# Patient Record
Sex: MALE | Race: WHITE | NOT HISPANIC OR LATINO | Employment: UNEMPLOYED | URBAN - METROPOLITAN AREA
[De-identification: names, ages, dates, MRNs, and addresses within clinical notes are randomized per-mention and may not be internally consistent; named-entity substitution may affect disease eponyms.]

---

## 2017-01-02 DIAGNOSIS — K21.9 GASTROESOPHAGEAL REFLUX DISEASE WITHOUT ESOPHAGITIS: Primary | ICD-10-CM

## 2017-01-02 RX ORDER — ATORVASTATIN CALCIUM 40 MG/1
20 TABLET, FILM COATED ORAL DAILY
Qty: 90 TABLET | Refills: 3 | COMMUNITY
Start: 2017-01-02 | End: 2019-01-24

## 2017-02-03 ENCOUNTER — TRANSFERRED RECORDS (OUTPATIENT)
Dept: HEALTH INFORMATION MANAGEMENT | Facility: CLINIC | Age: 42
End: 2017-02-03

## 2017-02-24 ENCOUNTER — TELEPHONE (OUTPATIENT)
Dept: EDUCATION SERVICES | Facility: CLINIC | Age: 42
End: 2017-02-24

## 2017-02-24 DIAGNOSIS — E10.59 TYPE 1 DIABETES MELLITUS WITH DIABETIC CARDIOMYOPATHY (H): Primary | ICD-10-CM

## 2017-02-24 DIAGNOSIS — I43 TYPE 1 DIABETES MELLITUS WITH DIABETIC CARDIOMYOPATHY (H): Primary | ICD-10-CM

## 2017-02-24 NOTE — TELEPHONE ENCOUNTER
Patient has expressed interest in attending diabetes education, as a part of his [facility] insurance plan's Comprehensive Diabetes Solution program. Diabetes Educator Referral has been pended for your completion and signature. Please review, complete and sign, if you are in agreement. Thank you!    Marcelle Garcia MPH RD LD CDE   [facility]

## 2017-03-23 DIAGNOSIS — Z94.0 KIDNEY TRANSPLANTED: ICD-10-CM

## 2017-03-23 LAB
ANION GAP SERPL CALCULATED.3IONS-SCNC: 6 MMOL/L (ref 3–14)
BUN SERPL-MCNC: 20 MG/DL (ref 7–30)
CALCIUM SERPL-MCNC: 9.2 MG/DL (ref 8.5–10.1)
CHLORIDE SERPL-SCNC: 106 MMOL/L (ref 94–109)
CO2 SERPL-SCNC: 28 MMOL/L (ref 20–32)
CREAT SERPL-MCNC: 1.78 MG/DL (ref 0.66–1.25)
ERYTHROCYTE [DISTWIDTH] IN BLOOD BY AUTOMATED COUNT: 13.2 % (ref 10–15)
GFR SERPL CREATININE-BSD FRML MDRD: 43 ML/MIN/1.7M2
GLUCOSE SERPL-MCNC: 117 MG/DL (ref 70–99)
HCT VFR BLD AUTO: 42.4 % (ref 40–53)
HGB BLD-MCNC: 14 G/DL (ref 13.3–17.7)
MCH RBC QN AUTO: 29.5 PG (ref 26.5–33)
MCHC RBC AUTO-ENTMCNC: 33 G/DL (ref 31.5–36.5)
MCV RBC AUTO: 89 FL (ref 78–100)
PLATELET # BLD AUTO: 248 10E9/L (ref 150–450)
POTASSIUM SERPL-SCNC: 4.9 MMOL/L (ref 3.4–5.3)
RBC # BLD AUTO: 4.75 10E12/L (ref 4.4–5.9)
SODIUM SERPL-SCNC: 140 MMOL/L (ref 133–144)
TACROLIMUS BLD-MCNC: 7.1 UG/L (ref 5–15)
TME LAST DOSE: NORMAL H
WBC # BLD AUTO: 7.2 10E9/L (ref 4–11)

## 2017-03-23 PROCEDURE — 80197 ASSAY OF TACROLIMUS: CPT | Performed by: INTERNAL MEDICINE

## 2017-03-23 PROCEDURE — 80048 BASIC METABOLIC PNL TOTAL CA: CPT | Performed by: INTERNAL MEDICINE

## 2017-03-23 PROCEDURE — 85027 COMPLETE CBC AUTOMATED: CPT | Performed by: INTERNAL MEDICINE

## 2017-03-23 PROCEDURE — 36415 COLL VENOUS BLD VENIPUNCTURE: CPT | Performed by: INTERNAL MEDICINE

## 2017-04-04 NOTE — TELEPHONE ENCOUNTER
Prescription approved per [facility] Refill Protocol.  Malia Kaminski, RN  Triage Nurse

## 2017-04-04 NOTE — TELEPHONE ENCOUNTER
YOHAN CONTOUR TEST STRIP       Last Written Prescription Date: 12/30/2015  Last Fill Quantity: 450,  # refills: 3   Last Office Visit with [facility], [facility] or [facility] prescribing provider: 12/22/2016

## 2017-06-07 DIAGNOSIS — Z94.0 KIDNEY TRANSPLANTED: ICD-10-CM

## 2017-06-07 LAB
ANION GAP SERPL CALCULATED.3IONS-SCNC: 9 MMOL/L (ref 3–14)
BUN SERPL-MCNC: 27 MG/DL (ref 7–30)
CALCIUM SERPL-MCNC: 9.1 MG/DL (ref 8.5–10.1)
CHLORIDE SERPL-SCNC: 108 MMOL/L (ref 94–109)
CO2 SERPL-SCNC: 22 MMOL/L (ref 20–32)
CREAT SERPL-MCNC: 1.63 MG/DL (ref 0.66–1.25)
ERYTHROCYTE [DISTWIDTH] IN BLOOD BY AUTOMATED COUNT: 13 % (ref 10–15)
GFR SERPL CREATININE-BSD FRML MDRD: 47 ML/MIN/1.7M2
GLUCOSE SERPL-MCNC: 116 MG/DL (ref 70–99)
HCT VFR BLD AUTO: 41.9 % (ref 40–53)
HGB BLD-MCNC: 13.9 G/DL (ref 13.3–17.7)
MCH RBC QN AUTO: 29.6 PG (ref 26.5–33)
MCHC RBC AUTO-ENTMCNC: 33.2 G/DL (ref 31.5–36.5)
MCV RBC AUTO: 89 FL (ref 78–100)
PLATELET # BLD AUTO: 259 10E9/L (ref 150–450)
POTASSIUM SERPL-SCNC: 5 MMOL/L (ref 3.4–5.3)
RBC # BLD AUTO: 4.7 10E12/L (ref 4.4–5.9)
SODIUM SERPL-SCNC: 139 MMOL/L (ref 133–144)
TACROLIMUS BLD-MCNC: 6.5 UG/L (ref 5–15)
TME LAST DOSE: NORMAL H
WBC # BLD AUTO: 8.6 10E9/L (ref 4–11)

## 2017-06-07 PROCEDURE — 85027 COMPLETE CBC AUTOMATED: CPT | Performed by: INTERNAL MEDICINE

## 2017-06-07 PROCEDURE — 80197 ASSAY OF TACROLIMUS: CPT | Performed by: INTERNAL MEDICINE

## 2017-06-07 PROCEDURE — 36415 COLL VENOUS BLD VENIPUNCTURE: CPT | Performed by: INTERNAL MEDICINE

## 2017-06-07 PROCEDURE — 80048 BASIC METABOLIC PNL TOTAL CA: CPT | Performed by: INTERNAL MEDICINE

## 2017-06-14 ENCOUNTER — OFFICE VISIT (OUTPATIENT)
Dept: PODIATRY | Facility: CLINIC | Age: 42
End: 2017-06-14
Payer: COMMERCIAL

## 2017-06-14 VITALS — WEIGHT: 201.1 LBS | BODY MASS INDEX: 30.48 KG/M2 | HEART RATE: 76 BPM | HEIGHT: 68 IN

## 2017-06-14 DIAGNOSIS — I43 TYPE 1 DIABETES MELLITUS WITH DIABETIC CARDIOMYOPATHY (H): ICD-10-CM

## 2017-06-14 DIAGNOSIS — E10.59 TYPE 1 DIABETES MELLITUS WITH DIABETIC CARDIOMYOPATHY (H): ICD-10-CM

## 2017-06-14 DIAGNOSIS — M79.671 PAIN IN BOTH FEET: Primary | ICD-10-CM

## 2017-06-14 DIAGNOSIS — L84 CALLUS OF FOOT: ICD-10-CM

## 2017-06-14 DIAGNOSIS — M21.6X9 PES CAVUS, UNSPECIFIED LATERALITY: ICD-10-CM

## 2017-06-14 DIAGNOSIS — M79.672 PAIN IN BOTH FEET: Primary | ICD-10-CM

## 2017-06-14 PROCEDURE — 11056 PARNG/CUTG B9 HYPRKR LES 2-4: CPT | Performed by: PODIATRIST

## 2017-06-14 PROCEDURE — 99203 OFFICE O/P NEW LOW 30 MIN: CPT | Mod: 25 | Performed by: PODIATRIST

## 2017-06-14 NOTE — PROGRESS NOTES
"  ASSESSMENT/PLAN:    Encounter Diagnoses   Name Primary?     Pain in both feet Yes     Callus of foot      Pes cavus, unspecified laterality      No acute findings. No ulcerations.    I paired the three  hyperkeratotic lesions down with a #15 scalpel.   He is to continue to file the areas down  Pt is referred to the [facility] for  prescription accommodative orthoses.      Information on \"diabetes and your feet\" was provided.     Body mass index is 30.58 kg/(m^2).    Weight management plan: Patient was referred to their PCP to discuss a diet and exercise plan.      Eric Edward DPM, FACFAS, MS    [place] [facility]le [facility]      ____________________________________________________________________    HPI:          Chief Complaint: callus build up, both feet.   Onset of problem: years  Pain/ discomfort is described as:  Throbbing, shooting  Ratin/10   Frequency:  daily    The pain is made worse with walking  Previous treatment: orthoses, self filing  *  Past Medical History:   Diagnosis Date     CMV (cytomegalovirus infection) status negative      Coronary artery disease, non-occlusive     angio showed diffuse disease with no lesions     Diabetes mellitus, type 1     Diagnosed at age 11     Dyslipidemia      Hypertension      Immunosuppressed status (H)      Kidney transplant status, living related donor           Mycotic aneurysm of kidney transplant (H) 2008     Noncompliance with treatment     no labs for one year     Pancreas replaced by transplant (H) 2009    rejection treated with thymo     Tobacco abuse ongoing   *  *  Past Surgical History:   Procedure Laterality Date     BYPASS GRAFT ARTERY CORONARY N/A 2015    Procedure: BYPASS GRAFT ARTERY CORONARY;  Surgeon: Katy Neville MD;  Location: [facility] OR     ORTHOPEDIC SURGERY      tumor removed from left knee     TRANSPLANT      pancrease and kidney transplant   *  *  Current " Outpatient Prescriptions   Medication Sig Dispense Refill     YOHAN CONTOUR test strip USE TO TEST BLOOD SUGAR FIVE TIMES A DAY OR AS DIRECTED 450 each 3     atorvastatin (LIPITOR) 40 MG tablet Take 0.5 tablets (20 mg) by mouth daily 90 tablet 3     omeprazole (PRILOSEC) 40 MG capsule Take 1 capsule (40 mg) by mouth daily Take 30-60 minutes before a meal. 90 capsule 3     tacrolimus (PROGRAF - GENERIC EQUIVALENT) 1 MG capsule Take 1 capsule (1 mg) by mouth 2 times daily Total dose = 1.5 mg BID 60 capsule 11     tacrolimus (PROGRAF - GENERIC EQUIVALENT) 0.5 MG capsule Take 1 capsule (0.5 mg) by mouth 2 times daily Total dose = 1.5 mg BID 60 capsule 11     aspirin 81 MG tablet Take 1 tablet (81 mg) by mouth daily 90 tablet 3     insulin glargine (LANTUS SOLOSTAR) 100 UNIT/ML injection Inject 22 Units Subcutaneous At Bedtime 1 Month 5     insulin aspart (NOVOLOG PEN) 100 UNIT/ML injection Inject 1-8 Units Subcutaneous 3 times daily (before meals) Do Not give Correction Insulin if Pre-Meal BG < 140   140-169 give 1 units.  170-199 give 2 units.  -229 give 3 units.  -259 give 4 units.  260-289 give 5 units.  290-319 give 6 units.  320-349 give 7 units.  BG >/= 350 give 8 units.  To be given with prandial insulin, and based on pre-meal blood glucose.   Notify MD if glucose = or > 350 mg/dL after administration. 15 mL 5     metoprolol (TOPROL-XL) 25 MG 24 hr tablet Take 1 tablet (25 mg) by mouth daily 90 tablet 3     mycophenolic acid (MYFORTIC - GENERIC EQUIVALENT) 360 MG EC tablet Take 2 tablets (720 mg) by mouth 2 times daily 120 each 5     fludrocortisone (FLORINEF) 0.1 MG tablet TAKE ONE TABLET BY MOUTH EVERY DAY 30 tablet 11     insulin pen needle (BD ARPITA U/F) 32G X 4 MM Use 4 daily or as directed. 120 each 11     polyethylene glycol (MIRALAX/GLYCOLAX) packet Take 17 g by mouth daily as needed for constipation       lactulose 20 GM/30ML SOLN Take 30 mLs (20 g) by mouth 2 times daily as needed For  "constipation. 473 mL 1     insulin aspart (NOVOLOG PEN) 100 UNIT/ML soln Inject 1-6 Units Subcutaneous At Bedtime Custom DOSING     Do Not give Bedtime Correction Insulin if BG < 200  -229 give 1 units.  -259 give 2 units.  -289 give 3 units.  -319 give 4 units.  -349 give 5 units.  BG >/= 350 give 6 units.  Notify MD if glucose = or > 350 mg/dL after administration. 3 mL 2     insulin aspart (NOVOLOG PEN) 100 UNIT/ML soln Inject 1 Units Subcutaneous Take with snacks or supplements for high blood sugar DOSE:  1 units per 10 grams of carbohydrate. Only chart total amount of units given.  Do not give if pre-prandial glucose is < 60 mg/dL. 1 Month 6     Blood Glucose Monitoring Suppl ([employer] CONTOUR MONITOR) W/DEVICE KIT TO TEST BG 5 TIMIES DAILY 1 kit 0       ROS:     A 10-point review of systems was performed and is positive for that noted in the HPI and as seen below.  All other areas are negative.     Numbness in feet?  no   Calf pain with walking? no  Recent foot/ankle injury? no  Weight change over past 12 months? 10-15# gain  Self perception as overweight? yes  Recent flu-like symptoms? no  Joint pain other than feet ? no    Social History: Employment:  Software desnigner;  Exercise/Physical activity:  \"below average\";  Tobacco use: yes  Social History     Social History     Marital status:      Spouse name: N/A     Number of children: 1     Years of education: N/A     Occupational History           Social History Main Topics     Smoking status: Former Smoker     Packs/day: 0.30     Types: Cigarettes     Smokeless tobacco: Never Used      Comment: E- Cig     Alcohol use 0.0 oz/week     0 Standard drinks or equivalent per week      Comment: social     Drug use: No     Sexual activity: No     Other Topics Concern     Blood Transfusions No     possibly during surgery, otherwise none.     Seat Belt Yes     Social History Narrative       Family " "history:  Family History   Problem Relation Age of Onset     HEART DISEASE Maternal Grandfather 62       Rheumatoid arthritis:  no  Foot Problems: no  Diabetes: yes      EXAM:    Vitals: Pulse 76  Ht 5' 8\" (1.727 m)  Wt 201 lb 1.6 oz (91.2 kg)  BMI 30.58 kg/m2  BMI: Body mass index is 30.58 kg/(m^2).  Height: 5' 8\"    Constitutional/ general:  Pt is in no apparent distress, appears well-nourished.  Cooperative with history and physical exam.     Vascular:  Pedal pulses are palpable bilaterally for both the DP and PT arteries.  CFT < 3 sec.  No edema.  Pedal hair growth noted.     Neuro:  Alert and oriented x 3. Coordinated gait.  Light touch sensation is intact to the L4, L5, S1 distributions. No obvious deficits.  No evidence of neurological-based weakness, spasticity, or contracture in the lower extremities.     Derm: Normal texture and turgor.  No erythema, ecchymosis, or cyanosis.  No open lesions.   Hyperkeratotic skin build up sub right 5th metatarsal head; sub left 1st and 5th metatarsal heads    Musculoskeletal:    Lower extremity muscle strength is normal.  Patient is ambulatory without an assistive device or brace .  No gross deformities.  High medial longitudinal arches.      Eric Edward DPM, JAMEL, MS [place] [facility] & Ankle Surgery              "

## 2017-06-14 NOTE — LETTER
"  2017       RE: Murtaza Fitzgerald  [address] [address]           Dear Colleague,    Thank you for referring your patient, Murtaza Fitzgerald, to the [facility]. Please see a copy of my visit note below.      ASSESSMENT/PLAN:    Encounter Diagnoses   Name Primary?     Pain in both feet Yes     Callus of foot      Pes cavus, unspecified laterality      No acute findings. No ulcerations.    I paired the three  hyperkeratotic lesions down with a #15 scalpel.   He is to continue to file the areas down  Pt is referred to the [facility] for  prescription accommodative orthoses.      Information on \"diabetes and your feet\" was provided.     Body mass index is 30.58 kg/(m^2).    Weight management plan: Patient was referred to their PCP to discuss a diet and exercise plan.      Eric Edward DPM, FACFAS, MS    [place] [facility] Pod[facility]ry/Foot & Ankle Surgery      ____________________________________________________________________    HPI:          Chief Complaint: callus build up, both feet.   Onset of problem: years  Pain/ discomfort is described as:  Throbbing, shooting  Ratin/10   Frequency:  daily    The pain is made worse with walking  Previous treatment: orthoses, self filing  *  Past Medical History:   Diagnosis Date     CMV (cytomegalovirus infection) status negative      Coronary artery disease, non-occlusive     angio showed diffuse disease with no lesions     Diabetes mellitus, type 1     Diagnosed at age 11     Dyslipidemia      Hypertension      Immunosuppressed status (H)      Kidney transplant status, living related donor           Mycotic aneurysm of kidney transplant (H) 2008     Noncompliance with treatment     no labs for one year     Pancreas replaced by transplant (H) 2009    rejection treated with thymo     Tobacco abuse ongoing   *  *  Past Surgical History:   Procedure Laterality Date     " BYPASS GRAFT ARTERY CORONARY N/A 8/5/2015    Procedure: BYPASS GRAFT ARTERY CORONARY;  Surgeon: Katy Neville MD;  Location: [facility] OR     ORTHOPEDIC SURGERY  1993    tumor removed from left knee     TRANSPLANT      pancrease and kidney transplant   *  *  Current Outpatient Prescriptions   Medication Sig Dispense Refill     YOHAN CONTOUR test strip USE TO TEST BLOOD SUGAR FIVE TIMES A DAY OR AS DIRECTED 450 each 3     atorvastatin (LIPITOR) 40 MG tablet Take 0.5 tablets (20 mg) by mouth daily 90 tablet 3     omeprazole (PRILOSEC) 40 MG capsule Take 1 capsule (40 mg) by mouth daily Take 30-60 minutes before a meal. 90 capsule 3     tacrolimus (PROGRAF - GENERIC EQUIVALENT) 1 MG capsule Take 1 capsule (1 mg) by mouth 2 times daily Total dose = 1.5 mg BID 60 capsule 11     tacrolimus (PROGRAF - GENERIC EQUIVALENT) 0.5 MG capsule Take 1 capsule (0.5 mg) by mouth 2 times daily Total dose = 1.5 mg BID 60 capsule 11     aspirin 81 MG tablet Take 1 tablet (81 mg) by mouth daily 90 tablet 3     insulin glargine (LANTUS SOLOSTAR) 100 UNIT/ML injection Inject 22 Units Subcutaneous At Bedtime 1 Month 5     insulin aspart (NOVOLOG PEN) 100 UNIT/ML injection Inject 1-8 Units Subcutaneous 3 times daily (before meals) Do Not give Correction Insulin if Pre-Meal BG < 140   140-169 give 1 units.  170-199 give 2 units.  -229 give 3 units.  -259 give 4 units.  260-289 give 5 units.  290-319 give 6 units.  320-349 give 7 units.  BG >/= 350 give 8 units.  To be given with prandial insulin, and based on pre-meal blood glucose.   Notify MD if glucose = or > 350 mg/dL after administration. 15 mL 5     metoprolol (TOPROL-XL) 25 MG 24 hr tablet Take 1 tablet (25 mg) by mouth daily 90 tablet 3     mycophenolic acid (MYFORTIC - GENERIC EQUIVALENT) 360 MG EC tablet Take 2 tablets (720 mg) by mouth 2 times daily 120 each 5     fludrocortisone (FLORINEF) 0.1 MG tablet TAKE ONE TABLET BY MOUTH EVERY DAY 30 tablet 11     insulin pen  "needle (BD ARPITA U/F) 32G X 4 MM Use 4 daily or as directed. 120 each 11     polyethylene glycol (MIRALAX/GLYCOLAX) packet Take 17 g by mouth daily as needed for constipation       lactulose 20 GM/30ML SOLN Take 30 mLs (20 g) by mouth 2 times daily as needed For constipation. 473 mL 1     insulin aspart (NOVOLOG PEN) 100 UNIT/ML soln Inject 1-6 Units Subcutaneous At Bedtime Custom DOSING     Do Not give Bedtime Correction Insulin if BG < 200  -229 give 1 units.  -259 give 2 units.  -289 give 3 units.  -319 give 4 units.  -349 give 5 units.  BG >/= 350 give 6 units.  Notify MD if glucose = or > 350 mg/dL after administration. 3 mL 2     insulin aspart (NOVOLOG PEN) 100 UNIT/ML soln Inject 1 Units Subcutaneous Take with snacks or supplements for high blood sugar DOSE:  1 units per 10 grams of carbohydrate. Only chart total amount of units given.  Do not give if pre-prandial glucose is < 60 mg/dL. 1 Month 6     Blood Glucose Monitoring Suppl ([employer] CONTOUR MONITOR) W/DEVICE KIT TO TEST BG 5 TIMIES DAILY 1 kit 0       ROS:     A 10-point review of systems was performed and is positive for that noted in the HPI and as seen below.  All other areas are negative.     Numbness in feet?  no   Calf pain with walking? no  Recent foot/ankle injury? no  Weight change over past 12 months? 10-15# gain  Self perception as overweight? yes  Recent flu-like symptoms? no  Joint pain other than feet ? no    Social History: Employment:  Software desnigner;  Exercise/Physical activity:  \"below average\";  Tobacco use: yes  Social History     Social History     Marital status:      Spouse name: N/A     Number of children: 1     Years of education: N/A     Occupational History           Social History Main Topics     Smoking status: Former Smoker     Packs/day: 0.30     Types: Cigarettes     Smokeless tobacco: Never Used      Comment: E- Cig     Alcohol use 0.0 oz/week     0 Standard " "drinks or equivalent per week      Comment: social     Drug use: No     Sexual activity: No     Other Topics Concern     Blood Transfusions No     possibly during surgery, otherwise none.     Seat Belt Yes     Social History Narrative       Family history:  Family History   Problem Relation Age of Onset     HEART DISEASE Maternal Grandfather 62       Rheumatoid arthritis:  no  Foot Problems: no  Diabetes: yes      EXAM:    Vitals: Pulse 76  Ht 5' 8\" (1.727 m)  Wt 201 lb 1.6 oz (91.2 kg)  BMI 30.58 kg/m2  BMI: Body mass index is 30.58 kg/(m^2).  Height: 5' 8\"    Constitutional/ general:  Pt is in no apparent distress, appears well-nourished.  Cooperative with history and physical exam.     Vascular:  Pedal pulses are palpable bilaterally for both the DP and PT arteries.  CFT < 3 sec.  No edema.  Pedal hair growth noted.     Neuro:  Alert and oriented x 3. Coordinated gait.  Light touch sensation is intact to the L4, L5, S1 distributions. No obvious deficits.  No evidence of neurological-based weakness, spasticity, or contracture in the lower extremities.     Derm: Normal texture and turgor.  No erythema, ecchymosis, or cyanosis.  No open lesions.   Hyperkeratotic skin build up sub right 5th metatarsal head; sub left 1st and 5th metatarsal heads    Musculoskeletal:    Lower extremity muscle strength is normal.  Patient is ambulatory without an assistive device or brace .  No gross deformities.  High medial longitudinal arches.      Eric Edward DPM, FACJAZZY, MS    [place] [facility] Pod[facility] & Ankle Surgery                Again, thank you for allowing me to participate in the care of your patient.        Sincerely,              Eric Edward DPM    "

## 2017-06-14 NOTE — NURSING NOTE
"Chief Complaint   Patient presents with     Foot Problems     DM foot check, calluses on lateral ball of foot L>R       Initial Pulse 76  Ht 5' 8\" (1.727 m)  Wt 201 lb 1.6 oz (91.2 kg)  BMI 30.58 kg/m2 Estimated body mass index is 30.58 kg/(m^2) as calculated from the following:    Height as of this encounter: 5' 8\" (1.727 m).    Weight as of this encounter: 201 lb 1.6 oz (91.2 kg).  Medication Reconciliation: complete  "

## 2017-06-14 NOTE — PATIENT INSTRUCTIONS
[facility] LOCATIONS     MONDAY / FRIDAY AM - ALETHA WEDNESDAY - SIMON   [address] [place] [address] RENAY Rojas [address]   [phone] / [phone] / [phone]       THURSDAY - HIAWATHA SCHEDULE SURGERY: [phone]   [address] APPOINTMENTS: [phone]   [place] [address] AFTER HOURS: [phone]   [phone] / [phone] BILLING QUESTIONS: [phone]      [facility] LOCATIONS  [facility]  [address]  [place] [address]  Phone: [phone]  Fax: [phone] [facility]  [address]  [place] [address]  Phone: [phone]   Fax: [phone]   [facility]  [address]  [place] [address]  Phone: [phone]  Fax: [phone] [facility]  [address]  [place] [address]  Phone: [phone]   Fax: [phone]   [facility]   [address]  [place] [address]  Phone: [phone]   Fax: [phone] * Please call any location listed to make an appointment for a casting/fitting. Your referral was sent to their central office and they will all have the order on file.     CALLUS / CORN / IPK    When there is excessive friction or pressure on the skin, the body responds by making the skin thicker to protect the deeper structures from becoming exposed. While this works well to protect the deeper structures, the thickened skin can cause increased pressure and pain.    Callus: flat, diffuse thickened areas are simple calluses and they are usually caused by friction. Often these are the result of rubbing on a shoe or from going barefoot.    Corns: calluses with a central core on or between the toes are called corns. These result from prominent joints on toes rubbing together. Theses are a symptom of bone malalignment or illfitting shoes and will  always recur unless the underlying bones are addressed surgically.    IPK: calluses with a central core on the ball of the foot are usually IPKs (intractable plantar keratosis). These are caused by excessive pressure from the metatarsals, the bones that make up the ball of the foot. Often one of these bones is too long or too prominent. Again, these will always recur unless the underlying bone issue is addressed. There is no cure for these. They will either go away by themselves, recur, or more could develop.    Home Treatment  - File: Trim them down with a pumice stone or callus file a couple times a week to remove callus tissue that builds up. An electric callus removing device. Amope Pedi Perfect Electronic Pedicure Foot File and Callus Remover can be a good option.   - Moisturize: Lotion can be applied to soften the callus. A lactic acid or urea based cream such as Carmol, Kersal or Vanicream or thicker cream with shea butter are good options.   - Foot Gear: Good supportive shoes and minimizing barefoot walking can slow down callus formation and can decrease pain levels. Gel inserts can also provide padding to the bottom of the foot to prevent pain and slow recurrence. Toe spacers, toe covers, can custom orthotic inserts can be beneficial as well.  - Surgery: If there is a surgical pathology noted, such as a prominent bone, often this needs to be addressed surgically to minimize recurrence. However, sometimes the lesion simply migrates to another spot after surgery, so it is not a guaranteed cure.     If you cannot treat them yourself at home, call the home foot care nurses below:  [facility]  [phone] [facility]   [phone] [facility]   [phone]       DIABETES AND YOUR FEET    Diabetes can result in several problems in the feet including ulcers (open sores) and amputations. Two of the most important reasons why people develop foot problems when they have diabetes is : 1. Neuropathy (loss of feeling)  " 2. Vascular disease (loss or decrease of blood flow).    Neuropathy is a term used to describe a loss of nerve function.  Patients with diabetes are at risk of developing neuropathy if their sugars continue to run high and are above the normal value. One theory for neuropathy is that the \"extra\" sugar in the body enters the nerves and is broken down. These by-products build up in the nerve causing it to swell and impairing nerve function. Often times, this can be prevented by controlling your sugars, dieting and exercise.    When a person develops neuropathy, they usually begin to feel numbness or tingling in their feet and sometime in their legs.  Other symptoms may include painful burning or hot feet, tingling or feeling like insects or ants are crawling on your feet or legs.  If the diabetes is sever and the sugars run high for long periods of time, neuropathy can also occur in the hands.    Vascular disease is a term used to describe a loss or decrease in circulation (blood flow). There is a problem in getting blood and oxygen to areas that need it. Similar to neuropathy, sugars can build up in the walls of the arteries (blood vessels) and cause them to become swollen, thickened and hardened. This decreases the amount of blood that can go to an area that needs it. Though this is common in the legs of diabetic patients, it can also affect other arteries (blood vessels) in the body such as in the heart and eyes.    In the legs, vascular disease usually results in cramping. Patients who develop leg cramps after walking the same distance every time (i.e. One block, half a mile, ect.) need to let their doctors know so that their circulation may be checked. Cramps causing severe pain in the feet and/or legs while sleeping and the cramps go away when you stand or hang your legs off the side of the bed, may also be a sign of poor blood circulation.  Occasional cramping in cold weather or on rare occasions with activity " may not be due to poor circulation, but you should inform your doctor.    PREVENTION OF THESE DISEASES  The key to prevention is good blood sugar control. Poor blood sugar control is a big reason many of these problems start. Physical activity (exercise) is a very good way to help decrease your blood sugars. Exercise can lower your blood sugar, blood pressure, and cholesterol. It also reduces your risk for heart disease and stroke, relieves stress, and strengthens your heart, muscles and bones.  In addition, regular activity helps insulin work better, improves your blood circulation, and keeps your joints flexible. If you're trying to lose weight, a combination of exercise and wise food choices can help you reach your target weight and maintain it.      PAIN MANAGEMENT  1.Blood Sugar Control - Most important  2. Medications such as:  Amytriptylline, duloxetine, gabapentin, lyrica, tramadol  3. Nutritional therapy:  Vitamin B6 (100mg daily), Vitamin B12 (75mcg daily), Vitamin D 2000 IU daily), Alpha-Lipoic Acid (600-1800mg daily), Acetyl-L-Carnitine (500-1000mg TID, L-methyl folate (1500mcg daily)    ** Metformin can block Vitamin B6 and B12 so it is important to supplement**    FOOT CARE RECOMMENDATIONS   1. Wash your feet with lukewarm water and a mild soap and then dry them thoroughly, especially between the toes.     2. Examine your feet daily looking for cuts, corns, blisters, cracks, ect, especially after wearing new shoes. Make sure to look between your toes. If you cannot see the bottom of your feet, set a mirror on the floor and hold your foot over it, or ask a spouse, friend or family member to examine your feet for you. Contact your doctor immediately if new problems are noted or if sores are not healing.     3. Immediately apply moisturizer to the tops and bottoms of your feet, avoiding areas between the toes. Hand lotion (Intesive Care, Keyonna, Eucerin, Neutrogena, Curel, ect) is sufficient unless your  doctor prescribes a medicated lotion. Apply sunscreen to your feet when going swimming outside.     4. Use clean comfortable shoes, wear white socks (if you have any bleeding or drainage, you will see it on white socks). Socks should not have thick seams or cut off the circulation around the leg. Break in new shoes slowly and rotate with older shoes until broken in. Check the inside of your shoes with your hand to look for areas of irritation or objects that may have fallen into your shoes.       5. Keep slippers by the side of your bed for use during the night.     6.  Shoes should be fitted by a professional and should not cause areas of irritation.  Check your feet regularly when wearing a new pair of shoes and replace them as needed.     7.  Talk to your doctor about proper exercise. Exercise and stretching stimulate blood flow to your feet and maintain proper glucose levels.     8.  Monitor your blood glucose level as instructed by your doctor. Notify your doctor immediately if your blood sugar is abnormally high or low.    9. Cut your nails straight across, but then gently round any sharp edges with a cardboard nail file. If you have neuropathy, peripheral vascular disease or cannot see that well to trim your own toenails contact [facility] ([phone]) or [facility] ([phone]).      THINGS TO AVOID DOING   1.  Do not soak your feet if you have an open sore. Use only lukewarm water and always check the temperature with your hand as hot water can easily burn your feet.       2.  Never use a hot water bottle or heating pad on your feet. Also do not apply cold compresses to your feet. With decreased sensation, you could burn or freeze your feet.       3.  Do not apply any of these to your feet:    -  Over the counter medicine for corns or warts    -  Harsh chemicals like boric acid    -  Do not self-treat corns, cuts, blisters or infections. Always consult your doctor.       4.  Do not wear sandals,  slippers or walk barefoot, especially on hot sand or concrete or other harsh surfaces.     5.  If you smoke, stop!!!            Body Mass Index (BMI)  Many things can cause foot and ankle problems. Foot structure, activity level, foot mechanics and injuries are common causes of pain.  One very important issue that often goes unmentioned, is body weight.  Extra weight can cause increased stress on muscles, ligaments, bones and tendons.  Sometimes just a few extra pounds is all it takes to put one over her/his threshold. Without reducing that stress, it can be difficult to alleviate pain. Some people are uncomfortable addressing this issue, but we feel it is important for you to think about it. As [facility] specialists, our job is addressing the lower extremity problem and possible causes. Regarding extra body weight, we encourage patients to discuss diet and weight management plans with their primary care doctors. It is this team approach that gives you the best opportunity for pain relief and getting you back on your feet.

## 2017-06-14 NOTE — MR AVS SNAPSHOT
After Visit Summary   6/14/2017    Murtaza Fitzgerald    MRN: [id number]           Patient Information     Date Of Birth          1975        Visit Information        Provider Department      6/14/2017 9:15 AM Eric Maza DPM [facility]        Today's Diagnoses     Pain in both feet    -  1    Callus of foot        Pes cavus, unspecified laterality          Care Instructions    DR. MAZA'S CLINIC LOCATIONS     [facility]   [address] [place] [address] [place] [address]   [phone] / [phone] / [phone]       THURSDAY - [place] SCHEDULE SURGERY: [phone]   [address] APPOINTMENTS: [phone]   [place] [address] AFTER HOURS: [phone] / [phone] BILLING QUESTIONS: [phone]      [facility] LOCATIONS  [facility]  [address]  [place] [address]  Phone: [phone]  Fax: [phone] [facility]  [address]  [place] [address]  Phone: [phone]   Fax: [phone]   [facility]  [address]  [place] [address]  Phone: [phone]  Fax: [phone] [facility]  [address]  [place] [address]  Phone: [phone]   Fax: [phone]   [facility]   [address]  [place] [address]  Phone: [phone]   Fax: [phone] * Please call any location listed to make an appointment for a casting/fitting. Your referral was sent to their central office and they will all have the order on file.     CALLUS / CORN / IPK    When there is excessive friction or pressure on the skin, the body responds by making the skin thicker to protect the deeper structures from becoming exposed. While this works well to protect the deeper structures, the thickened  skin can cause increased pressure and pain.    Callus: flat, diffuse thickened areas are simple calluses and they are usually caused by friction. Often these are the result of rubbing on a shoe or from going barefoot.    Corns: calluses with a central core on or between the toes are called corns. These result from prominent joints on toes rubbing together. Theses are a symptom of bone malalignment or illfitting shoes and will always recur unless the underlying bones are addressed surgically.    IPK: calluses with a central core on the ball of the foot are usually IPKs (intractable plantar keratosis). These are caused by excessive pressure from the metatarsals, the bones that make up the ball of the foot. Often one of these bones is too long or too prominent. Again, these will always recur unless the underlying bone issue is addressed. There is no cure for these. They will either go away by themselves, recur, or more could develop.    Home Treatment  - File: Trim them down with a pumice stone or callus file a couple times a week to remove callus tissue that builds up. An electric callus removing device. Amope Pedi Perfect Electronic Pedicure Foot File and Callus Remover can be a good option.   - Moisturize: Lotion can be applied to soften the callus. A lactic acid or urea based cream such as Carmol, Kersal or Vanicream or thicker cream with shea butter are good options.   - Foot Gear: Good supportive shoes and minimizing barefoot walking can slow down callus formation and can decrease pain levels. Gel inserts can also provide padding to the bottom of the foot to prevent pain and slow recurrence. Toe spacers, toe covers, can custom orthotic inserts can be beneficial as well.  - Surgery: If there is a surgical pathology noted, such as a prominent bone, often this needs to be addressed surgically to minimize recurrence. However, sometimes the lesion simply migrates to another spot after surgery, so it is not a  "guaranteed cure.     If you cannot treat them yourself at home, call the home foot care nurses below:  [facility]  [phone] [facility]   [phone] [facility]   [phone]       DIABETES AND YOUR FEET    Diabetes can result in several problems in the feet including ulcers (open sores) and amputations. Two of the most important reasons why people develop foot problems when they have diabetes is : 1. Neuropathy (loss of feeling)  2. Vascular disease (loss or decrease of blood flow).    Neuropathy is a term used to describe a loss of nerve function.  Patients with diabetes are at risk of developing neuropathy if their sugars continue to run high and are above the normal value. One theory for neuropathy is that the \"extra\" sugar in the body enters the nerves and is broken down. These by-products build up in the nerve causing it to swell and impairing nerve function. Often times, this can be prevented by controlling your sugars, dieting and exercise.    When a person develops neuropathy, they usually begin to feel numbness or tingling in their feet and sometime in their legs.  Other symptoms may include painful burning or hot feet, tingling or feeling like insects or ants are crawling on your feet or legs.  If the diabetes is sever and the sugars run high for long periods of time, neuropathy can also occur in the hands.    Vascular disease is a term used to describe a loss or decrease in circulation (blood flow). There is a problem in getting blood and oxygen to areas that need it. Similar to neuropathy, sugars can build up in the walls of the arteries (blood vessels) and cause them to become swollen, thickened and hardened. This decreases the amount of blood that can go to an area that needs it. Though this is common in the legs of diabetic patients, it can also affect other arteries (blood vessels) in the body such as in the heart and eyes.    In the legs, vascular disease usually results in cramping. Patients " who develop leg cramps after walking the same distance every time (i.e. One block, half a mile, ect.) need to let their doctors know so that their circulation may be checked. Cramps causing severe pain in the feet and/or legs while sleeping and the cramps go away when you stand or hang your legs off the side of the bed, may also be a sign of poor blood circulation.  Occasional cramping in cold weather or on rare occasions with activity may not be due to poor circulation, but you should inform your doctor.    PREVENTION OF THESE DISEASES  The key to prevention is good blood sugar control. Poor blood sugar control is a big reason many of these problems start. Physical activity (exercise) is a very good way to help decrease your blood sugars. Exercise can lower your blood sugar, blood pressure, and cholesterol. It also reduces your risk for heart disease and stroke, relieves stress, and strengthens your heart, muscles and bones.  In addition, regular activity helps insulin work better, improves your blood circulation, and keeps your joints flexible. If you're trying to lose weight, a combination of exercise and wise food choices can help you reach your target weight and maintain it.      PAIN MANAGEMENT  1.Blood Sugar Control - Most important  2. Medications such as:  Amytriptylline, duloxetine, gabapentin, lyrica, tramadol  3. Nutritional therapy:  Vitamin B6 (100mg daily), Vitamin B12 (75mcg daily), Vitamin D 2000 IU daily), Alpha-Lipoic Acid (600-1800mg daily), Acetyl-L-Carnitine (500-1000mg TID, L-methyl folate (1500mcg daily)    ** Metformin can block Vitamin B6 and B12 so it is important to supplement**    FOOT CARE RECOMMENDATIONS   1. Wash your feet with lukewarm water and a mild soap and then dry them thoroughly, especially between the toes.     2. Examine your feet daily looking for cuts, corns, blisters, cracks, ect, especially after wearing new shoes. Make sure to look between your toes. If you cannot see  the bottom of your feet, set a mirror on the floor and hold your foot over it, or ask a spouse, friend or family member to examine your feet for you. Contact your doctor immediately if new problems are noted or if sores are not healing.     3. Immediately apply moisturizer to the tops and bottoms of your feet, avoiding areas between the toes. Hand lotion (Intesive Care, Keyonna, Eucerin, Neutrogena, Curel, ect) is sufficient unless your doctor prescribes a medicated lotion. Apply sunscreen to your feet when going swimming outside.     4. Use clean comfortable shoes, wear white socks (if you have any bleeding or drainage, you will see it on white socks). Socks should not have thick seams or cut off the circulation around the leg. Break in new shoes slowly and rotate with older shoes until broken in. Check the inside of your shoes with your hand to look for areas of irritation or objects that may have fallen into your shoes.       5. Keep slippers by the side of your bed for use during the night.     6.  Shoes should be fitted by a professional and should not cause areas of irritation.  Check your feet regularly when wearing a new pair of shoes and replace them as needed.     7.  Talk to your doctor about proper exercise. Exercise and stretching stimulate blood flow to your feet and maintain proper glucose levels.     8.  Monitor your blood glucose level as instructed by your doctor. Notify your doctor immediately if your blood sugar is abnormally high or low.    9. Cut your nails straight across, but then gently round any sharp edges with a cardboard nail file. If you have neuropathy, peripheral vascular disease or cannot see that well to trim your own toenails contact [facility] ([phone]) or [facility] ([phone]).      THINGS TO AVOID DOING   1.  Do not soak your feet if you have an open sore. Use only lukewarm water and always check the temperature with your hand as hot water can easily burn your feet.        2.  Never use a hot water bottle or heating pad on your feet. Also do not apply cold compresses to your feet. With decreased sensation, you could burn or freeze your feet.       3.  Do not apply any of these to your feet:    -  Over the counter medicine for corns or warts    -  Harsh chemicals like boric acid    -  Do not self-treat corns, cuts, blisters or infections. Always consult your doctor.       4.  Do not wear sandals, slippers or walk barefoot, especially on hot sand or concrete or other harsh surfaces.     5.  If you smoke, stop!!!            Body Mass Index (BMI)  Many things can cause foot and ankle problems. Foot structure, activity level, foot mechanics and injuries are common causes of pain.  One very important issue that often goes unmentioned, is body weight.  Extra weight can cause increased stress on muscles, ligaments, bones and tendons.  Sometimes just a few extra pounds is all it takes to put one over her/his threshold. Without reducing that stress, it can be difficult to alleviate pain. Some people are uncomfortable addressing this issue, but we feel it is important for you to think about it. As Foot &  Ankle specialists, our job is addressing the lower extremity problem and possible causes. Regarding extra body weight, we encourage patients to discuss diet and weight management plans with their primary care doctors. It is this team approach that gives you the best opportunity for pain relief and getting you back on your feet.                Follow-ups after your visit        Who to contact     If you have questions or need follow up information about today's clinic visit or your schedule please contact [facility] directly at [phone].  Normal or non-critical lab and imaging results will be communicated to you by MyChart, letter or phone within 4 business days after the clinic has received the results. If you do not hear from us within 7 days, please contact the clinic  "through [employer] or phone. If you have a critical or abnormal lab result, we will notify you by phone as soon as possible.  Submit refill requests through [email or web address] or call your pharmacy and they will forward the refill request to us. Please allow 3 business days for your refill to be completed.          Additional Information About Your Visit        [employer] Information     [email or web address] gives you secure access to your electronic health record. If you see a primary care provider, you can also send messages to your care team and make appointments. If you have questions, please call your primary care clinic.  If you do not have a primary care provider, please call [phone] and they will assist you.        Care EveryWhere ID     This is your Care EveryWhere ID. This could be used by other organizations to access your [facility] medical records  [id number]        Your Vitals Were     Pulse Height BMI (Body Mass Index)             76 5' 8\" (1.727 m) 30.58 kg/m2          Blood Pressure from Last 3 Encounters:   12/22/16 105/71   07/26/16 111/76   04/06/16 113/78    Weight from Last 3 Encounters:   06/14/17 201 lb 1.6 oz (91.2 kg)   12/22/16 198 lb 12.8 oz (90.2 kg)   07/26/16 187 lb (84.8 kg)              We Performed the Following     TRIM HYPERKERATOTIC SKIN LESION,2-4        Primary Care Provider Office Phone # Fax #    Kt R[facility]os [phone]       [address] [place] [address]        Thank you!     Thank you for choosing [facility]  for your care. Our goal is always to provide you with excellent care. Hearing back from our patients is one way we can continue to improve our services. Please take a few minutes to complete the written survey that you may receive in the mail after your visit with us. Thank you!             Your Updated Medication List - Protect others around you: Learn how to safely use, store and throw away your medicines at " www.disposemymeds.org.          This list is accurate as of: 6/14/17  9:46 AM.  Always use your most recent med list.                   Brand Name Dispense Instructions for use    aspirin 81 MG tablet     90 tablet    Take 1 tablet (81 mg) by mouth daily       YOHAN CONTOUR test strip   Generic drug:  blood glucose monitoring     450 each    USE TO TEST BLOOD SUGAR FIVE TIMES A DAY OR AS DIRECTED       blood glucose monitoring meter device kit     1 kit    TO TEST BG 5 TIMIES DAILY       fludrocortisone 0.1 MG tablet    FLORINEF    30 tablet    TAKE ONE TABLET BY MOUTH EVERY DAY       * insulin aspart 100 UNIT/ML injection    NovoLOG PEN    1 Month    Inject 1 Units Subcutaneous Take with snacks or supplements for high blood sugar DOSE:  1 units per 10 grams of carbohydrate. Only chart total amount of units given.  Do not give if pre-prandial glucose is < 60 mg/dL.       * insulin aspart 100 UNIT/ML injection    NovoLOG PEN    3 mL    Inject 1-6 Units Subcutaneous At Bedtime Custom DOSING    Do Not give Bedtime Correction Insulin if BG < 200 -229 give 1 units. -259 give 2 units. -289 give 3 units. -319 give 4 units. -349 give 5 units. BG >/= 350 give 6 units. Notify MD if glucose = or > 350 mg/dL after administration.       * insulin aspart 100 UNIT/ML injection    NovoLOG PEN    15 mL    Inject 1-8 Units Subcutaneous 3 times daily (before meals) Do Not give Correction Insulin if Pre-Meal BG < 140  140-169 give 1 units. 170-199 give 2 units. -229 give 3 units. -259 give 4 units. 260-289 give 5 units. 290-319 give 6 units. 320-349 give 7 units. BG >/= 350 give 8 units. To be given with prandial insulin, and based on pre-meal blood glucose.  Notify MD if glucose = or > 350 mg/dL after administration.       insulin glargine 100 UNIT/ML injection    LANTUS SOLOSTAR    1 Month    Inject 22 Units Subcutaneous At Bedtime       insulin pen needle 32G X 4 MM    BD ARPITA U/F    120  each    Use 4 daily or as directed.       lactulose 20 GM/30ML Soln     473 mL    Take 30 mLs (20 g) by mouth 2 times daily as needed For constipation.       LIPITOR 40 MG tablet   Generic drug:  atorvastatin     90 tablet    Take 0.5 tablets (20 mg) by mouth daily       metoprolol 25 MG 24 hr tablet    TOPROL-XL    90 tablet    Take 1 tablet (25 mg) by mouth daily       mycophenolic acid 360 MG EC tablet    MYFORTIC - GENERIC EQUIVALENT    120 each    Take 2 tablets (720 mg) by mouth 2 times daily       omeprazole 40 MG capsule    priLOSEC    90 capsule    Take 1 capsule (40 mg) by mouth daily Take 30-60 minutes before a meal.       polyethylene glycol Packet    MIRALAX/GLYCOLAX     Take 17 g by mouth daily as needed for constipation       * tacrolimus 1 MG capsule    PROGRAF - GENERIC EQUIVALENT    60 capsule    Take 1 capsule (1 mg) by mouth 2 times daily Total dose = 1.5 mg BID       * tacrolimus 0.5 MG capsule    PROGRAF - GENERIC EQUIVALENT    60 capsule    Take 1 capsule (0.5 mg) by mouth 2 times daily Total dose = 1.5 mg BID       * Notice:  This list has 5 medication(s) that are the same as other medications prescribed for you. Read the directions carefully, and ask your doctor or other care provider to review them with you.

## 2017-06-26 DIAGNOSIS — E10.59 TYPE 1 DIABETES MELLITUS WITH DIABETIC CARDIOMYOPATHY (H): ICD-10-CM

## 2017-06-26 DIAGNOSIS — I43 TYPE 1 DIABETES MELLITUS WITH DIABETIC CARDIOMYOPATHY (H): ICD-10-CM

## 2017-06-26 DIAGNOSIS — M79.671 PAIN IN BOTH FEET: ICD-10-CM

## 2017-06-26 DIAGNOSIS — L84 CALLUS OF FOOT: ICD-10-CM

## 2017-06-26 DIAGNOSIS — E10.40 DIABETIC NEUROPATHY, TYPE I DIABETES MELLITUS (H): Primary | ICD-10-CM

## 2017-06-26 DIAGNOSIS — M21.6X9 CAVUS DEFORMITY OF FOOT, ACQUIRED: ICD-10-CM

## 2017-06-26 DIAGNOSIS — M79.672 PAIN IN BOTH FEET: ICD-10-CM

## 2017-06-26 NOTE — PROGRESS NOTES
Orthoses order signed.     Eric Edward DPM, FACFAS, MS    [facility] of [facility]/Foot & Ankle Surgery

## 2017-08-02 DIAGNOSIS — G90.3 NEUROGENIC ORTHOSTATIC HYPOTENSION (H): ICD-10-CM

## 2017-08-02 DIAGNOSIS — Z94.0 KIDNEY TRANSPLANTED: ICD-10-CM

## 2017-08-03 RX ORDER — MYCOPHENOLIC ACID 360 MG/1
TABLET, DELAYED RELEASE ORAL
Qty: 120 TABLET | Refills: 1 | Status: SHIPPED | OUTPATIENT
Start: 2017-08-03 | End: 2017-10-01

## 2017-09-05 DIAGNOSIS — I25.10 CORONARY ARTERY DISEASE INVOLVING NATIVE CORONARY ARTERY OF NATIVE HEART WITHOUT ANGINA PECTORIS: Primary | ICD-10-CM

## 2017-09-05 RX ORDER — METOPROLOL SUCCINATE 25 MG/1
25 TABLET, EXTENDED RELEASE ORAL DAILY
Qty: 90 TABLET | Refills: 3 | Status: SHIPPED | OUTPATIENT
Start: 2017-09-05 | End: 2019-01-24

## 2017-09-05 NOTE — TELEPHONE ENCOUNTER
toprol xl 25      Last Written Prescription Date: 11/21/16  Last Fill Quantity: 90, # refills: 3    Last Office Visit with G, [facility] or [facility] prescribing provider:  12/22/2016   Future Office Visit:        BP Readings from Last 3 Encounters:   12/22/16 105/71   07/26/16 111/76   04/06/16 113/78     Thank you  Juliette Burrell Technician

## 2017-10-01 DIAGNOSIS — Z94.0 KIDNEY TRANSPLANTED: ICD-10-CM

## 2017-10-01 DIAGNOSIS — G90.3 NEUROGENIC ORTHOSTATIC HYPOTENSION (H): ICD-10-CM

## 2017-10-02 RX ORDER — MYCOPHENOLIC ACID 360 MG/1
TABLET, DELAYED RELEASE ORAL
Qty: 120 TABLET | Refills: 1 | Status: SHIPPED | OUTPATIENT
Start: 2017-10-02 | End: 2017-11-20

## 2017-10-31 ENCOUNTER — TELEPHONE (OUTPATIENT)
Dept: TRANSPLANT | Facility: CLINIC | Age: 42
End: 2017-10-31

## 2017-10-31 DIAGNOSIS — Z94.0 KIDNEY REPLACED BY TRANSPLANT: ICD-10-CM

## 2017-10-31 DIAGNOSIS — Z79.899 ENCOUNTER FOR LONG-TERM CURRENT USE OF MEDICATION: ICD-10-CM

## 2017-10-31 DIAGNOSIS — Z48.298 AFTERCARE FOLLOWING ORGAN TRANSPLANT: Primary | ICD-10-CM

## 2017-10-31 DIAGNOSIS — Z48.298 AFTERCARE FOLLOWING ORGAN TRANSPLANT: ICD-10-CM

## 2017-10-31 LAB
ERYTHROCYTE [DISTWIDTH] IN BLOOD BY AUTOMATED COUNT: 13 % (ref 10–15)
HCT VFR BLD AUTO: 41.6 % (ref 40–53)
HGB BLD-MCNC: 13.9 G/DL (ref 13.3–17.7)
MCH RBC QN AUTO: 30.3 PG (ref 26.5–33)
MCHC RBC AUTO-ENTMCNC: 33.4 G/DL (ref 31.5–36.5)
MCV RBC AUTO: 91 FL (ref 78–100)
PLATELET # BLD AUTO: 262 10E9/L (ref 150–450)
RBC # BLD AUTO: 4.58 10E12/L (ref 4.4–5.9)
TACROLIMUS BLD-MCNC: 7.4 UG/L (ref 5–15)
TME LAST DOSE: NORMAL H
WBC # BLD AUTO: 8.6 10E9/L (ref 4–11)

## 2017-10-31 PROCEDURE — 80048 BASIC METABOLIC PNL TOTAL CA: CPT | Performed by: INTERNAL MEDICINE

## 2017-10-31 PROCEDURE — 85027 COMPLETE CBC AUTOMATED: CPT | Performed by: INTERNAL MEDICINE

## 2017-10-31 PROCEDURE — 80197 ASSAY OF TACROLIMUS: CPT | Performed by: INTERNAL MEDICINE

## 2017-10-31 PROCEDURE — 36415 COLL VENOUS BLD VENIPUNCTURE: CPT | Performed by: INTERNAL MEDICINE

## 2017-11-01 LAB
ANION GAP SERPL CALCULATED.3IONS-SCNC: 14 MMOL/L (ref 3–14)
BUN SERPL-MCNC: 24 MG/DL (ref 7–30)
CALCIUM SERPL-MCNC: 8.8 MG/DL (ref 8.5–10.1)
CHLORIDE SERPL-SCNC: 105 MMOL/L (ref 94–109)
CO2 SERPL-SCNC: 20 MMOL/L (ref 20–32)
CREAT SERPL-MCNC: 1.79 MG/DL (ref 0.66–1.25)
GFR SERPL CREATININE-BSD FRML MDRD: 42 ML/MIN/1.7M2
GLUCOSE SERPL-MCNC: 238 MG/DL (ref 70–99)
POTASSIUM SERPL-SCNC: 4.8 MMOL/L (ref 3.4–5.3)
SODIUM SERPL-SCNC: 139 MMOL/L (ref 133–144)

## 2017-11-02 ENCOUNTER — TELEPHONE (OUTPATIENT)
Dept: TRANSPLANT | Facility: CLINIC | Age: 42
End: 2017-11-02

## 2017-11-02 DIAGNOSIS — Z94.0 KIDNEY TRANSPLANTED: Primary | ICD-10-CM

## 2017-11-02 NOTE — TELEPHONE ENCOUNTER
Spoke to patient regarding tac level = 7.4, above goal.  Patient states that this was NOT a good 12 hour trough level.  No dose change at this time.  Patient will repeat labs in 1-2 weeks.

## 2017-11-02 NOTE — TELEPHONE ENCOUNTER
Tacrolimus level 7.4       Goal 4-6  Please phone patient and ask if last Tacrolimus level was a good 12 hour level.  If so, decrease Tacrolimus from 1.5 mg twice per day to 1.0 mg twice per day.  Repeat labs in 1-2 weeks

## 2017-11-20 ENCOUNTER — OFFICE VISIT (OUTPATIENT)
Dept: PEDIATRICS | Facility: CLINIC | Age: 42
End: 2017-11-20
Payer: COMMERCIAL

## 2017-11-20 VITALS
BODY MASS INDEX: 31.37 KG/M2 | SYSTOLIC BLOOD PRESSURE: 82 MMHG | HEART RATE: 86 BPM | TEMPERATURE: 98.5 F | WEIGHT: 207 LBS | OXYGEN SATURATION: 96 % | DIASTOLIC BLOOD PRESSURE: 68 MMHG | HEIGHT: 68 IN

## 2017-11-20 DIAGNOSIS — D84.9 IMMUNOSUPPRESSED STATUS (H): ICD-10-CM

## 2017-11-20 DIAGNOSIS — E10.59 TYPE 1 DIABETES MELLITUS WITH OTHER CIRCULATORY COMPLICATION (H): ICD-10-CM

## 2017-11-20 DIAGNOSIS — R11.0 NAUSEA: ICD-10-CM

## 2017-11-20 DIAGNOSIS — T86.891 FAILED PANCREAS TRANSPLANT: ICD-10-CM

## 2017-11-20 DIAGNOSIS — R41.3 MEMORY LOSS: ICD-10-CM

## 2017-11-20 DIAGNOSIS — Z23 NEED FOR PROPHYLACTIC VACCINATION AND INOCULATION AGAINST INFLUENZA: ICD-10-CM

## 2017-11-20 DIAGNOSIS — I25.10 CORONARY ARTERY DISEASE INVOLVING NATIVE CORONARY ARTERY OF NATIVE HEART WITHOUT ANGINA PECTORIS: ICD-10-CM

## 2017-11-20 DIAGNOSIS — Z94.0 KIDNEY REPLACED BY TRANSPLANT: ICD-10-CM

## 2017-11-20 DIAGNOSIS — I10 HYPERTENSION GOAL BP (BLOOD PRESSURE) < 140/90: ICD-10-CM

## 2017-11-20 DIAGNOSIS — Z00.00 ENCOUNTER FOR ROUTINE ADULT HEALTH EXAMINATION WITHOUT ABNORMAL FINDINGS: Primary | ICD-10-CM

## 2017-11-20 DIAGNOSIS — E78.5 HYPERLIPIDEMIA WITH TARGET LDL LESS THAN 100: ICD-10-CM

## 2017-11-20 PROCEDURE — 99214 OFFICE O/P EST MOD 30 MIN: CPT | Mod: 25 | Performed by: INTERNAL MEDICINE

## 2017-11-20 PROCEDURE — 99396 PREV VISIT EST AGE 40-64: CPT | Performed by: INTERNAL MEDICINE

## 2017-11-20 NOTE — NURSING NOTE
"Chief Complaint   Patient presents with     Physical       Initial BP (!) 82/68 (BP Location: Right arm, Cuff Size: Adult Regular)  Pulse 86  Temp 98.5  F (36.9  C) (Oral)  Ht 5' 8\" (1.727 m)  Wt 207 lb (93.9 kg)  SpO2 96%  BMI 31.47 kg/m2 Estimated body mass index is 31.47 kg/(m^2) as calculated from the following:    Height as of this encounter: 5' 8\" (1.727 m).    Weight as of this encounter: 207 lb (93.9 kg).  Medication Reconciliation: complete     Other BP readin/58, right arm with regular cuff    Cecy Calvillo MA   2017,  8:28 AM    "

## 2017-11-20 NOTE — PATIENT INSTRUCTIONS
Call endocrine for a appointment for diabetes mellitus management.    They will call you for both the MRI and the gastric emptying scan.    Kt Ríos MD  Internal Medicine and Pediatrics         Preventive Health Recommendations  Male Ages 40 to 49    Yearly exam:             See your health care provider every year in order to  o   Review health changes.   o   Discuss preventive care.    o   Review your medicines if your doctor has prescribed any.    You should be tested each year for STDs (sexually transmitted diseases) if you re at risk.     Have a cholesterol test every 5 years.     Have a colonoscopy (test for colon cancer) if someone in your family has had colon cancer or polyps before age 50.     After age 45, have a diabetes test (fasting glucose). If you are at risk for diabetes, you should have this test every 3 years.      Talk with your health care provider about whether or not a prostate cancer screening test (PSA) is right for you.    Shots: Get a flu shot each year. Get a tetanus shot every 10 years.     Nutrition:    Eat at least 5 servings of fruits and vegetables daily.     Eat whole-grain bread, whole-wheat pasta and brown rice instead of white grains and rice.     Talk to your provider about Calcium and Vitamin D.     Lifestyle    Exercise for at least 150 minutes a week (30 minutes a day, 5 days a week). This will help you control your weight and prevent disease.     Limit alcohol to one drink per day.     No smoking.     Wear sunscreen to prevent skin cancer.     See your dentist every six months for an exam and cleaning.

## 2017-11-20 NOTE — MR AVS SNAPSHOT
After Visit Summary   11/20/2017    Murtaza Fitzgerald    MRN: [id number]           Patient Information     Date Of Birth          1975        Visit Information        Provider Department      11/20/2017 8:20 AM Kt Ríos MD [facility] [place]        Today's Diagnoses     Coronary artery disease involving native coronary artery of native heart without angina pectoris    -  1    Need for prophylactic vaccination and inoculation against influenza        Type 1 diabetes mellitus with other circulatory complication (H)        Hyperlipidemia with target LDL less than 100        Hypertension goal BP (blood pressure) < 140/90        Immunosuppressed status (H)        Kidney replaced by transplant        Failed pancreas transplant        Nausea        Memory loss          Care Instructions    Call endocrine for a appointment for diabetes mellitus management.    They will call you for both the MRI and the gastric emptying scan.    Kt Ríos MD  Internal Medicine and Pediatrics         Preventive Health Recommendations  Male Ages 40 to 49    Yearly exam:             See your health care provider every year in order to  o   Review health changes.   o   Discuss preventive care.    o   Review your medicines if your doctor has prescribed any.    You should be tested each year for STDs (sexually transmitted diseases) if you re at risk.     Have a cholesterol test every 5 years.     Have a colonoscopy (test for colon cancer) if someone in your family has had colon cancer or polyps before age 50.     After age 45, have a diabetes test (fasting glucose). If you are at risk for diabetes, you should have this test every 3 years.      Talk with your health care provider about whether or not a prostate cancer screening test (PSA) is right for you.    Shots: Get a flu shot each year. Get a tetanus shot every 10 years.     Nutrition:    Eat at least 5 servings of fruits and vegetables daily.     Eat  whole-grain bread, whole-wheat pasta and brown rice instead of white grains and rice.     Talk to your provider about Calcium and Vitamin D.     Lifestyle    Exercise for at least 150 minutes a week (30 minutes a day, 5 days a week). This will help you control your weight and prevent disease.     Limit alcohol to one drink per day.     No smoking.     Wear sunscreen to prevent skin cancer.     See your dentist every six months for an exam and cleaning.              Follow-ups after your visit        Additional Services     ENDOCRINOLOGY ADULT REFERRAL       Your provider has referred you to: [facility] [phone]   [email or web address]      Please be aware that coverage of these services is subject to the terms and limitations of your health insurance plan.  Call member services at your health plan with any benefit or coverage questions.      Please bring the following to your appointment:    >>   Any x-rays, CTs or MRIs which have been performed.  Contact the facility where they were done to arrange for  prior to your scheduled appointment.    >>   List of current medications   >>   This referral request   >>   Any documents/labs given to you for this referral                  Your next 10 appointments already scheduled     Nov 21, 2017  7:20 AM CST   LAB with EA LAB   [facility]    [address]  [address]  [facility] [address] [phone]           Please do not eat 10-12 hours before your appointment if you are coming in fasting for labs on lipids, cholesterol, or glucose (sugar). This does not apply to pregnant women. Water, hot tea and black coffee (with nothing added) are okay. Do not drink other fluids, diet soda or chew gum.              Future tests that were ordered for you today     Open Future Orders        Priority Expected Expires Ordered    MR Brain w/o Contrast Routine  11/20/2018 11/20/2017  "   NM Gastric Emptying Routine  11/20/2018 11/20/2017    HEMOGLOBIN A1C Routine  2/20/2018 11/20/2017    Lipid panel reflex to direct LDL Fasting Routine  2/20/2018 11/20/2017    TSH WITH FREE T4 REFLEX Routine  2/20/2018 11/20/2017            Who to contact     If you have questions or need follow up information about today's clinic visit or your schedule please contact [facility] directly at [phone].  Normal or non-critical lab and imaging results will be communicated to you by Sapheonhart, letter or phone within 4 business days after the clinic has received the results. If you do not hear from us within 7 days, please contact the clinic through [employer]aren[employer] or phone. If you have a critical or abnormal lab result, we will notify you by phone as soon as possible.  Submit refill requests through G[employer]a or call your pharmacy and they will forward the refill request to us. Please allow 3 business days for your refill to be completed.          Additional Information About Your Visit        Garena Information     [employer] gives you secure access to your electronic health record. If you see a primary care provider, you can also send messages to your care team and make appointments. If you have questions, please call your primary care clinic.  If you do not have a primary care provider, please call [phone] and they will assist you.        Care EveryWhere ID     This is your Care EveryWhere ID. This could be used by other organizations to access your [place] medical records  [id number]        Your Vitals Were     Pulse Temperature Height Pulse Oximetry BMI (Body Mass Index)       86 98.5  F (36.9  C) (Oral) 5' 8\" (1.727 m) 96% 31.47 kg/m2        Blood Pressure from Last 3 Encounters:   11/20/17 (!) 82/68   12/22/16 105/71   07/26/16 111/76    Weight from Last 3 Encounters:   11/20/17 207 lb (93.9 kg)   06/14/17 201 lb 1.6 oz (91.2 kg)   12/22/16 198 lb 12.8 oz (90.2 kg)              We Performed the " Following     ENDOCRINOLOGY ADULT REFERRAL     OFFICE/OUTPT VISIT,[facility]          Today's Medication Changes          These changes are accurate as of: 11/20/17  8:57 AM.  If you have any questions, ask your nurse or doctor.               These medicines have changed or have updated prescriptions.        Dose/Directions    * insulin aspart 100 UNIT/ML injection   Commonly known as:  NovoLOG PEN   This may have changed:    - Another medication with the same name was changed. Make sure you understand how and when to take each.  - Another medication with the same name was removed. Continue taking this medication, and follow the directions you see here.   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 170-199 give 2 units. -229 give 3 units. -259 give 4 units. 260-289 give 5 units. 290-319 give 6 units. 320-349 give 7 units. BG >/= 350 give 8 units. To be given with prandial insulin, and based on pre-meal blood glucose.  Notify MD if glucose = or > 350 mg/dL after administration.   Quantity:  15 mL   Refills:  5       * insulin aspart 100 UNIT/ML injection   Commonly known as:  NovoLOG PEN   This may have changed:    - additional instructions  - Another medication with the same name was removed. Continue taking this medication, and follow the directions you see here.   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   Refills:  0       LANTUS SOLOSTAR 100 UNIT/ML injection   This may have changed:  how much to take   Generic drug:  insulin glargine   Changed by:  Kt Ríos MD         Dose:  26 Units   Inject 26 Units Subcutaneous At Bedtime   Refills:  0       * Notice:  This list has 2 medication(s) that are the same as other medications prescribed for you. Read the directions carefully, and ask your doctor or other care provider to review them with you.             Primary Care Provider Office Phone # Fax #    Kt Ríos [phone] [address] [place] [address]        Equal Access to Services     [facility]: Hadii aad ku hadasho Soomaali, waaxda luqadaha, qaybta kaalmada adeegyada, waxay idiin hayaan adeeg allegramarianneshelby laallyson . So [facility] [phone].    ATENCIÓN: Si habla español, tiene a chatterjee disposición servicios gratuitos de asistencia lingüística. Llame al [phone].    We comply with applicable federal civil rights laws and [place] laws. We do not discriminate on the basis of race, color, national origin, age, disability, sex, sexual orientation, or gender identity.            Thank you!     [facility]. Our goal is always to provide you with excellent care. Hearing back from our patients is one way we can continue to improve our services. Please take a few minutes to complete the written survey that you may receive in the mail after your visit with us. Thank you!             Your Updated Medication List - Protect others around you: Learn how to safely use, store and throw away your medicines at www.disposemymeds.org.          This list is accurate as of: 11/20/17  8:57 AM.  Always use your most recent med list.                   Brand Name Dispense Instructions for use Diagnosis    aspirin 81 MG tablet     90 tablet    Take 1 tablet (81 mg) by mouth daily    Coronary artery disease involving native coronary artery of native heart without angina pectoris       YOHAN CONTOUR test strip   Generic drug:  blood glucose monitoring     450 each    USE TO TEST BLOOD SUGAR FIVE TIMES A DAY OR AS  DIRECTED    Type 1 diabetes mellitus, uncontrolled (H)       blood glucose monitoring meter device kit     1 kit    TO TEST BG 5 TIMIES DAILY    Diabetes mellitus, type 1       * insulin aspart 100 UNIT/ML injection    NovoLOG PEN    15 mL    Inject 1-8 Units Subcutaneous 3 times daily (before meals) Do Not give Correction Insulin if Pre-Meal BG < 140  140-169 give 1 units. 170-199 give 2 units. -229 give 3 units. -259 give 4 units. 260-289 give 5 units. 290-319 give 6 units. 320-349 give 7 units. BG >/= 350 give 8 units. To be given with prandial insulin, and based on pre-meal blood glucose.  Notify MD if glucose = or > 350 mg/dL after administration.    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    Constipation       LANTUS SOLOSTAR 100 UNIT/ML injection   Generic drug:  insulin glargine      Inject 26 Units Subcutaneous At Bedtime        LIPITOR 40 MG tablet   Generic drug:  atorvastatin     90 tablet    Take 0.5 tablets (20 mg) by mouth daily    Gastroesophageal reflux disease without esophagitis       metoprolol 25 MG 24 hr tablet    TOPROL-XL    90 tablet    Take 1 tablet (25 mg) by mouth daily    Coronary artery disease involving native coronary artery of native heart without angina pectoris       omeprazole 40 MG capsule    priLOSEC    90 capsule    Take 1 capsule (40 mg) by mouth daily Take 30-60 minutes before a meal.    Gastroesophageal reflux disease without  esophagitis       polyethylene glycol Packet    MIRALAX/GLYCOLAX     Take 17 g by mouth daily as needed for constipation        * tacrolimus 1 MG capsule    GENERIC EQUIVALENT    60 capsule    Take 1 capsule (1 mg) by mouth 2 times daily Total dose = 1.5 mg BID    Kidney transplanted       * tacrolimus 0.5 MG capsule    GENERIC EQUIVALENT    60 capsule    Take 1 capsule (0.5 mg) by mouth 2 times daily Total dose = 1.5 mg BID    Kidney transplanted       * Notice:  This list has 4 medication(s) that are the same as other medications prescribed for you. Read the directions carefully, and ask your doctor or other care provider to review them with you.

## 2017-11-20 NOTE — PROGRESS NOTES
"SUBJECTIVE:   CC: Murtaza Fitzgerald is an 42 year old male who presents for preventative health visit.     Physical   Annual:     Getting at least 3 servings of Calcium per day::  NO    Bi-annual eye exam::  Yes    Dental care twice a year::  NO    Sleep apnea or symptoms of sleep apnea::  None    Diet::  Carbohydrate counting    Frequency of exercise::  None    Taking medications regularly::  Yes    Medication side effects::  None    Additional concerns today::  YES      Pt would like to discuss nausea, daily for the past 4-6 weeks. Also, would like to discuss \"things haven't been clicking\" in brain.      Wondering about an endocrine referral:  Feels like less stress (from recent house purchase) is improving his control.  AM sugars:  .  After meals, not checking.    Has been nauseous a lot; recent lab work for his kidney transplant has been OK.  Prilosec helps gastroesophageal reflux disease, but every meal makes him more nauseous.   No vomiting, no diarrhea.   No known gastroparesis.    Also, he has additional complaints of feeling that cannot work as efficiently as before; mental slowing every since his CABG.      Has been cleared by cardioology for new pancreas transplant, and is now active on the list.       Today's PHQ-2 Score: PHQ-2 ( 1999 Pf[employer]) 11/20/2017   Q1: Little interest or pleasure in doing things 1   Q2: Feeling down, depressed or hopeless 0   PHQ-2 Score 1   Q1: Little interest or pleasure in doing things Several days   Q2: Feeling down, depressed or hopeless Not at all   PHQ-2 Score 1       Abuse: Current or Past(Physical, Sexual or Emotional)- No  Do you feel safe in your environment - Yes    Social History   Substance Use Topics     Smoking status: Former Smoker     Packs/day: 0.30     Types: Cigarettes     Smokeless tobacco: Never Used      Comment: E- Cig use still     Alcohol use 0.0 oz/week     0 Standard drinks or equivalent per week      Comment: social     The patient " does not drink >3 drinks per day nor >7 drinks per week.    Last PSA: No results found for: PSA    Reviewed orders with patient. Reviewed health maintenance and updated orders accordingly - Yes  Labs reviewed in EPIC  BP Readings from Last 3 Encounters:   11/20/17 (!) 82/68   12/22/16 105/71   07/26/16 111/76    Wt Readings from Last 3 Encounters:   11/20/17 207 lb (93.9 kg)   06/14/17 201 lb 1.6 oz (91.2 kg)   12/22/16 198 lb 12.8 oz (90.2 kg)                  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)     Coronary artery disease involving native coronary artery of native heart without angina pectoris     Past Surgical History:   Procedure Laterality Date     BYPASS GRAFT ARTERY CORONARY N/A 8/5/2015    Procedure: BYPASS GRAFT ARTERY CORONARY;  Surgeon: Katy Neville MD;  Location: [facility] OR     ORTHOPEDIC SURGERY  1993    tumor removed from left knee     TRANSPLANT      pancrease and kidney transplant       Social History   Substance Use Topics     Smoking status: Former Smoker     Packs/day: 0.30     Types: Cigarettes     Smokeless tobacco: Never Used      Comment: E- Cig use still     Alcohol use 0.0 oz/week     0 Standard drinks or equivalent per week      Comment: social     Family History   Problem Relation Age of Onset     HEART DISEASE Maternal Grandfather 62         Current Outpatient Prescriptions   Medication Sig Dispense Refill     insulin glargine (LANTUS SOLOSTAR) 100 UNIT/ML injection Inject 26 Units Subcutaneous At Bedtime 8 mL 11     insulin aspart (NOVOLOG PEN) 100 UNIT/ML injection  Inject 1 Units Subcutaneous Take with snacks or supplements for high blood sugar DOSE:  2 units per 15 grams of carbohydrate.. 8 mL 11     metoprolol (TOPROL-XL) 25 MG 24 hr tablet Take 1 tablet (25 mg) by mouth daily 90 tablet 3     YOHAN CONTOUR test strip USE TO TEST BLOOD SUGAR FIVE TIMES A DAY OR AS DIRECTED 450 each 3     atorvastatin (LIPITOR) 40 MG tablet Take 0.5 tablets (20 mg) by mouth daily 90 tablet 3     omeprazole (PRILOSEC) 40 MG capsule Take 1 capsule (40 mg) by mouth daily Take 30-60 minutes before a meal. 90 capsule 3     tacrolimus (PROGRAF - GENERIC EQUIVALENT) 1 MG capsule Take 1 capsule (1 mg) by mouth 2 times daily Total dose = 1.5 mg BID 60 capsule 11     tacrolimus (PROGRAF - GENERIC EQUIVALENT) 0.5 MG capsule Take 1 capsule (0.5 mg) by mouth 2 times daily Total dose = 1.5 mg BID 60 capsule 11     aspirin 81 MG tablet Take 1 tablet (81 mg) by mouth daily 90 tablet 3     insulin aspart (NOVOLOG PEN) 100 UNIT/ML injection Inject 1-8 Units Subcutaneous 3 times daily (before meals) Do Not give Correction Insulin if Pre-Meal BG < 140   140-169 give 1 units.  170-199 give 2 units.  -229 give 3 units.  -259 give 4 units.  260-289 give 5 units.  290-319 give 6 units.  320-349 give 7 units.  BG >/= 350 give 8 units.  To be given with prandial insulin, and based on pre-meal blood glucose.   Notify MD if glucose = or > 350 mg/dL after administration. 15 mL 5     insulin pen needle (BD ARPITA U/F) 32G X 4 MM Use 4 daily or as directed. 120 each 11     polyethylene glycol (MIRALAX/GLYCOLAX) packet Take 17 g by mouth daily as needed for constipation       lactulose 20 GM/30ML SOLN Take 30 mLs (20 g) by mouth 2 times daily as needed For constipation. 473 mL 1     Blood Glucose Monitoring Suppl ([employer] CONTOUR MONITOR) W/DEVICE KIT TO TEST BG 5 TIMIES DAILY 1 kit 0     [DISCONTINUED] insulin glargine (LANTUS SOLOSTAR) 100 UNIT/ML injection Inject 26 Units Subcutaneous At Bedtime        [DISCONTINUED] insulin aspart (NOVOLOG PEN) 100 UNIT/ML injection Inject 1 Units Subcutaneous Take with snacks or supplements for high blood sugar DOSE:  2 units per 15 grams of carbohydrate..       [DISCONTINUED] insulin glargine (LANTUS SOLOSTAR) 100 UNIT/ML injection Inject 22 Units Subcutaneous At Bedtime 1 Month 5     [DISCONTINUED] insulin aspart (NOVOLOG PEN) 100 UNIT/ML soln Inject 1-6 Units Subcutaneous At Bedtime Custom DOSING     Do Not give Bedtime Correction Insulin if BG < 200  -229 give 1 units.  -259 give 2 units.  -289 give 3 units.  -319 give 4 units.  -349 give 5 units.  BG >/= 350 give 6 units.  Notify MD if glucose = or > 350 mg/dL after administration. 3 mL 2     [DISCONTINUED] insulin aspart (NOVOLOG PEN) 100 UNIT/ML soln Inject 1 Units Subcutaneous Take with snacks or supplements for high blood sugar DOSE:  1 units per 10 grams of carbohydrate. Only chart total amount of units given.  Do not give if pre-prandial glucose is < 60 mg/dL. 1 Month 6     Allergies   Allergen Reactions     Reglan Other (See Comments)     IV, delsuions           Reviewed and updated as needed this visit by clinical staffTobacco  Allergies  Meds  Med Hx  Surg Hx  Fam Hx  Soc Hx        Reviewed and updated as needed this visit by Provider          Past Medical History:   Diagnosis Date     CMV (cytomegalovirus infection) status negative 2011     Coronary artery disease, non-occlusive 2008    angio showed diffuse disease with no lesions     Diabetes mellitus, type 1     Diagnosed at age 11     Dyslipidemia      Hypertension      Immunosuppressed status (H)      Kidney transplant status, living related donor 2008          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     Noncompliance with treatment     no labs for one year     Pancreas replaced by transplant (H) Feb 2009    rejection treated with thymo     Tobacco abuse ongoing      Past Surgical History:   Procedure Laterality  "Date     BYPASS GRAFT ARTERY CORONARY N/A 8/5/2015    Procedure: BYPASS GRAFT ARTERY CORONARY;  Surgeon: Katy Neville MD;  Location: [facility] OR     ORTHOPEDIC SURGERY  1993    tumor removed from left knee     TRANSPLANT      pancrease and kidney transplant       Review of Systems  C: NEGATIVE for fever, chills, change in weight  I: NEGATIVE for worrisome rashes, moles or lesions  E: NEGATIVE for vision changes or irritation  ENT: NEGATIVE for ear, mouth and throat problems  R: NEGATIVE for significant cough or SOB  CV: NEGATIVE for chest pain, palpitations or peripheral edema  GI: NEGATIVE for nausea, abdominal pain, heartburn, or change in bowel habits   male: negative for dysuria, hematuria, decreased urinary stream, erectile dysfunction, urethral discharge  M: NEGATIVE for significant arthralgias or myalgia  N: NEGATIVE for weakness, dizziness or paresthesias  P: NEGATIVE for changes in mood or affect    OBJECTIVE:   Pulse 86  Temp 98.5  F (36.9  C) (Oral)  Ht 5' 8\" (1.727 m)  Wt 207 lb (93.9 kg)  SpO2 96%  BMI 31.47 kg/m2    Physical Exam  GENERAL: healthy, alert and no distress  EYES: Eyes grossly normal to inspection, PERRL and conjunctivae and sclerae normal  HENT: ear canals and TM's normal, nose and mouth without ulcers or lesions  NECK: no adenopathy, no asymmetry, masses, or scars and thyroid normal to palpation  RESP: lungs clear to auscultation - no rales, rhonchi or wheezes  CV: regular rate and rhythm, normal S1 S2, no S3 or S4, no murmur, click or rub, no peripheral edema and peripheral pulses strong  ABDOMEN: soft, nontender, no hepatosplenomegaly, no masses and bowel sounds normal   (male): normal male genitalia without lesions or urethral discharge, no hernia  MS: no gross musculoskeletal defects noted, no edema  SKIN: no suspicious lesions or rashes  NEURO: Normal strength and tone, mentation intact and speech normal  PSYCH: mentation appears normal, affect " normal/bright    ASSESSMENT/PLAN:   1. Need for prophylactic vaccination and inoculation against influenza  up to date.     2. Type 1 diabetes mellitus with other circulatory complication (H)  Would like endocrine referral.  Labs today to ensure adequate diabetes blood test (A1c).  No change in doses of insulin unless labs merit.   - HEMOGLOBIN A1C; Future  - Lipid panel reflex to direct LDL Fasting; Future  - TSH WITH FREE T4 REFLEX; Future  - ENDOCRINOLOGY ADULT REFERRAL  - insulin glargine (LANTUS SOLOSTAR) 100 UNIT/ML injection; Inject 26 Units Subcutaneous At Bedtime  Dispense: 8 mL; Refill: 11  - insulin aspart (NOVOLOG PEN) 100 UNIT/ML injection; Inject 1 Units Subcutaneous Take with snacks or supplements for high blood sugar DOSE:  2 units per 15 grams of carbohydrate..  Dispense: 8 mL; Refill: 11    3. Coronary artery disease involving native coronary artery of native heart without angina pectoris  Secondary risk factor modification.  CLeared for future pancreas transplant.     4. Hyperlipidemia with target LDL less than 100  Continue statin.     5. Hypertension goal BP (blood pressure) < 140/90  blood pressure well controlled; low today, but asymptomatic.     6. Immunosuppressed status (H)  Management of his tranplant status by Dr. Jorgensen.     7. Kidney replaced by transplant  As above.     8. Failed pancreas transplant  On active list again.     9. Nausea  May very well be gastroparesis; referred for gastric emptying scan. zofran not helping; would use reglan if warranted.   - NM Gastric Emptying; Future  - OFFICE/OUTPT VISIT,EST,LEVL IV    10. Memory loss  Need to r/o any past infarct; non-contrast due to kidney disease.   - MR Brain w/o Contrast; Future  - OFFICE/OUTPT VISIT,EST,LEVL IV    11. Encounter for routine adult health examination without abnormal findings  Discussed diet, exercise, testicular self exam, blood pressure, cholesterol, and need for cancer surveillance at appropriate ages.  "      COUNSELING:   Reviewed preventive health counseling, as reflected in patient instructions       Regular exercise       Healthy diet/nutrition       Vision screening       Hearing screening       Family planning       Safe sex practices/STD prevention       Colon cancer screening       Prostate cancer screening           reports that he has quit smoking. His smoking use included Cigarettes. He smoked 0.30 packs per day. He has never used smokeless tobacco.      Estimated body mass index is 31.47 kg/(m^2) as calculated from the following:    Height as of this encounter: 5' 8\" (1.727 m).    Weight as of this encounter: 207 lb (93.9 kg).   Weight management plan: Patient was referred to their PCP to discuss a diet and exercise plan.    Counseling Resources:  ATP IV Guidelines  Pooled Cohorts Equation Calculator  FRAX Risk Assessment  ICSI Preventive Guidelines  Dietary Guidelines for Americans, 2010  USDA's MyPlate  ASA Prophylaxis  Lung CA Screening    Kt Ríos MD  [facility]  "

## 2017-11-21 DIAGNOSIS — E10.59 TYPE 1 DIABETES MELLITUS WITH OTHER CIRCULATORY COMPLICATION (H): ICD-10-CM

## 2017-11-21 DIAGNOSIS — Z94.0 KIDNEY TRANSPLANTED: ICD-10-CM

## 2017-11-21 LAB
CHOLEST SERPL-MCNC: 171 MG/DL
HBA1C MFR BLD: 6.4 % (ref 4.3–6)
HDLC SERPL-MCNC: 33 MG/DL
LDLC SERPL CALC-MCNC: 99 MG/DL
NONHDLC SERPL-MCNC: 138 MG/DL
TACROLIMUS BLD-MCNC: 9.9 UG/L (ref 5–15)
TME LAST DOSE: NORMAL H
TRIGL SERPL-MCNC: 193 MG/DL
TSH SERPL DL<=0.005 MIU/L-ACNC: 2.06 MU/L (ref 0.4–4)

## 2017-11-21 PROCEDURE — 84443 ASSAY THYROID STIM HORMONE: CPT | Performed by: INTERNAL MEDICINE

## 2017-11-21 PROCEDURE — 36415 COLL VENOUS BLD VENIPUNCTURE: CPT | Performed by: INTERNAL MEDICINE

## 2017-11-21 PROCEDURE — 83036 HEMOGLOBIN GLYCOSYLATED A1C: CPT | Performed by: INTERNAL MEDICINE

## 2017-11-21 PROCEDURE — 80197 ASSAY OF TACROLIMUS: CPT | Performed by: INTERNAL MEDICINE

## 2017-11-21 PROCEDURE — 80061 LIPID PANEL: CPT | Performed by: INTERNAL MEDICINE

## 2017-11-22 DIAGNOSIS — Z94.0 KIDNEY TRANSPLANTED: ICD-10-CM

## 2017-11-22 DIAGNOSIS — Z94.0 KIDNEY REPLACED BY TRANSPLANT: Primary | ICD-10-CM

## 2017-11-22 RX ORDER — TACROLIMUS 1 MG/1
1 CAPSULE ORAL 2 TIMES DAILY
Qty: 60 CAPSULE | Refills: 11 | Status: SHIPPED | OUTPATIENT
Start: 2017-11-22 | End: 2019-01-10

## 2017-11-22 NOTE — TELEPHONE ENCOUNTER
Tacrolimus level 9.9       Goal 4-6  Please phone patient and ask if last Tacrolimus level was a good 12 hour level.  If so, decrease Tacrolimus from 1.5 mg twice per day to 1.0 mg twice per day.  Repeat labs in 1-2 weeks

## 2017-11-22 NOTE — TELEPHONE ENCOUNTER
Patient states accurate 12 hour level and will decrease prograf to 1mg twice per day and will repeat x 2 weeks.

## 2017-11-27 ENCOUNTER — HOSPITAL ENCOUNTER (OUTPATIENT)
Dept: MRI IMAGING | Facility: CLINIC | Age: 42
Discharge: HOME | End: 2017-11-27
Attending: INTERNAL MEDICINE | Admitting: INTERNAL MEDICINE
Payer: COMMERCIAL

## 2017-11-27 DIAGNOSIS — R41.3 MEMORY LOSS: ICD-10-CM

## 2017-11-27 PROCEDURE — 25000128 H RX IP 250 OP 636: Performed by: INTERNAL MEDICINE

## 2017-11-27 PROCEDURE — A9585 GADOBUTROL INJECTION: HCPCS | Performed by: INTERNAL MEDICINE

## 2017-11-27 PROCEDURE — 70553 MRI BRAIN STEM W/O & W/DYE: CPT

## 2017-11-27 RX ORDER — GADOBUTROL 604.72 MG/ML
10 INJECTION INTRAVENOUS ONCE
Status: COMPLETED | OUTPATIENT
Start: 2017-11-27 | End: 2017-11-27

## 2017-11-27 RX ADMIN — GADOBUTROL 9 ML: 604.72 INJECTION INTRAVENOUS at 14:25

## 2017-11-28 ENCOUNTER — TELEPHONE (OUTPATIENT)
Dept: PEDIATRICS | Facility: CLINIC | Age: 42
End: 2017-11-28

## 2017-11-28 ENCOUNTER — HOSPITAL ENCOUNTER (OUTPATIENT)
Dept: NUCLEAR MEDICINE | Facility: CLINIC | Age: 42
Setting detail: NUCLEAR MEDICINE
Discharge: HOME | End: 2017-11-28
Attending: INTERNAL MEDICINE | Admitting: INTERNAL MEDICINE
Payer: COMMERCIAL

## 2017-11-28 DIAGNOSIS — R11.0 NAUSEA: ICD-10-CM

## 2017-11-28 PROCEDURE — A9541 TC99M SULFUR COLLOID: HCPCS | Performed by: INTERNAL MEDICINE

## 2017-11-28 PROCEDURE — 78264 GASTRIC EMPTYING IMG STUDY: CPT

## 2017-11-28 PROCEDURE — 34300033 ZZH RX 343: Performed by: INTERNAL MEDICINE

## 2017-11-28 RX ADMIN — Medication 2 MILLICURIE: at 08:15

## 2017-11-30 DIAGNOSIS — K31.84 GASTROPARESIS: Primary | ICD-10-CM

## 2017-11-30 RX ORDER — ERYTHROMYCIN 250 MG/1
250 TABLET, DELAYED RELEASE ORAL DAILY
Qty: 30 TABLET | Refills: 2 | Status: CANCELLED | OUTPATIENT
Start: 2017-11-30

## 2017-12-27 ENCOUNTER — MYC MEDICAL ADVICE (OUTPATIENT)
Dept: PEDIATRICS | Facility: CLINIC | Age: 42
End: 2017-12-27

## 2017-12-27 DIAGNOSIS — K21.9 GASTROESOPHAGEAL REFLUX DISEASE WITHOUT ESOPHAGITIS: ICD-10-CM

## 2017-12-27 RX ORDER — OMEPRAZOLE 40 MG/1
40 CAPSULE, DELAYED RELEASE ORAL 2 TIMES DAILY
Qty: 180 CAPSULE | Refills: 3 | Status: SHIPPED | OUTPATIENT
Start: 2017-12-27 | End: 2019-01-16

## 2018-02-21 ENCOUNTER — MYC MEDICAL ADVICE (OUTPATIENT)
Dept: PEDIATRICS | Facility: CLINIC | Age: 43
End: 2018-02-21

## 2018-02-21 DIAGNOSIS — R11.0 NAUSEA: Primary | ICD-10-CM

## 2018-02-22 ENCOUNTER — MYC MEDICAL ADVICE (OUTPATIENT)
Dept: PEDIATRICS | Facility: CLINIC | Age: 43
End: 2018-02-22

## 2018-02-22 DIAGNOSIS — K21.9 GASTROESOPHAGEAL REFLUX DISEASE WITHOUT ESOPHAGITIS: Primary | ICD-10-CM

## 2018-02-22 RX ORDER — METOCLOPRAMIDE 5 MG/1
5 TABLET ORAL
Qty: 30 TABLET | Refills: 0 | Status: SHIPPED | OUTPATIENT
Start: 2018-02-22 | End: 2018-03-25

## 2018-02-22 NOTE — TELEPHONE ENCOUNTER
Patient replied to the question of reaction to IV Reglan.  Please review.  Sharon Zepeda RN  Message handled by Nurse Triage.

## 2018-02-22 NOTE — TELEPHONE ENCOUNTER
Patient replied to the lab result note-asking to try Reglan, but the note states that he reacted to it in the past and the alternative is erythromycin.    Please review and advise.  Sharon Zepeda RN  Message handled by Nurse Triage.

## 2018-03-25 DIAGNOSIS — K21.9 GASTROESOPHAGEAL REFLUX DISEASE WITHOUT ESOPHAGITIS: ICD-10-CM

## 2018-03-26 NOTE — TELEPHONE ENCOUNTER
"Requested Prescriptions   Pending Prescriptions Disp Refills     metoclopramide (REGLAN) 5 MG tablet [Pharmacy Med Name: METOCLOPRAMIDE HCL 5MG TABS]  Last Written Prescription Date: 02/22/2018  Last Fill Quantity: 30 tablet,  # refills: 0   Last office visit: 11/20/2017 with prescribing provider:  Kt Ríos MD    Future Office Visit:     30 tablet 0     Sig: TAKE ONE TABLET BY MOUTH FOUR TIMES A DAY (BEFORE MEALS AND NIGHTLY)     Antivertigo/Antiemetic Agents Passed    3/25/2018  5:28 PM       Passed - Recent (12 mo) or future (30 days) visit within the authorizing provider's specialty    Patient had office visit in the last 12 months or has a visit in the next 30 days with authorizing provider or within the authorizing provider's specialty.  See \"Patient Info\" tab in inbasket, or \"Choose Columns\" in Meds & Orders section of the refill encounter.           Passed - Patient is 18 years of age or older        YOHAN CONTOUR test strip [Pharmacy Med Name: YOHAN CONTOUR TEST  STRP]  Last Written Prescription Date:  04/04/2017  Last Fill Quantity: 450 each,  # refills: 3   Last office visit: 11/20/2017 with prescribing provider:   Kt Ríos MD     Future Office Visit:     450 each 3     Sig: USE TO TEST BLOOD SUGAR FIVE TIMES A DAY OR AS DIRECTED    Diabetic Supplies Protocol Passed    3/25/2018  5:28 PM       Passed - Patient is 18 years of age or older       Passed - Recent (6 mo) or future (30 days) visit within the authorizing provider's specialty    Patient had office visit in the last 6 months or has a visit in the next 30 days with authorizing provider.  See \"Patient Info\" tab in inbasket, or \"Choose Columns\" in Meds & Orders section of the refill encounter.              "

## 2018-03-27 RX ORDER — METOCLOPRAMIDE 5 MG/1
TABLET ORAL
Qty: 120 TABLET | Refills: 2 | Status: ON HOLD | OUTPATIENT
Start: 2018-03-27 | End: 2019-02-08

## 2018-03-27 NOTE — TELEPHONE ENCOUNTER
Prescription approved per [facility] Refill Protocol for the ashok contour strips    Routing refill request to provider for review/approval because:  Can reglan be approved again?

## 2018-05-10 DIAGNOSIS — Z48.298 AFTERCARE FOLLOWING ORGAN TRANSPLANT: ICD-10-CM

## 2018-05-10 DIAGNOSIS — Z94.0 KIDNEY REPLACED BY TRANSPLANT: ICD-10-CM

## 2018-05-10 DIAGNOSIS — Z79.899 ENCOUNTER FOR LONG-TERM CURRENT USE OF MEDICATION: ICD-10-CM

## 2018-05-10 LAB
ANION GAP SERPL CALCULATED.3IONS-SCNC: 6 MMOL/L (ref 3–14)
BUN SERPL-MCNC: 18 MG/DL (ref 7–30)
CALCIUM SERPL-MCNC: 9.1 MG/DL (ref 8.5–10.1)
CHLORIDE SERPL-SCNC: 106 MMOL/L (ref 94–109)
CO2 SERPL-SCNC: 25 MMOL/L (ref 20–32)
CREAT SERPL-MCNC: 1.69 MG/DL (ref 0.66–1.25)
ERYTHROCYTE [DISTWIDTH] IN BLOOD BY AUTOMATED COUNT: 13.4 % (ref 10–15)
GFR SERPL CREATININE-BSD FRML MDRD: 45 ML/MIN/1.7M2
GLUCOSE SERPL-MCNC: 257 MG/DL (ref 70–99)
HCT VFR BLD AUTO: 44.4 % (ref 40–53)
HGB BLD-MCNC: 14.7 G/DL (ref 13.3–17.7)
MCH RBC QN AUTO: 29.9 PG (ref 26.5–33)
MCHC RBC AUTO-ENTMCNC: 33.1 G/DL (ref 31.5–36.5)
MCV RBC AUTO: 90 FL (ref 78–100)
PLATELET # BLD AUTO: 289 10E9/L (ref 150–450)
POTASSIUM SERPL-SCNC: 5 MMOL/L (ref 3.4–5.3)
RBC # BLD AUTO: 4.92 10E12/L (ref 4.4–5.9)
SODIUM SERPL-SCNC: 137 MMOL/L (ref 133–144)
TACROLIMUS BLD-MCNC: 5.8 UG/L (ref 5–15)
TME LAST DOSE: NORMAL H
WBC # BLD AUTO: 7.7 10E9/L (ref 4–11)

## 2018-05-10 PROCEDURE — 80197 ASSAY OF TACROLIMUS: CPT | Performed by: INTERNAL MEDICINE

## 2018-05-10 PROCEDURE — 85027 COMPLETE CBC AUTOMATED: CPT | Performed by: INTERNAL MEDICINE

## 2018-05-10 PROCEDURE — 80048 BASIC METABOLIC PNL TOTAL CA: CPT | Performed by: INTERNAL MEDICINE

## 2018-05-10 PROCEDURE — 36415 COLL VENOUS BLD VENIPUNCTURE: CPT | Performed by: INTERNAL MEDICINE

## 2018-06-12 ENCOUNTER — OFFICE VISIT (OUTPATIENT)
Dept: ENDOCRINOLOGY | Facility: CLINIC | Age: 43
End: 2018-06-12
Payer: COMMERCIAL

## 2018-06-12 VITALS
HEART RATE: 104 BPM | BODY MASS INDEX: 32.39 KG/M2 | HEIGHT: 68 IN | SYSTOLIC BLOOD PRESSURE: 108 MMHG | WEIGHT: 213.7 LBS | DIASTOLIC BLOOD PRESSURE: 70 MMHG | TEMPERATURE: 98.7 F | OXYGEN SATURATION: 99 % | RESPIRATION RATE: 16 BRPM

## 2018-06-12 DIAGNOSIS — I43 TYPE 1 DIABETES MELLITUS WITH DIABETIC CARDIOMYOPATHY (H): ICD-10-CM

## 2018-06-12 DIAGNOSIS — E10.59 TYPE 1 DIABETES MELLITUS WITH DIABETIC CARDIOMYOPATHY (H): ICD-10-CM

## 2018-06-12 PROCEDURE — 99244 OFF/OP CNSLTJ NEW/EST MOD 40: CPT | Performed by: INTERNAL MEDICINE

## 2018-06-12 NOTE — PROGRESS NOTES
Endocrinology Clinic Note:  Name: Murtaza Fitzgerald  Seen in consultation with Kt Ríos for Diabetes.  HPI:  Murtaza Fitzgerald is a 42 year old male who presents for the evaluation/management of type 1 diabetes.  Was seen by  at [facility] before and [facility] before. Last visit with her was 2013. Records reviewed.  history of type 1 diabetes, diagnosed at age 11, complicated by triopathy and kidney failure.  He underwent LDKT in 11/2008 and bladder drained pancreas transplant in 02/2009.   After the pancreas transplant, he didn't require treatment with insulin for 4 years.  It was restarted in November 2012.  Considering second pancreas transplant.    1. Type 1 DM:  Orginally diagnosed at the age of: diagnosed at age 11  Hospitalization for DKA:  Current Regimen: lantus 26 units as night. Novolog 2/ 15 gm of CHO + SSI 1-30> 100. About 15-20 Units with each meal.  BS checks: 2-3 times a day  Average Meter Download: 219. Fasting Bg mostly high, other times variable. Highest was 365, lowest was 65.  Last A1c: 6.4%  Symptoms of hypoglycemia (low blood sugar): present, episodes are occasional.  Diabetic eye exam within the last year:   Fixed meal pattern: yes  Patient counting carbs: yes  Using PUMP: no      DM Complications:   Complications:   Diabetes Complications  Description / Detail    Diabetic Retinopathy  H/O laser photocoagulation in both eyes and vitrectomy in his right eye    CAD / PAD  No- unable to feel the warm or cold temperatures from knee down   Neuropathy  +   Nephropathy / Microalbuminuria  + s/p kidney transplant.  Lab Results   Component Value Date    UMALCR 198.98 12/22/2016         Gastroparesis  No   Hypoglycemia [place]  No          2. Hypertension: Blood Pressure today:   BP Readings from Last 3 Encounters:   06/12/18 108/70   11/20/17 (!) 82/68   12/22/16 105/71   On medication  3. 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     Noncompliance with treatment     no labs for one year     Pancreas replaced by transplant (H) Feb 2009    rejection treated with thymo     Tobacco abuse ongoing     Past Surgical History:   Procedure Laterality Date     BYPASS GRAFT ARTERY CORONARY N/A 8/5/2015    Procedure: BYPASS GRAFT ARTERY CORONARY;  Surgeon: Katy Neville MD;  Location: [facility] OR     ORTHOPEDIC SURGERY  1993    tumor removed from left knee     TRANSPLANT      pancrease and kidney transplant     Family Hx:  Family History   Problem Relation Age of Onset     HEART DISEASE Maternal Grandfather 62           DM2: No           Social Hx:  Social History     Social History     Marital status:      Spouse name: N/A     Number of children: 1     Years of education: N/A     Occupational History           Social History Main Topics     Smoking status: Former Smoker     Packs/day: 0.30     Types: Cigarettes     Smokeless tobacco: Never Used      Comment: E- Cig use still     Alcohol use 0.0 oz/week     0 Standard drinks or equivalent per week      Comment: social     Drug use: No     Sexual activity: No     Other Topics Concern     Parent/Sibling W/ Cabg, Mi Or Angioplasty Before 65f 55m? No     Blood Transfusions No     possibly during surgery, otherwise none.     Seat Belt Yes     Social History Narrative          MEDICATIONS:  has a current medication list which includes the following prescription(s): aspirin, ashok contour, blood glucose monitoring, insulin aspart, insulin aspart, insulin glargine, insulin pen needle, metoclopramide,  "metoprolol succinate, omeprazole, tacrolimus, atorvastatin, lactulose, and polyethylene glycol.    ROS     ROS: 10 point ROS neg other than the symptoms noted above in the HPI.    Physical Exam   VS: /70 (BP Location: Right arm, Patient Position: Chair, Cuff Size: Adult Regular)  Pulse 104  Temp 98.7  F (37.1  C) (Oral)  Resp 16  Ht 1.727 m (5' 8\")  Wt 96.9 kg (213 lb 11.2 oz)  SpO2 99%  BMI 32.49 kg/m2  GENERAL: AXOX3, NAD, well dressed, answering questions appropriately, appears stated age.  HEENT: No exopthalmous, no proptosis, EOMI, no lig lag, no retraction  NECK: Thyroid normal in size, non tender, no nodules were palpated.  CV: RRR, no rubs, gallops, no murmurs  LUNGS: CTAB, no wheezes, rales, or ronchi  ABDOMEN: soft, nontender, nondistended, +BS, no organomegaly  EXTREMITIES: no edema, +pulses, no rashes, no lesions  NEUROLOGY: CN grossly intact, + monofilament, + DTR upper and lower extremity, no tremors  DM FOOT EXAM: normal DP and PT pulses, no trophic changes or ulcerative lesions, normal sensory exam and normal monofilament exam.   MSK: grossly intact  SKIN: no rashes, no lesions  PSYCH: normal affect and mood      LABS:  A1c:  Lab Results   Component Value Date    A1C 6.4 11/21/2017    A1C 7.1 12/22/2016    A1C 7.2 07/26/2016    A1C 7.5 08/10/2015    A1C 8.5 08/06/2015         BMP:  Last Basic Metabolic Panel:  Lab Results   Component Value Date     05/10/2018      Lab Results   Component Value Date    POTASSIUM 5.0 05/10/2018     Lab Results   Component Value Date    CHLORIDE 106 05/10/2018     Lab Results   Component Value Date    CHARLY 9.1 05/10/2018     Lab Results   Component Value Date    CO2 25 05/10/2018     Lab Results   Component Value Date    BUN 18 05/10/2018     Lab Results   Component Value Date    CR 1.69 05/10/2018     Lab Results   Component Value Date     05/10/2018         Urine Micro:  Lab Results   Component Value Date    MICROL 195 12/22/2016     No " results found for: MICROALBUMIN      LFTs/Lipids:  Recent Labs   Lab Test  11/21/17   0711  07/08/15   0745  01/28/15   0747   CHOL  171  142  120   HDL  33*  35*  39*   LDL  99  90  64   TRIG  193*  86  87   CHOLHDLRATIO   --   4.1  3.1       Liver Function Studies -   Recent Labs   Lab Test  12/22/16   0908   PROTTOTAL  7.7   ALBUMIN  3.8   BILITOTAL  0.3   ALKPHOS  119   AST  10   ALT  16         TFTs:  Lab Results   Component Value Date    TSH 2.06 11/21/2017       Blood Glucose and pump data/ Meter reviewed.     All pertinent notes, labs, and images personally reviewed by me.     A/P  Mr.Christopher EDUAR Fitzgerald is a 42 year old here for the evaluation/management of diabetes:    1. DM1 - Under Fair control.  A1c 6.4%. DM complicated by triopathy.  FBGs high most of the time. Other BG variable.  Plan:  Labs today.  Stop lantus  Switch to Tresiba (long acting insulin)- take 28 units/day (night)  If BG is > 150 consistently after 5 days increase by 2 units. Do not go beyond 34 Units.  Consider pump--> medtronic 670 G. Discussed insulin pump briefly with pt.  Recommend diagnostic CGM- CDE referral in place.    Recommend checking blood sugars before meals and at bedtime.    If Blood glucose are low more often-> 2-3 times/week- give us a call.  The patient is advised to Make better food choices: reduce carbs, Reduce portion size, weight loss and exercise 3-4 times a week.  Discussed hypoglycemia signs and symptoms as well as management in detail.      There is some variability among people, most will usually develop symptoms suggestive of hypoglycemia when blood glucose levels are lowered to the mid 60's. The first set of symptoms are called adrenergic. Patients may experience any of the following nervousness, sweating, intense hunger, trembling, weakness, palpitations, and difficulty speaking. When BS fall below 50 the patient is unable to talk and take oral therapy.  Would recommend Glucagon emergency kit for  the patient and education for family and friends around the patient.   The acute management of hypoglycemia involves the rapid delivery of a source of easily absorbed sugar. Regular soda, juice, lifesavers, table sugar, are good options. 15 grams of glucose is the dose that is given, followed by an assessment of symptoms and a blood glucose check if possible. If after 10 minutes there is no improvement, another 10-15 grams should be given. This can be repeated up to three times. The equivalency of 10-15 grams of glucose (approximate servings) are: Four lifesavers, 4 teaspoons of sugar, or 1/2 cup or 4 oz of juice or regular pop.    2. Hypertension - Under Good  control.  Takes metoprolol 25 mg.      3. Hyperlipidemia - Under Good control. Takes Atorvastatin 20 mg/day    4. Prevention  ASA- 81 mg  Smoking- no    Most Recent Immunizations   Administered Date(s) Administered     HepB 09/27/2013     Influenza (IIV3) PF 12/14/2009     MMR 08/27/1990     Pneumo Conj 13-V (2010&after) 12/22/2016     Pneumococcal 23 valent 08/05/2013     TD (ADULT, 7+) 04/26/2001     TDAP Vaccine (Adacel) 09/27/2013       Follow-up:  3 months    Patricia Costa M.D  Endocrinology  [facility]  CC: Kt Ríos    More than 50% of face to face time spent with Mr. Tata Fitzgerald on counseling / coordinating his care.     All questions were answered.  The patient indicates understanding of the above issues and agrees with the plan set forth.     Disclaimer: This note consists of symbols derived from keyboarding, dictation and/or voice recognition software. As a result, there may be errors in the script that have gone undetected. Please consider this when interpreting information found in this chart.    Addendum to above note and clinic visit:    Labs reviewed.    See result note/telephone encounter.

## 2018-06-12 NOTE — LETTER
6/12/2018         RE: Murtaza Fitzgerald  [address] [facility] [address]        Dear Colleague,    [facility] you [facility], [facility]za Fitzgerald[facility]. Please see a copy of my visit note below.    Endocrinology Clinic Note:  Name: Murtaza Fitzgerald  Seen in consultation with Kt Ríos for Diabetes.  HPI:  Murtaza Fitzgerald is a 42 year old male who presents for the evaluation/management of type 1 diabetes.  Was seen by  at [facility] before and [facility]. Last visit with her was 2013. Records reviewed.  history of type 1 diabetes, diagnosed at age 11, complicated by triopathy and kidney failure.  He underwent LDKT in 11/2008 and bladder drained pancreas transplant in 02/2009.   After the pancreas transplant, he didn't require treatment with insulin for 4 years.  It was restarted in November 2012.  Considering second pancreas transplant.    1. Type 1 DM:  Orginally diagnosed at the age of: diagnosed at age 11  Hospitalization for DKA:  Current Regimen: lantus 26 units as night. Novolog 2/ 15 gm of CHO + SSI 1-30> 100. About 15-20 Units with each meal.  BS checks: 2-3 times a day  Average Meter Download: 219. Fasting Bg mostly high, other times variable. Highest was 365, lowest was 65.  Last A1c: 6.4%  Symptoms of hypoglycemia (low blood sugar): present, episodes are occasional.  Diabetic eye exam within the last year:   Fixed meal pattern: yes  Patient counting carbs: yes  Using PUMP: no      DM Complications:   Complications:   Diabetes Complications  Description / Detail    Diabetic Retinopathy  H/O laser photocoagulation in both eyes and vitrectomy in his right eye    CAD / PAD  No- unable to feel the warm or cold temperatures from knee down   Neuropathy  +   Nephropathy / Microalbuminuria  + s/p kidney transplant.  Lab Results   Component Value Date    UMALCR 198.98 12/22/2016         Gastroparesis  No   Hypoglycemia  [place]  No          2. Hypertension: Blood Pressure today:   BP Readings from Last 3 Encounters:   06/12/18 108/70   11/20/17 (!) 82/68   12/22/16 105/71   On medication  3. 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     Noncompliance with treatment     no labs for one year     Pancreas replaced by transplant (H) Feb 2009    rejection treated with thymo     Tobacco abuse ongoing     Past Surgical History:   Procedure Laterality Date     BYPASS GRAFT ARTERY CORONARY N/A 8/5/2015    Procedure: BYPASS GRAFT ARTERY CORONARY;  Surgeon: Katy Neville MD;  Location: [facility] OR     ORTHOPEDIC SURGERY  1993    tumor removed from left knee     TRANSPLANT      pancrease and kidney transplant     Family Hx:  Family History   Problem Relation Age of Onset     HEART DISEASE Maternal Grandfather 62           DM2: No           Social Hx:  Social History     Social History     Marital status:      Spouse name: N/A     Number of children: 1     Years of education: N/A     Occupational History           Social History Main Topics     Smoking status: Former Smoker     Packs/day: 0.30     Types: Cigarettes     Smokeless tobacco: Never Used      Comment: E- Cig use still     Alcohol use 0.0 oz/week     0 Standard drinks or equivalent per week      Comment: social     Drug use: No     Sexual activity: No     Other Topics Concern     Parent/Sibling W/ Cabg, Mi Or Angioplasty Before 65f 55m? No     Blood Transfusions No     possibly during surgery, otherwise none.     Seat Belt Yes  "    Social History Narrative          MEDICATIONS:  has a current medication list which includes the following prescription(s): aspirin, ashok contour, blood glucose monitoring, insulin aspart, insulin aspart, insulin glargine, insulin pen needle, metoclopramide, metoprolol succinate, omeprazole, tacrolimus, atorvastatin, lactulose, and polyethylene glycol.    ROS     ROS: 10 point ROS neg other than the symptoms noted above in the HPI.    Physical Exam   VS: /70 (BP Location: Right arm, Patient Position: Chair, Cuff Size: Adult Regular)  Pulse 104  Temp 98.7  F (37.1  C) (Oral)  Resp 16  Ht 1.727 m (5' 8\")  Wt 96.9 kg (213 lb 11.2 oz)  SpO2 99%  BMI 32.49 kg/m2  GENERAL: AXOX3, NAD, well dressed, answering questions appropriately, appears stated age.  HEENT: No exopthalmous, no proptosis, EOMI, no lig lag, no retraction  NECK: Thyroid normal in size, non tender, no nodules were palpated.  CV: RRR, no rubs, gallops, no murmurs  LUNGS: CTAB, no wheezes, rales, or ronchi  ABDOMEN: soft, nontender, nondistended, +BS, no organomegaly  EXTREMITIES: no edema, +pulses, no rashes, no lesions  NEUROLOGY: CN grossly intact, + monofilament, + DTR upper and lower extremity, no tremors  DM FOOT EXAM: normal DP and PT pulses, no trophic changes or ulcerative lesions, normal sensory exam and normal monofilament exam.   MSK: grossly intact  SKIN: no rashes, no lesions  PSYCH: normal affect and mood      LABS:  A1c:  Lab Results   Component Value Date    A1C 6.4 11/21/2017    A1C 7.1 12/22/2016    A1C 7.2 07/26/2016    A1C 7.5 08/10/2015    A1C 8.5 08/06/2015         BMP:  Last Basic Metabolic Panel:  Lab Results   Component Value Date     05/10/2018      Lab Results   Component Value Date    POTASSIUM 5.0 05/10/2018     Lab Results   Component Value Date    CHLORIDE 106 05/10/2018     Lab Results   Component Value Date    CHARLY 9.1 05/10/2018     Lab Results   Component Value Date    CO2 25 05/10/2018     Lab " Results   Component Value Date    BUN 18 05/10/2018     Lab Results   Component Value Date    CR 1.69 05/10/2018     Lab Results   Component Value Date     05/10/2018         Urine Micro:  Lab Results   Component Value Date    MICROL 195 12/22/2016     No results found for: MICROALBUMIN      LFTs/Lipids:  Recent Labs   Lab Test  11/21/17   0711  07/08/15   0745  01/28/15   0747   CHOL  171  142  120   HDL  33*  35*  39*   LDL  99  90  64   TRIG  193*  86  87   CHOLHDLRATIO   --   4.1  3.1       Liver Function Studies -   Recent Labs   Lab Test  12/22/16   0908   PROTTOTAL  7.7   ALBUMIN  3.8   BILITOTAL  0.3   ALKPHOS  119   AST  10   ALT  16         TFTs:  Lab Results   Component Value Date    TSH 2.06 11/21/2017       Blood Glucose and pump data/ Meter reviewed.     All pertinent notes, labs, and images personally reviewed by me.     A/P  Mr.Christopher EDUAR Fitzgerald is a 42 year old here for the evaluation/management of diabetes:    1. DM1 - Under Fair control.  A1c 6.4%. DM complicated by triopathy.  FBGs high most of the time. Other BG variable.  Plan:  Labs today.  Stop lantus  Switch to Tresiba (long acting insulin)- take 28 units/day (night)  If BG is > 150 consistently after 5 days increase by 2 units. Do not go beyond 34 Units.  Consider pump--> medtronic 670 G. Discussed insulin pump briefly with pt.  Recommend diagnostic CGM- CDE referral in place.    Recommend checking blood sugars before meals and at bedtime.    If Blood glucose are low more often-> 2-3 times/week- give us a call.  The patient is advised to Make better food choices: reduce carbs, Reduce portion size, weight loss and exercise 3-4 times a week.  Discussed hypoglycemia signs and symptoms as well as management in detail.      There is some variability among people, most will usually develop symptoms suggestive of hypoglycemia when blood glucose levels are lowered to the mid 60's. The first set of symptoms are called adrenergic.  Patients may experience any of the following nervousness, sweating, intense hunger, trembling, weakness, palpitations, and difficulty speaking. When BS fall below 50 the patient is unable to talk and take oral therapy.  Would recommend Glucagon emergency kit for the patient and education for family and friends around the patient.   The acute management of hypoglycemia involves the rapid delivery of a source of easily absorbed sugar. Regular soda, juice, lifesavers, table sugar, are good options. 15 grams of glucose is the dose that is given, followed by an assessment of symptoms and a blood glucose check if possible. If after 10 minutes there is no improvement, another 10-15 grams should be given. This can be repeated up to three times. The equivalency of 10-15 grams of glucose (approximate servings) are: Four lifesavers, 4 teaspoons of sugar, or 1/2 cup or 4 oz of juice or regular pop.    2. Hypertension - Under Good  control.  Takes metoprolol 25 mg.      3. Hyperlipidemia - Under Good control. Takes Atorvastatin 20 mg/day    4. Prevention  ASA- 81 mg  Smoking- no    Most Recent Immunizations   Administered Date(s) Administered     HepB 09/27/2013     Influenza (IIV3) PF 12/14/2009     MMR 08/27/1990     Pneumo Conj 13-V (2010&after) 12/22/2016     Pneumococcal 23 valent 08/05/2013     TD (ADULT, 7+) 04/26/2001     TDAP Vaccine (Adacel) 09/27/2013       Follow-up:  3 months    Patricia Costa M.D  Endocrinology  [facility]  CC: Kt Ríos    More than 50% of face to face time spent with Mr. Tata Fitzgerald on counseling / coordinating his care.     All questions were answered.  The patient indicates understanding of the above issues and agrees with the plan set forth.     Disclaimer: This note consists of symbols derived from keyboarding, dictation and/or voice recognition software. As a result, there may be errors in the script that have gone undetected. Please consider this when interpreting  information found in this chart.    Addendum to above note and clinic visit:    Labs reviewed.    See result note/telephone encounter.            Again, thank you for allowing me to participate in the care of your patient.        Sincerely,        Patricia Costa MD

## 2018-06-12 NOTE — MR AVS SNAPSHOT
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  [place] [address]  Appointment [phone]  Fax: [phone]  [place]: Wednesday and Thursday          Please arrive 30 minutes before your scheduled appointment.   First go to the main floor lab suit  for previsit A1c lab appointment and then come upstairs and get checked in with  for clinic visit.  This will allow for your blood glucose meter/insulin pump to be downloaded and glycosylated hemoglobin (A1c) to be obtained prior to your appointment.      Labs today.  Stop lantus  Switch to Tresiba (long acting insulin)- take 28 units/day (night)  If BG is > 150 consistently after 5 days increase by 2 units. Do not go beyond 34 Units.  Consider pump--> medtronic 670 G.    Your provider has referred you to Diabetes Education: For all [facility]:  Phone [phone]; Fax [phone]  Please call and make the appointment.-->continous glucose monitor (dexom, ipro) (diagnostic)    Recommend checking blood sugars before meals and at bedtime.    If Blood glucose are low more often-> 2-3 times/week- give us a call.  The patient is advised to Make better food choices: reduce carbs, Reduce portion size, weight loss and exercise 3-4 times a week.  Discussed  hypoglycemia signs and symptoms as well as management in detail.                Follow-ups after your visit        Additional Services     DIABETES EDUCATOR REFERRAL       Your provider has referred you to Diabetes Education: For all [facility]:  Phone [phone]; Fax [phone]    This is a Previous Diagnosis     Type of diabetes is Type 1   Medicare covers: 10 hours of initial DSMT in 12 month period from the time of first visit, plus 2 hours of follow-up DSMT annually, and additional hours as requested for insulin training.         Diabetes Co-Morbidities: dyslipidemia and hypertension               A1C Goal:  <7.0       A1C is: Lab Results       Component                Value               Date                       A1C                      6.4                 11/21/2017              MEDICAL NUTRITION THERAPY (MNT) for Diabetes    Medical Nutrition Therapy with a Registered Dietitian can be provided in coordination with Diabetes Self-Management Training to assist in achieving optimal diabetes management.    MNT Type and Hours: Previous diagnosis: Annual follow-up MNT - 2 hours                       Medicare will cover: 3 hours initial MNT in 12 month period after first visit, plus 2 hours of follow-up MNT annually                                                          A1C is: Lab Results       Component                Value               Date                       A1C                      6.4                 11/21/2017            If an urgent visit is needed or A1C is above 12, Care Team to call the diabetes education team at [phone] or send a message to the diabetes education pool (P DIAB ED-PATIENT CARE).    Diabetes education focus: Continuous glucose monitor Diagnostic CGM (minimum of 72-hours)      Education needs: None                                                                                                                                                      Please be aware that  coverage of these services is subject to the terms and limitations of your health insurance plan.  Call member services at your health plan to determine Diabetes Self-Management Training benefits and ask which blood glucose monitor brands are covered by your plan.      Please bring the following to your appointment:    -   List of current medications   -   List of blood glucose monitor brands that are covered by your insurance plan  -   Blood glucose monitor and log book  -   Food records for the 3 days prior to your visit      The Certified Diabetes Educator may make diabetes medication adjustments per  the CDE Protocol and Collaborative Practice Agreement.                  Who to contact     If you have questions or need follow up information about today's clinic visit or your schedule please contact [facility] directly at [phone].  Normal or non-critical lab and imaging results will be communicated to you by [employer], letter or phone within 4 business days after the clinic has received the results. If you do not hear from us within 7 days, please contact the clinic through [employer] or phone. If you have a critical or abnormal lab result, we will notify you by phone as soon as possible.  Submit refill requests through [employer]on[employer] or call your pharmacy and they will forward the refill request to us. Please allow 3 business days for your refill to be completed.          Additional Information About Your Visit        [employer] Information     [employer] gives you secure access to your electronic health record. If you see a primary care provider, you can also send messages to your care team and make appointments. If you have questions, please call your primary care clinic.  If you do not have a primary care provider, please call [phone] and they will assist you.        Care EveryWhere ID     This is your Care EveryWhere ID. This could be used by other organizations to access your [facility]  "records  [id number]        Your Vitals Were     Pulse Temperature Respirations Height Pulse Oximetry BMI (Body Mass Index)    104 98.7  F (37.1  C) (Oral) 16 1.727 m (5' 8\") 99% 32.49 kg/m2       Blood Pressure from Last 3 Encounters:   06/12/18 108/70   11/20/17 (!) 82/68   12/22/16 105/71    Weight from Last 3 Encounters:   06/12/18 96.9 kg (213 lb 11.2 oz)   11/20/17 93.9 kg (207 lb)   06/14/17 91.2 kg (201 lb 1.6 oz)              We Performed the Following     Albumin Random Urine Quantitative with Creat Ratio     DIABETES EDUCATOR REFERRAL     Hemoglobin A1c          Today's Medication Changes          These changes are accurate as of 6/12/18  9:51 AM.  If you have any questions, ask your nurse or doctor.               Start taking these medicines.        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 170-199 give 2 units. -229 give 3 units. -259 give 4 units. 260-289 give 5 units. 290-319 give 6 units. 320-349 give 7 units. BG >/= 350 give 8 units. To be given with prandial insulin, and based on pre-meal blood glucose.  Notify MD if glucose = or > 350 mg/dL after administration.   Quantity:  15 mL   Refills:  5       * insulin aspart 100 UNIT/ML injection   Commonly known as:  NovoLOG PEN   This may have changed:  Another " medication with the same name was changed. Make sure you understand how and when to take each.   Used for:  Type 1 diabetes mellitus with other circulatory complication (H)        Dose:  1 Units   Inject 1 Units Subcutaneous Take with snacks or supplements for high blood sugar DOSE:  2 units per 15 grams of carbohydrate..   Quantity:  8 mL   Refills:  11       * Notice:  This list has 2 medication(s) that are the same as other medications prescribed for you. Read the directions carefully, and ask your doctor or other care provider to review them with you.      Stop taking these medicines if you haven't already. Please contact your care team if you have questions.     insulin glargine 100 UNIT/ML injection   Commonly known as:  LANTUS SOLOSTAR   Stopped by:  Patricia Costa MD                Where to get your medicines      These medications were sent to [facility] [place], [place] - [address] [facility].  [address] [facility] [address]     Phone:  [phone]     insulin degludec 100 UNIT/ML pen                Primary Care Provider Office Phone # Fax #    [place] -[phone] [address] [place] [address]        Equal Access to Services     [facility]: Hadii jocelyn abreuo Soashley, waaxda luqadaha, qaybta kaalmada adeluisyada, gilberto linn. So [facility] [phone].    ATENCIÓN: Si habla español, tiene a chatterjee disposición servicios gratuitos de asistencia lingüística. Gurjit al [phone].    We comply with applicable federal civil rights laws and [place] laws. We do not discriminate on the basis of race, color, national origin, age, disability, sex, sexual orientation, or gender identity.            Thank you!     Thank you for choosing [facility]  for your care. Our goal is always to provide you with excellent care. Hearing back from our patients is one way we can continue to improve our services.  Please take a few minutes to complete the written survey that you may receive in the mail after your visit with us. Thank you!             Your Updated Medication List - Protect others around you: Learn how to safely use, store and throw away your medicines at www.disposemymeds.org.          This list is accurate as of 6/12/18  9:51 AM.  Always use your most recent med list.                   Brand Name Dispense Instructions for use Diagnosis    aspirin 81 MG tablet     90 tablet    Take 1 tablet (81 mg) by mouth daily    Coronary artery disease involving native coronary artery of native heart without angina pectoris       YOHAN CONTOUR test strip   Generic drug:  blood glucose monitoring     450 each    USE TO TEST BLOOD SUGAR FIVE TIMES A DAY OR AS DIRECTED    Type 1 diabetes mellitus, uncontrolled (H)       blood glucose monitoring meter device kit     1 kit    TO TEST BG 5 TIMIES DAILY    Diabetes mellitus, type 1       * insulin aspart 100 UNIT/ML injection    NovoLOG PEN    15 mL    Inject 1-8 Units Subcutaneous 3 times daily (before meals) Do Not give Correction Insulin if Pre-Meal BG < 140  140-169 give 1 units. 170-199 give 2 units. -229 give 3 units. -259 give 4 units. 260-289 give 5 units. 290-319 give 6 units. 320-349 give 7 units. BG >/= 350 give 8 units. To be given with prandial insulin, and based on pre-meal blood glucose.  Notify MD if glucose = or > 350 mg/dL after administration.    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    Constipation       LIPITOR 40 MG tablet   Generic drug:  atorvastatin     90 tablet    Take 0.5 tablets (20 mg) by mouth daily    Gastroesophageal reflux disease without esophagitis       metoclopramide 5 MG tablet    REGLAN    120 tablet    TAKE ONE TABLET BY MOUTH FOUR TIMES A DAY (BEFORE MEALS AND NIGHTLY)    Gastroesophageal reflux disease without esophagitis       metoprolol succinate 25 MG 24 hr tablet    TOPROL-XL    90 tablet    Take 1 tablet (25 mg) by mouth daily    Coronary artery disease involving native coronary artery of native heart without angina pectoris       omeprazole 40 MG capsule    priLOSEC    180 capsule    Take 1 capsule (40 mg) by mouth 2 times daily Take 30-60 minutes before a meal.    Gastroesophageal reflux disease without esophagitis       polyethylene glycol Packet    MIRALAX/GLYCOLAX     Take 17 g by mouth daily as needed for constipation        tacrolimus 1 MG capsule    GENERIC EQUIVALENT    60 capsule    Take 1 capsule (1 mg) by mouth 2 times daily    Kidney transplanted       * Notice:  This list has 2 medication(s) that are the same as other medications prescribed for you. Read the directions carefully, and ask your doctor or other care provider to review them with you.

## 2018-06-12 NOTE — PATIENT INSTRUCTIONS
[facility] & [place] locations   Dr Cotsa, Endocrinology Department      [facility] [place]   [address]  [place] [address]  Appointment [phone]  Fax: [phone]  [place]: Monday and Tuesday         [facility]   [address]  [place] [address]  Appointment [phone]  Fax: [phone]  [place]: Wednesday and Thursday          Please arrive 30 minutes before your scheduled appointment.   First go to the main floor lab suit  for previsit A1c lab appointment and then come upstairs and get checked in with  for clinic visit.  This will allow for your blood glucose meter/insulin pump to be downloaded and glycosylated hemoglobin (A1c) to be obtained prior to your appointment.      Labs today.  Stop lantus  Switch to Tresiba (long acting insulin)- take 28 units/day (night)  If BG is > 150 consistently after 5 days increase by 2 units. Do not go beyond 34 Units.  Consider pump--> medtronic 670 G.    Your provider has referred you to Diabetes Education: For all [facility]:  Phone [phone]; Fax [phone]  Please call and make the appointment.-->continous glucose monitor (dexom, ipro) (diagnostic)    Recommend checking blood sugars before meals and at bedtime.    If Blood glucose are low more often-> 2-3 times/week- give us a call.  The patient is advised to Make better food choices: reduce carbs, Reduce portion size, weight loss and exercise 3-4 times a week.  Discussed hypoglycemia signs and symptoms as well as management in detail.

## 2018-06-14 ENCOUNTER — TELEPHONE (OUTPATIENT)
Dept: EDUCATION SERVICES | Facility: CLINIC | Age: 43
End: 2018-06-14

## 2018-06-14 NOTE — TELEPHONE ENCOUNTER
Attempted to reach patient re: upcoming DM ed appointment that was accidentally double booked on 6/26/18 at 12:00 pm.   Left voice mail, CDE has cancellation/hold appointment availability on 6/19 at 10:30 and 12:30.  Pt asked to call scheduling line [phone] to re-schedule.    Becky Lozada RD, CDE  Diabetes

## 2018-06-25 ENCOUNTER — MYC MEDICAL ADVICE (OUTPATIENT)
Dept: PEDIATRICS | Facility: CLINIC | Age: 43
End: 2018-06-25

## 2018-06-25 DIAGNOSIS — M25.511 RIGHT SHOULDER PAIN, UNSPECIFIED CHRONICITY: Primary | ICD-10-CM

## 2018-06-26 ENCOUNTER — ALLIED HEALTH/NURSE VISIT (OUTPATIENT)
Dept: EDUCATION SERVICES | Facility: CLINIC | Age: 43
End: 2018-06-26
Payer: COMMERCIAL

## 2018-06-26 DIAGNOSIS — I43 TYPE 1 DIABETES MELLITUS WITH DIABETIC CARDIOMYOPATHY (H): Primary | ICD-10-CM

## 2018-06-26 DIAGNOSIS — E10.59 TYPE 1 DIABETES MELLITUS WITH DIABETIC CARDIOMYOPATHY (H): Primary | ICD-10-CM

## 2018-06-26 PROCEDURE — 95250 CONT GLUC MNTR PHYS/QHP EQP: CPT

## 2018-06-26 PROCEDURE — G0108 DIAB MANAGE TRN  PER INDIV: HCPCS

## 2018-06-26 NOTE — MR AVS SNAPSHOT
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)    -  1       Follow-ups after your visit        Your next 10 appointments already scheduled     Jul 03, 2018  9:30 AM CDT   Diabetic Education with Vania Gutierrez RN   [facility]    [id number]  [id number] [phone]            Jul 10, 2018  9:30 AM CDT   Diabetic Education with Vania Gutierrez RN   [facility]    [address]  [id number] [phone]              Who to contact     If you have questions or need follow up information about today's clinic visit or your schedule please contact [facility] directly at [phone].  Normal or non-critical lab and imaging results will be communicated to you by MyChart, letter or phone within 4 business days after the clinic has received the results. If you do not hear from us within 7 days, please contact the clinic through [employer] or phone. If you have a critical or abnormal lab result, we will notify you by phone as soon as possible.  Submit refill requests through Snapdeal or call your pharmacy and they will forward the refill request to us. Please allow 3 business days for your refill to be completed.          Additional Information About Your Visit        MyChart Information     [employer] gives you secure access to your electronic health record. If you see a primary care provider, you can also send messages to your care team and make appointments. If you have questions, please call your  primary care clinic.  If you do not have a primary care provider, please call [phone] and they will assist you.        Care EveryWhere ID     This is your Care EveryWhere ID. This could be used by other organizations to access your [place] medical records  [id number]         Blood Pressure from Last 3 Encounters:   06/12/18 108/70   11/20/17 (!) 82/68   12/22/16 105/71    Weight from Last 3 Encounters:   06/12/18 96.9 kg (213 lb 11.2 oz)   11/20/17 93.9 kg (207 lb)   06/14/17 91.2 kg (201 lb 1.6 oz)              We Performed the Following     CONTINUOUS GLUCOSE MONITORING                              [74251]     DIABETES EDUCATION - Individual  []          Today's Medication Changes          These changes are accurate as of 6/26/18 12:21 PM.  If you have any questions, ask your nurse or doctor.               These medicines have changed or have updated prescriptions.        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 170-199 give 2 units. -229 give 3 units. -259 give 4 units. 260-289 give 5 units. 290-319 give 6 units. 320-349 give 7 units. BG >/= 350 give 8 units. To be given with prandial insulin, and based on pre-meal blood glucose.  Notify MD if glucose = or > 350 mg/dL after administration.   Quantity:  15 mL   Refills:  5       * insulin aspart 100 UNIT/ML injection   Commonly known as:  NovoLOG PEN   This may have changed:  Another medication with the same name was changed. Make sure you understand how and when to take each.   Used for:  Type 1 diabetes mellitus with other circulatory complication (H)        Dose:  1 Units   Inject 1 Units Subcutaneous Take with  snacks or supplements for high blood sugar DOSE:  2 units per 15 grams of carbohydrate..   Quantity:  8 mL   Refills:  11       * Notice:  This list has 2 medication(s) that are the same as other medications prescribed for you. Read the directions carefully, and ask your doctor or other care provider to review them with you.             Primary Care Provider Office Phone # Fax #    [place] [phone] [address] [place] [address]        Equal Access to Services     [facility]: Hadii aad ku hadasho Soomaali, waaxda luqadaha, qaybta kaalmada adeegyada, waxay idiin hayaan adeeg kharash la'charbeln . So [facility] [phone].    ATENCIÓN: Si habla español, tiene a chatterjee disposición servicios gratuitos de asistencia lingüística. [facility] [phone].    We comply with applicable federal civil rights laws and [place] laws. We do not discriminate on the basis of race, color, national origin, age, disability, sex, sexual orientation, or gender identity.            Thank you!     Thank you for choosing [facility] [place]  for your care. Our goal is always to provide you with excellent care. Hearing back from our patients is one way we can continue to improve our services. Please take a few minutes to complete the written survey that you may receive in the mail after your visit with us. Thank you!             Your Updated Medication List - Protect others around you: Learn how to safely use, store and throw away your medicines at www.disposemymeds.org.          This list is accurate as of 6/26/18 12:21 PM.  Always use your most recent med list.                   Brand Name Dispense Instructions for use Diagnosis    aspirin 81 MG tablet     90 tablet    Take 1 tablet (81 mg) by mouth daily    Coronary artery disease involving native coronary artery of native heart without angina pectoris       YOHAN CONTOUR test strip   Generic drug:  blood glucose monitoring     450  each    USE TO TEST BLOOD SUGAR FIVE TIMES A DAY OR AS DIRECTED    Type 1 diabetes mellitus, uncontrolled (H)       blood glucose monitoring meter device kit     1 kit    TO TEST BG 5 TIMIES DAILY    Diabetes mellitus, type 1       * insulin aspart 100 UNIT/ML injection    NovoLOG PEN    15 mL    Inject 1-8 Units Subcutaneous 3 times daily (before meals) Do Not give Correction Insulin if Pre-Meal BG < 140  140-169 give 1 units. 170-199 give 2 units. -229 give 3 units. -259 give 4 units. 260-289 give 5 units. 290-319 give 6 units. 320-349 give 7 units. BG >/= 350 give 8 units. To be given with prandial insulin, and based on pre-meal blood glucose.  Notify MD if glucose = or > 350 mg/dL after administration.    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    Constipation       LIPITOR 40 MG tablet   Generic drug:  atorvastatin     90 tablet    Take 0.5 tablets (20 mg) by mouth daily    Gastroesophageal reflux disease without esophagitis       metoclopramide 5 MG tablet    REGLAN    120 tablet    TAKE ONE TABLET BY MOUTH FOUR TIMES A DAY (BEFORE MEALS AND NIGHTLY)    Gastroesophageal reflux disease without esophagitis       metoprolol succinate 25 MG 24 hr tablet    TOPROL-XL    90 tablet    Take 1 tablet (25 mg)  by mouth daily    Coronary artery disease involving native coronary artery of native heart without angina pectoris       omeprazole 40 MG capsule    priLOSEC    180 capsule    Take 1 capsule (40 mg) by mouth 2 times daily Take 30-60 minutes before a meal.    Gastroesophageal reflux disease without esophagitis       polyethylene glycol Packet    MIRALAX/GLYCOLAX     Take 17 g by mouth daily as needed for constipation        tacrolimus 1 MG capsule    GENERIC EQUIVALENT    60 capsule    Take 1 capsule (1 mg) by mouth 2 times daily    Kidney transplanted       * Notice:  This list has 2 medication(s) that are the same as other medications prescribed for you. Read the directions carefully, and ask your doctor or other care provider to review them with you.

## 2018-06-26 NOTE — PROGRESS NOTES
Diabetes Self Management Training: Professional Continuous Glucose monitor and  Pre-Insulin Pump Assessment     Murtaza WITT Tata Fitzgerald presents today for CGM and discuss insulin pump related to Type 1 diabetes.    He is accompanied by self    Patient's diabetes management related comments/concerns: blood sugars have been higher lately, thinking about a pump or to go back on transplant list.  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    Patient's emotional response to diabetes: expresses readiness to learn    Patient would like this visit to be focused around the following diabetes-related behaviors and goals: discuss pump and CGM    ASSESSMENT:  Patient Problem List and Family Medical History reviewed for relevant medical history, current medical status, and diabetes risk factors.    Current Diabetes Management per Patient:  Taking diabetes medications?   yes:     Diabetes Medication(s)     Insulin Sig    insulin aspart (NOVOLOG PEN) 100 UNIT/ML injection Inject 1 Units Subcutaneous Take with snacks or supplements for high blood sugar DOSE:  2 units per 15 grams of carbohydrate..    insulin aspart (NOVOLOG PEN) 100 UNIT/ML injection Inject 1-8 Units Subcutaneous 3 times daily (before meals) Do Not give Correction Insulin if Pre-Meal BG < 140   140-169 give 1 units.  170-199 give 2 units.  -229 give 3 units.  -259 give 4 units.  260-289 give 5 units.  290-319 give 6 units.  320-349 give 7 units.  BG >/= 350 give 8 units.  To be given with prandial insulin, and based on pre-meal blood glucose.   Notify MD if glucose = or > 350 mg/dL after administration.     Patient taking differently:  Inject 1-8 Units Subcutaneous 3 times daily (before meals) Do Not give Correction Insulin if Pre-Meal BG < 140   140-169 give 1 units.  170-199 give 2 units.  -229 give 3 units.  -259 give 4 units.  260-289 give 5 units.  290-319 give 6 units.  320-349 give 7 units.  BG >/= 350 give 8  "units.  To be given with prandial insulin, and based on pre-meal blood glucose.   Notify MD if glucose = or > 350 mg/dL after administration.    insulin degludec (TRESIBA) 100 UNIT/ML pen Inject 28 Units Subcutaneous daily          *Abbreviated insulin dose documentation key: Insulin Trade Name (fmsrmdova-osygm-tdqlwi-bedtime) - i.e. Humalog 5-5-5-0 (Humalog 5 units at breakfast, 5 units at lunch, and 5 units at dinner).    Past Diabetes Education: Yes    Patient glucose self monitoring as follows: 3-5 times daily.   BG meter: patient did not bring meter meter  BG results: not available     BG values are: unable to assess  Patient's most recent   Lab Results   Component Value Date    A1C 6.4 11/21/2017    is meeting goal of <7.0    Nutrition:  Patient currently eats 3 meals a day and snacks as needed, he counts carbohydrates in grams    Physical Activity:    Not assessed    Diabetes Complications:  Not discussed today.    Vitals:  There were no vitals taken for this visit.  Estimated body mass index is 32.49 kg/(m^2) as calculated from the following:    Height as of 6/12/18: 1.727 m (5' 8\").    Weight as of 6/12/18: 96.9 kg (213 lb 11.2 oz).   Last 3 BP:   BP Readings from Last 3 Encounters:   06/12/18 108/70   11/20/17 (!) 82/68   12/22/16 105/71       History   Smoking Status     Former Smoker     Packs/day: 0.30     Types: Cigarettes   Smokeless Tobacco     Never Used     Comment: E- Cig use still       Labs:  Lab Results   Component Value Date    A1C 6.4 11/21/2017     Lab Results   Component Value Date     05/10/2018     Lab Results   Component Value Date    LDL 99 11/21/2017     HDL Cholesterol   Date Value Ref Range Status   11/21/2017 33 (L) >39 mg/dL Final   ]  GFR Estimate   Date Value Ref Range Status   05/10/2018 45 (L) >60 mL/min/1.7m2 Final     Comment:     Non  GFR Calc     GFR Estimate If Black   Date Value Ref Range Status   05/10/2018 54 (L) >60 mL/min/1.7m2 Final     " Comment:      GFR Calc     Lab Results   Component Value Date    CR 1.69 05/10/2018     No results found for: MICROALBUMIN    Socio/Economic History:    Support system: not assessed    Health Beliefs and Attitudes:   Patient Activation Measure Survey Score:  No flowsheet data found.    Stage of Change: PREPARATION (Decided to change - considering how)    INTERVENTION:    LibrePro sensor started today. (Lot # [id number], Serial # [id number], Expiration date 10/31/18) Sensor was inserted with no resistance or bleeding at insertion site.    Pt will plan to wear the sensor through  7/10/18.    WRITTEN AND VERBAL INFORMATION GIVEN TO SUPPORT UNDERSTANDING OF:  LibrePro CGM: Sensor insertion, intention of monitoring for 14 days. Keep records of BG, food intake, exercise, and medication dosing during wear.       Patient verbalizes understanding of how to remove sensor and all instructions provided.     Education provided today on:  AADE Self-Care Behaviors:  Monitoring: frequency of monitoring    Basics of insulin pump therapy, including what is an insulin pump, how an insulin pump functions, advantages and disadvantages to insulin pump use, hands on button pressing and requirements for successful insulin pump therapy, were discussed.     Opportunities for ongoing education and support in diabetes-self management were discussed.    Patientt verbalized understanding of concepts discussed and recommendations provided today.       Education Materials Provided:  Food record, blood glucose and insulin log sheet  Informational packets for Medtronic, Omnipod and Tandem insulin pumps,   dexcom and aaron sensor brochures    PLAN:  Keep detailed food and insulin record.  Review pump and CGM brochures    FOLLOW-UP:  Chart routed to referring provider.    Ongoing plan for education and support: Follow-up visit with diabetes educator in 7/3/18    Vania Gutierrez RN,CDE   Time Spent: 60 minutes  Encounter Type:  Individual    Any diabetes medication dose changes were made via the CDE Protocol and Collaborative Practice Agreement with the patient's referring provider. A copy of this encounter was shared with the provider.

## 2018-06-26 NOTE — LETTER
6/26/2018         RE: Murtaza Fitzgerald  [address] [address]        Dear Colleague,    Thank you for referring your patient, Murtaza Fitzgerald, to the [facility]. Please see a copy of my visit note below.    Diabetes Self Management Training: Professional Continuous Glucose monitor and  Pre-Insulin Pump Assessment     Murtaza Fitzgerald presents today for CGM and discuss insulin pump related to Type 1 diabetes.    He is accompanied by self    Patient's diabetes management related comments/concerns: blood sugars have been higher lately, thinking about a pump or to go back on transplant list.  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    Patient's emotional response to diabetes: expresses readiness to learn    Patient would like this visit to be focused around the following diabetes-related behaviors and goals: discuss pump and CGM    ASSESSMENT:  Patient Problem List and Family Medical History reviewed for relevant medical history, current medical status, and diabetes risk factors.    Current Diabetes Management per Patient:  Taking diabetes medications?   yes:     Diabetes Medication(s)     Insulin Sig    insulin aspart (NOVOLOG PEN) 100 UNIT/ML injection Inject 1 Units Subcutaneous Take with snacks or supplements for high blood sugar DOSE:  2 units per 15 grams of carbohydrate..    insulin aspart (NOVOLOG PEN) 100 UNIT/ML injection Inject 1-8 Units Subcutaneous 3 times daily (before meals) Do Not give Correction Insulin if Pre-Meal BG < 140   140-169 give 1 units.  170-199 give 2 units.  -229 give 3 units.  -259 give 4 units.  260-289 give 5 units.  290-319 give 6 units.  320-349 give 7 units.  BG >/= 350 give 8 units.  To be given with prandial insulin, and based on pre-meal blood glucose.   Notify MD if glucose = or > 350 mg/dL after administration.     Patient taking differently:  Inject 1-8 Units  "Subcutaneous 3 times daily (before meals) Do Not give Correction Insulin if Pre-Meal BG < 140   140-169 give 1 units.  170-199 give 2 units.  -229 give 3 units.  -259 give 4 units.  260-289 give 5 units.  290-319 give 6 units.  320-349 give 7 units.  BG >/= 350 give 8 units.  To be given with prandial insulin, and based on pre-meal blood glucose.   Notify MD if glucose = or > 350 mg/dL after administration.    insulin degludec (TRESIBA) 100 UNIT/ML pen Inject 28 Units Subcutaneous daily          *Abbreviated insulin dose documentation key: Insulin Trade Name (flgodustg-dormn-hkeulv-bedtime) - i.e. Humalog 5-5-5-0 (Humalog 5 units at breakfast, 5 units at lunch, and 5 units at dinner).    Past Diabetes Education: Yes    Patient glucose self monitoring as follows: 3-5 times daily.   BG meter: patient did not bring meter meter  BG results: not available     BG values are: unable to assess  Patient's most recent   Lab Results   Component Value Date    A1C 6.4 11/21/2017    is meeting goal of <7.0    Nutrition:  Patient currently eats 3 meals a day and snacks as needed, he counts carbohydrates in grams    Physical Activity:    Not assessed    Diabetes Complications:  Not discussed today.    Vitals:  There were no vitals taken for this visit.  Estimated body mass index is 32.49 kg/(m^2) as calculated from the following:    Height as of 6/12/18: 1.727 m (5' 8\").    Weight as of 6/12/18: 96.9 kg (213 lb 11.2 oz).   Last 3 BP:   BP Readings from Last 3 Encounters:   06/12/18 108/70   11/20/17 (!) 82/68   12/22/16 105/71       History   Smoking Status     Former Smoker     Packs/day: 0.30     Types: Cigarettes   Smokeless Tobacco     Never Used     Comment: E- Cig use still       Labs:  Lab Results   Component Value Date    A1C 6.4 11/21/2017     Lab Results   Component Value Date     05/10/2018     Lab Results   Component Value Date    LDL 99 11/21/2017     HDL Cholesterol   Date Value Ref Range Status "   11/21/2017 33 (L) >39 mg/dL Final   ]  GFR Estimate   Date Value Ref Range Status   05/10/2018 45 (L) >60 mL/min/1.7m2 Final     Comment:     Non  GFR Calc     GFR Estimate If Black   Date Value Ref Range Status   05/10/2018 54 (L) >60 mL/min/1.7m2 Final     Comment:      GFR Calc     Lab Results   Component Value Date    CR 1.69 05/10/2018     No results found for: MICROALBUMIN    Socio/Economic History:    Support system: not assessed    Health Beliefs and Attitudes:   Patient Activation Measure Survey Score:  No flowsheet data found.    Stage of Change: PREPARATION (Decided to change - considering how)    INTERVENTION:    LibrePro sensor started today. (Lot # [id number], Serial # [id number], Expiration date 10/31/18) Sensor was inserted with no resistance or bleeding at insertion site.    Pt will plan to wear the sensor through  7/10/18.    WRITTEN AND VERBAL INFORMATION GIVEN TO SUPPORT UNDERSTANDING OF:  LibrePro CGM: Sensor insertion, intention of monitoring for 14 days. Keep records of BG, food intake, exercise, and medication dosing during wear.       Patient verbalizes understanding of how to remove sensor and all instructions provided.     Education provided today on:  AADE Self-Care Behaviors:  Monitoring: frequency of monitoring    Basics of insulin pump therapy, including what is an insulin pump, how an insulin pump functions, advantages and disadvantages to insulin pump use, hands on button pressing and requirements for successful insulin pump therapy, were discussed.     Opportunities for ongoing education and support in diabetes-self management were discussed.    Patientt verbalized understanding of concepts discussed and recommendations provided today.       Education Materials Provided:  Food record, blood glucose and insulin log sheet  Informational packets for Medtronic, Omnipod and Tandem insulin pumps,   dexcom and aaron sensor brochures    PLAN:  Keep detailed  food and insulin record.  Review pump and CGM brochures    FOLLOW-UP:  Chart routed to referring provider.    Ongoing plan for education and support: Follow-up visit with diabetes educator in 7/3/18    Vania Gutierrez RN,CDE   Time Spent: 60 minutes  Encounter Type: Individual    Any diabetes medication dose changes were made via the CDE Protocol and Collaborative Practice Agreement with the patient's referring provider. A copy of this encounter was shared with the provider.

## 2018-06-26 NOTE — TELEPHONE ENCOUNTER
Patient asking for a physical therapy referral for pain in the right shoulder. onset: 4 weeks ago.     Pended please sign if ok.  Sharon Zepeda RN  Message handled by Nurse Triage.

## 2018-07-03 ENCOUNTER — ALLIED HEALTH/NURSE VISIT (OUTPATIENT)
Dept: EDUCATION SERVICES | Facility: CLINIC | Age: 43
End: 2018-07-03
Payer: COMMERCIAL

## 2018-07-03 DIAGNOSIS — E10.59 TYPE 1 DIABETES MELLITUS WITH OTHER CIRCULATORY COMPLICATION (H): ICD-10-CM

## 2018-07-03 PROCEDURE — G0108 DIAB MANAGE TRN  PER INDIV: HCPCS

## 2018-07-03 NOTE — MR AVS SNAPSHOT
After Visit Summary   7/3/2018    Murtaza Fitzgerald    MRN: [id number]           Patient Information     Date Of Birth          1975        Visit Information        Provider Department      7/3/2018 9:30 AM Vania Gutierrez RN [facility]        Today's Diagnoses     Type 1 diabetes mellitus with other circulatory complication (H)          Care Instructions    Decrease Lantus to 24 units once a day    Novolo units per 14 grams of carbohydrate ( 1:7)  Correction of 1:25 above 150    Keep a food record          Follow-ups after your visit        Your next 10 appointments already scheduled     2018  2:20 PM CDT   (Arrive by 2:05 PM)   IKER Extremity with Rusty Little, PT   [facility] ([facility])    [address]  [facility] [id number] [phone]            Jul 10, 2018  9:30 AM CDT   Diabetic Education with Vania Gutierrez RN   [facility] ([facility])    [address]  [facility] [id number] [phone]            2018  3:00 PM CDT   IKER Extremity with Rusty Little, PT   [facility] ([facility])    [address]  [facility] [id number] [phone]            2018 11:30 AM CDT   IKER Extremity with Tonia Pablo PT   [facility] ([facility])    [address]  [facility] [address] [phone]              Who to contact     If you have questions or need follow up information about today's clinic visit or your schedule please contact [facility] directly at [phone].  Normal or non-critical lab and imaging results will be communicated to you by MyChart, letter or phone within 4 business days after the clinic  has received the results. If you do not hear from us within 7 days, please contact the clinic through [employer] or phone. If you have a critical or abnormal lab result, we will notify you by phone as soon as possible.  Submit refill requests through [employer] or call your pharmacy and they will forward the refill request to us. Please allow 3 business days for your refill to be completed.          Additional Information About Your Visit        [employer] Information     [employer] gives you secure access to your electronic health record. If you see a primary care provider, you can also send messages to your care team and make appointments. If you have questions, please call your primary care clinic.  If you do not have a primary care provider, please call [phone] and they will assist you.        Care EveryWhere ID     This is your Care EveryWhere ID. This could be used by other organizations to access your [place] medical records  [id number]         Blood Pressure from Last 3 Encounters:   06/12/18 108/70   11/20/17 (!) 82/68   12/22/16 105/71    Weight from Last 3 Encounters:   06/12/18 96.9 kg (213 lb 11.2 oz)   11/20/17 93.9 kg (207 lb)   06/14/17 91.2 kg (201 lb 1.6 oz)              Today, you had the following     No orders found for display         Today's Medication Changes          These changes are accurate as of 7/3/18 10:30 AM.  If you have any questions, ask your nurse or doctor.               These medicines have changed or have updated prescriptions.        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 170-199 give 2 units. -229 give 3 units. -259 give 4 units. 260-289  give 5 units. 290-319 give 6 units. 320-349 give 7 units. BG >/= 350 give 8 units. To be given with prandial insulin, and based on pre-meal blood glucose.  Notify MD if glucose = or > 350 mg/dL after administration.   Quantity:  15 mL   Refills:  5       * insulin aspart 100 UNIT/ML injection   Commonly known as:  NovoLOG PEN   This may have changed:  Another medication with the same name was changed. Make sure you understand how and when to take each.   Used for:  Type 1 diabetes mellitus with other circulatory complication (H)        Dose:  1 Units   Inject 1 Units Subcutaneous Take with snacks or supplements for high blood sugar DOSE:  2 units per 15 grams of carbohydrate..   Quantity:  8 mL   Refills:  11       * Notice:  This list has 2 medication(s) that are the same as other medications prescribed for you. Read the directions carefully, and ask your doctor or other care provider to review them with you.             Primary Care Provider Office Phone # Fax #    [place] [phone]       [facility] [place] [address]        Equal Access to Services     [facility]: Hadii aad ku hadasho [place], waaxda luqadaha, qaybta kaalmada ademartha, gilberto douglas . So [facility] [phone].    ATENCIÓN: Si habla español, tiene a chatterjee disposición servicios gratuitos de asistencia lingüística. Llame al [phone].    We comply with applicable federal civil rights laws and [place] laws. We do not discriminate on the basis of race, color, national origin, age, disability, sex, sexual orientation, or gender identity.            Thank you!     Thank you for choosing [facility] [place]  for your care. Our goal is always to provide you with excellent care. Hearing back from our patients is one way we can continue to improve our services. Please take a few minutes to complete the written survey that you may receive in the mail after your visit  with us. Thank you!             Your Updated Medication List - Protect others around you: Learn how to safely use, store and throw away your medicines at www.disposemymeds.org.          This list is accurate as of 7/3/18 10:30 AM.  Always use your most recent med list.                   Brand Name Dispense Instructions for use Diagnosis    aspirin 81 MG tablet     90 tablet    Take 1 tablet (81 mg) by mouth daily    Coronary artery disease involving native coronary artery of native heart without angina pectoris       YOHAN CONTOUR test strip   Generic drug:  blood glucose monitoring     450 each    USE TO TEST BLOOD SUGAR FIVE TIMES A DAY OR AS DIRECTED    Type 1 diabetes mellitus, uncontrolled (H)       blood glucose monitoring meter device kit     1 kit    TO TEST BG 5 TIMIES DAILY    Diabetes mellitus, type 1       * insulin aspart 100 UNIT/ML injection    NovoLOG PEN    15 mL    Inject 1-8 Units Subcutaneous 3 times daily (before meals) Do Not give Correction Insulin if Pre-Meal BG < 140  140-169 give 1 units. 170-199 give 2 units. -229 give 3 units. -259 give 4 units. 260-289 give 5 units. 290-319 give 6 units. 320-349 give 7 units. BG >/= 350 give 8 units. To be given with prandial insulin, and based on pre-meal blood glucose.  Notify MD if glucose = or > 350 mg/dL after administration.    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    Type 1 diabetes mellitus with diabetic  cardiomyopathy (H)       lactulose 20 GM/30ML Soln     473 mL    Take 30 mLs (20 g) by mouth 2 times daily as needed For constipation.    Constipation       LIPITOR 40 MG tablet   Generic drug:  atorvastatin     90 tablet    Take 0.5 tablets (20 mg) by mouth daily    Gastroesophageal reflux disease without esophagitis       metoclopramide 5 MG tablet    REGLAN    120 tablet    TAKE ONE TABLET BY MOUTH FOUR TIMES A DAY (BEFORE MEALS AND NIGHTLY)    Gastroesophageal reflux disease without esophagitis       metoprolol succinate 25 MG 24 hr tablet    TOPROL-XL    90 tablet    Take 1 tablet (25 mg) by mouth daily    Coronary artery disease involving native coronary artery of native heart without angina pectoris       omeprazole 40 MG capsule    priLOSEC    180 capsule    Take 1 capsule (40 mg) by mouth 2 times daily Take 30-60 minutes before a meal.    Gastroesophageal reflux disease without esophagitis       polyethylene glycol Packet    MIRALAX/GLYCOLAX     Take 17 g by mouth daily as needed for constipation        tacrolimus 1 MG capsule    GENERIC EQUIVALENT    60 capsule    Take 1 capsule (1 mg) by mouth 2 times daily    Kidney transplanted       * Notice:  This list has 2 medication(s) that are the same as other medications prescribed for you. Read the directions carefully, and ask your doctor or other care provider to review them with you.

## 2018-07-03 NOTE — LETTER
7/3/2018         RE: Murtaza Fitzgerald  [address] [facility] [address]        Dear Colleague,    Thank you for referring your patient, Murtaza Fitzgerald, to the [facility]. Please see a copy of my visit note below.    Diabetes Self Management Training: Follow-up Visit and Continuous Glucose Monitor Download    Murtaza Fitzgerald presents today for education, evaluation of glucose control and download of continuous glucose monitoring related to Type 1 diabetes.    He is accompanied by self    Patient's diabetes management related comments/concerns: sensor fell off on Friday, having trouble with memory, has consulted with PCP and  regarding this.  Self adjusted insulin since last seen by . Worries abut high glucose since he did not manage diabetes when he was younger and developed the complications of diabetes.   Looked at brochures and decided he wants to move forward on the dexcom. Still considering insulin pumps but likes the Omnipod best.     Current Diabetes Management per Patient:  Taking diabetes medications?   yes:     Diabetes Medication(s)     Insulin Sig    insulin aspart (NOVOLOG PEN) 100 UNIT/ML injection Inject 1 Units Subcutaneous Take with snacks or supplements for high blood sugar DOSE:  2 units per 15 grams of carbohydrate..patient self adjusted dosing to 1:5- he did not feel 2:15 was enough insulin    insulin aspart (NOVOLOG PEN) 100 UNIT/ML injection Inject 1-8 Units Subcutaneous 3 times daily (before meals) Do Not give Correction Insulin if Pre-Meal BG < 140   140-169 give 1 units.  170-199 give 2 units.  -229 give 3 units.  -259 give 4 units.  260-289 give 5 units.  290-319 give 6 units.  320-349 give 7 units.  BG >/= 350 give 8 units.  To be given with prandial insulin, and based on pre-meal blood glucose.   Notify MD if glucose = or > 350 mg/dL after administration.    Lantus 30 units  at bedtime - patient has a lot of Lantus and did NOT yet started the Tresiba          *Abbreviated insulin dose documentation key: Insulin Trade Name (elrnisjsz-sjvol-zwtpit-bedtime) - i.e. Humalog 5-5-5-0 (Humalog 5 units at breakfast, 5 units at lunch, and 5 units at dinner).    Most Recent A1c Result:    Lab Results   Component Value Date    A1C 6.4 11/21/2017     Indication/s for LibrePro study: Difficult to manage hypoglycemia and/or hyperglycemia, despite multiple adjustments in self-monitoring of blood glucose and diabetes medication administration, Suspected nocturnal hypoglycemia and Hypoglycemia unawareness.      LibrePro Continuous Glucose Monitor Interpretation         StatisticsData evaluation:            Patient's Logbook shows the following:   Carbohydrate counting is: accurate  Medication and/or insulin dosing is: accurate according to his self adjustment    Assessment:  Patient experiencing several episodes of hypoglycemia and is unaware of these occurences.  This may be contributing to his memory issues.    Overnight hypoglycemia and minimal post meal rise in glucose. Recommend decrease both basal and bolus insulin and replace sensor to monitor glucose trends with new dosing.       INTERVENTION:  Recommend decrease basal insulin by 20%, change I:C ratio to 2:14(1:7) and correction to 1:20 > 150     LibrePro sensor started today. (Lot # [id number], Serial # [id number], Expiration date 10/31/18) Sensor was inserted with no resistance or bleeding at insertion site.      Dexcom online insurance information filled out and submitted per patient request.    Education provided today on:  AADE Self-Care Behaviors:  Healthy Eating: carbohydrate counting and consistency in amount, composition, and timing of food intake  Monitoring: frequency of monitoring  Taking Medication: action of prescribed medication, side effects of prescribed medications, when to take medications and dosing  Problem Solving: low blood  glucose - causes, signs/symptoms, treatment and prevention and carrying a carbohydrate source at all times    Pt verbalized understanding of concepts discussed and recommendations provided today.       Education Materials Provided:  Food record    PLAN:  See Patient Instructions for co-developed, patient-stated behavior change goals.  AVS printed and provided to patient today.    FOLLOW-UP:  Chart routed to referring provider.  7/10/18    Vania Gutierrez RN,CDE   Time Spent: 60 minutes  Encounter Type: Individual    Any diabetes medication dose changes were made via the CDE Protocol and Collaborative Practice Agreement with the patient's referring provider. A copy of this encounter was shared with the provider.

## 2018-07-03 NOTE — PATIENT INSTRUCTIONS
Decrease Lantus to 24 units once a day    Novolo units per 14 grams of carbohydrate ( 1:7)  Correction of 1:25 above 150    Keep a food record

## 2018-07-03 NOTE — PROGRESS NOTES
Diabetes Self Management Training: Follow-up Visit and Continuous Glucose Monitor Download    Murtaza WITT Tata Fitzgerald presents today for education, evaluation of glucose control and download of continuous glucose monitoring related to Type 1 diabetes.    He is accompanied by self    Patient's diabetes management related comments/concerns: sensor fell off on Friday, having trouble with memory, has consulted with PCP and  regarding this.  Self adjusted insulin since last seen by . Worries abut high glucose since he did not manage diabetes when he was younger and developed the complications of diabetes.   Looked at brochures and decided he wants to move forward on the dexcom. Still considering insulin pumps but likes the Omnipod best.     Current Diabetes Management per Patient:  Taking diabetes medications?   yes:     Diabetes Medication(s)     Insulin Sig    insulin aspart (NOVOLOG PEN) 100 UNIT/ML injection Inject 1 Units Subcutaneous Take with snacks or supplements for high blood sugar DOSE:  2 units per 15 grams of carbohydrate..patient self adjusted dosing to 1:5- he did not feel 2:15 was enough insulin    insulin aspart (NOVOLOG PEN) 100 UNIT/ML injection Inject 1-8 Units Subcutaneous 3 times daily (before meals) Do Not give Correction Insulin if Pre-Meal BG < 140   140-169 give 1 units.  170-199 give 2 units.  -229 give 3 units.  -259 give 4 units.  260-289 give 5 units.  290-319 give 6 units.  320-349 give 7 units.  BG >/= 350 give 8 units.  To be given with prandial insulin, and based on pre-meal blood glucose.   Notify MD if glucose = or > 350 mg/dL after administration.    Lantus 30 units at bedtime - patient has a lot of Lantus and did NOT yet started the Tresiba          *Abbreviated insulin dose documentation key: Insulin Trade Name (qhopddupq-xvsyl-ziyfyw-bedtime) - i.e. Humalog 5-5-5-0 (Humalog 5 units at breakfast, 5 units at lunch, and 5 units at  dinner).    Most Recent A1c Result:    Lab Results   Component Value Date    A1C 6.4 11/21/2017     Indication/s for LibrePro study: Difficult to manage hypoglycemia and/or hyperglycemia, despite multiple adjustments in self-monitoring of blood glucose and diabetes medication administration, Suspected nocturnal hypoglycemia and Hypoglycemia unawareness.      LibrePro Continuous Glucose Monitor Interpretation         StatisticsData evaluation:            Patient's Logbook shows the following:   Carbohydrate counting is: accurate  Medication and/or insulin dosing is: accurate according to his self adjustment    Assessment:  Patient experiencing several episodes of hypoglycemia and is unaware of these occurences.  This may be contributing to his memory issues.    Overnight hypoglycemia and minimal post meal rise in glucose. Recommend decrease both basal and bolus insulin and replace sensor to monitor glucose trends with new dosing.       INTERVENTION:  Recommend decrease basal insulin by 20%, change I:C ratio to 2:14(1:7) and correction to 1:20 > 150     LibrePro sensor started today. (Lot # [id number], Serial # [id number], Expiration date 10/31/18) Sensor was inserted with no resistance or bleeding at insertion site.      Dexcom online insurance information filled out and submitted per patient request.    Education provided today on:  AADE Self-Care Behaviors:  Healthy Eating: carbohydrate counting and consistency in amount, composition, and timing of food intake  Monitoring: frequency of monitoring  Taking Medication: action of prescribed medication, side effects of prescribed medications, when to take medications and dosing  Problem Solving: low blood glucose - causes, signs/symptoms, treatment and prevention and carrying a carbohydrate source at all times    Pt verbalized understanding of concepts discussed and recommendations provided today.       Education Materials Provided:  Food record    PLAN:  See Patient  Instructions for co-developed, patient-stated behavior change goals.  AVS printed and provided to patient today.    FOLLOW-UP:  Chart routed to referring provider.  7/10/18    Vania Gutierrez RN,CDE   Time Spent: 60 minutes  Encounter Type: Individual    Any diabetes medication dose changes were made via the CDE Protocol and Collaborative Practice Agreement with the patient's referring provider. A copy of this encounter was shared with the provider.

## 2018-07-03 NOTE — Clinical Note
FYI- I adjusted his insulin today and we requested the dexcom so you may get an order request to sign off from them.   Vania Gutierrez RN,CDE

## 2018-07-05 ENCOUNTER — THERAPY VISIT (OUTPATIENT)
Dept: PHYSICAL THERAPY | Facility: CLINIC | Age: 43
End: 2018-07-05
Payer: COMMERCIAL

## 2018-07-05 DIAGNOSIS — M75.01 ADHESIVE CAPSULITIS OF RIGHT SHOULDER: Primary | ICD-10-CM

## 2018-07-05 PROCEDURE — 97161 PT EVAL LOW COMPLEX 20 MIN: CPT | Mod: GP | Performed by: PHYSICAL THERAPIST

## 2018-07-05 PROCEDURE — 97110 THERAPEUTIC EXERCISES: CPT | Mod: GP | Performed by: PHYSICAL THERAPIST

## 2018-07-05 NOTE — PROGRESS NOTES
[facility] Initial Evaluation  Subjective:  Patient is a 42 year old male presenting with rehab right shoulder hpi. The history is provided by the patient. No  was used.   Murtaza Fitzgerald is a 42 year old male with a right shoulder condition.  Condition occurred with:  Unknown cause.  Condition occurred: for unknown reasons.  This is a new condition  Pt c/o R shoulder pain x 6 weeks.  Denies injury.  MD order date 6/25/18.  States similar symptoms L shoulder 2014 resolved with PT..    Patient reports pain:  Anterior and medial.    Pain is described as sharp, shooting, stabbing and aching and is constant and reported as 5/10.  Associated symptoms:  Loss of motion/stiffness. Pain is the same all the time.  Symptoms are exacerbated by carrying, certain positions, lifting, lying on extremity, using arm behind back and using arm overhead and relieved by nothing.  Since onset symptoms are unchanged.        General health as reported by patient is fair.  Pertinent medical history includes:  Diabetes, heart problems, high blood pressure, kidney disease, smoking and migraines/headaches.    Other surgeries include:  Heart surgery and other (3x bypass, kidney and pancreas transplant).  Current medications:  Other (insulin, reglen, prilosec, aspirin).  Current occupation is   .  Patient is working in normal job without restrictions.  Primary job tasks include:  Other (computer).                                Objective:  Standing Alignment:    Cervical/Thoracic:  Forward head  Shoulder/UE:  Rounded shoulders                                       Shoulder Evaluation:  ROM:  AROM:    Flexion:  Right:  120    Abduction:  Right:  88      External Rotation:  Right:  31            Extension/Internal Rotation:  Right:  Outer hip    PROM:    Flexion:  Right: 130      Abduction:  Right:  105      External Rotation:  Right:  40                    Strength:  : grossly  4+/5 (-) R shoulder fair scap stab.                            Mobility Tests:      Glenohumeral posterior right:  Hypomobile  Glenohumeral inferior right:  Hypomobile          Scapulohumeral rhythm right:  Hypermobile                                   General     ROS    Assessment/Plan:    Patient is a 42 year old male with right side shoulder complaints.    Patient has the following significant findings with corresponding treatment plan.                Diagnosis 1:  R shoulder pain  Pain -  hot/cold therapy, manual therapy, directional preference exercise and home program  Decreased ROM/flexibility - manual therapy and therapeutic exercise  Decreased joint mobility - manual therapy and therapeutic exercise  Decreased strength - therapeutic exercise and therapeutic activities  Impaired muscle performance - neuro re-education  Decreased function - therapeutic activities  Impaired posture - neuro re-education    Therapy Evaluation Codes:   1) History comprised of:   Personal factors that impact the plan of care:      None.    Comorbidity factors that impact the plan of care are:      Diabetes, Heart problems, High blood pressure, Migraines/headaches, Smoking and kidney and pancreas transplant.     Medications impacting care: insulin, reglen, prilosec, aspirin.  2) Examination of Body Systems comprised of:   Body structures and functions that impact the plan of care:      Shoulder.   Activity limitations that impact the plan of care are:      Dressing, Lifting, Sleeping and Laying down.  3) Clinical presentation characteristics are:   Stable/Uncomplicated.  4) Decision-Making    Low complexity using standardized patient assessment instrument and/or measureable assessment of functional outcome.  Cumulative Therapy Evaluation is: Low complexity.    Previous and current functional limitations:  (See Goal Flow Sheet for this information)    Short term and Long term goals: (See Goal Flow Sheet for this information)      Communication ability:  Patient appears to be able to clearly communicate and understand verbal and written communication and follow directions correctly.  Treatment Explanation - The following has been discussed with the patient:   RX ordered/plan of care  Anticipated outcomes  Possible risks and side effects  This patient would benefit from PT intervention to resume normal activities.   Rehab potential is excellent.    Frequency:  1 X week, once daily  Duration:  for 8 weeks  Discharge Plan:  Achieve all LTG.  Independent in home treatment program.  Reach maximal therapeutic benefit.    Please refer to the daily flowsheet for treatment today, total treatment time and time spent performing 1:1 timed codes.

## 2018-07-05 NOTE — MR AVS SNAPSHOT
After Visit Summary   7/5/2018    Murtaza Fitzgerald    MRN: [id number]           Patient Information     Date Of Birth          1975        Visit Information        Provider Department      7/5/2018 2:20 PM Rusty Little, PT [facility]        Today's Diagnoses     Adhesive capsulitis of right shoulder    -  1       Follow-ups after your visit        Your next 10 appointments already scheduled     Jul 10, 2018  9:30 AM CDT   Diabetic Education with Vania Gutierrez RN   [facility] ([facility])    [address]  [facility] [address]   [phone]            Jul 12, 2018  3:00 PM CDT   IEKR Extremity with Rusty Little, PT   [facility] ([facility])    [address]  [facility] [id number] [phone]            Jul 19, 2018 11:30 AM CDT   IKER Extremity with Tonia Pablo PT   [facility] ([facility])    [address]  [facility] [address]   [phone]              Who to contact     If you have questions or need follow up information about today's clinic visit or your schedule please contact [facility] directly at [phone].  Normal or non-critical lab and imaging results will be communicated to you by MyChart, letter or phone within 4 business days after the clinic has received the results. If you do not hear from us within 7 days, please contact the clinic through MyChart or phone. If you have a critical or abnormal lab result, we will notify you by phone as soon as possible.  Submit refill requests through [employer] or call your pharmacy and they will forward the refill request to us. Please allow 3 business days for your refill to be completed.          Additional  Information About Your Visit        [employer] Information     [employer] gives you secure access to your electronic health record. If you see a primary care provider, you can also send messages to your care team and make appointments. If you have questions, please call your primary care clinic.  If you do not have a primary care provider, please call [phone] and they will assist you.        Care EveryWhere ID     This is your Care EveryWhere ID. This could be used by other organizations to access your [place] medical records  [id number]         Blood Pressure from Last 3 Encounters:   06/12/18 108/70   11/20/17 (!) 82/68   12/22/16 105/71    Weight from Last 3 Encounters:   06/12/18 96.9 kg (213 lb 11.2 oz)   11/20/17 93.9 kg (207 lb)   06/14/17 91.2 kg (201 lb 1.6 oz)              We Performed the Following     HC PT EVAL, LOW COMPLEXITY     IKER INITIAL EVAL REPORT     THERAPEUTIC EXERCISES          Today's Medication Changes          These changes are accurate as of 7/5/18  3:02 PM.  If you have any questions, ask your nurse or doctor.               These medicines have changed or have updated prescriptions.        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 170-199 give 2 units. -229 give 3 units. -259 give 4 units. 260-289 give 5 units. 290-319 give 6 units. 320-349 give 7 units. BG >/= 350 give 8 units. To be given with prandial insulin, and based on pre-meal blood glucose.  Notify MD if glucose = or > 350 mg/dL after administration.   Quantity:  15 mL   Refills:  5       * insulin aspart 100 UNIT/ML injection   Commonly known as:  NovoLOG PEN   This may have changed:   Another medication with the same name was changed. Make sure you understand how and when to take each.   Used for:  Type 1 diabetes mellitus with other circulatory complication (H)        DOSE:  2 units per 14 grams of carbohydrate..   Quantity:  8 mL   Refills:  11       * Notice:  This list has 2 medication(s) that are the same as other medications prescribed for you. Read the directions carefully, and ask your doctor or other care provider to review them with you.             Primary Care Provider Office Phone # Fax #    [facility] [phone] [address] [place] [address]        Equal Access to Services     [facility]: Hadii jocelyn blandon hadasho Soomaali, waaxda luqadaha, qaybta kaalmada myron, gilberto douglas . So [facility] [phone].    ATENCIÓN: Si habla español, tiene a chatterjee disposición servicios gratuitos de asistencia lingüística. [facility] [phone].    We comply with applicable federal civil rights laws and [place] laws. We do not discriminate on the basis of race, color, national origin, age, disability, sex, sexual orientation, or gender identity.            Thank you!     Thank you for choosing [facility]  for your care. Our goal is always to provide you with excellent care. Hearing back from our patients is one way we can continue to improve our services. Please take a few minutes to complete the written survey that you may receive in the mail after your visit with us. Thank you!             Your Updated Medication List - Protect others around you: Learn how to safely use, store and throw away your medicines at www.disposemymeds.org.          This list is accurate as of 7/5/18  3:02 PM.  Always use your most recent med list.                   Brand Name Dispense Instructions for use Diagnosis    aspirin 81 MG tablet     90 tablet    Take 1 tablet (81 mg) by mouth daily    Coronary  artery disease involving native coronary artery of native heart without angina pectoris       Applix CONTOUR test strip   Generic drug:  blood glucose monitoring     450 each    USE TO TEST BLOOD SUGAR FIVE TIMES A DAY OR AS DIRECTED    Type 1 diabetes mellitus, uncontrolled (H)       blood glucose monitoring meter device kit     1 kit    TO TEST BG 5 TIMIES DAILY    Diabetes mellitus, type 1       * insulin aspart 100 UNIT/ML injection    NovoLOG PEN    15 mL    Inject 1-8 Units Subcutaneous 3 times daily (before meals) Do Not give Correction Insulin if Pre-Meal BG < 140  140-169 give 1 units. 170-199 give 2 units. -229 give 3 units. -259 give 4 units. 260-289 give 5 units. 290-319 give 6 units. 320-349 give 7 units. BG >/= 350 give 8 units. To be given with prandial insulin, and based on pre-meal blood glucose.  Notify MD if glucose = or > 350 mg/dL after administration.    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    Constipation       LIPITOR 40 MG tablet   Generic drug:  atorvastatin     90 tablet    Take 0.5 tablets (20 mg) by mouth daily    Gastroesophageal reflux disease without esophagitis       metoclopramide 5 MG tablet    REGLAN    120 tablet    TAKE ONE TABLET BY MOUTH FOUR TIMES A DAY (BEFORE MEALS AND NIGHTLY)    Gastroesophageal reflux disease without esophagitis        metoprolol succinate 25 MG 24 hr tablet    TOPROL-XL    90 tablet    Take 1 tablet (25 mg) by mouth daily    Coronary artery disease involving native coronary artery of native heart without angina pectoris       omeprazole 40 MG capsule    priLOSEC    180 capsule    Take 1 capsule (40 mg) by mouth 2 times daily Take 30-60 minutes before a meal.    Gastroesophageal reflux disease without esophagitis       polyethylene glycol Packet    MIRALAX/GLYCOLAX     Take 17 g by mouth daily as needed for constipation        tacrolimus 1 MG capsule    GENERIC EQUIVALENT    60 capsule    Take 1 capsule (1 mg) by mouth 2 times daily    Kidney transplanted       * Notice:  This list has 2 medication(s) that are the same as other medications prescribed for you. Read the directions carefully, and ask your doctor or other care provider to review them with you.

## 2018-07-10 ENCOUNTER — ALLIED HEALTH/NURSE VISIT (OUTPATIENT)
Dept: EDUCATION SERVICES | Facility: CLINIC | Age: 43
End: 2018-07-10
Payer: COMMERCIAL

## 2018-07-10 DIAGNOSIS — E10.59 TYPE 1 DIABETES MELLITUS WITH DIABETIC CARDIOMYOPATHY (H): Primary | ICD-10-CM

## 2018-07-10 DIAGNOSIS — I43 TYPE 1 DIABETES MELLITUS WITH DIABETIC CARDIOMYOPATHY (H): Primary | ICD-10-CM

## 2018-07-10 PROCEDURE — G0108 DIAB MANAGE TRN  PER INDIV: HCPCS

## 2018-07-10 NOTE — LETTER
7/10/2018         RE: Murtaza Fitzgerald  [address] [place] [address]        Dear Colleague,    Thank you for referring your patient, Murtaza Fitzgerald, to the [facility]. Please see a copy of my visit note below.    Diabetes Self Management Training: Follow-up Visit and Continuous Glucose Monitor Download    Murtaza Fitzgerald presents today for education, evaluation of glucose control, pre-insulin pump start education and download of continuous glucose monitoring related to Type 1 diabetes.    He is accompanied by self    Patient's diabetes management related comments/concerns:  decided to go ahead with the Omnipod pump, received a call from [employer] they received his request but hasn't heard any more.       Current Diabetes Management per Patient:  Taking diabetes medications?   yes:     Lantus: 0-0-0-24  Novolo:7 + correction of 1:25 > 150    *Abbreviated insulin dose documentation key: Insulin Trade Name ([id number]-tioats-bedtime) - i.e. Humalog 5-5-5-0 (Humalog 5 units at breakfast, 5 units at lunch, and 5 units at dinner).      Most Recent A1c Result:    Lab Results   Component Value Date    A1C 6.4 2017     Indication/s for LibrePro study: Difficult to manage hypoglycemia and/or hyperglycemia, despite multiple adjustments in self-monitoring of blood glucose and diabetes medication administration, Suspected nocturnal hypoglycemia and Hypoglycemia unawareness.      LibrePro Continuous Glucose Monitor Interpretation         StatisticsData evaluation:                         :             Patient's Logbook shows the following:   Carbohydrate counting is: accurate  Medication and/or insulin dosing is: accurate  Took novolog after the dinner meal on  and after the breakfast meal on .    Took lantus late on - was 1:30 am ( ) instead of 10pm    Assessment:  Patient reduced hypoglycemia events with decrease in  insulin dose last week.  Most elevated readings are explainable.    Minimal rise after Breakfast with lower readings before lunch, breakfast usually consists of a banana, he is taking 4 units ( 30 grams of carbohydrates). Recommend he only take 3 units for the banana.       INTERVENTION:  Paperwork filled out for Omnipod pump    Education provided today on:  AADE Self-Care Behaviors:  Healthy Eating: review of carbohydrate counting  Monitoring: individual blood glucose targets and frequency of monitoring  Taking Medication: action of prescribed medication, when to take medications and dosing  Problem Solving: low blood glucose - causes, signs/symptoms, treatment and prevention and carrying a carbohydrate source at all times    Insulin pump management and the importance of Balancing glucose and insulin, Carbohydrate counting, Calculating boluses and Managing insulin pump therapy    Pt verbalized understanding of concepts discussed and recommendations provided today.       Education Materials Provided:  Contour Next One meter kit    PLAN:  See Patient Instructions for co-developed, patient-stated behavior change goals.  AVS printed and provided to patient today.    FOLLOW-UP:  Chart routed to referring provider.  Patient has completed pre-insulin pump start education and is ready to proceed with insulin pump start.  Call to set up a 2 hour pump start when pump I shipped      Vania Gutierrez RN,CDE   Time Spent: 60 minutes  Encounter Type: Individual

## 2018-07-10 NOTE — Clinical Note
Please bill for interpretation.  Also he  Wants to go ahead with the dexcom and omnipod. Please sign orders when they come to you. Vania Gutierrez RN,CDE

## 2018-07-10 NOTE — MR AVS SNAPSHOT
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 Please let me know if you decide to move forward with the pump          Follow-ups after your visit        Your next 10 appointments already scheduled     Jul 12, 2018  3:00 PM CDT   IKER Extremity with Rusty Little, PT   [place] [facility]    [address]  [facility] [id number] [phone]            Jul 19, 2018 11:30 AM CDT   IKER Extremity with Tonia Pablo PT   [facility] ([facility])    [address]  [facility] [id number] [phone]              Who to contact     If you have questions or need follow up information about today's clinic visit or your schedule please contact [facility] directly at [phone].  Normal or non-critical lab and imaging results will be communicated to you by MyChart, letter or phone within 4 business days after the clinic has received the results. If you do not hear from us within 7 days, please contact the clinic through MyChart or phone. If you have a critical or abnormal lab result, we will notify you by phone as soon as possible.  Submit refill requests through [employer] or call your pharmacy and they will forward the refill request to us. Please allow 3 business days  for your refill to be completed.          Additional Information About Your Visit        [employer] Information     [employer] gives you secure access to your electronic health record. If you see a primary care provider, you can also send messages to your care team and make appointments. If you have questions, please call your primary care clinic.  If you do not have a primary care provider, please call [phone] and they will assist you.        Care EveryWhere ID     This is your Care EveryWhere ID. This could be used by other organizations to access your [place] medical records  [id number]         Blood Pressure from Last 3 Encounters:   06/12/18 108/70   11/20/17 (!) 82/68   12/22/16 105/71    Weight from Last 3 Encounters:   06/12/18 96.9 kg (213 lb 11.2 oz)   11/20/17 93.9 kg (207 lb)   06/14/17 91.2 kg (201 lb 1.6 oz)              Today, you had the following     No orders found for display         Today's Medication Changes          These changes are accurate as of 7/10/18 10:24 AM.  If you have any questions, ask your nurse or doctor.               These medicines have changed or have updated prescriptions.        Dose/Directions    * YOHAN CONTOUR test strip   This may have changed:  Another medication with the same name was added. Make sure you understand how and when to take each.   Used for:  Type 1 diabetes mellitus, uncontrolled (H)   Generic drug:  blood glucose monitoring        USE TO TEST BLOOD SUGAR FIVE TIMES A DAY OR AS DIRECTED   Quantity:  450 each   Refills:  1       * blood glucose monitoring test strip   Commonly known as:  no brand specified   This may have changed:  You were already taking a medication with the same name, and this prescription was added. Make sure you understand how and when to take each.   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 170-199 give 2 units. -229 give 3 units. -259 give 4 units. 260-289 give 5 units. 290-319 give 6 units. 320-349 give 7 units. BG >/= 350 give 8 units. To be given with prandial insulin, and based on pre-meal blood glucose.  Notify MD if glucose = or > 350 mg/dL after administration.   Quantity:  15 mL   Refills:  5       * insulin aspart 100 UNIT/ML injection   Commonly known as:  NovoLOG PEN   This may have changed:  Another medication with the same name was changed. Make sure you understand how and when to take each.   Used for:  Type 1 diabetes mellitus with other circulatory complication (H)        DOSE:  2 units per 14 grams of carbohydrate..   Quantity:  8 mL   Refills:  11       * Notice:  This list has 4 medication(s) that are the same as other medications prescribed for you. Read the directions carefully, and ask your doctor or other care provider to review them with you.         Where to get your medicines      These medications were sent to [facility] [address] [address] [address]     Phone:  [phone]     blood glucose monitoring test strip                Primary Care Provider Office Phone # Fax #    [place] [phone]       [facility] [place] [address]        Equal Access to Services     [facility]: Hadii aad jamia hadasho Soomaali, waaxda luqadaha, qaybta kaalmada adeegyada, gilberto linn. So [facility] [phone].    ATENCIÓN: Si habla español, tiene a chatterjee disposición servicios gratuitos de asistencia lingüística. Llame al  [phone].    We comply with applicable federal civil rights laws and [place] laws. We do not discriminate on the basis of race, color, national origin, age, disability, sex, sexual orientation, or gender identity.            Thank you!     Thank you for choosing [facility]  for your care. Our goal is always to provide you with excellent care. Hearing back from our patients is one way we can continue to improve our services. Please take a few minutes to complete the written survey that you may receive in the mail after your visit with us. Thank you!             Your Updated Medication List - Protect others around you: Learn how to safely use, store and throw away your medicines at www.disposemymeds.org.          This list is accurate as of 7/10/18 10:24 AM.  Always use your most recent med list.                   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 170-199 give 2 units. -229 give 3 units. -259 give 4 units. 260-289 give 5 units. 290-319 give 6 units. 320-349 give 7 units. BG >/= 350 give 8 units. To be  given with prandial insulin, and based on pre-meal blood glucose.  Notify MD if glucose = or > 350 mg/dL after administration.    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    Constipation       LIPITOR 40 MG tablet   Generic drug:  atorvastatin     90 tablet    Take 0.5 tablets (20 mg) by mouth daily    Gastroesophageal reflux disease without esophagitis       metoclopramide 5 MG tablet    REGLAN    120 tablet    TAKE ONE TABLET BY MOUTH FOUR TIMES A DAY (BEFORE MEALS AND NIGHTLY)    Gastroesophageal reflux disease without esophagitis       metoprolol succinate 25 MG 24 hr tablet    TOPROL-XL    90 tablet    Take 1 tablet (25 mg) by mouth daily    Coronary artery disease involving native coronary artery of native heart without angina pectoris       omeprazole 40 MG capsule    priLOSEC    180 capsule    Take 1 capsule (40 mg) by mouth 2 times daily Take 30-60 minutes before a meal.    Gastroesophageal reflux disease without esophagitis       polyethylene glycol Packet    MIRALAX/GLYCOLAX     Take 17 g by mouth daily as needed for constipation        tacrolimus 1 MG capsule    GENERIC EQUIVALENT    60 capsule    Take 1 capsule (1 mg) by mouth 2 times daily    Kidney transplanted       * Notice:  This list has 4 medication(s) that are the same as other medications prescribed for you. Read the  directions carefully, and ask your doctor or other care provider to review them with you.

## 2018-07-10 NOTE — PATIENT INSTRUCTIONS
Continue with Lantus 24 units daily    Continue with I:C ratio of 2:14   Correction of 1:25 >150    Only take 3 units for a regular banana    [employer] ( Omnipod ) will contact you regarding insurance coverage. Please let me know if you decide to move forward with the pump

## 2018-07-10 NOTE — PROGRESS NOTES
Diabetes Self Management Training: Follow-up Visit and Continuous Glucose Monitor Download    Murtaza WITT Tata Fitzgerald presents today for education, evaluation of glucose control, pre-insulin pump start education and download of continuous glucose monitoring related to Type 1 diabetes.    He is accompanied by self    Patient's diabetes management related comments/concerns:  decided to go ahead with the Omnipod pump, received a call from [employer] they received his request but hasn't heard any more.       Current Diabetes Management per Patient:  Taking diabetes medications?   yes:     Lantus: 0-0-0-24  Novolo:7 + correction of 1:25 > 150    *Abbreviated insulin dose documentation key: Insulin Trade Name ([id number]-lyyakb-bedtime) - i.e. Humalog 5-5-5-0 (Humalog 5 units at breakfast, 5 units at lunch, and 5 units at dinner).      Most Recent A1c Result:    Lab Results   Component Value Date    A1C 6.4 2017     Indication/s for LibrePro study: Difficult to manage hypoglycemia and/or hyperglycemia, despite multiple adjustments in self-monitoring of blood glucose and diabetes medication administration, Suspected nocturnal hypoglycemia and Hypoglycemia unawareness.      LibrePro Continuous Glucose Monitor Interpretation         StatisticsData evaluation:                         :             Patient's Logbook shows the following:   Carbohydrate counting is: accurate  Medication and/or insulin dosing is: accurate  Took novolog after the dinner meal on  and after the breakfast meal on .    Took lantus late on - was 1:30 am ( ) instead of 10pm    Assessment:  Patient reduced hypoglycemia events with decrease in insulin dose last week.  Most elevated readings are explainable.    Minimal rise after Breakfast with lower readings before lunch, breakfast usually consists of a banana, he is taking 4 units ( 30 grams of carbohydrates). Recommend he only take 3 units for the banana.        INTERVENTION:  Paperwork filled out for Omnipod pump    Education provided today on:  AADE Self-Care Behaviors:  Healthy Eating: review of carbohydrate counting  Monitoring: individual blood glucose targets and frequency of monitoring  Taking Medication: action of prescribed medication, when to take medications and dosing  Problem Solving: low blood glucose - causes, signs/symptoms, treatment and prevention and carrying a carbohydrate source at all times    Insulin pump management and the importance of Balancing glucose and insulin, Carbohydrate counting, Calculating boluses and Managing insulin pump therapy    Pt verbalized understanding of concepts discussed and recommendations provided today.       Education Materials Provided:  Contour Next One meter kit    PLAN:  See Patient Instructions for co-developed, patient-stated behavior change goals.  AVS printed and provided to patient today.    FOLLOW-UP:  Chart routed to referring provider.  Patient has completed pre-insulin pump start education and is ready to proceed with insulin pump start.  Call to set up a 2 hour pump start when pump I shipped      Vania Gutierrez RN,CDE   Time Spent: 60 minutes  Encounter Type: Individual

## 2018-07-11 ENCOUNTER — MYC MEDICAL ADVICE (OUTPATIENT)
Dept: ENDOCRINOLOGY | Facility: CLINIC | Age: 43
End: 2018-07-11

## 2018-07-12 ENCOUNTER — MEDICAL CORRESPONDENCE (OUTPATIENT)
Dept: HEALTH INFORMATION MANAGEMENT | Facility: CLINIC | Age: 43
End: 2018-07-12

## 2018-07-12 NOTE — TELEPHONE ENCOUNTER
Vania- can you help him get started on Omnipod?  Let me know if you have any questions/ need to sign order.

## 2018-07-12 NOTE — TELEPHONE ENCOUNTER
Omnipod Certificate of Medical Necessity received in error in [place].  Faxed to Dr. Costa at the [facility] fax# [phone].  Yanely Alexandra M.A.

## 2018-07-16 ENCOUNTER — DOCUMENTATION ONLY (OUTPATIENT)
Dept: ENDOCRINOLOGY | Facility: CLINIC | Age: 43
End: 2018-07-16
Payer: COMMERCIAL

## 2018-07-16 DIAGNOSIS — I43 TYPE 1 DIABETES MELLITUS WITH DIABETIC CARDIOMYOPATHY (H): Primary | ICD-10-CM

## 2018-07-16 DIAGNOSIS — E10.59 TYPE 1 DIABETES MELLITUS WITH DIABETIC CARDIOMYOPATHY (H): Primary | ICD-10-CM

## 2018-07-16 PROCEDURE — 95251 CONT GLUC MNTR ANALYSIS I&R: CPT | Performed by: CLINICAL NURSE SPECIALIST

## 2018-07-16 NOTE — PROGRESS NOTES
LibrePro Continuous Glucose Monitor Interpretation          StatisticsData evaluation:                         :            Patient's Logbook shows the following:   Carbohydrate counting is: accurate  Medication and/or insulin dosing is: accurate  Took novolog after the dinner meal on 7/4 and after the breakfast meal on 7/8.    Took lantus late on 7/8- was 1:30 am ( 7/9) instead of 10pm     Assessment:  Patient reduced hypoglycemia events with decrease in insulin dose last week.  Most elevated readings are explainable.    Minimal rise after Breakfast with lower readings before lunch, breakfast usually consists of a banana, he is taking 4 units ( 30 grams of carbohydrates). Recommend he only take 3 units for the banana.       INTERVENTION:  Paperwork filled out for Omnipod pump     Education provided on:  GUMARO Self-Care Behaviors:  Healthy Eating: review of carbohydrate counting  Monitoring: individual blood glucose targets and frequency of monitoring  Taking Medication: action of prescribed medication, when to take medications and dosing  Problem Solving: low blood glucose - causes, signs/symptoms, treatment and prevention and carrying a carbohydrate source at all times     Insulin pump management and the importance of Balancing glucose and insulin, Carbohydrate counting, Calculating boluses and Managing insulin pump therapy     Pt verbalized understanding of concepts discussed and recommendations provided today.        Education Materials Provided:  Contour Next One meter kit     PLAN:  See Patient Instructions for co-developed, patient-stated behavior change goals.     FOLLOW-UP:  Chart routed to referring provider.  Patient has completed pre-insulin pump start education and is ready to proceed with insulin pump start.  Call to set up a 2 hour pump start when pump I shipped        Vania Gutierrez RN,JANIEE   Marcie Lozano NP  Endocrinology  [facility]

## 2018-07-18 NOTE — TELEPHONE ENCOUNTER
Has this been taken care of yet?  The patient see's me in [place] for diabetes education but See's  for Endocrinology.  It is a CMN order that needs to be signed by  to process his Omnipod pump and faxed back.    Please let me know if there is something specific I need to help with .    Vania Gutierrez RN,CDE

## 2018-07-19 ENCOUNTER — DOCUMENTATION ONLY (OUTPATIENT)
Dept: ENDOCRINOLOGY | Facility: CLINIC | Age: 43
End: 2018-07-19

## 2018-07-27 ENCOUNTER — THERAPY VISIT (OUTPATIENT)
Dept: PHYSICAL THERAPY | Facility: CLINIC | Age: 43
End: 2018-07-27
Payer: COMMERCIAL

## 2018-07-27 DIAGNOSIS — M75.01 ADHESIVE CAPSULITIS OF RIGHT SHOULDER: ICD-10-CM

## 2018-07-27 PROCEDURE — 97110 THERAPEUTIC EXERCISES: CPT | Mod: GP | Performed by: PHYSICAL THERAPIST

## 2018-07-27 PROCEDURE — 97140 MANUAL THERAPY 1/> REGIONS: CPT | Mod: GP | Performed by: PHYSICAL THERAPIST

## 2018-07-30 ENCOUNTER — MYC MEDICAL ADVICE (OUTPATIENT)
Dept: EDUCATION SERVICES | Facility: CLINIC | Age: 43
End: 2018-07-30

## 2018-07-30 DIAGNOSIS — I43 TYPE 1 DIABETES MELLITUS WITH DIABETIC CARDIOMYOPATHY (H): Primary | ICD-10-CM

## 2018-07-30 DIAGNOSIS — E10.59 TYPE 1 DIABETES MELLITUS WITH DIABETIC CARDIOMYOPATHY (H): Primary | ICD-10-CM

## 2018-07-30 NOTE — TELEPHONE ENCOUNTER
Formula for insulin pump settings:  Pre-Pump TDD of 50 units insulin - 20% = 40 Units     Basal rate is 50% of 40 insulin pump TDD = 20 ÷ 24 hours = 0.85 units/hr  I:C ratio: rule of 450 ÷ 40insulin pump TDD = 11 grams/unit (round to the nearest whole number)  Insulin sensitivity factor: 1800 ÷ 40 insulin pump TDD = 45 mg/dl/ unit     Patient Initial Pump settings:  Omnipod  BASAL RATES and times:  12 AM (midnight): 0.85 units/hour    CARB RATIO and times:  12 AM (midnight): 11   Corection Factor (Sensitivity) and times:  12 AM - 12 AM: 45 mg/dL    Active Insulin Time: 4 hours   BLOOD GLUCOSE TARGET: 100mg/dl  Correct above 120mg/dl  Reverse correction: On  Minimum BG for bolus calculation:70mg/dl   Max basal: 2.0  Max bolus: 25  Temp basal: percent  Meter BG goal: 100-120  Low reservoir: 20 units  Pod expiration: 1-3 hours per patient preference      Additional notes: Adjustments to current basal insulin(Lantus/Levemir/Basaglar/Toujeo/Tresiba) prior to pump start? No Tresiba the morning of the pump start       Addition order needed:  Insulin vials Yes   Test strips  Yes   Glucagon Yes   Ketone test strips Yes   Syringes Yes     Please let me know if you agree with above initiation settings or indicate alternative plan.    Vania Gutierrez RN,CDE

## 2018-08-01 ENCOUNTER — THERAPY VISIT (OUTPATIENT)
Dept: PHYSICAL THERAPY | Facility: CLINIC | Age: 43
End: 2018-08-01
Payer: COMMERCIAL

## 2018-08-01 DIAGNOSIS — M75.01 ADHESIVE CAPSULITIS OF RIGHT SHOULDER: ICD-10-CM

## 2018-08-01 PROCEDURE — 97110 THERAPEUTIC EXERCISES: CPT | Mod: GP | Performed by: PHYSICAL THERAPIST

## 2018-08-01 PROCEDURE — 97140 MANUAL THERAPY 1/> REGIONS: CPT | Mod: GP | Performed by: PHYSICAL THERAPIST

## 2018-08-01 NOTE — MR AVS SNAPSHOT
After Visit Summary   8/1/2018    Murtaza Fitzgerald    MRN: [id number]           Patient Information     Date Of Birth          1975        Visit Information        Provider Department      8/1/2018 3:00 PM Rusty Little, PT [facility]        Today's Diagnoses     Adhesive capsulitis of right shoulder           Follow-ups after your visit        Your next 10 appointments already scheduled     Aug 08, 2018  3:00 PM CDT   IKER Extremity with Rusty Little PT   [facility] ([facility]    [address]  [facility] [address]   [phone]            Aug 13, 2018  8:30 AM CDT   Diabetic Education with Vania Gutierrez RN   [facility] ([facility])    [address]  [facility] [id number] [phone]              Who to contact     If you have questions or need follow up information about today's clinic visit or your schedule please contact [facility] PH[facility] THERAP[facility] directly at [phone].  Normal or non-critical lab and imaging results will be communicated to you by MyChart, letter or phone within 4 business days after the clinic has received the results. If you do not hear from us within 7 days, please contact the clinic through [employer] or phone. If you have a critical or abnormal lab result, we will notify you by phone as soon as possible.  Submit refill requests through [employer] or call your pharmacy and they will forward the refill request to us. Please allow 3 business days for your refill to be completed.          Additional Information About Your Visit        MyChart Information     [employer] gives you secure access to your electronic health record. If you see a primary care provider, you can also send messages to your care team and make appointments. If  you have questions, please call your primary care clinic.  If you do not have a primary care provider, please call [phone] and they will assist you.        Care EveryWhere ID     This is your Care EveryWhere ID. This could be used by other organizations to access your [place] medical records  [id number]         Blood Pressure from Last 3 Encounters:   06/12/18 108/70   11/20/17 (!) 82/68   12/22/16 105/71    Weight from Last 3 Encounters:   06/12/18 96.9 kg (213 lb 11.2 oz)   11/20/17 93.9 kg (207 lb)   06/14/17 91.2 kg (201 lb 1.6 oz)              We Performed the Following     MANUAL THER TECH,1+REGIONS,EA 15 MIN     THERAPEUTIC EXERCISES          Today's Medication Changes          These changes are accurate as of 8/1/18  3:23 PM.  If you have any questions, ask your nurse or doctor.               These medicines have changed or have updated prescriptions.        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 170-199 give 2 units. -229 give 3 units. -259 give 4 units. 260-289 give 5 units. 290-319 give 6 units. 320-349 give 7 units. BG >/= 350 give 8 units. To be given with prandial insulin, and based on pre-meal blood glucose.  Notify MD if glucose = or > 350 mg/dL after administration.   Quantity:  15 mL   Refills:  5       * insulin aspart 100 UNIT/ML injection   Commonly known as:  NovoLOG PEN   This may have changed:  Another medication with the same name was changed. Make sure you understand how and when to take each.   Used for:  Type 1 diabetes mellitus with other circulatory complication (H)        DOSE:  2 units per 14 grams of carbohydrate..   Quantity:  8 mL    Refills:  11       * Notice:  This list has 2 medication(s) that are the same as other medications prescribed for you. Read the directions carefully, and ask your doctor or other care provider to review them with you.             Primary Care Provider Office Phone # Fax #    [place] [phone] [address] [place] [address]        Equal Access to Services     [facility]: Hadii aad ku hadasho Soomaali, waaxda luqadaha, qaybta kaalmada adeegyada, waxay idiin hayaan adeeg kharash la'aan ah. So [facility] [phone].    ATENCIÓN: Si habla espmike, tiene a chatterjee disposición servicios gratuitos de asistencia lingüística. [facility] [phone].    We comply with applicable federal civil rights laws and [place] laws. We do not discriminate on the basis of race, color, national origin, age, disability, sex, sexual orientation, or gender identity.            Thank you!     Thank you for [facility] PH[facility] THERAP[facility]  for your care. Our goal is always to provide you with excellent care. Hearing back from our patients is one way we can continue to improve our services. Please take a few minutes to complete the written survey that you may receive in the mail after your visit with us. Thank you!             Your Updated Medication List - Protect others around you: Learn how to safely use, store and throw away your medicines at www.disposemymeds.org.          This list is accurate as of 8/1/18  3:23 PM.  Always use your most recent med list.                   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 170-199 give 2 units. -229 give 3 units. -259 give 4 units. 260-289 give 5 units. 290-319 give 6 units. 320-349 give 7 units. BG >/= 350 give 8 units. To be given with prandial insulin, and based on pre-meal blood glucose.  Notify MD if glucose = or > 350 mg/dL after administration.    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    Constipation       LIPITOR 40 MG tablet   Generic drug:  atorvastatin     90 tablet    Take 0.5 tablets (20 mg) by mouth daily    Gastroesophageal reflux disease without esophagitis       metoclopramide 5 MG tablet    REGLAN    120 tablet    TAKE ONE TABLET BY MOUTH FOUR TIMES A DAY (BEFORE MEALS AND NIGHTLY)    Gastroesophageal reflux disease without esophagitis       metoprolol succinate 25 MG 24 hr tablet    TOPROL-XL    90 tablet    Take 1 tablet  (25 mg) by mouth daily    Coronary artery disease involving native coronary artery of native heart without angina pectoris       omeprazole 40 MG capsule    priLOSEC    180 capsule    Take 1 capsule (40 mg) by mouth 2 times daily Take 30-60 minutes before a meal.    Gastroesophageal reflux disease without esophagitis       polyethylene glycol Packet    MIRALAX/GLYCOLAX     Take 17 g by mouth daily as needed for constipation        tacrolimus 1 MG capsule    GENERIC EQUIVALENT    60 capsule    Take 1 capsule (1 mg) by mouth 2 times daily    Kidney transplanted       * Notice:  This list has 4 medication(s) that are the same as other medications prescribed for you. Read the directions carefully, and ask your doctor or other care provider to review them with you.

## 2018-08-10 ENCOUNTER — TELEPHONE (OUTPATIENT)
Dept: FAMILY MEDICINE | Facility: CLINIC | Age: 43
End: 2018-08-10

## 2018-08-10 RX ORDER — SYRINGE-NEEDLE,INSULIN,0.5 ML 27GX1/2"
SYRINGE, EMPTY DISPOSABLE MISCELLANEOUS
Qty: 100 EACH | Refills: 11 | Status: SHIPPED | OUTPATIENT
Start: 2018-08-10 | End: 2019-06-28

## 2018-08-10 NOTE — TELEPHONE ENCOUNTER
Rx's signed and sent to his pharmacy.  I agree with insulin pump initiation settings as documented in this encounter.  Marcie Lozano NP  Endocrinology

## 2018-08-10 NOTE — TELEPHONE ENCOUNTER
Please do not close this encounter until this has been addressed.  (prior auth approved/denied, prescriber refusal to complete prior auth or medication changed/discontinued)    Prior Authorization needed on: freestyle test strips for insulin pump  Drug NDC: [address]-75     Insurance: [facility] - Bin:  [id number], Pcn:  [facility]  Member ID: [id number]   Insurance phone #: [phone]    Pharmacy NPI: [id number]  Pharmacy Phone #: [phone]  Pharmacy Fax #: [phone]    Please let us know if the PA gets approved or denied or if medication is changed  Please change medication or provide reasoning/documentation and forward to PA Team at [phone].    Thanks,  Randi Hernandez CPhT  [facility]  [phone]

## 2018-08-11 NOTE — TELEPHONE ENCOUNTER
Please submit PA  Dx type 1 diabetes with cardiomyopathy - E10.59.  Diabetes is being treated with Omnipod insulin pump.  Freestyle meter/test strip is the only meter/strip that communicates directly with this insulin pump - there are no alternatives.  Marcie Lozano NP  Endocrinology

## 2018-08-13 NOTE — TELEPHONE ENCOUNTER
PA Initiation    Medication: freestyle test strips   Insurance Company: [employer] [place] - Phone [phone] Fax [phone]  Pharmacy Filling the Rx: [place], [place] - [address]  Filling Pharmacy Phone: [phone]  Filling Pharmacy Fax:    Start Date: 8/13/2018    Central Prior Authorization Team   Phone: [phone]

## 2018-08-16 NOTE — TELEPHONE ENCOUNTER
Returned a call to a pharmacist at [facility] needing to verify the type of insulin pump pt has, relayed that he has an Omnipod insulin pump per notes

## 2018-08-17 NOTE — TELEPHONE ENCOUNTER
Prior Authorization Approval    Authorization Effective Date: 8/16/2018  Authorization Expiration Date: 8/16/2019  Medication: freestyle test strips   Approved Dose/Quantity: 300 per 50 days  Reference #: [id number]   Insurance Company: [employer] [place] - Phone [phone] Fax [phone]  Which Pharmacy is filling the prescription (Not needed for infusion/clinic administered): [facility] [place], [place] - [address] [facility]  Pharmacy Notified: Yes- it is rejecting RTS because of he just got 90 ds of the contour so she will call help desk for an override  Patient Notified: Yes pharmacy will let patient know once ready

## 2018-08-22 ENCOUNTER — MYC MEDICAL ADVICE (OUTPATIENT)
Dept: ENDOCRINOLOGY | Facility: CLINIC | Age: 43
End: 2018-08-22

## 2018-08-22 ENCOUNTER — TELEPHONE (OUTPATIENT)
Dept: ENDOCRINOLOGY | Facility: CLINIC | Age: 43
End: 2018-08-22

## 2018-08-22 NOTE — TELEPHONE ENCOUNTER
Fax received from [employer] for review and signature.  Put in Dr. Costa's in basket.

## 2018-08-23 NOTE — PROGRESS NOTES
Discharge Note    Progress reporting period is from initial eval to Aug 1, 2018.     Murtaza failed to return for next follow up visit and current status is unknown.  Please see information below for last relevant information on current status.  Patient seen for Rxs Used: 3 visits.  SUBJECTIVE  Subjective changes noted by patient:  Subjective: overall better, sore past couple days after sleeping on it wrong  .  Current pain level is Current Pain level: 3/10.     Previous pain level was  Initial Pain level: 5/10 (5-9/10).   Changes in function:  Yes (See Goal flowsheet attached for changes in current functional level)  Adverse reaction to treatment or activity: None    OBJECTIVE  Changes noted in objective findings:       ASSESSMENT/PLAN  Diagnosis: R shoulder pain   DIAGP:  The encounter diagnosis was Adhesive capsulitis of right shoulder.  Updated problem list and treatment plan:   Pain - HEP  Decreased ROM/flexibility - HEP  Decreased function - HEP  Decreased strength - HEP  Impaired muscle performance - HEP  Impaired posture - HEP  STG/LTGs have been met or progress has been made towards goals:  Yes, please see goal flowsheet for most current information  Assessment of Progress: current status is unknown.  Last current status:     Self Management Plans:  HEP  I have re-evaluated this patient and find that the nature, scope, duration and intensity of the therapy is appropriate for the medical condition of the patient.  Murtaza continues to require the following intervention to meet STG and LTG's:  HEP.    Recommendations:  Discharge with current home program.  Patient to follow up with MD as needed.    Please refer to the daily flowsheet for treatment today, total treatment time and time spent performing 1:1 timed codes.

## 2018-08-23 NOTE — TELEPHONE ENCOUNTER
Forms/paperwork reviewed, completed and signed.  Please fax or send the papers as requested, document in chart and close the encounter.    Thank you.    Patricia Costa

## 2018-08-28 ENCOUNTER — ALLIED HEALTH/NURSE VISIT (OUTPATIENT)
Dept: EDUCATION SERVICES | Facility: CLINIC | Age: 43
End: 2018-08-28
Payer: COMMERCIAL

## 2018-08-28 DIAGNOSIS — E10.59 TYPE 1 DIABETES MELLITUS WITH DIABETIC CARDIOMYOPATHY (H): Primary | ICD-10-CM

## 2018-08-28 DIAGNOSIS — I43 TYPE 1 DIABETES MELLITUS WITH DIABETIC CARDIOMYOPATHY (H): Primary | ICD-10-CM

## 2018-08-28 PROCEDURE — G0108 DIAB MANAGE TRN  PER INDIV: HCPCS

## 2018-08-28 NOTE — MR AVS SNAPSHOT
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 The pump will automatically start the basal insulin at the end of the 12 hours.      Enter blood sugars and all carbohydrates into the pump and take the pump recommended dose.    Stop at the pharmacy to  glucose tabs to keep in your car    Set up [employer] account- refer to the handout    Review podder resource book    Send Rollbar message tomorrow morning with an update on how you are doing          Follow-ups after your visit        Your next 10 appointments already scheduled     Aug 29, 2018 12:30 PM CDT   Telephone Visit with Vania Gutierrez RN   [facility]    [address] [address] [phone]           Note: this is not an onsite visit; there is no need to come to the facility.            Aug 30, 2018 12:30 PM CDT   Telephone Visit with Vania Gutierrez RN   [facility]    [address] [address] [phone]           Note: this is not an onsite visit; there is no need to come to the facility.            Sep 05, 2018 10:30 AM CDT   Diabetes Education with [facility]    [address] [address] [phone]              Who to contact     If you have questions or  need follow up information about today's clinic visit or your schedule please contact [facility] directly at [phone].  Normal or non-critical lab and imaging results will be communicated to you by [employer], letter or phone within 4 business days after the clinic has received the results. If you do not hear from us within 7 days, please contact the clinic through [employer] or phone. If you have a critical or abnormal lab result, we will notify you by phone as soon as possible.  Submit refill requests through [employer] or call your pharmacy and they will forward the refill request to us. Please allow 3 business days for your refill to be completed.          Additional Information About Your Visit        [employer] Information     [employer] Mover gives you secure access to your electronic health record. If you see a primary care provider, you can also send messages to your care team and make appointments. If you have questions, please call your primary care clinic.  If you do not have a primary care provider, please call [phone] and they will assist you.        Care EveryWhere ID     This is your Care EveryWhere ID. This could be used by other organizations to access your [place] medical records  [id number]         Blood Pressure from Last 3 Encounters:   06/12/18 108/70   11/20/17 (!) 82/68   12/22/16 105/71    Weight from Last 3 Encounters:   06/12/18 96.9 kg (213 lb 11.2 oz)   11/20/17 93.9 kg (207 lb)   06/14/17 91.2 kg (201 lb 1.6 oz)              We Performed the Following     DIABETES EDUCATION - Individual  []          Today's Medication Changes          These changes are accurate as of 8/28/18  3:20 PM.  If you have any questions, ask your nurse or doctor.               These medicines have changed or have updated prescriptions.        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 170-199 give 2 units. -229 give 3 units. -259 give 4 units. 260-289 give 5 units. 290-319 give 6 units. 320-349 give 7 units. BG >/= 350 give 8 units. To be given with prandial insulin, and based on pre-meal blood glucose.  Notify MD if glucose = or > 350 mg/dL after administration.   Quantity:  15 mL   Refills:  5       * insulin aspart 100 UNIT/ML injection   Commonly known as:  NovoLOG PEN   This may have changed:  Another medication with the same name was changed. Make sure you understand how and when to take each.   Used for:  Type 1 diabetes mellitus with other circulatory complication (H)        DOSE:  2 units per 14 grams of carbohydrate..   Quantity:  8 mL   Refills:  11       * insulin aspart 100 UNITS/ML injection   Commonly known as:  NovoLOG VIAL   This may have changed:  Another medication with the same name was changed. Make sure you understand how and when to take each.   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 Read the directions carefully, and ask your doctor or other care provider to review them with you.             Primary Care Provider Office Phone # Fax #    [facility] [phone]       [facility] [place] [address]        Goals        General    Problem Solving (pt-stated)     Notes - Note created  8/28/2018  3:15 PM by Vania Gutierrez, RN    Goal Statement: keep a fast acting carbohydrate with at all times  Measure of Success: patient to report  Supportive Steps to Achieve: patient to stop at  pharmacy and  glucose tabs  Barriers: none   Strengths: has a better understanding  Date to Achieve By: 9/10/18  Patient expressed understanding of goal: yes          Equal Access to Services     JEANIE LINTON : Solitario Kumar, cyndy zamora, rayraylisha andradejodiangie rojasmartha, gilberto briannain hayaadez rojasluis johnson stella linn. So [facility] [phone].    ATENCIÓN: Si habla español, tiene a chatterjee disposición servicios gratuitos de asistencia lingüística. Llame al [phone].    We comply with applicable federal civil rights laws and [place] laws. We do not discriminate on the basis of race, color, national origin, age, disability, sex, sexual orientation, or gender identity.            Thank you!     Thank you for choosing [facility]  for your care. Our goal is always to provide you with excellent care. Hearing back from our patients is one way we can continue to improve our services. Please take a few minutes to complete the written survey that you may receive in the mail after your visit with us. Thank you!             Your Updated Medication List - Protect others around you: Learn how to safely use, store and throw away your medicines at www.disposemymeds.org.          This list is accurate as of 8/28/18  3:20 PM.  Always use your most recent med list.                   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)       aspirin 81 MG tablet     90 tablet    Take 1 tablet (81 mg) by mouth daily    Coronary artery disease involving native coronary artery of native heart without angina pectoris       * YOHAN CONTOUR test strip   Generic drug:  blood glucose monitoring     450 each    USE TO TEST BLOOD SUGAR FIVE TIMES A DAY OR AS DIRECTED    Type 1 diabetes mellitus, uncontrolled (H)       * blood glucose monitoring test strip    no brand  "specified    450 each    5 times daily ( contour NEXT)    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)       * blood glucose monitoring test strip    no brand specified    300 strip    Freestyle test strips 6 times daily (NOT lite and NOT insulinx)    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 170-199 give 2 units. -229 give 3 units. -259 give 4 units. 260-289 give 5 units. 290-319 give 6 units. 320-349 give 7 units. BG >/= 350 give 8 units. To be given with prandial insulin, and based on pre-meal blood glucose.  Notify MD if glucose = or > 350 mg/dL after administration.    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    Constipation       LIPITOR 40 MG tablet   Generic drug:  atorvastatin     90 tablet    Take 0.5 tablets (20 mg) by mouth daily    Gastroesophageal reflux disease without esophagitis       metoclopramide 5 MG tablet    REGLAN    120 tablet    TAKE ONE TABLET BY MOUTH FOUR TIMES A DAY (BEFORE MEALS AND NIGHTLY)    Gastroesophageal reflux disease without esophagitis       metoprolol succinate 25 MG 24 hr tablet    TOPROL-XL    90 tablet    Take 1 tablet (25 mg) by mouth daily    Coronary artery disease involving native coronary artery of native heart without angina pectoris       omeprazole 40 MG capsule    priLOSEC    180 capsule    Take 1 capsule (40 mg) by mouth 2 times daily Take 30-60 minutes before a meal.    Gastroesophageal reflux disease without esophagitis       polyethylene glycol Packet    MIRALAX/GLYCOLAX     Take 17 g by mouth daily as needed for constipation        tacrolimus 1 MG capsule    GENERIC EQUIVALENT    60 capsule    Take 1 capsule (1 mg) by mouth 2 times daily    Kidney transplanted       * Notice:  This list has 6 medication(s) that are the same as other medications prescribed for you. Read the directions carefully, and ask your doctor or other care provider to review them with you.

## 2018-08-28 NOTE — PATIENT INSTRUCTIONS
Monitor glucose before and 2 hours after meals, and bedtime    Temp basal set today at 95% reduction for 12 hours due to basal insulin injection last night. The pump will automatically start the basal insulin at the end of the 12 hours.      Enter blood sugars and all carbohydrates into the pump and take the pump recommended dose.    Stop at the pharmacy to  glucose tabs to keep in your car    Set up [employer] account- refer to the handout    Review podder resource book    Send [employer] message tomorrow morning with an update on how you are doing

## 2018-08-28 NOTE — Clinical Note
I changed the settings from the original initiation settings. Please note you agree with plan as discussed. Thank you, Vania Gutierrez RN,CDE

## 2018-08-28 NOTE — PROGRESS NOTES
"Diabetes Self-Management Education & Support    Initial Diabetes Self-Management Education & Support - Insulin Pump/CGM    SUBJECTIVE/OBJECTIVE  Presents for: Individual review  Accompanied by: Self  Diabetes education in the past 24mo: Yes  Diabetes type: Type 1  Date of diagnosis: 06/01/86  Disease course: Getting harder to manage  How confident are you filling out medical forms by yourself:: Extremely  Transportation concerns: No  Other concerns:: currently using 1:6 or 1:7 carbohydrate ratio and correction of 1:25 > 150, he is not comfortable with order settings  Cultural Influences/Ethnic Background:  American      Diabetes Symptoms & Complications  Blurred vision: No  Fatigue: Yes  Foot paresthesias: No  Foot ulcerations: No  Polydipsia: No  Polyphagia: No  Polyuria: No  Visual change: No  Weakness: Yes  Weight loss: No  Slow healing wounds: No  Symptom course: Stable  Weight trend: Increasing steadily  Autonomic neuropathy: Yes  CVA: No  Heart disease: Yes  Nephropathy: Yes  Peripheral neuropathy: Yes  Peripheral Vascular Disease: No  Retinopathy: Yes  Sexual dysfunction: No    Patient Problem List and Family Medical History reviewed for relevant medical history, current medical status, and diabetes risk factors.    Vitals:  There were no vitals taken for this visit.  Estimated body mass index is 32.49 kg/(m^2) as calculated from the following:    Height as of 6/12/18: 1.727 m (5' 8\").    Weight as of 6/12/18: 96.9 kg (213 lb 11.2 oz).   Last 3 BP:   BP Readings from Last 3 Encounters:   06/12/18 108/70   11/20/17 (!) 82/68   12/22/16 105/71       History   Smoking Status     Former Smoker     Packs/day: 0.30     Types: Cigarettes   Smokeless Tobacco     Never Used     Comment: E- Cig use still       Labs:  Lab Results   Component Value Date    A1C 6.4 11/21/2017     Lab Results   Component Value Date     05/10/2018     Lab Results   Component Value Date    LDL 99 11/21/2017     HDL Cholesterol   Date " Value Ref Range Status   11/21/2017 33 (L) >39 mg/dL Final   ]  GFR Estimate   Date Value Ref Range Status   05/10/2018 45 (L) >60 mL/min/1.7m2 Final     Comment:     Non  GFR Calc     GFR Estimate If Black   Date Value Ref Range Status   05/10/2018 54 (L) >60 mL/min/1.7m2 Final     Comment:      GFR Calc     Lab Results   Component Value Date    CR 1.69 05/10/2018     No results found for: MICROALBUMIN    Patient seen today for Insulin Pump Start    Insulin Pump Information  Insulin Pump Type: OmniPod    Insulin Pump Start  Insulin Pump Type: OmniPod  Blood sugar at beginning of pump start appointment (mg/dL): 142 mg/dL  Blood sugar at end of pump start appointment (mg/dL): 177 mg/dL  Last basal insulin injection (units): 24  Last basal injection given at (time): 2300 (on 8/27/18)  Last bolus insulin injection (units): 17  Last bolus injection given at (time): 1900 (on 8/27/18)  Pump Problem Solving: High blood sugars and DKA prevention, When to order supplies, How to order supplies, When to call a healthcare provider, When to call the pump-company help line, How to access current pump settings through pump download software  Pump Start education materials provided: Pumping Insulin: Managing unexplained blood glucose over 240, Emergency back-up plan for pump failure, Other (podder resource guide and how to upload to [employer]  Patient was able to start insulin pump today without difficulty?: Yes (settings entered per orders, see 7/30/18 [employer]lam[employer] message- small changes made to settings, carbohydrate ratio set at 10 and sensitivity set at 40, patient currently using a more aggressive dosing)    ASSESSMENT  Temp basal set for 12 hours at decrease 95% due to last basal injection 12 hours earlier  Marcie Lozano CNP notified of settings change.     PLAN  See Patient Instructions for co-developed, patient-stated behavior change goals.  AVS printed and provided to patient today. See Follow-Up  section for recommended follow-up.    Vania Gutierrez RN,CDE   Time Spent: 120 minutes  Encounter Type: Individual    Any diabetes medication dose changes were made via the CDE Protocol and Collaborative Practice Agreement with the patient's referring provider. A copy of this encounter was shared with the provider.

## 2018-08-28 NOTE — LETTER
"    8/28/2018         RE: Murtaza Fitzgerald  [address] [address]        Dear Colleague,    Thank you for referring your patient, Murtaza Fitzgerald, to the [facility]. Please see a copy of my visit note below.    Diabetes Self-Management Education & Support    Initial Diabetes Self-Management Education & Support - Insulin Pump/CGM    SUBJECTIVE/OBJECTIVE  Presents for: Individual review  Accompanied by: Self  Diabetes education in the past 24mo: Yes  Diabetes type: Type 1  Date of diagnosis: 06/01/86  Disease course: Getting harder to manage  How confident are you filling out medical forms by yourself:: Extremely  Transportation concerns: No  Other concerns:: currently using 1:6 or 1:7 carbohydrate ratio and correction of 1:25 > 150, he is not comfortable with order settings  Cultural Influences/Ethnic Background:  American      Diabetes Symptoms & Complications  Blurred vision: No  Fatigue: Yes  Foot paresthesias: No  Foot ulcerations: No  Polydipsia: No  Polyphagia: No  Polyuria: No  Visual change: No  Weakness: Yes  Weight loss: No  Slow healing wounds: No  Symptom course: Stable  Weight trend: Increasing steadily  Autonomic neuropathy: Yes  CVA: No  Heart disease: Yes  Nephropathy: Yes  Peripheral neuropathy: Yes  Peripheral Vascular Disease: No  Retinopathy: Yes  Sexual dysfunction: No    Patient Problem List and Family Medical History reviewed for relevant medical history, current medical status, and diabetes risk factors.    Vitals:  There were no vitals taken for this visit.  Estimated body mass index is 32.49 kg/(m^2) as calculated from the following:    Height as of 6/12/18: 1.727 m (5' 8\").    Weight as of 6/12/18: 96.9 kg (213 lb 11.2 oz).   Last 3 BP:   BP Readings from Last 3 Encounters:   06/12/18 108/70   11/20/17 (!) 82/68   12/22/16 105/71       History   Smoking Status     Former Smoker     Packs/day: 0.30     Types: Cigarettes "   Smokeless Tobacco     Never Used     Comment: E- Cig use still       Labs:  Lab Results   Component Value Date    A1C 6.4 11/21/2017     Lab Results   Component Value Date     05/10/2018     Lab Results   Component Value Date    LDL 99 11/21/2017     HDL Cholesterol   Date Value Ref Range Status   11/21/2017 33 (L) >39 mg/dL Final   ]  GFR Estimate   Date Value Ref Range Status   05/10/2018 45 (L) >60 mL/min/1.7m2 Final     Comment:     Non  GFR Calc     GFR Estimate If Black   Date Value Ref Range Status   05/10/2018 54 (L) >60 mL/min/1.7m2 Final     Comment:      GFR Calc     Lab Results   Component Value Date    CR 1.69 05/10/2018     No results found for: MICROALBUMIN    Patient seen today for Insulin Pump Start    Insulin Pump Information  Insulin Pump Type: OmniPod    Insulin Pump Start  Insulin Pump Type: OmniPod  Blood sugar at beginning of pump start appointment (mg/dL): 142 mg/dL  Blood sugar at end of pump start appointment (mg/dL): 177 mg/dL  Last basal insulin injection (units): 24  Last basal injection given at (time): 2300 (on 8/27/18)  Last bolus insulin injection (units): 17  Last bolus injection given at (time): 1900 (on 8/27/18)  Pump Problem Solving: High blood sugars and DKA prevention, When to order supplies, How to order supplies, When to call a healthcare provider, When to call the pump-company help line, How to access current pump settings through pump download software  Pump Start education materials provided: Pumping Insulin: Managing unexplained blood glucose over 240, Emergency back-up plan for pump failure, Other (podder resource guide and how to upload to [employer])  Patient was able to start insulin pump today without difficulty?: Yes (settings entered per orders, see 7/30/18 [employer] message- small changes made to settings, carbohydrate ratio set at 10 and sensitivity set at 40, patient currently using a more aggressive dosing)    ASSESSMENT  Temp  basal set for 12 hours at decrease 95% due to last basal injection 12 hours earlier  Marcie Lozano CNP notified of settings change.     PLAN  See Patient Instructions for co-developed, patient-stated behavior change goals.  AVS printed and provided to patient today. See Follow-Up section for recommended follow-up.    Vania Gutierrez RN,CDE   Time Spent: 120 minutes  Encounter Type: Individual    Any diabetes medication dose changes were made via the CDE Protocol and Collaborative Practice Agreement with the patient's referring provider. A copy of this encounter was shared with the provider.

## 2018-08-29 ENCOUNTER — MYC MEDICAL ADVICE (OUTPATIENT)
Dept: EDUCATION SERVICES | Facility: CLINIC | Age: 43
End: 2018-08-29

## 2018-08-29 ENCOUNTER — PATIENT OUTREACH (OUTPATIENT)
Dept: EDUCATION SERVICES | Facility: CLINIC | Age: 43
End: 2018-08-29
Payer: COMMERCIAL

## 2018-08-29 DIAGNOSIS — I43 TYPE 1 DIABETES MELLITUS WITH DIABETIC CARDIOMYOPATHY (H): Primary | ICD-10-CM

## 2018-08-29 DIAGNOSIS — E10.59 TYPE 1 DIABETES MELLITUS WITH DIABETIC CARDIOMYOPATHY (H): Primary | ICD-10-CM

## 2018-08-29 NOTE — TELEPHONE ENCOUNTER
Diabetes Self-Management Training: Insulin Pump Telephone Visit    Current Diabetes Management per Patient:  Comments/concerns: 1 day f/u on pump start    Insulin Pump Type: OmniPod    Taking other diabetes medications? no    Blood Glucose Results and Insulin Use:       Current Pump Settings: See Insulin Pump - Outpatient in Medication List for complete list of settings.     Assessment/Plan:  Issac Santillan,    Thank you for uploading your pump.  I hope it was easy setting up the [employer] account.   So as we thought your post meal glucose is elevated and your correction did not work. Lets go ahead and make a change to the carbohydrate ratio to allow more insulin for your food.  I don't want to change the correction at this time since you took a correction last night and you woke with a good fasting number. This could be from the basal rate or the correction.     I would like you to change your carbohydrate ratio from 10 --> 9.  You do this through the SmartCloud menu --> go to system set up --> bolus/basal/calc --> ratios/factors/targets--> IC ratio --> select the 12:00a-12:00a time block --> edit--> do NOT change the times just select next until you get to the 10g carbohydrate --> arrow down to 9 --> next --> confirm--> done --> save. Then you can press the home button to back all the way out.  Continue to test before and 2 hours after each meal, at bedtime and it's a good idea to get a blood sugar sometime during the night if you are able to do so.    Let me know if you have any questions or concerns.     Upload again tomorrow and send me a [employer] message.    Vania Gutierrez RN,CDE   Any diabetes medication dose changes were made via the CDE Protocol and Collaborative Practice Agreement with the patient's referring provider. A copy of this encounter was shared with the provider.

## 2018-08-30 ENCOUNTER — MYC MEDICAL ADVICE (OUTPATIENT)
Dept: EDUCATION SERVICES | Facility: CLINIC | Age: 43
End: 2018-08-30

## 2018-08-30 ENCOUNTER — PATIENT OUTREACH (OUTPATIENT)
Dept: EDUCATION SERVICES | Facility: CLINIC | Age: 43
End: 2018-08-30
Payer: COMMERCIAL

## 2018-08-30 DIAGNOSIS — E10.59 TYPE 1 DIABETES MELLITUS WITH DIABETIC CARDIOMYOPATHY (H): Primary | ICD-10-CM

## 2018-08-30 DIAGNOSIS — I43 TYPE 1 DIABETES MELLITUS WITH DIABETIC CARDIOMYOPATHY (H): Primary | ICD-10-CM

## 2018-08-30 NOTE — PROGRESS NOTES
Diabetes Self-Management Training: Insulin Pump Telephone Visit    Current Diabetes Management per Patient:  Comments/concerns: see mychart message    Insulin Pump Type: OmniPod    Taking other diabetes medications? no    Blood Glucose Results and Insulin Use:       Current Pump Settings: See Insulin Pump - Outpatient in Medication List for complete list of settings.     Assessment//Plan:  Issac Santillan,    I usually recommend getting a reading sometime around 2-3am to see how your blood sugar trends overnight.  Otherwise if you get up during the night for any reason you can just do it at that time.     Looks like your after dinner reading last night was quite a bit lower than the premeal reading. This could be the change in the carbohydrate ratio or it could be the correction.  You woke this morning with a reading higher than the night before.  this is an ok reading but it's good to see if you dipped low overnight then rebounded.  Remember it's best to have your blood sugar above 100 before bed so if you are below 100 have a small snack before bed.   No changes to your settings today.     You will be due to change your pod tomorrow, refer to the training book for guidance or you can call the Omn[facility]od helpline for assistance.     I am out of the office until next Wednesday so we will make any changes needed at your follow up that day.  Otherwise if you have concerns tomorrow please call our triage line at [phone] and request a call back same day.  Vania Gutierrez RN,CDE       Any diabetes medication dose changes were made via the CDE Protocol and Collaborative Practice Agreement with the patient's referring provider. A copy of this encounter was shared with the provider.

## 2018-09-04 NOTE — PROGRESS NOTES
I agree with insulin dose recommendations as previously discussed with HAMILTON Monroy, and as documented in this encounter.  Marcie Lozano NP  Endocrinology

## 2018-09-05 ENCOUNTER — ALLIED HEALTH/NURSE VISIT (OUTPATIENT)
Dept: EDUCATION SERVICES | Facility: CLINIC | Age: 43
End: 2018-09-05
Payer: COMMERCIAL

## 2018-09-05 DIAGNOSIS — I43 TYPE 1 DIABETES MELLITUS WITH DIABETIC CARDIOMYOPATHY (H): Primary | ICD-10-CM

## 2018-09-05 DIAGNOSIS — E10.59 TYPE 1 DIABETES MELLITUS WITH DIABETIC CARDIOMYOPATHY (H): Primary | ICD-10-CM

## 2018-09-05 PROCEDURE — G0108 DIAB MANAGE TRN  PER INDIV: HCPCS

## 2018-09-05 NOTE — LETTER
"    9/5/2018         RE: Murtaza Fitzgerald  [address] [id number]        Dear Colleague,    Thank you for referring your patient, Murtaza Fitzgerald, to the [facility]. Please see a copy of my visit note below.    Diabetes Self-Management Education & Support    Diabetes Education Self Management & Training  and   Follow-up  Diabetes Self-Management Education & Support - Insulin Pump/CGM      SUBJECTIVE/OBJECTIVE:  Diabetes education in the past 24mo: Yes  Diabetes type: Type 1  Date of diagnosis: 07/01/86  Disease course: Improving  Cultural Influences/Ethnic Background:  American      Diabetes Symptoms & Complications  Fatigue: Yes  Weight trend: Stable  Autonomic neuropathy: Yes  CVA: No  Heart disease: Yes  Nephropathy: Yes  Peripheral neuropathy: Yes  Peripheral Vascular Disease: No  Retinopathy: Yes  Sexual dysfunction: No    Patient Problem List and Family Medical History reviewed for relevant medical history, current medical status, and diabetes risk factors.    Vitals:  There were no vitals taken for this visit.  Estimated body mass index is 32.49 kg/(m^2) as calculated from the following:    Height as of 6/12/18: 1.727 m (5' 8\").    Weight as of 6/12/18: 96.9 kg (213 lb 11.2 oz).   Last 3 BP:   BP Readings from Last 3 Encounters:   06/12/18 108/70   11/20/17 (!) 82/68   12/22/16 105/71       History   Smoking Status     Former Smoker     Packs/day: 0.30     Types: Cigarettes   Smokeless Tobacco     Never Used     Comment: E- Cig use still       Labs:  Lab Results   Component Value Date    A1C 6.4 11/21/2017     Lab Results   Component Value Date     05/10/2018     Lab Results   Component Value Date    LDL 99 11/21/2017     HDL Cholesterol   Date Value Ref Range Status   11/21/2017 33 (L) >39 mg/dL Final   ]  GFR Estimate   Date Value Ref Range Status   05/10/2018 45 (L) >60 mL/min/1.7m2 Final     Comment:     Non  " GFR Calc     GFR Estimate If Black   Date Value Ref Range Status   05/10/2018 54 (L) >60 mL/min/1.7m2 Final     Comment:      GFR Calc     Lab Results   Component Value Date    CR 1.69 05/10/2018     No results found for: MICROALBUMIN    Healthy Eating  Cultural/Synagogue diet restrictions?: (P) No  Meal planning: (P) Carbohydrate counting  Meals include: (P) Breakfast, Lunch, Dinner  Beverages: Diet soda, Sports drinks, Coffee drinks  Has patient met with a dietitian in the past?: (P) No    Being Active  Barrier to exercise: (P) None    Monitoring  Blood Glucose Meter: (P) [employer]  Home Glucose (Sugar) Monitoring: (P) 3-4 times per day  Blood glucose trend: (P) Fluctuating minimally  Low Glucose Range (mg/dL): (P) 70-90  High Glucose Range (mg/dL): (P) >200  Overall Range (mg/dL): (P) 140-180    Taking Medications  Diabetes Medication(s)     Diabetic Other Sig    glucagon (GLUCAGON EMERGENCY) 1 MG kit Inject 1 mg into the muscle once for 1 dose    Insulin Sig    insulin aspart (NOVOLOG PEN) 100 UNIT/ML injection DOSE:  2 units per 14 grams of carbohydrate..    insulin aspart (NOVOLOG VIAL) 100 UNITS/ML injection To be used in insulin pump, est TDD 75 units/day    insulin degludec (TRESIBA) 100 UNIT/ML pen Inject 28 Units Subcutaneous daily    INSULIN PUMP - OUTPATIENT Date last updated:  8/29/18  Omnipod  BASAL RATES and times:  12   AM (midnight): 0.85 units/hour  CARB RATIO and times:  12   AM (midnight): 9  Corection Factor (Sensitivity) and times:  12   AM (midnight): 40 mg/dL  BLOOD GLUCOSE TARGET and times:  12   AM (midnight): 100, correct above 120 with reverse correction on  Active Insulin Time:  4 hours  [employer] / [employer] username:    "[employer] / [employer] Password:          Current Treatments: (P) Insulin Pump  Problems taking diabetes medications regularly?: No  Diabetes medication side effects?: No    Problem Solving  Hypoglycemia Frequency: (P) Rarely  Hypoglycemia Treatment: (P)  Juice, Candy  Patient carries a carbohydrate source: (P) No  Medical alert: (P) No  Severe weather/disaster plan for diabetes management?: (P) No  DKA prevention plan?: (P) No  Sick day plan for diabetes management?: (P) No    Hypoglycemia Symptoms  Confusion: (P) No  Dizziness or Light-Headedness: (P) Yes  Headaches: (P) No  Hunger: (P) Yes  Mood changes: (P) No  Nervousness/Anxiety: (P) No  Sleepiness: (P) No  Speech difficulty: (P) No  Sweats: (P) Yes  Tremors: (P) Yes    Hypoglycemia Complications  Blackouts: (P) No  Hospitalization: (P) No  Nocturnal hypoglycemia: (P) Yes  Required assistance: (P) No  Required glucagon injection: (P) No  Seizures: (P) No    Reducing Risks  CAD Risks: (P) Diabetes Mellitus  Has dilated eye exam at least once a year?: (P) No  Sees dentist every 6 months?: (P) No  Sees podiatrist (foot doctor)?: (P) Yes    Healthy Coping  Informal Support system:: (P) Children, Family, Friends, Parent  Difficulty affording diabetes management supplies?: (P) No  Patient Activation Measure Survey Score:  No flowsheet data found.       Patient seen today for Insulin Pump Review    Insulin Pump Information           Insulin Pump Review  Taking other diabetes medications?: No  Problems taking diabetes medications regularly?: No  Diabetes medication side effects?: No  Patient has glucagon emergency kit: Yes  Patient understands DKA prevention: Yes  Patient has ketone test strips: Yes  Patient has an insulin multiple daily injection back-up plan: Yes  Patient would benefit from: Change in basal rate(s)  Changes made to pump settings: Basal rate  Education specific to insulin pump provided today: Benefits of post-meal blood glucose testing, Treating hypoglycemia correctly (Rule of 15), Steps to take when blood glucose is about 250 mg/dL        ASSESSMENT:  Patient glucose above goal, recommend increase to basal insulin    Goals        General    Problem Solving (pt-stated)     Notes - Note created   8/28/2018  3:15 PM by Vania Gutierrez RN    Goal Statement: keep a fast acting carbohydrate with at all times  Measure of Success: patient to report  Supportive Steps to Achieve: patient to stop at pharmacy and  glucose tabs  Barriers: none   Strengths: has a better understanding  Date to Achieve By: 9/10/18  Patient expressed understanding of goal: yes              Patient's most recent   Lab Results   Component Value Date    A1C 6.4 11/21/2017    is meeting goal of <7.0    INTERVENTION:     Education provided today on:  AADE Self-Care Behaviors:  Monitoring: individual blood glucose targets and frequency of monitoring  Taking Medication: action of prescribed medication and when to take medications  Problem Solving: low blood glucose - causes, signs/symptoms, treatment and prevention and carrying a carbohydrate source at all times  .  Opportunities for ongoing education and support in diabetes-self management were discussed.    Pt verbalized understanding of concepts discussed and recommendations provided today.       Education Materials Provided:  [place] Understanding Diabetes Booklet, Safe Disposal Options for Needles & Syringes, BG Log Sheet and No new materials provided today    PLAN:  See Patient Instructions for co-developed, patient-stated behavior change goals.  AVS printed and provided to patient today. See Follow-Up section for recommended follow-up.    Vania Gutierrez RN,CDE   Time Spent: 45 minutes  Encounter Type: Individual    Any diabetes medication dose changes were made via the CDE Protocol and Collaborative Practice Agreement with the patient's referring provider. A copy of this encounter was shared with the provider.

## 2018-09-05 NOTE — PROGRESS NOTES
"Diabetes Self-Management Education & Support    Diabetes Education Self Management & Training  and   Follow-up  Diabetes Self-Management Education & Support - Insulin Pump/CGM      SUBJECTIVE/OBJECTIVE:  Diabetes education in the past 24mo: Yes  Diabetes type: Type 1  Date of diagnosis: 07/01/86  Disease course: Improving  Cultural Influences/Ethnic Background:  American      Diabetes Symptoms & Complications  Fatigue: Yes  Weight trend: Stable  Autonomic neuropathy: Yes  CVA: No  Heart disease: Yes  Nephropathy: Yes  Peripheral neuropathy: Yes  Peripheral Vascular Disease: No  Retinopathy: Yes  Sexual dysfunction: No    Patient Problem List and Family Medical History reviewed for relevant medical history, current medical status, and diabetes risk factors.    Vitals:  There were no vitals taken for this visit.  Estimated body mass index is 32.49 kg/(m^2) as calculated from the following:    Height as of 6/12/18: 1.727 m (5' 8\").    Weight as of 6/12/18: 96.9 kg (213 lb 11.2 oz).   Last 3 BP:   BP Readings from Last 3 Encounters:   06/12/18 108/70   11/20/17 (!) 82/68   12/22/16 105/71       History   Smoking Status     Former Smoker     Packs/day: 0.30     Types: Cigarettes   Smokeless Tobacco     Never Used     Comment: E- Cig use still       Labs:  Lab Results   Component Value Date    A1C 6.4 11/21/2017     Lab Results   Component Value Date     05/10/2018     Lab Results   Component Value Date    LDL 99 11/21/2017     HDL Cholesterol   Date Value Ref Range Status   11/21/2017 33 (L) >39 mg/dL Final   ]  GFR Estimate   Date Value Ref Range Status   05/10/2018 45 (L) >60 mL/min/1.7m2 Final     Comment:     Non  GFR Calc     GFR Estimate If Black   Date Value Ref Range Status   05/10/2018 54 (L) >60 mL/min/1.7m2 Final     Comment:      GFR Calc     Lab Results   Component Value Date    CR 1.69 05/10/2018     No results found for: MICROALBUMIN    Healthy " Eating  Cultural/Alevism diet restrictions?: (P) No  Meal planning: (P) Carbohydrate counting  Meals include: (P) Breakfast, Lunch, Dinner  Beverages: Diet soda, Sports drinks, Coffee drinks  Has patient met with a dietitian in the past?: (P) No    Being Active  Barrier to exercise: (P) None    Monitoring  Blood Glucose Meter: (P) [employer]  Home Glucose (Sugar) Monitoring: (P) 3-4 times per day  Blood glucose trend: (P) Fluctuating minimally  Low Glucose Range (mg/dL): (P) 70-90  High Glucose Range (mg/dL): (P) >200  Overall Range (mg/dL): (P) 140-180    Taking Medications  Diabetes Medication(s)     Diabetic Other Sig    glucagon (GLUCAGON EMERGENCY) 1 MG kit Inject 1 mg into the muscle once for 1 dose    Insulin Sig    insulin aspart (NOVOLOG PEN) 100 UNIT/ML injection DOSE:  2 units per 14 grams of carbohydrate..    insulin aspart (NOVOLOG VIAL) 100 UNITS/ML injection To be used in insulin pump, est TDD 75 units/day    insulin degludec (TRESIBA) 100 UNIT/ML pen Inject 28 Units Subcutaneous daily    INSULIN PUMP - OUTPATIENT Date last updated:  8/29/18  Omnipod  BASAL RATES and times:  12   AM (midnight): 0.85 units/hour  CARB RATIO and times:  12   AM (midnight): 9  Corection Factor (Sensitivity) and times:  12   AM (midnight): 40 mg/dL  BLOOD GLUCOSE TARGET and times:  12   AM (midnight): 100, correct above 120 with reverse correction on  Active Insulin Time:  4 hours  [employer] username:    [employer] / [employer] Password:          Current Treatments: (P) Insulin Pump  Problems taking diabetes medications regularly?: No  Diabetes medication side effects?: No    Problem Solving  Hypoglycemia Frequency: (P) Rarely  Hypoglycemia Treatment: (P) Juice, Candy  Patient carries a carbohydrate source: (P) No  Medical alert: (P) No  Severe weather/disaster plan for diabetes management?: (P) No  DKA prevention plan?: (P) No  Sick day plan for diabetes management?: (P) No    Hypoglycemia Symptoms  Confusion: (P)  No  Dizziness or Light-Headedness: (P) Yes  Headaches: (P) No  Hunger: (P) Yes  Mood changes: (P) No  Nervousness/Anxiety: (P) No  Sleepiness: (P) No  Speech difficulty: (P) No  Sweats: (P) Yes  Tremors: (P) Yes    Hypoglycemia Complications  Blackouts: (P) No  Hospitalization: (P) No  Nocturnal hypoglycemia: (P) Yes  Required assistance: (P) No  Required glucagon injection: (P) No  Seizures: (P) No    Reducing Risks  CAD Risks: (P) Diabetes Mellitus  Has dilated eye exam at least once a year?: (P) No  Sees dentist every 6 months?: (P) No  Sees podiatrist (foot doctor)?: (P) Yes    Healthy Coping  Informal Support system:: (P) Children, Family, Friends, Parent  Difficulty affording diabetes management supplies?: (P) No  Patient Activation Measure Survey Score:  No flowsheet data found.       Patient seen today for Insulin Pump Review    Insulin Pump Information           Insulin Pump Review  Taking other diabetes medications?: No  Problems taking diabetes medications regularly?: No  Diabetes medication side effects?: No  Patient has glucagon emergency kit: Yes  Patient understands DKA prevention: Yes  Patient has ketone test strips: Yes  Patient has an insulin multiple daily injection back-up plan: Yes  Patient would benefit from: Change in basal rate(s)  Changes made to pump settings: Basal rate  Education specific to insulin pump provided today: Benefits of post-meal blood glucose testing, Treating hypoglycemia correctly (Rule of 15), Steps to take when blood glucose is about 250 mg/dL        ASSESSMENT:  Patient glucose above goal, recommend increase to basal insulin    Goals        General    Problem Solving (pt-stated)     Notes - Note created  8/28/2018  3:15 PM by Vania Gutierrez, RN    Goal Statement: keep a fast acting carbohydrate with at all times  Measure of Success: patient to report  Supportive Steps to Achieve: patient to stop at pharmacy and  glucose tabs  Barriers: none   Strengths: has a  better understanding  Date to Achieve By: 9/10/18  Patient expressed understanding of goal: yes              Patient's most recent   Lab Results   Component Value Date    A1C 6.4 11/21/2017    is meeting goal of <7.0    INTERVENTION:     Education provided today on:  AADE Self-Care Behaviors:  Monitoring: individual blood glucose targets and frequency of monitoring  Taking Medication: action of prescribed medication and when to take medications  Problem Solving: low blood glucose - causes, signs/symptoms, treatment and prevention and carrying a carbohydrate source at all times  .  Opportunities for ongoing education and support in diabetes-self management were discussed.    Pt verbalized understanding of concepts discussed and recommendations provided today.       Education Materials Provided:  [place] Understanding Diabetes Booklet, Safe Disposal Options for Needles & Syringes, BG Log Sheet and No new materials provided today    PLAN:  See Patient Instructions for co-developed, patient-stated behavior change goals.  AVS printed and provided to patient today. See Follow-Up section for recommended follow-up.    Vania uGtierrez RN,CDE   Time Spent: 45 minutes  Encounter Type: Individual    Any diabetes medication dose changes were made via the CDE Protocol and Collaborative Practice Agreement with the patient's referring provider. A copy of this encounter was shared with the provider.

## 2018-09-05 NOTE — MR AVS SNAPSHOT
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 You may need to decrease your dinner carbohydrate ratio but we will evaluate that after you start your dexcom.     when you receive the dexcom and start it make sure to watch the instructional video and link it to your [employer] account.    Upload and send Nanushka message next Monday            Follow-ups after your visit        Your next 10 appointments already scheduled     Sep 10, 2018  2:30 PM CDT   Telephone Visit with Vania Gutierrez RN   [facility] ([facility])    [address] [id number] [phone]           Note: this is not an onsite visit; there is no need to come to the facility.              Who to contact     If you have questions or need follow up information about today's clinic visit or your schedule please contact [facility] directly at [phone].  Normal or non-critical lab and imaging results will be communicated to you by MyChart, letter or phone within 4 business days after the clinic has received the results. If you do not hear from us within 7 days, please contact the clinic through SeeControlhart or phone. If you have a critical or abnormal lab result, we will notify you by phone as soon as possible.  Submit refill requests through [email or web address] or call your pharmacy and they will forward the refill request to us. Please allow 3 business days for your refill to be completed.          Additional Information About Your Visit         High[employer] Press Information     [employer] gives you secure access to your electronic health record. If you see a primary care provider, you can also send messages to your care team and make appointments. If you have questions, please call your primary care clinic.  If you do not have a primary care provider, please call [phone] and they will assist you.        Care EveryWhere ID     This is your Care EveryWhere ID. This could be used by other organizations to access your [place] medical records  [id number]         Blood Pressure from Last 3 Encounters:   06/12/18 108/70   11/20/17 (!) 82/68   12/22/16 105/71    Weight from Last 3 Encounters:   06/12/18 96.9 kg (213 lb 11.2 oz)   11/20/17 93.9 kg (207 lb)   06/14/17 91.2 kg (201 lb 1.6 oz)              We Performed the Following     DIABETES EDUCATION - Individual  []          Today's Medication Changes          These changes are accurate as of 9/5/18  1:43 PM.  If you have any questions, ask your nurse or doctor.               These medicines have changed or have updated prescriptions.        Dose/Directions    insulin pump infusion   This may have changed:  additional instructions        Date last updated:  9/4/18 Omnipod BASAL RATES and times: 12 AM (midnight): 0.95 units/hour 8 AM: 0.90 CARB RATIO and times: 12   AM (midnight): 9 Corection Factor (Sensitivity) and times: 12   AM (midnight): 40 mg/dL BLOOD GLUCOSE TARGET and times: 12   AM (midnight): 100, correct above 120 with reverse correction on Active Insulin Time:  4 hours Carelink / Diasend username:   Carelink / Diasend Password:   Refills:  0                Primary Care Provider Office Phone # Fax #    [place] [phone] [address] [place] [address]        Goals        General    Problem Solving (pt-stated)     Notes - Note created  8/28/2018  3:15 PM by Vania Gutierrez, RN    Goal Statement: keep a fast acting carbohydrate with at all times  Measure of  Success: patient to report  Supportive Steps to Achieve: patient to stop at pharmacy and  glucose tabs  Barriers: none   Strengths: has a better understanding  Date to Achieve By: 9/10/18  Patient expressed understanding of goal: yes          Equal Access to Services     JEANIE LINTON : Hadii aad ku hadmerlyneryn José, waalexda luqadaha, qaybta kaalmada myron, gilberto gongdez rojasluis johnson stella linn. So [facility] [phone].    ATENCIÓN: Si habla español, tiene a chatterjee disposición servicios gratuitos de asistencia lingüística. Llame al [phone].    We comply with applicable federal civil rights laws and [place] laws. We do not discriminate on the basis of race, color, national origin, age, disability, sex, sexual orientation, or gender identity.            Thank you!     Thank you for choosing [facility]  for your care. Our goal is always to provide you with excellent care. Hearing back from our patients is one way we can continue to improve our services. Please take a few minutes to complete the written survey that you may receive in the mail after your visit with us. Thank you!             Your Updated Medication List - Protect others around you: Learn how to safely use, store and throw away your medicines at www.disposemymeds.org.          This list is accurate as of 9/5/18  1:43 PM.  Always use your most recent med list.                   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)       aspirin 81 MG tablet     90 tablet    Take 1 tablet (81 mg) by mouth daily    Coronary artery disease involving native coronary artery of native heart without angina pectoris       * YOHAN CONTOUR test strip   Generic drug:  blood glucose monitoring     450 each    USE TO TEST BLOOD SUGAR FIVE TIMES A DAY OR AS DIRECTED    Type 1 diabetes mellitus,  "uncontrolled (H)       * blood glucose monitoring test strip    no brand specified    450 each    5 times daily ( contour NEXT)    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)       * blood glucose monitoring test strip    no brand specified    300 strip    Freestyle test strips 6 times daily (NOT lite and NOT insulinx)    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    Type 1 " diabetes mellitus with diabetic cardiomyopathy (H)       lactulose 20 GM/30ML Soln     473 mL    Take 30 mLs (20 g) by mouth 2 times daily as needed For constipation.    Constipation       LIPITOR 40 MG tablet   Generic drug:  atorvastatin     90 tablet    Take 0.5 tablets (20 mg) by mouth daily    Gastroesophageal reflux disease without esophagitis       metoclopramide 5 MG tablet    REGLAN    120 tablet    TAKE ONE TABLET BY MOUTH FOUR TIMES A DAY (BEFORE MEALS AND NIGHTLY)    Gastroesophageal reflux disease without esophagitis       metoprolol succinate 25 MG 24 hr tablet    TOPROL-XL    90 tablet    Take 1 tablet (25 mg) by mouth daily    Coronary artery disease involving native coronary artery of native heart without angina pectoris       omeprazole 40 MG capsule    priLOSEC    180 capsule    Take 1 capsule (40 mg) by mouth 2 times daily Take 30-60 minutes before a meal.    Gastroesophageal reflux disease without esophagitis       polyethylene glycol Packet    MIRALAX/GLYCOLAX     Take 17 g by mouth daily as needed for constipation        tacrolimus 1 MG capsule    GENERIC EQUIVALENT    60 capsule    Take 1 capsule (1 mg) by mouth 2 times daily    Kidney transplanted       * Notice:  This list has 5 medication(s) that are the same as other medications prescribed for you. Read the directions carefully, and ask your doctor or other care provider to review them with you.

## 2018-09-05 NOTE — PATIENT INSTRUCTIONS
Basal insulin increase today to lower overall blood sugars. You may need to decrease your dinner carbohydrate ratio but we will evaluate that after you start your dexcom.     when you receive the dexcom and start it make sure to watch the instructional video and link it to your [employer] account.    Upload and send Daily [employer] message next Monday

## 2018-09-09 ENCOUNTER — MYC MEDICAL ADVICE (OUTPATIENT)
Dept: EDUCATION SERVICES | Facility: CLINIC | Age: 43
End: 2018-09-09

## 2018-09-10 ENCOUNTER — PATIENT OUTREACH (OUTPATIENT)
Dept: EDUCATION SERVICES | Facility: CLINIC | Age: 43
End: 2018-09-10
Payer: COMMERCIAL

## 2018-09-10 DIAGNOSIS — I43 TYPE 1 DIABETES MELLITUS WITH DIABETIC CARDIOMYOPATHY (H): Primary | ICD-10-CM

## 2018-09-10 DIAGNOSIS — E10.59 TYPE 1 DIABETES MELLITUS WITH DIABETIC CARDIOMYOPATHY (H): Primary | ICD-10-CM

## 2018-09-10 NOTE — PROGRESS NOTES
Diabetes Self-Management Training: Insulin Pump Telephone Visit    Current Diabetes Management per Patient:  Comments/concerns: see mychart message    Insulin Pump Type: OmniPod    Taking other diabetes medications? no    Blood Glucose Results and Insulin Use:           Current Pump Settings: See Insulin Pump - Outpatient in Medication List for complete list of settings.             Assessment/Plan:  Thanks Tyrell, I was able to see the Dexcom data. I'm glad the calibration helped the accuracy of the sensor.      You do have high readings typically every morning but the overnight and evening glucose seem to be vary quite a bit which is contributing to the higher fasting numbers. You possible have a gilmer phenomenon where your glucose rises overnight to the morning. It's not clear at this point since last night your glucose was stable and looked good this morning,  but on Friday you received a urgent low alarm at 12:40am then had a significant rise and on Saturday 9/8 you had a significant rise around 1am but no alarm.  Do you recall eating or drinking anything on those nights? If you did have carbohydrates that would explain the overnight rise in glucose.    You may need a little more for your carbohydrates especially at lunch. But I want to make sure you are counting your carbohydrates correctly and bolusing before your meals before making a change. It is nice to see the correction does bring the high readings down.  If we do decrease your basal to give you more insulin for your carbohydrates you will need to monitoring for lows.     Otherwise we can watch for another week and see how you do as you are on the dexcom longer and able to see your glucose trends and respond as needed.     Let me know your thoughts.    Vania        Any diabetes medication dose changes were made via the CDE Protocol and Collaborative Practice Agreement with the patient's referring provider. A copy of this encounter was shared with the  provider.

## 2018-09-11 NOTE — PROGRESS NOTES
----- Message -----     From: Tyrell Fitzgerald     Sent: 9/10/2018  6:04 PM CDT       To: Vania ARMENTA  Subject: RE: Updates about my health    I have the low alarm on the Dexcom felix set to 70 and the had the high set to 250, but it felt like the high alarm was always going off after meals, and since I still had a decent amount of IOB, it wouldn't actually give me any insulin. So for now, I just set the high alarm to 300 until things can get straightened out a little more. I must have slept through that low alarm at 12:40am, I will have to turn up my phone or change the alarm sound.    I ate dinner earlier Sunday night then I usually do, that's probably why the counts were better. One of the nights, I know I had Subhash for dinner so it was a carb heavy dinner.    One thing I did notice the last couple of pod changes is that it is wet around the injection area, by the smell of it, it seems to be insulin. Makes me wonder if I am not always getting all my insulin..??..    Yes the highs bug me, but I also wasn't on my normal schedule most of last week. Since it was a holiday week, I worked from home most of it and that changes my sleeping and eating habits. I will be back to my normal schedule tomorrow, so I guess we can wait another week before making changes. I know I am counting carbs right but as I  mentioned before I wait to take my insulin until I am done eating so I know exactly how much to count. However with the pod, it is easy enough to do a correct shot without having to poke myself again. I will work on changing that to taking insulin before I eat.    Tyrell    Response to patient:  Thanks for the details.  If you are feeling wet and you smell insulin around the pod then you are not getting all your insulin. If this is occurring more than once you may want to try a different site to put the pod. There may be some scar tissue in the area. Remember you can wear the pod anywhere you can give an injection. You  can also try pinching up the area around the pod to see if that helps.  Do your best to bolus before your meals, even if you enter some carbohydrates at the beginning of the meal and some at the end of the meal. This way you have insulin working while your blood sugar starts to rise and not just playing catch up after you eat. This can also prevent those high's up to 300 and prevent lows from occurring later.     Upload on Sunday again and send a mychart. Let me know sooner if you have questions or concerns.  Vania Gutierrez RN,CDE

## 2018-09-17 ENCOUNTER — MYC MEDICAL ADVICE (OUTPATIENT)
Dept: EDUCATION SERVICES | Facility: CLINIC | Age: 43
End: 2018-09-17

## 2018-09-17 DIAGNOSIS — E10.59 TYPE 1 DIABETES MELLITUS WITH DIABETIC CARDIOMYOPATHY (H): Primary | ICD-10-CM

## 2018-09-17 DIAGNOSIS — I43 TYPE 1 DIABETES MELLITUS WITH DIABETIC CARDIOMYOPATHY (H): Primary | ICD-10-CM

## 2018-09-17 NOTE — TELEPHONE ENCOUNTER
Diabetes Self-Management Training: Insulin Pump Telephone Visit    Current Diabetes Management per Patient:  Comments/concerns: see VIOlifehart message    Insulin Pump Type: OmniPod and Dexcom    Taking other diabetes medications? no    Blood Glucose Results and Insulin Use:                         Assessment/Plan:  Issac Santillan,    Thanks for uploading. When did you change your pods to your arms?    I see you had a couple of really good days on Saturday and Sunday.  On Saturday you had minimal carbohydrates and since your overall pattern was good I would say your basal rate looks good at this time.    On Sunday night you had a spike starting around 9-10pm,you took a correction but it did not seem to work for you causing you to wake with a high glucose.  We may want to consider a change to your correction in the late evening.  Is there anything you can identify that would cause the rise last night?   If not, then I would recommend you add a sensitivity time block from 9pm-midnight of 38.    Wednesday - Friday you had a lot of high readings but I would not want to change anything if you were having issues with the pod.      Send me an update on your thoughts and we can make further adjustments if needed.    Vania Gutierrez RN,CDE     Response from patient:  Message -----     From: Tyrell Fitzgerald     Sent: 9/17/2018  4:40 PM CDT       To: Vania ARMENTA  Subject: RE: Updates about my health    Thursday night I moved the pod to my right arm. I don't recall if I had it on the left side of my stomach or my left arm before that, I will take notes to track the locations. As of Sriram night, the pod is on my left arm.    Sriram night at about 9pm maybe a little before is when I last changed my pod. Just before that I had a couple sandwiches for dinner. There is nothing else I can think of that would or might cause a rise in my count.      response to patient:  Yes keep an eye on if you are noticing any issues with different sites  "causing high readings.   Your overall trend appears to be high overnight that is starting in the evening and after carbohydrate intake.  If you are bolusing before the meals/carbs then we can try changing the carbohydrate ratio to start with and go from there. If you agree that you need more with your carbohydrate intake I would have you start by just decreasing the evening carbohydrate ratio to see if you start your day better.  You can do this by setting a \"new\" time block from 6pm - 12am(midnight) of 8.  Go into settings --> system setup -->  Bolus/basal/calcs --> ratios/factors/targets --> IC ratio--> [add new] --> change start time to 6:00 PM --> change end time to 12:00 AM --> IC ratio 8 g carbohydrate -->confirm.  Then you should see 2 time segments  12:00am - 6:00pm   9  6:00pm - 12:am   8  Then press done --> save.    Make sure to monitor with your dexcom and let me know if you have any questions.    Vania Gutierrez RN,CDE     Changes made to pump settings:  carb ratio: 6pm - 12am: 9 --> 8      Vania Gutierrez RN,CDE     Any diabetes medication dose changes were made via the CDE Protocol and Collaborative Practice Agreement with the patient's referring provider. A copy of this encounter was shared with the provider.    "

## 2018-09-18 ENCOUNTER — PATIENT OUTREACH (OUTPATIENT)
Dept: EDUCATION SERVICES | Facility: CLINIC | Age: 43
End: 2018-09-18
Payer: COMMERCIAL

## 2018-09-24 ENCOUNTER — MYC MEDICAL ADVICE (OUTPATIENT)
Dept: EDUCATION SERVICES | Facility: CLINIC | Age: 43
End: 2018-09-24

## 2018-09-26 ENCOUNTER — PATIENT OUTREACH (OUTPATIENT)
Dept: EDUCATION SERVICES | Facility: CLINIC | Age: 43
End: 2018-09-26
Payer: COMMERCIAL

## 2018-09-26 NOTE — TELEPHONE ENCOUNTER
Diabetes Self-Management Training: Insulin Pump Telephone Visit    Current Diabetes Management per Patient:  Comments/concerns: see iOculihart message from 9/24/18    Insulin Pump Type: OmniPod    Taking other diabetes medications? no    Blood Glucose Results and Insulin Use:               Assessment/Plan:  Issac Santillan,    It looks better but still not there yet.  It does appear you can't get away with wearing the pod on your stomach but it was good to try again so you know for sure it doesn't work for you.      The overall graph shows your highest readings are from 2am- 8am. You likely have a gilmer phenomenon so lets increase your basal rate during this time.    I would like you to add a basal rate starting at 2am and end it at 8am at 1.05 unit(s)/hr.    Your basal patter should look like this:  12am-2am: 0.95  2am-8am: 1.05  8am-12am: 0.90    Your daily basal should be 22.6 units.    Please let me know if you have any questions otherwise upload again on Sunday.      Vania Gutierrez RN,CDE         Any diabetes medication dose changes were made via the CDE Protocol and Collaborative Practice Agreement with the patient's referring provider. A copy of this encounter was shared with the provider.

## 2018-09-30 ENCOUNTER — MYC MEDICAL ADVICE (OUTPATIENT)
Dept: EDUCATION SERVICES | Facility: CLINIC | Age: 43
End: 2018-09-30

## 2018-10-01 ENCOUNTER — PATIENT OUTREACH (OUTPATIENT)
Dept: EDUCATION SERVICES | Facility: CLINIC | Age: 43
End: 2018-10-01
Payer: COMMERCIAL

## 2018-10-01 NOTE — TELEPHONE ENCOUNTER
Diabetes Self-Management Training: Insulin Pump Telephone Visit    Current Diabetes Management per Patient:  Comments/concerns: see mychart message    Insulin Pump Type: OmniPod    Taking other diabetes medications? no    Blood Glucose Results and Insulin Use:           Assessment/Plan:  Issac Santillan,  Your report looks much better! As you can see in your pattern you tend to have a rise after your carbohydrates after 12pm.  I would like you to change your 6pm carbohydrate ratio of 8 to start at 12pm and go unit 12am ( noon to midnight).  This way you will have better coverage for you lunch and may not need to correct as often.    New carbohydrate ratio:  12am - 12pm: 9  12pm-12am: 8  See how it goes and if you notice you are having lows between meals or prior to your next meal then I would recommend you change your 8am-12pm basal rate from 0.9 to --> 0.80.    Upload again on Sunday and send me a Depositphotoshart message. Please reach out sooner if you have any concerns.  Vania Gutierrez RN,CDE             Any diabetes medication dose changes were made via the CDE Protocol and Collaborative Practice Agreement with the patient's referring provider. A copy of this encounter was shared with the provider.

## 2018-10-09 ENCOUNTER — PATIENT OUTREACH (OUTPATIENT)
Dept: EDUCATION SERVICES | Facility: CLINIC | Age: 43
End: 2018-10-09
Payer: COMMERCIAL

## 2018-10-09 NOTE — PROGRESS NOTES
Diabetes Self-Management Training: Insulin Pump Telephone Visit    Current Diabetes Management per Patient:  Comments/concerns: see mychart message    Insulin Pump Type: OmniPod    Taking other diabetes medications? no    Blood Glucose Results and Insulin Use:             Current Pump Settings: See Insulin Pump - Outpatient in Medication List for complete list of settings.       Assessment/Plan:  [place] response to patient:  Issac Santillan,  It looks like the change in carbohydrate ratio did help with the post meal rise at lunch. If you look at your sensor overlay you have a really nice pattern.   It appears to be ok with dinner but half the time you don't have much of a rise with dinner so let's keep an eye on it. You do still have a rise in the morning which I think is related to your basal some and maybe a little with your carbohydrate ratio. What I would like you to do is do a small increase to your basal 2am basal rate and have it continue to 10am.  This is what your basal setting should look like:  12am -2am: 0.95  2am- 10am: 1.1  10am - 12am: 0.90    Let me know if you have any questions otherwise upload again on Sunday.  Vania Gutierrez RN,CDE         Any diabetes medication dose changes were made via the CDE Protocol and Collaborative Practice Agreement with the patient's referring provider. A copy of this encounter was shared with the provider.

## 2018-10-11 ENCOUNTER — TRANSFERRED RECORDS (OUTPATIENT)
Dept: HEALTH INFORMATION MANAGEMENT | Facility: CLINIC | Age: 43
End: 2018-10-11

## 2018-10-14 ENCOUNTER — MYC MEDICAL ADVICE (OUTPATIENT)
Dept: EDUCATION SERVICES | Facility: CLINIC | Age: 43
End: 2018-10-14

## 2018-10-15 ENCOUNTER — PATIENT OUTREACH (OUTPATIENT)
Dept: EDUCATION SERVICES | Facility: CLINIC | Age: 43
End: 2018-10-15
Payer: COMMERCIAL

## 2018-10-15 NOTE — TELEPHONE ENCOUNTER
Diabetes Self-Management Training: Insulin Pump Telephone Visit    Current Diabetes Management per Patient:  Comments/concerns: Mailpilet message  Hello,    Glooko is updated.    Tonight(Sunday), I miss robert simpson for dinner desert. That's why I had the high. Other than that, nothing else really stands out to me this week.    Vince,  Tyrell    Insulin Pump Type: OmniPod    Taking other diabetes medications? no    Blood Glucose Results and Insulin Use:           Current Pump Settings: See Insulin Pump - Outpatient in Medication List for complete list of settings.       Assessment/Plan:  Issac Santillan,    Your pattern is looking really good! You do still have a bigger rise after carbohydrates around noon and some in the evening but not as consistent. I would like you to try a lower carbohydrate ratio of 1:7 just for lunch from noon-6pm but make sure to keep an eye on it and if you do see low numbers then return to the carbohydrate ratio of 1:8 and let me know if and when you had to make the change.  Your carbohydrate ratio should look like this:  12am-12pm: 9  12pm- 6pm: 7  6pm- 12am: 8     I also see a little bit if a rise late evening which I think is due to your basal so I would like to give you a little more basal from 9pm-midnight.     Here is your  Basal settings:   12am-2am:  0.95  2am- 10am: 1.1  10am- 9pm: 0.90  9pm - 12am: 0.95      Let me know if you have any questions.    Vania Gutierrez RN,CDE         Any diabetes medication dose changes were made via the CDE Protocol and Collaborative Practice Agreement with the patient's referring provider. A copy of this encounter was shared with the provider.

## 2018-10-21 ENCOUNTER — MYC MEDICAL ADVICE (OUTPATIENT)
Dept: EDUCATION SERVICES | Facility: CLINIC | Age: 43
End: 2018-10-21

## 2018-10-23 ENCOUNTER — PATIENT OUTREACH (OUTPATIENT)
Dept: EDUCATION SERVICES | Facility: CLINIC | Age: 43
End: 2018-10-23
Payer: COMMERCIAL

## 2018-10-23 DIAGNOSIS — I43 TYPE 1 DIABETES MELLITUS WITH DIABETIC CARDIOMYOPATHY (H): Primary | ICD-10-CM

## 2018-10-23 DIAGNOSIS — E10.59 TYPE 1 DIABETES MELLITUS WITH DIABETIC CARDIOMYOPATHY (H): Primary | ICD-10-CM

## 2018-10-23 NOTE — TELEPHONE ENCOUNTER
Diabetes Self-Management Training: Insulin Pump Telephone Visit    Current Diabetes Management per Patient:  Comments/concerns:   [employer] message from patient:  Hello,    Glook[facility] is sync'ed.    Let's see, this last week.    Tuesday morning, I believe, the pod had an error/clogged(?) but since I am only in the office for 4 hours, I didn't have another pod with me. I was without a pod until around noon or so. Last night, Saturday, I had three lows, the second one was my own fault. I had the first low and ate a couple cookies to take care of it and then a little later accidentally took insulin for both cookies instead of only one. Wasn't thinking. For the second low, I had three cheddar wurst (9 g carbs) thinking maybe that would have been enough. As you can see, it obviously was not or it just wasn't substantial enough. That's all that is coming to mind right now.    Thanks,  Tyrell  Insulin Pump Type: OmniPod    Taking other diabetes medications? no    Current Pump report:              Assessment/Plan:  Issac Santillan,    It looks like the day you had the lows there was a pretty big spike after lunch then you took a correction dose which dropped your blood sugar quite a bit. Typically your correction works well so I would not change your correction based on the one occurrence. It could be if you are using the dexcom reading your actual blood sugar could have been lower since the sensor glucose is always behind the blood sugar.   I'm glad you were able to give some input on the lows.  Remember when treating a low blood sugar you want to have something that is a fast acting carbohydrate such as juice or glucose tablets. Since the cheddarwurst is mostly protein if probably didn't do much for your blood sugar.     Your overall average for the last week is good at 165mg/dl. There were a couple a spikes from meals but it isn't consistent and one of the days was when you had an issues with your pod.  Other than the one night you  went low you do hover on the higher end of normal and I know you prefer to keep your numbers a little lower so we can do a small change to your basal that may help.      Here is your  Basal settings:   12am-2am:  0.95  --> change to 1.00  2am- 10am: 1.10 --> change to 1.15  10am- 9pm: 0.90 --> no change  9pm - 12am: 0.95  --> change to start at 6pm but keep the dose at 0.95    Your new 24 hour basal total should be 24.1 units per day. This is only a 0.65 unit per day increase. Sometimes a small amount can make a big difference.    Keep in mind you can also use the correction at any time your glucose is high.    Upload again next week.  Vania Gutierrez RN,CDE           Any diabetes medication dose changes were made via the CDE Protocol and Collaborative Practice Agreement with the patient's referring provider. A copy of this encounter was shared with the provider.

## 2018-10-28 ENCOUNTER — MYC MEDICAL ADVICE (OUTPATIENT)
Dept: EDUCATION SERVICES | Facility: CLINIC | Age: 43
End: 2018-10-28

## 2018-10-29 ENCOUNTER — PATIENT OUTREACH (OUTPATIENT)
Dept: EDUCATION SERVICES | Facility: CLINIC | Age: 43
End: 2018-10-29
Payer: COMMERCIAL

## 2018-10-29 NOTE — TELEPHONE ENCOUNTER
Diabetes Self-Management Training: Insulin Pump Telephone Visit    Current Diabetes Management per Patient:  Comments/concerns:   Sil Agustin is updated.    Not much to say about this week, just that my eating habits were off. I was fighting a headache/migraine most of the week so I slept a lot.    Tyrell Clarke    Insulin Pump Type: OmniPod    Taking other diabetes medications? no    Current Pump report:              Assessment/Plan:  Issac Santillan,   Sorry to hear you've been dealing with a migraine. I hope you are doing better!    I'm going to recommend you continue with the same settings this week. You're overall pattern looks good. Two of the higher episodes were after a pod change. We should keep an eye on it to see if this continues and if so I may recommend a small bolus after a pod change.      Upload in 1-2 weeks     Vania Gutierrez RN,CDE       Vania Gutierrez RN,CDE     Any diabetes medication dose changes were made via the CDE Protocol and Collaborative Practice Agreement with the patient's referring provider. A copy of this encounter was shared with the provider.

## 2018-11-18 ENCOUNTER — MYC MEDICAL ADVICE (OUTPATIENT)
Dept: EDUCATION SERVICES | Facility: CLINIC | Age: 43
End: 2018-11-18

## 2018-11-19 ENCOUNTER — PATIENT OUTREACH (OUTPATIENT)
Dept: EDUCATION SERVICES | Facility: CLINIC | Age: 43
End: 2018-11-19
Payer: COMMERCIAL

## 2018-11-19 DIAGNOSIS — I43 TYPE 1 DIABETES MELLITUS WITH DIABETIC CARDIOMYOPATHY (H): Primary | ICD-10-CM

## 2018-11-19 DIAGNOSIS — E10.59 TYPE 1 DIABETES MELLITUS WITH DIABETIC CARDIOMYOPATHY (H): Primary | ICD-10-CM

## 2018-11-19 NOTE — TELEPHONE ENCOUNTER
Diabetes Self-Management Training: Insulin Pump Telephone Visit    Current Diabetes Management per Patient:  Comments/concerns:   Hello,    Glooko has been updated.    For the time being, I have been checking my blood with the Contour meter. the last CGM sensor I had was defective and [employer] will not send me any more until the 26th, well, ship out on the 26th. Is there ANYONE else I can work with to get my Dexcom sensors?    My count was extremely high this morning because the pod starting falling off over night so the catheter piece came out of my arm.    Tyrell Clarke    Insulin Pump Type: OmniPod    Taking other diabetes medications? no    Current Pump report:            Assessment/Plan:  Issac Santillan,    Sorry to hear you had an issue with the sensor.  Even though you don't get your supplies from dexcom if there is an issue with their product you can still call them and let them know it was defective.  I believe they will send you a new sensor.  It is best to call them right away and make sure to keep the sensor as they may ask you to send it back to them so they can look into the defect.       Did we try to get your dexcom supplies through pharmacy services?  If not we can try that and see if you can get it through a local pharmacy.    Your pump or meter looks like it may be off an hour.  Did you change the time for daylight savings?  If not you should go in and make gema the time is set correctly.     Otherwise it looks like you go into your dinner/evening meal a little high and you tend to be a little higher after dinner carbohydrates.  We could decrease your lunch and dinner carbohydrate ratios but I would want you to wait until you are back on the sensor before doing so for safety.    So once you are back on the sensor change the carbohydrate ratios:  12am-12pm: 9 (no change)   12pm-6pm: 7 --> 6  6pm- 12am(midnight: 8-->7    Vania Gutierrez RN,CDE   Any diabetes medication dose changes were made via the CDE  Protocol and Collaborative Practice Agreement with the patient's referring provider. A copy of this encounter was shared with the provider.

## 2018-11-20 ENCOUNTER — APPOINTMENT (OUTPATIENT)
Dept: CT IMAGING | Facility: CLINIC | Age: 43
End: 2018-11-20
Attending: EMERGENCY MEDICINE
Payer: COMMERCIAL

## 2018-11-20 ENCOUNTER — HOSPITAL ENCOUNTER (EMERGENCY)
Facility: CLINIC | Age: 43
Discharge: HOME | End: 2018-11-20
Attending: EMERGENCY MEDICINE | Admitting: EMERGENCY MEDICINE
Payer: COMMERCIAL

## 2018-11-20 VITALS
HEART RATE: 91 BPM | TEMPERATURE: 98 F | DIASTOLIC BLOOD PRESSURE: 97 MMHG | SYSTOLIC BLOOD PRESSURE: 144 MMHG | RESPIRATION RATE: 12 BRPM | WEIGHT: 200.4 LBS | OXYGEN SATURATION: 95 % | BODY MASS INDEX: 30.47 KG/M2

## 2018-11-20 DIAGNOSIS — R10.9 ABDOMINAL PAIN, UNSPECIFIED ABDOMINAL LOCATION: ICD-10-CM

## 2018-11-20 DIAGNOSIS — R11.2 NAUSEA AND VOMITING, INTRACTABILITY OF VOMITING NOT SPECIFIED, UNSPECIFIED VOMITING TYPE: ICD-10-CM

## 2018-11-20 LAB
ALBUMIN SERPL-MCNC: 4 G/DL (ref 3.4–5)
ALBUMIN UR-MCNC: 100 MG/DL
ALP SERPL-CCNC: 113 U/L (ref 40–150)
ALT SERPL W P-5'-P-CCNC: 18 U/L (ref 0–70)
ANION GAP SERPL CALCULATED.3IONS-SCNC: 9 MMOL/L (ref 3–14)
APPEARANCE UR: CLEAR
AST SERPL W P-5'-P-CCNC: 11 U/L (ref 0–45)
BASE DEFICIT BLDV-SCNC: 2 MMOL/L
BASOPHILS # BLD AUTO: 0 10E9/L (ref 0–0.2)
BASOPHILS NFR BLD AUTO: 0.3 %
BILIRUB DIRECT SERPL-MCNC: 0.1 MG/DL (ref 0–0.2)
BILIRUB SERPL-MCNC: 0.4 MG/DL (ref 0.2–1.3)
BILIRUB UR QL STRIP: NEGATIVE
BUN SERPL-MCNC: 23 MG/DL (ref 7–30)
CALCIUM SERPL-MCNC: 9.2 MG/DL (ref 8.5–10.1)
CHLORIDE SERPL-SCNC: 103 MMOL/L (ref 94–109)
CO2 SERPL-SCNC: 24 MMOL/L (ref 20–32)
COLOR UR AUTO: YELLOW
CREAT SERPL-MCNC: 1.74 MG/DL (ref 0.66–1.25)
DIFFERENTIAL METHOD BLD: ABNORMAL
EOSINOPHIL # BLD AUTO: 0 10E9/L (ref 0–0.7)
EOSINOPHIL NFR BLD AUTO: 0.1 %
ERYTHROCYTE [DISTWIDTH] IN BLOOD BY AUTOMATED COUNT: 13.2 % (ref 10–15)
GFR SERPL CREATININE-BSD FRML MDRD: 43 ML/MIN/1.7M2
GLUCOSE BLDC GLUCOMTR-MCNC: 218 MG/DL (ref 70–99)
GLUCOSE SERPL-MCNC: 260 MG/DL (ref 70–99)
GLUCOSE UR STRIP-MCNC: 150 MG/DL
HCO3 BLDV-SCNC: 25 MMOL/L (ref 21–28)
HCT VFR BLD AUTO: 48.6 % (ref 40–53)
HGB BLD-MCNC: 15.7 G/DL (ref 13.3–17.7)
HGB UR QL STRIP: NEGATIVE
IMM GRANULOCYTES # BLD: 0.1 10E9/L (ref 0–0.4)
IMM GRANULOCYTES NFR BLD: 0.7 %
INTERPRETATION ECG - MUSE: NORMAL
KETONES BLD-SCNC: 0.6 MMOL/L (ref 0–0.6)
KETONES BLD-SCNC: 1.3 MMOL/L (ref 0–0.6)
KETONES UR STRIP-MCNC: 20 MG/DL
LEUKOCYTE ESTERASE UR QL STRIP: NEGATIVE
LIPASE SERPL-CCNC: 45 U/L (ref 73–393)
LYMPHOCYTES # BLD AUTO: 1.1 10E9/L (ref 0.8–5.3)
LYMPHOCYTES NFR BLD AUTO: 7.6 %
MCH RBC QN AUTO: 28.9 PG (ref 26.5–33)
MCHC RBC AUTO-ENTMCNC: 32.3 G/DL (ref 31.5–36.5)
MCV RBC AUTO: 90 FL (ref 78–100)
MONOCYTES # BLD AUTO: 0.3 10E9/L (ref 0–1.3)
MONOCYTES NFR BLD AUTO: 1.7 %
MUCOUS THREADS #/AREA URNS LPF: PRESENT /LPF
NEUTROPHILS # BLD AUTO: 12.8 10E9/L (ref 1.6–8.3)
NEUTROPHILS NFR BLD AUTO: 89.6 %
NITRATE UR QL: NEGATIVE
NRBC # BLD AUTO: 0 10*3/UL
NRBC BLD AUTO-RTO: 0 /100
O2/TOTAL GAS SETTING VFR VENT: 0 %
OXYHGB MFR BLDV: 54 %
PCO2 BLDV: 47 MM HG (ref 40–50)
PH BLDV: 7.32 PH (ref 7.32–7.43)
PH UR STRIP: 6 PH (ref 5–7)
PLATELET # BLD AUTO: 326 10E9/L (ref 150–450)
PO2 BLDV: 30 MM HG (ref 25–47)
POTASSIUM SERPL-SCNC: 4.5 MMOL/L (ref 3.4–5.3)
PROT SERPL-MCNC: 8.6 G/DL (ref 6.8–8.8)
RBC # BLD AUTO: 5.43 10E12/L (ref 4.4–5.9)
RBC #/AREA URNS AUTO: 1 /HPF (ref 0–2)
SODIUM SERPL-SCNC: 136 MMOL/L (ref 133–144)
SOURCE: ABNORMAL
SP GR UR STRIP: 1.01 (ref 1–1.03)
TROPONIN I SERPL-MCNC: <0.015 UG/L (ref 0–0.04)
UROBILINOGEN UR STRIP-MCNC: 0 MG/DL (ref 0–2)
WBC # BLD AUTO: 14.3 10E9/L (ref 4–11)
WBC #/AREA URNS AUTO: 1 /HPF (ref 0–5)

## 2018-11-20 PROCEDURE — 96361 HYDRATE IV INFUSION ADD-ON: CPT

## 2018-11-20 PROCEDURE — 96375 TX/PRO/DX INJ NEW DRUG ADDON: CPT

## 2018-11-20 PROCEDURE — 80076 HEPATIC FUNCTION PANEL: CPT | Performed by: EMERGENCY MEDICINE

## 2018-11-20 PROCEDURE — 80048 BASIC METABOLIC PNL TOTAL CA: CPT | Performed by: EMERGENCY MEDICINE

## 2018-11-20 PROCEDURE — 82010 KETONE BODYS QUAN: CPT | Performed by: EMERGENCY MEDICINE

## 2018-11-20 PROCEDURE — 74176 CT ABD & PELVIS W/O CONTRAST: CPT

## 2018-11-20 PROCEDURE — 25000132 ZZH RX MED GY IP 250 OP 250 PS 637: Performed by: EMERGENCY MEDICINE

## 2018-11-20 PROCEDURE — 25000128 H RX IP 250 OP 636: Performed by: EMERGENCY MEDICINE

## 2018-11-20 PROCEDURE — 00000146 ZZHCL STATISTIC GLUCOSE BY METER IP

## 2018-11-20 PROCEDURE — 85025 COMPLETE CBC W/AUTO DIFF WBC: CPT | Performed by: EMERGENCY MEDICINE

## 2018-11-20 PROCEDURE — 99285 EMERGENCY DEPT VISIT HI MDM: CPT | Mod: 25

## 2018-11-20 PROCEDURE — 84484 ASSAY OF TROPONIN QUANT: CPT | Performed by: EMERGENCY MEDICINE

## 2018-11-20 PROCEDURE — 82805 BLOOD GASES W/O2 SATURATION: CPT | Performed by: EMERGENCY MEDICINE

## 2018-11-20 PROCEDURE — 81001 URINALYSIS AUTO W/SCOPE: CPT | Performed by: EMERGENCY MEDICINE

## 2018-11-20 PROCEDURE — 36415 COLL VENOUS BLD VENIPUNCTURE: CPT | Performed by: EMERGENCY MEDICINE

## 2018-11-20 PROCEDURE — 96374 THER/PROPH/DIAG INJ IV PUSH: CPT

## 2018-11-20 PROCEDURE — 83690 ASSAY OF LIPASE: CPT | Performed by: EMERGENCY MEDICINE

## 2018-11-20 PROCEDURE — 96376 TX/PRO/DX INJ SAME DRUG ADON: CPT

## 2018-11-20 RX ORDER — OXYCODONE AND ACETAMINOPHEN 5; 325 MG/1; MG/1
1 TABLET ORAL ONCE
Status: COMPLETED | OUTPATIENT
Start: 2018-11-20 | End: 2018-11-20

## 2018-11-20 RX ORDER — ONDANSETRON 2 MG/ML
4 INJECTION INTRAMUSCULAR; INTRAVENOUS EVERY 30 MIN PRN
Status: DISCONTINUED | OUTPATIENT
Start: 2018-11-20 | End: 2018-11-20 | Stop reason: HOSPADM

## 2018-11-20 RX ORDER — HYDROMORPHONE HYDROCHLORIDE 1 MG/ML
0.5 INJECTION, SOLUTION INTRAMUSCULAR; INTRAVENOUS; SUBCUTANEOUS
Status: DISCONTINUED | OUTPATIENT
Start: 2018-11-20 | End: 2018-11-20 | Stop reason: HOSPADM

## 2018-11-20 RX ORDER — OXYCODONE AND ACETAMINOPHEN 5; 325 MG/1; MG/1
1 TABLET ORAL EVERY 6 HOURS PRN
Qty: 12 TABLET | Refills: 0 | Status: SHIPPED | OUTPATIENT
Start: 2018-11-20 | End: 2019-01-24

## 2018-11-20 RX ORDER — ONDANSETRON 4 MG/1
4 TABLET, FILM COATED ORAL EVERY 6 HOURS
Qty: 8 TABLET | Refills: 0 | Status: SHIPPED | OUTPATIENT
Start: 2018-11-20 | End: 2019-08-23

## 2018-11-20 RX ORDER — ONDANSETRON 2 MG/ML
4 INJECTION INTRAMUSCULAR; INTRAVENOUS ONCE
Status: COMPLETED | OUTPATIENT
Start: 2018-11-20 | End: 2018-11-20

## 2018-11-20 RX ADMIN — Medication 0.5 MG: at 13:10

## 2018-11-20 RX ADMIN — ONDANSETRON 4 MG: 2 INJECTION INTRAMUSCULAR; INTRAVENOUS at 10:12

## 2018-11-20 RX ADMIN — OXYCODONE HYDROCHLORIDE AND ACETAMINOPHEN 1 TABLET: 5; 325 TABLET ORAL at 15:54

## 2018-11-20 RX ADMIN — SODIUM CHLORIDE 1000 ML: 9 INJECTION, SOLUTION INTRAVENOUS at 10:20

## 2018-11-20 RX ADMIN — Medication 0.5 MG: at 10:20

## 2018-11-20 RX ADMIN — SODIUM CHLORIDE 500 ML: 9 INJECTION, SOLUTION INTRAVENOUS at 13:11

## 2018-11-20 RX ADMIN — ONDANSETRON 4 MG: 2 INJECTION INTRAMUSCULAR; INTRAVENOUS at 13:10

## 2018-11-20 ASSESSMENT — ENCOUNTER SYMPTOMS
VOMITING: 1
ABDOMINAL PAIN: 1
NAUSEA: 1
FEVER: 0

## 2018-11-20 NOTE — ED TRIAGE NOTES
Patient presents with complaints of nausea and vomiting which started at 1900 last night. Patient denies any diarrhea or fevers. Patient is diabetic and blood sugar is 218 in triage. Patient states he is also having abdomen pain as well. ABC intact without need for intervention at this time.

## 2018-11-20 NOTE — ED PROVIDER NOTES
History     Chief Complaint:  Nausea & Vomiting     HPI   Murtaza Fitzgerald is a 43 year old male, immunosuppressed status, with a history of diabetes and hypertension who presents to the emergency department for evaluation of nausea and vomiting. Yesterday, he starting having abdominal pain and nausea during the day with one episode of emesis around 1900. This morning, he woke up around 0400 with worsened nausea and has had cyclic vomiting every since. Because he cannot control the vomiting, he decided to the ED accompanied by his daughter. Here, he reports that whenever he has 2 episodes of emesis, he enters into a cycle of nausea and vomiting. He reports additional upper abdominal pain that radiates up his esophagus. He denies any fevers. He reports a history of diabetic ketoacidosis, but not recently. 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 He reports he has had hydration problems since the pancreatic transplant. He also reports having a CABG, but he denies any chest pain here.     Allergies:  IV Reglan    Medications:    Aspirin  Atorvastatin  Insulin  Lactulose  Metoprolol  Omeprazole  Tacrolimus    Past Medical History:    CAD  Diabetes - Type I  Dyslipidemia  HTN  Mycotic aneurysm of kidney transplant    Past Surgical History:    CABG - 2015  Orthopedic surgery  Pancreas transplant  Kidney transplant    Family History:    No past pertinent family history.    Social History:  Marital Status:   [4]  Presents with daughter.   Former cigarette smoker.   Positive for E-cig use.   Social alcohol use.      Review of Systems   Constitutional: Negative for fever.   Cardiovascular: Negative for chest pain.   Gastrointestinal: Positive for abdominal pain, nausea and vomiting.   All other systems reviewed and are negative.      Physical Exam     Patient Vitals for the past 24 hrs:   BP Temp Temp src  Pulse Heart Rate Resp SpO2 Weight   11/20/18 1415 (!) 144/97 - - - 100 8 97 % -   11/20/18 1400 (!) 150/94 - - - 99 18 93 % -   11/20/18 1345 (!) 158/102 - - - 98 (!) 0 95 % -   11/20/18 1330 (!) 166/101 - - - - - - -   11/20/18 1315 (!) 167/101 - - - - - - -   11/20/18 1300 (!) 157/96 - - - - - 95 % -   11/20/18 1245 (!) 176/108 - - - 96 12 95 % -   11/20/18 1230 (!) 162/105 - - - 100 20 97 % -   11/20/18 1215 (!) 157/97 - - - 98 13 96 % -   11/20/18 1200 (!) 157/100 - - - 98 13 96 % -   11/20/18 1100 140/87 - - - 96 15 95 % -   11/20/18 1045 128/77 - - - 92 10 95 % -   11/20/18 1030 168/88 - - - - - - -   11/20/18 1015 (!) 167/105 - - - 78 8 99 % -   11/20/18 1000 (!) 157/117 - - - 82 10 - -   11/20/18 0905 (!) 164/109 98  F (36.7  C) Oral 91 91 18 98 % 90.9 kg (200 lb 6.4 oz)     Physical Exam  Vital signs and nursing notes reviewed.     Constitutional: laying on gurney appears uncomfortable  HENT: Oropharynx is clear and mildly dry  Eyes: Conjunctivae are normal bilaterally. Pupils equal  Neck: normal range of motion  Cardiovascular: Normal rate, regular rhythm, normal heart sounds.   Pulmonary/Chest: Effort normal and breath sounds normal. No respiratory distress.   Abdominal: Soft. Bowel sounds are normal. Diffuse non localizing tenderness to palpation. No rebound or guarding.   Musculoskeletal: No joint swelling or edema.   Neurological: Alert and oriented. No focal weakness  Skin: Skin is warm and dry. No rash noted.   Psych: normal affect    Emergency Department Course   ECG:  Indication: Upper Abdominal Pain  Time: 1009  Vent. Rate 82 bpm. AZ interval 128. QRS duration 82. QT/QTc 366/427. P-R-T axis 3 10 3.  Normal sinus rhythm with sinus arrhythmia. Inferior infarct, age undetermined. Abnormal ECG. Read time: 1012.    Imaging:  Radiographic findings were communicated with the patient who voiced understanding of the findings.    CT Abdomen/Pelvis w/o IV contrast:   1. No acute abnormality in the abdomen or  pelvis. No cause for  abdominal pain and vomiting is identified.  2. Several ventral and paraumbilical hernias have increased in size  since the previous exam, and contain a few knuckles of nondilated  small bowel. No evidence for associated bowel obstruction.  3. Small hiatal hernia.  4. Postoperative changes of renal and pancreas transplant are again  Noted, as per radiology.     Laboratory:  CBC: WBC: 14.3 (H), HGB: 15.7, PLT: 326    BMP: Glucose 260 (H), GFR: 43 (L), o/w WNL (Creatinine: 1.74 (H))    0905 Glucose by meter: 218 (H)    Lipase: 45 (L)    0954 Troponin: <0.015    Hepatic Panel: All WNL    1033 Ketone beta-hydroxybutyrate: 1.3 (H)  1416 Ketone beta-hydroxybutyrate: 0.6    Blood gas venous & oxyhgb: All WNL    UA with Microscopic: Glucose: 150 (A), Ketone: 20 (A), Albumin: 100 (A), Mucous: Present (A), o/w WNL    Interventions:  1012 Zofran, 4 mg, IV injection  1020 NS 1L IV   Dilaudid, 0.5 mg, IV injection  1310 Zofran, 4 mg, IV injection   Dilaudid, 0.5 mg, IV injection   1311 NS 0.5L IV   Percocet, 1 tablet, PO    Emergency Department Course:  Nursing notes and vitals reviewed. (1006) I performed an exam of the patient as documented above.      IV inserted. Medicine administered as documented above. Blood drawn. This was sent to the lab for further testing, results above.     The patient was sent for an abdomen/pelvis CT while in the emergency department, findings above.      EKG obtained in the ED, see results above.     The patient provided a urine sample here in the emergency department. This was sent for laboratory testing, findings above.     (1310) I rechecked the patient and discussed the results of his workup thus far.      Findings and plan explained to the Patient. Patient discharged home with instructions regarding supportive care, medications, and reasons to return. The importance of close follow-up was reviewed. The patient was prescribed Zofran and Percocet.      I personally reviewed  the laboratory and imaging results with the Patient and answered all related questions prior to discharge.        Impression & Plan      Medical Decision Making:  Murtaza Fitzgerald is a pleasant 43 year old male who presents with worsening nausea, vomiting, and abdominal pain. Patient says at times when he starts to get dehydrated, he gets into this cycle where he cannot stop vomiting. He has not noted obvious fevers, bloody stools or diarrhea. He also described a diffuse abdominal pain. On initial examination, he was quite uncomfortable with dry heaving, and nausea. Lab tests were obtained which showed serum ketones at 1.3 without obvious acidosis and a leukocytosis of uncertain significance. CT imaging did not show any acute intraabdominal pathology. Troponin and EKG were obtained which were unremarkable as well. After IV hydration and fluids and he was medicated for nausea and pain control, patient showed marked improvement of his symptomatology. I rechecked his ketones and result was at 0.6 which raises suspicion for more of a mild starvation ketosis, as there is no indication of DKA. He is tolerating PO fluids in the emergency department. He feels better and wants to be discharged home. I did offer him observation admission to ensure continued improvement, but he declined and insisted that he will return with worsening symptoms. Patient was given prescriptions for Zofran, as well as some medication for his pain. He is aware however if he experiences fevers, intractable pain, or uncontrolled vomiting, he needs to return here immediately. Patient understands the plan and is discharged home in stable condition.     Diagnosis:    ICD-10-CM    1. Abdominal pain, unspecified abdominal location R10.9    2. Nausea and vomiting, intractability of vomiting not specified, unspecified vomiting type R11.2        Disposition:  discharged to home    Discharge Medications:  New Prescriptions    ONDANSETRON (ZOFRAN) 4  MG TABLET    Take 1 tablet (4 mg) by mouth every 6 hours    OXYCODONE-ACETAMINOPHEN (PERCOCET) 5-325 MG PER TABLET    Take 1 tablet by mouth every 6 hours as needed for moderate to severe pain       Scribe Disclosure:  I, Drea Paz, am serving as a scribe on 11/20/2018 at 10:06 AM to personally document services performed by Yonny Ureña MD based on my observations and the provider's statements to me.       Drea Paz  11/20/2018   [facility] EMERGENCY DEPARTMENT       Yonny Ureña MD  11/20/18 [id number]

## 2018-11-20 NOTE — ED AVS SNAPSHOT
[facility] Emergency Department    [address]    [facility] [address]    Phone:  [phone]    Fax:  [phone]                                       Murtaza Fitzgerald   MRN: [id number]    Department:  [facility] Emergency Department   Date of Visit:  11/20/2018           Patient Information     Date Of Birth          1975        Your diagnoses for this visit were:     Abdominal pain, unspecified abdominal location     Nausea and vomiting, intractability of vomiting not specified, unspecified vomiting type        You were seen by Yonny Ureña MD.      Follow-up Information     Schedule an appointment as soon as possible for a visit with Kt Ríos MD.    Specialties:  Internal Medicine, Pediatrics    Contact information:    [facility] [place] [address] [phone]          Follow up with [facility] Emergency Department.    Specialty:  EMERGENCY MEDICINE    Why:  If symptoms worsen    Contact information:    [address]  [facility] [address] [phone]        Discharge Instructions         Abdominal Pain    Abdominal pain is pain in the stomach or belly area. Everyone has this pain from time to time. In many cases it goes away on its own. But abdominal pain can sometimes be due to a serious problem, such as appendicitis. So it s important to know when to seek help.  Causes of abdominal pain  There are many possible causes of abdominal pain. Common causes in adults include:    Constipation, diarrhea, or gas    Stomach acid flowing back up into the esophagus (acid reflux or heartburn)    Severe acid reflux, called GERD (gastroesophageal reflux disease)    A sore in the lining of the stomach or small intestine (peptic ulcer)    Inflammation of the gallbladder, liver, or pancreas    Gallstones or kidney stones    Appendicitis     Intestinal blockage     An internal organ pushing through a muscle  or other tissue (hernia)    Urinary tract infections    In women, menstrual cramps, fibroids, or endometriosis    Inflammation or infection of the intestines  Diagnosing the cause of abdominal pain  Your healthcare provider will do a physical exam help find the cause of your pain. If needed, tests will be ordered. Belly pain has many possible causes. So it can be hard to find the reason for your pain. Giving details about your pain can help. Tell your provider where and when you feel the pain, and what makes it better or worse. Also let your provider know if you have other symptoms such as:    Fever    Tiredness    Upset stomach (nausea)    Vomiting    Changes in bathroom habits  Treating abdominal pain  Some causes of pain need emergency medical treatment right away. These include appendicitis or a bowel blockage. Other problems can be treated with rest, fluids, or medicines. Your healthcare provider can give you specific instructions for treatment or self-care based on what is causing your pain.  If you have vomiting or diarrhea, sip water or other clear fluids. When you are ready to eat solid foods again, start with small amounts of easy-to-digest, low-fat foods. These include apple sauce, toast, or crackers.   When to seek medical care  Call 911 or go to the hospital right away if you:    Can t pass stool and are vomiting    Are vomiting blood or have bloody diarrhea or black, tarry diarrhea    Have chest, neck, or shoulder pain    Feel like you might pass out    Have pain in your shoulder blades with nausea    Have sudden, severe belly pain    Have new, severe pain unlike any you have felt before    Have a belly that is rigid, hard, and tender to touch  Call your healthcare provider if you have:    Pain for more than 5 days    Bloating for more than 2 days    Diarrhea for more than 5 days    A fever of 100.4 F (38 C) or higher, or as directed by your healthcare provider    Pain that gets worse    Weight loss  "for no reason    Continued lack of appetite    Blood in your stool  How to prevent abdominal pain  Here are some tips to help prevent abdominal pain:    Eat smaller amounts of food at one time.    Avoid greasy, fried, or other high-fat foods.    Avoid foods that give you gas.    Exercise regularly.    Drink plenty of fluids.  To help prevent GERD symptoms:    Quit smoking.    Reduce alcohol and certain foods that increase stomach acid.    Avoid aspirin and over-the-counter pain and fever medicines (NSAIDS or nonsteroidal anti-inflammatory drugs), if possible    Lose extra weight.    Finish eating at least 2 hours before you go to bed or lie down.    Raise the head of your bed.  Date Last Reviewed: 7/1/2016 2000-2018 [employer]. [address] [place] [address], [place] [address]. All rights reserved. This information is not intended as a substitute for professional medical care. Always follow your healthcare professional's instructions.          Vomiting (Adult)  Vomiting is a common symptom that may be due to different causes. These include gastroenteritis (\"stomach flu\"), food poisoning and gastritis. There are other more serious causes of vomiting which may be hard to diagnose early in the illness. Therefore, it is important to watch for the warning signs listed below.  The main danger from repeated vomiting is dehydration. This is due to excess loss of water and minerals from the body. When this occurs, your body fluids must be replaced.  Home care    If symptoms are severe, rest at home for the next 24 hours.    Because your symptoms may be from an infection, wash your hands often and well. If soap and water are not available, use alcohol-based  to keep from spreading the infection to others.    Wash your hands for at least 20 seconds. Humming the happy birthday song twice while you wash is an easy way to make sure you've washed for 20 seconds.    Wash your hands after using the toilet, before " and after preparing food, before eating food, after changing a diaper, cleaning a wound, caring for a sick person, and blowing your nose, coughing, or sneezing. You should also wash your hands after caring for someone who is sick, touching pet food, or treats, and touching an animal, or animal waste.    You may use acetaminophen or NSAID medicines like ibuprofen or naproxen to control fever, unless another medicine was prescribed. If you have chronic liver or kidney disease or ever had a stomach ulcer or gastrointestinal bleeding, talk with your doctor before using these medicines. Aspirin should never be used in anyone under 18 years of age who is ill with a fever. It may cause severe liver damage. Don't use NSAID medicines if you are already taking one for another condition (like arthritis) or are on aspirin (such as for heart disease, or after a stroke)    Don't use tobacco and or drink alcohol, which may worsen your symptoms.    If medicines for vomiting were prescribed, take as directed.    Once vomiting stops, then follow these guidelines:  During the first 12 to 24 hours follow the diet below:    Fruit juices. Apple, grape juice, clear fruit drinks, and electrolyte replacement drinks.    Beverages. Soft drinks without caffeine; mineral water (plain or flavored), decaffeinated tea and coffee.    Soups. Clear broth and bouillon    Desserts. Plain gelatin, ice pops, and fruit juice bars. As you feel better, you may add 6 to 8 ounces of yogurt per day.  During the next 24 hours you may add the following to the above:    Hot cereal, plain toast, bread, rolls, crackers    Plain noodles, rice, mashed potatoes, chicken noodle or rice soup    Unsweetened canned fruit such as applesauce, bananas (avoid pineapple and citrus)    Limit caffeine and chocolate. No spices or seasonings except salt.  During the next 24 hours:  Gradually resume a normal diet, as you feel better and your symptoms lessen.  Follow-up care  Follow  up with your healthcare provider, or as advised.  When to seek medical advice  Call your healthcare provider right away if any of these occur:    Constant right-sided lower belly pain or increasing general belly pain    Continued vomiting (unable to keep liquids down) for 24 hours    Vomiting blood or coffee grounds    Swollen belly    Frequent diarrhea (more than 5 times a day); blood (red or black color) or mucus in diarrhea    Reduced urine output or extreme thirst    Weakness, dizziness or fainting    Unusually drowsy or confused    Fever of 100.4 F (38 C) oral or higher, or as directed    Yellow color of the eyes or skin  Date Last Reviewed: 3/1/2018    [id number] [employer]. [address] [place] [address], [place] [address]. All rights reserved. This information is not intended as a substitute for professional medical care. Always follow your healthcare professional's instructions.          24 Hour Appointment Hotline       To make an appointment at any [facility], call [phone] ([phone]). If you don't have a family doctor or clinic, we will help you find one. [facility] are conveniently located to serve the needs of you and your family.             Review of your medicines      START taking        Dose / Directions Last dose taken    ondansetron 4 MG tablet   Commonly known as:  ZOFRAN   Dose:  4 mg   Quantity:  8 tablet        Take 1 tablet (4 mg) by mouth every 6 hours   Refills:  0        oxyCODONE-acetaminophen 5-325 MG per tablet   Commonly known as:  PERCOCET   Dose:  1 tablet   Quantity:  12 tablet        Take 1 tablet by mouth every 6 hours as needed for moderate to severe pain   Refills:  0          Our records show that you are taking the medicines listed below. If these are incorrect, please call your family doctor or clinic.        Dose / Directions Last dose taken    acetone urine test strip   Commonly known as:  KETOSTIX   Dose:  1 strip   Quantity:  50 each         1 strip by In Vitro route daily   Refills:  3        aspirin 81 MG tablet   Dose:  81 mg   Quantity:  90 tablet        Take 1 tablet (81 mg) by mouth daily   Refills:  3        * YOHAN CONTOUR test strip   Quantity:  450 each   Generic drug:  blood glucose monitoring        USE TO TEST BLOOD SUGAR FIVE TIMES A DAY OR AS DIRECTED   Refills:  1        * blood glucose monitoring test strip   Commonly known as:  no brand specified   Quantity:  450 each        5 times daily ( contour NEXT)   Refills:  11        * blood glucose monitoring test strip   Commonly known as:  no brand specified   Quantity:  300 strip        Freestyle test strips 6 times daily (NOT lite and NOT insulinx)   Refills:  11        blood glucose monitoring meter device kit   Quantity:  1 kit        TO TEST BG 5 TIMIES DAILY   Refills:  0        glucagon 1 MG kit   Commonly known as:  GLUCAGON EMERGENCY   Dose:  1 mg   Quantity:  1 mg        Inject 1 mg into the muscle once for 1 dose   Refills:  0        * insulin aspart 100 UNIT/ML injection   Commonly known as:  NovoLOG PEN   Quantity:  8 mL        DOSE:  2 units per 14 grams of carbohydrate..   Refills:  11        * insulin aspart 100 UNITS/ML injection   Commonly known as:  NovoLOG VIAL   Quantity:  60 mL        To be used in insulin pump, est TDD 75 units/day   Refills:  3        insulin degludec 100 UNIT/ML pen   Commonly known as:  TRESIBA   Dose:  28 Units   Quantity:  15 mL        Inject 28 Units Subcutaneous daily   Refills:  3        insulin pen needle 32G X 4 MM miscellaneous   Commonly known as:  BD ARPITA U/F   Quantity:  120 each        Use 4 daily or as directed.   Refills:  11        insulin pump infusion        Date last updated:  10/9/18 Omnipod BASAL RATES and times: 12 AM (midnight): 0.95 units/hour 2 AM: 1.1 10 AM: 0.90 CARB RATIO and times: 12 AM (midnight): 9 12 PM:8 Corection Factor (Sensitivity) and times: 12   AM (midnight): 40 mg/dL 6 PM: 38 BLOOD GLUCOSE TARGET and times:  "12   AM (midnight): 100, correct above 120 with reverse correction on Active Insulin Time:  4 hours Dexcom sharing code: [email or web address]   Refills:  0        insulin syringe-needle U-100 31G X 5/16\" 1 ML miscellaneous   Commonly known as:  BD insulin syringe ULTRAFINE   Quantity:  100 each        Use one syringe 1 daily or as directed.   Refills:  11        lactulose 20 GM/30ML Soln   Dose:  20 g   Quantity:  473 mL        Take 30 mLs (20 g) by mouth 2 times daily as needed For constipation.   Refills:  1        LIPITOR 40 MG tablet   Dose:  20 mg   Quantity:  90 tablet   Generic drug:  atorvastatin        Take 0.5 tablets (20 mg) by mouth daily   Refills:  3        metoclopramide 5 MG tablet   Commonly known as:  REGLAN   Quantity:  120 tablet        TAKE ONE TABLET BY MOUTH FOUR TIMES A DAY (BEFORE MEALS AND NIGHTLY)   Refills:  2        metoprolol succinate 25 MG 24 hr tablet   Commonly known as:  TOPROL-XL   Dose:  25 mg   Quantity:  90 tablet        Take 1 tablet (25 mg) by mouth daily   Refills:  3        omeprazole 40 MG capsule   Commonly known as:  priLOSEC   Dose:  40 mg   Quantity:  180 capsule        Take 1 capsule (40 mg) by mouth 2 times daily Take 30-60 minutes before a meal.   Refills:  3        polyethylene glycol Packet   Commonly known as:  MIRALAX/GLYCOLAX   Dose:  17 g        Take 17 g by mouth daily as needed for constipation   Refills:  0        tacrolimus 1 MG capsule   Commonly known as:  GENERIC EQUIVALENT   Dose:  1 mg   Quantity:  60 capsule        Take 1 capsule (1 mg) by mouth 2 times daily   Refills:  11        * Notice:  This list has 5 medication(s) that are the same as other medications prescribed for you. Read the directions carefully, and ask your doctor or other care provider to review them with you.            Information about OPIOIDS     PRESCRIPTION OPIOIDS: WHAT YOU NEED TO KNOW   We gave you an opioid (narcotic) pain medicine. It is important to manage your pain, but opioids " are not always the best choice. You should first try all the other options your care team gave you. Take this medicine for as short a time (and as few doses) as possible.    Some activities can increase your pain, such as bandage changes or therapy sessions. It may help to take your pain medicine 30 to 60 minutes before these activities. Reduce your stress by getting enough sleep, working on hobbies you enjoy and practicing relaxation or meditation. Talk to your care team about ways to manage your pain beyond prescription opioids.    These medicines have risks:    DO NOT drive when on new or higher doses of pain medicine. These medicines can affect your alertness and reaction times, and you could be arrested for driving under the influence (DUI). If you need to use opioids long-term, talk to your care team about driving.    DO NOT operate heavy machinery    DO NOT do any other dangerous activities while taking these medicines.    DO NOT drink any alcohol while taking these medicines.     If the opioid prescribed includes acetaminophen, DO NOT take with any other medicines that contain acetaminophen. Read all labels carefully. Look for the word  acetaminophen  or  Tylenol.  Ask your pharmacist if you have questions or are unsure.    You can get addicted to pain medicines, especially if you have a history of addiction (chemical, alcohol or substance dependence). Talk to your care team about ways to reduce this risk.    All opioids tend to cause constipation. Drink plenty of water and eat foods that have a lot of fiber, such as fruits, vegetables, prune juice, apple juice and high-fiber cereal. Take a laxative (Miralax, milk of magnesia, Colace, Senna) if you don t move your bowels at least every other day. Other side effects include upset stomach, sleepiness, dizziness, throwing up, tolerance (needing more of the medicine to have the same effect), physical dependence and slowed breathing.    Store your pills in a  secure place, locked if possible. We will not replace any lost or stolen medicine. If you don t finish your medicine, please throw away (dispose) as directed by your pharmacist. The [place] [employer] has more information about safe disposal: https://www.pca.[email or web address].[email or web address]-medications        Prescriptions were sent or printed at these locations (2 Prescriptions)                   Other Prescriptions                Printed at Department/Unit printer (2 of 2)         ondansetron (ZOFRAN) 4 MG tablet               oxyCODONE-acetaminophen (PERCOCET) 5-325 MG per tablet                Procedures and tests performed during your visit     Procedure/Test Number of Times Performed    Basic metabolic panel (BMP) 1    Blood gas venous and oxyhgb 1    CBC + differential 1    CT Abdomen Pelvis w/o Contrast 1    EKG 12 lead 1    Glucose by meter 1    Hepatic panel 1    Ketone Beta-Hydroxybutyrate Quantitative 2    Lipase 1    Troponin I (now) 1    UA with Microscopic 1      Orders Needing Specimen Collection     None      Pending Results     No orders found from 11/18/2018 to 11/21/2018.            Pending Culture Results     No orders found from 11/18/2018 to 11/21/2018.            Pending Results Instructions     If you had any lab results that were not finalized at the time of your Discharge, you can call the ED Lab Result RN at [phone]. You will be contacted by this team for any positive Lab results or changes in treatment. The nurses are available 7 days a week from 10A to 6:30P.  You can leave a message 24 hours per day and they will return your call.        Test Results From Your Hospital Stay        11/20/2018  9:16 AM      Component Results     Component Value Ref Range & Units Status    Glucose 218 (H) 70 - 99 mg/dL Final    Dr/RN Notified         11/20/2018 10:38 AM      Component Results     Component Value Ref Range & Units Status    WBC 14.3 (H) 4.0 - 11.0 10e9/L  Final    RBC Count 5.43 4.4 - 5.9 10e12/L Final    Hemoglobin 15.7 13.3 - 17.7 g/dL Final    Hematocrit 48.6 40.0 - 53.0 % Final    MCV 90 78 - 100 fl Final    MCH 28.9 26.5 - 33.0 pg Final    MCHC 32.3 31.5 - 36.5 g/dL Final    RDW 13.2 10.0 - 15.0 % Final    Platelet Count 326 150 - 450 10e9/L Final    Diff Method Automated Method  Final    % Neutrophils 89.6 % Final    % Lymphocytes 7.6 % Final    % Monocytes 1.7 % Final    % Eosinophils 0.1 % Final    % Basophils 0.3 % Final    % Immature Granulocytes 0.7 % Final    Nucleated RBCs 0 0 /100 Final    Absolute Neutrophil 12.8 (H) 1.6 - 8.3 10e9/L Final    Absolute Lymphocytes 1.1 0.8 - 5.3 10e9/L Final    Absolute Monocytes 0.3 0.0 - 1.3 10e9/L Final    Absolute Eosinophils 0.0 0.0 - 0.7 10e9/L Final    Absolute Basophils 0.0 0.0 - 0.2 10e9/L Final    Abs Immature Granulocytes 0.1 0 - 0.4 10e9/L Final    Absolute Nucleated RBC 0.0  Final         11/20/2018 11:05 AM      Component Results     Component Value Ref Range & Units Status    Sodium 136 133 - 144 mmol/L Final    Potassium 4.5 3.4 - 5.3 mmol/L Final    Chloride 103 94 - 109 mmol/L Final    Carbon Dioxide 24 20 - 32 mmol/L Final    Anion Gap 9 3 - 14 mmol/L Final    Glucose 260 (H) 70 - 99 mg/dL Final    Urea Nitrogen 23 7 - 30 mg/dL Final    Creatinine 1.74 (H) 0.66 - 1.25 mg/dL Final    GFR Estimate 43 (L) >60 mL/min/1.7m2 Final    Non  GFR Calc    GFR Estimate If Black 53 (L) >60 mL/min/1.7m2 Final    African American GFR Calc    Calcium 9.2 8.5 - 10.1 mg/dL Final         11/20/2018 11:08 AM      Component Results     Component Value Ref Range & Units Status    Troponin I ES <0.015 0.000 - 0.045 ug/L Final    The 99th percentile for upper reference range is 0.045 ug/L.  Troponin values   in the range of 0.045 - 0.120 ug/L may be associated with risks of adverse   clinical events.           11/20/2018 12:26 PM      Component Results     Component Value Ref Range & Units Status    Color  Urine Yellow  Final    Appearance Urine Clear  Final    Glucose Urine 150 (A) NEG^Negative mg/dL Final    Bilirubin Urine Negative NEG^Negative Final    Ketones Urine 20 (A) NEG^Negative mg/dL Final    Specific Gravity Urine 1.012 1.003 - 1.035 Final    Blood Urine Negative NEG^Negative Final    pH Urine 6.0 5.0 - 7.0 pH Final    Protein Albumin Urine 100 (A) NEG^Negative mg/dL Final    Urobilinogen mg/dL 0.0 0.0 - 2.0 mg/dL Final    Nitrite Urine Negative NEG^Negative Final    Leukocyte Esterase Urine Negative NEG^Negative Final    Source Midstream Urine  Final    WBC Urine 1 0 - 5 /HPF Final    RBC Urine 1 0 - 2 /HPF Final    Mucous Urine Present (A) NEG^Negative /LPF Final         11/20/2018 10:39 AM      Component Results     Component Value Ref Range & Units Status    Ketone Quantitative 1.3 (H) 0.0 - 0.6 mmol/L Final               11/20/2018 11:05 AM      Component Results     Component Value Ref Range & Units Status    Bilirubin Direct 0.1 0.0 - 0.2 mg/dL Final    Bilirubin Total 0.4 0.2 - 1.3 mg/dL Final    Albumin 4.0 3.4 - 5.0 g/dL Final    Protein Total 8.6 6.8 - 8.8 g/dL Final    Alkaline Phosphatase 113 40 - 150 U/L Final    ALT 18 0 - 70 U/L Final    AST 11 0 - 45 U/L Final         11/20/2018 11:05 AM      Component Results     Component Value Ref Range & Units Status    Lipase 45 (L) 73 - 393 U/L Final         11/20/2018 12:46 PM      Narrative     CT ABDOMEN AND PELVIS WITHOUT CONTRAST   11/20/2018 11:20 AM     HISTORY: Abdominal pain and vomiting.    TECHNIQUE: Volumetric helical sections were acquired from the lung  bases through the ischial tuberosities without IV contrast. Coronal  images were also reconstructed. Radiation dose for this scan was  reduced using automated exposure control, adjustment of the mA and/or  kV according to patient size, or iterative reconstruction technique.    COMPARISON: CT of the chest, abdomen, and pelvis performed 8/27/2015.    FINDINGS: No bowel obstruction. No  convincing evidence for colitis or  diverticulitis. No free fluid in the pelvis. The appendix is not  visualized, however there are no significant inflammatory changes  identified in the right lower quadrant. A left lower quadrant renal  transplant is unremarkable. Atrophic native kidneys are again noted.  No hydronephrosis. Several ventral and paraumbilical hernias are new  since the previous exam, and contain a few knuckles of nondilated  small bowel.     Mild atherosclerotic aortoiliac calcification. Small hiatal hernia.  The liver, gallbladder, spleen, and adrenal glands have unremarkable  noncontrast appearances. The native pancreas again appears atrophic.  Transplanted pancreas is again noted in the right lower quadrant and  is unchanged in appearance. Sternotomy. Coronary artery calcification.  Mild scarring and/or atelectasis in the lingula. The visualized lung  bases are otherwise clear.        Impression     IMPRESSION:   1. No acute abnormality in the abdomen or pelvis. No cause for  abdominal pain and vomiting is identified.  2. Several ventral and paraumbilical hernias have increased in size  since the previous exam, and contain a few knuckles of nondilated  small bowel. No evidence for associated bowel obstruction.  3. Small hiatal hernia.  4. 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           SAGRARIO GIL MD         11/20/2018  2:25 PM      Component Results     Component Value Ref Range & Units Status    Ketone Quantitative 0.6 0.0 - 0.6 mmol/L Final         11/20/2018  2:53 PM      Component Results     Component Value Ref Range & Units Status    Ph Venous 7.32 7.32 - 7.43 pH Final    PCO2 Venous 47 40 - 50 mm Hg Final    PO2 Venous 30 25 - 47 mm Hg Final    Bicarbonate Venous 25 21 - 28 mmol/L Final    FIO2 0  Final    Oxyhemoglobin Venous 54 % Final    Base Deficit Venous 2.0 mmol/L Final    Reference range:  -7.7 to 1.9                Clinical Quality Measure:  Blood Pressure Screening     Your blood pressure was checked while you were in the emergency department today. The last reading we obtained was  BP: (!) 144/97 . Please read the guidelines below about what these numbers mean and what you should do about them.  If your systolic blood pressure (the top number) is less than 120 and your diastolic blood pressure (the bottom number) is less than 80, then your blood pressure is normal. There is nothing more that you need to do about it.  If your systolic blood pressure (the top number) is 120-139 or your diastolic blood pressure (the bottom number) is 80-89, your blood pressure may be higher than it should be. You should have your blood pressure rechecked within a year by a primary care provider.  If your systolic blood pressure (the top number) is 140 or greater or your diastolic blood pressure (the bottom number) is 90 or greater, you may have high blood pressure. High blood pressure is treatable, but if left untreated over time it can put you at risk for heart attack, stroke, or kidney failure. You should have your blood pressure rechecked by a primary care provider within the next 4 weeks.  If your provider in the emergency department today gave you specific instructions to follow-up with your doctor or provider even sooner than that, you should follow that instruction and not wait for up to 4 weeks for your follow-up visit.        Thank you for choosing [facility]       Thank you for choosing [facility] for your care. Our goal is always to provide you with excellent care. Hearing back from our patients is one way we can continue to improve our services. Please take a few minutes to complete the written survey that you may receive in the mail after you visit with us. Thank you!        [employer] Information     [employer] gives you secure access to your electronic health record. If you see a primary care provider, you can also send messages to your care team and make  appointments. If you have questions, please call your primary care clinic.  If you do not have a primary care provider, please call [phone] and they will assist you.        Care EveryWhere ID     This is your Care EveryWhere ID. This could be used by other organizations to access your [facility] medical records  [id number]        Equal Access to Services     JEANIE LINTON : Solitario Kumar, cyndy zamora, gilberto elder. So [facility] [phone].    ATENCIÓN: Si habla español, tiene a chatterjee disposición servicios gratuitos de asistencia lingüística. Llame al [phone].    We comply with applicable federal civil rights laws and [place] laws. We do not discriminate on the basis of race, color, national origin, age, disability, sex, sexual orientation, or gender identity.            After Visit Summary       This is your record. Keep this with you and show to your community pharmacist(s) and doctor(s) at your next visit.

## 2018-11-20 NOTE — DISCHARGE INSTRUCTIONS
Abdominal Pain    Abdominal pain is pain in the stomach or belly area. Everyone has this pain from time to time. In many cases it goes away on its own. But abdominal pain can sometimes be due to a serious problem, such as appendicitis. So it s important to know when to seek help.  Causes of abdominal pain  There are many possible causes of abdominal pain. Common causes in adults include:    Constipation, diarrhea, or gas    Stomach acid flowing back up into the esophagus (acid reflux or heartburn)    Severe acid reflux, called GERD (gastroesophageal reflux disease)    A sore in the lining of the stomach or small intestine (peptic ulcer)    Inflammation of the gallbladder, liver, or pancreas    Gallstones or kidney stones    Appendicitis     Intestinal blockage     An internal organ pushing through a muscle or other tissue (hernia)    Urinary tract infections    In women, menstrual cramps, fibroids, or endometriosis    Inflammation or infection of the intestines  Diagnosing the cause of abdominal pain  Your healthcare provider will do a physical exam help find the cause of your pain. If needed, tests will be ordered. Belly pain has many possible causes. So it can be hard to find the reason for your pain. Giving details about your pain can help. Tell your provider where and when you feel the pain, and what makes it better or worse. Also let your provider know if you have other symptoms such as:    Fever    Tiredness    Upset stomach (nausea)    Vomiting    Changes in bathroom habits  Treating abdominal pain  Some causes of pain need emergency medical treatment right away. These include appendicitis or a bowel blockage. Other problems can be treated with rest, fluids, or medicines. Your healthcare provider can give you specific instructions for treatment or self-care based on what is causing your pain.  If you have vomiting or diarrhea, sip water or other clear fluids. When you are ready to eat solid foods again,  "start with small amounts of easy-to-digest, low-fat foods. These include apple sauce, toast, or crackers.   When to seek medical care  Call 911 or go to the hospital right away if you:    Can t pass stool and are vomiting    Are vomiting blood or have bloody diarrhea or black, tarry diarrhea    Have chest, neck, or shoulder pain    Feel like you might pass out    Have pain in your shoulder blades with nausea    Have sudden, severe belly pain    Have new, severe pain unlike any you have felt before    Have a belly that is rigid, hard, and tender to touch  Call your healthcare provider if you have:    Pain for more than 5 days    Bloating for more than 2 days    Diarrhea for more than 5 days    A fever of 100.4 F (38 C) or higher, or as directed by your healthcare provider    Pain that gets worse    Weight loss for no reason    Continued lack of appetite    Blood in your stool  How to prevent abdominal pain  Here are some tips to help prevent abdominal pain:    Eat smaller amounts of food at one time.    Avoid greasy, fried, or other high-fat foods.    Avoid foods that give you gas.    Exercise regularly.    Drink plenty of fluids.  To help prevent GERD symptoms:    Quit smoking.    Reduce alcohol and certain foods that increase stomach acid.    Avoid aspirin and over-the-counter pain and fever medicines (NSAIDS or nonsteroidal anti-inflammatory drugs), if possible    Lose extra weight.    Finish eating at least 2 hours before you go to bed or lie down.    Raise the head of your bed.  Date Last Reviewed: 7/1/2016 2000-2018 [employer] [address] [place] [address], [place] [address]. All rights reserved. This information is not intended as a substitute for professional medical care. Always follow your healthcare professional's instructions.          Vomiting (Adult)  Vomiting is a common symptom that may be due to different causes. These include gastroenteritis (\"stomach flu\"), food poisoning and gastritis. " There are other more serious causes of vomiting which may be hard to diagnose early in the illness. Therefore, it is important to watch for the warning signs listed below.  The main danger from repeated vomiting is dehydration. This is due to excess loss of water and minerals from the body. When this occurs, your body fluids must be replaced.  Home care    If symptoms are severe, rest at home for the next 24 hours.    Because your symptoms may be from an infection, wash your hands often and well. If soap and water are not available, use alcohol-based  to keep from spreading the infection to others.    Wash your hands for at least 20 seconds. Humming the happy birthday song twice while you wash is an easy way to make sure you've washed for 20 seconds.    Wash your hands after using the toilet, before and after preparing food, before eating food, after changing a diaper, cleaning a wound, caring for a sick person, and blowing your nose, coughing, or sneezing. You should also wash your hands after caring for someone who is sick, touching pet food, or treats, and touching an animal, or animal waste.    You may use acetaminophen or NSAID medicines like ibuprofen or naproxen to control fever, unless another medicine was prescribed. If you have chronic liver or kidney disease or ever had a stomach ulcer or gastrointestinal bleeding, talk with your doctor before using these medicines. Aspirin should never be used in anyone under 18 years of age who is ill with a fever. It may cause severe liver damage. Don't use NSAID medicines if you are already taking one for another condition (like arthritis) or are on aspirin (such as for heart disease, or after a stroke)    Don't use tobacco and or drink alcohol, which may worsen your symptoms.    If medicines for vomiting were prescribed, take as directed.    Once vomiting stops, then follow these guidelines:  During the first 12 to 24 hours follow the diet below:    Fruit  juices. Apple, grape juice, clear fruit drinks, and electrolyte replacement drinks.    Beverages. Soft drinks without caffeine; mineral water (plain or flavored), decaffeinated tea and coffee.    Soups. Clear broth and bouillon    Desserts. Plain gelatin, ice pops, and fruit juice bars. As you feel better, you may add 6 to 8 ounces of yogurt per day.  During the next 24 hours you may add the following to the above:    Hot cereal, plain toast, bread, rolls, crackers    Plain noodles, rice, mashed potatoes, chicken noodle or rice soup    Unsweetened canned fruit such as applesauce, bananas (avoid pineapple and citrus)    Limit caffeine and chocolate. No spices or seasonings except salt.  During the next 24 hours:  Gradually resume a normal diet, as you feel better and your symptoms lessen.  Follow-up care  Follow up with your healthcare provider, or as advised.  When to seek medical advice  Call your healthcare provider right away if any of these occur:    Constant right-sided lower belly pain or increasing general belly pain    Continued vomiting (unable to keep liquids down) for 24 hours    Vomiting blood or coffee grounds    Swollen belly    Frequent diarrhea (more than 5 times a day); blood (red or black color) or mucus in diarrhea    Reduced urine output or extreme thirst    Weakness, dizziness or fainting    Unusually drowsy or confused    Fever of 100.4 F (38 C) oral or higher, or as directed    Yellow color of the eyes or skin  Date Last Reviewed: 3/1/2018    6328-4783 [employer]. [address] [place] [address], [place] [address]. All rights reserved. This information is not intended as a substitute for professional medical care. Always follow your healthcare professional's instructions.

## 2018-11-20 NOTE — ED AVS SNAPSHOT
[facility] Emergency Department    [address]    [facility] [address]    Phone:  [phone]    Fax:  [phone]                                       Murtaza Fitzgerald   MRN: [id number]    Department:  [facility] Emergency Department   Date of Visit:  11/20/2018           After Visit Summary Signature Page     I have received my discharge instructions, and my questions have been answered. I have discussed any challenges I see with this plan with the nurse or doctor.    ..........................................................................................................................................  Patient/Patient Representative Signature      ..........................................................................................................................................  Patient Representative Print Name and Relationship to Patient    ..................................................               ................................................  Date                                   Time    ..........................................................................................................................................  Reviewed by Signature/Title    ...................................................              ..............................................  Date                                               Time          22EPIC Rev 08/18

## 2018-11-26 ENCOUNTER — TELEPHONE (OUTPATIENT)
Dept: NEPHROLOGY | Facility: CLINIC | Age: 43
End: 2018-11-26

## 2018-11-26 DIAGNOSIS — Z94.0 KIDNEY REPLACED BY TRANSPLANT: Primary | ICD-10-CM

## 2018-11-26 NOTE — TELEPHONE ENCOUNTER
"ISSUE:  Pt with ED visit last week with N/V  In need of drug level.     PLAN:  Call placed to pt. He agrees to be seen by nephology - message sent to SOT . Orders updated through epic.   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 I asked that he hydrate orally as well as possible - can discuss IVF at clinic visit.   "

## 2018-12-01 ENCOUNTER — MYC MEDICAL ADVICE (OUTPATIENT)
Dept: EDUCATION SERVICES | Facility: CLINIC | Age: 43
End: 2018-12-01

## 2018-12-03 ENCOUNTER — PATIENT OUTREACH (OUTPATIENT)
Dept: EDUCATION SERVICES | Facility: CLINIC | Age: 43
End: 2018-12-03
Payer: COMMERCIAL

## 2018-12-04 ENCOUNTER — TELEPHONE (OUTPATIENT)
Dept: ENDOCRINOLOGY | Facility: CLINIC | Age: 43
End: 2018-12-04

## 2018-12-04 NOTE — TELEPHONE ENCOUNTER
Panel Management Review      Patient has the following on his problem list:     Diabetes    ASA: Passed    Last A1C  Lab Results   Component Value Date    A1C 6.4 11/21/2017    A1C 7.1 12/22/2016    A1C 7.2 07/26/2016    A1C 7.5 08/10/2015    A1C 8.5 08/06/2015     A1C tested: FAILED    Last LDL:    Lab Results   Component Value Date    CHOL 171 11/21/2017     Lab Results   Component Value Date    HDL 33 11/21/2017     Lab Results   Component Value Date    LDL 99 11/21/2017     Lab Results   Component Value Date    TRIG 193 11/21/2017     Lab Results   Component Value Date    CHOLHDLRATIO 4.1 07/08/2015     Lab Results   Component Value Date    NHDL 138 11/21/2017       Is the patient on a Statin? YES             Is the patient on Aspirin? YES    Medications     HMG CoA Reductase Inhibitors    atorvastatin (LIPITOR) 40 MG tablet    Salicylates    aspirin 81 MG tablet          Last three blood pressure readings:  BP Readings from Last 3 Encounters:   11/20/18 (!) 144/97   06/12/18 108/70   11/20/17 (!) 82/68       Date of last diabetes office visit: 06/12/18     Tobacco History:     History   Smoking Status     Former Smoker     Packs/day: 0.30     Types: Cigarettes   Smokeless Tobacco     Never Used     Comment: E- Cig use still           Composite cancer screening  Chart review shows that this patient is due/due soon for the following None  Summary:    Patient is due/failing the following:   FOLLOW UP    Action needed:   Patient needs office visit for dm with endo.    Type of outreach:    Sent [employer] message.    Questions for provider review:    None                                                                                                                                    Rebekah Santoyo CMA       Chart routed to no one .

## 2018-12-16 ENCOUNTER — MYC MEDICAL ADVICE (OUTPATIENT)
Dept: EDUCATION SERVICES | Facility: CLINIC | Age: 43
End: 2018-12-16

## 2018-12-16 DIAGNOSIS — I43 TYPE 1 DIABETES MELLITUS WITH DIABETIC CARDIOMYOPATHY (H): Primary | ICD-10-CM

## 2018-12-16 DIAGNOSIS — E10.59 TYPE 1 DIABETES MELLITUS WITH DIABETIC CARDIOMYOPATHY (H): Primary | ICD-10-CM

## 2018-12-18 ENCOUNTER — PATIENT OUTREACH (OUTPATIENT)
Dept: EDUCATION SERVICES | Facility: CLINIC | Age: 43
End: 2018-12-18
Payer: COMMERCIAL

## 2018-12-18 NOTE — TELEPHONE ENCOUNTER
Diabetes Self-Management Training: Insulin Pump Telephone Visit    Current Diabetes Management per Patient:  Comments/concerns: see 117go message:Hello,    G[employer] is sync'ed.    I've had a few lows this last week. Also one really high following a low, I was half asleep and over ate to resolve the low.    Tyrell Clarke    Insulin Pump Type: OmniPod    Taking other diabetes medications? no    Current Pump report:                  Assessment/Plan:  Issac Santillan,    I see the low on 12/15 then the rebound high.  There were carbohydrates entered prior to that low episode.  Do you know why the lows occurred? Such as activity, incorrect carbohydrates entered into the pump or not eating the full meal?      The one constant I see is your overnight highs.  You tend to rise from midnight to morning.   You do have some post meal highs but it isn't consistent. And there are some frequent lower readings premeal.      I would like to increase your overnight basal insulin to see if this will help the overnight highs, help you start your day better and not have to take a correction dose during the night.  I would also like to do a small decrease to the daytime basal to prevent the between meal lows.      Here are the changes I am recommending:    Basal:   12am-2am: change from 1.00 to 1.10  2am- 10am: change from 1.15 to 1.25  10am- 6pm: change from 0.90 to 0.80  6pm - 12am: no change, continue at 0.95    I would like to see you in January to review your report together in clinic.  You are also due to see Dr. Costa.  I believe her schedule is out a few months so you should schedule something right away.      Please let me know if you have any questions or concerns.         Vania Gutierrez RN,CDE     Any diabetes medication dose changes were made via the CDE Protocol and Collaborative Practice Agreement with the patient's referring provider. A copy of this encounter was shared with the provider.

## 2018-12-19 ENCOUNTER — MYC MEDICAL ADVICE (OUTPATIENT)
Dept: PEDIATRICS | Facility: CLINIC | Age: 43
End: 2018-12-19

## 2018-12-19 NOTE — TELEPHONE ENCOUNTER
Routing to station C nurse pool-regarding a form-please review and watch for the form-please let patient know if he needs appointment to fill this out.  Sharon Zepeda RN  Message handled by Nurse Triage.

## 2018-12-20 NOTE — TELEPHONE ENCOUNTER
Received forms. Dr Ríos filled out and forms were faxed to [facility]. Called and spoke with patient and got him scheduled for a follow up per PCP request.     Tootie Bar MA 5:03 PM 12/20/2018

## 2018-12-21 ENCOUNTER — DOCUMENTATION ONLY (OUTPATIENT)
Dept: TRANSPLANT | Facility: CLINIC | Age: 43
End: 2018-12-21

## 2018-12-21 NOTE — LETTER
PHYSICIAN ORDERS    DATE & TIME ISSUED: 2018 3:34 PM  PATIENT NAME: Murtaza Fitzgerald   : 1975     [facility] MR# [if applicable]: [id number]     DIAGNOSIS / ICD - 10 CODES    Kidney Transplanted (Z94.0)    After Care Following Organ Transplant (Z48.298)    Long Term Use of Medication (Z79.899)      Every 3 months    Hemogram and Platelet    Basic Metabolic Panel (Sodium,potassium,chloride,CO2,creatinine,urea,nitrogen,glucose,calcium)    Cyclosporine drug level    Every 6 months    Urine for protein creatinine ratio      Patient should release information to the [facility].   Please fax results to the Transplant Center at [phone].  Any questions please call [phone] or [phone] .      .

## 2018-12-21 NOTE — PROGRESS NOTES
Chart Prep    Clinic Visit on: 1/17/19    Last lab completed:  11/20/18    Lab letter updated: 12/21/18    Lab:  [facility]    Lab orders up to date in [facility].

## 2019-01-10 ENCOUNTER — ALLIED HEALTH/NURSE VISIT (OUTPATIENT)
Dept: EDUCATION SERVICES | Facility: CLINIC | Age: 44
End: 2019-01-10
Payer: COMMERCIAL

## 2019-01-10 DIAGNOSIS — E10.59 TYPE 1 DIABETES MELLITUS WITH DIABETIC CARDIOMYOPATHY (H): Primary | ICD-10-CM

## 2019-01-10 DIAGNOSIS — Z94.0 KIDNEY TRANSPLANTED: Primary | ICD-10-CM

## 2019-01-10 DIAGNOSIS — I43 TYPE 1 DIABETES MELLITUS WITH DIABETIC CARDIOMYOPATHY (H): Primary | ICD-10-CM

## 2019-01-10 PROCEDURE — G0108 DIAB MANAGE TRN  PER INDIV: HCPCS

## 2019-01-10 RX ORDER — TACROLIMUS 1 MG/1
1 CAPSULE ORAL 2 TIMES DAILY
Qty: 60 CAPSULE | Refills: 1 | Status: SHIPPED | OUTPATIENT
Start: 2019-01-10 | End: 2019-02-15

## 2019-01-10 NOTE — LETTER
1/10/2019         RE: Murtaza Fitzgerald  [address] [facility] [address]        Dear Colleague,    Thank you for referring your patient, Murtaza Fitzgerald, to the [facility]. Please see a copy of my visit note below.    Diabetes Self-Management Education & Support    Diabetes Self-Management Education & Support - Insulin Pump/CGM Review    SUBJECTIVE/OBJECTIVE  Presents for: Individual review  Accompanied by: Self  Diabetes education in the past 24mo: Yes  Focus of Visit: Insulin Pump  Diabetes type: Type 1  Disease course: Stable  How confident are you filling out medical forms by yourself:: Extremely  Diabetes management related comments/concerns: always seem to go high afte a pod change, not sure why numbers are high sometimes but has not been feeling well, doesn't always want to take a correction if during the night or concerned will go low  Transportation concerns: No  Other concerns:: None  Cultural Influences/Ethnic Background:  American      Patient seen today for Insulin Pump CGM  Review:    Reports:                Insulin Pump Information  Insulin Pump Type: OmniPod    Insulin Pump Review  Insulin Pump Type: OmniPod  Taking other diabetes medications?: No  Problems taking diabetes medications regularly?: No  Diabetes medication side effects?: No  Patient would benefit from: Change in carbohydrate ratio(s), Use of a temporary basal rate, Counting carbohydrates accurately  Changes made to pump settings: Carb ratio  Changes made to sensor settings: Glucose Alerts  Education specific to insulin pump provided today: How to use a temporary basal rate, Importance of counting carbohydrates accurately    Statistics/Data Evaluation:  Consistent day-to-day patterns: Pattern of post meal hyperglycemia(lunch and dinner)      Healthy Eating  Cultural/[place] diet restrictions?: No  Patient on a regular basis: Eats 3 meals a day, Counts carbohydrates  Meal  planning: Carbohydrate counting  Meals include: Breakfast, Lunch, Dinner(patient states diet hasn't changed)  Beverages: Sports drinks(powerade 0)  Has patient met with a dietitian in the past?: No    Being Active  Being Active Assessed Today: Yes  Exercise:: Currently not exercising  Barrier to exercise: None    Monitoring  Monitoring Assessed Today: Yes  Did patient bring glucose meter to appointment? : Yes  Blood Glucose Meter: CGM  Home Glucose (Sugar) Monitoring: Other  Blood glucose trend: No change  Low Glucose Range (mg/dL): <70  High Glucose Range (mg/dL): >200  Overall Range (mg/dL): 140-180    Taking Medications  Diabetes Medication(s)     Diabetic Other Sig    glucagon (GLUCAGON EMERGENCY) 1 MG kit Inject 1 mg into the muscle once for 1 dose    Insulin Sig    insulin aspart (NOVOLOG PEN) 100 UNIT/ML injection DOSE:  2 units per 14 grams of carbohydrate..    insulin aspart (NOVOLOG VIAL) 100 UNITS/ML injection To be used in insulin pump, est TDD 75 units/day    insulin degludec (TRESIBA) 100 UNIT/ML pen Inject 28 Units Subcutaneous daily    INSULIN PUMP - OUTPATIENT Date last updated:  12/18/18  Omnipod  BASAL RATES and times:  12 AM (midnight): 1.15 units/hour  2 AM: 1.25  10 AM: 0.80  6 PM: 0.95  CARB RATIO and times:  12 AM (midnight): 9  12 PM:6  6 PM: 7  Corection Factor (Sensitivity) and times:  12   AM (midnight): 40 mg/dL  6 PM: 38  BLOOD GLUCOSE TARGET and times:  12   AM (midnight): 100, correct above 120 with reverse correction on  Active Insulin Time:  4 hours  Dexcom sharing code: [id number]          Current Treatments: Insulin Pump  Dose schedule: pre-breakfast, pre-lunch, pre-dinner  Given by: Patient  Injection/Infusion sites: Arms  Problems taking diabetes medications regularly?: No  Diabetes medication side effects?: No  Treatment Compliance: All of the time    Problem Solving  Problem Solving Assessed Today: Yes  Hypoglycemia Frequency: Weekly  Hypoglycemia Treatment: Candy, Other  food  Patient carries a carbohydrate source: No  Medical alert: No  Severe weather/disaster plan for diabetes management?: No  DKA prevention plan?: No  Sick day plan for diabetes management?: (P) No       Reducing Risks  Reducing Risks Assessed Today: No  CAD Risks: Diabetes Mellitus  Has dilated eye exam at least once a year?: Yes  Sees dentist every 6 months?: No  Sees podiatrist (foot doctor)?: No    Healthy Coping  Healthy Coping Assessed Today: Yes  Emotional response to diabetes: Ready to learn  Informal Support system:: Children, Friends, Parent  Stage of change: ACTION (Actively working towards change)  Difficulty affording diabetes management supplies?: No  Support resources: In-person Offerings  Patient Activation Measure Survey Score:  No flowsheet data found.      ASSESSMENT  Glucose high post lunch when carbohydrates entered before noon, also evening carbohydrates,  recommend change lunch carbohydrate ratio to start at 11 and run until 8pm to allow more insulin at these times.     Hyperglycemia occuring after pod change. Recommend he set temp basal at + 10% for 30 minutes after he changes a pod.      Patient is noticing hyperglycemia on dexcom but does not address the high due to concerns the correction will cause a low glucose. Occasional he did have a hypoglycemia event after correction but no consistent pattern.  Recommend not to ignore the high readings, he can try a temp basal at times when he is high but does not trust using the correction. In most cases the temp will offer less insulin than a correction, it will allow a little more insulin and provide a safety when concerned for lows.  He can also use a temp when he is low or concerned about his glucose running low.    Goal of using the temp is to give him some control when glucose is above or below goal but not to replace the correction dose for high glucose or the rule of 15 for low glucose but to be used as a tool as needed.   Set temp basal  at +/-10-20% as needed for 30 minutes - 2 hours.     Changes made to pump settings:  Carbohydrate ratio:  12am-12pm: 9--> change to 12am-11am: 9  12pm-6pm 6  --> change to 11am-8pm: 6  6pm- 12am: 7 --> change to 8pm-12am: 7    Patient turned his High alert off, he did not like when it woke him at night -  Recommend he turn on high alert to prevent hyperglycemia and DKA, patient agreed but would only set it at 350mg/dl.       INTERVENTION:   Diabetes knowledge and skills assessment:     Patient is knowledgeable in diabetes management concepts related to: Healthy Eating, Monitoring and Taking Medication    Patient needs further education on the following diabetes management concepts: Healthy Eating-review of carbohydrate counting, Taking Medication, Problem Solving     Based on learning assessment above, most appropriate setting for further diabetes education would be: Individual setting.    Education provided today on:  AADE Self-Care Behaviors:  Healthy Eating: review of carbohydrate counting- use of food models  Monitoring: individual blood glucose targets and frequency of monitoring  Taking Medication: action of prescribed medication, when to take medications and dosing  Problem Solving: high blood glucose - causes, signs/symptoms, treatment and prevention, low blood glucose - causes, signs/symptoms, treatment and prevention, carrying a carbohydrate source at all times and when to call health care provider    Opportunities for ongoing education and support in diabetes-self management were discussed.    Pt verbalized understanding of concepts discussed and recommendations provided today.       Education Materials Provided:  No new materials provided today      PLAN  See Patient Instructions for co-developed, patient-stated behavior change goals.  Patient declined AVS, but states understanding and is able to repeat plan back to educator.See Follow-Up section for recommended follow-up.    Vania Gutierrez RN,CDE   Time  Spent: 60 minutes  Encounter Type: Individual    Any diabetes medication dose changes were made via the CDE Protocol and Collaborative Practice Agreement with the patient's referring provider. A copy of this encounter was shared with the provider.

## 2019-01-10 NOTE — PROGRESS NOTES
Diabetes Self-Management Education & Support    Diabetes Self-Management Education & Support - Insulin Pump/CGM Review    SUBJECTIVE/OBJECTIVE  Presents for: Individual review  Accompanied by: Self  Diabetes education in the past 24mo: Yes  Focus of Visit: Insulin Pump  Diabetes type: Type 1  Disease course: Stable  How confident are you filling out medical forms by yourself:: Extremely  Diabetes management related comments/concerns: always seem to go high afte a pod change, not sure why numbers are high sometimes but has not been feeling well, doesn't always want to take a correction if during the night or concerned will go low  Transportation concerns: No  Other concerns:: None  Cultural Influences/Ethnic Background:  American      Patient seen today for Insulin Pump CGM  Review:    Reports:                Insulin Pump Information  Insulin Pump Type: OmniPod    Insulin Pump Review  Insulin Pump Type: OmniPod  Taking other diabetes medications?: No  Problems taking diabetes medications regularly?: No  Diabetes medication side effects?: No  Patient would benefit from: Change in carbohydrate ratio(s), Use of a temporary basal rate, Counting carbohydrates accurately  Changes made to pump settings: Carb ratio  Changes made to sensor settings: Glucose Alerts  Education specific to insulin pump provided today: How to use a temporary basal rate, Importance of counting carbohydrates accurately    Statistics/Data Evaluation:  Consistent day-to-day patterns: Pattern of post meal hyperglycemia(lunch and dinner)      Healthy Eating  Cultural/[place] diet restrictions?: No  Patient on a regular basis: Eats 3 meals a day, Counts carbohydrates  Meal planning: Carbohydrate counting  Meals include: Breakfast, Lunch, Dinner(patient states diet hasn't changed)  Beverages: Sports drinks(powerade 0)  Has patient met with a dietitian in the past?: No    Being Active  Being Active Assessed Today: Yes  Exercise:: Currently not  exercising  Barrier to exercise: None    Monitoring  Monitoring Assessed Today: Yes  Did patient bring glucose meter to appointment? : Yes  Blood Glucose Meter: CGM  Home Glucose (Sugar) Monitoring: Other  Blood glucose trend: No change  Low Glucose Range (mg/dL): <70  High Glucose Range (mg/dL): >200  Overall Range (mg/dL): 140-180    Taking Medications  Diabetes Medication(s)     Diabetic Other Sig    glucagon (GLUCAGON EMERGENCY) 1 MG kit Inject 1 mg into the muscle once for 1 dose    Insulin Sig    insulin aspart (NOVOLOG PEN) 100 UNIT/ML injection DOSE:  2 units per 14 grams of carbohydrate..    insulin aspart (NOVOLOG VIAL) 100 UNITS/ML injection To be used in insulin pump, est TDD 75 units/day    insulin degludec (TRESIBA) 100 UNIT/ML pen Inject 28 Units Subcutaneous daily    INSULIN PUMP - OUTPATIENT Date last updated:  12/18/18  Omnipod  BASAL RATES and times:  12 AM (midnight): 1.15 units/hour  2 AM: 1.25  10 AM: 0.80  6 PM: 0.95  CARB RATIO and times:  12 AM (midnight): 9  12 PM:6  6 PM: 7  Corection Factor (Sensitivity) and times:  12   AM (midnight): 40 mg/dL  6 PM: 38  BLOOD GLUCOSE TARGET and times:  12   AM (midnight): 100, correct above 120 with reverse correction on  Active Insulin Time:  4 hours  Dexcom sharing code: [id number]          Current Treatments: Insulin Pump  Dose schedule: pre-breakfast, pre-lunch, pre-dinner  Given by: Patient  Injection/Infusion sites: Arms  Problems taking diabetes medications regularly?: No  Diabetes medication side effects?: No  Treatment Compliance: All of the time    Problem Solving  Problem Solving Assessed Today: Yes  Hypoglycemia Frequency: Weekly  Hypoglycemia Treatment: Candy, Other food  Patient carries a carbohydrate source: No  Medical alert: No  Severe weather/disaster plan for diabetes management?: No  DKA prevention plan?: No  Sick day plan for diabetes management?: (P) No       Reducing Risks  Reducing Risks Assessed Today: No  CAD Risks:  Diabetes Mellitus  Has dilated eye exam at least once a year?: Yes  Sees dentist every 6 months?: No  Sees podiatrist (foot doctor)?: No    Healthy Coping  Healthy Coping Assessed Today: Yes  Emotional response to diabetes: Ready to learn  Informal Support system:: Children, Friends, Parent  Stage of change: ACTION (Actively working towards change)  Difficulty affording diabetes management supplies?: No  Support resources: In-person Offerings  Patient Activation Measure Survey Score:  No flowsheet data found.      ASSESSMENT  Glucose high post lunch when carbohydrates entered before noon, also evening carbohydrates,  recommend change lunch carbohydrate ratio to start at 11 and run until 8pm to allow more insulin at these times.     Hyperglycemia occuring after pod change. Recommend he set temp basal at + 10% for 30 minutes after he changes a pod.      Patient is noticing hyperglycemia on dexcom but does not address the high due to concerns the correction will cause a low glucose. Occasional he did have a hypoglycemia event after correction but no consistent pattern.  Recommend not to ignore the high readings, he can try a temp basal at times when he is high but does not trust using the correction. In most cases the temp will offer less insulin than a correction, it will allow a little more insulin and provide a safety when concerned for lows.  He can also use a temp when he is low or concerned about his glucose running low.    Goal of using the temp is to give him some control when glucose is above or below goal but not to replace the correction dose for high glucose or the rule of 15 for low glucose but to be used as a tool as needed.   Set temp basal at +/-10-20% as needed for 30 minutes - 2 hours.     Changes made to pump settings:  Carbohydrate ratio:  12am-12pm: 9--> change to 12am-11am: 9  12pm-6pm 6  --> change to 11am-8pm: 6  6pm- 12am: 7 --> change to 8pm-12am: 7    Patient turned his High alert off, he did  not like when it woke him at night -  Recommend he turn on high alert to prevent hyperglycemia and DKA, patient agreed but would only set it at 350mg/dl.       INTERVENTION:   Diabetes knowledge and skills assessment:     Patient is knowledgeable in diabetes management concepts related to: Healthy Eating, Monitoring and Taking Medication    Patient needs further education on the following diabetes management concepts: Healthy Eating-review of carbohydrate counting, Taking Medication, Problem Solving     Based on learning assessment above, most appropriate setting for further diabetes education would be: Individual setting.    Education provided today on:  AADE Self-Care Behaviors:  Healthy Eating: review of carbohydrate counting- use of food models  Monitoring: individual blood glucose targets and frequency of monitoring  Taking Medication: action of prescribed medication, when to take medications and dosing  Problem Solving: high blood glucose - causes, signs/symptoms, treatment and prevention, low blood glucose - causes, signs/symptoms, treatment and prevention, carrying a carbohydrate source at all times and when to call health care provider    Opportunities for ongoing education and support in diabetes-self management were discussed.    Pt verbalized understanding of concepts discussed and recommendations provided today.       Education Materials Provided:  No new materials provided today      PLAN  See Patient Instructions for co-developed, patient-stated behavior change goals.  Patient declined AVS, but states understanding and is able to repeat plan back to educator.See Follow-Up section for recommended follow-up.    Vania Gutierrez RN,CDE   Time Spent: 60 minutes  Encounter Type: Individual    Any diabetes medication dose changes were made via the CDE Protocol and Collaborative Practice Agreement with the patient's referring provider. A copy of this encounter was shared with the provider.

## 2019-01-10 NOTE — PATIENT INSTRUCTIONS
High alert set at 350mg/dl     Use temp basal as needed at +/- 10-20% for 30 minutes or up to 2 hours.    Use temp basal at + 10% for 30 minutes after a pod change    Changes made to pump today to allow more insulin for carbohydrates at lunch if starting before noon and more insulin for carbohydrates at dinner up to 8pm     Upload pump in 2 weeks or reach out sooner if needed

## 2019-01-11 DIAGNOSIS — E10.59 TYPE 1 DIABETES MELLITUS WITH DIABETIC CARDIOMYOPATHY (H): ICD-10-CM

## 2019-01-11 DIAGNOSIS — I43 TYPE 1 DIABETES MELLITUS WITH DIABETIC CARDIOMYOPATHY (H): ICD-10-CM

## 2019-01-11 DIAGNOSIS — Z94.0 KIDNEY REPLACED BY TRANSPLANT: ICD-10-CM

## 2019-01-11 LAB
ERYTHROCYTE [DISTWIDTH] IN BLOOD BY AUTOMATED COUNT: 13.5 % (ref 10–15)
HBA1C MFR BLD: 6.8 % (ref 0–5.6)
HCT VFR BLD AUTO: 46.9 % (ref 40–53)
HGB BLD-MCNC: 15.5 G/DL (ref 13.3–17.7)
MCH RBC QN AUTO: 28.9 PG (ref 26.5–33)
MCHC RBC AUTO-ENTMCNC: 33 G/DL (ref 31.5–36.5)
MCV RBC AUTO: 88 FL (ref 78–100)
PLATELET # BLD AUTO: 277 10E9/L (ref 150–450)
PROT UR-MCNC: 0.55 G/L
PROT/CREAT 24H UR: 0.87 G/G CR (ref 0–0.2)
RBC # BLD AUTO: 5.36 10E12/L (ref 4.4–5.9)
TACROLIMUS BLD-MCNC: 3 UG/L (ref 5–15)
TME LAST DOSE: ABNORMAL H
WBC # BLD AUTO: 9.4 10E9/L (ref 4–11)

## 2019-01-11 PROCEDURE — 84156 ASSAY OF PROTEIN URINE: CPT | Performed by: INTERNAL MEDICINE

## 2019-01-11 PROCEDURE — 83036 HEMOGLOBIN GLYCOSYLATED A1C: CPT | Performed by: INTERNAL MEDICINE

## 2019-01-11 PROCEDURE — 80048 BASIC METABOLIC PNL TOTAL CA: CPT | Performed by: INTERNAL MEDICINE

## 2019-01-11 PROCEDURE — 36415 COLL VENOUS BLD VENIPUNCTURE: CPT | Performed by: INTERNAL MEDICINE

## 2019-01-11 PROCEDURE — 80197 ASSAY OF TACROLIMUS: CPT | Performed by: INTERNAL MEDICINE

## 2019-01-11 PROCEDURE — 85027 COMPLETE CBC AUTOMATED: CPT | Performed by: INTERNAL MEDICINE

## 2019-01-11 PROCEDURE — 82043 UR ALBUMIN QUANTITATIVE: CPT | Performed by: INTERNAL MEDICINE

## 2019-01-12 LAB
ANION GAP SERPL CALCULATED.3IONS-SCNC: 8 MMOL/L (ref 3–14)
BUN SERPL-MCNC: 18 MG/DL (ref 7–30)
CALCIUM SERPL-MCNC: 8.9 MG/DL (ref 8.5–10.1)
CHLORIDE SERPL-SCNC: 107 MMOL/L (ref 94–109)
CO2 SERPL-SCNC: 24 MMOL/L (ref 20–32)
CREAT SERPL-MCNC: 1.72 MG/DL (ref 0.66–1.25)
GFR SERPL CREATININE-BSD FRML MDRD: 48 ML/MIN/{1.73_M2}
GLUCOSE SERPL-MCNC: 149 MG/DL (ref 70–99)
POTASSIUM SERPL-SCNC: 4.8 MMOL/L (ref 3.4–5.3)
SODIUM SERPL-SCNC: 139 MMOL/L (ref 133–144)

## 2019-01-13 LAB
CREAT UR-MCNC: 66 MG/DL
MICROALBUMIN UR-MCNC: 313 MG/L
MICROALBUMIN/CREAT UR: 474.96 MG/G CR (ref 0–17)

## 2019-01-16 ENCOUNTER — PATIENT OUTREACH (OUTPATIENT)
Dept: CARE COORDINATION | Facility: CLINIC | Age: 44
End: 2019-01-16

## 2019-01-16 ENCOUNTER — TELEPHONE (OUTPATIENT)
Dept: PEDIATRICS | Facility: CLINIC | Age: 44
End: 2019-01-16

## 2019-01-16 DIAGNOSIS — K21.9 GASTROESOPHAGEAL REFLUX DISEASE WITHOUT ESOPHAGITIS: ICD-10-CM

## 2019-01-16 RX ORDER — OMEPRAZOLE 40 MG/1
CAPSULE, DELAYED RELEASE ORAL
Qty: 60 CAPSULE | Refills: 0 | Status: SHIPPED | OUTPATIENT
Start: 2019-01-16 | End: 2019-01-24

## 2019-01-16 NOTE — TELEPHONE ENCOUNTER
Pt's insurance requires a PA on Omeprazole    Insurance rejection: STEP THERAPY REQ'D/QUANTITY LIMITS APPLY  MAXIMUM DAILY DOSE OF [phone]    Diagnosis Code: K219    Please provide reasoning / documentation and forward to PA team at [phone].  Thanks!!    PA NEEDED ON: Omeprazole 40mg (2/day)  INS IS: [employer]  BIN: [id number]  PHONE # IS: [phone]  ID # IS: [id number]  GROUP: -  PCN: [id number]    PLEASE LET US KNOW WHEN PA IS GRANTED/DENIED.  THANK YOU!    Sandor Weber  [facility]  [phone]

## 2019-01-16 NOTE — TELEPHONE ENCOUNTER
30 day supply given.  Patient is due for yearly physical and lab work.  Please call and assist with scheduling appointment prior to next refill   Basia DUNAWAY RN - Triage  [facility]

## 2019-01-16 NOTE — TELEPHONE ENCOUNTER
"Requested Prescriptions   Pending Prescriptions Disp Refills     omeprazole (PRILOSEC) 40 MG DR capsule [Pharmacy Med Name: OMEPRAZOLE 40MG CPDR]    Last Written Prescription Date:  12/27/2017  Last Fill Quantity: 180,  # refills: 3   Last office visit: 11/20/2017 with prescribing provider:  Kt Ríos     Future Office Visit:   Next 5 appointments (look out 90 days)    Jan 24, 2019 11:40 AM CST  [facility] [address] [place] [address]  [address]  [facility] [address]  [phone]   Feb 19, 2019 11:30 AM CST  Return Visit with Patricia Costa MD  [facility] [address] [place] [address]  [address]  [facility] [address]  [phone]          180 capsule 3     Sig: TAKE ONE CAPSULE BY MOUTH TWICE A DAY 30 TO 60 MINUTES BEFORE A MEAL    PPI Protocol Passed - 1/16/2019 12:19 PM       Passed - Not on Clopidogrel (unless Pantoprazole ordered)       Passed - No diagnosis of osteoporosis on record       Passed - Recent (12 mo) or future (30 days) visit within the authorizing provider's specialty    Patient had office visit in the last 12 months or has a visit in the next 30 days with authorizing provider or within the authorizing provider's specialty.  See \"Patient Info\" tab in inbasket, or \"Choose Columns\" in Meds & Orders section of the refill encounter.             Passed - Medication is active on med list       Passed - Patient is age 18 or older          "

## 2019-01-16 NOTE — TELEPHONE ENCOUNTER
We can begin PA.  If not covered, call patient and tell him we may need to change to once daily instead of twice daily.     \    Kt Ríos MD  Internal Medicine and Pediatrics

## 2019-01-17 ENCOUNTER — TELEPHONE (OUTPATIENT)
Dept: PEDIATRICS | Facility: CLINIC | Age: 44
End: 2019-01-17

## 2019-01-17 NOTE — TELEPHONE ENCOUNTER
Prior Authorization Retail Medication Request    Medication/Dose: omeprazole (PRILOSEC) 40 MG DR capsule  ICD code (if different than what is on RX):  Gastroesophageal reflux disease without esophagitis [K21.9]  Previously Tried and Failed:  Reorder  Rationale:  Reorder    Insurance Name:  [facility]  Insurance ID:  [id number]      Pharmacy Information (if different than what is on RX)  Name:  [facility]  Phone:  [phone]    Thanks so much for your help. Have a great day!

## 2019-01-18 ENCOUNTER — TELEPHONE (OUTPATIENT)
Dept: TRANSPLANT | Facility: CLINIC | Age: 44
End: 2019-01-18

## 2019-01-18 DIAGNOSIS — Z94.0 KIDNEY REPLACED BY TRANSPLANT: Primary | ICD-10-CM

## 2019-01-18 NOTE — TELEPHONE ENCOUNTER
ISSUE:  Cancelled clinic visit yesterday  In need of IS regimen as it is unclear in notes    PLAN:  Call placed to pt. No answer. Left vm asking for return call.

## 2019-01-18 NOTE — TELEPHONE ENCOUNTER
Call returned from pt. He states that he had to cancel appt yesterday due to severe acid reflux issues. I asked him about IS regimen. He confirms that he is taking 1mg BID tacrolimus only. He is a poor historian as to why he is on single agent, or when this was changed. Sent to Dr. Jorgensen for review.     I asked that pt collect follow-up labs next week, as tac was low recently at 3 (he confirms that he missed night time dose prior to draw) orders placed. Tyrell voices understanding.

## 2019-01-22 NOTE — TELEPHONE ENCOUNTER
Sung Jorgensen MD Withrow, Angela, RN             I cannot find what happened with the mycophenolic acid previously, but it seems he might have been off it since 11/2017.     Would restart mycophenolic acid 360 mg bid.     Thanks.    Previous Messages      ----- Message -----   From: Henrietta Barraza, RN   Sent: 1/18/2019   2:36 PM   To: Sung Jorgensen MD   Subject: IS                                               Hey I talked with him today. He is on tac alone, is unable to tell me why or when was changed to single agent. Do you want to over book him sometime next week? Change anything?         PLAN:  Call placed to pt. No answer. Left detailed vm asking that he call back.

## 2019-01-23 DIAGNOSIS — Z94.0 KIDNEY REPLACED BY TRANSPLANT: ICD-10-CM

## 2019-01-23 LAB
ERYTHROCYTE [DISTWIDTH] IN BLOOD BY AUTOMATED COUNT: 13.5 % (ref 10–15)
HCT VFR BLD AUTO: 46.4 % (ref 40–53)
HGB BLD-MCNC: 15.6 G/DL (ref 13.3–17.7)
MCH RBC QN AUTO: 29.1 PG (ref 26.5–33)
MCHC RBC AUTO-ENTMCNC: 33.6 G/DL (ref 31.5–36.5)
MCV RBC AUTO: 87 FL (ref 78–100)
PLATELET # BLD AUTO: 316 10E9/L (ref 150–450)
RBC # BLD AUTO: 5.36 10E12/L (ref 4.4–5.9)
TACROLIMUS BLD-MCNC: 4.9 UG/L (ref 5–15)
TME LAST DOSE: ABNORMAL H
WBC # BLD AUTO: 8.7 10E9/L (ref 4–11)

## 2019-01-23 PROCEDURE — 80048 BASIC METABOLIC PNL TOTAL CA: CPT | Performed by: INTERNAL MEDICINE

## 2019-01-23 PROCEDURE — 80197 ASSAY OF TACROLIMUS: CPT | Performed by: INTERNAL MEDICINE

## 2019-01-23 PROCEDURE — 85027 COMPLETE CBC AUTOMATED: CPT | Performed by: INTERNAL MEDICINE

## 2019-01-23 PROCEDURE — 36415 COLL VENOUS BLD VENIPUNCTURE: CPT | Performed by: INTERNAL MEDICINE

## 2019-01-23 NOTE — TELEPHONE ENCOUNTER
Central Prior Authorization Team   Phone: [phone]      PA Initiation    Medication: omeprazole (PRILOSEC) 40 MG DR capsule-Initiated  Insurance Company: [employer] Clinical [employer] - Phone [phone] Fax [phone]  Pharmacy Filling the Rx: [place], [place] - [address]  Filling Pharmacy Phone: [phone]  Filling Pharmacy Fax:    Start Date: 1/23/2019

## 2019-01-24 ENCOUNTER — OFFICE VISIT (OUTPATIENT)
Dept: PEDIATRICS | Facility: CLINIC | Age: 44
End: 2019-01-24
Payer: COMMERCIAL

## 2019-01-24 VITALS
TEMPERATURE: 98 F | HEIGHT: 69 IN | OXYGEN SATURATION: 97 % | HEART RATE: 94 BPM | BODY MASS INDEX: 29.99 KG/M2 | WEIGHT: 202.5 LBS | RESPIRATION RATE: 16 BRPM | DIASTOLIC BLOOD PRESSURE: 72 MMHG | SYSTOLIC BLOOD PRESSURE: 102 MMHG

## 2019-01-24 DIAGNOSIS — I43 TYPE 1 DIABETES MELLITUS WITH DIABETIC CARDIOMYOPATHY (H): ICD-10-CM

## 2019-01-24 DIAGNOSIS — E78.5 HYPERLIPIDEMIA WITH TARGET LDL LESS THAN 100: ICD-10-CM

## 2019-01-24 DIAGNOSIS — R11.2 NAUSEA AND VOMITING, INTRACTABILITY OF VOMITING NOT SPECIFIED, UNSPECIFIED VOMITING TYPE: Primary | ICD-10-CM

## 2019-01-24 DIAGNOSIS — I10 HYPERTENSION GOAL BP (BLOOD PRESSURE) < 140/90: ICD-10-CM

## 2019-01-24 DIAGNOSIS — E10.59 TYPE 1 DIABETES MELLITUS WITH DIABETIC CARDIOMYOPATHY (H): ICD-10-CM

## 2019-01-24 DIAGNOSIS — I25.10 CORONARY ARTERY DISEASE INVOLVING NATIVE CORONARY ARTERY OF NATIVE HEART WITHOUT ANGINA PECTORIS: ICD-10-CM

## 2019-01-24 DIAGNOSIS — K21.9 GASTROESOPHAGEAL REFLUX DISEASE WITHOUT ESOPHAGITIS: ICD-10-CM

## 2019-01-24 LAB
ANION GAP SERPL CALCULATED.3IONS-SCNC: 5 MMOL/L (ref 3–14)
BUN SERPL-MCNC: 17 MG/DL (ref 7–30)
CALCIUM SERPL-MCNC: 9.2 MG/DL (ref 8.5–10.1)
CHLORIDE SERPL-SCNC: 107 MMOL/L (ref 94–109)
CO2 SERPL-SCNC: 26 MMOL/L (ref 20–32)
CREAT SERPL-MCNC: 1.76 MG/DL (ref 0.66–1.25)
GFR SERPL CREATININE-BSD FRML MDRD: 47 ML/MIN/{1.73_M2}
GLUCOSE SERPL-MCNC: 93 MG/DL (ref 70–99)
POTASSIUM SERPL-SCNC: 4.4 MMOL/L (ref 3.4–5.3)
SODIUM SERPL-SCNC: 138 MMOL/L (ref 133–144)

## 2019-01-24 PROCEDURE — 99214 OFFICE O/P EST MOD 30 MIN: CPT | Performed by: INTERNAL MEDICINE

## 2019-01-24 RX ORDER — ATORVASTATIN CALCIUM 40 MG/1
20 TABLET, FILM COATED ORAL DAILY
Qty: 45 TABLET | Refills: 3 | Status: SHIPPED | OUTPATIENT
Start: 2019-01-24 | End: 2020-01-27

## 2019-01-24 RX ORDER — MYCOPHENOLIC ACID 180 MG/1
360 TABLET, DELAYED RELEASE ORAL 2 TIMES DAILY
Qty: 360 TABLET | Refills: 3 | Status: SHIPPED | OUTPATIENT
Start: 2019-01-24 | End: 2020-01-27

## 2019-01-24 RX ORDER — OMEPRAZOLE 40 MG/1
40 CAPSULE, DELAYED RELEASE ORAL 2 TIMES DAILY
Qty: 180 CAPSULE | Refills: 3 | Status: SHIPPED | OUTPATIENT
Start: 2019-01-24 | End: 2019-12-19

## 2019-01-24 ASSESSMENT — MIFFLIN-ST. JEOR: SCORE: 1805.97

## 2019-01-24 NOTE — TELEPHONE ENCOUNTER
Call placed to pt. He agrees to start mycophenolic acid 360mg BID. Message sent to  to have him set up for annual neph visit. Rx updated.   Pt verbalizes understanding of plan

## 2019-01-24 NOTE — PATIENT INSTRUCTIONS
Let's try adding reglan nightly before bed to see if this helps with your AM symptoms.    Then after 1-2 weeks, can resume our lipitor.    Remain on the prilosec (omeprazole) 40 mg twice daily.    Follow-up with gastroenterology for your nausea.      as needed zofran for significant nausea as well.    Kt Ríos MD  Internal Medicine and Pediatrics

## 2019-01-24 NOTE — TELEPHONE ENCOUNTER
Prior Authorization Approval    Authorization Effective Date: 1/22/2019  Authorization Expiration Date: 1/22/2020  Medication: omeprazole (PRILOSEC) 40 MG DR capsule-APPROVED  Approved Dose/Quantity:   Reference #:     Insurance Company: [employer] Review - Phone [phone] Fax [phone]  Expected CoPay:       CoPay Card Available:      Foundation Assistance Needed:    Which Pharmacy is filling the prescription (Not needed for infusion/clinic administered): [facility] [place], [place] - [address] [facility]  Pharmacy Notified: Yes  Patient Notified: No    Pharmacy will notify patient when medication is ready.

## 2019-01-24 NOTE — PROGRESS NOTES
SUBJECTIVE:   Murtaza Fitzgerald is a 43 year old male who presents to clinic today for the following health issues:    Pt presents to the clinic for FMLA follow up     Working full time; works at [employer].  Has days where he needs to miss hours or days due to medical conditions and appts.    Still has significant reflux symptoms; wakes up nauseous most every AM.  Vomits often as well; sometimes as often as weekly.   In past had esophagogastroduodenoscopy 10 years ago; within normal limits.  Also had Gastric emptying scan that was mildly delayed, and a recent CT showing some hernias; no obstruction.     bowel movements are most every other day.  Not on miralax.     Problem list and histories reviewed & adjusted, as indicated.  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)     Coronary artery disease involving native coronary artery of native heart without angina pectoris     Adhesive capsulitis of right shoulder     Past Surgical History:   Procedure Laterality Date     BYPASS GRAFT ARTERY CORONARY N/A 8/5/2015    Procedure: BYPASS GRAFT ARTERY CORONARY;  Surgeon: Katy Neville MD;  Location: [facility] OR     ORTHOPEDIC SURGERY  1993    tumor removed from left knee     TRANSPLANT      pancrease and kidney transplant       Social History     Tobacco Use     Smoking status: Former Smoker     Packs/day: 0.30     Types: Cigarettes     Smokeless tobacco: Never Used     Tobacco comment: E- Cig use still   Substance Use Topics     Alcohol use: Yes     Alcohol/week:  "0.0 oz     Comment: social     Family History   Problem Relation Age of Onset     Heart Disease Maternal Grandfather 62         Current Outpatient Medications   Medication Sig Dispense Refill     acetone, Urine, test STRP 1 strip by In Vitro route daily 50 each 3     aspirin 81 MG tablet Take 1 tablet (81 mg) by mouth daily 90 tablet 3     atorvastatin (LIPITOR) 40 MG tablet Take 0.5 tablets (20 mg) by mouth daily 45 tablet 3     YOHAN CONTOUR test strip USE TO TEST BLOOD SUGAR FIVE TIMES A DAY OR AS DIRECTED 450 each 1     blood glucose monitoring (NO BRAND SPECIFIED) test strip Freestyle test strips 6 times daily (NOT lite and NOT insulinx) 300 strip 11     blood glucose monitoring (NO BRAND SPECIFIED) test strip 5 times daily ( contour NEXT) 450 each 11     Blood Glucose Monitoring Suppl ([employer] CONTOUR MONITOR) W/DEVICE KIT TO TEST BG 5 TIMIES DAILY 1 kit 0     insulin aspart (NOVOLOG PEN) 100 UNIT/ML injection DOSE:  2 units per 14 grams of carbohydrate.. 8 mL 11     insulin aspart (NOVOLOG VIAL) 100 UNITS/ML injection To be used in insulin pump, est TDD 75 units/day 60 mL 3     insulin pen needle (BD ARPITA U/F) 32G X 4 MM Use 4 daily or as directed. 120 each 11     INSULIN PUMP - OUTPATIENT Date last updated:  12/18/18  Omnipod  BASAL RATES and times:  12 AM (midnight): 1.15 units/hour  2 AM: 1.25  10 AM: 0.80  6 PM: 0.95  CARB RATIO and times:  12 AM (midnight): 9  12 PM:6  6 PM: 7  Corection Factor (Sensitivity) and times:  12   AM (midnight): 40 mg/dL  6 PM: 38  BLOOD GLUCOSE TARGET and times:  12   AM (midnight): 100, correct above 120 with reverse correction on  Active Insulin Time:  4 hours  Dexcom sharing code: [id number]       insulin syringe-needle U-100 (BD INSULIN SYRINGE ULTRAFINE) 31G X 5/16\" 1 ML Use one syringe 1 daily or as directed. 100 each 11     lactulose 20 GM/30ML SOLN Take 30 mLs (20 g) by mouth 2 times daily as needed For constipation. 473 mL 1     metoclopramide (REGLAN) 5 MG " tablet TAKE ONE TABLET BY MOUTH FOUR TIMES A DAY (BEFORE MEALS AND NIGHTLY) 120 tablet 2     omeprazole (PRILOSEC) 40 MG DR capsule Take 1 capsule (40 mg) by mouth 2 times daily 180 capsule 3     ondansetron (ZOFRAN) 4 MG tablet Take 1 tablet (4 mg) by mouth every 6 hours 8 tablet 0     tacrolimus (GENERIC EQUIVALENT) 1 MG capsule Take 1 capsule (1 mg) by mouth 2 times daily 60 capsule 1     glucagon (GLUCAGON EMERGENCY) 1 MG kit Inject 1 mg into the muscle once for 1 dose 1 mg 0     Allergies   Allergen Reactions     Reglan Other (See Comments)     IV, delsuions     Recent Labs   Lab Test 01/11/19  0742 11/20/18  0954  11/21/17  0711  12/22/16  0908  09/04/15  1213  07/08/15  0745  01/28/15  0747   A1C 6.8*  --   --  6.4*  --  7.1*   < >  --    < > 7.4*  --   --    LDL  --   --   --  99  --   --   --   --   --  90  --  64   HDL  --   --   --  33*  --   --   --   --   --  35*  --  39*   TRIG  --   --   --  193*  --   --   --   --   --  86  --  87   ALT  --  18  --   --   --  16  --  16   < > 22  --  19   CR 1.72* 1.74*   < >  --    < > 1.79*   < > 1.64*   < > 1.54*   < > 1.79*   GFRESTIMATED 48* 43*   < >  --    < > 42*   < > 47*   < > 51*   < > 43*   GFRESTBLACK 56* 53*   < >  --    < > 51*   < > 57*   < > 62   < > 52*   POTASSIUM 4.8 4.5   < >  --    < > 5.0   < > 4.8   < > 4.5   < > 4.9   TSH  --   --   --  2.06  --   --   --  1.31  --   --   --   --     < > = values in this interval not displayed.      BP Readings from Last 3 Encounters:   01/24/19 102/72   11/20/18 (!) 144/97   06/12/18 108/70    Wt Readings from Last 3 Encounters:   01/24/19 91.9 kg (202 lb 8 oz)   11/20/18 90.9 kg (200 lb 6.4 oz)   06/12/18 96.9 kg (213 lb 11.2 oz)                  Labs reviewed in EPIC    Reviewed and updated as needed this visit by clinical staff       Reviewed and updated as needed this visit by Provider         ROS:  CONSTITUTIONAL: NEGATIVE for fever, chills, change in weight  ENT/MOUTH: NEGATIVE for ear, mouth and  "throat problems  RESP: NEGATIVE for significant cough or SOB  CV: NEGATIVE for chest pain, palpitations or peripheral edema    OBJECTIVE:                                                    /72 (BP Location: Right arm, Patient Position: Chair, Cuff Size: Adult Large)   Pulse 94   Temp 98  F (36.7  C) (Oral)   Resp 16   Ht 1.74 m (5' 8.5\")   Wt 91.9 kg (202 lb 8 oz)   SpO2 97%   BMI 30.34 kg/m    Body mass index is 30.34 kg/m .   GENERAL: healthy, alert, well nourished, well hydrated, no distress  HENT: ear canals- normal; TMs- normal; Nose- normal; Mouth- no ulcers, no lesions  NECK: no tenderness, no adenopathy, no asymmetry, no masses, no stiffness; thyroid- normal to palpation  RESP: lungs clear to auscultation - no rales, no rhonchi, no wheezes  CV: regular rates and rhythm, normal S1 S2, no S3 or S4 and no murmur, no click or rub -  ABDOMEN: soft, no tenderness, no  hepatosplenomegaly, no masses, normal bowel sounds    Diagnostic test results:  Diagnostic Test Results:  none      ASSESSMENT/PLAN:                                                    1. Nausea:    Likley from his gastroparesis.  Trial of reglan nightly before bed to see if helps his AM symptoms.  Avoid alcohol, caffeine, tobacco, spicy foods, citrus foods, aspirin and anti-inflammatories like ibuprofen (\"Advil\" and \"Motrin\") or naproxen (\"Aleve\").     Patient Instructions   Let's try adding reglan nightly before bed to see if this helps with your AM symptoms.    Then after 1-2 weeks, can resume our lipitor.    Remain on the prilosec (omeprazole) 40 mg twice daily.    Follow-up with gastroenterology for your nausea.      as needed zofran for significant nausea as well.    Kt Ríos MD  Internal Medicine and Pediatrics        - atorvastatin (LIPITOR) 40 MG tablet; Take 0.5 tablets (20 mg) by mouth daily  Dispense: 45 tablet; Refill: 3  - omeprazole (PRILOSEC) 40 MG DR capsule; Take 1 capsule (40 mg) by mouth 2 times daily  Dispense: 180 " capsule; Refill: 3    2. Nausea and vomiting, intractability of vomiting not specified, unspecified vomiting type    - GASTROENTEROLOGY ADULT REF CONSULT ONLY    (I25.10) Coronary artery disease involving native coronary artery of native heart without angina pectoris  Comment:   Plan: secondary risk factor modification.     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  Continue secondary risk factor modification for BP, cholesterol, anticoagulation, and smoking cessation.     (I10) Hypertension goal BP (blood pressure) < 140/90  Comment:   Plan: At goal.     (E78.5) Hyperlipidemia with target LDL less than 100  Comment:   Plan:      See Patient Instructions    Kt Ríos MD  [facility]

## 2019-02-04 ENCOUNTER — HOSPITAL ENCOUNTER (EMERGENCY)
Facility: CLINIC | Age: 44
Discharge: HOME | End: 2019-02-04
Attending: EMERGENCY MEDICINE | Admitting: EMERGENCY MEDICINE
Payer: COMMERCIAL

## 2019-02-04 VITALS
RESPIRATION RATE: 20 BRPM | SYSTOLIC BLOOD PRESSURE: 175 MMHG | HEART RATE: 100 BPM | DIASTOLIC BLOOD PRESSURE: 99 MMHG | OXYGEN SATURATION: 95 % | TEMPERATURE: 98.8 F

## 2019-02-04 DIAGNOSIS — R11.2 NON-INTRACTABLE VOMITING WITH NAUSEA, UNSPECIFIED VOMITING TYPE: ICD-10-CM

## 2019-02-04 LAB
ANION GAP SERPL CALCULATED.3IONS-SCNC: 5 MMOL/L (ref 3–14)
BASOPHILS # BLD AUTO: 0.1 10E9/L (ref 0–0.2)
BASOPHILS NFR BLD AUTO: 0.3 %
BUN SERPL-MCNC: 16 MG/DL (ref 7–30)
CALCIUM SERPL-MCNC: 8.7 MG/DL (ref 8.5–10.1)
CHLORIDE SERPL-SCNC: 108 MMOL/L (ref 94–109)
CO2 SERPL-SCNC: 24 MMOL/L (ref 20–32)
CREAT SERPL-MCNC: 1.6 MG/DL (ref 0.66–1.25)
DIFFERENTIAL METHOD BLD: ABNORMAL
EOSINOPHIL # BLD AUTO: 0.2 10E9/L (ref 0–0.7)
EOSINOPHIL NFR BLD AUTO: 1.2 %
ERYTHROCYTE [DISTWIDTH] IN BLOOD BY AUTOMATED COUNT: 13.3 % (ref 10–15)
GFR SERPL CREATININE-BSD FRML MDRD: 53 ML/MIN/{1.73_M2}
GLUCOSE SERPL-MCNC: 189 MG/DL (ref 70–99)
HCT VFR BLD AUTO: 47.8 % (ref 40–53)
HGB BLD-MCNC: 15.3 G/DL (ref 13.3–17.7)
IMM GRANULOCYTES # BLD: 0.1 10E9/L (ref 0–0.4)
IMM GRANULOCYTES NFR BLD: 0.6 %
LYMPHOCYTES # BLD AUTO: 1.1 10E9/L (ref 0.8–5.3)
LYMPHOCYTES NFR BLD AUTO: 7.3 %
MAGNESIUM SERPL-MCNC: 1.9 MG/DL (ref 1.6–2.3)
MCH RBC QN AUTO: 28.3 PG (ref 26.5–33)
MCHC RBC AUTO-ENTMCNC: 32 G/DL (ref 31.5–36.5)
MCV RBC AUTO: 89 FL (ref 78–100)
MONOCYTES # BLD AUTO: 0.8 10E9/L (ref 0–1.3)
MONOCYTES NFR BLD AUTO: 5.4 %
NEUTROPHILS # BLD AUTO: 13.2 10E9/L (ref 1.6–8.3)
NEUTROPHILS NFR BLD AUTO: 85.2 %
NRBC # BLD AUTO: 0 10*3/UL
NRBC BLD AUTO-RTO: 0 /100
PLATELET # BLD AUTO: 272 10E9/L (ref 150–450)
POTASSIUM SERPL-SCNC: 4.3 MMOL/L (ref 3.4–5.3)
RBC # BLD AUTO: 5.4 10E12/L (ref 4.4–5.9)
SODIUM SERPL-SCNC: 137 MMOL/L (ref 133–144)
WBC # BLD AUTO: 15.5 10E9/L (ref 4–11)

## 2019-02-04 PROCEDURE — 96361 HYDRATE IV INFUSION ADD-ON: CPT

## 2019-02-04 PROCEDURE — 80048 BASIC METABOLIC PNL TOTAL CA: CPT | Performed by: EMERGENCY MEDICINE

## 2019-02-04 PROCEDURE — 99284 EMERGENCY DEPT VISIT MOD MDM: CPT | Mod: 25

## 2019-02-04 PROCEDURE — 85025 COMPLETE CBC W/AUTO DIFF WBC: CPT | Performed by: EMERGENCY MEDICINE

## 2019-02-04 PROCEDURE — 25000128 H RX IP 250 OP 636: Performed by: EMERGENCY MEDICINE

## 2019-02-04 PROCEDURE — 25000132 ZZH RX MED GY IP 250 OP 250 PS 637: Performed by: EMERGENCY MEDICINE

## 2019-02-04 PROCEDURE — 96374 THER/PROPH/DIAG INJ IV PUSH: CPT

## 2019-02-04 PROCEDURE — 25000125 ZZHC RX 250: Performed by: EMERGENCY MEDICINE

## 2019-02-04 PROCEDURE — 83735 ASSAY OF MAGNESIUM: CPT | Performed by: EMERGENCY MEDICINE

## 2019-02-04 RX ORDER — ONDANSETRON 4 MG/1
4 TABLET, ORALLY DISINTEGRATING ORAL EVERY 8 HOURS PRN
Qty: 10 TABLET | Refills: 0 | Status: ON HOLD | OUTPATIENT
Start: 2019-02-04 | End: 2019-02-08

## 2019-02-04 RX ORDER — SODIUM CHLORIDE 9 MG/ML
INJECTION, SOLUTION INTRAVENOUS CONTINUOUS
Status: DISCONTINUED | OUTPATIENT
Start: 2019-02-04 | End: 2019-02-04 | Stop reason: HOSPADM

## 2019-02-04 RX ORDER — ONDANSETRON 2 MG/ML
4 INJECTION INTRAMUSCULAR; INTRAVENOUS ONCE
Status: COMPLETED | OUTPATIENT
Start: 2019-02-04 | End: 2019-02-04

## 2019-02-04 RX ADMIN — SODIUM CHLORIDE: 9 INJECTION, SOLUTION INTRAVENOUS at 05:44

## 2019-02-04 RX ADMIN — ONDANSETRON 4 MG: 2 INJECTION INTRAMUSCULAR; INTRAVENOUS at 05:51

## 2019-02-04 RX ADMIN — SODIUM CHLORIDE 1000 ML: 9 INJECTION, SOLUTION INTRAVENOUS at 07:13

## 2019-02-04 RX ADMIN — LIDOCAINE HYDROCHLORIDE 15 ML: 20 SOLUTION ORAL; TOPICAL at 05:44

## 2019-02-04 ASSESSMENT — ENCOUNTER SYMPTOMS
VOMITING: 1
NAUSEA: 1
FEVER: 1
DIARRHEA: 0

## 2019-02-04 NOTE — ED AVS SNAPSHOT
[facility] Emergency Department  [address]  [facility] [address]  Phone:  [phone]  Fax:  [phone]                                    Murtaza Fitzgerald   MRN: [id number]    Department:  [facility] Emergency Department   Date of Visit:  2/4/2019           After Visit Summary Signature Page    I have received my discharge instructions, and my questions have been answered. I have discussed any challenges I see with this plan with the nurse or doctor.    ..........................................................................................................................................  Patient/Patient Representative Signature      ..........................................................................................................................................  Patient Representative Print Name and Relationship to Patient    ..................................................               ................................................  Date                                   Time    ..........................................................................................................................................  Reviewed by Signature/Title    ...................................................              ..............................................  Date                                               Time          22EPIC Rev 08/18

## 2019-02-04 NOTE — ED TRIAGE NOTES
Pt had kidney and pancreas tx and dehydrated easily. NV since Friday.    Pt A&O x 3, CMS x 3, ABCD's adequate in triage

## 2019-02-04 NOTE — ED PROVIDER NOTES
History     Chief Complaint:  Nausea & Vomiting    HPI   Murtaza Fitzgerald is a 43 year old male with a complex past medical history significant for CAD, type I diabetes, and hypertension, status post kidney and pancrease transplant, who presents to the emergency department with nausea and vomiting. The patient reports ongoing nausea and vomiting since Friday (2/1). This was initially just once daily, but then increased to every hour this morning, prompting him to the emergency department, as he notes that he dehydrates easily given his transplant history. The patient notes that this has happened in the past. He denies diarrhea, but reports a fever of 100.9 F at home. The patient is on Lipitor for elevated blood pressure, but is not anticoagulated. He follows with the [facility] regarding both his kidney and pancreas transplants, and is currently on tacrolimus and mycophenolic acid immunosuppressive therapy.     Allergies:  IV Reglan    Medications:    Aspirin   Lipitor  Blood glucose monitoring  Novolog  Insulin   mycophenolic acid  Tacrolimus  Prilosec   Reglan  Glucagon   Lactulose      Past Medical History:    CAD  Type I diabetes  Hyperlipidemia   Hypertension  Mycotic aneurysm   Immunosuppressed status    Past Surgical History:    CABG x 3   Orthopedic surgery   Kidney transplant  Pancrease transplant     Family History:    Heart disease     Social History:  The patient was not accompanied to the ED.  Smoking Status: Former  Smokeless Tobacco: Never  Alcohol Use: Yes  Marital Status:   [4]    Review of Systems   Constitutional: Positive for fever.   Gastrointestinal: Positive for nausea and vomiting. Negative for diarrhea.   Neurological: Negative for syncope.   All other systems reviewed and are negative.    Physical Exam     Patient Vitals for the past 24 hrs:   BP Temp Temp src Pulse Resp SpO2   02/04/19 0531 -- 98.8  F (37.1  C) Oral -- -- --   02/04/19 0512  119/74 95.6  F (35.3  C) Temporal 100 20 98 %     Physical Exam  Constitutional: Vital signs reviewed as above.  HENT:               Head: No external signs of trauma noted.              Eyes: Conjunctivae are normal. Pupils are equal, round, and reactive to light.   Cardiovascular:               Slightly tachycardic rate, regular rhythm and normal heart sounds.                Exam reveals no friction rub.                No murmur heard.  Pulmonary/Chest:               Effort normal and breath sounds normal.               No respiratory distress.               There are no wheezes.               There are no rales.   Gastrointestinal:               Soft.               Bowel sounds normal.               There is no distension.               There is no tenderness.                         There is a reducible, non-tender, umbilical hernia.              There is no rebound or guarding.   Musculoskeletal:               Normal range of motion.               Normal Tone  Neurological: Patient is alert and oriented to person, place, and time.   Skin: Skin is warm and dry. Patient is not diaphoretic  Psychiatric: The patient appears calm      Emergency Department Course     Laboratory:  Laboratory findings were communicated with the patient who voiced understanding of the findings.    CBC: WBC 15.5 (H), o/w WNL (HGB 15.3, )  BMP:  (H), Creatinine 1.69 (H), GFR 53 (L), o/w WNL   Magnesium: 1.9    Interventions:  0544 - Sodium chloride 0.9% infusion IV  0544 - GI Cocktail - Maalox 15 mL, Viscous Lidocaine 15 mL, 30 mL suspension PO  0551 - Zofran injection 4 mg IV  0713 - 2nd NS Bolus    Emergency Department Course:  Nursing notes and vitals reviewed.    0536: I performed an exam of the patient as documented above.     0558: Patient rechecked and updated. I rechecked the patient.  Findings and plan explained to the Patient. Patient discharged home with instructions regarding supportive care, medications, and  reasons to return. The importance of close follow-up was reviewed.     Impression & Plan      Medical Decision Making:  This 43-year-old male patient presents the ED due to nausea and vomiting.  Please see the HPI and exam for specifics.  Patient has received antiemetics and fluids in the ED and feels better.  I do note an elevated white blood cell count though with the patient's emesis I am not surprised at this.  He is not febrile and does not have abdominal pain.  I am not concerned for other infectious etiologies at this particular time.  I believe he can be discharged and should follow closely in the outpatient setting with his primary care clinic and transplant team.  Anticipatory guidance given prior to discharge.    Diagnosis:    ICD-10-CM    1. Non-intractable vomiting with nausea, unspecified vomiting type R11.2        Disposition:  discharged to home    Discharge Medications:     Medication List      Started    ondansetron 4 MG ODT tab  Commonly known as:  ZOFRAN ODT  4 mg, Oral, EVERY 8 HOURS PRN            Ange Corral  2/4/2019   [facility] EMERGENCY DEPARTMENT  IAnge am serving as a scribe at 5:26 AM on 2/4/2019 to document services personally performed by Farshad Mchugh DO based on my observations and the provider's statements to me.       Farshad Mchugh DO  02/04/19 0714

## 2019-02-05 ENCOUNTER — HOSPITAL ENCOUNTER (INPATIENT)
Facility: CLINIC | Age: 44
LOS: 2 days | Discharge: HOME | End: 2019-02-08
Attending: EMERGENCY MEDICINE | Admitting: HOSPITALIST
Payer: COMMERCIAL

## 2019-02-05 DIAGNOSIS — R07.81 RIB PAIN: ICD-10-CM

## 2019-02-05 DIAGNOSIS — Z94.0 STATUS POST KIDNEY TRANSPLANT: ICD-10-CM

## 2019-02-05 DIAGNOSIS — Z79.4 DIABETES MELLITUS DUE TO UNDERLYING CONDITION WITH HYPERGLYCEMIA, WITH LONG-TERM CURRENT USE OF INSULIN (H): ICD-10-CM

## 2019-02-05 DIAGNOSIS — E08.65 DIABETES MELLITUS DUE TO UNDERLYING CONDITION WITH HYPERGLYCEMIA, WITH LONG-TERM CURRENT USE OF INSULIN (H): ICD-10-CM

## 2019-02-05 DIAGNOSIS — K21.9 GASTROESOPHAGEAL REFLUX DISEASE WITHOUT ESOPHAGITIS: ICD-10-CM

## 2019-02-05 DIAGNOSIS — R11.2 NAUSEA AND VOMITING, INTRACTABILITY OF VOMITING NOT SPECIFIED, UNSPECIFIED VOMITING TYPE: ICD-10-CM

## 2019-02-05 LAB
ALBUMIN SERPL-MCNC: 3.5 G/DL (ref 3.4–5)
ALBUMIN UR-MCNC: 100 MG/DL
ALP SERPL-CCNC: 119 U/L (ref 40–150)
ALT SERPL W P-5'-P-CCNC: 23 U/L (ref 0–70)
AMPHETAMINES UR QL SCN: NEGATIVE
ANION GAP SERPL CALCULATED.3IONS-SCNC: 11 MMOL/L (ref 3–14)
APPEARANCE UR: CLEAR
AST SERPL W P-5'-P-CCNC: 17 U/L (ref 0–45)
BARBITURATES UR QL: NEGATIVE
BASOPHILS # BLD AUTO: 0.1 10E9/L (ref 0–0.2)
BASOPHILS NFR BLD AUTO: 0.3 %
BENZODIAZ UR QL: NEGATIVE
BILIRUB DIRECT SERPL-MCNC: 0.1 MG/DL (ref 0–0.2)
BILIRUB SERPL-MCNC: 0.6 MG/DL (ref 0.2–1.3)
BILIRUB UR QL STRIP: NEGATIVE
BUN SERPL-MCNC: 14 MG/DL (ref 7–30)
CALCIUM SERPL-MCNC: 9.4 MG/DL (ref 8.5–10.1)
CANNABINOIDS UR QL SCN: NEGATIVE
CHLORIDE SERPL-SCNC: 106 MMOL/L (ref 94–109)
CO2 SERPL-SCNC: 21 MMOL/L (ref 20–32)
COCAINE UR QL: NEGATIVE
COLOR UR AUTO: ABNORMAL
CREAT SERPL-MCNC: 1.51 MG/DL (ref 0.66–1.25)
DIFFERENTIAL METHOD BLD: ABNORMAL
EOSINOPHIL # BLD AUTO: 0 10E9/L (ref 0–0.7)
EOSINOPHIL NFR BLD AUTO: 0.2 %
ERYTHROCYTE [DISTWIDTH] IN BLOOD BY AUTOMATED COUNT: 13.3 % (ref 10–15)
GFR SERPL CREATININE-BSD FRML MDRD: 56 ML/MIN/{1.73_M2}
GLUCOSE BLDC GLUCOMTR-MCNC: 110 MG/DL (ref 70–99)
GLUCOSE BLDC GLUCOMTR-MCNC: 75 MG/DL (ref 70–99)
GLUCOSE BLDC GLUCOMTR-MCNC: 86 MG/DL (ref 70–99)
GLUCOSE SERPL-MCNC: 216 MG/DL (ref 70–99)
GLUCOSE UR STRIP-MCNC: 150 MG/DL
HCT VFR BLD AUTO: 47.7 % (ref 40–53)
HGB BLD-MCNC: 15.4 G/DL (ref 13.3–17.7)
HGB UR QL STRIP: NEGATIVE
IMM GRANULOCYTES # BLD: 0.1 10E9/L (ref 0–0.4)
IMM GRANULOCYTES NFR BLD: 0.7 %
INTERPRETATION ECG - MUSE: NORMAL
KETONES BLD-SCNC: 1.1 MMOL/L (ref 0–0.6)
KETONES BLD-SCNC: 1.9 MMOL/L (ref 0–0.6)
KETONES UR STRIP-MCNC: 20 MG/DL
LEUKOCYTE ESTERASE UR QL STRIP: NEGATIVE
LIPASE SERPL-CCNC: 34 U/L (ref 73–393)
LYMPHOCYTES # BLD AUTO: 1.3 10E9/L (ref 0.8–5.3)
LYMPHOCYTES NFR BLD AUTO: 7.1 %
MCH RBC QN AUTO: 28.6 PG (ref 26.5–33)
MCHC RBC AUTO-ENTMCNC: 32.3 G/DL (ref 31.5–36.5)
MCV RBC AUTO: 89 FL (ref 78–100)
MONOCYTES # BLD AUTO: 0.9 10E9/L (ref 0–1.3)
MONOCYTES NFR BLD AUTO: 5 %
MUCOUS THREADS #/AREA URNS LPF: PRESENT /LPF
NEUTROPHILS # BLD AUTO: 15.4 10E9/L (ref 1.6–8.3)
NEUTROPHILS NFR BLD AUTO: 86.7 %
NITRATE UR QL: NEGATIVE
NRBC # BLD AUTO: 0 10*3/UL
NRBC BLD AUTO-RTO: 0 /100
OPIATES UR QL SCN: POSITIVE
PCP UR QL SCN: NEGATIVE
PH UR STRIP: 6 PH (ref 5–7)
PLATELET # BLD AUTO: 306 10E9/L (ref 150–450)
POTASSIUM SERPL-SCNC: 4.3 MMOL/L (ref 3.4–5.3)
PROT SERPL-MCNC: 9 G/DL (ref 6.8–8.8)
RBC # BLD AUTO: 5.39 10E12/L (ref 4.4–5.9)
RBC #/AREA URNS AUTO: 4 /HPF (ref 0–2)
SODIUM SERPL-SCNC: 138 MMOL/L (ref 133–144)
SOURCE: ABNORMAL
SP GR UR STRIP: 1.01 (ref 1–1.03)
TACROLIMUS BLD-MCNC: <3 UG/L (ref 5–15)
TME LAST DOSE: ABNORMAL H
TROPONIN I SERPL-MCNC: <0.015 UG/L (ref 0–0.04)
UROBILINOGEN UR STRIP-MCNC: 0 MG/DL (ref 0–2)
WBC # BLD AUTO: 17.8 10E9/L (ref 4–11)
WBC #/AREA URNS AUTO: 1 /HPF (ref 0–5)

## 2019-02-05 PROCEDURE — 25000128 H RX IP 250 OP 636: Performed by: PHYSICIAN ASSISTANT

## 2019-02-05 PROCEDURE — 80197 ASSAY OF TACROLIMUS: CPT | Performed by: EMERGENCY MEDICINE

## 2019-02-05 PROCEDURE — 93005 ELECTROCARDIOGRAM TRACING: CPT

## 2019-02-05 PROCEDURE — 99220 ZZC INITIAL OBSERVATION CARE,LEVL III: CPT | Performed by: PHYSICIAN ASSISTANT

## 2019-02-05 PROCEDURE — 96376 TX/PRO/DX INJ SAME DRUG ADON: CPT

## 2019-02-05 PROCEDURE — 25000132 ZZH RX MED GY IP 250 OP 250 PS 637: Performed by: PHYSICIAN ASSISTANT

## 2019-02-05 PROCEDURE — 00000146 ZZHCL STATISTIC GLUCOSE BY METER IP

## 2019-02-05 PROCEDURE — 25000131 ZZH RX MED GY IP 250 OP 636 PS 637: Performed by: PHYSICIAN ASSISTANT

## 2019-02-05 PROCEDURE — 80076 HEPATIC FUNCTION PANEL: CPT | Performed by: EMERGENCY MEDICINE

## 2019-02-05 PROCEDURE — 80048 BASIC METABOLIC PNL TOTAL CA: CPT | Performed by: EMERGENCY MEDICINE

## 2019-02-05 PROCEDURE — 25000128 H RX IP 250 OP 636

## 2019-02-05 PROCEDURE — 25000128 H RX IP 250 OP 636: Performed by: EMERGENCY MEDICINE

## 2019-02-05 PROCEDURE — 96374 THER/PROPH/DIAG INJ IV PUSH: CPT

## 2019-02-05 PROCEDURE — 82010 KETONE BODYS QUAN: CPT | Performed by: EMERGENCY MEDICINE

## 2019-02-05 PROCEDURE — 84484 ASSAY OF TROPONIN QUANT: CPT | Performed by: EMERGENCY MEDICINE

## 2019-02-05 PROCEDURE — 36415 COLL VENOUS BLD VENIPUNCTURE: CPT | Performed by: PHYSICIAN ASSISTANT

## 2019-02-05 PROCEDURE — 82010 KETONE BODYS QUAN: CPT | Performed by: PHYSICIAN ASSISTANT

## 2019-02-05 PROCEDURE — 80307 DRUG TEST PRSMV CHEM ANLYZR: CPT | Performed by: EMERGENCY MEDICINE

## 2019-02-05 PROCEDURE — 85025 COMPLETE CBC W/AUTO DIFF WBC: CPT | Performed by: EMERGENCY MEDICINE

## 2019-02-05 PROCEDURE — 96361 HYDRATE IV INFUSION ADD-ON: CPT

## 2019-02-05 PROCEDURE — G0378 HOSPITAL OBSERVATION PER HR: HCPCS

## 2019-02-05 PROCEDURE — 83690 ASSAY OF LIPASE: CPT | Performed by: EMERGENCY MEDICINE

## 2019-02-05 PROCEDURE — 81001 URINALYSIS AUTO W/SCOPE: CPT | Performed by: EMERGENCY MEDICINE

## 2019-02-05 PROCEDURE — 96375 TX/PRO/DX INJ NEW DRUG ADDON: CPT

## 2019-02-05 PROCEDURE — 99285 EMERGENCY DEPT VISIT HI MDM: CPT | Mod: 25

## 2019-02-05 RX ORDER — PROCHLORPERAZINE MALEATE 5 MG
10 TABLET ORAL EVERY 6 HOURS PRN
Status: DISCONTINUED | OUTPATIENT
Start: 2019-02-05 | End: 2019-02-08 | Stop reason: HOSPADM

## 2019-02-05 RX ORDER — ONDANSETRON 4 MG/1
4 TABLET, ORALLY DISINTEGRATING ORAL EVERY 6 HOURS PRN
Status: DISCONTINUED | OUTPATIENT
Start: 2019-02-05 | End: 2019-02-08 | Stop reason: HOSPADM

## 2019-02-05 RX ORDER — POLYETHYLENE GLYCOL 3350 17 G/17G
17 POWDER, FOR SOLUTION ORAL DAILY PRN
Status: DISCONTINUED | OUTPATIENT
Start: 2019-02-05 | End: 2019-02-08 | Stop reason: HOSPADM

## 2019-02-05 RX ORDER — AMOXICILLIN 250 MG
1 CAPSULE ORAL 2 TIMES DAILY PRN
Status: DISCONTINUED | OUTPATIENT
Start: 2019-02-05 | End: 2019-02-08 | Stop reason: HOSPADM

## 2019-02-05 RX ORDER — AMOXICILLIN 250 MG
2 CAPSULE ORAL 2 TIMES DAILY PRN
Status: DISCONTINUED | OUTPATIENT
Start: 2019-02-05 | End: 2019-02-08 | Stop reason: HOSPADM

## 2019-02-05 RX ORDER — PROCHLORPERAZINE 25 MG
25 SUPPOSITORY, RECTAL RECTAL EVERY 12 HOURS PRN
Status: DISCONTINUED | OUTPATIENT
Start: 2019-02-05 | End: 2019-02-08 | Stop reason: HOSPADM

## 2019-02-05 RX ORDER — TACROLIMUS 1 MG/1
1 CAPSULE ORAL 2 TIMES DAILY
Status: DISCONTINUED | OUTPATIENT
Start: 2019-02-05 | End: 2019-02-08 | Stop reason: HOSPADM

## 2019-02-05 RX ORDER — ONDANSETRON 2 MG/ML
4 INJECTION INTRAMUSCULAR; INTRAVENOUS ONCE
Status: COMPLETED | OUTPATIENT
Start: 2019-02-05 | End: 2019-02-05

## 2019-02-05 RX ORDER — SODIUM CHLORIDE 9 MG/ML
INJECTION, SOLUTION INTRAVENOUS CONTINUOUS
Status: ACTIVE | OUTPATIENT
Start: 2019-02-05 | End: 2019-02-05

## 2019-02-05 RX ORDER — ACETAMINOPHEN 325 MG/1
975 TABLET ORAL
Status: DISCONTINUED | OUTPATIENT
Start: 2019-02-05 | End: 2019-02-08 | Stop reason: HOSPADM

## 2019-02-05 RX ORDER — LORAZEPAM 2 MG/ML
0.5 INJECTION INTRAMUSCULAR ONCE
Status: COMPLETED | OUTPATIENT
Start: 2019-02-05 | End: 2019-02-05

## 2019-02-05 RX ORDER — LIDOCAINE 40 MG/G
CREAM TOPICAL
Status: DISCONTINUED | OUTPATIENT
Start: 2019-02-05 | End: 2019-02-05

## 2019-02-05 RX ORDER — NICOTINE POLACRILEX 4 MG
15-30 LOZENGE BUCCAL
Status: DISCONTINUED | OUTPATIENT
Start: 2019-02-05 | End: 2019-02-08 | Stop reason: HOSPADM

## 2019-02-05 RX ORDER — ONDANSETRON 2 MG/ML
INJECTION INTRAMUSCULAR; INTRAVENOUS
Status: COMPLETED
Start: 2019-02-05 | End: 2019-02-05

## 2019-02-05 RX ORDER — NALOXONE HYDROCHLORIDE 0.4 MG/ML
.1-.4 INJECTION, SOLUTION INTRAMUSCULAR; INTRAVENOUS; SUBCUTANEOUS
Status: DISCONTINUED | OUTPATIENT
Start: 2019-02-05 | End: 2019-02-08 | Stop reason: HOSPADM

## 2019-02-05 RX ORDER — PROMETHAZINE HYDROCHLORIDE 25 MG/ML
12.5 INJECTION, SOLUTION INTRAMUSCULAR; INTRAVENOUS ONCE
Status: DISCONTINUED | OUTPATIENT
Start: 2019-02-05 | End: 2019-02-05

## 2019-02-05 RX ORDER — ONDANSETRON HYDROCHLORIDE 4 MG/5ML
4 SOLUTION ORAL ONCE
Status: DISCONTINUED | OUTPATIENT
Start: 2019-02-05 | End: 2019-02-05

## 2019-02-05 RX ORDER — ONDANSETRON 2 MG/ML
4 INJECTION INTRAMUSCULAR; INTRAVENOUS EVERY 6 HOURS PRN
Status: DISCONTINUED | OUTPATIENT
Start: 2019-02-05 | End: 2019-02-08 | Stop reason: HOSPADM

## 2019-02-05 RX ORDER — MORPHINE SULFATE 2 MG/ML
2 INJECTION, SOLUTION INTRAMUSCULAR; INTRAVENOUS ONCE
Status: COMPLETED | OUTPATIENT
Start: 2019-02-05 | End: 2019-02-05

## 2019-02-05 RX ORDER — SODIUM CHLORIDE 9 MG/ML
1000 INJECTION, SOLUTION INTRAVENOUS CONTINUOUS
Status: DISCONTINUED | OUTPATIENT
Start: 2019-02-05 | End: 2019-02-05

## 2019-02-05 RX ORDER — DEXTROSE MONOHYDRATE 25 G/50ML
25-50 INJECTION, SOLUTION INTRAVENOUS
Status: DISCONTINUED | OUTPATIENT
Start: 2019-02-05 | End: 2019-02-08 | Stop reason: HOSPADM

## 2019-02-05 RX ORDER — MYCOPHENOLIC ACID 360 MG/1
360 TABLET, DELAYED RELEASE ORAL 2 TIMES DAILY
Status: DISCONTINUED | OUTPATIENT
Start: 2019-02-05 | End: 2019-02-08 | Stop reason: HOSPADM

## 2019-02-05 RX ORDER — ACETAMINOPHEN 325 MG/1
650 TABLET ORAL EVERY 4 HOURS PRN
Status: DISCONTINUED | OUTPATIENT
Start: 2019-02-05 | End: 2019-02-05

## 2019-02-05 RX ADMIN — ACETAMINOPHEN 650 MG: 325 TABLET, FILM COATED ORAL at 12:42

## 2019-02-05 RX ADMIN — SODIUM CHLORIDE 1000 ML: 9 INJECTION, SOLUTION INTRAVENOUS at 05:45

## 2019-02-05 RX ADMIN — ONDANSETRON 4 MG: 2 INJECTION INTRAMUSCULAR; INTRAVENOUS at 08:50

## 2019-02-05 RX ADMIN — ONDANSETRON 4 MG: 2 INJECTION INTRAMUSCULAR; INTRAVENOUS at 06:21

## 2019-02-05 RX ADMIN — SODIUM CHLORIDE, PRESERVATIVE FREE: 5 INJECTION INTRAVENOUS at 10:00

## 2019-02-05 RX ADMIN — TACROLIMUS 1 MG: 1 CAPSULE ORAL at 19:19

## 2019-02-05 RX ADMIN — SODIUM CHLORIDE 1000 ML: 9 INJECTION, SOLUTION INTRAVENOUS at 06:42

## 2019-02-05 RX ADMIN — MYCOPHENOLIC ACID 360 MG: 360 TABLET, DELAYED RELEASE ORAL at 19:19

## 2019-02-05 RX ADMIN — Medication 1 LOZENGE: at 12:30

## 2019-02-05 RX ADMIN — ACETAMINOPHEN 975 MG: 325 TABLET, FILM COATED ORAL at 17:20

## 2019-02-05 RX ADMIN — ONDANSETRON 4 MG: 2 INJECTION INTRAMUSCULAR; INTRAVENOUS at 05:45

## 2019-02-05 RX ADMIN — PROMETHAZINE HYDROCHLORIDE 12.5 MG: 25 INJECTION INTRAMUSCULAR; INTRAVENOUS at 08:03

## 2019-02-05 RX ADMIN — OMEPRAZOLE 40 MG: 20 CAPSULE, DELAYED RELEASE ORAL at 19:19

## 2019-02-05 RX ADMIN — DEXTRAN 70, AND HYPROMELLOSE 2910 2 DROP: 1; 3 SOLUTION/ DROPS OPHTHALMIC at 19:19

## 2019-02-05 RX ADMIN — LORAZEPAM 0.5 MG: 2 INJECTION INTRAMUSCULAR; INTRAVENOUS at 06:40

## 2019-02-05 RX ADMIN — MORPHINE SULFATE 2 MG: 2 INJECTION, SOLUTION INTRAMUSCULAR; INTRAVENOUS at 06:40

## 2019-02-05 ASSESSMENT — ENCOUNTER SYMPTOMS
COUGH: 1
SORE THROAT: 1
NAUSEA: 1
FEVER: 1
RHINORRHEA: 1
SHORTNESS OF BREATH: 0
ARTHRALGIAS: 1
VOMITING: 1

## 2019-02-05 ASSESSMENT — MIFFLIN-ST. JEOR: SCORE: 1806.2

## 2019-02-05 NOTE — PLAN OF CARE
PRIMARY DIAGNOSIS: NAUSEA/VOMITING     OUTPATIENT/OBSERVATION GOALS TO BE MET BEFORE DISCHARGE  1. Orthostatic performed: No    2. Tolerating PO fluid and/or antibiotics (if applicable): Yes    3. Nausea/Vomiting/Diarrhea symptoms improved: Yes    4. Pain status: Improved-controlled with oral pain medications.    5. Return to near baseline physical activity: Yes    Discharge Planner Nurse   Safe discharge environment identified: Yes  Barriers to discharge: Yes       Entered by: Ruth Kaur 02/05/2019 12:51 PM   Pt alert and orientated. VSS. Stated nausea is better. Pain to ribs rated 6/10. PO tylenol given. Tolerating clears. Will continue to monitor.   Please review provider order for any additional goals.   Nurse to notify provider when observation goals have been met and patient is ready for discharge.

## 2019-02-05 NOTE — PHARMACY-ADMISSION MEDICATION HISTORY
Admission medication history interview status for this patient is complete. See [facility] admission navigator for allergy information, prior to admission medications and immunization status.     Medication history interview source(s):Patient   Medication history resources (including written lists, pill bottles, clinic record): clinic record   Primary pharmacy: [facility]    Changes made to PTA medication list:  Added: none  Deleted: aspirin  Changed: updated insulin pump settings CARB RATIO times were changed on 1/10/19 - see note from [facility] Nurse Visit for pump details/information.     Actions taken by pharmacist (provider contacted, etc):None     Additional medication history information: Patient was having nausea and vomiting during Med Rec Interview therefore med rec interview was brief but updated med list was obtained. he has insulin pump on him and can answer questions about it if needed. He reports he has not restarted mycophenolic acid and atorvastatin although unclear when he stopped these temporarily.    Medication reconciliation/reorder completed by provider prior to medication history? No    Do you take OTC medications (eg tylenol, ibuprofen, fish oil, eye/ear drops, etc)? N (Y/N)    For patients on insulin therapy: Y (Y/N)  Lantus/levemir/NPH/Mix 70/30 dose:  N (Y/N) (see Med list for doses)   Sliding scale Novolog - insulin pump  If Yes, do you have a baseline novolog pre-meal dose:  N/a   Patients eat three meals a day:   Y  How many episodes of hypoglycemia do you have per week: n/a  How many missed doses do you have per week: n/a  How many times do you check your blood glucose per day: multiple through CGM  Do you have a Continuous glucose monitor (CGM)   Y (remind pt that not approved for hospital use)   Any Barriers to therapy - Be specific :  cost of medications, comfortable with giving injections (if applicable), comfortable and confident with current diabetes regimen: n/a       Prior to  "Admission medications    Medication Sig Last Dose Taking? Auth Provider   acetone, Urine, test STRP 1 strip by In Vitro route daily  Yes Marcie Lozano APRN CNP   atorvastatin (LIPITOR) 40 MG tablet Take 0.5 tablets (20 mg) by mouth daily Past Month at Unknown time Yes Kt Ríos MD   YOHAN CONTOUR test strip USE TO TEST BLOOD SUGAR FIVE TIMES A DAY OR AS DIRECTED  Yes Kt Ríos MD   blood glucose monitoring (NO BRAND SPECIFIED) test strip Freestyle test strips 6 times daily (NOT lite and NOT insulinx)  Yes Marcie Lozano APRN CNP   blood glucose monitoring (NO BRAND SPECIFIED) test strip 5 times daily ( contour NEXT)  Yes Marcie Lozano APRN CNP   Blood Glucose Monitoring Suppl ([employer] CONTOUR MONITOR) W/DEVICE KIT TO TEST BG 5 TIMIES DAILY  Yes Henrietta Tomas MD   Insulin Aspart (INSULIN PUMP - OUTPATIENT) Date last updated: 1/10/19  Omnipod  Insulin: Novolog  BASAL RATES and times:   12 AM (midnight): 1.15 units/hr  2 AM: 1.25  10 AM: 0.8  6 PM: 0.95  CARB RATIO and times:  12 AM - 11 AM: 9  11 AM - 8 PM: 6  8 PM - 12 AM: 7   BLOOD GLUCOSE TARGET and times:  12 AM (midnight): 100, correct above 120 with reverse correction on  Active Insulin Time: 4 hours  Dexcom sharing cose: [id number] 2/5/2019 at Unknown time Yes Unknown, Entered By History   insulin aspart (NOVOLOG PEN) 100 UNIT/ML injection DOSE:  2 units per 14 grams of carbohydrate..  at uses when insulin pump not working Yes Patricia Costa MD   insulin aspart (NOVOLOG VIAL) 100 UNITS/ML injection To be used in insulin pump, est TDD 75 units/day  Yes Marcie Lozano APRN CNP   insulin pen needle (BD ARPITA U/F) 32G X 4 MM Use 4 daily or as directed.  Yes Kt Ríos MD   insulin syringe-needle U-100 (BD INSULIN SYRINGE ULTRAFINE) 31G X 5/16\" 1 ML Use one syringe 1 daily or as directed.  Yes Marice Lozano APRN CNP   metoclopramide (REGLAN) 5 MG tablet TAKE ONE TABLET BY MOUTH FOUR TIMES A DAY (BEFORE " MEALS AND NIGHTLY) 2/4/2019 at Unknown time Yes Kt Ríos MD   omeprazole (PRILOSEC) 40 MG DR capsule Take 1 capsule (40 mg) by mouth 2 times daily 2/4/2019 at pm Yes Kt Ríos MD   ondansetron (ZOFRAN ODT) 4 MG ODT tab Take 1 tablet (4 mg) by mouth every 8 hours as needed for vomiting 2/4/2019 at Unknown time Yes Farshad Mchugh,    ondansetron (ZOFRAN) 4 MG tablet Take 1 tablet (4 mg) by mouth every 6 hours 2/4/2019 at Unknown time Yes Yonny Ureña MD   tacrolimus (GENERIC EQUIVALENT) 1 MG capsule Take 1 capsule (1 mg) by mouth 2 times daily 2/4/2019 at pm Yes Sung Jorgensen MD   glucagon (GLUCAGON EMERGENCY) 1 MG kit Inject 1 mg into the muscle once for 1 dose   Marcie Lozano APRN CNP   lactulose 20 GM/30ML SOLN Take 30 mLs (20 g) by mouth 2 times daily as needed For constipation. Unknown at Unknown time  Dangelo Pelayo PA   mycophenolic acid (GENERIC EQUIVALENT) 180 MG EC tablet Take 2 tablets (360 mg) by mouth 2 times daily Unknown at has not restarted yet  Sung Jorgensen MD

## 2019-02-05 NOTE — ED PROVIDER NOTES
History     Chief Complaint:  Nausea and Vomiting     HPI   Murtaza Fitzgerald is a 43 year old male with a complex past medical history significant for CAD, diabetes, hypertension, immunosuppressed status and status post kidney and pancreas transplants who presents with a female  to the Emergency Department today for evaluation of nausea and vomiting. Patient reports his symptoms began four days ago with congestion, rhinorrhea, sore throat, nausea and fever of 100.9. Patient developed a cough. Two days ago he vomited once. Yesterday he vomited so many times he presented here. This morning he returned for continued nausea and vomiting. Patient reports rib pain from vomiting and coughing. No shortness of breath. He reports his kidney transplant is still working while he lost the pancreas three years after the transplant.    [facility] ED visit 2/4/19  Laboratory:  Laboratory findings were communicated with the patient who voiced understanding of the findings.  CBC: WBC 15.5 (H), o/w WNL (HGB 15.3, )  BMP:  (H), Creatinine 1.69 (H), GFR 53 (L), o/w WNL   Magnesium: 1.9    Allergies:  IV Reglan: delusions      Medications:    Aspirin 81 MG   Lipitor  Novolog  Lactulose  Reglan  Prilosec  Zofran  Tacrolimus  Glucagon      Past Medical History:    Capsulitis  CAD  Diabetes  Hypertension  Kidney transplant  Immunosuppressed status  Dyslipidemia  Pancreas transplant  Tobacco abuse    Past Surgical History:    Coronary artery bypass graft  Orthopedic surgery left knee  Pancreas transplant  Kidney transplant    Family History:    History reviewed. No pertinent family history.     Social History:  Smoking status: Former smoker  Alcohol use: Yes  Marital Status:   [4]     Review of Systems   Constitutional: Positive for fever.   HENT: Positive for congestion, rhinorrhea and sore throat.    Respiratory: Positive for cough. Negative for shortness of breath.    Gastrointestinal:  Positive for nausea and vomiting.   Musculoskeletal: Positive for arthralgias.   All other systems reviewed and are negative.      Physical Exam   First Vitals:  BP: (!) 164/96  Pulse: 88  Temp: 98  F (36.7  C)  Resp: 16  Weight: 91.6 kg (202 lb)  SpO2: 97 %      Physical Exam  General: The patient is alert, in no respiratory distress. Pallid and retching.    HENT: Mucous membranes dry    Cardiovascular: Regular rate and rhythm. Good pulses in all four extremities. Normal capillary refill and skin turgor.     Respiratory: Lungs are clear. No nasal flaring. No retractions. No wheezing, no crackles.    Gastrointestinal: Epigastric discomfort to palpation. Abdomen soft. No guarding, no rebound. No palpable hernias.     Musculoskeletal: No gross deformity.     Skin: No rashes or petechiae. Moist skin    Neurologic: The patient is alert and oriented x3. GCS 15. No testable cranial nerve deficit. Follows commands with clear and appropriate speech. Gives appropriate answers. Good strength in all extremities. No gross neurologic deficit. Gross sensation intact. Pupils are round and reactive. No meningismus.     Lymphatic: No cervical adenopathy. No lower extremity swelling.    Psychiatric: The patient is non-tearful.    Emergency Department Course     ECG (6:36:59):  Rate 72 bpm. MO interval 130. QRS duration 84. QT/QTc 412/451. P-R-T axes 30 4 22. Normal sinus rhythm with sinus arrhythmia. Interpreted at 0641 by Murtaza West MD.    Laboratory:  CBC: WBC 17.8 (H) o/w WNL (HGB 15.4, )  BMP: Glucose 216 (H), Creatinine 1.51 (H), GFR 56 (L) o/w WNL  Lipase: 34 (L)    Hepatic panel: Protein total 9.0 (H) o/w WNL    Troponin I: <0.015    Tacrolimus level: In process    Ketone beta-hydroxybutyrate: 1.9 (H)    Interventions:  0545: NS 1L IV Bolus  0621: Zofran 4 MG IV  0640: Ativan 0.5 MG IV  0640: Morphine 2 MG IV  0642: NS 1L IV Bolus  0803: Phenergan 12.5 MG IV  0850: Zofran 4 MG IV    Emergency Department  Course:  Past medical records, nursing notes, and vitals reviewed.  0618: I performed an exam of the patient and obtained history, as documented above.    IV inserted and blood drawn.    Findings and plan explained to the Patient who consents to admission.     0753: Discussed the patient with Catalina DIXON for Dr. Wynn, who will admit the patient to a obs bed for further monitoring, evaluation, and treatment.      Impression & Plan      Medical Decision Making:  Murtaza Fitzgerald has a history of a pancreatic transplant which is no longer functioning but his renal transplant is. I did read his chart and he has had a visit yesterday and in November for vomiting. I considered cyclic vomiting. I did consider other intraabdominal causes such as bowel obstruction, appendicitis amongst others. He does have a history of hernias but does not have a significant amount of abdominal pain as it is mostly rib pain that comes with vomiting. I did discuss a CT scan with the patient but I felt the risk of radiation was too high. He had a recent scan in November. Labs were checked. His white count is elevated but it was previously as well. I do not think it is likely he has pancreatitis. Considerations would be gastritis, gastroenteritis, toxins unlikely. He did report some cold symptoms which would support a viral cause but otherwise he is non-toxic. He was treated with multiple anti-emetics which did not work and he was admitted to the hospital for hydration and to help improve his nausea.    Diagnosis:    ICD-10-CM   1. Nausea and vomiting, intractability of vomiting not specified, unspecified vomiting type R11.2   2. Rib pain R07.81   3. Status post kidney transplant Z94.0   4. Diabetes mellitus due to underlying condition with hyperglycemia, with long-term current use of insulin (H) E08.65    Z79.4     Disposition:  Admitted    Drea Chao  2/5/2019   [facility] EMERGENCY DEPARTMENT  [place]  Disclosure:  I, Drea Zhanna, am serving as a scribe at 6:18 AM on 2/5/2019 to document services personally performed by Murtaza West MD based on my observations and the provider's statements to me.        Murtaza West MD  02/10/19 1101

## 2019-02-05 NOTE — ED NOTES
Placed pt on 2L O2 NC, pt continues to report improvement in symptoms and has no requests/complaints at this time.

## 2019-02-05 NOTE — PLAN OF CARE
ROOM # 203    Living Situation (if not independent, order SW consult): daughter and grandson, so in law live with him in a house  Facility name:  : Daughter Indigo downey or mother    Activity level at baseline: independent   Activity level on admit: SBA      Patient registered to observation; given Patient Bill of Rights; given the opportunity to ask questions about observation status and their plan of care.  Patient has been oriented to the observation room, bathroom and call light is in place.    Discussed discharge goals and expectations with patient/family.

## 2019-02-05 NOTE — H&P
History and Physical     Murtaza Fitzgerald MRN# [id number]   YOB: 1975 Age: 43 year old      Date of Admission:  2/5/2019    Primary care provider: Kt Ríos          Assessment and Plan:   Murtaza Fitzgerald is a 43 year old male with a complicated PMH listed below who presents to the ED for the second tie in 24 hrs for intractable n/v, dehydration and recent URI, likely suggestive of viral illness.     1. N/v with +serum ketones--started since Sun evening, waxing and waning. Tried to keep hydrated but was vomiting q15 mins per pt report and thus prompted admission. No fever or chills, no abd pain (except after retching), likely viral . Will cont IVF, anti-emetics. No vomiting since this am in the ED. Cont supportive cares.     2. Recent viral URI: started on Friday night, mild low grade, sinus congestion and sore throat, overall sl better, family had similar sxs. Cont supportive cares. Consider Influenza if pt does spike fevers.     3. DMI: s/p failed pancreas transplant. Has insulin pump. BG has been in the low 200's seen by Pharmacy and he is managing his own insulin pump with monitoring.     4. S/p renal and pancrease transplant (failed pancreas, functional renal tsplnt). Cont mycophenolic acid and tacrolimus.     5. Hx of chronic pain: PRN tyenol offered for abd pain from retching. No IV pain meds.     6. CAD: diffuse dz but no intervention needed. No CP.     [place] to OBS  Encourage ambulation  Dispo: likely home tomorrow                 Chief Complaint:   Intractable n/v         History of Present Illness:   Murtaza Fitzgerald is a 43 year old male with a complex past medical history significant for CAD, diabetes, hypertension, immunosuppressed status and status post kidney and pancreas transplants (failed pancreas) who presents to the Emergency Department for the second time in 24 hrs today for evaluation of intractable nausea and vomiting. Patient reports  his symptoms began four days ago with congestion, rhinorrhea, sore throat, nausea and fever of 100.9. Patient developed a cough. Overall that has since improved. His family had similar URI sxs.  Two days ago he vomited once. Yesterday he vomited so many times he presented here. This morning he returned for continued nausea and vomiting. He started to vomit at least every 15 mins that prompted him to seek admission. He denies any CP, SOB. BG has been in the 200's and is following his usual sliding scale insulin along with his insulin pump.              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     Noncompliance with treatment     no labs for one year     Pancreas replaced by transplant (H) Feb 2009    rejection treated with thymo     Tobacco abuse ongoing               Past Surgical History:     Past Surgical History:   Procedure Laterality Date     BYPASS GRAFT ARTERY CORONARY N/A 8/5/2015    Procedure: BYPASS GRAFT ARTERY CORONARY;  Surgeon: Katy Neville MD;  Location: [facility] OR     ORTHOPEDIC SURGERY  1993    tumor removed from left knee     TRANSPLANT      pancrease and kidney transplant               Social History:     Social History     Socioeconomic History     Marital status:      Spouse name: Not on file     Number of children: 1     Years of education: Not on file     Highest education level: Not on file   Social Needs     Financial resource strain: Not on file     Food insecurity - worry: Not on file     Food insecurity - inability: Not on file     Transportation needs - medical: Not on  file     Transportation needs - non-medical: Not on file   Occupational History     Occupation:    Tobacco Use     Smoking status: Former Smoker     Packs/day: 0.30     Types: Cigarettes     Smokeless tobacco: Never Used     Tobacco comment: E- Cig use still   Substance and Sexual Activity     Alcohol use: Yes     Alcohol/week: 0.0 oz     Comment: social     Drug use: No     Sexual activity: No     Partners: Female   Other Topics Concern     Parent/sibling w/ CABG, MI or angioplasty before 65F 55M? No      Service Not Asked     Blood Transfusions No     Comment: possibly during surgery, otherwise none.     Caffeine Concern Not Asked     Occupational Exposure Not Asked     Hobby Hazards Not Asked     Sleep Concern Not Asked     Stress Concern Not Asked     Weight Concern Not Asked     Special Diet Not Asked     Back Care Not Asked     Exercise Not Asked     Bike Helmet Not Asked     Seat Belt Yes     Self-Exams Not Asked   Social History Narrative     Not on file               Family History:     Family History   Problem Relation Age of Onset     Heart Disease Maternal Grandfather 62              Allergies:      Allergies   Allergen Reactions     Reglan Other (See Comments)     IV, delsuions               Medications:     Prior to Admission medications    Medication Sig Last Dose Taking? Auth Provider   acetone, Urine, test STRP 1 strip by In Vitro route daily  Yes Marcie Lozano APRN CNP   atorvastatin (LIPITOR) 40 MG tablet Take 0.5 tablets (20 mg) by mouth daily Past Month at Unknown time Yes Kt Ríos MD   YOHAN CONTOUR test strip USE TO TEST BLOOD SUGAR FIVE TIMES A DAY OR AS DIRECTED  Yes Kt Ríos MD   blood glucose monitoring (NO BRAND SPECIFIED) test strip Freestyle test strips 6 times daily (NOT lite and NOT insulinx)  Yes Marcie Lozano APRN CNP   blood glucose monitoring (NO BRAND SPECIFIED) test strip 5 times daily ( contour NEXT)  Yes Marcie Lozano  "APRN CNP   Blood Glucose Monitoring Suppl (Accessory Addict SocietyIA CONTOUR MONITOR) W/DEVICE KIT TO TEST BG 5 TIMIES DAILY  Yes Henrietta Tomas MD   Insulin Aspart (INSULIN PUMP - OUTPATIENT) Date last updated: 1/10/19  Omnipod  Insulin: Novolog  BASAL RATES and times:   12 AM (midnight): 1.15 units/hr  2 AM: 1.25  10 AM: 0.8  6 PM: 0.95  CARB RATIO and times:  12 AM - 11 AM: 9  11 AM - 8 PM: 6  8 PM - 12 AM: 7   BLOOD GLUCOSE TARGET and times:  12 AM (midnight): 100, correct above 120 with reverse correction on  Active Insulin Time: 4 hours  Dexcom sharing cose: [id number] 2/5/2019 at Unknown time Yes Unknown, Entered By History   insulin aspart (NOVOLOG PEN) 100 UNIT/ML injection DOSE:  2 units per 14 grams of carbohydrate..  at uses when insulin pump not working Yes Patricia Costa MD   insulin aspart (NOVOLOG VIAL) 100 UNITS/ML injection To be used in insulin pump, est TDD 75 units/day  Yes Marice Lozano APRN CNP   insulin pen needle (BD ARPITA U/F) 32G X 4 MM Use 4 daily or as directed.  Yes Kt Ríos MD   insulin syringe-needle U-100 (BD INSULIN SYRINGE ULTRAFINE) 31G X 5/16\" 1 ML Use one syringe 1 daily or as directed.  Yes Marcie Lozano APRN CNP   metoclopramide (REGLAN) 5 MG tablet TAKE ONE TABLET BY MOUTH FOUR TIMES A DAY (BEFORE MEALS AND NIGHTLY) 2/4/2019 at Unknown time Yes Kt Ríos MD   omeprazole (PRILOSEC) 40 MG DR capsule Take 1 capsule (40 mg) by mouth 2 times daily 2/4/2019 at pm Yes Kt Ríos MD   ondansetron (ZOFRAN ODT) 4 MG ODT tab Take 1 tablet (4 mg) by mouth every 8 hours as needed for vomiting 2/4/2019 at Unknown time Yes Farshad Mchugh,    ondansetron (ZOFRAN) 4 MG tablet Take 1 tablet (4 mg) by mouth every 6 hours 2/4/2019 at Unknown time Yes Yonny Ureña MD   tacrolimus (GENERIC EQUIVALENT) 1 MG capsule Take 1 capsule (1 mg) by mouth 2 times daily 2/4/2019 at pm Yes Sung Jorgensen MD   glucagon (GLUCAGON EMERGENCY) 1 MG kit Inject 1 " "mg into the muscle once for 1 dose   Marcie Lozano APRN CNP   lactulose 20 GM/30ML SOLN Take 30 mLs (20 g) by mouth 2 times daily as needed For constipation. Unknown at Unknown time  Dangelo Pelayo PA   mycophenolic acid (GENERIC EQUIVALENT) 180 MG EC tablet Take 2 tablets (360 mg) by mouth 2 times daily Unknown at has not restarted yet  Sung Jorgensen MD              Review of Systems:   A Comprehensive greater than 10 system review of systems was carried out.  Pertinent positives and negatives are noted above.  Otherwise negative for contributory information.            Physical Exam:   Blood pressure 150/86, pulse 95, temperature 98.7  F (37.1  C), temperature source Oral, resp. rate 18, height 1.727 m (5' 8\"), weight 92.7 kg (204 lb 4.8 oz), SpO2 95 %.  Exam:  GENERAL:  Comfortable. Sipping CLD  PSYCH: pleasant, oriented, No acute distress.  HEENT:  PERRLA. Normal conjunctiva, normal hearing, nasal mucosa and Oropharynx are normal.  NECK:  Supple, no neck vein distention, adenopathy or bruits, normal thyroid.  HEART:  Normal S1, S2 with no murmur, no pericardial rub, gallops or S3 or S4.  LUNGS:  Clear to auscultation, normal Respiratory effort. No wheezing, rales or ronchi.  ABDOMEN:  Soft, no hepatosplenomegaly, normal bowel sounds. Non-tender, non distended.   EXTREMITIES:  No pedal edema, +2 pulses bilateral and equal.  SKIN:  Dry to touch, No rash, wound or ulcerations.  NEUROLOGIC:  CN 2-12 intact, BL 5/5 symmetric upper and lower extremity strength, sensation is intact with no focal deficits.           Data:     Recent Labs   Lab 02/05/19  0547 02/04/19  0530   WBC 17.8* 15.5*   HGB 15.4 15.3   HCT 47.7 47.8   MCV 89 89    272     Recent Labs   Lab 02/05/19  0547 02/04/19  0530    137   POTASSIUM 4.3 4.3   CHLORIDE 106 108   CO2 21 24   ANIONGAP 11 5   * 189*   BUN 14 16   CR 1.51* 1.60*   GFRESTIMATED 56* 53*   GFRESTBLACK 65 61   CHARLY 9.4 8.7       Recent Labs "   Lab 02/05/19  1245   COLOR Straw   APPEARANCE Clear   URINEGLC 150*   URINEBILI Negative   URINEKETONE 20*   SG 1.010   UBLD Negative   URINEPH 6.0   PROTEIN 100*   NITRITE Negative   LEUKEST Negative   RBCU 4*   WBCU 1     Recent Labs   Lab 02/05/19  1245   URINEKETONE 20*     Lorri Hanson PA-C    This patient was seen and discussed with Dr. Wynn who agrees with the current plans as outlined above.

## 2019-02-05 NOTE — PHARMACY
Pharmacy: assistance with insulin pump management    Date last interrogated by pharmacy: 2/5/2019   Omnipod   Insulin: Novolog   BASAL RATES and times:   12 AM (midnight): 1.1 units/hr   2 AM: 1.25   10 AM: 0.8   6 PM: 0.95   CARB RATIO and times:   12 AM - 11 AM: 9   11 AM - 8 PM: 6   8 PM - 12 AM: 7   BLOOD GLUCOSE TARGET and times:   12 AM (midnight): 100, correct above 120 with reverse correction on   Active Insulin Time: 4 hours   ISF (insulin sensitivity factor):   40 from    38 from [id number]     Plan: Insulin pump infusing with same pta pump settings. Pt has dexcom CGM but notified pt that CGM not approved for hospital use- accuchecks will be done per hospital policy: ac, hs, 0200 & 0500

## 2019-02-05 NOTE — LETTER
[facility] OBSERVATION DEPARTMENT  [address]  [facility] [address]  [phone] February 8, 2019    RE:  Murtaza Fitzgerald                                                                                                                                                       [address] [facility] [id number]            To whom it may concern:    Murtaza Fitzgerald was hospitalized from 2/5/2019-02/08/19.  Please excuse him from work during this time period.  He can return to work without restrictions on 2/11/2019.      Sincerely,            Marcelle Loera MD

## 2019-02-05 NOTE — ED NOTES
[facility]  ED Nurse Handoff Report    Murtaza Fitzgerald is a 43 year old male   ED Chief complaint: Nausea & Vomiting  . ED Diagnosis:   Final diagnoses:   Nausea and vomiting, intractability of vomiting not specified, unspecified vomiting type   Rib pain   Status post kidney transplant   Diabetes mellitus due to underlying condition with hyperglycemia, with long-term current use of insulin (H)     Allergies:   Allergies   Allergen Reactions     Reglan Other (See Comments)     IV, delsuions       Code Status: Full Code  Activity level - Baseline/Home:  Independent. Activity Level - Current:   Independent . Lift room needed: No. Bariatric: No   Needed: No   Isolation: No. Infection: Not Applicable.     Vital Signs:   Vitals:    02/05/19 0647 02/05/19 0648 02/05/19 0700 02/05/19 0701   BP: (!) 181/101  180/78    Pulse: 71  77    Resp:       Temp:       TempSrc:       SpO2:  90% 93% 95%   Weight:           Cardiac Rhythm:  ,      Pain level:    Patient confused: No. Patient Falls Risk: Yes.   Elimination Status: Has voided   Patient Report - Initial Complaint: vomiting. Focused Assessment:  Gastrointestinal - GI WDL: -WDL except; all Gastrointestinal Comment: Pt c/o continued and worsening NV, denies bowel or bladder complaints. Pt dry heaving intermittently in ED, at home PO meds not working. Pt complex PMH of pancreas and kidney transplants, immunosuppressed, CAD, diabetes.   Tests Performed:   Labs Ordered and Resulted from Time of ED Arrival Up to the Time of Departure from the ED   CBC WITH PLATELETS DIFFERENTIAL - Abnormal; Notable for the following components:       Result Value    WBC 17.8 (*)     Absolute Neutrophil 15.4 (*)     All other components within normal limits   BASIC METABOLIC PANEL - Abnormal; Notable for the following components:    Glucose 216 (*)     Creatinine 1.51 (*)     GFR Estimate 56 (*)     All other components within normal limits   KETONE  BETA-HYDROXYBUTYRATE QUANTITATIVE - Abnormal; Notable for the following components:    Ketone Quantitative 1.9 (*)     All other components within normal limits   HEPATIC PANEL - Abnormal; Notable for the following components:    Protein Total 9.0 (*)     All other components within normal limits   LIPASE - Abnormal; Notable for the following components:    Lipase 34 (*)     All other components within normal limits   TACROLIMUS LEVEL   TROPONIN I   ROUTINE UA WITH MICROSCOPIC   DRUG ABUSE SCREEN 77 URINE (FL, RH, SH)   PERIPHERAL IV CATHETER   PERIPHERAL IV CATHETER   .   Treatments provided:   Medications   0.9% sodium chloride BOLUS (1,000 mLs Intravenous New Bag 2/5/19 0642)     Followed by   sodium chloride 0.9% infusion (not administered)   promethazine (PHENERGAN) IV injection 12.5 mg (not administered)   0.9% sodium chloride BOLUS (0 mLs Intravenous Stopped 2/5/19 0640)   ondansetron (ZOFRAN) injection 4 mg (4 mg Intravenous Given 2/5/19 0621)   LORazepam (ATIVAN) injection 0.5 mg (0.5 mg Intravenous Given 2/5/19 0640)   morphine (PF) injection 2 mg (2 mg Intravenous Given 2/5/19 0640)       Family Comments: wife at bedside  OBS brochure/video discussed/provided to patient:  yes  ED Medications:   Medications   0.9% sodium chloride BOLUS (1,000 mLs Intravenous New Bag 2/5/19 0642)     Followed by   sodium chloride 0.9% infusion (not administered)   promethazine (PHENERGAN) IV injection 12.5 mg (not administered)   0.9% sodium chloride BOLUS (0 mLs Intravenous Stopped 2/5/19 0640)   ondansetron (ZOFRAN) injection 4 mg (4 mg Intravenous Given 2/5/19 0621)   LORazepam (ATIVAN) injection 0.5 mg (0.5 mg Intravenous Given 2/5/19 0640)   morphine (PF) injection 2 mg (2 mg Intravenous Given 2/5/19 0640)     Drips infusing:  No  For the majority of the shift, the patient's behavior Green. Interventions performed were monitor  .     Severe Sepsis OR Septic Shock Diagnosis Present: No      ED Nurse Name/Phone Number:  Tomas Pinzon,   7:45 AM    RECEIVING UNIT ED HANDOFF REVIEW    Above ED Nurse Handoff Report was reviewed: Yes  Reviewed by: Ruth Kaur on February 5, 2019 at 8:37 AM

## 2019-02-05 NOTE — ED TRIAGE NOTES
NV continues from when he was here yesterday AM. zofran and reglan po not working.    Pt A&O x 3, CMS x 3, ABCD's adequate in triage

## 2019-02-06 ENCOUNTER — TELEPHONE (OUTPATIENT)
Dept: TRANSPLANT | Facility: CLINIC | Age: 44
End: 2019-02-06

## 2019-02-06 PROBLEM — R11.2 INTRACTABLE NAUSEA AND VOMITING: Status: ACTIVE | Noted: 2019-02-06

## 2019-02-06 LAB
ANION GAP SERPL CALCULATED.3IONS-SCNC: 9 MMOL/L (ref 3–14)
BASOPHILS # BLD AUTO: 0.1 10E9/L (ref 0–0.2)
BASOPHILS NFR BLD AUTO: 0.6 %
BUN SERPL-MCNC: 13 MG/DL (ref 7–30)
CALCIUM SERPL-MCNC: 8.3 MG/DL (ref 8.5–10.1)
CHLORIDE SERPL-SCNC: 108 MMOL/L (ref 94–109)
CO2 SERPL-SCNC: 21 MMOL/L (ref 20–32)
CREAT SERPL-MCNC: 1.32 MG/DL (ref 0.66–1.25)
DIFFERENTIAL METHOD BLD: ABNORMAL
EOSINOPHIL # BLD AUTO: 0.1 10E9/L (ref 0–0.7)
EOSINOPHIL NFR BLD AUTO: 0.4 %
ERYTHROCYTE [DISTWIDTH] IN BLOOD BY AUTOMATED COUNT: 13.2 % (ref 10–15)
GFR SERPL CREATININE-BSD FRML MDRD: 66 ML/MIN/{1.73_M2}
GLUCOSE BLDC GLUCOMTR-MCNC: 136 MG/DL (ref 70–99)
GLUCOSE BLDC GLUCOMTR-MCNC: 139 MG/DL (ref 70–99)
GLUCOSE BLDC GLUCOMTR-MCNC: 145 MG/DL (ref 70–99)
GLUCOSE BLDC GLUCOMTR-MCNC: 147 MG/DL (ref 70–99)
GLUCOSE BLDC GLUCOMTR-MCNC: 157 MG/DL (ref 70–99)
GLUCOSE BLDC GLUCOMTR-MCNC: 157 MG/DL (ref 70–99)
GLUCOSE BLDC GLUCOMTR-MCNC: 88 MG/DL (ref 70–99)
GLUCOSE SERPL-MCNC: 136 MG/DL (ref 70–99)
HCT VFR BLD AUTO: 43.3 % (ref 40–53)
HGB BLD-MCNC: 13.8 G/DL (ref 13.3–17.7)
IMM GRANULOCYTES # BLD: 0.2 10E9/L (ref 0–0.4)
IMM GRANULOCYTES NFR BLD: 1.4 %
KETONES BLD-SCNC: 1.3 MMOL/L (ref 0–0.6)
LYMPHOCYTES # BLD AUTO: 1.3 10E9/L (ref 0.8–5.3)
LYMPHOCYTES NFR BLD AUTO: 9.2 %
MCH RBC QN AUTO: 28.4 PG (ref 26.5–33)
MCHC RBC AUTO-ENTMCNC: 31.9 G/DL (ref 31.5–36.5)
MCV RBC AUTO: 89 FL (ref 78–100)
MONOCYTES # BLD AUTO: 0.7 10E9/L (ref 0–1.3)
MONOCYTES NFR BLD AUTO: 5 %
NEUTROPHILS # BLD AUTO: 11.5 10E9/L (ref 1.6–8.3)
NEUTROPHILS NFR BLD AUTO: 83.4 %
NRBC # BLD AUTO: 0 10*3/UL
NRBC BLD AUTO-RTO: 0 /100
PLATELET # BLD AUTO: 278 10E9/L (ref 150–450)
POTASSIUM SERPL-SCNC: 3.9 MMOL/L (ref 3.4–5.3)
RBC # BLD AUTO: 4.86 10E12/L (ref 4.4–5.9)
SODIUM SERPL-SCNC: 138 MMOL/L (ref 133–144)
WBC # BLD AUTO: 13.8 10E9/L (ref 4–11)

## 2019-02-06 PROCEDURE — 96375 TX/PRO/DX INJ NEW DRUG ADDON: CPT

## 2019-02-06 PROCEDURE — 25000128 H RX IP 250 OP 636: Performed by: INTERNAL MEDICINE

## 2019-02-06 PROCEDURE — 25000128 H RX IP 250 OP 636: Performed by: PHYSICIAN ASSISTANT

## 2019-02-06 PROCEDURE — 36415 COLL VENOUS BLD VENIPUNCTURE: CPT | Performed by: PHYSICIAN ASSISTANT

## 2019-02-06 PROCEDURE — 25000131 ZZH RX MED GY IP 250 OP 636 PS 637: Performed by: PHYSICIAN ASSISTANT

## 2019-02-06 PROCEDURE — 80048 BASIC METABOLIC PNL TOTAL CA: CPT | Performed by: PHYSICIAN ASSISTANT

## 2019-02-06 PROCEDURE — 96361 HYDRATE IV INFUSION ADD-ON: CPT

## 2019-02-06 PROCEDURE — 82010 KETONE BODYS QUAN: CPT | Performed by: PHYSICIAN ASSISTANT

## 2019-02-06 PROCEDURE — 96376 TX/PRO/DX INJ SAME DRUG ADON: CPT

## 2019-02-06 PROCEDURE — 99232 SBSQ HOSP IP/OBS MODERATE 35: CPT | Performed by: INTERNAL MEDICINE

## 2019-02-06 PROCEDURE — 25000128 H RX IP 250 OP 636: Performed by: HOSPITALIST

## 2019-02-06 PROCEDURE — 85025 COMPLETE CBC W/AUTO DIFF WBC: CPT | Performed by: PHYSICIAN ASSISTANT

## 2019-02-06 PROCEDURE — G0378 HOSPITAL OBSERVATION PER HR: HCPCS

## 2019-02-06 PROCEDURE — 12000011 ZZH R&B MS OVERFLOW

## 2019-02-06 PROCEDURE — 25000132 ZZH RX MED GY IP 250 OP 250 PS 637: Performed by: PHYSICIAN ASSISTANT

## 2019-02-06 PROCEDURE — 00000146 ZZHCL STATISTIC GLUCOSE BY METER IP

## 2019-02-06 RX ORDER — HYDROMORPHONE HYDROCHLORIDE 1 MG/ML
0.2 INJECTION, SOLUTION INTRAMUSCULAR; INTRAVENOUS; SUBCUTANEOUS EVERY 4 HOURS PRN
Status: DISCONTINUED | OUTPATIENT
Start: 2019-02-06 | End: 2019-02-07

## 2019-02-06 RX ORDER — SODIUM CHLORIDE 9 MG/ML
INJECTION, SOLUTION INTRAVENOUS CONTINUOUS
Status: DISCONTINUED | OUTPATIENT
Start: 2019-02-06 | End: 2019-02-08 | Stop reason: HOSPADM

## 2019-02-06 RX ORDER — LORAZEPAM 2 MG/ML
0.5 INJECTION INTRAMUSCULAR ONCE
Status: COMPLETED | OUTPATIENT
Start: 2019-02-06 | End: 2019-02-06

## 2019-02-06 RX ADMIN — ACETAMINOPHEN 975 MG: 325 TABLET, FILM COATED ORAL at 08:17

## 2019-02-06 RX ADMIN — LORAZEPAM 0.5 MG: 2 INJECTION, SOLUTION INTRAMUSCULAR; INTRAVENOUS at 03:32

## 2019-02-06 RX ADMIN — SODIUM CHLORIDE: 9 INJECTION, SOLUTION INTRAVENOUS at 19:03

## 2019-02-06 RX ADMIN — ONDANSETRON 4 MG: 4 TABLET, ORALLY DISINTEGRATING ORAL at 01:13

## 2019-02-06 RX ADMIN — OMEPRAZOLE 40 MG: 20 CAPSULE, DELAYED RELEASE ORAL at 19:53

## 2019-02-06 RX ADMIN — TACROLIMUS 1 MG: 1 CAPSULE ORAL at 19:53

## 2019-02-06 RX ADMIN — SODIUM CHLORIDE: 9 INJECTION, SOLUTION INTRAVENOUS at 03:32

## 2019-02-06 RX ADMIN — MYCOPHENOLIC ACID 360 MG: 360 TABLET, DELAYED RELEASE ORAL at 08:17

## 2019-02-06 RX ADMIN — PROCHLORPERAZINE EDISYLATE 10 MG: 5 INJECTION INTRAMUSCULAR; INTRAVENOUS at 02:08

## 2019-02-06 RX ADMIN — TACROLIMUS 1 MG: 1 CAPSULE ORAL at 08:17

## 2019-02-06 RX ADMIN — SODIUM CHLORIDE: 9 INJECTION, SOLUTION INTRAVENOUS at 12:38

## 2019-02-06 RX ADMIN — OMEPRAZOLE 40 MG: 20 CAPSULE, DELAYED RELEASE ORAL at 08:17

## 2019-02-06 RX ADMIN — PROCHLORPERAZINE EDISYLATE 10 MG: 5 INJECTION INTRAMUSCULAR; INTRAVENOUS at 08:00

## 2019-02-06 RX ADMIN — HYDROMORPHONE HYDROCHLORIDE 0.2 MG: 1 INJECTION, SOLUTION INTRAMUSCULAR; INTRAVENOUS; SUBCUTANEOUS at 11:38

## 2019-02-06 RX ADMIN — DEXTRAN 70, AND HYPROMELLOSE 2910 2 DROP: 1; 3 SOLUTION/ DROPS OPHTHALMIC at 19:03

## 2019-02-06 RX ADMIN — HYDROMORPHONE HYDROCHLORIDE 0.2 MG: 1 INJECTION, SOLUTION INTRAMUSCULAR; INTRAVENOUS; SUBCUTANEOUS at 19:59

## 2019-02-06 RX ADMIN — ACETAMINOPHEN 975 MG: 325 TABLET, FILM COATED ORAL at 18:04

## 2019-02-06 RX ADMIN — MYCOPHENOLIC ACID 360 MG: 360 TABLET, DELAYED RELEASE ORAL at 19:53

## 2019-02-06 RX ADMIN — ONDANSETRON 4 MG: 2 INJECTION INTRAMUSCULAR; INTRAVENOUS at 06:46

## 2019-02-06 NOTE — PROGRESS NOTES
Pt had a BGL of 75. Pt was asymptomatic. Pt ate 2 joyce crackers. Pt was rechecked at 86. Pt then drank 240 oz of Apple juice and rechecked his own sugar at 99. Plan: monitor symptoms of hypoglycemia. Recheck sugar at 0200. Encourage pt to eat and drink.

## 2019-02-06 NOTE — TELEPHONE ENCOUNTER
Call returned to Marcelle (provider at [facility]). She states that pt is currently admitted for intractable nausea/vomiting (which pt states he has a history of in the past). I asked that pt obtain follow-up tac level, as last level was undetected, emphasizing 12 hour level. I advised that pt reach out to [facility] office once he has discharged to follow-up with him.

## 2019-02-06 NOTE — PLAN OF CARE
"Raymond: A&Ox4  VS: /81 (BP Location: Left arm)   Pulse 76   Temp 98.2  F (36.8  C) (Oral)   Resp 16   Ht 1.727 m (5' 8\")   Wt 92.7 kg (204 lb 4.8 oz)   SpO2 94%   BMI 31.06 kg/m    LS: Clear, equal  GI: nausea and vomiting, improved slightly after IV dilaudid, pt also able to sleep now  : WNL  Skin: WNL  Activity: up independent, pt has not gotten out of bed much due to nausea   Diet: Full liquids, pt has not had much more than water and a couple sips of diet cola   Pain: improved after IV dilaudid    Pt has sea-bands on and orange/peppermint aromatherapy.  "

## 2019-02-06 NOTE — PLAN OF CARE
PRIMARY DIAGNOSIS: N/V- Rib pain     OUTPATIENT/OBSERVATION GOALS TO BE MET BEFORE DISCHARGE  1. Orthostatic performed: NA    2. Tolerating PO fluid and/or antibiotics (if applicable): Yes    3. Nausea/Vomiting/Diarrhea symptoms improved: Yes    4. Pain status: Improved-controlled with oral pain medications.    5. Return to near baseline physical activity: Yes    Discharge Planner Nurse   Safe discharge environment identified: Yes  Barriers to discharge: No       Entered by: Palak Shannon 02/05/2019 10:22 PM     Please review provider order for any additional goals.   Nurse to notify provider when observation goals have been met and patient is ready for discharge.    Patient is Alert and Oriented x4. Vitals are Temp: 98.4  F (36.9  C) Temp src: Oral BP: 142/79 Pulse: 87   Resp: 16 SpO2: 95 % O2 Device: None (Room air) Oxygen Delivery: 3 LPM Pt is complaining of 5/10 pain in their  Bilateral ribs dt Dry heaving.  Tylenol given for pain.  They are independent with Activity.    Pt is on a Full liquid diet and tolerating well. Patient is saline locked.   No nausea of vomiting/dry heaving since coming to floor. Ketones :1.9/1.1  Plan: monitor pain, nausea and output. To follow labs in AM.

## 2019-02-06 NOTE — PLAN OF CARE
Patient No change    PRIMARY DIAGNOSIS: Nausea and Vomiting  OUTPATIENT/OBSERVATION GOALS TO BE MET BEFORE DISCHARGE:  1. Stable vital signs Yes  2. Tolerating diet:No  3. Pain controlled with oral pain medications:  Yes  4. Positive bowel sounds:  Yes  5. Voiding without difficulty:  Yes  6. Able to ambulate:  Yes  7. Provider specific discharge goals met:  No    Vitals are Temp: 96.2  F (35.7  C) Temp src: Oral BP: 139/68 Pulse: 74   Resp: 16 SpO2: 92 %.  Patient is Alert and Oriented x4. They are independent with Activity.  Pt is a Full liquid diet.  They are complaining of pain in their ribs from dry heaving.  Patient is Saline locked.  Patient is feeling nauseated and oral zofran given.    Discharge Planner Nurse   Safe discharge environment identified: Yes  Barriers to discharge: No            Please review provider order for any additional goals.   Nurse to notify provider when observation goals have been met and patient is ready for discharge.

## 2019-02-06 NOTE — PLAN OF CARE
Patient No change    PRIMARY DIAGNOSIS: Nausea and Vomiting  OUTPATIENT/OBSERVATION GOALS TO BE MET BEFORE DISCHARGE:  1. Stable vital signs Yes  2. Tolerating diet:No  3. Pain controlled with oral pain medications:  Yes  4. Positive bowel sounds:  Yes  5. Voiding without difficulty:  Yes  6. Able to ambulate:  Yes  7. Provider specific discharge goals met:  No    Vitals are Temp: 95.9  F (35.5  C) Temp src: Oral BP: 149/76 Pulse: 74   Resp: 16 SpO2: 94 %.  Patient is Alert and Oriented x4. They are independent with Activity.  Pt is a Full liquid diet.  They are complaining of pain in their ribs from dry heaving. Patient is feeling nauseated and oral zofran, IV compazine, and IV ativan given.  Patient also has Normal saline running at 100mL per hour because he is feeling dehydrated.    Discharge Planner Nurse   Safe discharge environment identified: Yes  Barriers to discharge: No            Please review provider order for any additional goals.   Nurse to notify provider when observation goals have been met and patient is ready for discharge.

## 2019-02-06 NOTE — TELEPHONE ENCOUNTER
Provider Call: Transplant Lab/Orders  Route to LPN  Post Transplant Days: [id number]  When patient is less than 60 days post-transplant, route high priority  Reason for Call: Clarification; which order? General labs-  Liver patients reporting abnormal lab results: Route to RN and Page  Document lab facility information when provider is calling about annual lab orders. Delete facility wildcards when not needed.  Facility Name: [facility]  Facility Location: [place]    Pt has not been seen in a while, pt is at hospital lab currently to do labs. Lab would like to verify what is needed. The pt just started mycophenolate acid yesterday. They want to confirm if any labs are needed at all      Callback needed? Yes    Return Call Needed  Same as documented in contacts section  When to return call?: Now: [place] RN first, if no answer Page     RN krystina, encounter sent

## 2019-02-06 NOTE — PLAN OF CARE
PRIMARY DIAGNOSIS: N/V- Rib pain     OUTPATIENT/OBSERVATION GOALS TO BE MET BEFORE DISCHARGE  1. Orthostatic performed: NA    2. Tolerating PO fluid and/or antibiotics (if applicable): Yes    3. Nausea/Vomiting/Diarrhea symptoms improved: Yes    4. Pain status: Improved-controlled with oral pain medications.    5. Return to near baseline physical activity: Yes    Discharge Planner Nurse   Safe discharge environment identified: Yes  Barriers to discharge: No       Entered by: Palak Shannon 02/05/2019 6:42 PM     Please review provider order for any additional goals.   Nurse to notify provider when observation goals have been met and patient is ready for discharge.    Patient is Alert and Oriented x4. Vitals are Temp: 98.4  F (36.9  C) Temp src: Oral BP: 142/79 Pulse: 87   Resp: 16 SpO2: 95 % O2 Device: None (Room air) Oxygen Delivery: 3 LPM Pt is complaining of 5/10 pain in their  Bilateral ribs dt Dry heaving.  Tylenol given for pain.  They are independent with Activity.    Pt is on a Full liquid diet and tolerating well. Patient has Normal Saline 0.9% running at 100 mL per hour.   No nausea of vomiting/dry heaving since coming to floor. Ketones :1.9/1.1  Plan: monitor pain, nausea and output. To follow labs in AM.

## 2019-02-06 NOTE — PROGRESS NOTES
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  Continues to have nausea and emesis, unable to tolerate PO.    Problem List/Assessment and Plan:     1. Intractable Nausea/Vomiting: Patient has struggled with intermittent nausea and emesis.  In the past it was attributed to constipation as well as reflux.  He had an EGD in 2009 for nausea/emesis which showed a normal esophagus, hiatal hernia and gastritis.  He had a gastric emptying study in 11/2017 which did show delayed gastric emptying.  He saw his PCP at the end of January and was prescribed Reglan QHS but stated that seemed to make the nausea worse.  He does have some lower rib pain/epigastric pain from retching but otherwise no abdominal pain.  Has leukocytosis but this is improving with IVF and no antibiotics.  I suspect this is related to his gastroparesis which has been worsened by dehydration.  Continue antiemetics and IVF.  - Increase NS to 150 ml/hour  - Continue antiemetics (IV) as he is not tolerating PO    2. Recent URI: Suspect viral, family had similar symptoms.  Symptoms improving.  Do not suspect PNA.  No antibiotics indicated.    3. DM1 with Insulin Pump: Continue pump. Pharmacy following.    4. S/P Renal and Pancrease Transplant: Follows at [facility] transplant clinic with Dr. Jorgensen.  I spoke with Henrietta (transplant coordinator).  Recommended resuming PTA Mycophenolate (patient just started taking this the day of admission) and well as Tacrolimus (had missed several  doses over the past week).  He should call transplant clinic on day of discharge to set up lab draw to repeat levels.  - Continue Mycophenolate and Tacrolimus  - Call transplant clinic to set up lab draw for levels upon discharge    5. CAD: Had CABG in 8/2015. No CP or SOB.  Resume statin once he is able to take PO.    6. Lower Chest/Abdominal Pain: From recurrent emesis.  Not tolerating PO medications at this time.  Low dose IV Dilaudid until he can take PO.    Diet: Advance Diet as Tolerated: Full Liquid Diet; Regular Diet Adult    DVT Prophylaxis: Pneumatic Compression Devices  Mueller Catheter: not present  Code Status: Full Code      Disposition Plan   Expected discharge: 1-2 days, recommended to prior living arrangement once emesis resolved, tolerating PO intake.  Entered: Marcelle Loera MD 02/06/2019, 10:39 AM       The patient's care was discussed with the Bedside Nurse, Patient and Henrietta (clinical coordinator at transplant clinic).    Marcelle Loera MD  Hospitalist Service  [facility]          Interval History (Subjective):      Patient states he is still feeling bad.  He has had continued nausea despite antiemetics.  Vomited immediately after trying to take his oral medications this morning.  Has emesis after trying small amounts of liquids.  Does have pain in the ribs from dry heaving, Tylenol has not been effective (and does not stay down).  Other than rib pain in the lower chest no abdominal pain.  Denies diarrhea or constipation.  Is having flatus.  No SOB, fevers, chills.  States his sickness started last Friday with URI symptoms then on Saturday and again on Sunday he had one episode of emesis.  Things progressed to the point that he could not eat or drink and he feels very dehydrated.  States this has happened before when he gets sick (gets nausea and emesis, fluids typically resolve the severity).  Feels that this is worse than the past.    I spoke with Henrietta (transplant clinical  "coordinator) at [facility].  Patient was to restart Mycophenolate at the end of January but states that he took his first dose yesterday.  Also states he probably missed a few doses of Tacrolimus over the past week but cannot give me exact details of when or how many.  She recommended continuing medications as PTA.  Patient should call the clinic upon discharge to set up lab draw for levels in a few days post discharge.                  Physical Exam:      Last Vital Signs:  /85 (BP Location: Left arm)   Pulse 70   Temp 98.3  F (36.8  C) (Oral)   Resp 16   Ht 1.727 m (5' 8\")   Wt 92.7 kg (204 lb 4.8 oz)   SpO2 95%   BMI 31.06 kg/m      General: Alert, awake, appears uncomfortable due to nausea  HEENT: Normocephalic and atraumatic, eyes anicteric and without scleral injection, EOMI, face symmetric, MMM.  Cardiac: RRR, normal S1, S2. No m/g/r, no LE edema.  Pulmonary: Normal chest rise, normal work of breathing.  Lungs CTAB without crackles or wheezing.  Abdomen: soft, non-tender, non-distended.  Normoactive bowel sounds, no guarding or rebound tenderness.  Extremities: no deformities.  Warm, well perfused.  Skin: no rashes or lesions.  Warm and Dry.  Neuro: No focal deficits.  Speech clear.  Coordination and strength grossly normal.  Psych: Alert and oriented x3. Appropriate affect.         Medications:      All current medications were reviewed with changes reflected in problem list.         Data:      All new lab and imaging data was reviewed.   Labs:  Recent Labs   Lab 02/06/19 0642 02/05/19 0547 02/04/19  0530    138 137   POTASSIUM 3.9 4.3 4.3   CHLORIDE 108 106 108   CO2 21 21 24   ANIONGAP 9 11 5   * 216* 189*   BUN 13 14 16   CR 1.32* 1.51* 1.60*   GFRESTIMATED 66 56* 53*   GFRESTBLACK 77 65 61   CHARLY 8.3* 9.4 8.7     Recent Labs   Lab 02/06/19 0642 02/05/19  0547 02/04/19  0530   WBC 13.8* 17.8* 15.5*   HGB 13.8 15.4 15.3   HCT 43.3 47.7 47.8   MCV 89 89 89    306 272    "   Imaging:   No results found for this or any previous visit (from the past 24 hour(s)).    Marcelle Loera MD.

## 2019-02-06 NOTE — PLAN OF CARE
PRIMARY DIAGNOSIS: NAUSEA & VOMITING    OUTPATIENT/OBSERVATION GOALS TO BE MET BEFORE DISCHARGE  1. Orthostatic performed: N/A    2. Tolerating PO fluid and/or antibiotics (if applicable): No, Pt vomited shortly after taking his morning oral medications    3. Nausea/Vomiting/Diarrhea symptoms improved: No,  pt still has nausea after zofran and compazine this morning. Aromatherapy (orange/peppermint) has been given. Pt vomited shortly after taking morning oral medications     4. Pain status: Improved-controlled with oral pain medications, rib pain from vomiting.    5. Return to near baseline physical activity: Yes    VSS on RA. NS running at 100 ml/hr. Pt reports nausea and vomiting witnessed by writer. Compezine given, little relief reported. Aromatherapy given. Pt chose to hold his insulin bolus because he was not going to eat. Plan: monitor for nausea and vomiting, continue IVF    Discharge Planner Nurse   Safe discharge environment identified: Yes, home with family  Barriers to discharge: Yes, nausea and vomiting has not resolved       Entered by: Marcelle Stovall 02/06/2019 10:15 AM     Please review provider order for any additional goals.   Nurse to notify provider when observation goals have been met and patient is ready for discharge.

## 2019-02-07 LAB
ANION GAP SERPL CALCULATED.3IONS-SCNC: 7 MMOL/L (ref 3–14)
BUN SERPL-MCNC: 10 MG/DL (ref 7–30)
CALCIUM SERPL-MCNC: 8.3 MG/DL (ref 8.5–10.1)
CHLORIDE SERPL-SCNC: 109 MMOL/L (ref 94–109)
CO2 SERPL-SCNC: 23 MMOL/L (ref 20–32)
CREAT SERPL-MCNC: 1.4 MG/DL (ref 0.66–1.25)
ERYTHROCYTE [DISTWIDTH] IN BLOOD BY AUTOMATED COUNT: 13.3 % (ref 10–15)
GFR SERPL CREATININE-BSD FRML MDRD: 62 ML/MIN/{1.73_M2}
GLUCOSE BLDC GLUCOMTR-MCNC: 113 MG/DL (ref 70–99)
GLUCOSE BLDC GLUCOMTR-MCNC: 124 MG/DL (ref 70–99)
GLUCOSE BLDC GLUCOMTR-MCNC: 142 MG/DL (ref 70–99)
GLUCOSE BLDC GLUCOMTR-MCNC: 152 MG/DL (ref 70–99)
GLUCOSE BLDC GLUCOMTR-MCNC: 167 MG/DL (ref 70–99)
GLUCOSE SERPL-MCNC: 157 MG/DL (ref 70–99)
HCT VFR BLD AUTO: 40.5 % (ref 40–53)
HGB BLD-MCNC: 13 G/DL (ref 13.3–17.7)
MCH RBC QN AUTO: 29 PG (ref 26.5–33)
MCHC RBC AUTO-ENTMCNC: 32.1 G/DL (ref 31.5–36.5)
MCV RBC AUTO: 90 FL (ref 78–100)
PLATELET # BLD AUTO: 282 10E9/L (ref 150–450)
POTASSIUM SERPL-SCNC: 3.9 MMOL/L (ref 3.4–5.3)
RBC # BLD AUTO: 4.49 10E12/L (ref 4.4–5.9)
SODIUM SERPL-SCNC: 139 MMOL/L (ref 133–144)
WBC # BLD AUTO: 12.6 10E9/L (ref 4–11)

## 2019-02-07 PROCEDURE — 99232 SBSQ HOSP IP/OBS MODERATE 35: CPT | Performed by: INTERNAL MEDICINE

## 2019-02-07 PROCEDURE — 25000131 ZZH RX MED GY IP 250 OP 636 PS 637: Performed by: PHYSICIAN ASSISTANT

## 2019-02-07 PROCEDURE — 85027 COMPLETE CBC AUTOMATED: CPT | Performed by: INTERNAL MEDICINE

## 2019-02-07 PROCEDURE — 00000146 ZZHCL STATISTIC GLUCOSE BY METER IP

## 2019-02-07 PROCEDURE — 25000128 H RX IP 250 OP 636: Performed by: INTERNAL MEDICINE

## 2019-02-07 PROCEDURE — 36415 COLL VENOUS BLD VENIPUNCTURE: CPT | Performed by: INTERNAL MEDICINE

## 2019-02-07 PROCEDURE — 12000011 ZZH R&B MS OVERFLOW

## 2019-02-07 PROCEDURE — 25000132 ZZH RX MED GY IP 250 OP 250 PS 637: Performed by: INTERNAL MEDICINE

## 2019-02-07 PROCEDURE — C9113 INJ PANTOPRAZOLE SODIUM, VIA: HCPCS | Performed by: INTERNAL MEDICINE

## 2019-02-07 PROCEDURE — 80048 BASIC METABOLIC PNL TOTAL CA: CPT | Performed by: INTERNAL MEDICINE

## 2019-02-07 PROCEDURE — 25000128 H RX IP 250 OP 636: Performed by: PHYSICIAN ASSISTANT

## 2019-02-07 RX ORDER — HYDROMORPHONE HYDROCHLORIDE 1 MG/ML
0.2 INJECTION, SOLUTION INTRAMUSCULAR; INTRAVENOUS; SUBCUTANEOUS EVERY 4 HOURS PRN
Status: DISCONTINUED | OUTPATIENT
Start: 2019-02-07 | End: 2019-02-08

## 2019-02-07 RX ORDER — METOCLOPRAMIDE 5 MG/1
5 TABLET ORAL
Status: DISCONTINUED | OUTPATIENT
Start: 2019-02-07 | End: 2019-02-08 | Stop reason: HOSPADM

## 2019-02-07 RX ORDER — LABETALOL HYDROCHLORIDE 5 MG/ML
10 INJECTION, SOLUTION INTRAVENOUS
Status: DISCONTINUED | OUTPATIENT
Start: 2019-02-07 | End: 2019-02-08 | Stop reason: HOSPADM

## 2019-02-07 RX ORDER — OXYCODONE HYDROCHLORIDE 5 MG/1
5 TABLET ORAL EVERY 4 HOURS PRN
Status: DISCONTINUED | OUTPATIENT
Start: 2019-02-07 | End: 2019-02-08 | Stop reason: HOSPADM

## 2019-02-07 RX ADMIN — SODIUM CHLORIDE: 9 INJECTION, SOLUTION INTRAVENOUS at 08:12

## 2019-02-07 RX ADMIN — PROCHLORPERAZINE EDISYLATE 10 MG: 5 INJECTION INTRAMUSCULAR; INTRAVENOUS at 09:17

## 2019-02-07 RX ADMIN — Medication 0.2 MG: at 14:24

## 2019-02-07 RX ADMIN — PROCHLORPERAZINE EDISYLATE 10 MG: 5 INJECTION INTRAMUSCULAR; INTRAVENOUS at 14:24

## 2019-02-07 RX ADMIN — SODIUM CHLORIDE: 9 INJECTION, SOLUTION INTRAVENOUS at 14:57

## 2019-02-07 RX ADMIN — METOCLOPRAMIDE HYDROCHLORIDE 5 MG: 5 TABLET ORAL at 22:22

## 2019-02-07 RX ADMIN — Medication 0.2 MG: at 10:15

## 2019-02-07 RX ADMIN — MYCOPHENOLIC ACID 360 MG: 360 TABLET, DELAYED RELEASE ORAL at 20:50

## 2019-02-07 RX ADMIN — METOCLOPRAMIDE HYDROCHLORIDE 5 MG: 5 TABLET ORAL at 11:21

## 2019-02-07 RX ADMIN — OXYCODONE HYDROCHLORIDE 5 MG: 5 TABLET ORAL at 23:31

## 2019-02-07 RX ADMIN — SODIUM CHLORIDE: 9 INJECTION, SOLUTION INTRAVENOUS at 01:35

## 2019-02-07 RX ADMIN — SODIUM CHLORIDE: 9 INJECTION, SOLUTION INTRAVENOUS at 21:35

## 2019-02-07 RX ADMIN — PANTOPRAZOLE SODIUM 40 MG: 40 INJECTION, POWDER, FOR SOLUTION INTRAVENOUS at 20:50

## 2019-02-07 RX ADMIN — TACROLIMUS 1 MG: 1 CAPSULE ORAL at 20:50

## 2019-02-07 RX ADMIN — OXYCODONE HYDROCHLORIDE 5 MG: 5 TABLET ORAL at 18:54

## 2019-02-07 RX ADMIN — ONDANSETRON 4 MG: 2 INJECTION INTRAMUSCULAR; INTRAVENOUS at 08:49

## 2019-02-07 RX ADMIN — ONDANSETRON 4 MG: 2 INJECTION INTRAMUSCULAR; INTRAVENOUS at 16:04

## 2019-02-07 RX ADMIN — HYDROMORPHONE HYDROCHLORIDE 0.2 MG: 1 INJECTION, SOLUTION INTRAMUSCULAR; INTRAVENOUS; SUBCUTANEOUS at 00:50

## 2019-02-07 RX ADMIN — METOCLOPRAMIDE HYDROCHLORIDE 5 MG: 5 TABLET ORAL at 18:54

## 2019-02-07 ASSESSMENT — ACTIVITIES OF DAILY LIVING (ADL)
AMBULATION: 0-->INDEPENDENT
RETIRED_EATING: 0-->INDEPENDENT
DRESS: 0-->INDEPENDENT
BATHING: 0-->INDEPENDENT
RETIRED_COMMUNICATION: 0-->UNDERSTANDS/COMMUNICATES WITHOUT DIFFICULTY
TRANSFERRING: 0-->INDEPENDENT
COGNITION: 0 - NO COGNITION ISSUES REPORTED
SWALLOWING: 0-->SWALLOWS FOODS/LIQUIDS WITHOUT DIFFICULTY
FALL_HISTORY_WITHIN_LAST_SIX_MONTHS: NO
TOILETING: 0-->INDEPENDENT

## 2019-02-07 NOTE — PROGRESS NOTES
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  Continues to have nausea and emesis, unable to tolerate PO this morning.    Problem List/Assessment and Plan:     1. Intractable Nausea/Vomiting: Patient has struggled with intermittent nausea and emesis.  In the past it was attributed to constipation as well as reflux.  He had an EGD in 2009 for nausea/emesis which showed a normal esophagus, hiatal hernia and gastritis.  He had a gastric emptying study in 11/2017 which did show delayed gastric emptying.  He saw his PCP at the end of January and was prescribed Reglan QHS but stated that seemed to make the nausea worse (only took it a few times PRN).  He does have some lower rib pain/epigastric pain from retching but otherwise no abdominal pain.  Has leukocytosis but this is improving with IVF and no antibiotics.  I suspect this is related to his gastroparesis which has been worsened by dehydration and possibly worsened by reflux.  Continue antiemetics and IVF.  - Continue NS to 150 ml/hour  - Continue antiemetics   - Add scheduled Reglan  - Continue BID PPI    2. Recent URI: Suspect viral, family had similar symptoms.  Symptoms improving.  Do not suspect PNA.  No antibiotics indicated.    3. DM1 with Insulin Pump: Continue pump. Pharmacy following.    4. S/P Renal and Pancrease Transplant: Follows at [facility] transplant clinic with Dr. Jorgensen.  I spoke with Henrietta (transplant coordinator) on 2/6.  Recommended resuming PTA Mycophenolate  "(patient just started taking this the day of admission) and well as Tacrolimus (had missed several doses over the past week).  He should call transplant clinic on day of discharge to set up lab draw to repeat levels.  - Continue Mycophenolate and Tacrolimus  - Call transplant clinic to set up lab draw for levels upon discharge    5. CAD: Had CABG in 8/2015. No CP or SOB.  Resume statin once he is able to take PO.    6. Lower Chest/Abdominal Pain: From recurrent emesis.  Not tolerating PO medications at this time.  Low dose IV Dilaudid until he can take PO.    Diet: Advance Diet as Tolerated: Regular Diet Adult; Regular Diet Adult    DVT Prophylaxis: Pneumatic Compression Devices  Mueller Catheter: not present  Code Status: Full Code      Disposition Plan   Expected discharge: 1-2 days, recommended to prior living arrangement once emesis resolved, tolerating PO intake.  Entered: Marcelle Loera MD 02/07/2019, 9:43 AM       The patient's care was discussed with the Bedside Nurse, Patient and Henrietta (clinical coordinator at transplant clinic).    Marcelle Loera MD  Hospitalist Service  [facility]          Interval History (Subjective):      Patient states last night he was able to get some sleep and this morning was feeling better.  He then tried to have some water and ate a few bites of banana but then had return of his emesis and nausea.  He continues to have pain in his upper abdomen/lower ribs which he feels is related to repeated retching.  Pain medications do help and he thinks this allowed him to get sleep last night which helped control his nausea overnight.  He had a BM 2 nights ago.  Denies CP or SOB.  Does have some reflux.                  Physical Exam:      Last Vital Signs:  /79 (BP Location: Left arm)   Pulse 88   Temp 97.1  F (36.2  C) (Oral)   Resp 16   Ht 1.727 m (5' 8\")   Wt 92.7 kg (204 lb 4.8 oz)   SpO2 90%   BMI 31.06 kg/m      General: Alert, awake, appears " uncomfortable due to nausea  HEENT: Normocephalic and atraumatic, eyes anicteric and without scleral injection, EOMI, face symmetric, MMM.  Cardiac: RRR, normal S1, S2. No m/g/r, no LE edema.  Pulmonary: Normal chest rise, normal work of breathing.  Lungs CTAB without crackles or wheezing.  Abdomen: soft, non-tender, non-distended.  Normoactive bowel sounds, no guarding or rebound tenderness.  Extremities: no deformities.  Warm, well perfused.  Skin: no rashes or lesions.  Warm and Dry.  Neuro: No focal deficits.  Speech clear.  Coordination and strength grossly normal.  Psych: Alert and oriented x3. Appropriate affect.         Medications:      All current medications were reviewed with changes reflected in problem list.         Data:      All new lab and imaging data was reviewed.   Labs:  Recent Labs   Lab 02/07/19 0642 02/06/19  0642 02/05/19  0547    138 138   POTASSIUM 3.9 3.9 4.3   CHLORIDE 109 108 106   CO2 23 21 21   ANIONGAP 7 9 11   * 136* 216*   BUN 10 13 14   CR 1.40* 1.32* 1.51*   GFRESTIMATED 62 66 56*   GFRESTBLACK 72 77 65   CHARLY 8.3* 8.3* 9.4     Recent Labs   Lab 02/07/19 0642 02/06/19  0642 02/05/19  0547   WBC 12.6* 13.8* 17.8*   HGB 13.0* 13.8 15.4   HCT 40.5 43.3 47.7   MCV 90 89 89    278 306      Imaging:   No results found for this or any previous visit (from the past 24 hour(s)).    Marcelle Loera MD.

## 2019-02-07 NOTE — CONSULTS
[facility]  Gastroenterology Consultation         Marlon Berger MD    Patient Name: Murtaza Fitzgerald MRN# [id number]   YOB: 1975 Age: 43 year old      Date of Admission:  2/5/2019  Today's Date: 02/07/2019         Assessment and Plan:     Impression:  1.  Recurrent nausea and vomiting in gentleman with history as outlined below.  Differential diagnosis includes viral gastroenteritis, electrolyte imbalance, elevated glucose without evidence for DKA, less likely upper GI lesions given acute situation.  Chronic renal issues with slightly elevated creatinine may play a role in his nausea and vomiting.  Medications and/or side effects of medications also certainly may be playing a role.  At this juncture, no alarming symptoms which would warrant EGD in the acute setting.  However, if symptoms continue despite conservative measures, endoscopic evaluation can be reconsidered at that time.  2.  Multiple medical problems as outlined below under the care of Dr. Loera.    Recommendations:  1.  At this juncture would hold off on endoscopic evaluation.  2.  Recommend IV proton pump inhibitor twice daily.  3.  Optimize electrolytes and renal function.  4.  Once nausea and vomiting resolves, hopefully in the next day or so, would start clear liquid diet with advancement to gastric emptying diet as tolerated.  5.  If the patient's vomiting continues unabated, at that juncture could consider endoscopic evaluation.  6.  Should the patient develop any signs of significant abdominal distention would recommend CT scan of the abdomen and pelvis.  7.  Continue supportive care otherwise per Dr. Loera.  8.  Case and findings discussed with Dr. Loera.  Please call with any further questions.   9.  Thank you for allowing me to participate in this patient's healthcare.            Primary Care Physician:     Kt Ríos [phone]            Requesting Physician:        Dr. Alena Loera          Chief Complaint:     Nausea vomiting         History of Present Illness:     Murtaza Fitzgerald is a 43 year old male who is admitted from the emergency department at [facility] for nausea and vomiting.  He was seen twice in 24 hours for reported intractable nausea and vomiting and dehydration with recent upper respiratory tract infection and is admitted for further evaluation.  Patient reports vomiting every 15-30 minutes over the past 24-36 hours.  There is no abdominal pain.  There is no significant antecedent nausea.  He does have some nonspecific discomfort in the muscles of the upper abdomen from dry heaving.  Patient estimates that he is 25-30% improved and has not vomit in the last 1-2 hours.  He is currently being supported with IV fluids and as well as anti-emetics.  He does have a history of a recent upper respiratory tract infection that started last Friday night.  This may represent part of a larger viral process such as influenza which can also present with nausea vomiting.  He has a history of a failed pancreatic transplant and history of functional kidney transplant with slow increase in creatinine.    He reports a normal EGD 3-4 years ago performed at an outside hospital.  Patient denies melena, rectal bleeding, hematemesis, dysphagia or odynophagia.  He does have a history of reflux which he states has been increased recently and he associates with vomiting in the past.  No weight loss, jaundice, fatigue or pruritus noted.  As noted above, no significant abdominal pain except for some soreness from dry heaving.           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     Noncompliance with treatment     no labs for one year     Pancreas replaced by transplant (H) Feb 2009    rejection treated with thymo     Tobacco abuse ongoing             Past Surgical History:     Past Surgical History:   Procedure Laterality Date     BYPASS GRAFT ARTERY CORONARY N/A 8/5/2015    Procedure: BYPASS GRAFT ARTERY CORONARY;  Surgeon: Katy Neville MD;  Location: [facility] OR     ORTHOPEDIC SURGERY  1993    tumor removed from left knee     TRANSPLANT      pancrease and kidney transplant            Home Medications:     Prior to Admission medications    Medication Sig Last Dose Taking? Auth Provider   acetone, Urine, test STRP 1 strip by In Vitro route daily  Yes Marcie Lozano APRN CNP   atorvastatin (LIPITOR) 40 MG tablet Take 0.5 tablets (20 mg) by mouth daily Past Month at Unknown time Yes Kt Ríos MD   YOHAN CONTOUR test strip USE TO TEST BLOOD SUGAR FIVE TIMES A DAY OR AS DIRECTED  Yes Kt Ríos MD   blood glucose monitoring (NO BRAND SPECIFIED) test strip Freestyle test strips 6 times daily (NOT lite and NOT insulinx)  Yes Marcie Lozano APRN CNP   blood glucose monitoring (NO BRAND SPECIFIED) test strip 5 times daily ( contour NEXT)  Yes Marcie Lozano APRN CNP   Blood Glucose Monitoring Suppl ([employer] CONTOUR MONITOR) W/DEVICE KIT TO TEST BG 5 TIMIES DAILY  Yes Henrietta Tomas MD   Insulin Aspart (INSULIN PUMP - OUTPATIENT) Date last interrogated by pharmacy: 2/5/2019  Omnipod  Insulin: Novolog  BASAL RATES and times:   12 AM (midnight): 1.1 units/hr  2 AM: 1.25  10 AM: 0.8  6 PM: 0.95  CARB RATIO and times:  12 AM - 11 AM: 9  11 AM - 8 PM: 6  8 PM - 12 AM: 7   BLOOD GLUCOSE TARGET and times:  12 AM (midnight): 100, correct above 120 with reverse correction on  Active Insulin Time: 4 hours  ISF (insulin sensitivity factor):   40 from   38 from  "[id number]  Dexcom sharing cose: [id number] 2/5/2019 at Unknown time Yes Unknown, Entered By History   insulin aspart (NOVOLOG PEN) 100 UNIT/ML injection DOSE:  2 units per 14 grams of carbohydrate..  at uses when insulin pump not working Yes Patricia Costa MD   insulin aspart (NOVOLOG VIAL) 100 UNITS/ML injection To be used in insulin pump, est TDD 75 units/day  Yes Mracie Lozano APRN CNP   insulin pen needle (BD ARPITA U/F) 32G X 4 MM Use 4 daily or as directed.  Yes Kt Ríos MD   insulin syringe-needle U-100 (BD INSULIN SYRINGE ULTRAFINE) 31G X 5/16\" 1 ML Use one syringe 1 daily or as directed.  Yes Marcie Lozano APRN CNP   metoclopramide (REGLAN) 5 MG tablet TAKE ONE TABLET BY MOUTH FOUR TIMES A DAY (BEFORE MEALS AND NIGHTLY) 2/4/2019 at Unknown time Yes Kt Ríos MD   omeprazole (PRILOSEC) 40 MG DR capsule Take 1 capsule (40 mg) by mouth 2 times daily 2/4/2019 at pm Yes Kt Ríos MD   ondansetron (ZOFRAN ODT) 4 MG ODT tab Take 1 tablet (4 mg) by mouth every 8 hours as needed for vomiting 2/4/2019 at Unknown time Yes Farshad Mchugh DO   ondansetron (ZOFRAN) 4 MG tablet Take 1 tablet (4 mg) by mouth every 6 hours 2/4/2019 at Unknown time Yes Yonny Ureña MD   tacrolimus (GENERIC EQUIVALENT) 1 MG capsule Take 1 capsule (1 mg) by mouth 2 times daily 2/4/2019 at pm Yes Sung Jorgensen MD   glucagon (GLUCAGON EMERGENCY) 1 MG kit Inject 1 mg into the muscle once for 1 dose   Marcie Lozano APRN CNP   lactulose 20 GM/30ML SOLN Take 30 mLs (20 g) by mouth 2 times daily as needed For constipation. Unknown at Unknown time  Dangelo Pelayo PA   mycophenolic acid (GENERIC EQUIVALENT) 180 MG EC tablet Take 2 tablets (360 mg) by mouth 2 times daily Unknown at has not restarted yet  Sung Jorgensen MD            Current Medications:           acetaminophen  975 mg Oral TID     insulin aspart   Device See Admin Instructions     insulin bolus from " AMBULATORY PUMP   Subcutaneous 4x Daily AC & HS     insulin bolus from AMBULATORY PUMP   Subcutaneous TID AC     metoclopramide  5 mg Oral 4x Daily AC & HS     mycophenolic acid  360 mg Oral BID     omeprazole  40 mg Oral BID     tacrolimus  1 mg Oral BID     sore throat lozenge, glucose **OR** dextrose **OR** glucagon, HYDROmorphone, hypromellose-dextran, melatonin, naloxone, ondansetron **OR** ondansetron, oxyCODONE, polyethylene glycol, prochlorperazine **OR** prochlorperazine **OR** prochlorperazine, senna-docusate **OR** senna-docusate         Allergies:     Allergies   Allergen Reactions     Reglan Other (See Comments)     IV, delsuions            Social History:     Murtaza Fitzgerald  reports that he has quit smoking. His smoking use included cigarettes. He smoked 0.30 packs per day. he has never used smokeless tobacco. He reports that he drinks alcohol. He reports that he does not use drugs.          Family History:     Family History   Problem Relation Age of Onset     Heart Disease Maternal Grandfather 62             Review of Systems:     Comprehensive GI review of systems is completed and is negative except as above.             Physical Exam:     Vital Signs with Ranges  Temp:  [97.1  F (36.2  C)-98.4  F (36.9  C)] 97.1  F (36.2  C)  Pulse:  [76-95] 88  Resp:  [16] 16  BP: (129-152)/(66-79) 152/79  SpO2:  [90 %-95 %] 90 %  I/O's Last 24 hours  I/O last 3 completed shifts:  In: 2908 [I.V.:2908]  Out: -     Constitutional:  Alert and no distress.   HEENT: PERRL, EOMI, sclera nonicteric, conjunctiva pink and moist.  NECK: Supple, nontender. No lymphadenopathy, JVD or bruits noted.  PULMONARY: Clear to auscultation bilaterally.  CARDIOVASCULAR: S1, S2, no S3, no S4, no murmur, regular rate and rhythm  ABDOMEN: Soft, minimal tenderness over the upper abdomen, seems muscular from vomiting, nondistended, no tympany, no organomegaly, no fullness, guarding or rebound are noted.   NEURO: Alert and  oriented to place, time and person. Neurological exam grossly nonfocal, CN II through XII are grossly intact. Detailed neurological exam is not performed.  EXT: No cyanosis, clubbing or edema.         Data:   All new lab and imaging data was reviewed.  .  Recent Labs   Lab Test 02/07/19 0642 02/06/19  0642 02/05/19  0547  08/17/15  0640  08/05/15  1534 08/05/15  1353   WBC 12.6* 13.8* 17.8*   < > 16.0*   < > 14.2* 15.2*   HGB 13.0* 13.8 15.4   < > 11.4*   < > 11.0* 9.1*   MCV 90 89 89   < > 90   < > 88 88    278 306   < > 510*   < > 155 141*   INR  --   --   --   --  1.04  --  1.42* 1.65*    < > = values in this interval not displayed.     Recent Labs   Lab Test 02/07/19 0642 02/06/19  0642 02/05/19  0547    138 138   POTASSIUM 3.9 3.9 4.3   CHLORIDE 109 108 106   CO2 23 21 21   BUN 10 13 14   CR 1.40* 1.32* 1.51*   ANIONGAP 7 9 11     Recent Labs   Lab Test 02/05/19  1245 02/05/19  0547 11/20/18  1149 11/20/18  0954 12/22/16  0908  08/28/15  0647  08/22/15  1953  10/28/14  0803 08/07/14  0734 06/11/14  0741   ALBUMIN  --  3.5  --  4.0 3.8   < >  --    < > 3.3*   < >  --   --  3.9   BILITOTAL  --  0.6  --  0.4 0.3   < >  --    < > 0.6   < >  --   --  0.3   ALT  --  23  --  18 16   < >  --    < > 18   < >  --   --  15   AST  --  17  --  11 10   < >  --    < > 12   < >  --   --  21   ALKPHOS  --  119  --  113 119   < >  --    < > 123   < >  --   --  116   PROTEIN 100*  --  100*  --   --   --  30*   < >  --    < >  --   --   --    LIPASE  --  34*  --  45*  --   --   --   --  63*   < > 51* 46*  --    AMYLASE  --   --   --   --   --   --   --   --   --   --  56 51 73    < > = values in this interval not displayed.            Marlon Berger MD, [facility]  [phone]

## 2019-02-07 NOTE — PROGRESS NOTES
Patient reported sugar at 61 per pt's insulin monitor. Patient drank 2 apple juices and recheck via hospital glucometer was 142.

## 2019-02-07 NOTE — PLAN OF CARE
"BP (!) 183/92 (BP Location: Left arm)   Pulse 90   Temp 99  F (37.2  C) (Oral)   Resp 16   Ht 1.727 m (5' 8\")   Wt 92.7 kg (204 lb 4.8 oz)   SpO2 93%   BMI 31.06 kg/m      Neuro: WNL   Cardiac: WNL   Lungs: WNL   GI: Nausea and vomiting. Unable to tolerate PO.   : WNL   Pain: Rated 6/10 to Ribs   IV: IVF running at 150ml/hr   Meds: IV meds used at this time.   Diet: Regular   Activity: Independent   Plan: IVF, Manage nausea/vomiting/pain. GI consult.     Will continue to monitor and provide cares.      "

## 2019-02-07 NOTE — PLAN OF CARE
Vitals: VSS. Vital signs:  Temp: 97.1  F (36.2  C) Temp src: Oral BP: 140/66 Pulse: 76   Resp: 16 SpO2: 95 % O2 Device: None (Room air)   Labs: WBC: 13.8  Neuro: WDL  GI/: Patient had no complaints of nausea overnight.   Skin: WDL  Activity: Ind   Diet: Regular diet, tolerated well on eves and no nausea since last meal.   Pain: Pt did have abdominal pain overnight, PRN IV dilaudid given.    Plan: IVF NS at 150. Ind in room. Pt is on regular diet. Tolerated supper well last wayne. Monitor nausea/vomiting after breakfast this morning. Pt hopefully to discharge once able to tolerate PO intake.

## 2019-02-08 ENCOUNTER — TELEPHONE (OUTPATIENT)
Dept: TRANSPLANT | Facility: CLINIC | Age: 44
End: 2019-02-08

## 2019-02-08 VITALS
HEART RATE: 84 BPM | BODY MASS INDEX: 30.96 KG/M2 | SYSTOLIC BLOOD PRESSURE: 138 MMHG | OXYGEN SATURATION: 95 % | RESPIRATION RATE: 16 BRPM | DIASTOLIC BLOOD PRESSURE: 77 MMHG | WEIGHT: 204.3 LBS | TEMPERATURE: 98 F | HEIGHT: 68 IN

## 2019-02-08 DIAGNOSIS — Z94.0 KIDNEY REPLACED BY TRANSPLANT: Primary | ICD-10-CM

## 2019-02-08 LAB
ANION GAP SERPL CALCULATED.3IONS-SCNC: 7 MMOL/L (ref 3–14)
BUN SERPL-MCNC: 7 MG/DL (ref 7–30)
CALCIUM SERPL-MCNC: 8.3 MG/DL (ref 8.5–10.1)
CHLORIDE SERPL-SCNC: 106 MMOL/L (ref 94–109)
CO2 SERPL-SCNC: 26 MMOL/L (ref 20–32)
CREAT SERPL-MCNC: 1.32 MG/DL (ref 0.66–1.25)
ERYTHROCYTE [DISTWIDTH] IN BLOOD BY AUTOMATED COUNT: 13 % (ref 10–15)
GFR SERPL CREATININE-BSD FRML MDRD: 66 ML/MIN/{1.73_M2}
GLUCOSE BLDC GLUCOMTR-MCNC: 109 MG/DL (ref 70–99)
GLUCOSE BLDC GLUCOMTR-MCNC: 114 MG/DL (ref 70–99)
GLUCOSE BLDC GLUCOMTR-MCNC: 85 MG/DL (ref 70–99)
GLUCOSE SERPL-MCNC: 127 MG/DL (ref 70–99)
HCT VFR BLD AUTO: 42.2 % (ref 40–53)
HGB BLD-MCNC: 13.4 G/DL (ref 13.3–17.7)
MCH RBC QN AUTO: 28.5 PG (ref 26.5–33)
MCHC RBC AUTO-ENTMCNC: 31.8 G/DL (ref 31.5–36.5)
MCV RBC AUTO: 90 FL (ref 78–100)
PLATELET # BLD AUTO: 301 10E9/L (ref 150–450)
POTASSIUM SERPL-SCNC: 3.5 MMOL/L (ref 3.4–5.3)
RBC # BLD AUTO: 4.71 10E12/L (ref 4.4–5.9)
SODIUM SERPL-SCNC: 139 MMOL/L (ref 133–144)
WBC # BLD AUTO: 11.4 10E9/L (ref 4–11)

## 2019-02-08 PROCEDURE — 85027 COMPLETE CBC AUTOMATED: CPT | Performed by: INTERNAL MEDICINE

## 2019-02-08 PROCEDURE — 25000128 H RX IP 250 OP 636: Performed by: INTERNAL MEDICINE

## 2019-02-08 PROCEDURE — 36415 COLL VENOUS BLD VENIPUNCTURE: CPT | Performed by: INTERNAL MEDICINE

## 2019-02-08 PROCEDURE — 25000132 ZZH RX MED GY IP 250 OP 250 PS 637: Performed by: INTERNAL MEDICINE

## 2019-02-08 PROCEDURE — 25000131 ZZH RX MED GY IP 250 OP 636 PS 637: Performed by: PHYSICIAN ASSISTANT

## 2019-02-08 PROCEDURE — C9113 INJ PANTOPRAZOLE SODIUM, VIA: HCPCS | Performed by: INTERNAL MEDICINE

## 2019-02-08 PROCEDURE — 00000146 ZZHCL STATISTIC GLUCOSE BY METER IP

## 2019-02-08 PROCEDURE — 80048 BASIC METABOLIC PNL TOTAL CA: CPT | Performed by: INTERNAL MEDICINE

## 2019-02-08 PROCEDURE — 99239 HOSP IP/OBS DSCHRG MGMT >30: CPT | Performed by: INTERNAL MEDICINE

## 2019-02-08 RX ORDER — METOCLOPRAMIDE 5 MG/1
TABLET ORAL
Qty: 120 TABLET | Refills: 0 | Status: SHIPPED | OUTPATIENT
Start: 2019-02-08 | End: 2019-10-07

## 2019-02-08 RX ADMIN — SODIUM CHLORIDE: 9 INJECTION, SOLUTION INTRAVENOUS at 04:22

## 2019-02-08 RX ADMIN — MYCOPHENOLIC ACID 360 MG: 360 TABLET, DELAYED RELEASE ORAL at 07:43

## 2019-02-08 RX ADMIN — TACROLIMUS 1 MG: 1 CAPSULE ORAL at 07:43

## 2019-02-08 RX ADMIN — PANTOPRAZOLE SODIUM 40 MG: 40 INJECTION, POWDER, FOR SOLUTION INTRAVENOUS at 07:43

## 2019-02-08 RX ADMIN — METOCLOPRAMIDE HYDROCHLORIDE 5 MG: 5 TABLET ORAL at 07:43

## 2019-02-08 RX ADMIN — Medication 0.2 MG: at 05:40

## 2019-02-08 NOTE — PROGRESS NOTES
"GASTROENTEROLOGY PROGRESS NOTE        SUBJECTIVE:  No nausea or vomiting. Passing flatus. Tolerating liquids.      OBJECTIVE:    /75 (BP Location: Left arm)   Pulse 89   Temp 97.3  F (36.3  C) (Oral)   Resp 16   Ht 1.727 m (5' 8\")   Wt 92.7 kg (204 lb 4.8 oz)   SpO2 91%   BMI 31.06 kg/m    Temp (24hrs), Av.1  F (36.7  C), Min:97.1  F (36.2  C), Max:99  F (37.2  C)    No data found.    Intake/Output Summary (Last 24 hours) at 2019 1021  Last data filed at 2019 0422  Gross per 24 hour   Intake 2972 ml   Output --   Net 2972 ml        PHYSICAL EXAM     Constitutional: NAD  Abdomen: soft, NTTP         Additional Comments:  ROS, FH, SH: See initial GI consult for details.     I have reviewed the patient's new clinical lab results:     Recent Labs   Lab Test 19  0541 19  0642 19  0642  08/17/15  0640  08/05/15  1534 08/05/15  1353   WBC 11.4* 12.6* 13.8*   < > 16.0*   < > 14.2* 15.2*   HGB 13.4 13.0* 13.8   < > 11.4*   < > 11.0* 9.1*   MCV 90 90 89   < > 90   < > 88 88    282 278   < > 510*   < > 155 141*   INR  --   --   --   --  1.04  --  1.42* 1.65*    < > = values in this interval not displayed.     Recent Labs   Lab Test 19  0541 19  0642 19  0642   POTASSIUM 3.5 3.9 3.9   CHLORIDE 106 109 108   CO2 26 23 21   BUN 7 10 13   ANIONGAP 7 7 9     Recent Labs   Lab Test 19  1245 19  0547 18  1149 18  0954 16  0908  08/28/15  0647  08/22/15  1953  10/28/14  0803 14  0734 14  0741   ALBUMIN  --  3.5  --  4.0 3.8   < >  --    < > 3.3*   < >  --   --  3.9   BILITOTAL  --  0.6  --  0.4 0.3   < >  --    < > 0.6   < >  --   --  0.3   ALT  --  23  --  18 16   < >  --    < > 18   < >  --   --  15   AST  --  17  --  11 10   < >  --    < > 12   < >  --   --  21   PROTEIN 100*  --  100*  --   --   --  30*   < >  --    < >  --   --   --    LIPASE  --  34*  --  45*  --   --   --   --  63*   < > 51* 46*  --    AMYLASE  --   --   " --   --   --   --   --   --   --   --  56 51 73    < > = values in this interval not displayed.        ASSESSMENT/ PLAN    Murtaza Fitzgerald is a 44 yo male with medical history of coronary artery disease, DM insulin pump, prior failed kidney and pancreas transplant ( on immunosuppression) presenting with nausea and vomiting.      2. Recurrent nausea and vomiting: Resolved,  suspect viral gastroenteritis vs known gastroparesis. Tolerating liquids without further N/V. Improved with Reglan. Recommend gastroparesis diet ( smaller more frequent meals that are low in fat and low in fiber).     -- Stable from GI standpoint       Discussed with Dr. Celine Naik, [facility]  [facility]

## 2019-02-08 NOTE — DISCHARGE INSTRUCTIONS
1. Please call your transplant clinic.  They would like to check levels of your immunosuppression medications in the upcoming days.    2. Take Reglan before each meal as well as at night to help prevent nausea.

## 2019-02-08 NOTE — PLAN OF CARE
"/75 (BP Location: Left arm)   Pulse 89   Temp 97.3  F (36.3  C) (Oral)   Resp 16   Ht 1.727 m (5' 8\")   Wt 92.7 kg (204 lb 4.8 oz)   SpO2 91%   BMI 31.06 kg/m    Pt alert and oriented - up independently.  Pt denies nausea this morning. Scheduled Reglan given.  Pt rating pain 3/10 - denies need for intervention.   Tolerated regular diet for breakfast.  Plan to discharge home today.     AVS reviewed with patient. Pt reports understanding. Instructed patient to  prescribed prescription at preferred pharmacy. Plans to discharge home with daughter to transport.   "

## 2019-02-08 NOTE — PLAN OF CARE
PRIMARY DIAGNOSIS: ACUTE PAIN  OUTPATIENT/OBSERVATION GOALS TO BE MET BEFORE DISCHARGE:  1. Pain Status: Improved but still requiring IV narcotics.    2. Return to near baseline physical activity: Yes    3. Cleared for discharge by consultants (if involved): No, GI to see patient.    Discharge Planner Nurse   Safe discharge environment identified: Yes  Barriers to discharge: No       Entered by: Veronica Lugo 02/07/2019 6:13 PM    Patient is still having abdominal pain and nausea. The only nausea med that seems to help is compazine. Patient is not keeping pills down. Patient is willing to try supper - will see how it goes.      Please review provider order for any additional goals.   Nurse to notify provider when observation goals have been met and patient is ready for discharge.

## 2019-02-08 NOTE — PLAN OF CARE
"Vitals: VSS.   Temp: 97.1  F (36.2  C) Temp src: Oral BP: 120/71 Pulse: 99   Resp: 16 SpO2: 92 % O2 Device: None (Room air)   Labs: WBC: 13.8/12.6  Neuro: WDL  GI/: Patient had no complaints of nausea overnight.   Skin: WDL  Activity: Ind   Diet: Regular diet, tolerated well on eves and no nausea since last meal.   Pain: Pt taking PRN oxycodone for pain. IV dilaudid also given this morning as patient reporting \"feeling like i'm going to get an upset stomach.\"   Plan: IVF NS at 150. Ind in room. Pt is on regular diet. Tolerated supper well last wayne. Monitor nausea/vomiting. Pt hopefully to discharge once able to tolerate PO intake.    "

## 2019-02-08 NOTE — DISCHARGE SUMMARY
[facility]  Discharge Summary  Name: Murtaza Fitzgerald    MRN: [id number]  YOB: 1975    Age: 43 year old  Date of Discharge:  2/8/2019  Date of Admission: 2/5/2019  Primary Care Provider: Kt Ríos  Discharge Physician:  Marcelle Loera MD  Discharging Service:  Hospitalist      Discharge Diagnoses:  1. Intractable nausea and vomiting  2. Recent URI  3. DM1 with insulin pump  4. History of Renal and Pancrease Transplant  5. CAD  6. Lower Chest/Abdominal Pain     Follow-ups Needed After Discharge   1. Patient needs to follow up with PCP in one week  2. Patient needs to call transplant clinic to set up lab draw for immunosuppression drug levels    Unresulted Labs Ordered in the Past 30 Days of this Admission     No orders found from 10/16/2018 to 12/16/2018.        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 Patient was slow to improve, was seen by GI but by discharge was tolerating a regular diet.      1. Intractable Nausea/Vomiting: Patient has struggled with intermittent nausea and emesis.  In the past it was attributed to constipation as well as reflux.  He had an EGD in 2009 for nausea/emesis which showed a normal esophagus, hiatal hernia and gastritis.  He had a gastric emptying study in 11/2017 which did show delayed gastric emptying.  He saw his PCP at the end of January and was prescribed Reglan QHS but stated that seemed to make the nausea worse (only took it a few times PRN).  He does have some lower rib pain/epigastric pain from retching but otherwise no abdominal pain.  Has leukocytosis but this is improving with IVF and no antibiotics.  I suspect this is related to his gastroparesis which has  "been worsened by dehydration and possibly worsened by reflux.  He was seen by GI given persistent symptoms.  No imaging/scopes recommended as he did improve.  He was started on Reglan QID and QHS and I encouraged him to continue with this at discharge.  He will follow up with PCP within one week.     2. Recent URI: Suspect viral, family had similar symptoms.  Symptoms improving.  Do not suspect PNA.  No antibiotics indicated.     3. DM1 with Insulin Pump: Continue pump. Pharmacy following.  Continue PTA pump settings upon discharge.     4. S/P Renal and Pancrease Transplant: Follows at [facility] transplant clinic with Dr. Jorgensen.  I spoke with Henrietta (transplant coordinator) on 2/6.  Recommended resuming PTA Mycophenolate (patient just started taking this the day of admission) and well as Tacrolimus (had missed several doses over the past week).  He should call transplant clinic on day of discharge to set up lab draw to repeat levels, I did discuss this with him.     5. CAD: Had CABG in 8/2015. No CP or SOB.      6. Lower Chest/Abdominal Pain: From recurrent emesis.  Pain improving.  No narcotics at discharge.  Can take Tylenol if needed.     Discharge Disposition:  Discharged to home     Allergies:  Allergies   Allergen Reactions     Reglan Other (See Comments)     IV, delsuions        Condition on Discharge:  Discharge condition: Good   Discharge vitals: Blood pressure 129/75, pulse 89, temperature 97.3  F (36.3  C), temperature source Oral, resp. rate 16, height 1.727 m (5' 8\"), weight 92.7 kg (204 lb 4.8 oz), SpO2 91 %.   Code status on discharge: Full Code     History of Illness:  See detailed admission note for full details.    Physical Exam:  Blood pressure 129/75, pulse 89, temperature 97.3  F (36.3  C), temperature source Oral, resp. rate 16, height 1.727 m (5' 8\"), weight 92.7 kg (204 lb 4.8 oz), SpO2 91 %.  Wt Readings from Last 1 Encounters:   02/05/19 92.7 kg (204 lb 4.8 oz)     General: Alert, awake, no " acute distress.  HEENT: Normocephalic, atraumatic, eyes anicteric and without scleral injection, EOMI, MMM.  Cardiac: RRR, normal S1, S2.  No m/g/r. No LE edema.  Pulmonary: Normal chest rise, normal work of breathing.  Lungs CTAB  Abdomen: soft, non-tender, non-distended.  Normoactive BS.  No guarding or rebound tenderness.  Extremities: no deformities.  Warm, well perfused.  Skin: no rashes or lesions noted.  Warm and Dry.  Neuro: No focal deficits noted.  Speech clear.  Coordination and strength grossly normal.  Psych: Appropriate affect. Alert and oriented x3    Procedures other than Imaging:  IVF     Imaging:  Results for orders placed or performed during the hospital encounter of 11/20/18   CT Abdomen Pelvis w/o Contrast    Narrative    CT ABDOMEN AND PELVIS WITHOUT CONTRAST   11/20/2018 11:20 AM     HISTORY: Abdominal pain and vomiting.    TECHNIQUE: Volumetric helical sections were acquired from the lung  bases through the ischial tuberosities without IV contrast. Coronal  images were also reconstructed. Radiation dose for this scan was  reduced using automated exposure control, adjustment of the mA and/or  kV according to patient size, or iterative reconstruction technique.    COMPARISON: CT of the chest, abdomen, and pelvis performed 8/27/2015.    FINDINGS: No bowel obstruction. No convincing evidence for colitis or  diverticulitis. No free fluid in the pelvis. The appendix is not  visualized, however there are no significant inflammatory changes  identified in the right lower quadrant. A left lower quadrant renal  transplant is unremarkable. Atrophic native kidneys are again noted.  No hydronephrosis. Several ventral and paraumbilical hernias are new  since the previous exam, and contain a few knuckles of nondilated  small bowel.     Mild atherosclerotic aortoiliac calcification. Small hiatal hernia.  The liver, gallbladder, spleen, and adrenal glands have unremarkable  noncontrast appearances. The native  pancreas again appears atrophic.  Transplanted pancreas is again noted in the right lower quadrant and  is unchanged in appearance. Sternotomy. Coronary artery calcification.  Mild scarring and/or atelectasis in the lingula. The visualized lung  bases are otherwise clear.      Impression    IMPRESSION:   1. No acute abnormality in the abdomen or pelvis. No cause for  abdominal pain and vomiting is identified.  2. Several ventral and paraumbilical hernias have increased in size  since the previous exam, and contain a few knuckles of nondilated  small bowel. No evidence for associated bowel obstruction.  3. Small hiatal hernia.  4. 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           SAGRARIO GIL MD        Consultations:  Consultations This Hospital Stay   PHARMACY TO DOSE MEDICATION  GASTROENTEROLOGY IP CONSULT     Recent Lab Results:  Recent Labs   Lab 02/08/19  0541 02/07/19  0642 02/06/19  0642   WBC 11.4* 12.6* 13.8*   HGB 13.4 13.0* 13.8   HCT 42.2 40.5 43.3   MCV 90 90 89    282 278     Recent Labs   Lab 02/08/19  0541 02/07/19  0642 02/06/19  0642 02/05/19  0547 02/04/19  0530    139 138 138 137   POTASSIUM 3.5 3.9 3.9 4.3 4.3   CHLORIDE 106 109 108 106 108   CO2 26 23 21 21 24   ANIONGAP 7 7 9 11 5   * 157* 136* 216* 189*   BUN 7 10 13 14 16   CR 1.32* 1.40* 1.32* 1.51* 1.60*   GFRESTIMATED 66 62 66 56* 53*   GFRESTBLACK 77 72 77 65 61   CHARLY 8.3* 8.3* 8.3* 9.4 8.7   MAG  --   --   --   --  1.9   PROTTOTAL  --   --   --  9.0*  --    ALBUMIN  --   --   --  3.5  --    BILITOTAL  --   --   --  0.6  --    ALKPHOS  --   --   --  119  --    AST  --   --   --  17  --    ALT  --   --   --  23  --           Pending Results:    Unresulted Labs Ordered in the Past 30 Days of this Admission     No orders found from 12/7/2018 to 2/6/2019.           Discharge Instructions and Follow-Up:   Discharge Orders      Reason for your hospital stay    You were hospitalized for  nausea and vomiting.  This was likely due to dehydration, gastroparesis (slowing of the stomach motility) and possible a viral infection.     Follow-up and recommended labs and tests     Follow up with primary care provider, Kt Ríos, within 7 days for hospital follow- up.  No follow up labs or test are needed.    Please call your transplant clinic.  They would like to check levels of your immunosuppression medications in the upcoming days.     Activity    Your activity upon discharge: activity as tolerated     Full Code     Diet    Follow this diet upon discharge: Orders Placed This Encounter      Advance Diet as Tolerated: Regular Diet Adult; Regular Diet Adult     Discharge Medications   Current Discharge Medication List      CONTINUE these medications which have CHANGED    Details   metoclopramide (REGLAN) 5 MG tablet TAKE ONE TABLET BY MOUTH FOUR TIMES A DAY (BEFORE MEALS AND NIGHTLY)  Qty: 120 tablet, Refills: 0    Comments: Patient will call when this medication should be filled.  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 YOHAN CONTOUR test strip USE TO TEST BLOOD SUGAR FIVE TIMES A DAY OR AS DIRECTED  Qty: 450 each, Refills: 1    Associated Diagnoses: Type 1 diabetes mellitus, uncontrolled (H)      !! blood glucose monitoring (NO BRAND SPECIFIED) test strip Freestyle test strips 6 times daily (NOT lite and NOT insulinx)  Qty: 300 strip, Refills: 11    Associated Diagnoses: Type 1 diabetes mellitus with diabetic  "cardiomyopathy (H)      !! 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)      Blood Glucose Monitoring Suppl ([employer] CONTOUR MONITOR) W/DEVICE KIT TO TEST BG 5 TIMIES DAILY  Qty: 1 kit, Refills: 0    Associated Diagnoses: Diabetes mellitus, type 1      Insulin Aspart (INSULIN PUMP - OUTPATIENT) Date last interrogated by pharmacy: 2/5/2019  Omnipod  Insulin: Novolog  BASAL RATES and times:   12 AM (midnight): 1.1 units/hr  2 AM: 1.25  10 AM: 0.8  6 PM: 0.95  CARB RATIO and times:  12 AM - 11 AM: 9  11 AM - 8 PM: 6  8 PM - 12 AM: 7   BLOOD GLUCOSE TARGET and times:  12 AM (midnight): 100, correct above 120 with reverse correction on  Active Insulin Time: 4 hours  ISF (insulin sensitivity factor):   40 from   38 from [id number]  Dexcom sharing cose: [id number]      insulin aspart (NOVOLOG PEN) 100 UNIT/ML injection DOSE:  2 units per 14 grams of carbohydrate..  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)      omeprazole (PRILOSEC) 40 MG DR capsule Take 1 capsule (40 mg) by mouth " 2 times daily  Qty: 180 capsule, Refills: 3    Associated Diagnoses: Gastroesophageal reflux disease without esophagitis      ondansetron (ZOFRAN) 4 MG tablet Take 1 tablet (4 mg) by mouth every 6 hours  Qty: 8 tablet, Refills: 0      tacrolimus (GENERIC EQUIVALENT) 1 MG capsule Take 1 capsule (1 mg) by mouth 2 times daily  Qty: 60 capsule, Refills: 1    Associated Diagnoses: Kidney transplanted      glucagon (GLUCAGON EMERGENCY) 1 MG kit Inject 1 mg into the muscle once for 1 dose  Qty: 1 mg, Refills: 0    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  Qty: 473 mL, Refills: 1    Associated Diagnoses: Constipation      mycophenolic acid (GENERIC EQUIVALENT) 180 MG EC tablet Take 2 tablets (360 mg) by mouth 2 times daily  Qty: 360 tablet, Refills: 3    Associated Diagnoses: Kidney replaced by transplant       !! - Potential duplicate medications found. Please discuss with provider.      STOP taking these medications       ondansetron (ZOFRAN ODT) 4 MG ODT tab Comments:   Reason for Stopping:               Time Spent on this Encounter   I, Marcelle Loera, personally saw the patient today and spent greater than 30 minutes discharging this patient.    Marcelle Loera MD

## 2019-02-08 NOTE — TELEPHONE ENCOUNTER
Incoming call from pt. He was discharged from hospital today - had issues with N/V and URI. He states he is feeling better. I asked that he collect follow-up lab work next week, including 12 hour tac level. Orders placed. Message sent to SOT  to set him up with nephrology visit.  Pt verbalizes understanding of plan.

## 2019-02-11 ENCOUNTER — PATIENT OUTREACH (OUTPATIENT)
Dept: CARE COORDINATION | Facility: CLINIC | Age: 44
End: 2019-02-11

## 2019-02-11 ENCOUNTER — TELEPHONE (OUTPATIENT)
Dept: TRANSPLANT | Facility: CLINIC | Age: 44
End: 2019-02-11

## 2019-02-11 NOTE — TELEPHONE ENCOUNTER
Spoke with the patient and confirmed Transplant appointments on 3/28/19.  Informed patient an itinerary can be accessed on [employer], and will be sent via mail.

## 2019-02-11 NOTE — PROGRESS NOTES
Clinic Care Coordination Contact    Clinic Care Coordination Contact  OUTREACH    Referral Information:  Referral Source: IP Report    Chief Complaint   Patient presents with     Clinic Care Coordination - Post Hospital     nausea, vomiting        Universal Utilization:   Clinic Utilization  Difficulty keeping appointments:: No  Compliance Concerns: No  No-Show Concerns: No  No PCP office visit in Past Year: No  Utilization    Last refreshed: 2/9/2019  3:00 PM:  Hospital Admissions 1           Last refreshed: 2/9/2019  3:00 PM:  ED Visits 2           Last refreshed: 2/9/2019  3:00 PM:  No Show Count (past year) 3              Current as of: 2/9/2019  3:00 PM            Clinical Concerns:  Current Medical Concerns:  Patient reports blood sugars high at times in the evenings. 242, continuous glucose monitoring. Otherwise normally between 120-130's. Patient reports he is doing better since out of hospital. Still having some nausea. Educated about essential oils, ivanna.   Reports tried calling [facility] clinic twice but was on hold for too long.  Assisted patient to schedule Primary Care Provider follow up for Thursday at 1:40 pm and lab appointment for Thursday at 07:20 am.   Education Provided to patient: CC Role.      Health Maintenance Reviewed: Not assessed  Clinical Pathway: None    Medication Management:  Patient independent with management of his medications. No concerns expressed at this time.     Functional Status:   Not addressed during outreach.     Living Situation:   Not addressed during outreach.     Diet/Exercise/Sleep:  Diet:: Regular  Inadequate nutrition:: No. Patient reports he is staying well hydrated.     Transportation:  Transportation concerns: No  Transportation means:: Regular car     Psychosocial:     Financial/Insurance:   Financial/Insurance concerns (GOAL):: No     Resources and Interventions:  Current Resources:   Advance Care Plan/Directive  Advanced Care Plans/Directives on file:: Yes  Type  Advanced Care Plans/Directives: Advanced Directive - On File  Advanced Care Plan/Directive Status: Not Applicable    Referrals Placed: None     Patient/Caregiver understanding: Patient verbalized understanding and denies any additional questions or concerns at this time. RNCC engaged in [employer] communications during encounter.      Future Appointments              In 3 days EA LAB [facility], [facility]    In 3 days Kt Ríos MD [facility]    In 1 week Patricia Costa MD [facility]          Plan: At this time, patient denies outstanding need for connection or referral to resources or assistance navigating recommended follow up care. No further Care Coordination outreaches scheduled at this time, patient provided with this writer's number and encouraged to call with future needs or questions.    Lali Smiley RN Care Coordinator  [facility] [place]  Email: [email or web address]  Phone: [phone]

## 2019-02-14 ENCOUNTER — OFFICE VISIT (OUTPATIENT)
Dept: PEDIATRICS | Facility: CLINIC | Age: 44
End: 2019-02-14
Payer: COMMERCIAL

## 2019-02-14 VITALS
DIASTOLIC BLOOD PRESSURE: 66 MMHG | OXYGEN SATURATION: 99 % | WEIGHT: 201.6 LBS | TEMPERATURE: 97.7 F | SYSTOLIC BLOOD PRESSURE: 102 MMHG | HEART RATE: 103 BPM | HEIGHT: 68 IN | BODY MASS INDEX: 30.55 KG/M2

## 2019-02-14 DIAGNOSIS — I43 TYPE 1 DIABETES MELLITUS WITH DIABETIC CARDIOMYOPATHY (H): ICD-10-CM

## 2019-02-14 DIAGNOSIS — Z94.0 KIDNEY REPLACED BY TRANSPLANT: ICD-10-CM

## 2019-02-14 DIAGNOSIS — E10.59 TYPE 1 DIABETES MELLITUS WITH DIABETIC CARDIOMYOPATHY (H): ICD-10-CM

## 2019-02-14 DIAGNOSIS — I25.10 CORONARY ARTERY DISEASE, NON-OCCLUSIVE: Primary | ICD-10-CM

## 2019-02-14 DIAGNOSIS — E78.5 HYPERLIPIDEMIA WITH TARGET LDL LESS THAN 100: ICD-10-CM

## 2019-02-14 DIAGNOSIS — D84.9 IMMUNOSUPPRESSED STATUS (H): ICD-10-CM

## 2019-02-14 DIAGNOSIS — I10 HYPERTENSION GOAL BP (BLOOD PRESSURE) < 140/90: ICD-10-CM

## 2019-02-14 LAB
ANION GAP SERPL CALCULATED.3IONS-SCNC: 7 MMOL/L (ref 3–14)
BUN SERPL-MCNC: 20 MG/DL (ref 7–30)
CALCIUM SERPL-MCNC: 9 MG/DL (ref 8.5–10.1)
CHLORIDE SERPL-SCNC: 104 MMOL/L (ref 94–109)
CO2 SERPL-SCNC: 27 MMOL/L (ref 20–32)
CREAT SERPL-MCNC: 1.69 MG/DL (ref 0.66–1.25)
ERYTHROCYTE [DISTWIDTH] IN BLOOD BY AUTOMATED COUNT: 13.8 % (ref 10–15)
GFR SERPL CREATININE-BSD FRML MDRD: 49 ML/MIN/{1.73_M2}
GLUCOSE SERPL-MCNC: 158 MG/DL (ref 70–99)
HCT VFR BLD AUTO: 43.5 % (ref 40–53)
HGB BLD-MCNC: 14.5 G/DL (ref 13.3–17.7)
MCH RBC QN AUTO: 28.9 PG (ref 26.5–33)
MCHC RBC AUTO-ENTMCNC: 33.3 G/DL (ref 31.5–36.5)
MCV RBC AUTO: 87 FL (ref 78–100)
PLATELET # BLD AUTO: 354 10E9/L (ref 150–450)
POTASSIUM SERPL-SCNC: 4.4 MMOL/L (ref 3.4–5.3)
RBC # BLD AUTO: 5.01 10E12/L (ref 4.4–5.9)
SODIUM SERPL-SCNC: 138 MMOL/L (ref 133–144)
WBC # BLD AUTO: 11.9 10E9/L (ref 4–11)

## 2019-02-14 PROCEDURE — 80197 ASSAY OF TACROLIMUS: CPT | Performed by: INTERNAL MEDICINE

## 2019-02-14 PROCEDURE — 99495 TRANSJ CARE MGMT MOD F2F 14D: CPT | Performed by: INTERNAL MEDICINE

## 2019-02-14 PROCEDURE — 85027 COMPLETE CBC AUTOMATED: CPT | Performed by: INTERNAL MEDICINE

## 2019-02-14 PROCEDURE — 36415 COLL VENOUS BLD VENIPUNCTURE: CPT | Performed by: INTERNAL MEDICINE

## 2019-02-14 PROCEDURE — 80048 BASIC METABOLIC PNL TOTAL CA: CPT | Performed by: INTERNAL MEDICINE

## 2019-02-14 ASSESSMENT — MIFFLIN-ST. JEOR: SCORE: 1793.95

## 2019-02-14 NOTE — PROGRESS NOTES
"  SUBJECTIVE:   Murtaza Fitzgerald is a 43 year old male who presents to clinic today for the following health issues:        Hospital Follow-up Visit:    Hospital/Nursing Home/IP Rehab Facility: [facility]  Date of Admission: 2/5/2019  Date of Discharge: 2/8/2019  Reason(s) for Admission: N and V            Problems taking medications regularly:  None       Medication changes since discharge: Srart taking metoclopramide       Problems adhering to non-medication therapy:  None    Summary of hospitalization:  [facility] discharge summary reviewed  Diagnostic Tests/Treatments reviewed.  Follow up needed: none  Other Healthcare Providers Involved in Patient s Care:         None  Update since discharge: improved.     Post Discharge Medication Reconciliation: discharge medications reconciled, continue medications without change.  Plan of care communicated with patient     Coding guidelines for this visit:  Type of Medical   Decision Making Face-to-Face Visit       within 7 Days of discharge Face-to-Face Visit        within 14 days of discharge   Moderate Complexity [id number]   High Complexity [id number]          A few days before admission, had some congestion and upset stomach; could not stop vomiting.  Went to emergency room.  Given IVF and sent home.  Then next AM symptoms worsened again, admitted for 4 nights.  Restarted reglan; does not note much of a difference.   Currently still nauseous most every AM.  (Remains with a sore throat recently).  No fevers or chills noted.  Last weekend did have a \"low grade fever\".     Takes prilosec (omeprazole) and four times daily reglan.  Takes zofran when symptoms are severe, but also does not help much.      Did not believe that abd wall hernia was the cause.     Has flare ups of his vomiting and dehydration on average of once daily.     Discharge summary:    Discharge Diagnoses:  1. Intractable nausea and vomiting  2. Recent URI  3. DM1 with " insulin pump  4. History of Renal and Pancrease Transplant  5. CAD  6. Lower Chest/Abdominal Pain      Follow-ups Needed After Discharge   1. Patient needs to follow up with PCP in one week  2. Patient needs to call transplant clinic to set up lab draw for immunosuppression drug levels         Unresulted Labs Ordered in the Past 30 Days of this Admission      No orders found from 10/16/2018 to 12/16/2018.          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 Patient was slow to improve, was seen by GI but by discharge was tolerating a regular diet.      1. Intractable Nausea/Vomiting: Patient has struggled with intermittent nausea and emesis.  In the past it was attributed to constipation as well as reflux.  He had an EGD in 2009 for nausea/emesis which showed a normal esophagus, hiatal hernia and gastritis.  He had a gastric emptying study in 11/2017 which did show delayed gastric emptying.  He saw his PCP at the end of January and was prescribed Reglan QHS but stated that seemed to make the nausea worse (only took it a few times PRN).  He does have some lower rib pain/epigastric pain from retching but otherwise no abdominal pain.  Has leukocytosis but this is improving with IVF and no antibiotics.  I suspect this is related to his gastroparesis which has been worsened by dehydration and possibly worsened by reflux.  He was seen by GI given persistent symptoms.  No imaging/scopes recommended as he did improve.  He was started on Reglan QID and QHS and I encouraged him to continue with this at discharge.  He will follow up with PCP within one week.     2. Recent URI: Suspect viral, family had similar symptoms.  Symptoms improving.  Do not  suspect PNA.  No antibiotics indicated.     3. DM1 with Insulin Pump: Continue pump. Pharmacy following.  Continue PTA pump settings upon discharge.     4. S/P Renal and Pancrease Transplant: Follows at [facility] transplant clinic with Dr. Jorgensen.  I spoke with Henrietta (transplant coordinator) on 2/6.  Recommended resuming PTA Mycophenolate (patient just started taking this the day of admission) and well as Tacrolimus (had missed several doses over the past week).  He should call transplant clinic on day of discharge to set up lab draw to repeat levels, I did discuss this with him.     5. CAD: Had CABG in 8/2015. No CP or SOB.      6. Lower Chest/Abdominal Pain: From recurrent emesis.  Pain improving.  No narcotics at discharge.  Can take Tylenol if needed.         Problem list and histories reviewed & adjusted, as indicated.  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)     Coronary artery disease involving native coronary artery of native heart without angina pectoris     Adhesive capsulitis of right shoulder     Nausea & vomiting     Intractable nausea and vomiting     Past Surgical History:   Procedure Laterality Date     BYPASS GRAFT ARTERY CORONARY N/A 8/5/2015    Procedure: BYPASS GRAFT ARTERY CORONARY;  Surgeon: Katy Neville MD;  Location: [facility] OR     ORTHOPEDIC SURGERY  1993    tumor removed from left knee     TRANSPLANT      pancrease and kidney transplant       Social History     Tobacco Use     Smoking status: Former Smoker     Packs/day: 0.30     Types:  Cigarettes     Smokeless tobacco: Never Used     Tobacco comment: E- Cig use still   Substance Use Topics     Alcohol use: Yes     Alcohol/week: 0.0 oz     Comment: social     Family History   Problem Relation Age of Onset     Heart Disease Maternal Grandfather 62         Current Outpatient Medications   Medication Sig Dispense Refill     acetone, Urine, test STRP 1 strip by In Vitro route daily 50 each 3     atorvastatin (LIPITOR) 40 MG tablet Take 0.5 tablets (20 mg) by mouth daily 45 tablet 3     YOHAN CONTOUR test strip USE TO TEST BLOOD SUGAR FIVE TIMES A DAY OR AS DIRECTED 450 each 1     blood glucose monitoring (NO BRAND SPECIFIED) test strip Freestyle test strips 6 times daily (NOT lite and NOT insulinx) 300 strip 11     blood glucose monitoring (NO BRAND SPECIFIED) test strip 5 times daily ( contour NEXT) 450 each 11     Blood Glucose Monitoring Suppl ([employer] CONTOUR MONITOR) W/DEVICE KIT TO TEST BG 5 TIMIES DAILY 1 kit 0     glucagon (GLUCAGON EMERGENCY) 1 MG kit Inject 1 mg into the muscle once for 1 dose 1 mg 0     Insulin Aspart (INSULIN PUMP - OUTPATIENT) Date last interrogated by pharmacy: 2/5/2019  Omnipod  Insulin: Novolog  BASAL RATES and times:   12 AM (midnight): 1.1 units/hr  2 AM: 1.25  10 AM: 0.8  6 PM: 0.95  CARB RATIO and times:  12 AM - 11 AM: 9  11 AM - 8 PM: 6  8 PM - 12 AM: 7   BLOOD GLUCOSE TARGET and times:  12 AM (midnight): 100, correct above 120 with reverse correction on  Active Insulin Time: 4 hours  ISF (insulin sensitivity factor):   40 from   38 from [id number]  Dexcom sharing cose: [id number]       insulin aspart (NOVOLOG PEN) 100 UNIT/ML injection DOSE:  2 units per 14 grams of carbohydrate.. 8 mL 11     insulin aspart (NOVOLOG VIAL) 100 UNITS/ML injection To be used in insulin pump, est TDD 75 units/day 60 mL 3     insulin pen needle (BD ARPITA U/F) 32G X 4 MM Use 4 daily or as directed. 120 each 11     insulin syringe-needle U-100 (BD INSULIN SYRINGE ULTRAFINE)  "31G X 5/16\" 1 ML Use one syringe 1 daily or as directed. 100 each 11     lactulose 20 GM/30ML SOLN Take 30 mLs (20 g) by mouth 2 times daily as needed For constipation. 473 mL 1     metoclopramide (REGLAN) 5 MG tablet TAKE ONE TABLET BY MOUTH FOUR TIMES A DAY (BEFORE MEALS AND NIGHTLY) 120 tablet 0     mycophenolic acid (GENERIC EQUIVALENT) 180 MG EC tablet Take 2 tablets (360 mg) by mouth 2 times daily 360 tablet 3     omeprazole (PRILOSEC) 40 MG DR capsule Take 1 capsule (40 mg) by mouth 2 times daily 180 capsule 3     ondansetron (ZOFRAN) 4 MG tablet Take 1 tablet (4 mg) by mouth every 6 hours 8 tablet 0     tacrolimus (GENERIC EQUIVALENT) 1 MG capsule Take 1 capsule (1 mg) by mouth 2 times daily 60 capsule 1     Allergies   Allergen Reactions     Reglan Other (See Comments)     IV, delsuions     Recent Labs   Lab Test 02/08/19  0541 02/07/19  0642  02/05/19  0547  01/11/19  0742 11/20/18  0954  11/21/17  0711  12/22/16  0908  09/04/15  1213  07/08/15  0745  01/28/15  0747   A1C  --   --   --   --   --  6.8*  --   --  6.4*  --  7.1*   < >  --    < > 7.4*  --   --    LDL  --   --   --   --   --   --   --   --  99  --   --   --   --   --  90  --  64   HDL  --   --   --   --   --   --   --   --  33*  --   --   --   --   --  35*  --  39*   TRIG  --   --   --   --   --   --   --   --  193*  --   --   --   --   --  86  --  87   ALT  --   --   --  23  --   --  18  --   --   --  16  --  16   < > 22  --  19   CR 1.32* 1.40*   < > 1.51*   < > 1.72* 1.74*   < >  --    < > 1.79*   < > 1.64*   < > 1.54*   < > 1.79*   GFRESTIMATED 66 62   < > 56*   < > 48* 43*   < >  --    < > 42*   < > 47*   < > 51*   < > 43*   GFRESTBLACK 77 72   < > 65   < > 56* 53*   < >  --    < > 51*   < > 57*   < > 62   < > 52*   POTASSIUM 3.5 3.9   < > 4.3   < > 4.8 4.5   < >  --    < > 5.0   < > 4.8   < > 4.5   < > 4.9   TSH  --   --   --   --   --   --   --   --  2.06  --   --   --  1.31  --   --   --   --     < > = values in this interval not " "displayed.      BP Readings from Last 3 Encounters:   02/08/19 138/77   02/04/19 (!) 175/99   01/24/19 102/72    Wt Readings from Last 3 Encounters:   02/05/19 92.7 kg (204 lb 4.8 oz)   01/24/19 91.9 kg (202 lb 8 oz)   11/20/18 90.9 kg (200 lb 6.4 oz)                  Labs reviewed in EPIC    Reviewed and updated as needed this visit by clinical staff       Reviewed and updated as needed this visit by Provider         ROS:  CONSTITUTIONAL: NEGATIVE for fever, chills, change in weight  ENT/MOUTH: NEGATIVE for ear, mouth and throat problems  RESP: NEGATIVE for significant cough or SOB  CV: NEGATIVE for chest pain, palpitations or peripheral edema    OBJECTIVE:                                                    /66   Pulse 103   Temp 97.7  F (36.5  C) (Tympanic)   Ht 1.727 m (5' 8\")   Wt 91.4 kg (201 lb 9.6 oz)   SpO2 99%   BMI 30.65 kg/m    Body mass index is 30.65 kg/m .   GENERAL: healthy, alert, well nourished, well hydrated, no distress  HENT: ear canals- normal; TMs- normal; Nose- normal; Mouth- no ulcers, no lesions  NECK: no tenderness, no adenopathy, no asymmetry, no masses, no stiffness; thyroid- normal to palpation  RESP: lungs clear to auscultation - no rales, no rhonchi, no wheezes  CV: regular rates and rhythm, normal S1 S2, no S3 or S4 and no murmur, no click or rub -  ABDOMEN: soft, no tenderness, no  hepatosplenomegaly, no masses, normal bowel sounds    Diagnostic test results:  Diagnostic Test Results:  none      ASSESSMENT/PLAN:                                                    1. Kidney replaced by transplant  Management per transplant team.  - aspirin (ASA) 81 MG tablet; Take 1 tablet (81 mg) by mouth daily  Dispense: 90 tablet; Refill: 3      (I25.10) Coronary artery disease, non-occlusive  (primary encounter diagnosis)  Comment: =  Plan: secondary risk factor modification     (E78.5) Hyperlipidemia with target LDL less than 100  Comment:   Plan: Well controlled.     (I10) " Hypertension goal BP (blood pressure) < 140/90  Comment:   Plan: blood pressure well controlled.     (D89.9) Immunosuppressed status (H)  Comment:   Plan: No signs of infection currently.  Immunosuppressants managed by transplant team.     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  Continue secondary risk factor modification for BP, cholesterol, anticoagulation, and smoking cessation.     Chronic n/v: patient asking about revision of pancreas surgery; if this might improve his chronic nausea.  I advised him to set up appointment with his transplant surgeon to see if it would help.  Otherwise, continue reglan, prilosec (omeprazole), and as needed zofran.          See Patient Instructions    Kt Ríso MD  [facility]

## 2019-02-14 NOTE — PATIENT INSTRUCTIONS
No change in current meds.     Begin/ resume a baby aspirin daily.    Follow-up with your transplant surgery to discuss the possibility of pancreas revision/ if this could help your chronic nausea.    Remain on the reglan four times daily and the zofran as needed for now.    Kt Ríos MD  Internal Medicine and Pediatrics

## 2019-02-15 ENCOUNTER — TELEPHONE (OUTPATIENT)
Dept: TRANSPLANT | Facility: CLINIC | Age: 44
End: 2019-02-15

## 2019-02-15 DIAGNOSIS — Z94.0 KIDNEY TRANSPLANTED: ICD-10-CM

## 2019-02-15 LAB
TACROLIMUS BLD-MCNC: 3.3 UG/L (ref 5–15)
TME LAST DOSE: ABNORMAL H

## 2019-02-15 RX ORDER — TACROLIMUS 0.5 MG/1
0.5 CAPSULE ORAL 2 TIMES DAILY
Qty: 180 CAPSULE | Refills: 3 | Status: SHIPPED | OUTPATIENT
Start: 2019-02-15 | End: 2019-03-28

## 2019-02-15 RX ORDER — TACROLIMUS 1 MG/1
1 CAPSULE ORAL 2 TIMES DAILY
Qty: 60 CAPSULE | Refills: 1
Start: 2019-02-15 | End: 2019-03-28

## 2019-02-15 NOTE — TELEPHONE ENCOUNTER
"Incoming vm from pt - he has questions   Call returned to pt. Was recently discharged from the hospital with acute N/V. States that he is feeling better, but \"chronically dehydrated\" ever since his pancreas transplant (which has since failed) - per surgery note from years ago, states that enteric conversion could help with this problem. To review with nephrology/surgery    Tac level 3.3 and 10 hour level  Pt confirms current dose of 1mg BID. I asked that he INCREASE dose to 1.5mg BID and repeat all txp labs again next week. Rx updated, orders placed. Good oral hydration has been reinforced.  Pt verbalizes understanding of plan    "
No complaints

## 2019-02-19 ENCOUNTER — OFFICE VISIT (OUTPATIENT)
Dept: ENDOCRINOLOGY | Facility: CLINIC | Age: 44
End: 2019-02-19
Payer: COMMERCIAL

## 2019-02-19 VITALS
HEIGHT: 68 IN | DIASTOLIC BLOOD PRESSURE: 78 MMHG | SYSTOLIC BLOOD PRESSURE: 118 MMHG | BODY MASS INDEX: 30.28 KG/M2 | HEART RATE: 99 BPM | TEMPERATURE: 97.6 F | OXYGEN SATURATION: 98 % | WEIGHT: 199.8 LBS

## 2019-02-19 DIAGNOSIS — I43 TYPE 1 DIABETES MELLITUS WITH DIABETIC CARDIOMYOPATHY (H): ICD-10-CM

## 2019-02-19 DIAGNOSIS — E10.59 TYPE 1 DIABETES MELLITUS WITH DIABETIC CARDIOMYOPATHY (H): ICD-10-CM

## 2019-02-19 PROCEDURE — 99213 OFFICE O/P EST LOW 20 MIN: CPT | Mod: 25 | Performed by: INTERNAL MEDICINE

## 2019-02-19 PROCEDURE — 95251 CONT GLUC MNTR ANALYSIS I&R: CPT | Performed by: INTERNAL MEDICINE

## 2019-02-19 ASSESSMENT — MIFFLIN-ST. JEOR: SCORE: 1785.79

## 2019-02-19 NOTE — PROGRESS NOTES
Endocrinology Clinic Note:  Name: Murtaza Fitzgerald  Seen for f/u of Diabetes. Last visit 6/2018.  HPI:  Murtaza Fitzgerald is a 42 year old male who presents for the evaluation/management of type 1 diabetes.  Was seen by  at [facility] before and [facility] before. Last visit with her was 2013. Records reviewed.  history of type 1 diabetes, diagnosed at age 11, complicated by triopathy and kidney failure.  He underwent LDKT in 11/2008 and bladder and pancreas transplant in 02/2009.   After the pancreas transplant, he didn't require treatment with insulin for 4 years.  It was restarted in November 2012.  Started Omnipod October 2018  Also using  G6 Dexcom in October 2018    1. Type 1 DM:  Orginally diagnosed at the age of: diagnosed at age 11  Hospitalization for DKA:  Current Regimen:  yes:     Diabetes Medication(s)     Insulin Sig    Insulin Aspart (INSULIN PUMP - OUTPATIENT) Date last interrogated by pharmacy: 2/5/2019  Omnipod  Insulin: Novolog  BASAL RATES and times:   12 AM (midnight): 1.1 units/hr  2 AM: 1.25  10 AM: 0.8  6 PM: 0.95  CARB RATIO and times:  12 AM - 11 AM: 9  11 AM - 8 PM: 6  8 PM - 12 AM: 7   BLOOD GLUCOSE TARGET and times:  12 AM (midnight): 100, correct above 120 with reverse correction on  Active Insulin Time: 4 hours  ISF (insulin sensitivity factor):   40 from   38 from [id number]  Dexcom sharing cose: [id number]    insulin aspart (NOVOLOG PEN) 100 UNIT/ML injection DOSE:  2 units per 14 grams of carbohydrate..    insulin aspart (NOVOLOG VIAL) 100 UNITS/ML injection To be used in insulin pump, est TDD 75 units/day          BS checks: 2-3 times a day  Average Meter Download: 183 with SD 56. Highest was 318, lowest was 100  Also reviewed Dexcom results.  Last A1c: 6.4%  Symptoms of hypoglycemia (low blood sugar): present, episodes are occasional.  Diabetic eye exam within the last year:   Fixed meal pattern: yes  Patient counting carbs: yes  Using PUMP: no      DM  Complications:   Complications:   Diabetes Complications  Description / Detail    Diabetic Retinopathy  H/O laser photocoagulation in both eyes and vitrectomy in his right eye    CAD / PAD  No- unable to feel the warm or cold temperatures from knee down   Neuropathy  +   Nephropathy / Microalbuminuria  + s/p kidney transplant.  Lab Results   Component Value Date    UMALCR 198.98 12/22/2016         Gastroparesis  No   Hypoglycemia [place]ar[place]  No     2. Hypertension: Blood Pressure today:   BP Readings from Last 3 Encounters:   02/19/19 118/78   02/14/19 102/66   02/08/19 138/77   On medication  3. Hyperlipidemia:  On medication.    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     Noncompliance with treatment     no labs for one year     Pancreas replaced by transplant (H) Feb 2009    rejection treated with thymo     Tobacco abuse ongoing     Past Surgical History:   Procedure Laterality Date     BYPASS GRAFT ARTERY CORONARY N/A 8/5/2015    Procedure: BYPASS GRAFT ARTERY CORONARY;  Surgeon: Katy Neville MD;  Location: [facility] OR     ORTHOPEDIC SURGERY  1993    tumor removed from left knee     TRANSPLANT      pancrease and kidney transplant     Family Hx:  Family History   Problem Relation Age of Onset     Heart Disease Maternal Grandfather 62           DM2: No           Social Hx:  Social History     Socioeconomic History     Marital status:      Spouse name: Not on file     Number of children: 1     Years of education: Not on file     Highest education level: Not on file   Social Needs     Financial resource strain: Not  "on file     Food insecurity - worry: Not on file     Food insecurity - inability: Not on file     Transportation needs - medical: Not on file     Transportation needs - non-medical: Not on file   Occupational History     Occupation:    Tobacco Use     Smoking status: Former Smoker     Packs/day: 0.30     Types: Cigarettes     Smokeless tobacco: Never Used     Tobacco comment: E- Cig use still   Substance and Sexual Activity     Alcohol use: Yes     Alcohol/week: 0.0 oz     Comment: social     Drug use: No     Sexual activity: No     Partners: Female   Other Topics Concern     Parent/sibling w/ CABG, MI or angioplasty before 65F 55M? No      Service Not Asked     Blood Transfusions No     Comment: possibly during surgery, otherwise none.     Caffeine Concern Not Asked     Occupational Exposure Not Asked     Hobby Hazards Not Asked     Sleep Concern Not Asked     Stress Concern Not Asked     Weight Concern Not Asked     Special Diet Not Asked     Back Care Not Asked     Exercise Not Asked     Bike Helmet Not Asked     Seat Belt Yes     Self-Exams Not Asked   Social History Narrative     Not on file          MEDICATIONS:  has a current medication list which includes the following prescription(s): acetone urine, aspirin, atorvastatin, ashok contour, blood glucose, blood glucose, blood glucose monitoring, insulin aspart, insulin aspart, insulin aspart, insulin pen needle, insulin syringe-needle u-100, lactulose, metoclopramide, mycophenolic acid, omeprazole, ondansetron, tacrolimus, and tacrolimus.    ROS     ROS: 10 point ROS neg other than the symptoms noted above in the HPI.    Physical Exam   VS: /78 (BP Location: Right arm, Patient Position: Chair, Cuff Size: Adult Large)   Pulse 99   Temp 97.6  F (36.4  C) (Oral)   Ht 1.727 m (5' 8\")   Wt 90.6 kg (199 lb 12.8 oz)   SpO2 98%   BMI 30.38 kg/m    GENERAL: AXOX3, NAD, well dressed, answering questions appropriately, appears stated " age.  HEENT: No exopthalmous, no proptosis, EOMI, no lig lag, no retraction  NECK: Thyroid normal in size, non tender, no nodules were palpated.  CV: RRR  LUNGS: CTAB  ABDOMEN: +BS  NEUROLOGY: CN grossly intact, no tremors  PSYCH: normal affect and mood    LABS:  A1c:  Lab Results   Component Value Date    A1C 6.8 01/11/2019    A1C 6.4 11/21/2017    A1C 7.1 12/22/2016    A1C 7.2 07/26/2016    A1C 7.5 08/10/2015       BMP:  Last Basic Metabolic Panel:  Lab Results   Component Value Date     05/10/2018      Lab Results   Component Value Date    POTASSIUM 5.0 05/10/2018     Lab Results   Component Value Date    CHLORIDE 106 05/10/2018     Lab Results   Component Value Date    CHARLY 9.1 05/10/2018     Lab Results   Component Value Date    CO2 25 05/10/2018     Lab Results   Component Value Date    BUN 18 05/10/2018     Lab Results   Component Value Date    CR 1.69 05/10/2018     Lab Results   Component Value Date     05/10/2018         Urine Micro:  Lab Results   Component Value Date    UMALCR 474.96 01/11/2019         LFTs/Lipids:  Recent Labs   Lab Test 11/21/17  0711 07/08/15  0745 01/28/15  0747   CHOL 171 142 120   HDL 33* 35* 39*   LDL 99 90 64   TRIG 193* 86 87   CHOLHDLRATIO  --  4.1 3.1       TFTs:  Lab Results   Component Value Date    TSH 2.06 11/21/2017       Blood Glucose and pump data/ Meter reviewed.     Glucometer/ insulin pump (if applicable)/ CGM data (if applicable) downloaded, personally reviewed and interpreted.  All Blood sugar data reviewed personally and discussed with pt.      All pertinent notes, labs, and images personally reviewed by me.     A/P  Mr.Christopher EDUAR Fitzgerald is a 43 year old here for the evaluation/management of diabetes:    1. DM1 - Under Fair control.  A1c 6.8%. DM complicated by triopathy.  BG variable.  Using Omnipod and Dexcom with G6 sensors.  Plan:  In general BG acceptable.  BG appears variable.  No major episodes of hypoglycemia noted/reported.    Continue current pump settings  Labs in 3 months  Need to make lab only appointment.  Follow up with Vania in 3 months and 6 months with endo  Continue to use dexcom G6.    2. Hypertension - Under Good  control.  Takes metoprolol 25 mg.      3. Hyperlipidemia - Under Good control. Takes Atorvastatin 20 mg/day.  Check lipids with next lab draw.  4. Prevention  ASA- 81 mg  Smoking- no    Most Recent Immunizations   Administered Date(s) Administered     HepB 09/27/2013     Influenza (IIV3) PF 12/14/2009     MMR 08/27/1990     Pneumo Conj 13-V (2010&after) 12/22/2016     Pneumococcal 23 valent 08/05/2013     TD (ADULT, 7+) 04/26/2001     TDAP Vaccine (Adacel) 09/27/2013     Recommend checking blood sugars before meals and at bedtime.    If Blood glucose are low more often-> 2-3 times/week- give us a call.  The patient is advised to Make better food choices: reduce carbs, Reduce portion size, weight loss and exercise 3-4 times a week.  Discussed hypoglycemia signs and symptoms as well as management in detail.      There is some variability among people, most will usually develop symptoms suggestive of hypoglycemia when blood glucose levels are lowered to the mid 60's. The first set of symptoms are called adrenergic. Patients may experience any of the following nervousness, sweating, intense hunger, trembling, weakness, palpitations, and difficulty speaking. When BS fall below 50 the patient is unable to talk and take oral therapy.  Would recommend Glucagon emergency kit for the patient and education for family and friends around the patient.   The acute management of hypoglycemia involves the rapid delivery of a source of easily absorbed sugar. Regular soda, juice, lifesavers, table sugar, are good options. 15 grams of glucose is the dose that is given, followed by an assessment of symptoms and a blood glucose check if possible. If after 10 minutes there is no improvement, another 10-15 grams should be given. This can  be repeated up to three times. The equivalency of 10-15 grams of glucose (approximate servings) are: Four lifesavers, 4 teaspoons of sugar, or 1/2 cup or 4 oz of juice or regular pop.      Follow-up:  3 months    Patricia Costa M.D  Endocrinology  [facility]  CC: Kt Ríos    More than 50% of face to face time spent with Mr. Tata Fitzgerald on counseling / coordinating his care.     All questions were answered.  The patient indicates understanding of the above issues and agrees with the plan set forth.     Disclaimer: This note consists of symbols derived from keyboarding, dictation and/or voice recognition software. As a result, there may be errors in the script that have gone undetected. Please consider this when interpreting information found in this chart.    Addendum to above note and clinic visit:    Labs reviewed.    See result note/telephone encounter.

## 2019-02-19 NOTE — PATIENT INSTRUCTIONS
[facility]   Dr Costa, Endocrinology Department      [facility]   [address]  [place] [address]  Appointment [phone]  Fax: [phone]  [place]: Monday and Tuesday         [facility]   [address]  [place] [address]  Appointment [phone]  Fax: [phone]  [place]: Wednesday and Thursday            Continue current pump settings  Labs in 3 months  Need to make lab only appointment.  Follow up with Vania in 3 months and 6 months with endo  Continue to use dexcom G6.    Recommend checking blood sugars before meals and at bedtime.    If Blood glucose are low more often-> 2-3 times/week- give us a call.  The patient is advised to Make better food choices: reduce carbs, Reduce portion size, weight loss and exercise 3-4 times a week.  Discussed hypoglycemia signs and symptoms as well as management in detail.

## 2019-02-19 NOTE — NURSING NOTE
"ENDOCRINOLOGY INTAKE FORM    Patient Name:  Murtaza Fitzgerald  :  1975    Is patient Diabetic?   Yes: type 1  Does patient have non-diabetic or other endocrine issues?  No    Vitals: /78 (BP Location: Right arm, Patient Position: Chair, Cuff Size: Adult Large)   Temp 97.6  F (36.4  C) (Oral)   Ht 1.727 m (5' 8\")   Wt 90.6 kg (199 lb 12.8 oz)   BMI 30.38 kg/m    BMI= Body mass index is 30.38 kg/m .    Flu vaccine:  No  Pneumonia vaccine:  Yes: both    Smoking and Alcohol use:  Social History     Tobacco Use     Smoking status: Former Smoker     Packs/day: 0.30     Types: Cigarettes     Smokeless tobacco: Never Used     Tobacco comment: E- Cig use still   Substance Use Topics     Alcohol use: Yes     Alcohol/week: 0.0 oz     Comment: social     Drug use: No       Foot Exam: Yes: 20  Eye Exam:  Yes: [facility]  Dental Exam:  Yes:   Aspirin Use:  No    Lab Results   Component Value Date    A1C 6.8 2019    A1C 6.4 2017    A1C 7.1 2016    A1C 7.2 2016    A1C 7.5 08/10/2015       Lab Results   Component Value Date    MICROL 313 2019     No results found for: MICROALBUMIN    Rebekah Santoyo CMA    "

## 2019-02-19 NOTE — LETTER
2/19/2019         RE: Murtaza Fitzgerald  [address] [address]        Dear Colleague,    Thank you for referring your patient, Mur[facility]za Fitzgerald, [facility]. Please see a copy of my visit note below.        Endocrinology Clinic Note:  Name: Murtaza Fitzgerald  Seen for f/u of Diabetes. Last visit 6/2018.  HPI:  Murtaza Fitzgerald is a 42 year old male who presents for the evaluation/management of type 1 diabetes.  Was seen by  at [facility] before and [facility] before. Last visit with her was 2013. Records reviewed.  history of type 1 diabetes, diagnosed at age 11, complicated by triopathy and kidney failure.  He underwent LDKT in 11/2008 and bladder and pancreas transplant in 02/2009.   After the pancreas transplant, he didn't require treatment with insulin for 4 years.  It was restarted in November 2012.  Started Omnipod October 2018  Also using  G6 Dexcom in October 2018    1. Type 1 DM:  Orginally diagnosed at the age of: diagnosed at age 11  Hospitalization for DKA:  Current Regimen:  yes:     Diabetes Medication(s)     Insulin Sig    Insulin Aspart (INSULIN PUMP - OUTPATIENT) Date last interrogated by pharmacy: 2/5/2019  Omnipod  Insulin: Novolog  BASAL RATES and times:   12 AM (midnight): 1.1 units/hr  2 AM: 1.25  10 AM: 0.8  6 PM: 0.95  CARB RATIO and times:  12 AM - 11 AM: 9  11 AM - 8 PM: 6  8 PM - 12 AM: 7   BLOOD GLUCOSE TARGET and times:  12 AM (midnight): 100, correct above 120 with reverse correction on  Active Insulin Time: 4 hours  ISF (insulin sensitivity factor):   40 from   38 from [id number]  Dexcom sharing cose: [id number]    insulin aspart (NOVOLOG PEN) 100 UNIT/ML injection DOSE:  2 units per 14 grams of carbohydrate..    insulin aspart (NOVOLOG VIAL) 100 UNITS/ML injection To be used in insulin pump, est TDD 75 units/day          BS checks: 2-3 times a day  Average Meter Download: 183 with SD 56. Highest  was 318, lowest was 100  Also reviewed Dexcom results.  Last A1c: 6.4%  Symptoms of hypoglycemia (low blood sugar): present, episodes are occasional.  Diabetic eye exam within the last year:   Fixed meal pattern: yes  Patient counting carbs: yes  Using PUMP: no      DM Complications:   Complications:   Diabetes Complications  Description / Detail    Diabetic Retinopathy  H/O laser photocoagulation in both eyes and vitrectomy in his right eye    CAD / PAD  No- unable to feel the warm or cold temperatures from knee down   Neuropathy  +   Nephropathy / Microalbuminuria  + s/p kidney transplant.  Lab Results   Component Value Date    UMALCR 198.98 12/22/2016         Gastroparesis  No   Hypoglycemia [place]  No     2. Hypertension: Blood Pressure today:   BP Readings from Last 3 Encounters:   02/19/19 118/78   02/14/19 102/66   02/08/19 138/77   On medication  3. Hyperlipidemia:  On medication.    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     Noncompliance with treatment     no labs for one year     Pancreas replaced by transplant (H) Feb 2009    rejection treated with thymo     Tobacco abuse ongoing     Past Surgical History:   Procedure Laterality Date     BYPASS GRAFT ARTERY CORONARY N/A 8/5/2015    Procedure: BYPASS GRAFT ARTERY CORONARY;  Surgeon: Katy Neville MD;  Location: [facility] OR     ORTHOPEDIC SURGERY  1993    tumor removed from left knee     TRANSPLANT      pancrease and kidney transplant     Family Hx:  Family History   Problem Relation Age of Onset     Heart Disease Maternal Grandfather 62           DM2:  No           Social Hx:  Social History     Socioeconomic History     Marital status:      Spouse name: Not on file     Number of children: 1     Years of education: Not on file     Highest education level: Not on file   Social Needs     Financial resource strain: Not on file     Food insecurity - worry: Not on file     Food insecurity - inability: Not on file     Transportation needs - medical: Not on file     Transportation needs - non-medical: Not on file   Occupational History     Occupation:    Tobacco Use     Smoking status: Former Smoker     Packs/day: 0.30     Types: Cigarettes     Smokeless tobacco: Never Used     Tobacco comment: E- Cig use still   Substance and Sexual Activity     Alcohol use: Yes     Alcohol/week: 0.0 oz     Comment: social     Drug use: No     Sexual activity: No     Partners: Female   Other Topics Concern     Parent/sibling w/ CABG, MI or angioplasty before 65F 55M? No      Service Not Asked     Blood Transfusions No     Comment: possibly during surgery, otherwise none.     Caffeine Concern Not Asked     Occupational Exposure Not Asked     Hobby Hazards Not Asked     Sleep Concern Not Asked     Stress Concern Not Asked     Weight Concern Not Asked     Special Diet Not Asked     Back Care Not Asked     Exercise Not Asked     Bike Helmet Not Asked     Seat Belt Yes     Self-Exams Not Asked   Social History Narrative     Not on file          MEDICATIONS:  has a current medication list which includes the following prescription(s): acetone urine, aspirin, atorvastatin, ashok contour, blood glucose, blood glucose, blood glucose monitoring, insulin aspart, insulin aspart, insulin aspart, insulin pen needle, insulin syringe-needle u-100, lactulose, metoclopramide, mycophenolic acid, omeprazole, ondansetron, tacrolimus, and tacrolimus.    ROS     ROS: 10 point ROS neg other than the symptoms noted above in the HPI.    Physical Exam   VS: /78 (BP Location:  "Right arm, Patient Position: Chair, Cuff Size: Adult Large)   Pulse 99   Temp 97.6  F (36.4  C) (Oral)   Ht 1.727 m (5' 8\")   Wt 90.6 kg (199 lb 12.8 oz)   SpO2 98%   BMI 30.38 kg/m     GENERAL: AXOX3, NAD, well dressed, answering questions appropriately, appears stated age.  HEENT: No exopthalmous, no proptosis, EOMI, no lig lag, no retraction  NECK: Thyroid normal in size, non tender, no nodules were palpated.  CV: RRR  LUNGS: CTAB  ABDOMEN: +BS  NEUROLOGY: CN grossly intact, no tremors  PSYCH: normal affect and mood    LABS:  A1c:  Lab Results   Component Value Date    A1C 6.8 01/11/2019    A1C 6.4 11/21/2017    A1C 7.1 12/22/2016    A1C 7.2 07/26/2016    A1C 7.5 08/10/2015       BMP:  Last Basic Metabolic Panel:  Lab Results   Component Value Date     05/10/2018      Lab Results   Component Value Date    POTASSIUM 5.0 05/10/2018     Lab Results   Component Value Date    CHLORIDE 106 05/10/2018     Lab Results   Component Value Date    CHARLY 9.1 05/10/2018     Lab Results   Component Value Date    CO2 25 05/10/2018     Lab Results   Component Value Date    BUN 18 05/10/2018     Lab Results   Component Value Date    CR 1.69 05/10/2018     Lab Results   Component Value Date     05/10/2018         Urine Micro:  Lab Results   Component Value Date    UMALCR 474.96 01/11/2019         LFTs/Lipids:  Recent Labs   Lab Test 11/21/17  0711 07/08/15  0745 01/28/15  0747   CHOL 171 142 120   HDL 33* 35* 39*   LDL 99 90 64   TRIG 193* 86 87   CHOLHDLRATIO  --  4.1 3.1       TFTs:  Lab Results   Component Value Date    TSH 2.06 11/21/2017       Blood Glucose and pump data/ Meter reviewed.     Glucometer/ insulin pump (if applicable)/ CGM data (if applicable) downloaded, personally reviewed and interpreted.  All Blood sugar data reviewed personally and discussed with pt.      All pertinent notes, labs, and images personally reviewed by me.     A/P  Mr.Christopher EDUAR Fitzgerald is a 43 year old here for the " evaluation/management of diabetes:    1. DM1 - Under Fair control.  A1c 6.8%. DM complicated by triopathy.  BG variable.  Using Omnipod and Dexcom with G6 sensors.  Plan:  In general BG acceptable.  BG appears variable.  No major episodes of hypoglycemia noted/reported.   Continue current pump settings  Labs in 3 months  Need to make lab only appointment.  Follow up with Vania in 3 months and 6 months with endo  Continue to use dexcom G6.    2. Hypertension - Under Good  control.  Takes metoprolol 25 mg.      3. Hyperlipidemia - Under Good control. Takes Atorvastatin 20 mg/day.  Check lipids with next lab draw.  4. Prevention  ASA- 81 mg  Smoking- no    Most Recent Immunizations   Administered Date(s) Administered     HepB 09/27/2013     Influenza (IIV3) PF 12/14/2009     MMR 08/27/1990     Pneumo Conj 13-V (2010&after) 12/22/2016     Pneumococcal 23 valent 08/05/2013     TD (ADULT, 7+) 04/26/2001     TDAP Vaccine (Adacel) 09/27/2013     Recommend checking blood sugars before meals and at bedtime.    If Blood glucose are low more often-> 2-3 times/week- give us a call.  The patient is advised to Make better food choices: reduce carbs, Reduce portion size, weight loss and exercise 3-4 times a week.  Discussed hypoglycemia signs and symptoms as well as management in detail.      There is some variability among people, most will usually develop symptoms suggestive of hypoglycemia when blood glucose levels are lowered to the mid 60's. The first set of symptoms are called adrenergic. Patients may experience any of the following nervousness, sweating, intense hunger, trembling, weakness, palpitations, and difficulty speaking. When BS fall below 50 the patient is unable to talk and take oral therapy.  Would recommend Glucagon emergency kit for the patient and education for family and friends around the patient.   The acute management of hypoglycemia involves the rapid delivery of a source of easily absorbed sugar. Regular  soda, juice, lifesavers, table sugar, are good options. 15 grams of glucose is the dose that is given, followed by an assessment of symptoms and a blood glucose check if possible. If after 10 minutes there is no improvement, another 10-15 grams should be given. This can be repeated up to three times. The equivalency of 10-15 grams of glucose (approximate servings) are: Four lifesavers, 4 teaspoons of sugar, or 1/2 cup or 4 oz of juice or regular pop.      Follow-up:  3 months    Patricia Costa M.D  Endocrinology  [facility]  CC: Kt Ríos    More than 50% of face to face time spent with Mr. Tata Fitzgerald on counseling / coordinating his care.     All questions were answered.  The patient indicates understanding of the above issues and agrees with the plan set forth.     Disclaimer: This note consists of symbols derived from keyboarding, dictation and/or voice recognition software. As a result, there may be errors in the script that have gone undetected. Please consider this when interpreting information found in this chart.    Addendum to above note and clinic visit:    Labs reviewed.    See result note/telephone encounter.        Again, thank you for allowing me to participate in the care of your patient.        Sincerely,        Patricia Costa MD

## 2019-02-21 ENCOUNTER — TELEPHONE (OUTPATIENT)
Dept: TRANSPLANT | Facility: CLINIC | Age: 44
End: 2019-02-21

## 2019-02-21 DIAGNOSIS — E86.0 DEHYDRATION: ICD-10-CM

## 2019-02-21 NOTE — TELEPHONE ENCOUNTER
Patient Call: Calling back;  Follow up from Tuesday.    Call back needed? Yes    Return Call Needed  Same as documented in contacts section  When to return call?: Same day: [facility]

## 2019-02-22 NOTE — TELEPHONE ENCOUNTER
Call returned to pt. He states that he received the new dose just on Tuesday and plans to repeat labs next week. To bring up chronic dehydration related to bladder drained pancreas next week.

## 2019-02-25 ENCOUNTER — MYC MEDICAL ADVICE (OUTPATIENT)
Dept: ENDOCRINOLOGY | Facility: CLINIC | Age: 44
End: 2019-02-25

## 2019-02-25 PROBLEM — E86.0 DEHYDRATION: Status: ACTIVE | Noted: 2019-02-25

## 2019-02-25 NOTE — TELEPHONE ENCOUNTER
Per review with Dr. Snyder - to trend BP's at home. Ok for IVF 1L weekly as needed. Maybe start Florinef.      Call placed to pt. He voices understanding to  a BP cuff and trend BP's at home at least twice daily. Therapy plan entered for weekly IVF PRN. Tyrell voices understanding to call and set up IVF as needed at [facility]. To follow-up with him in 2 weeks time.

## 2019-02-26 ENCOUNTER — TELEPHONE (OUTPATIENT)
Dept: TRANSPLANT | Facility: CLINIC | Age: 44
End: 2019-02-26

## 2019-02-26 DIAGNOSIS — E86.0 DEHYDRATION: Primary | ICD-10-CM

## 2019-02-26 DIAGNOSIS — Z48.298 AFTERCARE FOLLOWING ORGAN TRANSPLANT: ICD-10-CM

## 2019-02-28 NOTE — TELEPHONE ENCOUNTER
Call returned to pt. He states he woke up dry heaving today. I asked that he call to see if he can get in for IV fluids today. To call him tomorrow to check in how he does after fluid infusion.

## 2019-03-01 ENCOUNTER — INFUSION THERAPY VISIT (OUTPATIENT)
Dept: INFUSION THERAPY | Facility: CLINIC | Age: 44
End: 2019-03-01
Attending: INTERNAL MEDICINE
Payer: COMMERCIAL

## 2019-03-01 VITALS — TEMPERATURE: 97.2 F | DIASTOLIC BLOOD PRESSURE: 76 MMHG | SYSTOLIC BLOOD PRESSURE: 119 MMHG | HEART RATE: 95 BPM

## 2019-03-01 DIAGNOSIS — E86.0 DEHYDRATION: Primary | ICD-10-CM

## 2019-03-01 PROCEDURE — 25000128 H RX IP 250 OP 636: Performed by: INTERNAL MEDICINE

## 2019-03-01 PROCEDURE — 96361 HYDRATE IV INFUSION ADD-ON: CPT

## 2019-03-01 PROCEDURE — 96360 HYDRATION IV INFUSION INIT: CPT

## 2019-03-01 RX ADMIN — SODIUM CHLORIDE 2000 ML: 9 INJECTION, SOLUTION INTRAVENOUS at 11:36

## 2019-03-01 NOTE — PROGRESS NOTES
Infusion Nursing Note:  Murtaza Fitzgerald presents today for IVF.    Patient seen by provider today: No   present during visit today: Not Applicable.    Note: Pt states he's been dealing with dehydration issues since his pancreas transplant in 2009.  Denies vomiting but has had recent dry heaves.      Intravenous Access:  Peripheral IV placed.    Treatment Conditions:  Not Applicable.      Post Infusion Assessment:  Patient tolerated infusion without incident.  Blood return noted pre and post infusion.  Site patent and intact, free from redness, edema or discomfort.  No evidence of extravasations.  Access discontinued per protocol.    Discharge Plan:   Discharge instructions reviewed with: Patient.  Patient and/or family verbalized understanding of discharge instructions and all questions answered.   Patient will return as needed for next appointment.   Patient discharged in stable condition accompanied by: self.  Departure Mode: Ambulatory.    Magdalena Sylvester RN

## 2019-03-01 NOTE — TELEPHONE ENCOUNTER
"vi placed to pt. He states that he obtained IV fluids this am. Feels better. Worried that it won't \"last through the weekend\"    To follow-up with pt next week about plan moving forward as far as Florinef, etc.     "

## 2019-03-05 ENCOUNTER — HOSPITAL ENCOUNTER (EMERGENCY)
Facility: CLINIC | Age: 44
Discharge: HOME | End: 2019-03-05
Attending: EMERGENCY MEDICINE | Admitting: EMERGENCY MEDICINE
Payer: COMMERCIAL

## 2019-03-05 ENCOUNTER — HOSPITAL ENCOUNTER (EMERGENCY)
Facility: CLINIC | Age: 44
Discharge: HOME | End: 2019-03-05
Attending: PHYSICIAN ASSISTANT | Admitting: PHYSICIAN ASSISTANT
Payer: COMMERCIAL

## 2019-03-05 VITALS
TEMPERATURE: 97.8 F | WEIGHT: 200 LBS | DIASTOLIC BLOOD PRESSURE: 105 MMHG | BODY MASS INDEX: 30.41 KG/M2 | OXYGEN SATURATION: 94 % | RESPIRATION RATE: 18 BRPM | SYSTOLIC BLOOD PRESSURE: 185 MMHG | HEART RATE: 84 BPM

## 2019-03-05 VITALS
OXYGEN SATURATION: 96 % | SYSTOLIC BLOOD PRESSURE: 148 MMHG | HEART RATE: 83 BPM | TEMPERATURE: 98.6 F | RESPIRATION RATE: 16 BRPM | DIASTOLIC BLOOD PRESSURE: 97 MMHG

## 2019-03-05 DIAGNOSIS — R10.9 ABDOMINAL PAIN, UNSPECIFIED ABDOMINAL LOCATION: ICD-10-CM

## 2019-03-05 DIAGNOSIS — R73.9 HYPERGLYCEMIA: ICD-10-CM

## 2019-03-05 DIAGNOSIS — R11.2 NON-INTRACTABLE VOMITING WITH NAUSEA, UNSPECIFIED VOMITING TYPE: ICD-10-CM

## 2019-03-05 DIAGNOSIS — E86.0 DEHYDRATION: ICD-10-CM

## 2019-03-05 LAB
ALBUMIN SERPL-MCNC: 3.8 G/DL (ref 3.4–5)
ALBUMIN SERPL-MCNC: 3.8 G/DL (ref 3.4–5)
ALP SERPL-CCNC: 109 U/L (ref 40–150)
ALP SERPL-CCNC: 115 U/L (ref 40–150)
ALT SERPL W P-5'-P-CCNC: 22 U/L (ref 0–70)
ALT SERPL W P-5'-P-CCNC: 24 U/L (ref 0–70)
ANION GAP SERPL CALCULATED.3IONS-SCNC: 10 MMOL/L (ref 3–14)
ANION GAP SERPL CALCULATED.3IONS-SCNC: 10 MMOL/L (ref 3–14)
AST SERPL W P-5'-P-CCNC: 16 U/L (ref 0–45)
AST SERPL W P-5'-P-CCNC: 21 U/L (ref 0–45)
BASOPHILS # BLD AUTO: 0 10E9/L (ref 0–0.2)
BASOPHILS # BLD AUTO: 0 10E9/L (ref 0–0.2)
BASOPHILS NFR BLD AUTO: 0.1 %
BASOPHILS NFR BLD AUTO: 0.1 %
BILIRUB SERPL-MCNC: 0.5 MG/DL (ref 0.2–1.3)
BILIRUB SERPL-MCNC: 0.6 MG/DL (ref 0.2–1.3)
BUN SERPL-MCNC: 28 MG/DL (ref 7–30)
BUN SERPL-MCNC: 31 MG/DL (ref 7–30)
CALCIUM SERPL-MCNC: 9.4 MG/DL (ref 8.5–10.1)
CALCIUM SERPL-MCNC: 9.4 MG/DL (ref 8.5–10.1)
CHLORIDE SERPL-SCNC: 103 MMOL/L (ref 94–109)
CHLORIDE SERPL-SCNC: 108 MMOL/L (ref 94–109)
CO2 BLDCOV-SCNC: 18 MMOL/L (ref 21–28)
CO2 BLDCOV-SCNC: 21 MMOL/L (ref 21–28)
CO2 SERPL-SCNC: 19 MMOL/L (ref 20–32)
CO2 SERPL-SCNC: 22 MMOL/L (ref 20–32)
CREAT SERPL-MCNC: 1.71 MG/DL (ref 0.66–1.25)
CREAT SERPL-MCNC: 1.74 MG/DL (ref 0.66–1.25)
DIFFERENTIAL METHOD BLD: ABNORMAL
DIFFERENTIAL METHOD BLD: ABNORMAL
EOSINOPHIL # BLD AUTO: 0 10E9/L (ref 0–0.7)
EOSINOPHIL # BLD AUTO: 0 10E9/L (ref 0–0.7)
EOSINOPHIL NFR BLD AUTO: 0 %
EOSINOPHIL NFR BLD AUTO: 0.1 %
ERYTHROCYTE [DISTWIDTH] IN BLOOD BY AUTOMATED COUNT: 13.9 % (ref 10–15)
ERYTHROCYTE [DISTWIDTH] IN BLOOD BY AUTOMATED COUNT: 14 % (ref 10–15)
FLUAV+FLUBV AG SPEC QL: NEGATIVE
FLUAV+FLUBV AG SPEC QL: NEGATIVE
GFR SERPL CREATININE-BSD FRML MDRD: 47 ML/MIN/{1.73_M2}
GFR SERPL CREATININE-BSD FRML MDRD: 48 ML/MIN/{1.73_M2}
GLUCOSE BLDC GLUCOMTR-MCNC: 176 MG/DL (ref 70–99)
GLUCOSE BLDC GLUCOMTR-MCNC: 249 MG/DL (ref 70–99)
GLUCOSE SERPL-MCNC: 186 MG/DL (ref 70–99)
GLUCOSE SERPL-MCNC: 260 MG/DL (ref 70–99)
HCT VFR BLD AUTO: 46.5 % (ref 40–53)
HCT VFR BLD AUTO: 51.7 % (ref 40–53)
HGB BLD-MCNC: 15.1 G/DL (ref 13.3–17.7)
HGB BLD-MCNC: 16.4 G/DL (ref 13.3–17.7)
IMM GRANULOCYTES # BLD: 0 10E9/L (ref 0–0.4)
IMM GRANULOCYTES # BLD: 0.1 10E9/L (ref 0–0.4)
IMM GRANULOCYTES NFR BLD: 0.3 %
IMM GRANULOCYTES NFR BLD: 0.7 %
INTERPRETATION ECG - MUSE: NORMAL
KETONES BLD-SCNC: 0.8 MMOL/L (ref 0–0.6)
LACTATE BLD-SCNC: 0.9 MMOL/L (ref 0.7–2)
LACTATE BLD-SCNC: 1.2 MMOL/L (ref 0.7–2.1)
LACTATE BLD-SCNC: 2.6 MMOL/L (ref 0.7–2)
LACTATE BLD-SCNC: 3.1 MMOL/L (ref 0.7–2.1)
LIPASE SERPL-CCNC: 29 U/L (ref 73–393)
LIPASE SERPL-CCNC: 30 U/L (ref 73–393)
LYMPHOCYTES # BLD AUTO: 1.5 10E9/L (ref 0.8–5.3)
LYMPHOCYTES # BLD AUTO: 2.3 10E9/L (ref 0.8–5.3)
LYMPHOCYTES NFR BLD AUTO: 11.3 %
LYMPHOCYTES NFR BLD AUTO: 17.1 %
MAGNESIUM SERPL-MCNC: 1.8 MG/DL (ref 1.6–2.3)
MCH RBC QN AUTO: 28.3 PG (ref 26.5–33)
MCH RBC QN AUTO: 28.3 PG (ref 26.5–33)
MCHC RBC AUTO-ENTMCNC: 31.7 G/DL (ref 31.5–36.5)
MCHC RBC AUTO-ENTMCNC: 32.5 G/DL (ref 31.5–36.5)
MCV RBC AUTO: 87 FL (ref 78–100)
MCV RBC AUTO: 89 FL (ref 78–100)
MONOCYTES # BLD AUTO: 0.4 10E9/L (ref 0–1.3)
MONOCYTES # BLD AUTO: 0.6 10E9/L (ref 0–1.3)
MONOCYTES NFR BLD AUTO: 3.2 %
MONOCYTES NFR BLD AUTO: 4.4 %
NEUTROPHILS # BLD AUTO: 10.7 10E9/L (ref 1.6–8.3)
NEUTROPHILS # BLD AUTO: 11.3 10E9/L (ref 1.6–8.3)
NEUTROPHILS NFR BLD AUTO: 79.2 %
NEUTROPHILS NFR BLD AUTO: 83.5 %
NRBC # BLD AUTO: 0 10*3/UL
NRBC # BLD AUTO: 0 10*3/UL
NRBC BLD AUTO-RTO: 0 /100
NRBC BLD AUTO-RTO: 0 /100
PCO2 BLDV: 31 MM HG (ref 40–50)
PCO2 BLDV: 35 MM HG (ref 40–50)
PH BLDV: 7.33 PH (ref 7.32–7.43)
PH BLDV: 7.43 PH (ref 7.32–7.43)
PLATELET # BLD AUTO: 313 10E9/L (ref 150–450)
PLATELET # BLD AUTO: 346 10E9/L (ref 150–450)
PO2 BLDV: 44 MM HG (ref 25–47)
PO2 BLDV: 54 MM HG (ref 25–47)
POTASSIUM SERPL-SCNC: 3.9 MMOL/L (ref 3.4–5.3)
POTASSIUM SERPL-SCNC: 4.5 MMOL/L (ref 3.4–5.3)
PROT SERPL-MCNC: 8.5 G/DL (ref 6.8–8.8)
PROT SERPL-MCNC: 9 G/DL (ref 6.8–8.8)
RBC # BLD AUTO: 5.34 10E12/L (ref 4.4–5.9)
RBC # BLD AUTO: 5.79 10E12/L (ref 4.4–5.9)
SAO2 % BLDV FROM PO2: 82 %
SAO2 % BLDV FROM PO2: 86 %
SODIUM SERPL-SCNC: 135 MMOL/L (ref 133–144)
SODIUM SERPL-SCNC: 137 MMOL/L (ref 133–144)
SPECIMEN SOURCE: NORMAL
WBC # BLD AUTO: 13.5 10E9/L (ref 4–11)
WBC # BLD AUTO: 13.5 10E9/L (ref 4–11)

## 2019-03-05 PROCEDURE — 83690 ASSAY OF LIPASE: CPT | Performed by: EMERGENCY MEDICINE

## 2019-03-05 PROCEDURE — 80053 COMPREHEN METABOLIC PANEL: CPT | Performed by: PHYSICIAN ASSISTANT

## 2019-03-05 PROCEDURE — 82803 BLOOD GASES ANY COMBINATION: CPT

## 2019-03-05 PROCEDURE — 99285 EMERGENCY DEPT VISIT HI MDM: CPT | Mod: 25

## 2019-03-05 PROCEDURE — 83735 ASSAY OF MAGNESIUM: CPT | Performed by: EMERGENCY MEDICINE

## 2019-03-05 PROCEDURE — 25000132 ZZH RX MED GY IP 250 OP 250 PS 637: Performed by: EMERGENCY MEDICINE

## 2019-03-05 PROCEDURE — 85025 COMPLETE CBC W/AUTO DIFF WBC: CPT | Performed by: EMERGENCY MEDICINE

## 2019-03-05 PROCEDURE — 82010 KETONE BODYS QUAN: CPT | Performed by: EMERGENCY MEDICINE

## 2019-03-05 PROCEDURE — 99284 EMERGENCY DEPT VISIT MOD MDM: CPT | Mod: 25

## 2019-03-05 PROCEDURE — 96374 THER/PROPH/DIAG INJ IV PUSH: CPT

## 2019-03-05 PROCEDURE — 96361 HYDRATE IV INFUSION ADD-ON: CPT

## 2019-03-05 PROCEDURE — 83690 ASSAY OF LIPASE: CPT | Performed by: PHYSICIAN ASSISTANT

## 2019-03-05 PROCEDURE — 25000128 H RX IP 250 OP 636: Performed by: PHYSICIAN ASSISTANT

## 2019-03-05 PROCEDURE — 80053 COMPREHEN METABOLIC PANEL: CPT | Performed by: EMERGENCY MEDICINE

## 2019-03-05 PROCEDURE — 96375 TX/PRO/DX INJ NEW DRUG ADDON: CPT

## 2019-03-05 PROCEDURE — 00000146 ZZHCL STATISTIC GLUCOSE BY METER IP

## 2019-03-05 PROCEDURE — 85025 COMPLETE CBC W/AUTO DIFF WBC: CPT | Performed by: PHYSICIAN ASSISTANT

## 2019-03-05 PROCEDURE — 83605 ASSAY OF LACTIC ACID: CPT

## 2019-03-05 PROCEDURE — 87804 INFLUENZA ASSAY W/OPTIC: CPT | Mod: 91 | Performed by: EMERGENCY MEDICINE

## 2019-03-05 PROCEDURE — 96376 TX/PRO/DX INJ SAME DRUG ADON: CPT

## 2019-03-05 PROCEDURE — 25000128 H RX IP 250 OP 636: Performed by: EMERGENCY MEDICINE

## 2019-03-05 PROCEDURE — 93005 ELECTROCARDIOGRAM TRACING: CPT

## 2019-03-05 PROCEDURE — 83605 ASSAY OF LACTIC ACID: CPT | Performed by: PHYSICIAN ASSISTANT

## 2019-03-05 RX ORDER — DICYCLOMINE HYDROCHLORIDE 10 MG/1
10 CAPSULE ORAL ONCE
Status: COMPLETED | OUTPATIENT
Start: 2019-03-05 | End: 2019-03-05

## 2019-03-05 RX ORDER — HYDROMORPHONE HYDROCHLORIDE 1 MG/ML
0.3 INJECTION, SOLUTION INTRAMUSCULAR; INTRAVENOUS; SUBCUTANEOUS ONCE
Status: COMPLETED | OUTPATIENT
Start: 2019-03-05 | End: 2019-03-05

## 2019-03-05 RX ORDER — ONDANSETRON 4 MG/1
4 TABLET, ORALLY DISINTEGRATING ORAL EVERY 8 HOURS PRN
Qty: 10 TABLET | Refills: 0 | Status: SHIPPED | OUTPATIENT
Start: 2019-03-05 | End: 2019-07-03

## 2019-03-05 RX ORDER — FLUDROCORTISONE ACETATE 0.1 MG/1
0.1 TABLET ORAL DAILY
Qty: 30 TABLET | Refills: 11 | Status: SHIPPED | OUTPATIENT
Start: 2019-03-05 | End: 2019-03-28

## 2019-03-05 RX ORDER — ONDANSETRON 2 MG/ML
4 INJECTION INTRAMUSCULAR; INTRAVENOUS ONCE
Status: COMPLETED | OUTPATIENT
Start: 2019-03-05 | End: 2019-03-05

## 2019-03-05 RX ORDER — HYDROMORPHONE HYDROCHLORIDE 1 MG/ML
0.5 INJECTION, SOLUTION INTRAMUSCULAR; INTRAVENOUS; SUBCUTANEOUS ONCE
Status: COMPLETED | OUTPATIENT
Start: 2019-03-05 | End: 2019-03-05

## 2019-03-05 RX ADMIN — DICYCLOMINE HYDROCHLORIDE 10 MG: 10 CAPSULE ORAL at 12:34

## 2019-03-05 RX ADMIN — SODIUM CHLORIDE 1000 ML: 9 INJECTION, SOLUTION INTRAVENOUS at 12:24

## 2019-03-05 RX ADMIN — SODIUM CHLORIDE 1000 ML: 9 INJECTION, SOLUTION INTRAVENOUS at 13:16

## 2019-03-05 RX ADMIN — SODIUM CHLORIDE 1000 ML: 9 INJECTION, SOLUTION INTRAVENOUS at 20:57

## 2019-03-05 RX ADMIN — Medication 0.3 MG: at 22:16

## 2019-03-05 RX ADMIN — SODIUM CHLORIDE 1000 ML: 9 INJECTION, SOLUTION INTRAVENOUS at 22:10

## 2019-03-05 RX ADMIN — ONDANSETRON 4 MG: 2 INJECTION INTRAMUSCULAR; INTRAVENOUS at 20:56

## 2019-03-05 RX ADMIN — Medication 0.5 MG: at 20:56

## 2019-03-05 RX ADMIN — ONDANSETRON 4 MG: 2 INJECTION INTRAMUSCULAR; INTRAVENOUS at 12:33

## 2019-03-05 ASSESSMENT — ENCOUNTER SYMPTOMS
FEVER: 0
DIARRHEA: 1
LIGHT-HEADEDNESS: 1
ABDOMINAL PAIN: 1
RHINORRHEA: 0
COUGH: 0
NAUSEA: 1
VOMITING: 1
DYSURIA: 0
HEADACHES: 0

## 2019-03-05 NOTE — ED AVS SNAPSHOT
[facility] Emergency Department  [address]  [facility] [address]  Phone:  [phone]  Fax:  [phone]                                    Murtaza Fitzgerald   MRN: [id number]    Department:  [facility] Emergency Department   Date of Visit:  3/5/2019           After Visit Summary Signature Page    I have received my discharge instructions, and my questions have been answered. I have discussed any challenges I see with this plan with the nurse or doctor.    ..........................................................................................................................................  Patient/Patient Representative Signature      ..........................................................................................................................................  Patient Representative Print Name and Relationship to Patient    ..................................................               ................................................  Date                                   Time    ..........................................................................................................................................  Reviewed by Signature/Title    ...................................................              ..............................................  Date                                               Time          22EPIC Rev 08/18

## 2019-03-05 NOTE — TELEPHONE ENCOUNTER
Call returned from pt. He was in the ED this am again with recurrent dry heaving/N/V. Was given antiemetics and IV fluids and sent home.  To review today.

## 2019-03-05 NOTE — TELEPHONE ENCOUNTER
Per review with Dr. Villalba-  To collect timed urine amylase  Ok to start Florinef 0.1mcg daily  Ok for IV fluids twice weekly PRN    Call placed to pt. He agrees to plan above. Rx sent. Therapy plan updated. He plans to collect all txp labs later this week, including timed urine amylase. Orders sent.

## 2019-03-05 NOTE — ED AVS SNAPSHOT
[facility] Emergency Department  [address]  [facility] [address]  Phone:  [phone]  Fax:  [phone]                                    Murtaza Fitzgerald   MRN: [id number]    Department:  [facility] Emergency Department   Date of Visit:  3/5/2019           After Visit Summary Signature Page    I have received my discharge instructions, and my questions have been answered. I have discussed any challenges I see with this plan with the nurse or doctor.    ..........................................................................................................................................  Patient/Patient Representative Signature      ..........................................................................................................................................  Patient Representative Print Name and Relationship to Patient    ..................................................               ................................................  Date                                   Time    ..........................................................................................................................................  Reviewed by Signature/Title    ...................................................              ..............................................  Date                                               Time          22EPIC Rev 08/18

## 2019-03-05 NOTE — ED PROVIDER NOTES
"  History     Chief Complaint:    Nausea and Vomiting    HPI   Murtaza Fitzgerald is a 43 year old male with a history of kidney and pancreas transplant, hypertension, coronary artery disease, amongst others who presents with nausea and vomiting. The patient states that for the past 2 days he has had nausea, vomiting, and diarrhea that he details there has been \"a few dozen\" episodes of emesis. Due to this, the patient is feeling dehydrated, feels mildly lightheaded on standing and describes a cramping dull abdominal pain from the nausea and vomiting. He states that he had similar symptoms a few weeks prior where he was hospitalized for it. He denies fever, cough, runny nose, dysuria, headache.  No recent antibiotic use, changes in medications, or chance of eating anything unusual. Lastly, he details that he follows Dr. Jorgensen through [facility] transplant. No sick contacts.      Allergies:  Reglan      Medications:    Acetone  Aspirin  Lipitor  Sofiya contour test strip  Blood glucose monitoring  ascensia contour monitor  Insulin pump  Novolog pen  Lactulose  reglan  mycophenolate acid  Prilosec  Zofran  Tacrolimus     Past Medical History:    CMV (cytomegalovirus infection) status negative   Coronary artery disease, non-occlusive   Diabetes mellitus, type 1   Dyslipidemia   Hypertension   Immunosuppressed status    Kidney transplant status, living related donor   Mycotic aneurysm of kidney transplant    Noncompliance with treatment   Pancreas replaced by transplant   Tobacco abuse     Past Surgical History:    Bypass graft artery coronary  Tumor removed left knee  Pancrease and kidney transplant     Family History:    Maternal Grandfather: Heart Disease     Social History:  The patient was accompanied to the ED by daughter.  Smoking Status: Former Smoker  Smokeless Tobacco: Never Used  Alcohol Use: Positive  Drug Use: Negative  PCP: Kt R[facility]os   Marital Status:        Review of Systems "   Constitutional: Negative for fever.   HENT: Negative for rhinorrhea.    Respiratory: Negative for cough.    Gastrointestinal: Positive for abdominal pain, diarrhea, nausea and vomiting.   Genitourinary: Negative for dysuria.   Neurological: Positive for light-headedness. Negative for headaches.   All other systems reviewed and are negative.    Physical Exam     Patient Vitals for the past 24 hrs:   BP Temp Temp src Pulse Heart Rate Resp SpO2   03/05/19 1427 (!) 148/97 -- -- 83 -- 16 96 %   03/05/19 1415 (!) 148/97 -- -- 83 -- -- --   03/05/19 1400 (!) 153/94 -- -- 84 -- -- --   03/05/19 1345 (!) 153/98 -- -- 89 -- -- 96 %   03/05/19 1330 137/86 -- -- 86 -- -- 96 %   03/05/19 1300 -- -- -- 87 -- -- 96 %   03/05/19 1230 101/76 -- -- 98 -- -- 96 %   03/05/19 1213 (!) 128/94 98.6  F (37  C) Oral -- 104 20 96 %        Physical Exam  Nursing note and vitals reviewed.  Constitutional: Well nourished. Resting comfortably.   Eyes: Conjunctiva normal.  Pupils are equal, round, and reactive to light.   ENT: Nose normal. Mucous membranes pink and moist.    Neck: Normal range of motion.  CVS: Sinus tachycardia.  Normal heart sounds.  No murmur.  Well healed substernal scar  Pulmonary: Lungs clear to auscultation bilaterally. No wheezes/rales/rhonchi.  GI: Abdomen soft. Nontender, nondistended. No rigidity or guarding.  Well healed midline vertical incision with small incisional hernia, soft, reducible  MSK: No calf tenderness or swelling.  Neuro: Alert. Follows simple commands.  Skin: Skin is warm and dry. No rash noted.   Psychiatric: Normal affect.       Emergency Department Course     ECG:  ECG taken at 1221  Normal sinus rhythm  Rate 99 bpm. OR interval 124 ms. QRS duration 80 ms. QT/QTc 338/433 ms. P-R-T axes 19 31 15.  Similar to EKG 2/5/19    Laboratory:  Laboratory findings were communicated with the patient who voiced understanding of the findings.    ISTAT gases lactate madison POCT: pH: 7.43, PCO2: 31 (L), PO2: 44,  Bicarbonate: 21, O2 Sat: 82, Lactic acid: 3.1 (H)   Glucose by meter: 176 (H)  Glucose by meter: 249 (H)  Infuenza A/B antigen: Negative  CBC: WBC 13.5 (H), HGB 16.4 ,   CMP: Glucose 260 (H), BUN 31 (H), GFR 47 (L), Protein Albumin 9.0 (H) o/w WNL (Creatinine 1.74 (H))  Lipase: 29 (L)  Magnesium: 1.8  Ketone Beta-hydroxybutyrate Quantitative: 0.8 (H)    Repeat lactate after IVF  ISTAT gases lactate madison POCT: pH: 7.33, PCO2: 35 (L), PO2: 54 (H), Bicarbonate: 18 (L), O2 Sat: 86, Lactic acid: 1.2      Interventions:  1224 NS 1000 mL IV  1233 Zofran 4 mg IV  1234 Bentyl 10 mg PO  1316 NS 1000 mL IV     Emergency Department Course:     Nursing notes and vitals reviewed.    1208 I performed an exam of the patient as documented above.     1211 Blood drawn. This was sent to the lab for further testing, results above.     1218 IV was inserted and blood was drawn for laboratory testing, results above.     1223 Blood drawn. This was sent to the lab for further testing, results above.     1238 A influenza sample was obtained for laboratory testing as documented above.     1320 Patient rechecked and updated.  The patient is tolerating PO.     1322 Blood drawn. This was sent to the lab for further testing, results above.     1401 Blood drawn. This was sent to the lab for further testing, results above.     1533 Patient rechecked and updated.  He is eating currently. He reports feeling much improved.     I personally reviewed the lab and EKG results with the patient and answered all related questions prior to discharge.    Impression & Plan      Medical Decision Making:  Murtaza Fitzgerald is a 43 year old male with history of diabetes and renal and pancreatic transplant who presents to the emergency department today for evaluation of nausea, vomiting, diarrhea. He is mildy tachycardic but otherwise vitally stable. He has a non focal exam. The patient has noted leukocytosis but I suspect this is more reactive in  setting of emesis. His initial lactate was elevated as well though I suspect that this is more secondary to dehydration. The patient denies any cough and is in no respiratory distress nor hypoxic to necessitate CXR.  He has no abdominal tenderness on exam so will hold off on CT at this time.  The patient was given IV fluids and antiemetics and reported symptom improvement. On reevaluation he was tolerating PO without difficultly. His repeat lactate was improved.  The patient was noted to be hyperglycemic but no evidence of DKA. I have a strong suspicion patient's presentation is secondary to influenza like illness. He is not septic appearing and I feel comfortable with outpatient disposition at this point in time. He will be discharged home with ODT Zofran as well as my recommendation of PO hydration. Counseled to monitor blood sugars closely at home as well.  He is instructed to follow up closely with PCP in the next 1-2 days. Planning to return to ED if the patient should develop intractable pain or worsening symptoms.     Diagnosis:    ICD-10-CM    1. Non-intractable vomiting with nausea, unspecified vomiting type R11.2 Lactic acid whole blood   2. Dehydration E86.0    3. Hyperglycemia R73.9      Disposition:   The patient is discharged to home.     CMS Diagnoses: The Lactic acid level is elevated due to dehydration, at this time there is no sign of severe sepsis or septic shock.    Discharge Medications:          START taking      Dose / Directions   ondansetron 4 MG ODT tab  Commonly known as:  ZOFRAN ODT      Dose:  4 mg  Take 1 tablet (4 mg) by mouth every 8 hours as needed for nausea  Quantity:  10 tablet  Refills:  0              Where to get your medicines      Some of these will need a paper prescription and others can be bought over the counter. Ask your nurse if you have questions.    Bring a paper prescription for each of these medications    ondansetron 4 MG ODT tab         Scribe Disclosure:  Alem DOAN  Severson, am serving as a scribe at 12:14 PM on 3/5/2019 to document services personally performed by Yesica Velez DO based on my observations and the provider's statements to me.     [facility] EMERGENCY DEPARTMENT            Yesica Velez DO  03/05/19 2018

## 2019-03-06 ENCOUNTER — TELEPHONE (OUTPATIENT)
Dept: ENDOCRINOLOGY | Facility: CLINIC | Age: 44
End: 2019-03-06

## 2019-03-06 ENCOUNTER — PATIENT OUTREACH (OUTPATIENT)
Dept: CARE COORDINATION | Facility: CLINIC | Age: 44
End: 2019-03-06

## 2019-03-06 ASSESSMENT — ENCOUNTER SYMPTOMS
NAUSEA: 1
DIARRHEA: 0
DYSURIA: 0
SHORTNESS OF BREATH: 0
FEVER: 0
ABDOMINAL PAIN: 1
VOMITING: 1
CHILLS: 0

## 2019-03-06 NOTE — ED TRIAGE NOTES
Patient was seen here earlier today for nausea and vomiting, given fluids and IV zofran. Was feeling better when discharged, sent home with PO zofran. Patient returns with continued vomiting, is feeling dehydrated again. States he did not have a chance to have his prescription filled yet so he has not tried the zofran at home. ABCs intact.

## 2019-03-06 NOTE — ED PROVIDER NOTES
"  History     Chief Complaint:  Nausea & Vomiting      HPI   Murtaza Fitzgerald is a 43 year old male with history of CAD s/p CABG, kidney transplant, HTN who presents with nausea and vomiting. Patient reports that he has had intermittent episodes of nausea and vomiting for months. This episode started 2 days ago. He reports too many episodes of vomiting to count. Denies diarrhea. He reports some upper abdominal pain that he relates to repeated episodes of vomiting. He was evaluated in the ED earlier to day for similar symptoms and improved after fluids and zofran and was discharged home. Unfortunately, he did not fill his zofran prescription. He went home and took a nap, woke up and was nauseated and started vomiting again, thus returned to the ED. Denies fever or chills. Denies urinary symptoms.     Allergies:  Allergies   Allergen Reactions     Reglan Other (See Comments)     IV, delsuions        Medications:    acetone, Urine, test STRP   aspirin (ASA) 81 MG tablet   atorvastatin (LIPITOR) 40 MG tablet   YOHAN CONTOUR test strip   blood glucose monitoring (NO BRAND SPECIFIED) test strip   blood glucose monitoring (NO BRAND SPECIFIED) test strip   Blood Glucose Monitoring Suppl ([email or web address] CONTOUR MONITOR) W/DEVICE KIT   fludrocortisone (FLORINEF) 0.1 MG tablet   Insulin Aspart (INSULIN PUMP - OUTPATIENT)   insulin aspart (NOVOLOG PEN) 100 UNIT/ML injection   insulin aspart (NOVOLOG VIAL) 100 UNITS/ML injection   insulin pen needle (BD ARPITA U/F) 32G X 4 MM   insulin syringe-needle U-100 (BD INSULIN SYRINGE ULTRAFINE) 31G X 5/16\" 1 ML   lactulose 20 GM/30ML SOLN   metoclopramide (REGLAN) 5 MG tablet   mycophenolic acid (GENERIC EQUIVALENT) 180 MG EC tablet   omeprazole (PRILOSEC) 40 MG DR capsule   ondansetron (ZOFRAN ODT) 4 MG ODT tab   ondansetron (ZOFRAN) 4 MG tablet   tacrolimus (GENERIC EQUIVALENT) 0.5 MG capsule   tacrolimus (GENERIC EQUIVALENT) 1 MG capsule       Past Medical History:    Past Medical " History:   Diagnosis Date     CMV (cytomegalovirus infection) status negative 2011     Coronary artery disease, non-occlusive 2008     Diabetes mellitus, type 1      Dyslipidemia      Hypertension      Immunosuppressed status (H)      Kidney transplant status, living related donor 2008     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     Noncompliance with treatment      Pancreas replaced by transplant (H) Feb 2009     Tobacco abuse ongoing       Past Surgical History:    Past Surgical History:   Procedure Laterality Date     BYPASS GRAFT ARTERY CORONARY N/A 8/5/2015    Procedure: BYPASS GRAFT ARTERY CORONARY;  Surgeon: Katy Neville MD;  Location: [facility] OR     ORTHOPEDIC SURGERY  1993    tumor removed from left knee     TRANSPLANT      pancrease and kidney transplant       Family History:    Family History   Problem Relation Age of Onset     Heart Disease Maternal Grandfather 62       Social History:  Marital Status:   [4]  Social History     Tobacco Use     Smoking status: Former Smoker     Packs/day: 0.30     Types: Cigarettes     Smokeless tobacco: Never Used     Tobacco comment: E- Cig use still   Substance Use Topics     Alcohol use: Yes     Alcohol/week: 0.0 oz     Comment: social     Drug use: No        Review of Systems   Constitutional: Negative for chills and fever.   Respiratory: Negative for shortness of breath.    Cardiovascular: Negative for chest pain.   Gastrointestinal: Positive for abdominal pain, nausea and vomiting. Negative for diarrhea.   Genitourinary: Negative for dysuria.   All other systems reviewed and are negative.        Physical Exam   First Vitals:  BP: (!) 182/110  Pulse: 92  Resp: 18  SpO2: 100 %      Physical Exam  Constitutional: uncomfortable appearing, but not acutely distressed.   Head: No external signs of trauma noted to head or face.   Eyes: Pupils are equal, round, and reactive to light. Conjunctiva normal. No scleral icterus.   ENT: MMM. Normal voice.    Neck: Normal ROM.    Cardiovascular: Normal rate, regular rhythm, and intact distal pulses.    Respiratory: Effort normal. No respiratory distress. Lungs clear to auscultation bilaterally.   GI: Soft. There is no tenderness. No rebound or guarding.   Musculoskeletal: No deformities appreciated. Normal ROM. No edema noted.  Neurological: Alert and Oriented x 3. Speech normal. Moves all extremities equally.  Psychiatric: Appropriate mood, affect, and behavior.   Skin: Skin is warm and dry.         Emergency Department Course       Laboratory:  Labs Ordered and Resulted from Time of ED Arrival Up to the Time of Departure from the ED   COMPREHENSIVE METABOLIC PANEL - Abnormal; Notable for the following components:       Result Value    Carbon Dioxide 19 (*)     Glucose 186 (*)     Creatinine 1.71 (*)     GFR Estimate 48 (*)     GFR Estimate If Black 56 (*)     All other components within normal limits   LIPASE - Abnormal; Notable for the following components:    Lipase 30 (*)     All other components within normal limits   LACTIC ACID WHOLE BLOOD - Abnormal; Notable for the following components:    Lactic Acid 2.6 (*)     All other components within normal limits   CBC WITH PLATELETS DIFFERENTIAL - Abnormal; Notable for the following components:    WBC 13.5 (*)     Absolute Neutrophil 11.3 (*)     All other components within normal limits   LACTIC ACID WHOLE BLOOD     Interventions:  Medications   0.9% sodium chloride BOLUS (0 mLs Intravenous Stopped 3/5/19 2210)   ondansetron (ZOFRAN) injection 4 mg (4 mg Intravenous Given 3/5/19 2056)   HYDROmorphone (PF) (DILAUDID) injection 0.5 mg (0.5 mg Intravenous Given 3/5/19 2056)   0.9% sodium chloride BOLUS (0 mLs Intravenous Stopped 3/5/19 2309)   HYDROmorphone (PF) (DILAUDID) injection 0.3 mg (0.3 mg Intravenous Given 3/5/19 2216)       Emergency Department Course:  Past medical records, nursing notes, and vitals reviewed.  I performed an exam of the patient and obtained  history, as documented above.  IV access obtained. Labs sent.      I reassessed the patient and updated him regarding results.     I rechecked the patient once again. Findings and plan explained to the patient and family members. Patient requested to be discharged home. All questions answered prior to discharge.          Impression & Plan      Medical Decision Makin-year-old male presenting with continued nausea and vomiting.  Vitals stable on arrival.  He is a benign abdominal exam.  His initial lactate was elevated at 2.6, but this cleared with IV fluids and I suspect was just secondary to continued dehydration.  His creatinine is 1.71, which appears to be baseline.  He does have a mild leukocytosis to 13.5, but this appears to be chronic.  He has a benign abdominal exam and therefore I do not feel CT is indicated at this time.  He was given IV fluids, Zofran, and Dilaudid with significant improvement in his symptoms.  He has had several similar presentations previously, symptoms were then thought to potentiallybe due to gastroparesis.  Since this is his second ED visit today for these symptoms, I did offer observation admission for continued symptom management.  However, patient felt improved after treatment here and requested discharge home.  I think this is appropriate at this time given his continued benign abdominal exam and the fact that he is no longer vomiting.  He was encouraged to fill his Zofran prescription as soon as possible.  He was instructed to follow-up with his primary care provider in 1-2 days for reassessment.  Instructed to return to the emergency department for any new or worsening symptoms, including worsening abdominal pain, fever, further vomiting, or other concerning symptoms.    Critical Care time:  none    Diagnosis:    ICD-10-CM    1. Non-intractable vomiting with nausea, unspecified vomiting type R11.2    2. Abdominal pain, unspecified abdominal location R10.9         Disposition:  discharged to home    Yeimi Daily  3/5/2019   [facility] EMERGENCY DEPARTMENT       Yeimi Daily PA-C  03/06/19 0228

## 2019-03-06 NOTE — PROGRESS NOTES
Clinic Care Coordination Contact    Situation: Patient chart reviewed by care coordinator.    Background: Patient returned writers call regarding ER follow up for nausea, vomiting.     Assessment: Patient reports he is getting increasingly more nauseous throughout the day, but reassures writer he has more Zofran now and that this is helping somewhat. He states he is working with the transplant coordination team. Reports issues are related to dehydration and has some testing scheduled to be completed on Tuesday. Depending on the results of that they may consider removing the transplanted pancreas. He admits he has not tried ivanna in management of his nausea/vomiting. Encouraged patient to stay well hydrated. Patient declines a Primary Care Provider follow up appointment at this time and choosing to just work with the nephrology team. Explained CC role. Patient denies any further support at this time.       Plan/Recommendations: At this time, patient denies outstanding need for connection or referral to resources or assistance navigating recommended follow up care. No further Care Coordination outreaches scheduled at this time, patient provided with this writer's number and encouraged to call with future needs or questions.      Lali Smiley RN Care Coordinator  [facility] [place]  Email: [email or web address]  Phone: [phone]

## 2019-03-06 NOTE — TELEPHONE ENCOUNTER
Reason for Call:  Form, our goal is to have forms completed with 72 hours, however, some forms may require a visit or additional information.    Type of letter, form or note:  medical    Who is the form from?: Patient    Where did the form come from: Patient or family brought in       What clinic location was the form placed at?: [facility]    Where the form was placed: 's Box    What number is listed as a contact on the form?: please complete form and fax to [phone]   or mail to:   [employer]    Evaluation Unit  [address] [place] [id number]       Additional comments:     Call taken on 3/6/2019 at 10:30 AM by Michelle Ozuna

## 2019-03-06 NOTE — PROGRESS NOTES
Clinic Care Coordination Contact  [facility]/Voicemail    Referral Source: IP Report - Patient has had 4 ED visits/hospital admissions within the past 90 days with a 59% risk of admission or ED visit score. All visits were related to nausea, vomiting. No Primary Care Provider follow up appointment scheduled at this time. Patient has an upcoming nephrology appointment scheduled for 3/28. Appears to be followed by SOT team.   Clinical Data: Care Coordinator Outreach.   Outreach attempted x 1.  Left message on voicemail with call back information and requested return call.  Plan: Care Coordinator will try to reach patient again in 2-3 business days.    Lali Smiley RN Care Coordinator  [facility] [place]  Email: [email or web address]  Phone: [phone]

## 2019-03-08 ENCOUNTER — INFUSION THERAPY VISIT (OUTPATIENT)
Dept: INFUSION THERAPY | Facility: CLINIC | Age: 44
End: 2019-03-08
Attending: INTERNAL MEDICINE
Payer: COMMERCIAL

## 2019-03-08 VITALS
TEMPERATURE: 98.4 F | DIASTOLIC BLOOD PRESSURE: 74 MMHG | SYSTOLIC BLOOD PRESSURE: 112 MMHG | HEART RATE: 95 BPM | RESPIRATION RATE: 16 BRPM | OXYGEN SATURATION: 98 %

## 2019-03-08 DIAGNOSIS — E86.0 DEHYDRATION: Primary | ICD-10-CM

## 2019-03-08 PROCEDURE — 96360 HYDRATION IV INFUSION INIT: CPT

## 2019-03-08 PROCEDURE — 25000128 H RX IP 250 OP 636: Performed by: INTERNAL MEDICINE

## 2019-03-08 RX ADMIN — SODIUM CHLORIDE 1000 ML: 9 INJECTION, SOLUTION INTRAVENOUS at 11:12

## 2019-03-08 ASSESSMENT — PAIN SCALES - GENERAL: PAINLEVEL: NO PAIN (0)

## 2019-03-08 NOTE — PROGRESS NOTES
Infusion Nursing Note:  Murtaza EDUAR Fitzgerald presents today for IV hydration.    Patient seen by provider today: No   present during visit today: Not Applicable.    Note: Feels like he only needs 1L of fluids today.  Denies current dizziness or nausea.    Intravenous Access:  Peripheral IV placed.    Treatment Conditions:  Not Applicable.      Post Infusion Assessment:  Patient tolerated infusion without incident.  Blood return noted pre and post infusion.  Site patent and intact, free from redness, edema or discomfort.  No evidence of extravasations.  Access discontinued per protocol.    Discharge Plan:   Discharge instructions reviewed with: Patient.  Patient discharged in stable condition accompanied by: self.  Departure Mode: Ambulatory.  Will call to schedule appointment when he needs fluids again.    EMMETT CHEEK RN

## 2019-03-11 NOTE — TELEPHONE ENCOUNTER
Form was faxed, copied, sent to scanning.      Rebekah Santoyo, [facility]  Bob/Janelle

## 2019-03-12 DIAGNOSIS — Z48.298 AFTERCARE FOLLOWING ORGAN TRANSPLANT: ICD-10-CM

## 2019-03-12 LAB
AMYLASE ?TM UR-CRATE: 3 U/H (ref 3–20)
AMYLASE UR-CCNC: 50 U/L (ref 32–641)
AMYLASE/CREAT UR: 79 U/G CR (ref 30–180)
ANION GAP SERPL CALCULATED.3IONS-SCNC: 9 MMOL/L (ref 3–14)
BUN SERPL-MCNC: 19 MG/DL (ref 7–30)
CALCIUM SERPL-MCNC: 9.1 MG/DL (ref 8.5–10.1)
CHLORIDE SERPL-SCNC: 107 MMOL/L (ref 94–109)
CO2 SERPL-SCNC: 24 MMOL/L (ref 20–32)
COLLECT DURATION TIME UR: 24 H
CREAT 24H UR-MRATE: 0.89 G/(24.H) (ref 1–2)
CREAT SERPL-MCNC: 1.54 MG/DL (ref 0.66–1.25)
CREAT UR-MCNC: 64 MG/DL
ERYTHROCYTE [DISTWIDTH] IN BLOOD BY AUTOMATED COUNT: 13.8 % (ref 10–15)
GFR SERPL CREATININE-BSD FRML MDRD: 55 ML/MIN/{1.73_M2}
GLUCOSE SERPL-MCNC: 157 MG/DL (ref 70–99)
HCT VFR BLD AUTO: 43.9 % (ref 40–53)
HGB BLD-MCNC: 14.1 G/DL (ref 13.3–17.7)
MCH RBC QN AUTO: 27.8 PG (ref 26.5–33)
MCHC RBC AUTO-ENTMCNC: 32.1 G/DL (ref 31.5–36.5)
MCV RBC AUTO: 87 FL (ref 78–100)
PLATELET # BLD AUTO: 338 10E9/L (ref 150–450)
POTASSIUM SERPL-SCNC: 3.8 MMOL/L (ref 3.4–5.3)
RBC # BLD AUTO: 5.07 10E12/L (ref 4.4–5.9)
SODIUM SERPL-SCNC: 140 MMOL/L (ref 133–144)
SPECIMEN VOL UR: 1400 ML
TACROLIMUS BLD-MCNC: 4.1 UG/L (ref 5–15)
TME LAST DOSE: ABNORMAL H
WBC # BLD AUTO: 9.6 10E9/L (ref 4–11)

## 2019-03-12 PROCEDURE — 36415 COLL VENOUS BLD VENIPUNCTURE: CPT | Performed by: INTERNAL MEDICINE

## 2019-03-12 PROCEDURE — 85027 COMPLETE CBC AUTOMATED: CPT | Performed by: INTERNAL MEDICINE

## 2019-03-12 PROCEDURE — 80197 ASSAY OF TACROLIMUS: CPT | Performed by: INTERNAL MEDICINE

## 2019-03-12 PROCEDURE — 81050 URINALYSIS VOLUME MEASURE: CPT | Performed by: INTERNAL MEDICINE

## 2019-03-12 PROCEDURE — 80048 BASIC METABOLIC PNL TOTAL CA: CPT | Performed by: INTERNAL MEDICINE

## 2019-03-12 PROCEDURE — 82150 ASSAY OF AMYLASE: CPT | Performed by: INTERNAL MEDICINE

## 2019-03-13 ENCOUNTER — INFUSION THERAPY VISIT (OUTPATIENT)
Dept: INFUSION THERAPY | Facility: CLINIC | Age: 44
End: 2019-03-13
Attending: INTERNAL MEDICINE
Payer: COMMERCIAL

## 2019-03-13 VITALS
SYSTOLIC BLOOD PRESSURE: 121 MMHG | DIASTOLIC BLOOD PRESSURE: 79 MMHG | TEMPERATURE: 99.1 F | HEART RATE: 89 BPM | OXYGEN SATURATION: 97 % | RESPIRATION RATE: 16 BRPM

## 2019-03-13 DIAGNOSIS — E86.0 DEHYDRATION: Primary | ICD-10-CM

## 2019-03-13 PROCEDURE — 25000128 H RX IP 250 OP 636: Performed by: INTERNAL MEDICINE

## 2019-03-13 PROCEDURE — 96360 HYDRATION IV INFUSION INIT: CPT

## 2019-03-13 PROCEDURE — 96361 HYDRATE IV INFUSION ADD-ON: CPT

## 2019-03-13 RX ADMIN — SODIUM CHLORIDE 2000 ML: 9 INJECTION, SOLUTION INTRAVENOUS at 13:52

## 2019-03-13 ASSESSMENT — PAIN SCALES - GENERAL: PAINLEVEL: NO PAIN (0)

## 2019-03-13 NOTE — PROGRESS NOTES
Infusion Nursing Note:  Murtaza Yenclaudette Fitzgerald presents today for IV hydration.    Patient seen by provider today: No   present during visit today: Not Applicable.    Note: Had c/o dizziness yesterday but denies dizziness today.  Asking for 2L of fluid today.    Intravenous Access:  Peripheral IV placed.    Treatment Conditions:  Not Applicable.      Post Infusion Assessment:  Patient tolerated infusion without incident.  Blood return noted pre and post infusion.  Site patent and intact, free from redness, edema or discomfort.  No evidence of extravasations.  Access discontinued per protocol.    Discharge Plan:   Discharge instructions reviewed with: Patient.  Patient discharged in stable condition accompanied by: self.  Departure Mode: Ambulatory.  Next IV hydration scheduled for 3/15/19.    EMMETT CHEEK RN

## 2019-03-14 ENCOUNTER — TELEPHONE (OUTPATIENT)
Dept: TRANSPLANT | Facility: CLINIC | Age: 44
End: 2019-03-14

## 2019-03-14 NOTE — TELEPHONE ENCOUNTER
Call placed to pt to follow-up on status post starting Florinef and twice weekly IV fluids PRN  He states that overall he is feeling better, but he states that mainly it is fluid infusion that are helping, he feels. He needs to take unpaid time off work for these.  Timed urine amylase showing little - no exocrine funtion  To review any further intervention with nephrology.

## 2019-03-15 ENCOUNTER — INFUSION THERAPY VISIT (OUTPATIENT)
Dept: INFUSION THERAPY | Facility: CLINIC | Age: 44
End: 2019-03-15
Attending: INTERNAL MEDICINE
Payer: COMMERCIAL

## 2019-03-15 VITALS
OXYGEN SATURATION: 98 % | HEART RATE: 93 BPM | RESPIRATION RATE: 16 BRPM | TEMPERATURE: 97.6 F | DIASTOLIC BLOOD PRESSURE: 89 MMHG | SYSTOLIC BLOOD PRESSURE: 135 MMHG

## 2019-03-15 DIAGNOSIS — E86.0 DEHYDRATION: Primary | ICD-10-CM

## 2019-03-15 PROCEDURE — 25000128 H RX IP 250 OP 636: Performed by: INTERNAL MEDICINE

## 2019-03-15 PROCEDURE — 96360 HYDRATION IV INFUSION INIT: CPT

## 2019-03-15 RX ADMIN — SODIUM CHLORIDE 1000 ML: 9 INJECTION, SOLUTION INTRAVENOUS at 11:08

## 2019-03-15 NOTE — PROGRESS NOTES
Infusion Nursing Note:  Murtaza Fitzgerald presents today for IVF.    Patient seen by provider today: No   present during visit today: Not Applicable.    Note: Patient states he would like only like one liter of fluids today.    Intravenous Access:  Peripheral IV placed.    Treatment Conditions:  Not Applicable.      Post Infusion Assessment:  Patient tolerated infusion without incident.  Blood return noted pre and post infusion.  Site patent and intact, free from redness, edema or discomfort.  No evidence of extravasations.  Access discontinued per protocol.       Discharge Plan:   Discharge instructions reviewed with: Patient.  Patient and/or family verbalized understanding of discharge instructions and all questions answered.  Copy of AVS reviewed with patient and/or family.   Patient discharged in stable condition accompanied by: self.  Departure Mode: Ambulatory.    Swapna Ortiz RN

## 2019-03-20 ENCOUNTER — TEAM CONFERENCE (OUTPATIENT)
Dept: NEPHROLOGY | Facility: CLINIC | Age: 44
End: 2019-03-20

## 2019-03-20 ENCOUNTER — TELEPHONE (OUTPATIENT)
Dept: TRANSPLANT | Facility: CLINIC | Age: 44
End: 2019-03-20

## 2019-03-20 DIAGNOSIS — R11.2 NAUSEA AND VOMITING: Primary | ICD-10-CM

## 2019-03-20 NOTE — TELEPHONE ENCOUNTER
Post Kidney and Pancreas Transplant Team Conference  Date: 3/20/2019  Transplant Coordinator: My Bansal, Henrietta Barraza     Attendees:  [x]  Dr. Jorgensen [] Roxy Almanza, RN  [] Marcie Galvan LPN     [x]  Dr. Villalba [] Becky Fitzpatrick, JOSE [x] Ginger Blankenship LPN   [x]  Dr. Chen [] Araceli Manley, JOSE    []  Dr. Snyder [x] Micaela Law RN    [x] Dr. Urban [x] Marcelle Hagan, JOSE    [x] Dr. Guzman [] Conner Diaz RN    [] Dr. Saleh [x] Henrietta Barraza, RN    [x] Surgery Fellow [x] Chandni Bird RN    [] Sania Ventura, ILIANA [x] Adriana Tucker, JOSE    [] Rob Burt, PharmD [] Parth Zarate RN     [] Roxy Dumont RN        Verbal Plan Read Back:   Patient to see GI at [facility]  EGD and gastric emptying studies    Routed to RN Coordinator   Ginger Blankenship

## 2019-03-21 ENCOUNTER — MYC MEDICAL ADVICE (OUTPATIENT)
Dept: EDUCATION SERVICES | Facility: CLINIC | Age: 44
End: 2019-03-21

## 2019-03-22 ENCOUNTER — MYC MEDICAL ADVICE (OUTPATIENT)
Dept: EDUCATION SERVICES | Facility: CLINIC | Age: 44
End: 2019-03-22

## 2019-03-22 NOTE — TELEPHONE ENCOUNTER
"Tyrell sent [employer] message:  Issac Caro,    I think I may have a little bit of a problem. As my dehydration issue has temporarily been \"fixed\", I have been feeling better and therefore been eating more. Long story, I have been going pods every two days for the last few. Is there a way to increase the number f pods I get or am I going to have to go off the pods in between deliveries?    Thanks,  Tyrell Fitzgerald CDE reply:  Vania Guerrero is out of the office so I'm taking a look ;)  We would not want you to need to go off the pods - just need to update the prescription I believe! That needs to come from Dr. Costa, so will work on communication on this end.   Do you have an idea of what your current total daily dose is now? That would be helpful to know.     It will take a couple of days, and weekend in the middle, to get it all squared away I think but we'll let you know.   Thanks! Asia oSuza RD, DONAL, CDE    Note- email has been sent asking if new CMN is needed and whether it will be sent directly to endo MD office.     Asia Souza RD, DONAL, CDE    "

## 2019-03-26 ENCOUNTER — TELEPHONE (OUTPATIENT)
Dept: ENDOCRINOLOGY | Facility: CLINIC | Age: 44
End: 2019-03-26

## 2019-03-26 ENCOUNTER — HOSPITAL ENCOUNTER (OUTPATIENT)
Facility: CLINIC | Age: 44
Setting detail: SPECIMEN
End: 2019-03-26
Attending: INTERNAL MEDICINE
Payer: COMMERCIAL

## 2019-03-26 ENCOUNTER — INFUSION THERAPY VISIT (OUTPATIENT)
Dept: INFUSION THERAPY | Facility: CLINIC | Age: 44
End: 2019-03-26
Attending: INTERNAL MEDICINE
Payer: COMMERCIAL

## 2019-03-26 VITALS
SYSTOLIC BLOOD PRESSURE: 113 MMHG | HEART RATE: 89 BPM | OXYGEN SATURATION: 98 % | DIASTOLIC BLOOD PRESSURE: 80 MMHG | TEMPERATURE: 97.9 F | RESPIRATION RATE: 16 BRPM

## 2019-03-26 DIAGNOSIS — E86.0 DEHYDRATION: Primary | ICD-10-CM

## 2019-03-26 PROCEDURE — 25000128 H RX IP 250 OP 636: Performed by: INTERNAL MEDICINE

## 2019-03-26 PROCEDURE — 96360 HYDRATION IV INFUSION INIT: CPT

## 2019-03-26 RX ADMIN — SODIUM CHLORIDE 1000 ML: 9 INJECTION, SOLUTION INTRAVENOUS at 12:45

## 2019-03-26 NOTE — PROGRESS NOTES
Infusion Nursing Note:  Murtaza Fitzgerald presents today for IVF.    Patient seen by provider today: No   present during visit today: Not Applicable.    Note: Patient complains of feeling light headed at times when getting up too fast, otherwise denies any other symptoms or complaints. Pt requested to receive only 1 liter of fluids today.    Intravenous Access:  Peripheral IV placed.    Treatment Conditions:  Not Applicable.      Post Infusion Assessment:  Patient tolerated infusion without incident.  Blood return noted pre and post infusion.  Site patent and intact, free from redness, edema or discomfort.  No evidence of extravasations.  Access discontinued per protocol.       Discharge Plan:   Discharge instructions reviewed with: Patient.  Patient and/or family verbalized understanding of discharge instructions and all questions answered.  Copy of AVS reviewed with patient and/or family.  Patient will return 3/29/19 for next appointment.  Patient discharged in stable condition accompanied by: self.  Departure Mode: Ambulatory.    Swapna Ortiz RN

## 2019-03-27 NOTE — TELEPHONE ENCOUNTER
Pt has resources to set up NM gastric emptying study and EGD.   Message sent to SOT  to get him set up with GI at [facility].  Pt verbalizes understanding of plan

## 2019-03-28 ENCOUNTER — HOSPITAL ENCOUNTER (OUTPATIENT)
Facility: CLINIC | Age: 44
End: 2019-03-28
Attending: INTERNAL MEDICINE | Admitting: INTERNAL MEDICINE
Payer: COMMERCIAL

## 2019-03-28 ENCOUNTER — OFFICE VISIT (OUTPATIENT)
Dept: NEPHROLOGY | Facility: CLINIC | Age: 44
End: 2019-03-28
Attending: INTERNAL MEDICINE
Payer: COMMERCIAL

## 2019-03-28 ENCOUNTER — TELEPHONE (OUTPATIENT)
Dept: GASTROENTEROLOGY | Facility: CLINIC | Age: 44
End: 2019-03-28

## 2019-03-28 VITALS
OXYGEN SATURATION: 97 % | WEIGHT: 200.8 LBS | SYSTOLIC BLOOD PRESSURE: 147 MMHG | HEART RATE: 98 BPM | TEMPERATURE: 97.8 F | DIASTOLIC BLOOD PRESSURE: 98 MMHG | BODY MASS INDEX: 30.53 KG/M2

## 2019-03-28 DIAGNOSIS — Z94.0 KIDNEY TRANSPLANTED: Primary | ICD-10-CM

## 2019-03-28 DIAGNOSIS — D84.9 IMMUNOSUPPRESSION (H): ICD-10-CM

## 2019-03-28 DIAGNOSIS — Z94.83 PANCREAS REPLACED BY TRANSPLANT (H): ICD-10-CM

## 2019-03-28 DIAGNOSIS — Z94.0 HTN, KIDNEY TRANSPLANT RELATED: ICD-10-CM

## 2019-03-28 DIAGNOSIS — R11.2 NAUSEA AND VOMITING, INTRACTABILITY OF VOMITING NOT SPECIFIED, UNSPECIFIED VOMITING TYPE: ICD-10-CM

## 2019-03-28 DIAGNOSIS — E86.0 DEHYDRATION: ICD-10-CM

## 2019-03-28 DIAGNOSIS — I15.1 HTN, KIDNEY TRANSPLANT RELATED: ICD-10-CM

## 2019-03-28 PROCEDURE — G0463 HOSPITAL OUTPT CLINIC VISIT: HCPCS | Mod: ZF

## 2019-03-28 RX ORDER — TACROLIMUS 1 MG/1
1 CAPSULE ORAL 2 TIMES DAILY
Qty: 60 CAPSULE | Refills: 11 | Status: SHIPPED | OUTPATIENT
Start: 2019-03-28 | End: 2019-08-23

## 2019-03-28 RX ORDER — FLUDROCORTISONE ACETATE 0.1 MG/1
0.1 TABLET ORAL DAILY
Qty: 60 TABLET | Refills: 11 | Status: SHIPPED | OUTPATIENT
Start: 2019-03-28 | End: 2019-05-13

## 2019-03-28 RX ORDER — TACROLIMUS 0.5 MG/1
0.5 CAPSULE ORAL 2 TIMES DAILY
Qty: 60 CAPSULE | Refills: 11 | Status: SHIPPED | OUTPATIENT
Start: 2019-03-28 | End: 2019-08-23

## 2019-03-28 ASSESSMENT — PAIN SCALES - GENERAL: PAINLEVEL: MODERATE PAIN (4)

## 2019-03-28 NOTE — NURSING NOTE
"Chief Complaint   Patient presents with     RECHECK     Annual Follow Up Kidney TX     Vital signs:  Temp: 97.8  F (36.6  C) Temp src: Oral BP: (!) 147/98 Pulse: 98     SpO2: 97 %       Weight: 91.1 kg (200 lb 12.8 oz)  Estimated body mass index is 30.53 kg/m  as calculated from the following:    Height as of 2/19/19: 1.727 m (5' 8\").    Weight as of this encounter: 91.1 kg (200 lb 12.8 oz).      Marge Waldron    "

## 2019-03-28 NOTE — PATIENT INSTRUCTIONS
Watch bp and swelling in legs on the higher fludrocortisone 0.1 mg twice a day.    We will follow up with the gastric emptying study.     If normal, we will attempt weaning IVF.    I renewed the tacrolimus.

## 2019-03-28 NOTE — PROGRESS NOTES
[facility]  Nephrology Clinic  Kidney/Pancreas Recipient  2019     Name: Murtaza Fitzgerald  MRN: [id number]   Age: 43 year old  : 1975  Referring provider: Kt Ríos     CHRONIC TRANSPLANT NEPHROLOGY VISIT    Assessment and Plan:   # LDKT:    - Baseline Cr ~ 1.6-1.8; Stable   - Proteinuria: Mild; 0.87 in January of this year.    - Date of DSA last checked:  Latest DSA: No   - BK Viremia: No   - Kidney Tx Biopsy: Yes, demonstrated chronic changes.     # Immunosuppression: Tacrolimus (goal 4-6) and Mycophenolic acid (goal  1-3.5)   - Changes: No    # Diabetes status-post failed ANNMARIE (bladder drained):   - Borderline control   - HbA1c: Stable, most recently at 6.8.     Pancreas failed due rejection. Not interested in pancreas retransplant at this time.     # Hypertension: Controlled; Goal BP: < 130/80   - Changes: No    # Anemia in chronic renal disease:    - Hgb: Stable; most recently 14.1.     # Mineral Bone Disorder:    - Vitamin D; level is: Not checked recently   - Calcium; level is: Normal   - Phosphorus; level is: Not checked recently     # Electrolytes:   - Potassium; level: Normal  - Magnesium; level: Normal  - Bicarbonate; level: Normal    # CAD status-post CABG in :    - Remains asymptomatic.    - Continue to follow with cardiology.     # Nausea and Vomiting:   - Will increase his fludrocortisone dose to 0.1mg twice daily. He was counseled to watch for increased leg swelling or changes in blood pressure.    - Follow up with gastroenterology for further management.    - He is scheduled for a gastric emptying test next week.    - Would like to eventually taper him off of IV fluids.     # Skin Cancer Risk:    - Discussed sun protection and recommend regular follow up with Dermatology.    # Medical Compliance: Yes    Follow-up: Return to clinic in 6 months for repeat evaluation.      # Transplant History:  Etiology of kidney failure: Diabetes mellitus, type I.   Tx: LDKT  Tx: ANNMARIE  (failed)  Transplant: 11/4/2008 (Kidney), 2/17/2009 (Pancreas)  Donor Type: Living Donor Class: Standard Criteria Donor    Transplant Office Phone Number: [phone]    Assessment and plan was discussed with the patient and he voiced his understanding and agreement.    Chief Complaint   Follow-up    History of Present Illness:  Murtaza Fitzgerald is a 43 year old male with a history of end stage kidney disease secondary to diabetes mellitus, status-post LDKT on 11/04/2008, who presents for follow-up. He is also status-post failed pancreas transplant in 2009. The patient was last evaluated in our clinic on 03/28/2016 by Dr. Sung Jorgensen; please see this note for further details. At that time, he was doing quite well. No changes were made with regards to immunosuppression or his antihypertensive regimen. He remained inactive on the pancreas transplant list following a CABG in 2015; however, he was encouraged to follow up with cardiology for clearance to reactive.     Today, the patient states he is receiving IV fluids twice per week for ongoing symptoms of nausea and vomiting. He has attributed this to chronic dehydration. This has been quite helpful. He is planning to follow up with gastroenterology for further management. He started fludrocortisone but has not noticed any improvement with this medication. He denies any swelling in his lower extremities. Home blood pressures have been around 110's systolic and stable. He describes intermittent episodes of diarrhea, but nothing that is bothersome for him. Chest pain and shortness of breath are rarely a problem for him, though he can experience this with prolonged walking or exercise. He is currently using an insulin pump. When he is feeling good, he uses approximately 100 units per day, less if he isn't feeling well.     He otherwise denies new complaints.      Recent Hospitalizations:  [x] No [] Yes    New Medical Issues: [] No [x] Yes See above.     Decreased energy: [x] No [] Yes    Chest pain or SOB with exertion:  [] No [x] Yes See above.    Appetite change or weight change: [x] No [] Yes    Nausea, vomiting or diarrhea:  [] No [x] Yes See above.    Fever, sweats or chills: [x] No [] Yes    Leg swelling: [x] No [] Yes      Review of Systems:   A comprehensive review of systems was obtained and negative, except as noted in the HPI or past medical history.     Active Medications:     Current Outpatient Medications:      acetone, Urine, test STRP, 1 strip by In Vitro route daily, Disp: 50 each, Rfl: 3     aspirin (ASA) 81 MG tablet, Take 1 tablet (81 mg) by mouth daily, Disp: 90 tablet, Rfl: 3     atorvastatin (LIPITOR) 40 MG tablet, Take 0.5 tablets (20 mg) by mouth daily, Disp: 45 tablet, Rfl: 3     YOHAN CONTOUR test strip, USE TO TEST BLOOD SUGAR FIVE TIMES A DAY OR AS DIRECTED, Disp: 450 each, Rfl: 1     blood glucose monitoring (NO BRAND SPECIFIED) test strip, Freestyle test strips 6 times daily (NOT lite and NOT insulinx), Disp: 300 strip, Rfl: 11     blood glucose monitoring (NO BRAND SPECIFIED) test strip, 5 times daily ( contour NEXT), Disp: 450 each, Rfl: 11     Blood Glucose Monitoring Suppl (BULX CONTOUR MONITOR) W/DEVICE KIT, TO TEST BG 5 TIMIES DAILY, Disp: 1 kit, Rfl: 0     fludrocortisone (FLORINEF) 0.1 MG tablet, Take 1 tablet (0.1 mg) by mouth daily, Disp: 60 tablet, Rfl: 11     Insulin Aspart (INSULIN PUMP - OUTPATIENT), Date last interrogated by pharmacy: 2/5/2019 Omnipod Insulin: Novolog BASAL RATES and times:  12 AM (midnight): 1.1 units/hr 2 AM: 1.25 10 AM: 0.8 6 PM: 0.95 CARB RATIO and times: 12 AM - 11 AM: 9 11 AM - 8 PM: 6 8 PM - 12 AM: 7  BLOOD GLUCOSE TARGET and times: 12 AM (midnight): 100, correct above 120 with reverse correction on Active Insulin Time: 4 hours ISF (insulin sensitivity factor):  40 from  38 from [id number] Dexcom sharing cose: [id number], Disp: , Rfl:      insulin aspart (NOVOLOG PEN) 100 UNIT/ML  "injection, DOSE:  2 units per 14 grams of carbohydrate.., Disp: 8 mL, Rfl: 11     insulin aspart (NOVOLOG VIAL) 100 UNITS/ML injection, To be used in insulin pump, est TDD 75 units/day, Disp: 60 mL, Rfl: 3     insulin pen needle (BD ARPITA U/F) 32G X 4 MM, Use 4 daily or as directed., Disp: 120 each, Rfl: 11     insulin syringe-needle U-100 (BD INSULIN SYRINGE ULTRAFINE) 31G X 5/16\" 1 ML, Use one syringe 1 daily or as directed., Disp: 100 each, Rfl: 11     metoclopramide (REGLAN) 5 MG tablet, TAKE ONE TABLET BY MOUTH FOUR TIMES A DAY (BEFORE MEALS AND NIGHTLY), Disp: 120 tablet, Rfl: 0     mycophenolic acid (GENERIC EQUIVALENT) 180 MG EC tablet, Take 2 tablets (360 mg) by mouth 2 times daily, Disp: 360 tablet, Rfl: 3     omeprazole (PRILOSEC) 40 MG DR capsule, Take 1 capsule (40 mg) by mouth 2 times daily, Disp: 180 capsule, Rfl: 3     tacrolimus (GENERIC EQUIVALENT) 0.5 MG capsule, Take 1 capsule (0.5 mg) by mouth 2 times daily Total dose = 1.5mg BID, Disp: 60 capsule, Rfl: 11     tacrolimus (GENERIC EQUIVALENT) 1 MG capsule, Take 1 capsule (1 mg) by mouth 2 times daily Total dose = 1.5mg BID, Disp: 60 capsule, Rfl: 11     ondansetron (ZOFRAN) 4 MG tablet, Take 1 tablet (4 mg) by mouth every 6 hours (Patient not taking: Reported on 3/28/2019), Disp: 8 tablet, Rfl: 0      Allergies:   Reglan      Active Medical Problems:  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)     Coronary artery disease involving native coronary artery of native heart without angina pectoris     Adhesive capsulitis of right shoulder "     Nausea & vomiting     Intractable nausea and vomiting     Dehydration     Social History:   Social History     Tobacco Use     Smoking status: Former Smoker     Packs/day: 0.30     Types: Cigarettes     Smokeless tobacco: Never Used     Tobacco comment: E- Cig use still   Substance Use Topics     Alcohol use: Yes     Alcohol/week: 0.0 oz     Comment: social     Drug use: No     Physical Exam:   BP (!) 147/98 (BP Location: Left arm)   Pulse 98   Temp 97.8  F (36.6  C) (Oral)   Wt 91.1 kg (200 lb 12.8 oz)   SpO2 97%   BMI 30.53 kg/m     Wt Readings from Last 4 Encounters:   03/28/19 91.1 kg (200 lb 12.8 oz)   03/05/19 90.7 kg (200 lb)   02/19/19 90.6 kg (199 lb 12.8 oz)   02/14/19 91.4 kg (201 lb 9.6 oz)     GENERAL APPEARANCE: alert and no distress  HENT: mouth without ulcers or lesions  LYMPHATICS: no cervical or supraclavicular nodes  RESP: lungs clear to auscultation - no rales, rhonchi or wheezes  CV: regular rhythm, normal rate, no rub, no murmur  EDEMA: no LE edema bilaterally  ABDOMEN: soft, nondistended, nontender, bowel sounds normal  MS: extremities normal - no gross deformities noted, no evidence of inflammation in joints, no muscle tenderness  SKIN: no rash  TX KIDNEY: normal    Data:   Renal Latest Ref Rng & Units 3/12/2019 3/5/2019 3/5/2019   Na 133 - 144 mmol/L 140 137 135   K 3.4 - 5.3 mmol/L 3.8 3.9 4.5   Cl 94 - 109 mmol/L 107 108 103   CO2 20 - 32 mmol/L 24 19(L) 22   BUN 7 - 30 mg/dL 19 28 31(H)   Cr 0.66 - 1.25 mg/dL 1.54(H) 1.71(H) 1.74(H)   Glucose 70 - 99 mg/dL 157(H) 186(H) 260(H)   Ca  8.5 - 10.1 mg/dL 9.1 9.4 9.4   Mg 1.6 - 2.3 mg/dL - - 1.8     Bone Health Latest Ref Rng & Units 8/13/2015 8/12/2015 8/11/2015   Phos 2.5 - 4.5 mg/dL 2.6 2.5 2.4(L)     Heme Latest Ref Rng & Units 3/12/2019 3/5/2019 3/5/2019   WBC 4.0 - 11.0 10e9/L 9.6 13.5(H) 13.5(H)   Hgb 13.3 - 17.7 g/dL 14.1 15.1 16.4   Plt 150 - 450 10e9/L 338 313 346     Liver Latest Ref Rng & Units 3/5/2019 3/5/2019 2/5/2019    AP 40 - 150 U/L 109 115 119   TBili 0.2 - 1.3 mg/dL 0.5 0.6 0.6   DBili 0.0 - 0.2 mg/dL - - 0.1   ALT 0 - 70 U/L 22 24 23   AST 0 - 45 U/L 16 21 17   Tot Protein 6.8 - 8.8 g/dL 8.5 9.0(H) 9.0(H)   Albumin 3.4 - 5.0 g/dL 3.8 3.8 3.5     Pancreas Latest Ref Rng & Units 3/5/2019 3/5/2019 2/5/2019   A1C 0 - 5.6 % - - -   Amylase 30 - 110 U/L - - -   Lipase 73 - 393 U/L 30(L) 29(L) 34(L)     Iron studies Latest Ref Rng & Units 11/6/2008 11/5/2008   Iron 35 - 180 ug/dL - 17(L)   Ferritin 20 - 300 ng/mL 114 -     [facility] Txp Virology Latest Ref Rng & Units 7/26/2016 7/8/2015 1/28/2015   CMV IgG EU/mL - - -   CVM DNA Quant - Plasma, EDTA anticoagulant - -   CMV Quant <100 Copies/mL - - -   CMV QT Log <2.0 Log copies/mL - - -   BK Spec - - Plasma Plasma   BK Res BKNEG copies/mL - BK Virus DNA Not Detected BK Virus DNA Not Detected   BK Log <2.7 Log copies/mL - Not Calculated   The Real-Time quantitative BK Virus assay was developed and its performance   characteristics determined by the Infect[facility] Diseases Diagnostic Laboratory at   the [facility] in [place], [place]. The   primers and probes for each analyte are Analyte Specific Reagents (ASRs)   manufactured by [employer].   ASRs are used in many laboratory tests necessary for standard medical care and   generally do not require U.S. Food and Drug Administration approval. The FDA   has determined that such clearance or approval is not necessary.   This test is used for clinical purposes. It should not be regarded as   investigational or for research. This laboratory is certified under the   Clinical Laboratory Improvement Amendments of 1988 (CLIA-88) as qualified to   perform high complexity clinical laboratory testing.   Not Calculated   The Real-Time quantitative BK Virus assay was developed and its performance   characteristics determined by the Infect[facility] Diseases Diagnostic Laboratory at   the [facility] in  [place], [place]. The   primers and probes for each analyte are Analyte Specific Reagents (ASRs)   manufactured by [employer].   ASRs are used in many laboratory tests necessary for standard medical care and   generally do not require U.S. Food and Drug Administration approval. The FDA   has determined that such clearance or approval is not necessary.   This test is used for clinical purposes. It should not be regarded as   investigational or for research. This laboratory is certified under the   Clinical Laboratory Improvement Amendments of 1988 (CLIA-88) as qualified to   perform high complexity clinical laboratory testing.     EBV IgG - - - -   Hep B Core NEG - - -   Hep B Surf - - - -   HIV 1&2 NEG - - -        Recent Labs   Lab Test 02/05/19  0628 02/14/19  0709 03/12/19  0716   DOSTAC 02.04.19 2000 LD AT 2100 ON 02/13/2019 03/11/2019 @ 1930   TACROL <3.0* 3.3* 4.1*     Recent Labs   Lab Test 11/11/13  0726 11/20/13  0910 01/11/14  0621   DOSMPA Not Provided 11/19/13 2100 Not Provided   MPACID <0.25* 0.90* 3.63*   MPAG <6.5* 29.8* 157.4*       Scribe Disclosure:   I, Taylor Casanova, am serving as a scribe to document services personally performed by Dr. Jack Chen at this visit, based upon the provider's statements to me. All documentation has been reviewed by the aforementioned provider prior to being entered into the official medical record.     Portions of this medical record were completed by a scribe. UPON MY REVIEW AND AUTHENTICATION BY ELECTRONIC SIGNATURE, this confirms (a) I performed the applicable clinical services, and (b) the record is accurate.

## 2019-03-28 NOTE — LETTER
3/28/2019      RE: Murtaza Fitzgerald  [address] [facility] [address]       [facility]  Nephrology Clinic  Kidney/Pancreas Recipient  2019     Name: Murtaza Fitzgerald  MRN: [id number]   Age: 43 year old  : 1975  Referring provider: Kt Ríos     CHRONIC TRANSPLANT NEPHROLOGY VISIT    Assessment and Plan:   # LDKT:    - Baseline Cr ~ 1.6-1.8; Stable   - Proteinuria: Mild; 0.87 in January of this year.    - Date of DSA last checked:  Latest DSA: No   - BK Viremia: No   - Kidney Tx Biopsy: Yes, demonstrated chronic changes.     # Immunosuppression: Tacrolimus (goal 4-6) and Mycophenolic acid (goal  1-3.5)   - Changes: No    # Diabetes status-post failed ANNMARIE (bladder drained):   - Borderline control   - HbA1c: Stable, most recently at 6.8.     Pancreas failed due rejection. Not interested in pancreas retransplant at this time.     # Hypertension: Controlled; Goal BP: < 130/80   - Changes: No    # Anemia in chronic renal disease:    - Hgb: Stable; most recently 14.1.     # Mineral Bone Disorder:    - Vitamin D; level is: Not checked recently   - Calcium; level is: Normal   - Phosphorus; level is: Not checked recently     # Electrolytes:   - Potassium; level: Normal  - Magnesium; level: Normal  - Bicarbonate; level: Normal    # CAD status-post CABG in :    - Remains asymptomatic.    - Continue to follow with cardiology.     # Nausea and Vomiting:   - Will increase his fludrocortisone dose to 0.1mg twice daily. He was counseled to watch for increased leg swelling or changes in blood pressure.    - Follow up with gastroenterology for further management.    - He is scheduled for a gastric emptying test next week.    - Would like to eventually taper him off of IV fluids.     # Skin Cancer Risk:    - Discussed sun protection and recommend regular follow up with Dermatology.    # Medical Compliance: Yes    Follow-up: Return to clinic in 6 months for repeat evaluation.      #  Transplant History:  Etiology of kidney failure: Diabetes mellitus, type I.   Tx: LDKT  Tx: ANNMARIE (failed)  Transplant: 11/4/2008 (Kidney), 2/17/2009 (Pancreas)  Donor Type: Living Donor Class: Standard Criteria Donor    Transplant Office Phone Number: [phone]    Assessment and plan was discussed with the patient and he voiced his understanding and agreement.    Chief Complaint   Follow-up    History of Present Illness:  Murtaza Fitzgerald is a 43 year old male with a history of end stage kidney disease secondary to diabetes mellitus, status-post LDKT on 11/04/2008, who presents for follow-up. He is also status-post failed pancreas transplant in 2009. The patient was last evaluated in our clinic on 03/28/2016 by Dr. Sung Jorgensen; please see this note for further details. At that time, he was doing quite well. No changes were made with regards to immunosuppression or his antihypertensive regimen. He remained inactive on the pancreas transplant list following a CABG in 2015; however, he was encouraged to follow up with cardiology for clearance to reactive.     Today, the patient states he is receiving IV fluids twice per week for ongoing symptoms of nausea and vomiting. He has attributed this to chronic dehydration. This has been quite helpful. He is planning to follow up with gastroenterology for further management. He started fludrocortisone but has not noticed any improvement with this medication. He denies any swelling in his lower extremities. Home blood pressures have been around 110's systolic and stable. He describes intermittent episodes of diarrhea, but nothing that is bothersome for him. Chest pain and shortness of breath are rarely a problem for him, though he can experience this with prolonged walking or exercise. He is currently using an insulin pump. When he is feeling good, he uses approximately 100 units per day, less if he isn't feeling well.     He otherwise denies new complaints.       Recent Hospitalizations:  [x] No [] Yes    New Medical Issues: [] No [x] Yes See above.    Decreased energy: [x] No [] Yes    Chest pain or SOB with exertion:  [] No [x] Yes See above.    Appetite change or weight change: [x] No [] Yes    Nausea, vomiting or diarrhea:  [] No [x] Yes See above.    Fever, sweats or chills: [x] No [] Yes    Leg swelling: [x] No [] Yes      Review of Systems:   A comprehensive review of systems was obtained and negative, except as noted in the HPI or past medical history.     Active Medications:     Current Outpatient Medications:      acetone, Urine, test STRP, 1 strip by In Vitro route daily, Disp: 50 each, Rfl: 3     aspirin (ASA) 81 MG tablet, Take 1 tablet (81 mg) by mouth daily, Disp: 90 tablet, Rfl: 3     atorvastatin (LIPITOR) 40 MG tablet, Take 0.5 tablets (20 mg) by mouth daily, Disp: 45 tablet, Rfl: 3     YOHAN CONTOUR test strip, USE TO TEST BLOOD SUGAR FIVE TIMES A DAY OR AS DIRECTED, Disp: 450 each, Rfl: 1     blood glucose monitoring (NO BRAND SPECIFIED) test strip, Freestyle test strips 6 times daily (NOT lite and NOT insulinx), Disp: 300 strip, Rfl: 11     blood glucose monitoring (NO BRAND SPECIFIED) test strip, 5 times daily ( contour NEXT), Disp: 450 each, Rfl: 11     Blood Glucose Monitoring Suppl (5173.com CONTOUR MONITOR) W/DEVICE KIT, TO TEST BG 5 TIMIES DAILY, Disp: 1 kit, Rfl: 0     fludrocortisone (FLORINEF) 0.1 MG tablet, Take 1 tablet (0.1 mg) by mouth daily, Disp: 60 tablet, Rfl: 11     Insulin Aspart (INSULIN PUMP - OUTPATIENT), Date last interrogated by pharmacy: 2/5/2019 Omnipod Insulin: Novolog BASAL RATES and times:  12 AM (midnight): 1.1 units/hr 2 AM: 1.25 10 AM: 0.8 6 PM: 0.95 CARB RATIO and times: 12 AM - 11 AM: 9 11 AM - 8 PM: 6 8 PM - 12 AM: 7  BLOOD GLUCOSE TARGET and times: 12 AM (midnight): 100, correct above 120 with reverse correction on Active Insulin Time: 4 hours ISF (insulin sensitivity factor):  40 from  38 from [id number]  "Dexcom sharing cose: [id number], Disp: , Rfl:      insulin aspart (NOVOLOG PEN) 100 UNIT/ML injection, DOSE:  2 units per 14 grams of carbohydrate.., Disp: 8 mL, Rfl: 11     insulin aspart (NOVOLOG VIAL) 100 UNITS/ML injection, To be used in insulin pump, est TDD 75 units/day, Disp: 60 mL, Rfl: 3     insulin pen needle (BD ARPITA U/F) 32G X 4 MM, Use 4 daily or as directed., Disp: 120 each, Rfl: 11     insulin syringe-needle U-100 (BD INSULIN SYRINGE ULTRAFINE) 31G X 5/16\" 1 ML, Use one syringe 1 daily or as directed., Disp: 100 each, Rfl: 11     metoclopramide (REGLAN) 5 MG tablet, TAKE ONE TABLET BY MOUTH FOUR TIMES A DAY (BEFORE MEALS AND NIGHTLY), Disp: 120 tablet, Rfl: 0     mycophenolic acid (GENERIC EQUIVALENT) 180 MG EC tablet, Take 2 tablets (360 mg) by mouth 2 times daily, Disp: 360 tablet, Rfl: 3     omeprazole (PRILOSEC) 40 MG DR capsule, Take 1 capsule (40 mg) by mouth 2 times daily, Disp: 180 capsule, Rfl: 3     tacrolimus (GENERIC EQUIVALENT) 0.5 MG capsule, Take 1 capsule (0.5 mg) by mouth 2 times daily Total dose = 1.5mg BID, Disp: 60 capsule, Rfl: 11     tacrolimus (GENERIC EQUIVALENT) 1 MG capsule, Take 1 capsule (1 mg) by mouth 2 times daily Total dose = 1.5mg BID, Disp: 60 capsule, Rfl: 11     ondansetron (ZOFRAN) 4 MG tablet, Take 1 tablet (4 mg) by mouth every 6 hours (Patient not taking: Reported on 3/28/2019), Disp: 8 tablet, Rfl: 0      Allergies:   Reglan      Active Medical Problems:  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)     Coronary artery disease involving native " coronary artery of native heart without angina pectoris     Adhesive capsulitis of right shoulder     Nausea & vomiting     Intractable nausea and vomiting     Dehydration     Social History:   Social History     Tobacco Use     Smoking status: Former Smoker     Packs/day: 0.30     Types: Cigarettes     Smokeless tobacco: Never Used     Tobacco comment: E- Cig use still   Substance Use Topics     Alcohol use: Yes     Alcohol/week: 0.0 oz     Comment: social     Drug use: No     Physical Exam:   BP (!) 147/98 (BP Location: Left arm)   Pulse 98   Temp 97.8  F (36.6  C) (Oral)   Wt 91.1 kg (200 lb 12.8 oz)   SpO2 97%   BMI 30.53 kg/m      Wt Readings from Last 4 Encounters:   03/28/19 91.1 kg (200 lb 12.8 oz)   03/05/19 90.7 kg (200 lb)   02/19/19 90.6 kg (199 lb 12.8 oz)   02/14/19 91.4 kg (201 lb 9.6 oz)     GENERAL APPEARANCE: alert and no distress  HENT: mouth without ulcers or lesions  LYMPHATICS: no cervical or supraclavicular nodes  RESP: lungs clear to auscultation - no rales, rhonchi or wheezes  CV: regular rhythm, normal rate, no rub, no murmur  EDEMA: no LE edema bilaterally  ABDOMEN: soft, nondistended, nontender, bowel sounds normal  MS: extremities normal - no gross deformities noted, no evidence of inflammation in joints, no muscle tenderness  SKIN: no rash  TX KIDNEY: normal    Data:   Renal Latest Ref Rng & Units 3/12/2019 3/5/2019 3/5/2019   Na 133 - 144 mmol/L 140 137 135   K 3.4 - 5.3 mmol/L 3.8 3.9 4.5   Cl 94 - 109 mmol/L 107 108 103   CO2 20 - 32 mmol/L 24 19(L) 22   BUN 7 - 30 mg/dL 19 28 31(H)   Cr 0.66 - 1.25 mg/dL 1.54(H) 1.71(H) 1.74(H)   Glucose 70 - 99 mg/dL 157(H) 186(H) 260(H)   Ca  8.5 - 10.1 mg/dL 9.1 9.4 9.4   Mg 1.6 - 2.3 mg/dL - - 1.8     Bone Health Latest Ref Rng & Units 8/13/2015 8/12/2015 8/11/2015   Phos 2.5 - 4.5 mg/dL 2.6 2.5 2.4(L)     Heme Latest Ref Rng & Units 3/12/2019 3/5/2019 3/5/2019   WBC 4.0 - 11.0 10e9/L 9.6 13.5(H) 13.5(H)   Hgb 13.3 - 17.7 g/dL 14.1 15.1  16.4   Plt 150 - 450 10e9/L 338 313 346     Liver Latest Ref Rng & Units 3/5/2019 3/5/2019 2/5/2019   AP 40 - 150 U/L 109 115 119   TBili 0.2 - 1.3 mg/dL 0.5 0.6 0.6   DBili 0.0 - 0.2 mg/dL - - 0.1   ALT 0 - 70 U/L 22 24 23   AST 0 - 45 U/L 16 21 17   Tot Protein 6.8 - 8.8 g/dL 8.5 9.0(H) 9.0(H)   Albumin 3.4 - 5.0 g/dL 3.8 3.8 3.5     Pancreas Latest Ref Rng & Units 3/5/2019 3/5/2019 2/5/2019   A1C 0 - 5.6 % - - -   Amylase 30 - 110 U/L - - -   Lipase 73 - 393 U/L 30(L) 29(L) 34(L)     Iron studies Latest Ref Rng & Units 11/6/2008 11/5/2008   Iron 35 - 180 ug/dL - 17(L)   Ferritin 20 - 300 ng/mL 114 -     [facility] Txp Virology Latest Ref Rng & Units 7/26/2016 7/8/2015 1/28/2015   CMV IgG EU/mL - - -   CVM DNA Quant - Plasma, EDTA anticoagulant - -   CMV Quant <100 Copies/mL - - -   CMV QT Log <2.0 Log copies/mL - - -   BK Spec - - Plasma Plasma   BK Res BKNEG copies/mL - BK Virus DNA Not Detected BK Virus DNA Not Detected   BK Log <2.7 Log copies/mL - Not Calculated   The Real-Time quantitative BK Virus assay was developed and its performance   characteristics determined by the Infectious Diseases Diagnostic Laboratory at   the [facility] in [place], [place]. The   primers and probes for each analyte are Analyte Specific Reagents (ASRs)   manufactured by [employer].   ASRs are used in many laboratory tests necessary for standard medical care and   generally do not require U.S. Food and Drug Administration approval. The FDA   has determined that such clearance or approval is not necessary.   This test is used for clinical purposes. It should not be regarded as   investigational or for research. This laboratory is certified under the   Clinical Laboratory Improvement Amendments of 1988 (CLIA-88) as qualified to   perform high complexity clinical laboratory testing.   Not Calculated   The Real-Time quantitative BK Virus assay was developed and its performance   characteristics determined by  the Infectious Diseases Diagnostic Laboratory at   the [facility] in [place], [place]. The   primers and probes for each analyte are Analyte Specific Reagents (ASRs)   manufactured by [employer].   ASRs are used in many laboratory tests necessary for standard medical care and   generally do not require U.S. Food and Drug Administration approval. The FDA   has determined that such clearance or approval is not necessary.   This test is used for clinical purposes. It should not be regarded as   investigational or for research. This laboratory is certified under the   Clinical Laboratory Improvement Amendments of 1988 (CLIA-88) as qualified to   perform high complexity clinical laboratory testing.     EBV IgG - - - -   Hep B Core NEG - - -   Hep B Surf - - - -   HIV 1&2 NEG - - -        Recent Labs   Lab Test 02/05/19  0628 02/14/19  0709 03/12/19  0716   DOSTAC 02.04.19 2000 LD AT 2100 ON 02/13/2019 03/11/2019 @ 1930   TACROL <3.0* 3.3* 4.1*     Recent Labs   Lab Test 11/11/13  0726 11/20/13  0910 01/11/14  0621   DOSMPA Not Provided 11/19/13 2100 Not Provided   MPACID <0.25* 0.90* 3.63*   MPAG <6.5* 29.8* 157.4*       Scribe Disclosure:   I, Taylor Casanova, am serving as a scribe to document services personally performed by Dr. Jack Chen at this visit, based upon the provider's statements to me. All documentation has been reviewed by the aforementioned provider prior to being entered into the official medical record.     Portions of this medical record were completed by a scribe. UPON MY REVIEW AND AUTHENTICATION BY ELECTRONIC SIGNATURE, this confirms (a) I performed the applicable clinical services, and (b) the record is accurate.       Kidney/Pancreas Recipient

## 2019-03-28 NOTE — TELEPHONE ENCOUNTER
Spoke with the patient to discuss appts requested by his coordinator, Henrietta.    I confirmed that his gastric emptying study has been set up on 4/4 at 8a at [facility]. Pt is aware.    I assured him that [facility] would be [facility] to get him set up for an EGD.    I told him that our GI dept is booking out to July and gave him the telephone number for [facility]. He will call [phone] to see if they can see him sooner. I asked him to call me to cancel the appt in July if he can be seen sooner. Pt agreed.    4/04/19 8:00 AM 30 [id number] RHSCNM NM GASTRIC           7/24/19 9:00 AM 40 Renetta Mayorga MD [facility]

## 2019-03-29 ENCOUNTER — INFUSION THERAPY VISIT (OUTPATIENT)
Dept: INFUSION THERAPY | Facility: CLINIC | Age: 44
End: 2019-03-29
Attending: INTERNAL MEDICINE
Payer: COMMERCIAL

## 2019-03-29 VITALS
OXYGEN SATURATION: 98 % | HEART RATE: 92 BPM | TEMPERATURE: 98 F | RESPIRATION RATE: 16 BRPM | SYSTOLIC BLOOD PRESSURE: 149 MMHG | DIASTOLIC BLOOD PRESSURE: 89 MMHG

## 2019-03-29 DIAGNOSIS — E86.0 DEHYDRATION: Primary | ICD-10-CM

## 2019-03-29 PROCEDURE — 25000128 H RX IP 250 OP 636: Performed by: INTERNAL MEDICINE

## 2019-03-29 PROCEDURE — 96360 HYDRATION IV INFUSION INIT: CPT

## 2019-03-29 RX ADMIN — SODIUM CHLORIDE 1000 ML: 9 INJECTION, SOLUTION INTRAVENOUS at 11:09

## 2019-03-29 NOTE — PROGRESS NOTES
Infusion Nursing Note:  Murtaza Fitzgerald presents today for IVF.    Patient seen by provider today: No   present during visit today: Not Applicable.    Note: N/A.    Intravenous Access:  Peripheral IV placed.    Treatment Conditions:  Not Applicable.      Post Infusion Assessment:  Patient tolerated infusion without incident.  Blood return noted pre and post infusion.  Site patent and intact, free from redness, edema or discomfort.  No evidence of extravasations.  Access discontinued per protocol.       Discharge Plan:   Discharge instructions reviewed with: Patient.  Patient and/or family verbalized understanding of discharge instructions and all questions answered.  Copy of AVS reviewed with patient and/or family.  Patient will return 4/2/19 for next appointment.  Patient discharged in stable condition accompanied by: self.  Departure Mode: Ambulatory.    Swapna Ortiz RN

## 2019-04-04 ENCOUNTER — HOSPITAL ENCOUNTER (OUTPATIENT)
Dept: NUCLEAR MEDICINE | Facility: CLINIC | Age: 44
Setting detail: NUCLEAR MEDICINE
Discharge: HOME | End: 2019-04-04
Attending: SURGERY | Admitting: SURGERY
Payer: COMMERCIAL

## 2019-04-04 DIAGNOSIS — R11.2 NAUSEA AND VOMITING: ICD-10-CM

## 2019-04-04 PROCEDURE — 34300033 ZZH RX 343: Performed by: SURGERY

## 2019-04-04 PROCEDURE — A9541 TC99M SULFUR COLLOID: HCPCS | Performed by: SURGERY

## 2019-04-04 PROCEDURE — 78264 GASTRIC EMPTYING IMG STUDY: CPT

## 2019-04-04 RX ADMIN — Medication 2 MILLICURIE: at 08:05

## 2019-04-05 ENCOUNTER — INFUSION THERAPY VISIT (OUTPATIENT)
Dept: INFUSION THERAPY | Facility: CLINIC | Age: 44
End: 2019-04-05
Attending: INTERNAL MEDICINE
Payer: COMMERCIAL

## 2019-04-05 ENCOUNTER — HOSPITAL ENCOUNTER (OUTPATIENT)
Facility: CLINIC | Age: 44
Setting detail: SPECIMEN
End: 2019-04-05
Attending: INTERNAL MEDICINE
Payer: COMMERCIAL

## 2019-04-05 VITALS
DIASTOLIC BLOOD PRESSURE: 84 MMHG | RESPIRATION RATE: 16 BRPM | TEMPERATURE: 98 F | SYSTOLIC BLOOD PRESSURE: 143 MMHG | HEART RATE: 90 BPM

## 2019-04-05 DIAGNOSIS — E86.0 DEHYDRATION: Primary | ICD-10-CM

## 2019-04-05 PROCEDURE — 96360 HYDRATION IV INFUSION INIT: CPT

## 2019-04-05 PROCEDURE — 25000128 H RX IP 250 OP 636: Performed by: INTERNAL MEDICINE

## 2019-04-05 RX ORDER — LIDOCAINE 40 MG/G
CREAM TOPICAL
Status: CANCELLED | OUTPATIENT
Start: 2019-04-05

## 2019-04-05 RX ORDER — ONDANSETRON 2 MG/ML
4 INJECTION INTRAMUSCULAR; INTRAVENOUS
Status: CANCELLED | OUTPATIENT
Start: 2019-04-05

## 2019-04-05 RX ADMIN — SODIUM CHLORIDE 1000 ML: 9 INJECTION, SOLUTION INTRAVENOUS at 12:00

## 2019-04-05 NOTE — PROGRESS NOTES
Infusion Nursing Note:  Murtaza Fitzgerald presents today for IVF.    Patient seen by provider today: No   present during visit today: Not Applicable.    Note: Tyrell only wanted one liter today.    Intravenous Access:  Peripheral IV placed.    Treatment Conditions:  Not Applicable.      Post Infusion Assessment:  Patient tolerated infusion without incident.  Blood return noted pre and post infusion.  Site patent and intact, free from redness, edema or discomfort.  No evidence of extravasations.  Access discontinued per protocol.       Discharge Plan:   Patient declined prescription refills.  Discharge instructions reviewed with: Patient.  Patient and/or family verbalized understanding of discharge instructions and all questions answered.  Copy of AVS reviewed with patient and/or family.  Patient will return 4/9/19 for next appointment.  Patient discharged in stable condition accompanied by: self.  Departure Mode: Ambulatory.    Mirta Remy RN

## 2019-04-17 ENCOUNTER — TEAM CONFERENCE (OUTPATIENT)
Dept: TRANSPLANT | Facility: CLINIC | Age: 44
End: 2019-04-17

## 2019-04-17 DIAGNOSIS — K31.84 GASTROPARESIS: Primary | ICD-10-CM

## 2019-04-18 NOTE — TELEPHONE ENCOUNTER
Post Kidney and Pancreas Transplant Team Conference  Date: 4/17/2019  Transplant Coordinator: My Bansal, Henrietta Barraza     Attendees:  [x]  Dr. Jorgensen [] Roxy Almanza, RN  [] Marcie Galvan LPN     [x]  Dr. Villalba [] Becky Fitzpatrick, JOSE [x] Ginger Blankenship LPN   [x]  Dr. Chen [] Araceli Manley, JOSE    [x]  Dr. Snyder [x] Micaela Law RN    [x] Dr. Urban [x] Marcelle Hagan, JOSE    [] Dr. Guzman [x] Conner Diaz RN    [] Dr. Saleh [x] Henrietta Barraza, RN    [] Surgery Fellow [x] Chandni Bird RN    [] Sania Ventura, ILIANA [] Adriana Tucker RN    [] Rob Burt, PharmD [] Parth Zarate RN     [] Roxy Dumont RN        Verbal Plan Read Back:   Appt with Dr. Guzman    Routed to RN Coordinator   Ginger Blankenship

## 2019-04-20 ENCOUNTER — HOSPITAL ENCOUNTER (EMERGENCY)
Facility: CLINIC | Age: 44
Discharge: HOME | End: 2019-04-20
Attending: EMERGENCY MEDICINE | Admitting: EMERGENCY MEDICINE
Payer: COMMERCIAL

## 2019-04-20 ENCOUNTER — APPOINTMENT (OUTPATIENT)
Dept: CT IMAGING | Facility: CLINIC | Age: 44
End: 2019-04-20
Attending: EMERGENCY MEDICINE
Payer: COMMERCIAL

## 2019-04-20 VITALS
RESPIRATION RATE: 18 BRPM | TEMPERATURE: 98 F | HEART RATE: 81 BPM | OXYGEN SATURATION: 96 % | DIASTOLIC BLOOD PRESSURE: 112 MMHG | SYSTOLIC BLOOD PRESSURE: 169 MMHG

## 2019-04-20 DIAGNOSIS — R51.9 NONINTRACTABLE HEADACHE, UNSPECIFIED CHRONICITY PATTERN, UNSPECIFIED HEADACHE TYPE: ICD-10-CM

## 2019-04-20 LAB
ANION GAP SERPL CALCULATED.3IONS-SCNC: 5 MMOL/L (ref 3–14)
BASOPHILS # BLD AUTO: 0.1 10E9/L (ref 0–0.2)
BASOPHILS NFR BLD AUTO: 0.6 %
BUN SERPL-MCNC: 19 MG/DL (ref 7–30)
CALCIUM SERPL-MCNC: 8.2 MG/DL (ref 8.5–10.1)
CHLORIDE SERPL-SCNC: 108 MMOL/L (ref 94–109)
CO2 SERPL-SCNC: 26 MMOL/L (ref 20–32)
CREAT SERPL-MCNC: 1.95 MG/DL (ref 0.66–1.25)
DIFFERENTIAL METHOD BLD: NORMAL
EOSINOPHIL # BLD AUTO: 0.2 10E9/L (ref 0–0.7)
EOSINOPHIL NFR BLD AUTO: 1.8 %
ERYTHROCYTE [DISTWIDTH] IN BLOOD BY AUTOMATED COUNT: 13.7 % (ref 10–15)
GFR SERPL CREATININE-BSD FRML MDRD: 41 ML/MIN/{1.73_M2}
GLUCOSE BLDC GLUCOMTR-MCNC: 120 MG/DL (ref 70–99)
GLUCOSE SERPL-MCNC: 120 MG/DL (ref 70–99)
HCT VFR BLD AUTO: 45.2 % (ref 40–53)
HGB BLD-MCNC: 14.5 G/DL (ref 13.3–17.7)
IMM GRANULOCYTES # BLD: 0.1 10E9/L (ref 0–0.4)
IMM GRANULOCYTES NFR BLD: 0.5 %
LYMPHOCYTES # BLD AUTO: 2 10E9/L (ref 0.8–5.3)
LYMPHOCYTES NFR BLD AUTO: 20.4 %
MCH RBC QN AUTO: 28.3 PG (ref 26.5–33)
MCHC RBC AUTO-ENTMCNC: 32.1 G/DL (ref 31.5–36.5)
MCV RBC AUTO: 88 FL (ref 78–100)
MONOCYTES # BLD AUTO: 0.6 10E9/L (ref 0–1.3)
MONOCYTES NFR BLD AUTO: 5.7 %
NEUTROPHILS # BLD AUTO: 6.9 10E9/L (ref 1.6–8.3)
NEUTROPHILS NFR BLD AUTO: 71 %
NRBC # BLD AUTO: 0 10*3/UL
NRBC BLD AUTO-RTO: 0 /100
PLATELET # BLD AUTO: 279 10E9/L (ref 150–450)
POTASSIUM SERPL-SCNC: 3.5 MMOL/L (ref 3.4–5.3)
RBC # BLD AUTO: 5.12 10E12/L (ref 4.4–5.9)
SODIUM SERPL-SCNC: 139 MMOL/L (ref 133–144)
WBC # BLD AUTO: 9.7 10E9/L (ref 4–11)

## 2019-04-20 PROCEDURE — 80048 BASIC METABOLIC PNL TOTAL CA: CPT | Performed by: EMERGENCY MEDICINE

## 2019-04-20 PROCEDURE — 25000128 H RX IP 250 OP 636: Performed by: EMERGENCY MEDICINE

## 2019-04-20 PROCEDURE — 25000132 ZZH RX MED GY IP 250 OP 250 PS 637: Performed by: EMERGENCY MEDICINE

## 2019-04-20 PROCEDURE — 99284 EMERGENCY DEPT VISIT MOD MDM: CPT | Mod: 25

## 2019-04-20 PROCEDURE — 85025 COMPLETE CBC W/AUTO DIFF WBC: CPT | Performed by: EMERGENCY MEDICINE

## 2019-04-20 PROCEDURE — 96375 TX/PRO/DX INJ NEW DRUG ADDON: CPT

## 2019-04-20 PROCEDURE — 00000146 ZZHCL STATISTIC GLUCOSE BY METER IP

## 2019-04-20 PROCEDURE — 70450 CT HEAD/BRAIN W/O DYE: CPT

## 2019-04-20 PROCEDURE — 96361 HYDRATE IV INFUSION ADD-ON: CPT

## 2019-04-20 PROCEDURE — 96374 THER/PROPH/DIAG INJ IV PUSH: CPT

## 2019-04-20 RX ORDER — ACETAMINOPHEN 500 MG
500 TABLET ORAL EVERY 4 HOURS PRN
Status: DISCONTINUED | OUTPATIENT
Start: 2019-04-20 | End: 2019-04-21 | Stop reason: HOSPADM

## 2019-04-20 RX ORDER — DIPHENHYDRAMINE HYDROCHLORIDE 50 MG/ML
25 INJECTION INTRAMUSCULAR; INTRAVENOUS ONCE
Status: COMPLETED | OUTPATIENT
Start: 2019-04-20 | End: 2019-04-20

## 2019-04-20 RX ORDER — ONDANSETRON 2 MG/ML
4 INJECTION INTRAMUSCULAR; INTRAVENOUS ONCE
Status: COMPLETED | OUTPATIENT
Start: 2019-04-20 | End: 2019-04-20

## 2019-04-20 RX ADMIN — DIPHENHYDRAMINE HYDROCHLORIDE 25 MG: 50 INJECTION, SOLUTION INTRAMUSCULAR; INTRAVENOUS at 22:31

## 2019-04-20 RX ADMIN — ACETAMINOPHEN 500 MG: 500 TABLET, FILM COATED ORAL at 22:40

## 2019-04-20 RX ADMIN — SODIUM CHLORIDE 1000 ML: 9 INJECTION, SOLUTION INTRAVENOUS at 22:31

## 2019-04-20 RX ADMIN — SODIUM CHLORIDE 500 ML: 9 INJECTION, SOLUTION INTRAVENOUS at 22:40

## 2019-04-20 RX ADMIN — ONDANSETRON 4 MG: 2 INJECTION INTRAMUSCULAR; INTRAVENOUS at 22:32

## 2019-04-20 RX ADMIN — PROCHLORPERAZINE EDISYLATE 5 MG: 5 INJECTION INTRAMUSCULAR; INTRAVENOUS at 22:40

## 2019-04-20 ASSESSMENT — ENCOUNTER SYMPTOMS
FEVER: 0
HEADACHES: 1
VOMITING: 0
CONFUSION: 1
NAUSEA: 1

## 2019-04-20 NOTE — ED AVS SNAPSHOT
[facility] Emergency Department  [address]  [facility] [address]  Phone:  [phone]  Fax:  [phone]                                    Murtaza Fitzgerald   MRN: [id number]    Department:  [facility] Emergency Department   Date of Visit:  4/20/2019           After Visit Summary Signature Page    I have received my discharge instructions, and my questions have been answered. I have discussed any challenges I see with this plan with the nurse or doctor.    ..........................................................................................................................................  Patient/Patient Representative Signature      ..........................................................................................................................................  Patient Representative Print Name and Relationship to Patient    ..................................................               ................................................  Date                                   Time    ..........................................................................................................................................  Reviewed by Signature/Title    ...................................................              ..............................................  Date                                               Time          22EPIC Rev 08/18

## 2019-04-21 NOTE — ED PROVIDER NOTES
"  History     Chief Complaint:    Headache    HPI   Murtaza Fitzgerald is an immunosuppressed  43 year old male with a history of type I diabetes, coronary artery disease, s/p CABG, s/p kidney transplant, s/p pancreas transplant who presents to the ED for evaluation of a headache. The patient states that he went to [place]. As he was finishing eating around 1930, became nauseous, and then developed a stabbing temporal headache. His glucose sensor told him he had low blood sugar around this time. He also states that his peripheral vision became \"fuzzy\" and he thinking was \"foggy\", citing that he had difficulty answering his daughter's questions. He rates his headache a 9 or 10 out of 10 which ultimately prompted his visit to the ED. He states that his \"fogginess\" is improving. He otherwise denies any fevers, recent illnesses, numbness, or vomit. He did not hit his head recently. Of note, he states that he had a similar episode about five years ago that resolved after taking Aleve and going to sleep.     Allergies:  Reglan     Medications:    Aspirin  Lipitor  Florinef  Insulin aspart  Reglan  Prilosec  Zofran  Tacrolimus    Past Medical History:    CMV negative  CAD  Type I DM  Dyslipidemia  HTN  Immunosuppressed status  Kidney transplant status  Mycotic aneurysm of kidney transplant  Noncompliance with treatment  Pancreas replaced by transplant  Tobacco abuse    Past Surgical History:    CABG x2  Orthopedic surgery  Pancreas and kidney transplant    Family History:    No past pertinent family history.    Social History:  Former smoker.   Positive for alcohol use.   Marital Status:   [4]     Review of Systems   Constitutional: Negative for fever.   Eyes: Positive for visual disturbance.   Gastrointestinal: Positive for nausea. Negative for vomiting.   Neurological: Positive for headaches.   Psychiatric/Behavioral: Positive for confusion.   All other systems reviewed and are " negative.    Physical Exam   First Vitals:  BP: 125/88  Pulse: 84  Heart Rate: 92  Temp: 98  F (36.7  C)  Resp: 16  SpO2: 100 %    Physical Exam  General: Patient is alert and interactive when I enter the room  Head:  The scalp, face, and head appear normal  Eyes:  Conjunctivae are normal, PERRL  ENT:    The nose is normal    Pinnae are normal    External acoustic canals are normal  Neck:  Trachea midline  CV:  Pulses are normal  Resp:  No respiratory distress   Abdomen:      Soft, non-tender, non-distended  Musc:  Normal muscular tone    No major joint effusions    No asymmetric leg swelling  Skin:  No rash or lesions noted  Neuro:  Speech is normal and fluent. Face is symmetric.     Moving all extremities well. Cranial nerves intact.   Psych: Awake. Alert.  Normal affect.  Appropriate interactions.    Emergency Department Course   ECG:  Time: 2135  Vent. Rate 84 bpm. MO interval 134. QRS duration 86. QT/QTc 348/411. P-R-T axis 27 -14 34.  Sinus rhythm. Inferior infarct. Abnormal ECG.     Imaging:  Radiographic findings were communicated with the patient who voiced understanding of the findings.  CT Head without contrast:   Normal head CT as per radiology.    Laboratory:  CBC: WBC: 9.7, HGB: 14.5, PLT: 279  BMP: Glucose 120 (H), Creatinine 1.95 (H), GFR estimate 41 (L), Calcium 8.2 (L), o/w WNL    2139 Glucose by meter: 120 (H)    Interventions:  2231 NS 1,000 mLs IV   Benadryl 25 mg IV  2232 Zofran 4 mg IV  2240 Tylenol 500 mg PO   Compazine 5 mg IV    Emergency Department Course:  Nursing notes and vitals reviewed. (2157) I performed an exam of the patient as documented above.     IV inserted. Medicine administered as documented above. Blood drawn. This was sent to the lab for further testing, results above.     The patient was sent for a head CT while in the emergency department, findings above.     I rechecked the patient and discussed the results of his workup thus far.     Findings and plan explained to the  Patient. Patient discharged home with instructions regarding supportive care, medications, and reasons to return. The importance of close follow-up was reviewed.     I personally reviewed the laboratory results with the Patient and answered all related questions prior to discharge.   Impression & Plan    Medical Decision Making:  Tyrell Fitzgerald is a 43 year old gentleman with history of diabetes and CKD status post transplant who presents with headache. Patient had confusion with his headache that seems to be getting better but his headache is still there. He is neurologically intact and afebrile. He has had this before, that resolved with Advil. This time sound very migrainous in nature. We did head CT which I think is sufficient enough to rule him out for head bleed as this started  Within six hours of the onset of his headache. Patient was given compazine, benadryl, and Tylenol with much improvement in his headache. He did have a little elevation in his creatinine which he will follow up on with his neprhologist. He otherwise appeared well and was given IV fluids as well. Patient felt comfortable going home and was discharged as this is likely a migraine. Patient discharged.       Diagnosis:    ICD-10-CM    1. Nonintractable headache, unspecified chronicity pattern, unspecified headache type R51        Disposition:  discharged to home    Scribe Disclosure:  I,  Gonzalez Genao, am serving as a scribe on 4/20/2019 at 9:57 PM to personally document services performed by Lianna Garcia MD based on my observations and the provider's statements to me.          Gonzalez Genao  4/20/2019   [facility] EMERGENCY DEPARTMENT       Lianna Garcia MD  04/21/19 [id number]

## 2019-04-21 NOTE — ED TRIAGE NOTES
"Patient presents to ED due  HA and altered mental status.     Family reports patient c/o HA approx 2030 describes pain as \" stabbing\" family reports patient having difficulty finding words and more slow to respond     HX TIA   "

## 2019-04-22 ENCOUNTER — PATIENT OUTREACH (OUTPATIENT)
Dept: CARE COORDINATION | Facility: CLINIC | Age: 44
End: 2019-04-22

## 2019-04-22 LAB — INTERPRETATION ECG - MUSE: NORMAL

## 2019-04-22 NOTE — PROGRESS NOTES
Clinic Care Coordination Contact  [facility]/Voicemail    Referral Source: ED Follow-Up. Patient meets criteria for an outreach with 5 ED visits within the past 90 days and a 66% risk of admission or ED visit score. Patient was most recently seen in the ED for a headache. Also noted to follow up with nephrology for elevated labs.   Clinical Data: Care Coordinator Outreach  Outreach attempted x 1.  Left message on voicemail with call back information and requested return call.  Plan: Care Coordinator will try to reach patient again in 1-2 business days.    Lali Smiley RN Care Coordinator  [facility] [place]  Email: [email or web address]  Phone: [phone]

## 2019-04-22 NOTE — PROGRESS NOTES
Clinic Care Coordination Contact  OUTREACH    Referral Information:  Referral Source: ED Follow-Up    Chief Complaint   Patient presents with     Clinic Care Coordination - Follow-up     ED 4/20 - headache        Universal Utilization: Patient meets criteria for outreach with 5 ED visits within the past 90 days and a 66% risk of admission or ED visit score.   Clinic Utilization  Difficulty keeping appointments:: No  Compliance Concerns: No  No-Show Concerns: No  No PCP office visit in Past Year: No  Utilization    Last refreshed: 4/21/2019  2:09 AM:  Hospital Admissions 1           Last refreshed: 4/21/2019  2:09 AM:  ED Visits 5           Last refreshed: 4/21/2019  2:09 AM:  No Show Count (past year) 5              Current as of: 4/21/2019  2:09 AM              Clinical Concerns:  Current Medical Concerns:  Patient reports he has had no further headaches. Came out of nowhere, and resolved. Patient reports he is doing much better and eating better since dehydration issues have resolved. Instructed patient to follow up with nephrologist due to high labs in ED. Patient reports he just seen nephrologist couple weeks ago, but will give them a call.   Education Provided to patient: CC role, Clinic after hours, same day appointments, urgent care utilization.      Health Maintenance Reviewed: Not assessed  Clinical Pathway: None    Medication Management:  No concerns.    Living Situation:  Current living arrangement:: I live in a private home with family  Type of residence:: Private home - stairs    Diet/Exercise/Sleep:  Inadequate nutrition (GOAL):: No  Food Insecurity: No  Tube Feeding: No  Significant changes in sleep pattern (GOAL): No    Transportation:  Transportation concerns (GOAL):: No  Transportation means:: Regular car  Financial/Insurance:   Financial/Insurance concerns (GOAL):: No    Patient/Caregiver understanding: Patient verbalized understanding and denies any additional questions or concerns at this time.  RNCC engaged in AIDET communications during encounter.          Future Appointments              In 3 months Renetta Mayorga MD [facility], [facility]    In 3 months Kt Ríos MD [facility]    In 5 months 2, Uc Kidney/Pancreas Recipient [facility] Nephrology, [facility]          Plan: At this time, patient denies outstanding need for connection or referral to resources or assistance navigating recommended follow up care. No further Care Coordination outreaches scheduled at this time, patient provided with this writer's number and encouraged to call with future needs or questions.    Lali Smiley RN Care Coordinator  [facility] [place]  Email: [email or web address]  Phone: [phone]

## 2019-04-22 NOTE — TELEPHONE ENCOUNTER
FUTURE VISIT INFORMATION      FUTURE VISIT INFORMATION:    Date: 7/24/19    Time: 9:00am    Location: [facility]  REFERRAL INFORMATION:    Referring provider:  Dr. Guzman     Referring providers clinic:  [facility]    Reason for visit/diagnosis:  Nausea and vomiting.    NOTES STATUS DETAILS   OFFICE NOTE from referring provider  Internal 3/20/19 Team Conference   OFFICE NOTE from other specialist   Internal 3/28/19, 1/24/19   DISCHARGE SUMMARY FROM HOSPITAL Internal 2/5/19, 8/22/15, 8/17/15, 1/9/14   DISCHARGE REPORT FROM ED Internal 3/5/19, 2/4/19, 11/20/18, 8/21/15   OPERATIVE REPORT  N/A    PFC REPORT N/A    MEDICATION LIST Internal    LABS     FIT/STOOL TESTING Internal    PERTINENT LABS N/A    PATHOLOGY REPORTS RELATED TO DIAGNOSIS Internal    DIAGNOSTIC PROCEDURES     COLONOSCOPY N/A    ENDOSCOPY (EGD) Internal 4/3/09   ERCP N/A    EUS N/A    FLEX SIGMOIDOSCOPY N/A    IMAGING & REPORT      CT, MRI, US, XR Internal PACS

## 2019-05-13 ENCOUNTER — OFFICE VISIT (OUTPATIENT)
Dept: TRANSPLANT | Facility: CLINIC | Age: 44
End: 2019-05-13
Attending: SURGERY
Payer: COMMERCIAL

## 2019-05-13 VITALS
OXYGEN SATURATION: 98 % | BODY MASS INDEX: 31.32 KG/M2 | SYSTOLIC BLOOD PRESSURE: 114 MMHG | HEART RATE: 100 BPM | DIASTOLIC BLOOD PRESSURE: 77 MMHG | WEIGHT: 206 LBS

## 2019-05-13 DIAGNOSIS — K31.84 GASTROPARESIS: ICD-10-CM

## 2019-05-13 DIAGNOSIS — E86.0 DEHYDRATION: Primary | ICD-10-CM

## 2019-05-13 PROCEDURE — G0463 HOSPITAL OUTPT CLINIC VISIT: HCPCS | Mod: ZF

## 2019-05-13 RX ORDER — FLUDROCORTISONE ACETATE 0.1 MG/1
0.2 TABLET ORAL DAILY
Qty: 30 TABLET | Refills: 1 | Status: SHIPPED | OUTPATIENT
Start: 2019-05-13 | End: 2019-06-26

## 2019-05-13 ASSESSMENT — PAIN SCALES - GENERAL: PAINLEVEL: NO PAIN (0)

## 2019-05-13 NOTE — LETTER
5/13/2019      RE: Murtaza Payton Beareffie  [address] [address]       Chief Complaint   Patient presents with     RECHECK     Follow up for Kidney post     Blood pressure 114/77, pulse 100, weight 93.4 kg (206 lb), SpO2 98 %.    Latisha Weems [facility]      Transplant Surgery Progress Note    Transplants:  11/4/2008 (Kidney), 2/17/2009 (Pancreas); Postoperative day:  3842  S: 42 yo M with T1DM s/p LDKT (2008)- functional, baseline Cr 1.6-1.8 and ANNMARIE (failed 3 yrs post txp). Currently back on insulin pump. Referred for assessment of gastroparesis. Had onset of GI symptoms (daily nausea/emesis, weight loss, dehydration) in Fall 2017. Gastric emptying study at that time demonstrated T1/2 emptying 156minutes (elevated). Has had persistence and worsening of symptoms over intervening time, with multiple visits to emergency department and need for IVF for dehydration. Recent gastric emptying study (4/4/19) demonstrated T1/2 210 minutes. However, he was also recently started, and later increased, on fludrocortisone (now on 0.2mg PO nightly) for dysautonomia and gastroparesis. He is also maintained on reglan. He has improved significantly with this medication at this increased dose. He reports nausea 1-2 X weekly with only rare emesis. He is able to keep down solids and liquids and has not been losing weight. He is requiring only infrequent IV infusions and has globally improved.     Transplant History:    Transplant Type:  LDKT and ANNMARIE  Donor Type: Living   Transplant Date:  11/4/2008 (Kidney), 2/17/2009 (Pancreas)   Ureteral Stent:  No   Crossmatch:  negative   DSA at Tx:  No  Baseline Cr: 1.6-1.8   DeNovo DSA: No    Present Maintenance Immunosuppression:  Tacrolimus and Mycophenolate mofetil    CMV IgG Ab Discordance:  No  EBV IgG Ab Discordance:  No    BK Viremia:  No  EBV Viremia:  No    Transplant Coordinator: Henriteta Villela     Transplant Office Phone Number: [phone]      Immunosuppressant Medications     Immunosuppressive Agents Disp Start End     mycophenolic acid (GENERIC EQUIVALENT) 180 MG EC tablet    360 tablet 2019    Sig - Route: Take 2 tablets (360 mg) by mouth 2 times daily - Oral    Class: E-Prescribe     tacrolimus (GENERIC EQUIVALENT) 0.5 MG capsule    60 capsule 3/28/2019     Sig - Route: Take 1 capsule (0.5 mg) by mouth 2 times daily Total dose = 1.5mg BID - Oral    Class: E-Prescribe     tacrolimus (GENERIC EQUIVALENT) 1 MG capsule    60 capsule 3/28/2019     Sig - Route: Take 1 capsule (1 mg) by mouth 2 times daily Total dose = 1.5mg BID - Oral    Class: E-Prescribe          Possible Immunosuppression-related side effects:   []             headache  []             vivid dreams  []             irritability  []             cognitive difficuties  []             fine tremor  []             nausea  []             diarrhea  []             neuropathy      []             edema  []             renal calcineurin toxicity  []             hyperkalemia  []             post-transplant diabetes  []             decreased appetite  []             increased appetite  []             other:  []             none    Prescription Medications as of 2019       Rx Number Disp Refills Start End Last Dispensed Date Next Fill Date Owning Pharmacy    acetone, Urine, test STRP  50 each 3 8/10/2018    [facility] [place] [address]    Si strip by In Vitro route daily    Class: E-Prescribe    Notes to Pharmacy: Urine ketone stix    Route: In Vitro    aspirin (ASA) 81 MG tablet  90 tablet 3 2019   [place] [id number]    Sig: Take 1 tablet (81 mg) by mouth daily    Class: E-Prescribe    Route: Oral    atorvastatin (LIPITOR) 40 MG tablet  45 tablet 3 2019    [place], [place] - [address]    Sig: Take 0.5 tablets (20 mg) by mouth daily     Class: E-Prescribe    Route: Oral    YOHAN CONTOUR test strip  450 each 1 3/27/2018    [place], [place] - [address]    Sig: USE TO TEST BLOOD SUGAR FIVE TIMES A DAY OR AS DIRECTED    Class: E-Prescribe    blood glucose monitoring (NO BRAND SPECIFIED) test strip  300 strip 11 8/10/2018    [facility] [address]    Sig: Freestyle test strips 6 times daily (NOT lite and NOT insulinx)    Class: E-Prescribe    blood glucose monitoring (NO BRAND SPECIFIED) test strip  450 each 11 7/10/2018    [place] [address]    Si times daily ( contour NEXT)    Class: E-Prescribe    Notes to Pharmacy: Patient has new meter,please remove previous script for regular contour strips    Blood Glucose Monitoring Suppl ([employer] CONTOUR MONITOR) W/DEVICE KIT  1 kit 0 2013    [facility] [place] [address] AT [address]    Sig: TO TEST BG 5 TIMIES DAILY    Class: E-Prescribe    fludrocortisone (FLORINEF) 0.1 MG tablet  60 tablet 11 3/28/2019    [facility] [address]    Sig: Take 1 tablet (0.1 mg) by mouth daily    Class: E-Prescribe    Route: Oral    Insulin Aspart (INSULIN PUMP - OUTPATIENT)            Sig: Date last interrogated by pharmacy: 2019  Omnipod  Insulin: Novolog  BASAL RATES and times:   12 AM (midnight): 1.1 units/hr  2 AM: 1.25  10 AM: 0.8  6 PM: 0.95  CARB RATIO and times:  12 AM - 11 AM: 9  11 AM - 8 PM: 6  8 PM - 12 AM: 7   BLOOD GLUCOSE TARGET and times:  12 AM (midnight): 100, correct above 120 with reverse correction on  Active Insulin Time: 4 hours  ISF (insulin sensitivity factor):   40 from   38 from [phone]  Dexcom sharing cose: [id number]    Class: Historical    insulin aspart (NOVOLOG PEN) 100 UNIT/ML injection  8 mL 11 7/3/2018        Sig: DOSE:  2 units per 14  "grams of carbohydrate..    Class: No Print Out    insulin aspart (NOVOLOG VIAL) 100 UNITS/ML injection  60 mL 3 8/10/2018    [place] [address]    Sig: To be used in insulin pump, est TDD 75 units/day    Class: E-Prescribe    insulin pen needle (BD ARPITA U/F) 32G X 4 MM  120 each 11 8/15/2016    [place], [place] - [address] [facility].    Sig: Use 4 daily or as directed.    Class: E-Prescribe    insulin syringe-needle U-100 (BD INSULIN SYRINGE ULTRAFINE) 31G X 5/16\" 1 ML  100 each 11 8/10/2018    [place] [address]    Sig: Use one syringe 1 daily or as directed.    Class: E-Prescribe    metoclopramide (REGLAN) 5 MG tablet  120 tablet 0 2/8/2019    [place] [address]    Sig: TAKE ONE TABLET BY MOUTH FOUR TIMES A DAY (BEFORE MEALS AND NIGHTLY)    Class: E-Prescribe    Notes to Pharmacy: Patient will call when this medication should be filled.    mycophenolic acid (GENERIC EQUIVALENT) 180 MG EC tablet  360 tablet 3 1/24/2019 1/24/2020   [place] [address]    Sig: Take 2 tablets (360 mg) by mouth 2 times daily    Class: E-Prescribe    Route: Oral    omeprazole (PRILOSEC) 40 MG DR capsule  180 capsule 3 1/24/2019    [place] [id number]    Sig: Take 1 capsule (40 mg) by mouth 2 times daily    Class: E-Prescribe    Route: Oral    ondansetron (ZOFRAN) 4 MG tablet  8 tablet 0 11/20/2018        Sig: Take 1 tablet (4 mg) by mouth every 6 hours    Class: Local Print    Route: Oral    tacrolimus (GENERIC EQUIVALENT) 0.5 MG capsule  60 capsule 11 3/28/2019    [place] - [address]    Sig: Take 1 capsule (0.5 mg) by mouth 2 times daily Total dose = 1.5mg BID    Class: E-Prescribe    Route: Oral    tacrolimus (GENERIC " EQUIVALENT) 1 MG capsule  60 capsule 11 3/28/2019    [place], [place] - [address]    Sig: Take 1 capsule (1 mg) by mouth 2 times daily Total dose = 1.5mg BID    Class: E-Prescribe    Route: Oral    ondansetron (ZOFRAN ODT) 4 MG ODT tab (Ended)  10 tablet 0 3/5/2019 3/8/2019       Sig: Take 1 tablet (4 mg) by mouth every 8 hours as needed for nausea    Class: Local Print    Route: Oral          O:   Pulse:  [100] 100  BP: (114)/(77) 114/77  SpO2:  [98 %] 98 %  Alert, oriented. No distress  Normocephalic, no icterus  nonlabored respirations  NSR  abd soft. Obese. Large midline incisional hernia from prior SPK and involving umbilicus. Nontender, not incarcerated. No open wounds  Ext wwp, palpable pulses. No edema  Skin warm, dry    Transplant Immunosuppression Labs Latest Ref Rng & Units 4/20/2019 3/12/2019 3/5/2019 3/5/2019 3/5/2019   Tacro Level 5.0 - 15.0 ug/L - 4.1(L) - - -   Tacro Level - - 03/11/2019 @ 1930 - - -   Cyclo Level 50 - 400 ug/L - - - - -   Cyclo Level - - - - - -   Creat 0.66 - 1.25 mg/dL 1.95(H) 1.54(H) - 1.71(H) 1.74(H)   BUN 7 - 30 mg/dL 19 19 - 28 31(H)   WBC 4.0 - 11.0 10e9/L 9.7 9.6 13.5(H) - 13.5(H)   Neutrophil % 71.0 - 83.5 - 79.2   ANEU 1.6 - 8.3 10e9/L 6.9 - 11.3(H) - 10.7(H)       Chemistries:   Recent Labs   Lab Test 04/20/19  2249   BUN 19   CR 1.95*   GFRESTIMATED 41*   *     Lab Results   Component Value Date    A1C 6.8 01/11/2019    CPEPT 0.6 07/31/2013     Recent Labs   Lab Test 03/05/19 2012   ALBUMIN 3.8   BILITOTAL 0.5   ALKPHOS 109   AST 16   ALT 22     Urine Studies:  Recent Labs   Lab Test 02/05/19  1245   COLOR Straw   APPEARANCE Clear   URINEGLC 150*   URINEBILI Negative   URINEKETONE 20*   SG 1.010   UBLD Negative   URINEPH 6.0   PROTEIN 100*   NITRITE Negative   LEUKEST Negative   RBCU 4*   WBCU 1     Recent Labs   Lab Test 01/11/19  0741 07/08/15  0747   UTPG 0.87* 0.28*     Hematology:   Recent Labs   Lab Test 04/20/19  0188  03/12/19  0715 03/05/19 2050   HGB 14.5 14.1 15.1    338 313   WBC 9.7 9.6 13.5*     Coags:   Recent Labs   Lab Test 08/17/15  0640 08/05/15  1534   INR 1.04 1.42*     HLA antibodies:   SA1 Hi Risk Ynes   Date Value Ref Range Status   07/26/2016 B:38 49 51 52 53 57 58 59 63 77 78 Cw:2  Final     SA1 Mod Risk Ynes   Date Value Ref Range Status   07/26/2016 A:25 B:8 13 37 75 Cw:15 17  Final     SA2 Hi Risk Ynes   Date Value Ref Range Status   07/26/2016   Final    DR:11 DQ:7 DQA:04 05 06 DP:02:01 03 04:02 06 09 10 14 17 18 20 28     SA2 Mod Risk Ynes   Date Value Ref Range Status   07/26/2016 DR:8 16 DRw:51  Final       Assessment: Murtaza Fitzgerald is doing fairly well s/p LDKT and ANNMARIE:  Issues we addressed during his visit include:    -gastroparesis  -immunosuppression management    Plan:    1. Graft function: pancreas failed in past. Kidney stable, Cr 1.6-1.8  2. Immunosuppression Management: No change tacrolimus 1.5 BID, myfortic 360 BID Advised that immunosuppression may also contribute to feelings of nausea. If this persists may consider transition to imuran, but elected to continue course for now as symptoms minimal and improving.  Complexity of management:Low.  Contributing factors: organ dysfunction  3. Gastroparesis: NM study demonstrating prolonged T1/2, but symptomatically significantly improved on increased fludrocortisone and reglan. Will plan for repeat NM gastric emptying study in three months to assess function while symptomatically improved. If T1/2 also improves but worsens with recurrence of symptoms, can demonstrate clear benefit to surgery. If T1/2 remains elevated while asymptomatic, unclear if surgery will be of benefit. Further, in setting of large ventral hernia adding complexity and risk to surgery without clear benefit, will just monitor for now. Hernia minimally symptomatic, will observe for now as well. Patient in agreement with both of these plans.   Followup: NM gastric  emptying study in 3 months    Fellow: Gilson Archuleta MD    I have reviewed history, examined patient and discussed plan with the fellow/resident/[place].  I concur with the findings in this note.       Risks of the surgical procedure including but not limited to the rare risk of mortality discussed in detail. Patient verbalized good understanding and had several pertinent questions which were answered satisfactorily.     Total Time: 40 min,   Counselling Time: 25 min.        .    Melissa Guzman MD

## 2019-05-13 NOTE — PROGRESS NOTES
Chief Complaint   Patient presents with     RECHECK     Follow up for Kidney post     Blood pressure 114/77, pulse 100, weight 93.4 kg (206 lb), SpO2 98 %.    Latisha Weems, CMA

## 2019-05-13 NOTE — PROGRESS NOTES
Transplant Surgery Progress Note    Transplants:  11/4/2008 (Kidney), 2/17/2009 (Pancreas); Postoperative day:  3842  S: 42 yo M with T1DM s/p LDKT (2008)- functional, baseline Cr 1.6-1.8 and ANNMARIE (failed 3 yrs post txp). Currently back on insulin pump. Referred for assessment of gastroparesis. Had onset of GI symptoms (daily nausea/emesis, weight loss, dehydration) in Fall 2017. Gastric emptying study at that time demonstrated T1/2 emptying 156minutes (elevated). Has had persistence and worsening of symptoms over intervening time, with multiple visits to emergency department and need for IVF for dehydration. Recent gastric emptying study (4/4/19) demonstrated T1/2 210 minutes. However, he was also recently started, and later increased, on fludrocortisone (now on 0.2mg PO nightly) for dysautonomia and gastroparesis. He is also maintained on reglan. He has improved significantly with this medication at this increased dose. He reports nausea 1-2 X weekly with only rare emesis. He is able to keep down solids and liquids and has not been losing weight. He is requiring only infrequent IV infusions and has globally improved.     Transplant History:    Transplant Type:  LDKT and ANNMARIE  Donor Type: Living   Transplant Date:  11/4/2008 (Kidney), 2/17/2009 (Pancreas)   Ureteral Stent:  No   Crossmatch:  negative   DSA at Tx:  No  Baseline Cr: 1.6-1.8   DeNovo DSA: No    Present Maintenance Immunosuppression:  Tacrolimus and Mycophenolate mofetil    CMV IgG Ab Discordance:  No  EBV IgG Ab Discordance:  No    BK Viremia:  No  EBV Viremia:  No    Transplant Coordinator: Henrietta Villela     Transplant Office Phone Number: [phone]     Immunosuppressant Medications     Immunosuppressive Agents Disp Start End     mycophenolic acid (GENERIC EQUIVALENT) 180 MG EC tablet    360 tablet 1/24/2019 1/24/2020    Sig - Route: Take 2 tablets (360 mg) by mouth 2 times daily - Oral    Class: E-Prescribe     tacrolimus (GENERIC  EQUIVALENT) 0.5 MG capsule    60 capsule 3/28/2019     Sig - Route: Take 1 capsule (0.5 mg) by mouth 2 times daily Total dose = 1.5mg BID - Oral    Class: E-Prescribe     tacrolimus (GENERIC EQUIVALENT) 1 MG capsule    60 capsule 3/28/2019     Sig - Route: Take 1 capsule (1 mg) by mouth 2 times daily Total dose = 1.5mg BID - Oral    Class: E-Prescribe          Possible Immunosuppression-related side effects:   []             headache  []             vivid dreams  []             irritability  []             cognitive difficuties  []             fine tremor  []             nausea  []             diarrhea  []             neuropathy      []             edema  []             renal calcineurin toxicity  []             hyperkalemia  []             post-transplant diabetes  []             decreased appetite  []             increased appetite  []             other:  []             none    Prescription Medications as of 2019       Rx Number Disp Refills Start End Last Dispensed Date Next Fill Date Owning Pharmacy    acetone, Urine, test STRP  50 each 3 8/10/2018    [place] [address]    Si strip by In Vitro route daily    Class: E-Prescribe    Notes to Pharmacy: Urine ketone stix    Route: In Vitro    aspirin (ASA) 81 MG tablet  90 tablet 3 2019   [place], [place] - [address]    Sig: Take 1 tablet (81 mg) by mouth daily    Class: E-Prescribe    Route: Oral    atorvastatin (LIPITOR) 40 MG tablet  45 tablet 3 2019    [facility] [address]    Sig: Take 0.5 tablets (20 mg) by mouth daily    Class: E-Prescribe    Route: Oral    YOHAN CONTOUR test strip  450 each 1 3/27/2018    [place], [place] - [address]    Sig: USE TO TEST BLOOD SUGAR FIVE TIMES A DAY OR AS DIRECTED    Class: E-Prescribe    blood glucose monitoring (NO BRAND  SPECIFIED) test strip  300 strip 11 8/10/2018    [place], [place] - [address]    Sig: Freestyle test strips 6 times daily (NOT lite and NOT insulinx)    Class: E-Prescribe    blood glucose monitoring (NO BRAND SPECIFIED) test strip  450 each 11 7/10/2018    [place], [place] - [address]    Si times daily ( contour NEXT)    Class: E-Prescribe    Notes to Pharmacy: Patient has new meter,please remove previous script for regular contour strips    Blood Glucose Monitoring Suppl (Jackpocket CONTOUR MONITOR) W/DEVICE KIT  1 kit 0 2013    [facility] [employer] [facility] [address] [place] [address] - [address] AT [facility]    Sig: TO TEST BG 5 TIMIES DAILY    Class: E-Prescribe    fludrocortisone (FLORINEF) 0.1 MG tablet  60 tablet 11 3/28/2019    [place], [place] - [address]    Sig: Take 1 tablet (0.1 mg) by mouth daily    Class: E-Prescribe    Route: Oral    Insulin Aspart (INSULIN PUMP - OUTPATIENT)            Sig: Date last interrogated by pharmacy: 2019  Omnipod  Insulin: Novolog  BASAL RATES and times:   12 AM (midnight): 1.1 units/hr  2 AM: 1.25  10 AM: 0.8  6 PM: 0.95  CARB RATIO and times:  12 AM - 11 AM: 9  11 AM - 8 PM: 6  8 PM - 12 AM: 7   BLOOD GLUCOSE TARGET and times:  12 AM (midnight): 100, correct above 120 with reverse correction on  Active Insulin Time: 4 hours  ISF (insulin sensitivity factor):   40 from   38 from [id number]  Dexcom sharing cose: [id number]    Class: Historical    insulin aspart (NOVOLOG PEN) 100 UNIT/ML injection  8 mL 11 7/3/2018        Sig: DOSE:  2 units per 14 grams of carbohydrate..    Class: No Print Out    insulin aspart (NOVOLOG VIAL) 100 UNITS/ML injection  60 mL 3 8/10/2018    [place], [place] - [address]    Sig: To be used in insulin pump, est TDD 75 units/day    Class: E-Prescribe     "insulin pen needle (BD ARPITA U/F) 32G X 4 MM  120 each 11 8/15/2016    [place], [place] - [address]    Sig: Use 4 daily or as directed.    Class: E-Prescribe    insulin syringe-needle U-100 (BD INSULIN SYRINGE ULTRAFINE) 31G X 5/16\" 1 ML  100 each 11 8/10/2018    [facility] [address]    Sig: Use one syringe 1 daily or as directed.    Class: E-Prescribe    metoclopramide (REGLAN) 5 MG tablet  120 tablet 0 2/8/2019    [facility] [address]    Sig: TAKE ONE TABLET BY MOUTH FOUR TIMES A DAY (BEFORE MEALS AND NIGHTLY)    Class: E-Prescribe    Notes to Pharmacy: Patient will call when this medication should be filled.    mycophenolic acid (GENERIC EQUIVALENT) 180 MG EC tablet  360 tablet 3 1/24/2019 1/24/2020   [facility] [address]    Sig: Take 2 tablets (360 mg) by mouth 2 times daily    Class: E-Prescribe    Route: Oral    omeprazole (PRILOSEC) 40 MG DR capsule  180 capsule 3 1/24/2019    [place], [place] - [address]    Sig: Take 1 capsule (40 mg) by mouth 2 times daily    Class: E-Prescribe    Route: Oral    ondansetron (ZOFRAN) 4 MG tablet  8 tablet 0 11/20/2018        Sig: Take 1 tablet (4 mg) by mouth every 6 hours    Class: Local Print    Route: Oral    tacrolimus (GENERIC EQUIVALENT) 0.5 MG capsule  60 capsule 11 3/28/2019    [facility], [place] - [address]    Sig: Take 1 capsule (0.5 mg) by mouth 2 times daily Total dose = 1.5mg BID    Class: E-Prescribe    Route: Oral    tacrolimus (GENERIC EQUIVALENT) 1 MG capsule  60 capsule 11 3/28/2019    [facility], [place] - [address]    Sig: Take 1 capsule (1 mg) by mouth 2 times daily Total dose = 1.5mg BID    Class: E-Prescribe    Route: Oral    ondansetron (ZOFRAN ODT) 4 MG ODT tab (Ended)  " 10 tablet 0 3/5/2019 3/8/2019       Sig: Take 1 tablet (4 mg) by mouth every 8 hours as needed for nausea    Class: Local Print    Route: Oral          O:   Pulse:  [100] 100  BP: (114)/(77) 114/77  SpO2:  [98 %] 98 %  Alert, oriented. No distress  Normocephalic, no icterus  nonlabored respirations  NSR  abd soft. Obese. Large midline incisional hernia from prior SPK and involving umbilicus. Nontender, not incarcerated. No open wounds  Ext wwp, palpable pulses. No edema  Skin warm, dry    Transplant Immunosuppression Labs Latest Ref Rng & Units 4/20/2019 3/12/2019 3/5/2019 3/5/2019 3/5/2019   Tacro Level 5.0 - 15.0 ug/L - 4.1(L) - - -   Tacro Level - - 03/11/2019 @ 1930 - - -   Cyclo Level 50 - 400 ug/L - - - - -   Cyclo Level - - - - - -   Creat 0.66 - 1.25 mg/dL 1.95(H) 1.54(H) - 1.71(H) 1.74(H)   BUN 7 - 30 mg/dL 19 19 - 28 31(H)   WBC 4.0 - 11.0 10e9/L 9.7 9.6 13.5(H) - 13.5(H)   Neutrophil % 71.0 - 83.5 - 79.2   ANEU 1.6 - 8.3 10e9/L 6.9 - 11.3(H) - 10.7(H)       Chemistries:   Recent Labs   Lab Test 04/20/19  2249   BUN 19   CR 1.95*   GFRESTIMATED 41*   *     Lab Results   Component Value Date    A1C 6.8 01/11/2019    CPEPT 0.6 07/31/2013     Recent Labs   Lab Test 03/05/19 2012   ALBUMIN 3.8   BILITOTAL 0.5   ALKPHOS 109   AST 16   ALT 22     Urine Studies:  Recent Labs   Lab Test 02/05/19  1245   COLOR Straw   APPEARANCE Clear   URINEGLC 150*   URINEBILI Negative   URINEKETONE 20*   SG 1.010   UBLD Negative   URINEPH 6.0   PROTEIN 100*   NITRITE Negative   LEUKEST Negative   RBCU 4*   WBCU 1     Recent Labs   Lab Test 01/11/19  0741 07/08/15  0747   UTPG 0.87* 0.28*     Hematology:   Recent Labs   Lab Test 04/20/19  2249 03/12/19  0715 03/05/19  2050   HGB 14.5 14.1 15.1    338 313   WBC 9.7 9.6 13.5*     Coags:   Recent Labs   Lab Test 08/17/15  0640 08/05/15  1534   INR 1.04 1.42*     HLA antibodies:   SA1 Hi Risk Ynes   Date Value Ref Range Status   07/26/2016 B:38 49 51 52 53 57 58 59 63  77 78 Cw:2  Final     SA1 Mod Risk Ynes   Date Value Ref Range Status   07/26/2016 A:25 B:8 13 37 75 Cw:15 17  Final     SA2 Hi Risk Ynes   Date Value Ref Range Status   07/26/2016   Final    DR:11 DQ:7 DQA:04 05 06 DP:02:01 03 04:02 06 09 10 14 17 18 20 28     SA2 Mod Risk Ynes   Date Value Ref Range Status   07/26/2016 DR:8 16 DRw:51  Final       Assessment: Murtaza Fitzgerald is doing fairly well s/p LDKT and ANNMARIE:  Issues we addressed during his visit include:    -gastroparesis  -immunosuppression management    Plan:    1. Graft function: pancreas failed in past. Kidney stable, Cr 1.6-1.8  2. Immunosuppression Management: No change tacrolimus 1.5 BID, myfortic 360 BID Advised that immunosuppression may also contribute to feelings of nausea. If this persists may consider transition to imuran, but elected to continue course for now as symptoms minimal and improving.  Complexity of management:Low.  Contributing factors: organ dysfunction  3. Gastroparesis: NM study demonstrating prolonged T1/2, but symptomatically significantly improved on increased fludrocortisone and reglan. Will plan for repeat NM gastric emptying study in three months to assess function while symptomatically improved. If T1/2 also improves but worsens with recurrence of symptoms, can demonstrate clear benefit to surgery. If T1/2 remains elevated while asymptomatic, unclear if surgery will be of benefit. Further, in setting of large ventral hernia adding complexity and risk to surgery without clear benefit, will just monitor for now. Hernia minimally symptomatic, will observe for now as well. Patient in agreement with both of these plans.   Followup: NM gastric emptying study in 3 months    Fellow: Gilson Archuleta MD    I have reviewed history, examined patient and discussed plan with the fellow/resident/DENVER.  I concur with the findings in this note.       Risks of the surgical procedure including but not limited to the rare risk of  mortality discussed in detail. Patient verbalized good understanding and had several pertinent questions which were answered satisfactorily.     Total Time: 40 min,   Counselling Time: 25 min.        .

## 2019-06-12 ENCOUNTER — APPOINTMENT (OUTPATIENT)
Dept: CT IMAGING | Facility: CLINIC | Age: 44
End: 2019-06-12
Attending: EMERGENCY MEDICINE
Payer: COMMERCIAL

## 2019-06-12 ENCOUNTER — HOSPITAL ENCOUNTER (EMERGENCY)
Facility: CLINIC | Age: 44
Discharge: HOME | End: 2019-06-12
Attending: EMERGENCY MEDICINE | Admitting: EMERGENCY MEDICINE
Payer: COMMERCIAL

## 2019-06-12 VITALS
TEMPERATURE: 99 F | BODY MASS INDEX: 30.41 KG/M2 | DIASTOLIC BLOOD PRESSURE: 86 MMHG | RESPIRATION RATE: 20 BRPM | SYSTOLIC BLOOD PRESSURE: 157 MMHG | OXYGEN SATURATION: 97 % | WEIGHT: 200 LBS | HEART RATE: 77 BPM

## 2019-06-12 DIAGNOSIS — E86.0 DEHYDRATION: ICD-10-CM

## 2019-06-12 DIAGNOSIS — R51.9 HEADACHE AROUND THE EYES: ICD-10-CM

## 2019-06-12 LAB
ANION GAP SERPL CALCULATED.3IONS-SCNC: 9 MMOL/L (ref 3–14)
BASOPHILS # BLD AUTO: 0 10E9/L (ref 0–0.2)
BASOPHILS NFR BLD AUTO: 0.3 %
BUN SERPL-MCNC: 26 MG/DL (ref 7–30)
CALCIUM SERPL-MCNC: 9 MG/DL (ref 8.5–10.1)
CHLORIDE SERPL-SCNC: 104 MMOL/L (ref 94–109)
CO2 SERPL-SCNC: 23 MMOL/L (ref 20–32)
CREAT SERPL-MCNC: 2.19 MG/DL (ref 0.66–1.25)
CREAT SERPL-MCNC: 2.36 MG/DL (ref 0.66–1.25)
DIFFERENTIAL METHOD BLD: ABNORMAL
EOSINOPHIL # BLD AUTO: 0.1 10E9/L (ref 0–0.7)
EOSINOPHIL NFR BLD AUTO: 0.6 %
ERYTHROCYTE [DISTWIDTH] IN BLOOD BY AUTOMATED COUNT: 13.1 % (ref 10–15)
GFR SERPL CREATININE-BSD FRML MDRD: 33 ML/MIN/{1.73_M2}
GFR SERPL CREATININE-BSD FRML MDRD: 36 ML/MIN/{1.73_M2}
GLUCOSE BLDC GLUCOMTR-MCNC: 145 MG/DL (ref 70–99)
GLUCOSE SERPL-MCNC: 161 MG/DL (ref 70–99)
HCT VFR BLD AUTO: 46.1 % (ref 40–53)
HGB BLD-MCNC: 15 G/DL (ref 13.3–17.7)
IMM GRANULOCYTES # BLD: 0.1 10E9/L (ref 0–0.4)
IMM GRANULOCYTES NFR BLD: 0.5 %
LYMPHOCYTES # BLD AUTO: 2.4 10E9/L (ref 0.8–5.3)
LYMPHOCYTES NFR BLD AUTO: 21.1 %
MCH RBC QN AUTO: 28.6 PG (ref 26.5–33)
MCHC RBC AUTO-ENTMCNC: 32.5 G/DL (ref 31.5–36.5)
MCV RBC AUTO: 88 FL (ref 78–100)
MONOCYTES # BLD AUTO: 0.7 10E9/L (ref 0–1.3)
MONOCYTES NFR BLD AUTO: 6.1 %
NEUTROPHILS # BLD AUTO: 8 10E9/L (ref 1.6–8.3)
NEUTROPHILS NFR BLD AUTO: 71.4 %
NRBC # BLD AUTO: 0 10*3/UL
NRBC BLD AUTO-RTO: 0 /100
PLATELET # BLD AUTO: 340 10E9/L (ref 150–450)
POTASSIUM SERPL-SCNC: 3.4 MMOL/L (ref 3.4–5.3)
RBC # BLD AUTO: 5.24 10E12/L (ref 4.4–5.9)
SODIUM SERPL-SCNC: 136 MMOL/L (ref 133–144)
WBC # BLD AUTO: 11.2 10E9/L (ref 4–11)

## 2019-06-12 PROCEDURE — 96374 THER/PROPH/DIAG INJ IV PUSH: CPT

## 2019-06-12 PROCEDURE — 00000146 ZZHCL STATISTIC GLUCOSE BY METER IP

## 2019-06-12 PROCEDURE — 25800030 ZZH RX IP 258 OP 636: Performed by: EMERGENCY MEDICINE

## 2019-06-12 PROCEDURE — 25000128 H RX IP 250 OP 636: Performed by: EMERGENCY MEDICINE

## 2019-06-12 PROCEDURE — 80048 BASIC METABOLIC PNL TOTAL CA: CPT | Performed by: EMERGENCY MEDICINE

## 2019-06-12 PROCEDURE — 96361 HYDRATE IV INFUSION ADD-ON: CPT

## 2019-06-12 PROCEDURE — 99285 EMERGENCY DEPT VISIT HI MDM: CPT | Mod: 25

## 2019-06-12 PROCEDURE — 96376 TX/PRO/DX INJ SAME DRUG ADON: CPT

## 2019-06-12 PROCEDURE — 82565 ASSAY OF CREATININE: CPT | Performed by: EMERGENCY MEDICINE

## 2019-06-12 PROCEDURE — 85025 COMPLETE CBC W/AUTO DIFF WBC: CPT | Performed by: EMERGENCY MEDICINE

## 2019-06-12 PROCEDURE — 25000128 H RX IP 250 OP 636

## 2019-06-12 PROCEDURE — 96375 TX/PRO/DX INJ NEW DRUG ADDON: CPT

## 2019-06-12 PROCEDURE — 70450 CT HEAD/BRAIN W/O DYE: CPT

## 2019-06-12 RX ORDER — SODIUM CHLORIDE 9 MG/ML
1000 INJECTION, SOLUTION INTRAVENOUS CONTINUOUS
Status: DISCONTINUED | OUTPATIENT
Start: 2019-06-12 | End: 2019-06-13 | Stop reason: HOSPADM

## 2019-06-12 RX ORDER — ONDANSETRON 4 MG/1
4 TABLET, ORALLY DISINTEGRATING ORAL EVERY 6 HOURS PRN
Qty: 10 TABLET | Refills: 0 | Status: SHIPPED | OUTPATIENT
Start: 2019-06-12 | End: 2019-07-03

## 2019-06-12 RX ORDER — ONDANSETRON 2 MG/ML
4 INJECTION INTRAMUSCULAR; INTRAVENOUS ONCE
Status: COMPLETED | OUTPATIENT
Start: 2019-06-12 | End: 2019-06-12

## 2019-06-12 RX ORDER — HYDROMORPHONE HYDROCHLORIDE 1 MG/ML
INJECTION, SOLUTION INTRAMUSCULAR; INTRAVENOUS; SUBCUTANEOUS
Status: DISCONTINUED
Start: 2019-06-12 | End: 2019-06-13 | Stop reason: HOSPADM

## 2019-06-12 RX ORDER — DEXAMETHASONE SODIUM PHOSPHATE 10 MG/ML
10 INJECTION, SOLUTION INTRAMUSCULAR; INTRAVENOUS ONCE
Status: COMPLETED | OUTPATIENT
Start: 2019-06-12 | End: 2019-06-12

## 2019-06-12 RX ORDER — HYDROMORPHONE HYDROCHLORIDE 1 MG/ML
INJECTION, SOLUTION INTRAMUSCULAR; INTRAVENOUS; SUBCUTANEOUS
Status: COMPLETED
Start: 2019-06-12 | End: 2019-06-12

## 2019-06-12 RX ORDER — HYDROMORPHONE HYDROCHLORIDE 1 MG/ML
INJECTION, SOLUTION INTRAMUSCULAR; INTRAVENOUS; SUBCUTANEOUS
Status: DISCONTINUED
Start: 2019-06-12 | End: 2019-06-12 | Stop reason: WASHOUT

## 2019-06-12 RX ORDER — DIPHENHYDRAMINE HYDROCHLORIDE 50 MG/ML
25 INJECTION INTRAMUSCULAR; INTRAVENOUS ONCE
Status: COMPLETED | OUTPATIENT
Start: 2019-06-12 | End: 2019-06-12

## 2019-06-12 RX ORDER — OXYCODONE HYDROCHLORIDE 5 MG/1
5 TABLET ORAL EVERY 6 HOURS PRN
Qty: 12 TABLET | Refills: 0 | Status: SHIPPED | OUTPATIENT
Start: 2019-06-12 | End: 2019-06-28

## 2019-06-12 RX ADMIN — Medication 0.5 MG: at 21:07

## 2019-06-12 RX ADMIN — HYDROMORPHONE HYDROCHLORIDE 1 MG: 1 INJECTION, SOLUTION INTRAMUSCULAR; INTRAVENOUS; SUBCUTANEOUS at 21:54

## 2019-06-12 RX ADMIN — HYDROMORPHONE HYDROCHLORIDE 0.5 MG: 1 INJECTION, SOLUTION INTRAMUSCULAR; INTRAVENOUS; SUBCUTANEOUS at 21:07

## 2019-06-12 RX ADMIN — SODIUM CHLORIDE 1000 ML: 9 INJECTION, SOLUTION INTRAVENOUS at 21:08

## 2019-06-12 RX ADMIN — PROCHLORPERAZINE EDISYLATE 10 MG: 5 INJECTION INTRAMUSCULAR; INTRAVENOUS at 21:08

## 2019-06-12 RX ADMIN — SODIUM CHLORIDE 1000 ML: 9 INJECTION, SOLUTION INTRAVENOUS at 21:55

## 2019-06-12 RX ADMIN — DEXAMETHASONE SODIUM PHOSPHATE 10 MG: 10 INJECTION, SOLUTION INTRAMUSCULAR; INTRAVENOUS at 21:08

## 2019-06-12 RX ADMIN — DIPHENHYDRAMINE HYDROCHLORIDE 25 MG: 50 INJECTION, SOLUTION INTRAMUSCULAR; INTRAVENOUS at 21:08

## 2019-06-12 RX ADMIN — ONDANSETRON 4 MG: 2 INJECTION INTRAMUSCULAR; INTRAVENOUS at 22:26

## 2019-06-12 ASSESSMENT — ENCOUNTER SYMPTOMS
NAUSEA: 1
CONSTIPATION: 0
HEADACHES: 1
ABDOMINAL PAIN: 0
VOMITING: 1

## 2019-06-12 NOTE — ED AVS SNAPSHOT
[facility] Emergency Department  [address]  [facility] [address]  Phone:  [phone]  Fax:  [phone]                                    Murtaza Fitzgerald   MRN: [id number]    Department:  [facility] Emergency Department   Date of Visit:  6/12/2019           After Visit Summary Signature Page    I have received my discharge instructions, and my questions have been answered. I have discussed any challenges I see with this plan with the nurse or doctor.    ..........................................................................................................................................  Patient/Patient Representative Signature      ..........................................................................................................................................  Patient Representative Print Name and Relationship to Patient    ..................................................               ................................................  Date                                   Time    ..........................................................................................................................................  Reviewed by Signature/Title    ...................................................              ..............................................  Date                                               Time          22EPIC Rev 08/18

## 2019-06-13 ENCOUNTER — APPOINTMENT (OUTPATIENT)
Dept: GENERAL RADIOLOGY | Facility: CLINIC | Age: 44
End: 2019-06-13
Attending: NURSE PRACTITIONER
Payer: COMMERCIAL

## 2019-06-13 ENCOUNTER — PATIENT OUTREACH (OUTPATIENT)
Dept: CARE COORDINATION | Facility: CLINIC | Age: 44
End: 2019-06-13

## 2019-06-13 ENCOUNTER — HOSPITAL ENCOUNTER (EMERGENCY)
Facility: CLINIC | Age: 44
Discharge: HOME | End: 2019-06-13
Attending: NURSE PRACTITIONER | Admitting: NURSE PRACTITIONER
Payer: COMMERCIAL

## 2019-06-13 VITALS
SYSTOLIC BLOOD PRESSURE: 101 MMHG | HEART RATE: 105 BPM | WEIGHT: 205 LBS | BODY MASS INDEX: 31.17 KG/M2 | DIASTOLIC BLOOD PRESSURE: 72 MMHG | OXYGEN SATURATION: 98 % | RESPIRATION RATE: 16 BRPM | TEMPERATURE: 97.9 F
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
## 2019-06-13 DIAGNOSIS — S62.109A WRIST FRACTURE: ICD-10-CM

## 2019-06-13 DIAGNOSIS — S93.409A ANKLE SPRAIN: ICD-10-CM

## 2019-06-13 PROCEDURE — 25000132 ZZH RX MED GY IP 250 OP 250 PS 637: Performed by: NURSE PRACTITIONER

## 2019-06-13 PROCEDURE — 25600 CLTX DST RDL FX/EPHYS SEP WO: CPT | Mod: LT

## 2019-06-13 PROCEDURE — 73100 X-RAY EXAM OF WRIST: CPT | Mod: LT

## 2019-06-13 PROCEDURE — 99284 EMERGENCY DEPT VISIT MOD MDM: CPT | Mod: 25

## 2019-06-13 PROCEDURE — 73610 X-RAY EXAM OF ANKLE: CPT | Mod: LT

## 2019-06-13 RX ORDER — IBUPROFEN 600 MG/1
600 TABLET, FILM COATED ORAL ONCE
Status: COMPLETED | OUTPATIENT
Start: 2019-06-13 | End: 2019-06-13

## 2019-06-13 RX ORDER — TRAMADOL HYDROCHLORIDE 50 MG/1
50 TABLET ORAL EVERY 8 HOURS PRN
Qty: 9 TABLET | Refills: 0 | Status: SHIPPED | OUTPATIENT
Start: 2019-06-13 | End: 2019-06-28

## 2019-06-13 RX ADMIN — IBUPROFEN 600 MG: 600 TABLET ORAL at 20:03

## 2019-06-13 ASSESSMENT — ACTIVITIES OF DAILY LIVING (ADL): DEPENDENT_IADLS:: INDEPENDENT

## 2019-06-13 NOTE — ED AVS SNAPSHOT
[facility] Emergency Department  [address]  [facility] [address]  Phone:  [phone]  Fax:  [phone]                                    Murtaza Fitzgerald   MRN: [id number]    Department:  [facility] Emergency Department   Date of Visit:  6/13/2019           After Visit Summary Signature Page    I have received my discharge instructions, and my questions have been answered. I have discussed any challenges I see with this plan with the nurse or doctor.    ..........................................................................................................................................  Patient/Patient Representative Signature      ..........................................................................................................................................  Patient Representative Print Name and Relationship to Patient    ..................................................               ................................................  Date                                   Time    ..........................................................................................................................................  Reviewed by Signature/Title    ...................................................              ..............................................  Date                                               Time          22EPIC Rev 08/18

## 2019-06-13 NOTE — PROGRESS NOTES
Clinic Care Coordination Contact  OUTREACH    Referral Information:  Referral Source: ED Follow-Up     Chief Complaint   Patient presents with     Clinic Care Coordination - Follow-up     ED 06/12 - headache        Universal Utilization: Patient has had 2 ED visits within the past 90 days with a risk of admission or ED visit score of 66%.   Clinic Utilization  Difficulty keeping appointments:: No  Compliance Concerns: No  No-Show Concerns: No  No PCP office visit in Past Year: No  Utilization    Last refreshed: 6/13/2019  9:05 AM:  Hospital Admissions 1           Last refreshed: 6/13/2019  9:05 AM:  ED Visits 6           Last refreshed: 6/13/2019  9:05 AM:  No Show Count (past year) 5              Current as of: 6/13/2019  9:05 AM            Clinical Concerns:  Current Medical Concerns:  Patient seen recently in ED for headache, dehydration.  Patient reports he is feeling much better today. Reports blood sugars jumped up a bit but now are coming back down. Assisted patient to schedule lab only appointment for tomorrow at 0930 am.   Current Behavioral Concerns: None noted  Education Provided to patient: CC role, Urgent Care utilization.      Health Maintenance Reviewed: Not assessed  Clinical Pathway: None    Medication Management:  Patient independently managing his own medications. No concerns. Reports since increased dose of Florinef has been doing very well.     Functional Status:  Dependent ADLs:: Independent  Dependent IADLs:: Independent  Bed or wheelchair confined:: No  Mobility Status: Independent  Fallen 2 or more times in the past year?: No  Any fall with injury in the past year?: No    Living Situation:  Current living arrangement:: I live in a private home with family  Type of residence:: Private home - stairs    Diet/Exercise/Sleep:  Inadequate nutrition (GOAL):: No  Food Insecurity: No  Tube Feeding: No  Significant changes in sleep pattern (GOAL): No    Transportation:  Transportation concerns  (GOAL):: No  Transportation means:: Regular car     Financial/Insurance:   Financial/Insurance concerns (GOAL):: None presently. Patient reports he will begin receiving his severance package on Monday and this will be over in 6 weeks. Will have insurance through the end of August. Is working with insurance to get new jobs.      Resources and Interventions:  Current Resources:   Community Resources: None  Supplies used at home:: Diabetic Supplies  Equipment Currently Used at Home: glucometer    Advance Care Plan/Directive  Advanced Care Plans/Directives on file:: Yes  Type Advanced Care Plans/Directives: Advanced Directive - On File  Advanced Care Plan/Directive Status: Not Applicable     Goals:   Goals        General    Problem Solving (pt-stated)     Notes - Note created  8/28/2018  3:15 PM by Vania Gutierrez RN    Goal Statement: keep a fast acting carbohydrate with at all times  Measure of Success: patient to report  Supportive Steps to Achieve: patient to stop at pharmacy and  glucose tabs  Barriers: none   Strengths: has a better understanding  Date to Achieve By: 9/10/18  Patient expressed understanding of goal: yes            Patient/Caregiver understanding: Patient verbalized understanding and denies any additional questions or concerns at this time. RNCC engaged in AIDET communications during encounter.      Future Appointments              Tomorrow EA LAB [facility] HEATHER Rojas    In 1 month Renetta Mayorga MD [facility], [facility]    In 2 months Kt Ríos MD [facility]ER [place]    In 3 months 2, [facility] Kidney[facility]Pancreas Recipient [facility]        Plan: At this time, patient denies outstanding need for connection or referral to resources or assistance navigating recommended follow up care. No further Care Coordination outreaches scheduled at this time, patient provided with this writer's number and encouraged to call with future  needs or questions.    Lali Smiley RN Care Coordinator  [facility] [place]  Email: [email or web address]  Phone: [phone]

## 2019-06-13 NOTE — ED PROVIDER NOTES
History     Chief Complaint:  Headache    HPI   Murtaza Fitzgerald is a 43 year old male, with a history of HTN, HLD, CAD, diabetes, s/p kidney and pancreas transplants, who presents with his daughter to the emergency department for evaluation of a headache. The patient reports he started experiencing a headache behind his right eye two days prior to evaluation and yesterday morning he started having nausea and vomiting which he attributes to the pain. He denies any diarrhea or abdominal pain. He notes he tried Aleve but had no relief. He notes he has not been able to keep down his normal medications. He notes this headache is different from his previous headaches and he has not seen a neurologist for them. He denies any ill contacts.    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 No pertinent family history.     Social History:  Smoking status: Former smoker of 0.30 ppd  Alcohol use: Yes  The patient presents to the emergency department with his daughter.  Marital Status:   [4]     Review of Systems   Gastrointestinal: Positive for nausea and vomiting. Negative for abdominal pain and constipation.   Neurological: Positive for headaches.   All other systems reviewed and are negative.        Physical Exam   Patient Vitals for the past 24 hrs:   BP Temp Temp src Pulse Heart Rate Resp SpO2 Weight   06/12/19 2115 (!) 149/92 -- -- 81 -- -- 95 % --   06/12/19 2100 (!) 138/95 -- -- 87 -- -- 96 % --   06/12/19 2028 98/64 99  F (37.2  C) Temporal 108 108 20 98 % 90.7 kg (200 lb)     Physical Exam   Constitutional: He  appears well-developed and well-nourished. He appears distressed.   HENT:   Right Ear: External ear normal.   Left Ear: External ear normal.   Mouth/Throat: Oropharynx is clear and moist. No oropharyngeal exudate.   TM's clear bilaterally   Eyes: Pupils are equal, round, and reactive to light. Conjunctivae and EOM are normal. No scleral icterus.   Neck: Normal range of motion. Neck supple.   No meningismus   Cardiovascular: Normal rate, regular rhythm, normal heart sounds and intact distal pulses. Exam reveals no gallop and no friction rub.   No murmur heard.  Pulmonary/Chest: Effort normal and breath sounds normal. No respiratory distress. He has no wheezes. He has no rales.   Abdominal: Soft. Bowel sounds are normal. He exhibits no distension and no mass. There is no tenderness.   Musculoskeletal: Normal range of motion. He exhibits no edema.   Lymphadenopathy:     He has no cervical adenopathy.   Neurological: He is alert. No cranial nerve deficit.   Speech clear. 5/5 strength x 4. No focal weakness   Skin: Skin is warm and dry. No rash noted.   Psychiatric: He has a normal mood and affect.       Emergency Department Course   Laboratory:  CBC: WBC 11.2 (H) o/w WNL (HGB 15.0, )  BMP: Glucose 161 (H), Creatinine 2.36 (H), GFR Estimate 33 (L) o/w WNL   Glucose by meter: 145 (H)  Creatinine: Creatinine 2.19 (H), GFR Estimate 36 (L) o/w WNL    Interventions:  2107 Dilaudid 1.0 mg IV  2108 NS 1L IV Bolus  2108 Decadron 10 mg IV  2108 Benadryl 25 mg IV  2108 Compazine 10 mg IV  2154 Dilaudid 1.0 mg IV  2155 NS 1L IV Bolus  2226 Zofran 4 mg IV    Emergency Department Course:  Past medical records, nursing notes, and vitals reviewed.  2041: I performed an exam of the patient and obtained history, as documented above.     IV inserted and blood drawn.     2146: I rechecked the patient. Explained findings to the patient and his daughter. The patient reports his nausea is gone and his headache is slightly  better.    2243: I rechecked the patient. Patient was sipping water and prefers to go home.    2320: I rechecked the patient. Explained findings to the patient and his daughter.    Findings and plan explained to the Patient. Patient discharged home with instructions regarding supportive care, medications, and reasons to return. The importance of close follow-up was reviewed.   Impression & Plan    Medical Decision Making:  Murtaza Fitzgerald is a 43 year old male presents with headache, nausea and vomiting for the last two days. Patient does not have a history of migraines but does have history of intractable headache cause nausea and vomiting. He has had previous headaches with similar symptoms however this headache was a little bit different. There is no evidence secondary to headache, such as meningitis, subarachnoid hemorrhage. Headache is improved and nausea is gone. I offered admission for rehydration due to creatinine being elevated, he declined. After the second liter went in, he felt he was doing a lot better and repeat creatinine was trending down, so he wanted to go home as he felt he would sleep better at home. I indicated that he needs to have a 24 hour lab follow up with his doctor. I did send him home with Zofran and oxycodone for use and he should keep pushing fluids. If symptoms worsen and is unable to drink fluids, he needs to do come back for admission. He voices understanding. Patient is discharged in stable condition.      Diagnosis:    ICD-10-CM   1. Headache around the eyes R51   2. Dehydration E86.0     Disposition:  Discharged to home with instructions for follow up.    Discharge Medications:  Started    * ondansetron 4 MG ODT tab  Commonly known as:  ZOFRAN ODT  4 mg, Oral, EVERY 6 HOURS PRN     oxyCODONE 5 MG tablet  Commonly known as:  ROXICODONE  5 mg, Oral, EVERY 6 HOURS PRN       Corrine Wilburn  6/12/2019   [facility] EMERGENCY DEPARTMENT  Scribe Disclosure:  I  Corrine Wilburn, am serving as a scribe at 8:41 PM on 6/12/2019 to document services personally performed by Barbara Dempsey MD based on my observations and the provider's statements to me.      Barbara Dempsey MD  06/12/19 [id number]

## 2019-06-13 NOTE — DISCHARGE INSTRUCTIONS
Opioid Medication Information    You have been given a prescription for an opioid (narcotic) pain medicine and/or have received a pain medicine while here in the Emergency Department. These medicines can make you drowsy or impaired. You must not drive, operate dangerous equipment, or engage in any other dangerous activities while taking these medications. If you drive while taking these medications, you could be arrested for DUI, or driving under the influence. Do not drink any alcohol while you are taking these medications.   Opioid pain medications can cause addiction. If you have a history of chemical dependency of any type, you are at a higher risk of becoming addicted to pain medications.  Only take these prescribed medications to treat your pain when all other options have been tried. Take it for as short a time and as few doses as possible. Store your pain pills in a secure place, as they are frequently stolen and provide a dangerous opportunity for children or visitors in your house to start abusing these powerful medications. We will not replace any lost or stolen medicine.  As soon as your pain is better, you should flush all your remaining medication.   Many prescription pain medications contain Tylenol  (acetaminophen), including Vicodin , Tylenol #3 , Norco , Lortab , and Percocet .  You should not take any extra pills of Tylenol  if you are using these prescription medications or you can get very sick.  Do not ever take more than 4000 mg of acetaminophen in any 24 hour period.  All opioids tend to cause constipation. Drink plenty of water and eat foods that have a lot of fiber, such as fruits, vegetables, prune juice, apple juice and high fiber cereal.  Take a laxative if you don?t move your bowels at least every other day. Miralax , Milk of Magnesia, Colace , or Senna  can be used to keep you regular.

## 2019-06-14 ASSESSMENT — ENCOUNTER SYMPTOMS
WOUND: 0
ABDOMINAL PAIN: 0
SHORTNESS OF BREATH: 0

## 2019-06-14 NOTE — ED TRIAGE NOTES
Pt in with a C/O mechanical fall. Pt reports he was trying to stop his cat from getting out of the house and fell off a step in the garage onto the cement floor. No headstrike or LOC. Pt reports L ankle and wrist pain on arrival. Swelling present, no deformity noted

## 2019-06-14 NOTE — ED PROVIDER NOTES
History     Chief Complaint:  Ankle Pain and Wrist Pain    The history is provided by the patient.      Murtaza Fitzgerald is a 43 year old male who presents with ankle pain and wrist pain.  A few hours ago, patient was walking down cement stairs at home when his cat ran by. He tried turning around to catch the cat when he twisted his left ankle and fell over onto his left wrist. He did not hit his head but has pain in his ankle and wrist, prompting him to the ED for evaluation. Here, patient states his ankle is sore medially and laterally. His wrist pain radiates up to his elbow.     Allergies:  Benadryl  Reglan     Medications:    Aspirin 81 mg tablet  Florinef  Insulin aspart  Prilosec  Oxycodone  Tacrolimus    Past Medical History:    Adhesive capsulitis of right shoulder  CAD   DM type 1  CABG  Hyperlipidemia  HTN  Kidney replacement by transplant  CMV  Immunosuppressed  Dyslipidemia  Mycotic aneurysm of kidney transplant  Pancreas transplanted     Past Surgical History:    Coronary artery bypass graft  Left knee tumor removal  Pancrease and kidney transplant    Family History:    No past pertinent family history.    Social History:  Former smoker: 0.30 pack/day.  Positive for alcohol use.   Negative for drug use.  Marital Status:  .     Review of Systems   Respiratory: Negative for shortness of breath.    Cardiovascular: Negative for chest pain.   Gastrointestinal: Negative for abdominal pain.   Musculoskeletal:        Positive for left wrist and left ankle pain.      Skin: Negative for wound.   All other systems reviewed and are negative.      Physical Exam     Patient Vitals for the past 24 hrs:   BP Temp Temp src Pulse Resp SpO2 Weight   06/13/19 1936 101/72 97.9  F (36.6  C) Oral 105 16 98 % 93 kg (205 lb)     Physical Exam  General: Alert, Mild  discomfort, well kept   HENT:  Normal voice, No lymphadenopathy  Eyes:  The pupils are equal, round, and reactive to light, Conjunctiva normal,  No scleral icterus   Neck:  Normal range of motion  CV:  Normal Pulses  Resp:  Non-labored, No cough  MS:  Left wrist with mild swelling and tenderness. Patient reluctant to use due to discomfort.   Left ankle with lateral swelling and tenderness.    Skin:  No rash or acute skin lesions noted  Neuro: Speech is normal and fluent  Psych:  Awake. Alert.  Normal affect.  Appropriate interactions. Good eye contact    Emergency Department Course   Imaging:  Radiographic findings were communicated with the patient who voiced understanding of the findings.    XR Ankle Left G/E 3 Views   Final Result   IMPRESSION: Soft tissue swelling over the lateral malleolus. Ankle   mortise is intact. Talonavicular degenerative changes. No acute   fracture or dislocation.      JONNY SCOTT MD      XR Wrist Left 2 Views   Final Result   IMPRESSION: There appears be a nondisplaced fracture through the   medial volar aspect of the distal radius that extends to the articular   surface seen on the lateral view only.      JONNY SCOTT MD      Interventions:  2003 Ibuprofen tablet 600 mg PO    Emergency Department Course:  1956 Nursing notes and vitals reviewed. I performed an exam of the patient as documented above.     IV inserted. Medicine administered as documented above. Blood drawn. This was sent to the lab for further testing, results above.    The patient was sent for a left wrist and left ankle x-ray while in the emergency department, findings above.     Findings and plan explained to the Patient. Patient discharged home with instructions regarding supportive care, medications, and reasons to return. The importance of close follow-up was reviewed. The patient was prescribed ultram    I personally reviewed the laboratory results with the Patient and answered all related questions prior to discharge.     Impression & Plan    Medical Decision Making:  Murtaza Fitzgerald is a 43 year old male who presented for injury after  falling on Left ankle or Left wrist concerned due to discomfort he presented for evaluation. Xray obtained and is negative for fracture, dislocation, or malalignment of ankle.  However there is a small intra-articular fracture that is nondisplaced in the left wrist.  Ankle appears to be a simple sprain/strain type injury. We did discuss the possibility of a occult fracture not seen on imaging today.  He is placed in a air splint for his ankle in a prefab volar splint with thumb spica for wrist.  No neurovascular compromise. Ibuprofen and Ice for discomfort. RX for Ultram given. Follow up with PCP/Orthopedics in 5-7 days if continued or worsening pain.  Immediate return to the ED if develops numbness, weakness, tingling, discoloration, or for other concerns    Diagnosis:    ICD-10-CM    1. Fall, initial encounter W19.XXXA    2. Ankle sprain S93.409A    3. Wrist fracture S62.109A        Disposition:  discharged to home    Discharge Medications:     Medication List      Started    traMADol 50 MG tablet  Commonly known as:  ULTRAM  50 mg, Oral, EVERY 8 HOURS PRN          Scribe Disposition  I, Marcelle Romo, am serving as a scribe on 6/13/2019 at 9:02 PM to personally document services performed by Alex Markham CNP based on my observations and the provider's statements to me.     Marcelle Romo  6/13/2019   [facility] EMERGENCY DEPARTMENT       Alex Markham APRN CNP  06/14/19 0046

## 2019-06-14 NOTE — DISCHARGE INSTRUCTIONS
Ibuprofen or Naproxen and/or Tylenol scheduled for the next 3-5 days. 600 mg (three tabs) ibuprofen, 440 mg (two tabs) Naproxen, 650-1000 mg of Tylenol.  Ibuprofen and Tylenol are every 6 hours Naproxen is 2 times daily. Follow directions.    Ice or heat to area.  I recommend ice in the first 24-48 hours.

## 2019-06-16 ENCOUNTER — APPOINTMENT (OUTPATIENT)
Dept: GENERAL RADIOLOGY | Facility: CLINIC | Age: 44
End: 2019-06-16
Attending: EMERGENCY MEDICINE
Payer: COMMERCIAL

## 2019-06-16 ENCOUNTER — HOSPITAL ENCOUNTER (EMERGENCY)
Facility: CLINIC | Age: 44
Discharge: HOME | End: 2019-06-16
Attending: EMERGENCY MEDICINE | Admitting: EMERGENCY MEDICINE
Payer: COMMERCIAL

## 2019-06-16 VITALS
DIASTOLIC BLOOD PRESSURE: 100 MMHG | RESPIRATION RATE: 16 BRPM | OXYGEN SATURATION: 98 % | SYSTOLIC BLOOD PRESSURE: 147 MMHG | TEMPERATURE: 98.9 F | HEART RATE: 98 BPM

## 2019-06-16 DIAGNOSIS — R11.2 NON-INTRACTABLE VOMITING WITH NAUSEA, UNSPECIFIED VOMITING TYPE: ICD-10-CM

## 2019-06-16 DIAGNOSIS — M25.562 ACUTE PAIN OF LEFT KNEE: ICD-10-CM

## 2019-06-16 DIAGNOSIS — K59.00 CONSTIPATION, UNSPECIFIED CONSTIPATION TYPE: ICD-10-CM

## 2019-06-16 LAB
ALBUMIN SERPL-MCNC: 3.2 G/DL (ref 3.4–5)
ALP SERPL-CCNC: 107 U/L (ref 40–150)
ALT SERPL W P-5'-P-CCNC: 12 U/L (ref 0–70)
ANION GAP SERPL CALCULATED.3IONS-SCNC: 12 MMOL/L (ref 3–14)
AST SERPL W P-5'-P-CCNC: 11 U/L (ref 0–45)
BASOPHILS # BLD AUTO: 0.1 10E9/L (ref 0–0.2)
BASOPHILS NFR BLD AUTO: 0.5 %
BILIRUB SERPL-MCNC: 0.7 MG/DL (ref 0.2–1.3)
BUN SERPL-MCNC: 25 MG/DL (ref 7–30)
CALCIUM SERPL-MCNC: 8.8 MG/DL (ref 8.5–10.1)
CHLORIDE SERPL-SCNC: 103 MMOL/L (ref 94–109)
CO2 SERPL-SCNC: 21 MMOL/L (ref 20–32)
CREAT SERPL-MCNC: 2.18 MG/DL (ref 0.66–1.25)
DIFFERENTIAL METHOD BLD: ABNORMAL
EOSINOPHIL # BLD AUTO: 0 10E9/L (ref 0–0.7)
EOSINOPHIL NFR BLD AUTO: 0.3 %
ERYTHROCYTE [DISTWIDTH] IN BLOOD BY AUTOMATED COUNT: 13 % (ref 10–15)
GFR SERPL CREATININE-BSD FRML MDRD: 36 ML/MIN/{1.73_M2}
GLUCOSE BLDC GLUCOMTR-MCNC: 210 MG/DL (ref 70–99)
GLUCOSE SERPL-MCNC: 196 MG/DL (ref 70–99)
HCT VFR BLD AUTO: 43.2 % (ref 40–53)
HGB BLD-MCNC: 14.4 G/DL (ref 13.3–17.7)
IMM GRANULOCYTES # BLD: 0.1 10E9/L (ref 0–0.4)
IMM GRANULOCYTES NFR BLD: 0.9 %
LYMPHOCYTES # BLD AUTO: 1.8 10E9/L (ref 0.8–5.3)
LYMPHOCYTES NFR BLD AUTO: 15.2 %
MCH RBC QN AUTO: 28.8 PG (ref 26.5–33)
MCHC RBC AUTO-ENTMCNC: 33.3 G/DL (ref 31.5–36.5)
MCV RBC AUTO: 86 FL (ref 78–100)
MONOCYTES # BLD AUTO: 0.7 10E9/L (ref 0–1.3)
MONOCYTES NFR BLD AUTO: 6.1 %
NEUTROPHILS # BLD AUTO: 9 10E9/L (ref 1.6–8.3)
NEUTROPHILS NFR BLD AUTO: 77 %
NRBC # BLD AUTO: 0 10*3/UL
NRBC BLD AUTO-RTO: 0 /100
PLATELET # BLD AUTO: 342 10E9/L (ref 150–450)
POTASSIUM SERPL-SCNC: 3.6 MMOL/L (ref 3.4–5.3)
PROT SERPL-MCNC: 8 G/DL (ref 6.8–8.8)
RBC # BLD AUTO: 5 10E12/L (ref 4.4–5.9)
SODIUM SERPL-SCNC: 136 MMOL/L (ref 133–144)
WBC # BLD AUTO: 11.7 10E9/L (ref 4–11)

## 2019-06-16 PROCEDURE — 80053 COMPREHEN METABOLIC PANEL: CPT | Performed by: EMERGENCY MEDICINE

## 2019-06-16 PROCEDURE — 96361 HYDRATE IV INFUSION ADD-ON: CPT

## 2019-06-16 PROCEDURE — 74019 RADEX ABDOMEN 2 VIEWS: CPT

## 2019-06-16 PROCEDURE — 00000146 ZZHCL STATISTIC GLUCOSE BY METER IP

## 2019-06-16 PROCEDURE — 85025 COMPLETE CBC W/AUTO DIFF WBC: CPT | Performed by: EMERGENCY MEDICINE

## 2019-06-16 PROCEDURE — 25000128 H RX IP 250 OP 636: Performed by: EMERGENCY MEDICINE

## 2019-06-16 PROCEDURE — 99285 EMERGENCY DEPT VISIT HI MDM: CPT | Mod: 25

## 2019-06-16 PROCEDURE — 73562 X-RAY EXAM OF KNEE 3: CPT | Mod: LT

## 2019-06-16 PROCEDURE — 96374 THER/PROPH/DIAG INJ IV PUSH: CPT

## 2019-06-16 RX ORDER — ONDANSETRON 4 MG/1
4 TABLET, ORALLY DISINTEGRATING ORAL EVERY 8 HOURS PRN
Qty: 10 TABLET | Refills: 0 | Status: SHIPPED | OUTPATIENT
Start: 2019-06-16 | End: 2019-07-03

## 2019-06-16 RX ORDER — ONDANSETRON 2 MG/ML
4 INJECTION INTRAMUSCULAR; INTRAVENOUS ONCE
Status: COMPLETED | OUTPATIENT
Start: 2019-06-16 | End: 2019-06-16

## 2019-06-16 RX ADMIN — SODIUM CHLORIDE 1000 ML: 9 INJECTION, SOLUTION INTRAVENOUS at 15:04

## 2019-06-16 RX ADMIN — ONDANSETRON HYDROCHLORIDE 4 MG: 2 INJECTION, SOLUTION INTRAMUSCULAR; INTRAVENOUS at 15:04

## 2019-06-16 ASSESSMENT — ENCOUNTER SYMPTOMS
NAUSEA: 1
ARTHRALGIAS: 1
CONSTIPATION: 1
ABDOMINAL PAIN: 1
VOMITING: 1

## 2019-06-16 NOTE — DISCHARGE INSTRUCTIONS
No signs of bowel obstruction or other emergency today.   Take miralax and dulcolax per your usual to help you have a bowel movement.  Return to the emergency department for fevers, abdominal pain, uncontrollable vomiting, or for any other concerns.     Discharge Instructions  Vomiting    You have been seen today for vomiting (throwing up). This is usually caused by a virus, but some bacteria, parasites, medicines or other medical conditions can cause similar symptoms. At this time your provider does not find that your vomiting is a sign of anything dangerous or life-threatening. However, sometimes the signs of serious illness do not show up right away. If you have new or worse symptoms, you may need to be seen again in the Emergency Department or by your primary provider. Remember that serious problems like appendicitis can start as vomiting.    Generally, every Emergency Department visit should have a follow-up clinic visit with either a primary or a specialty clinic/provider. Please follow-up as instructed by your emergency provider today.    Return to the Emergency Department if:  You keep vomiting and you are not able to keep liquids down.   You feel you are getting dehydrated, such as being very thirsty, not urinating (peeing) at least every 8-12 hours, or feeling faint or lightheaded.   You develop a new fever, or your fever continues for more than 2 days.   You have abdominal (belly pain) that seems worse than cramps, is in one spot, or is getting worse over time. Appendicitis usually causes pain in the right lower abdomen (to the right and below your belly button) so watch for pain in this location.  You have blood in your vomit or stools.   You feel very weak.  You are not starting to improve within 24 hours of your visit here.     What can I do to help myself?  The most important thing to do is to drink clear liquids. If you have been vomiting a lot, it is best to have only small, frequent sips of  liquids. Drinking too much at once may cause more vomiting. If you are vomiting often, you must replace minerals, sodium and potassium lost with your illness. Pedialyte  is the best available rehydration liquid but some find that it doesn?t taste good so sports drinks are an alterative. You can also drink clear liquids such as water, weak tea, apple juice, and 7-Up . Avoid acid liquids (orange), caffeine (coffee) or alcohol. Do not drink milk until you no longer have diarrhea (loose stools).   After liquids are staying down, you may start eating mild foods. Soda crackers, toast, plain noodles, gelatin, applesauce and bananas are good first choices. Avoid foods that have acid, are spicy, fatty or have a lot of fiber (such as meats, coarse grains, vegetables). You may start eating these foods again in about 3 days when you are better.   Sometimes treatment includes prescription medicine to prevent nausea (sick to your stomach) and vomiting. If your provider prescribes these for you, take them as directed.   Do not take ibuprofen, naproxen, or other nonsteroidal anti-inflammatory (NSAID) medicines without checking with your healthcare provider.     If you were given a prescription for medicine here today, be sure to read all of the information (including the package insert) that comes with your prescription.  This will include important information about the medicine, its side effects, and any warnings that you need to know about.  The pharmacist who fills the prescription can provide more information and answer questions you may have about the medicine.  If you have questions or concerns that the pharmacist cannot address, please call or return to the Emergency Department.     Remember that you can always come back to the Emergency Department if you are not able to see your regular provider in the amount of time listed above, if you get any new symptoms, or if there is anything that worries you.

## 2019-06-16 NOTE — ED PROVIDER NOTES
History     Chief Complaint:  Constipation, Nausea and Vomiting    HPI   Murtaza Fitzgerald is a 43 year old male with a history of DM1 and HTN who presents to the emergency department for evaluation of constipation, nausea, and vomiting. The patient indicates he has been experiencing constipation and abdominal pain for the last few days with the onset of vomiting and nausea last night, prompting him to present to the ED. He states he fell on 6/13, spraining his left ankle and fracturing his left wrist, for which he is taking Tramadol. The patient notes he is experiencing left knee pain as well. He states he has experienced constipation from taking narcotics in the past. He is taking Dulcolax, and his last bowel movement was three days ago. He indicates symptom relief after bowel movements. Of note, the patient is currently not using crutches or a walker.      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    Social History:  Presents with daughter.  Former smoker.  Positive for alcohol use.   Marital Status:   [4]     Review of Systems   Gastrointestinal: Positive for abdominal pain, constipation, nausea and vomiting.   Musculoskeletal: Positive for arthralgias.   All other systems reviewed and are negative.      Physical Exam     Patient Vitals for the past 24 hrs:   BP Temp Temp src Pulse Resp SpO2   06/16/19 1700 (!) 147/100 -- -- -- -- 98 %   06/16/19 1500 (!) 145/101 -- -- 98 -- 98 %   06/16/19 1340 (!)  131/98 98.9  F (37.2  C) Oral 101 16 99 %     Physical Exam    Nursing note and vitals reviewed.  Constitutional: Cooperative.   HENT:   Mouth/Throat: Moist mucous membranes.   Eyes: EOMI, nonicteric sclera  Cardiovascular: Normal rate, regular rhythm, no murmurs, rubs, or gallops  Pulmonary/Chest: Effort normal and breath sounds normal. No respiratory distress. No wheezes. No rales.   Abdominal: Soft. Nontender, nondistended, no guarding or rigidity. BS present.   Musculoskeletal: Pt in left wrist velcro brace and left ankle walking boot.   Left knee effusion with pain with ROM. Ant/post drawer negative, neg varus/valgus stress, negative shane's.   Neurological: Alert. Moves all extremities spontaneously. Normal sensation BLE.   Skin: Skin is warm and dry. No rash noted.   Psychiatric: Normal mood and affect.     Emergency Department Course     Imaging:  Radiographic findings were communicated with the patient and family who voiced understanding of the findings.    Abdomen XR, 2 vw, flat and upright:  Nonobstructive gas pattern.  Imaging independently reviewed and agree with radiologist interpretation.     XR Knee Left 3 Views:  1. No fractures identified.  2. Large knee joint effusion.  Imaging independently reviewed and agree with radiologist interpretation.     Laboratory:  CMP: Glucose 196 (H), Creatinine 2.18 (H), GFR Estimate 42 (L), Albumin 3.2 (L), o/w WNL    CBC: WBC: 11.7 (H), HGB: 144.4, PLT: 342    Glucose: 210 (H)    Interventions:  1504 NS 1L IV BOLUS   Zofran 4 mg IV    Emergency Department Course:  Nursing notes and vitals reviewed. 1420 I performed an exam of the patient as documented above.     IV inserted. Medicine administered as documented above. Blood drawn. This was sent to the lab for further testing, results above.    The patient was sent for a Abdomen XR and Knee XR while in the emergency department, findings above.     1620 I rechecked the patient and discussed the results of his  workup thus far.     Findings and plan explained to the Patient and daughter. Patient discharged home with instructions regarding supportive care, medications, and reasons to return. The importance of close follow-up was reviewed. The patient was prescribed Zofran.    I personally reviewed the laboratory results with the Patient and daughter and answered all related questions prior to discharge.    Impression & Plan      Medical Decision Making:  Pt presents with CC abd pain, vomiting. Pt reports that it feels like he's constipated. Given vomiting, obtained abdominal x-ray to evaluate for obstruction, but it appears normal. Pt feeling improved with nausea medication and is tolerating po. He doesn't have abdominal tenderness on exam making biliary pathology, appendicitis, or other intra-abdominal catastrophe unlikely. Would not CT for this reason. Pt declines enema for feelings of constipation, reporting he'll take miralax instead given multiple previous similar feelings and history of good results. Pt with chronic creatinine elevation that's not worse today. Labs otherwise unremarkable. Given symptomatic improvement, he's safe for discharge and outpt follow-up/treatment. He also complained of left knee pain following his recent fall. No fractures or obvious ligamentous injury noted on imaging and exam. Given effusion, though, he may still have underlying injury. He's ambulatory. Recommended ace wrap and orthopedic follow-up. He is in stable condition at the time of discharge, indications for return to the ED were discussed as well as follow up. All questions were answered and he is in agreement with the plan.      Diagnosis:    ICD-10-CM   1. Non-intractable vomiting with nausea, unspecified vomiting type R11.2   2. Constipation, unspecified constipation type K59.00   3. Acute pain of left knee M25.562     Disposition:  discharged to home    Discharge Medications:  Medication List      Started    * ondansetron 4 MG  ODT tab  Commonly known as:  ZOFRAN ODT  4 mg, Oral, EVERY 8 HOURS PRN          I, Sravanthi Sin, am serving as a scribe on 6/16/2019 at 2:45 PM to personally document services performed by Yonny Franklin MD based on my observations and the provider's statements to me.     Sravanthi Sin  6/16/2019   [facility] EMERGENCY DEPARTMENT       Yonny Franklin MD  06/19/19 1952

## 2019-06-16 NOTE — ED TRIAGE NOTES
Patient fell on Thursday and sprained left ankle and wrist. Also states a fracture in left wrist. Taking Tramadol for pain with some relief. States nauseated and vomiting started last night. Abdominal pain and constipation at this time. Last BM 3 days ago.

## 2019-06-16 NOTE — ED AVS SNAPSHOT
[facility] Emergency Department  [address]  [facility] [address]  Phone:  [phone]  Fax:  [phone]                                    Murtaza Fitzgerald   MRN: [id number]    Department:  [facility] Emergency Department   Date of Visit:  6/16/2019           After Visit Summary Signature Page    I have received my discharge instructions, and my questions have been answered. I have discussed any challenges I see with this plan with the nurse or doctor.    ..........................................................................................................................................  Patient/Patient Representative Signature      ..........................................................................................................................................  Patient Representative Print Name and Relationship to Patient    ..................................................               ................................................  Date                                   Time    ..........................................................................................................................................  Reviewed by Signature/Title    ...................................................              ..............................................  Date                                               Time          22EPIC Rev 08/18

## 2019-06-17 ENCOUNTER — PATIENT OUTREACH (OUTPATIENT)
Dept: CARE COORDINATION | Facility: CLINIC | Age: 44
End: 2019-06-17

## 2019-06-17 ASSESSMENT — ACTIVITIES OF DAILY LIVING (ADL): DEPENDENT_IADLS:: INDEPENDENT

## 2019-06-17 NOTE — PROGRESS NOTES
Clinic Care Coordination Contact  OUTREACH    Referral Information:  Referral Source: ED Follow-Up    Chief Complaint   Patient presents with     Clinic Care Coordination - Follow-up     ED 06/16 - nausea, vomiting, dehydration        Universal Utilization: Patient has had 4 ED visits within the past 90 days and a risk of admission or ED visit score of 66%.   Clinic Utilization  Difficulty keeping appointments:: No  Compliance Concerns: No  No-Show Concerns: No  No PCP office visit in Past Year: No  Utilization    Last refreshed: 6/16/2019  5:35 PM:  Hospital Admissions 1           Last refreshed: 6/16/2019  5:35 PM:  ED Visits 8           Last refreshed: 6/16/2019  5:35 PM:  No Show Count (past year) 6              Current as of: 6/16/2019  5:35 PM            Clinical Concerns:  Current Medical Concerns:  Patient has been seen in the ED twice since last outreach. Patient reports the fall was a fall and feels nothing could have been done about that. Ankle and wrist are very sore. Needs to call and schedule follow up with ortho. He is icing and elevating it currently. Patient reports when he is on painkillers he has a special cocktail that he needs to take along with it to prevent him from getting backed up. Patient states he forgot to take this and got backed up and everything he ate just came back up. Patient states everything is fine now.   Education Provided to patient: CC role.    Pain  Pain (GOAL):: Yes  Type: Acute (<3mo)  Health Maintenance Reviewed: Not assessed  Clinical Pathway: None    Medication Management:  Patient expresses no concerns with medications.     Functional Status:  Dependent ADLs:: Independent  Dependent IADLs:: Independent  Bed or wheelchair confined:: No  Mobility Status: Independent  Any fall with injury in the past year?: Yes    Living Situation:  Current living arrangement:: I live in a private home with family  Type of residence:: Private home -  stairs    Diet/Exercise/Sleep:  Inadequate nutrition (GOAL):: No  Food Insecurity: No  Tube Feeding: No  Significant changes in sleep pattern (GOAL): No    Transportation:  Transportation concerns (GOAL):: No  Transportation means:: Regular car   Financial/Insurance:   Financial/Insurance concerns (GOAL):: No     Resources and Interventions:  Current Resources:   Community Resources: None  Supplies used at home:: Diabetic Supplies  Equipment Currently Used at Home: glucometer    Advance Care Plan/Directive  Advanced Care Plans/Directives on file:: Yes  Type Advanced Care Plans/Directives: Advanced Directive - On File  Advanced Care Plan/Directive Status: Not Applicable    Patient/Caregiver understanding: Patient verbalized understanding and denies any additional questions or concerns at this time. RNCC engaged in [employer] communications during encounter.      Future Appointments              In 1 month Renetta Mayorga MD [facility], [facility]    In 1 month Kt Ríos MD [facility]    In 3 months 2, [facility]          Plan: At this time, patient denies outstanding need for connection or referral to resources or assistance navigating recommended follow up care. No further Care Coordination outreaches scheduled at this time, patient provided with this writer's number and encouraged to call with future needs or questions.    Lali Smiley RN Care Coordinator  [facility]  Email: [email or web address]  Phone: [phone]

## 2019-06-19 ENCOUNTER — TRANSFERRED RECORDS (OUTPATIENT)
Dept: HEALTH INFORMATION MANAGEMENT | Facility: CLINIC | Age: 44
End: 2019-06-19

## 2019-06-24 ENCOUNTER — TRANSFERRED RECORDS (OUTPATIENT)
Dept: HEALTH INFORMATION MANAGEMENT | Facility: CLINIC | Age: 44
End: 2019-06-24

## 2019-06-25 DIAGNOSIS — E86.0 DEHYDRATION: ICD-10-CM

## 2019-06-25 NOTE — TELEPHONE ENCOUNTER
Pt is looking for a refill of Fludrocortisone.    Last prescribed by Dr. Serrano ([facility]) on 5/13/2019 qty 30 with 0 refills.    Written before that on 3/28/19 qty 60 with 11 refills from Dr. Chen BUT the newest directions are different currently Dr. Serrano has pt taking 2 tablets daily.    Please verify directions and send new rx.    Thank you!!    Kira Arredondo, Pharmacy [facility]  [phone]

## 2019-06-26 ENCOUNTER — TELEPHONE (OUTPATIENT)
Dept: ENDOCRINOLOGY | Facility: CLINIC | Age: 44
End: 2019-06-26

## 2019-06-26 RX ORDER — FLUDROCORTISONE ACETATE 0.1 MG/1
0.2 TABLET ORAL DAILY
Qty: 30 TABLET | Refills: 1 | Status: SHIPPED | OUTPATIENT
Start: 2019-06-26 | End: 2019-06-28

## 2019-06-26 NOTE — TELEPHONE ENCOUNTER
Panel Management Review      Patient has the following on his problem list:     Diabetes    ASA: Passed    Last A1C  Lab Results   Component Value Date    A1C 6.8 01/11/2019    A1C 6.4 11/21/2017    A1C 7.1 12/22/2016    A1C 7.2 07/26/2016    A1C 7.5 08/10/2015     A1C tested: FAILED    Last LDL:    Lab Results   Component Value Date    CHOL 171 11/21/2017     Lab Results   Component Value Date    HDL 33 11/21/2017     Lab Results   Component Value Date    LDL 99 11/21/2017     Lab Results   Component Value Date    TRIG 193 11/21/2017     Lab Results   Component Value Date    CHOLHDLRATIO 4.1 07/08/2015     Lab Results   Component Value Date    NHDL 138 11/21/2017       Is the patient on a Statin? YES             Is the patient on Aspirin? YES    Medications     HMG CoA Reductase Inhibitors     atorvastatin (LIPITOR) 40 MG tablet       Salicylates     aspirin (ASA) 81 MG tablet             Last three blood pressure readings:  BP Readings from Last 3 Encounters:   06/16/19 (!) 147/100   06/13/19 101/72   06/12/19 157/86       Date of last diabetes office visit: 02/19/19     Tobacco History:     History   Smoking Status     Former Smoker     Packs/day: 0.30     Types: Cigarettes   Smokeless Tobacco     Never Used     Comment: E- Cig use still           Composite cancer screening  Chart review shows that this patient is due/due soon for the following None  Summary:    Patient is due/failing the following:   A1C and FOLLOW UP    Action needed:   Patient needs office visit for endo.    Type of outreach:    Sent [employer] message.    Questions for provider review:    None                                                                                                                                    Rebekah Santoyo, SKYE  [facility]  Bob[facility]Janelle       Chart routed to no one .

## 2019-06-28 ENCOUNTER — OFFICE VISIT (OUTPATIENT)
Dept: PEDIATRICS | Facility: CLINIC | Age: 44
End: 2019-06-28
Payer: COMMERCIAL

## 2019-06-28 VITALS
TEMPERATURE: 98.9 F | HEART RATE: 102 BPM | OXYGEN SATURATION: 98 % | BODY MASS INDEX: 31.29 KG/M2 | WEIGHT: 205.8 LBS | DIASTOLIC BLOOD PRESSURE: 88 MMHG | SYSTOLIC BLOOD PRESSURE: 128 MMHG

## 2019-06-28 DIAGNOSIS — I10 HYPERTENSION GOAL BP (BLOOD PRESSURE) < 140/90: ICD-10-CM

## 2019-06-28 DIAGNOSIS — E78.5 HYPERLIPIDEMIA WITH TARGET LDL LESS THAN 100: ICD-10-CM

## 2019-06-28 DIAGNOSIS — E10.59 TYPE 1 DIABETES MELLITUS WITH DIABETIC CARDIOMYOPATHY (H): ICD-10-CM

## 2019-06-28 DIAGNOSIS — E86.0 DEHYDRATION: ICD-10-CM

## 2019-06-28 DIAGNOSIS — Z94.0 KIDNEY REPLACED BY TRANSPLANT: ICD-10-CM

## 2019-06-28 DIAGNOSIS — S83.204D OTHER TEAR OF MENISCUS OF LEFT KNEE, UNSPECIFIED MENISCUS, UNSPECIFIED WHETHER OLD OR CURRENT TEAR, SUBSEQUENT ENCOUNTER: ICD-10-CM

## 2019-06-28 DIAGNOSIS — D84.9 IMMUNOSUPPRESSED STATUS (H): ICD-10-CM

## 2019-06-28 DIAGNOSIS — Z01.818 PREOP GENERAL PHYSICAL EXAM: Primary | ICD-10-CM

## 2019-06-28 DIAGNOSIS — I25.10 CORONARY ARTERY DISEASE INVOLVING NATIVE CORONARY ARTERY OF NATIVE HEART WITHOUT ANGINA PECTORIS: ICD-10-CM

## 2019-06-28 DIAGNOSIS — I43 TYPE 1 DIABETES MELLITUS WITH DIABETIC CARDIOMYOPATHY (H): ICD-10-CM

## 2019-06-28 DIAGNOSIS — G47.33 OSA (OBSTRUCTIVE SLEEP APNEA): ICD-10-CM

## 2019-06-28 PROBLEM — R11.2 INTRACTABLE NAUSEA AND VOMITING: Status: RESOLVED | Noted: 2019-02-06 | Resolved: 2019-06-28

## 2019-06-28 PROBLEM — R11.2 NAUSEA & VOMITING: Status: RESOLVED | Noted: 2019-02-05 | Resolved: 2019-06-28

## 2019-06-28 LAB
ANION GAP SERPL CALCULATED.3IONS-SCNC: 8 MMOL/L (ref 3–14)
BUN SERPL-MCNC: 31 MG/DL (ref 7–30)
CALCIUM SERPL-MCNC: 9 MG/DL (ref 8.5–10.1)
CHLORIDE SERPL-SCNC: 107 MMOL/L (ref 94–109)
CO2 SERPL-SCNC: 23 MMOL/L (ref 20–32)
CREAT SERPL-MCNC: 2.53 MG/DL (ref 0.66–1.25)
GFR SERPL CREATININE-BSD FRML MDRD: 30 ML/MIN/{1.73_M2}
GLUCOSE SERPL-MCNC: 157 MG/DL (ref 70–99)
HBA1C MFR BLD: 6.8 % (ref 0–5.6)
POTASSIUM SERPL-SCNC: 4.2 MMOL/L (ref 3.4–5.3)
SODIUM SERPL-SCNC: 138 MMOL/L (ref 133–144)

## 2019-06-28 PROCEDURE — 83036 HEMOGLOBIN GLYCOSYLATED A1C: CPT | Performed by: INTERNAL MEDICINE

## 2019-06-28 PROCEDURE — 36415 COLL VENOUS BLD VENIPUNCTURE: CPT | Performed by: INTERNAL MEDICINE

## 2019-06-28 PROCEDURE — 80048 BASIC METABOLIC PNL TOTAL CA: CPT | Performed by: INTERNAL MEDICINE

## 2019-06-28 PROCEDURE — 99215 OFFICE O/P EST HI 40 MIN: CPT | Performed by: INTERNAL MEDICINE

## 2019-06-28 RX ORDER — FLUDROCORTISONE ACETATE 0.1 MG/1
0.2 TABLET ORAL DAILY
Qty: 60 TABLET | Refills: 5 | Status: ON HOLD | OUTPATIENT
Start: 2019-06-28 | End: 2020-01-05

## 2019-06-28 SDOH — SOCIAL STABILITY: SOCIAL NETWORK: HOW OFTEN DO YOU ATTENT MEETINGS OF THE CLUB OR ORGANIZATION YOU BELONG TO?: NEVER

## 2019-06-28 SDOH — HEALTH STABILITY: MENTAL HEALTH
STRESS IS WHEN SOMEONE FEELS TENSE, NERVOUS, ANXIOUS, OR CAN'T SLEEP AT NIGHT BECAUSE THEIR MIND IS TROUBLED. HOW STRESSED ARE YOU?: TO SOME EXTENT

## 2019-06-28 SDOH — ECONOMIC STABILITY: INCOME INSECURITY: HOW HARD IS IT FOR YOU TO PAY FOR THE VERY BASICS LIKE FOOD, HOUSING, MEDICAL CARE, AND HEATING?: NOT VERY HARD

## 2019-06-28 SDOH — SOCIAL STABILITY: SOCIAL NETWORK: HOW OFTEN DO YOU ATTEND CHURCH OR RELIGIOUS SERVICES?: NEVER

## 2019-06-28 SDOH — SOCIAL STABILITY: SOCIAL NETWORK: ARE YOU MARRIED, WIDOWED, DIVORCED, SEPARATED, NEVER MARRIED, OR LIVING WITH A PARTNER?: DIVORCED

## 2019-06-28 SDOH — HEALTH STABILITY: PHYSICAL HEALTH: ON AVERAGE, HOW MANY DAYS PER WEEK DO YOU ENGAGE IN MODERATE TO STRENUOUS EXERCISE (LIKE A BRISK WALK)?: 0 DAYS

## 2019-06-28 SDOH — ECONOMIC STABILITY: TRANSPORTATION INSECURITY
IN THE PAST 12 MONTHS, HAS THE LACK OF TRANSPORTATION KEPT YOU FROM MEDICAL APPOINTMENTS OR FROM GETTING MEDICATIONS?: NO

## 2019-06-28 SDOH — SOCIAL STABILITY: SOCIAL NETWORK
IN A TYPICAL WEEK, HOW MANY TIMES DO YOU TALK ON THE PHONE WITH FAMILY, FRIENDS, OR NEIGHBORS?: MORE THAN THREE TIMES A WEEK

## 2019-06-28 SDOH — HEALTH STABILITY: MENTAL HEALTH: HOW OFTEN DO YOU HAVE 6 OR MORE DRINKS ON ONE OCCASION?: NEVER

## 2019-06-28 SDOH — ECONOMIC STABILITY: TRANSPORTATION INSECURITY
IN THE PAST 12 MONTHS, HAS LACK OF TRANSPORTATION KEPT YOU FROM MEETINGS, WORK, OR FROM GETTING THINGS NEEDED FOR DAILY LIVING?: NO

## 2019-06-28 SDOH — SOCIAL STABILITY: SOCIAL NETWORK: HOW OFTEN DO YOU GET TOGETHER WITH FRIENDS OR RELATIVES?: ONCE A WEEK

## 2019-06-28 SDOH — ECONOMIC STABILITY: FOOD INSECURITY: WITHIN THE PAST 12 MONTHS, THE FOOD YOU BOUGHT JUST DIDN'T LAST AND YOU DIDN'T HAVE MONEY TO GET MORE.: NEVER TRUE

## 2019-06-28 SDOH — ECONOMIC STABILITY: FOOD INSECURITY: WITHIN THE PAST 12 MONTHS, YOU WORRIED THAT YOUR FOOD WOULD RUN OUT BEFORE YOU GOT MONEY TO BUY MORE.: NEVER TRUE

## 2019-06-28 SDOH — HEALTH STABILITY: MENTAL HEALTH: HOW OFTEN DO YOU HAVE A DRINK CONTAINING ALCOHOL?: NEVER

## 2019-06-28 SDOH — HEALTH STABILITY: PHYSICAL HEALTH: ON AVERAGE, HOW MANY MINUTES DO YOU ENGAGE IN EXERCISE AT THIS LEVEL?: 0 MIN

## 2019-06-28 SDOH — SOCIAL STABILITY: SOCIAL NETWORK
DO YOU BELONG TO ANY CLUBS OR ORGANIZATIONS SUCH AS CHURCH GROUPS UNIONS, FRATERNAL OR ATHLETIC GROUPS, OR SCHOOL GROUPS?: NO

## 2019-06-28 NOTE — PROGRESS NOTES
[facility]  [address]  [address]  [facility] [id number] [phone]  Dept: [phone]    PRE-OP EVALUATION:  Today's date: 2019    Murtaza Fitzgerald (: 1975) presents for pre-operative evaluation assessment as requested by Dr. Alex Candelaria.  He requires evaluation and anesthesia risk assessment prior to undergoing surgery/procedure for treatment of left knee torn meniscus .    Fax number for surgical facility: [facility]  [phone]  Primary Physician: Kt Ríos  Type of Anesthesia Anticipated: General - pt reported    Patient has a Health Care Directive or Living Will:  YES, copy scanned and received    Preop Questions 2019   Who is doing your surgery? [facility]   What are you having done? meniscus repair   Date of Surgery/Procedure:    Facility or Hospital where procedure/surgery will be performed: [facility]   1.  Do you have a history of Heart attack, stroke, stent, coronary bypass surgery, or other heart surgery? YES  - s/p CABG    2.  Do you ever have any pain or discomfort in your chest? No   3.  Do you have a history of  Heart Failure? No   4.   Are you troubled by shortness of breath when:  walking on a level surface, or up a slight hill, or at night? No   5.  Do you currently have a cold, bronchitis or other respiratory infection? No   6.  Do you have a cough, shortness of breath, or wheezing? No   7.  Do you sometimes get pains in the calves of your legs when you walk? No   8. Do you or anyone in your family have previous history of blood clots? No   9.  Do you or does anyone in your family have a serious bleeding problem such as prolonged bleeding following surgeries or cuts? No   10. Have you ever had problems with anemia or been told to take iron pills? No   11. Have you had any abnormal blood loss such as black, tarry or bloody stools? No   12. Have you ever had a blood transfusion?  YES -    13. Have you or any of your relatives ever had problems with anesthesia? No   14. Do you have sleep apnea, excessive snoring or daytime drowsiness? UNKNOWN - does have snoring.    15. Do you have any prosthetic heart valves? No   16. Do you have prosthetic joints? No         HPI:     HPI related to upcoming procedure: left meniscal tear.        CAD - Patient has a longstanding history of moderate-severe CAD.  Is s/p CABG in 2016 Patient denies recent chest pain or NTG use, denies exercise induced dyspnea or PND.   Last electrocardiogram showed old inferior Q waves 4/20/19. Last echocardiogram 9/2015:     Interpretation Summary     The rhythm was sinus tachycardia.  The visual ejection fraction is estimated at 65-70%.  The transmitral spectral Doppler flow pattern is suggestive of impaired LV  relaxation.  Small pericardial effusion  There are no echocardiographic indications of cardiac tamponade.    DIABETES - Patient has a longstanding history of DiabetesType Type I . Patient is being treated with insulin pump and denies significant side effects. Control has been good. Complicating factors include but are not limited to: chronic kidney disease.     HYPERLIPIDEMIA - Patient has a long history of significant Hyperlipidemia requiring medication for treatment with recent good control. Patient reports no problems or side effects with the medication.     RENAL INSUFFICIENCY - Patient has a longstanding history of moderate-severe chronic renal insufficiency.  IS s/p kidney transplant. Last Cr 2.18 10 days ago, trending downward..  Baseline near 1.3 when hydrated.     SLEEP PROBLEM - Patient has a longstanding history of snoring..  Having obstructive sleep apnea work up soon.       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     Noncompliance with treatment     no labs for one year     Pancreas replaced by transplant (H) Feb 2009    rejection treated with thymo     Tobacco abuse ongoing     Past Surgical History:   Procedure Laterality Date     BYPASS GRAFT ARTERY CORONARY N/A 8/5/2015    Procedure: BYPASS GRAFT ARTERY CORONARY;  Surgeon: Katy Neville MD;  Location: [facility] OR     ORTHOPEDIC SURGERY  1993    tumor removed from left knee     TRANSPLANT      pancrease and kidney transplant     Current Outpatient Medications   Medication Sig Dispense Refill     aspirin (ASA) 81 MG tablet Take 1 tablet (81 mg) by mouth daily 90 tablet 3     atorvastatin (LIPITOR) 40 MG tablet Take 0.5 tablets (20 mg) by mouth daily 45 tablet 3     fludrocortisone (FLORINEF) 0.1 MG tablet Take 2 tablets (0.2 mg) by mouth daily 30 tablet 1     Insulin Aspart (INSULIN PUMP - OUTPATIENT) Date last interrogated by pharmacy: 2/5/2019  Omnipod  Insulin: Novolog  BASAL RATES and times:   12 AM (midnight): 1.1 units/hr  2 AM: 1.25  10 AM: 0.8  6 PM: 0.95  CARB RATIO and times:  12 AM - 11 AM: 9  11 AM - 8 PM: 6  8 PM - 12 AM: 7   BLOOD GLUCOSE TARGET and times:  12 AM (midnight): 100, correct above 120 with reverse correction on  Active Insulin Time: 4 hours  ISF (insulin sensitivity factor):   40 from   38 from [id number]  Dexcom sharing cose: [id number]       insulin aspart (NOVOLOG VIAL) 100 UNITS/ML injection To be used in insulin pump, est TDD 75 units/day 60 mL 3     insulin pen needle (BD ARPITA U/F) 32G X 4 MM Use 4 daily or as directed. 120 each 11     metoclopramide (REGLAN) 5 MG tablet TAKE ONE TABLET BY MOUTH FOUR TIMES A DAY (BEFORE MEALS AND NIGHTLY) 120 tablet 0     mycophenolic acid (GENERIC EQUIVALENT) 180 MG EC tablet  Take 2 tablets (360 mg) by mouth 2 times daily 360 tablet 3     omeprazole (PRILOSEC) 40 MG DR capsule Take 1 capsule (40 mg) by mouth 2 times daily 180 capsule 3     ondansetron (ZOFRAN) 4 MG tablet Take 1 tablet (4 mg) by mouth every 6 hours 8 tablet 0     tacrolimus (GENERIC EQUIVALENT) 0.5 MG capsule Take 1 capsule (0.5 mg) by mouth 2 times daily Total dose = 1.5mg BID 60 capsule 11     tacrolimus (GENERIC EQUIVALENT) 1 MG capsule Take 1 capsule (1 mg) by mouth 2 times daily Total dose = 1.5mg BID 60 capsule 11     acetone, Urine, test STRP 1 strip by In Vitro route daily 50 each 3     YOHAN CONTOUR test strip USE TO TEST BLOOD SUGAR FIVE TIMES A DAY OR AS DIRECTED 450 each 1     blood glucose monitoring (NO BRAND SPECIFIED) test strip Freestyle test strips 6 times daily (NOT lite and NOT insulinx) 300 strip 11     blood glucose monitoring (NO BRAND SPECIFIED) test strip 5 times daily ( contour NEXT) 450 each 11     Blood Glucose Monitoring Suppl (Funplus CONTOUR MONITOR) W/DEVICE KIT TO TEST BG 5 TIMIES DAILY 1 kit 0     OTC products: None, except as noted above    Allergies   Allergen Reactions     Benadryl [Diphenhydramine]      Reglan Other (See Comments)     IV, delsuions      Latex Allergy: NO    Social History     Tobacco Use     Smoking status: Former Smoker     Packs/day: 0.30     Types: Cigarettes     Smokeless tobacco: Never Used     Tobacco comment: E- Cig use still   Substance Use Topics     Alcohol use: Yes     Alcohol/week: 0.0 oz     Frequency: Never     Binge frequency: Never     Comment: social     History   Drug Use No       REVIEW OF SYSTEMS:   CONSTITUTIONAL: NEGATIVE for fever, chills, change in weight  INTEGUMENTARY/SKIN: NEGATIVE for worrisome rashes, moles or lesions  EYES: NEGATIVE for vision changes or irritation  ENT/MOUTH: NEGATIVE for ear, mouth and throat problems  RESP: NEGATIVE for significant cough or SOB  BREAST: NEGATIVE for masses, tenderness or discharge  CV: NEGATIVE for  chest pain, palpitations or peripheral edema  GI: NEGATIVE for nausea, abdominal pain, heartburn, or change in bowel habits  : NEGATIVE for frequency, dysuria, or hematuria  MUSCULOSKELETAL: NEGATIVE for significant arthralgias or myalgia  NEURO: NEGATIVE for weakness, dizziness or paresthesias  ENDOCRINE: NEGATIVE for temperature intolerance, skin/hair changes  HEME: NEGATIVE for bleeding problems  PSYCHIATRIC: NEGATIVE for changes in mood or affect    EXAM:   /88 (BP Location: Right arm, Patient Position: Sitting, Cuff Size: Adult Regular)   Pulse 102   Temp 98.9  F (37.2  C) (Oral)   Wt 93.4 kg (205 lb 12.8 oz)   SpO2 98%   BMI 31.29 kg/m      GENERAL APPEARANCE: healthy, alert and no distress     EYES: EOMI,  PERRL     HENT: ear canals and TM's normal and nose and mouth without ulcers or lesions     NECK: no adenopathy, no asymmetry, masses, or scars and thyroid normal to palpation     RESP: lungs clear to auscultation - no rales, rhonchi or wheezes     CV: regular rates and rhythm, normal S1 S2, no S3 or S4 and no murmur, click or rub     ABDOMEN:  soft, nontender, no HSM or masses and bowel sounds normal     MS: extremities normal- no gross deformities noted, no evidence of inflammation in joints, FROM in all extremities.     SKIN: no suspicious lesions or rashes     NEURO: Normal strength and tone, sensory exam grossly normal, mentation intact and speech normal     PSYCH: mentation appears normal. and affect normal/bright     LYMPHATICS: No cervical adenopathy    DIAGNOSTICS:   EKG: Not indicated due to non-vascular surgery and last ekg on 4/2019.  Stable q waves. No cardiac symptoms.     Recent Labs   Lab Test 06/16/19  1500 06/12/19  2256 06/12/19  2045  01/11/19  0742  11/21/17  0711  08/17/15  0640  08/05/15  1534   HGB 14.4  --  15.0   < > 15.5   < >  --    < > 11.4*   < > 11.0*     --  340   < > 277   < >  --    < > 510*   < > 155   INR  --   --   --   --   --   --   --   --  1.04   --  1.42*     --  136   < > 139   < >  --    < > 130*   < > 144   POTASSIUM 3.6  --  3.4   < > 4.8   < >  --    < > 4.9   < > 4.3   CR 2.18* 2.19* 2.36*   < > 1.72*   < >  --    < > 1.69*   < > 1.50*   A1C  --   --   --   --  6.8*  --  6.4*   < >  --    < >  --     < > = values in this interval not displayed.        IMPRESSION:   Reason for surgery/procedure: meniscal tear  Diagnosis/reason for consult:  Preoperative evaluation     The proposed surgical procedure is considered INTERMEDIATE risk.    REVISED CARDIAC RISK INDEX  The patient has the following serious cardiovascular risks for perioperative complications such as (MI, PE, VFib and 3  AV Block):  Coronary Artery Disease (MI, positive stress test, angina, Qs on EKG)  Diabetes Mellitus (on Insulin)  INTERPRETATION: 2 risks: Class III (moderate risk - 6.6% complication rate)    The patient has the following additional risks for perioperative complications:  No identified additional risks      ICD-10-CM    1. Preop general physical exam Z01.818        RECOMMENDATIONS:     (Z01.818) Preop general physical exam  (primary encounter diagnosis)  Comment: Advised to stop all aspirin products and nonsteroidals (like ibuprofen or naproxen) for 10 days prior to procedure.  Advised follow surgical center's instructions re: arrival time and when to stop eating.   Plan:     (S83.204D) Other tear of meniscus of left knee, unspecified meniscus, unspecified whether old or current tear, subsequent encounter  Comment:   Plan: Surgical and post-op care per primary surgical service.      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     (D89.9) Immunosuppressed status (H)  Comment:   Plan: No signs of infection.     (Z94.0) Kidney replaced by transplant  Comment:   Plan: Basic metabolic panel  (Ca, Cl, CO2, Creat,         Gluc, K, Na,  BUN)        Creatinine tredning down; recheck today.  Tends to lose kidney function easily and quickly with dehydration.     (E78.5) Hyperlipidemia with target LDL less than 100  Comment:   Plan:     (I10) Hypertension goal BP (blood pressure) < 140/90  Comment:   Plan: Controlled.     (E86.0) Dehydration  Comment:   Plan: fludrocortisone (FLORINEF) 0.1 MG tablet        Refilled.     (I25.10) Coronary artery disease involving native coronary artery of native heart without angina pectoris  Comment:   Plan: secondary risk factor modification     Procedure 7/9/19  Dobutamine Echo Complete. Contrast Optison:       Interpretation Summary  This test indicates a low probability of severe occlusive coronary artery  disease. No hemodynamically significant valvular abnormalities on 2D or color  flow imaging.    (G47.33) KRISSY (obstructive sleep apnea)  Comment:   Plan: SLEEP EVALUATION & MANAGEMENT REFERRAL - [facility]         -[facility]          [phone] (Age 18 and up)        To schedule sleep study later next month after knee surgery.            APPROVAL GIVEN to proceed with proposed procedure, without further diagnostic evaluation       Signed Electronically by: Kt Ríos MD    Copy of this evaluation report is provided to requesting physician.    [facility] Preop Guidelines

## 2019-06-28 NOTE — PATIENT INSTRUCTIONS
Before Your Surgery      Call your surgeon if there is any change in your health. This includes signs of a cold or flu (such as a sore throat, runny nose, cough, rash or fever).    Do not smoke, drink alcohol or take over the counter medicine (unless your surgeon or primary care doctor tells you to) for the 24 hours before and after surgery.    If you take prescribed drugs: Follow your doctor s orders about which medicines to take and which to stop until after surgery.    Stay on your basal rate of insulin; no boluses that morining of srugery as you will not be eating.  With respect to florinef; remain on current dose.  No changes in other meds.  Hold your aspirin for 1 week before procedure.  With respect to pain control: hold aleve (naproxen), and may take tylenol in stead.   Call sleep center for an obstructive sleep apnea evaluation. This should not postpone surgery.         Eating and drinking prior to surgery: follow the instructions from your surgeon    Take a shower or bath the night before surgery. Use the soap your surgeon gave you to gently clean your skin. If you do not have soap from your surgeon, use your regular soap. Do not shave or scrub the surgery site.  Wear clean pajamas and have clean sheets on your bed.

## 2019-06-29 ENCOUNTER — TELEPHONE (OUTPATIENT)
Dept: TRANSPLANT | Facility: CLINIC | Age: 44
End: 2019-06-29

## 2019-06-29 DIAGNOSIS — Z94.0 KIDNEY REPLACED BY TRANSPLANT: Primary | ICD-10-CM

## 2019-06-30 NOTE — TELEPHONE ENCOUNTER
Creatinine = 2.53 (6/28/19)    PLAN:  Call Murtaza Fitzgerald  and check how patient is feeling?   Having cough/cold/congestion?   Nausea/Vomiting?  Diarrhea?   Dehydrated?   Lightheaded ?   BP?      Weight?  Missed medication?  Changes in medication, (blaine diuretics)?  When did he get IV fluid last?    If no cardiac issues, instruct to improve hydration and recheck BMP this week.

## 2019-07-01 NOTE — TELEPHONE ENCOUNTER
"Call placed to patient. Patient v\u to improve hydration and repeat level this week    PLAN:  Call Murtaza Fitzgerald  and check how patient is feeling? \"OK\"  Having cough/cold/congestion?  No  Nausea/Vomiting? NO  Diarrhea? NO  Dehydrated? Ongoning  Lightheaded ? No  BP?   No   Weight? No  Missed medication?  Changes in medication, (blaine diuretics)? Fludrocortisone  When did he get IV fluid last? 2 weeks ago       "

## 2019-07-03 ENCOUNTER — TELEPHONE (OUTPATIENT)
Dept: PEDIATRICS | Facility: CLINIC | Age: 44
End: 2019-07-03

## 2019-07-03 ENCOUNTER — ANESTHESIA EVENT (OUTPATIENT)
Dept: SURGERY | Facility: CLINIC | Age: 44
End: 2019-07-03

## 2019-07-03 ENCOUNTER — HOSPITAL ENCOUNTER (OUTPATIENT)
Facility: CLINIC | Age: 44
End: 2019-07-03
Attending: ORTHOPAEDIC SURGERY | Admitting: ORTHOPAEDIC SURGERY

## 2019-07-03 VITALS — BODY MASS INDEX: 31.07 KG/M2 | WEIGHT: 205 LBS | HEIGHT: 68 IN

## 2019-07-03 DIAGNOSIS — Z01.818 PRE-OPERATIVE CLEARANCE: Primary | ICD-10-CM

## 2019-07-03 DIAGNOSIS — R07.9 ACUTE CHEST PAIN: ICD-10-CM

## 2019-07-03 RX ORDER — ACETAMINOPHEN 500 MG
500-1000 TABLET ORAL EVERY 6 HOURS PRN
COMMUNITY
End: 2019-07-05 | Stop reason: HOSPADM

## 2019-07-03 RX ORDER — ONDANSETRON 2 MG/ML
4 INJECTION INTRAMUSCULAR; INTRAVENOUS
Status: DISCONTINUED | OUTPATIENT
Start: 2019-07-03 | End: 2019-07-03

## 2019-07-03 RX ORDER — LIDOCAINE 40 MG/G
CREAM TOPICAL
Status: DISCONTINUED | OUTPATIENT
Start: 2019-07-03 | End: 2019-07-03

## 2019-07-03 ASSESSMENT — MIFFLIN-ST. JEOR: SCORE: 1809.37

## 2019-07-03 NOTE — TELEPHONE ENCOUNTER
Call received from Shonda from pre-admitting office(opens till 6 pm [facility] [place]) at [phone]. Pt had pre-op done on 6/28 treatment for L knee torn meniscus, surgery on 7/8(Mon).    Anesthesiologist requesting stress test done prior to surgery. Please advise on stress test order. Thanks.    Parul RN  Triage Nurse

## 2019-07-03 NOTE — TELEPHONE ENCOUNTER
Order signed for this. Called to see if could schedule on 7/5, no available appointments, able to do on 7/9 and scheduled patient then. Called and discussed with patient. Patient will have to reschedule surgery.    Per patient anesthesia wanted this done due to not having one done in the last 5 years, patient agrees to testing and questions answered.    FYI to PCP

## 2019-07-03 NOTE — OR NURSING
pts chart reviewed by Dr. Hunter in anesthesia, no cardiology visit noted since 2016(has hx of cad and cabg in 2015, type 1 diabetes with kidney and pancreas transplant), stress test recommended and 's office notified to arrange, pt called to notify him of plan, Dr. Candelaria's office called with plan.

## 2019-07-05 ENCOUNTER — INFUSION THERAPY VISIT (OUTPATIENT)
Dept: INFUSION THERAPY | Facility: CLINIC | Age: 44
End: 2019-07-05
Attending: INTERNAL MEDICINE
Payer: COMMERCIAL

## 2019-07-05 VITALS
SYSTOLIC BLOOD PRESSURE: 124 MMHG | TEMPERATURE: 96.7 F | HEART RATE: 95 BPM | RESPIRATION RATE: 18 BRPM | DIASTOLIC BLOOD PRESSURE: 80 MMHG

## 2019-07-05 DIAGNOSIS — E86.0 DEHYDRATION: Primary | ICD-10-CM

## 2019-07-05 PROCEDURE — 96360 HYDRATION IV INFUSION INIT: CPT

## 2019-07-05 PROCEDURE — 25000128 H RX IP 250 OP 636: Performed by: INTERNAL MEDICINE

## 2019-07-05 RX ADMIN — SODIUM CHLORIDE 1000 ML: 9 INJECTION, SOLUTION INTRAVENOUS at 09:15

## 2019-07-05 NOTE — PROGRESS NOTES
Infusion Nursing Note:  Murtaza Fitzgerald presents today for IVF  Patient seen by provider today: No   present during visit today: Not Applicable.    Note: Pt prefers only 1 liter NS today, otherwise is using BR too often during the night. Tacrolimus level due, but pt took his dose this AM. Also needs BMP for creatinine.  Unable to draw from IV post infusion.  Pt says he will set appt for Monday, get both labs done then.    Intravenous Access:  Peripheral IV placed.    Treatment Conditions:  Not Applicable.      Post Infusion Assessment:  Patient tolerated infusion without incident.  Site patent and intact, free from redness, edema or discomfort.  No evidence of extravasations.  Access discontinued per protocol.       Discharge Plan:   Discharge instructions reviewed with: Patient.  Patient and/or family verbalized understanding of discharge instructions and all questions answered.  Patient discharged in stable condition accompanied by: self.  Departure Mode: Ambulatory.    Yeimi Jesus RN

## 2019-07-08 ENCOUNTER — ANESTHESIA (OUTPATIENT)
Dept: SURGERY | Facility: CLINIC | Age: 44
End: 2019-07-08

## 2019-07-09 ENCOUNTER — HOSPITAL ENCOUNTER (OUTPATIENT)
Dept: CARDIOLOGY | Facility: CLINIC | Age: 44
Discharge: HOME | End: 2019-07-09
Attending: INTERNAL MEDICINE | Admitting: INTERNAL MEDICINE
Payer: COMMERCIAL

## 2019-07-09 VITALS — BODY MASS INDEX: 31.17 KG/M2 | WEIGHT: 205.03 LBS

## 2019-07-09 DIAGNOSIS — Z01.818 PRE-OPERATIVE CLEARANCE: ICD-10-CM

## 2019-07-09 PROCEDURE — 93325 DOPPLER ECHO COLOR FLOW MAPG: CPT | Mod: 26 | Performed by: INTERNAL MEDICINE

## 2019-07-09 PROCEDURE — 93321 DOPPLER ECHO F-UP/LMTD STD: CPT | Mod: 26 | Performed by: INTERNAL MEDICINE

## 2019-07-09 PROCEDURE — 25000128 H RX IP 250 OP 636

## 2019-07-09 PROCEDURE — 25500064 ZZH RX 255 OP 636: Performed by: INTERNAL MEDICINE

## 2019-07-09 PROCEDURE — 40000264 ECHO STRESS ECHOCARDIOGRAM

## 2019-07-09 PROCEDURE — 25000125 ZZHC RX 250: Performed by: INTERNAL MEDICINE

## 2019-07-09 PROCEDURE — 93018 CV STRESS TEST I&R ONLY: CPT | Performed by: INTERNAL MEDICINE

## 2019-07-09 PROCEDURE — 93350 STRESS TTE ONLY: CPT | Mod: 26 | Performed by: INTERNAL MEDICINE

## 2019-07-09 PROCEDURE — 93016 CV STRESS TEST SUPVJ ONLY: CPT | Performed by: INTERNAL MEDICINE

## 2019-07-09 RX ORDER — SODIUM CHLORIDE 9 MG/ML
INJECTION, SOLUTION INTRAVENOUS CONTINUOUS
Status: ACTIVE | OUTPATIENT
Start: 2019-07-09 | End: 2019-07-09

## 2019-07-09 RX ORDER — DOBUTAMINE HYDROCHLORIDE 200 MG/100ML
INJECTION INTRAVENOUS
Status: COMPLETED
Start: 2019-07-09 | End: 2019-07-09

## 2019-07-09 RX ORDER — ATROPINE SULFATE 0.1 MG/ML
.2-2 INJECTION INTRAVENOUS
Status: ACTIVE | OUTPATIENT
Start: 2019-07-09 | End: 2019-07-09

## 2019-07-09 RX ORDER — DOBUTAMINE HYDROCHLORIDE 200 MG/100ML
50 INJECTION INTRAVENOUS CONTINUOUS
Status: ACTIVE | OUTPATIENT
Start: 2019-07-09 | End: 2019-07-09

## 2019-07-09 RX ORDER — METOPROLOL TARTRATE 1 MG/ML
1-20 INJECTION, SOLUTION INTRAVENOUS
Status: ACTIVE | OUTPATIENT
Start: 2019-07-09 | End: 2019-07-09

## 2019-07-09 RX ADMIN — DOBUTAMINE HYDROCHLORIDE 10 MCG/KG/MIN: 200 INJECTION INTRAVENOUS at 11:07

## 2019-07-09 RX ADMIN — METOPROLOL TARTRATE 4 MG: 1 INJECTION, SOLUTION INTRAVENOUS at 11:30

## 2019-07-09 RX ADMIN — HUMAN ALBUMIN MICROSPHERES AND PERFLUTREN 9 ML: 10; .22 INJECTION, SOLUTION INTRAVENOUS at 11:01

## 2019-07-09 NOTE — TELEPHONE ENCOUNTER
Received call from nurse Promise with [facility].     Patient cannot complete exercise stress test this morning due to torn left meniscus.     Requesting order for Dobutamine Stress Test prior to knee surgery tomorrow.    Order cued. Please review/sign or advise.     Thank you,   Christa Vogel RN BSN   [facility]

## 2019-07-10 ENCOUNTER — HOSPITAL ENCOUNTER (OUTPATIENT)
Facility: CLINIC | Age: 44
Discharge: HOME | End: 2019-07-10
Attending: ORTHOPAEDIC SURGERY | Admitting: ORTHOPAEDIC SURGERY
Payer: COMMERCIAL

## 2019-07-10 ENCOUNTER — ANESTHESIA (OUTPATIENT)
Dept: SURGERY | Facility: CLINIC | Age: 44
End: 2019-07-10
Payer: COMMERCIAL

## 2019-07-10 ENCOUNTER — ANESTHESIA EVENT (OUTPATIENT)
Dept: SURGERY | Facility: CLINIC | Age: 44
End: 2019-07-10
Payer: COMMERCIAL

## 2019-07-10 VITALS
OXYGEN SATURATION: 98 % | SYSTOLIC BLOOD PRESSURE: 145 MMHG | HEIGHT: 68 IN | BODY MASS INDEX: 30.28 KG/M2 | HEART RATE: 84 BPM | RESPIRATION RATE: 14 BRPM | WEIGHT: 199.8 LBS | TEMPERATURE: 97 F | DIASTOLIC BLOOD PRESSURE: 98 MMHG

## 2019-07-10 DIAGNOSIS — S83.282A ACUTE LATERAL MENISCUS TEAR OF LEFT KNEE, INITIAL ENCOUNTER: Primary | ICD-10-CM

## 2019-07-10 LAB
GLUCOSE BLDC GLUCOMTR-MCNC: 140 MG/DL (ref 70–99)
GLUCOSE BLDC GLUCOMTR-MCNC: 166 MG/DL (ref 70–99)

## 2019-07-10 PROCEDURE — 40000306 ZZH STATISTIC PRE PROC ASSESS II: Performed by: ORTHOPAEDIC SURGERY

## 2019-07-10 PROCEDURE — 25800030 ZZH RX IP 258 OP 636: Performed by: ANESTHESIOLOGY

## 2019-07-10 PROCEDURE — 37000009 ZZH ANESTHESIA TECHNICAL FEE, EACH ADDTL 15 MIN: Performed by: ORTHOPAEDIC SURGERY

## 2019-07-10 PROCEDURE — 36000058 ZZH SURGERY LEVEL 3 EA 15 ADDTL MIN: Performed by: ORTHOPAEDIC SURGERY

## 2019-07-10 PROCEDURE — 25000128 H RX IP 250 OP 636: Performed by: ANESTHESIOLOGY

## 2019-07-10 PROCEDURE — 25000128 H RX IP 250 OP 636: Performed by: ORTHOPAEDIC SURGERY

## 2019-07-10 PROCEDURE — 25000125 ZZHC RX 250: Performed by: ANESTHESIOLOGY

## 2019-07-10 PROCEDURE — 71000027 ZZH RECOVERY PHASE 2 EACH 15 MINS: Performed by: ORTHOPAEDIC SURGERY

## 2019-07-10 PROCEDURE — 25000128 H RX IP 250 OP 636: Performed by: NURSE ANESTHETIST, CERTIFIED REGISTERED

## 2019-07-10 PROCEDURE — 25800030 ZZH RX IP 258 OP 636: Performed by: NURSE ANESTHETIST, CERTIFIED REGISTERED

## 2019-07-10 PROCEDURE — 71000012 ZZH RECOVERY PHASE 1 LEVEL 1 FIRST HR: Performed by: ORTHOPAEDIC SURGERY

## 2019-07-10 PROCEDURE — 27210794 ZZH OR GENERAL SUPPLY STERILE: Performed by: ORTHOPAEDIC SURGERY

## 2019-07-10 PROCEDURE — 82962 GLUCOSE BLOOD TEST: CPT | Mod: 91

## 2019-07-10 PROCEDURE — 25000125 ZZHC RX 250: Performed by: NURSE ANESTHETIST, CERTIFIED REGISTERED

## 2019-07-10 PROCEDURE — 37000008 ZZH ANESTHESIA TECHNICAL FEE, 1ST 30 MIN: Performed by: ORTHOPAEDIC SURGERY

## 2019-07-10 PROCEDURE — 36000056 ZZH SURGERY LEVEL 3 1ST 30 MIN: Performed by: ORTHOPAEDIC SURGERY

## 2019-07-10 PROCEDURE — 25800025 ZZH RX 258: Performed by: ORTHOPAEDIC SURGERY

## 2019-07-10 RX ORDER — GLYCOPYRROLATE 0.2 MG/ML
INJECTION, SOLUTION INTRAMUSCULAR; INTRAVENOUS PRN
Status: DISCONTINUED | OUTPATIENT
Start: 2019-07-10 | End: 2019-07-10

## 2019-07-10 RX ORDER — MEPERIDINE HYDROCHLORIDE 50 MG/ML
12.5 INJECTION INTRAMUSCULAR; INTRAVENOUS; SUBCUTANEOUS
Status: DISCONTINUED | OUTPATIENT
Start: 2019-07-10 | End: 2019-07-10 | Stop reason: HOSPADM

## 2019-07-10 RX ORDER — FENTANYL CITRATE 50 UG/ML
50 INJECTION, SOLUTION INTRAMUSCULAR; INTRAVENOUS ONCE
Status: COMPLETED | OUTPATIENT
Start: 2019-07-10 | End: 2019-07-10

## 2019-07-10 RX ORDER — ONDANSETRON 4 MG/1
4-8 TABLET, ORALLY DISINTEGRATING ORAL EVERY 8 HOURS PRN
Qty: 4 TABLET | Refills: 0 | Status: SHIPPED | OUTPATIENT
Start: 2019-07-10 | End: 2019-08-23

## 2019-07-10 RX ORDER — IBUPROFEN 600 MG/1
600 TABLET, FILM COATED ORAL
Status: DISCONTINUED | OUTPATIENT
Start: 2019-07-10 | End: 2019-07-10 | Stop reason: HOSPADM

## 2019-07-10 RX ORDER — NEOSTIGMINE METHYLSULFATE 1 MG/ML
VIAL (ML) INJECTION PRN
Status: DISCONTINUED | OUTPATIENT
Start: 2019-07-10 | End: 2019-07-10

## 2019-07-10 RX ORDER — CEFAZOLIN SODIUM 2 G/100ML
2 INJECTION, SOLUTION INTRAVENOUS
Status: COMPLETED | OUTPATIENT
Start: 2019-07-10 | End: 2019-07-10

## 2019-07-10 RX ORDER — NALOXONE HYDROCHLORIDE 0.4 MG/ML
.1-.4 INJECTION, SOLUTION INTRAMUSCULAR; INTRAVENOUS; SUBCUTANEOUS
Status: DISCONTINUED | OUTPATIENT
Start: 2019-07-10 | End: 2019-07-10 | Stop reason: HOSPADM

## 2019-07-10 RX ORDER — ROPIVACAINE HYDROCHLORIDE 7.5 MG/ML
INJECTION, SOLUTION EPIDURAL; PERINEURAL PRN
Status: DISCONTINUED | OUTPATIENT
Start: 2019-07-10 | End: 2019-07-10 | Stop reason: HOSPADM

## 2019-07-10 RX ORDER — FENTANYL CITRATE 50 UG/ML
INJECTION, SOLUTION INTRAMUSCULAR; INTRAVENOUS PRN
Status: DISCONTINUED | OUTPATIENT
Start: 2019-07-10 | End: 2019-07-10

## 2019-07-10 RX ORDER — ONDANSETRON 2 MG/ML
4 INJECTION INTRAMUSCULAR; INTRAVENOUS ONCE
Status: COMPLETED | OUTPATIENT
Start: 2019-07-10 | End: 2019-07-10

## 2019-07-10 RX ORDER — CEFAZOLIN SODIUM 1 G/3ML
1 INJECTION, POWDER, FOR SOLUTION INTRAMUSCULAR; INTRAVENOUS SEE ADMIN INSTRUCTIONS
Status: DISCONTINUED | OUTPATIENT
Start: 2019-07-10 | End: 2019-07-10 | Stop reason: HOSPADM

## 2019-07-10 RX ORDER — ONDANSETRON 2 MG/ML
INJECTION INTRAMUSCULAR; INTRAVENOUS PRN
Status: DISCONTINUED | OUTPATIENT
Start: 2019-07-10 | End: 2019-07-10

## 2019-07-10 RX ORDER — SODIUM CHLORIDE, SODIUM LACTATE, POTASSIUM CHLORIDE, CALCIUM CHLORIDE 600; 310; 30; 20 MG/100ML; MG/100ML; MG/100ML; MG/100ML
INJECTION, SOLUTION INTRAVENOUS CONTINUOUS
Status: DISCONTINUED | OUTPATIENT
Start: 2019-07-10 | End: 2019-07-10 | Stop reason: HOSPADM

## 2019-07-10 RX ORDER — LIDOCAINE HYDROCHLORIDE 10 MG/ML
INJECTION, SOLUTION INFILTRATION; PERINEURAL PRN
Status: DISCONTINUED | OUTPATIENT
Start: 2019-07-10 | End: 2019-07-10

## 2019-07-10 RX ORDER — LIDOCAINE 40 MG/G
CREAM TOPICAL
Status: DISCONTINUED | OUTPATIENT
Start: 2019-07-10 | End: 2019-07-10 | Stop reason: HOSPADM

## 2019-07-10 RX ORDER — PROPOFOL 10 MG/ML
INJECTION, EMULSION INTRAVENOUS PRN
Status: DISCONTINUED | OUTPATIENT
Start: 2019-07-10 | End: 2019-07-10

## 2019-07-10 RX ORDER — HYDROXYZINE HYDROCHLORIDE 25 MG/1
25 TABLET, FILM COATED ORAL 3 TIMES DAILY PRN
Qty: 30 TABLET | Refills: 0 | Status: SHIPPED | OUTPATIENT
Start: 2019-07-10 | End: 2019-08-23

## 2019-07-10 RX ORDER — ONDANSETRON 4 MG/1
4 TABLET, ORALLY DISINTEGRATING ORAL
Status: DISCONTINUED | OUTPATIENT
Start: 2019-07-10 | End: 2019-07-10 | Stop reason: HOSPADM

## 2019-07-10 RX ORDER — ACETAMINOPHEN 325 MG/1
650 TABLET ORAL EVERY 4 HOURS PRN
Qty: 50 TABLET | Refills: 0 | Status: SHIPPED | OUTPATIENT
Start: 2019-07-10 | End: 2020-09-30

## 2019-07-10 RX ORDER — FENTANYL CITRATE 50 UG/ML
25-50 INJECTION, SOLUTION INTRAMUSCULAR; INTRAVENOUS
Status: DISCONTINUED | OUTPATIENT
Start: 2019-07-10 | End: 2019-07-10 | Stop reason: HOSPADM

## 2019-07-10 RX ORDER — DEXAMETHASONE SODIUM PHOSPHATE 4 MG/ML
INJECTION, SOLUTION INTRA-ARTICULAR; INTRALESIONAL; INTRAMUSCULAR; INTRAVENOUS; SOFT TISSUE PRN
Status: DISCONTINUED | OUTPATIENT
Start: 2019-07-10 | End: 2019-07-10

## 2019-07-10 RX ORDER — HYDROMORPHONE HYDROCHLORIDE 1 MG/ML
.3-.5 INJECTION, SOLUTION INTRAMUSCULAR; INTRAVENOUS; SUBCUTANEOUS EVERY 10 MIN PRN
Status: DISCONTINUED | OUTPATIENT
Start: 2019-07-10 | End: 2019-07-10 | Stop reason: HOSPADM

## 2019-07-10 RX ORDER — SCOLOPAMINE TRANSDERMAL SYSTEM 1 MG/1
1 PATCH, EXTENDED RELEASE TRANSDERMAL
Status: DISCONTINUED | OUTPATIENT
Start: 2019-07-10 | End: 2019-07-10 | Stop reason: HOSPADM

## 2019-07-10 RX ORDER — AMOXICILLIN 250 MG
1-2 CAPSULE ORAL 2 TIMES DAILY
Qty: 30 TABLET | Refills: 0 | Status: SHIPPED | OUTPATIENT
Start: 2019-07-10 | End: 2020-01-27

## 2019-07-10 RX ORDER — HYDROXYZINE HYDROCHLORIDE 25 MG/1
25 TABLET, FILM COATED ORAL
Status: DISCONTINUED | OUTPATIENT
Start: 2019-07-10 | End: 2019-07-10 | Stop reason: HOSPADM

## 2019-07-10 RX ORDER — ONDANSETRON 2 MG/ML
4 INJECTION INTRAMUSCULAR; INTRAVENOUS EVERY 30 MIN PRN
Status: DISCONTINUED | OUTPATIENT
Start: 2019-07-10 | End: 2019-07-10 | Stop reason: HOSPADM

## 2019-07-10 RX ORDER — OXYCODONE HYDROCHLORIDE 5 MG/1
5 TABLET ORAL EVERY 6 HOURS PRN
Qty: 18 TABLET | Refills: 0 | Status: SHIPPED | OUTPATIENT
Start: 2019-07-10 | End: 2019-12-02

## 2019-07-10 RX ORDER — OXYCODONE HYDROCHLORIDE 5 MG/1
5 TABLET ORAL
Status: DISCONTINUED | OUTPATIENT
Start: 2019-07-10 | End: 2019-07-10 | Stop reason: HOSPADM

## 2019-07-10 RX ORDER — IBUPROFEN 600 MG/1
600 TABLET, FILM COATED ORAL EVERY 6 HOURS PRN
Qty: 30 TABLET | Refills: 0 | Status: SHIPPED | OUTPATIENT
Start: 2019-07-10 | End: 2019-08-23

## 2019-07-10 RX ORDER — ONDANSETRON 4 MG/1
4 TABLET, ORALLY DISINTEGRATING ORAL EVERY 30 MIN PRN
Status: DISCONTINUED | OUTPATIENT
Start: 2019-07-10 | End: 2019-07-10 | Stop reason: HOSPADM

## 2019-07-10 RX ADMIN — Medication 4.5 MG: at 14:09

## 2019-07-10 RX ADMIN — PROPOFOL 150 MG: 10 INJECTION, EMULSION INTRAVENOUS at 13:45

## 2019-07-10 RX ADMIN — FENTANYL CITRATE 250 MCG: 50 INJECTION, SOLUTION INTRAMUSCULAR; INTRAVENOUS at 13:45

## 2019-07-10 RX ADMIN — SODIUM CHLORIDE, POTASSIUM CHLORIDE, SODIUM LACTATE AND CALCIUM CHLORIDE: 600; 310; 30; 20 INJECTION, SOLUTION INTRAVENOUS at 13:39

## 2019-07-10 RX ADMIN — MIDAZOLAM 2 MG: 1 INJECTION INTRAMUSCULAR; INTRAVENOUS at 13:39

## 2019-07-10 RX ADMIN — SCOPALAMINE 1 PATCH: 1 PATCH, EXTENDED RELEASE TRANSDERMAL at 15:36

## 2019-07-10 RX ADMIN — GLYCOPYRROLATE 0.8 MG: 0.2 INJECTION, SOLUTION INTRAMUSCULAR; INTRAVENOUS at 14:09

## 2019-07-10 RX ADMIN — ONDANSETRON 4 MG: 2 INJECTION INTRAMUSCULAR; INTRAVENOUS at 13:21

## 2019-07-10 RX ADMIN — DEXAMETHASONE SODIUM PHOSPHATE 4 MG: 4 INJECTION, SOLUTION INTRA-ARTICULAR; INTRALESIONAL; INTRAMUSCULAR; INTRAVENOUS; SOFT TISSUE at 13:45

## 2019-07-10 RX ADMIN — CEFAZOLIN SODIUM 2 G: 2 INJECTION, SOLUTION INTRAVENOUS at 13:39

## 2019-07-10 RX ADMIN — ONDANSETRON 4 MG: 2 INJECTION INTRAMUSCULAR; INTRAVENOUS at 14:43

## 2019-07-10 RX ADMIN — PHENYLEPHRINE HYDROCHLORIDE 100 MCG: 10 INJECTION INTRAVENOUS at 14:03

## 2019-07-10 RX ADMIN — ONDANSETRON 4 MG: 2 INJECTION INTRAMUSCULAR; INTRAVENOUS at 14:22

## 2019-07-10 RX ADMIN — LIDOCAINE HYDROCHLORIDE 30 MG: 10 INJECTION, SOLUTION INFILTRATION; PERINEURAL at 13:45

## 2019-07-10 RX ADMIN — PROCHLORPERAZINE EDISYLATE 10 MG: 5 INJECTION INTRAMUSCULAR; INTRAVENOUS at 15:37

## 2019-07-10 RX ADMIN — FENTANYL CITRATE 50 MCG: 50 INJECTION INTRAMUSCULAR; INTRAVENOUS at 13:31

## 2019-07-10 ASSESSMENT — ENCOUNTER SYMPTOMS: DYSRHYTHMIAS: 0

## 2019-07-10 ASSESSMENT — LIFESTYLE VARIABLES: TOBACCO_USE: 1

## 2019-07-10 ASSESSMENT — MIFFLIN-ST. JEOR: SCORE: 1785.79

## 2019-07-10 NOTE — DISCHARGE INSTRUCTIONS
KNEE ARTHROSCOPY DISCHARGE INSTRUCTIONS  [facility]  DR. FARRAH FRANCES   [phone]    ACTIVITY  KEEP YOUR LEG ELEVATED WITH A PILLOW UNDER YOUR CALF, NOT UNDER THE KNEE.  YOU SHOULD ATTEMPT TO KEEP YOUR KNEE ABOVE THE LEVEL OF YOUR HEART AND YOUR ANKLE ABOVE THE LEVEL OF YOUR KNEE FOR THE FIRST 2-3 DAYS.  THIS IS THE BEST POSITION TO REDUCE SWELLING.  USE YOUR CRUTCHES IF NECESSARY; GENERALLY YOU CAN PLACE AS MUCH WEIGHT ON YOUR LEG AS PAIN AND SWELLING PERMIT.  IF YOU HAVE INCREASING PAIN OR SWELLING, YOU SHOULD NOT BE USING THE LEG AS MUCH.  ONCE YOU WALK WITHOUT PAIN OR A LIMP; THEN CRUTCHES CAN BE GRADUALLY DISCONTINUED IF YOU ARE USING THEM.    DRESSING  YOU MAY REMOVE THE BANDAGE AND GAUZE FROM YOUR KNEE TWO DAYS AFTER SURGERY.  IF YOU HAVE STERI-STRIPS, LEAVE THEM ON AT THIS TIME.  APPLY BAND-AIDS TO THE WOUNDS.  THE BANDAGES YOU REMOVE MAY BE WET TO THE TOUCH.  THIS IS NORMAL AS THE KNEE IS FILLED WITH WATER DURING THE SURGERY AND IT LEAKS OUT 24-36 HOURS AFTER SURGERY.    KEEP YOUR BANDAGES AND WOUNDS DRY.    IIF THE WOUNDS DO GET WET, REMOVE THE BAND-AIDS, PAT DRY AND REAPPLY FRESH BAND-AIDS.  CHANGE YOUR BAND-AIDS DAILY.    YOU MAY SHOWER WITH THE WOUNDS EXPOSED TWO DAYS AFTER SURGERY.  STILL, HOWEVER, THE WOUNDS ARE NOT TO BE SOAKED (NO TUBS, HOT TUBS, JACUZZIS OR SWIMMING POOLS).  ADDITIONALLY, YOU SHOULD STILL AVOID USE OF A SAUNA OR HEATING PAD.  THIS WILL ONLY PROVOKE SWELLING.    ICE PACKS  YOUR E-Z WRAP WILL BE PLACED ON YOUR KNEE IN THE POST ANESTHESIA RECOVERY ROOM AFTER SURGERY.    WHEN YOU GET HOME, KEEP THE CUFF ON FOR THE FIRST 24 HOURS AND KEEP IT COLD.  ALTERNATIVELY, IF YOU HAVE NOT BEEN PROVIDED WITH AN E-Z WRAP, PLACE ICE PACKS ON YOUR KNEE FOR 20-30 MINUTES 4-5 TIMES A DAY.  USE ICE PACKS FOR 4-5 DAYS.    PAIN CONTROL  TAKE THE PAIN MEDICATION AND/OR ANTI-INFLAMMATORY MEDICATION AS PRESCRIBED.  DON'T LET YOUR PAIN BECOME SEVERE.    OFFICE RETURN  PLEASE CALL THE OFFICE IN THE  FIRST DAY OR TWO AFTER SURGERY TO   SCHEDULE A POST-OPERATIVE VISIT.  YOUR APPOINTMENT SHOULD BE 7-10 DAYS AFTER SURGERY.    IF AT ANY TIME THERE ARE SIGNS OF INFECTION:  INCREASED SWELLING, REDNESS, DRAINAGE FROM THE INCISIONS, WARMTH, FEVER, CHILLS OR SEVERE PAIN UNRELIEVED BY PRESCRIPTION MEDICATIONS OR IF YOU HAVE ANY QUESTIONS OR CONCERNS, CONTACT US AT THE OFFICE: [phone].  PLEASE USE THIS NUMBER FOR EMERGENCY CALLS AND DO NOT CALL THE HOSPITAL/SURGERY CENTER.  THERE IS AN ANSWERING SERVICE AVAILABLE TO ASSIST YOU AFTER HOURS.          GENERAL ANESTHESIA OR SEDATION ADULT DISCHARGE INSTRUCTIONS   SPECIAL PRECAUTIONS FOR 24 HOURS AFTER SURGERY    IT IS NOT UNUSUAL TO FEEL LIGHT-HEADED OR FAINT, UP TO 24 HOURS AFTER SURGERY OR WHILE TAKING PAIN MEDICATION.  IF YOU HAVE THESE SYMPTOMS; SIT FOR A FEW MINUTES BEFORE STANDING AND HAVE SOMEONE ASSIST YOU WHEN YOU GET UP TO WALK OR USE THE BATHROOM.    YOU SHOULD REST AND RELAX FOR THE NEXT 24 HOURS AND YOU MUST MAKE ARRANGEMENTS TO HAVE SOMEONE STAY WITH YOU FOR AT LEAST 24 HOURS AFTER YOUR DISCHARGE.  AVOID HAZARDOUS AND STRENUOUS ACTIVITIES.  DO NOT MAKE IMPORTANT DECISIONS FOR 24 HOURS.    DO NOT DRIVE ANY VEHICLE OR OPERATE MECHANICAL EQUIPMENT FOR 24 HOURS FOLLOWING THE END OF YOUR SURGERY.  EVEN THOUGH YOU MAY FEEL NORMAL, YOUR REACTIONS MAY BE AFFECTED BY THE MEDICATION YOU HAVE RECEIVED.    DO NOT DRINK ALCOHOLIC BEVERAGES FOR 24 HOURS FOLLOWING YOUR SURGERY.    DRINK CLEAR LIQUIDS (APPLE JUICE, GINGER ALE, 7-UP, BROTH, ETC.).  PROGRESS TO YOUR REGULAR DIET AS YOU FEEL ABLE.    YOU MAY HAVE A DRY MOUTH, A SORE THROAT, MUSCLES ACHES OR TROUBLE SLEEPING.  THESE SHOULD GO AWAY AFTER 24 HOURS.    CALL YOUR DOCTOR FOR ANY OF THE FOLLOWING:  SIGNS OF INFECTION (FEVER, GROWING TENDERNESS AT THE SURGERY SITE, A LARGE AMOUNT OF DRAINAGE OR BLEEDING, SEVERE PAIN, FOUL-SMELLING DRAINAGE, REDNESS OR SWELLING.    IT HAS BEEN OVER 8 TO 10 HOURS SINCE SURGERY AND YOU  ARE STILL NOT ABLE TO URINATE (PASS WATER).       Transderm Scop (Scopolamine) Patch    The Transderm Scop patch placed behind your ear helps to prevent nausea and vomiting associated with the use of anesthesia and certain analgesics used during or after many types surgery.  You will need to remove this patch after 3 days.  However, it may be removed sooner if you are no longer concerned about nausea or vomiting.      The most common side effects:  ?  dryness of the mouth  ?  drowsiness  ?  blurred vision  ?  dilation of pupils  ?  disorientation, memory disturbances and/or confusion  ?  dizziness  ?  restlessness  ?  hallucinations  ?  difficulty urinating  ?  skin rash  ?  red or painful eyes    Avoid drinking alcohol while using Transderm Scop patch.  Be careful about driving or operating any machinery while using this medication since it may make you drowsy.  In the unlikely event that you experience pain in the eye, which may be accompanied by widening of the pupil, eye redness and blurred vision, remove the Transderm Scop Patch immediately and consult your doctor.    Removal of Transderm Scop Patch:  To remove the patch simply peel it off your skin.  Be sure to wash your hands and the area behind your ear thoroughly with soap and water.  The Transderm Scop Patch will continue to have some active ingredient after use.  Therefore, to avoid accidental contact or ingestion by children or pets, fold the used patch in half with the sticky side together and dispose in the trash out of reach of children and pets.  If you wear contact lens, be sure to wash your hands first before handling contact lens.      Removal date:______________________.

## 2019-07-10 NOTE — OP NOTE
Procedure Date: 07/10/2019      PREOPERATIVE DIAGNOSIS:  Left knee lateral meniscus tear in the setting of an acute lateral tibial plateau fracture.      POSTOPERATIVE DIAGNOSIS:  Left knee lateral meniscus tear in the setting of an acute lateral tibial plateau fracture.      PROCEDURE:  Left knee arthroscopic partial lateral meniscectomy.      SURGEON:  Alex Candelaria MD.      ASSISTANT:  VLAD NAVARRO PA-C      ANESTHESIA:  General.      ESTIMATED BLOOD LOSS:  Less than 5 mL.      INTRAOPERATIVE COMPLICATIONS:  None apparent.      OPERATIVE INDICATIONS:  The patient is a 43-year-old gentleman who sustained an acute traumatic injury to his left knee with a notable plateau fracture on MRI, as well as an associated lateral meniscus tear.  It was felt that the plateau fracture could be treated in a nonoperative fashion, but the meniscus was felt to be in acute tear, for which he would likely benefit from repair versus meniscectomy, depending on intraoperative findings.  Risks, benefits and alternatives were discussed, and he elected to proceed.      DESCRIPTION OF PROCEDURE:  The patient was identified in the preoperative holding area, and the operative site was marked.  Consent was again reviewed, and all questions were answered.  He was taken to the operating room and placed under general anesthesia.  The left lower extremity was prepped and draped in the usual sterile fashion.  Preoperative antibiotics were administered, and a timeout was called to identify the correct operative site and procedure.  The tourniquet was inflated to 250 mmHg.  Arthroscopic instruments were introduced.  Diagnostic arthroscopy showed no chondral wear to the patella or trochlea.  The medial femoral condyle was without chondral wear.  There was grade 1 softening of the medial tibial plateau.  The medial meniscus was probed throughout and noted be intact.  The ACL and PCL were noted to be intact.  Lateral femoral condyle was without chondral  wear.  Lateral tibial plateau showed some areas of focal grade 2 chondromalacia.  There was fissuring but no areas of articular step-off.  The lateral meniscus showed an inner surface-type tear.  It was displaceable to probing.  The lateral meniscus root insertion was noted to be intact.        The basket forceps and mechanical shaver were used to debride the inner surface meniscus flap tear back to a stable margin.  The meniscus was then probed and noted to be well stabilized.  The mechanical shaver was used to remove any loose debris.  After lavage, the arthroscopic instruments were removed, and incisions were closed with nylon suture.  Sterile dressings were applied.  The patient was awoken from general anesthesia and transferred to postanesthesia care in stable condition.         AGUILAR FRANCES MD             D: 07/10/2019   T: 07/10/2019   MT:       Name:     DOUG PAIZ   MRN:      [id number]        Account:        [id number]   :      1975           Procedure Date: 07/10/2019      Document: [id number]

## 2019-07-10 NOTE — ANESTHESIA POSTPROCEDURE EVALUATION
Patient: Murtaza Payton Bearl    Procedure(s):  Left knee partial lateral meniscectomy    Diagnosis:left knee lateral meniscus tear  Diagnosis Additional Information: No value filed.    Anesthesia Type:  General, ETT    Note:  Anesthesia Post Evaluation    Patient location during evaluation: PACU  Patient participation: Able to fully participate in evaluation  Level of consciousness: awake  Pain management: adequate  Airway patency: patent  Cardiovascular status: acceptable  Respiratory status: acceptable  Hydration status: acceptable  PONV: controlled     Anesthetic complications: None          Last vitals:  Vitals:    07/10/19 1610 07/10/19 1700 07/10/19 1718   BP: 139/85  (!) 145/98   Pulse:      Resp: 16  12   Temp: 97.8  F (36.6  C)     SpO2: 92% 99% 99%         Electronically Signed By: Yovany Olvera DO  July 10, 2019  5:35 PM

## 2019-07-10 NOTE — ANESTHESIA CARE TRANSFER NOTE
Patient: Murtaza Payton Beareffie    Procedure(s):  Left knee partial lateral meniscectomy    Diagnosis: left knee lateral meniscus tear  Diagnosis Additional Information: No value filed.    Anesthesia Type:   General, ETT     Note:  Airway :Face Mask  Patient transferred to:PACU  Comments: Jovita Report: Identifed the Patient, Identified the Reponsible Provider, Reviewed the pertinent medical history, Discussed the surgical course, Reviewed Intra-OP anesthesia mangement and issues during anesthesia, Set expectations for post-procedure period and Allowed opportunity for questions and acknowledgement of understanding      Vitals: (Last set prior to Anesthesia Care Transfer)    CRNA VITALS  7/10/2019 1355 - 7/10/2019 1430      7/10/2019             SpO2:  96 %                Electronically Signed By: JORDAN Marsh CRNA  July 10, 2019  2:30 PM

## 2019-07-10 NOTE — ANESTHESIA PREPROCEDURE EVALUATION
Anesthesia Pre-Procedure Evaluation    Patient: Doug Fitzgerald   MRN: [id number] : 1975          Preoperative Diagnosis: left knee lateral meniscus tear    Procedure(s):  Left knee lateral meniscus repair verses partial lateral meniscectomy (choice anesthesia)    Past Medical History:   Diagnosis Date     CMV (cytomegalovirus infection) status negative      Coronary artery disease, non-occlusive     angio showed diffuse disease with no lesions     Diabetes mellitus, type 1     Diagnosed at age 11     Dialysis patient (H)     prior to kidney transplant     Dyslipidemia      Gastroesophageal reflux disease      History of blood transfusion      Hypertension      Immunosuppressed status (H)      Kidney transplant status, living related donor           Migraines      Mycotic aneurysm of kidney transplant (H) 2008     Noncompliance with treatment     no labs for one year     Pancreas replaced by transplant (H) 2009    rejection treated with thymo     Pancreatic disease     pancreas transplant     Tobacco abuse ongoing     Past Surgical History:   Procedure Laterality Date     BYPASS GRAFT ARTERY CORONARY N/A 2015    Procedure: BYPASS GRAFT ARTERY CORONARY;  Surgeon: Katy Neville MD;  Location: [facility] OR     EYE SURGERY      vitrectomy both eyes      ORTHOPEDIC SURGERY      tumor removed from left knee     TRANSPLANT  .    pancrease and kidney transplant     Anesthesia Evaluation     .             ROS/MED HX    ENT/Pulmonary:     (+)tobacco use, Current use , . .    Neurologic:      (-) TIA, Other neuro hx and Dementia   Cardiovascular: Comment: [facility]  Echocardiography Laboratory  [address] [place] [address]        Name: DOUG PAIZ  MRN: [id number]  : 1975  Study Date: 2019 10:53 AM  Age: 43 yrs  Gender: Male  Patient Location: [facility]  Reason For Study: Pre-operative clearance  History: Diabetes,  HTN, Hyperlipidemia  Ordering Physician: LAINE CHRISTOPHER  Referring Physician: MELODY CHAVES  Performed By: Silas Sosa RDCS     BSA: 2.1 m2  Height: 68 in  Weight: 205 lb  HR: 90  BP: 160/91 mmHg  Medications: Lipitor  _____________________________________________________________________________  __        Procedure  Dobutamine Echo Complete. Contrast Optison.  _____________________________________________________________________________  __        Interpretation Summary  This test indicates a low probability of severe occlusive coronary artery  disease. No hemodynamically significant valvular abnormalities on 2D or color  flow imaging.  _____________________________________________________________________________  __     Stress  Dobutamine time: 8:06.  RPP 31,871.  The drug infusion was stopped due to target heart rate achieved.  The maximum dose of dobutamine was 30mcg/kg/min.  The maximum dose of metoprolol was 4mg.  The patient exhibited no chest pain during exercise.  There was a maximum 1.5mm ST segment depression in the anterior lead(s).  No arrhythmia noted.  Target Heart Rate was achieved.  Normal resting wall motion and no stress-induced wall motion abnormality.  Left ventricular cavity size decreases with exercise.  Global LV systolic function augments with exercise.  The visual ejection fraction is estimated at >70%.     Baseline  The patient is in normal sinus rhythm.  Resting ECG is normal.  The visual ejection fraction is estimated at 55-60%.  Normal left ventricular function and wall motion at rest.    (+) Dyslipidemia, hypertension--CAD, -CABG-. : . CHF . . :. .      (-) arrhythmias and pulmonary hypertension   METS/Exercise Tolerance:     Hematologic:     (+) Anemia, History of Transfusion Other Hematologic Disorder-Immunesuppressed      Musculoskeletal:   (+) arthritis,  -       GI/Hepatic:     (+) GERD      (-) hepatitis   Renal/Genitourinary:     (+) chronic renal disease, Pt does not require  dialysis, Pt has history of transplant,       Endo:     (+) type I DM (S/P Pancreas transplant), Using insulin - using insulin pump Diabetic complications: nephropathy neuropathy gastroparesis, Obesity, .   (-) thyroid disease, chronic steroid usage and other endocrine disorder   Psychiatric:         Infectious Disease:  - neg infectious disease ROS       Malignancy:         Other:                          Physical Exam      Airway   Mallampati: II  TM distance: >3 FB  Neck ROM: full    Dental     Cardiovascular   Rhythm and rate: regular and normal  (-) no murmur    Pulmonary    breath sounds clear to auscultation    Other findings: Lab Test        06/16/19 06/12/19 04/20/19      --          08/17/15      --          08/05/15     08/05/15                       1500          2045          2249           --           0640           --           1534          1353          WBC          11.7*        11.2*        9.7            < >        16.0*          < >        14.2*        15.2*         HGB          14.4         15.0         14.5           < >        11.4*          < >        11.0*        9.1*          MCV          86           88           88             < >        90             < >        88           88            PLT          342          340          279            < >        510*           < >        155          141*          INR           --           --           --           --          1.04          --          1.42*        1.65*          < > = values in this interval not displayed.                  Lab Test        06/28/19 06/16/19 06/12/19 06/12/19                       1358          1500          2256          2045          NA           138          136           --          136           POTASSIUM    4.2          3.6           --          3.4           CHLORIDE     107          103           --          104           CO2          23           21            --          23           "  BUN          31*          25            --          26            CR           2.53*        2.18*        2.19*        2.36*         ANIONGAP     8            12            --          9             CHARLY          9.0          8.8           --          9.0           GLC          157*         196*          --          161*                Lab Results   Component Value Date    WBC 11.7 (H) 06/16/2019    HGB 14.4 06/16/2019    HCT 43.2 06/16/2019     06/16/2019    CRP 4.4 08/19/2008     06/28/2019    POTASSIUM 4.2 06/28/2019    CHLORIDE 107 06/28/2019    CO2 23 06/28/2019    BUN 31 (H) 06/28/2019    CR 2.53 (H) 06/28/2019     (H) 06/28/2019    CHARLY 9.0 06/28/2019    PHOS 2.6 08/13/2015    MAG 1.8 03/05/2019    ALBUMIN 3.2 (L) 06/16/2019    PROTTOTAL 8.0 06/16/2019    ALT 12 06/16/2019    AST 11 06/16/2019    ALKPHOS 107 06/16/2019    BILITOTAL 0.7 06/16/2019    LIPASE 30 (L) 03/05/2019    AMYLASE 56 10/28/2014    PTT 32 08/05/2015    INR 1.04 08/17/2015    FIBR 217 08/05/2015    TSH 2.06 11/21/2017    T4 1.25 07/31/2013       Preop Vitals  BP Readings from Last 3 Encounters:   07/10/19 (!) 139/97   07/05/19 124/80   06/28/19 128/88    Pulse Readings from Last 3 Encounters:   07/05/19 95   06/28/19 102   06/16/19 98      Resp Readings from Last 3 Encounters:   07/10/19 18   07/05/19 18   06/16/19 16    SpO2 Readings from Last 3 Encounters:   07/10/19 98%   06/28/19 98%   06/16/19 98%      Temp Readings from Last 1 Encounters:   07/10/19 95.5  F (35.3  C) (Temporal)    Ht Readings from Last 1 Encounters:   07/10/19 1.727 m (5' 8\")      Wt Readings from Last 1 Encounters:   07/10/19 90.6 kg (199 lb 12.8 oz)    Estimated body mass index is 30.38 kg/m  as calculated from the following:    Height as of this encounter: 1.727 m (5' 8\").    Weight as of this encounter: 90.6 kg (199 lb 12.8 oz).       Anesthesia Plan      History & Physical Review  History and physical reviewed and following examination; no " interval change.    ASA Status:  3 .    NPO Status:  > 8 hours    Plan for General and ETT with Propofol induction. Maintenance will be Balanced.    PONV prophylaxis:  Ondansetron (or other 5HT-3)  Additional equipment: Videolaryngoscope      Postoperative Care  Postoperative pain management:  IV analgesics and Oral pain medications.      Consents  Anesthetic plan, risks, benefits and alternatives discussed with:  Patient..                 Diaz Oliver MD                    .

## 2019-07-11 ENCOUNTER — PATIENT OUTREACH (OUTPATIENT)
Dept: CARE COORDINATION | Facility: CLINIC | Age: 44
End: 2019-07-11

## 2019-07-11 ENCOUNTER — HOSPITAL ENCOUNTER (EMERGENCY)
Facility: CLINIC | Age: 44
Discharge: HOME | End: 2019-07-11
Attending: EMERGENCY MEDICINE | Admitting: EMERGENCY MEDICINE
Payer: COMMERCIAL

## 2019-07-11 VITALS
TEMPERATURE: 97.5 F | RESPIRATION RATE: 18 BRPM | OXYGEN SATURATION: 95 % | WEIGHT: 198 LBS | DIASTOLIC BLOOD PRESSURE: 84 MMHG | BODY MASS INDEX: 30.11 KG/M2 | SYSTOLIC BLOOD PRESSURE: 114 MMHG

## 2019-07-11 DIAGNOSIS — N18.9 CHRONIC RENAL IMPAIRMENT, UNSPECIFIED CKD STAGE: ICD-10-CM

## 2019-07-11 DIAGNOSIS — R33.8 ACUTE URINARY RETENTION: ICD-10-CM

## 2019-07-11 DIAGNOSIS — E10.59 TYPE 1 DIABETES MELLITUS WITH DIABETIC CARDIOMYOPATHY (H): Primary | ICD-10-CM

## 2019-07-11 DIAGNOSIS — I43 TYPE 1 DIABETES MELLITUS WITH DIABETIC CARDIOMYOPATHY (H): Primary | ICD-10-CM

## 2019-07-11 LAB
ALBUMIN UR-MCNC: 50 MG/DL
ANION GAP SERPL CALCULATED.3IONS-SCNC: 16 MMOL/L (ref 6–17)
APPEARANCE UR: CLEAR
BACTERIA #/AREA URNS HPF: ABNORMAL /HPF
BILIRUB UR QL STRIP: NEGATIVE
BUN SERPL-MCNC: 28 MG/DL (ref 5–24)
CA-I BLD-SCNC: 5 MG/DL (ref 4.4–5.2)
CHLORIDE BLD-SCNC: 101 MMOL/L (ref 94–109)
CO2 BLD-SCNC: 21 MMOL/L (ref 20–32)
COLOR UR AUTO: ABNORMAL
CREAT BLD-MCNC: 2.7 MG/DL (ref 0.66–1.25)
GFR SERPL CREATININE-BSD FRML MDRD: 26 ML/MIN/{1.73_M2}
GLUCOSE BLD-MCNC: 139 MG/DL (ref 70–99)
GLUCOSE UR STRIP-MCNC: NEGATIVE MG/DL
HCT VFR BLD CALC: 36 %PCV (ref 40–53)
HGB BLD CALC-MCNC: 12.2 G/DL (ref 13.3–17.7)
HGB UR QL STRIP: NEGATIVE
HYALINE CASTS #/AREA URNS LPF: 1 /LPF (ref 0–2)
KETONES UR STRIP-MCNC: NEGATIVE MG/DL
LEUKOCYTE ESTERASE UR QL STRIP: NEGATIVE
NITRATE UR QL: NEGATIVE
PH UR STRIP: 6 PH (ref 5–7)
POTASSIUM BLD-SCNC: 3.7 MMOL/L (ref 3.4–5.3)
RBC #/AREA URNS AUTO: 1 /HPF (ref 0–2)
SODIUM BLD-SCNC: 138 MMOL/L (ref 133–144)
SOURCE: ABNORMAL
SP GR UR STRIP: 1.01 (ref 1–1.03)
UROBILINOGEN UR STRIP-MCNC: NORMAL MG/DL (ref 0–2)
WBC #/AREA URNS AUTO: 1 /HPF (ref 0–5)

## 2019-07-11 PROCEDURE — 81001 URINALYSIS AUTO W/SCOPE: CPT | Performed by: EMERGENCY MEDICINE

## 2019-07-11 PROCEDURE — 51702 INSERT TEMP BLADDER CATH: CPT

## 2019-07-11 PROCEDURE — 99284 EMERGENCY DEPT VISIT MOD MDM: CPT

## 2019-07-11 PROCEDURE — 51798 US URINE CAPACITY MEASURE: CPT

## 2019-07-11 PROCEDURE — 85014 HEMATOCRIT: CPT

## 2019-07-11 PROCEDURE — 80047 BASIC METABLC PNL IONIZED CA: CPT

## 2019-07-11 ASSESSMENT — ENCOUNTER SYMPTOMS
FEVER: 0
DIFFICULTY URINATING: 1

## 2019-07-11 ASSESSMENT — ACTIVITIES OF DAILY LIVING (ADL): DEPENDENT_IADLS:: INDEPENDENT

## 2019-07-11 NOTE — ED PROVIDER NOTES
History     Chief Complaint:  Urinary Retention      HPI   Murtaza Fitzgerald is a 43 year old male, with a history of a kidney transplant and s/p left arthroscopic knee surgery yesterday, who presents to the ED for evaluation of urinary retention. The patient says that he left his knee surgery at 530 PM yesterday. Last night, he says that he tried to urinate four times, but could only get a few drops of urine out. The patient says that he also has developed some midline low abdominal pain since then. The pain is constant and non-radiating. Mueller catheter was placed prior to evaluation and patient feels that his abdominal pain has resolved.  The patient denies fever. Of note, the patient is no longer undergoing dialysis after kidney transplant.    Allergies:  Benadryl  Reglan    Medications:    Tylenol  Acetone  Aspirin 81 mg  lipitor  Florinef  Atarax  Advil  Reglan  Prilosec  Zofran  Senokot  Tacrolimus    Past Medical History:    CMV  Coronary artery disease  Diabetes mellitus  Dialysis patient  Dyslipidemia  Gastroesophageal reflux disease  History of blood transfusion  Hypertension  Immunosuppressed status  Kidney transplant status  Migraines  Mycotic aneurysm of kidney transplant  Pancreas replaced by transplant  Pancreatic disease  Tobacco abuse    Past Surgical History:    Bypass graft artery coronary  Eye surgery  Orthopedic surgery  Transplant    Family History:    Heart Disease    Social History:  Smoking status: Former Smoker-quit 4 years ago  Alcohol use: Not currently  Marital Status:   [4]       Review of Systems   Constitutional: Negative for fever.   Genitourinary: Positive for difficulty urinating.   All other systems reviewed and are negative.        Physical Exam   First Vitals:  Patient Vitals for the past 24 hrs:   BP Temp Temp src Heart Rate Resp SpO2 Weight   07/11/19 0605 114/84 97.5  F (36.4  C) Temporal 103 18 95 % 89.8 kg (198 lb)     Physical Exam    Constitutional:   Pleasant, age appropriate.       Resting comfortably in the bed.  HEENT:    Oropharynx is moist.  Eyes:    Conjunctiva normal  Neck:    Supple, no meningismus.     CV:     Regular rate and rhythm.      No murmurs, rubs or gallops.  PULM:    Clear to auscultation bilateral.       No respiratory distress.      Good air exchange.  ABD:    Soft, non-distended.       No abdominal tenderness.     Bowel sounds normal.     No pulsatile masses.       No rebound, guarding or rigidity.     No CVA tenderness.   :   Mueller catheter in place with clear yellow urine in the collection bag  LYMPH:   No cervical lymphadenopathy.  NEURO:   Alert.  Good muscular tone, no atrophy.   Skin:    Warm, dry and intact.    Psych:    Mood is good and affect is appropriate.      Emergency Department Course     Laboratory:  ISTAT Basic Met ICa HCT POCT: Glucose 139 (H), BUN 28 (H), Creatinine 2.7 (H), GFR Estimate 26 (L), HGB 12.2 (L), Hematocrit-POCT 36 (L).     UA with micro: Protein Albumin Urine 50, Bacteria Few Reviewed acceptable o/w negative    Emergency Department Course:  Past medical records, nursing notes, and vitals reviewed.  0651: I performed an exam of the patient and obtained history, as documented above.    IV inserted and blood drawn.    0715: I rechecked the patient.  Findings and plan explained to the Patient. Patient discharged home with instructions regarding supportive care, medications, and reasons to return. The importance of close follow-up was reviewed.     Impression & Plan      Medical Decision Makin-year-old male presented to ED with acute urinary retention after arthroscopic knee surgery yesterday.  Mueller catheter was placed without difficulty.  He has no secondary urinary tract infection.  Patient's creatinine is 2.7 with recent creatinine of 2.5.  Patient safe for discharge home and will closely follow-up with urologist or his primary surgeon for Mueller catheter removal.    Diagnosis:    ICD-10-CM    1.  Acute urinary retention R33.8    2. Chronic renal impairment, unspecified CKD stage N18.9        Disposition:  discharged to home    Howard Proctor  7/11/2019   [facility] EMERGENCY DEPARTMENT  Scribe Disclosure:  I, Howard Proctor, am serving as a scribe at 6:51AM on 7/11/2019 to document services personally performed by John Dejesus MD based on my observations and the provider's statements to me.        John Dejesus MD  07/11/19 0752

## 2019-07-11 NOTE — ED TRIAGE NOTES
Pt w/ surgery yesterday, now unable to urinate.  Only able to get drops out since surg.  Associated abd discomfort.

## 2019-07-11 NOTE — PROGRESS NOTES
Clinic Care Coordination Contact    Clinic Care Coordination Contact  OUTREACH    Referral Information:  Referral Source: ED Follow-Up  Patient meets criteria for Clinic Care Coordination outreach with multiple recent ED visits and calculated risk of readmission (or ED visit) of 66%         Chief Complaint   Patient presents with     Clinic Care Coordination - Post Hospital     ED visit for urinary retention        Universal Utilization: Concern for utilization of ED; RN CC did describe that PCP office has same-day acute visit and urgent care services  Clinic Utilization  Difficulty keeping appointments:: No  Compliance Concerns: No  No-Show Concerns: No  No PCP office visit in Past Year: No  Utilization    Last refreshed: 7/11/2019  9:42 AM:  Hospital Admissions 1           Last refreshed: 7/11/2019  9:42 AM:  ED Visits 9           Last refreshed: 7/11/2019  9:42 AM:  No Show Count (past year) 6              Current as of: 7/11/2019  9:42 AM              Clinical Concerns:  Current Medical Concerns:  Patient most recently in  ED due to complications of urinary retention following planned knee arthroscopy. Today, he reports the catheter is draining well; RN CC educated patient that if urine becomes foul smelling, cloudy, or has presence of sediment or blood   He should present to urgent care or request same day acute visit.   Regarding catheter cares, patient verbalized confidence in drainage system; he indicates his family will be helping him arrange follow up with urology later today.       Medication Management:  Independently manages medications     Functional Status:  Dependent ADLs:: Independent  Dependent IADLs:: Independent  Bed or wheelchair confined:: No  Mobility Status: Independent  Fallen 2 or more times in the past year?: No  Any fall with injury in the past year?: No    Living Situation:  Current living arrangement:: I live in a private home with family  Type of residence:: Private home -  stairs    Diet/Exercise/Sleep:  Inadequate nutrition (GOAL):: No  Food Insecurity: No  Tube Feeding: No  Exercise:: Currently not exercising  Significant changes in sleep pattern (GOAL): No    Transportation:  Transportation concerns (GOAL):: No  Transportation means:: Regular car     Psychosocial:  Buddhist or spiritual beliefs that impact treatment:: No  Mental health DX:: No  Mental health management concern (GOAL):: No     Financial/Insurance:   Financial/Insurance concerns (GOAL):: No       Resources and Interventions:  Current Resources:    ;   Community Resources: None  Supplies used at home:: Diabetic Supplies  Equipment Currently Used at Home: glucometer    Advance Care Plan/Directive  Advanced Care Plans/Directives on file:: Yes  Type Advanced Care Plans/Directives: Advanced Directive - On File  Advanced Care Plan/Directive Status: Not Applicable          Goals:   Goals        General    Problem Solving (pt-stated)     Notes - Note created  8/28/2018  3:15 PM by Vania Gutierrez RN    Goal Statement: keep a fast acting carbohydrate with at all times  Measure of Success: patient to report  Supportive Steps to Achieve: patient to stop at pharmacy and  glucose tabs  Barriers: none   Strengths: has a better understanding  Date to Achieve By: 9/10/18  Patient expressed understanding of goal: yes                Patient/Caregiver understanding: Patient verbalized understanding, engaged in AIDET communication behavior during encounter.       Future Appointments              In 1 week Renetta Mayorga MD [facility], [facility]    In 1 month Kt Ríos MD [facility] HEATHER Rojas    In 2 months 2, [facility] Kidney[facility]Pancreas Recipient [facility]          Plan:   At this time, the patient denies any outstanding need for resources or assistance navigating follow up care. No further care coordination outreaches will be scheduled unless patient initiates or  new referral for care coordination is placed.     Reena Mitchell, RN   Clinic Care Coordinator-[facility]  PH: [phone]

## 2019-07-11 NOTE — DISCHARGE INSTRUCTIONS
Please make an appointment to follow up with [facility] [phone] in 3-5 days even if entirely better.

## 2019-07-11 NOTE — ED AVS SNAPSHOT
[facility] Emergency Department  [address]  [facility] [address]  Phone:  [phone]  Fax:  [phone]                                    Murtaza Fitzgerald   MRN: [id number]    Department:  [facility] Emergency Department   Date of Visit:  7/11/2019           After Visit Summary Signature Page    I have received my discharge instructions, and my questions have been answered. I have discussed any challenges I see with this plan with the nurse or doctor.    ..........................................................................................................................................  Patient/Patient Representative Signature      ..........................................................................................................................................  Patient Representative Print Name and Relationship to Patient    ..................................................               ................................................  Date                                   Time    ..........................................................................................................................................  Reviewed by Signature/Title    ...................................................              ..............................................  Date                                               Time          22EPIC Rev 08/18

## 2019-07-12 ENCOUNTER — PRE VISIT (OUTPATIENT)
Dept: UROLOGY | Facility: CLINIC | Age: 44
End: 2019-07-12

## 2019-07-16 ENCOUNTER — OFFICE VISIT (OUTPATIENT)
Dept: UROLOGY | Facility: CLINIC | Age: 44
End: 2019-07-16
Payer: COMMERCIAL

## 2019-07-16 VITALS
OXYGEN SATURATION: 99 % | WEIGHT: 200 LBS | SYSTOLIC BLOOD PRESSURE: 100 MMHG | HEIGHT: 68 IN | HEART RATE: 110 BPM | BODY MASS INDEX: 30.31 KG/M2 | DIASTOLIC BLOOD PRESSURE: 60 MMHG

## 2019-07-16 DIAGNOSIS — R33.9 URINARY RETENTION: Primary | ICD-10-CM

## 2019-07-16 PROCEDURE — 99213 OFFICE O/P EST LOW 20 MIN: CPT | Performed by: PHYSICIAN ASSISTANT

## 2019-07-16 ASSESSMENT — MIFFLIN-ST. JEOR: SCORE: 1786.69

## 2019-07-16 ASSESSMENT — PAIN SCALES - GENERAL: PAINLEVEL: NO PAIN (0)

## 2019-07-16 NOTE — NURSING NOTE
"Chief Complaint   Patient presents with     Urinary Retention     Patient had Mueller Cath place a week ago after Surgery       Blood pressure 100/60, pulse 110, height 1.727 m (5' 8\"), weight 90.7 kg (200 lb), SpO2 99 %. Body mass index is 30.41 kg/m .    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)     Coronary artery disease involving native coronary artery of native heart without angina pectoris     Adhesive capsulitis of right shoulder     Dehydration       Allergies   Allergen Reactions     Benadryl [Diphenhydramine]      Reglan Other (See Comments)     IV, delsuions       Current Outpatient Medications   Medication Sig Dispense Refill     acetaminophen (TYLENOL) 325 MG tablet Take 2 tablets (650 mg) by mouth every 4 hours as needed for mild pain 50 tablet 0     acetone, Urine, test STRP 1 strip by In Vitro route daily 50 each 3     aspirin (ASA) 81 MG tablet Take 1 tablet (81 mg) by mouth daily 90 tablet 3     atorvastatin (LIPITOR) 40 MG tablet Take 0.5 tablets (20 mg) by mouth daily 45 tablet 3     YOHAN CONTOUR test strip USE TO TEST BLOOD SUGAR FIVE TIMES A DAY OR AS DIRECTED 450 each 1     blood glucose monitoring (NO BRAND SPECIFIED) test strip Freestyle test strips 6 times daily (NOT lite and NOT insulinx) 300 strip 11     blood glucose monitoring (NO BRAND SPECIFIED) test strip 5 times daily ( contour NEXT) 450 each 11     Blood Glucose Monitoring Suppl ([employer] CONTOUR MONITOR) W/DEVICE KIT TO TEST BG 5 TIMIES DAILY 1 kit 0     fludrocortisone (FLORINEF) 0.1 MG tablet Take 2 tablets (0.2 " mg) by mouth daily 60 tablet 5     hydrOXYzine (ATARAX) 25 MG tablet Take 1 tablet (25 mg) by mouth 3 times daily as needed for itching 30 tablet 0     ibuprofen (ADVIL/MOTRIN) 600 MG tablet Take 1 tablet (600 mg) by mouth every 6 hours as needed for other (mild and/or inflammatory pain) 30 tablet 0     Insulin Aspart (INSULIN PUMP - OUTPATIENT) Date last interrogated by pharmacy: 2/5/2019  Omnipod  Insulin: Novolog  BASAL RATES and times:   12 AM (midnight): 1.1 units/hr  2 AM: 1.25  10 AM: 0.8  6 PM: 0.95  CARB RATIO and times:  12 AM - 11 AM: 9  11 AM - 8 PM: 6  8 PM - 12 AM: 7   BLOOD GLUCOSE TARGET and times:  12 AM (midnight): 100, correct above 120 with reverse correction on  Active Insulin Time: 4 hours  ISF (insulin sensitivity factor):   40 from   38 from [id number]  Dexcom sharing cose: [id number]       insulin aspart (NOVOLOG VIAL) 100 UNITS/ML injection To be used in insulin pump, est TDD 75 units/day 60 mL 3     insulin pen needle (BD ARPITA U/F) 32G X 4 MM Use 4 daily or as directed. 120 each 11     metoclopramide (REGLAN) 5 MG tablet TAKE ONE TABLET BY MOUTH FOUR TIMES A DAY (BEFORE MEALS AND NIGHTLY) 120 tablet 0     mycophenolic acid (GENERIC EQUIVALENT) 180 MG EC tablet Take 2 tablets (360 mg) by mouth 2 times daily 360 tablet 3     omeprazole (PRILOSEC) 40 MG DR capsule Take 1 capsule (40 mg) by mouth 2 times daily 180 capsule 3     ondansetron (ZOFRAN) 4 MG tablet Take 1 tablet (4 mg) by mouth every 6 hours 8 tablet 0     ondansetron (ZOFRAN-ODT) 4 MG ODT tab Take 1-2 tablets (4-8 mg) by mouth every 8 hours as needed for nausea 4 tablet 0     senna-docusate (SENOKOT-S/PERICOLACE) 8.6-50 MG tablet Take 1-2 tablets by mouth 2 times daily 30 tablet 0     tacrolimus (GENERIC EQUIVALENT) 0.5 MG capsule Take 1 capsule (0.5 mg) by mouth 2 times daily Total dose = 1.5mg BID 60 capsule 11     tacrolimus (GENERIC EQUIVALENT) 1 MG capsule Take 1 capsule (1 mg) by mouth 2 times daily Total dose =  1.5mg BID 60 capsule 11       Social History     Tobacco Use     Smoking status: Former Smoker     Packs/day: 0.30     Types: Cigarettes     Last attempt to quit: 7/3/2015     Years since quittin.0     Smokeless tobacco: Never Used     Tobacco comment: E- Cig use still   Substance Use Topics     Alcohol use: Not Currently     Alcohol/week: 0.0 oz     Frequency: Never     Binge frequency: Never     Drug use: No       FERMIN Galloway  2019  1:28 PM

## 2019-07-16 NOTE — PROGRESS NOTES
CC: Urinary retention.    HPI: It is a pleasure to see Mr. Murtaza Fitzgerald, a 43 year old male seen today in the urology clinic in hospital follow up of urinary retention. This developed acutely requiring Mueller catheter placement on 7/11/19 after knee surgery. This has been draining well with pale, yellow urine. The patient is tolerating the catheter without issue. No clots of hematuria noted.    Hx of kidney/panc transplant in 2008 (BL Cr 2.5).     The patient has no prior history of AUR or similar symptoms. At baseline, patient does not have any urinary concerns. Currently denies fevers, chills, N/V, abdominal/back pain.    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     Noncompliance with treatment     no labs for one year     Pancreas replaced by transplant (H) Feb 2009    rejection treated with thymo     Pancreatic disease     pancreas transplant     Tobacco abuse ongoing     Past Surgical History:   Procedure Laterality Date     ARTHROSCOPY KNEE WITH LATERAL MENISCECTOMY Left 7/10/2019    Procedure: Left knee arthroscopic partial lateral meniscectomy;  Surgeon: Alex Candelaria MD;  Location: [facility] OR     BYPASS GRAFT ARTERY CORONARY N/A 8/5/2015    Procedure: BYPASS GRAFT ARTERY CORONARY;  Surgeon: Katy Neville MD;  Location: [facility] OR     EYE SURGERY      vitrectomy both eyes      ORTHOPEDIC SURGERY  1993    tumor removed from left knee     TRANSPLANT  2009.2008    pancrease  and kidney transplant     Current Outpatient Medications   Medication Sig Dispense Refill     acetaminophen (TYLENOL) 325 MG tablet Take 2 tablets (650 mg) by mouth every 4 hours as needed for mild pain 50 tablet 0     acetone, Urine, test STRP 1 strip by In Vitro route daily 50 each 3     aspirin (ASA) 81 MG tablet Take 1 tablet (81 mg) by mouth daily 90 tablet 3     atorvastatin (LIPITOR) 40 MG tablet Take 0.5 tablets (20 mg) by mouth daily 45 tablet 3     YOHAN CONTOUR test strip USE TO TEST BLOOD SUGAR FIVE TIMES A DAY OR AS DIRECTED 450 each 1     blood glucose monitoring (NO BRAND SPECIFIED) test strip Freestyle test strips 6 times daily (NOT lite and NOT insulinx) 300 strip 11     blood glucose monitoring (NO BRAND SPECIFIED) test strip 5 times daily ( contour NEXT) 450 each 11     Blood Glucose Monitoring Suppl ([employer] CONTOUR MONITOR) W/DEVICE KIT TO TEST BG 5 TIMIES DAILY 1 kit 0     fludrocortisone (FLORINEF) 0.1 MG tablet Take 2 tablets (0.2 mg) by mouth daily 60 tablet 5     hydrOXYzine (ATARAX) 25 MG tablet Take 1 tablet (25 mg) by mouth 3 times daily as needed for itching 30 tablet 0     ibuprofen (ADVIL/MOTRIN) 600 MG tablet Take 1 tablet (600 mg) by mouth every 6 hours as needed for other (mild and/or inflammatory pain) 30 tablet 0     Insulin Aspart (INSULIN PUMP - OUTPATIENT) Date last interrogated by pharmacy: 2/5/2019  Omnipod  Insulin: Novolog  BASAL RATES and times:   12 AM (midnight): 1.1 units/hr  2 AM: 1.25  10 AM: 0.8  6 PM: 0.95  CARB RATIO and times:  12 AM - 11 AM: 9  11 AM - 8 PM: 6  8 PM - 12 AM: 7   BLOOD GLUCOSE TARGET and times:  12 AM (midnight): 100, correct above 120 with reverse correction on  Active Insulin Time: 4 hours  ISF (insulin sensitivity factor):   40 from   38 from [id number]  Dexcom sharing cose: [id number]       insulin aspart (NOVOLOG VIAL) 100 UNITS/ML injection To be used in insulin pump, est TDD 75 units/day 60 mL 3     insulin pen needle (BD ARPITA  U/F) 32G X 4 MM Use 4 daily or as directed. 120 each 11     metoclopramide (REGLAN) 5 MG tablet TAKE ONE TABLET BY MOUTH FOUR TIMES A DAY (BEFORE MEALS AND NIGHTLY) 120 tablet 0     mycophenolic acid (GENERIC EQUIVALENT) 180 MG EC tablet Take 2 tablets (360 mg) by mouth 2 times daily 360 tablet 3     omeprazole (PRILOSEC) 40 MG DR capsule Take 1 capsule (40 mg) by mouth 2 times daily 180 capsule 3     ondansetron (ZOFRAN) 4 MG tablet Take 1 tablet (4 mg) by mouth every 6 hours 8 tablet 0     ondansetron (ZOFRAN-ODT) 4 MG ODT tab Take 1-2 tablets (4-8 mg) by mouth every 8 hours as needed for nausea 4 tablet 0     senna-docusate (SENOKOT-S/PERICOLACE) 8.6-50 MG tablet Take 1-2 tablets by mouth 2 times daily 30 tablet 0     tacrolimus (GENERIC EQUIVALENT) 0.5 MG capsule Take 1 capsule (0.5 mg) by mouth 2 times daily Total dose = 1.5mg BID 60 capsule 11     tacrolimus (GENERIC EQUIVALENT) 1 MG capsule Take 1 capsule (1 mg) by mouth 2 times daily Total dose = 1.5mg BID 60 capsule 11     Allergies   Allergen Reactions     Benadryl [Diphenhydramine]      Reglan Other (See Comments)     IV, delsuions     Family History: There is no h/o  malignancy.  There is no h/o urolithiasis.     Social History     Socioeconomic History     Marital status:      Spouse name: Not on file     Number of children: 1     Years of education: Not on file     Highest education level: Associate degree: academic program   Occupational History     Occupation:    Social Needs     Financial resource strain: Not very hard     Food insecurity:     Worry: Never true     Inability: Never true     Transportation needs:     Medical: No     Non-medical: No   Tobacco Use     Smoking status: Former Smoker     Packs/day: 0.30     Types: Cigarettes     Last attempt to quit: 7/3/2015     Years since quittin.0     Smokeless tobacco: Never Used     Tobacco comment: E- Cig use still   Substance and Sexual Activity     Alcohol use: Not  "Currently     Alcohol/week: 0.0 oz     Frequency: Never     Binge frequency: Never     Drug use: No     Sexual activity: Never     Partners: Female   Lifestyle     Physical activity:     Days per week: 0 days     Minutes per session: 0 min     Stress: To some extent   Relationships     Social connections:     Talks on phone: More than three times a week     Gets together: Once a week     Attends [employer] service: Never     Active member of club or organization: No     Attends meetings of clubs or organizations: Never     Relationship status:      Intimate partner violence:     Fear of current or ex partner: Not on file     Emotionally abused: Not on file     Physically abused: Not on file     Forced sexual activity: Not on file   Other Topics Concern     Parent/sibling w/ CABG, MI or angioplasty before 65F 55M? No      Service Not Asked     Blood Transfusions No     Comment: possibly during surgery, otherwise none.     Caffeine Concern Not Asked     Occupational Exposure Not Asked     Hobby Hazards Not Asked     Sleep Concern Not Asked     Stress Concern Not Asked     Weight Concern Not Asked     Special Diet Not Asked     Back Care Not Asked     Exercise Not Asked     Bike Helmet Not Asked     Seat Belt Yes     Self-Exams Not Asked   Social History Narrative     Not on file       ROS: A comprehensive 14 point ROS was obtained and was  otherwise negative except for that outlined above in the HPI.    PHYSICAL EXAM:   /60 (BP Location: Left arm, Patient Position: Sitting, Cuff Size: Adult Regular)   Pulse 110   Ht 1.727 m (5' 8\")   Wt 90.7 kg (200 lb)   SpO2 99%   BMI 30.41 kg/m      GEN: NAD  EYES: EOMI  MOUTH: MMM  NECK: Supple  RESP: Unlabored breathing  CARDIAC: No LE edema  SKIN: Warm  ABD: soft  NEURO: AAO  : clear, balloon deflated and removed intact    REVIEW OF OUTSIDE RECORDS: 5 minutes spent reviewing previous/outside records.    ASSESSMENT/PLAN:  43 year old male who developed " acute urinary retention requiring Mueller catheter placement in the post op setting.  -Mueller removed  -Call if issues or go to ER  -RTO 4 wks for PVR    Should the patient continue to have voiding difficulty, the next appropriate studies would be cystoscopy.      [facility]    20 min spent with the patient, >50% of this time was spent in a face-to-face manner and on coordination of care of urinary retention.

## 2019-07-16 NOTE — LETTER
7/16/2019       RE: Murtaza Fitzgerald  [address] [address]     Dear Colleague,    Thank you for referring your patient, Murtaza Fitzgerald, to the [facility] at [facility]. Please see a copy of my visit note below.    CC: Urinary retention.    HPI: It is a pleasure to see Mr. Murtaza Fitzgerald, a 43 year old male seen today in the urology clinic in hospital follow up of urinary retention. This developed acutely requiring Mueller catheter placement on 7/11/19 after knee surgery. This has been draining well with pale, yellow urine. The patient is tolerating the catheter without issue. No clots of hematuria noted.    Hx of kidney/panc transplant in 2008 (BL Cr 2.5).     The patient has no prior history of AUR or similar symptoms. At baseline, patient does not have any urinary concerns. Currently denies fevers, chills, N/V, abdominal/back pain.    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     Noncompliance with treatment     no labs for one year     Pancreas replaced by transplant (H) Feb 2009    rejection treated with thymo     Pancreatic disease     pancreas transplant     Tobacco abuse ongoing     Past Surgical History:   Procedure Laterality Date     ARTHROSCOPY KNEE WITH LATERAL MENISCECTOMY Left 7/10/2019    Procedure: Left knee arthroscopic partial lateral  meniscectomy;  Surgeon: Alex Candelaria MD;  Location: [facility] OR     BYPASS GRAFT ARTERY CORONARY N/A 8/5/2015    Procedure: BYPASS GRAFT ARTERY CORONARY;  Surgeon: Katy Neville MD;  Location: [facility] OR     EYE SURGERY      vitrectomy both eyes      ORTHOPEDIC SURGERY  1993    tumor removed from left knee     TRANSPLANT  2009.2008    pancrease and kidney transplant     Current Outpatient Medications   Medication Sig Dispense Refill     acetaminophen (TYLENOL) 325 MG tablet Take 2 tablets (650 mg) by mouth every 4 hours as needed for mild pain 50 tablet 0     acetone, Urine, test STRP 1 strip by In Vitro route daily 50 each 3     aspirin (ASA) 81 MG tablet Take 1 tablet (81 mg) by mouth daily 90 tablet 3     atorvastatin (LIPITOR) 40 MG tablet Take 0.5 tablets (20 mg) by mouth daily 45 tablet 3     YOHAN CONTOUR test strip USE TO TEST BLOOD SUGAR FIVE TIMES A DAY OR AS DIRECTED 450 each 1     blood glucose monitoring (NO BRAND SPECIFIED) test strip Freestyle test strips 6 times daily (NOT lite and NOT insulinx) 300 strip 11     blood glucose monitoring (NO BRAND SPECIFIED) test strip 5 times daily ( contour NEXT) 450 each 11     Blood Glucose Monitoring Suppl ([employer] CONTOUR MONITOR) W/DEVICE KIT TO TEST BG 5 TIMIES DAILY 1 kit 0     fludrocortisone (FLORINEF) 0.1 MG tablet Take 2 tablets (0.2 mg) by mouth daily 60 tablet 5     hydrOXYzine (ATARAX) 25 MG tablet Take 1 tablet (25 mg) by mouth 3 times daily as needed for itching 30 tablet 0     ibuprofen (ADVIL/MOTRIN) 600 MG tablet Take 1 tablet (600 mg) by mouth every 6 hours as needed for other (mild and/or inflammatory pain) 30 tablet 0     Insulin Aspart (INSULIN PUMP - OUTPATIENT) Date last interrogated by pharmacy: 2/5/2019  Omnipod  Insulin: Novolog  BASAL RATES and times:   12 AM (midnight): 1.1 units/hr  2 AM: 1.25  10 AM: 0.8  6 PM: 0.95  CARB RATIO and times:  12 AM - 11 AM: 9  11 AM - 8 PM: 6  8 PM - 12 AM: 7   BLOOD GLUCOSE TARGET and times:  12 AM  (midnight): 100, correct above 120 with reverse correction on  Active Insulin Time: 4 hours  ISF (insulin sensitivity factor):   40 from   38 from [id number]  Dexcom sharing cose: [id number]       insulin aspart (NOVOLOG VIAL) 100 UNITS/ML injection To be used in insulin pump, est TDD 75 units/day 60 mL 3     insulin pen needle (BD ARPITA U/F) 32G X 4 MM Use 4 daily or as directed. 120 each 11     metoclopramide (REGLAN) 5 MG tablet TAKE ONE TABLET BY MOUTH FOUR TIMES A DAY (BEFORE MEALS AND NIGHTLY) 120 tablet 0     mycophenolic acid (GENERIC EQUIVALENT) 180 MG EC tablet Take 2 tablets (360 mg) by mouth 2 times daily 360 tablet 3     omeprazole (PRILOSEC) 40 MG DR capsule Take 1 capsule (40 mg) by mouth 2 times daily 180 capsule 3     ondansetron (ZOFRAN) 4 MG tablet Take 1 tablet (4 mg) by mouth every 6 hours 8 tablet 0     ondansetron (ZOFRAN-ODT) 4 MG ODT tab Take 1-2 tablets (4-8 mg) by mouth every 8 hours as needed for nausea 4 tablet 0     senna-docusate (SENOKOT-S/PERICOLACE) 8.6-50 MG tablet Take 1-2 tablets by mouth 2 times daily 30 tablet 0     tacrolimus (GENERIC EQUIVALENT) 0.5 MG capsule Take 1 capsule (0.5 mg) by mouth 2 times daily Total dose = 1.5mg BID 60 capsule 11     tacrolimus (GENERIC EQUIVALENT) 1 MG capsule Take 1 capsule (1 mg) by mouth 2 times daily Total dose = 1.5mg BID 60 capsule 11     Allergies   Allergen Reactions     Benadryl [Diphenhydramine]      Reglan Other (See Comments)     IV, delsuions     Family History: There is no h/o  malignancy.  There is no h/o urolithiasis.     Social History     Socioeconomic History     Marital status:      Spouse name: Not on file     Number of children: 1     Years of education: Not on file     Highest education level: Associate degree: academic program   Occupational History     Occupation:    Social Needs     Financial resource strain: Not very hard     Food insecurity:     Worry: Never true     Inability:  "Never true     Transportation needs:     Medical: No     Non-medical: No   Tobacco Use     Smoking status: Former Smoker     Packs/day: 0.30     Types: Cigarettes     Last attempt to quit: 7/3/2015     Years since quittin.0     Smokeless tobacco: Never Used     Tobacco comment: E- Cig use still   Substance and Sexual Activity     Alcohol use: Not Currently     Alcohol/week: 0.0 oz     Frequency: Never     Binge frequency: Never     Drug use: No     Sexual activity: Never     Partners: Female   Lifestyle     Physical activity:     Days per week: 0 days     Minutes per session: 0 min     Stress: To some extent   Relationships     Social connections:     Talks on phone: More than three times a week     Gets together: Once a week     Attends Sikh service: Never     Active member of club or organization: No     Attends meetings of clubs or organizations: Never     Relationship status:      Intimate partner violence:     Fear of current or ex partner: Not on file     Emotionally abused: Not on file     Physically abused: Not on file     Forced sexual activity: Not on file   Other Topics Concern     Parent/sibling w/ CABG, MI or angioplasty before 65F 55M? No      Service Not Asked     Blood Transfusions No     Comment: possibly during surgery, otherwise none.     Caffeine Concern Not Asked     Occupational Exposure Not Asked     Hobby Hazards Not Asked     Sleep Concern Not Asked     Stress Concern Not Asked     Weight Concern Not Asked     Special Diet Not Asked     Back Care Not Asked     Exercise Not Asked     Bike Helmet Not Asked     Seat Belt Yes     Self-Exams Not Asked   Social History Narrative     Not on file       ROS: A comprehensive 14 point ROS was obtained and was  otherwise negative except for that outlined above in the HPI.    PHYSICAL EXAM:   /60 (BP Location: Left arm, Patient Position: Sitting, Cuff Size: Adult Regular)   Pulse 110   Ht 1.727 m (5' 8\")   Wt 90.7 kg " (200 lb)   SpO2 99%   BMI 30.41 kg/m       GEN: NAD  EYES: EOMI  MOUTH: MMM  NECK: Supple  RESP: Unlabored breathing  CARDIAC: No LE edema  SKIN: Warm  ABD: soft  NEURO: AAO  : clear, balloon deflated and removed intact    REVIEW OF OUTSIDE RECORDS: 5 minutes spent reviewing previous/outside records.    ASSESSMENT/PLAN:  43 year old male who developed acute urinary retention requiring Mueller catheter placement in the post op setting.  -Mueller removed  -Call if issues or go to ER  -RTO 4 wks for PVR    Should the patient continue to have voiding difficulty, the next appropriate studies would be cystoscopy.      Brisa Vasquez PA-C  [facility]    20 min spent with the patient, >50% of this time was spent in a face-to-face manner and on coordination of care of urinary retention.

## 2019-07-18 ENCOUNTER — TELEPHONE (OUTPATIENT)
Dept: GASTROENTEROLOGY | Facility: CLINIC | Age: 44
End: 2019-07-18

## 2019-07-18 NOTE — TELEPHONE ENCOUNTER
Called and spoke with patient reminding him of appointment for 7/24/19 at 9AM. Patient requested cancellation of appointment as was determined symptoms caused by dehydration and is feeling much better, is followed by transplant clinic.  Have canceled GI appointment.

## 2019-07-22 ENCOUNTER — TRANSFERRED RECORDS (OUTPATIENT)
Dept: HEALTH INFORMATION MANAGEMENT | Facility: CLINIC | Age: 44
End: 2019-07-22

## 2019-07-24 ENCOUNTER — PRE VISIT (OUTPATIENT)
Dept: GASTROENTEROLOGY | Facility: CLINIC | Age: 44
End: 2019-07-24

## 2019-08-01 DIAGNOSIS — E10.59 TYPE 1 DIABETES MELLITUS WITH DIABETIC CARDIOMYOPATHY (H): ICD-10-CM

## 2019-08-01 DIAGNOSIS — I43 TYPE 1 DIABETES MELLITUS WITH DIABETIC CARDIOMYOPATHY (H): ICD-10-CM

## 2019-08-01 NOTE — TELEPHONE ENCOUNTER
"Requested Prescriptions   Pending Prescriptions Disp Refills     insulin aspart (NOVOLOG VIAL) 100 UNITS/ML vial [Pharmacy Med Name: NOVOLOG 100UNIT/ML SOLN] 60 mL 3     Sig: TO BE USED IN INSULIN PUMP UP TO 75 UNITS TOTAL DAILY DOSE  Last Written Prescription Date:  8/10/18  Last Fill Quantity: 60 mL,  # refills: 3   Last office visit: 2/19/2019 with prescribing provider:  Igor   Future Office Visit:   Next 5 appointments (look out 90 days)    Aug 13, 2019 11:20 AM CDT  Office Visit with Kt Ríos MD  [facility] [address]  [address]  [place] [facility] [address]  [phone]             Short Acting Insulin Protocol Failed - 8/1/2019 11:45 AM        Failed - LDL on file in past 12 months     Recent Labs   Lab Test 11/21/17  0711   LDL 99             Passed - Blood pressure less than 140/90 in past 6 months     BP Readings from Last 3 Encounters:   07/16/19 100/60   07/11/19 114/84   07/10/19 (!) 145/98                 Passed - Microalbumin on file in past 12 months     Recent Labs   Lab Test 01/11/19  0741   MICROL 313   UMALCR 474.96*             Passed - Serum creatinine on file in past 12 months     Recent Labs   Lab Test 07/11/19  0649 06/28/19  1358   CR  --  2.53*   CREAT 2.7*  --              Passed - HgbA1C in past 3 or 6 months     If HgbA1C is 8 or greater, it needs to be on file within the past 3 months.  If less than 8, must be on file within the past 6 months.     Recent Labs   Lab Test 06/28/19  1358   A1C 6.8*             Passed - Medication is active on med list        Passed - Patient is age 18 or older        Passed - Recent (6 mo) or future (30 days) visit within the authorizing provider's specialty     Patient had office visit in the last 6 months or has a visit in the next 30 days with authorizing provider or within the authorizing provider's specialty.  See \"Patient Info\" tab in inbasket, or \"Choose Columns\" in Meds & Orders section " of the refill encounter.

## 2019-08-05 NOTE — TELEPHONE ENCOUNTER
Class: E-Prescribe   Order: [id number]   E-Prescribing Status: Receipt confirmed by pharmacy (8/2/2019  8:06 PM CDT)

## 2019-08-22 DIAGNOSIS — Z94.0 KIDNEY REPLACED BY TRANSPLANT: ICD-10-CM

## 2019-08-22 LAB
ANION GAP SERPL CALCULATED.3IONS-SCNC: 7 MMOL/L (ref 3–14)
BUN SERPL-MCNC: 21 MG/DL (ref 7–30)
CALCIUM SERPL-MCNC: 9.1 MG/DL (ref 8.5–10.1)
CHLORIDE SERPL-SCNC: 105 MMOL/L (ref 94–109)
CO2 SERPL-SCNC: 27 MMOL/L (ref 20–32)
CREAT SERPL-MCNC: 2.3 MG/DL (ref 0.66–1.25)
CREAT UR-MCNC: 41 MG/DL
ERYTHROCYTE [DISTWIDTH] IN BLOOD BY AUTOMATED COUNT: 13.8 % (ref 10–15)
GFR SERPL CREATININE-BSD FRML MDRD: 34 ML/MIN/{1.73_M2}
GLUCOSE SERPL-MCNC: 118 MG/DL (ref 70–99)
HCT VFR BLD AUTO: 39.3 % (ref 40–53)
HGB BLD-MCNC: 12.8 G/DL (ref 13.3–17.7)
MCH RBC QN AUTO: 29 PG (ref 26.5–33)
MCHC RBC AUTO-ENTMCNC: 32.6 G/DL (ref 31.5–36.5)
MCV RBC AUTO: 89 FL (ref 78–100)
PLATELET # BLD AUTO: 472 10E9/L (ref 150–450)
POTASSIUM SERPL-SCNC: 4.6 MMOL/L (ref 3.4–5.3)
PROT UR-MCNC: 0.89 G/L
PROT/CREAT 24H UR: 2.2 G/G CR (ref 0–0.2)
RBC # BLD AUTO: 4.41 10E12/L (ref 4.4–5.9)
SODIUM SERPL-SCNC: 139 MMOL/L (ref 133–144)
TACROLIMUS BLD-MCNC: <3 UG/L (ref 5–15)
TME LAST DOSE: ABNORMAL H
WBC # BLD AUTO: 9.6 10E9/L (ref 4–11)

## 2019-08-22 PROCEDURE — 80197 ASSAY OF TACROLIMUS: CPT | Performed by: INTERNAL MEDICINE

## 2019-08-22 PROCEDURE — 80048 BASIC METABOLIC PNL TOTAL CA: CPT | Performed by: INTERNAL MEDICINE

## 2019-08-22 PROCEDURE — 85027 COMPLETE CBC AUTOMATED: CPT | Performed by: INTERNAL MEDICINE

## 2019-08-22 PROCEDURE — 36415 COLL VENOUS BLD VENIPUNCTURE: CPT | Performed by: INTERNAL MEDICINE

## 2019-08-22 PROCEDURE — 84156 ASSAY OF PROTEIN URINE: CPT | Performed by: INTERNAL MEDICINE

## 2019-08-23 ENCOUNTER — TELEPHONE (OUTPATIENT)
Dept: TRANSPLANT | Facility: CLINIC | Age: 44
End: 2019-08-23

## 2019-08-23 ENCOUNTER — OFFICE VISIT (OUTPATIENT)
Dept: PEDIATRICS | Facility: CLINIC | Age: 44
End: 2019-08-23
Payer: COMMERCIAL

## 2019-08-23 VITALS
HEIGHT: 68 IN | HEART RATE: 105 BPM | DIASTOLIC BLOOD PRESSURE: 88 MMHG | BODY MASS INDEX: 30.31 KG/M2 | OXYGEN SATURATION: 98 % | TEMPERATURE: 98.5 F | SYSTOLIC BLOOD PRESSURE: 136 MMHG | WEIGHT: 200 LBS

## 2019-08-23 DIAGNOSIS — Z48.298 AFTERCARE FOLLOWING ORGAN TRANSPLANT: ICD-10-CM

## 2019-08-23 DIAGNOSIS — Z95.1 S/P CABG X 3: ICD-10-CM

## 2019-08-23 DIAGNOSIS — Z13.220 SCREENING CHOLESTEROL LEVEL: ICD-10-CM

## 2019-08-23 DIAGNOSIS — I43 TYPE 1 DIABETES MELLITUS WITH DIABETIC CARDIOMYOPATHY (H): Primary | ICD-10-CM

## 2019-08-23 DIAGNOSIS — S83.282A ACUTE LATERAL MENISCUS TEAR OF LEFT KNEE, INITIAL ENCOUNTER: ICD-10-CM

## 2019-08-23 DIAGNOSIS — E78.5 HYPERLIPIDEMIA WITH TARGET LDL LESS THAN 100: ICD-10-CM

## 2019-08-23 DIAGNOSIS — Z94.0 IMMUNOSUPPRESSIVE MANAGEMENT ENCOUNTER FOLLOWING KIDNEY TRANSPLANT: ICD-10-CM

## 2019-08-23 DIAGNOSIS — E10.59 TYPE 1 DIABETES MELLITUS WITH DIABETIC CARDIOMYOPATHY (H): Primary | ICD-10-CM

## 2019-08-23 DIAGNOSIS — Z94.0 KIDNEY TRANSPLANTED: ICD-10-CM

## 2019-08-23 DIAGNOSIS — Z94.0 KIDNEY TRANSPLANTED: Primary | ICD-10-CM

## 2019-08-23 DIAGNOSIS — Z79.899 IMMUNOSUPPRESSIVE MANAGEMENT ENCOUNTER FOLLOWING KIDNEY TRANSPLANT: ICD-10-CM

## 2019-08-23 DIAGNOSIS — J18.9 ATYPICAL PNEUMONIA: ICD-10-CM

## 2019-08-23 DIAGNOSIS — D84.9 IMMUNOSUPPRESSION (H): ICD-10-CM

## 2019-08-23 DIAGNOSIS — D84.9 IMMUNOSUPPRESSED STATUS (H): ICD-10-CM

## 2019-08-23 DIAGNOSIS — I10 HYPERTENSION GOAL BP (BLOOD PRESSURE) < 140/90: ICD-10-CM

## 2019-08-23 PROCEDURE — 99207 C FOOT EXAM  NO CHARGE: CPT | Mod: 25 | Performed by: INTERNAL MEDICINE

## 2019-08-23 PROCEDURE — 99214 OFFICE O/P EST MOD 30 MIN: CPT | Performed by: INTERNAL MEDICINE

## 2019-08-23 RX ORDER — AZITHROMYCIN 250 MG/1
TABLET, FILM COATED ORAL
Qty: 6 TABLET | Refills: 0 | Status: SHIPPED | OUTPATIENT
Start: 2019-08-23 | End: 2019-12-02

## 2019-08-23 RX ORDER — ONDANSETRON 4 MG/1
4-8 TABLET, ORALLY DISINTEGRATING ORAL EVERY 8 HOURS PRN
Qty: 15 TABLET | Refills: 0 | Status: SHIPPED | OUTPATIENT
Start: 2019-08-23 | End: 2019-10-08

## 2019-08-23 RX ORDER — TACROLIMUS 1 MG/1
1 CAPSULE ORAL 2 TIMES DAILY
Qty: 60 CAPSULE | Refills: 11 | Status: ON HOLD | COMMUNITY
Start: 2019-08-23 | End: 2020-12-06

## 2019-08-23 ASSESSMENT — MIFFLIN-ST. JEOR: SCORE: 1786.69

## 2019-08-23 NOTE — TELEPHONE ENCOUNTER
ISSUE:   Tacrolimus level <3.0 on 8/22/19,  Goal 4-6   Current dose 1.5 mg BID    PLAN:   Please call pt and confirm this was a good 12-hour trough. Verify dose 1.5 mg BID.   Confirm no new medications or illness (blaine. Diarrhea).   If good trough, increase dose to 2 mg BID and recheck level next week.  Otherwise, if not a good level, have Tac levels rechecked and make sure it is a good trough.    OUTCOME:  Called and spoke with Murtaza Fitzgerald who confirms good trough and taking 1.5 mg BID.  Recommended increasing his dose to 2 mg BID. States no new meds. Has been sick with a cold the past week.  Verbalized understanding to recheck levels next week. Orders place in [employer]. (Uses [facility] lab)      ADDENDUM:  Creatinine = 2.3 (8/22/19)  Recommend improving hydration and getting BMP and UA/UC next week as well.  Had issues with urinary retention after getting a schultz cath inpatient, states that is resolved.  He does feel dehydrated. States he has been laid off from his job and is currently looking for another job.  States he has insurance til end of the month and is ok to get labs next week. Orders placed in Epic.

## 2019-08-23 NOTE — PROGRESS NOTES
Subjective     Murtaza Fitzgerald is a 43 year old male who presents to clinic today for the following health issues:    HPI   Diabetes Follow-up      How often are you checking your blood sugar? Three-four times daily    What time of day are you checking your blood sugars (select all that apply)?  Before meals and At bedtime    Have you had any blood sugars above 200?  Yes     Have you had any blood sugars below 70?  No    What symptoms do you notice when your blood sugar is low?  Dizzy    What concerns do you have today about your diabetes? None     Do you have any of these symptoms? (Select all that apply)  No numbness or tingling in feet.  No redness, sores or blisters on feet.  No complaints of excessive thirst.  No reports of blurry vision.  No significant changes to weight.     Have you had a diabetic eye exam in the last 12 months? Yes- Date of last eye exam: 10/11/19    Diabetes Management Resources    Hyperlipidemia Follow-Up      Are you having any of the following symptoms? (Select all that apply)  No complaints of shortness of breath, chest pain or pressure.  No increased sweating or nausea with activity.  No left-sided neck or arm pain.  No complaints of pain in calves when walking 1-2 blocks.    Are you regularly taking any medication or supplement to lower your cholesterol?   Yes- Atorvastatin    Are you having muscle aches or other side effects that you think could be caused by your cholesterol lowering medication?  No    Hypertension Follow-up      Do you check your blood pressure regularly outside of the clinic? No     Are you following a low salt diet? No    Are your blood pressures ever more than 140 on the top number (systolic) OR more   than 90 on the bottom number (diastolic), for example 140/90? No    BP Readings from Last 2 Encounters:   08/23/19 136/88   07/16/19 100/60     Hemoglobin A1C (%)   Date Value   06/28/2019 6.8 (H)   01/11/2019 6.8 (H)     LDL Cholesterol Calculated  (mg/dL)   Date Value   11/21/2017 99   07/08/2015 90         How many servings of fruits and vegetables do you eat daily?  0-1    On average, how many sweetened beverages do you drink each day (soda, juice, sweet tea, etc)?   0    How many days per week do you miss taking your medication? 0    Has had cough and cold 3 weeks. No fevers.  Lots of phlegm.  No shortness of breath except after coughing.  No blood.  Some lightheadedness with cough.  IF gets up faster and walks to another part of the house, will feel it more.  No over-the-counter meds for the cold.      Sugars:  Had been higher with recent upper respiratory infection; Using pump with basal rates and bolus.     Kidney transplant:  Tacrolimus recently increased to 2 mg twice daily.     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)     Coronary artery disease involving native coronary artery of native heart without angina pectoris     Adhesive capsulitis of right shoulder     Dehydration     Past Surgical History:   Procedure Laterality Date     ARTHROSCOPY KNEE WITH LATERAL MENISCECTOMY Left 7/10/2019    Procedure: Left knee arthroscopic partial lateral meniscectomy;  Surgeon: Alex Candelaria MD;  Location:  OR     BYPASS GRAFT ARTERY CORONARY N/A 8/5/2015    Procedure: BYPASS GRAFT ARTERY CORONARY;  Surgeon: Katy Neville MD;  Location:  OR     EYE SURGERY      vitrectomy both eyes      ORTHOPEDIC SURGERY  1993    tumor removed from left knee     TRANSPLANT  2009.2008    pancrease and kidney transplant       Social History     Tobacco  Use     Smoking status: Former Smoker     Packs/day: 0.30     Types: Cigarettes     Last attempt to quit: 7/3/2015     Years since quittin.1     Smokeless tobacco: Never Used     Tobacco comment: E- Cig use still   Substance Use Topics     Alcohol use: Not Currently     Alcohol/week: 0.0 oz     Frequency: Never     Binge frequency: Never     Family History   Problem Relation Age of Onset     Unknown/Adopted Father      Heart Disease Maternal Grandfather 62         Current Outpatient Medications   Medication Sig Dispense Refill     acetaminophen (TYLENOL) 325 MG tablet Take 2 tablets (650 mg) by mouth every 4 hours as needed for mild pain 50 tablet 0     aspirin (ASA) 81 MG tablet Take 1 tablet (81 mg) by mouth daily 90 tablet 3     atorvastatin (LIPITOR) 40 MG tablet Take 0.5 tablets (20 mg) by mouth daily 45 tablet 3     azithromycin (ZITHROMAX) 250 MG tablet Take 2 tablets (500 mg) by mouth daily for 1 day, THEN 1 tablet (250 mg) daily for 4 days. 6 tablet 0     fludrocortisone (FLORINEF) 0.1 MG tablet Take 2 tablets (0.2 mg) by mouth daily 60 tablet 5     Insulin Aspart (INSULIN PUMP - OUTPATIENT) Date last interrogated by pharmacy: 2019  Omnipod  Insulin: Novolog  BASAL RATES and times:   12 AM (midnight): 1.1 units/hr  2 AM: 1.25  10 AM: 0.8  6 PM: 0.95  CARB RATIO and times:  12 AM - 11 AM: 9  11 AM - 8 PM: 6  8 PM - 12 AM: 7   BLOOD GLUCOSE TARGET and times:  12 AM (midnight): 100, correct above 120 with reverse correction on  Active Insulin Time: 4 hours  ISF (insulin sensitivity factor):   40 from   38 from [id number]  Dexcom sharing cose: [id number]       insulin aspart (NOVOLOG VIAL) 100 UNITS/ML vial TO BE USED IN INSULIN PUMP UP TO 75 UNITS TOTAL DAILY DOSE 60 mL 0     metoclopramide (REGLAN) 5 MG tablet TAKE ONE TABLET BY MOUTH FOUR TIMES A DAY (BEFORE MEALS AND NIGHTLY) 120 tablet 0     mycophenolic acid (GENERIC EQUIVALENT) 180 MG EC tablet Take 2 tablets (360 mg) by mouth 2 times  daily 360 tablet 3     omeprazole (PRILOSEC) 40 MG DR capsule Take 1 capsule (40 mg) by mouth 2 times daily 180 capsule 3     ondansetron (ZOFRAN-ODT) 4 MG ODT tab Take 1-2 tablets (4-8 mg) by mouth every 8 hours as needed for nausea 15 tablet 0     tacrolimus (GENERIC EQUIVALENT) 1 MG capsule Take 2 capsules (2 mg) by mouth 2 times daily 60 capsule 11     acetone, Urine, test STRP 1 strip by In Vitro route daily 50 each 3     YOHAN CONTOUR test strip USE TO TEST BLOOD SUGAR FIVE TIMES A DAY OR AS DIRECTED 450 each 1     blood glucose monitoring (NO BRAND SPECIFIED) test strip Freestyle test strips 6 times daily (NOT lite and NOT insulinx) 300 strip 11     blood glucose monitoring (NO BRAND SPECIFIED) test strip 5 times daily ( contour NEXT) 450 each 11     Blood Glucose Monitoring Suppl ([employer] CONTOUR MONITOR) W/DEVICE KIT TO TEST BG 5 TIMIES DAILY 1 kit 0     insulin pen needle (BD ARPITA U/F) 32G X 4 MM Use 4 daily or as directed. 120 each 11     senna-docusate (SENOKOT-S/PERICOLACE) 8.6-50 MG tablet Take 1-2 tablets by mouth 2 times daily 30 tablet 0     Allergies   Allergen Reactions     Benadryl [Diphenhydramine]      Reglan Other (See Comments)     IV, delsuions     BP Readings from Last 3 Encounters:   08/23/19 136/88   07/16/19 100/60   07/11/19 114/84    Wt Readings from Last 3 Encounters:   08/23/19 90.7 kg (200 lb)   07/16/19 90.7 kg (200 lb)   07/11/19 89.8 kg (198 lb)                    Reviewed and updated as needed this visit by Provider         Review of Systems   ROS COMP: CONSTITUTIONAL: NEGATIVE for fever, chills, change in weight  INTEGUMENTARY/SKIN: NEGATIVE for worrisome rashes, moles or lesions  EYES: NEGATIVE for vision changes or irritation  ENT/MOUTH: NEGATIVE for ear, mouth and throat problems  RESP: NEGATIVE for significant cough or SOB  BREAST: NEGATIVE for masses, tenderness or discharge  CV: NEGATIVE for chest pain, palpitations or peripheral edema  GI: NEGATIVE for nausea, abdominal  "pain, heartburn, or change in bowel habits  : NEGATIVE for frequency, dysuria, or hematuria  MUSCULOSKELETAL: NEGATIVE for significant arthralgias or myalgia  NEURO: NEGATIVE for weakness, dizziness or paresthesias  ENDOCRINE: NEGATIVE for temperature intolerance, skin/hair changes  HEME: NEGATIVE for bleeding problems  PSYCHIATRIC: NEGATIVE for changes in mood or affect      Objective    /88 (BP Location: Right arm, Patient Position: Sitting, Cuff Size: Adult Large)   Pulse 105   Temp 98.5  F (36.9  C) (Tympanic)   Ht 1.727 m (5' 8\")   Wt 90.7 kg (200 lb)   SpO2 98%   BMI 30.41 kg/m    Body mass index is 30.41 kg/m .  Physical Exam   GENERAL: healthy, alert and no distress  EYES: Eyes grossly normal to inspection, PERRL and conjunctivae and sclerae normal  HENT: ear canals and TM's normal, nose and mouth without ulcers or lesions  NECK: no adenopathy, no asymmetry, masses, or scars and thyroid normal to palpation  RESP: lungs clear to auscultation - no rales, rhonchi or wheezes  CV: regular rate and rhythm, normal S1 S2, no S3 or S4, no murmur, click or rub, no peripheral edema and peripheral pulses strong  ABDOMEN: soft, nontender, no hepatosplenomegaly, no masses and bowel sounds normal  MS: no gross musculoskeletal defects noted, no edema  SKIN: no suspicious lesions or rashes  NEURO: Normal strength and tone, mentation intact and speech normal  PSYCH: mentation appears normal, affect normal/bright    Diagnostic Test Results:  Labs reviewed in Epic        Assessment & Plan     1.Nausea:  - ondansetron (ZOFRAN-ODT) 4 MG ODT tab; Take 1-2 tablets (4-8 mg) by mouth every 8 hours as needed for nausea  Dispense: 15 tablet; Refill: 0    2. Kidney transplanted  Management per kidney transplant team.   - tacrolimus (GENERIC EQUIVALENT) 1 MG capsule; Take 2 capsules (2 mg) by mouth 2 times daily  Dispense: 60 capsule; Refill: 11    3. Immunosuppression (H)  Continue updated dosing.   - tacrolimus (GENERIC " "EQUIVALENT) 1 MG capsule; Take 2 capsules (2 mg) by mouth 2 times daily  Dispense: 60 capsule; Refill: 11    4. 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  Continue secondary risk factor modification for BP, cholesterol, anticoagulation, and smoking cessation.   - C FOOT EXAM  NO CHARGE    5. S/P CABG x 3  Secondary risk factor modification.     6. Hypertension goal BP (blood pressure) < 140/90  At goal.      7. Immunosuppressed status (H)      8. Hyperlipidemia with target LDL less than 100      9. Screening cholesterol level    - Lipid panel reflex to direct LDL Fasting; Future    10. Atypical pneumonia  Saline spray (nonmedicated salt water) in small squirt bottles can be used every hour or two during the day, as can humidifiers during the night.  Steam showers can help keep mucous loose.       For adults and kids over 6, expectorants (like \"Mucinex\" or \"Robitussin\") may help, as can using cough supressants (like the \"DM\" in Mucinex DM and Robitussin DM).    Might benefit from antihistamines like Zyrtec (cetirizine) for relief of congestion; the dose is usually 10 mg for adults, 5 mg for school aged kids, and 2.5 mg for toddlers.   - azithromycin (ZITHROMAX) 250 MG tablet; Take 2 tablets (500 mg) by mouth daily for 1 day, THEN 1 tablet (250 mg) daily for 4 days.  Dispense: 6 tablet; Refill: 0     BMI:   Estimated body mass index is 30.41 kg/m  as calculated from the following:    Height as of this encounter: 1.727 m (5' 8\").    Weight as of this encounter: 90.7 kg (200 lb).   Weight management plan: Patient was referred to their PCP to discuss a diet and exercise plan.        See Patient Instructions    No follow-ups on file.    Kt Ríos MD  [facility]      "

## 2019-08-23 NOTE — PATIENT INSTRUCTIONS
Fasting LDL cholesterol in 1 -2 weeks    Begin a 5 day azithromycin course for possible walking pneumonia.     No change in your diabetes mellitus meds.    Stay on atorvastatin and aspirin.    Follow up in 6 months.     Kt Ríos MD  Internal Medicine and Pediatrics

## 2019-08-28 DIAGNOSIS — Z94.0 IMMUNOSUPPRESSIVE MANAGEMENT ENCOUNTER FOLLOWING KIDNEY TRANSPLANT: ICD-10-CM

## 2019-08-28 DIAGNOSIS — Z94.0 KIDNEY TRANSPLANTED: ICD-10-CM

## 2019-08-28 DIAGNOSIS — Z48.298 AFTERCARE FOLLOWING ORGAN TRANSPLANT: ICD-10-CM

## 2019-08-28 DIAGNOSIS — Z79.899 IMMUNOSUPPRESSIVE MANAGEMENT ENCOUNTER FOLLOWING KIDNEY TRANSPLANT: ICD-10-CM

## 2019-08-28 DIAGNOSIS — Z94.0 KIDNEY TRANSPLANTED: Primary | ICD-10-CM

## 2019-08-28 DIAGNOSIS — N39.0 URINARY TRACT INFECTION: ICD-10-CM

## 2019-08-28 DIAGNOSIS — Z13.220 SCREENING CHOLESTEROL LEVEL: ICD-10-CM

## 2019-08-28 LAB
ALBUMIN UR-MCNC: 100 MG/DL
ANION GAP SERPL CALCULATED.3IONS-SCNC: 9 MMOL/L (ref 3–14)
APPEARANCE UR: CLEAR
BACTERIA #/AREA URNS HPF: ABNORMAL /HPF
BILIRUB UR QL STRIP: NEGATIVE
BUN SERPL-MCNC: 26 MG/DL (ref 7–30)
CALCIUM SERPL-MCNC: 9 MG/DL (ref 8.5–10.1)
CHLORIDE SERPL-SCNC: 106 MMOL/L (ref 94–109)
CHOLEST SERPL-MCNC: 153 MG/DL
CO2 SERPL-SCNC: 25 MMOL/L (ref 20–32)
COLOR UR AUTO: YELLOW
CREAT SERPL-MCNC: 2.55 MG/DL (ref 0.66–1.25)
GFR SERPL CREATININE-BSD FRML MDRD: 30 ML/MIN/{1.73_M2}
GLUCOSE SERPL-MCNC: 128 MG/DL (ref 70–99)
GLUCOSE UR STRIP-MCNC: NEGATIVE MG/DL
HDLC SERPL-MCNC: 32 MG/DL
HGB UR QL STRIP: ABNORMAL
KETONES UR STRIP-MCNC: NEGATIVE MG/DL
LDLC SERPL CALC-MCNC: 94 MG/DL
LEUKOCYTE ESTERASE UR QL STRIP: NEGATIVE
NITRATE UR QL: NEGATIVE
NON-SQ EPI CELLS #/AREA URNS LPF: ABNORMAL /LPF
NONHDLC SERPL-MCNC: 121 MG/DL
PH UR STRIP: 7 PH (ref 5–7)
POTASSIUM SERPL-SCNC: 4.5 MMOL/L (ref 3.4–5.3)
RBC #/AREA URNS AUTO: ABNORMAL /HPF
SODIUM SERPL-SCNC: 140 MMOL/L (ref 133–144)
SOURCE: ABNORMAL
SP GR UR STRIP: 1.01 (ref 1–1.03)
TACROLIMUS BLD-MCNC: 4.9 UG/L (ref 5–15)
TME LAST DOSE: 900 H
TRIGL SERPL-MCNC: 133 MG/DL
UROBILINOGEN UR STRIP-ACNC: 0.2 EU/DL (ref 0.2–1)
WBC #/AREA URNS AUTO: ABNORMAL /HPF
WBC CLUMPS #/AREA URNS HPF: PRESENT /HPF

## 2019-08-28 PROCEDURE — 80048 BASIC METABOLIC PNL TOTAL CA: CPT | Performed by: INTERNAL MEDICINE

## 2019-08-28 PROCEDURE — 80061 LIPID PANEL: CPT | Performed by: INTERNAL MEDICINE

## 2019-08-28 PROCEDURE — 36415 COLL VENOUS BLD VENIPUNCTURE: CPT | Performed by: INTERNAL MEDICINE

## 2019-08-28 PROCEDURE — 87086 URINE CULTURE/COLONY COUNT: CPT | Performed by: INTERNAL MEDICINE

## 2019-08-28 PROCEDURE — 81001 URINALYSIS AUTO W/SCOPE: CPT | Performed by: INTERNAL MEDICINE

## 2019-08-28 PROCEDURE — 80197 ASSAY OF TACROLIMUS: CPT | Performed by: INTERNAL MEDICINE

## 2019-08-29 LAB
BACTERIA SPEC CULT: NO GROWTH
SPECIMEN SOURCE: NORMAL

## 2019-09-19 ENCOUNTER — TELEPHONE (OUTPATIENT)
Dept: TRANSPLANT | Facility: CLINIC | Age: 44
End: 2019-09-19

## 2019-09-19 DIAGNOSIS — Z94.0 KIDNEY TRANSPLANTED: Primary | ICD-10-CM

## 2019-09-19 DIAGNOSIS — Z48.298 AFTERCARE FOLLOWING ORGAN TRANSPLANT: ICD-10-CM

## 2019-09-19 DIAGNOSIS — T86.10 COMPLICATIONS, KIDNEY TRANSPLANT: ICD-10-CM

## 2019-10-07 DIAGNOSIS — K21.9 GASTROESOPHAGEAL REFLUX DISEASE WITHOUT ESOPHAGITIS: ICD-10-CM

## 2019-10-07 NOTE — TELEPHONE ENCOUNTER
"Requested Prescriptions   Pending Prescriptions Disp Refills     metoclopramide (REGLAN) 5 MG tablet [Pharmacy Med Name: METOCLOPRAMIDE HCL 5MG TABS]    Last Written Prescription Date:  2/8/2019  Last Fill Quantity: 120,  # refills: 0   Last office visit: 8/23/2019 with prescribing provider:  Kt Ríos     Future Office Visit:     120 tablet 0     Sig: TAKE ONE TABLET BY MOUTH FOUR TIMES A DAY BEFORE MEALS AND NIGHTLY        Antivertigo/Antiemetic Agents Passed - 10/7/2019  9:53 AM        Passed - Recent (12 mo) or future (30 days) visit within the authorizing provider's specialty     Patient has had an office visit with the authorizing provider or a provider within the authorizing providers department within the previous 12 mos or has a future within next 30 days. See \"Patient Info\" tab in inbasket, or \"Choose Columns\" in Meds & Orders section of the refill encounter.              Passed - Medication is active on med list        Passed - Patient is 18 years of age or older            "

## 2019-10-08 ENCOUNTER — OFFICE VISIT (OUTPATIENT)
Dept: NEPHROLOGY | Facility: CLINIC | Age: 44
End: 2019-10-08
Attending: INTERNAL MEDICINE
Payer: COMMERCIAL

## 2019-10-08 VITALS
OXYGEN SATURATION: 98 % | HEIGHT: 68 IN | RESPIRATION RATE: 16 BRPM | DIASTOLIC BLOOD PRESSURE: 80 MMHG | SYSTOLIC BLOOD PRESSURE: 117 MMHG | TEMPERATURE: 98.5 F | BODY MASS INDEX: 31.02 KG/M2 | HEART RATE: 102 BPM | WEIGHT: 204.7 LBS

## 2019-10-08 DIAGNOSIS — D84.9 IMMUNOSUPPRESSED STATUS (H): ICD-10-CM

## 2019-10-08 DIAGNOSIS — S83.282A ACUTE LATERAL MENISCUS TEAR OF LEFT KNEE, INITIAL ENCOUNTER: ICD-10-CM

## 2019-10-08 DIAGNOSIS — Z94.0 KIDNEY REPLACED BY TRANSPLANT: Primary | ICD-10-CM

## 2019-10-08 DIAGNOSIS — Z48.298 AFTERCARE FOLLOWING ORGAN TRANSPLANT: ICD-10-CM

## 2019-10-08 PROCEDURE — G0463 HOSPITAL OUTPT CLINIC VISIT: HCPCS | Mod: ZF

## 2019-10-08 RX ORDER — METOCLOPRAMIDE 5 MG/1
TABLET ORAL
Qty: 120 TABLET | Refills: 0 | Status: SHIPPED | OUTPATIENT
Start: 2019-10-08 | End: 2020-01-27

## 2019-10-08 RX ORDER — ONDANSETRON 4 MG/1
4-8 TABLET, ORALLY DISINTEGRATING ORAL EVERY 8 HOURS PRN
Qty: 60 TABLET | Refills: 1 | Status: SHIPPED | OUTPATIENT
Start: 2019-10-08 | End: 2020-03-31

## 2019-10-08 ASSESSMENT — PAIN SCALES - GENERAL: PAINLEVEL: NO PAIN (0)

## 2019-10-08 ASSESSMENT — MIFFLIN-ST. JEOR: SCORE: 1803.01

## 2019-10-08 NOTE — LETTER
10/8/2019       RE: Murtaza Fitzgerald  [address] [facility] [address]     Dear Colleague,    Thank you for referring your patient, Murtaza Fitzgerald, to the [facility] NEPHROLOGY at [facility]. Please see a copy of my visit note below.    CHRONIC TRANSPLANT NEPHROLOGY VISIT    Assessment & Plan   # LDKT: Increased   - Baseline Cr ~ 1.6-1.8   - Proteinuria: Moderate (1-3 grams)   - Date DSA Last Checked: Jul/2017       Latest DSA: No   - BK Viremia: No   - Kidney Tx Biopsy: Dec 13, 2013; Result: CNI toxicity               - Decline in kidney function likely pre renal. Will need to follow up now with monthly labs to monitor.     # Diabetes status-post failed ANNMARIE (bladder drained):              - Borderline control              - HbA1c: Stable, most recently at 6.8.     Pancreas failed due rejection. Not interested in pancreas retransplant at this time.     # Immunosuppression:  tacrolimus immediate release (goal 4-6) and Myfortic (not followed)    - Changes: No    # Prophylaxis:    - PJP: None, will check CD4 count     # Orthostatic hypotension: Controlled , /71 today, he does not have a cuff at home to check blood pressures.    - Changes: No, continue Florinef 0.1 mg BID . Encouraged compression short use.     # Anemia in Chronic Renal Disease: Hgb: Stable      WILL: No   - Iron studies: Not checked recently    # Mineral Bone Disorder:   - Secondary renal hyperparathyroidism; PTH level: Not checked recently, will recheck with next labs   - Vitamin D; level: Not checked recently, will recheck with next labs   - Calcium; level: Normal  - Phosphorus; level: Normal     # Electrolytes:   - Potassium; level: Normal  - Magnesium; level: Normal  - Bicarbonate; level: Normal  - Sodium; level: Normal        # CAD status-post CABG in 2015:               - pre op stress test in 7/2019 showed no stress-induced wall motion abnormality              -  due for  follow up with  cardiology.     # Nausea and Vomiting: due to insurance issues he has not been taking Florinef, Zofran or receiving his  weekly IV fluid infusions for the past 3-4 months. Insurance issues have since resolved, so he will be arranging for fluid replacement again soon. He was seen by GI with an updated gastric emptying study and was recommend gastric pacemaker, but patient declined.    # Skin Cancer Risk:    - Discussed sun protection and recommend regular follow up with Dermatology.    # Medical Compliance: No.  Evidence of labs less than recommended.  - Discussed importance of checking labs regularly as recommended, taking medications as prescribed and attending scheduled medical appointments.      # Transplant History:  Etiology of Kidney Failure: Diabetes mellitus type 1  Tx: LDKT  Transplant: 11/4/2008 (Kidney), 2/17/2009 (Pancreas)  Donor Type: Living Donor Class: Standard Criteria Donor  Significant changes in immunosuppression: GI issues with MMF   Significant transplant-related complications: None    Transplant Office Phone Number: [phone]    Assessment and plan was discussed with the patient and he voiced his understanding and agreement.    Return visit: No follow-ups on file.    JORDAN Tran CNP    Chief Complaint   Mr. Tata Fitzgerald is a 44 year old here for kidney transplant.    History of Present Illness    Since he was last seen by Dr. Chen in 3/2019 (see 3/28/2019 note for details) he lost his job and insurance and went without antinausea medications and fluid replacement for several months. He had an ED visit in 6/2019 for constipation, nausea, vomiting, after not taking his stool softeners for a few days. He denies constipation today. In addition, a few months ago he fell in his garage injuring his wrist and knee s/p left arthroscopic knee surgery in 7/2019. His surgery was complicated by urinary retention and required a temporary schultz. He report today he is making  "good urine output and denies any urinary symptoms today.     Recent Hospitalizations:  [] No [x] Yes Detailed above   New Medical Issues: [x] No [] Yes    Decreased energy: [] No [x] Yes \"very low\" new over the past few months   Chest pain or SOB with exertion:  [x] No [] Yes    Appetite change or weight change: [x] No [] Yes    Nausea, vomiting or diarrhea:  [] No [x] Yes    Fever, sweats or chills: [x] No [] Yes    Leg swelling: [x] No [] Yes      Home BP: Not checked      Review of Systems   A comprehensive review of systems was obtained and negative, except as noted in the HPI or PMH.    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)     Coronary artery disease involving native coronary artery of native heart without angina pectoris     Adhesive capsulitis of right shoulder     Dehydration       Social History   Social History     Tobacco Use     Smoking status: Former Smoker     Packs/day: 0.30     Types: Cigarettes     Last attempt to quit: 7/3/2015     Years since quittin.2     Smokeless tobacco: Never Used     Tobacco comment: E- Cig use still   Substance Use Topics     Alcohol use: Not Currently     Alcohol/week: 0.0 standard drinks     Frequency: Never     Binge frequency: Never     Drug use: No       Allergies   Allergies   Allergen Reactions     Benadryl [Diphenhydramine]      Reglan Other (See Comments)     IV, delsuions       Medications   Current Outpatient Medications   Medication Sig     acetaminophen (TYLENOL) 325 MG tablet Take 2 tablets (650 mg) by mouth every 4 hours " as needed for mild pain     acetone, Urine, test STRP 1 strip by In Vitro route daily     aspirin (ASA) 81 MG tablet Take 1 tablet (81 mg) by mouth daily     atorvastatin (LIPITOR) 40 MG tablet Take 0.5 tablets (20 mg) by mouth daily     YOHAN CONTOUR test strip USE TO TEST BLOOD SUGAR FIVE TIMES A DAY OR AS DIRECTED     blood glucose monitoring (NO BRAND SPECIFIED) test strip Freestyle test strips 6 times daily (NOT lite and NOT insulinx)     blood glucose monitoring (NO BRAND SPECIFIED) test strip 5 times daily ( contour NEXT)     Blood Glucose Monitoring Suppl (ClasesD CONTOUR MONITOR) W/DEVICE KIT TO TEST BG 5 TIMIES DAILY     fludrocortisone (FLORINEF) 0.1 MG tablet Take 2 tablets (0.2 mg) by mouth daily     Insulin Aspart (INSULIN PUMP - OUTPATIENT) Date last interrogated by pharmacy: 2/5/2019  Omnipod  Insulin: Novolog  BASAL RATES and times:   12 AM (midnight): 1.1 units/hr  2 AM: 1.25  10 AM: 0.8  6 PM: 0.95  CARB RATIO and times:  12 AM - 11 AM: 9  11 AM - 8 PM: 6  8 PM - 12 AM: 7   BLOOD GLUCOSE TARGET and times:  12 AM (midnight): 100, correct above 120 with reverse correction on  Active Insulin Time: 4 hours  ISF (insulin sensitivity factor):   40 from   38 from [id number]  Dexcom sharing cose: [id number]     insulin aspart (NOVOLOG VIAL) 100 UNITS/ML vial TO BE USED IN INSULIN PUMP UP TO 75 UNITS TOTAL DAILY DOSE     insulin pen needle (BD ARPITA U/F) 32G X 4 MM Use 4 daily or as directed.     metoclopramide (REGLAN) 5 MG tablet TAKE ONE TABLET BY MOUTH FOUR TIMES A DAY (BEFORE MEALS AND NIGHTLY)     mycophenolic acid (GENERIC EQUIVALENT) 180 MG EC tablet Take 2 tablets (360 mg) by mouth 2 times daily     omeprazole (PRILOSEC) 40 MG DR capsule Take 1 capsule (40 mg) by mouth 2 times daily     ondansetron (ZOFRAN-ODT) 4 MG ODT tab Take 1-2 tablets (4-8 mg) by mouth every 8 hours as needed for nausea     senna-docusate (SENOKOT-S/PERICOLACE) 8.6-50 MG tablet Take 1-2 tablets by mouth 2 times  "daily     tacrolimus (GENERIC EQUIVALENT) 1 MG capsule Take 2 capsules (2 mg) by mouth 2 times daily     No current facility-administered medications for this visit.      There are no discontinued medications.    Physical Exam   Vital Signs: /80 (BP Location: Right arm, Patient Position: Sitting, Cuff Size: Adult Regular)   Pulse 102   Temp 98.5  F (36.9  C) (Oral)   Resp 16   Ht 1.727 m (5' 8\")   Wt 92.9 kg (204 lb 11.2 oz)   SpO2 98%   BMI 31.12 kg/m       GENERAL APPEARANCE: alert and no distress  HENT: mouth without ulcers or lesions  LYMPHATICS: no cervical or supraclavicular nodes  RESP: lungs clear to auscultation - no rales, rhonchi or wheezes  CV: regular rhythm, normal rate, no rub, no murmur  EDEMA: no LE edema bilaterally  ABDOMEN: soft, nondistended, nontender, bowel sounds normal  MS: extremities normal - no gross deformities noted, no evidence of inflammation in joints, no muscle tenderness  SKIN: no rash  TX KIDNEY: abnormal      Data     Renal Latest Ref Rng & Units 8/28/2019 8/22/2019 7/11/2019   Na 133 - 144 mmol/L 140 139 138   K 3.4 - 5.3 mmol/L 4.5 4.6 3.7   Cl 94 - 109 mmol/L 106 105 101   CO2 20 - 32 mmol/L 25 27 -   BUN 7 - 30 mg/dL 26 21 28(H)   Cr 0.66 - 1.25 mg/dL 2.55(H) 2.30(H) -   Glucose 70 - 99 mg/dL 128(H) 118(H) 139(H)   Ca  8.5 - 10.1 mg/dL 9.0 9.1 -   Mg 1.6 - 2.3 mg/dL - - -     Bone Health Latest Ref Rng & Units 8/13/2015 8/12/2015 8/11/2015   Phos 2.5 - 4.5 mg/dL 2.6 2.5 2.4(L)     Heme Latest Ref Rng & Units 8/22/2019 7/11/2019 6/16/2019   WBC 4.0 - 11.0 10e9/L 9.6 - 11.7(H)   Hgb 13.3 - 17.7 g/dL 12.8(L) 12.2(L) 14.4   Plt 150 - 450 10e9/L 472(H) - 342     Liver Latest Ref Rng & Units 6/16/2019 3/5/2019 3/5/2019   AP 40 - 150 U/L 107 109 115   TBili 0.2 - 1.3 mg/dL 0.7 0.5 0.6   DBili 0.0 - 0.2 mg/dL - - -   ALT 0 - 70 U/L 12 22 24   AST 0 - 45 U/L 11 16 21   Tot Protein 6.8 - 8.8 g/dL 8.0 8.5 9.0(H)   Albumin 3.4 - 5.0 g/dL 3.2(L) 3.8 3.8     Pancreas Latest " Ref Rng & Units 6/28/2019 3/5/2019 3/5/2019   A1C 0 - 5.6 % 6.8(H) - -   Amylase 30 - 110 U/L - - -   Lipase 73 - 393 U/L - 30(L) 29(L)     Iron studies Latest Ref Rng & Units 11/6/2008 11/5/2008   Iron 35 - 180 ug/dL - 17(L)   Ferritin 20 - 300 ng/mL 114 -     [facility] Txp Virology Latest Ref Rng & Units 7/26/2016 7/8/2015 1/28/2015   CMV IgG EU/mL - - -   CVM DNA Quant - Plasma, EDTA anticoagulant - -   CMV Quant <100 Copies/mL - - -   CMV QT Log <2.0 Log copies/mL - - -   BK Spec - - Plasma Plasma   BK Res BKNEG copies/mL - BK Virus DNA Not Detected BK Virus DNA Not Detected   BK Log <2.7 Log copies/mL - Not Calculated   The Real-Time quantitative BK Virus assay was developed and its performance   characteristics determined by the [facility] Laboratory at   the [facility] in [place], [place]. The   primers and probes for each analyte are Analyte Specific Reagents (ASRs)   manufactured by [employer].   ASRs are used in many laboratory tests necessary for standard medical care and   generally do not require U.S. Food and Drug Administration approval. The FDA   has determined that such clearance or approval is not necessary.   This test is used for clinical purposes. It should not be regarded as   investigational or for research. This laboratory is certified under the   Clinical Laboratory Improvement Amendments of 1988 (CLIA-88) as qualified to   perform high complexity clinical laboratory testing.   Not Calculated   The Real-Time quantitative BK Virus assay was developed and its performance   characteristics determined by the [facility] Diagnostic Laboratory at   the [facility] in [place], [place]. The   primers and probes for each analyte are Analyte Specific Reagents (ASRs)   manufactured by [employer].   ASRs are used in many laboratory tests necessary for standard medical care and   generally do not require U.S. Food and Drug  Administration approval. The FDA   has determined that such clearance or approval is not necessary.   This test is used for clinical purposes. It should not be regarded as   investigational or for research. This laboratory is certified under the   Clinical Laboratory Improvement Amendments of 1988 (CLIA-88) as qualified to   perform high complexity clinical laboratory testing.     EBV IgG - - - -   Hep B Core NEG - - -   Hep B Surf - - - -   HIV 1&2 NEG - - -        Recent Labs   Lab Test 03/12/19  0716 08/22/19  0857 08/28/19  0900   DOSTAC 03/11/2019 @ 1930 2100 08/21/19 900   TACROL 4.1* <3.0* 4.9*     Recent Labs   Lab Test 11/11/13  0726 11/20/13  0910 01/11/14  0621   DOSMPA Not Provided 11/19/13 2100 Not Provided   MPACID <0.25* 0.90* 3.63*   MPAG <6.5* 29.8* 157.4*   Patient was seen by myself, Dr. Adam Villalba, in conjunction with Mikhail Rachel NP as part of a shared visit.    I personally reviewed past medical and surgical history, vital signs, medications and labs.  Present and past medical history, along with significant physical exam findings were all reviewed with DENVER.    My garcia findings:  Murtaza Fitzgerald is 44 year old here for follow up     Key management decisions made by me and discussed with DENVER:  - kidney transplant with chronic dysfunction stable   - chronic immunosuppression   - orthostatic hypotension   - severe gastroparesis   Continue current meds. Discussed compression stocking to help with the orthostatic symptoms.       Again, thank you for allowing me to participate in the care of your patient.      Sincerely,     Kidney/Pancreas 3

## 2019-10-08 NOTE — LETTER
10/8/2019      RE: Murtaza Fitzgerald  [address] [facility] [id number]       CHRONIC TRANSPLANT NEPHROLOGY VISIT    Assessment & Plan   # LDKT: Increased   - Baseline Cr ~ 1.6-1.8   - Proteinuria: Moderate (1-3 grams)   - Date DSA Last Checked: Jul/2017       Latest DSA: No   - BK Viremia: No   - Kidney Tx Biopsy: Dec 13, 2013; Result: CNI toxicity               - Decline in kidney function likely pre renal. Will need to follow up now with monthly labs to monitor.     # Diabetes status-post failed ANNMARIE (bladder drained):              - Borderline control              - HbA1c: Stable, most recently at 6.8.     Pancreas failed due rejection. Not interested in pancreas retransplant at this time.     # Immunosuppression:  tacrolimus immediate release (goal 4-6) and Myfortic (not followed)    - Changes: No    # Prophylaxis:    - PJP: None, will check CD4 count     # Orthostatic hypotension: Controlled , /71 today, he does not have a cuff at home to check blood pressures.    - Changes: No, continue Florinef 0.1 mg BID . Encouraged compression short use.     # Anemia in Chronic Renal Disease: Hgb: Stable      WILL: No   - Iron studies: Not checked recently    # Mineral Bone Disorder:   - Secondary renal hyperparathyroidism; PTH level: Not checked recently, will recheck with next labs   - Vitamin D; level: Not checked recently, will recheck with next labs   - Calcium; level: Normal  - Phosphorus; level: Normal     # Electrolytes:   - Potassium; level: Normal  - Magnesium; level: Normal  - Bicarbonate; level: Normal  - Sodium; level: Normal        # CAD status-post CABG in 2015:               - pre op stress test in 7/2019 showed no stress-induced wall motion abnormality              -  due for follow up with  cardiology.     # Nausea and Vomiting: due to insurance issues he has not been taking Florinef, Zofran or receiving his  weekly IV fluid infusions for the past 3-4 months. Insurance issues  "have since resolved, so he will be arranging for fluid replacement again soon. He was seen by GI with an updated gastric emptying study and was recommend gastric pacemaker, but patient declined.    # Skin Cancer Risk:    - Discussed sun protection and recommend regular follow up with Dermatology.    # Medical Compliance: No.  Evidence of labs less than recommended.  - Discussed importance of checking labs regularly as recommended, taking medications as prescribed and attending scheduled medical appointments.      # Transplant History:  Etiology of Kidney Failure: Diabetes mellitus type 1  Tx: LDKT  Transplant: 11/4/2008 (Kidney), 2/17/2009 (Pancreas)  Donor Type: Living Donor Class: Standard Criteria Donor  Significant changes in immunosuppression: GI issues with MMF   Significant transplant-related complications: None    Transplant Office Phone Number: [phone]    Assessment and plan was discussed with the patient and he voiced his understanding and agreement.    Return visit: No follow-ups on file.    JORDAN Tran CNP    Chief Complaint   Mr. Tata Fitzgerald is a 44 year old here for kidney transplant.    History of Present Illness    Since he was last seen by Dr. Chen in 3/2019 (see 3/28/2019 note for details) he lost his job and insurance and went without antinausea medications and fluid replacement for several months. He had an ED visit in 6/2019 for constipation, nausea, vomiting, after not taking his stool softeners for a few days. He denies constipation today. In addition, a few months ago he fell in his garage injuring his wrist and knee s/p left arthroscopic knee surgery in 7/2019. His surgery was complicated by urinary retention and required a temporary schultz. He report today he is making good urine output and denies any urinary symptoms today.     Recent Hospitalizations:  [] No [x] Yes Detailed above   New Medical Issues: [x] No [] Yes    Decreased energy: [] No [x] Yes \"very low\" new over " the past few months   Chest pain or SOB with exertion:  [x] No [] Yes    Appetite change or weight change: [x] No [] Yes    Nausea, vomiting or diarrhea:  [] No [x] Yes    Fever, sweats or chills: [x] No [] Yes    Leg swelling: [x] No [] Yes      Home BP: Not checked      Review of Systems   A comprehensive review of systems was obtained and negative, except as noted in the HPI or PMH.    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)     Coronary artery disease involving native coronary artery of native heart without angina pectoris     Adhesive capsulitis of right shoulder     Dehydration       Social History   Social History     Tobacco Use     Smoking status: Former Smoker     Packs/day: 0.30     Types: Cigarettes     Last attempt to quit: 7/3/2015     Years since quittin.2     Smokeless tobacco: Never Used     Tobacco comment: E- Cig use still   Substance Use Topics     Alcohol use: Not Currently     Alcohol/week: 0.0 standard drinks     Frequency: Never     Binge frequency: Never     Drug use: No       Allergies   Allergies   Allergen Reactions     Benadryl [Diphenhydramine]      Reglan Other (See Comments)     IV, delsuions       Medications   Current Outpatient Medications   Medication Sig     acetaminophen (TYLENOL) 325 MG tablet Take 2 tablets (650 mg) by mouth every 4 hours as needed for mild pain     acetone, Urine, test STRP 1 strip by In Vitro route daily     aspirin (ASA) 81 MG tablet Take 1 tablet (81 mg) by mouth daily     atorvastatin (LIPITOR) 40 MG tablet Take 0.5  tablets (20 mg) by mouth daily     YOHAN CONTOUR test strip USE TO TEST BLOOD SUGAR FIVE TIMES A DAY OR AS DIRECTED     blood glucose monitoring (NO BRAND SPECIFIED) test strip Freestyle test strips 6 times daily (NOT lite and NOT insulinx)     blood glucose monitoring (NO BRAND SPECIFIED) test strip 5 times daily ( contour NEXT)     Blood Glucose Monitoring Suppl ([employer] CONTOUR MONITOR) W/DEVICE KIT TO TEST BG 5 TIMIES DAILY     fludrocortisone (FLORINEF) 0.1 MG tablet Take 2 tablets (0.2 mg) by mouth daily     Insulin Aspart (INSULIN PUMP - OUTPATIENT) Date last interrogated by pharmacy: 2/5/2019  Omnipod  Insulin: Novolog  BASAL RATES and times:   12 AM (midnight): 1.1 units/hr  2 AM: 1.25  10 AM: 0.8  6 PM: 0.95  CARB RATIO and times:  12 AM - 11 AM: 9  11 AM - 8 PM: 6  8 PM - 12 AM: 7   BLOOD GLUCOSE TARGET and times:  12 AM (midnight): 100, correct above 120 with reverse correction on  Active Insulin Time: 4 hours  ISF (insulin sensitivity factor):   40 from   38 from [id number]  Dexcom sharing cose: [id number]     insulin aspart (NOVOLOG VIAL) 100 UNITS/ML vial TO BE USED IN INSULIN PUMP UP TO 75 UNITS TOTAL DAILY DOSE     insulin pen needle (BD ARPITA U/F) 32G X 4 MM Use 4 daily or as directed.     metoclopramide (REGLAN) 5 MG tablet TAKE ONE TABLET BY MOUTH FOUR TIMES A DAY (BEFORE MEALS AND NIGHTLY)     mycophenolic acid (GENERIC EQUIVALENT) 180 MG EC tablet Take 2 tablets (360 mg) by mouth 2 times daily     omeprazole (PRILOSEC) 40 MG DR capsule Take 1 capsule (40 mg) by mouth 2 times daily     ondansetron (ZOFRAN-ODT) 4 MG ODT tab Take 1-2 tablets (4-8 mg) by mouth every 8 hours as needed for nausea     senna-docusate (SENOKOT-S/PERICOLACE) 8.6-50 MG tablet Take 1-2 tablets by mouth 2 times daily     tacrolimus (GENERIC EQUIVALENT) 1 MG capsule Take 2 capsules (2 mg) by mouth 2 times daily     No current facility-administered medications for this visit.      There are no discontinued  "medications.    Physical Exam   Vital Signs: /80 (BP Location: Right arm, Patient Position: Sitting, Cuff Size: Adult Regular)   Pulse 102   Temp 98.5  F (36.9  C) (Oral)   Resp 16   Ht 1.727 m (5' 8\")   Wt 92.9 kg (204 lb 11.2 oz)   SpO2 98%   BMI 31.12 kg/m       GENERAL APPEARANCE: alert and no distress  HENT: mouth without ulcers or lesions  LYMPHATICS: no cervical or supraclavicular nodes  RESP: lungs clear to auscultation - no rales, rhonchi or wheezes  CV: regular rhythm, normal rate, no rub, no murmur  EDEMA: no LE edema bilaterally  ABDOMEN: soft, nondistended, nontender, bowel sounds normal  MS: extremities normal - no gross deformities noted, no evidence of inflammation in joints, no muscle tenderness  SKIN: no rash  TX KIDNEY: abnormal      Data     Renal Latest Ref Rng & Units 8/28/2019 8/22/2019 7/11/2019   Na 133 - 144 mmol/L 140 139 138   K 3.4 - 5.3 mmol/L 4.5 4.6 3.7   Cl 94 - 109 mmol/L 106 105 101   CO2 20 - 32 mmol/L 25 27 -   BUN 7 - 30 mg/dL 26 21 28(H)   Cr 0.66 - 1.25 mg/dL 2.55(H) 2.30(H) -   Glucose 70 - 99 mg/dL 128(H) 118(H) 139(H)   Ca  8.5 - 10.1 mg/dL 9.0 9.1 -   Mg 1.6 - 2.3 mg/dL - - -     Bone Health Latest Ref Rng & Units 8/13/2015 8/12/2015 8/11/2015   Phos 2.5 - 4.5 mg/dL 2.6 2.5 2.4(L)     Heme Latest Ref Rng & Units 8/22/2019 7/11/2019 6/16/2019   WBC 4.0 - 11.0 10e9/L 9.6 - 11.7(H)   Hgb 13.3 - 17.7 g/dL 12.8(L) 12.2(L) 14.4   Plt 150 - 450 10e9/L 472(H) - 342     Liver Latest Ref Rng & Units 6/16/2019 3/5/2019 3/5/2019   AP 40 - 150 U/L 107 109 115   TBili 0.2 - 1.3 mg/dL 0.7 0.5 0.6   DBili 0.0 - 0.2 mg/dL - - -   ALT 0 - 70 U/L 12 22 24   AST 0 - 45 U/L 11 16 21   Tot Protein 6.8 - 8.8 g/dL 8.0 8.5 9.0(H)   Albumin 3.4 - 5.0 g/dL 3.2(L) 3.8 3.8     Pancreas Latest Ref Rng & Units 6/28/2019 3/5/2019 3/5/2019   A1C 0 - 5.6 % 6.8(H) - -   Amylase 30 - 110 U/L - - -   Lipase 73 - 393 U/L - 30(L) 29(L)     Iron studies Latest Ref Rng & Units 11/6/2008 11/5/2008 "   Iron 35 - 180 ug/dL - 17(L)   Ferritin 20 - 300 ng/mL 114 -     [facility] Txp Virology Latest Ref Rng & Units 7/26/2016 7/8/2015 1/28/2015   CMV IgG EU/mL - - -   CVM DNA Quant - Plasma, EDTA anticoagulant - -   CMV Quant <100 Copies/mL - - -   CMV QT Log <2.0 Log copies/mL - - -   BK Spec - - Plasma Plasma   BK Res BKNEG copies/mL - BK Virus DNA Not Detected BK Virus DNA Not Detected   BK Log <2.7 Log copies/mL - Not Calculated   The Real-Time quantitative BK Virus assay was developed and its performance   characteristics determined by the [facility] Laboratory at   the [facility] in [place], [place]. The   primers and probes for each analyte are Analyte Specific Reagents (ASRs)   manufactured by [employer].   ASRs are used in many laboratory tests necessary for standard medical care and   generally do not require U.S. Food and Drug Administration approval. The FDA   has determined that such clearance or approval is not necessary.   This test is used for clinical purposes. It should not be regarded as   investigational or for research. This laboratory is certified under the   Clinical Laboratory Improvement Amendments of 1988 (CLIA-88) as qualified to   perform high complexity clinical laboratory testing.   Not Calculated   The Real-Time quantitative BK Virus assay was developed and its performance   characteristics determined by the [facility] Laboratory at   the [facility] in [place], [place]. The   primers and probes for each analyte are Analyte Specific Reagents (ASRs)   manufactured by [employer].   ASRs are used in many laboratory tests necessary for standard medical care and   generally do not require U.S. Food and Drug Administration approval. The FDA   has determined that such clearance or approval is not necessary.   This test is used for clinical purposes. It should not be regarded as   investigational or for  research. This laboratory is certified under the   Clinical Laboratory Improvement Amendments of 1988 (CLIA-88) as qualified to   perform high complexity clinical laboratory testing.     EBV IgG - - - -   Hep B Core NEG - - -   Hep B Surf - - - -   HIV 1&2 NEG - - -        Recent Labs   Lab Test 03/12/19  0716 08/22/19  0857 08/28/19  0900   DOSTAC 03/11/2019 @ 1930 2100 08/21/19 900   TACROL 4.1* <3.0* 4.9*     Recent Labs   Lab Test 11/11/13  0726 11/20/13  0910 01/11/14  0621   DOSMPA Not Provided 11/19/13 2100 Not Provided   MPACID <0.25* 0.90* 3.63*   MPAG <6.5* 29.8* 157.4*   Patient was seen by myself, Dr. Adam Villalba, in conjunction with Mikhail Rachel NP as part of a shared visit.    I personally reviewed past medical and surgical history, vital signs, medications and labs.  Present and past medical history, along with significant physical exam findings were all reviewed with DENVER.    My garcia findings:  Murtaza Fitzgerald is 44 year old here for follow up     Key management decisions made by me and discussed with DENVER:  - kidney transplant with chronic dysfunction stable   - chronic immunosuppression   - orthostatic hypotension   - severe gastroparesis   Continue current meds. Discussed compression stocking to help with the orthostatic symptoms.       [facility]

## 2019-10-08 NOTE — TELEPHONE ENCOUNTER
Please call patient.  Confirm that he is NOT having significant side effects or allergic symptoms on reglan, (metacloprimide) before I refill.    Kt Ríos MD  Internal Medicine and Pediatrics

## 2019-10-08 NOTE — TELEPHONE ENCOUNTER
Dr. Ríos:    I spoke with the Pt. He denies any side effects, denies any rash or allergic reaction.   He reports only having issues with the IV form of this medication.     Monie Conroy RN -- [facility]

## 2019-10-08 NOTE — NURSING NOTE
"Chief Complaint   Patient presents with     RECHECK     S/P Kidney/Pancreas TX       Vital signs:  Temp: 98.5  F (36.9  C) Temp src: Oral BP: 117/80 Pulse: 102   Resp: 16 SpO2: 98 %     Height: 172.7 cm (5' 8\") Weight: 92.9 kg (204 lb 11.2 oz)  Estimated body mass index is 31.12 kg/m  as calculated from the following:    Height as of this encounter: 1.727 m (5' 8\").    Weight as of this encounter: 92.9 kg (204 lb 11.2 oz).          Catalina England, [facility]  10/8/2019 2:04 PM      "

## 2019-10-08 NOTE — PROGRESS NOTES
CHRONIC TRANSPLANT NEPHROLOGY VISIT    Assessment & Plan   # LDKT: Increased   - Baseline Cr ~ 1.6-1.8   - Proteinuria: Moderate (1-3 grams)   - Date DSA Last Checked: Jul/2017       Latest DSA: No   - BK Viremia: No   - Kidney Tx Biopsy: Dec 13, 2013; Result: CNI toxicity               - Decline in kidney function likely pre renal. Will need to follow up now with monthly labs to monitor.     # Diabetes status-post failed ANNMARIE (bladder drained):              - Borderline control              - HbA1c: Stable, most recently at 6.8.     Pancreas failed due rejection. Not interested in pancreas retransplant at this time.     # Immunosuppression:  tacrolimus immediate release (goal 4-6) and Myfortic (not followed)    - Changes: No    # Prophylaxis:    - PJP: None, will check CD4 count     # Orthostatic hypotension: Controlled , /71 today, he does not have a cuff at home to check blood pressures.    - Changes: No, continue Florinef 0.1 mg BID . Encouraged compression short use.     # Anemia in Chronic Renal Disease: Hgb: Stable      WILL: No   - Iron studies: Not checked recently    # Mineral Bone Disorder:   - Secondary renal hyperparathyroidism; PTH level: Not checked recently, will recheck with next labs   - Vitamin D; level: Not checked recently, will recheck with next labs   - Calcium; level: Normal  - Phosphorus; level: Normal     # Electrolytes:   - Potassium; level: Normal  - Magnesium; level: Normal  - Bicarbonate; level: Normal  - Sodium; level: Normal        # CAD status-post CABG in 2015:               - pre op stress test in 7/2019 showed no stress-induced wall motion abnormality              - due for follow up with  cardiology.     # Nausea and Vomiting: due to insurance issues he has not been taking Florinef, Zofran or receiving his  weekly IV fluid infusions for the past 3-4 months. Insurance issues have since resolved, so he will be arranging for fluid replacement again soon. He was seen by GI with  "an updated gastric emptying study and was recommend gastric pacemaker, but patient declined.    # Skin Cancer Risk:    - Discussed sun protection and recommend regular follow up with Dermatology.    # Medical Compliance: No.  Evidence of labs less than recommended.  - Discussed importance of checking labs regularly as recommended, taking medications as prescribed and attending scheduled medical appointments.      # Transplant History:  Etiology of Kidney Failure: Diabetes mellitus type 1  Tx: LDKT  Transplant: 11/4/2008 (Kidney), 2/17/2009 (Pancreas)  Donor Type: Living Donor Class: Standard Criteria Donor  Significant changes in immunosuppression: GI issues with MMF   Significant transplant-related complications: None    Transplant Office Phone Number: [phone]    Assessment and plan was discussed with the patient and he voiced his understanding and agreement.    Return visit: No follow-ups on file.    JORDAN Tran CNP    Chief Complaint   Mr. Tata Fitzgerald is a 44 year old here for kidney transplant.    History of Present Illness    Since he was last seen by Dr. Chen in 3/2019 (see 3/28/2019 note for details) he lost his job and insurance and went without antinausea medications and fluid replacement for several months. He had an ED visit in 6/2019 for constipation, nausea, vomiting, after not taking his stool softeners for a few days. He denies constipation today. In addition, a few months ago he fell in his garage injuring his wrist and knee s/p left arthroscopic knee surgery in 7/2019. His surgery was complicated by urinary retention and required a temporary schultz. He report today he is making good urine output and denies any urinary symptoms today.     Recent Hospitalizations:  [] No [x] Yes Detailed above   New Medical Issues: [x] No [] Yes    Decreased energy: [] No [x] Yes \"very low\" new over the past few months   Chest pain or SOB with exertion:  [x] No [] Yes    Appetite change or weight " change: [x] No [] Yes    Nausea, vomiting or diarrhea:  [] No [x] Yes    Fever, sweats or chills: [x] No [] Yes    Leg swelling: [x] No [] Yes      Home BP: Not checked      Review of Systems   A comprehensive review of systems was obtained and negative, except as noted in the HPI or PMH.    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)     Coronary artery disease involving native coronary artery of native heart without angina pectoris     Adhesive capsulitis of right shoulder     Dehydration       Social History   Social History     Tobacco Use     Smoking status: Former Smoker     Packs/day: 0.30     Types: Cigarettes     Last attempt to quit: 7/3/2015     Years since quittin.2     Smokeless tobacco: Never Used     Tobacco comment: E- Cig use still   Substance Use Topics     Alcohol use: Not Currently     Alcohol/week: 0.0 standard drinks     Frequency: Never     Binge frequency: Never     Drug use: No       Allergies   Allergies   Allergen Reactions     Benadryl [Diphenhydramine]      Reglan Other (See Comments)     IV, delsuions       Medications   Current Outpatient Medications   Medication Sig     acetaminophen (TYLENOL) 325 MG tablet Take 2 tablets (650 mg) by mouth every 4 hours as needed for mild pain     acetone, Urine, test STRP 1 strip by In Vitro route daily     aspirin (ASA) 81 MG tablet Take 1 tablet (81 mg) by mouth daily     atorvastatin (LIPITOR) 40 MG tablet Take 0.5 tablets (20 mg) by mouth daily     YOHAN CONTOUR test strip USE TO TEST BLOOD SUGAR FIVE TIMES A DAY OR  AS DIRECTED     blood glucose monitoring (NO BRAND SPECIFIED) test strip Freestyle test strips 6 times daily (NOT lite and NOT insulinx)     blood glucose monitoring (NO BRAND SPECIFIED) test strip 5 times daily ( contour NEXT)     Blood Glucose Monitoring Suppl (Customcells CONTOUR MONITOR) W/DEVICE KIT TO TEST BG 5 TIMIES DAILY     fludrocortisone (FLORINEF) 0.1 MG tablet Take 2 tablets (0.2 mg) by mouth daily     Insulin Aspart (INSULIN PUMP - OUTPATIENT) Date last interrogated by pharmacy: 2/5/2019  Omnipod  Insulin: Novolog  BASAL RATES and times:   12 AM (midnight): 1.1 units/hr  2 AM: 1.25  10 AM: 0.8  6 PM: 0.95  CARB RATIO and times:  12 AM - 11 AM: 9  11 AM - 8 PM: 6  8 PM - 12 AM: 7   BLOOD GLUCOSE TARGET and times:  12 AM (midnight): 100, correct above 120 with reverse correction on  Active Insulin Time: 4 hours  ISF (insulin sensitivity factor):   40 from   38 from [id number]  Dexcom sharing cose: [id number]     insulin aspart (NOVOLOG VIAL) 100 UNITS/ML vial TO BE USED IN INSULIN PUMP UP TO 75 UNITS TOTAL DAILY DOSE     insulin pen needle (BD ARPITA U/F) 32G X 4 MM Use 4 daily or as directed.     metoclopramide (REGLAN) 5 MG tablet TAKE ONE TABLET BY MOUTH FOUR TIMES A DAY (BEFORE MEALS AND NIGHTLY)     mycophenolic acid (GENERIC EQUIVALENT) 180 MG EC tablet Take 2 tablets (360 mg) by mouth 2 times daily     omeprazole (PRILOSEC) 40 MG DR capsule Take 1 capsule (40 mg) by mouth 2 times daily     ondansetron (ZOFRAN-ODT) 4 MG ODT tab Take 1-2 tablets (4-8 mg) by mouth every 8 hours as needed for nausea     senna-docusate (SENOKOT-S/PERICOLACE) 8.6-50 MG tablet Take 1-2 tablets by mouth 2 times daily     tacrolimus (GENERIC EQUIVALENT) 1 MG capsule Take 2 capsules (2 mg) by mouth 2 times daily     No current facility-administered medications for this visit.      There are no discontinued medications.    Physical Exam   Vital Signs: /80 (BP Location: Right arm, Patient Position: Sitting, Cuff  "Size: Adult Regular)   Pulse 102   Temp 98.5  F (36.9  C) (Oral)   Resp 16   Ht 1.727 m (5' 8\")   Wt 92.9 kg (204 lb 11.2 oz)   SpO2 98%   BMI 31.12 kg/m      GENERAL APPEARANCE: alert and no distress  HENT: mouth without ulcers or lesions  LYMPHATICS: no cervical or supraclavicular nodes  RESP: lungs clear to auscultation - no rales, rhonchi or wheezes  CV: regular rhythm, normal rate, no rub, no murmur  EDEMA: no LE edema bilaterally  ABDOMEN: soft, nondistended, nontender, bowel sounds normal  MS: extremities normal - no gross deformities noted, no evidence of inflammation in joints, no muscle tenderness  SKIN: no rash  TX KIDNEY: abnormal      Data     Renal Latest Ref Rng & Units 8/28/2019 8/22/2019 7/11/2019   Na 133 - 144 mmol/L 140 139 138   K 3.4 - 5.3 mmol/L 4.5 4.6 3.7   Cl 94 - 109 mmol/L 106 105 101   CO2 20 - 32 mmol/L 25 27 -   BUN 7 - 30 mg/dL 26 21 28(H)   Cr 0.66 - 1.25 mg/dL 2.55(H) 2.30(H) -   Glucose 70 - 99 mg/dL 128(H) 118(H) 139(H)   Ca  8.5 - 10.1 mg/dL 9.0 9.1 -   Mg 1.6 - 2.3 mg/dL - - -     Bone Health Latest Ref Rng & Units 8/13/2015 8/12/2015 8/11/2015   Phos 2.5 - 4.5 mg/dL 2.6 2.5 2.4(L)     Heme Latest Ref Rng & Units 8/22/2019 7/11/2019 6/16/2019   WBC 4.0 - 11.0 10e9/L 9.6 - 11.7(H)   Hgb 13.3 - 17.7 g/dL 12.8(L) 12.2(L) 14.4   Plt 150 - 450 10e9/L 472(H) - 342     Liver Latest Ref Rng & Units 6/16/2019 3/5/2019 3/5/2019   AP 40 - 150 U/L 107 109 115   TBili 0.2 - 1.3 mg/dL 0.7 0.5 0.6   DBili 0.0 - 0.2 mg/dL - - -   ALT 0 - 70 U/L 12 22 24   AST 0 - 45 U/L 11 16 21   Tot Protein 6.8 - 8.8 g/dL 8.0 8.5 9.0(H)   Albumin 3.4 - 5.0 g/dL 3.2(L) 3.8 3.8     Pancreas Latest Ref Rng & Units 6/28/2019 3/5/2019 3/5/2019   A1C 0 - 5.6 % 6.8(H) - -   Amylase 30 - 110 U/L - - -   Lipase 73 - 393 U/L - 30(L) 29(L)     Iron studies Latest Ref Rng & Units 11/6/2008 11/5/2008   Iron 35 - 180 ug/dL - 17(L)   Ferritin 20 - 300 ng/mL 114 -     [facility] Txp Virology Latest Ref Rng & Units 7/26/2016 " 7/8/2015 1/28/2015   CMV IgG EU/mL - - -   CVM DNA Quant - Plasma, EDTA anticoagulant - -   CMV Quant <100 Copies/mL - - -   CMV QT Log <2.0 Log copies/mL - - -   BK Spec - - Plasma Plasma   BK Res BKNEG copies/mL - BK Virus DNA Not Detected BK Virus DNA Not Detected   BK Log <2.7 Log copies/mL - Not Calculated   The Real-Time quantitative BK Virus assay was developed and its performance   characteristics determined by the [facility] at   the [facility] in [place], [place]. The   primers and probes for each analyte are Analyte Specific Reagents (ASRs)   manufactured by [employer].   ASRs are used in many laboratory tests necessary for standard medical care and   generally do not require U.S. Food and Drug Administration approval. The FDA   has determined that such clearance or approval is not necessary.   This test is used for clinical purposes. It should not be regarded as   investigational or for research. This laboratory is certified under the   Clinical Laboratory Improvement Amendments of 1988 (CLIA-88) as qualified to   perform high complexity clinical laboratory testing.   Not Calculated   The Real-Time quantitative BK Virus assay was developed and its performance   characteristics determined by the [facility] Laboratory at   the [facility] in [place], [place]. The   primers and probes for each analyte are Analyte Specific Reagents (ASRs)   manufactured by [employer].   ASRs are used in many laboratory tests necessary for standard medical care and   generally do not require U.S. Food and Drug Administration approval. The FDA   has determined that such clearance or approval is not necessary.   This test is used for clinical purposes. It should not be regarded as   investigational or for research. This laboratory is certified under the   Clinical Laboratory Improvement Amendments of 1988 (CLIA-88) as  qualified to   perform high complexity clinical laboratory testing.     EBV IgG - - - -   Hep B Core NEG - - -   Hep B Surf - - - -   HIV 1&2 NEG - - -        Recent Labs   Lab Test 03/12/19  0716 08/22/19  0857 08/28/19  0900   DOSTAC 03/11/2019 @ 1930 2100 08/21/19 900   TACROL 4.1* <3.0* 4.9*     Recent Labs   Lab Test 11/11/13  0726 11/20/13  0910 01/11/14  0621   DOSMPA Not Provided 11/19/13 2100 Not Provided   MPACID <0.25* 0.90* 3.63*   MPAG <6.5* 29.8* 157.4*   Patient was seen by myself, Dr. Adam Villalba, in conjunction with Mikhail Rachel NP as part of a shared visit.    I personally reviewed past medical and surgical history, vital signs, medications and labs.  Present and past medical history, along with significant physical exam findings were all reviewed with DENVER.    My garcia findings:  Murtaza Fitzgerald is 44 year old here for follow up     Key management decisions made by me and discussed with DENVER:  - kidney transplant with chronic dysfunction stable   - chronic immunosuppression   - orthostatic hypotension   - severe gastroparesis   Continue current meds. Discussed compression stocking to help with the orthostatic symptoms.

## 2019-10-10 ENCOUNTER — TRANSFERRED RECORDS (OUTPATIENT)
Dept: HEALTH INFORMATION MANAGEMENT | Facility: CLINIC | Age: 44
End: 2019-10-10

## 2019-10-22 DIAGNOSIS — Z94.0 KIDNEY TRANSPLANTED: ICD-10-CM

## 2019-10-22 DIAGNOSIS — Z48.298 AFTERCARE FOLLOWING ORGAN TRANSPLANT: ICD-10-CM

## 2019-10-22 DIAGNOSIS — E10.59 TYPE 1 DIABETES MELLITUS WITH DIABETIC CARDIOMYOPATHY (H): ICD-10-CM

## 2019-10-22 DIAGNOSIS — T86.10 COMPLICATIONS, KIDNEY TRANSPLANT: ICD-10-CM

## 2019-10-22 DIAGNOSIS — I43 TYPE 1 DIABETES MELLITUS WITH DIABETIC CARDIOMYOPATHY (H): ICD-10-CM

## 2019-10-22 LAB
ANION GAP SERPL CALCULATED.3IONS-SCNC: 12 MMOL/L (ref 3–14)
BUN SERPL-MCNC: 37 MG/DL (ref 7–30)
CALCIUM SERPL-MCNC: 8.8 MG/DL (ref 8.5–10.1)
CD3 CELLS # BLD: 1095 CELLS/UL (ref 603–2990)
CD3 CELLS NFR BLD: 61 % (ref 49–84)
CD3+CD4+ CELLS # BLD: 572 CELLS/UL (ref 441–2156)
CD3+CD4+ CELLS NFR BLD: 32 % (ref 28–63)
CD3+CD4+ CELLS/CD3+CD8+ CLL BLD: 1.33 % (ref 1.4–2.6)
CD3+CD8+ CELLS # BLD: 431 CELLS/UL (ref 125–1312)
CD3+CD8+ CELLS NFR BLD: 24 % (ref 10–40)
CHLORIDE SERPL-SCNC: 108 MMOL/L (ref 94–109)
CO2 SERPL-SCNC: 19 MMOL/L (ref 20–32)
CREAT SERPL-MCNC: 3.18 MG/DL (ref 0.66–1.25)
ERYTHROCYTE [DISTWIDTH] IN BLOOD BY AUTOMATED COUNT: 13.3 % (ref 10–15)
GFR SERPL CREATININE-BSD FRML MDRD: 23 ML/MIN/{1.73_M2}
GLUCOSE SERPL-MCNC: 89 MG/DL (ref 70–99)
HCT VFR BLD AUTO: 39.6 % (ref 40–53)
HGB BLD-MCNC: 13.4 G/DL (ref 13.3–17.7)
IFC SPECIMEN: ABNORMAL
MCH RBC QN AUTO: 29.5 PG (ref 26.5–33)
MCHC RBC AUTO-ENTMCNC: 33.8 G/DL (ref 31.5–36.5)
MCV RBC AUTO: 87 FL (ref 78–100)
PLATELET # BLD AUTO: 316 10E9/L (ref 150–450)
POTASSIUM SERPL-SCNC: 3.8 MMOL/L (ref 3.4–5.3)
RBC # BLD AUTO: 4.55 10E12/L (ref 4.4–5.9)
SODIUM SERPL-SCNC: 139 MMOL/L (ref 133–144)
TACROLIMUS BLD-MCNC: 5.2 UG/L (ref 5–15)
TME LAST DOSE: 2100 H
TSH SERPL DL<=0.005 MIU/L-ACNC: 1.76 MU/L (ref 0.4–4)
WBC # BLD AUTO: 7.9 10E9/L (ref 4–11)

## 2019-10-22 PROCEDURE — 85027 COMPLETE CBC AUTOMATED: CPT | Performed by: INTERNAL MEDICINE

## 2019-10-22 PROCEDURE — 80197 ASSAY OF TACROLIMUS: CPT | Performed by: INTERNAL MEDICINE

## 2019-10-22 PROCEDURE — 84443 ASSAY THYROID STIM HORMONE: CPT | Performed by: INTERNAL MEDICINE

## 2019-10-22 PROCEDURE — 36415 COLL VENOUS BLD VENIPUNCTURE: CPT | Performed by: INTERNAL MEDICINE

## 2019-10-22 PROCEDURE — 86359 T CELLS TOTAL COUNT: CPT | Performed by: INTERNAL MEDICINE

## 2019-10-22 PROCEDURE — 80048 BASIC METABOLIC PNL TOTAL CA: CPT | Performed by: INTERNAL MEDICINE

## 2019-10-22 PROCEDURE — 86360 T CELL ABSOLUTE COUNT/RATIO: CPT | Performed by: INTERNAL MEDICINE

## 2019-11-08 ENCOUNTER — HEALTH MAINTENANCE LETTER (OUTPATIENT)
Age: 44
End: 2019-11-08

## 2019-11-18 DIAGNOSIS — I43 TYPE 1 DIABETES MELLITUS WITH DIABETIC CARDIOMYOPATHY (H): Primary | ICD-10-CM

## 2019-11-18 DIAGNOSIS — E10.59 TYPE 1 DIABETES MELLITUS WITH DIABETIC CARDIOMYOPATHY (H): Primary | ICD-10-CM

## 2019-11-19 DIAGNOSIS — E10.59 TYPE 1 DIABETES MELLITUS WITH DIABETIC CARDIOMYOPATHY (H): ICD-10-CM

## 2019-11-19 DIAGNOSIS — Z48.298 AFTERCARE FOLLOWING ORGAN TRANSPLANT: ICD-10-CM

## 2019-11-19 DIAGNOSIS — Z94.0 KIDNEY TRANSPLANTED: ICD-10-CM

## 2019-11-19 DIAGNOSIS — I43 TYPE 1 DIABETES MELLITUS WITH DIABETIC CARDIOMYOPATHY (H): ICD-10-CM

## 2019-11-19 DIAGNOSIS — T86.10 COMPLICATIONS, KIDNEY TRANSPLANT: ICD-10-CM

## 2019-11-19 LAB
ANION GAP SERPL CALCULATED.3IONS-SCNC: 9 MMOL/L (ref 3–14)
BUN SERPL-MCNC: 43 MG/DL (ref 7–30)
CALCIUM SERPL-MCNC: 9 MG/DL (ref 8.5–10.1)
CHLORIDE SERPL-SCNC: 105 MMOL/L (ref 94–109)
CHOLEST SERPL-MCNC: 221 MG/DL
CO2 SERPL-SCNC: 22 MMOL/L (ref 20–32)
CREAT SERPL-MCNC: 3.84 MG/DL (ref 0.66–1.25)
ERYTHROCYTE [DISTWIDTH] IN BLOOD BY AUTOMATED COUNT: 13.3 % (ref 10–15)
GFR SERPL CREATININE-BSD FRML MDRD: 18 ML/MIN/{1.73_M2}
GLUCOSE SERPL-MCNC: 85 MG/DL (ref 70–99)
HBA1C MFR BLD: 6.7 % (ref 0–5.6)
HCT VFR BLD AUTO: 41.6 % (ref 40–53)
HDLC SERPL-MCNC: 39 MG/DL
HGB BLD-MCNC: 14 G/DL (ref 13.3–17.7)
LDLC SERPL CALC-MCNC: 145 MG/DL
MCH RBC QN AUTO: 29.5 PG (ref 26.5–33)
MCHC RBC AUTO-ENTMCNC: 33.7 G/DL (ref 31.5–36.5)
MCV RBC AUTO: 88 FL (ref 78–100)
NONHDLC SERPL-MCNC: 182 MG/DL
PLATELET # BLD AUTO: 305 10E9/L (ref 150–450)
POTASSIUM SERPL-SCNC: 3.7 MMOL/L (ref 3.4–5.3)
RBC # BLD AUTO: 4.75 10E12/L (ref 4.4–5.9)
SODIUM SERPL-SCNC: 136 MMOL/L (ref 133–144)
TACROLIMUS BLD-MCNC: 5.1 UG/L (ref 5–15)
TME LAST DOSE: 2100 H
TRIGL SERPL-MCNC: 184 MG/DL
WBC # BLD AUTO: 9.5 10E9/L (ref 4–11)

## 2019-11-19 PROCEDURE — 80061 LIPID PANEL: CPT | Performed by: INTERNAL MEDICINE

## 2019-11-19 PROCEDURE — 83036 HEMOGLOBIN GLYCOSYLATED A1C: CPT | Performed by: INTERNAL MEDICINE

## 2019-11-19 PROCEDURE — 80197 ASSAY OF TACROLIMUS: CPT | Performed by: INTERNAL MEDICINE

## 2019-11-19 PROCEDURE — 36415 COLL VENOUS BLD VENIPUNCTURE: CPT | Performed by: INTERNAL MEDICINE

## 2019-11-19 PROCEDURE — 85027 COMPLETE CBC AUTOMATED: CPT | Performed by: INTERNAL MEDICINE

## 2019-11-19 PROCEDURE — 80048 BASIC METABOLIC PNL TOTAL CA: CPT | Performed by: INTERNAL MEDICINE

## 2019-12-02 ENCOUNTER — MYC MEDICAL ADVICE (OUTPATIENT)
Dept: ENDOCRINOLOGY | Facility: CLINIC | Age: 44
End: 2019-12-02

## 2019-12-02 ENCOUNTER — OFFICE VISIT (OUTPATIENT)
Dept: ENDOCRINOLOGY | Facility: CLINIC | Age: 44
End: 2019-12-02
Payer: COMMERCIAL

## 2019-12-02 VITALS
WEIGHT: 204.6 LBS | OXYGEN SATURATION: 98 % | HEART RATE: 101 BPM | SYSTOLIC BLOOD PRESSURE: 121 MMHG | HEIGHT: 68 IN | TEMPERATURE: 97.3 F | DIASTOLIC BLOOD PRESSURE: 83 MMHG | RESPIRATION RATE: 16 BRPM | BODY MASS INDEX: 31.01 KG/M2

## 2019-12-02 DIAGNOSIS — I43 TYPE 1 DIABETES MELLITUS WITH DIABETIC CARDIOMYOPATHY (H): Primary | ICD-10-CM

## 2019-12-02 DIAGNOSIS — E10.59 TYPE 1 DIABETES MELLITUS WITH DIABETIC CARDIOMYOPATHY (H): Primary | ICD-10-CM

## 2019-12-02 PROCEDURE — 99213 OFFICE O/P EST LOW 20 MIN: CPT | Performed by: INTERNAL MEDICINE

## 2019-12-02 PROCEDURE — 95251 CONT GLUC MNTR ANALYSIS I&R: CPT | Performed by: INTERNAL MEDICINE

## 2019-12-02 ASSESSMENT — MIFFLIN-ST. JEOR: SCORE: 1802.56

## 2019-12-02 NOTE — PATIENT INSTRUCTIONS
[facility] & [place] locations   Dr Costa, Endocrinology Department      [facility]   [address]  [place] [address]  Appointment [phone]  Fax: [phone]  [place]: Monday and Tuesday         [facility]   [address]  [place] [address]  Appointment [phone]  Fax: [phone]  [place]: Wednesday and Thursday            Please check the cost coverage and copay with insurance before recommended tests, services and medications (especially if new medications are prescribed).     If ordered, please get blood work done 1 week prior to your next appointment so they will be available to Dr. Costa at your visit.    To provide the best diabetic care, please bring your blood glucose meter to each and every visit with your  Endocrinologist. Your blood glucose meter/insulin pump will be downloaded at every appointment.  Please arrive 15 minutes before your scheduled appointment. This will allow for your blood glucose meter/insulin pump  to be downloaded.  If you are wearing DEXCOM please bring  or sharing code from the Dexcom Clarity Appt so that it can be downloaded.  If you are using freestyle aaron personal sensors please bring the reader.  If you are using TANDEM insulin pump please have your username and password to get info from [employer]andem website.    Continue pump settings.  Bring phone for download in next few days  Labs in 3 months ( fasting labs)  Please make a lab appointment for blood work and follow up clinic appointment in 1 week after that to discuss results.  Let me know if you need new Rx for long acting insulin.    Recommend checking blood sugars before meals and at bedtime.    If Blood glucose are low more often-> 2-3 times/week- give us a call.  The patient is advised to Make better food choices: reduce carbs, Reduce portion size, weight loss and exercise 3-4 times  a week.  Discussed hypoglycemia signs and symptoms as well as management in detail.

## 2019-12-02 NOTE — NURSING NOTE
ENDOCRINOLOGY INTAKE FORM    Patient Name:  Murtaza Fitzgerald  :  1975    Is patient Diabetic?   Yes: type 1  Does patient have non-diabetic or other endocrine issues?  Yes: dyslipidemia    Vitals: There were no vitals taken for this visit.  BMI= There is no height or weight on file to calculate BMI.    Flu vaccine:  No  Pneumonia vaccine:  Yes: both    Smoking and Alcohol use:  Social History     Tobacco Use     Smoking status: Former Smoker     Packs/day: 0.30     Types: Cigarettes     Last attempt to quit: 7/3/2015     Years since quittin.4     Smokeless tobacco: Never Used     Tobacco comment: E- Cig use still   Substance Use Topics     Alcohol use: Not Currently     Alcohol/week: 0.0 standard drinks     Frequency: Never     Binge frequency: Never     Drug use: No       Foot Exam: Yes: 19  Eye Exam:  Yes: 10/10/19  Dental Exam:  No  Aspirin Use:  Yes: 81 mg    Lab Results   Component Value Date    A1C 6.7 2019    A1C 6.8 2019    A1C 6.8 2019    A1C 6.4 2017    A1C 7.1 2016       Lab Results   Component Value Date    MICROL 313 2019     No results found for: MICROALBUMIN    Rebekah Santoyo CMA  [facility]  Bob/Janelle

## 2019-12-02 NOTE — PROGRESS NOTES
Endocrinology Clinic Note:  Name: Murtaza Fitzgerald  Seen for f/u of Diabetes. Last visit 2/2019.  HPI:  Murtaza Fitzgerald is a 44 year old male who presents for the evaluation/management of type 1 diabetes.  Was seen by  at [facility] before and [facility] before.   History of type 1 diabetes, diagnosed at age 11, complicated by triopathy and kidney failure.  He underwent LDKT in 11/2008 and bladder and pancreas transplant in 02/2009.   After the pancreas transplant, he didn't require treatment with insulin for 4 years.  It was restarted in November 2012.  Started Omnipod October 2018.  Also using  G6 Dexcom in October 2018.    Followed by nephrology. Consideration for dialysis next year.  Followed by optho for retinopathy.  Complaining of leg pain- neuropathy. Medication options are limited in the setting of CKD.    1. Type 1 DM:  Orginally diagnosed at the age of: diagnosed at age 11  Reports that he has long acting insulin in case of pump malfunction at home. He also has glucagon kit at home for emergency.  Current Regimen:  yes:     Diabetes Medication(s)     Insulin       Insulin Aspart (INSULIN PUMP - OUTPATIENT)    Date last interrogated by pharmacy: 2/5/2019  Omnipod  Insulin: Novolog  BASAL RATES and times:   12 AM (midnight): 1.1 units/hr  2 AM: 1.25  10 AM: 0.8  6 PM: 0.95  CARB RATIO and times:  12 AM - 11 AM: 9  11 AM - 8 PM: 6  8 PM - 12 AM: 7   BLOOD GLUCOSE TARGET and times:  12 AM (midnight): 100, correct above 120 with reverse correction on  Active Insulin Time: 4 hours  ISF (insulin sensitivity factor):   40 from   38 from [id number]  Dexcom sharing cose: [id number]     insulin aspart (NOVOLOG VIAL) 100 UNITS/ML vial    TO BE USED IN INSULIN PUMP UP TO 75 UNITS TOTAL DAILY DOSE          BS checks: 2-3 times a day  Average Meter Download: not able to download. Forgot phone. Code not working.  Reports variable BG.  No major episodes of hypoglycemia noted/reported.   Last  A1c: 6.4%  Diabetic eye exam within the last year: nov 2019  Fixed meal pattern: yes  Patient counting carbs: yes  Using PUMP: omnipod    Current Regimen:   Insulin pump -   Time Rate (U/hr)   0000-  1. 1   0200  1.25   1000  0.80   1800  0.95     Carbohydrate Ratio -    Time Ratio   0000-  9   1100  2000  6   7     Sensitivity   0000  1800     40    38   Active Insulin Time   hours   Basal   44%   Bolus    56%   Total Carbohydrates/day    Total Insulin/day     Average Blood Sugar                      DM Complications:   Complications:   Diabetes Complications  Description / Detail    Diabetic Retinopathy  H/O laser photocoagulation in both eyes and vitrectomy in his right eye.  Followed by optho.   CAD / PAD  No- unable to feel the warm or cold temperatures from knee down   Neuropathy  +   Nephropathy / Microalbuminuria  + s/p kidney transplant.  Lab Results   Component Value Date    UMALCR 198.98 12/22/2016         Gastroparesis  No   Hypoglycemia [place]  No     2. Hypertension: Blood Pressure today:   BP Readings from Last 3 Encounters:   12/02/19 121/83   10/08/19 117/80   08/23/19 136/88   On medication  3. Hyperlipidemia:  On medication.    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     Noncompliance with treatment     no labs for one year     Pancreas replaced by transplant (H) Feb 2009    rejection treated with thymo     Pancreatic disease     pancreas transplant     Tobacco  abuse ongoing     Past Surgical History:   Procedure Laterality Date     ARTHROSCOPY KNEE WITH LATERAL MENISCECTOMY Left 7/10/2019    Procedure: Left knee arthroscopic partial lateral meniscectomy;  Surgeon: Alex Candelaria MD;  Location: [facility] OR     BYPASS GRAFT ARTERY CORONARY N/A 2015    Procedure: BYPASS GRAFT ARTERY CORONARY;  Surgeon: Katy Neville MD;  Location: [facility] OR     EYE SURGERY      vitrectomy both eyes      ORTHOPEDIC SURGERY      tumor removed from left knee     TRANSPLANT  .    pancrease and kidney transplant     Family Hx:  Family History   Problem Relation Age of Onset     Unknown/Adopted Father      Heart Disease Maternal Grandfather 62           DM2: No           Social Hx:  Social History     Socioeconomic History     Marital status:      Spouse name: Not on file     Number of children: 1     Years of education: Not on file     Highest education level: Associate degree: academic program   Occupational History     Occupation:    Social Needs     Financial resource strain: Not very hard     Food insecurity:     Worry: Never true     Inability: Never true     Transportation needs:     Medical: No     Non-medical: No   Tobacco Use     Smoking status: Former Smoker     Packs/day: 0.30     Types: Cigarettes     Last attempt to quit: 7/3/2015     Years since quittin.4     Smokeless tobacco: Never Used     Tobacco comment: E- Cig use still   Substance and Sexual Activity     Alcohol use: Not Currently     Alcohol/week: 0.0 standard drinks     Frequency: Never     Binge frequency: Never     Drug use: No     Sexual activity: Never     Partners: Female   Lifestyle     Physical activity:     Days per week: 0 days     Minutes per session: 0 min     Stress: To some extent   Relationships     Social connections:     Talks on phone: More than three times a week     Gets together: Once a week     Attends [employer] service: Never     Active member of club or  "organization: No     Attends meetings of clubs or organizations: Never     Relationship status:      Intimate partner violence:     Fear of current or ex partner: Not on file     Emotionally abused: Not on file     Physically abused: Not on file     Forced sexual activity: Not on file   Other Topics Concern     Parent/sibling w/ CABG, MI or angioplasty before 65F 55M? No      Service Not Asked     Blood Transfusions No     Comment: possibly during surgery, otherwise none.     Caffeine Concern Not Asked     Occupational Exposure Not Asked     Hobby Hazards Not Asked     Sleep Concern Not Asked     Stress Concern Not Asked     Weight Concern Not Asked     Special Diet Not Asked     Back Care Not Asked     Exercise Not Asked     Bike Helmet Not Asked     Seat Belt Yes     Self-Exams Not Asked   Social History Narrative     Not on file          MEDICATIONS:  has a current medication list which includes the following prescription(s): acetaminophen, acetone urine, aspirin, atorvastatin, ashok contour, blood glucose, blood glucose, blood glucose monitoring, fludrocortisone, insulin aspart, insulin aspart, insulin pen needle, metoclopramide, mycophenolic acid, omeprazole, ondansetron, senna-docusate, and tacrolimus.    ROS     ROS: 10 point ROS neg other than the symptoms noted above in the HPI.    Physical Exam   VS: /83 (BP Location: Right arm, Patient Position: Chair, Cuff Size: Adult Large)   Pulse 101   Temp 97.3  F (36.3  C) (Oral)   Resp 16   Ht 1.727 m (5' 8\")   Wt 92.8 kg (204 lb 9.6 oz)   SpO2 98%   BMI 31.11 kg/m    GENERAL: AXOX3, NAD, well dressed, answering questions appropriately, appears stated age.  HEENT: No exopthalmous, no proptosis, EOMI, no lig lag, no retraction  NECK: Thyroid normal in size, non tender, no nodules were palpated.  CV: RRR  LUNGS: CTAB  ABDOMEN: +BS  NEUROLOGY: CN grossly intact, no tremors  PSYCH: normal affect and mood    LABS:  A1c:  Lab Results "   Component Value Date    A1C 6.7 11/19/2019    A1C 6.8 06/28/2019    A1C 6.8 01/11/2019    A1C 6.4 11/21/2017    A1C 7.1 12/22/2016       BMP:  Creatinine   Date Value Ref Range Status   11/19/2019 3.84 (H) 0.66 - 1.25 mg/dL Final       Urine Micro:  Lab Results   Component Value Date    UMALCR 474.96 01/11/2019         LFTs/Lipids:  Recent Labs   Lab Test 11/19/19  0900 08/28/19  0900  07/08/15  0745 01/28/15  0747   CHOL 221* 153   < > 142 120   HDL 39* 32*   < > 35* 39*   * 94   < > 90 64   TRIG 184* 133   < > 86 87   CHOLHDLRATIO  --   --   --  4.1 3.1    < > = values in this interval not displayed.         TFTs:  Lab Results   Component Value Date    TSH 2.06 11/21/2017       Blood Glucose and pump data/ Meter reviewed.     Glucometer/ insulin pump (if applicable)/ CGM data (if applicable) downloaded, personally reviewed and interpreted.  All Blood sugar data reviewed personally and discussed with pt.    Glucometer/ insulin pump (if applicable)/ CGM data (if applicable) downloaded, Personally reviewed and interpreted.  All Blood sugar data reviewed personally and discussed with pt.  See nursing note from 12/2/2019 for details of BG/CGM log.    All pertinent notes, labs, and images personally reviewed by me.     A/P  Mr.Christopher EDUAR Fitzgerald is a 44 year old here for the evaluation/management of diabetes:    1. DM1 - Under Fair control.  A1c 6.7%. DM complicated by triopathy.  BG variable. On limited data Bg appears on higher side but does not co-relate with A1c.  Unable to download Dexcom.  Using Omnipod and Dexcom with G6 sensors.  Plan:  Continue current pump settings.  Labs in 3 months.  Bring phone for download in next few days or share code for download.  Continue to use dexcom G6.    2. Hypertension - Under Good  control.  Takes metoprolol 25 mg.      3. Hyperlipidemia - Under Good control. Takes Atorvastatin 20 mg/day.  Reports that he was out of medication for 1 month before recent lipid  panel. Not restarted Atorvastatin.  Check lipids with next lab draw.  4. Prevention  ASA- 81 mg  Smoking- no    Most Recent Immunizations   Administered Date(s) Administered     HepB 09/27/2013     Influenza (IIV3) PF 12/14/2009     MMR 08/27/1990     Pneumo Conj 13-V (2010&after) 12/22/2016     Pneumococcal 23 valent 08/05/2013     TD (ADULT, 7+) 04/26/2001     TDAP Vaccine (Adacel) 09/27/2013     Recommend checking blood sugars before meals and at bedtime.    If Blood glucose are low more often-> 2-3 times/week- give us a call.  The patient is advised to Make better food choices: reduce carbs, Reduce portion size, weight loss and exercise 3-4 times a week.  Discussed hypoglycemia signs and symptoms as well as management in detail.      There is some variability among people, most will usually develop symptoms suggestive of hypoglycemia when blood glucose levels are lowered to the mid 60's. The first set of symptoms are called adrenergic. Patients may experience any of the following nervousness, sweating, intense hunger, trembling, weakness, palpitations, and difficulty speaking. When BS fall below 50 the patient is unable to talk and take oral therapy.  Would recommend Glucagon emergency kit for the patient and education for family and friends around the patient.   The acute management of hypoglycemia involves the rapid delivery of a source of easily absorbed sugar. Regular soda, juice, lifesavers, table sugar, are good options. 15 grams of glucose is the dose that is given, followed by an assessment of symptoms and a blood glucose check if possible. If after 10 minutes there is no improvement, another 10-15 grams should be given. This can be repeated up to three times. The equivalency of 10-15 grams of glucose (approximate servings) are: Four lifesavers, 4 teaspoons of sugar, or 1/2 cup or 4 oz of juice or regular pop.      Follow-up:  3 months    Patricia Costa M.D  [facility]  Bob/Janelle  CC: Kt Ríos    More than 50% of face to face time spent with Mr. Tata Fitzgerald on counseling / coordinating his care.     All questions were answered.  The patient indicates understanding of the above issues and agrees with the plan set forth.     Disclaimer: This note consists of symbols derived from keyboarding, dictation and/or voice recognition software. As a result, there may be errors in the script that have gone undetected. Please consider this when interpreting information found in this chart.    Addendum to above note and clinic visit:    Labs reviewed.    See result note/telephone encounter.      After clinic visit patient did share his code and we are able to download CGM report.  Average blood sugar is 169 with standard deviation 71.  Based on that A1c and should be 7.3%.  In general blood sugars are mostly on higher side.  Blood sugars are on the higher side especially after 10 AM.  Some days postmeal hyperglycemia is evident.  Based on that recommend to increase basal rate during the day.  Currently using basal 44% and bolus 56%    New basal rates:    Time Rate (U/hr)   0000-  1. 1   0200  1.25   1000  0.80-->1.1   1800  0.95-->1.25

## 2019-12-02 NOTE — LETTER
12/2/2019         RE: Murtaza Fitzgerald  [address] [address]        Dear Colleague,    Thank you for referring your patient, [facility] Fitzgerald[facility]. Please see a copy of my visit note below.    Endocrinology Clinic Note:  Name: Murtaza Fitzgerald  Seen for f/u of Diabetes. Last visit 2/2019.  HPI:  Murtaza Fitzgerald is a 44 year old male who presents for the evaluation/management of type 1 diabetes.  Was seen by  at [facility] before and [facility] before.   History of type 1 diabetes, diagnosed at age 11, complicated by triopathy and kidney failure.  He underwent LDKT in 11/2008 and bladder and pancreas transplant in 02/2009.   After the pancreas transplant, he didn't require treatment with insulin for 4 years.  It was restarted in November 2012.  Started Omnipod October 2018.  Also using  G6 Dexcom in October 2018.    Followed by nephrology. Consideration for dialysis next year.  Followed by optho for retinopathy.  Complaining of leg pain- neuropathy. Medication options are limited in the setting of CKD.    1. Type 1 DM:  Orginally diagnosed at the age of: diagnosed at age 11  Reports that he has long acting insulin in case of pump malfunction at home. He also has glucagon kit at home for emergency.  Current Regimen:  yes:     Diabetes Medication(s)     Insulin       Insulin Aspart (INSULIN PUMP - OUTPATIENT)    Date last interrogated by pharmacy: 2/5/2019  Omnipod  Insulin: Novolog  BASAL RATES and times:   12 AM (midnight): 1.1 units/hr  2 AM: 1.25  10 AM: 0.8  6 PM: 0.95  CARB RATIO and times:  12 AM - 11 AM: 9  11 AM - 8 PM: 6  8 PM - 12 AM: 7   BLOOD GLUCOSE TARGET and times:  12 AM (midnight): 100, correct above 120 with reverse correction on  Active Insulin Time: 4 hours  ISF (insulin sensitivity factor):   40 from   38 from [id number]  Dexcom sharing cose: [id number]     insulin aspart (NOVOLOG VIAL) 100  UNITS/ML vial    TO BE USED IN INSULIN PUMP UP TO 75 UNITS TOTAL DAILY DOSE          BS checks: 2-3 times a day  Average Meter Download: not able to download. Forgot phone. Code not working.  Reports variable BG.  No major episodes of hypoglycemia noted/reported.   Last A1c: 6.4%  Diabetic eye exam within the last year: nov 2019  Fixed meal pattern: yes  Patient counting carbs: yes  Using PUMP: no      DM Complications:   Complications:   Diabetes Complications  Description / Detail    Diabetic Retinopathy  H/O laser photocoagulation in both eyes and vitrectomy in his right eye.  Followed by optho.   CAD / PAD  No- unable to feel the warm or cold temperatures from knee down   Neuropathy  +   Nephropathy / Microalbuminuria  + s/p kidney transplant.  Lab Results   Component Value Date    UMALCR 198.98 12/22/2016         Gastroparesis  No   Hypoglycemia [place]  No     2. Hypertension: Blood Pressure today:   BP Readings from Last 3 Encounters:   12/02/19 121/83   10/08/19 117/80   08/23/19 136/88   On medication  3. Hyperlipidemia:  On medication.    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     Noncompliance with treatment     no labs for one year     Pancreas replaced by transplant (H) Feb 2009    rejection treated with thymo     Pancreatic disease     pancreas transplant     Tobacco abuse ongoing     Past Surgical History:   Procedure Laterality Date     ARTHROSCOPY KNEE WITH  LATERAL MENISCECTOMY Left 7/10/2019    Procedure: Left knee arthroscopic partial lateral meniscectomy;  Surgeon: Alex Candelaria MD;  Location: [facility] OR     BYPASS GRAFT ARTERY CORONARY N/A 2015    Procedure: BYPASS GRAFT ARTERY CORONARY;  Surgeon: Katy Neville MD;  Location: [facility] OR     EYE SURGERY      vitrectomy both eyes      ORTHOPEDIC SURGERY      tumor removed from left knee     TRANSPLANT  .    pancrease and kidney transplant     Family Hx:  Family History   Problem Relation Age of Onset     Unknown/Adopted Father      Heart Disease Maternal Grandfather 62           DM2: No           Social Hx:  Social History     Socioeconomic History     Marital status:      Spouse name: Not on file     Number of children: 1     Years of education: Not on file     Highest education level: Associate degree: academic program   Occupational History     Occupation:    Social Needs     Financial resource strain: Not very hard     Food insecurity:     Worry: Never true     Inability: Never true     Transportation needs:     Medical: No     Non-medical: No   Tobacco Use     Smoking status: Former Smoker     Packs/day: 0.30     Types: Cigarettes     Last attempt to quit: 7/3/2015     Years since quittin.4     Smokeless tobacco: Never Used     Tobacco comment: E- Cig use still   Substance and Sexual Activity     Alcohol use: Not Currently     Alcohol/week: 0.0 standard drinks     Frequency: Never     Binge frequency: Never     Drug use: No     Sexual activity: Never     Partners: Female   Lifestyle     Physical activity:     Days per week: 0 days     Minutes per session: 0 min     Stress: To some extent   Relationships     Social connections:     Talks on phone: More than three times a week     Gets together: Once a week     Attends Caodaism service: Never     Active member of club or organization: No     Attends meetings of clubs or organizations: Never     Relationship status:  "     Intimate partner violence:     Fear of current or ex partner: Not on file     Emotionally abused: Not on file     Physically abused: Not on file     Forced sexual activity: Not on file   Other Topics Concern     Parent/sibling w/ CABG, MI or angioplasty before 65F 55M? No      Service Not Asked     Blood Transfusions No     Comment: possibly during surgery, otherwise none.     Caffeine Concern Not Asked     Occupational Exposure Not Asked     Hobby Hazards Not Asked     Sleep Concern Not Asked     Stress Concern Not Asked     Weight Concern Not Asked     Special Diet Not Asked     Back Care Not Asked     Exercise Not Asked     Bike Helmet Not Asked     Seat Belt Yes     Self-Exams Not Asked   Social History Narrative     Not on file          MEDICATIONS:  has a current medication list which includes the following prescription(s): acetaminophen, acetone urine, aspirin, atorvastatin, ashok contour, blood glucose, blood glucose, blood glucose monitoring, fludrocortisone, insulin aspart, insulin aspart, insulin pen needle, metoclopramide, mycophenolic acid, omeprazole, ondansetron, senna-docusate, and tacrolimus.    ROS     ROS: 10 point ROS neg other than the symptoms noted above in the HPI.    Physical Exam   VS: /83 (BP Location: Right arm, Patient Position: Chair, Cuff Size: Adult Large)   Pulse 101   Temp 97.3  F (36.3  C) (Oral)   Resp 16   Ht 1.727 m (5' 8\")   Wt 92.8 kg (204 lb 9.6 oz)   SpO2 98%   BMI 31.11 kg/m     GENERAL: AXOX3, NAD, well dressed, answering questions appropriately, appears stated age.  HEENT: No exopthalmous, no proptosis, EOMI, no lig lag, no retraction  NECK: Thyroid normal in size, non tender, no nodules were palpated.  CV: RRR  LUNGS: CTAB  ABDOMEN: +BS  NEUROLOGY: CN grossly intact, no tremors  PSYCH: normal affect and mood    LABS:  A1c:  Lab Results   Component Value Date    A1C 6.7 11/19/2019    A1C 6.8 06/28/2019    A1C 6.8 01/11/2019    A1C 6.4 " 11/21/2017    A1C 7.1 12/22/2016       BMP:  Creatinine   Date Value Ref Range Status   11/19/2019 3.84 (H) 0.66 - 1.25 mg/dL Final       Urine Micro:  Lab Results   Component Value Date    UMALCR 474.96 01/11/2019         LFTs/Lipids:  Recent Labs   Lab Test 11/19/19  0900 08/28/19  0900  07/08/15  0745 01/28/15  0747   CHOL 221* 153   < > 142 120   HDL 39* 32*   < > 35* 39*   * 94   < > 90 64   TRIG 184* 133   < > 86 87   CHOLHDLRATIO  --   --   --  4.1 3.1    < > = values in this interval not displayed.         TFTs:  Lab Results   Component Value Date    TSH 2.06 11/21/2017       Blood Glucose and pump data/ Meter reviewed.     Glucometer/ insulin pump (if applicable)/ CGM data (if applicable) downloaded, personally reviewed and interpreted.  All Blood sugar data reviewed personally and discussed with pt.      All pertinent notes, labs, and images personally reviewed by me.     A/P  Mr.Christopher EDUAR Fitzgerald is a 44 year old here for the evaluation/management of diabetes:    1. DM1 - Under Fair control.  A1c 6.7%. DM complicated by triopathy.  BG variable. On limited data Bg appears on higher side but does not co-relate with A1c.  Unable to download Dexcom.  Using Omnipod and Dexcom with G6 sensors.  Plan:  Continue current pump settings.  Labs in 3 months.  Bring phone for download in next few days or share code for download.  Continue to use dexcom G6.    2. Hypertension - Under Good  control.  Takes metoprolol 25 mg.      3. Hyperlipidemia - Under Good control. Takes Atorvastatin 20 mg/day.  Reports that he was out of medication for 1 month before recent lipid panel. Not restarted Atorvastatin.  Check lipids with next lab draw.  4. Prevention  ASA- 81 mg  Smoking- no    Most Recent Immunizations   Administered Date(s) Administered     HepB 09/27/2013     Influenza (IIV3) PF 12/14/2009     MMR 08/27/1990     Pneumo Conj 13-V (2010&after) 12/22/2016     Pneumococcal 23 valent 08/05/2013     TD  (ADULT, 7+) 04/26/2001     TDAP Vaccine (Adacel) 09/27/2013     Recommend checking blood sugars before meals and at bedtime.    If Blood glucose are low more often-> 2-3 times/week- give us a call.  The patient is advised to Make better food choices: reduce carbs, Reduce portion size, weight loss and exercise 3-4 times a week.  Discussed hypoglycemia signs and symptoms as well as management in detail.      There is some variability among people, most will usually develop symptoms suggestive of hypoglycemia when blood glucose levels are lowered to the mid 60's. The first set of symptoms are called adrenergic. Patients may experience any of the following nervousness, sweating, intense hunger, trembling, weakness, palpitations, and difficulty speaking. When BS fall below 50 the patient is unable to talk and take oral therapy.  Would recommend Glucagon emergency kit for the patient and education for family and friends around the patient.   The acute management of hypoglycemia involves the rapid delivery of a source of easily absorbed sugar. Regular soda, juice, lifesavers, table sugar, are good options. 15 grams of glucose is the dose that is given, followed by an assessment of symptoms and a blood glucose check if possible. If after 10 minutes there is no improvement, another 10-15 grams should be given. This can be repeated up to three times. The equivalency of 10-15 grams of glucose (approximate servings) are: Four lifesavers, 4 teaspoons of sugar, or 1/2 cup or 4 oz of juice or regular pop.      Follow-up:  3 months    Patricia Costa M.D  Endocrinology  [facility]  CC: Kt R[facility]os    More than 50% of face to face time spent with Mr. Tata Fitzgerald on counseling / coordinating his care.     All questions were answered.  The patient indicates understanding of the above issues and agrees with the plan set forth.     Disclaimer: This note consists of symbols derived from keyboarding, dictation  and/or voice recognition software. As a result, there may be errors in the script that have gone undetected. Please consider this when interpreting information found in this chart.    Addendum to above note and clinic visit:    Labs reviewed.    See result note/telephone encounter.            Again, thank you for allowing me to participate in the care of your patient.        Sincerely,        Patricia Costa MD

## 2019-12-19 ENCOUNTER — TELEPHONE (OUTPATIENT)
Dept: PEDIATRICS | Facility: CLINIC | Age: 44
End: 2019-12-19

## 2019-12-19 DIAGNOSIS — T86.10 COMPLICATIONS, KIDNEY TRANSPLANT: ICD-10-CM

## 2019-12-19 DIAGNOSIS — Z94.0 KIDNEY TRANSPLANTED: ICD-10-CM

## 2019-12-19 DIAGNOSIS — K21.9 GASTROESOPHAGEAL REFLUX DISEASE WITHOUT ESOPHAGITIS: ICD-10-CM

## 2019-12-19 DIAGNOSIS — Z48.298 AFTERCARE FOLLOWING ORGAN TRANSPLANT: ICD-10-CM

## 2019-12-19 LAB
ALBUMIN SERPL-MCNC: 3.5 G/DL (ref 3.4–5)
ANION GAP SERPL CALCULATED.3IONS-SCNC: 8 MMOL/L (ref 3–14)
BUN SERPL-MCNC: 45 MG/DL (ref 7–30)
CALCIUM SERPL-MCNC: 9.1 MG/DL (ref 8.5–10.1)
CHLORIDE SERPL-SCNC: 108 MMOL/L (ref 94–109)
CO2 SERPL-SCNC: 23 MMOL/L (ref 20–32)
CREAT SERPL-MCNC: 4.47 MG/DL (ref 0.66–1.25)
ERYTHROCYTE [DISTWIDTH] IN BLOOD BY AUTOMATED COUNT: 13.1 % (ref 10–15)
GFR SERPL CREATININE-BSD FRML MDRD: 15 ML/MIN/{1.73_M2}
GLUCOSE SERPL-MCNC: 127 MG/DL (ref 70–99)
HCT VFR BLD AUTO: 38.2 % (ref 40–53)
HGB BLD-MCNC: 12.7 G/DL (ref 13.3–17.7)
MCH RBC QN AUTO: 29.1 PG (ref 26.5–33)
MCHC RBC AUTO-ENTMCNC: 33.2 G/DL (ref 31.5–36.5)
MCV RBC AUTO: 88 FL (ref 78–100)
PHOSPHATE SERPL-MCNC: 4 MG/DL (ref 2.5–4.5)
PLATELET # BLD AUTO: 315 10E9/L (ref 150–450)
POTASSIUM SERPL-SCNC: 3.9 MMOL/L (ref 3.4–5.3)
RBC # BLD AUTO: 4.36 10E12/L (ref 4.4–5.9)
SODIUM SERPL-SCNC: 139 MMOL/L (ref 133–144)
TACROLIMUS BLD-MCNC: 5.4 UG/L (ref 5–15)
TME LAST DOSE: 2100 H
WBC # BLD AUTO: 8.8 10E9/L (ref 4–11)

## 2019-12-19 PROCEDURE — 85027 COMPLETE CBC AUTOMATED: CPT | Performed by: INTERNAL MEDICINE

## 2019-12-19 PROCEDURE — 80069 RENAL FUNCTION PANEL: CPT | Performed by: INTERNAL MEDICINE

## 2019-12-19 PROCEDURE — 36415 COLL VENOUS BLD VENIPUNCTURE: CPT | Performed by: INTERNAL MEDICINE

## 2019-12-19 PROCEDURE — 80197 ASSAY OF TACROLIMUS: CPT | Performed by: INTERNAL MEDICINE

## 2019-12-19 RX ORDER — OMEPRAZOLE 40 MG/1
CAPSULE, DELAYED RELEASE ORAL
Qty: 180 CAPSULE | Refills: 1 | Status: SHIPPED | OUTPATIENT
Start: 2019-12-19 | End: 2020-01-27

## 2019-12-19 NOTE — TELEPHONE ENCOUNTER
"Requested Prescriptions   Pending Prescriptions Disp Refills     omeprazole (PRILOSEC) 40 MG DR capsule [Pharmacy Med Name: OMEPRAZOLE 40MG CPDR] 180 capsule 3     Sig: TAKE ONE CAPSULE BY MOUTH TWICE A DAY       PPI Protocol Passed - 12/19/2019 12:31 PM        Passed - Not on Clopidogrel (unless Pantoprazole ordered)        Passed - No diagnosis of osteoporosis on record        Passed - Recent (12 mo) or future (30 days) visit within the authorizing provider's specialty     Patient has had an office visit with the authorizing provider or a provider within the authorizing providers department within the previous 12 mos or has a future within next 30 days. See \"Patient Info\" tab in inbasket, or \"Choose Columns\" in Meds & Orders section of the refill encounter.              Passed - Medication is active on med list        Passed - Patient is age 18 or older          "

## 2019-12-19 NOTE — TELEPHONE ENCOUNTER
Patient needs PA on: omeprazole 40mg   Insurance is: [employer]  ID number is: [id number]  [id number]  PCN [id number]  Help desk phone: [phone]    Patient has state insurance now, insurance only cover 1 cap per day.    Please let us know if PA granted/denied or change to different medication.  Thanks,    Tim Daniels  Pharmacy Technician  [facility]  [phone]

## 2019-12-20 NOTE — TELEPHONE ENCOUNTER
Central Prior Authorization Team   Phone: [phone]    Prior Authorization Approval    Authorization Effective Date: 10/1/2019  Authorization Expiration Date: 4/18/2020  Medication: omeprazole 40mg   Approved Dose/Quantity: 60 per 30  Reference #: [id number]   Insurance Company: Blue Plus PMA - Phone [phone] Fax [phone]  Expected CoPay:       CoPay Card Available:      Foundation Assistance Needed:    Which Pharmacy is filling the prescription (Not needed for infusion/clinic administered): [facility] [place], [place] - [address] [facility] [address]  Pharmacy Notified: Yes  Patient Notified: Yes, **Instructed pharmacy to notify patient when script is ready to /ship.**

## 2019-12-20 NOTE — TELEPHONE ENCOUNTER
Central Prior Authorization Team   Phone: [phone]    PA Initiation    Medication: omeprazole 40mg   Insurance Company: [employer] Plus PMAP - Phone [phone] Fax [phone]  Pharmacy Filling the Rx: [place], [place] - [address]  Filling Pharmacy Phone: [phone]  Filling Pharmacy Fax: [phone]  Start Date: 12/20/2019

## 2019-12-20 NOTE — TELEPHONE ENCOUNTER
Prior Authorization Retail Medication Request    Medication/Dose: omeprazole 40mg   ICD code (if different than what is on RX):    Previously Tried and Failed:  Also currently on reglan, lipitor  Has tried nexium in the past  Rationale:  Gastroesophageal reflux disease without esophagitis [K21.9]     Insurance Name:  [employer] Plus [place] [employer]  Insurance ID:  [id number]      Pharmacy Information (if different than what is on RX)  Name:  [facility]  Phone:  [phone]  Fax: [phone]

## 2019-12-23 ENCOUNTER — TRANSFERRED RECORDS (OUTPATIENT)
Dept: HEALTH INFORMATION MANAGEMENT | Facility: CLINIC | Age: 44
End: 2019-12-23

## 2019-12-26 ENCOUNTER — TELEPHONE (OUTPATIENT)
Dept: TRANSPLANT | Facility: CLINIC | Age: 44
End: 2019-12-26

## 2019-12-26 DIAGNOSIS — Z94.0 KIDNEY TRANSPLANTED: Primary | ICD-10-CM

## 2019-12-26 DIAGNOSIS — Z48.298 AFTERCARE FOLLOWING ORGAN TRANSPLANT: ICD-10-CM

## 2019-12-26 DIAGNOSIS — T86.10 COMPLICATIONS, KIDNEY TRANSPLANT: ICD-10-CM

## 2019-12-26 NOTE — TELEPHONE ENCOUNTER
Patient Call: General  Route to LPN    Reason for call: pt will be re doing labs tomorrow morning.    Call back needed? Yes    Return Call Needed  Same as documented in contacts section  When to return call?: Greater than one day: Route standard priority

## 2019-12-26 NOTE — TELEPHONE ENCOUNTER
Creatinine elevated.  Need recheck labs.    PLAN:  Call Murtaza Fitzgerald and check how he is doing.  Any diarrhea? UTI?  Dehydration?  Recheck labs soon with UA/UC.    OUTCOME:  Left VM.  Orders placed in Epic.

## 2019-12-27 NOTE — TELEPHONE ENCOUNTER
Spoke with Murtaza Fitzgerald who reports unemployment right now.  Used to get 1L 2x a week IV fluids but now it will cost him $25 copay each infusion.  Discussed creatinine continues to trend up. Recommended he hydrate as much as he can.  States feels sick during the morning. Was going to get labs today but felt sick again.  Had loose stools yesterday, but not today. Gets mostly dry heaves but sometimes vomits if he has drank or ate something.  Will send message to  for any resources available.    ADDENDUM:  Call Murtaza Fitzgerald and recommend applying for medical assistance.  States he already has [facility].  States he will hydrate as much as he can and get labs completed.

## 2019-12-31 DIAGNOSIS — Z48.298 AFTERCARE FOLLOWING ORGAN TRANSPLANT: ICD-10-CM

## 2019-12-31 DIAGNOSIS — R53.1 ASTHENIA: ICD-10-CM

## 2019-12-31 DIAGNOSIS — E10.59 TYPE 1 DIABETES MELLITUS WITH DIABETIC CARDIOMYOPATHY (H): ICD-10-CM

## 2019-12-31 DIAGNOSIS — Z94.0 KIDNEY TRANSPLANTED: ICD-10-CM

## 2019-12-31 DIAGNOSIS — G62.9 PERIPHERAL NERVE DISORDER: Primary | ICD-10-CM

## 2019-12-31 DIAGNOSIS — T86.10 COMPLICATIONS, KIDNEY TRANSPLANT: ICD-10-CM

## 2019-12-31 DIAGNOSIS — I43 TYPE 1 DIABETES MELLITUS WITH DIABETIC CARDIOMYOPATHY (H): ICD-10-CM

## 2019-12-31 LAB — VIT B12 SERPL-MCNC: 909 PG/ML (ref 193–986)

## 2019-12-31 PROCEDURE — 82784 ASSAY IGA/IGD/IGG/IGM EACH: CPT | Performed by: PSYCHIATRY & NEUROLOGY

## 2019-12-31 PROCEDURE — 84165 PROTEIN E-PHORESIS SERUM: CPT | Performed by: PSYCHIATRY & NEUROLOGY

## 2019-12-31 PROCEDURE — 86334 IMMUNOFIX E-PHORESIS SERUM: CPT | Performed by: PSYCHIATRY & NEUROLOGY

## 2019-12-31 PROCEDURE — 00000402 ZZHCL STATISTIC TOTAL PROTEIN: Performed by: PSYCHIATRY & NEUROLOGY

## 2019-12-31 PROCEDURE — 82607 VITAMIN B-12: CPT | Performed by: INTERNAL MEDICINE

## 2019-12-31 PROCEDURE — 36415 COLL VENOUS BLD VENIPUNCTURE: CPT | Performed by: INTERNAL MEDICINE

## 2019-12-31 PROCEDURE — 84155 ASSAY OF PROTEIN SERUM: CPT | Performed by: PSYCHIATRY & NEUROLOGY

## 2019-12-31 PROCEDURE — 80048 BASIC METABOLIC PNL TOTAL CA: CPT | Performed by: INTERNAL MEDICINE

## 2019-12-31 PROCEDURE — 84443 ASSAY THYROID STIM HORMONE: CPT | Performed by: INTERNAL MEDICINE

## 2019-12-31 NOTE — TELEPHONE ENCOUNTER
"Requested Prescriptions   Pending Prescriptions Disp Refills     insulin aspart (NOVOLOG VIAL) 100 UNITS/ML vial [Pharmacy Med Name: NOVOLOG 100UNIT/ML SOLN] 60 mL 0     Sig: TO BE USED IN INSULIN PUMP UP TO  75UNITS TOTAL DAILY DOSE   Last Written Prescription Date:  12/02/19  Last Fill Quantity: 60,  # refills: 1   Last office visit: 12/2/2019 with prescribing provider:  yes   Future Office Visit:        Short Acting Insulin Protocol Passed - 12/31/2019 11:55 AM        Passed - Blood pressure less than 140/90 in past 6 months     BP Readings from Last 3 Encounters:   12/02/19 121/83   10/08/19 117/80   08/23/19 136/88                 Passed - LDL on file in past 12 months     Recent Labs   Lab Test 11/19/19  0900   *             Passed - Microalbumin on file in past 12 months     Recent Labs   Lab Test 01/11/19  0741   MICROL 313   UMALCR 474.96*             Passed - Serum creatinine on file in past 12 months     Recent Labs   Lab Test 12/19/19  0900  07/11/19  0649   CR 4.47*   < >  --    CREAT  --   --  2.7*    < > = values in this interval not displayed.             Passed - HgbA1C in past 3 or 6 months     If HgbA1C is 8 or greater, it needs to be on file within the past 3 months.  If less than 8, must be on file within the past 6 months.     Recent Labs   Lab Test 11/19/19  0900   A1C 6.7*             Passed - Medication is active on med list        Passed - Patient is age 18 or older        Passed - Recent (6 mo) or future (30 days) visit within the authorizing provider's specialty     Patient had office visit in the last 6 months or has a visit in the next 30 days with authorizing provider or within the authorizing provider's specialty.  See \"Patient Info\" tab in inbasket, or \"Choose Columns\" in Meds & Orders section of the refill encounter.              "

## 2020-01-01 LAB — PROT SERPL-MCNC: 7.8 G/DL (ref 6.8–8.8)

## 2020-01-02 LAB
ALBUMIN SERPL ELPH-MCNC: 3.9 G/DL (ref 3.7–5.1)
ALPHA1 GLOB SERPL ELPH-MCNC: 0.4 G/DL (ref 0.2–0.4)
ALPHA2 GLOB SERPL ELPH-MCNC: 1 G/DL (ref 0.5–0.9)
ANION GAP SERPL CALCULATED.3IONS-SCNC: 11 MMOL/L (ref 3–14)
B-GLOBULIN SERPL ELPH-MCNC: 1 G/DL (ref 0.6–1)
BUN SERPL-MCNC: 48 MG/DL (ref 7–30)
CALCIUM SERPL-MCNC: 8.7 MG/DL (ref 8.5–10.1)
CHLORIDE SERPL-SCNC: 107 MMOL/L (ref 94–109)
CO2 SERPL-SCNC: 21 MMOL/L (ref 20–32)
CREAT SERPL-MCNC: 4.78 MG/DL (ref 0.66–1.25)
GAMMA GLOB SERPL ELPH-MCNC: 1.1 G/DL (ref 0.7–1.6)
GFR SERPL CREATININE-BSD FRML MDRD: 14 ML/MIN/{1.73_M2}
GLUCOSE SERPL-MCNC: 101 MG/DL (ref 70–99)
M PROTEIN SERPL ELPH-MCNC: 0 G/DL
POTASSIUM SERPL-SCNC: 3.8 MMOL/L (ref 3.4–5.3)
PROT PATTERN SERPL ELPH-IMP: ABNORMAL
SODIUM SERPL-SCNC: 139 MMOL/L (ref 133–144)
TSH SERPL DL<=0.005 MIU/L-ACNC: 1.98 MU/L (ref 0.4–4)

## 2020-01-02 NOTE — TELEPHONE ENCOUNTER
Medication refused, refill requested too soon. Prescription ordered on 12/2/19 for 80 day supply with 1 additional refill.

## 2020-01-03 ENCOUNTER — HOSPITAL ENCOUNTER (INPATIENT)
Facility: CLINIC | Age: 45
LOS: 2 days | Discharge: HOME | DRG: 698 | End: 2020-01-05
Attending: EMERGENCY MEDICINE | Admitting: INTERNAL MEDICINE

## 2020-01-03 DIAGNOSIS — N18.9 ACUTE RENAL FAILURE SUPERIMPOSED ON CHRONIC KIDNEY DISEASE, UNSPECIFIED CKD STAGE, UNSPECIFIED ACUTE RENAL FAILURE TYPE: ICD-10-CM

## 2020-01-03 DIAGNOSIS — R79.89 KETONEMIA: ICD-10-CM

## 2020-01-03 DIAGNOSIS — N17.9 ACUTE RENAL FAILURE SUPERIMPOSED ON CHRONIC KIDNEY DISEASE, UNSPECIFIED CKD STAGE, UNSPECIFIED ACUTE RENAL FAILURE TYPE: ICD-10-CM

## 2020-01-03 DIAGNOSIS — R11.2 INTRACTABLE VOMITING WITH NAUSEA, UNSPECIFIED VOMITING TYPE: ICD-10-CM

## 2020-01-03 LAB
ALBUMIN SERPL-MCNC: 4 G/DL (ref 3.4–5)
ALBUMIN UR-MCNC: 300 MG/DL
ALP SERPL-CCNC: 171 U/L (ref 40–150)
ALT SERPL W P-5'-P-CCNC: 14 U/L (ref 0–70)
ANION GAP SERPL CALCULATED.3IONS-SCNC: 13 MMOL/L (ref 3–14)
APPEARANCE UR: CLEAR
AST SERPL W P-5'-P-CCNC: 13 U/L (ref 0–45)
BASE DEFICIT BLDV-SCNC: 6.7 MMOL/L
BASOPHILS # BLD AUTO: 0 10E9/L (ref 0–0.2)
BASOPHILS NFR BLD AUTO: 0.3 %
BILIRUB SERPL-MCNC: 0.4 MG/DL (ref 0.2–1.3)
BILIRUB UR QL STRIP: NEGATIVE
BUN SERPL-MCNC: 52 MG/DL (ref 7–30)
CALCIUM SERPL-MCNC: 9.2 MG/DL (ref 8.5–10.1)
CHLORIDE SERPL-SCNC: 108 MMOL/L (ref 94–109)
CO2 SERPL-SCNC: 19 MMOL/L (ref 20–32)
COLOR UR AUTO: ABNORMAL
CREAT SERPL-MCNC: 5.42 MG/DL (ref 0.66–1.25)
DIFFERENTIAL METHOD BLD: ABNORMAL
EOSINOPHIL # BLD AUTO: 0 10E9/L (ref 0–0.7)
EOSINOPHIL NFR BLD AUTO: 0 %
ERYTHROCYTE [DISTWIDTH] IN BLOOD BY AUTOMATED COUNT: 12.8 % (ref 10–15)
GFR SERPL CREATININE-BSD FRML MDRD: 12 ML/MIN/{1.73_M2}
GLUCOSE BLDC GLUCOMTR-MCNC: 108 MG/DL (ref 70–99)
GLUCOSE BLDC GLUCOMTR-MCNC: 51 MG/DL (ref 70–99)
GLUCOSE BLDC GLUCOMTR-MCNC: 85 MG/DL (ref 70–99)
GLUCOSE SERPL-MCNC: 93 MG/DL (ref 70–99)
GLUCOSE UR STRIP-MCNC: 30 MG/DL
HCO3 BLDV-SCNC: 19 MMOL/L (ref 21–28)
HCT VFR BLD AUTO: 39.6 % (ref 40–53)
HGB BLD-MCNC: 12.6 G/DL (ref 13.3–17.7)
HGB UR QL STRIP: ABNORMAL
HYALINE CASTS #/AREA URNS LPF: 1 /LPF (ref 0–2)
IMM GRANULOCYTES # BLD: 0.1 10E9/L (ref 0–0.4)
IMM GRANULOCYTES NFR BLD: 0.5 %
KETONES BLD-SCNC: 1.7 MMOL/L (ref 0–0.6)
KETONES UR STRIP-MCNC: 10 MG/DL
LEUKOCYTE ESTERASE UR QL STRIP: NEGATIVE
LYMPHOCYTES # BLD AUTO: 1.1 10E9/L (ref 0.8–5.3)
LYMPHOCYTES NFR BLD AUTO: 9.6 %
MCH RBC QN AUTO: 28.8 PG (ref 26.5–33)
MCHC RBC AUTO-ENTMCNC: 31.8 G/DL (ref 31.5–36.5)
MCV RBC AUTO: 90 FL (ref 78–100)
MONOCYTES # BLD AUTO: 0.3 10E9/L (ref 0–1.3)
MONOCYTES NFR BLD AUTO: 2.7 %
MUCOUS THREADS #/AREA URNS LPF: PRESENT /LPF
NEUTROPHILS # BLD AUTO: 9.6 10E9/L (ref 1.6–8.3)
NEUTROPHILS NFR BLD AUTO: 86.9 %
NITRATE UR QL: NEGATIVE
NRBC # BLD AUTO: 0 10*3/UL
NRBC BLD AUTO-RTO: 0 /100
O2/TOTAL GAS SETTING VFR VENT: ABNORMAL %
OXYHGB MFR BLDV: 63 %
PCO2 BLDV: 38 MM HG (ref 40–50)
PH BLDV: 7.3 PH (ref 7.32–7.43)
PH UR STRIP: 6.5 PH (ref 5–7)
PLATELET # BLD AUTO: 332 10E9/L (ref 150–450)
PO2 BLDV: 32 MM HG (ref 25–47)
POTASSIUM SERPL-SCNC: 3.9 MMOL/L (ref 3.4–5.3)
PROT SERPL-MCNC: 8.5 G/DL (ref 6.8–8.8)
RBC # BLD AUTO: 4.38 10E12/L (ref 4.4–5.9)
RBC #/AREA URNS AUTO: 3 /HPF (ref 0–2)
SODIUM SERPL-SCNC: 140 MMOL/L (ref 133–144)
SOURCE: ABNORMAL
SP GR UR STRIP: 1.02 (ref 1–1.03)
UROBILINOGEN UR STRIP-MCNC: NORMAL MG/DL (ref 0–2)
WBC # BLD AUTO: 11 10E9/L (ref 4–11)
WBC #/AREA URNS AUTO: 1 /HPF (ref 0–5)

## 2020-01-03 PROCEDURE — 25800030 ZZH RX IP 258 OP 636: Performed by: INTERNAL MEDICINE

## 2020-01-03 PROCEDURE — 96375 TX/PRO/DX INJ NEW DRUG ADDON: CPT

## 2020-01-03 PROCEDURE — 25000132 ZZH RX MED GY IP 250 OP 250 PS 637: Performed by: INTERNAL MEDICINE

## 2020-01-03 PROCEDURE — 82805 BLOOD GASES W/O2 SATURATION: CPT | Performed by: EMERGENCY MEDICINE

## 2020-01-03 PROCEDURE — 96376 TX/PRO/DX INJ SAME DRUG ADON: CPT

## 2020-01-03 PROCEDURE — 85025 COMPLETE CBC W/AUTO DIFF WBC: CPT | Performed by: EMERGENCY MEDICINE

## 2020-01-03 PROCEDURE — 80053 COMPREHEN METABOLIC PANEL: CPT | Performed by: EMERGENCY MEDICINE

## 2020-01-03 PROCEDURE — 82010 KETONE BODYS QUAN: CPT | Performed by: EMERGENCY MEDICINE

## 2020-01-03 PROCEDURE — 25000128 H RX IP 250 OP 636: Performed by: INTERNAL MEDICINE

## 2020-01-03 PROCEDURE — 99223 1ST HOSP IP/OBS HIGH 75: CPT | Mod: AI | Performed by: INTERNAL MEDICINE

## 2020-01-03 PROCEDURE — 25000131 ZZH RX MED GY IP 250 OP 636 PS 637: Performed by: INTERNAL MEDICINE

## 2020-01-03 PROCEDURE — 25000128 H RX IP 250 OP 636

## 2020-01-03 PROCEDURE — 25800030 ZZH RX IP 258 OP 636: Performed by: EMERGENCY MEDICINE

## 2020-01-03 PROCEDURE — 81001 URINALYSIS AUTO W/SCOPE: CPT | Performed by: EMERGENCY MEDICINE

## 2020-01-03 PROCEDURE — 96361 HYDRATE IV INFUSION ADD-ON: CPT

## 2020-01-03 PROCEDURE — 96374 THER/PROPH/DIAG INJ IV PUSH: CPT

## 2020-01-03 PROCEDURE — 00000146 ZZHCL STATISTIC GLUCOSE BY METER IP

## 2020-01-03 PROCEDURE — 12000000 ZZH R&B MED SURG/OB

## 2020-01-03 PROCEDURE — 99285 EMERGENCY DEPT VISIT HI MDM: CPT | Mod: 25

## 2020-01-03 PROCEDURE — 93005 ELECTROCARDIOGRAM TRACING: CPT

## 2020-01-03 PROCEDURE — 25000128 H RX IP 250 OP 636: Performed by: EMERGENCY MEDICINE

## 2020-01-03 RX ORDER — METOCLOPRAMIDE 5 MG/1
10 TABLET ORAL EVERY 6 HOURS PRN
Status: DISCONTINUED | OUTPATIENT
Start: 2020-01-03 | End: 2020-01-03

## 2020-01-03 RX ORDER — LIDOCAINE 40 MG/G
CREAM TOPICAL
Status: DISCONTINUED | OUTPATIENT
Start: 2020-01-03 | End: 2020-01-05

## 2020-01-03 RX ORDER — OXYCODONE HYDROCHLORIDE 5 MG/1
5-10 TABLET ORAL
Status: DISCONTINUED | OUTPATIENT
Start: 2020-01-03 | End: 2020-01-05 | Stop reason: HOSPADM

## 2020-01-03 RX ORDER — ALUMINA, MAGNESIA, AND SIMETHICONE 2400; 2400; 240 MG/30ML; MG/30ML; MG/30ML
15 SUSPENSION ORAL ONCE
Status: COMPLETED | OUTPATIENT
Start: 2020-01-03 | End: 2020-01-05

## 2020-01-03 RX ORDER — NICOTINE POLACRILEX 4 MG
15-30 LOZENGE BUCCAL
Status: DISCONTINUED | OUTPATIENT
Start: 2020-01-03 | End: 2020-01-05

## 2020-01-03 RX ORDER — PROCHLORPERAZINE 25 MG
25 SUPPOSITORY, RECTAL RECTAL EVERY 12 HOURS PRN
Status: DISCONTINUED | OUTPATIENT
Start: 2020-01-03 | End: 2020-01-05 | Stop reason: HOSPADM

## 2020-01-03 RX ORDER — HYDRALAZINE HYDROCHLORIDE 20 MG/ML
5 INJECTION INTRAMUSCULAR; INTRAVENOUS ONCE
Status: DISCONTINUED | OUTPATIENT
Start: 2020-01-03 | End: 2020-01-03

## 2020-01-03 RX ORDER — ONDANSETRON 2 MG/ML
4 INJECTION INTRAMUSCULAR; INTRAVENOUS EVERY 6 HOURS PRN
Status: DISCONTINUED | OUTPATIENT
Start: 2020-01-03 | End: 2020-01-05 | Stop reason: HOSPADM

## 2020-01-03 RX ORDER — ONDANSETRON 2 MG/ML
4 INJECTION INTRAMUSCULAR; INTRAVENOUS ONCE
Status: COMPLETED | OUTPATIENT
Start: 2020-01-03 | End: 2020-01-03

## 2020-01-03 RX ORDER — METOCLOPRAMIDE HYDROCHLORIDE 5 MG/ML
5 INJECTION INTRAMUSCULAR; INTRAVENOUS EVERY 6 HOURS PRN
Status: DISCONTINUED | OUTPATIENT
Start: 2020-01-03 | End: 2020-01-05 | Stop reason: HOSPADM

## 2020-01-03 RX ORDER — ATORVASTATIN CALCIUM 20 MG/1
20 TABLET, FILM COATED ORAL AT BEDTIME
Status: DISCONTINUED | OUTPATIENT
Start: 2020-01-03 | End: 2020-01-05 | Stop reason: HOSPADM

## 2020-01-03 RX ORDER — LIDOCAINE 40 MG/G
CREAM TOPICAL
Status: DISCONTINUED | OUTPATIENT
Start: 2020-01-03 | End: 2020-01-05 | Stop reason: HOSPADM

## 2020-01-03 RX ORDER — SODIUM CHLORIDE 9 MG/ML
INJECTION, SOLUTION INTRAVENOUS CONTINUOUS
Status: DISCONTINUED | OUTPATIENT
Start: 2020-01-03 | End: 2020-01-05 | Stop reason: HOSPADM

## 2020-01-03 RX ORDER — DEXTROSE MONOHYDRATE 25 G/50ML
25-50 INJECTION, SOLUTION INTRAVENOUS
Status: DISCONTINUED | OUTPATIENT
Start: 2020-01-03 | End: 2020-01-05

## 2020-01-03 RX ORDER — SODIUM CHLORIDE 9 MG/ML
INJECTION, SOLUTION INTRAVENOUS ONCE
Status: COMPLETED | OUTPATIENT
Start: 2020-01-03 | End: 2020-01-03

## 2020-01-03 RX ORDER — METOCLOPRAMIDE 5 MG/1
5 TABLET ORAL EVERY 6 HOURS PRN
Status: DISCONTINUED | OUTPATIENT
Start: 2020-01-03 | End: 2020-01-05 | Stop reason: HOSPADM

## 2020-01-03 RX ORDER — ONDANSETRON 4 MG/1
4 TABLET, ORALLY DISINTEGRATING ORAL EVERY 6 HOURS PRN
Status: DISCONTINUED | OUTPATIENT
Start: 2020-01-03 | End: 2020-01-05 | Stop reason: HOSPADM

## 2020-01-03 RX ORDER — NITROGLYCERIN 0.4 MG/1
0.4 TABLET SUBLINGUAL EVERY 5 MIN PRN
Status: DISCONTINUED | OUTPATIENT
Start: 2020-01-03 | End: 2020-01-05 | Stop reason: HOSPADM

## 2020-01-03 RX ORDER — METOCLOPRAMIDE HYDROCHLORIDE 5 MG/ML
10 INJECTION INTRAMUSCULAR; INTRAVENOUS EVERY 6 HOURS PRN
Status: DISCONTINUED | OUTPATIENT
Start: 2020-01-03 | End: 2020-01-03

## 2020-01-03 RX ORDER — MYCOPHENOLIC ACID 360 MG/1
360 TABLET, DELAYED RELEASE ORAL 2 TIMES DAILY
Status: DISCONTINUED | OUTPATIENT
Start: 2020-01-03 | End: 2020-01-05 | Stop reason: HOSPADM

## 2020-01-03 RX ORDER — FLUDROCORTISONE ACETATE 0.1 MG/1
0.2 TABLET ORAL AT BEDTIME
Status: DISCONTINUED | OUTPATIENT
Start: 2020-01-03 | End: 2020-01-05

## 2020-01-03 RX ORDER — HYDROMORPHONE HYDROCHLORIDE 1 MG/ML
.3-.5 INJECTION, SOLUTION INTRAMUSCULAR; INTRAVENOUS; SUBCUTANEOUS
Status: DISCONTINUED | OUTPATIENT
Start: 2020-01-03 | End: 2020-01-05 | Stop reason: HOSPADM

## 2020-01-03 RX ORDER — NALOXONE HYDROCHLORIDE 0.4 MG/ML
.1-.4 INJECTION, SOLUTION INTRAMUSCULAR; INTRAVENOUS; SUBCUTANEOUS
Status: DISCONTINUED | OUTPATIENT
Start: 2020-01-03 | End: 2020-01-05 | Stop reason: HOSPADM

## 2020-01-03 RX ORDER — ACETAMINOPHEN 325 MG/1
650 TABLET ORAL EVERY 4 HOURS PRN
Status: DISCONTINUED | OUTPATIENT
Start: 2020-01-03 | End: 2020-01-03

## 2020-01-03 RX ORDER — PROCHLORPERAZINE MALEATE 5 MG
10 TABLET ORAL EVERY 6 HOURS PRN
Status: DISCONTINUED | OUTPATIENT
Start: 2020-01-03 | End: 2020-01-05 | Stop reason: HOSPADM

## 2020-01-03 RX ORDER — TACROLIMUS 1 MG/1
2 CAPSULE ORAL 2 TIMES DAILY
Status: DISCONTINUED | OUTPATIENT
Start: 2020-01-03 | End: 2020-01-05 | Stop reason: HOSPADM

## 2020-01-03 RX ORDER — ONDANSETRON 2 MG/ML
INJECTION INTRAMUSCULAR; INTRAVENOUS
Status: COMPLETED
Start: 2020-01-03 | End: 2020-01-03

## 2020-01-03 RX ORDER — ACETAMINOPHEN 325 MG/1
650 TABLET ORAL EVERY 4 HOURS PRN
Status: DISCONTINUED | OUTPATIENT
Start: 2020-01-03 | End: 2020-01-05 | Stop reason: HOSPADM

## 2020-01-03 RX ADMIN — SODIUM CHLORIDE 250 ML: 9 INJECTION, SOLUTION INTRAVENOUS at 15:51

## 2020-01-03 RX ADMIN — ONDANSETRON HYDROCHLORIDE 4 MG: 2 INJECTION, SOLUTION INTRAMUSCULAR; INTRAVENOUS at 13:34

## 2020-01-03 RX ADMIN — PROCHLORPERAZINE EDISYLATE 5 MG: 5 INJECTION INTRAMUSCULAR; INTRAVENOUS at 17:18

## 2020-01-03 RX ADMIN — ATORVASTATIN CALCIUM 20 MG: 20 TABLET, FILM COATED ORAL at 20:52

## 2020-01-03 RX ADMIN — SODIUM CHLORIDE: 9 INJECTION, SOLUTION INTRAVENOUS at 17:37

## 2020-01-03 RX ADMIN — MYCOPHENOLIC ACID 360 MG: 360 TABLET, DELAYED RELEASE ORAL at 20:50

## 2020-01-03 RX ADMIN — ONDANSETRON 4 MG: 2 INJECTION INTRAMUSCULAR; INTRAVENOUS at 15:17

## 2020-01-03 RX ADMIN — SODIUM CHLORIDE 1000 ML: 9 INJECTION, SOLUTION INTRAVENOUS at 13:34

## 2020-01-03 RX ADMIN — ACETAMINOPHEN 650 MG: 325 TABLET, FILM COATED ORAL at 19:57

## 2020-01-03 RX ADMIN — ONDANSETRON HYDROCHLORIDE 4 MG: 2 INJECTION, SOLUTION INTRAMUSCULAR; INTRAVENOUS at 15:17

## 2020-01-03 RX ADMIN — TACROLIMUS 2 MG: 1 CAPSULE ORAL at 20:49

## 2020-01-03 ASSESSMENT — ENCOUNTER SYMPTOMS
DIARRHEA: 0
NAUSEA: 1
SORE THROAT: 0
FEVER: 0
LIGHT-HEADEDNESS: 0
VOMITING: 1
DIFFICULTY URINATING: 0
ABDOMINAL PAIN: 0

## 2020-01-03 ASSESSMENT — MIFFLIN-ST. JEOR
SCORE: 1804.37
SCORE: 1782.15

## 2020-01-03 ASSESSMENT — ACTIVITIES OF DAILY LIVING (ADL): ADLS_ACUITY_SCORE: 10

## 2020-01-03 NOTE — ED PROVIDER NOTES
History     Chief Complaint:  Nausea & Vomiting    HPI  Murtaza Fitzgerald is a 44 year old male with history of kidney transplant who presents today with nausea and vomiting. The patient states that for the last 2 days he has not been able to keep any food, drinks or pills down with repeated vomiting. He states he was here last spring with a similar presentation secondary to dehydration but today he feels as if dehydration is not much of an issues. He states his past nausea and vomiting has been under control. He states he is a kidney transplant patient, and is not on the list for a new kidney yet. He denies diarrhea, fever, lightheadedness, issues urinating, ear pain, sore throat or recent change to medications.     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  Smoking Status: Former  Smokeless Tobacco: Never  Alcohol Use: No   Drug Use: No   PCP: Kt Ríos   Marital Status:      Review of Systems   Constitutional: Negative for fever.   HENT: Negative for ear pain and sore throat.    Gastrointestinal: Positive for nausea and vomiting. Negative for abdominal pain and diarrhea.   Genitourinary: Negative for difficulty urinating.   Neurological: Negative for light-headedness.   All other systems reviewed and are negative.    "    Physical Exam     Patient Vitals for the past 24 hrs:   BP Temp Temp src Pulse Heart Rate Resp SpO2 Height Weight   01/03/20 1730 (!) 150/99 -- -- 101 97 9 98 % -- --   01/03/20 1700 (!) 178/108 -- -- 97 100 14 99 % -- --   01/03/20 1630 (!) 182/104 -- -- 101 105 9 99 % -- --   01/03/20 1600 (!) 194/116 -- -- 97 101 13 99 % -- --   01/03/20 1519 (!) 162/111 -- -- 100 95 13 98 % -- --   01/03/20 1450 (!) 143/93 -- -- -- 92 13 97 % -- --   01/03/20 1430 (!) 163/93 -- -- 94 93 -- 97 % -- --   01/03/20 1330 118/74 -- -- 97 -- -- 95 % -- --   01/03/20 1307 107/71 98.5  F (36.9  C) Oral 109 -- 20 99 % 1.727 m (5' 8\") 93 kg (205 lb)       Physical Exam  Constitutional:       Comments: Pleasant and cooperative   HENT:      Right Ear: Tympanic membrane normal.      Left Ear: Tympanic membrane normal.      Mouth/Throat:      Pharynx: No posterior oropharyngeal erythema.   Eyes:      Conjunctiva/sclera: Conjunctivae normal.   Neck:      Musculoskeletal: Neck supple.   Cardiovascular:      Rate and Rhythm: Normal rate and regular rhythm.      Heart sounds: Normal heart sounds.   Pulmonary:      Effort: Pulmonary effort is normal.      Breath sounds: Normal breath sounds.   Abdominal:      General: Bowel sounds are normal. There is no distension.      Palpations: Abdomen is soft.      Tenderness: There is no abdominal tenderness. There is no guarding or rebound.   Musculoskeletal: Normal range of motion.   Skin:     General: Skin is warm and dry.   Neurological:      Mental Status: He is alert.         Emergency Department Course     ECG:  Indication: transplant patient  Time: 1345  Vent. Rate 98 bpm. CT interval 122. QRS duration 80. QT/QTc 344/439. P-R-T axis 10 15 25.  Normal sinus rhythm. Inferior infarct, age undetermined. Abnormal ECG.  Read time: 5385    Laboratory:  Laboratory results were communicated with the patient who voiced understanding of the findings.    ISTAT VBG: pH 7.30 (L) / PCO2 38 (L) / PO2 32 / Bicarb " 19 (L) / Oxyhemoglobi: 63 / Base Deficit 6.7    UA: G, Ketone: 10, Blood: trace, Protein Albumin: 300, RBC/HPF: 3 (H), Mucous: present, o/w Negative     CBC: WBC: 11.0, HGB: 12.6 (L), PLT: 332  CMP: Glucose: 93, CO2: 19 (L), BUN: 52 (H), Creatinine: 5.42, GFR: 12 (L), Alkphos: 171 (H) o/w WNL      Ketone Beta: 1.7 (H)    Interventions:  1334 Zofran 4 mg IV  1334 NS 1,000 mLs IV  1517 Zofran 4 mg IV  1551  mLs IV    Emergency Department Course:  Nursing notes and vitals reviewed. (1:54 PM) I performed an exam of the patient as documented above.     IV inserted, blood and Urine sent to the lab for further testing, results above.     Medicine administered as documented above.    1607 I consulted with Dr. Villalba, nephrology, regarding the patient's history and presentation here in the emergency department.    1630 I rechecked the patient and discussed the results of their workup thus far.     1700 I consulted with Dr. Arnold of the hospitalist services at [facility].     1721  I consulted with Dr. Mulligan of the hospitalist services. They are in agreement to accept the patient for admission.    Findings and plan explained to the Patient who consents to admission. Discussed the patient with Dr. Mulligan, who will admit the patient to a medical bed for further monitoring, evaluation, and treatment.    Impression & Plan       Medical Decision Making:    Murtaza Fitzgerald is a 44 year old male with a history of renal transplant who presents the emergency department now with refractory nausea and vomiting.  His renal function has significantly worsened from recent values with a creatinine of 5.4 though he does not have any indications for immediate dialysis such as hyperkalemia, fluid overload, or even markedly elevated BUN.  After several doses of antiemetics he is somewhat improved but still unable to take any reasonable amount of fluids.  He has elevated beta hydroxybutyrate consistent with ketonemia but is  only slightly acidotic with a bicarb of 19.  He is not significantly hyperglycemic.  I think this represents starvation ketosis rather than DKA.      As noted I spoke with the transplant service and the hospitalist service at the [facility].  They indicated that their initial approach would be continued IV fluids and antiemetics.  Given his complicated course they would prefer to have him there but at this point the [facility] has no available beds.  I discussed the case with Juan Pablo Mulligan MD of the hospitalist service.  We will admit the patient here for initial management.  The patient is on the list for a bed at the [facility] so that should things worsen he can be transferred there.  I will ask Juan Pablo Mulligan MD to keep in contact with nephrology and hospitalist services.      Diagnosis:    ICD-10-CM    1. Intractable vomiting with nausea, unspecified vomiting type R11.2    2. Acute renal failure superimposed on chronic kidney disease, unspecified CKD stage, unspecified acute renal failure type (H) N17.9     N18.9    3. Ketonemia R79.89      Disposition:  Admitted to Dr. Mulligan.    Scribe Disclosure:  I, Job Wild, am serving as a scribe at 1:54 PM on 1/3/2020 to document services personally performed by Genny Del Valle MD based on my observations and the provider's statements to me.      1/3/2020   [facility] EMERGENCY DEPARTMENT       Genny Del Valle MD  01/03/20 6627

## 2020-01-03 NOTE — ED TRIAGE NOTES
"Pt reports 3 days of nausea and vomiting, denies diarrhea. Pt is diabetic, states \"it might be my failing kidney that is making me sick\". Pt is a kidney transplant, last creat was 4.7. Denies fever.   "

## 2020-01-03 NOTE — ED NOTES
[facility]  ED Nurse Handoff Report    uMrtaza Fitzgerald is a 44 year old male   ED Chief complaint: Nausea & Vomiting  . ED Diagnosis:   Final diagnoses:   Intractable vomiting with nausea, unspecified vomiting type   Acute renal failure superimposed on chronic kidney disease, unspecified CKD stage, unspecified acute renal failure type (H)   Ketonemia     Allergies:   Allergies   Allergen Reactions     Benadryl [Diphenhydramine]      Reglan Other (See Comments)     IV, delsuions       Code Status: Full Code  Activity level - Baseline/Home:  Independent. Activity Level - Current:   Stand by Assist. Lift room needed: No. Bariatric: No   Needed: No   Isolation: No. Infection: Not Applicable.     Vital Signs:   Vitals:    01/03/20 1600 01/03/20 1630 01/03/20 1700 01/03/20 1730   BP: (!) 194/116 (!) 182/104 (!) 178/108 (!) 150/99   Pulse: 97 101 97 101   Resp: 13 9 14 9   Temp:       TempSrc:       SpO2: 99% 99% 99% 98%   Weight:       Height:           Cardiac Rhythm:  ,      Pain level: 0-10 Pain Scale: 6  Patient confused: No. Patient Falls Risk: Yes.   Elimination Status: Has voided   Patient Report - Initial Complaint: N/V. Focused Assessment:   Mrutaza Fitzgerald is a 44 year old male with history of kidney transplant who presents today with nausea and vomiting. The patient states that for the last 2 days he has not been able to keep any food, drinks or pills down with repeated vomiting. He states he was here last spring with a similar presentation secondary to dehydration but today he feels as if dehydration is not much of an issues. He states his past nausea and vomiting has been under control. He states he is a kidney transplant patient, and is not on the list for a new kidney yet. He denies diarrhea, fever, lightheadedness, issues urinating, ear pain, sore throat or recent change to medications.    Tests Performed:   Labs Ordered and Resulted from Time of ED Arrival  Up to the Time of Departure from the ED   CBC WITH PLATELETS DIFFERENTIAL - Abnormal; Notable for the following components:       Result Value    RBC Count 4.38 (*)     Hemoglobin 12.6 (*)     Hematocrit 39.6 (*)     Absolute Neutrophil 9.6 (*)     All other components within normal limits   COMPREHENSIVE METABOLIC PANEL - Abnormal; Notable for the following components:    Carbon Dioxide 19 (*)     Urea Nitrogen 52 (*)     Creatinine 5.42 (*)     GFR Estimate 12 (*)     GFR Estimate If Black 14 (*)     Alkaline Phosphatase 171 (*)     All other components within normal limits   ROUTINE UA WITH MICROSCOPIC - Abnormal; Notable for the following components:    Glucose Urine 30 (*)     Ketones Urine 10 (*)     Blood Urine Trace (*)     Protein Albumin Urine 300 (*)     RBC Urine 3 (*)     Mucous Urine Present (*)     All other components within normal limits   KETONE BETA-HYDROXYBUTYRATE QUANTITATIVE - Abnormal; Notable for the following components:    Ketone Quantitative 1.7 (*)     All other components within normal limits   BLOOD GAS VENOUS AND OXYHGB - Abnormal; Notable for the following components:    Ph Venous 7.30 (*)     PCO2 Venous 38 (*)     Bicarbonate Venous 19 (*)     All other components within normal limits   GLUCOSE MONITOR NURSING POCT   PERIPHERAL IV CATHETER   CARDIAC CONTINUOUS MONITORING     No orders to display     Treatments provided: Normal Saline, IV Zofran, IV Compazine (see MAR)  Family Comments: NA  OBS brochure/video discussed/provided to patient:  Yes  ED Medications:   Medications   alum & mag hydroxide-simethicone (MYLANTA ES/MAALOX  ES) suspension 15 mL (has no administration in time range)   ondansetron (ZOFRAN) injection 4 mg (4 mg Intravenous Given 1/3/20 1334)   0.9% sodium chloride BOLUS (0 mLs Intravenous Stopped 1/3/20 1509)   ondansetron (ZOFRAN) injection 4 mg (4 mg Intravenous Given 1/3/20 1517)   0.9% sodium chloride BOLUS (0 mLs Intravenous Stopped 1/3/20 1708)    prochlorperazine (COMPAZINE) injection 5 mg (5 mg Intravenous Given 1/3/20 3730)   sodium chloride 0.9% infusion ( Intravenous New Bag 1/3/20 0775)     Drips infusing:  No  For the majority of the shift, the patient's behavior Green. Interventions performed were NA.     Severe Sepsis OR Septic Shock Diagnosis Present: No      ED Nurse Name/Phone Number: Jessica Sam RN,   5:47 PM    RECEIVING UNIT ED HANDOFF REVIEW    Above ED Nurse Handoff Report was reviewed: Yes  Reviewed by: Renetta Chiang, RN on January 3, 2020 at 7:05 PM   Did you vocera the ED RN: yes

## 2020-01-03 NOTE — PHARMACY-ADMISSION MEDICATION HISTORY
Admission medication history interview status for this patient is complete. See [facility] admission navigator for allergy information, prior to admission medications and immunization status.     Medication history interview source(s):Patient  Medication history resources (including written lists, pill bottles, clinic record):None  Primary pharmacy:[facility]    Changes made to PTA medication list:  Added: -  Deleted: aspirin  Changed: -    Actions taken by pharmacist (provider contacted, etc):None     Additional medication history information:None    Medication reconciliation/reorder completed by provider prior to medication history?  No     Do you take OTC medications (eg tylenol, ibuprofen, fish oil, eye/ear drops, etc)? Yes     For patients on insulin therapy: Yes (Insulin Pump)  Lantus/levemir/NPH/toujeo/tresiba/Mix 70/30 dose:  No  Sliding scale Novolog: No  If Yes, do you have a baseline novolog pre-meal dose:    units with meals  Patients eat three meals a day: Yes  How many episodes of hypoglycemia do you have per week: unknown  How many missed doses do you have per week: Insulin Pump  How many times do you check your blood glucose per day:  -  Do you have a Continuous glucose monitor (CGM) : Yes (remind pt that not approved for hospital use)  Any Barriers to therapy - Be specific :  No  (cost of medications, comfortable with giving injections (if applicable), comfortable and confident with current diabetes regimen)      Prior to Admission medications    Medication Sig Last Dose Taking? Auth Provider   acetaminophen (TYLENOL) 325 MG tablet Take 2 tablets (650 mg) by mouth every 4 hours as needed for mild pain  Yes Lizandro Dasilva PA-C   atorvastatin (LIPITOR) 40 MG tablet Take 0.5 tablets (20 mg) by mouth daily 1/2/2020 at hs Yes Kt Ríos MD   fludrocortisone (FLORINEF) 0.1 MG tablet Take 2 tablets (0.2 mg) by mouth daily 1/2/2020 at hs Yes Kt Ríos MD   Insulin Aspart (INSULIN PUMP -  OUTPATIENT) Date last interrogated by pharmacy: 2/5/2019  Omnipod  Insulin: Novolog  BASAL RATES and times:   12 AM (midnight): 1.1 units/hr  2 AM: 1.25  10 AM: 0.8  6 PM: 0.95  CARB RATIO and times:  12 AM - 11 AM: 9  11 AM - 8 PM: 6  8 PM - 12 AM: 7   BLOOD GLUCOSE TARGET and times:  12 AM (midnight): 100, correct above 120 with reverse correction on  Active Insulin Time: 4 hours  ISF (insulin sensitivity factor):   40 from   38 from [id number]  Dexcom sharing cose: [id number]  Yes Unknown, Entered By History   insulin aspart (NOVOLOG VIAL) 100 UNITS/ML vial TO BE USED IN INSULIN PUMP UP TO 75 UNITS TOTAL DAILY DOSE  Yes Patricia Costa MD   mycophenolic acid (GENERIC EQUIVALENT) 180 MG EC tablet Take 2 tablets (360 mg) by mouth 2 times daily 1/3/2020 at x1 Yes Sung Jorgensen MD   omeprazole (PRILOSEC) 40 MG DR capsule TAKE ONE CAPSULE BY MOUTH TWICE A DAY 1/3/2020 at x1 Yes Kt Ríos MD   ondansetron (ZOFRAN-ODT) 4 MG ODT tab Take 1-2 tablets (4-8 mg) by mouth every 8 hours as needed for nausea  Yes Adam Villalba MD   tacrolimus (GENERIC EQUIVALENT) 1 MG capsule Take 2 capsules (2 mg) by mouth 2 times daily 1/3/2020 at x1 Yes Kt Ríos MD   acetone, Urine, test STRP 1 strip by In Vitro route daily   Marcie Lozano APRN CNP   YOHAN CONTOUR test strip USE TO TEST BLOOD SUGAR FIVE TIMES A DAY OR AS DIRECTED   Kt Ríos MD   blood glucose monitoring (NO BRAND SPECIFIED) test strip Freestyle test strips 6 times daily (NOT lite and NOT insulinx)   Marcie Lozano APRN CNP   blood glucose monitoring (NO BRAND SPECIFIED) test strip 5 times daily ( contour NEXT)   Marcie Lozano APRN CNP   Blood Glucose Monitoring Suppl (HipWayIA CONTOUR MONITOR) W/DEVICE KIT TO TEST BG 5 TIMIES DAILY   Henrietta Tomas MD   insulin pen needle (BD ARPITA U/F) 32G X 4 MM Use 4 daily or as directed.   Kt Ríos MD   metoclopramide (REGLAN) 5 MG tablet TAKE ONE TABLET BY  MOUTH FOUR TIMES A DAY BEFORE MEALS AND NIGHTLY   Kt Ríos MD   senna-docusate (SENOKOT-S/PERICOLACE) 8.6-50 MG tablet Take 1-2 tablets by mouth 2 times daily   Lizandro Dasilva PA-C

## 2020-01-04 LAB
ANION GAP SERPL CALCULATED.3IONS-SCNC: 8 MMOL/L (ref 3–14)
BUN SERPL-MCNC: 47 MG/DL (ref 7–30)
CALCIUM SERPL-MCNC: 8.4 MG/DL (ref 8.5–10.1)
CHLORIDE SERPL-SCNC: 109 MMOL/L (ref 94–109)
CO2 SERPL-SCNC: 21 MMOL/L (ref 20–32)
CREAT SERPL-MCNC: 5.1 MG/DL (ref 0.66–1.25)
ERYTHROCYTE [DISTWIDTH] IN BLOOD BY AUTOMATED COUNT: 12.8 % (ref 10–15)
GFR SERPL CREATININE-BSD FRML MDRD: 13 ML/MIN/{1.73_M2}
GLUCOSE BLDC GLUCOMTR-MCNC: 137 MG/DL (ref 70–99)
GLUCOSE BLDC GLUCOMTR-MCNC: 146 MG/DL (ref 70–99)
GLUCOSE BLDC GLUCOMTR-MCNC: 167 MG/DL (ref 70–99)
GLUCOSE BLDC GLUCOMTR-MCNC: 172 MG/DL (ref 70–99)
GLUCOSE BLDC GLUCOMTR-MCNC: 42 MG/DL (ref 70–99)
GLUCOSE BLDC GLUCOMTR-MCNC: 60 MG/DL (ref 70–99)
GLUCOSE BLDC GLUCOMTR-MCNC: 67 MG/DL (ref 70–99)
GLUCOSE BLDC GLUCOMTR-MCNC: 76 MG/DL (ref 70–99)
GLUCOSE SERPL-MCNC: 168 MG/DL (ref 70–99)
HCT VFR BLD AUTO: 35.1 % (ref 40–53)
HGB BLD-MCNC: 10.8 G/DL (ref 13.3–17.7)
MCH RBC QN AUTO: 28.6 PG (ref 26.5–33)
MCHC RBC AUTO-ENTMCNC: 30.8 G/DL (ref 31.5–36.5)
MCV RBC AUTO: 93 FL (ref 78–100)
PLATELET # BLD AUTO: 254 10E9/L (ref 150–450)
POTASSIUM SERPL-SCNC: 4.1 MMOL/L (ref 3.4–5.3)
RBC # BLD AUTO: 3.77 10E12/L (ref 4.4–5.9)
SODIUM SERPL-SCNC: 138 MMOL/L (ref 133–144)
WBC # BLD AUTO: 8.2 10E9/L (ref 4–11)

## 2020-01-04 PROCEDURE — 80048 BASIC METABOLIC PNL TOTAL CA: CPT | Performed by: INTERNAL MEDICINE

## 2020-01-04 PROCEDURE — 00000146 ZZHCL STATISTIC GLUCOSE BY METER IP

## 2020-01-04 PROCEDURE — 36415 COLL VENOUS BLD VENIPUNCTURE: CPT | Performed by: INTERNAL MEDICINE

## 2020-01-04 PROCEDURE — 25000132 ZZH RX MED GY IP 250 OP 250 PS 637: Performed by: INTERNAL MEDICINE

## 2020-01-04 PROCEDURE — 99232 SBSQ HOSP IP/OBS MODERATE 35: CPT | Performed by: INTERNAL MEDICINE

## 2020-01-04 PROCEDURE — 25800030 ZZH RX IP 258 OP 636: Performed by: INTERNAL MEDICINE

## 2020-01-04 PROCEDURE — 25800025 ZZH RX 258: Performed by: INTERNAL MEDICINE

## 2020-01-04 PROCEDURE — 25000131 ZZH RX MED GY IP 250 OP 636 PS 637: Performed by: INTERNAL MEDICINE

## 2020-01-04 PROCEDURE — 85027 COMPLETE CBC AUTOMATED: CPT | Performed by: INTERNAL MEDICINE

## 2020-01-04 PROCEDURE — 12000000 ZZH R&B MED SURG/OB

## 2020-01-04 RX ORDER — AMLODIPINE BESYLATE 5 MG/1
5 TABLET ORAL DAILY
Status: DISCONTINUED | OUTPATIENT
Start: 2020-01-04 | End: 2020-01-05

## 2020-01-04 RX ADMIN — DEXTROSE MONOHYDRATE 25 ML: 25 INJECTION, SOLUTION INTRAVENOUS at 02:36

## 2020-01-04 RX ADMIN — OXYCODONE HYDROCHLORIDE 10 MG: 5 TABLET ORAL at 04:08

## 2020-01-04 RX ADMIN — ATORVASTATIN CALCIUM 20 MG: 20 TABLET, FILM COATED ORAL at 20:11

## 2020-01-04 RX ADMIN — INSULIN ASPART 1 UNITS: 100 INJECTION, SOLUTION INTRAVENOUS; SUBCUTANEOUS at 08:14

## 2020-01-04 RX ADMIN — OXYCODONE HYDROCHLORIDE 10 MG: 5 TABLET ORAL at 23:59

## 2020-01-04 RX ADMIN — AMLODIPINE BESYLATE 5 MG: 5 TABLET ORAL at 12:04

## 2020-01-04 RX ADMIN — PROCHLORPERAZINE MALEATE 10 MG: 5 TABLET ORAL at 16:40

## 2020-01-04 RX ADMIN — MYCOPHENOLIC ACID 360 MG: 360 TABLET, DELAYED RELEASE ORAL at 08:00

## 2020-01-04 RX ADMIN — TACROLIMUS 2 MG: 1 CAPSULE ORAL at 08:00

## 2020-01-04 RX ADMIN — OXYCODONE HYDROCHLORIDE 10 MG: 5 TABLET ORAL at 00:13

## 2020-01-04 RX ADMIN — PROCHLORPERAZINE MALEATE 10 MG: 5 TABLET ORAL at 09:36

## 2020-01-04 RX ADMIN — SODIUM CHLORIDE: 9 INJECTION, SOLUTION INTRAVENOUS at 00:42

## 2020-01-04 RX ADMIN — DEXTROSE MONOHYDRATE 25 ML: 25 INJECTION, SOLUTION INTRAVENOUS at 20:04

## 2020-01-04 RX ADMIN — MYCOPHENOLIC ACID 360 MG: 360 TABLET, DELAYED RELEASE ORAL at 20:04

## 2020-01-04 RX ADMIN — OXYCODONE HYDROCHLORIDE 10 MG: 5 TABLET ORAL at 13:13

## 2020-01-04 RX ADMIN — SODIUM CHLORIDE: 9 INJECTION, SOLUTION INTRAVENOUS at 20:20

## 2020-01-04 RX ADMIN — SODIUM CHLORIDE: 9 INJECTION, SOLUTION INTRAVENOUS at 10:17

## 2020-01-04 RX ADMIN — DEXTROSE 30 G: 15 GEL ORAL at 19:45

## 2020-01-04 RX ADMIN — TACROLIMUS 2 MG: 1 CAPSULE ORAL at 20:04

## 2020-01-04 RX ADMIN — FLUDROCORTISONE ACETATE 0.2 MG: 0.1 TABLET ORAL at 20:11

## 2020-01-04 ASSESSMENT — MIFFLIN-ST. JEOR: SCORE: 1800.75

## 2020-01-04 ASSESSMENT — ACTIVITIES OF DAILY LIVING (ADL)
ADLS_ACUITY_SCORE: 10

## 2020-01-04 NOTE — PLAN OF CARE
VSS except slight HTN of 164/103, Norvasc ordered and given, on RA, A/OX4, LS dim, SBA, mod carb diet, pt c/o nausea at 0930, compazine oral given, nausea is now relieved, pt states lunch made his nausea slightly worse, pt c/o 7/10 leg pain, oxy given once, pain dropped to 5/10, discharge possible in 1-2 days.

## 2020-01-04 NOTE — PLAN OF CARE
Tylenol given for pain with relief. BS 51, juice and food given, recheck 108. Continue IV fluid. Continue to monitor.

## 2020-01-04 NOTE — PLAN OF CARE
Pt A&O x4, up with SBA. BP elevated- see flowsheets, all other VSS. PRN oxycodone given x2 for neuropathy LE pain w relief. Admit for N/V and elevated creatinine. HX of renal and pancreatic transplant '08, currently awaiting additional renal transplant. BG 42 overnight- gave 25g D50, r/t pt c/o nausea. Recheck BG was 167. Nephrology following. NS infusing at 100mL hr. Discharge TBD, will continue POC.

## 2020-01-04 NOTE — PROGRESS NOTES
[facility]  Hospitalist Progress Note  Admit 1/3/2020  1:12 PM    Name: Murtaza Fitzgerald    MRN: [id number]  Provider:  Dada Lazo MD, [facility]    Date of Service: 01/04/2020     Reason for Stay (Diagnosis): Intractable nausea and vomiting with acute kidney injury         Summary of hospital stay & Assessment/Plan:   Summary of Stay: Murtaza Fitzgerald is a 44 year old male who was admitted on 1/3/2020  44 year old  male with a significant past medical history of complex medical problems including coronary artery disease status post bypass graft, diabetes and kidney disease status post renal and pancreatic transplant in 2008 who presents with nausea and vomiting over the last 2 days.  Patient has been having frequent vomiting every few hours and was not able to take adequate fluid and food as well as immunosuppression medication.  There was no bed at the [facility] as she was admitted here at [facility] for further management.     Transplant: 11/4/2008 (Kidney), 2/17/2009 (Pancreas)                Donor Type: Living      Donor Class: Standard Criteria Donor                 Kidney Tx Biopsy: Yes, demonstrated chronic changes.   Last biopsy on record is 12/2013 which              showed features consistent with CNI toxicity.    Problem List:   1. Intractable nausea and vomiting likely secondary to gastroparesis, no evidence of acute infection or gastroenteritis, had a normal bowel movement yesterday morning  2. Acute kidney injury on top of chronic kidney disease stage V creatinine in the 4 range, history of kidney transplant rapid deterioration in the last year of kidney function back on the transplant list, might need dialysis meanwhile.  No indication for acute dialysis now no pulmonary edema or hyperkalemia or severe uremia.  3. Type 1 diabetes mellitus, he had ketosis on admission likely from decreased oral intake and starvation, mild/early DKA also possible  bicarb was 19 venous pH was 7.30.  Bicarb 21 this morning.  Continue insulin pump and IV fluid, antiemetic      DVT Prophylaxis: Pneumatic Compression Devices  Code Status:  Full Code    Disposition Plan     Expected discharge in 1-2 days to prior living arrangement once able to tolerate oral intake without nausea or vomiting and kidney/creatinine improves.     Entered: Dada Lazo 01/04/2020, 11:43 AM                 Interval History:         Feeling a little better today still not at baseline, reports history of gastroparesis and being eligible for a gastric pacemaker in the past, has had admission for gastroparesis in the past.    Today's plan detailed above discussed with nursing               Physical Exam:   Physical Exam   Temp: 98.1  F (36.7  C) Temp src: Oral BP: (!) 164/103 Pulse: 86 Heart Rate: 81 Resp: 18 SpO2: 96 % O2 Device: None (Room air)    Vitals:    01/03/20 1307 01/03/20 1921 01/04/20 0408   Weight: 93 kg (205 lb) 90.8 kg (200 lb 1.6 oz) 92.6 kg (204 lb 3.2 oz)     I/O last 3 completed shifts:  In: -   Out: 420 [Urine:420]      GENERAL:  Comfortable.   PSYCH: pleasant, oriented, No acute distress.  EYES: PERRLA, Normal conjunctiva.  HEART:  Normal S1, S2 with no edema.  LUNGS:  Clear to auscultation, normal Respiratory effort.  ABDOMEN:  Soft, no hepatosplenomegaly, normal bowel sounds.  SKIN:  Dry to touch, No rash.      Medications     sodium chloride 100 mL/hr at 01/04/20 1017       alum & mag hydroxide-simethicone  15 mL Oral Once     amLODIPine  5 mg Oral Daily     atorvastatin  20 mg Oral At Bedtime     fludrocortisone  0.2 mg Oral At Bedtime     insulin aspart  1-7 Units Subcutaneous TID AC     insulin aspart  1-5 Units Subcutaneous At Bedtime     mycophenolic acid  360 mg Oral BID     sodium chloride (PF)  3 mL Intracatheter Q8H     sodium chloride (PF)  3 mL Intracatheter Q8H     tacrolimus  2 mg Oral BID     Data     -Data reviewed today:  I personally reviewed  all new labs and  imaging results over the last 24 hours.    Recent Labs   Lab 01/04/20  0642 01/03/20  1323   WBC 8.2 11.0   HGB 10.8* 12.6*   HCT 35.1* 39.6*   MCV 93 90    332     Recent Labs   Lab 01/04/20  0642 01/03/20  1323 12/31/19  1144    140 139   POTASSIUM 4.1 3.9 3.8   CHLORIDE 109 108 107   CO2 21 19* 21   ANIONGAP 8 13 11   * 93 101*   BUN 47* 52* 48*   CR 5.10* 5.42* 4.78*   GFRESTIMATED 13* 12* 14*   GFRESTBLACK 15* 14* 16*   CHARLY 8.4* 9.2 8.7       No results found for this or any previous visit (from the past 24 hour(s)).    This document was produced using voice recognition software

## 2020-01-04 NOTE — H&P
Hospitalist Admission Note    Name: Murtaza Fitzgerald    MRN: [id number]          YOB: 1975    Age: 44 year old  Date of admission: 1/3/2020  Primary care provider: Kt Ríos              Assessment:       Brief summary of admission assessment:Murtaza Fitzgerald is a 44 year old  male with a significant past medical history of complex medical problems including coronary artery disease status post bypass graft, diabetes and kidney disease status post renal and pancreatic transplant in 2008 who presents with nausea and vomiting over the last 2 days.  Patient has been having frequent vomiting every few hours and was not able to take adequate fluid and food as well as medications.    ED evaluation revealed acute kidney injury on chronic kidney disease, mild metabolic acidosis and elevated ketone with normal anion gap and controlled  blood sugar.  Patient was given IV fluids, antiemetics and emergency department physician Dr. Garcia planned transfer to the [facility] since he is on transplant list again but there was no bed available at the [facility] and patient was recommended admission here for inpatient management of patient's nausea and vomiting and acute kidney injury.    I saw and examined patient emergency department and spoke with Dr. Garcia.  Patient symptoms are improving and he denies any chest pain shortness of breath or diarrhea.    He was then admitted to our service for  inpatient care.      Admission diagnoses:      #1.  Nausea and vomiting: Etiology unclear.  No evidence of diarrhea or gastroenteritis.  No infectious etiology apparent    #2.  Acute kidney injury on chronic kidney disease.  Serum creatinine is 5.42 up from 4.78 few days ago.  Patient is nonoliguric and provided urine sample.  No evidence of pulmonary edema or hyperkalemia    #3.  Chronic kidney disease: Status post renal transplant many years ago and  currently on a waiting list for another kidney transplant.  Patient did not take his transplant medications due to nausea and vomiting over the last 2 days.    #4.  Insulin requiring diabetes: Patient uses insulin pump.  Blood sugar is controlled.  I suspect his elevated hydroxybutyrate is likely from starvation ketosis rather than DKA.  Patient is alert and oriented x3 and able to use his insulin pump.    Comorbid medical conditions:    Chronic smoker    Hypertension    Dyslipidemia    Coronary artery disease    Type 1 diabetes     Plan/MDM:       > Admission Status: Will admit patient to hospitalist service as inpatient as patient likely need over two mid night stays in the hospital.     >Care plan:    --Admit to medical bed, remote telemetry  --Patient received 250 cc of IV bolus and started on 25 cc an hour we will continue 100 cc an hour.  Monitor intake and output and kidney functions  --I will involve nephrology team due to his transplant status and acute kidney injury  --Avoid nephrotoxic medications  --We will check his tacrolimus level  --May continue to use his insulin pump  --Nausea and vomiting treatment protocol      >Supportive care:IV fluids continued    >Diet:Diet advanced    >Activity:Advance activity as tolerated    >Education/Counseling :Discussed treatment plan with the patient    >Consults:Inpatient consult with nephrology    >VTE prophylactic measures:prophylaxis against venous thromboembolism    >Therapies:none       >Additional orders:    --Care plan discussed with the patient/family and agreed to care plan   --Patient will be transferred to care of hospitalist attending for further evaluation and management as appropriate   --Old medical orders reviewed   --imaging result independently reviewed by me     (See orders placed for this visit by me )     - Home medication reviewed and will be continued as appropriate once pharmacy reconciliation is completed         Code Status/Disposition:      >Code Status:Full Code      >Disposition:anticipate discharge to home and Anticipate discharge in 2-3 days        Disclaimer: This note consists of symbols derived from keyboarding, dictation and/or voice recognition software. As a result, there may be errors in the script that have gone undetected. Please consider this when interpreting information found in this chart.             Chief Complaint:       Nausea and vomiting     History is obtained from the patient          History of Present Illness:      This patient is a 44 year old  male with a significant past medical history of complex medical problems including coronary artery disease kidney disease requiring transplant as well as pancreatic transplant for diabetes Who presents with the following condition requiring a hospital admission:    Nausea and vomiting dehydration and acute kidney injury    Patient is 45-year-old male with complex medical problems stated above presents with nausea and vomiting for the last 2 days.  He has been having nausea and frequent vomiting every few hours over the last 2 days since Wednesday.  He was not able to take adequate intake and in fact he did not take his medications due to nausea and vomiting.  He has been having generalized weakness but denies any abdominal pain, diarrhea or fever.  Patient came to the emergency department for evaluation which revealed evidence of mild metabolic acidosis and starvation ketosis as well as worsening of his kidney function.  Patient was then admitted to our service for further care.             Past Medical History:     Past Medical History:   Diagnosis Date     CMV (cytomegalovirus infection) status negative 2011     Coronary artery disease, non-occlusive 2008    angio showed diffuse disease with no lesions     Diabetes mellitus, type 1     Diagnosed at age 11     Dialysis patient (H)     prior to kidney transplant     Dyslipidemia      Gastroesophageal reflux disease      History of  blood transfusion      Hypertension      Immunosuppressed status (H)      Kidney transplant status, living related donor           Migraines      Mycotic aneurysm of kidney transplant (H) 2008     Noncompliance with treatment     no labs for one year     Pancreas replaced by transplant (H) 2009    rejection treated with thymo     Pancreatic disease     pancreas transplant     Tobacco abuse ongoing            Past Surgical History:     Past Surgical History:   Procedure Laterality Date     ARTHROSCOPY KNEE WITH LATERAL MENISCECTOMY Left 7/10/2019    Procedure: Left knee arthroscopic partial lateral meniscectomy;  Surgeon: Alex Candelaria MD;  Location: [facility] OR     BYPASS GRAFT ARTERY CORONARY N/A 2015    Procedure: BYPASS GRAFT ARTERY CORONARY;  Surgeon: Katy Neville MD;  Location: [facility] OR     EYE SURGERY      vitrectomy both eyes      ORTHOPEDIC SURGERY      tumor removed from left knee     TRANSPLANT  .    pancrease and kidney transplant             Social History:     Social History     Tobacco Use     Smoking status: Former Smoker     Packs/day: 0.30     Types: Cigarettes     Last attempt to quit: 7/3/2015     Years since quittin.5     Smokeless tobacco: Never Used     Tobacco comment: E- Cig use still   Substance Use Topics     Alcohol use: Not Currently     Alcohol/week: 0.0 standard drinks     Frequency: Never     Binge frequency: Never             Family History:     Family History   Problem Relation Age of Onset     Unknown/Adopted Father      Heart Disease Maternal Grandfather 62            Allergies:     Allergies   Allergen Reactions     Benadryl [Diphenhydramine]      Reglan Other (See Comments)     IV, delsuions             Medications:        Prior to Admission medications    Medication Sig Last Dose Taking? Auth Provider   acetaminophen (TYLENOL) 325 MG tablet Take 2 tablets (650 mg) by mouth every 4 hours as needed for mild pain  Yes Lizandro Dasilva PA-C    atorvastatin (LIPITOR) 40 MG tablet Take 0.5 tablets (20 mg) by mouth daily 1/2/2020 at hs Yes Kt Ríos MD   fludrocortisone (FLORINEF) 0.1 MG tablet Take 2 tablets (0.2 mg) by mouth daily 1/2/2020 at hs Yes Kt Ríos MD   Insulin Aspart (INSULIN PUMP - OUTPATIENT) Date last interrogated by pharmacy: 2/5/2019  Omnipod  Insulin: Novolog  BASAL RATES and times:   12 AM (midnight): 1.1 units/hr  2 AM: 1.25  10 AM: 0.8  6 PM: 0.95  CARB RATIO and times:  12 AM - 11 AM: 9  11 AM - 8 PM: 6  8 PM - 12 AM: 7   BLOOD GLUCOSE TARGET and times:  12 AM (midnight): 100, correct above 120 with reverse correction on  Active Insulin Time: 4 hours  ISF (insulin sensitivity factor):   40 from   38 from [id number]  Dexcom sharing cose: [id number]  Yes Unknown, Entered By History   insulin aspart (NOVOLOG VIAL) 100 UNITS/ML vial TO BE USED IN INSULIN PUMP UP TO 75 UNITS TOTAL DAILY DOSE  Yes Patricia Costa MD   mycophenolic acid (GENERIC EQUIVALENT) 180 MG EC tablet Take 2 tablets (360 mg) by mouth 2 times daily 1/3/2020 at x1 Yes Sung Jorgensen MD   omeprazole (PRILOSEC) 40 MG DR capsule TAKE ONE CAPSULE BY MOUTH TWICE A DAY 1/3/2020 at x1 Yes Kt Ríos MD   ondansetron (ZOFRAN-ODT) 4 MG ODT tab Take 1-2 tablets (4-8 mg) by mouth every 8 hours as needed for nausea  Yes Adam Villalba MD   tacrolimus (GENERIC EQUIVALENT) 1 MG capsule Take 2 capsules (2 mg) by mouth 2 times daily 1/3/2020 at x1 Yes Kt Ríos MD   acetone, Urine, test STRP 1 strip by In Vitro route daily   Marcie Lozano APRN CNP   YOHAN CONTOUR test strip USE TO TEST BLOOD SUGAR FIVE TIMES A DAY OR AS DIRECTED   Kt Ríos MD   blood glucose monitoring (NO BRAND SPECIFIED) test strip Freestyle test strips 6 times daily (NOT lite and NOT insulinx)   Marcie Lozano APRN CNP   blood glucose monitoring (NO BRAND SPECIFIED) test strip 5 times daily ( contour NEXT)   Marcie Lozano APRN CNP   Blood Glucose  Monitoring Suppl (LinkdexIA CONTOUR MONITOR) W/DEVICE KIT TO TEST BG 5 TIMIES DAILY   Henrietta Tomas MD   insulin pen needle (BD ARPITA U/F) 32G X 4 MM Use 4 daily or as directed.   Kt Ríos MD   metoclopramide (REGLAN) 5 MG tablet TAKE ONE TABLET BY MOUTH FOUR TIMES A DAY BEFORE MEALS AND NIGHTLY   Kt Ríos MD   senna-docusate (SENOKOT-S/PERICOLACE) 8.6-50 MG tablet Take 1-2 tablets by mouth 2 times daily   Lizandro Dasilva PA-C          Review of Systems:     A Comprehensive greater than 10 system review of systems was carried out.  Pertinent positives and negatives are noted above in HPI.  Otherwise negative for contributory information.           Physical Exam:     Vital signs were reviewed    Temp:  [98.5  F (36.9  C)] 98.5  F (36.9  C)  Pulse:  [] 89  Heart Rate:  [] 89  Resp:  [9-20] 12  BP: (107-194)/() 150/99  SpO2:  [95 %-99 %] 96 %        GEN: awake, alert, cooperative, no apparent distress, oriented x 3    NECK:Supple ,no mass or thyromegaly     HEENT:  Normocephalic/atraumatic, no scleral icterus, no nasal discharge, mouth moist.    CV:  Regular rate and rhythm, no murmur or JVD.  S1 + S2 noted, no S3 or S4.    LUNGS:  Clear to auscultation bilaterally without rales/rhonchi/wheezing/retractions.  Symmetric chest rise on inhalation noted.    ABD:  Active bowel sounds, soft, non-tender/non-distended.  No rebound/guarding/rigidity.    EXT:  No edema.  No cyanosis.  No joint synovitis noted.Lower extremity pulses are normal bilaterally and     LGS: No cervical or axillary lymphadenopathy     SKIN:  Dry to touch, warm ,no exanthems noted in the visualized areas.    Neurologic:Grossly intact,non focal . No acute focal neurologic deficit     Psychaitric exam: Mood and affect normal              Data:       All laboratory and imaging data in the past 24 hours reviewed     Results for orders placed or performed during the hospital encounter of 01/03/20   CBC with platelets  differential     Status: Abnormal   Result Value Ref Range    WBC 11.0 4.0 - 11.0 10e9/L    RBC Count 4.38 (L) 4.4 - 5.9 10e12/L    Hemoglobin 12.6 (L) 13.3 - 17.7 g/dL    Hematocrit 39.6 (L) 40.0 - 53.0 %    MCV 90 78 - 100 fl    MCH 28.8 26.5 - 33.0 pg    MCHC 31.8 31.5 - 36.5 g/dL    RDW 12.8 10.0 - 15.0 %    Platelet Count 332 150 - 450 10e9/L    Diff Method Automated Method     % Neutrophils 86.9 %    % Lymphocytes 9.6 %    % Monocytes 2.7 %    % Eosinophils 0.0 %    % Basophils 0.3 %    % Immature Granulocytes 0.5 %    Nucleated RBCs 0 0 /100    Absolute Neutrophil 9.6 (H) 1.6 - 8.3 10e9/L    Absolute Lymphocytes 1.1 0.8 - 5.3 10e9/L    Absolute Monocytes 0.3 0.0 - 1.3 10e9/L    Absolute Eosinophils 0.0 0.0 - 0.7 10e9/L    Absolute Basophils 0.0 0.0 - 0.2 10e9/L    Abs Immature Granulocytes 0.1 0 - 0.4 10e9/L    Absolute Nucleated RBC 0.0    Comprehensive metabolic panel     Status: Abnormal   Result Value Ref Range    Sodium 140 133 - 144 mmol/L    Potassium 3.9 3.4 - 5.3 mmol/L    Chloride 108 94 - 109 mmol/L    Carbon Dioxide 19 (L) 20 - 32 mmol/L    Anion Gap 13 3 - 14 mmol/L    Glucose 93 70 - 99 mg/dL    Urea Nitrogen 52 (H) 7 - 30 mg/dL    Creatinine 5.42 (H) 0.66 - 1.25 mg/dL    GFR Estimate 12 (L) >60 mL/min/[1.73_m2]    GFR Estimate If Black 14 (L) >60 mL/min/[1.73_m2]    Calcium 9.2 8.5 - 10.1 mg/dL    Bilirubin Total 0.4 0.2 - 1.3 mg/dL    Albumin 4.0 3.4 - 5.0 g/dL    Protein Total 8.5 6.8 - 8.8 g/dL    Alkaline Phosphatase 171 (H) 40 - 150 U/L    ALT 14 0 - 70 U/L    AST 13 0 - 45 U/L   UA with Microscopic     Status: Abnormal   Result Value Ref Range    Color Urine Light Yellow     Appearance Urine Clear     Glucose Urine 30 (A) NEG^Negative mg/dL    Bilirubin Urine Negative NEG^Negative    Ketones Urine 10 (A) NEG^Negative mg/dL    Specific Gravity Urine 1.017 1.003 - 1.035    Blood Urine Trace (A) NEG^Negative    pH Urine 6.5 5.0 - 7.0 pH    Protein Albumin Urine 300 (A) NEG^Negative mg/dL     Urobilinogen mg/dL Normal 0.0 - 2.0 mg/dL    Nitrite Urine Negative NEG^Negative    Leukocyte Esterase Urine Negative NEG^Negative    Source Midstream Urine     WBC Urine 1 0 - 5 /HPF    RBC Urine 3 (H) 0 - 2 /HPF    Mucous Urine Present (A) NEG^Negative /LPF    Hyaline Casts 1 0 - 2 /LPF   Ketone Beta-Hydroxybutyrate Quantitative     Status: Abnormal   Result Value Ref Range    Ketone Quantitative 1.7 (HH) 0.0 - 0.6 mmol/L   Blood gas venous and oxyhgb     Status: Abnormal   Result Value Ref Range    Ph Venous 7.30 (L) 7.32 - 7.43 pH    PCO2 Venous 38 (L) 40 - 50 mm Hg    PO2 Venous 32 25 - 47 mm Hg    Bicarbonate Venous 19 (L) 21 - 28 mmol/L    FIO2 Room Air     Oxyhemoglobin Venous 63 %    Base Deficit Venous 6.7 mmol/L   EKG 12-lead, tracing only     Status: None (Preliminary result)   Result Value Ref Range    Interpretation ECG Click View Image link to view waveform and result             No results found for this or any previous visit (from the past 48 hour(s)).    EKG results: Normal sinus rhythm        All imaging studies reviewed by me.         Patient`s old medical records reviewed and case discussed with the ED physician.    ED course-Reviewed

## 2020-01-04 NOTE — CONSULTS
Nephrology Initial Consult  January 4, 2020      Murtaza Fitzgerald MRN:[id number] YOB: 1975  Date of Admission:1/3/2020  Primary care provider: Kt Ríos  Requesting physician: Juan Pablo Mulligan MD    ASSESSMENT AND RECOMMENDATIONS:   # LDKT:               Etiology of kidney failure: Diabetes mellitus, type I.    Tx: LDKT.    Dr Adam Villalba, [facility]   Tx: ANNMARIE (failed)   Transplant: 11/4/2008 (Kidney), 2/17/2009 (Pancreas)   Donor Type: Living      Donor Class: Standard Criteria Donor    Kidney Tx Biopsy: Yes, demonstrated chronic changes.   Last biopsy on record is 12/2013 which  showed features consistent with CNI toxicity.   UACR ~500 mg/g in 1/19     - gradually failing transplant, more accelerated over last year . Now worse with pre-renal state.   - slightly better today with IVF. Non oliguric. No s/o uremia .      # Immunosuppression: Tacrolimus (goal 4-6) and Mycophenolic acid (goal  1-3.5)           - continue same.      # Diabetes status-post failed ANNMARIE (bladder drained):     # Hypertension: BP high here with IVF. Normally on Fludrocortisone to keep BP up d/t chronic hypovolemia      # Anemia in chronic renal disease:               - Hgb 10.8. Not on WILL yet.     # Nausea/ vomiting - d/t gastroparesis.     Recc-   Continue IVF at current rate.   Mx of Vomiting per primary team.   Goal would be to control his emesis/ nausea, improve oral intake , stabilize his renal function and plan for quick f/u with [facility] txp team as outpt . Will likely need to be relisted for Txp and start preparing for dialysis.   Continue IS at current dose    Thank you for the consult. Will continue to follow along with you .          Amanda Weber MD  [facility] - Nephrology   [phone]        REASON FOR CONSULT: Worsening creatinine, kidney transplant patient    HISTORY OF PRESENT ILLNESS:  Murtaza Fitzgerald is a 44 year old gentleman with history of type 1 diabetes mellitus leading to  end-stage kidney failure status post LD KT on 11/04/2008 with failed pancreatic transplant in 2009, coronary artery disease status post CABG in 2015, longstanding issues with nausea and vomiting.   He follows with Dr. Barnard at [facility] for a transplant care.    He was admitted on 1/3/2020 secondary to complaints of nausea and vomiting for 2 to 3 days.  He had frequent vomiting and was not able to keep oral intake down.  He reported poor fluid intake.  He was unable to take transfer medications as well.  Evaluation here creatinine had bumped up to 5.4 from his last value of 4.7 on 12/31/2019.  Attempt was made to transfer patient to [facility] for his transplant care but since the hospital was full patient was admitted here for ongoing care.  Since then he has received IV normal saline, antiemetics and has been restarted on his transfer medication.  He is nonoliguric.  His creatinine today is slightly better at 5.1.  He has mild non-anion gap metabolic acidosis.  Otherwise electrolytes are fine.  He is hemodynamically stable, afebrile and blood pressures on the higher side.    Patient is struggled with nausea and vomiting.  At one point he was on IVF twice a week but stopped it d/t lack of insurance. He is on fludrocortisone to maintain his volume status.  Gastric study in the past has shown gastroparesis.  Creatinine used  to be around 1.5-1.7 at baseline up until March of this year and has gradually deteriorated over the last several months to Cr of 3s and 4s.  Today, still slightly nauseous , but better. Otherwise , no complaints.     PAST MEDICAL HISTORY:  Reviewed with patient on 01/04/2020  and is as listed in HPI.       MEDICATIONS:  PTA Meds  Prior to Admission medications    Medication Sig Last Dose Taking? Auth Provider   acetaminophen (TYLENOL) 325 MG tablet Take 2 tablets (650 mg) by mouth every 4 hours as needed for mild pain  Yes Lizandro Dasilva PA-C   atorvastatin (LIPITOR) 40  MG tablet Take 0.5 tablets (20 mg) by mouth daily 1/2/2020 at hs Yes Kt Ríos MD   fludrocortisone (FLORINEF) 0.1 MG tablet Take 2 tablets (0.2 mg) by mouth daily 1/2/2020 at hs Yes Kt Ríos MD   Insulin Aspart (INSULIN PUMP - OUTPATIENT) Date last interrogated by pharmacy: 2/5/2019  Omnipod  Insulin: Novolog  BASAL RATES and times:   12 AM (midnight): 1.1 units/hr  2 AM: 1.25  10 AM: 0.8  6 PM: 0.95  CARB RATIO and times:  12 AM - 11 AM: 9  11 AM - 8 PM: 6  8 PM - 12 AM: 7   BLOOD GLUCOSE TARGET and times:  12 AM (midnight): 100, correct above 120 with reverse correction on  Active Insulin Time: 4 hours  ISF (insulin sensitivity factor):   40 from   38 from [phone]  Dexcom sharing cose: [id number]  Yes Unknown, Entered By History   insulin aspart (NOVOLOG VIAL) 100 UNITS/ML vial TO BE USED IN INSULIN PUMP UP TO 75 UNITS TOTAL DAILY DOSE  Yes Patricia Costa MD   mycophenolic acid (GENERIC EQUIVALENT) 180 MG EC tablet Take 2 tablets (360 mg) by mouth 2 times daily 1/3/2020 at x1 Yes Sung Jorgensen MD   omeprazole (PRILOSEC) 40 MG DR capsule TAKE ONE CAPSULE BY MOUTH TWICE A DAY 1/3/2020 at x1 Yes Kt Ríos MD   ondansetron (ZOFRAN-ODT) 4 MG ODT tab Take 1-2 tablets (4-8 mg) by mouth every 8 hours as needed for nausea  Yes Adam Villalba MD   tacrolimus (GENERIC EQUIVALENT) 1 MG capsule Take 2 capsules (2 mg) by mouth 2 times daily 1/3/2020 at x1 Yes Kt Ríos MD   acetone, Urine, test STRP 1 strip by In Vitro route daily   Marcie Lozano APRN CNP   YOHAN CONTOUR test strip USE TO TEST BLOOD SUGAR FIVE TIMES A DAY OR AS DIRECTED   Kt Ríos MD   blood glucose monitoring (NO BRAND SPECIFIED) test strip Freestyle test strips 6 times daily (NOT lite and NOT insulinx)   Marcie Lozano APRN CNP   blood glucose monitoring (NO BRAND SPECIFIED) test strip 5 times daily ( contour NEXT)   Marcie Lozano APRN CNP   Blood Glucose Monitoring Suppl (ASCENSIA  "CONTOUR MONITOR) W/DEVICE KIT TO TEST BG 5 TIMIES DAILY   Henrietta Tomas MD   insulin pen needle (BD ARPITA U/F) 32G X 4 MM Use 4 daily or as directed.   Kt Ríos MD   metoclopramide (REGLAN) 5 MG tablet TAKE ONE TABLET BY MOUTH FOUR TIMES A DAY BEFORE MEALS AND NIGHTLY   Kt Ríos MD   senna-docusate (SENOKOT-S/PERICOLACE) 8.6-50 MG tablet Take 1-2 tablets by mouth 2 times daily   Lizandro Dasilva PA-C      Current Meds    alum & mag hydroxide-simethicone  15 mL Oral Once     atorvastatin  20 mg Oral At Bedtime     fludrocortisone  0.2 mg Oral At Bedtime     insulin aspart  1-7 Units Subcutaneous TID AC     insulin aspart  1-5 Units Subcutaneous At Bedtime     mycophenolic acid  360 mg Oral BID     sodium chloride (PF)  3 mL Intracatheter Q8H     sodium chloride (PF)  3 mL Intracatheter Q8H     tacrolimus  2 mg Oral BID     Infusion Meds    sodium chloride 100 mL/hr at 20 0803       ALLERGIES:    Allergies   Allergen Reactions     Benadryl [Diphenhydramine]      Reglan Other (See Comments)     IV, delsuions       REVIEW OF SYSTEMS:  A comprehensive of systems was negative except as noted above.    SOCIAL HISTORY:   Reviewed with patient on 2020   Former smoker.     FAMILY MEDICAL HISTORY:   Family History   Problem Relation Age of Onset     Unknown/Adopted Father      Heart Disease Maternal Grandfather 62     Reviewed with patient on 2020     PHYSICAL EXAM:   Temp  Av.3  F (36.8  C)  Min: 98  F (36.7  C)  Max: 98.5  F (36.9  C)      Pulse  Av.3  Min: 86  Max: 109 Resp  Av.6  Min: 9  Max: 20  SpO2  Av.2 %  Min: 95 %  Max: 99 %       BP (!) 164/103 (BP Location: Left arm)   Pulse 86   Temp 98.1  F (36.7  C) (Oral)   Resp 18   Ht 1.727 m (5' 8\")   Wt 92.6 kg (204 lb 3.2 oz)   SpO2 96%   BMI 31.05 kg/m     Date 20 0700 - 20 0659   Shift 3291-0532 1076-9178 6050-6267 24 Hour Total   INTAKE   I.V. 1486   1486   Shift Total(mL/kg) 1486(16.04)   " 1486(16.04)   OUTPUT   Urine 725   725   Shift Total(mL/kg) 725(7.83)   725(7.83)   Weight (kg) 92.62 92.62 92.62 92.62      Admit Weight: 93 kg (205 lb)     GENERAL APPEARANCE: no distress,  awake  EYES: no scleral icterus, pupils equal  HENT: NC/AT,  mouth  without ulcers or lesions  Lymphatics: no cervical or supraclavicular LAD  Endo: no moon facies, no goiter  Pulmonary: lungs clear to auscultation with equal breath sounds bilaterally, no clubbing  CV: regular rhythm, normal rate, no rub   - JVP -   - Edema-  GI: soft, nontender,   MS: no evidence of inflammation in joints  : mp schultz  SKIN: no rash, warm, dry, no cyanosis  NEURO: face symmetric, mp asterixis     LABS:   CMP  Recent Labs   Lab 01/04/20  0642 01/03/20  1323 12/31/19  1145 12/31/19  1144    140  --  139   POTASSIUM 4.1 3.9  --  3.8   CHLORIDE 109 108  --  107   CO2 21 19*  --  21   ANIONGAP 8 13  --  11   * 93  --  101*   BUN 47* 52*  --  48*   CR 5.10* 5.42*  --  4.78*   GFRESTIMATED 13* 12*  --  14*   GFRESTBLACK 15* 14*  --  16*   CHARLY 8.4* 9.2  --  8.7   PROTTOTAL  --  8.5 7.8  --    ALBUMIN  --  4.0  --   --    BILITOTAL  --  0.4  --   --    ALKPHOS  --  171*  --   --    AST  --  13  --   --    ALT  --  14  --   --      CBC  Recent Labs   Lab 01/04/20  0642 01/03/20  1323   HGB 10.8* 12.6*   WBC 8.2 11.0   RBC 3.77* 4.38*   HCT 35.1* 39.6*   MCV 93 90   MCH 28.6 28.8   MCHC 30.8* 31.8   RDW 12.8 12.8    332     INRNo lab results found in last 7 days.  ABG  Recent Labs   Lab 01/03/20  1423   O2PER Room Air      URINE STUDIES  Recent Labs   Lab Test 01/03/20  1406 08/28/19  0905 07/11/19  0631 02/05/19  1245   COLOR Light Yellow Yellow Light Yellow Straw   APPEARANCE Clear Clear Clear Clear   URINEGLC 30* Negative Negative 150*   URINEBILI Negative Negative Negative Negative   URINEKETONE 10* Negative Negative 20*   SG 1.017 1.015 1.008 1.010   UBLD Trace* Small* Negative Negative   URINEPH 6.5 7.0 6.0 6.0   PROTEIN 300*  100* 50* 100*   UROBILINOGEN  --  0.2  --   --    NITRITE Negative Negative Negative Negative   LEUKEST Negative Negative Negative Negative   RBCU 3* 5-10* 1 4*   WBCU 1 5-10* 1 1     Recent Labs   Lab Test 08/22/19  0857 01/11/19  0741 07/08/15  0747 01/28/15  0749 12/27/13  0736 11/11/13  1436 11/12/12  1649 10/11/12  0737 02/28/12  0713   UTPG 2.20* 0.87* 0.28* 0.43* 0.31* 0.30* 0.40* 0.51* 0.48*         Amanda Weber MD

## 2020-01-04 NOTE — PROGRESS NOTES
Patient's demographics show no insurance. CM left message for Financial Counselor's office [id number]) to request follow up with patient re: insurance.     Rosa Lozano RN, BSN, CTS  [facility]  [phone]

## 2020-01-05 VITALS
HEIGHT: 68 IN | TEMPERATURE: 98.4 F | SYSTOLIC BLOOD PRESSURE: 172 MMHG | RESPIRATION RATE: 18 BRPM | OXYGEN SATURATION: 97 % | DIASTOLIC BLOOD PRESSURE: 98 MMHG | HEART RATE: 86 BPM | WEIGHT: 204.2 LBS | BODY MASS INDEX: 30.95 KG/M2

## 2020-01-05 LAB
ANION GAP SERPL CALCULATED.3IONS-SCNC: 8 MMOL/L (ref 3–14)
BUN SERPL-MCNC: 40 MG/DL (ref 7–30)
CALCIUM SERPL-MCNC: 8.4 MG/DL (ref 8.5–10.1)
CHLORIDE SERPL-SCNC: 109 MMOL/L (ref 94–109)
CO2 SERPL-SCNC: 20 MMOL/L (ref 20–32)
CREAT SERPL-MCNC: 4.61 MG/DL (ref 0.66–1.25)
GFR SERPL CREATININE-BSD FRML MDRD: 15 ML/MIN/{1.73_M2}
GLUCOSE BLDC GLUCOMTR-MCNC: 207 MG/DL (ref 70–99)
GLUCOSE BLDC GLUCOMTR-MCNC: 219 MG/DL (ref 70–99)
GLUCOSE BLDC GLUCOMTR-MCNC: 236 MG/DL (ref 70–99)
GLUCOSE BLDC GLUCOMTR-MCNC: 268 MG/DL (ref 70–99)
GLUCOSE BLDC GLUCOMTR-MCNC: 277 MG/DL (ref 70–99)
GLUCOSE SERPL-MCNC: 289 MG/DL (ref 70–99)
INTERPRETATION ECG - MUSE: NORMAL
KETONES BLD-SCNC: 0.9 MMOL/L (ref 0–0.6)
POTASSIUM SERPL-SCNC: 4.3 MMOL/L (ref 3.4–5.3)
SODIUM SERPL-SCNC: 137 MMOL/L (ref 133–144)

## 2020-01-05 PROCEDURE — 25000128 H RX IP 250 OP 636: Performed by: INTERNAL MEDICINE

## 2020-01-05 PROCEDURE — 25800030 ZZH RX IP 258 OP 636: Performed by: INTERNAL MEDICINE

## 2020-01-05 PROCEDURE — 99239 HOSP IP/OBS DSCHRG MGMT >30: CPT | Performed by: HOSPITALIST

## 2020-01-05 PROCEDURE — 25000131 ZZH RX MED GY IP 250 OP 636 PS 637: Performed by: INTERNAL MEDICINE

## 2020-01-05 PROCEDURE — 36415 COLL VENOUS BLD VENIPUNCTURE: CPT | Performed by: INTERNAL MEDICINE

## 2020-01-05 PROCEDURE — 25000132 ZZH RX MED GY IP 250 OP 250 PS 637: Performed by: EMERGENCY MEDICINE

## 2020-01-05 PROCEDURE — 25000132 ZZH RX MED GY IP 250 OP 250 PS 637: Performed by: HOSPITALIST

## 2020-01-05 PROCEDURE — 82010 KETONE BODYS QUAN: CPT | Performed by: INTERNAL MEDICINE

## 2020-01-05 PROCEDURE — 25000132 ZZH RX MED GY IP 250 OP 250 PS 637: Performed by: INTERNAL MEDICINE

## 2020-01-05 PROCEDURE — 80048 BASIC METABOLIC PNL TOTAL CA: CPT | Performed by: INTERNAL MEDICINE

## 2020-01-05 PROCEDURE — 00000146 ZZHCL STATISTIC GLUCOSE BY METER IP

## 2020-01-05 RX ORDER — DEXTROSE MONOHYDRATE 25 G/50ML
25-50 INJECTION, SOLUTION INTRAVENOUS
Status: DISCONTINUED | OUTPATIENT
Start: 2020-01-05 | End: 2020-01-05 | Stop reason: HOSPADM

## 2020-01-05 RX ORDER — NICOTINE POLACRILEX 4 MG
15-30 LOZENGE BUCCAL
Status: DISCONTINUED | OUTPATIENT
Start: 2020-01-05 | End: 2020-01-05 | Stop reason: HOSPADM

## 2020-01-05 RX ORDER — HYDRALAZINE HYDROCHLORIDE 20 MG/ML
10 INJECTION INTRAMUSCULAR; INTRAVENOUS EVERY 4 HOURS PRN
Status: DISCONTINUED | OUTPATIENT
Start: 2020-01-05 | End: 2020-01-05 | Stop reason: HOSPADM

## 2020-01-05 RX ADMIN — ALUMINUM HYDROXIDE, MAGNESIUM HYDROXIDE, AND DIMETHICONE 15 ML: 400; 400; 40 SUSPENSION ORAL at 08:26

## 2020-01-05 RX ADMIN — INSULIN ASPART 1 UNITS: 100 INJECTION, SOLUTION INTRAVENOUS; SUBCUTANEOUS at 01:40

## 2020-01-05 RX ADMIN — MYCOPHENOLIC ACID 360 MG: 360 TABLET, DELAYED RELEASE ORAL at 08:25

## 2020-01-05 RX ADMIN — TACROLIMUS 2 MG: 1 CAPSULE ORAL at 08:25

## 2020-01-05 RX ADMIN — ONDANSETRON HYDROCHLORIDE 4 MG: 2 INJECTION, SOLUTION INTRAMUSCULAR; INTRAVENOUS at 06:59

## 2020-01-05 RX ADMIN — OMEPRAZOLE 20 MG: 20 CAPSULE, DELAYED RELEASE ORAL at 09:34

## 2020-01-05 RX ADMIN — AMLODIPINE BESYLATE 5 MG: 5 TABLET ORAL at 08:25

## 2020-01-05 RX ADMIN — SODIUM CHLORIDE: 9 INJECTION, SOLUTION INTRAVENOUS at 06:13

## 2020-01-05 RX ADMIN — PROCHLORPERAZINE EDISYLATE 10 MG: 5 INJECTION INTRAMUSCULAR; INTRAVENOUS at 04:48

## 2020-01-05 ASSESSMENT — ACTIVITIES OF DAILY LIVING (ADL)
ADLS_ACUITY_SCORE: 10

## 2020-01-05 ASSESSMENT — MIFFLIN-ST. JEOR: SCORE: 1800.75

## 2020-01-05 NOTE — PLAN OF CARE
VSS except HTN bp at 172/98, on RA, A/OX4, LS dim, 5/10 leg pain, SBA, creatinine-4.61, SR on tele, reviewed paperwork w/ pt, pt verbalized understanding, pt discharge home via mother's personal vehicle at 1325.

## 2020-01-05 NOTE — PLAN OF CARE
VS stable, c/o pain 4/10, declined pain medication. BS 67, juice x2 and crackers given. Recheck 60, gel given-BS in 15 min 76,  IV Glucose  given recheck 172, (see MAR for intervention). Continue IV fluid and continue to monitor.

## 2020-01-05 NOTE — DISCHARGE SUMMARY
"[facility]    Discharge Summary  Hospitalist    Date of Admission:  1/3/2020  Date of Discharge:  1/5/2020  Discharging Provider: Domingo Astudillo MD  Date of Service (when I saw the patient): 01/05/20    Discharge Diagnoses   Nausea and vomiting likely secondary to gastroparesis from diabetes.  Improved  Mild DKA on admission-improved  Diabetes mellitus type 1   SWATHI on CKD 5  CAD-chronic  History of pancreatic and renal transplant on immunosuppression    History of Present Illness   \"This patient is a 44 year old  male with a significant past medical history of complex medical problems including coronary artery disease kidney disease requiring transplant as well as pancreatic transplant for diabetes Who presents with the following condition requiring a hospital admission:     Nausea and vomiting dehydration and acute kidney injury     Patient is 45-year-old male with complex medical problems stated above presents with nausea and vomiting for the last 2 days.  He has been having nausea and frequent vomiting every few hours over the last 2 days since Wednesday.  He was not able to take adequate intake and in fact he did not take his medications due to nausea and vomiting.  He has been having generalized weakness but denies any abdominal pain, diarrhea or fever.  Patient came to the emergency department for evaluation which revealed evidence of mild metabolic acidosis and starvation ketosis as well as worsening of his kidney function.  Patient was then admitted to our service for further care.\"    Hospital Course   Murtaza Fitzgerald is a 44 year old male who was admitted on 1/3/2020  44 year old  male with a significant past medical history of complex medical problems including coronary artery disease status post bypass graft, diabetes and kidney disease status post renal and pancreatic transplant in 2008 who presents with nausea and vomiting over the last 2 days.       Transplant: " 11/4/2008 (Kidney), 2/17/2009 (Pancreas)                Donor Type: Living      Donor Class: Standard Criteria Donor                 Kidney Tx Biopsy: Yes, demonstrated chronic changes.   Last biopsy on record is                12/2013 which  showed features consistent with CNI toxicity.     #Intractable nausea and vomiting likely secondary to gastroparesis, no evidence of acute infection or gastroenteritis, had a normal bowel movement during his hospital stay.  He was treated symptomatically with antiemetics and PPI therapy.  He did improve and was tolerating p.o. diet at the time of discharge.    #Acute kidney injury on top of chronic kidney disease stage V creatinine in the 4 range, history of kidney transplant rapid deterioration in the last year of kidney function back on the transplant list.  He was seen by nephrology.  There was no indication for dialysis.  His kidney function did improve with IV fluids to 4.61 at the time of discharge (was 5.42 on admission).  He did have some hypertension on the day of discharge secondary to likely IV fluids and continue fludrocortisone therapy.  Nephrology recommended discontinuation of amlodipine and fludrocortisone therapy.  Recommending checking blood pressures at home and restarting fludrocortisone if blood pressures trending down as an outpatient.  He should follow-up with his PCP within 1 week with a repeat BMP and CBC.  He should follow-up with his transplant nephrology team within 2 to 3 weeks.    #Type 1 diabetes mellitus, he had ketosis on admission likely from decreased oral intake and starvation, mild/early DKA also possible bicarb was 19 venous pH was 7.30.    Bicarb improved through hospital stay.  He was maintained on his insulin pump.  He should continue this at discharge.    Domingo Astudillo MD    Pending Results   These results will be followed up by   Unresulted Labs Ordered in the Past 30 Days of this Admission     Date and Time Order Name Status Description     12/31/2019 1145 PROTEIN IMMUNOFIXATION SERUM In process         Code Status   Full Code       Primary Care Physician   Kt Ríos    Physical Exam   Temp: 98.4  F (36.9  C) Temp src: Axillary BP: (!) 172/98   Heart Rate: 92 Resp: 18 SpO2: 97 % O2 Device: None (Room air)    Vitals:    01/03/20 1921 01/04/20 0408 01/05/20 0528   Weight: 90.8 kg (200 lb 1.6 oz) 92.6 kg (204 lb 3.2 oz) 92.6 kg (204 lb 3.2 oz)     Vital Signs with Ranges  Temp:  [97.7  F (36.5  C)-98.4  F (36.9  C)] 98.4  F (36.9  C)  Heart Rate:  [89-92] 92  Resp:  [18] 18  BP: (160-196)/() 172/98  SpO2:  [96 %-97 %] 97 %  I/O last 3 completed shifts:  In: 1486 [I.V.:1486]  Out: 2300 [Urine:2300]    GENERAL:  Comfortable.   PSYCH: pleasant, oriented, No acute distress.  EYES: PERRLA, Normal conjunctiva.  HEART:  Normal S1, S2 with no edema.  LUNGS:  Clear to auscultation, normal Respiratory effort.  ABDOMEN:  Soft, no hepatosplenomegaly, normal bowel sounds.  SKIN:  Dry to touch, No rash.    Discharge Disposition   Discharged to home  Condition at discharge: Stable    Consultations This Hospital Stay   NEPHROLOGY IP CONSULT  NEPHROLOGY IP CONSULT    Time Spent on this Encounter   I, Domingo Astudillo MD, personally saw the patient today and spent greater than 30 minutes discharging this patient.    Discharge Orders      Reason for your hospital stay    You were hospitalized for nausea and vomiting along with acute kidney injury.  You were treated with IV fluids and seen by nephrology.  You should follow-up with your PCP within normal 1 week and transplant nephrology team in the next 2-3 weeks.     Follow-up and recommended labs and tests     Follow up with primary care provider, Kt Ríos, within 7 days for hospital follow- up.  The following labs/tests are recommended: CBC, BMP.     You should also follow-up with your transplant nephrology team in the next 2 to 3 weeks per nephrology.     Activity    Your activity upon discharge: activity as  tolerated     Full Code     Diet    Follow this diet upon discharge: Orders Placed This Encounter      Moderate Consistent CHO Diet     Discharge Medications   Current Discharge Medication List      CONTINUE these medications which have NOT CHANGED    Details   acetaminophen (TYLENOL) 325 MG tablet Take 2 tablets (650 mg) by mouth every 4 hours as needed for mild pain  Qty: 50 tablet, Refills: 0    Associated Diagnoses: Acute lateral meniscus tear of left knee, initial encounter      atorvastatin (LIPITOR) 40 MG tablet Take 0.5 tablets (20 mg) by mouth daily  Qty: 45 tablet, Refills: 3    Associated Diagnoses: Gastroesophageal reflux disease without esophagitis      Insulin Aspart (INSULIN PUMP - OUTPATIENT) Date last interrogated by pharmacy: 2/5/2019  Omnipod  Insulin: Novolog  BASAL RATES and times:   12 AM (midnight): 1.1 units/hr  2 AM: 1.25  10 AM: 0.8  6 PM: 0.95  CARB RATIO and times:  12 AM - 11 AM: 9  11 AM - 8 PM: 6  8 PM - 12 AM: 7   BLOOD GLUCOSE TARGET and times:  12 AM (midnight): 100, correct above 120 with reverse correction on  Active Insulin Time: 4 hours  ISF (insulin sensitivity factor):   40 from   38 from [id number]  Dexcom sharing cose: [id number]      insulin aspart (NOVOLOG VIAL) 100 UNITS/ML vial TO BE USED IN INSULIN PUMP UP TO 75 UNITS TOTAL DAILY DOSE  Qty: 60 mL, Refills: 1    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)      mycophenolic acid (GENERIC EQUIVALENT) 180 MG EC tablet Take 2 tablets (360 mg) by mouth 2 times daily  Qty: 360 tablet, Refills: 3    Associated Diagnoses: Kidney replaced by transplant      omeprazole (PRILOSEC) 40 MG DR capsule TAKE ONE CAPSULE BY MOUTH TWICE A DAY  Qty: 180 capsule, Refills: 1    Associated Diagnoses: Gastroesophageal reflux disease without esophagitis      ondansetron (ZOFRAN-ODT) 4 MG ODT tab Take 1-2 tablets (4-8 mg) by mouth every 8 hours as needed for nausea  Qty: 60 tablet, Refills: 1     Associated Diagnoses: Acute lateral meniscus tear of left knee, initial encounter      tacrolimus (GENERIC EQUIVALENT) 1 MG capsule Take 2 capsules (2 mg) by mouth 2 times daily  Qty: 60 capsule, Refills: 11    Associated Diagnoses: Kidney transplanted; Immunosuppression (H)      acetone, Urine, test STRP 1 strip by In Vitro route daily  Qty: 50 each, Refills: 3    Comments: Urine ketone stix  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 YOHAN CONTOUR test strip USE TO TEST BLOOD SUGAR FIVE TIMES A DAY OR AS DIRECTED  Qty: 450 each, Refills: 1    Associated Diagnoses: Type 1 diabetes mellitus, uncontrolled (H)      !! 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 - Potential duplicate medications found. Please discuss with provider.      STOP taking these medications       fludrocortisone (FLORINEF) 0.1 MG tablet Comments:   Reason for Stopping:             Allergies   Allergies   Allergen Reactions     Benadryl [Diphenhydramine]      Reglan Other (See Comments)     IV, delsuions     Data   Most Recent 3 CBC's:  Recent Labs   Lab Test 01/04/20  0642 01/03/20  1323 12/19/19  0900   WBC 8.2 11.0 8.8   HGB 10.8* 12.6* 12.7*   MCV 93 90 88    332 315      Most Recent 3 BMP's:  Recent Labs   Lab Test 01/05/20  0641 01/04/20  0642 01/03/20  1323    138 140   POTASSIUM 4.3 4.1 3.9   CHLORIDE 109 109 108   CO2 20 21 19*   BUN 40* 47* 52*   CR 4.61* 5.10* 5.42*   ANIONGAP 8 8 13   CHARLY 8.4* 8.4* 9.2   * 168* 93     Most Recent 2 LFT's:  Recent Labs   Lab Test 01/03/20  1323 06/16/19  1500   AST 13 11   ALT 14 12   ALKPHOS 171* 107   BILITOTAL 0.4 0.7     Most Recent INR's and Anticoagulation Dosing History:  Anticoagulation Dose History     Recent Dosing and Labs Latest Ref Rng & Units 11/29/2013 12/27/2013 12/27/2013 8/3/2015 8/5/2015 8/5/2015 8/17/2015    INR 0.86 - 1.14 1.01 1.36(H) 1.02 1.05 1.65(H) 1.42(H) 1.04        Most Recent 3 Troponin's:  Recent Labs   Lab Test 02/05/19  0547 11/20/18  0954 08/27/15  1527 08/27/15  1526   TROPI <0.015 <0.015  --  <0.015  The 99th percentile for upper reference range is 0.045 ug/L.  Troponin values in   the range of 0.045 - 0.120 ug/L may be associated with risks of adverse   clinical events.     TROPONIN  --   --  0.02  --      Most Recent Cholesterol Panel:  Recent Labs   Lab Test 11/19/19  0900   CHOL 221*   *   HDL 39*   TRIG 184*     Most Recent 6 Bacteria Isolates From Any Culture (See EPIC Reports for Culture Details):  Recent Labs   Lab Test 08/28/19  0906 08/27/15  1920 08/27/15  1835 08/17/15  1558 08/12/15  1555 08/12/15  1550   CULT No growth No  growth No growth No growth No growth No growth     Most Recent TSH, T4 and A1c Labs:  Recent Labs   Lab Test 12/31/19  1144 11/19/19  0900  07/31/13  0700   TSH 1.98  --    < > 1.26   T4  --   --   --  1.25   A1C  --  6.7*   < > 7.5*    < > = values in this interval not displayed.

## 2020-01-05 NOTE — PROGRESS NOTES
Nephrology Progress Note  01/05/2020         ASSESSMENT AND RECOMMENDATIONS:   # LDKT:               Etiology of kidney failure: Diabetes mellitus, type I.                 Tx: LDKT.                 Dr Adam Villalba, N                Tx: ANNMARIE (failed)                Transplant: 11/4/2008 (Kidney), 2/17/2009 (Pancreas)                Donor Type: Living                       Kidney Tx Biopsy: Yes, demonstrated chronic changes.   Last biopsy on record is 12/2013 which              showed features consistent with CNI toxicity.                UACR ~500 mg/g in 1/19      - gradually failing transplant, more accelerated over last year . Now worse with pre-renal state.   -Improving renal function today with IV fluid. Non oliguric. No s/o uremia .                   # Immunosuppression: Tacrolimus (goal 4-6) and Mycophenolic acid (goal  1-3.5)           - continue same.      # Diabetes status-post failed ANNMARIE (bladder drained):     # Hypertension: BP high here with IVF and fludrocortisone.  Normally on Fludrocortisone to keep BP up d/t chronic hypovolemia      # Anemia in chronic renal disease:               - Hgb 10.8. Not on WILL yet.      # Nausea/ vomiting - d/t gastroparesis.      Recc-   -Cut back on IV fluid to 50 cc an hour.  Encourage oral intake.  -Have discontinued both amlodipine and fludrocortisone.  He is on fludrocortisone to keep his blood pressure as he generally runs low.  Blood pressures likely higher now with IV fluid and fludrocortisone on board.  Use IV hydralazine as needed  but anticipate blood pressure would start trending down after he comes of IV fluid and fludrocortisone is held.  Patient can you advised to restart fludrocortisone if his blood pressure starts trending down his outpatient .  -Daily BMP.  Okay to discharge from renal standpoint if able to keep up with oral intake.  Recommend follow-up with transplant nephrology in the next 2 to 3 weeks.    Amanda Weber MD  [facility] -  Nephrology   [phone]      Interval History :   Seen / examined.   Denies sleep much last night.  Tired.  Nausea better.  Appetite fair.  Blood pressure is high in the range of 170s/100.  Amlodipine 5 mg has been started.  Patient surgeon for cortisone.    Review of Systems:   A 4 point review of systems was negative except as noted above.  Notably: fair appetite. Better  nausea or vomiting or diarrhea.  no confusion,  no fever or chills    Physical Exam:   I/O last 3 completed shifts:  In: 1486 [I.V.:1486]  Out: 2300 [Urine:2300]    GENERAL APPEARANCE: alert and no distress  EYES:  no scleral icterus, pupils equal  PULM: lungs clear to auscultation, equal air movement, no cyanosis or clubbing  CV: regular rhythm, normal rate, no rub     -JVP -     -edema -   GI: soft, non tender,   NEURO: mentation intact and speech normal, n asterixis     Labs:   All labs reviewed by me  Electrolytes/Renal -   Recent Labs   Lab Test 01/05/20  0641 01/04/20  0642 01/03/20  1323  12/19/19  0900  03/05/19  1218  02/04/19  0530  08/18/15  0530  08/13/15  0652 08/12/15  0755    138 140   < > 139   < > 135   < > 137   < > 136   < > 134 137   POTASSIUM 4.3 4.1 3.9   < > 3.9   < > 4.5   < > 4.3   < > 4.3   < > 3.8 3.2*   CHLORIDE 109 109 108   < > 108   < > 103   < > 108   < > 101   < > 96 100   CO2 20 21 19*   < > 23   < > 22   < > 24   < > 23   < > 26 26   BUN 40* 47* 52*   < > 45*   < > 31*   < > 16   < > 14   < > 22 31*   CR 4.61* 5.10* 5.42*   < > 4.47*   < > 1.74*   < > 1.60*   < > 1.53*   < > 1.74* 2.06*   * 168* 93   < > 127*   < > 260*   < > 189*   < > 180*   < > 317* 218*   CHARLY 8.4* 8.4* 9.2   < > 9.1   < > 9.4   < > 8.7   < > 8.4*   < > 8.3* 8.6   MAG  --   --   --   --   --   --  1.8  --  1.9  --  2.1   < > 2.4* 1.5*   PHOS  --   --   --   --  4.0  --   --   --   --   --   --   --  2.6 2.5    < > = values in this interval not displayed.       CBC -   Recent Labs   Lab Test 01/04/20  0642 01/03/20  0907  12/19/19  0900   WBC 8.2 11.0 8.8   HGB 10.8* 12.6* 12.7*    332 315       LFTs -   Recent Labs   Lab Test 01/03/20  1323 12/31/19  1145 12/19/19  0900 06/16/19  1500 03/05/19 2012   ALKPHOS 171*  --   --  107 109   BILITOTAL 0.4  --   --  0.7 0.5   ALT 14  --   --  12 22   AST 13  --   --  11 16   PROTTOTAL 8.5 7.8  --  8.0 8.5   ALBUMIN 4.0  --  3.5 3.2* 3.8         Current Medications:    amLODIPine  5 mg Oral Daily     atorvastatin  20 mg Oral At Bedtime     insulin aspart  1-7 Units Subcutaneous TID AC     insulin aspart  1-5 Units Subcutaneous At Bedtime     mycophenolic acid  360 mg Oral BID     omeprazole  40 mg Oral BID AC     sodium chloride (PF)  3 mL Intracatheter Q8H     sodium chloride (PF)  3 mL Intracatheter Q8H     tacrolimus  2 mg Oral BID       sodium chloride 100 mL/hr at 01/05/20 0833     Amanda Weber MD

## 2020-01-05 NOTE — PROVIDER NOTIFICATION
Pt ran out of insulin in his home insulin pump - does not have supplies here to change it.  Would you like to start an insulin gtt?  Per MD, check BG's Q2 and sliding scale insulin Q4.

## 2020-01-05 NOTE — PLAN OF CARE
BP trending high, all other VSS.  LS clear and satting well on RA.  PRN IV compazine x1 for nausea/vomiting.  Q2 BG checks with sliding scale available.  IVF infusing.  Continue with POC.

## 2020-01-06 ENCOUNTER — TELEPHONE (OUTPATIENT)
Dept: NEPHROLOGY | Facility: CLINIC | Age: 45
End: 2020-01-06

## 2020-01-06 ENCOUNTER — TELEPHONE (OUTPATIENT)
Dept: PEDIATRICS | Facility: CLINIC | Age: 45
End: 2020-01-06

## 2020-01-06 NOTE — TELEPHONE ENCOUNTER
[facility] Call Center    Phone Message    May a detailed message be left on voicemail: yes    Reason for Call: Other: Pt called in and stated that he was seen in the ED on 1/3 and is needing a hospita; Follow up. Writer was unable to see any appointment unitl 2/6/2020. Please follow up with appointment to get pt scheduled. thank you      Action Taken: Message routed to:  [facility] ([facility] Transplant

## 2020-01-06 NOTE — TELEPHONE ENCOUNTER
"  ED/Discharge Protocol    \"Hi, my name is Francisco Nelson RN, a registered nurse, and I am calling on behalf of Dr. Ríos''s office at [place].  I am calling to follow up and see how things are going for you after your recent visit.\"    \"I see that you were in the (ER/UC/IP) on 1/3/20.    How are you doing now that you are home?\"    Pt is feeling much better.  Able to keep food and fluids down.    Discharge Instructions    \"Let's review your discharge instructions.  What is/are the follow-up recommendations?  Pt. Response: I have called Dr. Villalba's office to schedule a follow up.    \"Were you instructed to make a follow-up appointment?\"  Pt. Response: Yes.  Has appointment been made?   No.  \"Can I help you schedule that appointment?\" Pt would like a call from scheduling      \"When you see the provider, I would recommend that you bring your discharge instructions with you.    Medications    \"How many new medications are you on since your hospitalization/ED visit?\"    0-1  \"How many of your current medicines changed (dose, timing, name, etc.) while you were in the hospital/ED visit?\"   0-1  \"Do you have questions about your medications?\"   No  \"Were you newly diagnosed with heart failure, COPD, diabetes or did you have a heart attack?\"   No  For patients on insulin: \"Did you start on insulin in the hospital or did you have your insulin dose changed?\"   No  Post Discharge Medication Reconciliation Status: discharge medications reconciled and changed, per note/orders (see AVS).  Florinef was removed from the med list during hospitalization    Was MTM referral placed (*Make sure to put transitions as reason for referral)?   No    Call Summary    \"Do you have any questions or concerns about your condition or care plan at the moment?\"    No  Triage nurse advice given:     Patient was in ER 2 in the past year (assess appropriateness of ER visits.)      \"If you have questions or things don't continue to improve, we " "encourage you contact us through the main clinic number,  [phone].  Even if the clinic is not open, triage nurses are available 24/7 to help you.     We would like you to know that our clinic has extended hours (provide information).  We also have urgent care (provide details on closest location and hours/contact info)\"      \"Thank you for your time and take care!\"      "

## 2020-01-06 NOTE — TELEPHONE ENCOUNTER
Please contact patient for In-patient follow up.  [phone] (home)     Visit date: [id number]  Diagnosis listed: Intractable Vomiting With Nausea, Unspecified Vomiting Type  Number of visits in past 12 months: 1 ED / 1 IP

## 2020-01-07 ENCOUNTER — TELEPHONE (OUTPATIENT)
Dept: ENDOCRINOLOGY | Facility: CLINIC | Age: 45
End: 2020-01-07

## 2020-01-07 NOTE — TELEPHONE ENCOUNTER
Sri for CGM.    LAST OFFICE/VIRTUAL VISIT:  12/02/19    FUTURE OFFICE/VIRTUAL VISIT:  None    Lab Results   Component Value Date    A1C 6.7 11/19/2019    A1C 6.8 06/28/2019    A1C 6.8 01/11/2019    A1C 6.4 11/21/2017    A1C 7.1 12/22/2016         Rebekah Santoyo CMA  [facility]  [place]

## 2020-01-07 NOTE — TELEPHONE ENCOUNTER
Priority would be to get into nephrology ASAP, given his transplant status,  not necessarily me.    Please help him schedule with them this week.    Kt Ríos MD  Internal Medicine and Pediatrics

## 2020-01-07 NOTE — TELEPHONE ENCOUNTER
Message was routed to [facility]plant to schedule appt. Patient does not need to see Dr. Ríos.

## 2020-01-07 NOTE — TELEPHONE ENCOUNTER
Forms/paperwork reviewed, completed and signed.  Please fax or send the papers as requested, document in chart and close the encounter.    Thank you.    Patricia Costa MD

## 2020-01-07 NOTE — TELEPHONE ENCOUNTER
Form was faxed, copied, sent to scanning.      Rebekah Santoyo, [facility]  [place]

## 2020-01-07 NOTE — TELEPHONE ENCOUNTER
Left a message.   Will discuss with Ne[facility] if they want ff-up post hospital discharge.  Will update him if need to come in.

## 2020-01-08 LAB
IGA SERPL-MCNC: 531 MG/DL (ref 84–499)
IGG SERPL-MCNC: 1160 MG/DL (ref 610–1616)
IGM SERPL-MCNC: 57 MG/DL (ref 35–242)
PROT PATTERN SERPL IFE-IMP: ABNORMAL

## 2020-01-13 ENCOUNTER — TELEPHONE (OUTPATIENT)
Dept: TRANSPLANT | Facility: CLINIC | Age: 45
End: 2020-01-13

## 2020-01-13 ENCOUNTER — TELEPHONE (OUTPATIENT)
Dept: PEDIATRICS | Facility: CLINIC | Age: 45
End: 2020-01-13

## 2020-01-13 NOTE — TELEPHONE ENCOUNTER
Patient currently uses an insulin pump. Patient states due to insurance issues, he will need to use an insulin pen for a short time.    Patient wondering what dose of Tresiba he should be injection? Please advise. Patient on pump for a few more days

## 2020-01-13 NOTE — TELEPHONE ENCOUNTER
Reason for Call:  Other call back/medication question    Detailed comments: Patient is requesting a call back from the RN to clarify what dose he should be taking of his insulin.      Phone Number Patient can be reached at: Cell number on file:    Telephone Information:   Mobile [phone]       Best Time: any    Can we leave a detailed message on this number? YES    Call taken on 1/13/2020 at 11:33 AM by Carla Mott

## 2020-01-13 NOTE — TELEPHONE ENCOUNTER
Deejay, MD Emily Segura Teresa, RN             Next week if we have opening otherwise 2 weeks is ok   Needs weekly labs    Previous Messages      ----- Message -----   From: Jyoti Diaz RN   Sent: 1/10/2020  12:49 PM CST   To: Adam Villalba MD   Subject: request to add to clinic                         Kid txp 2008     Discharge dx: Nausea and vomiting likely secondary to gastroparesis from diabetes.   Improved Mild DKA on admission     Has not rechecked labs since discharge. Last creatinine remains elevated.     Can I add in your clinic in 2 weeks?   Which day and time would you prefer?     Thanks,   Conner ELLIS         Left message re: plan.  Message sent to      Jyoti Diaz RN  [facility] Transplant Scheduling Pool             Appointment Request: Transplant nephrology   Orders Placed: N/A   Patient Aware? Yes.   Physician Override Approved? Yes, Per Dr. Villalba okay to add within the next two weeks.   Appointment Timeframe Requested: within 2 weeks.     Thank you,   Jyoti Diaz RN    Comments     January 14, 2020 8:02 AM -  [id number]:      Left message asking pt to return my call

## 2020-01-14 NOTE — TELEPHONE ENCOUNTER
Based on last clinic note he was using about 22 units of Basaglar.  If he wants to switch to long-acting insulin in case of pump problem he should be using Tresiba 18 units/day  Based on blood sugar data it can be further titrated.    Please inform patient.

## 2020-01-22 ENCOUNTER — TELEPHONE (OUTPATIENT)
Dept: TRANSPLANT | Facility: CLINIC | Age: 45
End: 2020-01-22

## 2020-01-22 NOTE — TELEPHONE ENCOUNTER
Spoke with the patient and confirmed renal appointments on 1/27/2020   Informed patient an itinerary can be accessed on [facility].

## 2020-01-27 ENCOUNTER — HOSPITAL ENCOUNTER (INPATIENT)
Facility: CLINIC | Age: 45
LOS: 2 days | Discharge: HOME | DRG: 638 | End: 2020-01-29
Attending: EMERGENCY MEDICINE | Admitting: HOSPITALIST

## 2020-01-27 DIAGNOSIS — Z94.0 KIDNEY REPLACED BY TRANSPLANT: ICD-10-CM

## 2020-01-27 DIAGNOSIS — D84.9 IMMUNOSUPPRESSED STATUS (H): ICD-10-CM

## 2020-01-27 DIAGNOSIS — I43 TYPE 1 DIABETES MELLITUS WITH DIABETIC CARDIOMYOPATHY (H): ICD-10-CM

## 2020-01-27 DIAGNOSIS — N17.9 AKI (ACUTE KIDNEY INJURY) (H): ICD-10-CM

## 2020-01-27 DIAGNOSIS — E10.59 TYPE 1 DIABETES MELLITUS WITH DIABETIC CARDIOMYOPATHY (H): ICD-10-CM

## 2020-01-27 DIAGNOSIS — E10.10 DIABETIC KETOACIDOSIS WITHOUT COMA ASSOCIATED WITH TYPE 1 DIABETES MELLITUS (H): ICD-10-CM

## 2020-01-27 DIAGNOSIS — I10 HYPERTENSION GOAL BP (BLOOD PRESSURE) < 140/90: Primary | ICD-10-CM

## 2020-01-27 PROBLEM — R11.2 INTRACTABLE NAUSEA AND VOMITING: Status: ACTIVE | Noted: 2020-01-27

## 2020-01-27 LAB
ALBUMIN SERPL-MCNC: 3.7 G/DL (ref 3.4–5)
ALBUMIN UR-MCNC: 300 MG/DL
ALP SERPL-CCNC: 189 U/L (ref 40–150)
ALT SERPL W P-5'-P-CCNC: 10 U/L (ref 0–70)
ANION GAP SERPL CALCULATED.3IONS-SCNC: 11 MMOL/L (ref 3–14)
APPEARANCE UR: CLEAR
AST SERPL W P-5'-P-CCNC: 12 U/L (ref 0–45)
BASE DEFICIT BLDV-SCNC: 7.2 MMOL/L
BASOPHILS # BLD AUTO: 0 10E9/L (ref 0–0.2)
BASOPHILS NFR BLD AUTO: 0.4 %
BILIRUB SERPL-MCNC: 0.5 MG/DL (ref 0.2–1.3)
BILIRUB UR QL STRIP: NEGATIVE
BUN SERPL-MCNC: 50 MG/DL (ref 7–30)
CALCIUM SERPL-MCNC: 9.3 MG/DL (ref 8.5–10.1)
CHLORIDE SERPL-SCNC: 107 MMOL/L (ref 94–109)
CO2 SERPL-SCNC: 19 MMOL/L (ref 20–32)
COLOR UR AUTO: ABNORMAL
CREAT SERPL-MCNC: 5.51 MG/DL (ref 0.66–1.25)
DIFFERENTIAL METHOD BLD: ABNORMAL
EOSINOPHIL # BLD AUTO: 0 10E9/L (ref 0–0.7)
EOSINOPHIL NFR BLD AUTO: 0.2 %
ERYTHROCYTE [DISTWIDTH] IN BLOOD BY AUTOMATED COUNT: 13.1 % (ref 10–15)
GASTROCULT GAST QL: POSITIVE
GFR SERPL CREATININE-BSD FRML MDRD: 12 ML/MIN/{1.73_M2}
GLUCOSE BLDC GLUCOMTR-MCNC: 105 MG/DL (ref 70–99)
GLUCOSE BLDC GLUCOMTR-MCNC: 107 MG/DL (ref 70–99)
GLUCOSE BLDC GLUCOMTR-MCNC: 110 MG/DL (ref 70–99)
GLUCOSE BLDC GLUCOMTR-MCNC: 112 MG/DL (ref 70–99)
GLUCOSE BLDC GLUCOMTR-MCNC: 115 MG/DL (ref 70–99)
GLUCOSE BLDC GLUCOMTR-MCNC: 142 MG/DL (ref 70–99)
GLUCOSE BLDC GLUCOMTR-MCNC: 153 MG/DL (ref 70–99)
GLUCOSE BLDC GLUCOMTR-MCNC: 161 MG/DL (ref 70–99)
GLUCOSE BLDC GLUCOMTR-MCNC: 168 MG/DL (ref 70–99)
GLUCOSE BLDC GLUCOMTR-MCNC: 172 MG/DL (ref 70–99)
GLUCOSE BLDC GLUCOMTR-MCNC: 173 MG/DL (ref 70–99)
GLUCOSE BLDC GLUCOMTR-MCNC: 194 MG/DL (ref 70–99)
GLUCOSE BLDC GLUCOMTR-MCNC: 261 MG/DL (ref 70–99)
GLUCOSE BLDC GLUCOMTR-MCNC: 332 MG/DL (ref 70–99)
GLUCOSE BLDC GLUCOMTR-MCNC: 333 MG/DL (ref 70–99)
GLUCOSE BLDC GLUCOMTR-MCNC: 342 MG/DL (ref 70–99)
GLUCOSE BLDC GLUCOMTR-MCNC: 357 MG/DL (ref 70–99)
GLUCOSE BLDC GLUCOMTR-MCNC: 98 MG/DL (ref 70–99)
GLUCOSE SERPL-MCNC: 367 MG/DL (ref 70–99)
GLUCOSE UR STRIP-MCNC: >1000 MG/DL
HCO3 BLDV-SCNC: 18 MMOL/L (ref 21–28)
HCT VFR BLD AUTO: 40.2 % (ref 40–53)
HGB BLD-MCNC: 11.1 G/DL (ref 13.3–17.7)
HGB BLD-MCNC: 13.1 G/DL (ref 13.3–17.7)
HGB UR QL STRIP: ABNORMAL
IMM GRANULOCYTES # BLD: 0.1 10E9/L (ref 0–0.4)
IMM GRANULOCYTES NFR BLD: 0.5 %
KETONES BLD-SCNC: 1.7 MMOL/L (ref 0–0.6)
KETONES UR STRIP-MCNC: ABNORMAL MG/DL
LACTATE BLD-SCNC: 1.7 MMOL/L (ref 0.7–2)
LACTATE BLD-SCNC: 2.7 MMOL/L (ref 0.7–2)
LEUKOCYTE ESTERASE UR QL STRIP: NEGATIVE
LYMPHOCYTES # BLD AUTO: 0.8 10E9/L (ref 0.8–5.3)
LYMPHOCYTES NFR BLD AUTO: 7.7 %
MAGNESIUM SERPL-MCNC: 2 MG/DL (ref 1.6–2.3)
MCH RBC QN AUTO: 28.6 PG (ref 26.5–33)
MCHC RBC AUTO-ENTMCNC: 32.6 G/DL (ref 31.5–36.5)
MCV RBC AUTO: 88 FL (ref 78–100)
MONOCYTES # BLD AUTO: 0.3 10E9/L (ref 0–1.3)
MONOCYTES NFR BLD AUTO: 2.5 %
NEUTROPHILS # BLD AUTO: 9.2 10E9/L (ref 1.6–8.3)
NEUTROPHILS NFR BLD AUTO: 88.7 %
NITRATE UR QL: NEGATIVE
NRBC # BLD AUTO: 0 10*3/UL
NRBC BLD AUTO-RTO: 0 /100
O2/TOTAL GAS SETTING VFR VENT: ABNORMAL %
OXYHGB MFR BLDV: 61 %
PCO2 BLDV: 35 MM HG (ref 40–50)
PH BLDV: 7.32 PH (ref 7.32–7.43)
PH UR STRIP: 6.5 PH (ref 5–7)
PHOSPHATE SERPL-MCNC: 4.4 MG/DL (ref 2.5–4.5)
PLATELET # BLD AUTO: 322 10E9/L (ref 150–450)
PO2 BLDV: 32 MM HG (ref 25–47)
POTASSIUM SERPL-SCNC: 4.1 MMOL/L (ref 3.4–5.3)
PROT SERPL-MCNC: 7.9 G/DL (ref 6.8–8.8)
RBC # BLD AUTO: 4.58 10E12/L (ref 4.4–5.9)
RBC #/AREA URNS AUTO: 2 /HPF (ref 0–2)
SODIUM SERPL-SCNC: 137 MMOL/L (ref 133–144)
SOURCE: ABNORMAL
SP GR UR STRIP: 1.01 (ref 1–1.03)
UROBILINOGEN UR STRIP-MCNC: NORMAL MG/DL (ref 0–2)
WBC # BLD AUTO: 10.3 10E9/L (ref 4–11)
WBC #/AREA URNS AUTO: 2 /HPF (ref 0–5)

## 2020-01-27 PROCEDURE — 82805 BLOOD GASES W/O2 SATURATION: CPT | Performed by: EMERGENCY MEDICINE

## 2020-01-27 PROCEDURE — 12000047 ZZH R&B IMCU

## 2020-01-27 PROCEDURE — 99285 EMERGENCY DEPT VISIT HI MDM: CPT | Mod: 25

## 2020-01-27 PROCEDURE — 00000146 ZZHCL STATISTIC GLUCOSE BY METER IP

## 2020-01-27 PROCEDURE — 25000125 ZZHC RX 250: Performed by: PHYSICIAN ASSISTANT

## 2020-01-27 PROCEDURE — 25000131 ZZH RX MED GY IP 250 OP 636 PS 637: Performed by: PHYSICIAN ASSISTANT

## 2020-01-27 PROCEDURE — 25000125 ZZHC RX 250: Performed by: EMERGENCY MEDICINE

## 2020-01-27 PROCEDURE — 25000128 H RX IP 250 OP 636: Performed by: PHYSICIAN ASSISTANT

## 2020-01-27 PROCEDURE — C9113 INJ PANTOPRAZOLE SODIUM, VIA: HCPCS | Performed by: HOSPITALIST

## 2020-01-27 PROCEDURE — 83605 ASSAY OF LACTIC ACID: CPT | Performed by: EMERGENCY MEDICINE

## 2020-01-27 PROCEDURE — 25800030 ZZH RX IP 258 OP 636: Performed by: EMERGENCY MEDICINE

## 2020-01-27 PROCEDURE — 25000128 H RX IP 250 OP 636

## 2020-01-27 PROCEDURE — 81001 URINALYSIS AUTO W/SCOPE: CPT | Performed by: EMERGENCY MEDICINE

## 2020-01-27 PROCEDURE — 85018 HEMOGLOBIN: CPT | Performed by: HOSPITALIST

## 2020-01-27 PROCEDURE — 96375 TX/PRO/DX INJ NEW DRUG ADDON: CPT

## 2020-01-27 PROCEDURE — 99223 1ST HOSP IP/OBS HIGH 75: CPT | Mod: AI | Performed by: HOSPITALIST

## 2020-01-27 PROCEDURE — 96366 THER/PROPH/DIAG IV INF ADDON: CPT

## 2020-01-27 PROCEDURE — 82010 KETONE BODYS QUAN: CPT | Performed by: EMERGENCY MEDICINE

## 2020-01-27 PROCEDURE — 25000128 H RX IP 250 OP 636: Performed by: HOSPITALIST

## 2020-01-27 PROCEDURE — 85025 COMPLETE CBC W/AUTO DIFF WBC: CPT | Performed by: EMERGENCY MEDICINE

## 2020-01-27 PROCEDURE — 25000128 H RX IP 250 OP 636: Performed by: EMERGENCY MEDICINE

## 2020-01-27 PROCEDURE — 82271 OCCULT BLOOD OTHER SOURCES: CPT | Performed by: EMERGENCY MEDICINE

## 2020-01-27 PROCEDURE — 96365 THER/PROPH/DIAG IV INF INIT: CPT

## 2020-01-27 PROCEDURE — 25800030 ZZH RX IP 258 OP 636: Performed by: HOSPITALIST

## 2020-01-27 PROCEDURE — 83735 ASSAY OF MAGNESIUM: CPT | Performed by: EMERGENCY MEDICINE

## 2020-01-27 PROCEDURE — 51798 US URINE CAPACITY MEASURE: CPT

## 2020-01-27 PROCEDURE — 80053 COMPREHEN METABOLIC PANEL: CPT | Performed by: EMERGENCY MEDICINE

## 2020-01-27 PROCEDURE — 84100 ASSAY OF PHOSPHORUS: CPT | Performed by: EMERGENCY MEDICINE

## 2020-01-27 PROCEDURE — 36415 COLL VENOUS BLD VENIPUNCTURE: CPT | Performed by: HOSPITALIST

## 2020-01-27 PROCEDURE — 93005 ELECTROCARDIOGRAM TRACING: CPT

## 2020-01-27 PROCEDURE — 96361 HYDRATE IV INFUSION ADD-ON: CPT

## 2020-01-27 RX ORDER — ONDANSETRON 2 MG/ML
4 INJECTION INTRAMUSCULAR; INTRAVENOUS ONCE
Status: COMPLETED | OUTPATIENT
Start: 2020-01-27 | End: 2020-01-27

## 2020-01-27 RX ORDER — SODIUM CHLORIDE 9 MG/ML
INJECTION, SOLUTION INTRAVENOUS CONTINUOUS
Status: DISCONTINUED | OUTPATIENT
Start: 2020-01-27 | End: 2020-01-27

## 2020-01-27 RX ORDER — TACROLIMUS 0.5 MG/1
2 CAPSULE ORAL 2 TIMES DAILY
Status: DISCONTINUED | OUTPATIENT
Start: 2020-01-27 | End: 2020-01-29 | Stop reason: HOSPADM

## 2020-01-27 RX ORDER — METOCLOPRAMIDE 5 MG/1
5 TABLET ORAL 4 TIMES DAILY PRN
COMMUNITY
End: 2020-02-03

## 2020-01-27 RX ORDER — ATORVASTATIN CALCIUM 40 MG/1
40 TABLET, FILM COATED ORAL DAILY
COMMUNITY
End: 2020-06-03

## 2020-01-27 RX ORDER — ATORVASTATIN CALCIUM 40 MG/1
40 TABLET, FILM COATED ORAL DAILY
Status: DISCONTINUED | OUTPATIENT
Start: 2020-01-27 | End: 2020-01-29 | Stop reason: HOSPADM

## 2020-01-27 RX ORDER — DEXTROSE MONOHYDRATE 25 G/50ML
25-50 INJECTION, SOLUTION INTRAVENOUS
Status: DISCONTINUED | OUTPATIENT
Start: 2020-01-27 | End: 2020-01-29 | Stop reason: HOSPADM

## 2020-01-27 RX ORDER — LORAZEPAM 2 MG/ML
0.5 INJECTION INTRAMUSCULAR EVERY 4 HOURS PRN
Status: DISCONTINUED | OUTPATIENT
Start: 2020-01-27 | End: 2020-01-29 | Stop reason: HOSPADM

## 2020-01-27 RX ORDER — MYCOPHENOLIC ACID 180 MG/1
360 TABLET, DELAYED RELEASE ORAL 2 TIMES DAILY
COMMUNITY
End: 2020-09-30

## 2020-01-27 RX ORDER — NICOTINE POLACRILEX 4 MG
15-30 LOZENGE BUCCAL
Status: DISCONTINUED | OUTPATIENT
Start: 2020-01-27 | End: 2020-01-27

## 2020-01-27 RX ORDER — POTASSIUM CL/LIDO/0.9 % NACL 10MEQ/0.1L
10 INTRAVENOUS SOLUTION, PIGGYBACK (ML) INTRAVENOUS ONCE
Status: COMPLETED | OUTPATIENT
Start: 2020-01-27 | End: 2020-01-27

## 2020-01-27 RX ORDER — NITROGLYCERIN 0.4 MG/1
0.4 TABLET SUBLINGUAL EVERY 5 MIN PRN
Status: DISCONTINUED | OUTPATIENT
Start: 2020-01-27 | End: 2020-01-29 | Stop reason: HOSPADM

## 2020-01-27 RX ORDER — OMEPRAZOLE 40 MG/1
80 CAPSULE, DELAYED RELEASE ORAL EVERY EVENING
COMMUNITY
End: 2020-12-29

## 2020-01-27 RX ORDER — DEXTROSE MONOHYDRATE 25 G/50ML
25-50 INJECTION, SOLUTION INTRAVENOUS
Status: DISCONTINUED | OUTPATIENT
Start: 2020-01-27 | End: 2020-01-27

## 2020-01-27 RX ORDER — LIDOCAINE 40 MG/G
CREAM TOPICAL
Status: DISCONTINUED | OUTPATIENT
Start: 2020-01-27 | End: 2020-01-29 | Stop reason: HOSPADM

## 2020-01-27 RX ORDER — ONDANSETRON 4 MG/1
4 TABLET, ORALLY DISINTEGRATING ORAL EVERY 6 HOURS PRN
Status: DISCONTINUED | OUTPATIENT
Start: 2020-01-27 | End: 2020-01-27

## 2020-01-27 RX ORDER — METOCLOPRAMIDE 5 MG/1
5 TABLET ORAL
Status: DISCONTINUED | OUTPATIENT
Start: 2020-01-27 | End: 2020-01-29 | Stop reason: HOSPADM

## 2020-01-27 RX ORDER — ONDANSETRON 2 MG/ML
4 INJECTION INTRAMUSCULAR; INTRAVENOUS EVERY 6 HOURS PRN
Status: DISCONTINUED | OUTPATIENT
Start: 2020-01-27 | End: 2020-01-29 | Stop reason: HOSPADM

## 2020-01-27 RX ORDER — METOCLOPRAMIDE HYDROCHLORIDE 5 MG/ML
5 INJECTION INTRAMUSCULAR; INTRAVENOUS ONCE
Status: DISCONTINUED | OUTPATIENT
Start: 2020-01-27 | End: 2020-01-27

## 2020-01-27 RX ORDER — MYCOPHENOLIC ACID 360 MG/1
360 TABLET, DELAYED RELEASE ORAL 2 TIMES DAILY
Status: DISCONTINUED | OUTPATIENT
Start: 2020-01-27 | End: 2020-01-29 | Stop reason: HOSPADM

## 2020-01-27 RX ORDER — LORAZEPAM 2 MG/ML
0.5 INJECTION INTRAMUSCULAR ONCE
Status: COMPLETED | OUTPATIENT
Start: 2020-01-27 | End: 2020-01-27

## 2020-01-27 RX ORDER — AMLODIPINE BESYLATE 5 MG/1
5 TABLET ORAL DAILY
Status: DISCONTINUED | OUTPATIENT
Start: 2020-01-27 | End: 2020-01-29 | Stop reason: HOSPADM

## 2020-01-27 RX ORDER — HYDRALAZINE HYDROCHLORIDE 20 MG/ML
10 INJECTION INTRAMUSCULAR; INTRAVENOUS EVERY 4 HOURS PRN
Status: DISCONTINUED | OUTPATIENT
Start: 2020-01-27 | End: 2020-01-29 | Stop reason: HOSPADM

## 2020-01-27 RX ORDER — SODIUM CHLORIDE, SODIUM LACTATE, POTASSIUM CHLORIDE, CALCIUM CHLORIDE 600; 310; 30; 20 MG/100ML; MG/100ML; MG/100ML; MG/100ML
INJECTION, SOLUTION INTRAVENOUS CONTINUOUS
Status: DISCONTINUED | OUTPATIENT
Start: 2020-01-27 | End: 2020-01-29 | Stop reason: HOSPADM

## 2020-01-27 RX ORDER — HEPARIN SODIUM 5000 [USP'U]/.5ML
5000 INJECTION, SOLUTION INTRAVENOUS; SUBCUTANEOUS EVERY 12 HOURS
Status: DISCONTINUED | OUTPATIENT
Start: 2020-01-27 | End: 2020-01-27

## 2020-01-27 RX ORDER — NICOTINE POLACRILEX 4 MG
15-30 LOZENGE BUCCAL
Status: DISCONTINUED | OUTPATIENT
Start: 2020-01-27 | End: 2020-01-29 | Stop reason: HOSPADM

## 2020-01-27 RX ORDER — DEXTROSE MONOHYDRATE 100 MG/ML
INJECTION, SOLUTION INTRAVENOUS CONTINUOUS PRN
Status: DISCONTINUED | OUTPATIENT
Start: 2020-01-27 | End: 2020-01-29 | Stop reason: HOSPADM

## 2020-01-27 RX ADMIN — MYCOPHENOLIC ACID 360 MG: 360 TABLET, DELAYED RELEASE ORAL at 21:47

## 2020-01-27 RX ADMIN — SODIUM CHLORIDE, POTASSIUM CHLORIDE, SODIUM LACTATE AND CALCIUM CHLORIDE: 600; 310; 30; 20 INJECTION, SOLUTION INTRAVENOUS at 13:52

## 2020-01-27 RX ADMIN — Medication 10 MEQ: at 09:12

## 2020-01-27 RX ADMIN — Medication 0.5 UNITS/HR: at 13:41

## 2020-01-27 RX ADMIN — LORAZEPAM 0.5 MG: 2 INJECTION INTRAMUSCULAR; INTRAVENOUS at 10:58

## 2020-01-27 RX ADMIN — Medication 1 UNITS/HR: at 14:39

## 2020-01-27 RX ADMIN — SODIUM CHLORIDE 1000 ML: 9 INJECTION, SOLUTION INTRAVENOUS at 06:54

## 2020-01-27 RX ADMIN — SODIUM CHLORIDE 8 MG/HR: 9 INJECTION, SOLUTION INTRAVENOUS at 13:52

## 2020-01-27 RX ADMIN — SODIUM CHLORIDE, POTASSIUM CHLORIDE, SODIUM LACTATE AND CALCIUM CHLORIDE 1000 ML: 600; 310; 30; 20 INJECTION, SOLUTION INTRAVENOUS at 07:23

## 2020-01-27 RX ADMIN — PROCHLORPERAZINE EDISYLATE 10 MG: 5 INJECTION INTRAMUSCULAR; INTRAVENOUS at 21:47

## 2020-01-27 RX ADMIN — PROCHLORPERAZINE EDISYLATE 10 MG: 5 INJECTION INTRAMUSCULAR; INTRAVENOUS at 15:56

## 2020-01-27 RX ADMIN — ONDANSETRON HYDROCHLORIDE 4 MG: 2 INJECTION, SOLUTION INTRAMUSCULAR; INTRAVENOUS at 06:53

## 2020-01-27 RX ADMIN — Medication 3 UNITS/HR: at 15:48

## 2020-01-27 RX ADMIN — LORAZEPAM 0.5 MG: 2 INJECTION INTRAMUSCULAR; INTRAVENOUS at 14:11

## 2020-01-27 RX ADMIN — SODIUM CHLORIDE 5 UNITS/HR: 9 INJECTION, SOLUTION INTRAVENOUS at 09:09

## 2020-01-27 RX ADMIN — Medication: at 12:22

## 2020-01-27 RX ADMIN — LORAZEPAM 0.5 MG: 2 INJECTION INTRAMUSCULAR; INTRAVENOUS at 21:46

## 2020-01-27 RX ADMIN — ONDANSETRON 4 MG: 2 INJECTION INTRAMUSCULAR; INTRAVENOUS at 09:55

## 2020-01-27 RX ADMIN — PROCHLORPERAZINE EDISYLATE 5 MG: 5 INJECTION INTRAMUSCULAR; INTRAVENOUS at 10:45

## 2020-01-27 RX ADMIN — PROCHLORPERAZINE EDISYLATE 10 MG: 5 INJECTION INTRAMUSCULAR; INTRAVENOUS at 06:54

## 2020-01-27 RX ADMIN — HYDRALAZINE HYDROCHLORIDE 10 MG: 20 INJECTION INTRAMUSCULAR; INTRAVENOUS at 12:36

## 2020-01-27 RX ADMIN — SODIUM CHLORIDE 80 MG: 9 INJECTION, SOLUTION INTRAVENOUS at 13:36

## 2020-01-27 RX ADMIN — ONDANSETRON HYDROCHLORIDE 4 MG: 2 INJECTION, SOLUTION INTRAMUSCULAR; INTRAVENOUS at 17:32

## 2020-01-27 RX ADMIN — Medication 1 UNITS/HR: at 18:35

## 2020-01-27 RX ADMIN — TACROLIMUS 2 MG: 0.5 CAPSULE ORAL at 21:47

## 2020-01-27 ASSESSMENT — ENCOUNTER SYMPTOMS
NAUSEA: 1
DIARRHEA: 0
CHILLS: 1
VOMITING: 1
ABDOMINAL DISTENTION: 0

## 2020-01-27 ASSESSMENT — ACTIVITIES OF DAILY LIVING (ADL)
ADLS_ACUITY_SCORE: 10

## 2020-01-27 NOTE — ED NOTES
[facility]  ED Nurse Handoff Report    Murtaza Fitzgerald is a 44 year old male   ED Chief complaint: Vomiting  . 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 Activity Level - Current:   Assist X 1. Lift room needed: No. Bariatric: No   Needed: No   Isolation: No. Infection: Not Applicable.     Vital Signs:   Vitals:    01/27/20 0920 01/27/20 0930 01/27/20 0940 01/27/20 0950   BP: (!) 223/134  (!) 200/121 (!) 218/117   Pulse:   98    Resp: 17 (!) 0 12 14   Temp:       TempSrc:       SpO2: 98% 98% 97% 97%   Weight:           Cardiac Rhythm:  ,      Pain level: 0-10 Pain Scale: 6  Patient confused: No. Patient Falls Risk: Yes.   Elimination Status: Has voided   Patient Report - Initial Complaint: Vomiting. Focused Assessment: Gastrointestinal - GI WDL: -WDL except; GI symptoms; nausea and vomiting  Nausea/Vomiting Signs/Symptoms: nausea continuous; emesis; dry-heaving  Nausea/Vomiting Interventions: antiemetic  GI Signs/Symptoms: abdominal discomfort; dry-heaving; diarrhea; nausea; other (see comments) (Pt. shows signs of diaphoresis accompanied with pale colored skin)  Tests Performed:   Labs Ordered and Resulted from Time of ED Arrival Up to the Time of Departure from the ED   COMPREHENSIVE METABOLIC PANEL - Abnormal; Notable for the following components:       Result Value    Carbon Dioxide 19 (*)     Glucose 367 (*)     Urea Nitrogen 50 (*)     Creatinine 5.51 (*)     GFR Estimate 12 (*)     GFR Estimate If Black 14 (*)     Alkaline Phosphatase 189 (*)      All other components within normal limits   LACTIC ACID WHOLE BLOOD - Abnormal; Notable for the following components:    Lactic Acid 2.7 (*)     All other components within normal limits   KETONE BETA-HYDROXYBUTYRATE QUANTITATIVE - Abnormal; Notable for the following components:    Ketone Quantitative 1.7 (*)     All other components within normal limits   BLOOD GAS VENOUS AND OXYHGB - Abnormal; Notable for the following components:    PCO2 Venous 35 (*)     Bicarbonate Venous 18 (*)     All other components within normal limits   GLUCOSE BY METER - Abnormal; Notable for the following components:    Glucose 333 (*)     All other components within normal limits   CBC WITH PLATELETS DIFFERENTIAL - Abnormal; Notable for the following components:    Hemoglobin 13.1 (*)     Absolute Neutrophil 9.2 (*)     All other components within normal limits   ROUTINE UA WITH MICROSCOPIC - Abnormal; Notable for the following components:    Glucose Urine >1000 (*)     Ketones Urine Trace (*)     Blood Urine Trace (*)     Protein Albumin Urine 300 (*)     All other components within normal limits   GLUCOSE BY METER - Abnormal; Notable for the following components:    Glucose 342 (*)     All other components within normal limits   GLUCOSE BY METER - Abnormal; Notable for the following components:    Glucose 357 (*)     All other components within normal limits   GLUCOSE BY METER - Abnormal; Notable for the following components:    Glucose 332 (*)     All other components within normal limits   MAGNESIUM   PHOSPHORUS   LACTIC ACID WHOLE BLOOD   GLUCOSE MONITOR NURSING POCT   NURSING DRAW AND HOLD   BLADDER SCAN   CARDIAC CONTINUOUS MONITORING   NOTIFY PHYSICIAN   PERIPHERAL IV CATHETER       Treatments provided:   Medications   dextrose 5% and 0.45% NaCl infusion (has no administration in time range)   dextrose 50 % injection 25-50 mL (has no administration in time range)   insulin 1 unit/1 mL in NS (NovoLIN, HumuLIN Regular) drip -ED  DKA algorithm (10 Units/hr Intravenous Rate/Dose Change 1/27/20 1025)   metoclopramide (REGLAN) injection 5 mg (has no administration in time range)   prochlorperazine (COMPAZINE) injection 10 mg (10 mg Intravenous Given 1/27/20 0654)   lactated ringers BOLUS 1,000 mL (0 mLs Intravenous Stopped 1/27/20 0830)   ondansetron (ZOFRAN) injection 4 mg (4 mg Intravenous Given 1/27/20 0653)   0.9% sodium chloride BOLUS (0 mLs Intravenous Stopped 1/27/20 0830)   potassium chloride 10 mEq in 100 mL intermittent infusion with 10 mg lidocaine (10 mEq Intravenous New Bag 1/27/20 0912)   ondansetron (ZOFRAN) injection 4 mg (4 mg Intravenous Given 1/27/20 0955)       Family Comments: Byself  OBS brochure/video discussed/provided to patient:  No  ED Medications:   Medications   dextrose 5% and 0.45% NaCl infusion (has no administration in time range)   dextrose 50 % injection 25-50 mL (has no administration in time range)   insulin 1 unit/1 mL in NS (NovoLIN, HumuLIN Regular) drip -ED DKA algorithm (10 Units/hr Intravenous Rate/Dose Change 1/27/20 1025)   metoclopramide (REGLAN) injection 5 mg (has no administration in time range)   prochlorperazine (COMPAZINE) injection 10 mg (10 mg Intravenous Given 1/27/20 0654)   lactated ringers BOLUS 1,000 mL (0 mLs Intravenous Stopped 1/27/20 0830)   ondansetron (ZOFRAN) injection 4 mg (4 mg Intravenous Given 1/27/20 0653)   0.9% sodium chloride BOLUS (0 mLs Intravenous Stopped 1/27/20 0830)   potassium chloride 10 mEq in 100 mL intermittent infusion with 10 mg lidocaine (10 mEq Intravenous New Bag 1/27/20 0912)   ondansetron (ZOFRAN) injection 4 mg (4 mg Intravenous Given 1/27/20 0955)     Drips infusing:  Yes, Insulin in Algorithm 2 see MAR Need BG check @ 1125  For the majority of the shift, the patient's behavior Green. Interventions performed were N/A.     Severe Sepsis OR Septic Shock Diagnosis Present: No      ED Nurse Name/Phone Number: Miguel Tariq RN,   RECEIVING UNIT ED HANDOFF  REVIEW    Above ED Nurse Handoff Report was reviewed: Yes  Reviewed by: Kayla Delgadillo RN on January 27, 2020 at 10:58 AM     10:36 AM

## 2020-01-27 NOTE — ED NOTES
Pt vomitted dark emesis. MD notified. Sample sent to lab for occult blood gastric content. Will continue to monitor and assess.

## 2020-01-27 NOTE — ED NOTES
"Pt voided 75mL of urine. Pt's bladder scan showed 479mL. Pt states, \"This is how my body works. I do not want any catheters placed in me.\" MD aware. Will continue to monitor and assess  "

## 2020-01-27 NOTE — PHARMACY-ADMISSION MEDICATION HISTORY
Admission medication history interview status for this patient is complete. See [facility] admission navigator for allergy information, prior to admission medications and immunization status.     Medication history interview source(s):Patient  Medication history resources (including written lists, pill bottles, clinic record):None  Primary pharmacy:[facility]    Changes made to PTA medication list:  Added: Tresiba- Could not find recent fill for this- patient reports he got pen before had pump so has had for a while, , Novolog for prandial and sliding scale , Mycophenolic (was crossed off on med list)  Deleted: Senna/Doc,  Insulin pump  Changed: Prilosec (script is for bid but patient says takes just once at night), Lipitor (script is for 40 mg tabs - take 1/2 tab.  Patient reports he takes a whole tablet daily), Metoclopramide to prn (rx is for scheduled)    Patient reports that directions on Tacrolius is 2 mg bid- pharmacy has 1.5 mg twice daily       Actions taken by pharmacist (provider contacted, etc):None     Additional medication history information:see above for details-     Medication reconciliation/reorder completed by provider prior to medication history?  No     Do you take OTC medications (eg tylenol, ibuprofen, fish oil, eye/ear drops, etc)? Yes     For patients on insulin therapy: Yes  Lantus/levemir/NPH/toujeo/tresiba/Mix 70/30 dose:  Yes  Sliding scale Novolog: Yes  If Yes, do you have a baseline novolog pre-meal dose:  yes  units with meals  Patients eat three meals a day: tries to eat three  How many episodes of hypoglycemia do you have per week: not often  How many missed doses do you have per week: once dary while- did not take last night  How many times do you check your blood glucose per day:  three  Do you have a Continuous glucose monitor (CGM) : No (remind pt that not approved for hospital use)  Any Barriers to therapy - Be specific :  Yes: waiting for insurance to cover insulin pump  again  (cost of medications, comfortable with giving injections (if applicable), comfortable and confident with current diabetes regimen)      Prior to Admission medications    Medication Sig Last Dose Taking? Auth Provider   acetaminophen (TYLENOL) 325 MG tablet Take 2 tablets (650 mg) by mouth every 4 hours as needed for mild pain not recent Yes Lizandro Dasilva PA-C   atorvastatin (LIPITOR) 40 MG tablet Take 40 mg by mouth daily 1/25/2020 Yes Unknown, Entered By History   insulin aspart (NOVOLOG FLEXPEN) 100 UNIT/ML pen Inject Subcutaneous 3 times daily (with meals) 6 units for every 30 grams of carbs  Yes Unknown, Entered By History   insulin aspart (NOVOLOG FLEXPEN) 100 UNIT/ML pen Inject Subcutaneous Take with snacks or supplements for high blood sugar 6 units for every 30 grams of carbs  Yes Unknown, Entered By History   insulin aspart (NOVOLOG FLEXPEN) 100 UNIT/ML pen Inject Subcutaneous 3 times daily (with meals) Sliding scale   1 unit for every bg  20 over 140  Yes Unknown, Entered By History   insulin degludec (TRESIBA) 100 UNIT/ML pen Inject 24 Units Subcutaneous At Bedtime 1/25/2020 Yes Unknown, Entered By History   metoclopramide (REGLAN) 5 MG tablet Take 5 mg by mouth 4 times daily as needed 1/25/2020 Yes Unknown, Entered By History   mycophenolic acid (GENERIC EQUIVALENT) 180 MG EC tablet Take 360 mg by mouth 2 times daily 1/18/2020 Yes Unknown, Entered By History   omeprazole (PRILOSEC) 40 MG DR capsule Take 40 mg by mouth every evening 1/25/2020 Yes Unknown, Entered By History   ondansetron (ZOFRAN-ODT) 4 MG ODT tab Take 1-2 tablets (4-8 mg) by mouth every 8 hours as needed for nausea  Yes Adam Villalba MD   acetone, Urine, test STRP 1 strip by In Vitro route daily   Marcie Lozano APRN CNP   YOHAN CONTOUR test strip USE TO TEST BLOOD SUGAR FIVE TIMES A DAY OR AS DIRECTED   Kt Ríos MD   blood glucose monitoring (NO BRAND SPECIFIED) test strip Freestyle test strips 6 times daily  (NOT lite and NOT insulinx)   Marcie Lozano APRN CNP   blood glucose monitoring (NO BRAND SPECIFIED) test strip 5 times daily ( contour NEXT)   Marcie Lozano APRN CNP   Blood Glucose Monitoring Suppl ([employer] CONTOUR MONITOR) W/DEVICE KIT TO TEST BG 5 TIMIES DAILY   Henrietta Tomas MD   insulin pen needle (BD ARPITA U/F) 32G X 4 MM Use 4 daily or as directed.   Kt Ríos MD   tacrolimus (GENERIC EQUIVALENT) 1 MG capsule Take 2 capsules (2 mg) by mouth 2 times daily 1/25/2020  Kt Ríos MD

## 2020-01-27 NOTE — H&P
History and Physical     Murtaza Fitzgerald MRN# [id number]   YOB: 1975 Age: 44 year old      Date of Admission:  1/27/2020    Primary care provider: Kt Ríos          Assessment and Plan:   Murtaza Fitzgerald is a 44 year old male with a PMH significant for DM I, CKD s/p kidney transplant 11/2008, pancreatic transplant 2/2009, CAD s/p bypass graft, HTN,  GERD, HLP  who presents with intractable nausea and vomiting with evidence of DKA on admission lab work.     Patient was discussed with Dr. Watson, who was provider in ED. Chart review of ED work up was reviewed as well as chart review of Care Everywhere, previous visits and admissions.     #Ketosis (suspect starvation) with hyperglycemia  # Intractable nausea and vomiting secondary to either gastroenteritis or gastroparesis  Patient presents with intractable nausea and vomiting for 24 hours. Has had poor oral intake with minimal insulin use since symptoms started.  Initial glucose of  367 and only improved to 342 after two litres of fluid. Serum ketones positive at 1.7, anion gap normal, venous gas with normal pH but slightly low bicarb at 18 and initial lactic acid elevated at 2.7 but improved to 1.7 after two litres. Started on insulin drip in ED.   -[facility] admission due to insulin drip  -Continue Insulin drip  -Lactated ringers at 100 ml/hr    #Acute on chronic kidney disease stage V  Hx of kidney transplant in 11/2008. On chart review note steady decline in kidney function since June 2019 and is back on the transplant list. Creatinine recently has been in the 4 range with current creatine of 5.51 suspect related to dehydration and poor oral intake.   -Hydrate with LR  -Nephrology consult  -Continue Tacrolimus (goal 4-6) and Mycophenolic acid (goal 1-3.5)  -Missed transplant appt today, will need to reschedule    #Type 1 diabetes hx of pancreatic transplant 2/2009  Has insulin pump at home but do due to insurance coverage has  not been using. Instead using Tresiba and Novolog Most recent A1c was 6.7 in November.  -Hold home insulin for now    #HTN  Recently stopped Amlodipine due to low blood pressure and pressures elevated here 218/117.  -Restart Amlodipine 5 mg  -Hydralazine PRN    #Anemia of chronic disease  Hemoglobin is 13.1 but I suspect hemoconcentration with dehydration.  -Recheck in AM    #CAD  Hx of severe 3 vessel diseae with CABG in 10/15. Last dobutamine stress test in 7/2019 was normal.       Social: No concerns  Code: Discussed with patient and they have chosen Full code  VTE prophylaxis: Heparin SQ  Disposition: Inpatient, [facility] admit                    Chief Complaint:   Intractable nausea and vomiting         History of Present Illness:   Murtaza Fitzgerald is a 44 year old male who presents with intractable nausea and vomiting for 24 hours with inability to keep food, liquid or medications down. He has diffuse abdominal discomfort with this but no diarrhea and had a normal non bloody bowel movement in the ED. States this feels similar to previous episodes of gastroparesis.  No fever, chills, cough, shortness of breath, chest discomfort or urinary symptoms. No sick contacts or travel. Continues to vape nicotine but does not drink alcohol. Missed transplant appt today.             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     Noncompliance with treatment     no labs for  one year     Pancreas replaced by transplant (H) 2009    rejection treated with thymo     Pancreatic disease     pancreas transplant     Tobacco abuse ongoing               Past Surgical History:     Past Surgical History:   Procedure Laterality Date     ARTHROSCOPY KNEE WITH LATERAL MENISCECTOMY Left 7/10/2019    Procedure: Left knee arthroscopic partial lateral meniscectomy;  Surgeon: Alex Candelaria MD;  Location: [facility] OR     BYPASS GRAFT ARTERY CORONARY N/A 2015    Procedure: BYPASS GRAFT ARTERY CORONARY;  Surgeon: Katy Neville MD;  Location: [facility] OR     EYE SURGERY      vitrectomy both eyes      ORTHOPEDIC SURGERY      tumor removed from left knee     TRANSPLANT  .    pancrease and kidney transplant               Social History:     Social History     Socioeconomic History     Marital status:      Spouse name: Not on file     Number of children: 1     Years of education: Not on file     Highest education level: Associate degree: academic program   Occupational History     Occupation:    Social Needs     Financial resource strain: Not very hard     Food insecurity:     Worry: Never true     Inability: Never true     Transportation needs:     Medical: No     Non-medical: No   Tobacco Use     Smoking status: Former Smoker     Packs/day: 0.30     Types: Cigarettes     Last attempt to quit: 7/3/2015     Years since quittin.5     Smokeless tobacco: Never Used     Tobacco comment: E- Cig use still   Substance and Sexual Activity     Alcohol use: Not Currently     Alcohol/week: 0.0 standard drinks     Frequency: Never     Binge frequency: Never     Drug use: No     Sexual activity: Never     Partners: Female   Lifestyle     Physical activity:     Days per week: 0 days     Minutes per session: 0 min     Stress: To some extent   Relationships     Social connections:     Talks on phone: More than three times a week     Gets together: Once a week     Attends Yazidi  service: Never     Active member of club or organization: No     Attends meetings of clubs or organizations: Never     Relationship status:      Intimate partner violence:     Fear of current or ex partner: Not on file     Emotionally abused: Not on file     Physically abused: Not on file     Forced sexual activity: Not on file   Other Topics Concern     Parent/sibling w/ CABG, MI or angioplasty before 65F 55M? No      Service Not Asked     Blood Transfusions No     Comment: possibly during surgery, otherwise none.     Caffeine Concern Not Asked     Occupational Exposure Not Asked     Hobby Hazards Not Asked     Sleep Concern Not Asked     Stress Concern Not Asked     Weight Concern Not Asked     Special Diet Not Asked     Back Care Not Asked     Exercise Not Asked     Bike Helmet Not Asked     Seat Belt Yes     Self-Exams Not Asked   Social History Narrative     Not on file               Family History:     Family History   Problem Relation Age of Onset     Unknown/Adopted Father      Heart Disease Maternal Grandfather 62              Allergies:      Allergies   Allergen Reactions     Benadryl [Diphenhydramine]      Reglan Other (See Comments)     IV, delsuions               Medications:     Prior to Admission medications    Medication Sig Last Dose Taking? Auth Provider   acetaminophen (TYLENOL) 325 MG tablet Take 2 tablets (650 mg) by mouth every 4 hours as needed for mild pain   Lizandro Dasilva PA-C   acetone, Urine, test STRP 1 strip by In Vitro route daily   Marcie Lozano APRN CNP   atorvastatin (LIPITOR) 40 MG tablet Take 0.5 tablets (20 mg) by mouth daily   Kt Ríos MD   YOHAN CONTOUR test strip USE TO TEST BLOOD SUGAR FIVE TIMES A DAY OR AS DIRECTED   Kt Ríos MD   blood glucose monitoring (NO BRAND SPECIFIED) test strip Freestyle test strips 6 times daily (NOT lite and NOT insulinx)   Marcie Lozano APRN CNP   blood glucose monitoring (NO BRAND SPECIFIED) test  strip 5 times daily ( contour NEXT)   Marcie Lozano APRN CNP   Blood Glucose Monitoring Suppl ([employer] CONTOUR MONITOR) W/DEVICE KIT TO TEST BG 5 TIMIES DAILY   Henrietta Tomas MD   Insulin Aspart (INSULIN PUMP - OUTPATIENT) Date last interrogated by pharmacy: 2/5/2019  Omnipod  Insulin: Novolog  BASAL RATES and times:   12 AM (midnight): 1.1 units/hr  2 AM: 1.25  10 AM: 0.8  6 PM: 0.95  CARB RATIO and times:  12 AM - 11 AM: 9  11 AM - 8 PM: 6  8 PM - 12 AM: 7   BLOOD GLUCOSE TARGET and times:  12 AM (midnight): 100, correct above 120 with reverse correction on  Active Insulin Time: 4 hours  ISF (insulin sensitivity factor):   40 from   38 from [id number]  Dexcom sharing cose: [id number]   Unknown, Entered By History   insulin aspart (NOVOLOG VIAL) 100 UNITS/ML vial TO BE USED IN INSULIN PUMP UP TO 75 UNITS TOTAL DAILY DOSE   Patricia Costa MD   insulin pen needle (BD ARPITA U/F) 32G X 4 MM Use 4 daily or as directed.   Kt Ríos MD   metoclopramide (REGLAN) 5 MG tablet TAKE ONE TABLET BY MOUTH FOUR TIMES A DAY BEFORE MEALS AND NIGHTLY   Kt Ríos MD   omeprazole (PRILOSEC) 40 MG DR capsule TAKE ONE CAPSULE BY MOUTH TWICE A DAY   Kt Ríos MD   ondansetron (ZOFRAN-ODT) 4 MG ODT tab Take 1-2 tablets (4-8 mg) by mouth every 8 hours as needed for nausea   Adam Villalba MD   senna-docusate (SENOKOT-S/PERICOLACE) 8.6-50 MG tablet Take 1-2 tablets by mouth 2 times daily   Lizandro Dasilva PA-C   tacrolimus (GENERIC EQUIVALENT) 1 MG capsule Take 2 capsules (2 mg) by mouth 2 times daily   Kt Ríos MD              Review of Systems:   A Comprehensive greater than 10 system review of systems was carried out.  Pertinent positives and negatives are noted above.  Otherwise negative for contributory information.            Physical Exam:   Blood pressure (!) 205/129, pulse 110, temperature 97.9  F (36.6  C), temperature source Oral, resp. rate 20, weight 90.7 kg (200 lb),  SpO2 95 %.  Exam:  GENERAL:  Appears tired and pale  PSYCH: pleasant, oriented, No acute distress.  HEENT:  PERRLA. Normal conjunctiva, normal hearing, nasal mucosa and Oropharynx are normal.  NECK:  Supple, no neck vein distention, adenopathy or bruits, normal thyroid.  HEART:  Normal S1, S2 with no murmur, no pericardial rub, gallops or S3 or S4.  LUNGS:  Clear to auscultation, normal Respiratory effort. No wheezing, rales or ronchi.  ABDOMEN:  Soft,  non distended, hypoactive bowel sounds with diffuse tenderness  EXTREMITIES:  No pedal edema, +2 pulses bilateral and equal.  SKIN:  Dry to touch, No rash, wound or ulcerations.  NEUROLOGIC:  CN 2-12 grossly intact,  sensation is intact with no focal deficits.               Data:     Recent Labs   Lab 01/27/20  0641   WBC 10.3   HGB 13.1*   HCT 40.2   MCV 88        Recent Labs   Lab 01/27/20  0642      POTASSIUM 4.1   CHLORIDE 107   CO2 19*   ANIONGAP 11   *   BUN 50*   CR 5.51*   GFRESTIMATED 12*   GFRESTBLACK 14*   CHARLY 9.3   MAG 2.0   PHOS 4.4   PROTTOTAL 7.9   ALBUMIN 3.7   BILITOTAL 0.5   ALKPHOS 189*   AST 12   ALT 10     Recent Labs   Lab 01/27/20  0807 01/27/20  0642   LACT 1.7 2.7*         No results found for this or any previous visit (from the past 24 hour(s)).      Rosalia Amor PA-C

## 2020-01-27 NOTE — ED PROVIDER NOTES
History     Chief Complaint:  Vomiting    HPI   Christjelanier EDUAR Fitzgerald is an immunosuppressed 44 year old male s/p kidney transplant in 2008, insulin-controlled type I diabetes mellitus who presents with nausea and non-bloody vomiting for the last 24 hours. He has also felt feverish and chilled. He has no feeling of abdominal distention. He does not endorse any new foods or ill contacts that may have provoked his symptoms. He has had non-bloody BM's x 3 in the last day. He voices that he has been compliant with his medications.  Sugars were 81 yesterday morning and 241 yesterday evening, which he took a few correction units to compensate for.     Allergies:  Benadryl  Reglan     Medications:    Atorvastatin  Insulin aspart  Omeprazole  Tacrolimus     Past Medical History:    CMV  CAD  Type I diabetes mellitus   Dyslipidemia  GERD  H/o blood transfusion  Hypertension  Immunosuppressed  Kidney transplant status  Migraines  Mycotic aneurysm of kidney transplant  Pancreatic disease  Tobacco abuse    Past Surgical History:    Knee arthroscopy with lateral meniscectomy  Bypass graft artery coronary  Vitrectomy both eyes  Orthopedic surgery  Pancreas and kidney transplant     Family History:    History reviewed. No pertinent family history.     Social History:  Smoking status: Former smoker  Alcohol use: Not currently  Drug use: No  PCP: Kt Ríos  Presents to the ED with himself  Marital Status:       Review of Systems   Constitutional: Positive for chills.   Gastrointestinal: Positive for nausea and vomiting. Negative for abdominal distention and diarrhea.   All other systems reviewed and are negative.        Physical Exam     Patient Vitals for the past 24 hrs:   BP Temp Temp src Pulse Heart Rate Resp SpO2 Weight   01/27/20 0950 (!) 218/117 -- -- -- 93 14 97 % --   01/27/20 0940 (!) 200/121 -- -- 98 96 12 97 % --   01/27/20 0930 -- -- -- -- 101 (!) 0 98 % --   01/27/20 0920 (!) 223/134 -- -- -- 101 17  98 % --   01/27/20 0820 (!) 205/129 -- -- -- 96 20 95 % --   01/27/20 0740 -- -- -- -- 111 14 96 % --   01/27/20 0730 -- -- -- -- 109 22 96 % --   01/27/20 0723 -- 97.9  F (36.6  C) Oral -- -- -- -- --   01/27/20 0720 (!) 188/98 -- -- -- 101 13 98 % --   01/27/20 0637 (!) 141/94 -- -- 110 110 30 98 % 90.7 kg (200 lb)       Physical Exam  Gen: Comfortable appearing actively vomiting  HEENT:  mmm, no rhinorrhea, mucous membranes dry, pupils 4 mm reactive no scleral icterus  Neck: supple, no abnormal swelling  Lungs:  CTAB,  no resp distress  CV: Tachycardic, no m/r/g, ppi  Abd: soft, no significant localizing abdominal tenderness to palpation, nondistended, no rebound/masses/guarding/hsm  Ext: no peripheral edema  Skin: warm, dry, well perfused, no rashes/bruising/lesions on exposed skin  Neuro: alert, MAEE, no gross motor or sensory deficits  Psych: Normal mood, normal affect      Emergency Department Course   ECG (6:55:21):  Rate 94 bpm. VT interval 122. QRS duration 92. QT/QTc 368/460. P-R-T axes 21 21 24. Normal sinus rhythm. Incomplete right bundle branch block. Inferior infarct, age undetermined. Abnormal EKG. Agree with computer interpretation. Interpreted at 0659 by Solitario Watson MD.    Laboratory:  CBC: HGB 13.1 (L) o/w WNL (WBC 10.3, )  CMP: Carbon Dioxide 19 (L), Glucose 367 (H), Bun 50 (H), Creatinine 5.51 (H), GFR 12 (L), Alkphos 189 (H)   Lactic acid whole blood (0712): 2.7 (H)  VBG: pH: 7.32, PCO2: 35 (L), PO2: 32, Bicarbonate: 18 (L), FlO2 room air, Oxyhemoglobin 61, Base Deficit Venous 7.2  Magnesium: 2  Phosphorus: 4.4    Glucose by meter (0647): 333 (H)  Glucose by meter (0847): 342 (H)   Glucose by meter (0916): 357 (H)   Ketone Beta-Hydroxybutyrate Quantitative: 1.7 (H)    UA with micro: Glucose >1000, trace ketones, trace blood, protein albumin 300    Interventions:  0653: 4 mg Zofran IV  0654: 10 mg Compazine IV  0654: NS 1L IV Bolus  0723: LR 1L IV Bolus  0909: 5 units/hr  insulin Novolin, Humulin regular IV drip  0912: 10 mEq potassium chloride IV  0955: 4 mg Zofran IV    Emergency Department Course:  Past medical records, nursing notes, and vitals reviewed.  0644: I performed an exam of the patient and obtained history, as documented above.    EKG obtained, findings above.     UA obtained, findings above.     IV inserted and blood drawn.     0753: I rechecked the patient. Explained findings to patient.    0808: I spoke to Dr. Chen on-call for nephrology at [facility].    Findings and plan explained to the patient who consents to admission.     09: Discussed the patient with Dr. Astudillo, who will admit the patient to an [facility] bed for further monitoring, evaluation, and treatment.     Impression & Plan    Medical Decision Makin-year-old male insulin-dependent diabetes history of renal and pancreatic transplant the [facility] on chronic immunosuppression.  Here with nonbilious nonbloody emesis and loose stools.  History of gastroparesis related nausea and vomiting.  Most likely this is an exacerbation of the gastroparesis.  This is limiting his p.o. intake is also been less compliant with his medications and has evidence of mild DKA and starvation ketosis at this time.  Was given antiemetics insulin intravenous fluids.  I do not think he needs advanced intra-abdominal imaging.  Given the inability to transition to p.o. intake will admit for further evaluation management as well as management of his ketosis and hyperglycemia.    Later in ED course and episode of emesis had a darker color to it and a gastric occult blood test was sent.  This was positive differential for this would be Genet-Perkins tear given initial episodes of emesis or not concerning for upper GI bleed.  If continues may need GI consult and endoscopy.    Diagnosis:    ICD-10-CM    1. SWATHI (acute kidney injury) (H) N17.9 Glucose by meter     Glucose by meter   2. Type 1 diabetes mellitus with diabetic  cardiomyopathy (H) E10.59     I43    3. Kidney replaced by transplant Z94.0    4. Immunosuppressed status (H) D89.9    5. Diabetic ketoacidosis without coma associated with type 1 diabetes mellitus (H) E10.10        Disposition: Admitted to [facility]    [place] DOAN, am serving as a scribe at 6:44 AM on 1/27/2020 to document services personally performed by Solitario Watson MD based on my observations and the provider's statements to me.     [place] Velazco  1/27/2020   [facility] EMERGENCY DEPARTMENT       Solitario Watson MD  01/27/20 1402       Solitario Watson MD  01/27/20 1409

## 2020-01-27 NOTE — CONSULTS
Consult Date:  2020      PRIMARY CARE PHYSICIAN:  Dr. Kt Ríos        HISTORY OF PRESENT ILLNESS:  This is a 44-year-old male known to this hospital for previous visits for similar episodes of dehydration, nausea, vomiting.  The patient was just in the hospital on  and at that time, he was thought to be suffering from dehydration and gastroparesis, and he was given rehydration and corrected his labs and he started feeling better, so he was discharged to be followed up.  He was supposed to be followed up today in Transplant Clinic, but ended up in the hospital because of a similar episode of dehydration, nausea, and vomiting which started several days ago.  The patient has noted that the material thrown up looked darker than usual and it turned out it was positive for blood.  He has not eaten anything today and he was hyperglycemic on admission, so that is being corrected and he is now being treated with Zofran and Compazine and he was started on a PPI.      PHYSICAL EXAMINATION:  On examination now, the abdomen is nontender with no palpable masses or enlarged organs present.      ASSESSMENT:  I discussed with him and his mother about doing another endoscopy.  He has not had one for years and he said that he wanted to put it off until tomorrow since he is not feeling very well tonight, and I think that is reasonable.  So, I will order him to have a gastroscopy tomorrow morning and will take a look and see if we can find something to treat.      Thank you for allowing me to see this patient.         LORIE LOREDO MD             D: 2020   T: 2020   MT: WT      Name:     DOUG PAIZ   MRN:      [id number]        Account:       [id number]   :      1975           Consult Date:  2020      Document: [id number]       cc: Kt Ríos MD       [facility]

## 2020-01-27 NOTE — ED TRIAGE NOTES
"Pt reports N/V and abd pain, is a kidney transplant pt with \"failing kidneys\". Presents to triage for 24 hours of symptoms including rapid respirations.   "

## 2020-01-27 NOTE — PLAN OF CARE
Pt arrived from the Ed at 12:00pm. Pt A/O. He c/o of discomfort in abdomen secondary to vomiting for the past 24 hours. Pt had 2 episodes of emesis this shift, 500cc total dark brown/black in color. Dr. Astudillo and Dr. Gomes are aware. Pt hgb 13.1 recheck will be at 18:00. Pt was started on a protonix gtt. Pt given ativan 0.5mg IV and compazine 10mg IV with some relieve. PT had insulin gtt running at 10units an hour. BS check was 194 and insulin gtt was adjusted per protocol. Pt /115 pt was given 10mg IV hydralazine, re-check /90. Plan is to have insulin gtt overnight, clear liquids until midnight then NPO for EDG tomorrow.

## 2020-01-28 LAB
ALBUMIN SERPL-MCNC: 3 G/DL (ref 3.4–5)
ALP SERPL-CCNC: 138 U/L (ref 40–150)
ALT SERPL W P-5'-P-CCNC: 13 U/L (ref 0–70)
ANION GAP SERPL CALCULATED.3IONS-SCNC: 8 MMOL/L (ref 3–14)
AST SERPL W P-5'-P-CCNC: 30 U/L (ref 0–45)
BILIRUB SERPL-MCNC: 0.5 MG/DL (ref 0.2–1.3)
BUN SERPL-MCNC: 50 MG/DL (ref 7–30)
CALCIUM SERPL-MCNC: 8.7 MG/DL (ref 8.5–10.1)
CHLORIDE SERPL-SCNC: 109 MMOL/L (ref 94–109)
CO2 SERPL-SCNC: 22 MMOL/L (ref 20–32)
CREAT SERPL-MCNC: 5.56 MG/DL (ref 0.66–1.25)
ERYTHROCYTE [DISTWIDTH] IN BLOOD BY AUTOMATED COUNT: 13.3 % (ref 10–15)
GFR SERPL CREATININE-BSD FRML MDRD: 12 ML/MIN/{1.73_M2}
GLUCOSE BLDC GLUCOMTR-MCNC: 101 MG/DL (ref 70–99)
GLUCOSE BLDC GLUCOMTR-MCNC: 109 MG/DL (ref 70–99)
GLUCOSE BLDC GLUCOMTR-MCNC: 110 MG/DL (ref 70–99)
GLUCOSE BLDC GLUCOMTR-MCNC: 112 MG/DL (ref 70–99)
GLUCOSE BLDC GLUCOMTR-MCNC: 113 MG/DL (ref 70–99)
GLUCOSE BLDC GLUCOMTR-MCNC: 114 MG/DL (ref 70–99)
GLUCOSE BLDC GLUCOMTR-MCNC: 115 MG/DL (ref 70–99)
GLUCOSE BLDC GLUCOMTR-MCNC: 117 MG/DL (ref 70–99)
GLUCOSE BLDC GLUCOMTR-MCNC: 120 MG/DL (ref 70–99)
GLUCOSE BLDC GLUCOMTR-MCNC: 127 MG/DL (ref 70–99)
GLUCOSE BLDC GLUCOMTR-MCNC: 132 MG/DL (ref 70–99)
GLUCOSE BLDC GLUCOMTR-MCNC: 134 MG/DL (ref 70–99)
GLUCOSE BLDC GLUCOMTR-MCNC: 134 MG/DL (ref 70–99)
GLUCOSE BLDC GLUCOMTR-MCNC: 137 MG/DL (ref 70–99)
GLUCOSE BLDC GLUCOMTR-MCNC: 140 MG/DL (ref 70–99)
GLUCOSE BLDC GLUCOMTR-MCNC: 140 MG/DL (ref 70–99)
GLUCOSE BLDC GLUCOMTR-MCNC: 146 MG/DL (ref 70–99)
GLUCOSE BLDC GLUCOMTR-MCNC: 148 MG/DL (ref 70–99)
GLUCOSE BLDC GLUCOMTR-MCNC: 155 MG/DL (ref 70–99)
GLUCOSE BLDC GLUCOMTR-MCNC: 156 MG/DL (ref 70–99)
GLUCOSE BLDC GLUCOMTR-MCNC: 90 MG/DL (ref 70–99)
GLUCOSE BLDC GLUCOMTR-MCNC: 92 MG/DL (ref 70–99)
GLUCOSE SERPL-MCNC: 157 MG/DL (ref 70–99)
HBA1C MFR BLD: 6.7 % (ref 0–5.6)
HCT VFR BLD AUTO: 32.3 % (ref 40–53)
HGB BLD-MCNC: 10.3 G/DL (ref 13.3–17.7)
HGB BLD-MCNC: 10.3 G/DL (ref 13.3–17.7)
INTERPRETATION ECG - MUSE: NORMAL
MAGNESIUM SERPL-MCNC: 1.9 MG/DL (ref 1.6–2.3)
MCH RBC QN AUTO: 28.8 PG (ref 26.5–33)
MCHC RBC AUTO-ENTMCNC: 31.9 G/DL (ref 31.5–36.5)
MCV RBC AUTO: 90 FL (ref 78–100)
PHOSPHATE SERPL-MCNC: 4.2 MG/DL (ref 2.5–4.5)
PLATELET # BLD AUTO: 231 10E9/L (ref 150–450)
POTASSIUM SERPL-SCNC: 4.1 MMOL/L (ref 3.4–5.3)
PROT SERPL-MCNC: 6.3 G/DL (ref 6.8–8.8)
RBC # BLD AUTO: 3.58 10E12/L (ref 4.4–5.9)
SODIUM SERPL-SCNC: 139 MMOL/L (ref 133–144)
TACROLIMUS BLD-MCNC: 3.2 UG/L (ref 5–15)
TME LAST DOSE: ABNORMAL H
UPPER GI ENDOSCOPY: NORMAL
WBC # BLD AUTO: 11.5 10E9/L (ref 4–11)

## 2020-01-28 PROCEDURE — 83735 ASSAY OF MAGNESIUM: CPT | Performed by: HOSPITALIST

## 2020-01-28 PROCEDURE — 25000125 ZZHC RX 250: Performed by: HOSPITALIST

## 2020-01-28 PROCEDURE — 25000128 H RX IP 250 OP 636: Performed by: HOSPITALIST

## 2020-01-28 PROCEDURE — C9113 INJ PANTOPRAZOLE SODIUM, VIA: HCPCS | Performed by: HOSPITALIST

## 2020-01-28 PROCEDURE — 25800030 ZZH RX IP 258 OP 636: Performed by: HOSPITALIST

## 2020-01-28 PROCEDURE — 25000132 ZZH RX MED GY IP 250 OP 250 PS 637: Performed by: HOSPITALIST

## 2020-01-28 PROCEDURE — 84100 ASSAY OF PHOSPHORUS: CPT | Performed by: HOSPITALIST

## 2020-01-28 PROCEDURE — 99233 SBSQ HOSP IP/OBS HIGH 50: CPT | Performed by: HOSPITALIST

## 2020-01-28 PROCEDURE — 36415 COLL VENOUS BLD VENIPUNCTURE: CPT | Performed by: HOSPITALIST

## 2020-01-28 PROCEDURE — 43239 EGD BIOPSY SINGLE/MULTIPLE: CPT | Performed by: INTERNAL MEDICINE

## 2020-01-28 PROCEDURE — 83036 HEMOGLOBIN GLYCOSYLATED A1C: CPT | Performed by: HOSPITALIST

## 2020-01-28 PROCEDURE — 80197 ASSAY OF TACROLIMUS: CPT | Performed by: HOSPITALIST

## 2020-01-28 PROCEDURE — 25000132 ZZH RX MED GY IP 250 OP 250 PS 637: Performed by: PHYSICIAN ASSISTANT

## 2020-01-28 PROCEDURE — 25000125 ZZHC RX 250: Performed by: INTERNAL MEDICINE

## 2020-01-28 PROCEDURE — 25000128 H RX IP 250 OP 636: Performed by: INTERNAL MEDICINE

## 2020-01-28 PROCEDURE — 25000131 ZZH RX MED GY IP 250 OP 636 PS 637: Performed by: PHYSICIAN ASSISTANT

## 2020-01-28 PROCEDURE — 85018 HEMOGLOBIN: CPT | Performed by: HOSPITALIST

## 2020-01-28 PROCEDURE — 80053 COMPREHEN METABOLIC PANEL: CPT | Performed by: HOSPITALIST

## 2020-01-28 PROCEDURE — 88305 TISSUE EXAM BY PATHOLOGIST: CPT | Performed by: INTERNAL MEDICINE

## 2020-01-28 PROCEDURE — 0DB68ZX EXCISION OF STOMACH, VIA NATURAL OR ARTIFICIAL OPENING ENDOSCOPIC, DIAGNOSTIC: ICD-10-PCS | Performed by: INTERNAL MEDICINE

## 2020-01-28 PROCEDURE — 40000104 ZZH STATISTIC MODERATE SEDATION < 10 MIN: Performed by: INTERNAL MEDICINE

## 2020-01-28 PROCEDURE — 12000047 ZZH R&B IMCU

## 2020-01-28 PROCEDURE — 85027 COMPLETE CBC AUTOMATED: CPT | Performed by: HOSPITALIST

## 2020-01-28 PROCEDURE — 88305 TISSUE EXAM BY PATHOLOGIST: CPT | Mod: 26 | Performed by: INTERNAL MEDICINE

## 2020-01-28 PROCEDURE — 00000146 ZZHCL STATISTIC GLUCOSE BY METER IP

## 2020-01-28 RX ORDER — LIDOCAINE 40 MG/G
CREAM TOPICAL
Status: DISCONTINUED | OUTPATIENT
Start: 2020-01-28 | End: 2020-01-28 | Stop reason: HOSPADM

## 2020-01-28 RX ORDER — NALOXONE HYDROCHLORIDE 0.4 MG/ML
.1-.4 INJECTION, SOLUTION INTRAMUSCULAR; INTRAVENOUS; SUBCUTANEOUS
Status: ACTIVE | OUTPATIENT
Start: 2020-01-28 | End: 2020-01-29

## 2020-01-28 RX ORDER — FLUMAZENIL 0.1 MG/ML
0.2 INJECTION, SOLUTION INTRAVENOUS
Status: ACTIVE | OUTPATIENT
Start: 2020-01-28 | End: 2020-01-29

## 2020-01-28 RX ORDER — FENTANYL CITRATE 50 UG/ML
INJECTION, SOLUTION INTRAMUSCULAR; INTRAVENOUS PRN
Status: DISCONTINUED | OUTPATIENT
Start: 2020-01-28 | End: 2020-01-28 | Stop reason: HOSPADM

## 2020-01-28 RX ADMIN — MYCOPHENOLIC ACID 360 MG: 360 TABLET, DELAYED RELEASE ORAL at 21:41

## 2020-01-28 RX ADMIN — SODIUM CHLORIDE, POTASSIUM CHLORIDE, SODIUM LACTATE AND CALCIUM CHLORIDE: 600; 310; 30; 20 INJECTION, SOLUTION INTRAVENOUS at 15:10

## 2020-01-28 RX ADMIN — PROCHLORPERAZINE EDISYLATE 10 MG: 5 INJECTION INTRAMUSCULAR; INTRAVENOUS at 19:56

## 2020-01-28 RX ADMIN — Medication 1 UNITS/HR: at 11:10

## 2020-01-28 RX ADMIN — SODIUM CHLORIDE, POTASSIUM CHLORIDE, SODIUM LACTATE AND CALCIUM CHLORIDE: 600; 310; 30; 20 INJECTION, SOLUTION INTRAVENOUS at 01:58

## 2020-01-28 RX ADMIN — ONDANSETRON HYDROCHLORIDE 4 MG: 2 INJECTION, SOLUTION INTRAMUSCULAR; INTRAVENOUS at 09:53

## 2020-01-28 RX ADMIN — AMLODIPINE BESYLATE 5 MG: 5 TABLET ORAL at 17:48

## 2020-01-28 RX ADMIN — PANTOPRAZOLE SODIUM 40 MG: 40 INJECTION, POWDER, FOR SOLUTION INTRAVENOUS at 08:55

## 2020-01-28 RX ADMIN — TACROLIMUS 2 MG: 0.5 CAPSULE ORAL at 21:41

## 2020-01-28 RX ADMIN — PROCHLORPERAZINE EDISYLATE 10 MG: 5 INJECTION INTRAMUSCULAR; INTRAVENOUS at 11:42

## 2020-01-28 RX ADMIN — INSULIN DEGLUDEC INJECTION 20 UNITS: 100 INJECTION, SOLUTION SUBCUTANEOUS at 22:25

## 2020-01-28 RX ADMIN — PANTOPRAZOLE SODIUM 40 MG: 40 INJECTION, POWDER, FOR SOLUTION INTRAVENOUS at 21:41

## 2020-01-28 RX ADMIN — LORAZEPAM 0.5 MG: 2 INJECTION INTRAMUSCULAR; INTRAVENOUS at 08:11

## 2020-01-28 RX ADMIN — SODIUM CHLORIDE 8 MG/HR: 9 INJECTION, SOLUTION INTRAVENOUS at 00:13

## 2020-01-28 RX ADMIN — SODIUM CHLORIDE 8 MG/HR: 9 INJECTION, SOLUTION INTRAVENOUS at 08:05

## 2020-01-28 RX ADMIN — ATORVASTATIN CALCIUM 40 MG: 40 TABLET, FILM COATED ORAL at 17:48

## 2020-01-28 ASSESSMENT — ACTIVITIES OF DAILY LIVING (ADL)
ADLS_ACUITY_SCORE: 10
ADLS_ACUITY_SCORE: 11
ADLS_ACUITY_SCORE: 11

## 2020-01-28 ASSESSMENT — MIFFLIN-ST. JEOR: SCORE: 1781.69

## 2020-01-28 NOTE — PLAN OF CARE
Pt a/o x4, up with SBA.  BG Q 1hr ranging from , currently in algorithm 2 at 1U/hr.  Protonix gtt running at 8 with LR.  No emesis since 1900.  Pt was able to take transplant meds at bedtime without throwing them up.  Ativan and given given x1 with some relief.  NPO after 0000 for upper GI, orders not yet released.  BPs elevated, PRN not yet needed.  GI and neph following.  Pt is drained and tired from throwing up so much and is falling asleep frequently, refused oral cares at this time and a full skin check, he wants to rest.  Last Hgb 10.3, trending down, recheck in the AM.  BG is now 92, will stop pump for now and reassess in 1 hr.  Pt is able to make needs known, will continue POC.

## 2020-01-28 NOTE — H&P
Pre-Endoscopy History and Physical     Murtaza Fitzgerald MRN# [id number]   YOB: 1975 Age: 44 year old     Date of Procedure: 1/27/2020  Primary care provider: Kt Ríos  Type of Endoscopy: Gastroscopy with possible biopsy, possible dilation  Reason for Procedure: bleed  Type of Anesthesia Anticipated: Conscious Sedation    HPI:    Murtaza is a 44 year old male who will be undergoing the above procedure.      A history and physical has been performed. The patient's medications and allergies have been reviewed. The risks and benefits of the procedure and the sedation options and risks were discussed with the patient.  All questions were answered and informed consent was obtained.      He denies a personal or family history of anesthesia complications or bleeding disorders.     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)     Coronary artery disease involving native coronary artery of native heart without angina pectoris     Adhesive capsulitis of right shoulder     Dehydration     SWATHI (acute kidney injury) (H)     Intractable nausea and vomiting        Past Medical History:   Diagnosis Date     CMV (cytomegalovirus infection) status negative 2011     Coronary artery disease, non-occlusive 2008    angio showed diffuse disease with no lesions     Diabetes mellitus, type 1     Diagnosed at age 11     Dialysis patient (H)     prior to kidney transplant     Dyslipidemia      Gastroesophageal reflux disease      History of blood transfusion      Hypertension      Immunosuppressed  status (H)      Kidney transplant status, living related donor           Migraines      Mycotic aneurysm of kidney transplant (H) 2008     Noncompliance with treatment     no labs for one year     Pancreas replaced by transplant (H) 2009    rejection treated with thymo     Pancreatic disease     pancreas transplant     Tobacco abuse ongoing        Past Surgical History:   Procedure Laterality Date     ARTHROSCOPY KNEE WITH LATERAL MENISCECTOMY Left 7/10/2019    Procedure: Left knee arthroscopic partial lateral meniscectomy;  Surgeon: Alex Candelaria MD;  Location: [facility] OR     BYPASS GRAFT ARTERY CORONARY N/A 2015    Procedure: BYPASS GRAFT ARTERY CORONARY;  Surgeon: Katy Neville MD;  Location: [facility] OR     EYE SURGERY      vitrectomy both eyes      ORTHOPEDIC SURGERY      tumor removed from left knee     TRANSPLANT  .    pancrease and kidney transplant       Social History     Tobacco Use     Smoking status: Former Smoker     Packs/day: 0.30     Types: Cigarettes     Last attempt to quit: 7/3/2015     Years since quittin.5     Smokeless tobacco: Never Used     Tobacco comment: E- Cig use still   Substance Use Topics     Alcohol use: Not Currently     Alcohol/week: 0.0 standard drinks     Frequency: Never     Binge frequency: Never       Family History   Problem Relation Age of Onset     Unknown/Adopted Father      Heart Disease Maternal Grandfather 62       Prior to Admission medications    Medication Sig Start Date End Date Taking? Authorizing Provider   acetaminophen (TYLENOL) 325 MG tablet Take 2 tablets (650 mg) by mouth every 4 hours as needed for mild pain 7/10/19  Yes Lizandro Dasilva PA-C   atorvastatin (LIPITOR) 40 MG tablet Take 40 mg by mouth daily   Yes Unknown, Entered By History   insulin aspart (NOVOLOG FLEXPEN) 100 UNIT/ML pen Inject Subcutaneous 3 times daily (with meals) 6 units for every 30 grams of carbs   Yes Unknown, Entered By History   insulin aspart  (NOVOLOG FLEXPEN) 100 UNIT/ML pen Inject Subcutaneous Take with snacks or supplements for high blood sugar 6 units for every 30 grams of carbs   Yes Unknown, Entered By History   insulin aspart (NOVOLOG FLEXPEN) 100 UNIT/ML pen Inject Subcutaneous 3 times daily (with meals) Sliding scale   1 unit for every bg  20 over 140   Yes Unknown, Entered By History   insulin degludec (TRESIBA) 100 UNIT/ML pen Inject 24 Units Subcutaneous At Bedtime   Yes Unknown, Entered By History   metoclopramide (REGLAN) 5 MG tablet Take 5 mg by mouth 4 times daily as needed   Yes Unknown, Entered By History   mycophenolic acid (GENERIC EQUIVALENT) 180 MG EC tablet Take 360 mg by mouth 2 times daily   Yes Unknown, Entered By History   omeprazole (PRILOSEC) 40 MG DR capsule Take 40 mg by mouth every evening   Yes Unknown, Entered By History   ondansetron (ZOFRAN-ODT) 4 MG ODT tab Take 1-2 tablets (4-8 mg) by mouth every 8 hours as needed for nausea 10/8/19  Yes Adam Villalba MD   acetone, Urine, test STRP 1 strip by In Vitro route daily 8/10/18   Marcie Lozano APRN CNP   YOHAN CONTOUR test strip USE TO TEST BLOOD SUGAR FIVE TIMES A DAY OR AS DIRECTED 3/27/18   Kt Ríos MD   blood glucose monitoring (NO BRAND SPECIFIED) test strip Freestyle test strips 6 times daily (NOT lite and NOT insulinx) 8/10/18   Marcie Lozano APRN CNP   blood glucose monitoring (NO BRAND SPECIFIED) test strip 5 times daily ( contour NEXT) 7/10/18   Marcie Lozano APRN CNP   Blood Glucose Monitoring Suppl (ASCENSIA CONTOUR MONITOR) W/DEVICE KIT TO TEST BG 5 TIMIES DAILY 7/8/13   Henrietta Tomas MD   insulin pen needle (BD ARPITA U/F) 32G X 4 MM Use 4 daily or as directed. 8/15/16   Kt Ríos MD   tacrolimus (GENERIC EQUIVALENT) 1 MG capsule Take 2 capsules (2 mg) by mouth 2 times daily 8/23/19   Kt Ríos MD       Allergies   Allergen Reactions     Benadryl [Diphenhydramine]      Reglan Other (See Comments)     IV,  "delsuions        REVIEW OF SYSTEMS:   5 point ROS negative except as noted above in HPI, including Gen., Resp., CV, GI &  system review.    PHYSICAL EXAM:   BP (!) 151/98   Pulse 93   Temp 98.7  F (37.1  C) (Oral)   Resp 17   Ht 1.727 m (5' 8\")   Wt 90.7 kg (200 lb)   SpO2 93%   BMI 30.41 kg/m   Estimated body mass index is 30.41 kg/m  as calculated from the following:    Height as of this encounter: 1.727 m (5' 8\").    Weight as of this encounter: 90.7 kg (200 lb).   GENERAL APPEARANCE: alert, and oriented  MENTAL STATUS: alert  AIRWAY EXAM: Mallampatti Class II (visualization of the soft palate, fauces, and uvula)  RESP: lungs clear to auscultation - no rales, rhonchi or wheezes  CV: regular rates and rhythm  DIAGNOSTICS:    Not indicated    IMPRESSION   ASA Class 3 - Severe systemic disease, but not incapacitating    PLAN:   Plan for Gastroscopy with possible biopsy, possible dilation. We discussed the risks, benefits and alternatives and the patient wished to proceed.    The above has been forwarded to the consulting provider.      Signed Electronically by: Emre Gomes MD  January 28, 2020          "

## 2020-01-28 NOTE — PLAN OF CARE
Pt a/o x4, up with assist of one. Pt BS this am 146, 156. Pt currently in algorithm 1 at 1unit an hour. Plan for insulin gtt per Dr. Wallace, continue gtt today and change over this evening when scheduled  tresiba given. Protonix gtt d/cd by MD and 40mg IV push bid ordered. Pt c/o of nausea this am, pt given 0.5mg ativan IV. Pt stated no emesis overnight. Hgb this am 10.3.  Pt to have EGD today.       13:45 PT returned from EGD at 13:30. PT has no c/o of pain. Tolerating sips of water. Pt VSS. BS at 13:30 113 pt remains in algorithm 1 at 1unit at 1 im    14:30 PT BS 90, this writer stopped insulin gtt and will re-check BS at 15:30.

## 2020-01-28 NOTE — PROGRESS NOTES
[facility]     Renal Progress Note       SHORTHAND KEY FOR MY NOTES:  c = with, s = without, p = after, a = before, x = except, asx = asymptomatic, tx = transplant or treatment, sx = symptoms or symptomatic, cx = canceled or culture, rxn = reaction, yday = yesterday, nl = normal, abx = antibiotics, fxn = function, dx = diagnosis, dz = disease, m/h = melena/hematochezia, c/d/l/ha = cramping/dizziness/lightheadedness/headache, d/c = discharge or diarrhea/constipation, f/c/n/v = fevers/chills/nausea/vomiting, cp/sob = chest pain/shortness of breath, tbv = total body volume, rxn = reaction, tdc = tunneled dialysis catheter, pta = prior to admission, hd = hemodialysis, pd = peritoneal dialysis, hhd = home hemodialysis         Assessment/Plan:     1.  SWATHI/CKD V.  Pt's cr is stable at 5.6.  No uremic sx.  He could have further progression of his failing kidney tx.  D/w pt that we may need to talk about RRT options if no improvement.  A.  Follow labs, uo, sx.  B.  Continue IVF.    2.  ESKD s/p LRRT.  He is on MMF/tac.  A.  Continue same immunosuppression.    3.  HTN.  BP is elevated.  He is on amlod only right now.  A.  Follow BPs.  B.  Continue amlod.    4.  FEN.  Electrolytes are ok.        Interval History:     Pt had n/v in the AM, but since his EGD he has felt a little better.  He ate broth and kept it down.  Good uo.  No cp/sob/abd pain.            Medications and Allergies:       amLODIPine  5 mg Oral Daily     atorvastatin  40 mg Oral Daily     [START ON 1/29/2020] insulin aspart  1-7 Units Subcutaneous TID AC     insulin aspart  1-5 Units Subcutaneous At Bedtime     insulin degludec  20 Units Subcutaneous At Bedtime     metoclopramide  5 mg Oral 4x Daily AC & HS     mycophenolic acid  360 mg Oral BID     pantoprazole (PROTONIX) IV  40 mg Intravenous BID     sodium chloride (PF)  3 mL Intracatheter Q8H     tacrolimus  2 mg Oral BID     Allergies   Allergen Reactions     Benadryl [Diphenhydramine]       "Reglan Other (See Comments)     IV, delsuions          Physical Exam:     Vitals were reviewed    Heart Rate: 90, Blood pressure (!) 161/96, pulse 98, temperature 98.4  F (36.9  C), temperature source Oral, resp. rate 18, height 1.727 m (5' 8\"), weight 90.7 kg (200 lb), SpO2 96 %.  Wt Readings from Last 3 Encounters:   01/28/20 90.7 kg (200 lb)   01/05/20 92.6 kg (204 lb 3.2 oz)   12/02/19 92.8 kg (204 lb 9.6 oz)     Intake/Output Summary (Last 24 hours) at 1/28/2020 1735  Last data filed at 1/28/2020 1510  Gross per 24 hour   Intake 680 ml   Output 150 ml   Net 530 ml     GENERAL APPEARANCE: pleasant, NAD, alert  HEENT:  eyes/ears/nose/neck grossly normal  RESP: lungs cta b c good efforts, no crackles, rhonchi or wheezes  CV: RRR, nl S1/S2  ABDOMEN: o/s/nt/nd  EXTREMITIES/SKIN: no ble edema         Data:     CBC RESULTS:     Recent Labs   Lab 01/28/20  0629 01/28/20  0018 01/27/20  1744 01/27/20  0641   WBC 11.5*  --   --  10.3   RBC 3.58*  --   --  4.58   HGB 10.3* 10.3* 11.1* 13.1*   HCT 32.3*  --   --  40.2     --   --  322     Basic Metabolic Panel:  Recent Labs   Lab 01/28/20  0629 01/27/20  0642    137   POTASSIUM 4.1 4.1   CHLORIDE 109 107   CO2 22 19*   BUN 50* 50*   CR 5.56* 5.51*   * 367*   CHARLY 8.7 9.3     INRNo lab results found in last 7 days.   Attestation:   I have reviewed today's relevant vital signs, notes, medications, labs and imaging.    Logan Yepez MD  [facility]phrology  [phone]  "

## 2020-01-28 NOTE — CONSULTS
[facility]    RENAL CONSULTATION NOTE    REFERRING MD:  Rosalia Amor PA-C    REASON FOR CONSULTATION:  SWATHI/CKD    DATE OF CONSULTATION: 20    SHORTHAND KEY FOR MY NOTES:  c = with, s = without, p = after, a = before, x = except, asx = asymptomatic, tx = transplant or treatment, sx = symptoms or symptomatic, cx = canceled or culture, rxn = reaction, yday = yesterday, nl = normal, abx = antibiotics, fxn = function, dx = diagnosis, dz = disease, m/h = melena/hematochezia, c/d/l/ha = cramping/dizziness/lightheadedness/headache, d/c = discharge or diarrhea/constipation, f/c/n/v = fevers/chills/nausea/vomiting, cp/sob = chest pain/shortness of breath, tbv = total body volume, rxn = reaction, tdc = tunneled dialysis catheter, pta = prior to admission, hd = hemodialysis, pd = peritoneal dialysis, hhd = home hemodialysis    HPI: Murtaza Fitzgerald is a 44 year old male c ESKD 2 DM1 s/p LRRT (sister-) and failed panc tx ( donor - 2009) who is under the care of Dr. Villalba ([facility] Tx) and was admitted on 2020 c n/v and found to have possible DKA and starvation ketosis.    Pt was recently in the hosp for gastroparesis and vomiting.  He was dc'd to home and was due to f/u c Tx Clinic today.  Unfortunately, he has been having recurrent nausea and dry heaves.  This has prevented him from taking some of his meds.  Given that he was feeling worse, he presented to the ER for further eval.  In the ER, many labs were abn.  He was tx c IVF, insulin, anti-emetics and KCl.  His cr was 5.5 today, up from his baseline in the high 4s.  In addition, when he vomited, the emesis was dark brown and tested + for occult blood.    Currently, he is still feeling weak and bad.  He began vomiting while being interviewed for this consultation.  He has a dry mouth.  No f/c.  No cp/sob.  UO has been lower in the past 1-2d.    ROS:  A complete review of systems was performed and is negative x as noted  above.    PMH:    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     Noncompliance with treatment     no labs for one year     Pancreas replaced by transplant (H) Feb 2009    rejection treated with thymo     Pancreatic disease     pancreas transplant     Tobacco abuse ongoing     PSH:    Past Surgical History:   Procedure Laterality Date     ARTHROSCOPY KNEE WITH LATERAL MENISCECTOMY Left 7/10/2019    Procedure: Left knee arthroscopic partial lateral meniscectomy;  Surgeon: Alex Candelaria MD;  Location: [facility] OR     BYPASS GRAFT ARTERY CORONARY N/A 8/5/2015    Procedure: BYPASS GRAFT ARTERY CORONARY;  Surgeon: Katy Neville MD;  Location: [facility] OR     EYE SURGERY      vitrectomy both eyes      ORTHOPEDIC SURGERY  1993    tumor removed from left knee     TRANSPLANT  2009.2008    pancrease and kidney transplant     MEDICATIONS:      amLODIPine  5 mg Oral Daily     atorvastatin  40 mg Oral Daily     metoclopramide  5 mg Oral 4x Daily AC & HS     mycophenolic acid  360 mg Oral BID     sodium chloride (PF)  3 mL Intracatheter Q8H     tacrolimus  2 mg Oral BID     ALLERGIES:    Allergies as of 01/27/2020 - Reviewed 01/27/2020   Allergen Reaction Noted     Benadryl [diphenhydramine]  06/13/2019     Reglan Other (See Comments) 05/11/2012     FH:    Family History   Problem Relation Age of Onset     Unknown/Adopted Father      Heart Disease Maternal Grandfather 62     SH:    Social History      Socioeconomic History     Marital status:      Spouse name: Not on file     Number of children: 1     Years of education: Not on file     Highest education level: Associate degree: academic program   Occupational History     Occupation:    Social Needs     Financial resource strain: Not very hard     Food insecurity:     Worry: Never true     Inability: Never true     Transportation needs:     Medical: No     Non-medical: No   Tobacco Use     Smoking status: Former Smoker     Packs/day: 0.30     Types: Cigarettes     Last attempt to quit: 7/3/2015     Years since quittin.5     Smokeless tobacco: Never Used     Tobacco comment: E- Cig use still   Substance and Sexual Activity     Alcohol use: Not Currently     Alcohol/week: 0.0 standard drinks     Frequency: Never     Binge frequency: Never     Drug use: No     Sexual activity: Never     Partners: Female   Lifestyle     Physical activity:     Days per week: 0 days     Minutes per session: 0 min     Stress: To some extent   Relationships     Social connections:     Talks on phone: More than three times a week     Gets together: Once a week     Attends Sabianism service: Never     Active member of club or organization: No     Attends meetings of clubs or organizations: Never     Relationship status:      Intimate partner violence:     Fear of current or ex partner: Not on file     Emotionally abused: Not on file     Physically abused: Not on file     Forced sexual activity: Not on file   Other Topics Concern     Parent/sibling w/ CABG, MI or angioplasty before 65F 55M? No      Service Not Asked     Blood Transfusions No     Comment: possibly during surgery, otherwise none.     Caffeine Concern Not Asked     Occupational Exposure Not Asked     Hobby Hazards Not Asked     Sleep Concern Not Asked     Stress Concern Not Asked     Weight Concern Not Asked     Special Diet Not Asked     Back Care Not Asked     Exercise Not  Asked     Bike Helmet Not Asked     Seat Belt Yes     Self-Exams Not Asked   Social History Narrative     Not on file     PHYSICAL EXAM:    BP (!) 160/98 (BP Location: Left arm)   Pulse 98   Temp 98.5  F (36.9  C) (Oral)   Resp 20   Wt 90.7 kg (200 lb)   SpO2 94%   BMI 30.41 kg/m      GENERAL: sleepy, but arousable; alert, not comfortable, vomiting  HEENT:  normocephalic, no gross abnormalities; dry mouth; pupils equal, EOMI, no scleral icterus or conj edema  CV: RRR, nl S1/S2, + tr ble edema  RESP: CTA B c good efforts  GI: abd o/s/nt/nd, BS present; no masses  MUSCULOSKELETAL: extremities nl - no gross deformities noted  SKIN: no suspicious lesions or rashes, dry to touch  NEURO:  strength ok and symmetric  PSYCH: mood fine, affect appropriate  LYMPH: no palpable ant/post cervical and supraclavicular adenopathy    LABS:      CBC RESULTS:     Recent Labs   Lab 01/27/20  1744 01/27/20  0641   WBC  --  10.3   RBC  --  4.58   HGB 11.1* 13.1*   HCT  --  40.2   PLT  --  322     BMP RESULTS:  Recent Labs   Lab 01/27/20  0642      POTASSIUM 4.1   CHLORIDE 107   CO2 19*   BUN 50*   CR 5.51*   *   CHARLY 9.3     INRNo lab results found in last 7 days.     DIAGNOSTICS:  Personally reviewed    A/P:  Murtaza Fitzgerald is a 44 year old male who has SWATHI/CKD, n/v, DKA.    1.  SWATHI/CKD.  Pt's baseline cr is in the high 4s and is currently higher bc he's pre-renal from the vomiting and poor intake.  No significant uremic sx.  Chemistries are ok.  He dialysed via a tunneled catheter for a couple of mos prior to tx.  His mother states that one of his sisters is interested in being a donor candidate.  A.  Follow labs, uo, sx.  B.  Continue IVF.  C.  F/u c UMP Tx soon to make a plan for pre-emptive tx vs dialysis access creation.    2.  ESKD s/p LRRT.  Pt's cr has been steadily worsening (as above.)  He follows c UMP Tx and is on MMF and tac.    A.  Continue immunosuppression meds at same doses for now.  B.   Await tac level and adjust prn.    3.  AGMA/possible DKA.  Pt has ketones - prob from starvation + mild DKA.  K is ok.  A.  Insulin gtt.  B.  Follow labs.    4.  Anemia.  Pt's hb is a bit lower today.  The initial number may have been hemoconcentrated and there is some dilution.  In addition, to eval the hematemesis, which may have been due to Genet-Perkins, GI has been consulted.  A.  Follow hb, clinically.  B.  Await GI input.    5.  FEN.  Electrolytes are ok.    Attestation:   I have reviewed today's relevant vital signs, notes, medications, labs and imaging.    Logan Yepez MD  [facility]ph[facility]  [phone]

## 2020-01-28 NOTE — PROGRESS NOTES
This writer spoke with Rosalia in Financial Counseling. Pt does not have insurance and indicated he has difficulty paying for his medication. Rosalia will follow up with Tyrell tomorrow.

## 2020-01-29 VITALS
TEMPERATURE: 97.6 F | SYSTOLIC BLOOD PRESSURE: 128 MMHG | RESPIRATION RATE: 18 BRPM | DIASTOLIC BLOOD PRESSURE: 69 MMHG | HEART RATE: 98 BPM | WEIGHT: 200 LBS | BODY MASS INDEX: 30.31 KG/M2 | OXYGEN SATURATION: 97 % | HEIGHT: 68 IN

## 2020-01-29 LAB
ANION GAP SERPL CALCULATED.3IONS-SCNC: 10 MMOL/L (ref 3–14)
BUN SERPL-MCNC: 46 MG/DL (ref 7–30)
CALCIUM SERPL-MCNC: 8.1 MG/DL (ref 8.5–10.1)
CHLORIDE SERPL-SCNC: 108 MMOL/L (ref 94–109)
CO2 SERPL-SCNC: 20 MMOL/L (ref 20–32)
COPATH REPORT: NORMAL
CREAT SERPL-MCNC: 5.32 MG/DL (ref 0.66–1.25)
ERYTHROCYTE [DISTWIDTH] IN BLOOD BY AUTOMATED COUNT: 13.1 % (ref 10–15)
GFR SERPL CREATININE-BSD FRML MDRD: 12 ML/MIN/{1.73_M2}
GLUCOSE BLDC GLUCOMTR-MCNC: 183 MG/DL (ref 70–99)
GLUCOSE BLDC GLUCOMTR-MCNC: 192 MG/DL (ref 70–99)
GLUCOSE BLDC GLUCOMTR-MCNC: 308 MG/DL (ref 70–99)
GLUCOSE SERPL-MCNC: 204 MG/DL (ref 70–99)
HCT VFR BLD AUTO: 32.4 % (ref 40–53)
HGB BLD-MCNC: 10 G/DL (ref 13.3–17.7)
MCH RBC QN AUTO: 28.4 PG (ref 26.5–33)
MCHC RBC AUTO-ENTMCNC: 30.9 G/DL (ref 31.5–36.5)
MCV RBC AUTO: 92 FL (ref 78–100)
PLATELET # BLD AUTO: 209 10E9/L (ref 150–450)
POTASSIUM SERPL-SCNC: 3.9 MMOL/L (ref 3.4–5.3)
RBC # BLD AUTO: 3.52 10E12/L (ref 4.4–5.9)
SODIUM SERPL-SCNC: 138 MMOL/L (ref 133–144)
WBC # BLD AUTO: 9.4 10E9/L (ref 4–11)

## 2020-01-29 PROCEDURE — 80048 BASIC METABOLIC PNL TOTAL CA: CPT | Performed by: HOSPITALIST

## 2020-01-29 PROCEDURE — 00000146 ZZHCL STATISTIC GLUCOSE BY METER IP

## 2020-01-29 PROCEDURE — 25000128 H RX IP 250 OP 636: Performed by: HOSPITALIST

## 2020-01-29 PROCEDURE — 25000132 ZZH RX MED GY IP 250 OP 250 PS 637: Performed by: PHYSICIAN ASSISTANT

## 2020-01-29 PROCEDURE — 36415 COLL VENOUS BLD VENIPUNCTURE: CPT | Performed by: HOSPITALIST

## 2020-01-29 PROCEDURE — 85027 COMPLETE CBC AUTOMATED: CPT | Performed by: HOSPITALIST

## 2020-01-29 PROCEDURE — 25000131 ZZH RX MED GY IP 250 OP 636 PS 637: Performed by: HOSPITALIST

## 2020-01-29 PROCEDURE — 99233 SBSQ HOSP IP/OBS HIGH 50: CPT | Performed by: HOSPITALIST

## 2020-01-29 PROCEDURE — 25800030 ZZH RX IP 258 OP 636: Performed by: HOSPITALIST

## 2020-01-29 PROCEDURE — 25000131 ZZH RX MED GY IP 250 OP 636 PS 637: Performed by: PHYSICIAN ASSISTANT

## 2020-01-29 PROCEDURE — 25000132 ZZH RX MED GY IP 250 OP 250 PS 637: Performed by: HOSPITALIST

## 2020-01-29 PROCEDURE — C9113 INJ PANTOPRAZOLE SODIUM, VIA: HCPCS | Performed by: HOSPITALIST

## 2020-01-29 RX ORDER — AMLODIPINE BESYLATE 5 MG/1
5 TABLET ORAL DAILY
Qty: 30 TABLET | Refills: 1 | Status: SHIPPED | OUTPATIENT
Start: 2020-01-30 | End: 2020-02-11

## 2020-01-29 RX ORDER — PANTOPRAZOLE SODIUM 40 MG/1
40 TABLET, DELAYED RELEASE ORAL
Status: DISCONTINUED | OUTPATIENT
Start: 2020-01-30 | End: 2020-01-29 | Stop reason: HOSPADM

## 2020-01-29 RX ORDER — CARVEDILOL 6.25 MG/1
6.25 TABLET ORAL 2 TIMES DAILY WITH MEALS
Status: DISCONTINUED | OUTPATIENT
Start: 2020-01-29 | End: 2020-01-29 | Stop reason: HOSPADM

## 2020-01-29 RX ORDER — CARVEDILOL 6.25 MG/1
6.25 TABLET ORAL 2 TIMES DAILY WITH MEALS
Qty: 60 TABLET | Refills: 1 | Status: SHIPPED | OUTPATIENT
Start: 2020-01-29 | End: 2020-02-11

## 2020-01-29 RX ADMIN — ATORVASTATIN CALCIUM 40 MG: 40 TABLET, FILM COATED ORAL at 09:11

## 2020-01-29 RX ADMIN — TACROLIMUS 2 MG: 0.5 CAPSULE ORAL at 09:10

## 2020-01-29 RX ADMIN — METOCLOPRAMIDE HYDROCHLORIDE 5 MG: 5 TABLET ORAL at 09:10

## 2020-01-29 RX ADMIN — INSULIN ASPART 2 UNITS: 100 INJECTION, SOLUTION INTRAVENOUS; SUBCUTANEOUS at 09:44

## 2020-01-29 RX ADMIN — SODIUM CHLORIDE, POTASSIUM CHLORIDE, SODIUM LACTATE AND CALCIUM CHLORIDE: 600; 310; 30; 20 INJECTION, SOLUTION INTRAVENOUS at 04:06

## 2020-01-29 RX ADMIN — INSULIN ASPART 4 UNITS: 100 INJECTION, SOLUTION INTRAVENOUS; SUBCUTANEOUS at 12:33

## 2020-01-29 RX ADMIN — MYCOPHENOLIC ACID 360 MG: 360 TABLET, DELAYED RELEASE ORAL at 09:11

## 2020-01-29 RX ADMIN — AMLODIPINE BESYLATE 5 MG: 5 TABLET ORAL at 09:11

## 2020-01-29 RX ADMIN — PANTOPRAZOLE SODIUM 40 MG: 40 INJECTION, POWDER, FOR SOLUTION INTRAVENOUS at 09:11

## 2020-01-29 RX ADMIN — PROCHLORPERAZINE EDISYLATE 10 MG: 5 INJECTION INTRAMUSCULAR; INTRAVENOUS at 08:34

## 2020-01-29 RX ADMIN — PROCHLORPERAZINE EDISYLATE 10 MG: 5 INJECTION INTRAMUSCULAR; INTRAVENOUS at 04:13

## 2020-01-29 RX ADMIN — CARVEDILOL 6.25 MG: 6.25 TABLET, FILM COATED ORAL at 09:11

## 2020-01-29 ASSESSMENT — ACTIVITIES OF DAILY LIVING (ADL)
ADLS_ACUITY_SCORE: 11

## 2020-01-29 NOTE — PLAN OF CARE
"BP (!) 158/92 (BP Location: Left arm)   Pulse 98   Temp 97.4  F (36.3  C)   Resp 16   Ht 1.727 m (5' 8\")   Wt 90.7 kg (200 lb)   SpO2 94%   BMI 30.41 kg/m     ORIENTATION:A/Ox4  VS: BP elevated 150s systolic  PAIN: Denies  TELE:SR w/ peaked Ts  IV: LR @ 75 ml/hr, insulin gtt turned off after bedtime Tresiba was given per MD  GLUCOSE CHECKS: 120, 134, 134  RESPIRATORY: LS- clear  GI:C/O nausea- PRN compazine given x1 with relief  : Voiding appropriately  SKIN: Intact  ACTIVITY: SBA calls appropiately  DIET:Full liquid  PLAN: Continue antiemetics, IV protonix, IVF and glucose control, discharge plan 2-3 days, continue POC.   "

## 2020-01-29 NOTE — DISCHARGE SUMMARY
Hospitalist Discharge Summary  [facility]    Murtaza Fitzgerald MRN# [id number]   YOB: 1975 Age: 44 year old     Date of Admission:  1/27/2020  Date of Discharge:  1/29/2020  2:57 PM  Admitting Physician:  Domingo Astudillo MD  Discharge Physician:  Diaz Minaya DO  Discharging Service:  Hospitalist     Primary Provider: Kt Ríos          Discharge Diagnosis:   Ketosis  DM1  Intractable N/V, suspect gastroparesis  SWATHI  CKD  AOCD  CAD  HTN             Discharge Disposition:   Discharged to home           Allergies:   Allergies   Allergen Reactions     Benadryl [Diphenhydramine]      Reglan Other (See Comments)     IV, delsuions              Discharge Medications:   Current Discharge Medication List      START taking these medications    Details   amLODIPine (NORVASC) 5 MG tablet Take 1 tablet (5 mg) by mouth daily  Qty: 30 tablet, Refills: 1    Associated Diagnoses: Hypertension goal BP (blood pressure) < 140/90      carvedilol (COREG) 6.25 MG tablet Take 1 tablet (6.25 mg) by mouth 2 times daily (with meals)  Qty: 60 tablet, Refills: 1    Associated Diagnoses: Hypertension goal BP (blood pressure) < 140/90         CONTINUE these medications which have NOT CHANGED    Details   acetaminophen (TYLENOL) 325 MG tablet Take 2 tablets (650 mg) by mouth every 4 hours as needed for mild pain  Qty: 50 tablet, Refills: 0    Associated Diagnoses: Acute lateral meniscus tear of left knee, initial encounter      atorvastatin (LIPITOR) 40 MG tablet Take 40 mg by mouth daily      !! insulin aspart (NOVOLOG FLEXPEN) 100 UNIT/ML pen Inject Subcutaneous 3 times daily (with meals) 6 units for every 30 grams of carbs      !! insulin aspart (NOVOLOG FLEXPEN) 100 UNIT/ML pen Inject Subcutaneous Take with snacks or supplements for high blood sugar 6 units for every 30 grams of carbs      !! insulin aspart (NOVOLOG FLEXPEN) 100 UNIT/ML pen Inject Subcutaneous 3 times daily (with meals) Sliding  scale   1 unit for every bg  20 over 140      insulin degludec (TRESIBA) 100 UNIT/ML pen Inject 24 Units Subcutaneous At Bedtime      metoclopramide (REGLAN) 5 MG tablet Take 5 mg by mouth 4 times daily as needed      mycophenolic acid (GENERIC EQUIVALENT) 180 MG EC tablet Take 360 mg by mouth 2 times daily      omeprazole (PRILOSEC) 40 MG DR capsule Take 40 mg by mouth every evening      ondansetron (ZOFRAN-ODT) 4 MG ODT tab Take 1-2 tablets (4-8 mg) by mouth every 8 hours as needed for nausea  Qty: 60 tablet, Refills: 1    Associated Diagnoses: Acute lateral meniscus tear of left knee, initial encounter      acetone, Urine, test STRP 1 strip by In Vitro route daily  Qty: 50 each, Refills: 3    Comments: Urine ketone stix  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 YOHAN CONTOUR test strip USE TO TEST BLOOD SUGAR FIVE TIMES A DAY OR AS DIRECTED  Qty: 450 each, Refills: 1    Associated Diagnoses: Type 1 diabetes mellitus, uncontrolled (H)      !! 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 - Potential duplicate medications found. Please discuss with provider.                 Condition on Discharge:   Discharge condition: Stable   Discharge vitals: Blood pressure (!) 145/80, pulse 98, temperature 97.6  F (36.4  C), temperature source Oral, resp. rate 18, height 1.727 m (5' 8\"), weight 90.7 kg (200 lb), SpO2 97 %.   Code status on discharge: Full Code      BASIC PHYSICAL EXAMINATION:  GENERAL: No apparent distress.  CARDIOVASCULAR: Regular rate and rhythm without murmurs.  PULMONARY: Clear to auscultation bilaterally.   GASTROINTESTINAL: Abdomen soft, non-tender.  EXTREMITIES: No edema, pulses intact.  NEUROLOGIC: No focal deficits.            History of Illness:   See detailed admission note for full details.               Procedures excluding imaging which is summarized below:   Please see details in the electronic medical record.           Consultations:   NEPHROLOGY IP CONSULT  GASTROENTEROLOGY IP CONSULT  GASTROENTEROLOGY IP CONSULT  PHARMACY IP CONSULT          Significant Results:   Results for orders placed or performed during the hospital encounter of 19   Echocardiogram Dobutamine Stress    Narrative    [id number]  [id number]  [id number]  [id number]^CANDI^LAINE^A           [facility]  Echocardiography Laboratory  [address] [place] [address]        Name: DOUG PAIZ  MRN: [id number]  : 1975  Study Date: 2019 10:53 AM  Age: 43 yrs  Gender: Male  Patient [facility]: [facility]  Reason For Study: Pre-operative clearance  History: Diabetes, HTN, Hyperlipidemia  Ordering Physician: LAINE CHRISTOPHER  Referring Physician: MELODY CHAVES  Performed By: Silas Sosa RDCS     BSA: 2.1 m2  Height: 68 in  Weight: 205 lb  HR: 90  BP: 160/91 mmHg  Medications: " Lipitor  _____________________________________________________________________________  __        Procedure  Dobutamine Echo Complete. Contrast Optison.  _____________________________________________________________________________  __        Interpretation Summary  This test indicates a low probability of severe occlusive coronary artery  disease. No hemodynamically significant valvular abnormalities on 2D or color  flow imaging.  _____________________________________________________________________________  __     Stress  Dobutamine time: 8:06.  RPP 31,871.  The drug infusion was stopped due to target heart rate achieved.  The maximum dose of dobutamine was 30mcg/kg/min.  The maximum dose of metoprolol was 4mg.  The patient exhibited no chest pain during exercise.  There was a maximum 1.5mm ST segment depression in the anterior lead(s).  No arrhythmia noted.  Target Heart Rate was achieved.  Normal resting wall motion and no stress-induced wall motion abnormality.  Left ventricular cavity size decreases with exercise.  Global LV systolic function augments with exercise.  The visual ejection fraction is estimated at >70%.     Baseline  The patient is in normal sinus rhythm.  Resting ECG is normal.  The visual ejection fraction is estimated at 55-60%.  Normal left ventricular function and wall motion at rest.     Stress Results           Protocol:  Dobutamine        Maximum Predicted HR:   179 bpm           Target HR: 152 bpm           % Maximum Predicted HR: 88 %                Stage  DurationHeart Rate  BP  Dose        Comment                    (mm:ss)   (bpm)            Stage 1   3:00     101    191/9710.00            Stage 2   3:00     144    203/9120.00            Stage 3   2:06     157    169/8330.00RPP: 31,871           RECOVERYR  6:00      88    170/77     Metoprolol 4 mg total.               Stress Duration:   8:06 mm:ss *        Recovery Time: 6:00 mm:ss         Maximum Stress HR: 157 bpm *            METS:          1     _____________________________________________________________________________  __  MMode/2D Measurements & Calculations  asc Aorta Diam: 3.2 cm                    _____________________________________________________________________________  __        Report approved by: Lorena Dickens 07/09/2019 12:26 PM        *Note: Due to a large number of results and/or encounters for the requested time period, some results have not been displayed. A complete set of results can be found in Results Review.       Transthoracic Echocardiogram Results:  No results found for this or any previous visit (from the past 4320 hour(s)).             Pending Results:   Unresulted Labs Ordered in the Past 30 Days of this Admission     Date and Time Order Name Status Description    1/28/2020 1248 Surgical pathology exam In process                       Discharge Instructions and Follow-Up:   Discharge instructions and follow-up:   Discharge Procedure Orders   Follow-up and recommended labs and tests    Order Comments: Follow up with primary care provider, Kt Ríos, within 7 days to evaluate medication change and for hospital follow- up.  The following labs/tests are recommended: BP check and BMP.  Follow-up with nephrology, GI, and motility clinic.     Activity   Order Comments: Your activity upon discharge: activity as tolerated     Order Specific Question Answer Comments   Is discharge order? Yes      Full Code     Order Specific Question Answer Comments   Code status determined by: Discussion with patient/legal decision maker      Diet   Order Comments: Follow this diet upon discharge: Orders Placed This Encounter      Advance Diet as Tolerated: Regular Diet Adult; 3954-5589 Calories: Moderate Consistent CHO (4-6 CHO units/meal)     Order Specific Question Answer Comments   Is discharge order? Yes              Hospital Course:   Murtaza Fitzgerald is a 44 year old male with a PMH significant for DM  I, CKD s/p kidney transplant 11/2008, pancreatic transplant 2/2009, CAD s/p bypass graft, HTN,  GERD, HLP  who presents with intractable nausea and vomiting with evidence of DKA on admission lab work.      #Ketosis (suspect starvation) with hyperglycemia  #Intractable nausea and vomiting secondary to suspected gastroparesis  #DM1  Patient presents with intractable nausea and vomiting for 24 hours. Has had poor oral intake with minimal insulin use since symptoms started.  Initial glucose of  367 and only improved to 342 after two litres of fluid. Serum ketones positive at 1.7, anion gap normal, venous gas with normal pH but slightly low bicarb at 18 and initial lactic acid elevated at 2.7 but improved to 1.7 after two litres. Started on insulin drip in ED.   -Now off Insulin drip  -Was on IVF  -Continue Tresiba at higher PTA dose 24 unit(s) daily  -GI consulted  -EGD largely unremarkable  -Continue anti-emetics and scheduled Reglan  -Empiric PPI   -Diet as tolerated  -Motility clinic follow-up, consider gastric pacer  -ADAT     #Acute on chronic kidney disease stage V  Hx of kidney transplant in 11/2008. On chart review note steady decline in kidney function since June 2019 and is back on the transplant list. Creatinine recently has been in the 4 range with current creatine of 5.51 on admit, suspect related to dehydration and poor oral intake.   -Hydrated with LR  -Nephrology consult  -Continue Tacrolimus (goal 4-6) and Mycophenolic acid (goal 1-3.5)  -Missed transplant appt on day of admit, will need to reschedule  -Follow-up with renal as OP     #Type 1 diabetes hx of pancreatic transplant 2/2009  Has insulin pump at home but do due to insurance coverage has not been using. Instead using Tresiba and Novolog Most recent A1c was 6.7 in November.  -Resume home insulin      #HTN  Recently stopped Amlodipine due to low blood pressure and pressures elevated here 218/117.  -Restarted Amlodipine 5 mg  -Start coreg 6.25 mg  bid  -Hydralazine PRN  -Check BP at home, discontinue coreg if SBP consistently <120     #Anemia of chronic disease  Hemoglobin is 13.1 on admit but I suspect hemoconcentration with dehydration and did trend down but now stable.  -Recheck in AM     #CAD  Hx of severe 3 vessel diseae with CABG in 10/15. Last dobutamine stress test in 7/2019 was normal.     The patient was seen, examined, and counseled on this day. The hospitalization and plan of care was reviewed with the patient extensively. All questions were addressed and the patient agreed to follow-up as noted above.      Total time spent in face to face contact with the patient and coordinating discharge was:  35 Minutes    Diaz Minaya DO MPH  [facility] Hospitalist  [address] [place] [address]  Pager: [phone]  01/29/2020

## 2020-01-29 NOTE — PROGRESS NOTES
[facility]    Hospitalist Progress Note  Name: Murtaza Fitzgerald    MRN: [id number]  Provider:  Diaz Minaya DO, MPH  Date of Service: 01/29/2020    Summary of Stay: Murtaza Fitzgerald is a 44 year old male with a PMH significant for DM I, CKD s/p kidney transplant 11/2008, pancreatic transplant 2/2009, CAD s/p bypass graft, HTN,  GERD, HLP  who presents with intractable nausea and vomiting with evidence of DKA on admission lab work.      #Ketosis (suspect starvation) with hyperglycemia  #Intractable nausea and vomiting secondary to suspected gastroparesis  #DM1  Patient presents with intractable nausea and vomiting for 24 hours. Has had poor oral intake with minimal insulin use since symptoms started.  Initial glucose of  367 and only improved to 342 after two litres of fluid. Serum ketones positive at 1.7, anion gap normal, venous gas with normal pH but slightly low bicarb at 18 and initial lactic acid elevated at 2.7 but improved to 1.7 after two litres. Started on insulin drip in ED.   -Now off Insulin drip  -Lactated ringers decrease to 50 ml/hr  -Continue Tresiba at higher PTA dose 24 unit(s) daily  -GI consulted  -EGD largely unremarkable  -Continue anti-emetics and scheduled Reglan  -Empiric PPI   -Diet as tolerated  -Motility clinic follow-up, consider gastric pacer  -ADAT     #Acute on chronic kidney disease stage V  Hx of kidney transplant in 11/2008. On chart review note steady decline in kidney function since June 2019 and is back on the transplant list. Creatinine recently has been in the 4 range with current creatine of 5.51 on admit, suspect related to dehydration and poor oral intake.   -Hydrate with LR  -Nephrology consult  -Continue Tacrolimus (goal 4-6) and Mycophenolic acid (goal 1-3.5)  -Missed transplant appt on day of admit, will need to reschedule     #Type 1 diabetes hx of pancreatic transplant 2/2009  Has insulin pump at home but do due to insurance coverage  has not been using. Instead using Tresiba and Novolog Most recent A1c was 6.7 in November.  -Resume home insulin      #HTN  Recently stopped Amlodipine due to low blood pressure and pressures elevated here 218/117.  -Restarted Amlodipine 5 mg  -Start coreg 6.25 mg bid  -Hydralazine PRN     #Anemia of chronic disease  Hemoglobin is 13.1 on admit but I suspect hemoconcentration with dehydration and did trend down but now stable.  -Recheck in AM     #CAD  Hx of severe 3 vessel diseae with CABG in 10/15. Last dobutamine stress test in 7/2019 was normal.     DVT Prophylaxis: Pneumatic Compression Devices  Code Status: Full Code  Diet: Advance Diet as Tolerated: Regular Diet Adult; 0017-6818 Calories: Moderate Consistent CHO (4-6 CHO units/meal)    Mueller Catheter: not present  Disposition: Expected discharge in 21-2 days to home. Goals prior to discharge include GI work up and BG control.   Incidental Findings: None  Family updated today: No.     Interval History   The patient reports doing well. No chest pain or shortness of breath. Still nausea but no vomiting, diarrhea, constipation. No fevers. No other specific complaints identified.     -Data reviewed today: I personally reviewed all new labs and imaging results over the last 24 hours.     Physical Exam   Temp: 97.6  F (36.4  C) Temp src: Oral BP: (!) 145/80 Pulse: 98 Heart Rate: 95 Resp: 18 SpO2: 97 % O2 Device: None (Room air) Oxygen Delivery: 2 LPM  Vitals:    01/27/20 0637 01/28/20 1204   Weight: 90.7 kg (200 lb) 90.7 kg (200 lb)     Vital Signs with Ranges  Temp:  [97.4  F (36.3  C)-98.4  F (36.9  C)] 97.6  F (36.4  C)  Pulse:  [] 98  Heart Rate:  [] 95  Resp:  [12-18] 18  BP: (131-177)/() 145/80  SpO2:  [93 %-99 %] 97 %  I/O last 3 completed shifts:  In: 1890 [P.O.:730; I.V.:1160]  Out: -     GENERAL: No apparent distress. Awake, alert, and fully oriented.  HEENT: Normocephalic, atraumatic. Extraocular movements intact.  CARDIOVASCULAR:  Regular rate and rhythm without murmurs or rubs. No S3.  PULMONARY: Clear bilaterally.  GASTROINTESTINAL: Soft, non-tender, non-distended. Bowel sounds normoactive.   EXTREMITIES: No cyanosis or clubbing. No edema.  NEUROLOGICAL: CN 2-12 grossly intact, no focal neurological deficits.  DERMATOLOGICAL: No rash, ulcer, bruising, nor jaundice.     Medications     dextrose       lactated ringers 50 mL/hr (01/29/20 0834)     - MEDICATION INSTRUCTIONS -         amLODIPine  5 mg Oral Daily     atorvastatin  40 mg Oral Daily     carvedilol  6.25 mg Oral BID w/meals     insulin aspart  1-7 Units Subcutaneous TID AC     insulin aspart  1-5 Units Subcutaneous At Bedtime     insulin degludec  24 Units Subcutaneous At Bedtime     metoclopramide  5 mg Oral 4x Daily AC & HS     mycophenolic acid  360 mg Oral BID     pantoprazole (PROTONIX) IV  40 mg Intravenous BID     sodium chloride (PF)  3 mL Intracatheter Q8H     tacrolimus  2 mg Oral BID     Data     Laboratory:  Recent Labs   Lab 01/29/20  0601 01/28/20  0629 01/28/20  0018  01/27/20  0641   WBC 9.4 11.5*  --   --  10.3   HGB 10.0* 10.3* 10.3*   < > 13.1*   HCT 32.4* 32.3*  --   --  40.2   MCV 92 90  --   --  88    231  --   --  322    < > = values in this interval not displayed.     Recent Labs   Lab 01/29/20  0601 01/28/20  0629 01/27/20  0642    139 137   POTASSIUM 3.9 4.1 4.1   CHLORIDE 108 109 107   CO2 20 22 19*   ANIONGAP 10 8 11   * 157* 367*   BUN 46* 50* 50*   CR 5.32* 5.56* 5.51*   GFRESTIMATED 12* 12* 12*   GFRESTBLACK 14* 13* 14*   CHARLY 8.1* 8.7 9.3     Recent Labs   Lab 01/29/20  0845 01/29/20  0601 01/29/20  0226 01/28/20  2144 01/28/20  2049 01/28/20  1945  01/28/20  0629  01/27/20  0642   GLC  --  204*  --   --   --   --   --  157*  --  367*   *  --  183* 134* 134* 120*   < >  --    < >  --     < > = values in this interval not displayed.     No results for input(s): CULT in the last 168 hours.    Imaging:  No results found for this  or any previous visit (from the past 24 hour(s)).      Diaz Minaya DO MPH  [facility] Hospitalist  [address] [place] [address]  Pager: [phone]  01/29/2020

## 2020-01-29 NOTE — PLAN OF CARE
Took over care for 4 hours. Blood sugars 112-140. Continues on Insulin gtt. Took clears at start of shift. Increased to full liquids but declined supper. Moves independently in bed. Norvasc held this a.m. due to nausea and took this afternoon.

## 2020-01-29 NOTE — PLAN OF CARE
Resumed home insulin dosing at lunch. Discharging on 2 b/p meds. Reviewed with him. Ood oral intake today. Mom transporting.

## 2020-01-30 ENCOUNTER — TELEPHONE (OUTPATIENT)
Dept: PEDIATRICS | Facility: CLINIC | Age: 45
End: 2020-01-30

## 2020-01-30 ENCOUNTER — PATIENT OUTREACH (OUTPATIENT)
Dept: CARE COORDINATION | Facility: CLINIC | Age: 45
End: 2020-01-30

## 2020-01-30 DIAGNOSIS — R11.2 INTRACTABLE NAUSEA AND VOMITING: Primary | ICD-10-CM

## 2020-01-30 DIAGNOSIS — Z71.89 OTHER SPECIFIED COUNSELING: ICD-10-CM

## 2020-01-30 NOTE — TELEPHONE ENCOUNTER
ED / Discharge Outreach Protocol    Patient Contact    Attempt # 1    Was call answered?  No.  Left message on voicemail with information to call me back.    When patient calls back:  Hospital follow up call.  Patient needs MTM visit.    Jun Dalal RN on 1/30/2020 at 10:37 AM

## 2020-01-30 NOTE — PROGRESS NOTES
The clinic Community Health Worker spoke with the patient today at the request of the PCP to discuss possible Clinic Care Coordination enrollment.  The service was described to the patient and immediate needs were discussed.  The patient declined enrollment at this time.  The PCP is encouraged to refer in the future if the patient's needs change.

## 2020-01-30 NOTE — TELEPHONE ENCOUNTER
Please contact patient for In-patient follow up.  [phone] (home)     Visit date: 1/29/20  Diagnosis listed:Chief Complaint: Lamberto (Acute Kidney Injury) (H), Hypertension Goal Bp (Blood Pressure) < 140/90  Number of visits in past 12 months:2

## 2020-01-31 NOTE — TELEPHONE ENCOUNTER
"  ED/Discharge Protocol    \"Hi, my name is Jun Dalal RN, a registered nurse, and I am calling on behalf of Dr. Ríos's office at [place].  I am calling to follow up and see how things are going for you after your recent visit.\"    \"I see that you were in the (ER/UC/IP) on 1/27/20.    How are you doing now that you are home?\"   Denies having any symptoms. Denies vomiting.   Denies pain, denies fever.     Is patient experiencing symptoms that may require a hospital visit?  Patient reports that he is able to function in daily life as he normally does.     Discharge Instructions    \"Let's review your discharge instructions.  What is/are the follow-up recommendations?  Pt. Response: has not been able to find blood pressure cuff. Patient reports that the hospital Doctor told him that he should hold the blood pressure medications until his blood pressure is above a threshold.   Has not been able to take blood pressure, and has therefore been unable to take the pills.   Blood sugar has been in 130's generally since discharge.     \"Were you instructed to make a follow-up appointment?\"  Pt. Response: Yes.  Has appointment been made?   Yes    MTM visit scheduled for 2/4/20.   \"When you see the provider, I would recommend that you bring your discharge instructions with you.    Medications    \"How many new medications are you on since your hospitalization/ED visit?\"    2 or more - [facility] MTM referral needed  \"How many of your current medicines changed (dose, timing, name, etc.) while you were in the hospital/ED visit?\"   2 or more - [facility] MTM referral needed  \"Do you have questions about your medications?\"   Yes (and cannot be easily answered) - Epic MTM referral needed  \"Were you newly diagnosed with heart failure, COPD, diabetes or did you have a heart attack?\"   No  For patients on insulin: \"Did you start on insulin in the hospital or did you have your insulin dose changed?\"   No  Post Discharge Medication Reconciliation " "Status: discharge medications reconciled, continue medications without change.    Was MTM referral placed (*Make sure to put transitions as reason for referral)?   Yes - \"The Medication Therapy  will call you within the next few days to schedule an appointment with an MTM pharmacist to discuss your medicines and make sure they are working as well as they possibly can for you. This visit can be done in a [facility] clinic or on the phone and is at no charge to you.\"    Call Summary    \"Do you have any questions or concerns about your condition or care plan at the moment?\"    No  Triage nurse advice given: MTM visit on 2/4/20.   Call back with any questions or concerns.     Patient was in ER 12 times in the past year (assess appropriateness of ER visits.)      \"If you have questions or things don't continue to improve, we encourage you contact us through the main clinic number,  [phone].  Even if the clinic is not open, triage nurses are available 24/7 to help you.     We would like you to know that our clinic has extended hours (provide information).  We also have urgent care (provide details on closest location and hours/contact info)\"      \"Thank you for your time and take care!\"      Jun Dalal RN on 1/31/2020 at 9:23 AM        "

## 2020-02-01 ENCOUNTER — HOSPITAL ENCOUNTER (EMERGENCY)
Facility: CLINIC | Age: 45
Discharge: HOME | End: 2020-02-01
Attending: EMERGENCY MEDICINE | Admitting: EMERGENCY MEDICINE

## 2020-02-01 VITALS
SYSTOLIC BLOOD PRESSURE: 168 MMHG | TEMPERATURE: 98.8 F | HEART RATE: 86 BPM | RESPIRATION RATE: 18 BRPM | DIASTOLIC BLOOD PRESSURE: 105 MMHG | OXYGEN SATURATION: 98 %

## 2020-02-01 DIAGNOSIS — K31.84 GASTROPARESIS: ICD-10-CM

## 2020-02-01 DIAGNOSIS — R11.2 NON-INTRACTABLE VOMITING WITH NAUSEA, UNSPECIFIED VOMITING TYPE: ICD-10-CM

## 2020-02-01 LAB
ALBUMIN SERPL-MCNC: 3.1 G/DL (ref 3.4–5)
ALP SERPL-CCNC: 145 U/L (ref 40–150)
ALT SERPL W P-5'-P-CCNC: 14 U/L (ref 0–70)
ANION GAP SERPL CALCULATED.3IONS-SCNC: 10 MMOL/L (ref 3–14)
AST SERPL W P-5'-P-CCNC: <3 U/L (ref 0–45)
BASOPHILS # BLD AUTO: 0 10E9/L (ref 0–0.2)
BASOPHILS NFR BLD AUTO: 0.3 %
BILIRUB SERPL-MCNC: 0.5 MG/DL (ref 0.2–1.3)
BUN SERPL-MCNC: 41 MG/DL (ref 7–30)
CALCIUM SERPL-MCNC: 9 MG/DL (ref 8.5–10.1)
CHLORIDE SERPL-SCNC: 104 MMOL/L (ref 94–109)
CO2 SERPL-SCNC: 22 MMOL/L (ref 20–32)
CREAT SERPL-MCNC: 4.94 MG/DL (ref 0.66–1.25)
DIFFERENTIAL METHOD BLD: ABNORMAL
EOSINOPHIL # BLD AUTO: 0 10E9/L (ref 0–0.7)
EOSINOPHIL NFR BLD AUTO: 0.5 %
ERYTHROCYTE [DISTWIDTH] IN BLOOD BY AUTOMATED COUNT: 13 % (ref 10–15)
GFR SERPL CREATININE-BSD FRML MDRD: 13 ML/MIN/{1.73_M2}
GLUCOSE SERPL-MCNC: 335 MG/DL (ref 70–99)
HCT VFR BLD AUTO: 35.5 % (ref 40–53)
HGB BLD-MCNC: 11.7 G/DL (ref 13.3–17.7)
IMM GRANULOCYTES # BLD: 0 10E9/L (ref 0–0.4)
IMM GRANULOCYTES NFR BLD: 0.3 %
LACTATE BLD-SCNC: 1.3 MMOL/L (ref 0.7–2)
LIPASE SERPL-CCNC: 45 U/L (ref 73–393)
LYMPHOCYTES # BLD AUTO: 0.8 10E9/L (ref 0.8–5.3)
LYMPHOCYTES NFR BLD AUTO: 9 %
MCH RBC QN AUTO: 29 PG (ref 26.5–33)
MCHC RBC AUTO-ENTMCNC: 33 G/DL (ref 31.5–36.5)
MCV RBC AUTO: 88 FL (ref 78–100)
MONOCYTES # BLD AUTO: 0.4 10E9/L (ref 0–1.3)
MONOCYTES NFR BLD AUTO: 4.3 %
NEUTROPHILS # BLD AUTO: 7.4 10E9/L (ref 1.6–8.3)
NEUTROPHILS NFR BLD AUTO: 85.6 %
NRBC # BLD AUTO: 0 10*3/UL
NRBC BLD AUTO-RTO: 0 /100
PLATELET # BLD AUTO: 305 10E9/L (ref 150–450)
POTASSIUM SERPL-SCNC: 4 MMOL/L (ref 3.4–5.3)
PROT SERPL-MCNC: 7.2 G/DL (ref 6.8–8.8)
RBC # BLD AUTO: 4.03 10E12/L (ref 4.4–5.9)
SODIUM SERPL-SCNC: 136 MMOL/L (ref 133–144)
WBC # BLD AUTO: 8.7 10E9/L (ref 4–11)

## 2020-02-01 PROCEDURE — 85025 COMPLETE CBC W/AUTO DIFF WBC: CPT | Performed by: EMERGENCY MEDICINE

## 2020-02-01 PROCEDURE — 25800030 ZZH RX IP 258 OP 636: Performed by: EMERGENCY MEDICINE

## 2020-02-01 PROCEDURE — 25000128 H RX IP 250 OP 636: Performed by: EMERGENCY MEDICINE

## 2020-02-01 PROCEDURE — 96375 TX/PRO/DX INJ NEW DRUG ADDON: CPT

## 2020-02-01 PROCEDURE — 99284 EMERGENCY DEPT VISIT MOD MDM: CPT | Mod: 25

## 2020-02-01 PROCEDURE — 96374 THER/PROPH/DIAG INJ IV PUSH: CPT

## 2020-02-01 PROCEDURE — 80053 COMPREHEN METABOLIC PANEL: CPT | Performed by: EMERGENCY MEDICINE

## 2020-02-01 PROCEDURE — 83690 ASSAY OF LIPASE: CPT | Performed by: EMERGENCY MEDICINE

## 2020-02-01 PROCEDURE — 96361 HYDRATE IV INFUSION ADD-ON: CPT

## 2020-02-01 PROCEDURE — 83605 ASSAY OF LACTIC ACID: CPT | Performed by: EMERGENCY MEDICINE

## 2020-02-01 RX ORDER — SODIUM CHLORIDE 9 MG/ML
1000 INJECTION, SOLUTION INTRAVENOUS CONTINUOUS
Status: DISCONTINUED | OUTPATIENT
Start: 2020-02-01 | End: 2020-02-01 | Stop reason: HOSPADM

## 2020-02-01 RX ORDER — ONDANSETRON 2 MG/ML
4 INJECTION INTRAMUSCULAR; INTRAVENOUS ONCE
Status: COMPLETED | OUTPATIENT
Start: 2020-02-01 | End: 2020-02-01

## 2020-02-01 RX ADMIN — SODIUM CHLORIDE 1000 ML: 9 INJECTION, SOLUTION INTRAVENOUS at 18:54

## 2020-02-01 RX ADMIN — PROCHLORPERAZINE EDISYLATE 10 MG: 5 INJECTION INTRAMUSCULAR; INTRAVENOUS at 18:54

## 2020-02-01 RX ADMIN — ONDANSETRON HYDROCHLORIDE 4 MG: 2 INJECTION, SOLUTION INTRAMUSCULAR; INTRAVENOUS at 19:45

## 2020-02-01 NOTE — ED AVS SNAPSHOT
[facility] Emergency Department  [address]  [facility] [address]  Phone:  [phone]  Fax:  [phone]                                    Murtaza Fitzgerald   MRN: [id number]    Department:  [facility] Emergency Department   Date of Visit:  2/1/2020           After Visit Summary Signature Page    I have received my discharge instructions, and my questions have been answered. I have discussed any challenges I see with this plan with the nurse or doctor.    ..........................................................................................................................................  Patient/Patient Representative Signature      ..........................................................................................................................................  Patient Representative Print Name and Relationship to Patient    ..................................................               ................................................  Date                                   Time    ..........................................................................................................................................  Reviewed by Signature/Title    ...................................................              ..............................................  Date                                               Time          22EPIC Rev 08/18

## 2020-02-02 NOTE — ED PROVIDER NOTES
History     Chief Complaint:  Vomiting    The history is provided by the patient.      Murtaza Fitzgerald is an immunosuppressed 44 year old male, s/p kidney transplant 11/2008 and pancreatic transplant 2/2009 with history significant for hypertension and type I diabetes mellitus, who presents with one day nausea/vomiting. Notably, patient was recently admitted on 1/27 to 1/29 for intractable emesis that was suspected to be secondary to gastroparesis due to his diabetes. He states he woke up late today and started feeling nauseous after breakfast. Tyrell attest to a dozen episodes of emesis today and notes symptoms usually improve with Compazine and IVF. He denies any hematemesis, diarrhea, abdominal pain, black/bloody stools, dysuria, hematuria, fever, or drug use. Around 1600, he reports taking one dose of Zofran and Reglan which resulted in minor improvements of symptoms. Patient is complaint to his immunosuppressants. He denies chest pain or SOB.  No fevers.    Allergies:  Benadryl  Reglan     Medications:    Norvasc  Lipitor  Coreg  Novolog  Insulin  Prilosec  Tacrolimus     Past Medical History:    CMV status negative  Type I DM  Dialysis patient  Dyslipidemia  GERD  Hypertension  Immunosuppressed  Migraines  Mycotic aneurysm of kidney transplant   Pancreas replaced by transplant    Past Surgical History:    Left knee arthroscopy with lateral meniscectomy  CABG  Vitrectomy, both eyes  Pancreas and Kidney transplant    Family History:    No past pertinent family history.    Social History:  Accompanied by friend.  Former Smoker, currently vapes   Alcohol Use: No  Drug Use: No  Marital Status:       Review of Systems   All other systems reviewed and are negative.    Physical Exam     Patient Vitals for the past 24 hrs:   BP Temp Temp src Pulse Resp SpO2   02/01/20 1930 -- -- -- 86 -- 98 %   02/01/20 1900 (!) 142/93 -- -- 93 -- 98 %   02/01/20 1855 (!) 128/95 -- -- 93 -- 100 %   02/01/20 1845 --  -- -- -- -- 100 %   207 105/76 98.8  F (37.1  C) Oral 100 18 100 %     Physical Exam  General: Resting on the bed.  Appears largely comfortable  Head: No obvious trauma to head.  Ears, Nose, Throat:  External ears normal.  Nose normal.  No pharyngeal erythema, swelling or exudate.  Midline uvula.  MMM.  Eyes:  Conjunctivae clear.  Pupils are equal, round, and reactive.   Neck: Normal range of motion.  Neck supple.   CV: Regular rate and rhythm.  No murmurs.      Respiratory: Effort normal and breath sounds normal.  No wheezing or crackles.   Gastrointestinal: Soft.  No distension. There is no tenderness.  There is no rigidity, no rebound and no guarding.   Skin: Skin is warm and dry.  No rash noted.     Emergency Department Course     Laboratory:  CBC: WBC 8.7, HGB 11.7 (L),   CMP: glc 335 (H) creatinine 4.94 (H) GFR 13 (L) Albumin 3.1 (L)  o/w WNL     Lipase: 45 (L)    Lactic Acid (Resulted: ): 1.3    Interventions:  185: Compazine 10 mg IV  : NS 1L IV Bolus   : Zofran 4 mg IV    Emergency Department Course:  Nursing notes and vitals reviewed.   IV was inserted and blood was drawn for laboratory testing, results above.   I performed an exam of the patient as documented above.    Tolerated PO. Findings and plan explained to the patient. Patient discharged home with instructions regarding supportive care, medications, and reasons to return. The importance of close follow-up was reviewed.     Impression & Plan      Medical Decision Makin-year-old male with a history of kidney and pancreas transplant, gastroparesis, diabetes presents with vomiting.  Broad differential was pursued including not limited to pancreatitis, hepatitis, cholecystitis, cholangitis, gastroparesis, cyclical vomiting, dehydration, electrolyte, metabolic, renal dysfunction, transplant rejection, etc.  Overall patient's well-appearing nontoxic.  He reports this is very consistent with prior  gastroparesis and he responds well to fluids and Compazine.  He states that previously he had waited longer but today he presented more promptly to control his symptoms.  He tried oral Zofran and Reglan at home without relief.  Compazine and fluids were administered in the ED.  CBC shows no leukocytosis or anemia.  Patient does not have a fever.  Do not suspect sepsis.  Lactate is normal not concerning for severe dehydration, severe sepsis, septic shock.  BMP shows chronic kidney dysfunction creatinine 4.94 is actually marginally improved from prior.  Hyperglycemia but no evidence of DKA.  No other acute electrolyte, metabolic or renal dysfunction noted from baseline.  LFTs and lipase are reassuring, not concerning for hepatitis, pancreatitis, cholecystitis.  Patient has a benign abdominal exam.  Do not suspect perforation or obstruction.  No indication for CT imaging.  Patient denies any urinary symptoms therefore UA was deferred.  Patient was able to p.o. challenge after above interventions.  He felt reassured and was able to go home.  Did discuss his blood pressure is mildly elevated and he needs to follow-up closely with his primary doctor regarding this.  Strict follow-up was encouraged.  Strict return precautions were discussed.  Patient and mother were discharged home.    Diagnosis:    ICD-10-CM   1. Non-intractable vomiting with nausea, unspecified vomiting type R11.2   2. Gastroparesis K31.84     Disposition: discharged to home    Scribe Disclosure:   FRANKI, Silver Loza, am serving as a scribe at 6:19 PM on 2/1/2020 to document services personally performed by Jessica Leos MD based on my observations and the provider's statements to me.     [facility] EMERGENCY DEPARTMENT       Jessica Leos MD  02/02/20 1041

## 2020-02-02 NOTE — ED TRIAGE NOTES
Presents to the ED with vomiting. History of gastroparesis. States was just discharged on Wednesday for same issues.

## 2020-02-02 NOTE — DISCHARGE INSTRUCTIONS
Return to the ED if you are unable to tolerate fluids, intractable nausea or vomiting, severe abdominal pain, fevers >101 or other acute changes.  Please follow up with your PCP in 2-3 days.      Discharge Instructions  Vomiting    You have been seen today for vomiting (throwing up). This is usually caused by a virus, but some bacteria, parasites, medicines or other medical conditions can cause similar symptoms. At this time your provider does not find that your vomiting is a sign of anything dangerous or life-threatening. However, sometimes the signs of serious illness do not show up right away. If you have new or worse symptoms, you may need to be seen again in the Emergency Department or by your primary provider. Remember that serious problems like appendicitis can start as vomiting.    Generally, every Emergency Department visit should have a follow-up clinic visit with either a primary or a specialty clinic/provider. Please follow-up as instructed by your emergency provider today.    Return to the Emergency Department if:  You keep vomiting and you are not able to keep liquids down.   You feel you are getting dehydrated, such as being very thirsty, not urinating (peeing) at least every 8-12 hours, or feeling faint or lightheaded.   You develop a new fever, or your fever continues for more than 2 days.   You have abdominal (belly pain) that seems worse than cramps, is in one spot, or is getting worse over time. Appendicitis usually causes pain in the right lower abdomen (to the right and below your belly button) so watch for pain in this location.  You have blood in your vomit or stools.   You feel very weak.  You are not starting to improve within 24 hours of your visit here.     What can I do to help myself?  The most important thing to do is to drink clear liquids. If you have been vomiting a lot, it is best to have only small, frequent sips of liquids. Drinking too much at once may cause more vomiting. If  you are vomiting often, you must replace minerals, sodium and potassium lost with your illness. Pedialyte  is the best available rehydration liquid but some find that it doesn t taste good so sports drinks are an alterative. You can also drink clear liquids such as water, weak tea, apple juice, and 7-Up . Avoid acid liquids (orange), caffeine (coffee) or alcohol. Do not drink milk until you no longer have diarrhea (loose stools).   After liquids are staying down, you may start eating mild foods. Soda crackers, toast, plain noodles, gelatin, applesauce and bananas are good first choices. Avoid foods that have acid, are spicy, fatty or have a lot of fiber (such as meats, coarse grains, vegetables). You may start eating these foods again in about 3 days when you are better.   Sometimes treatment includes prescription medicine to prevent nausea (sick to your stomach) and vomiting. If your provider prescribes these for you, take them as directed.   Do not take ibuprofen, naproxen, or other nonsteroidal anti-inflammatory (NSAID) medicines without checking with your healthcare provider.     If you were given a prescription for medicine here today, be sure to read all of the information (including the package insert) that comes with your prescription.  This will include important information about the medicine, its side effects, and any warnings that you need to know about.  The pharmacist who fills the prescription can provide more information and answer questions you may have about the medicine.  If you have questions or concerns that the pharmacist cannot address, please call or return to the Emergency Department.     Remember that you can always come back to the Emergency Department if you are not able to see your regular provider in the amount of time listed above, if you get any new symptoms, or if there is anything that worries you.

## 2020-02-03 ENCOUNTER — PATIENT OUTREACH (OUTPATIENT)
Dept: CARE COORDINATION | Facility: CLINIC | Age: 45
End: 2020-02-03

## 2020-02-03 DIAGNOSIS — R11.2 INTRACTABLE NAUSEA AND VOMITING: Primary | ICD-10-CM

## 2020-02-03 DIAGNOSIS — K21.9 GASTROESOPHAGEAL REFLUX DISEASE WITHOUT ESOPHAGITIS: ICD-10-CM

## 2020-02-03 DIAGNOSIS — Z71.89 OTHER SPECIFIED COUNSELING: ICD-10-CM

## 2020-02-03 RX ORDER — METOCLOPRAMIDE 5 MG/1
TABLET ORAL
Qty: 120 TABLET | Refills: 0 | Status: SHIPPED | OUTPATIENT
Start: 2020-02-03 | End: 2020-02-11

## 2020-02-03 NOTE — TELEPHONE ENCOUNTER
"Requested Prescriptions   Pending Prescriptions Disp Refills     metoclopramide (REGLAN) 5 MG tablet [Pharmacy Med Name: METOCLOPRAMIDE HCL 5MG TABS] 120 tablet 0     Sig: TAKE ONE TABLET BY MOUTH FOUR TIMES A DAY BEFORE MEALS AND NIGHTLY   Last Written Prescription Date:  na  Last Fill Quantity: na,  # refills: na   Last office visit: 8/23/2019 with prescribing provider   Future Office Visit:   Next 5 appointments (look out 90 days)    Feb 04, 2020 11:10 AM CST  (Arrive by 10:55 AM)  Pharmacist visit with Karen Dempsye [facility] ([facility]) [address] [place] [address]  [address]  [facility] [address] [phone]              Antivertigo/Antiemetic Agents Passed - 2/3/2020 10:25 AM        Passed - Recent (12 mo) or future (30 days) visit within the authorizing provider's specialty     Patient has had an office visit with the authorizing provider or a provider within the authorizing providers department within the previous 12 mos or has a future within next 30 days. See \"Patient Info\" tab in inbasket, or \"Choose Columns\" in Meds & Orders section of the refill encounter.              Passed - Medication is active on med list        Passed - Patient is 18 years of age or older          Prescription approved per [facility] Refill Protocol.  Maddy Alvarez RN on 2/3/2020 at 11:51 AM    "

## 2020-02-05 ENCOUNTER — HOSPITAL ENCOUNTER (INPATIENT)
Facility: CLINIC | Age: 45
LOS: 4 days | Discharge: HOME | DRG: 073 | End: 2020-02-09
Attending: EMERGENCY MEDICINE | Admitting: STUDENT IN AN ORGANIZED HEALTH CARE EDUCATION/TRAINING PROGRAM

## 2020-02-05 ENCOUNTER — APPOINTMENT (OUTPATIENT)
Dept: ULTRASOUND IMAGING | Facility: CLINIC | Age: 45
DRG: 073 | End: 2020-02-05
Attending: EMERGENCY MEDICINE

## 2020-02-05 DIAGNOSIS — Z79.4 ENCOUNTER FOR LONG-TERM (CURRENT) USE OF INSULIN (H): ICD-10-CM

## 2020-02-05 DIAGNOSIS — Z94.83 PANCREAS REPLACED BY TRANSPLANT (H): ICD-10-CM

## 2020-02-05 DIAGNOSIS — K31.84 DIABETIC GASTROPARESIS ASSOCIATED WITH TYPE 1 DIABETES MELLITUS (H): ICD-10-CM

## 2020-02-05 DIAGNOSIS — R11.2 NAUSEA AND VOMITING, INTRACTABILITY OF VOMITING NOT SPECIFIED, UNSPECIFIED VOMITING TYPE: ICD-10-CM

## 2020-02-05 DIAGNOSIS — K31.84 GASTROPARESIS: ICD-10-CM

## 2020-02-05 DIAGNOSIS — Z94.0 KIDNEY REPLACED BY TRANSPLANT: ICD-10-CM

## 2020-02-05 DIAGNOSIS — E10.43 DIABETIC GASTROPARESIS ASSOCIATED WITH TYPE 1 DIABETES MELLITUS (H): ICD-10-CM

## 2020-02-05 LAB
ALBUMIN SERPL-MCNC: 3.4 G/DL (ref 3.4–5)
ALP SERPL-CCNC: 162 U/L (ref 40–150)
ALT SERPL W P-5'-P-CCNC: 12 U/L (ref 0–70)
ANION GAP SERPL CALCULATED.3IONS-SCNC: 8 MMOL/L (ref 3–14)
AST SERPL W P-5'-P-CCNC: 7 U/L (ref 0–45)
BASOPHILS # BLD AUTO: 0 10E9/L (ref 0–0.2)
BASOPHILS NFR BLD AUTO: 0.4 %
BILIRUB SERPL-MCNC: 0.4 MG/DL (ref 0.2–1.3)
BUN SERPL-MCNC: 43 MG/DL (ref 7–30)
CALCIUM SERPL-MCNC: 9 MG/DL (ref 8.5–10.1)
CHLORIDE SERPL-SCNC: 105 MMOL/L (ref 94–109)
CO2 SERPL-SCNC: 22 MMOL/L (ref 20–32)
CREAT SERPL-MCNC: 5.99 MG/DL (ref 0.66–1.25)
DIFFERENTIAL METHOD BLD: ABNORMAL
EOSINOPHIL # BLD AUTO: 0 10E9/L (ref 0–0.7)
EOSINOPHIL NFR BLD AUTO: 0.3 %
ERYTHROCYTE [DISTWIDTH] IN BLOOD BY AUTOMATED COUNT: 13.2 % (ref 10–15)
GFR SERPL CREATININE-BSD FRML MDRD: 11 ML/MIN/{1.73_M2}
GLUCOSE BLDC GLUCOMTR-MCNC: 148 MG/DL (ref 70–99)
GLUCOSE BLDC GLUCOMTR-MCNC: 170 MG/DL (ref 70–99)
GLUCOSE SERPL-MCNC: 182 MG/DL (ref 70–99)
HCT VFR BLD AUTO: 38.4 % (ref 40–53)
HGB BLD-MCNC: 12.4 G/DL (ref 13.3–17.7)
IMM GRANULOCYTES # BLD: 0.1 10E9/L (ref 0–0.4)
IMM GRANULOCYTES NFR BLD: 0.8 %
LACTATE BLD-SCNC: 1.9 MMOL/L (ref 0.7–2)
LIPASE SERPL-CCNC: 39 U/L (ref 73–393)
LYMPHOCYTES # BLD AUTO: 1 10E9/L (ref 0.8–5.3)
LYMPHOCYTES NFR BLD AUTO: 10.5 %
MCH RBC QN AUTO: 28.8 PG (ref 26.5–33)
MCHC RBC AUTO-ENTMCNC: 32.3 G/DL (ref 31.5–36.5)
MCV RBC AUTO: 89 FL (ref 78–100)
MONOCYTES # BLD AUTO: 0.4 10E9/L (ref 0–1.3)
MONOCYTES NFR BLD AUTO: 4 %
NEUTROPHILS # BLD AUTO: 7.6 10E9/L (ref 1.6–8.3)
NEUTROPHILS NFR BLD AUTO: 84 %
NRBC # BLD AUTO: 0 10*3/UL
NRBC BLD AUTO-RTO: 0 /100
PLATELET # BLD AUTO: 388 10E9/L (ref 150–450)
POTASSIUM SERPL-SCNC: 4.3 MMOL/L (ref 3.4–5.3)
PROT SERPL-MCNC: 7.8 G/DL (ref 6.8–8.8)
RBC # BLD AUTO: 4.3 10E12/L (ref 4.4–5.9)
SODIUM SERPL-SCNC: 136 MMOL/L (ref 133–144)
WBC # BLD AUTO: 9.1 10E9/L (ref 4–11)

## 2020-02-05 PROCEDURE — 96374 THER/PROPH/DIAG INJ IV PUSH: CPT | Performed by: EMERGENCY MEDICINE

## 2020-02-05 PROCEDURE — 96361 HYDRATE IV INFUSION ADD-ON: CPT | Performed by: EMERGENCY MEDICINE

## 2020-02-05 PROCEDURE — 25000131 ZZH RX MED GY IP 250 OP 636 PS 637: Performed by: PHYSICIAN ASSISTANT

## 2020-02-05 PROCEDURE — 76776 US EXAM K TRANSPL W/DOPPLER: CPT

## 2020-02-05 PROCEDURE — 99223 1ST HOSP IP/OBS HIGH 75: CPT | Mod: AI | Performed by: PHYSICIAN ASSISTANT

## 2020-02-05 PROCEDURE — 25800030 ZZH RX IP 258 OP 636: Performed by: EMERGENCY MEDICINE

## 2020-02-05 PROCEDURE — 25000128 H RX IP 250 OP 636: Performed by: PHYSICIAN ASSISTANT

## 2020-02-05 PROCEDURE — 85025 COMPLETE CBC W/AUTO DIFF WBC: CPT | Performed by: EMERGENCY MEDICINE

## 2020-02-05 PROCEDURE — 96375 TX/PRO/DX INJ NEW DRUG ADDON: CPT | Performed by: EMERGENCY MEDICINE

## 2020-02-05 PROCEDURE — 80053 COMPREHEN METABOLIC PANEL: CPT | Performed by: EMERGENCY MEDICINE

## 2020-02-05 PROCEDURE — 96376 TX/PRO/DX INJ SAME DRUG ADON: CPT | Performed by: EMERGENCY MEDICINE

## 2020-02-05 PROCEDURE — 99285 EMERGENCY DEPT VISIT HI MDM: CPT | Mod: Z6 | Performed by: EMERGENCY MEDICINE

## 2020-02-05 PROCEDURE — 83605 ASSAY OF LACTIC ACID: CPT | Performed by: EMERGENCY MEDICINE

## 2020-02-05 PROCEDURE — 00000146 ZZHCL STATISTIC GLUCOSE BY METER IP

## 2020-02-05 PROCEDURE — 99223 1ST HOSP IP/OBS HIGH 75: CPT | Mod: AI | Performed by: STUDENT IN AN ORGANIZED HEALTH CARE EDUCATION/TRAINING PROGRAM

## 2020-02-05 PROCEDURE — 25000132 ZZH RX MED GY IP 250 OP 250 PS 637: Performed by: PHYSICIAN ASSISTANT

## 2020-02-05 PROCEDURE — 25800030 ZZH RX IP 258 OP 636: Performed by: PHYSICIAN ASSISTANT

## 2020-02-05 PROCEDURE — 27210995 ZZH RX 272: Performed by: STUDENT IN AN ORGANIZED HEALTH CARE EDUCATION/TRAINING PROGRAM

## 2020-02-05 PROCEDURE — 99285 EMERGENCY DEPT VISIT HI MDM: CPT | Mod: 25 | Performed by: EMERGENCY MEDICINE

## 2020-02-05 PROCEDURE — 25000128 H RX IP 250 OP 636: Performed by: EMERGENCY MEDICINE

## 2020-02-05 PROCEDURE — 83690 ASSAY OF LIPASE: CPT | Performed by: EMERGENCY MEDICINE

## 2020-02-05 PROCEDURE — 12000012 ZZH R&B MS OVERFLOW UMMC

## 2020-02-05 RX ORDER — SODIUM CHLORIDE, SODIUM LACTATE, POTASSIUM CHLORIDE, CALCIUM CHLORIDE 600; 310; 30; 20 MG/100ML; MG/100ML; MG/100ML; MG/100ML
INJECTION, SOLUTION INTRAVENOUS CONTINUOUS
Status: ACTIVE | OUTPATIENT
Start: 2020-02-05 | End: 2020-02-06

## 2020-02-05 RX ORDER — AMLODIPINE BESYLATE 5 MG/1
5 TABLET ORAL DAILY
Status: DISCONTINUED | OUTPATIENT
Start: 2020-02-06 | End: 2020-02-09 | Stop reason: HOSPADM

## 2020-02-05 RX ORDER — SODIUM CHLORIDE, SODIUM LACTATE, POTASSIUM CHLORIDE, CALCIUM CHLORIDE 600; 310; 30; 20 MG/100ML; MG/100ML; MG/100ML; MG/100ML
1000 INJECTION, SOLUTION INTRAVENOUS CONTINUOUS
Status: DISCONTINUED | OUTPATIENT
Start: 2020-02-05 | End: 2020-02-05

## 2020-02-05 RX ORDER — ACETAMINOPHEN 325 MG/1
650 TABLET ORAL EVERY 4 HOURS PRN
Status: DISCONTINUED | OUTPATIENT
Start: 2020-02-05 | End: 2020-02-09 | Stop reason: HOSPADM

## 2020-02-05 RX ORDER — CARVEDILOL 6.25 MG/1
6.25 TABLET ORAL 2 TIMES DAILY WITH MEALS
Status: DISCONTINUED | OUTPATIENT
Start: 2020-02-05 | End: 2020-02-09 | Stop reason: HOSPADM

## 2020-02-05 RX ORDER — ATORVASTATIN CALCIUM 40 MG/1
40 TABLET, FILM COATED ORAL DAILY
Status: DISCONTINUED | OUTPATIENT
Start: 2020-02-06 | End: 2020-02-06

## 2020-02-05 RX ORDER — LIDOCAINE 40 MG/G
CREAM TOPICAL
Status: DISCONTINUED | OUTPATIENT
Start: 2020-02-05 | End: 2020-02-09 | Stop reason: HOSPADM

## 2020-02-05 RX ORDER — NALOXONE HYDROCHLORIDE 0.4 MG/ML
.1-.4 INJECTION, SOLUTION INTRAMUSCULAR; INTRAVENOUS; SUBCUTANEOUS
Status: DISCONTINUED | OUTPATIENT
Start: 2020-02-05 | End: 2020-02-09 | Stop reason: HOSPADM

## 2020-02-05 RX ORDER — NICOTINE POLACRILEX 4 MG
15-30 LOZENGE BUCCAL
Status: DISCONTINUED | OUTPATIENT
Start: 2020-02-05 | End: 2020-02-09 | Stop reason: HOSPADM

## 2020-02-05 RX ORDER — LORAZEPAM 2 MG/ML
0.5 INJECTION INTRAMUSCULAR EVERY 4 HOURS PRN
Status: DISCONTINUED | OUTPATIENT
Start: 2020-02-05 | End: 2020-02-09 | Stop reason: HOSPADM

## 2020-02-05 RX ORDER — ONDANSETRON 2 MG/ML
8 INJECTION INTRAMUSCULAR; INTRAVENOUS EVERY 8 HOURS PRN
Status: DISCONTINUED | OUTPATIENT
Start: 2020-02-05 | End: 2020-02-06

## 2020-02-05 RX ORDER — TACROLIMUS 1 MG/1
2 CAPSULE ORAL 2 TIMES DAILY
Status: DISCONTINUED | OUTPATIENT
Start: 2020-02-06 | End: 2020-02-06

## 2020-02-05 RX ORDER — DEXTROSE MONOHYDRATE 25 G/50ML
25-50 INJECTION, SOLUTION INTRAVENOUS
Status: DISCONTINUED | OUTPATIENT
Start: 2020-02-05 | End: 2020-02-09 | Stop reason: HOSPADM

## 2020-02-05 RX ORDER — MYCOPHENOLIC ACID 360 MG/1
360 TABLET, DELAYED RELEASE ORAL 2 TIMES DAILY
Status: DISCONTINUED | OUTPATIENT
Start: 2020-02-06 | End: 2020-02-06

## 2020-02-05 RX ADMIN — LORAZEPAM 0.5 MG: 2 INJECTION INTRAMUSCULAR; INTRAVENOUS at 22:31

## 2020-02-05 RX ADMIN — OMEPRAZOLE 40 MG: 20 CAPSULE, DELAYED RELEASE ORAL at 23:53

## 2020-02-05 RX ADMIN — MYCOPHENOLIC ACID 360 MG: 360 TABLET, DELAYED RELEASE ORAL at 23:54

## 2020-02-05 RX ADMIN — INSULIN DEGLUDEC INJECTION 10 UNITS: 100 INJECTION, SOLUTION SUBCUTANEOUS at 23:57

## 2020-02-05 RX ADMIN — SODIUM CHLORIDE, POTASSIUM CHLORIDE, SODIUM LACTATE AND CALCIUM CHLORIDE 1000 ML: 600; 310; 30; 20 INJECTION, SOLUTION INTRAVENOUS at 20:40

## 2020-02-05 RX ADMIN — PROCHLORPERAZINE EDISYLATE 10 MG: 5 INJECTION INTRAMUSCULAR; INTRAVENOUS at 16:47

## 2020-02-05 RX ADMIN — SODIUM CHLORIDE, POTASSIUM CHLORIDE, SODIUM LACTATE AND CALCIUM CHLORIDE 1000 ML: 600; 310; 30; 20 INJECTION, SOLUTION INTRAVENOUS at 16:47

## 2020-02-05 RX ADMIN — ONDANSETRON 8 MG: 2 INJECTION INTRAMUSCULAR; INTRAVENOUS at 21:40

## 2020-02-05 RX ADMIN — TACROLIMUS 2 MG: 1 CAPSULE ORAL at 23:53

## 2020-02-05 RX ADMIN — PROCHLORPERAZINE EDISYLATE 10 MG: 5 INJECTION INTRAMUSCULAR; INTRAVENOUS at 20:41

## 2020-02-05 ASSESSMENT — MIFFLIN-ST. JEOR
SCORE: 1781.69
SCORE: 1731.8

## 2020-02-05 NOTE — ED TRIAGE NOTES
Pt arrived in triage with concerns of abdominal pain since Monday night. Pt says he has also been vomiting. Hx of kidney transplant. VSS.

## 2020-02-05 NOTE — ED NOTES
ED Triage Provider Note  [facility]  Encounter Date: Feb 5, 2020    History:  Chief Complaint   Patient presents with     Abdominal Pain     Murtaza Fitzgerald is a 44 year old male who presents to the ED with abdominal pain, nausea and vomiting. Pt reports being seen in the ED on Saturday. Now having 2 days of inability to tolerate PO, including medications. Has taken insulin. Blood sugars highly fluctuant. Emesis non-bloody. Diffuse abdominal pain. Hx of renal transplant. No blood in urine.    Review of Systems:  : Decreased urine output    Exam:  There were no vitals taken for this visit.  General: No acute distress. Appears stated age.   Cardio: Regular rate, extremities well perfused  Resp: Normal work of breathing, grossly normal respiratory rate  Neuro: Alert. CN II-XII grossly intact. Grossly intact strength.   GI: reducible umbilical hernia, no tenderness to palpation    Medical Decision Making:  Patient arriving to the ED with problem as above. A medical screening exam was performed. CBC, CMP, lipase, lactic acid, IVF, compazine orders initiated from Triage. The patient is appropriate to wait in triage.      Herminia Vergara MD on 2/5/2020 at 4:30 PM     Herminia Vergara MD  02/05/20 2027

## 2020-02-05 NOTE — ED PROVIDER NOTES
[place] EMERGENCY DEPARTMENT ([facility])  2/05/20  History     Chief Complaint   Patient presents with     Abdominal Pain     The history is provided by the patient and medical records.     Murtaza Fitzgerald is a 44 year old male with a past medical history nonocclusive CAD (s/p bypass graft 2015), type 1 diabetes mellitus, hyperlipidemia, hypertension, immunosuppression who is s/p kidney transplant (2008), and pancreas transplant (2009) who presents to the Emergency Department for evaluation of nausea and vomiting.  Per chart, the patient was seen at [facility] on 2/1/2020 for evaluation of vomiting as well.  Patient also recently admitted on 1/27-1/29 for intractable emesis that was suspected to be secondary to gastroparesis due to his diabetes.  On 2/1/2028 patient was noted to have roughly 12 episodes of vomiting.  Upon evaluation the patient's labs were fairly unremarkable other than his BNP which showed chronic kidney dysfunction creatinine of 4.94.  This was noted to be marginally improved from prior.  Patient was treated with IV fluids, and Compazine.    Upon initial evaluation today in the ED patient states that he was unable to eat any food, or drinking liquids without vomiting.  Patient's last bowel movement was yesterday morning, but felt fairly constipated prior to that BM.  Patient states that fluids and Compazine helped pain in the ED, but gets home and feels really sick again in 1-2 days.  Patient is currently on Reglan, but this does not help resolve his nausea or vomiting.  Patient denies any fevers in the ED.    Past Medical History:   Diagnosis Date     CMV (cytomegalovirus infection) status negative 2011     Coronary artery disease, non-occlusive 2008    angio showed diffuse disease with no lesions     Diabetes mellitus, type 1     Diagnosed at age 11     Dialysis patient (H)     prior to kidney transplant     Dyslipidemia      Gastroesophageal reflux disease       History of blood transfusion      Hypertension      Immunosuppressed status (H)      Kidney transplant status, living related donor           Migraines      Mycotic aneurysm of kidney transplant (H) 2008     Noncompliance with treatment     no labs for one year     Pancreas replaced by transplant (H) 2009    rejection treated with thymo     Pancreatic disease     pancreas transplant     Tobacco abuse ongoing       Past Surgical History:   Procedure Laterality Date     ARTHROSCOPY KNEE WITH LATERAL MENISCECTOMY Left 7/10/2019    Procedure: Left knee arthroscopic partial lateral meniscectomy;  Surgeon: Alex Candelaria MD;  Location: [facility] OR     BYPASS GRAFT ARTERY CORONARY N/A 2015    Procedure: BYPASS GRAFT ARTERY CORONARY;  Surgeon: Katy Neville MD;  Location: [facility] OR     ESOPHAGOSCOPY, GASTROSCOPY, DUODENOSCOPY (EGD), COMBINED N/A 2020    Procedure: ESOPHAGOGASTRODUODENOSCOPY (EGD) biopsies w/forceps;  Surgeon: Emre Gomes MD;  Location:  GI     EYE SURGERY      vitrectomy both eyes      ORTHOPEDIC SURGERY      tumor removed from left knee     TRANSPLANT  .    pancrease and kidney transplant       Family History   Problem Relation Age of Onset     Unknown/Adopted Father      Heart Disease Maternal Grandfather 62       Social History     Tobacco Use     Smoking status: Former Smoker     Packs/day: 0.30     Types: Cigarettes     Last attempt to quit: 7/3/2015     Years since quittin.5     Smokeless tobacco: Never Used     Tobacco comment: E- Cig use still   Substance Use Topics     Alcohol use: Not Currently     Alcohol/week: 0.0 standard drinks     Frequency: Never     Binge frequency: Never       Current Facility-Administered Medications   Medication     0.9% sodium chloride BOLUS     lactated ringers infusion     Current Outpatient Medications   Medication     acetaminophen (TYLENOL) 325 MG tablet     acetone, Urine, test STRP     amLODIPine (NORVASC) 5 MG  "tablet     atorvastatin (LIPITOR) 40 MG tablet     YOHAN CONTOUR test strip     blood glucose monitoring (NO BRAND SPECIFIED) test strip     blood glucose monitoring (NO BRAND SPECIFIED) test strip     Blood Glucose Monitoring Suppl ([email or web address] CONTOUR MONITOR) W/DEVICE KIT     carvedilol (COREG) 6.25 MG tablet     insulin aspart (NOVOLOG FLEXPEN) 100 UNIT/ML pen     insulin aspart (NOVOLOG FLEXPEN) 100 UNIT/ML pen     insulin aspart (NOVOLOG FLEXPEN) 100 UNIT/ML pen     insulin degludec (TRESIBA) 100 UNIT/ML pen     insulin pen needle (BD ARPITA U/F) 32G X 4 MM     metoclopramide (REGLAN) 5 MG tablet     mycophenolic acid (GENERIC EQUIVALENT) 180 MG EC tablet     omeprazole (PRILOSEC) 40 MG DR capsule     ondansetron (ZOFRAN-ODT) 4 MG ODT tab     tacrolimus (GENERIC EQUIVALENT) 1 MG capsule        Allergies   Allergen Reactions     Benadryl [Diphenhydramine]      Reglan Other (See Comments)     IV, delsuions     I have reviewed the Medications, Allergies, Past Medical and Surgical History, and Social History in the Epic system.    Review of Systems  ROS: 14 point ROS neg other than the symptoms noted above in the HPI.  Physical Exam   BP: 103/75  Pulse: 103  Temp: 99  F (37.2  C)  Resp: 18  Height: 172.7 cm (5' 8\")  Weight: 90.7 kg (200 lb)  SpO2: 98 %      Physical Exam  GEN: Well appearing, non toxic, cooperative.   HEENT: The head is normocephalic and atraumatic. Pupils are equal round and reactive to light. Extraocular motions are intact. There is no facial swelling. The neck is nontender and supple.   CV: Regular rate and rhythm without murmurs rubs or gallops. 2+ radial pulses bilaterally.  PULM: Clear to auscultation bilaterally.  ABD: Soft, nontender, nondistended.   EXT: Full range of motion.  No edema.  NEURO: Cranial nerves II through XII are intact and symmetric. Bilateral upper and lower extremities grossly show full range of motion without any focal deficits.   SKIN: No rashes, ecchymosis, or " lacerations  PSYCH: Calm and cooperative, interactive.   ED Course   5:48 PM  The patient was seen and examined by Miah Yepez MD in Room ED38.     Medications   lactated ringers infusion (has no administration in time range)   0.9% sodium chloride BOLUS (has no administration in time range)   lactated ringers BOLUS 1,000 mL (1,000 mLs Intravenous New Bag 2/5/20 1647)   prochlorperazine (COMPAZINE) injection 10 mg (10 mg Intravenous Given 2/5/20 1647)        Procedures             Critical Care time:  none             Labs Ordered and Resulted from Time of ED Arrival Up to the Time of Departure from the ED   CBC WITH PLATELETS DIFFERENTIAL - Abnormal; Notable for the following components:       Result Value    RBC Count 4.30 (*)     Hemoglobin 12.4 (*)     Hematocrit 38.4 (*)     All other components within normal limits   COMPREHENSIVE METABOLIC PANEL - Abnormal; Notable for the following components:    Glucose 182 (*)     Urea Nitrogen 43 (*)     Creatinine 5.99 (*)     GFR Estimate 11 (*)     GFR Estimate If Black 12 (*)     Alkaline Phosphatase 162 (*)     All other components within normal limits   LIPASE - Abnormal; Notable for the following components:    Lipase 39 (*)     All other components within normal limits   GLUCOSE BY METER - Abnormal; Notable for the following components:    Glucose 170 (*)     All other components within normal limits   GLUCOSE MONITOR NURSING POCT   LACTIC ACID WHOLE BLOOD   GLUCOSE MONITOR NURSING POCT   GLUCOSE MONITOR NURSING POCT   PERIPHERAL IV CATHETER   IP ASSIGN PROVIDER TEAM TO TREATMENT TEAM   VITAL SIGNS   PERIPHERAL IV CATHETER   PULSE OXIMETRY NURSING   INTAKE AND OUTPUT   APPLY PNEUMATIC COMPRESSION DEVICE (PCD)   ASSESS FOR HYPOGLYCEMIA SYMPTOMS   NOTIFY PHYSICIAN     Results for orders placed or performed during the hospital encounter of 02/05/20 (from the past 24 hour(s))   CBC with platelets differential   Result Value Ref Range    WBC 9.1 4.0 - 11.0 10e9/L     RBC Count 4.30 (L) 4.4 - 5.9 10e12/L    Hemoglobin 12.4 (L) 13.3 - 17.7 g/dL    Hematocrit 38.4 (L) 40.0 - 53.0 %    MCV 89 78 - 100 fl    MCH 28.8 26.5 - 33.0 pg    MCHC 32.3 31.5 - 36.5 g/dL    RDW 13.2 10.0 - 15.0 %    Platelet Count 388 150 - 450 10e9/L    Diff Method Automated Method     % Neutrophils 84.0 %    % Lymphocytes 10.5 %    % Monocytes 4.0 %    % Eosinophils 0.3 %    % Basophils 0.4 %    % Immature Granulocytes 0.8 %    Nucleated RBCs 0 0 /100    Absolute Neutrophil 7.6 1.6 - 8.3 10e9/L    Absolute Lymphocytes 1.0 0.8 - 5.3 10e9/L    Absolute Monocytes 0.4 0.0 - 1.3 10e9/L    Absolute Eosinophils 0.0 0.0 - 0.7 10e9/L    Absolute Basophils 0.0 0.0 - 0.2 10e9/L    Abs Immature Granulocytes 0.1 0 - 0.4 10e9/L    Absolute Nucleated RBC 0.0    Comprehensive metabolic panel   Result Value Ref Range    Sodium 136 133 - 144 mmol/L    Potassium 4.3 3.4 - 5.3 mmol/L    Chloride 105 94 - 109 mmol/L    Carbon Dioxide 22 20 - 32 mmol/L    Anion Gap 8 3 - 14 mmol/L    Glucose 182 (H) 70 - 99 mg/dL    Urea Nitrogen 43 (H) 7 - 30 mg/dL    Creatinine 5.99 (H) 0.66 - 1.25 mg/dL    GFR Estimate 11 (L) >60 mL/min/[1.73_m2]    GFR Estimate If Black 12 (L) >60 mL/min/[1.73_m2]    Calcium 9.0 8.5 - 10.1 mg/dL    Bilirubin Total 0.4 0.2 - 1.3 mg/dL    Albumin 3.4 3.4 - 5.0 g/dL    Protein Total 7.8 6.8 - 8.8 g/dL    Alkaline Phosphatase 162 (H) 40 - 150 U/L    ALT 12 0 - 70 U/L    AST 7 0 - 45 U/L   Lipase   Result Value Ref Range    Lipase 39 (L) 73 - 393 U/L   Lactic acid   Result Value Ref Range    Lactic Acid 1.9 0.7 - 2.0 mmol/L   Glucose by meter   Result Value Ref Range    Glucose 170 (H) 70 - 99 mg/dL     *Note: Due to a large number of results and/or encounters for the requested time period, some results have not been displayed. A complete set of results can be found in Results Review.            Assessments & Plan (with Medical Decision Making)   Patient is a 44-year-old male who presents with 2 or 3 days of  nausea vomiting and intermittent abdominal cramping in the setting of known gastroparesis.  Initial vitals were notable for some mild tachycardia.  Exam as above and reassuring in regards to his abdominal exam.  Labs were obtained and showed a slight worsening of his kidney function.  Patient was given IV fluids and Compazine for symptomatic control which was minimally helpful.  Patient did have additional episode of emesis which she was given further Compazine.  Based on his kidney function, and difficult to control nausea vomiting, he was admitted to medicine.  Patient did remain stable during his entire stay in the emergency department was admitted to the medicine service.    I have reviewed the nursing notes.        New Prescriptions    No medications on file       Final diagnoses:   Nausea and vomiting, intractability of vomiting not specified, unspecified vomiting type   Gastroparesis   I, Valentino Burnett, am serving as a trained medical scribe to document services personally performed by Miah Yepez MD, based on the provider's statements to me.      IMiah MD, was physically present and have reviewed and verified the accuracy of this note documented by Valentino Burnett.     2/5/2020   [facility], [place], EMERGENCY DEPARTMENT     Miah Yepez MD  02/05/20 [id number]

## 2020-02-05 NOTE — LETTER
Transition Communication Hand-off for Care Transitions to Next Level of Care Provider    Name: Murtaza Fitzgerald  : 1975  MRN #: [id number]  Primary Care Provider: Kt Ríos     Primary Clinic: [address] [place] [address] DR [address]O [id number]     Reason for Hospitalization:  Gastroparesis [K31.84]  Nausea and vomiting, intractability of vomiting not specified, unspecified vomiting type [R11.2]  Admit Date/Time: 2020  5:33 PM  Discharge Date:   Payor Source: No coverage found.               Reason for Communication Hand-off Referral: Other nausea and vomiting--kidney failure    Discharge Plan:Care Coordinator: Post discharge  D/I: Per call from Janette Vaughn pt --pt's situation is complex in that he lsot his job on 2020--he is getting paid a lot of money and his sister will find out when the payments end and then cna determine when he might be eligible for [employer].  P: Currently he is pending for [employer]. OPCC: Jyoti Perez--I have sent her this message.  Pt did NOT need dialysis when he discharged on /--will follow labs closely per Dr Jack Chen--Tx nephrologist.       Concern for non-adherence with plan of care:   Y/N Yes--no insurance currently  Discharge Needs Assessment:  Needs      Most Recent Value   Anticipated Changes Related to Illness  none        Follow-up specialty is recommended: Yes    Follow-up plan:    Future Appointments   Date Time Provider Department Center   2020  1:15 PM 3[facility]       Any outstanding tests or procedures:              Key Recommendations:      Cathie Rose RN    AVS/Discharge Summary is the source of truth; this is a helpful guide for improved communication of patient story

## 2020-02-06 LAB
ALBUMIN SERPL-MCNC: 2.9 G/DL (ref 3.4–5)
ALP SERPL-CCNC: 138 U/L (ref 40–150)
ALT SERPL W P-5'-P-CCNC: 10 U/L (ref 0–70)
ANION GAP SERPL CALCULATED.3IONS-SCNC: 10 MMOL/L (ref 3–14)
AST SERPL W P-5'-P-CCNC: 9 U/L (ref 0–45)
BILIRUB DIRECT SERPL-MCNC: <0.1 MG/DL (ref 0–0.2)
BILIRUB SERPL-MCNC: 0.5 MG/DL (ref 0.2–1.3)
BUN SERPL-MCNC: 43 MG/DL (ref 7–30)
CALCIUM SERPL-MCNC: 8.4 MG/DL (ref 8.5–10.1)
CHLORIDE SERPL-SCNC: 107 MMOL/L (ref 94–109)
CO2 SERPL-SCNC: 20 MMOL/L (ref 20–32)
CREAT SERPL-MCNC: 5.85 MG/DL (ref 0.66–1.25)
ERYTHROCYTE [DISTWIDTH] IN BLOOD BY AUTOMATED COUNT: 13.2 % (ref 10–15)
GFR SERPL CREATININE-BSD FRML MDRD: 11 ML/MIN/{1.73_M2}
GLUCOSE BLDC GLUCOMTR-MCNC: 161 MG/DL (ref 70–99)
GLUCOSE BLDC GLUCOMTR-MCNC: 163 MG/DL (ref 70–99)
GLUCOSE BLDC GLUCOMTR-MCNC: 188 MG/DL (ref 70–99)
GLUCOSE BLDC GLUCOMTR-MCNC: 200 MG/DL (ref 70–99)
GLUCOSE BLDC GLUCOMTR-MCNC: 220 MG/DL (ref 70–99)
GLUCOSE BLDC GLUCOMTR-MCNC: 236 MG/DL (ref 70–99)
GLUCOSE SERPL-MCNC: 169 MG/DL (ref 70–99)
HCT VFR BLD AUTO: 33.7 % (ref 40–53)
HGB BLD-MCNC: 10.9 G/DL (ref 13.3–17.7)
MCH RBC QN AUTO: 28.8 PG (ref 26.5–33)
MCHC RBC AUTO-ENTMCNC: 32.3 G/DL (ref 31.5–36.5)
MCV RBC AUTO: 89 FL (ref 78–100)
PLATELET # BLD AUTO: 299 10E9/L (ref 150–450)
POTASSIUM SERPL-SCNC: 4 MMOL/L (ref 3.4–5.3)
PROT SERPL-MCNC: 6.6 G/DL (ref 6.8–8.8)
RBC # BLD AUTO: 3.78 10E12/L (ref 4.4–5.9)
SODIUM SERPL-SCNC: 137 MMOL/L (ref 133–144)
WBC # BLD AUTO: 8 10E9/L (ref 4–11)

## 2020-02-06 PROCEDURE — 25000132 ZZH RX MED GY IP 250 OP 250 PS 637: Performed by: PHYSICIAN ASSISTANT

## 2020-02-06 PROCEDURE — 40000141 ZZH STATISTIC PERIPHERAL IV START W/O US GUIDANCE

## 2020-02-06 PROCEDURE — 85027 COMPLETE CBC AUTOMATED: CPT | Performed by: PHYSICIAN ASSISTANT

## 2020-02-06 PROCEDURE — 00000146 ZZHCL STATISTIC GLUCOSE BY METER IP

## 2020-02-06 PROCEDURE — 25000125 ZZHC RX 250: Performed by: PHYSICIAN ASSISTANT

## 2020-02-06 PROCEDURE — 25000131 ZZH RX MED GY IP 250 OP 636 PS 637: Performed by: INTERNAL MEDICINE

## 2020-02-06 PROCEDURE — 12000012 ZZH R&B MS OVERFLOW UMMC

## 2020-02-06 PROCEDURE — 25800030 ZZH RX IP 258 OP 636: Performed by: PHYSICIAN ASSISTANT

## 2020-02-06 PROCEDURE — 25000125 ZZHC RX 250: Performed by: INTERNAL MEDICINE

## 2020-02-06 PROCEDURE — 36415 COLL VENOUS BLD VENIPUNCTURE: CPT | Performed by: PHYSICIAN ASSISTANT

## 2020-02-06 PROCEDURE — 25000128 H RX IP 250 OP 636: Performed by: PHYSICIAN ASSISTANT

## 2020-02-06 PROCEDURE — 25000131 ZZH RX MED GY IP 250 OP 636 PS 637: Performed by: PHYSICIAN ASSISTANT

## 2020-02-06 PROCEDURE — 80076 HEPATIC FUNCTION PANEL: CPT | Performed by: PHYSICIAN ASSISTANT

## 2020-02-06 PROCEDURE — 80048 BASIC METABOLIC PNL TOTAL CA: CPT | Performed by: PHYSICIAN ASSISTANT

## 2020-02-06 PROCEDURE — 25000128 H RX IP 250 OP 636: Performed by: INTERNAL MEDICINE

## 2020-02-06 PROCEDURE — 99233 SBSQ HOSP IP/OBS HIGH 50: CPT | Performed by: INTERNAL MEDICINE

## 2020-02-06 PROCEDURE — 25800030 ZZH RX IP 258 OP 636: Performed by: INTERNAL MEDICINE

## 2020-02-06 RX ORDER — SODIUM CHLORIDE 9 MG/ML
INJECTION, SOLUTION INTRAVENOUS
Status: DISPENSED
Start: 2020-02-06 | End: 2020-02-07

## 2020-02-06 RX ORDER — HYDRALAZINE HYDROCHLORIDE 20 MG/ML
20 INJECTION INTRAMUSCULAR; INTRAVENOUS EVERY 6 HOURS PRN
Status: DISCONTINUED | OUTPATIENT
Start: 2020-02-06 | End: 2020-02-09 | Stop reason: HOSPADM

## 2020-02-06 RX ORDER — SUMATRIPTAN 6 MG/.5ML
6 INJECTION, SOLUTION SUBCUTANEOUS ONCE
Status: COMPLETED | OUTPATIENT
Start: 2020-02-06 | End: 2020-02-06

## 2020-02-06 RX ORDER — DEXTROSE MONOHYDRATE 50 MG/ML
INJECTION, SOLUTION INTRAVENOUS
Status: DISPENSED
Start: 2020-02-06 | End: 2020-02-07

## 2020-02-06 RX ORDER — METOCLOPRAMIDE HYDROCHLORIDE 5 MG/5ML
5 SOLUTION ORAL
Status: DISCONTINUED | OUTPATIENT
Start: 2020-02-06 | End: 2020-02-06

## 2020-02-06 RX ORDER — METOCLOPRAMIDE HYDROCHLORIDE 5 MG/5ML
10 SOLUTION ORAL 2 TIMES DAILY PRN
Status: DISCONTINUED | OUTPATIENT
Start: 2020-02-06 | End: 2020-02-09 | Stop reason: HOSPADM

## 2020-02-06 RX ORDER — ONDANSETRON 2 MG/ML
4 INJECTION INTRAMUSCULAR; INTRAVENOUS EVERY 6 HOURS PRN
Status: DISCONTINUED | OUTPATIENT
Start: 2020-02-06 | End: 2020-02-06

## 2020-02-06 RX ORDER — ONDANSETRON 2 MG/ML
8 INJECTION INTRAMUSCULAR; INTRAVENOUS EVERY 6 HOURS PRN
Status: DISCONTINUED | OUTPATIENT
Start: 2020-02-06 | End: 2020-02-09 | Stop reason: HOSPADM

## 2020-02-06 RX ADMIN — CARVEDILOL 6.25 MG: 6.25 TABLET, FILM COATED ORAL at 18:06

## 2020-02-06 RX ADMIN — TACROLIMUS 55 MCG/HR: 5 INJECTION, SOLUTION INTRAVENOUS at 20:18

## 2020-02-06 RX ADMIN — SODIUM CHLORIDE, POTASSIUM CHLORIDE, SODIUM LACTATE AND CALCIUM CHLORIDE: 600; 310; 30; 20 INJECTION, SOLUTION INTRAVENOUS at 06:37

## 2020-02-06 RX ADMIN — LIDOCAINE HYDROCHLORIDE 30 ML: 20 SOLUTION ORAL; TOPICAL at 12:12

## 2020-02-06 RX ADMIN — SUMATRIPTAN SUCCINATE 6 MG: 6 INJECTION SUBCUTANEOUS at 16:42

## 2020-02-06 RX ADMIN — LORAZEPAM 0.5 MG: 2 INJECTION INTRAMUSCULAR; INTRAVENOUS at 16:38

## 2020-02-06 RX ADMIN — PROCHLORPERAZINE EDISYLATE 10 MG: 5 INJECTION INTRAMUSCULAR; INTRAVENOUS at 18:29

## 2020-02-06 RX ADMIN — PROCHLORPERAZINE EDISYLATE 10 MG: 5 INJECTION INTRAMUSCULAR; INTRAVENOUS at 03:08

## 2020-02-06 RX ADMIN — OMEPRAZOLE 40 MG: 20 CAPSULE, DELAYED RELEASE ORAL at 20:12

## 2020-02-06 RX ADMIN — AMLODIPINE BESYLATE 5 MG: 5 TABLET ORAL at 16:48

## 2020-02-06 RX ADMIN — ONDANSETRON 8 MG: 2 INJECTION INTRAMUSCULAR; INTRAVENOUS at 14:29

## 2020-02-06 RX ADMIN — INSULIN ASPART 1 UNITS: 100 INJECTION, SOLUTION INTRAVENOUS; SUBCUTANEOUS at 16:54

## 2020-02-06 RX ADMIN — LORAZEPAM 0.5 MG: 2 INJECTION INTRAMUSCULAR; INTRAVENOUS at 22:33

## 2020-02-06 RX ADMIN — INSULIN ASPART 0.5 UNITS: 100 INJECTION, SOLUTION INTRAVENOUS; SUBCUTANEOUS at 20:14

## 2020-02-06 RX ADMIN — HYDRALAZINE HYDROCHLORIDE 20 MG: 20 INJECTION INTRAMUSCULAR; INTRAVENOUS at 12:37

## 2020-02-06 RX ADMIN — PROCHLORPERAZINE EDISYLATE 10 MG: 5 INJECTION INTRAMUSCULAR; INTRAVENOUS at 09:53

## 2020-02-06 RX ADMIN — ONDANSETRON 8 MG: 2 INJECTION INTRAMUSCULAR; INTRAVENOUS at 20:10

## 2020-02-06 RX ADMIN — INSULIN DEGLUDEC INJECTION 10 UNITS: 100 INJECTION, SOLUTION SUBCUTANEOUS at 22:06

## 2020-02-06 RX ADMIN — ONDANSETRON 8 MG: 2 INJECTION INTRAMUSCULAR; INTRAVENOUS at 08:05

## 2020-02-06 RX ADMIN — MYCOPHENOLATE MOFETIL 500 MG: 500 INJECTION, POWDER, LYOPHILIZED, FOR SOLUTION INTRAVENOUS at 20:52

## 2020-02-06 RX ADMIN — LORAZEPAM 0.5 MG: 2 INJECTION INTRAMUSCULAR; INTRAVENOUS at 11:39

## 2020-02-06 RX ADMIN — ERYTHROMYCIN LACTOBIONATE 250 MG: 500 INJECTION, POWDER, LYOPHILIZED, FOR SOLUTION INTRAVENOUS at 16:48

## 2020-02-06 ASSESSMENT — ACTIVITIES OF DAILY LIVING (ADL)
ADLS_ACUITY_SCORE: 11
ADLS_ACUITY_SCORE: 11
ADLS_ACUITY_SCORE: 13
ADLS_ACUITY_SCORE: 11

## 2020-02-06 NOTE — ED NOTES
[facility], [place]   ED Nurse to Floor Handoff     Murtaza Fitzgerald is a 44 year old male who speaks English and lives with others,  in a home  They arrived in the ED by car from home    ED Chief Complaint: Abdominal Pain    ED Dx;   Final diagnoses:   Nausea and vomiting, intractability of vomiting not specified, unspecified vomiting type   Gastroparesis         Needed?: No    Allergies:   Allergies   Allergen Reactions     Benadryl [Diphenhydramine]      Reglan Other (See Comments)     IV, delsuions   .  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     Noncompliance with treatment     no labs for one year     Pancreas replaced by transplant (H) Feb 2009    rejection treated with thymo     Pancreatic disease     pancreas transplant     Tobacco abuse ongoing      Baseline Mental status: WDL  Current Mental Status changes: at basesline    Infection present or suspected this encounter: no  Sepsis suspected: No  Isolation type: No active isolations     Activity level - Baseline/Home:  Independent  Activity Level - Current:   Independent    Bariatric equipment needed?: No    In the ED these meds were given:   Medications   lactated ringers infusion (has no administration in time range)   0.9% sodium chloride BOLUS (has no administration in time range)   lactated ringers BOLUS 1,000 mL (0 mLs Intravenous Stopped 2/5/20 1830)  "  prochlorperazine (COMPAZINE) injection 10 mg (10 mg Intravenous Given 2/5/20 5190)       Drips running?  No    Home pump  No    Current LDAs  Peripheral IV 02/05/20 Right Upper forearm (Active)   Site Assessment WDL 2/5/2020  4:38 PM   Number of days: 0       Wound 01/28/20 Anterior;Right;Lateral Ulceration dime size scabbed over, pt diabetic states he has had for 3 months. No drainage (Active)   Number of days: 8       Incision/Surgical Site 07/10/19 Left Knee (Active)   Number of days: 210       Labs results:   Labs Ordered and Resulted from Time of ED Arrival Up to the Time of Departure from the ED   CBC WITH PLATELETS DIFFERENTIAL - Abnormal; Notable for the following components:       Result Value    RBC Count 4.30 (*)     Hemoglobin 12.4 (*)     Hematocrit 38.4 (*)     All other components within normal limits   COMPREHENSIVE METABOLIC PANEL - Abnormal; Notable for the following components:    Glucose 182 (*)     Urea Nitrogen 43 (*)     Creatinine 5.99 (*)     GFR Estimate 11 (*)     GFR Estimate If Black 12 (*)     Alkaline Phosphatase 162 (*)     All other components within normal limits   LIPASE - Abnormal; Notable for the following components:    Lipase 39 (*)     All other components within normal limits   GLUCOSE BY METER - Abnormal; Notable for the following components:    Glucose 170 (*)     All other components within normal limits   GLUCOSE MONITOR NURSING POCT   LACTIC ACID WHOLE BLOOD   PERIPHERAL IV CATHETER       Imaging Studies: No results found for this or any previous visit (from the past 24 hour(s)).    Recent vital signs:   /87   Pulse 103   Temp 99  F (37.2  C) (Oral)   Resp 18   Ht 1.727 m (5' 8\")   Wt 90.7 kg (200 lb)   SpO2 100%   BMI 30.41 kg/m      [place] Coma Scale Score: 15 (02/05/20 1631)       Cardiac Rhythm: Normal Sinus  Pt needs tele? No  Skin/wound Issues: None    Code Status: Full Code    Pain control: good    Nausea control: fair    Abnormal " labs/tests/findings requiring intervention: N/A    Family present during ED course? Yes   Family Comments/Social Situation comments: N/A    Tasks needing completion: None    Eric Knight RN    [phone] [facility]  [phone] [facility]

## 2020-02-06 NOTE — CONSULTS
GASTROENTEROLOGY CONSULT  DATE OF SERVICE: 2/6/2020  PHYSICIAN REQUESTING CONSULT:Magdalena Mondragon [facility] medicine team   PATIENT MRN: [id number]  Reason for Consultation: Gastroparesis, consideration of gastric pacemaker and medication management    ASSESSMENT:  44-year-old male with history of gastroparesis, type 1 diabetes, kidney transplant in 11/2008, pancreas transplant in 2/2009, CAD s/p CABG who was admitted on 2/5/2020 with nausea, vomiting, abdominal pain.  We are consulted for management of his gastroparesis. His most recent gastric emptying study was slightly worse compared to the prior one. Given abdominal pain, nausea/vomiting differential also include pancreatitis however this is less likely given normal lipase.  Also on the differential for nausea/vomiting is chronic vomiting syndrome, abdominal migraine, uremia, mechanical obstruction (this is less likely given normal recent EGD).     For gastroparesis, would recommend stopping medications that can worsen his sx. After careful review of his home and hospital medications, compazine has anticholinergic activity and thus can worsen gastroparesis, thus recommend stopping it. We can trial 3 days of erythromycin to help him through this acute phase. If there is migraine etiology for his sx, can also add triptan to see if this helps. Zofran is helpful as an anti-emetic but has no effect in treating gastroparesis. Glycemic control in diabetes induced gastroparesis is known to improve symptoms.  He is type 1 diabetic with good glycemic control w/ most recent A1c 6.7. Other options to consider in the future is G-tube venting and feeding through J tube, assuming that he does NOT have small bowel dysmotility.  Regarding gastric pacemaker, this would be something he would follow-up with transplant surgery, Dr. Guzman and they can consult GI as an outpatient. We do NOT do this in-patient.      RECOMMENDATIONS:  - Erythromycin IV 3mg/kg q8rs for 3 days  -  Sumatriptan IM 6mg once  - Discontinue compazine  - Continue prn zofran  - Monitor Qtc given prolonging meds per primary team  - Agree with nephrology trial of dialysis to see if nausea resolves       GI will continue to follow. Please call or page if any questions.       The patient was seen, discussed, and plan agreed upon with Dr. Marie, attending gastroenterologist.    Pepper Corral, PGY-2  Internal Medicine  P: [phone]  __________________________________________________________________________________  HPI:  44-year-old male with history of gastroparesis, type 1 diabetes, kidney transplant in 11/2008, pancreas transplant in 2/2009, CAD s/p CABG who was admitted on 2/5/2020 with nausea, vomiting, abdominal pain.     He reports multiple admissions for nausea/vomiting that is associated with abdominal pain.  Antiemetics are not that helpful and is unable to keep anything down including pills.  He reports diffuse abdominal pain, but denies hematemesis, diarrhea, hematochezia, melena.  Overall he throws up he is mostly dry heaving.  It is difficult to determine if he has postprandial fullness given inability to take po.  He is a previous smoker, denies drinking.    It appears he is being followed by transplant surgery, Dr. Guzman and his NM gastric emptying study have been prolonged c/w gastroparesis.  Most recent EGD on 1/28/2020 showed some erythema in the gastric body with normal esophagus.      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     Noncompliance with treatment     no labs for one year     Pancreas replaced by transplant (H) Feb 2009    rejection treated with thymo     Pancreatic disease     pancreas transplant     Tobacco abuse ongoing       PSurgHx:   Past Surgical History:   Procedure Laterality Date     ARTHROSCOPY KNEE WITH LATERAL MENISCECTOMY Left 7/10/2019    Procedure: Left knee arthroscopic partial lateral meniscectomy;  Surgeon: Alex Candelaria MD;  Location:  OR     BYPASS GRAFT ARTERY CORONARY N/A 8/5/2015    Procedure: BYPASS GRAFT ARTERY CORONARY;  Surgeon: Katy Neville MD;  Location: [facility] OR     ESOPHAGOSCOPY, GASTROSCOPY, DUODENOSCOPY (EGD), COMBINED N/A 1/28/2020    Procedure: ESOPHAGOGASTRODUODENOSCOPY (EGD) biopsies w/forceps;  Surgeon: Emre Gomes MD;  Location:  GI     EYE SURGERY      vitrectomy both eyes      ORTHOPEDIC SURGERY  1993    tumor removed from left knee     TRANSPLANT  2009.2008    pancrease and kidney transplant       MEDS:  No current facility-administered medications on file prior to encounter.   acetaminophen (TYLENOL) 325 MG tablet, Take 2 tablets (650 mg) by mouth every 4 hours as needed for mild pain  atorvastatin (LIPITOR) 40 MG tablet, Take 40 mg by mouth daily  insulin aspart (NOVOLOG FLEXPEN) 100 UNIT/ML pen, Inject Subcutaneous 3 times daily (with meals) .  (1 unit per 6 grams of carbs)  insulin aspart (NOVOLOG FLEXPEN) 100 UNIT/ML pen, Sliding scale = 1 unit for every 40 over blood glucose of 140  insulin degludec (TRESIBA) 100 UNIT/ML pen, Inject 24 Units Subcutaneous At Bedtime  metoclopramide (REGLAN) 5 MG tablet, TAKE ONE TABLET BY MOUTH FOUR TIMES A DAY BEFORE MEALS AND NIGHTLY  omeprazole (PRILOSEC) 40 MG DR capsule, Take 40 mg by mouth every evening  ondansetron (ZOFRAN-ODT) 4 MG ODT tab, Take 1-2 tablets (4-8 mg) by mouth every 8 hours as needed for nausea  acetone, Urine, test STRP, 1 strip by In  Vitro route daily  amLODIPine (NORVASC) 5 MG tablet, Take 1 tablet (5 mg) by mouth daily  YOHAN CONTOUR test strip, USE TO TEST BLOOD SUGAR FIVE TIMES A DAY OR AS DIRECTED  blood glucose monitoring (NO BRAND SPECIFIED) test strip, Freestyle test strips 6 times daily (NOT lite and NOT insulinx)  blood glucose monitoring (NO BRAND SPECIFIED) test strip, 5 times daily ( contour NEXT)  Blood Glucose Monitoring Suppl (Good[employer]lic CONTOUR MONITOR) W/DEVICE KIT, TO TEST BG 5 TIMIES DAILY  carvedilol (COREG) 6.25 MG tablet, Take 1 tablet (6.25 mg) by mouth 2 times daily (with meals)  insulin pen needle (BD ARPITA U/F) 32G X 4 MM, Use 4 daily or as directed.  mycophenolic acid (GENERIC EQUIVALENT) 180 MG EC tablet, Take 360 mg by mouth 2 times daily  tacrolimus (GENERIC EQUIVALENT) 1 MG capsule, Take 2 capsules (2 mg) by mouth 2 times daily        ALLERGIES:    Allergies   Allergen Reactions     Benadryl [Diphenhydramine]      Reglan Other (See Comments)     IV, delsuions       FHx:  Family History   Problem Relation Age of Onset     Unknown/Adopted Father      Heart Disease Maternal Grandfather 62       SOCIAL Hx:  Social History     Socioeconomic History     Marital status: Single     Spouse name: Not on file     Number of children: 1     Years of education: Not on file     Highest education level: Associate degree: academic program   Occupational History     Occupation:    Social Needs     Financial resource strain: Not very hard     Food insecurity:     Worry: Never true     Inability: Never true     Transportation needs:     Medical: No     Non-medical: No   Tobacco Use     Smoking status: Former Smoker     Packs/day: 0.30     Types: Cigarettes     Last attempt to quit: 7/3/2015     Years since quittin.6     Smokeless tobacco: Never Used     Tobacco comment: E- Cig use still   Substance and Sexual Activity     Alcohol use: Not Currently     Alcohol/week: 0.0 standard drinks     Frequency: Never      "Binge frequency: Never     Drug use: No     Sexual activity: Never     Partners: Female   Lifestyle     Physical activity:     Days per week: 0 days     Minutes per session: 0 min     Stress: To some extent   Relationships     Social connections:     Talks on phone: More than three times a week     Gets together: Once a week     Attends [employer] service: Never     Active member of club or organization: No     Attends meetings of clubs or organizations: Never     Relationship status:      Intimate partner violence:     Fear of current or ex partner: Not on file     Emotionally abused: Not on file     Physically abused: Not on file     Forced sexual activity: Not on file   Other Topics Concern     Parent/sibling w/ CABG, MI or angioplasty before 65F 55M? No      Service Not Asked     Blood Transfusions No     Comment: possibly during surgery, otherwise none.     Caffeine Concern Not Asked     Occupational Exposure Not Asked     Hobby Hazards Not Asked     Sleep Concern Not Asked     Stress Concern Not Asked     Weight Concern Not Asked     Special Diet Not Asked     Back Care Not Asked     Exercise Not Asked     Bike Helmet Not Asked     Seat Belt Yes     Self-Exams Not Asked   Social History Narrative     Not on file       ROS: A comprehensive Review of Systems was asked and answered in the negative unless specifically commented upon in the HPI    Physical Exam  Blood pressure (!) 166/106, pulse 112, temperature 98.2  F (36.8  C), temperature source Oral, resp. rate 16, height 1.727 m (5' 8\"), weight 85.7 kg (189 lb), SpO2 97 %.  Gen: A&Ox3, mild distress  CV: RRR, murmurs  Lungs: normal wob   Abd: +bs, soft, distended, mild diffuse tenderness without rebound/guarding   Skin: no jaundice, no stigmata of chronic liver disease  MS: appropriate muscle mass for age  Neuro: non focal     LABS:  BMP  Recent Labs   Lab 02/06/20  0634 02/05/20  1639 02/01/20  1829    136 136   POTASSIUM 4.0 4.3 4.0 "   CHLORIDE 107 105 104   CHARLY 8.4* 9.0 9.0   CO2 20 22 22   BUN 43* 43* 41*   CR 5.85* 5.99* 4.94*   * 182* 335*     CBC  Recent Labs   Lab 02/06/20  0634 02/05/20  1639 02/01/20  1829   WBC 8.0 9.1 8.7   RBC 3.78* 4.30* 4.03*   HGB 10.9* 12.4* 11.7*   HCT 33.7* 38.4* 35.5*   MCV 89 89 88   MCH 28.8 28.8 29.0   MCHC 32.3 32.3 33.0   RDW 13.2 13.2 13.0    388 305     INRNo lab results found in last 7 days.  LFTs  Recent Labs   Lab 02/06/20  0634 02/05/20  1639 02/01/20  1829   ALKPHOS 138 162* 145   AST 9 7 <3   ALT 10 12 14   BILITOTAL 0.5 0.4 0.5   PROTTOTAL 6.6* 7.8 7.2   ALBUMIN 2.9* 3.4 3.1*      PANC  Recent Labs   Lab 02/05/20  1639 02/01/20  1829   LIPASE 39* 45*       IMAGING:  No abdominal imaging done here.

## 2020-02-06 NOTE — PHARMACY-ADMISSION MEDICATION HISTORY
"Admission medication history for the February 5, 2020 admission is complete.     Interview sources: Patient, Discharge Summary 1/29/2020, Dispense Hx    Medication adherence: Good- patient reports missing a dose of medication every few months.     Current outpatient pharmacy: None listed at this time.     Pertinent Medication Changes:  Added: None.     Deleted: None.     Changed: Per patient, the following medication have been updated to reflect how the patient take these medication at home:  Insulin Aspart 100 unit/mL:      6 units for every 30 grams of carbs -------->  1 unit for every 6 grams of carbs    1 unit for every 20 over blood glucose of 140 ---------> 1 unit for every 40 over blood glucose of 140.    Additional medication history information:   This medication history was completed at the patients bedside in the Adult Emergency Department.  Patient reports no changes in medication allergies.  Patient has been unable to tolerate oral intake for > 24 hours.  Per patient, his last dose of any medication was Tresiba on 2/4/2020.       Reglan:  Patient takes PO Reglan with each meal.  Of note, patient experiences delusions with use of IV Reglan.     Tacrolimus: Patient last took this medication on Monday, 2/3/2020.    Mycophenolic Acid:  Patient last took this medication on Monday, 2/3/2020.     Of note, patient was discharged from [facility]/29/2020 with instructions to start amlodipine and carvedilol.  Per patient, his blood pressure in \"normal\" outside of the clinic and has NOT STARTED either medication.     The patient denies currently taking any additional prescription, OTC or herbal medications at home.     Medication Sig Last Dose Taking?   acetaminophen (TYLENOL) 325 MG tablet Take 2 tablets (650 mg) by mouth every 4 hours as needed for mild pain Past Week at PRN Yes   atorvastatin (LIPITOR) 40 MG tablet Take 40 mg by mouth daily Past Week at PM Yes   insulin aspart (NOVOLOG FLEXPEN) 100 " UNIT/ML pen Inject Subcutaneous 3 times daily (with meals) .  (1 unit per 6 grams of carbs) Past Week at PM Yes   insulin aspart (NOVOLOG FLEXPEN) 100 UNIT/ML pen Sliding scale = 1 unit for every 40 over blood glucose of 140 Past Week at PRN Yes   insulin degludec (TRESIBA) 100 UNIT/ML pen Inject 24 Units Subcutaneous At Bedtime 2/4/2020 at PM Yes   metoclopramide (REGLAN) 5 MG tablet TAKE ONE TABLET BY MOUTH FOUR TIMES A DAY BEFORE MEALS AND NIGHTLY Past Week at PM Yes   omeprazole (PRILOSEC) 40 MG DR capsule Take 40 mg by mouth every evening Past Week at AM Yes   ondansetron (ZOFRAN-ODT) 4 MG ODT tab Take 1-2 tablets (4-8 mg) by mouth every 8 hours as needed for nausea 2/4/2020 at PRN Yes   acetone, Urine, test STRP 1 strip by In Vitro route daily More than a month    amLODIPine (NORVASC) 5 MG tablet Take 1 tablet (5 mg) by mouth daily NOT STARTED    ASSURED PHARMACY CONTOUR test strip USE TO TEST BLOOD SUGAR FIVE TIMES A DAY OR AS DIRECTED     blood glucose monitoring (NO BRAND SPECIFIED) test strip Freestyle test strips 6 times daily (NOT lite and NOT insulinx)     blood glucose monitoring (NO BRAND SPECIFIED) test strip 5 times daily ( contour NEXT)     Blood Glucose Monitoring Suppl ([employer] CONTOUR MONITOR) W/DEVICE KIT TO TEST BG 5 TIMIES DAILY     carvedilol (COREG) 6.25 MG tablet Take 1 tablet (6.25 mg) by mouth 2 times daily (with meals) NOT STARTED    insulin pen needle (BD ARPITA U/F) 32G X 4 MM Use 4 daily or as directed.     mycophenolic acid (GENERIC EQUIVALENT) 180 MG EC tablet Take 360 mg by mouth 2 times daily 2/3/2020    tacrolimus (GENERIC EQUIVALENT) 1 MG capsule Take 2 capsules (2 mg) by mouth 2 times daily 2/3/2020      Time spent: 30 minutes    Medication history completed by:  Kell Rodrigez, Pharmacy Intern

## 2020-02-06 NOTE — PROGRESS NOTES
[facility], [facility] Progress Note - Hospitalist Service, Gold 09       Date of Admission:  2/5/2020  Assessment & Plan       kelsea Fitzgerald is a 44 year old male admitted on 2/5/2020. He has a past medical history of type I diabetes, chronic kidney disease status post kidney transplant (November 2008), pancreatic transplant (February 2009), CAD s/p bypass graft, hypertension, GERD, gastroparesis who presented to the ER with 2 days of nausea, vomiting and not being able to keep anything down and was found to have recurrent gastroparesis and is being admitted to Internal Medicine for further care    Plan for today   - symptom control   - transplant neohro and GI eval     #Gastroparesis with nausea, vomiting  -Continue with Compazine 10mg q6h, Zofran 8mg q8h, Ativan 0.5mg IV prn for nausea  -Gastroenterology consulted   - patient started on Erythromycin 3 mg/kg TID for 3 days and one dose of sumatriptam   -continue r GI cocktail to see if this helps with symptoms as well  -per nephrology symptoms could be related to uremia and will discuss trial of dialysis with the patient  - patient to follow outpatient gastroenterology once symptoms controlled         2. Acute on chronic kidney injury secondary to dehydration  -Cr 5.99, no other acute electrolyte abnormalities. Baseline over the past year has been creeping up from 2.5 - 5.   -patient has CKD which is advanced and may end up on dialysis    - symptoms might be due to poor rental function and nephrology will try dialysis arrangement tomorrow     3. Type 1 diabetes  -Home regimen includes Tresiba 24U at night. On admission, given poor oral intake, we will order 10U at night along with sliding scale insulin Aspart QID.   -Endocrinology consult to help better manage blood sugars while he is not eating.     4. History of pancreatic transplant (February 2009)  5. History of kidney transplant (November 2008)  -patient not  taking his joy transplant meds I asked pharmacy to assist to get IV options and patient to get IV equivalent of mycophenolic acid 360mg twice daily, tacrolimus 2mg by mouth twice daily.   -Will check tacrolimus levels      6. CAD s/p bypass graft, hypertension  -Continue home regimen of Amlodipine 5mg once daily, Lipitor 10mg once daily, Carvedilol 6.25mg twice daily     Diet: Clear Liquid Diet    DVT Prophylaxis: Heparin SQ  Mueller Catheter: not present  Code Status: Full Code      Disposition Plan   Expected discharge: 4 - 7 days, recommended to prior living arrangement once renal function improved.  Entered: Jeremy Wang MD 02/06/2020, 4:04 PM       The patient's care was discussed with the Bedside Nurse, Patient and GI and nephrology  Consultant.    Jeremy Wang MD  Hospitalist Service, [address], [place]  Pager: [phone]  Please see sticky note for cross cover information  ______________________________________________________________________    Interval History       Data reviewed today: I reviewed all medications, new labs and imaging results over the last 24 hours.      Physical Exam   Vital Signs: Temp: 98.2  F (36.8  C) Temp src: Oral BP: (!) 177/99 Pulse: 105   Resp: 16 SpO2: 96 % O2 Device: None (Room air)    Weight: 189 lbs 0 oz  Constitutional: toxic  Hematologic / Lymphatic: no cervical lymphadenopathy  Respiratory: No increased work of breathing, good air exchange, clear to auscultation bilaterally, no crackles or wheezing  Cardiovascular: Normal apical impulse, regular rate and rhythm, normal S1 and S2, no S3 or S4, and no murmur noted  GI: No scars, normal bowel sounds, soft, non-distended, non-tender, no masses palpated, no hepatosplenomegally  Neurologic: Awake, alert, oriented to name, place and time.  Cranial nerves II-XII are grossly intact.  Motor is 5 out of 5 bilaterally.  Cerebellar finger to nose, heel to shin intact.  Sensory is intact.  Babinski  down going, Romberg negative, and gait is normal.    Data   Recent Labs   Lab 02/06/20  0634 02/05/20  1639 02/01/20  1829   WBC 8.0 9.1 8.7   HGB 10.9* 12.4* 11.7*   MCV 89 89 88    388 305    136 136   POTASSIUM 4.0 4.3 4.0   CHLORIDE 107 105 104   CO2 20 22 22   BUN 43* 43* 41*   CR 5.85* 5.99* 4.94*   ANIONGAP 10 8 10   CHARLY 8.4* 9.0 9.0   * 182* 335*   ALBUMIN 2.9* 3.4 3.1*   PROTTOTAL 6.6* 7.8 7.2   BILITOTAL 0.5 0.4 0.5   ALKPHOS 138 162* 145   ALT 10 12 14   AST 9 7 <3   LIPASE  --  39* 45*     Recent Results (from the past 24 hour(s))    Renal Transplant    Narrative    EXAMINATION:  RENAL TRANSPLANT,  2/5/2020 6:30 PM     COMPARISON: None.    HISTORY: SWATHI in setting of N/V    TECHNIQUE:  Grey-scale, color Doppler and spectral flow analysis.    FINDINGS:  The transplant kidney is located left lower quadrant, and measures  8.3. Parenchyma is of normal thickness and echogenicity. No focal  lesions. No hydronephrosis. No perinephric fluid collection.    Renal artery flow:   72 cm/sec peak systolic at hilum.  69 peak systolic at anastomosis.  Arcuate artery resistive indices range from 0.68-0.76    Renal Vein Flow:  12at hilum.   23 at anastomosis.    Iliac artery flow:  92 peak systolic above anastomosis.  150 peak systolic below anastomosis.    Iliac vein flow:  Patent above and below the anastomosis.      Impression    IMPRESSION: Normal transplant kidney ultrasound.    I have personally reviewed the examination and initial interpretation  and I agree with the findings.    APRYL HERNANDEZ MD     Medications     Injection Device for insulin         amLODIPine  5 mg Oral Daily     carvedilol  6.25 mg Oral BID w/meals     erythromycin  3 mg/kg Intravenous Q8H     insulin aspart  0.5-2.5 Units Subcutaneous Q4H     insulin degludec  10 Units Subcutaneous At Bedtime     mycophenolic acid  360 mg Oral BID     omeprazole  40 mg Oral QPM     sodium chloride (PF)  3 mL Intracatheter Q8H      SUMAtriptan  6 mg Subcutaneous Once     tacrolimus  2 mg Oral BID

## 2020-02-06 NOTE — ED NOTES
Initial Assessment: VSS. Communicates needs without difficulty. Pleasant and co-op. Denies SOB. Denies chest/shoulder/arm pain or discomfort. No acute distress noted. Family at the bedside in supportive role.

## 2020-02-06 NOTE — PLAN OF CARE
"BP (!) 177/99 (BP Location: Left arm)   Pulse 105   Temp 98.2  F (36.8  C) (Oral)   Resp 16   Ht 1.727 m (5' 8\")   Wt 85.7 kg (189 lb)   SpO2 96%   BMI 28.74 kg/m       Hypertensive; team is aware. PRN hydralazine IV has been given. Team also aware that patient is tachy but still insisted on given hydralazine vs. Lopressor. Has not been able to take oral medications. Has had at least 3x emesis; clear. PRN IV zofran and IV compazine, as well as ativan. Intermittently patient has been sleeping. Nephrology has seen patient, as well as primary team- gold 9. Up ad mikal. PIV infusing with LR @ 100 ml/hr. BS Q4 hours; 163, 188. Patient refused insulin due to the insulin scale. Clear liquid diet- has not been able to take in anything PO. PRN GI cocktail has also been given. Last BM was 2/4. Abdomen distended. Abdomen discomfort due to constant nausea and vomiting. Denies need for pain medication. Calls appropriately. Continue plan of care, please notify MD with any changes.     "

## 2020-02-06 NOTE — PLAN OF CARE
"VS: BP (!) 161/87 (BP Location: Left arm)   Pulse 97   Temp 98.8  F (37.1  C) (Oral)   Resp 16   Ht 1.727 m (5' 8\")   Wt 85.7 kg (189 lb)   SpO2 95%   BMI 28.74 kg/m      Current condition: Stable on RA. Hypertensive  Neuro: WDL  Cardio: WDL  Respiratory: WDL  GI/: Received report that voids spontaneously, did not void during shift. No BM during shift.   Skin: WDL.  Diet:   Clears.   B-169 refused insulin coverage.   LDA:  RPIV with LR at 100.   Mobility:  UAL.   Pain: States baseline neuropathy, denies any intervention.   PRN medications: Compazine x1.   Education:  Call light/medication management education.   Plan of Care: Will Continue to monitor.   "

## 2020-02-06 NOTE — H&P
[facility], [place]    History and Physical - Hospitalist Service, Gold Night Service       Date of Admission:  2/5/2020    Assessment & Plan   kelsea Fitzgerald is a 44 year old male admitted on 2/5/2020. He has a past medical history of type I diabetes, chronic kidney disease status post kidney transplant (November 2008), pancreatic transplant (February 2009), CAD s/p bypass graft, hypertension, GERD, gastroparesis who presented to the ER with 2 days of nausea, vomiting and not being able to keep anything down and was found to have recurrent gastroparesis and is being admitted to Internal Medicine for further care.     1. Acute gastroparesis with nausea, vomiting  -Continue with Compazine 10mg q6h, Zofran 8mg q8h, Ativan 0.5mg IV prn for nausea  -Gastroenterology consult for assessment of treatment options for severe recurrent gastroparesis  -Will order GI cocktail to see if this helps with symptoms as well  -Lactated Ringers x 1.5L overnight. He received a 1L bolus of 0.9% in the ER along with 1L of LR as well.     2. Acute on chronic kidney injury secondary to dehydration  -Cr 5.99, no other acute electrolyte abnormalities. Baseline over the past year has been creeping up from 2.5 - 5.   -Continue fluids overnight, recheck BMP in the  Morning. Maintain strict I/O, dose adjusting medications accordingly and will discuss with kidney transplant team about any adjustment to her transplant medications    3. Type 1 diabetes  -Home regimen includes Tresiba 24U at night. On admission, given poor oral intake, we will order 10U at night along with sliding scale insulin Aspart QID.   -Endocrinology consult to help better manage blood sugars while he is not eating.    4. History of pancreatic transplant (February 2009)  5. History of kidney transplant (November 2008)  -Continue home regimen of mycophenolic acid 360mg twice daily, tacrolimus 2mg by mouth twice daily.   -Will check  tacrolimus level before morning dose tomorrow.     6. CAD s/p bypass graft, hypertension  -Continue home regimen of Amlodipine 5mg once daily, Lipitor 10mg once daily, Carvedilol 6.25mg twice daily     Diet: Full liquids  DVT Prophylaxis: mechanical prophylaxis  Mueller Catheter: not present  Code Status: FULL CODE    Disposition Plan   Expected discharge: 2 - 3 days, recommended to prior living arrangement once he is able to eat by mouth.    Entered: ZACK Howell 02/05/2020, 7:48 PM       ZACK Howell  [facility], [place]  Pager: [phone]  Please see sticky note for cross cover information  ______________________________________________________________________    Chief Complaint   Nausea, vomiting    History is obtained from the patient    History of Present Illness   Murtaza Fitzgerald is a 44 year old male admitted on 2/5/2020. He has a past medical history of type I diabetes, chronic kidney disease status post kidney transplant (November 2008), pancreatic transplant (February 2009), CAD s/p bypass graft, hypertension, GERD, gastroparesis who presented to the ER with 2 days of nausea, vomiting and not being able to keep anything down. Vital signs on presentation were T 99.3F, , 138/87, 99% on room air. Labwork including CBC, BMP, hepatic panel, lactic acid revealed an acute kidney injury with Cr 5.99, Alk Phos 162, Lipase 39. He denies any diarrhea, fevers, chills, chest pain, shortness of breath. He denies any over abdominal pain, just epigastric pains after a vomiting episode. He denies any blood in his vomit or stool. He denies any recent change to his medications or recent travel. He reports that these symptoms feel exactly like another gastroparesis flare.  He repeatedly vomited in the ER but found some relief with Compazine, Zofran. He recently underwent gastric emptying studies and was being evaluated for a gastric pacemaker and is hoping to meet with  the Gastroenterology team during his hospital stay to further discuss this or anything else to help with his recurrent gastroparesis. He is being admitted to Internal Medicine for further work up.     Review of Systems    The 10 point Review of Systems is negative other than noted in the HPI or here.     Past Medical History    I have reviewed this patient's medical history and updated it with pertinent information if needed.   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  Past Surgical History:   Procedure Laterality Date     ARTHROSCOPY KNEE WITH LATERAL MENISCECTOMY Left 7/10/2019    Procedure: Left knee arthroscopic partial lateral meniscectomy;  Surgeon: Alex Candelaria MD;  Location:  OR     BYPASS GRAFT ARTERY CORONARY N/A 8/5/2015    Procedure: BYPASS GRAFT ARTERY CORONARY;  Surgeon: Katy Neville MD;  [facility]     ESOPHAGOSCOPY, GASTROSCOPY, DUODENOSCOPY (EGD), COMBINED N/A 1/28/2020    Procedure: ESOPHAGOGASTRODUODENOSCOPY (EGD) biopsies w/forceps;  Surgeon: Emre Gomes MD;  Location:  GI     EYE SURGERY      vitrectomy both  eyes      ORTHOPEDIC SURGERY      tumor removed from left knee     TRANSPLANT  .    pancrease and kidney transplant       Social History   I have reviewed this patient's social history and updated it with pertinent information if needed.  Social History     Tobacco Use     Smoking status: Former Smoker     Packs/day: 0.30     Types: Cigarettes     Last attempt to quit: 7/3/2015     Years since quittin.5     Smokeless tobacco: Never Used     Tobacco comment: E- Cig use still   Substance Use Topics     Alcohol use: Not Currently     Alcohol/week: 0.0 standard drinks     Frequency: Never     Binge frequency: Never     Drug use: No       Family History   I have reviewed this patient's family history and updated it with pertinent information if needed.   Family History   Problem Relation Age of Onset     Unknown/Adopted Father      Heart Disease Maternal Grandfather 62       Prior to Admission Medications   Prior to Admission Medications   Prescriptions Last Dose Informant Patient Reported? Taking?   YOHAN CONTOUR test strip   No No   Sig: USE TO TEST BLOOD SUGAR FIVE TIMES A DAY OR AS DIRECTED   Blood Glucose Monitoring Suppl (Warranty Life CONTOUR MONITOR) W/DEVICE KIT  Self No No   Sig: TO TEST BG 5 TIMIES DAILY   acetaminophen (TYLENOL) 325 MG tablet   No No   Sig: Take 2 tablets (650 mg) by mouth every 4 hours as needed for mild pain   acetone, Urine, test STRP   No No   Si strip by In Vitro route daily   amLODIPine (NORVASC) 5 MG tablet   No No   Sig: Take 1 tablet (5 mg) by mouth daily   atorvastatin (LIPITOR) 40 MG tablet   Yes No   Sig: Take 40 mg by mouth daily   blood glucose monitoring (NO BRAND SPECIFIED) test strip   No No   Si times daily ( contour NEXT)   blood glucose monitoring (NO BRAND SPECIFIED) test strip   No No   Sig: Freestyle test strips 6 times daily (NOT lite and NOT insulinx)   carvedilol (COREG) 6.25 MG tablet   No No   Sig: Take 1 tablet (6.25 mg) by mouth 2 times daily  (with meals)   insulin aspart (NOVOLOG FLEXPEN) 100 UNIT/ML pen   Yes No   Sig: Inject Subcutaneous 3 times daily (with meals) 6 units for every 30 grams of carbs   insulin aspart (NOVOLOG FLEXPEN) 100 UNIT/ML pen   Yes No   Sig: Inject Subcutaneous Take with snacks or supplements for high blood sugar 6 units for every 30 grams of carbs   insulin aspart (NOVOLOG FLEXPEN) 100 UNIT/ML pen   Yes No   Sig: Inject Subcutaneous 3 times daily (with meals) Sliding scale   1 unit for every bg  20 over 140   insulin degludec (TRESIBA) 100 UNIT/ML pen   Yes No   Sig: Inject 24 Units Subcutaneous At Bedtime   insulin pen needle (BD ARPITA U/F) 32G X 4 MM   No No   Sig: Use 4 daily or as directed.   metoclopramide (REGLAN) 5 MG tablet   No No   Sig: TAKE ONE TABLET BY MOUTH FOUR TIMES A DAY BEFORE MEALS AND NIGHTLY   mycophenolic acid (GENERIC EQUIVALENT) 180 MG EC tablet   Yes No   Sig: Take 360 mg by mouth 2 times daily   omeprazole (PRILOSEC) 40 MG DR capsule   Yes No   Sig: Take 40 mg by mouth every evening   ondansetron (ZOFRAN-ODT) 4 MG ODT tab   No No   Sig: Take 1-2 tablets (4-8 mg) by mouth every 8 hours as needed for nausea   tacrolimus (GENERIC EQUIVALENT) 1 MG capsule   Yes No   Sig: Take 2 capsules (2 mg) by mouth 2 times daily      Facility-Administered Medications: None     Allergies   Allergies   Allergen Reactions     Benadryl [Diphenhydramine]      Reglan Other (See Comments)     IV, delsuions       Physical Exam   Vital Signs: Temp: 99  F (37.2  C) Temp src: Oral BP: 138/87 Pulse: 101   Resp: 18 SpO2: 99 % O2 Device: None (Room air)    Weight: 200 lbs 0 oz    General Appearance: 44 year old gentleman resting at the edge of his bed vomiting into a bag, fatigued  HEENT: normocephalic, atraumatic, pupils are equal in size, equally reactive to light bilaterally, anicteric sclerae, EOM intact  Respiratory: breathing comfortably on room air, no adventitious sounds to bilateral auscultation  Cardiovascular: regular  rate and rhythm, no appreciable murmurs, rubs or gallops  GI: tinkering bowel sounds present, minimally distended, soft, non-tender to palpation throughout no rebound or guarding  Skin: dry, sluggish skin turgor, no open sores, lesions or ulcerations presemnt  Musculoskeletal: maintains equal strength in bilateral upper and lower extremities, no focal deficits  Psychiatric: alert, oriented to name, date, hospital and recent events, denies any agitation, anxiety or hallucinations    Data   Data reviewed today: I reviewed all medications, new labs and imaging results over the last 24 hours.   Most Recent 3 CBC's:  Recent Labs   Lab Test 02/05/20  1639 02/01/20  1829 01/29/20  0601   WBC 9.1 8.7 9.4   HGB 12.4* 11.7* 10.0*   MCV 89 88 92    305 209     Most Recent 3 BMP's:  Recent Labs   Lab Test 02/05/20  1639 02/01/20  1829 01/29/20  0601    136 138   POTASSIUM 4.3 4.0 3.9   CHLORIDE 105 104 108   CO2 22 22 20   BUN 43* 41* 46*   CR 5.99* 4.94* 5.32*   ANIONGAP 8 10 10   CHARLY 9.0 9.0 8.1*   * 335* 204*     Most Recent 2 LFT's:  Recent Labs   Lab Test 02/05/20 1639 02/01/20  1829   AST 7 <3   ALT 12 14   ALKPHOS 162* 145   BILITOTAL 0.4 0.5     Most Recent 3 INR's:  Recent Labs   Lab Test 08/17/15  0640 08/05/15  1534 08/05/15  1353   INR 1.04 1.42* 1.65*     No results found for this or any previous visit (from the past 24 hour(s)).

## 2020-02-06 NOTE — CONSULTS
[facility], [place]  Transplant Nephrology Consult  Date of Admission:  2/5/2020  Today's Date: 02/06/2020  Requesting physician: Jeremy Wang MD    Assessment & Plan    Murtaza WITT Tata Fitzgerald is a 44 year old male with h/o DM1 and ESRD s/p 2008 LDKT and 2009 ANNMARIE, diagnosed gastroparesis by emptying study, CAD s/p CABG, and HTN now admitted with N/V.     # LDKT: Creatinine has been higher for several months, 3s then 4s, now 5s. A pre-renal component is possible but progression of CKD underlying is also a concern. Agree with IVFs being given. U/S without obstruction. Would not treat rejection given his poor renal fxn of late; similarly with novel GN.   He is nearing dialysis (eGFR currently 11) - and his N/V could have a uremic component. Will see if medications improve his symptoms and he is considering trying dialysis to see if symptoms improve.    - Baseline Cr was ~1.8 but appears to have progressive CKD   - Proteinuria: Moderate (1-3 grams)   - Date DSA Last Checked: Jul/2016      Latest DSA: No   - BK Viremia: No   - Kidney Tx Biopsy: Dec 27, 2013; Result:   Kidney biopsy:       - Renal allograft (5 years post transplant), biopsy:       Rejection:       - Acute Tubulointerstitial:  None.  - Acute Vascular:  None.  - Chronic Tubulointerstitial:  None.  - Chronic vascular:  None.  - Transplant glomerulopathy:  No evidence.  - Chronic allograft nephropathy:  No evidence.  - Humoral rejection:  No evidence.       Drug toxicity:  Arteriolar hyaliniosis, consistent with calcineurin  inhibitor toxicity.  Other:       - Ischemic allograft injury:  No evidence  - Recurrent renal disease:  No.  - Polyoma virus cytopathic changes:  No.      # Pancreas Tx (ANNMARIE):   - Pancreatic Exocrine Drainage: Bladder drained    - failed on insulin   - Pancreatic enzymes: no need to check given failed organ       # Immunosuppression: Tacrolimus immediate release (goal 4-6) and Mycophenolic acid  (goal not followed)   - Changes: No, continue tacro 2mg BID, with level at goal 2/6, and myfortic 360mg BID     # Infection Prophylaxis:   - PJP: None    # Hypertension: Inadequate control;  Goal BP: < 140/90   - Volume status: Euvolemic     - Changes: No, agree with restarting home meds as we attempt to help him PO     # Diabetes:  - Management as per primary team.    # Anemia in Chronic Renal Disease: Hgb: Stable      WILL: No       # Mineral Bone Disorder:   - Calcium; level: Normal        On Supplement: No  - Phosphorus; level: Normal        On Supplement: No    # Electrolytes:   - Potassium; level: Normal        On Supplement: No  - Bicarbonate; level: Low normal        On Supplement: No, CTM   - Sodium; level: Normal         - anion gap is Normal, no e/o DKA     # gastroparesis  # N/V: Suspect his known gastroparesis is the etiology of his symptoms but will defer to primary team if any infectious workup in needed (no abd pain, diarrhea, fevers). He may need to have a gastric pacemaker implanted.   Also on the DDX is uremia, and the pt may elect to trial dialysis to see if symptoms improve.   If able to take PO he states he tolerates Reglan, and it would be reasonable to trial this at a higher dose than he home prescription before a trial of dialysis.     # Transplant History:  Etiology of Kidney Failure: Diabetes mellitus type 1  Tx: LDKT, ANNMARIE  Transplant: 11/4/2008 (Kidney), 2/17/2009 (Pancreas)  Donor Type: Living Donor Class: Standard Criteria Donor  Significant changes in immunosuppression: None  Significant transplant-related complications: None    Recommendations were communicated to the primary team verbally.    Seen and discussed with Dr. Villalba.    Jack Muller MD  Pager: [phone]    REASON FOR CONSULT   LDKT and failed ANNMARIE management     History of Present Illness   Murtaza Fitzgerald is a 44 year old male with h/o DM1 and ESRD s/p 2008 LDKT and 2009 ANNMARIE, diagnosed gastroparesis by  "emptying study, CAD s/p CABG, and HTN now admitted with N/V.     The patient reports many weeks of N/V and is now admitted for the 3rd time, the first two with DKA. He states Reglan helped some but his vomiting has limited his ability to take pills. No diarrhea, rather he has had some constipation. No fevers. No questionable foods. No sick contacts. He reports he has mild abdominal \"discomfort.\"     Creatinine has been rising, reaching 5s since 11/2019 and 4s in the months prior to that (and 3s prior to that).     He reports some hiccups. No confusion. No metallic taste.     Review of Systems    The 10 point Review of Systems is negative other than noted in the HPI or here.     Past Medical History    I have reviewed this patient's medical history and updated it with pertinent information if needed.   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  Past Surgical History:   Procedure Laterality Date     ARTHROSCOPY KNEE WITH LATERAL MENISCECTOMY Left 7/10/2019    Procedure: Left knee arthroscopic partial lateral meniscectomy;  Surgeon: Alex Candelaria MD;  " Location: [facility] OR     BYPASS GRAFT ARTERY CORONARY N/A 2015    Procedure: BYPASS GRAFT ARTERY CORONARY;  Surgeon: Katy Neville MD;  [facility]     ESOPHAGOSCOPY, GASTROSCOPY, DUODENOSCOPY (EGD), COMBINED N/A 2020    Procedure: ESOPHAGOGASTRODUODENOSCOPY (EGD) biopsies w/forceps;  Surgeon: Emre Gomes MD;  Location:  GI     EYE SURGERY      vitrectomy both eyes      ORTHOPEDIC SURGERY      tumor removed from left knee     TRANSPLANT  .    pancrease and kidney transplant       Family History   I have reviewed this patient's family history and updated it with pertinent information if needed.   Family History   Problem Relation Age of Onset     Unknown/Adopted Father      Heart Disease Maternal Grandfather 62       Social History   I have reviewed this patient's social history and updated it with pertinent information if needed. Murtaza Fitzgerald  reports that he quit smoking about 4 years ago. His smoking use included cigarettes. He smoked 0.30 packs per day. He has never used smokeless tobacco. He reports previous alcohol use. He reports that he does not use drugs.    Allergies   Allergies   Allergen Reactions     Benadryl [Diphenhydramine]      Reglan Other (See Comments)     IV, delsuions     Prior to Admission Medications     amLODIPine  5 mg Oral Daily     atorvastatin  40 mg Oral Daily     carvedilol  6.25 mg Oral BID w/meals     insulin aspart  0.5-2.5 Units Subcutaneous Q4H     insulin degludec  10 Units Subcutaneous At Bedtime     mycophenolic acid  360 mg Oral BID     omeprazole  40 mg Oral QPM     sodium chloride (PF)  3 mL Intracatheter Q8H     tacrolimus  2 mg Oral BID       Injection Device for insulin         Physical Exam   Temp  Av.6  F (37  C)  Min: 98.2  F (36.8  C)  Max: 99  F (37.2  C)      Pulse  Av.6  Min: 86  Max: 114 Resp  Av.2  Min: 16  Max: 18  SpO2  Av.6 %  Min: 94 %  Max: 100 %     BP (!) 177/99 (BP Location: Left arm)   " Pulse 105   Temp 98.2  F (36.8  C) (Oral)   Resp 16   Ht 1.727 m (5' 8\")   Wt 85.7 kg (189 lb)   SpO2 96%   BMI 28.74 kg/m     Date 02/06/20 0700 - 02/07/20 0659   Shift 3871-9115 4640-3601 9022-6335 24 Hour Total   INTAKE   I.V. 838.33   838.33   Shift Total(mL/kg) 838.33(9.78)   838.33(9.78)   OUTPUT   Emesis/NG output 75   75   Shift Total(mL/kg) 75(0.87)   75(0.87)   Weight (kg) 85.73 85.73 85.73 85.73      Admit Weight: 90.7 kg (200 lb)     GENERAL APPEARANCE: alert and no distress   HEENT: anicteric   RESP: lungs clear to auscultation - no rales, rhonchi or wheezes  CV: regular rhythm, normal rate, no rub, no murmur  EDEMA: no LE edema bilaterally  ABDOMEN: soft, nondistended, nontender, bowel sounds normal  MS: extremities normal - no gross deformities noted, no evidence of inflammation in joints, no muscle tenderness  SKIN: no rash on face or UEs  NEURO: AOx3, no asterixis     Data   CMP  Recent Labs   Lab 02/06/20  0634 02/05/20  1639 02/01/20  1829    136 136   POTASSIUM 4.0 4.3 4.0   CHLORIDE 107 105 104   CO2 20 22 22   ANIONGAP 10 8 10   * 182* 335*   BUN 43* 43* 41*   CR 5.85* 5.99* 4.94*   GFRESTIMATED 11* 11* 13*   GFRESTBLACK 13* 12* 15*   CHARLY 8.4* 9.0 9.0   PROTTOTAL 6.6* 7.8 7.2   ALBUMIN 2.9* 3.4 3.1*   BILITOTAL 0.5 0.4 0.5   ALKPHOS 138 162* 145   AST 9 7 <3   ALT 10 12 14     CBC  Recent Labs   Lab 02/06/20  0634 02/05/20  1639 02/01/20  1829   HGB 10.9* 12.4* 11.7*   WBC 8.0 9.1 8.7   RBC 3.78* 4.30* 4.03*   HCT 33.7* 38.4* 35.5*   MCV 89 89 88   MCH 28.8 28.8 29.0   MCHC 32.3 32.3 33.0   RDW 13.2 13.2 13.0    388 305     INRNo lab results found in last 7 days.  ABGNo lab results found in last 7 days.   Urine Studies  Recent Labs   Lab Test 01/27/20  0941 01/03/20  1406 08/28/19  0905 07/11/19  0631   COLOR Straw Light Yellow Yellow Light Yellow   APPEARANCE Clear Clear Clear Clear   URINEGLC >1000* 30* Negative Negative   URINEBILI Negative Negative Negative " Negative   URINEKETONE Trace* 10* Negative Negative   SG 1.011 1.017 1.015 1.008   UBLD Trace* Trace* Small* Negative   URINEPH 6.5 6.5 7.0 6.0   PROTEIN 300* 300* 100* 50*   UROBILINOGEN  --   --  0.2  --    NITRITE Negative Negative Negative Negative   LEUKEST Negative Negative Negative Negative   RBCU 2 3* 5-10* 1   WBCU 2 1 5-10* 1     Recent Labs   Lab Test 08/22/19  0857 01/11/19  0741 07/08/15  0747 01/28/15  0749 12/27/13  0736 11/11/13  1436 11/12/12  1649 10/11/12  0737 02/28/12  0713   UTPG 2.20* 0.87* 0.28* 0.43* 0.31* 0.30* 0.40* 0.51* 0.48*     PTH  No lab results found.  Iron Studies  No lab results found.    IMAGING:  All imaging studies reviewed by me.     Patient was seen and evaluated by me, Adam Villalba MD. I have reviewed the note and agree with the the plan of care as documented by the fellow.

## 2020-02-06 NOTE — PLAN OF CARE
"/88 (BP Location: Left arm)   Pulse 114   Temp 98.6  F (37  C) (Oral)   Resp 18   Ht 1.727 m (5' 8\")   Wt 85.7 kg (189 lb)   SpO2 94%   BMI 28.74 kg/m      4151-6235: Pt admitted from ED @ ~2230 for gastroparesis, n/v. H/o LDKT 2008 and pancreas txp 2009 d/t DM1. AVSS on RA.  Neuro: WDL.   Cardiac: WDL ex tachy into the 110s.  Resp: WDL.  GI/: Last BM yesterday. Hasn't voided since arriving on the floor.   Diet/Appetite: On clear liquid diet. Continuous nausea. One episode of emesis, 75 ml of thin, brown liquid. Given 0.5 mg IV Ativan.   Endocrine: Q4 BG checks with sliding scale coverage.   Skin: WDL.  Access: PIV x1 with LR @ 100.  Drains: NA.  Activity: Up independently.   Pain: Endorses neuropathic pain in his BLE, no prns given.  Plan: Complete transplant, endocrine, and GI consults tomorrow. Control nausea and establish plan for ongoing gastroparesis. Pt reports he has not been able to hold down meds (including anti-rejections) since Monday. Kaden CINTRON paged and asked what the plan for this was, stated \"we may just have to wait until he can hold them down,\" would appreciate txp team addressing this tomorrow. Oncoming RN will be giving pts anti-rejection meds once pharmacy verifies and sends, will attempt to control nausea enough to hold them down tonight. Will continue with plan of care and notify team of any changes.?    "

## 2020-02-07 ENCOUNTER — TELEPHONE (OUTPATIENT)
Dept: GASTROENTEROLOGY | Facility: CLINIC | Age: 45
End: 2020-02-07

## 2020-02-07 LAB
ALBUMIN SERPL-MCNC: 3 G/DL (ref 3.4–5)
ALP SERPL-CCNC: 139 U/L (ref 40–150)
ALT SERPL W P-5'-P-CCNC: 11 U/L (ref 0–70)
ANION GAP SERPL CALCULATED.3IONS-SCNC: 8 MMOL/L (ref 3–14)
AST SERPL W P-5'-P-CCNC: 13 U/L (ref 0–45)
BILIRUB SERPL-MCNC: 0.5 MG/DL (ref 0.2–1.3)
BUN SERPL-MCNC: 42 MG/DL (ref 7–30)
CALCIUM SERPL-MCNC: 8.3 MG/DL (ref 8.5–10.1)
CHLORIDE SERPL-SCNC: 108 MMOL/L (ref 94–109)
CO2 SERPL-SCNC: 22 MMOL/L (ref 20–32)
CREAT SERPL-MCNC: 5.98 MG/DL (ref 0.66–1.25)
ERYTHROCYTE [DISTWIDTH] IN BLOOD BY AUTOMATED COUNT: 13.2 % (ref 10–15)
GFR SERPL CREATININE-BSD FRML MDRD: 11 ML/MIN/{1.73_M2}
GLUCOSE BLDC GLUCOMTR-MCNC: 155 MG/DL (ref 70–99)
GLUCOSE BLDC GLUCOMTR-MCNC: 158 MG/DL (ref 70–99)
GLUCOSE BLDC GLUCOMTR-MCNC: 163 MG/DL (ref 70–99)
GLUCOSE BLDC GLUCOMTR-MCNC: 168 MG/DL (ref 70–99)
GLUCOSE BLDC GLUCOMTR-MCNC: 190 MG/DL (ref 70–99)
GLUCOSE BLDC GLUCOMTR-MCNC: 193 MG/DL (ref 70–99)
GLUCOSE BLDC GLUCOMTR-MCNC: 234 MG/DL (ref 70–99)
GLUCOSE BLDC GLUCOMTR-MCNC: 280 MG/DL (ref 70–99)
GLUCOSE BLDC GLUCOMTR-MCNC: 294 MG/DL (ref 70–99)
GLUCOSE SERPL-MCNC: 168 MG/DL (ref 70–99)
HCT VFR BLD AUTO: 32.5 % (ref 40–53)
HGB BLD-MCNC: 10.7 G/DL (ref 13.3–17.7)
MAGNESIUM SERPL-MCNC: 1.8 MG/DL (ref 1.6–2.3)
MCH RBC QN AUTO: 29.6 PG (ref 26.5–33)
MCHC RBC AUTO-ENTMCNC: 32.9 G/DL (ref 31.5–36.5)
MCV RBC AUTO: 90 FL (ref 78–100)
PHOSPHATE SERPL-MCNC: 3.9 MG/DL (ref 2.5–4.5)
PLATELET # BLD AUTO: 327 10E9/L (ref 150–450)
POTASSIUM SERPL-SCNC: 3.8 MMOL/L (ref 3.4–5.3)
PROT SERPL-MCNC: 6.5 G/DL (ref 6.8–8.8)
RBC # BLD AUTO: 3.62 10E12/L (ref 4.4–5.9)
SODIUM SERPL-SCNC: 137 MMOL/L (ref 133–144)
TACROLIMUS BLD-MCNC: 4.3 UG/L (ref 5–15)
TME LAST DOSE: ABNORMAL H
WBC # BLD AUTO: 8.1 10E9/L (ref 4–11)

## 2020-02-07 PROCEDURE — 99233 SBSQ HOSP IP/OBS HIGH 50: CPT | Performed by: INTERNAL MEDICINE

## 2020-02-07 PROCEDURE — 25000125 ZZHC RX 250: Performed by: INTERNAL MEDICINE

## 2020-02-07 PROCEDURE — 83735 ASSAY OF MAGNESIUM: CPT | Performed by: INTERNAL MEDICINE

## 2020-02-07 PROCEDURE — 85027 COMPLETE CBC AUTOMATED: CPT | Performed by: INTERNAL MEDICINE

## 2020-02-07 PROCEDURE — 25000131 ZZH RX MED GY IP 250 OP 636 PS 637: Performed by: INTERNAL MEDICINE

## 2020-02-07 PROCEDURE — 80053 COMPREHEN METABOLIC PANEL: CPT | Performed by: INTERNAL MEDICINE

## 2020-02-07 PROCEDURE — 36415 COLL VENOUS BLD VENIPUNCTURE: CPT | Performed by: INTERNAL MEDICINE

## 2020-02-07 PROCEDURE — 80197 ASSAY OF TACROLIMUS: CPT | Performed by: INTERNAL MEDICINE

## 2020-02-07 PROCEDURE — 25800030 ZZH RX IP 258 OP 636: Performed by: INTERNAL MEDICINE

## 2020-02-07 PROCEDURE — 00000146 ZZHCL STATISTIC GLUCOSE BY METER IP

## 2020-02-07 PROCEDURE — 25000128 H RX IP 250 OP 636: Performed by: PHYSICIAN ASSISTANT

## 2020-02-07 PROCEDURE — 12000012 ZZH R&B MS OVERFLOW UMMC

## 2020-02-07 PROCEDURE — 40000141 ZZH STATISTIC PERIPHERAL IV START W/O US GUIDANCE

## 2020-02-07 PROCEDURE — 25000132 ZZH RX MED GY IP 250 OP 250 PS 637: Performed by: PHYSICIAN ASSISTANT

## 2020-02-07 PROCEDURE — 84100 ASSAY OF PHOSPHORUS: CPT | Performed by: INTERNAL MEDICINE

## 2020-02-07 PROCEDURE — 25000128 H RX IP 250 OP 636: Performed by: INTERNAL MEDICINE

## 2020-02-07 RX ORDER — MYCOPHENOLIC ACID 360 MG/1
360 TABLET, DELAYED RELEASE ORAL 2 TIMES DAILY
Status: DISCONTINUED | OUTPATIENT
Start: 2020-02-07 | End: 2020-02-09 | Stop reason: HOSPADM

## 2020-02-07 RX ORDER — TACROLIMUS 1 MG/1
1 CAPSULE ORAL
Status: DISCONTINUED | OUTPATIENT
Start: 2020-02-07 | End: 2020-02-09 | Stop reason: HOSPADM

## 2020-02-07 RX ORDER — HEPARIN SODIUM 5000 [USP'U]/.5ML
5000 INJECTION, SOLUTION INTRAVENOUS; SUBCUTANEOUS EVERY 8 HOURS
Status: DISCONTINUED | OUTPATIENT
Start: 2020-02-07 | End: 2020-02-09 | Stop reason: HOSPADM

## 2020-02-07 RX ADMIN — ERYTHROMYCIN LACTOBIONATE 250 MG: 500 INJECTION, POWDER, LYOPHILIZED, FOR SOLUTION INTRAVENOUS at 00:59

## 2020-02-07 RX ADMIN — INSULIN ASPART 2 UNITS: 100 INJECTION, SOLUTION INTRAVENOUS; SUBCUTANEOUS at 03:23

## 2020-02-07 RX ADMIN — MYCOPHENOLIC ACID 360 MG: 360 TABLET, DELAYED RELEASE ORAL at 19:58

## 2020-02-07 RX ADMIN — INSULIN ASPART 1.5 UNITS: 100 INJECTION, SOLUTION INTRAVENOUS; SUBCUTANEOUS at 20:19

## 2020-02-07 RX ADMIN — ONDANSETRON 8 MG: 2 INJECTION INTRAMUSCULAR; INTRAVENOUS at 01:56

## 2020-02-07 RX ADMIN — LORAZEPAM 0.5 MG: 2 INJECTION INTRAMUSCULAR; INTRAVENOUS at 05:14

## 2020-02-07 RX ADMIN — HEPARIN SODIUM 5000 UNITS: 5000 INJECTION, SOLUTION INTRAVENOUS; SUBCUTANEOUS at 17:37

## 2020-02-07 RX ADMIN — TACROLIMUS 1 MG: 1 CAPSULE ORAL at 17:37

## 2020-02-07 RX ADMIN — CARVEDILOL 6.25 MG: 6.25 TABLET, FILM COATED ORAL at 08:38

## 2020-02-07 RX ADMIN — MYCOPHENOLATE MOFETIL 500 MG: 500 INJECTION, POWDER, LYOPHILIZED, FOR SOLUTION INTRAVENOUS at 10:00

## 2020-02-07 RX ADMIN — INSULIN ASPART 0.5 UNITS: 100 INJECTION, SOLUTION INTRAVENOUS; SUBCUTANEOUS at 23:46

## 2020-02-07 RX ADMIN — INSULIN ASPART 1 UNITS: 100 INJECTION, SOLUTION INTRAVENOUS; SUBCUTANEOUS at 17:37

## 2020-02-07 RX ADMIN — ERYTHROMYCIN LACTOBIONATE 250 MG: 500 INJECTION, POWDER, LYOPHILIZED, FOR SOLUTION INTRAVENOUS at 14:45

## 2020-02-07 RX ADMIN — INSULIN ASPART 0.5 UNITS: 100 INJECTION, SOLUTION INTRAVENOUS; SUBCUTANEOUS at 08:38

## 2020-02-07 RX ADMIN — AMLODIPINE BESYLATE 5 MG: 5 TABLET ORAL at 08:38

## 2020-02-07 RX ADMIN — OMEPRAZOLE 40 MG: 20 CAPSULE, DELAYED RELEASE ORAL at 19:58

## 2020-02-07 RX ADMIN — CARVEDILOL 6.25 MG: 6.25 TABLET, FILM COATED ORAL at 17:37

## 2020-02-07 RX ADMIN — INSULIN ASPART 2 UNITS: 100 INJECTION, SOLUTION INTRAVENOUS; SUBCUTANEOUS at 00:53

## 2020-02-07 RX ADMIN — ONDANSETRON 8 MG: 2 INJECTION INTRAMUSCULAR; INTRAVENOUS at 08:37

## 2020-02-07 RX ADMIN — ERYTHROMYCIN LACTOBIONATE 250 MG: 500 INJECTION, POWDER, LYOPHILIZED, FOR SOLUTION INTRAVENOUS at 22:37

## 2020-02-07 RX ADMIN — ERYTHROMYCIN LACTOBIONATE 250 MG: 500 INJECTION, POWDER, LYOPHILIZED, FOR SOLUTION INTRAVENOUS at 08:37

## 2020-02-07 RX ADMIN — INSULIN DEGLUDEC INJECTION 10 UNITS: 100 INJECTION, SOLUTION SUBCUTANEOUS at 22:40

## 2020-02-07 ASSESSMENT — ACTIVITIES OF DAILY LIVING (ADL)
ADLS_ACUITY_SCORE: 13

## 2020-02-07 ASSESSMENT — MIFFLIN-ST. JEOR: SCORE: 1735.43

## 2020-02-07 NOTE — TELEPHONE ENCOUNTER
Called and left patient message to contact GI clinic once discharged home from hospital to get rescheduled for GI consult.

## 2020-02-07 NOTE — PROGRESS NOTES
Care Coordinator  D: rickeydon Fitzgerald is a 44 year old male admitted on 2/5/2020. He has a past medical history of type I diabetes, chronic kidney disease status post kidney transplant (November 2008), pancreatic transplant (February 2009), CAD s/p bypass graft, hypertension, GERD, gastroparesis who presented to the ER with 2 days of nausea, vomiting and not being able to keep anything down and was found to have recurrent gastroparesis and is being admitted to Internal Medicine for further care--note per DR Wang 2/6.  I: Pt may need dialysis soon. Pt does NOT have insurance--Janette Vaughn informed on 2/6 from Pt --I called and left her a message today that he may need dialysis again.  A: pt is s/p kidney and pancreas transplant with increasing creatinine  P: pt will need hep B labs ordered(I have text paged Dr Wang which ones these are and a PPD or CXR that says (pt does NOT have TB).  Dr Parth Collazo,tx nephrology fellow, says they are still deciding if he needs dialysis or not. Will follow--wait for possible insurance options.

## 2020-02-07 NOTE — PROGRESS NOTES
[facility], [place]   Transplant Nephrology Progress Note  Date of Admission:  2/5/2020  Today's Date: 02/07/2020    Assessment & Plan   Murtaza Fitzgerald is a 44 year old male with h/o DM1 and ESRD s/p 2008 LDKT and 2009 ANNMARIE, diagnosed gastroparesis by emptying study, CAD s/p CABG, and HTN now admitted with N/V and progressive renal graft failure.     New recommendations:   -will hold of initiating HD today and CTM as his nausea is improved  -GI consulting, started erythromycin   -next tacro level Saturday 2/8 (ordered) - this is after his doses 2/6 were held and tacro gtt used     # LDKT: Creatinine has been higher for several months, 3s then 4s, now 5s. A pre-renal component is possible but progression of CKD underlying is also a concern. Agree with IVFs being given. U/S without obstruction. Would not treat rejection given his poor renal fxn of late; similarly with novel GN.   He is nearing dialysis (eGFR currently 11) - and his N/V could have a uremic component. Will see if medications improve his symptoms and he is considering trying dialysis to see if symptoms improve (were improved 2/7 so deferred on HD start at this time but CTM).               - Baseline Cr was ~1.8 but appears to have progressive CKD              - Proteinuria: Moderate (1-3 grams)              - Date DSA Last Checked: Jul/2016      Latest DSA: No              - BK Viremia: No              - Kidney Tx Biopsy: Dec 27, 2013; Result:   Kidney biopsy:       - Renal allograft (5 years post transplant), biopsy:       Rejection:       - Acute Tubulointerstitial:  None.  - Acute Vascular:  None.  - Chronic Tubulointerstitial:  None.  - Chronic vascular:  None.  - Transplant glomerulopathy:  No evidence.  - Chronic allograft nephropathy:  No evidence.  - Humoral rejection:  No evidence.       Drug toxicity:  Arteriolar hyaliniosis, consistent with calcineurin  inhibitor toxicity.  Other:       - Ischemic  allograft injury:  No evidence  - Recurrent renal disease:  No.  - Polyoma virus cytopathic changes:  No.        # Pancreas Tx (ANNMARIE):              - Pancreatic Exocrine Drainage: Bladder drained                    - failed on insulin              - Pancreatic enzymes: no need to check given failed organ                   # Immunosuppression: Tacrolimus immediate release (goal 4-6) and Mycophenolic acid (goal not followed)              - Changes: No, continue tacro 2mg BID, with level at goal 2/6, and myfortic 360mg BID    -next tacro level Saturday 2/8 (ordered) - this is after his doses 2/6 were held and tacro gtt used      # Infection Prophylaxis:   - PJP: None     # Hypertension: Inadequate control;             Goal BP: < 140/90              - Volume status: Euvolemic                              - Changes: No, agree with restarting home meds as we attempt to help him PO      # Diabetes:     - Management as per primary team.     # Anemia in Chronic Renal Disease: Hgb: Stable      WILL: No                   # Mineral Bone Disorder:   - Calcium; level: Normal        On Supplement: No  - Phosphorus; level: Normal        On Supplement: No     # Electrolytes:   - Potassium; level: Normal        On Supplement: No  - Bicarbonate; level: normal        On Supplement: No  - Sodium; level: Normal         - anion gap is Normal, no e/o DKA      # gastroparesis  # N/V: Suspect his known gastroparesis is the etiology of his symptoms but will defer to primary team if any infectious workup in needed (no abd pain, diarrhea, fevers). He may need to have a gastric pacemaker implanted. GI also suggesting G tube to vent.   Also on the DDX is uremia, and the pt may elect to trial dialysis to see if symptoms improve.   Had suggested using Reglan given prior success but GI recommending erythromycin and that medication was used.      # Transplant History:  Etiology of Kidney Failure: Diabetes mellitus type 1  Tx: LDKT, ANNMARIE  Transplant:  "2008 (Kidney), 2009 (Pancreas)  Donor Type: Living      Donor Class: Standard Criteria Donor  Significant changes in immunosuppression: None  Significant transplant-related complications: None    Recommendations were communicated to the primary team via note.     Seen and discussed with Dr. Villalba.    Jack Muller MD   Pager: [phone]    Interval History   Feeling less nauseated and tolerating pills. Not convince that he needs to begin dialysis but hasn't decided he isn't willing. About to eat soup.     Review of Systems   4 point ROS was obtained and negative except as noted in the Interval History.    MEDICATIONS:    amLODIPine  5 mg Oral Daily     carvedilol  6.25 mg Oral BID w/meals     erythromycin  3 mg/kg Intravenous Q8H     heparin ANTICOAGULANT  5,000 Units Subcutaneous Q8H     insulin aspart  0.5-2.5 Units Subcutaneous Q4H     insulin degludec  10 Units Subcutaneous At Bedtime     mycophenolic acid  360 mg Oral BID     omeprazole  40 mg Oral QPM     sodium chloride (PF)  3 mL Intracatheter Q8H     tacrolimus  1 mg Oral BID IS       Injection Device for insulin         Physical Exam   Temp  Av.4  F (36.9  C)  Min: 98  F (36.7  C)  Max: 99  F (37.2  C)      Pulse  Av.1  Min: 86  Max: 114 Resp  Av.9  Min: 16  Max: 18  SpO2  Av.2 %  Min: 94 %  Max: 100 %     BP (!) 155/87 (BP Location: Left arm)   Pulse 92   Temp 98.2  F (36.8  C) (Oral)   Resp 18   Ht 1.727 m (5' 8\")   Wt 86.1 kg (189 lb 12.8 oz)   SpO2 97%   BMI 28.86 kg/m      Admit Weight: 90.7 kg (200 lb)     GENERAL APPEARANCE: alert and no distress  RESP: lungs clear to auscultation - no rales, rhonchi or wheezes  CV: regular rhythm, normal rate, no rub, no murmur  EDEMA: no LE edema bilaterally  ABDOMEN: soft, nondistended, nontender, bowel sounds normal    Data   All labs reviewed by me.  CMP  Recent Labs   Lab 20  1058 20  0634 20  1639 20  1829    137 136 136   POTASSIUM 3.8 " 4.0 4.3 4.0   CHLORIDE 108 107 105 104   CO2 22 20 22 22   ANIONGAP 8 10 8 10   * 169* 182* 335*   BUN 42* 43* 43* 41*   CR 5.98* 5.85* 5.99* 4.94*   GFRESTIMATED 11* 11* 11* 13*   GFRESTBLACK 12* 13* 12* 15*   CHARLY 8.3* 8.4* 9.0 9.0   MAG 1.8  --   --   --    PHOS 3.9  --   --   --    PROTTOTAL 6.5* 6.6* 7.8 7.2   ALBUMIN 3.0* 2.9* 3.4 3.1*   BILITOTAL 0.5 0.5 0.4 0.5   ALKPHOS 139 138 162* 145   AST 13 9 7 <3   ALT 11 10 12 14     CBC  Recent Labs   Lab 02/07/20  1058 02/06/20  0634 02/05/20  1639 02/01/20  1829   HGB 10.7* 10.9* 12.4* 11.7*   WBC 8.1 8.0 9.1 8.7   RBC 3.62* 3.78* 4.30* 4.03*   HCT 32.5* 33.7* 38.4* 35.5*   MCV 90 89 89 88   MCH 29.6 28.8 28.8 29.0   MCHC 32.9 32.3 32.3 33.0   RDW 13.2 13.2 13.2 13.0    299 388 305     INRNo lab results found in last 7 days.  ABGNo lab results found in last 7 days.   Urine Studies  Recent Labs   Lab Test 01/27/20  0941 01/03/20  1406 08/28/19  0905 07/11/19  0631   COLOR Straw Light Yellow Yellow Light Yellow   APPEARANCE Clear Clear Clear Clear   URINEGLC >1000* 30* Negative Negative   URINEBILI Negative Negative Negative Negative   URINEKETONE Trace* 10* Negative Negative   SG 1.011 1.017 1.015 1.008   UBLD Trace* Trace* Small* Negative   URINEPH 6.5 6.5 7.0 6.0   PROTEIN 300* 300* 100* 50*   UROBILINOGEN  --   --  0.2  --    NITRITE Negative Negative Negative Negative   LEUKEST Negative Negative Negative Negative   RBCU 2 3* 5-10* 1   WBCU 2 1 5-10* 1     Recent Labs   Lab Test 08/22/19  0857 01/11/19  0741 07/08/15  0747 01/28/15  0749 12/27/13  0736 11/11/13  1436 11/12/12  1649 10/11/12  0737 02/28/12  0713   UTPG 2.20* 0.87* 0.28* 0.43* 0.31* 0.30* 0.40* 0.51* 0.48*     PTH  No lab results found.  Iron Studies  No lab results found.    IMAGING:  All imaging studies reviewed by me.         Patient was seen and evaluated by me, Adam Villalba MD. I have reviewed the note and agree with the the plan of care as documented by the fellow.

## 2020-02-07 NOTE — PLAN OF CARE
VS: VSS; afebrile; on RA  Mental Status: A&Ox4  Cardiac: WNL; denies chest pain   Respiratory: LS clear, diminished in bases; denies SOB  : voiding adequate amount urine per patient, unmeasured   GI: Abdomen rounded/obese, hypoactive bowel tones, last BM 2/, denies constipation, + intermittent nausea with PO intake, including medication, PRN Zofran and ativan given w/ relief in nausea   - 1 emesis this shift   Pain: denies pain   Diet: clear liquid diet; B, 220, 234, 193, 156  Mobility: up independent, ambulated to bathroom multiple times this shift   Treatment: continue erythromycin for gastroparesis treatment; PRN antiemetics; possible dialysis today; GI following, patient may need G-tube, in the future.  DC plan: TBD; home w/ family support

## 2020-02-07 NOTE — PLAN OF CARE
Afebrile.  BP slightly elevated.  Continued with ongoing nausea.  No emesis noted. Getting IV Zofran and Ativan .  Up independently in room.  Voids.  Ordered a popcicle and broth this afternoon.  MIV infusing.  Takes very few meds by mouth.  Prograf drip ongoing.  Continues to get IV Cellcept.  Sleeping most of day.  Continue tp monitor, support.

## 2020-02-07 NOTE — PROGRESS NOTES
[facility], [facility] Progress Note - Hospitalist Service, Gold 09       Date of Admission:  2/5/2020  Assessment & Plan      kelsea Fitzgerald is a 44 year old male admitted on 2/5/2020. He has a past medical history of type I diabetes, chronic kidney disease status post kidney transplant (November 2008), pancreatic transplant (February 2009), CAD s/p bypass graft, hypertension, GERD, gastroparesis who presented to the ER with 2 days of nausea, vomiting and not being able to keep anything down and was found to have recurrent gastroparesis and is being admitted to Internal Medicine for further care    Plan for today   - nausea seems to be better   - change immuno suppressants to oral   -advance diet to full liquid     #Gastroparesis with nausea, vomiting  -Continue with  Zofran 8mg q8h, Ativan 0.5mg IV as well as oral reglan  prn for nausea  -Gastroenterology consulted   - patient started on Erythromycin 3 mg/kg TID for 3 days and one dose of sumatriptam   -continue GI cocktail to see if this helps with symptoms as well  -dialysis plan postponed as patient seems to improve   - patient to follow outpatient gastroenterology once symptoms controlled    - advance diet to full liquid        2. Acute on chronic kidney injury secondary to dehydration  -Cr 5.99, no other acute electrolyte abnormalities. Baseline over the past year has been creeping up from 2.5 - 5.   -patient has CKD which is advanced and may end up on dialysis    - symptoms might be due to poor rental function and nephrology was planning to try  dialysis arrangement   - renal function is poor but stable     3. Type 1 diabetes  -Home regimen includes Tresiba 24U at night. On admission, given poor oral intake, we will order 10U at night along with sliding scale insulin Aspart QID.   -Endocrinology consult to help better manage blood sugars while he is not eating.     4. History of pancreatic transplant  (February 2009)  5. History of kidney transplant (November 2008)  -patient not taking his joy transplant meds I asked pharmacy to assist to get IV options and patient to get IV equivalent of mycophenolic acid 360mg twice daily, tacrolimus 2mg by mouth twice daily.   -Will check tacrolimus levels      6. CAD s/p bypass graft, hypertension  -Continue home regimen of Amlodipine 5mg once daily, Lipitor 10mg once daily, Carvedilol 6.25mg twice daily           Diet: Full Liquid Diet    DVT Prophylaxis: Heparin SQ  Mueller Catheter: not present  Code Status: Full Code      Disposition Plan   Expected discharge: 4 - 7 days, recommended to Dialysis arranged    Entered: Jeremy Wang MD 02/07/2020, 4:40 PM       The patient's care was discussed with the Patient and Nephrology anf GI  Consultant.    Jeremy Wang MD  Hospitalist Service, [address], [place]  Pager: [phone]  Please see sticky note for cross cover information  ______________________________________________________________________    Interval History   Nausea better   Tolerating oral feeds      Data reviewed today: I reviewed all medications, new labs and imaging results over the last 24 hours.      Physical Exam   Vital Signs: Temp: 98.2  F (36.8  C) Temp src: Oral BP: (!) 155/87 Pulse: 92   Resp: 18 SpO2: 97 % O2 Device: None (Room air)    Weight: 189 lbs 12.8 oz  Hematologic / Lymphatic: no cervical lymphadenopathy  Respiratory: No increased work of breathing, good air exchange, clear to auscultation bilaterally, no crackles or wheezing  Cardiovascular: Normal apical impulse, regular rate and rhythm, normal S1 and S2, no S3 or S4, and no murmur noted  GI: No scars, normal bowel sounds, soft, non-distended, non-tender, no masses palpated, no hepatosplenomegally  Neurologic: Awake, alert, oriented to name, place and time.  Cranial nerves II-XII are grossly intact.  Motor is 5 out of 5 bilaterally.  Cerebellar finger to nose,  heel to shin intact.  Sensory is intact.  Babinski down going, Romberg negative, and gait is normal.    Data   Recent Labs   Lab 02/07/20  1058 02/06/20  0634 02/05/20  1639 02/01/20  1829   WBC 8.1 8.0 9.1 8.7   HGB 10.7* 10.9* 12.4* 11.7*   MCV 90 89 89 88    299 388 305    137 136 136   POTASSIUM 3.8 4.0 4.3 4.0   CHLORIDE 108 107 105 104   CO2 22 20 22 22   BUN 42* 43* 43* 41*   CR 5.98* 5.85* 5.99* 4.94*   ANIONGAP 8 10 8 10   CHARLY 8.3* 8.4* 9.0 9.0   * 169* 182* 335*   ALBUMIN 3.0* 2.9* 3.4 3.1*   PROTTOTAL 6.5* 6.6* 7.8 7.2   BILITOTAL 0.5 0.5 0.4 0.5   ALKPHOS 139 138 162* 145   ALT 11 10 12 14   AST 13 9 7 <3   LIPASE  --   --  39* 45*     No results found for this or any previous visit (from the past 24 hour(s)).  Medications     Injection Device for insulin         amLODIPine  5 mg Oral Daily     carvedilol  6.25 mg Oral BID w/meals     erythromycin  3 mg/kg Intravenous Q8H     heparin ANTICOAGULANT  5,000 Units Subcutaneous Q8H     insulin aspart  0.5-2.5 Units Subcutaneous Q4H     insulin degludec  10 Units Subcutaneous At Bedtime     mycophenolic acid  360 mg Oral BID     omeprazole  40 mg Oral QPM     sodium chloride (PF)  3 mL Intracatheter Q8H     tacrolimus  1 mg Oral BID IS

## 2020-02-07 NOTE — PROGRESS NOTES
GASTROENTEROLOGY PROGRESS NOTE    Date: 02/07/2020     ASSESSMENT:  44-year-old male with history of gastroparesis, type 1 diabetes, kidney transplant in 11/2008, pancreas transplant in 2/2009, CAD s/p CABG who was admitted on 2/5/2020 with nausea, vomiting, abdominal pain.  We are consulted for management of his gastroparesis. His most recent gastric emptying study was slightly worse compared to the prior one. Given abdominal pain, nausea/vomiting differential also include pancreatitis however this is less likely given normal lipase.  Also on the differential for nausea/vomiting is chronic vomiting syndrome, abdominal migraine, uremia, mechanical obstruction (this is less likely given normal recent EGD).      For gastroparesis, would recommend stopping medications that can worsen his sx. After careful review of his home and hospital medications, compazine has anticholinergic activity and thus can worsen gastroparesis, thus recommend stopping it. We can trial 3 days of erythromycin to help him through this acute phase. If there is migraine etiology for his sx, can also add triptan to see if this helps. Zofran is helpful as an anti-emetic but has no effect in treating gastroparesis. Glycemic control in diabetes induced gastroparesis is known to improve symptoms.  He is type 1 diabetic with good glycemic control w/ most recent A1c 6.7. Other options to consider in the future is G-tube venting and feeding through J tube, assuming that he does NOT have small bowel dysmotility.  Regarding gastric pacemaker, this would be something he would follow-up with transplant surgery, Dr. Guzman and they can consult GI as an outpatient. We do NOT do this in-patient.   ----------------------------    Today, appears doing well with improval of symptoms after yesterday's recommendations and trial of erythromycin and sumatriptan once. Recommend advancing diet as tolerated      RECOMMENDATIONS:  - Erythromycin IV 3mg/kg q8rs for  "3 days, after that would develop tachyphylaxis   - Advance diet as tolerated  - Discontinue compazine  - Continue prn zofran  - Monitor Qtc given prolonging meds per primary team  - Outpatient recs: Follow with Dr. Guzman for his gastroparesis      GI will sign-off     The patient was seen, discussed, and plan agreed upon with Dr. Marie, attending gastroenterologist.     Pepper Corral, PGY-2  Internal Medicine  P: [phone]_______________________________________________________________    Subjective:  He had a BM this afternoon. Otherwise, nausea improved. Able to tolerate liquid diet. Denies abdominal pain.     Objective:  Blood pressure (!) 155/87, pulse 92, temperature 98.2  F (36.8  C), temperature source Oral, resp. rate 18, height 1.727 m (5' 8\"), weight 86.1 kg (189 lb 12.8 oz), SpO2 97 %.    Gen: Pleasant, NAD  HEENT: sclera anicteric  Lungs: Normal work of breathing  Abd: +BS, soft, non-tender, distended  Skin: no jaundice, no stigmata of chronic liver disease  MS: appropriate muscle mass for age  Neuro: Non-focal      LABS:  BMP  Recent Labs   Lab 02/07/20  1058 02/06/20  0634 02/05/20  1639 02/01/20  1829    137 136 136   POTASSIUM 3.8 4.0 4.3 4.0   CHLORIDE 108 107 105 104   CHARLY 8.3* 8.4* 9.0 9.0   CO2 22 20 22 22   BUN 42* 43* 43* 41*   CR 5.98* 5.85* 5.99* 4.94*   * 169* 182* 335*     CBC  Recent Labs   Lab 02/07/20  1058 02/06/20  0634 02/05/20  1639 02/01/20  1829   WBC 8.1 8.0 9.1 8.7   RBC 3.62* 3.78* 4.30* 4.03*   HGB 10.7* 10.9* 12.4* 11.7*   HCT 32.5* 33.7* 38.4* 35.5*   MCV 90 89 89 88   MCH 29.6 28.8 28.8 29.0   MCHC 32.9 32.3 32.3 33.0   RDW 13.2 13.2 13.2 13.0    299 388 305     INRNo lab results found in last 7 days.  LFTs  Recent Labs   Lab 02/07/20  1058 02/06/20  0634 02/05/20  1639 02/01/20  1829   ALKPHOS 139 138 162* 145   AST 13 9 7 <3   ALT 11 10 12 14   BILITOTAL 0.5 0.5 0.4 0.5   PROTTOTAL 6.5* 6.6* 7.8 7.2   ALBUMIN 3.0* 2.9* 3.4 3.1*      PANC  Recent " Labs   Lab 02/05/20  1639 02/01/20  1829   LIPASE 39* 45*       IMAGING:  Reviewed

## 2020-02-07 NOTE — PLAN OF CARE
"BP (!) 168/96 (BP Location: Right arm)   Pulse 111   Temp 98.2  F (36.8  C) (Oral)   Resp 16   Ht 1.727 m (5' 8\")   Wt 85.7 kg (189 lb)   SpO2 94%   BMI 28.74 kg/m       HTN (166/106), scheduled norvasc & coreg given. Tachycardic (100-110s), AOVSS on RA. Endorses abdominal pain, declined medication. Endorses continuous nausea, given PRN ativan 0.5 mg & PRN compazine 10 mg with some relief. Pt has frequent dry heaving while awake; 125 mL emesis x 1 - clear, mucous. R PIV NS locked. Anti-rejection medications changed to IV formulation as pt unable to tolerate most PO medications. AM doses not taken; Gold 9 team aware. Q4H BGs 236; 1 unit of insulin given. Voiding spontaneously. Last BM 2/4 per pt. Up ad mikal to bathroom. Continue to monitor and update Gold team with acute changes.   "

## 2020-02-08 LAB
ANION GAP SERPL CALCULATED.3IONS-SCNC: 8 MMOL/L (ref 3–14)
BUN SERPL-MCNC: 40 MG/DL (ref 7–30)
CALCIUM SERPL-MCNC: 8.7 MG/DL (ref 8.5–10.1)
CHLORIDE SERPL-SCNC: 106 MMOL/L (ref 94–109)
CO2 SERPL-SCNC: 21 MMOL/L (ref 20–32)
CREAT SERPL-MCNC: 5.94 MG/DL (ref 0.66–1.25)
ERYTHROCYTE [DISTWIDTH] IN BLOOD BY AUTOMATED COUNT: 13.2 % (ref 10–15)
GFR SERPL CREATININE-BSD FRML MDRD: 11 ML/MIN/{1.73_M2}
GLUCOSE BLDC GLUCOMTR-MCNC: 124 MG/DL (ref 70–99)
GLUCOSE BLDC GLUCOMTR-MCNC: 179 MG/DL (ref 70–99)
GLUCOSE BLDC GLUCOMTR-MCNC: 215 MG/DL (ref 70–99)
GLUCOSE BLDC GLUCOMTR-MCNC: 216 MG/DL (ref 70–99)
GLUCOSE BLDC GLUCOMTR-MCNC: 242 MG/DL (ref 70–99)
GLUCOSE SERPL-MCNC: 180 MG/DL (ref 70–99)
HCT VFR BLD AUTO: 33.9 % (ref 40–53)
HGB BLD-MCNC: 11 G/DL (ref 13.3–17.7)
MAGNESIUM SERPL-MCNC: 2 MG/DL (ref 1.6–2.3)
MCH RBC QN AUTO: 28.7 PG (ref 26.5–33)
MCHC RBC AUTO-ENTMCNC: 32.4 G/DL (ref 31.5–36.5)
MCV RBC AUTO: 89 FL (ref 78–100)
PLATELET # BLD AUTO: 336 10E9/L (ref 150–450)
POTASSIUM SERPL-SCNC: 3.6 MMOL/L (ref 3.4–5.3)
RBC # BLD AUTO: 3.83 10E12/L (ref 4.4–5.9)
SODIUM SERPL-SCNC: 136 MMOL/L (ref 133–144)
TACROLIMUS BLD-MCNC: 4.1 UG/L (ref 5–15)
TME LAST DOSE: ABNORMAL H
WBC # BLD AUTO: 6.8 10E9/L (ref 4–11)

## 2020-02-08 PROCEDURE — 25000132 ZZH RX MED GY IP 250 OP 250 PS 637: Performed by: PHYSICIAN ASSISTANT

## 2020-02-08 PROCEDURE — 85027 COMPLETE CBC AUTOMATED: CPT | Performed by: INTERNAL MEDICINE

## 2020-02-08 PROCEDURE — 25000128 H RX IP 250 OP 636: Performed by: PHYSICIAN ASSISTANT

## 2020-02-08 PROCEDURE — 80197 ASSAY OF TACROLIMUS: CPT | Performed by: INTERNAL MEDICINE

## 2020-02-08 PROCEDURE — 25000131 ZZH RX MED GY IP 250 OP 636 PS 637: Performed by: PHYSICIAN ASSISTANT

## 2020-02-08 PROCEDURE — 25800030 ZZH RX IP 258 OP 636: Performed by: INTERNAL MEDICINE

## 2020-02-08 PROCEDURE — 25000131 ZZH RX MED GY IP 250 OP 636 PS 637: Performed by: INTERNAL MEDICINE

## 2020-02-08 PROCEDURE — 83735 ASSAY OF MAGNESIUM: CPT | Performed by: INTERNAL MEDICINE

## 2020-02-08 PROCEDURE — 00000146 ZZHCL STATISTIC GLUCOSE BY METER IP

## 2020-02-08 PROCEDURE — 80048 BASIC METABOLIC PNL TOTAL CA: CPT | Performed by: INTERNAL MEDICINE

## 2020-02-08 PROCEDURE — 99233 SBSQ HOSP IP/OBS HIGH 50: CPT | Performed by: INTERNAL MEDICINE

## 2020-02-08 PROCEDURE — 25000128 H RX IP 250 OP 636: Performed by: INTERNAL MEDICINE

## 2020-02-08 PROCEDURE — 36415 COLL VENOUS BLD VENIPUNCTURE: CPT | Performed by: INTERNAL MEDICINE

## 2020-02-08 PROCEDURE — 12000012 ZZH R&B MS OVERFLOW UMMC

## 2020-02-08 RX ORDER — LORAZEPAM 2 MG/ML
1 INJECTION INTRAMUSCULAR ONCE
Status: COMPLETED | OUTPATIENT
Start: 2020-02-08 | End: 2020-02-08

## 2020-02-08 RX ADMIN — INSULIN ASPART 3 UNITS: 100 INJECTION, SOLUTION INTRAVENOUS; SUBCUTANEOUS at 21:22

## 2020-02-08 RX ADMIN — TACROLIMUS 1 MG: 1 CAPSULE ORAL at 17:56

## 2020-02-08 RX ADMIN — ERYTHROMYCIN LACTOBIONATE 250 MG: 500 INJECTION, POWDER, LYOPHILIZED, FOR SOLUTION INTRAVENOUS at 16:01

## 2020-02-08 RX ADMIN — ERYTHROMYCIN LACTOBIONATE 250 MG: 500 INJECTION, POWDER, LYOPHILIZED, FOR SOLUTION INTRAVENOUS at 23:02

## 2020-02-08 RX ADMIN — HEPARIN SODIUM 5000 UNITS: 5000 INJECTION, SOLUTION INTRAVENOUS; SUBCUTANEOUS at 09:48

## 2020-02-08 RX ADMIN — HEPARIN SODIUM 5000 UNITS: 5000 INJECTION, SOLUTION INTRAVENOUS; SUBCUTANEOUS at 17:56

## 2020-02-08 RX ADMIN — MYCOPHENOLIC ACID 360 MG: 360 TABLET, DELAYED RELEASE ORAL at 09:47

## 2020-02-08 RX ADMIN — TACROLIMUS 1 MG: 1 CAPSULE ORAL at 09:47

## 2020-02-08 RX ADMIN — AMLODIPINE BESYLATE 5 MG: 5 TABLET ORAL at 09:47

## 2020-02-08 RX ADMIN — INSULIN ASPART 1 UNITS: 100 INJECTION, SOLUTION INTRAVENOUS; SUBCUTANEOUS at 11:53

## 2020-02-08 RX ADMIN — ERYTHROMYCIN LACTOBIONATE 250 MG: 500 INJECTION, POWDER, LYOPHILIZED, FOR SOLUTION INTRAVENOUS at 06:58

## 2020-02-08 RX ADMIN — LORAZEPAM 1 MG: 2 INJECTION INTRAMUSCULAR; INTRAVENOUS at 09:45

## 2020-02-08 RX ADMIN — ONDANSETRON 8 MG: 2 INJECTION INTRAMUSCULAR; INTRAVENOUS at 11:50

## 2020-02-08 RX ADMIN — INSULIN ASPART 1 UNITS: 100 INJECTION, SOLUTION INTRAVENOUS; SUBCUTANEOUS at 09:04

## 2020-02-08 RX ADMIN — ONDANSETRON 8 MG: 2 INJECTION INTRAMUSCULAR; INTRAVENOUS at 05:42

## 2020-02-08 RX ADMIN — INSULIN ASPART 1 UNITS: 100 INJECTION, SOLUTION INTRAVENOUS; SUBCUTANEOUS at 16:04

## 2020-02-08 RX ADMIN — CARVEDILOL 6.25 MG: 6.25 TABLET, FILM COATED ORAL at 17:56

## 2020-02-08 RX ADMIN — LORAZEPAM 0.5 MG: 2 INJECTION INTRAMUSCULAR; INTRAVENOUS at 06:20

## 2020-02-08 RX ADMIN — MYCOPHENOLIC ACID 360 MG: 360 TABLET, DELAYED RELEASE ORAL at 20:47

## 2020-02-08 RX ADMIN — CARVEDILOL 6.25 MG: 6.25 TABLET, FILM COATED ORAL at 09:47

## 2020-02-08 RX ADMIN — OMEPRAZOLE 40 MG: 20 CAPSULE, DELAYED RELEASE ORAL at 20:47

## 2020-02-08 ASSESSMENT — ACTIVITIES OF DAILY LIVING (ADL)
ADLS_ACUITY_SCORE: 13
ADLS_ACUITY_SCORE: 14
ADLS_ACUITY_SCORE: 13

## 2020-02-08 NOTE — PROGRESS NOTES
[facility], [place]   Transplant Nephrology Progress Note  Date of Admission:  2/5/2020  Today's Date: 02/08/2020    Assessment & Plan   Murtaza Fitzgerald is a 44 year old male with h/o DM1 and ESRD s/p 2008 LDKT and 2009 ANNMARIE, diagnosed gastroparesis by emptying study, CAD s/p CABG, and HTN now admitted with N/V and progressive renal graft failure.     New recommendations:   -discussed potential need for HD for uremic n/v worsening his underlying gastroparesis.    -GI consulting, started erythromycin     - tac level fine on current dosing.    # LDKT: Creatinine has been higher for several months, 3s then 4s, now 5s. A pre-renal component is possible but progression of CKD underlying is also a concern. Agree with IVFs being given. U/S without obstruction.     Would not treat rejection given his poor renal fxn of late; similarly with novel GN.   He is nearing dialysis (eGFR currently 11) - and his N/V could have a uremic component.                 - Baseline Cr was ~1.8 but appears to have progressive CKD              - Proteinuria: Moderate (1-3 grams)              - Date DSA Last Checked: Jul/2016      Latest DSA: No              - BK Viremia: No              - Kidney Tx Biopsy: Dec 27, 2013; Result:   Kidney biopsy:       - Renal allograft (5 years post transplant), biopsy:       Rejection:       - Acute Tubulointerstitial:  None.  - Acute Vascular:  None.  - Chronic Tubulointerstitial:  None.  - Chronic vascular:  None.  - Transplant glomerulopathy:  No evidence.  - Chronic allograft nephropathy:  No evidence.  - Humoral rejection:  No evidence.       Drug toxicity:  Arteriolar hyaliniosis, consistent with calcineurin  inhibitor toxicity.  Other:       - Ischemic allograft injury:  No evidence  - Recurrent renal disease:  No.  - Polyoma virus cytopathic changes:  No.     # Pancreas Tx (ANNMARIE):              - Pancreatic Exocrine Drainage: Bladder drained                    -  failed on insulin              - Pancreatic enzymes: no need to check given failed organ                # Immunosuppression: Tacrolimus immediate release (goal 4-6) and Mycophenolic acid (goal not followed)              - Changes: No, continue tacro 2mg BID, with level at goal 2/6, and myfortic 360mg BID    -tac level of 4.1 ng / ml is ok.      # Infection Prophylaxis:   - PJP: None     # Hypertension: Inadequate control;             Goal BP: < 140/90              - Volume status: Euvolemic                              - Changes: No, agree with restarting home meds as we attempt to help him eat and drink.     # Diabetes:     - Management as per primary team.     # Anemia in Chronic Renal Disease: Hgb: Stable      WILL: No              11 g/dl today.    # Mineral Bone Disorder:   - Calcium; level: Normal        On Supplement: No  - Phosphorus; level: Normal        On Supplement: No     # Electrolytes:   - Potassium; level: 3.6 meq / L       On Supplement: No  - Bicarbonate; level: 21 meq / L      On Supplement: No  - Sodium; level: 136 meq / L     - anion gap is Normal, no e/o DKA      # gastroparesis  # N/V: Suspect his known gastroparesis. He may need to have a gastric pacemaker implanted. GI also suggesting G tube to vent.     Also on the DDX is uremia, and the pt may elect to trial dialysis to see if symptoms improve.     Now using erythromycin.     # Transplant History:  Etiology of Kidney Failure: Diabetes mellitus type 1  Tx: LDKT, ANNMARIE  Transplant: 11/4/2008 (Kidney), 2/17/2009 (Pancreas)  Donor Type: Living      Donor Class: Standard Criteria Donor  Significant changes in immunosuppression: None  Significant transplant-related complications: None    Recommendations were communicated to the primary team via note.     Jack Chen MD     Interval History      Nausea, emesis this am. No cp, sob. No f/s/c. No constipation.    Review of Systems   4 point ROS was obtained and negative except as noted in the  "Interval History.    MEDICATIONS:    amLODIPine  5 mg Oral Daily     carvedilol  6.25 mg Oral BID w/meals     erythromycin  3 mg/kg Intravenous Q8H     heparin ANTICOAGULANT  5,000 Units Subcutaneous Q8H     insulin aspart  0.5-2.5 Units Subcutaneous Q4H     insulin degludec  10 Units Subcutaneous At Bedtime     mycophenolic acid  360 mg Oral BID     omeprazole  40 mg Oral QPM     sodium chloride (PF)  3 mL Intracatheter Q8H     tacrolimus  1 mg Oral BID IS       Injection Device for insulin       Physical Exam   Temp  Av.4  F (36.9  C)  Min: 98  F (36.7  C)  Max: 99  F (37.2  C)      Pulse  Av.1  Min: 86  Max: 114 Resp  Av.9  Min: 16  Max: 18  SpO2  Av.2 %  Min: 94 %  Max: 100 %     BP (!) 166/98 (BP Location: Left arm)   Pulse 83   Temp 98.4  F (36.9  C) (Oral)   Resp 16   Ht 1.727 m (5' 8\")   Wt 86.1 kg (189 lb 12.8 oz)   SpO2 95%   BMI 28.86 kg/m      Admit Weight: 90.7 kg (200 lb)     HEENT: NC/AT  GENERAL APPEARANCE: alert and no distress  RESP: lungs clear to auscultation - no rales, rhonchi or wheezes  CV: regular rhythm, normal rate, no rub, no murmur  EDEMA: trace LE edema bilaterally  ABDOMEN: soft, nondistended, nontender, bowel sounds normal  Skin: no rash  Neuro: no focal deficits.    Data   All labs reviewed by me.  CMP  Recent Labs   Lab 20  0628 20  1058 20  0634 20  1639 20  1829    137 137 136 136   POTASSIUM 3.6 3.8 4.0 4.3 4.0   CHLORIDE 106 108 107 105 104   CO2 21 22 20 22 22   ANIONGAP 8 8 10 8 10   * 168* 169* 182* 335*   BUN 40* 42* 43* 43* 41*   CR 5.94* 5.98* 5.85* 5.99* 4.94*   GFRESTIMATED 11* 11* 11* 11* 13*   GFRESTBLACK 12* 12* 13* 12* 15*   CHARLY 8.7 8.3* 8.4* 9.0 9.0   MAG 2.0 1.8  --   --   --    PHOS  --  3.9  --   --   --    PROTTOTAL  --  6.5* 6.6* 7.8 7.2   ALBUMIN  --  3.0* 2.9* 3.4 3.1*   BILITOTAL  --  0.5 0.5 0.4 0.5   ALKPHOS  --  139 138 162* 145   AST  --  13 9 7 <3   ALT  --  11 10 12 14 "     CBC  Recent Labs   Lab 02/08/20  0628 02/07/20  1058 02/06/20  0634 02/05/20  1639   HGB 11.0* 10.7* 10.9* 12.4*   WBC 6.8 8.1 8.0 9.1   RBC 3.83* 3.62* 3.78* 4.30*   HCT 33.9* 32.5* 33.7* 38.4*   MCV 89 90 89 89   MCH 28.7 29.6 28.8 28.8   MCHC 32.4 32.9 32.3 32.3   RDW 13.2 13.2 13.2 13.2    327 299 388     Urine Studies  Recent Labs   Lab Test 01/27/20  0941 01/03/20  1406 08/28/19  0905 07/11/19  0631   COLOR Straw Light Yellow Yellow Light Yellow   APPEARANCE Clear Clear Clear Clear   URINEGLC >1000* 30* Negative Negative   URINEBILI Negative Negative Negative Negative   URINEKETONE Trace* 10* Negative Negative   SG 1.011 1.017 1.015 1.008   UBLD Trace* Trace* Small* Negative   URINEPH 6.5 6.5 7.0 6.0   PROTEIN 300* 300* 100* 50*   UROBILINOGEN  --   --  0.2  --    NITRITE Negative Negative Negative Negative   LEUKEST Negative Negative Negative Negative   RBCU 2 3* 5-10* 1   WBCU 2 1 5-10* 1     Recent Labs   Lab Test 08/22/19  0857 01/11/19  0741 07/08/15  0747 01/28/15  0749 12/27/13  0736 11/11/13  1436 11/12/12  1649 10/11/12  0737 02/28/12  0713   UTPG 2.20* 0.87* 0.28* 0.43* 0.31* 0.30* 0.40* 0.51* 0.48*     IMAGING:  All imaging studies reviewed by me.

## 2020-02-08 NOTE — PLAN OF CARE
"BP (!) 144/84 (BP Location: Left arm)   Pulse 80   Temp 98.1  F (36.7  C) (Oral)   Resp 16   Ht 1.727 m (5' 8\")   Wt 86.1 kg (189 lb 12.8 oz)   SpO2 98%   BMI 28.86 kg/m       5090-7574:  HTN (144/84), AOVSS on RA. Denied nausea most of the night, had apple juice around 0430. Pt called out with nausea ~0530. 50 ml emesis x 1, clear. PRN zofran 8 mg given without relief. PRN ativan 0.5 mg given. Endorsed low back discomfort, denied intervention. Q4H BGs 296, 190 & 124. Gold cross-cover paged for additional coverage for . 1.5 units per order given + additional 3 units for total of 4.5 units. Pt requesting change in sliding scale coverage, MD aware. Voiding spontaneously, not saving. Last BM 2/7 per pt. Clear liquid diet. R PIV NS locked. Up ad mikal. Continue to monitor and update Gold 9 team with acute changes.    "

## 2020-02-08 NOTE — PROGRESS NOTES
[facility], [facility] Progress Note - Hospitalist Service, Gold 09       Date of Admission:  2/5/2020  Assessment & Plan      kelsea Fitzgerald is a 44 year old male admitted on 2/5/2020. He has a past medical history of type I diabetes, chronic kidney disease status post kidney transplant (November 2008), pancreatic transplant (February 2009), CAD s/p bypass graft, hypertension, GERD, gastroparesis who presented to the ER with 2 days of nausea, vomiting and not being able to keep anything down and was found to have recurrent gastroparesis and is being admitted to Internal Medicine for further care    Plan for today   -awaiting nephrology's decision on dialysis     #Gastroparesis with nausea, vomiting  -Continue with  Zofran 8mg q8h, Ativan 0.5mg IV as well as oral reglan  prn for nausea  -Gastroenterology consulted   - patient started on Erythromycin 3 mg/kg TID for 3 days and one dose of sumatriptam   -continue GI cocktail to see if this helps with symptoms as well  -dialysis plan postponed as patient seems to improve   - patient to follow outpatient gastroenterology once symptoms controlled    - advance diet to full liquid        2. Acute on chronic kidney injury secondary to dehydration  -Cr 5.99, no other acute electrolyte abnormalities. Baseline over the past year has been creeping up from 2.5 - 5.   -patient has CKD which is advanced and may end up on dialysis    - symptoms might be due to poor rental function and nephrology was planning to try  dialysis arrangement   - renal function is poor but stable, nephrology to decide on dialysis     3. Type 1 diabetes  -Home regimen includes Tresiba 24U at night. On admission, given poor oral intake, we will order 10U at night along with sliding scale insulin Aspart QID.   -Endocrinology consult to help better manage blood sugars while he is not eating.     4. History of pancreatic transplant (February 2009)  5. History  of kidney transplant (November 2008)  -patient not taking his joy transplant meds I asked pharmacy to assist to get IV options and patient to get IV equivalent of mycophenolic acid 360mg twice daily, tacrolimus 2mg by mouth twice daily.   -Will check tacrolimus levels      6. CAD s/p bypass graft, hypertension  -Continue home regimen of Amlodipine 5mg once daily, Lipitor 10mg once daily, Carvedilol 6.25mg twice daily           Diet: Full Liquid Diet    DVT Prophylaxis: Heparin SQ  Mueller Catheter: not present  Code Status: Full Code      Disposition Plan   Expected discharge: 4 - 7 days, recommended to prior living arrangement once nephrology decision and initiation of dialysis .  Entered: Jeremy Wang MD 02/08/2020, 4:31 PM       The patient's care was discussed with the Bedside Nurse, Patient and Patient's Family.    Jeremy Wang MD  Hospitalist Service, [address], [place]  Pager: [phone]  Please see sticky note for cross cover information  ______________________________________________________________________    Interval History   Still nauseous     Data reviewed today: I reviewed all medications, new labs and imaging results over the last 24 hours.      Physical Exam   Vital Signs: Temp: 98.2  F (36.8  C) Temp src: Oral BP: 132/86 Pulse: 84   Resp: 16 SpO2: 96 % O2 Device: None (Room air)    Weight: 189 lbs 12.8 oz  Constitutional: awake, alert, cooperative, no apparent distress, and appears stated age  Hematologic / Lymphatic: no cervical lymphadenopathy  Respiratory: No increased work of breathing, good air exchange, clear to auscultation bilaterally, no crackles or wheezing  Cardiovascular: Normal apical impulse, regular rate and rhythm, normal S1 and S2, no S3 or S4, and no murmur noted  GI: No scars, normal bowel sounds, soft, non-distended, non-tender, no masses palpated, no hepatosplenomegally  Neurologic: Awake, alert, oriented to name, place and time.  Cranial  nerves II-XII are grossly intact.  Motor is 5 out of 5 bilaterally.  Cerebellar finger to nose, heel to shin intact.  Sensory is intact.  Babinski down going, Romberg negative, and gait is normal.    Data   Recent Labs   Lab 02/08/20  0628 02/07/20  1058 02/06/20  0634 02/05/20  1639 02/01/20  1829   WBC 6.8 8.1 8.0 9.1 8.7   HGB 11.0* 10.7* 10.9* 12.4* 11.7*   MCV 89 90 89 89 88    327 299 388 305    137 137 136 136   POTASSIUM 3.6 3.8 4.0 4.3 4.0   CHLORIDE 106 108 107 105 104   CO2 21 22 20 22 22   BUN 40* 42* 43* 43* 41*   CR 5.94* 5.98* 5.85* 5.99* 4.94*   ANIONGAP 8 8 10 8 10   CHARLY 8.7 8.3* 8.4* 9.0 9.0   * 168* 169* 182* 335*   ALBUMIN  --  3.0* 2.9* 3.4 3.1*   PROTTOTAL  --  6.5* 6.6* 7.8 7.2   BILITOTAL  --  0.5 0.5 0.4 0.5   ALKPHOS  --  139 138 162* 145   ALT  --  11 10 12 14   AST  --  13 9 7 <3   LIPASE  --   --   --  39* 45*     No results found for this or any previous visit (from the past 24 hour(s)).  Medications     Injection Device for insulin         amLODIPine  5 mg Oral Daily     carvedilol  6.25 mg Oral BID w/meals     erythromycin  3 mg/kg Intravenous Q8H     heparin ANTICOAGULANT  5,000 Units Subcutaneous Q8H     insulin aspart  0.5-2.5 Units Subcutaneous Q4H     insulin degludec  10 Units Subcutaneous At Bedtime     mycophenolic acid  360 mg Oral BID     omeprazole  40 mg Oral QPM     sodium chloride (PF)  3 mL Intracatheter Q8H     tacrolimus  1 mg Oral BID IS

## 2020-02-08 NOTE — PLAN OF CARE
Hypertensive this AM.  Patient continues with intractable nausea for most of day shift.  Dry wretching.  A couple of phlegem emesis.  Not eating anything or drinking much.  Got Zofran 8 mg x 1 and Ativan dose(1mg) x 1.  Able to take daily meds at noon.  Sleeping on and off as nausea allows.  Toilet voids.  Ibrahima Marcie at bedside for most of shift supportive. Per Nephrology to possibly start on dialysis with uremia as the possible culprit.  Continue to monitor, support.

## 2020-02-09 ENCOUNTER — PATIENT OUTREACH (OUTPATIENT)
Dept: CARE COORDINATION | Facility: CLINIC | Age: 45
End: 2020-02-09

## 2020-02-09 VITALS
DIASTOLIC BLOOD PRESSURE: 82 MMHG | SYSTOLIC BLOOD PRESSURE: 132 MMHG | BODY MASS INDEX: 28.76 KG/M2 | TEMPERATURE: 97.9 F | OXYGEN SATURATION: 96 % | WEIGHT: 189.8 LBS | RESPIRATION RATE: 16 BRPM | HEART RATE: 76 BPM | HEIGHT: 68 IN

## 2020-02-09 LAB
ALBUMIN SERPL-MCNC: 2.9 G/DL (ref 3.4–5)
ALP SERPL-CCNC: 133 U/L (ref 40–150)
ALT SERPL W P-5'-P-CCNC: 10 U/L (ref 0–70)
ANION GAP SERPL CALCULATED.3IONS-SCNC: 8 MMOL/L (ref 3–14)
AST SERPL W P-5'-P-CCNC: 8 U/L (ref 0–45)
BASOPHILS # BLD AUTO: 0 10E9/L (ref 0–0.2)
BASOPHILS NFR BLD AUTO: 0.7 %
BILIRUB SERPL-MCNC: 0.3 MG/DL (ref 0.2–1.3)
BUN SERPL-MCNC: 35 MG/DL (ref 7–30)
CALCIUM SERPL-MCNC: 8.3 MG/DL (ref 8.5–10.1)
CHLORIDE SERPL-SCNC: 109 MMOL/L (ref 94–109)
CO2 SERPL-SCNC: 20 MMOL/L (ref 20–32)
CREAT SERPL-MCNC: 6.21 MG/DL (ref 0.66–1.25)
DIFFERENTIAL METHOD BLD: ABNORMAL
EOSINOPHIL # BLD AUTO: 0.1 10E9/L (ref 0–0.7)
EOSINOPHIL NFR BLD AUTO: 1.6 %
ERYTHROCYTE [DISTWIDTH] IN BLOOD BY AUTOMATED COUNT: 13.1 % (ref 10–15)
GFR SERPL CREATININE-BSD FRML MDRD: 10 ML/MIN/{1.73_M2}
GLUCOSE BLDC GLUCOMTR-MCNC: 130 MG/DL (ref 70–99)
GLUCOSE BLDC GLUCOMTR-MCNC: 148 MG/DL (ref 70–99)
GLUCOSE BLDC GLUCOMTR-MCNC: 152 MG/DL (ref 70–99)
GLUCOSE BLDC GLUCOMTR-MCNC: 195 MG/DL (ref 70–99)
GLUCOSE SERPL-MCNC: 125 MG/DL (ref 70–99)
HCT VFR BLD AUTO: 30.8 % (ref 40–53)
HGB BLD-MCNC: 9.9 G/DL (ref 13.3–17.7)
IMM GRANULOCYTES # BLD: 0 10E9/L (ref 0–0.4)
IMM GRANULOCYTES NFR BLD: 0.4 %
LYMPHOCYTES # BLD AUTO: 1.4 10E9/L (ref 0.8–5.3)
LYMPHOCYTES NFR BLD AUTO: 24.5 %
MCH RBC QN AUTO: 28.9 PG (ref 26.5–33)
MCHC RBC AUTO-ENTMCNC: 32.1 G/DL (ref 31.5–36.5)
MCV RBC AUTO: 90 FL (ref 78–100)
MONOCYTES # BLD AUTO: 0.5 10E9/L (ref 0–1.3)
MONOCYTES NFR BLD AUTO: 9.3 %
NEUTROPHILS # BLD AUTO: 3.5 10E9/L (ref 1.6–8.3)
NEUTROPHILS NFR BLD AUTO: 63.5 %
NRBC # BLD AUTO: 0 10*3/UL
NRBC BLD AUTO-RTO: 0 /100
PLATELET # BLD AUTO: 261 10E9/L (ref 150–450)
POTASSIUM SERPL-SCNC: 3.4 MMOL/L (ref 3.4–5.3)
PROT SERPL-MCNC: 6.1 G/DL (ref 6.8–8.8)
RBC # BLD AUTO: 3.43 10E12/L (ref 4.4–5.9)
SODIUM SERPL-SCNC: 137 MMOL/L (ref 133–144)
WBC # BLD AUTO: 5.5 10E9/L (ref 4–11)

## 2020-02-09 PROCEDURE — 25000131 ZZH RX MED GY IP 250 OP 636 PS 637: Performed by: PHYSICIAN ASSISTANT

## 2020-02-09 PROCEDURE — 25000128 H RX IP 250 OP 636: Performed by: INTERNAL MEDICINE

## 2020-02-09 PROCEDURE — 80053 COMPREHEN METABOLIC PANEL: CPT | Performed by: INTERNAL MEDICINE

## 2020-02-09 PROCEDURE — 00000146 ZZHCL STATISTIC GLUCOSE BY METER IP

## 2020-02-09 PROCEDURE — 25000131 ZZH RX MED GY IP 250 OP 636 PS 637: Performed by: INTERNAL MEDICINE

## 2020-02-09 PROCEDURE — 25800030 ZZH RX IP 258 OP 636: Performed by: INTERNAL MEDICINE

## 2020-02-09 PROCEDURE — 85025 COMPLETE CBC W/AUTO DIFF WBC: CPT | Performed by: INTERNAL MEDICINE

## 2020-02-09 PROCEDURE — 36415 COLL VENOUS BLD VENIPUNCTURE: CPT | Performed by: INTERNAL MEDICINE

## 2020-02-09 PROCEDURE — 25000132 ZZH RX MED GY IP 250 OP 250 PS 637: Performed by: PHYSICIAN ASSISTANT

## 2020-02-09 RX ORDER — ERYTHROMYCIN ETHYLSUCCINATE 400 MG/5ML
400 SUSPENSION ORAL
Qty: 450 ML | Refills: 0 | Status: SHIPPED | OUTPATIENT
Start: 2020-02-09 | End: 2020-02-11

## 2020-02-09 RX ADMIN — MYCOPHENOLIC ACID 360 MG: 360 TABLET, DELAYED RELEASE ORAL at 09:16

## 2020-02-09 RX ADMIN — TACROLIMUS 1 MG: 1 CAPSULE ORAL at 09:16

## 2020-02-09 RX ADMIN — HEPARIN SODIUM 5000 UNITS: 5000 INJECTION, SOLUTION INTRAVENOUS; SUBCUTANEOUS at 09:16

## 2020-02-09 RX ADMIN — AMLODIPINE BESYLATE 5 MG: 5 TABLET ORAL at 09:16

## 2020-02-09 RX ADMIN — CARVEDILOL 6.25 MG: 6.25 TABLET, FILM COATED ORAL at 09:16

## 2020-02-09 RX ADMIN — ERYTHROMYCIN LACTOBIONATE 250 MG: 500 INJECTION, POWDER, LYOPHILIZED, FOR SOLUTION INTRAVENOUS at 07:36

## 2020-02-09 RX ADMIN — INSULIN ASPART 1 UNITS: 100 INJECTION, SOLUTION INTRAVENOUS; SUBCUTANEOUS at 11:08

## 2020-02-09 RX ADMIN — INSULIN ASPART 2 UNITS: 100 INJECTION, SOLUTION INTRAVENOUS; SUBCUTANEOUS at 00:18

## 2020-02-09 ASSESSMENT — ACTIVITIES OF DAILY LIVING (ADL)
ADLS_ACUITY_SCORE: 13
ADLS_ACUITY_SCORE: 14
ADLS_ACUITY_SCORE: 14
ADLS_ACUITY_SCORE: 13

## 2020-02-09 NOTE — PROGRESS NOTES
"Medicine Cross Cover Note    Contacted by nursing regarding uncontrolled BG. Patient is receiving 10 instead of home 24 units nightly Tresiba and \"very low\" [facility] correction scale. His PO intake is still below baseline but he is tolerating more than on admission (currently full liquids). Will increase Tresiba to 14 units tonight. Entered custom sliding scale - per patient his physician orders are for 1 unit correction for every 40 > 140, although he frequently uses 20 instead of 40 correction factor at home. Home carb coverage is 1 unit per 6 g CHO but this has been on hold d/t unreliable PO intake. Patient and nursing feel he would benefit from endocrinology consult and this is reflected in day team note but not ordered, so order placed this evening.     Maria Luisa Edwards PA-C  Hospitalist Service  Cross Cover [id number]      "

## 2020-02-09 NOTE — PLAN OF CARE
"BP (!) 146/80 (BP Location: Right arm)   Pulse 77   Temp 97.9  F (36.6  C) (Oral)   Resp 12   Ht 1.727 m (5' 8\")   Wt 86.1 kg (189 lb 12.8 oz)   SpO2 95%   BMI 28.86 kg/m       0382-6212:  HTN (160/94) AOVSS on RA. Endorses generalized body discomfort, aqua K pad given without relief. Pt inquired about gabapentin for neuropathic pain, cross-cover MD paged with no new orders. Denies nausea at present. Offered PRN antiemetics for prophylaxis and pt declined. Cross-cover paged re . Sliding scale changed & Tresiba dose increased from 10 to 14 units; Q4H BGs 242, 195 & 130. R PIV NS locked; scheduled erythromycin Q8H. Up ad mikal to bathroom. Voiding not saving. Last BM 2/8 per pt report. Clear liquid diet. Continue to monitor and update Gold 9 team with acute changes.   "

## 2020-02-09 NOTE — PROGRESS NOTES
[facility], [place]   Transplant Nephrology Progress Note  Date of Admission:  2/5/2020  Today's Date: 02/09/2020    Assessment & Plan   Murtaza Fitzgerald is a 44 year old male with h/o DM1 and ESRD s/p 2008 LDKT and 2009 ANNMARIE, diagnosed gastroparesis by emptying study, CAD s/p CABG, and HTN now admitted with N/V and progressive renal graft failure.     New recommendations:   -ok for discharge with planned labs twice weekly to evaluate for any renal recovery. If none, he will need HD access placement.     -GI consulting, started erythromycin     - tac level fine on current dosing.    # LDKT: Creatinine has been higher for several months, 3s then 4s, now 5s. A pre-renal component is possible but progression of CKD underlying is also a concern. U/S without obstruction.     Would not treat rejection given his poor renal fxn of late; similarly with novel GN.     The patient has stable albeit poor kidney function.  His current GFR is estimated at 10 mL/min.  I discussed with the patient that given the fact that his nausea and vomiting have resolved that the likelihood was that this was due to his gastroparesis and not uremia.  He has no definitive need for dialysis initiation due to normal potassium and reasonable bicarbonate levels.  His volume status is currently acceptable.    As such, I have recommended that the patient have twice weekly labs this week maintain a renal diet and daily weights.  There is a possibility that he could have return of kidney function over the coming days and weeks with resolution of an ATN presentation.  However, he may have progression of underlying graft dysfunction that would necessitate dialysis initiation as an outpatient.  The patient is aware of this and will get his labs set up for next week and I have sent his coordinator a message to do this as well.                - Baseline Cr was ~1.8 but appears to have progressive CKD              -  Proteinuria: Moderate (1-3 grams)              - Date DSA Last Checked: Jul/2016      Latest DSA: No              - BK Viremia: No              - Kidney Tx Biopsy: Dec 27, 2013; Result:   Kidney biopsy:       - Renal allograft (5 years post transplant), biopsy:       Rejection:       - Acute Tubulointerstitial:  None.  - Acute Vascular:  None.  - Chronic Tubulointerstitial:  None.  - Chronic vascular:  None.  - Transplant glomerulopathy:  No evidence.  - Chronic allograft nephropathy:  No evidence.  - Humoral rejection:  No evidence.       Drug toxicity:  Arteriolar hyaliniosis, consistent with calcineurin  inhibitor toxicity.  Other:       - Ischemic allograft injury:  No evidence  - Recurrent renal disease:  No.  - Polyoma virus cytopathic changes:  No.     # Pancreas Tx (ANNMARIE):              - Pancreatic Exocrine Drainage: Bladder drained                    - failed on insulin              - Pancreatic enzymes: no need to check given failed organ                # Immunosuppression: Tacrolimus immediate release (goal 4-6) and Mycophenolic acid (goal not followed)              - Changes: No, continue tacro 2mg BID, with level at goal 2/6, and myfortic 360mg BID    -tac level of 4.1 ng / ml is ok.      # Infection Prophylaxis:   - PJP: None     # Hypertension:            Goal BP: < 140/90              - Volume status: Euvolemic                              - Changes: No, agree with restarting home meds as we attempt to help him eat and drink.     # Diabetes:     - Management as per primary team.     # Anemia in Chronic Renal Disease: Hgb: Stable      WILL: No              9.9 g/dl today.    # Mineral Bone Disorder:   - Calcium; level: 8.3 mg / dl      On Supplement: No  - Phosphorus; level: Normal        On Supplement: No     # Electrolytes:   - Potassium; level: 3.4 meq / L       On Supplement: No  - Bicarbonate; level: 20 meq / L      On Supplement: No  - Sodium; level: 137 meq / L     - anion gap is Normal, no e/o  "DKA      # gastroparesis  # N/V: Suspect his known gastroparesis. He may need to have a gastric pacemaker implanted. GI also suggesting G tube to vent.     Now using erythromycin.     # Transplant History:  Etiology of Kidney Failure: Diabetes mellitus type 1  Tx: LDKT, ANNMARIE  Transplant: 2008 (Kidney), 2009 (Pancreas)  Donor Type: Living      Donor Class: Standard Criteria Donor  Significant changes in immunosuppression: None  Significant transplant-related complications: None    Recommendations were communicated to the primary team via telephone.    Jack Chen MD     Interval History      No n/v in last 24 hours. No cp, sob. No f/s/c. No constipation. Wants to go home.     Review of Systems   4 point ROS was obtained and negative except as noted in the Interval History.    MEDICATIONS:    amLODIPine  5 mg Oral Daily     carvedilol  6.25 mg Oral BID w/meals     heparin ANTICOAGULANT  5,000 Units Subcutaneous Q8H     insulin aspart   Subcutaneous TID w/meals     insulin aspart  1-7 Units Subcutaneous TID AC     insulin aspart  1-5 Units Subcutaneous At Bedtime     insulin glargine  14 Units Subcutaneous At Bedtime     mycophenolic acid  360 mg Oral BID     omeprazole  40 mg Oral QPM     sodium chloride (PF)  3 mL Intracatheter Q8H     tacrolimus  1 mg Oral BID IS       Physical Exam   Temp  Av.4  F (36.9  C)  Min: 98  F (36.7  C)  Max: 99  F (37.2  C)      Pulse  Av.1  Min: 86  Max: 114 Resp  Av.9  Min: 16  Max: 18  SpO2  Av.2 %  Min: 94 %  Max: 100 %     /82   Pulse 76   Temp 97.9  F (36.6  C) (Oral)   Resp 16   Ht 1.727 m (5' 8\")   Wt 86.1 kg (189 lb 12.8 oz)   SpO2 96%   BMI 28.86 kg/m      Admit Weight: 90.7 kg (200 lb)     HEENT: NC/AT  GENERAL APPEARANCE: alert and no distress  RESP: lungs clear to auscultation - no rales, rhonchi or wheezes  CV: regular rhythm, normal rate, no rub, no murmur  EDEMA: trace LE edema bilaterally  ABDOMEN: soft, nondistended, " nontender, bowel sounds normal  Skin: no rash  Neuro: no focal deficits.    Data   All labs reviewed by me.  CMP  Recent Labs   Lab 02/09/20  0615 02/08/20  0628 02/07/20  1058 02/06/20  0634 02/05/20  1639    136 137 137 136   POTASSIUM 3.4 3.6 3.8 4.0 4.3   CHLORIDE 109 106 108 107 105   CO2 20 21 22 20 22   ANIONGAP 8 8 8 10 8   * 180* 168* 169* 182*   BUN 35* 40* 42* 43* 43*   CR 6.21* 5.94* 5.98* 5.85* 5.99*   GFRESTIMATED 10* 11* 11* 11* 11*   GFRESTBLACK 12* 12* 12* 13* 12*   CHARLY 8.3* 8.7 8.3* 8.4* 9.0   MAG  --  2.0 1.8  --   --    PHOS  --   --  3.9  --   --    PROTTOTAL 6.1*  --  6.5* 6.6* 7.8   ALBUMIN 2.9*  --  3.0* 2.9* 3.4   BILITOTAL 0.3  --  0.5 0.5 0.4   ALKPHOS 133  --  139 138 162*   AST 8  --  13 9 7   ALT 10  --  11 10 12     CBC  Recent Labs   Lab 02/09/20  0615 02/08/20 0628 02/07/20  1058 02/06/20  0634   HGB 9.9* 11.0* 10.7* 10.9*   WBC 5.5 6.8 8.1 8.0   RBC 3.43* 3.83* 3.62* 3.78*   HCT 30.8* 33.9* 32.5* 33.7*   MCV 90 89 90 89   MCH 28.9 28.7 29.6 28.8   MCHC 32.1 32.4 32.9 32.3   RDW 13.1 13.2 13.2 13.2    336 327 299     Urine Studies  Recent Labs   Lab Test 01/27/20  0941 01/03/20  1406 08/28/19  0905 07/11/19  0631   COLOR Straw Light Yellow Yellow Light Yellow   APPEARANCE Clear Clear Clear Clear   URINEGLC >1000* 30* Negative Negative   URINEBILI Negative Negative Negative Negative   URINEKETONE Trace* 10* Negative Negative   SG 1.011 1.017 1.015 1.008   UBLD Trace* Trace* Small* Negative   URINEPH 6.5 6.5 7.0 6.0   PROTEIN 300* 300* 100* 50*   UROBILINOGEN  --   --  0.2  --    NITRITE Negative Negative Negative Negative   LEUKEST Negative Negative Negative Negative   RBCU 2 3* 5-10* 1   WBCU 2 1 5-10* 1     Recent Labs   Lab Test 08/22/19  0857 01/11/19  0741 07/08/15  0747 01/28/15  0749 12/27/13  0736 11/11/13  1436 11/12/12  1649 10/11/12  0737 02/28/12  0713   UTPG 2.20* 0.87* 0.28* 0.43* 0.31* 0.30* 0.40* 0.51* 0.48*     IMAGING:  All imaging studies  reviewed by me.

## 2020-02-09 NOTE — DISCHARGE SUMMARY
[facility] [place]  Hospitalist Discharge Summary       Date of Admission:  2/5/2020  Date of Discharge:  2/9/2020 12:49 PM  Discharging Provider: Jeremy Wang MD  Discharge Team: Hospitalist Service, [place]     Discharge Diagnoses   Gastroparesis   Diabetes   Renal failure   Hx of renal transplant       Follow-ups Needed After Discharge   Follow-up Appointments     Adult [facility] Follow-up and recommended labs and tests      Follow up with primary care provider, Kt Ríos, within 7 days to   evaluate treatment change.  The following labs/tests are recommended: BMP.      Follow up with Transplant nephrology , at ([facility]) to evaluate treatment   change. The following labs/tests are recommended: BMP.    Appointments on [place] and/or [facility] (with [facility] or [facility]   provider or service). Call [phone] if you haven't heard regarding   these appointments within 7 days of discharge.             Unresulted Labs Ordered in the Past 30 Days of this Admission     No orders found from 1/6/2020 to 2/6/2020.      These results will be followed up by BMP    Discharge Disposition   Discharged to home  Condition at discharge: Stable    Hospital Course        Consultations This Hospital Stay   NEPHROLOGY KIDNEY/PANCREAS TRANSPLANT ADULT IP CONSULT  MEDICATION HISTORY IP PHARMACY CONSULT  ENDOCRINE DIABETES ADULT IP CONSULT  GI LUMINAL ADULT IP CONSULT  VASCULAR ACCESS CARE ADULT IP CONSULT  VASCULAR ACCESS CARE ADULT IP CONSULT  ENDOCRINE DIABETES ADULT IP CONSULT  MEDICATION HISTORY IP PHARMACY CONSULT    Code Status   Full Code    Time Spent on this Encounter   I, Jeremy Wang MD, personally saw the patient today and spent greater than 30 minutes discharging this patient.       Jeremy Wang MD  [facility]  ______________________________________________________________________    Physical Exam   Vital Signs: Temp: 97  F (36.1  C) Temp src: Oral  BP: 137/81 Pulse: 77   Resp: 14 SpO2: 95 % O2 Device: None (Room air)    Weight: 189 lbs 12.8 oz  General Appearance: Alert and oriented   Respiratory: clear and resonant   Cardiovascular: S1 and S2 well heard   GI: Soft non tender   Skin: no rashes          Primary Care Physician   Kt Ríos    Discharge Orders      Reason for your hospital stay    Patient was admitted to the hospital with nausea and vomiting believed to be due to gastroparesis. He also has stage V CKD     Adult [facility] Follow-up and recommended labs and tests    Follow up with primary care provider, Kt Ríos, within 7 days to evaluate treatment change.  The following labs/tests are recommended: BMP.    Follow up with Transplant nephrology , at ([facility]) to evaluate treatment change. The following labs/tests are recommended: BMP.    Appointments on [place] and/or [facility] (with [facility] or [facility] provider or service). Call [phone] if you haven't heard regarding these appointments within 7 days of discharge.     Activity    Your activity upon discharge: activity as tolerated     Full Code     Diet    Follow this diet upon discharge: Orders Placed This Encounter      Consistent Carbohydrate Diet 8085-5758 Calories: Moderate Consistent CHO (4-6 CHO units/meal)       Significant Results and Procedures   Most Recent 3 CBC's:  Recent Labs   Lab Test 02/13/20  0720 02/12/20  0710 02/11/20  2241 02/11/20  1739   WBC 7.5 8.4  --  9.8   HGB 10.3* 10.9*  --  12.6*   MCV 91 90  --  89    297 313 351     Most Recent 3 BMP's:  Recent Labs   Lab Test 02/13/20  0720 02/12/20  0710 02/11/20  1739    139 138   POTASSIUM 4.0 4.2 4.4   CHLORIDE 112* 110* 107   CO2 20 17* 17*   BUN 31* 34* 33*   CR 5.69* 5.92* 6.13*   ANIONGAP 6 12 13   CHARLY 8.6 8.7 9.4   * 248* 274*       Discharge Medications   Current Discharge Medication List      START taking these medications    Details   erythromycin ethylsuccinate (ERYPED) 400 MG/5ML suspension  Take 5 mLs (400 mg) by mouth 3 times daily (with meals)  Qty: 450 mL, Refills: 0    Associated Diagnoses: Gastroparesis         CONTINUE these medications which have NOT CHANGED    Details   acetaminophen (TYLENOL) 325 MG tablet Take 2 tablets (650 mg) by mouth every 4 hours as needed for mild pain  Qty: 50 tablet, Refills: 0    Associated Diagnoses: Acute lateral meniscus tear of left knee, initial encounter      atorvastatin (LIPITOR) 40 MG tablet Take 40 mg by mouth daily      !! insulin aspart (NOVOLOG FLEXPEN) 100 UNIT/ML pen Inject Subcutaneous 3 times daily (with meals) .  (1 unit per 6 grams of carbs)      !! insulin aspart (NOVOLOG FLEXPEN) 100 UNIT/ML pen Sliding scale = 1 unit for every 40 over blood glucose of 140      insulin degludec (TRESIBA) 100 UNIT/ML pen Inject 24 Units Subcutaneous At Bedtime      metoclopramide (REGLAN) 5 MG tablet TAKE ONE TABLET BY MOUTH FOUR TIMES A DAY BEFORE MEALS AND NIGHTLY  Qty: 120 tablet, Refills: 0    Associated Diagnoses: Intractable nausea and vomiting; Gastroesophageal reflux disease without esophagitis      omeprazole (PRILOSEC) 40 MG DR capsule Take 40 mg by mouth every evening      ondansetron (ZOFRAN-ODT) 4 MG ODT tab Take 1-2 tablets (4-8 mg) by mouth every 8 hours as needed for nausea  Qty: 60 tablet, Refills: 1    Associated Diagnoses: Acute lateral meniscus tear of left knee, initial encounter      acetone, Urine, test STRP 1 strip by In Vitro route daily  Qty: 50 each, Refills: 3    Comments: Urine ketone stix  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 YOHAN CONTOUR test strip USE TO TEST BLOOD SUGAR FIVE TIMES A DAY OR AS DIRECTED  Qty: 450 each, Refills: 1    Associated Diagnoses: Type 1 diabetes mellitus, uncontrolled (H)      !! 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 - Potential duplicate medications found. Please discuss with provider.        Allergies   Allergies   Allergen Reactions     Benadryl [Diphenhydramine]      Reglan Other (See Comments)     IV, delsuions

## 2020-02-09 NOTE — PLAN OF CARE
VSS.  Patient with no nausea all night and this morning.  Has appetite.  MD paged for advanced diet.  Started on Mod. CHO diet.  Able to eat most of breakfast by 1130.  No nausea reported.  MD putting in discharge orders.  Recommended that patient stay and possibly order lunch to make sure that no nausea will occur.  Patient wanting to leave as he is able to get a ride soon. Per patient he has been thru this many times and feels ready to go home.  Endocrine consulted but has not seen patient yet.  Going home on previous diabetic orders.  Left facility with Mom at 1230.  To f/unit(s) as outpatient.

## 2020-02-10 ENCOUNTER — PATIENT OUTREACH (OUTPATIENT)
Dept: CARE COORDINATION | Facility: CLINIC | Age: 45
End: 2020-02-10

## 2020-02-10 ENCOUNTER — TELEPHONE (OUTPATIENT)
Dept: TRANSPLANT | Facility: CLINIC | Age: 45
End: 2020-02-10

## 2020-02-10 DIAGNOSIS — Z48.298 AFTERCARE FOLLOWING ORGAN TRANSPLANT: ICD-10-CM

## 2020-02-10 DIAGNOSIS — Z71.89 OTHER SPECIFIED COUNSELING: ICD-10-CM

## 2020-02-10 DIAGNOSIS — Z94.0 KIDNEY TRANSPLANTED: Primary | ICD-10-CM

## 2020-02-10 DIAGNOSIS — T86.10 COMPLICATIONS, KIDNEY TRANSPLANT: ICD-10-CM

## 2020-02-10 ASSESSMENT — ACTIVITIES OF DAILY LIVING (ADL): DEPENDENT_IADLS:: INDEPENDENT

## 2020-02-10 NOTE — LETTER
[place] CARE COORDINATION  [address] [place] [address]    February 10, 2020    Murtaza Payton [facility]  [address] [place] [address]      Dear Murtaza,    I am a clinic care coordinator who works with Kt Ríos MD. at [facility]. I wanted to thank you for spending the time to talk with me.  Below is a description of clinic care coordination and how I can further assist you.      The clinic care coordinator team is made up of a registered nurse,  and community health worker who understand the health care system. The goal of clinic care coordination is to help you manage your health and improve access to the health care system in the most efficient manner. The team can assist you in meeting your health care goals by providing education, coordinating services, strengthening the communication among your providers  and supporting you with any resource needs.    Please feel free to contact me, at [phone] with any questions or concerns. We are focused on providing you with the highest-quality healthcare experience possible and that all starts with you.     Sincerely,     Marcie Pastrana RN  Care Coordination  Phone:  [phone]  Email: [email or web address], [email or web address], [place] and [facility]

## 2020-02-10 NOTE — PROGRESS NOTES
Care Coordinator: Post discharge  D/I: Per call from Janette Vaughn pt --pt's situation is complex in that he lsot his job on 1/23/2020--he is getting paid a lot of money and his sister will find out when the payments end and then cna determine when he might be eligible for [place].  P: Currently [place] [employer]. OPCC: Jyoti Perez--I have sent her this message.  Pt did NOT need dialysis when he discharged on 2/9/--will follow labs closely per Dr Jack Chen--[place] nephrologist.

## 2020-02-10 NOTE — TELEPHONE ENCOUNTER
Gustavo, MD Emily Snider Teresa, RN             Can we get labs twice weekly on Tyrell? He is being discharged today (Sunday)     He had an SWATHI on CKD and may be close to needing HD re-initation.  GFR is 10 ml / min.     I hope he has some renal recovery, but this may not happen.     d       PLAN:  Call Murtaza Fitzgerald and discuss Dr. Chen' recommendation to complete transplant labs 2x a week.    OUTCOME:  Spoke to Tyrell and discussed plan. Stattes he is aware and already has an appointment scheduled.  Orders placed in Epic.

## 2020-02-10 NOTE — PROGRESS NOTES
Patient identified as high risk for readmission.  Patient meets criteria for care coordination outreach.    Marcie Pastrana RN  Care Coordination  Phone:  [phone]  Email: [email or web address]

## 2020-02-10 NOTE — TELEPHONE ENCOUNTER
Patient was contacted by an RN for post DC follow up so no duplicate post DC follow up call will be made    Yusra Aleman CMA   Post Hospital Discharge Team

## 2020-02-10 NOTE — PROGRESS NOTES
Clinic Care Coordination Contact    Patient admitted to [facility] 2/5-2/7 for treatment of nausea, vomiting, and abdominal pain.  Patient has history of DM1 and gastroparesis.  GI saw patient while in hospital.  Med changes were made.  Patient improved and was able to discharge home.    RN CC spoke with patient over phone.  Introduced self and role in clinic.    Referral Information:  Referral Source: IP Report    Primary Diagnosis: GI Disorders    Chief Complaint   Patient presents with     Clinic Care Coordination - Post Hospital     [facility] 2/5-2/7        Universal Utilization: Multiple ED/IP admissions due to gastroparesis.  Clinic Utilization  Difficulty keeping appointments:: No  Compliance Concerns: No  No PCP office visit in Past Year: No  Utilization    Last refreshed: 2/10/2020 12:52 PM:  Hospital Admissions 3           Last refreshed: 2/10/2020 12:52 PM:  ED Visits 8           Last refreshed: 2/10/2020 12:52 PM:  No Show Count (past year) 8              Current as of: 2/10/2020 12:52 PM            Clinical Concerns:  Current Medical Concerns:   Recurrent hospital admissions due to gastroparesis.  Patient states he has appt with GI at [facility] on Wednesday to discuss gastric pacemaker.     Patient currently does not have insurance.  He states his [place] is still [place].  He is not working.  Explains he was laid off last year.  He has applied for disability - he also has Stage 5 renal failure.  He had failed kidney transplant in 2008.  There is a possibility he will need hemodialysis in near future.  Patient states they are currently monitoring his labs.  Patient is hopeful to be eligable for MA and near future.    Patient has poor vision and cannot drive.  He has been relying on friends and family for rides, but does not like to burden them.  Patient would like resources for transportation.    Current Behavioral Concerns: none     Education Provided to patient: Clinic care coordination.  Community resources for  transportation.     Medication Management:  Medication reconciliation status: Medications reviewed and reconciled.  Continue medications without change.    Functional Status:  Dependent ADLs:: Independent  Dependent IADLs:: Independent  Bed or wheelchair confined:: No  Mobility Status: Independent    Living Situation:  Current living arrangement:: I live in a private home with family(Lives with daughter and IRMA, who rent out pts basement)  Type of residence:: Private home - stairs    Lifestyle & Psychosocial Needs:  Lifestyle     Physical activity:     Days per week: 0 days     Minutes per session: 0 min     Stress: To some extent     Social Needs     Financial resource strain: Not very hard     Food insecurity:     Worry: Never true     Inability: Never true     Transportation needs:     Medical: No     Non-medical: No     Diet:: Regular, Diabetic diet  Inadequate nutrition (GOAL):: No  Tube Feeding: No  Significant changes in sleep pattern (GOAL): No  Regular car     Restorationist or spiritual beliefs that impact treatment:: No  Mental health DX:: No  Informal Support system:: Children, Friends, Parent   Socioeconomic History     Marital status: Single     Spouse name: Not on file     Number of children: 1     Years of education: Not on file     Highest education level: Associate degree: academic program   Occupational History     Occupation:    Relationships     Social connections:     Talks on phone: More than three times a week     Gets together: Once a week     Attends Taoist service: Never     Active member of club or organization: No     Attends meetings of clubs or organizations: Never     Relationship status:      Intimate partner violence:     Fear of current or ex partner: Not on file     Emotionally abused: Not on file     Physically abused: Not on file     Forced sexual activity: Not on file     Tobacco Use     Smoking status: Former Smoker     Packs/day: 0.30     Types:  Cigarettes     Last attempt to quit: 7/3/2015     Years since quittin.6     Smokeless tobacco: Never Used     Tobacco comment: E- Cig use still   Substance and Sexual Activity     Alcohol use: Not Currently     Alcohol/week: 0.0 standard drinks     Frequency: Never     Binge frequency: Never     Drug use: No     Sexual activity: Never     Partners: Female     Resources and Interventions:    Community Resources: None  Supplies used at home:: Diabetic Supplies  Equipment Currently Used at Home: glucometer    Advance Care Plan/Directive  Type Advanced Care Plans/Directives: Advanced Directive - On File  Advanced Care Plan/Directive Status: Not Applicable    Referrals Placed: Transportation     Pt reports understanding and denies any additional questions or concerns at this times. RN CC engaged in AIDET communication during encounter.     Future Appointments              Tomorrow [place], [place]    In 2 days Renetta Mayorga MD [facility], [facility]    In 2 days Rosalia Ly, [facility] [place], [place]    In 3 days CR LAB [facility]     In 1 month 3, [facility]          Plan: RN CC provided patient the [facility] transportation guide via email.  Patient stated he has no further needs or concerns that need to be addressed at this time.  No further outreaches will be made at this time unless a new referral is made or a change in the pt's status occurs. Patient was provided with this writer's contact information and encouraged to call with any questions or concerns.    Marcie Pastrana RN  Care Coordination  Phone:  [phone]  Email: [email or web address]  [facility], [place] and [facility]

## 2020-02-10 NOTE — TELEPHONE ENCOUNTER
RECORDS RECEIVED FROM: N/A   DATE RECEIVED: 2020   NOTES STATUS DETAILS   OFFICE NOTE from referring provider N/A    OFFICE NOTE from other specialist Internal 19 Office visit with Dr. Kt Ríos ([facility])   DISCHARGE SUMMARY from hospital Internal 2020 ([facility])  2020 ( [facility])   1/3/2020 ( [facility])   19 ( [facility])  3/5/19 ( [facility])     *more ED/Hospital visits in Pt's chart    OPERATIVE REPORT N/A    MEDICATION LIST Internal         ENDOSCOPY  Internal EGD: 2020 ([facility])   COLONOSCOPY N/A    ERCP N/A    EUS N/A    STOOL TESTING N/A    PERTINENT LABS Internal    PATHOLOGY REPORTS (RELATED) Internal    IMAGING (CT, MRI, EGD) Internal XR Abdomen: 19, 8/19/15, 8/17/15  NM Gastric Emptyin19, 17  CT Abdomen Pelvis: 18  XR Chest: 9/4/15, 8/27/15  CT Chest Abdomen Pelvis: 8/27/15  US Abdomen: 8/23/15     REFERRAL INFORMATION    Date referral was placed: 2020   Date all records received: N/A   Date records were scanned into Epic: N/A   Date records were sent to Provider to review: N/A   Date and recommendation received from provider:  LETTER SENT  SCHEDULE APPOINTMENT   Date patient was contacted to schedule: 2/10/2020

## 2020-02-11 ENCOUNTER — TELEPHONE (OUTPATIENT)
Dept: GASTROENTEROLOGY | Facility: CLINIC | Age: 45
End: 2020-02-11

## 2020-02-11 ENCOUNTER — HOSPITAL ENCOUNTER (INPATIENT)
Facility: CLINIC | Age: 45
LOS: 10 days | Discharge: HOME | DRG: 698 | End: 2020-02-21
Attending: EMERGENCY MEDICINE | Admitting: STUDENT IN AN ORGANIZED HEALTH CARE EDUCATION/TRAINING PROGRAM
Payer: COMMERCIAL

## 2020-02-11 ENCOUNTER — APPOINTMENT (OUTPATIENT)
Dept: CT IMAGING | Facility: CLINIC | Age: 45
DRG: 698 | End: 2020-02-11
Attending: EMERGENCY MEDICINE

## 2020-02-11 DIAGNOSIS — I10 HYPERTENSION GOAL BP (BLOOD PRESSURE) < 140/90: Primary | ICD-10-CM

## 2020-02-11 DIAGNOSIS — K31.84 GASTROPARESIS: ICD-10-CM

## 2020-02-11 DIAGNOSIS — T86.10 COMPLICATIONS, KIDNEY TRANSPLANT: ICD-10-CM

## 2020-02-11 DIAGNOSIS — Z94.83 PANCREAS REPLACED BY TRANSPLANT (H): ICD-10-CM

## 2020-02-11 DIAGNOSIS — Z48.298 AFTERCARE FOLLOWING ORGAN TRANSPLANT: ICD-10-CM

## 2020-02-11 DIAGNOSIS — Z94.0 KIDNEY REPLACED BY TRANSPLANT: ICD-10-CM

## 2020-02-11 DIAGNOSIS — E10.43 DIABETIC GASTROPARESIS ASSOCIATED WITH TYPE 1 DIABETES MELLITUS (H): ICD-10-CM

## 2020-02-11 DIAGNOSIS — Z79.4 ENCOUNTER FOR LONG-TERM (CURRENT) USE OF INSULIN (H): ICD-10-CM

## 2020-02-11 DIAGNOSIS — E10.59 TYPE 1 DIABETES MELLITUS WITH DIABETIC CARDIOMYOPATHY (H): ICD-10-CM

## 2020-02-11 DIAGNOSIS — K31.84 DIABETIC GASTROPARESIS ASSOCIATED WITH TYPE 1 DIABETES MELLITUS (H): ICD-10-CM

## 2020-02-11 DIAGNOSIS — Z94.0 KIDNEY TRANSPLANTED: ICD-10-CM

## 2020-02-11 DIAGNOSIS — R11.2 INTRACTABLE VOMITING WITH NAUSEA, UNSPECIFIED VOMITING TYPE: ICD-10-CM

## 2020-02-11 DIAGNOSIS — I43 TYPE 1 DIABETES MELLITUS WITH DIABETIC CARDIOMYOPATHY (H): ICD-10-CM

## 2020-02-11 PROBLEM — R11.10 VOMITING: Status: ACTIVE | Noted: 2020-02-11

## 2020-02-11 LAB
ALBUMIN SERPL-MCNC: 3.8 G/DL (ref 3.4–5)
ALBUMIN UR-MCNC: >=300 MG/DL
ALP SERPL-CCNC: 172 U/L (ref 40–150)
ALT SERPL W P-5'-P-CCNC: 15 U/L (ref 0–70)
ANION GAP SERPL CALCULATED.3IONS-SCNC: 13 MMOL/L (ref 3–14)
ANION GAP SERPL CALCULATED.3IONS-SCNC: 9 MMOL/L (ref 3–14)
APPEARANCE UR: CLEAR
AST SERPL W P-5'-P-CCNC: 14 U/L (ref 0–45)
BASOPHILS # BLD AUTO: 0 10E9/L (ref 0–0.2)
BASOPHILS NFR BLD AUTO: 0.4 %
BILIRUB SERPL-MCNC: 0.4 MG/DL (ref 0.2–1.3)
BILIRUB UR QL STRIP: NEGATIVE
BUN SERPL-MCNC: 33 MG/DL (ref 7–30)
BUN SERPL-MCNC: 33 MG/DL (ref 7–30)
CALCIUM SERPL-MCNC: 8.8 MG/DL (ref 8.5–10.1)
CALCIUM SERPL-MCNC: 9.4 MG/DL (ref 8.5–10.1)
CHLORIDE SERPL-SCNC: 107 MMOL/L (ref 94–109)
CHLORIDE SERPL-SCNC: 108 MMOL/L (ref 94–109)
CHOLEST SERPL-MCNC: 166 MG/DL
CO2 SERPL-SCNC: 17 MMOL/L (ref 20–32)
CO2 SERPL-SCNC: 20 MMOL/L (ref 20–32)
COLOR UR AUTO: YELLOW
CREAT SERPL-MCNC: 5.93 MG/DL (ref 0.66–1.25)
CREAT SERPL-MCNC: 6.13 MG/DL (ref 0.66–1.25)
DIFFERENTIAL METHOD BLD: ABNORMAL
EOSINOPHIL # BLD AUTO: 0 10E9/L (ref 0–0.7)
EOSINOPHIL NFR BLD AUTO: 0.1 %
ERYTHROCYTE [DISTWIDTH] IN BLOOD BY AUTOMATED COUNT: 13.4 % (ref 10–15)
ERYTHROCYTE [DISTWIDTH] IN BLOOD BY AUTOMATED COUNT: 13.5 % (ref 10–15)
GFR SERPL CREATININE-BSD FRML MDRD: 10 ML/MIN/{1.73_M2}
GFR SERPL CREATININE-BSD FRML MDRD: 11 ML/MIN/{1.73_M2}
GLUCOSE SERPL-MCNC: 146 MG/DL (ref 70–99)
GLUCOSE SERPL-MCNC: 274 MG/DL (ref 70–99)
GLUCOSE UR STRIP-MCNC: 100 MG/DL
HBA1C MFR BLD: 7 % (ref 0–5.6)
HCT VFR BLD AUTO: 36.4 % (ref 40–53)
HCT VFR BLD AUTO: 38.6 % (ref 40–53)
HDLC SERPL-MCNC: 46 MG/DL
HGB BLD-MCNC: 11.6 G/DL (ref 13.3–17.7)
HGB BLD-MCNC: 12.6 G/DL (ref 13.3–17.7)
HGB UR QL STRIP: ABNORMAL
IMM GRANULOCYTES # BLD: 0.1 10E9/L (ref 0–0.4)
IMM GRANULOCYTES NFR BLD: 0.6 %
KETONES UR STRIP-MCNC: NEGATIVE MG/DL
LACTATE BLD-SCNC: 1.9 MMOL/L (ref 0.7–2)
LDLC SERPL CALC-MCNC: 88 MG/DL
LEUKOCYTE ESTERASE UR QL STRIP: NEGATIVE
LIPASE SERPL-CCNC: 41 U/L (ref 73–393)
LYMPHOCYTES # BLD AUTO: 0.8 10E9/L (ref 0.8–5.3)
LYMPHOCYTES NFR BLD AUTO: 7.7 %
MAGNESIUM SERPL-MCNC: 1.8 MG/DL (ref 1.6–2.3)
MCH RBC QN AUTO: 28.2 PG (ref 26.5–33)
MCH RBC QN AUTO: 29 PG (ref 26.5–33)
MCHC RBC AUTO-ENTMCNC: 31.9 G/DL (ref 31.5–36.5)
MCHC RBC AUTO-ENTMCNC: 32.6 G/DL (ref 31.5–36.5)
MCV RBC AUTO: 88 FL (ref 78–100)
MCV RBC AUTO: 89 FL (ref 78–100)
MONOCYTES # BLD AUTO: 0.2 10E9/L (ref 0–1.3)
MONOCYTES NFR BLD AUTO: 2.3 %
NEUTROPHILS # BLD AUTO: 8.7 10E9/L (ref 1.6–8.3)
NEUTROPHILS NFR BLD AUTO: 88.9 %
NITRATE UR QL: NEGATIVE
NONHDLC SERPL-MCNC: 120 MG/DL
NRBC # BLD AUTO: 0 10*3/UL
NRBC BLD AUTO-RTO: 0 /100
PH UR STRIP: 7 PH (ref 5–7)
PHOSPHATE SERPL-MCNC: 4.2 MG/DL (ref 2.5–4.5)
PLATELET # BLD AUTO: 313 10E9/L (ref 150–450)
PLATELET # BLD AUTO: 351 10E9/L (ref 150–450)
PLATELET # BLD AUTO: 352 10E9/L (ref 150–450)
POTASSIUM SERPL-SCNC: 4 MMOL/L (ref 3.4–5.3)
POTASSIUM SERPL-SCNC: 4.4 MMOL/L (ref 3.4–5.3)
PROT SERPL-MCNC: 7.4 G/DL (ref 6.8–8.8)
RBC # BLD AUTO: 4.12 10E12/L (ref 4.4–5.9)
RBC # BLD AUTO: 4.34 10E12/L (ref 4.4–5.9)
RBC #/AREA URNS AUTO: ABNORMAL /HPF
SODIUM SERPL-SCNC: 137 MMOL/L (ref 133–144)
SODIUM SERPL-SCNC: 138 MMOL/L (ref 133–144)
SOURCE: ABNORMAL
SP GR UR STRIP: 1.02 (ref 1–1.03)
TRIGL SERPL-MCNC: 159 MG/DL
UROBILINOGEN UR STRIP-ACNC: 0.2 EU/DL (ref 0.2–1)
WBC # BLD AUTO: 9 10E9/L (ref 4–11)
WBC # BLD AUTO: 9.8 10E9/L (ref 4–11)
WBC #/AREA URNS AUTO: ABNORMAL /HPF

## 2020-02-11 PROCEDURE — 96361 HYDRATE IV INFUSION ADD-ON: CPT | Performed by: EMERGENCY MEDICINE

## 2020-02-11 PROCEDURE — 96375 TX/PRO/DX INJ NEW DRUG ADDON: CPT | Performed by: EMERGENCY MEDICINE

## 2020-02-11 PROCEDURE — 80061 LIPID PANEL: CPT | Performed by: INTERNAL MEDICINE

## 2020-02-11 PROCEDURE — 83036 HEMOGLOBIN GLYCOSYLATED A1C: CPT | Performed by: INTERNAL MEDICINE

## 2020-02-11 PROCEDURE — 83605 ASSAY OF LACTIC ACID: CPT | Performed by: EMERGENCY MEDICINE

## 2020-02-11 PROCEDURE — 85049 AUTOMATED PLATELET COUNT: CPT | Performed by: STUDENT IN AN ORGANIZED HEALTH CARE EDUCATION/TRAINING PROGRAM

## 2020-02-11 PROCEDURE — 99285 EMERGENCY DEPT VISIT HI MDM: CPT | Mod: Z6 | Performed by: EMERGENCY MEDICINE

## 2020-02-11 PROCEDURE — 80053 COMPREHEN METABOLIC PANEL: CPT | Performed by: EMERGENCY MEDICINE

## 2020-02-11 PROCEDURE — 81001 URINALYSIS AUTO W/SCOPE: CPT | Performed by: INTERNAL MEDICINE

## 2020-02-11 PROCEDURE — 83735 ASSAY OF MAGNESIUM: CPT | Performed by: EMERGENCY MEDICINE

## 2020-02-11 PROCEDURE — 74176 CT ABD & PELVIS W/O CONTRAST: CPT

## 2020-02-11 PROCEDURE — 96376 TX/PRO/DX INJ SAME DRUG ADON: CPT | Performed by: EMERGENCY MEDICINE

## 2020-02-11 PROCEDURE — 80197 ASSAY OF TACROLIMUS: CPT | Performed by: INTERNAL MEDICINE

## 2020-02-11 PROCEDURE — 36415 COLL VENOUS BLD VENIPUNCTURE: CPT | Performed by: STUDENT IN AN ORGANIZED HEALTH CARE EDUCATION/TRAINING PROGRAM

## 2020-02-11 PROCEDURE — 84100 ASSAY OF PHOSPHORUS: CPT | Performed by: EMERGENCY MEDICINE

## 2020-02-11 PROCEDURE — 85027 COMPLETE CBC AUTOMATED: CPT | Performed by: INTERNAL MEDICINE

## 2020-02-11 PROCEDURE — 99223 1ST HOSP IP/OBS HIGH 75: CPT | Mod: AI | Performed by: STUDENT IN AN ORGANIZED HEALTH CARE EDUCATION/TRAINING PROGRAM

## 2020-02-11 PROCEDURE — 83690 ASSAY OF LIPASE: CPT | Performed by: EMERGENCY MEDICINE

## 2020-02-11 PROCEDURE — 85025 COMPLETE CBC W/AUTO DIFF WBC: CPT | Performed by: EMERGENCY MEDICINE

## 2020-02-11 PROCEDURE — 25000128 H RX IP 250 OP 636: Performed by: EMERGENCY MEDICINE

## 2020-02-11 PROCEDURE — 25000128 H RX IP 250 OP 636: Performed by: STUDENT IN AN ORGANIZED HEALTH CARE EDUCATION/TRAINING PROGRAM

## 2020-02-11 PROCEDURE — 12000001 ZZH R&B MED SURG/OB UMMC

## 2020-02-11 PROCEDURE — 99285 EMERGENCY DEPT VISIT HI MDM: CPT | Mod: 25 | Performed by: EMERGENCY MEDICINE

## 2020-02-11 PROCEDURE — 80048 BASIC METABOLIC PNL TOTAL CA: CPT | Performed by: INTERNAL MEDICINE

## 2020-02-11 PROCEDURE — 96374 THER/PROPH/DIAG INJ IV PUSH: CPT | Performed by: EMERGENCY MEDICINE

## 2020-02-11 PROCEDURE — 25800030 ZZH RX IP 258 OP 636: Performed by: EMERGENCY MEDICINE

## 2020-02-11 RX ORDER — POLYETHYLENE GLYCOL 3350 17 G/17G
17 POWDER, FOR SOLUTION ORAL DAILY PRN
Status: DISCONTINUED | OUTPATIENT
Start: 2020-02-11 | End: 2020-02-21 | Stop reason: HOSPADM

## 2020-02-11 RX ORDER — ONDANSETRON 4 MG/1
4-8 TABLET, ORALLY DISINTEGRATING ORAL EVERY 8 HOURS PRN
Status: DISCONTINUED | OUTPATIENT
Start: 2020-02-11 | End: 2020-02-21 | Stop reason: HOSPADM

## 2020-02-11 RX ORDER — MYCOPHENOLIC ACID 360 MG/1
360 TABLET, DELAYED RELEASE ORAL 2 TIMES DAILY
Status: DISCONTINUED | OUTPATIENT
Start: 2020-02-11 | End: 2020-02-21 | Stop reason: HOSPADM

## 2020-02-11 RX ORDER — NICOTINE POLACRILEX 4 MG
15-30 LOZENGE BUCCAL
Status: DISCONTINUED | OUTPATIENT
Start: 2020-02-11 | End: 2020-02-21 | Stop reason: HOSPADM

## 2020-02-11 RX ORDER — SODIUM CHLORIDE 9 MG/ML
1000 INJECTION, SOLUTION INTRAVENOUS CONTINUOUS
Status: DISCONTINUED | OUTPATIENT
Start: 2020-02-11 | End: 2020-02-13

## 2020-02-11 RX ORDER — ACETAMINOPHEN 325 MG/1
650 TABLET ORAL EVERY 4 HOURS PRN
Status: DISCONTINUED | OUTPATIENT
Start: 2020-02-11 | End: 2020-02-21 | Stop reason: HOSPADM

## 2020-02-11 RX ORDER — HEPARIN SODIUM 5000 [USP'U]/.5ML
5000 INJECTION, SOLUTION INTRAVENOUS; SUBCUTANEOUS 2 TIMES DAILY
Status: DISCONTINUED | OUTPATIENT
Start: 2020-02-11 | End: 2020-02-21 | Stop reason: HOSPADM

## 2020-02-11 RX ORDER — ONDANSETRON 2 MG/ML
4 INJECTION INTRAMUSCULAR; INTRAVENOUS EVERY 6 HOURS PRN
Status: DISCONTINUED | OUTPATIENT
Start: 2020-02-11 | End: 2020-02-21 | Stop reason: HOSPADM

## 2020-02-11 RX ORDER — DEXTROSE MONOHYDRATE 25 G/50ML
25-50 INJECTION, SOLUTION INTRAVENOUS
Status: DISCONTINUED | OUTPATIENT
Start: 2020-02-11 | End: 2020-02-21 | Stop reason: HOSPADM

## 2020-02-11 RX ORDER — ATORVASTATIN CALCIUM 40 MG/1
40 TABLET, FILM COATED ORAL DAILY
Status: DISCONTINUED | OUTPATIENT
Start: 2020-02-12 | End: 2020-02-21 | Stop reason: HOSPADM

## 2020-02-11 RX ORDER — ONDANSETRON 2 MG/ML
4 INJECTION INTRAMUSCULAR; INTRAVENOUS EVERY 30 MIN PRN
Status: COMPLETED | OUTPATIENT
Start: 2020-02-11 | End: 2020-02-11

## 2020-02-11 RX ORDER — NALOXONE HYDROCHLORIDE 0.4 MG/ML
.1-.4 INJECTION, SOLUTION INTRAMUSCULAR; INTRAVENOUS; SUBCUTANEOUS
Status: DISCONTINUED | OUTPATIENT
Start: 2020-02-11 | End: 2020-02-21 | Stop reason: HOSPADM

## 2020-02-11 RX ORDER — LORAZEPAM 2 MG/ML
0.5 INJECTION INTRAMUSCULAR EVERY 6 HOURS PRN
Status: DISCONTINUED | OUTPATIENT
Start: 2020-02-11 | End: 2020-02-14

## 2020-02-11 RX ORDER — TACROLIMUS 1 MG/1
2 CAPSULE ORAL 2 TIMES DAILY
Status: DISCONTINUED | OUTPATIENT
Start: 2020-02-11 | End: 2020-02-14

## 2020-02-11 RX ORDER — LIDOCAINE 40 MG/G
CREAM TOPICAL
Status: DISCONTINUED | OUTPATIENT
Start: 2020-02-11 | End: 2020-02-21 | Stop reason: HOSPADM

## 2020-02-11 RX ADMIN — ONDANSETRON 4 MG: 2 INJECTION INTRAMUSCULAR; INTRAVENOUS at 20:46

## 2020-02-11 RX ADMIN — PROCHLORPERAZINE EDISYLATE 10 MG: 5 INJECTION INTRAMUSCULAR; INTRAVENOUS at 17:55

## 2020-02-11 RX ADMIN — ONDANSETRON 4 MG: 2 INJECTION INTRAMUSCULAR; INTRAVENOUS at 23:09

## 2020-02-11 RX ADMIN — ONDANSETRON 4 MG: 2 INJECTION INTRAMUSCULAR; INTRAVENOUS at 19:45

## 2020-02-11 RX ADMIN — SODIUM CHLORIDE 1000 ML: 9 INJECTION, SOLUTION INTRAVENOUS at 18:56

## 2020-02-11 RX ADMIN — SODIUM CHLORIDE 1000 ML: 9 INJECTION, SOLUTION INTRAVENOUS at 17:53

## 2020-02-11 RX ADMIN — ONDANSETRON 4 MG: 2 INJECTION INTRAMUSCULAR; INTRAVENOUS at 22:26

## 2020-02-11 ASSESSMENT — ENCOUNTER SYMPTOMS
ARTHRALGIAS: 0
HEADACHES: 0
APPETITE CHANGE: 0
DIFFICULTY URINATING: 0
DIARRHEA: 0
DYSURIA: 0
FREQUENCY: 0
SHORTNESS OF BREATH: 0
FEVER: 0
EYE REDNESS: 0
CONFUSION: 0
NAUSEA: 1
CONSTIPATION: 0
ACTIVITY CHANGE: 0
VOMITING: 1
NECK STIFFNESS: 0
COLOR CHANGE: 0
ABDOMINAL DISTENTION: 1

## 2020-02-11 ASSESSMENT — ACTIVITIES OF DAILY LIVING (ADL)
SWALLOWING: 0-->SWALLOWS FOODS/LIQUIDS WITHOUT DIFFICULTY
RETIRED_EATING: 0-->INDEPENDENT
TOILETING: 0-->INDEPENDENT
COGNITION: 0 - NO COGNITION ISSUES REPORTED
AMBULATION: 0-->INDEPENDENT
DRESS: 0-->INDEPENDENT
FALL_HISTORY_WITHIN_LAST_SIX_MONTHS: NO
RETIRED_COMMUNICATION: 0-->UNDERSTANDS/COMMUNICATES WITHOUT DIFFICULTY
TRANSFERRING: 0-->INDEPENDENT
BATHING: 0-->INDEPENDENT

## 2020-02-11 NOTE — ED PROVIDER NOTES
"    [place] EMERGENCY DEPARTMENT ([facility])  2/11/20   History     Chief Complaint   Patient presents with     Nausea & Vomiting     HPI  Murtaza Fitzgerald is a 44 year old male who has a PMH of DM1 and ESRD s/p 2008 living donor kidney tx now with CKD progression and 2009 pancreas tx, recurrent gastroparesis, CAD s/p CABG, and HTN who presents to the Emergency Department for nausea and vomiting. Patient reports that he has had recurrent gastroparesis with intractable vomiting necessitating emergency visits, stating \"now I can't make it 2 days out of the hospital\". Was last able to keep down liquids and solids yesterday and now is unable to do either. Has vomited too many times to count, now just dry heaving. Has some abdominal soreness from exertion but denies focal abdominal pain. Last BM was Saturday (3 days ago) and was unremarkable. No issues with urination. Has been on Compazine 10mg q6h, Zofran 8mg q8h, Ativan 0.5mg IV prn for nausea per 2/5/20 Gastro note but states that he only has access to the Zofran at home. Has received all three during admissions with relief. States that he also tried IV Erythromycin last ER visit with improvement of symptoms as well. Does endorse worse than baseline abdominal distension.    Is also a type 1 diabetic and states sugar control has been difficulty with recent bouts of illness. Does have CKD and states dialysis was discussed recently but has not yet initiated. Has a history of kidney and pancreas transplant. Also had a triple cardiac bypass per family member without further cardiac incidents. Per EMR, remains on tacrolimus and Myfortic. Last abdominal CT on 11/2018 at [facility].    I have reviewed the Medications, Allergies, Past Medical and Surgical History, and Social History in the [employer] system.    Past Medical History:   Diagnosis Date     CMV (cytomegalovirus infection) status negative 2011     Coronary artery disease, non-occlusive 2008    " angio showed diffuse disease with no lesions     Diabetes mellitus, type 1     Diagnosed at age 11     Dialysis patient (H)     prior to kidney transplant     Dyslipidemia      Gastroesophageal reflux disease      History of blood transfusion      Hypertension      Immunosuppressed status (H)      Kidney transplant status, living related donor           Migraines      Mycotic aneurysm of kidney transplant (H) 2008     Noncompliance with treatment     no labs for one year     Pancreas replaced by transplant (H) 2009    rejection treated with thymo     Pancreatic disease     pancreas transplant     Tobacco abuse ongoing       Past Surgical History:   Procedure Laterality Date     ARTHROSCOPY KNEE WITH LATERAL MENISCECTOMY Left 7/10/2019    Procedure: Left knee arthroscopic partial lateral meniscectomy;  Surgeon: Alex Candelaria MD;  Location:  OR     BYPASS GRAFT ARTERY CORONARY N/A 2015    Procedure: BYPASS GRAFT ARTERY CORONARY;  Surgeon: Katy Neville MD;  Location: [facility] OR     ESOPHAGOSCOPY, GASTROSCOPY, DUODENOSCOPY (EGD), COMBINED N/A 2020    Procedure: ESOPHAGOGASTRODUODENOSCOPY (EGD) biopsies w/forceps;  Surgeon: Emre Gomes MD;  Location:  GI     EYE SURGERY      vitrectomy both eyes      ORTHOPEDIC SURGERY      tumor removed from left knee     TRANSPLANT  .    pancrease and kidney transplant       Family History   Problem Relation Age of Onset     Unknown/Adopted Father      Heart Disease Maternal Grandfather 62       Social History     Tobacco Use     Smoking status: Former Smoker     Packs/day: 0.30     Types: Cigarettes, Vaping Device     Last attempt to quit: 7/3/2015     Years since quittin.6     Smokeless tobacco: Never Used     Tobacco comment: E- Cig use still   Substance Use Topics     Alcohol use: Not Currently     Alcohol/week: 0.0 standard drinks     Frequency: Never     Binge frequency: Never       Current Facility-Administered Medications    Medication     acetaminophen (TYLENOL) tablet 650 mg     [START ON 2/12/2020] atorvastatin (LIPITOR) tablet 40 mg     glucose gel 15-30 g    Or     dextrose 50 % injection 25-50 mL    Or     glucagon injection 1 mg     heparin ANTICOAGULANT injection 5,000 Units     insulin aspart (NovoLOG) inj (RAPID ACTING)     insulin degludec (TRESIBA) 100 UNIT/ML injection 18 Units     lidocaine (LMX4) cream     lidocaine 1 % 0.1-1 mL     melatonin tablet 1 mg     mycophenolic acid (GENERIC EQUIVALENT) EC tablet 360 mg     naloxone (NARCAN) injection 0.1-0.4 mg     omeprazole (priLOSEC) CR capsule 40 mg     ondansetron (ZOFRAN) injection 4 mg     ondansetron (ZOFRAN-ODT) ODT tab 4-8 mg     polyethylene glycol (MIRALAX/GLYCOLAX) Packet 17 g     sodium chloride (PF) 0.9% PF flush 3 mL     sodium chloride (PF) 0.9% PF flush 3 mL     sodium chloride 0.9% infusion     tacrolimus (GENERIC EQUIVALENT) capsule 2 mg        Allergies   Allergen Reactions     Benadryl [Diphenhydramine]      Reglan Other (See Comments)     IV, delsuions        Review of Systems   Constitutional: Negative for activity change, appetite change and fever.   HENT: Negative for congestion.    Eyes: Negative for redness.   Respiratory: Negative for shortness of breath.    Cardiovascular: Negative for chest pain.   Gastrointestinal: Positive for abdominal distention, nausea and vomiting. Negative for constipation and diarrhea. Abdominal pain: diffuse.   Genitourinary: Negative for difficulty urinating, dysuria, frequency and urgency.   Musculoskeletal: Negative for arthralgias and neck stiffness.   Skin: Negative for color change.   Allergic/Immunologic: Positive for immunocompromised state.   Neurological: Negative for headaches.   Psychiatric/Behavioral: Negative for confusion.       Physical Exam   BP: (!) 170/107  Pulse: 98  Heart Rate: 98  Temp: 97.7  F (36.5  C)  Resp: 16  Weight: 86.2 kg (190 lb)  SpO2: 99 %      Physical Exam  Vitals signs and nursing  note reviewed.   Constitutional:       General: He is not in acute distress.     Appearance: He is not ill-appearing, toxic-appearing or diaphoretic.   HENT:      Head: Atraumatic.   Eyes:      General: No scleral icterus.     Pupils: Pupils are equal, round, and reactive to light.   Neck:      Musculoskeletal: Normal range of motion.   Cardiovascular:      Rate and Rhythm: Normal rate.      Heart sounds: Normal heart sounds.   Pulmonary:      Effort: No respiratory distress.      Breath sounds: Normal breath sounds.   Abdominal:      General: Bowel sounds are normal. There is distension.      Palpations: Abdomen is soft.      Tenderness: There is abdominal tenderness. There is right CVA tenderness. There is no left CVA tenderness.   Musculoskeletal:         General: No tenderness.   Skin:     General: Skin is warm.      Findings: No rash.   Neurological:      Mental Status: He is alert and oriented to person, place, and time.   Psychiatric:         Mood and Affect: Mood normal.         ED Course   6:02 PM  The patient was seen and examined by Toby Woody DO in Room ED32.        Results for orders placed or performed during the hospital encounter of 02/11/20 (from the past 24 hour(s))   CBC with platelets differential   Result Value Ref Range    WBC 9.8 4.0 - 11.0 10e9/L    RBC Count 4.34 (L) 4.4 - 5.9 10e12/L    Hemoglobin 12.6 (L) 13.3 - 17.7 g/dL    Hematocrit 38.6 (L) 40.0 - 53.0 %    MCV 89 78 - 100 fl    MCH 29.0 26.5 - 33.0 pg    MCHC 32.6 31.5 - 36.5 g/dL    RDW 13.4 10.0 - 15.0 %    Platelet Count 351 150 - 450 10e9/L    Diff Method Automated Method     % Neutrophils 88.9 %    % Lymphocytes 7.7 %    % Monocytes 2.3 %    % Eosinophils 0.1 %    % Basophils 0.4 %    % Immature Granulocytes 0.6 %    Nucleated RBCs 0 0 /100    Absolute Neutrophil 8.7 (H) 1.6 - 8.3 10e9/L    Absolute Lymphocytes 0.8 0.8 - 5.3 10e9/L    Absolute Monocytes 0.2 0.0 - 1.3 10e9/L    Absolute Eosinophils 0.0 0.0 - 0.7 10e9/L     Absolute Basophils 0.0 0.0 - 0.2 10e9/L    Abs Immature Granulocytes 0.1 0 - 0.4 10e9/L    Absolute Nucleated RBC 0.0    Comprehensive metabolic panel   Result Value Ref Range    Sodium 138 133 - 144 mmol/L    Potassium 4.4 3.4 - 5.3 mmol/L    Chloride 107 94 - 109 mmol/L    Carbon Dioxide 17 (L) 20 - 32 mmol/L    Anion Gap 13 3 - 14 mmol/L    Glucose 274 (H) 70 - 99 mg/dL    Urea Nitrogen 33 (H) 7 - 30 mg/dL    Creatinine 6.13 (H) 0.66 - 1.25 mg/dL    GFR Estimate 10 (L) >60 mL/min/[1.73_m2]    GFR Estimate If Black 12 (L) >60 mL/min/[1.73_m2]    Calcium 9.4 8.5 - 10.1 mg/dL    Bilirubin Total 0.4 0.2 - 1.3 mg/dL    Albumin 3.8 3.4 - 5.0 g/dL    Protein Total 7.4 6.8 - 8.8 g/dL    Alkaline Phosphatase 172 (H) 40 - 150 U/L    ALT 15 0 - 70 U/L    AST 14 0 - 45 U/L   Magnesium   Result Value Ref Range    Magnesium 1.8 1.6 - 2.3 mg/dL   Phosphorus   Result Value Ref Range    Phosphorus 4.2 2.5 - 4.5 mg/dL   Lactic acid   Result Value Ref Range    Lactic Acid 1.9 0.7 - 2.0 mmol/L   Lipase   Result Value Ref Range    Lipase 41 (L) 73 - 393 U/L   CT Abdomen Pelvis w/o Contrast    Narrative    EXAMINATION: CT abdomen/pelvis without contrast, 2/11/2020.    INDICATION: Abdominal distention, vomiting. Rule out SBO.    COMPARISON: CT abdomen 11/20/2018.    TECHNIQUE: Routine images from the level of the diaphragm through the  pelvis were obtained without contrast.     Total DLP: 932 mGy*cm.    FINDINGS:    The liver parenchyma is homogenous. No focal hepatic lesion. The  gallbladder is nondistended. No intrahepatic or extra hepatic biliary  duct dilation. The spleen is grossly unremarkable, with the exception  of a small splenule. Atrophic native appearance of the pancreas.  Transplanted kidney noted in the right lower quadrant. Bilateral  adrenal glands are unremarkable.    Bilaterally atrophic native kidneys. Multiple likely vascular  calcifications. No appreciable renal lesion. No hydronephrosis. No  hydroureter.  Postsurgical changes of left lower quadrant transplant  kidney. No appreciable renal lesion. No hydronephrosis. No  hydroureter. The bladder is well-distended. No bladder wall  irregularity.    The large and small bowel are normal in caliber. Moderate formed  colonic stool burden noted in the ascending, transverse and descending  colon. The appendix is unremarkable. The right lower quadrant  pancreatic transplant is identified and appears unremarkable. There is  a small bowel containing umbilical hernia. Adjacent fat-containing  ventral hernia. No evidence for obstruction. No significant adjacent  fat stranding. Small hiatal hernia.    The abdominal aorta is not aneurysmal. No appreciable intra-abdominal  lymphadenopathy by size criteria.    No acute osseous lesions. Mild degenerative changes of the lumbar  spine. Soft tissues are unremarkable.    Lung bases are clear. No focal airspace opacity. No pleural effusion.  Mild bibasilar atelectasis. No suspicious pulmonary nodules.      Impression    IMPRESSION:    1. No acute intra-abdominal pathology or evidence for small bowel  obstruction  2. Small bowel containing umbilical hernia with out evidence for  obstruction. Adjacent fat-containing ventral hernia.  3. Small hiatal hernia.  4. Stable postsurgical changes of renal and pancreas transplants.         I have personally reviewed the examination and initial interpretation  and I agree with the findings.    JORDAN ADAMS MD     *Note: Due to a large number of results and/or encounters for the requested time period, some results have not been displayed. A complete set of results can be found in Results Review.         Medications   0.9% sodium chloride BOLUS (0 mLs Intravenous Stopped 2/11/20 1855)     Followed by   sodium chloride 0.9% infusion ( Intravenous Rate/Dose Verify 2/11/20 2101)   acetaminophen (TYLENOL) tablet 650 mg (has no administration in time range)   atorvastatin (LIPITOR) tablet 40 mg (has no  administration in time range)   insulin degludec (TRESIBA) 100 UNIT/ML injection 18 Units (has no administration in time range)   mycophenolic acid (GENERIC EQUIVALENT) EC tablet 360 mg (has no administration in time range)   omeprazole (priLOSEC) CR capsule 40 mg (has no administration in time range)   ondansetron (ZOFRAN-ODT) ODT tab 4-8 mg (has no administration in time range)   tacrolimus (GENERIC EQUIVALENT) capsule 2 mg (has no administration in time range)   lidocaine 1 % 0.1-1 mL (has no administration in time range)   lidocaine (LMX4) cream (has no administration in time range)   sodium chloride (PF) 0.9% PF flush 3 mL (has no administration in time range)   sodium chloride (PF) 0.9% PF flush 3 mL (has no administration in time range)   naloxone (NARCAN) injection 0.1-0.4 mg (has no administration in time range)   melatonin tablet 1 mg (has no administration in time range)   heparin ANTICOAGULANT injection 5,000 Units (has no administration in time range)   polyethylene glycol (MIRALAX/GLYCOLAX) Packet 17 g (has no administration in time range)   glucose gel 15-30 g (has no administration in time range)     Or   dextrose 50 % injection 25-50 mL (has no administration in time range)     Or   glucagon injection 1 mg (has no administration in time range)   insulin aspart (NovoLOG) inj (RAPID ACTING) (has no administration in time range)   ondansetron (ZOFRAN) injection 4 mg (has no administration in time range)   prochlorperazine (COMPAZINE) injection 10 mg (10 mg Intravenous Given 2/11/20 1755)   ondansetron (ZOFRAN) injection 4 mg (4 mg Intravenous Given 2/11/20 2226)            Procedures         Labs Ordered and Resulted from Time of ED Arrival Up to the Time of Departure from the ED   CBC WITH PLATELETS DIFFERENTIAL - Abnormal; Notable for the following components:       Result Value    RBC Count 4.34 (*)     Hemoglobin 12.6 (*)     Hematocrit 38.6 (*)     Absolute Neutrophil 8.7 (*)     All other  components within normal limits   COMPREHENSIVE METABOLIC PANEL - Abnormal; Notable for the following components:    Carbon Dioxide 17 (*)     Glucose 274 (*)     Urea Nitrogen 33 (*)     Creatinine 6.13 (*)     GFR Estimate 10 (*)     GFR Estimate If Black 12 (*)     Alkaline Phosphatase 172 (*)     All other components within normal limits   LIPASE - Abnormal; Notable for the following components:    Lipase 41 (*)     All other components within normal limits   MAGNESIUM   PHOSPHORUS   LACTIC ACID WHOLE BLOOD            Assessments & Plan (with Medical Decision Making)   Patient with history of gastroparesis secondary to type 1 diabetes, presents to emergency department with complaint of inability to tolerate p.o.  He has been vomiting constantly since yesterday.  He cannot tolerate solids or liquids.  Constantly dry heaving in between episodes of vomiting.  Also complaining of diffuse abdominal distention.  Was recently admitted for this, treated with IV Compazine, Zofran, Ativan and had a GI consult who recommended IV erythromycin for short period.    On arrival, patient is visibly upset.  He otherwise appears nontoxic.  He is mildly hypertensive, otherwise has normal vital signs.  On exam, his abdomen is diffusely tender and moderately distended.  He also has some right-sided CVA tenderness.    Differential diagnosis includes exacerbation of gastroparesis, small bowel obstruction, gastroenteritis, colitis, less likely luminal perforation, influenza, AAA.  Plan for IV fluids, IV Compazine, CBC, CMP, lipase, urinalysis.  On review of records, he has not had a CT scan since 2018.  Will order noncontrast CT given his history of chronic kidney disease.    CT scan is negative.  Patient received mildly from Compazine.  Will add IV Zofran.  Have given a total of 1 L of fluids in the emergency department.  Laboratory work-up shows mildly worsening kidney function.  Patient is currently following with transplant  nephrology and knows he may need to start dialysis soon.  Given his inability to tolerate p.o., patient will need to be admitted to the hospital.  Case discussed with hospitalist who admit to their service.  I also discussed the case with the transplant fellow who will follow in consult.    I have reviewed the nursing notes.    I have reviewed the findings, diagnosis, plan and need for follow up with the patient.    Current Discharge Medication List          Final diagnoses:   Intractable vomiting with nausea, unspecified vomiting type   Gastroparesis     IMulugeta, am serving as a trained medical scribe to document services personally performed by Toby Woody DO, based on the provider's statements to me.     IToby DO, was physically present and have reviewed and verified the accuracy of this note documented by Muulgeta Oliveros.     2/11/2020   [facility], [place], EMERGENCY DEPARTMENT     Toby Woody DO  02/11/20 6901

## 2020-02-11 NOTE — ED NOTES
ED Triage Provider Note  [facility]  Encounter Date: Feb 11, 2020    History:  No chief complaint on file.    Murtaza Fitzgerald is a 44 year old male who presents to the ED with vomiting. Patient has hx of gastroparesis likely 2/2 DM. Mostly dry heaving.     Review of Systems:  GI: Nausea, vomiting  Card: No CP  Resp: No SOB    Exam:  There were no vitals taken for this visit.  General: No acute distress. Appears stated age.   Cardio: Regular rate, extremities well perfused  Resp: Normal work of breathing, grossly normal respiratory rate  Neuro: Alert. CN II-XII grossly intact. Grossly intact strength.   Abd: Diffuse discomfort to palpation. No guarding. No CVA TTP.    Medical Decision Making:  Patient arriving to the ED with problem as above. A medical screening exam was performed. Labs, fluid orders initiated from Triage. The patient is appropriate to wait in triage.      Raman Yepez MD on 2/11/2020 at 5:29 PM     Raman Yepez MD  02/11/20 173

## 2020-02-11 NOTE — TELEPHONE ENCOUNTER
Spoke to patient reminding of appointment scheduled on 2/12/20 at 0900 with [facility] GI clinic. Patient to arrive 15 min early. To reschedule or cancel patient to call [phone].      FERMIN Alexandra

## 2020-02-11 NOTE — IP AVS SNAPSHOT
MRN:[id number]                      After Visit Summary   2/11/2020    Murtaza Fitzgerald    MRN: [id number]           Visit Information        Department      2/11/2020  5:45 PM [facility], [place],  [facility]        Reason for your hospital stay      Patient was admitted to the hospital with ESRD and was started on HD              Review of your medicines      START taking       Dose / Directions   amLODIPine 5 MG tablet  Commonly known as:  NORVASC  Used for:  Hypertension goal BP (blood pressure) < 140/90      Dose:  5 mg  Start taking on:  February 22, 2020  Take 1 tablet (5 mg) by mouth daily  Quantity:  30 tablet  Refills:  0     carvedilol 6.25 MG tablet  Commonly known as:  COREG  Used for:  Hypertension goal BP (blood pressure) < 140/90      Dose:  6.25 mg  Take 1 tablet (6.25 mg) by mouth 2 times daily (with meals)  Quantity:  60 tablet  Refills:  0        CONTINUE these medicines which have NOT CHANGED       Dose / Directions   acetaminophen 325 MG tablet  Commonly known as:  TYLENOL  Used for:  Acute lateral meniscus tear of left knee, initial encounter      Dose:  650 mg  Take 2 tablets (650 mg) by mouth every 4 hours as needed for mild pain  Quantity:  50 tablet  Refills:  0     acetone urine test strip  Commonly known as:  KETOSTIX  Used for:  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)      Dose:  1 strip  1 strip by In Vitro route daily  Quantity:  50 each  Refills:  3     atorvastatin 40 MG tablet  Commonly known as:  LIPITOR      Dose:  40 mg  Take 40 mg by mouth daily  Refills:  0     * YOHAN CONTOUR test strip  Used for:  Type 1 diabetes mellitus, uncontrolled  Generic drug:  blood glucose      USE TO TEST BLOOD SUGAR FIVE TIMES A DAY OR AS DIRECTED  Quantity:  450 each  Refills:  1     * blood glucose test strip  Commonly known as:  NO BRAND SPECIFIED  Used for:  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)      5 times daily ( contour  NEXT)  Quantity:  450 each  Refills:  11     * blood glucose test strip  Commonly known as:  NO BRAND SPECIFIED  Used for:  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  Quantity:  120 each  Refills:  11     mycophenolic acid 180 MG EC tablet  Commonly known as:  GENERIC EQUIVALENT      Dose:  360 mg  Take 360 mg by mouth 2 times daily  Refills:  0     * NovoLOG FLEXPEN 100 UNIT/ML pen  Generic drug:  insulin aspart      Inject Subcutaneous 3 times daily (with meals) .  (1 unit per 6 grams of carbs)  Refills:  0     * NovoLOG FLEXPEN 100 UNIT/ML pen  Generic drug:  insulin aspart      Sliding scale = 1 unit for every 40 over blood glucose of 140  Refills:  0     omeprazole 40 MG DR capsule  Commonly known as:  priLOSEC      Dose:  40 mg  Take 40 mg by mouth every evening  Refills:  0     ondansetron 4 MG ODT tab  Commonly known as:  ZOFRAN-ODT  Used for:  Acute lateral meniscus tear of left knee, initial encounter      Dose:  4-8 mg  Take 1-2 tablets (4-8 mg) by mouth every 8 hours as needed for nausea  Quantity:  60 tablet  Refills:  1     tacrolimus 1 MG capsule  Commonly known as:  GENERIC EQUIVALENT  Used for:  Kidney transplanted, Immunosuppression (H)      Dose:  2 mg  Take 2 capsules (2 mg) by mouth 2 times daily  Quantity:  60 capsule  Refills:  11         * This list has 5 medication(s) that are the same as other medications  prescribed for you. Read the directions carefully, and ask your doctor or other care provider to review them with you.               Where to get your medicines      These medications were sent to [facility], [place] - [address]  [address] [place] [address]    Phone:  [phone]   amLODIPine 5 MG tablet  carvedilol 6.25 MG tablet           Prescriptions were sent or printed at these locations (2 Prescriptions)            [facility], [place] - [address]   [address] [facility] [address]    Telephone:  [phone]   Fax:  [phone]   Hours:                  E-Prescribed (2 of 2)         amLODIPine 5 MG PO tablet               carvedilol 6.25 MG PO tablet                Protect others around you: Learn how to safely use, store and throw away your medicines at www.disposemymeds.org.       Follow-ups after your visit       Follow-up Appointments     Adult [facility] Follow-up and recommended labs and tests      Follow up with primary care provider, Kt Ríos, within 7 days for hospital follow- up.  No follow up labs or test are needed.      Appointments on [place] and/or [facility] (with [facility] or [facility] provider or service). Call [phone] if you haven't heard regarding these appointments within 7 days of discharge.         Follow Up and recommended labs and tests      New Hemodilaysis    Please fax discharge orders to the [facility]    Ph:      Fax: [phone]    Your dialysis treatment/chair times will be on Tuesdays, Thursdays and Saturdays    Dialysis Times-1:00 p.m.    On your first dialysis treatment day, Tuesday February 25. 2020:  The staff has asked that you arrive at 11:45 a.m.on this day so that they can do admission paperwork.  Thank you.    Dialysis Access: Tunneled Line           Your next 10 appointments already scheduled    Apr 09, 2020  1:15 PM CDT  (Arrive by 1:00  PM)  Return Kidney Transplant with  Kidney/Pancreas [address] [place] [address] Nephrology ([facility]) [address] [facility] [address] [phone]         Care Instructions       After Care Instructions     Activity      Your activity upon discharge: activity as tolerated         Diet      Follow this diet upon discharge: Orders Placed This Encounter      Consistent Carbohydrate Diet 7314-1045 Calories: High Consistent CHO (4-7 CHO units/meal)           Further instructions from your care team       ________________________________________________________  Discharge RN please fax discharge orders to Dialysis @______________  ________________________________________________________    Statement of Approval (From admission, onward)     Ordered          02/21/20 1436  I have reviewed and agree with all the recommendations and orders detailed in this document.  EFFECTIVE NOW     Approved and electronically signed by:  Jeremy Wang MD                   Additional Information About Your Visit       [employer] Information    [employer] gives you secure access to your electronic health record. If you see a primary care provider, you can also send messages to your care team and make appointments. If you have questions, please call your primary care clinic.  If you do not have a primary care provider, please call [phone] and they will assist you.       Care EveryWhere ID    This is your Care EveryWhere ID. This could be used by other organizations to access your [facility] medical records  [id number]       Your Vitals Were  Most recent update: 2/21/2020  4:01 PM    Blood Pressure   164/95            Pulse   82          Temperature   98.8  F (37.1  C) (Oral)          Respirations   13          Weight   87.1 kg (192 lb 1.6 oz)             Pulse Oximetry   99%    BMI (Body Mass Index)   29.21 kg/m           Primary Care Provider Office Phone # Fax #    Kt Ríos [phone]       Equal Access to Services    [facility] : Hadii aad ku hadmerlyneryn Jeannieashley, waaxda luqadaha, qaybta kajodigilberto kee. So [facility] [phone].    ATENCIÓN: Si habla español, tiene a chatterjee disposición servicios gratuitos de asistencia lingüística. Llame al [phone].    We comply with applicable federal and state civil rights laws, including the [place] Human Rights Act. We do not discriminate on the basis of race, color, creed, Jainism, national origin, marital status, age, disability, sex, sexual orientation, or gender identity.       Thank you!    Thank you for choosing [facility] for your care. Our goal is always to provide you with excellent care. Hearing back from our patients is one way we can continue to improve our services. Please take a few minutes to complete the written survey that you may receive in the mail after you visit with us. Thank you!            Medication List      Medications          Morning Afternoon Evening Bedtime As Needed    acetaminophen 325 MG tablet  Also known as:  TYLENOL  INSTRUCTIONS:  Take 2 tablets (650 mg) by mouth every 4 hours as needed for mild pain  LAST TAKEN:  650 mg on February 21, 2020  3:17 AM                     acetone urine test strip  Also known as:  KETOSTIX  INSTRUCTIONS:  1 strip by In Vitro route daily  Doctor's comments:  Urine ketone stix                     amLODIPine 5 MG tablet  Also known as:  NORVASC  Start taking on:  February 22, 2020  INSTRUCTIONS:  Take 1 tablet (5 mg) by mouth daily  LAST TAKEN:  5 mg on February 21, 2020  8:16 AM                     atorvastatin 40 MG tablet  Also known as:  LIPITOR  INSTRUCTIONS:  Take 40 mg by mouth daily  LAST TAKEN:  40 mg on February 21, 2020  8:16 AM                     * YOHAN CONTOUR test strip  INSTRUCTIONS:  USE TO TEST BLOOD SUGAR FIVE TIMES A DAY OR AS DIRECTED  Generic drug:  blood glucose                     * blood glucose test strip  Also known as:  NO  BRAND SPECIFIED  INSTRUCTIONS:  5 times daily ( contour NEXT)  Doctor's comments:  Patient has new meter,please remove previous script for regular contour strips                     * blood glucose test strip  Also known as:  NO BRAND SPECIFIED  INSTRUCTIONS:  Freestyle test strips 6 times daily (NOT lite and NOT insulinx)                     blood glucose monitoring meter device kit  INSTRUCTIONS:  TO TEST BG 5 TIMIES DAILY                     carvedilol 6.25 MG tablet  Also known as:  COREG  INSTRUCTIONS:  Take 1 tablet (6.25 mg) by mouth 2 times daily (with meals)  LAST TAKEN:  6.25 mg on February 21, 2020  8:16 AM                     insulin degludec 100 UNIT/ML pen  Also known as:  TRESIBA  INSTRUCTIONS:  Inject 24 Units Subcutaneous At Bedtime  LAST TAKEN:  24 Units on February 20, 2020  9:19 PM                     insulin pen needle 32G X 4 MM miscellaneous  Also known as:  BD ARPITA U/F  INSTRUCTIONS:  Use 4 daily or as directed.                     mycophenolic acid 180 MG EC tablet  Also known as:  GENERIC EQUIVALENT  INSTRUCTIONS:  Take 360 mg by mouth 2 times daily  LAST TAKEN:  Ask your nurse or doctor                     * NovoLOG FLEXPEN 100 UNIT/ML pen  INSTRUCTIONS:  Inject Subcutaneous 3 times daily (with meals) .  (1 unit per 6 grams of carbs)  LAST TAKEN:  Ask your nurse or doctor  Generic drug:  insulin aspart                     * NovoLOG FLEXPEN 100 UNIT/ML pen  INSTRUCTIONS:  Sliding scale = 1 unit for every 40 over blood glucose of 140  LAST TAKEN:  Ask your nurse or doctor  Generic drug:  insulin aspart                     omeprazole 40 MG DR capsule  Also known as:  priLOSEC  INSTRUCTIONS:  Take 40 mg by mouth every evening  LAST TAKEN:  40 mg on February 20, 2020  7:14 PM                     ondansetron 4 MG ODT tab  Also known as:  ZOFRAN-ODT  INSTRUCTIONS:  Take 1-2 tablets (4-8 mg) by mouth every 8 hours as needed for nausea  LAST TAKEN:  Ask your nurse or doctor                      tacrolimus 1 MG capsule  Also known as:  GENERIC EQUIVALENT  INSTRUCTIONS:  Take 2 capsules (2 mg) by mouth 2 times daily  LAST TAKEN:  1 mg on February 21, 2020  8:17 AM                        * This list has 5 medication(s) that are the same as other medications prescribed for you. Read the directions carefully, and ask your doctor or other care provider to review them with you.

## 2020-02-11 NOTE — IP AVS SNAPSHOT
[facility], [place],  Dialysis  [address]  [facility] [address]  Phone:  [phone]  Fax:  [phone]                                    After Visit Summary   2/11/2020    Murtaza Fitzgerald    MRN: [id number]           After Visit Summary Signature Page    I have received my discharge instructions, and my questions have been answered. I have discussed any challenges I see with this plan with the nurse or doctor.    ..........................................................................................................................................  Patient/Patient Representative Signature      ..........................................................................................................................................  Patient Representative Print Name and Relationship to Patient    ..................................................               ................................................  Date                                   Time    ..........................................................................................................................................  Reviewed by Signature/Title    ...................................................              ..............................................  Date                                               Time          22EPIC Rev 08/18

## 2020-02-12 ENCOUNTER — TELEPHONE (OUTPATIENT)
Dept: TRANSPLANT | Facility: CLINIC | Age: 45
End: 2020-02-12

## 2020-02-12 ENCOUNTER — PRE VISIT (OUTPATIENT)
Dept: GASTROENTEROLOGY | Facility: CLINIC | Age: 45
End: 2020-02-12

## 2020-02-12 LAB
ANION GAP SERPL CALCULATED.3IONS-SCNC: 12 MMOL/L (ref 3–14)
BUN SERPL-MCNC: 34 MG/DL (ref 7–30)
CALCIUM SERPL-MCNC: 8.7 MG/DL (ref 8.5–10.1)
CHLORIDE SERPL-SCNC: 110 MMOL/L (ref 94–109)
CO2 SERPL-SCNC: 17 MMOL/L (ref 20–32)
CREAT SERPL-MCNC: 5.92 MG/DL (ref 0.66–1.25)
DEPRECATED CALCIDIOL+CALCIFEROL SERPL-MC: 8 UG/L (ref 20–75)
ERYTHROCYTE [DISTWIDTH] IN BLOOD BY AUTOMATED COUNT: 13.7 % (ref 10–15)
GFR SERPL CREATININE-BSD FRML MDRD: 11 ML/MIN/{1.73_M2}
GLUCOSE BLDC GLUCOMTR-MCNC: 132 MG/DL (ref 70–99)
GLUCOSE BLDC GLUCOMTR-MCNC: 180 MG/DL (ref 70–99)
GLUCOSE BLDC GLUCOMTR-MCNC: 187 MG/DL (ref 70–99)
GLUCOSE BLDC GLUCOMTR-MCNC: 280 MG/DL (ref 70–99)
GLUCOSE BLDC GLUCOMTR-MCNC: 333 MG/DL (ref 70–99)
GLUCOSE SERPL-MCNC: 248 MG/DL (ref 70–99)
HCT VFR BLD AUTO: 33.7 % (ref 40–53)
HGB BLD-MCNC: 10.9 G/DL (ref 13.3–17.7)
MCH RBC QN AUTO: 29 PG (ref 26.5–33)
MCHC RBC AUTO-ENTMCNC: 32.3 G/DL (ref 31.5–36.5)
MCV RBC AUTO: 90 FL (ref 78–100)
PHOSPHATE SERPL-MCNC: 4.3 MG/DL (ref 2.5–4.5)
PLATELET # BLD AUTO: 297 10E9/L (ref 150–450)
POTASSIUM SERPL-SCNC: 4.2 MMOL/L (ref 3.4–5.3)
RBC # BLD AUTO: 3.76 10E12/L (ref 4.4–5.9)
SODIUM SERPL-SCNC: 139 MMOL/L (ref 133–144)
SODIUM UR-SCNC: 84 MMOL/L
TACROLIMUS BLD-MCNC: 4.2 UG/L (ref 5–15)
TACROLIMUS BLD-MCNC: 7 UG/L (ref 5–15)
TME LAST DOSE: ABNORMAL H
TME LAST DOSE: NORMAL H
WBC # BLD AUTO: 8.4 10E9/L (ref 4–11)

## 2020-02-12 PROCEDURE — 82306 VITAMIN D 25 HYDROXY: CPT | Performed by: STUDENT IN AN ORGANIZED HEALTH CARE EDUCATION/TRAINING PROGRAM

## 2020-02-12 PROCEDURE — 99233 SBSQ HOSP IP/OBS HIGH 50: CPT | Performed by: INTERNAL MEDICINE

## 2020-02-12 PROCEDURE — 25000132 ZZH RX MED GY IP 250 OP 250 PS 637: Performed by: STUDENT IN AN ORGANIZED HEALTH CARE EDUCATION/TRAINING PROGRAM

## 2020-02-12 PROCEDURE — 00000146 ZZHCL STATISTIC GLUCOSE BY METER IP

## 2020-02-12 PROCEDURE — 12000001 ZZH R&B MED SURG/OB UMMC

## 2020-02-12 PROCEDURE — 25000125 ZZHC RX 250: Performed by: STUDENT IN AN ORGANIZED HEALTH CARE EDUCATION/TRAINING PROGRAM

## 2020-02-12 PROCEDURE — 80048 BASIC METABOLIC PNL TOTAL CA: CPT | Performed by: STUDENT IN AN ORGANIZED HEALTH CARE EDUCATION/TRAINING PROGRAM

## 2020-02-12 PROCEDURE — 25000128 H RX IP 250 OP 636: Performed by: STUDENT IN AN ORGANIZED HEALTH CARE EDUCATION/TRAINING PROGRAM

## 2020-02-12 PROCEDURE — 25800030 ZZH RX IP 258 OP 636: Performed by: STUDENT IN AN ORGANIZED HEALTH CARE EDUCATION/TRAINING PROGRAM

## 2020-02-12 PROCEDURE — 80180 DRUG SCRN QUAN MYCOPHENOLATE: CPT | Performed by: STUDENT IN AN ORGANIZED HEALTH CARE EDUCATION/TRAINING PROGRAM

## 2020-02-12 PROCEDURE — 36415 COLL VENOUS BLD VENIPUNCTURE: CPT | Performed by: STUDENT IN AN ORGANIZED HEALTH CARE EDUCATION/TRAINING PROGRAM

## 2020-02-12 PROCEDURE — 80197 ASSAY OF TACROLIMUS: CPT | Performed by: STUDENT IN AN ORGANIZED HEALTH CARE EDUCATION/TRAINING PROGRAM

## 2020-02-12 PROCEDURE — 25000131 ZZH RX MED GY IP 250 OP 636 PS 637: Performed by: INTERNAL MEDICINE

## 2020-02-12 PROCEDURE — 25000131 ZZH RX MED GY IP 250 OP 636 PS 637: Performed by: STUDENT IN AN ORGANIZED HEALTH CARE EDUCATION/TRAINING PROGRAM

## 2020-02-12 PROCEDURE — 84300 ASSAY OF URINE SODIUM: CPT | Performed by: STUDENT IN AN ORGANIZED HEALTH CARE EDUCATION/TRAINING PROGRAM

## 2020-02-12 PROCEDURE — 85027 COMPLETE CBC AUTOMATED: CPT | Performed by: STUDENT IN AN ORGANIZED HEALTH CARE EDUCATION/TRAINING PROGRAM

## 2020-02-12 PROCEDURE — 84100 ASSAY OF PHOSPHORUS: CPT | Performed by: STUDENT IN AN ORGANIZED HEALTH CARE EDUCATION/TRAINING PROGRAM

## 2020-02-12 RX ORDER — HYDRALAZINE HYDROCHLORIDE 20 MG/ML
10 INJECTION INTRAMUSCULAR; INTRAVENOUS
Status: COMPLETED | OUTPATIENT
Start: 2020-02-12 | End: 2020-02-13

## 2020-02-12 RX ORDER — LABETALOL 20 MG/4 ML (5 MG/ML) INTRAVENOUS SYRINGE
20
Status: DISCONTINUED | OUTPATIENT
Start: 2020-02-12 | End: 2020-02-21 | Stop reason: HOSPADM

## 2020-02-12 RX ORDER — CEFAZOLIN SODIUM 2 G/100ML
2 INJECTION, SOLUTION INTRAVENOUS
Status: COMPLETED | OUTPATIENT
Start: 2020-02-12 | End: 2020-02-13

## 2020-02-12 RX ADMIN — INSULIN ASPART 3 UNITS: 100 INJECTION, SOLUTION INTRAVENOUS; SUBCUTANEOUS at 05:10

## 2020-02-12 RX ADMIN — LORAZEPAM 0.5 MG: 2 INJECTION INTRAMUSCULAR; INTRAVENOUS at 08:09

## 2020-02-12 RX ADMIN — MYCOPHENOLIC ACID 360 MG: 360 TABLET, DELAYED RELEASE ORAL at 08:08

## 2020-02-12 RX ADMIN — ACETAMINOPHEN 650 MG: 325 TABLET, FILM COATED ORAL at 11:03

## 2020-02-12 RX ADMIN — LIDOCAINE HYDROCHLORIDE 30 ML: 20 SOLUTION ORAL; TOPICAL at 00:59

## 2020-02-12 RX ADMIN — ATORVASTATIN CALCIUM 40 MG: 40 TABLET, FILM COATED ORAL at 08:10

## 2020-02-12 RX ADMIN — SODIUM CHLORIDE 1000 ML: 9 INJECTION, SOLUTION INTRAVENOUS at 11:03

## 2020-02-12 RX ADMIN — ONDANSETRON 4 MG: 2 INJECTION INTRAMUSCULAR; INTRAVENOUS at 10:45

## 2020-02-12 RX ADMIN — ONDANSETRON 4 MG: 2 INJECTION INTRAMUSCULAR; INTRAVENOUS at 16:41

## 2020-02-12 RX ADMIN — OMEPRAZOLE 40 MG: 20 CAPSULE, DELAYED RELEASE ORAL at 19:34

## 2020-02-12 RX ADMIN — LORAZEPAM 0.5 MG: 2 INJECTION INTRAMUSCULAR; INTRAVENOUS at 00:52

## 2020-02-12 RX ADMIN — SODIUM CHLORIDE 1000 ML: 9 INJECTION, SOLUTION INTRAVENOUS at 02:58

## 2020-02-12 RX ADMIN — MYCOPHENOLIC ACID 360 MG: 360 TABLET, DELAYED RELEASE ORAL at 19:34

## 2020-02-12 RX ADMIN — SODIUM CHLORIDE 1000 ML: 9 INJECTION, SOLUTION INTRAVENOUS at 18:58

## 2020-02-12 RX ADMIN — ONDANSETRON 4 MG: 2 INJECTION INTRAMUSCULAR; INTRAVENOUS at 04:37

## 2020-02-12 RX ADMIN — OMEPRAZOLE 40 MG: 20 CAPSULE, DELAYED RELEASE ORAL at 01:30

## 2020-02-12 RX ADMIN — INSULIN ASPART 3 UNITS: 100 INJECTION, SOLUTION INTRAVENOUS; SUBCUTANEOUS at 08:09

## 2020-02-12 RX ADMIN — ONDANSETRON 4 MG: 2 INJECTION INTRAMUSCULAR; INTRAVENOUS at 22:40

## 2020-02-12 RX ADMIN — LORAZEPAM 0.5 MG: 2 INJECTION INTRAMUSCULAR; INTRAVENOUS at 13:27

## 2020-02-12 RX ADMIN — MYCOPHENOLIC ACID 360 MG: 360 TABLET, DELAYED RELEASE ORAL at 01:30

## 2020-02-12 RX ADMIN — INSULIN ASPART 1 UNITS: 100 INJECTION, SOLUTION INTRAVENOUS; SUBCUTANEOUS at 17:15

## 2020-02-12 RX ADMIN — TACROLIMUS 2 MG: 1 CAPSULE ORAL at 01:30

## 2020-02-12 RX ADMIN — INSULIN ASPART 6 UNITS: 100 INJECTION, SOLUTION INTRAVENOUS; SUBCUTANEOUS at 17:50

## 2020-02-12 RX ADMIN — INSULIN DEGLUDEC INJECTION 18 UNITS: 100 INJECTION, SOLUTION SUBCUTANEOUS at 22:39

## 2020-02-12 RX ADMIN — INSULIN DEGLUDEC INJECTION 18 UNITS: 100 INJECTION, SOLUTION SUBCUTANEOUS at 01:10

## 2020-02-12 RX ADMIN — TACROLIMUS 2 MG: 1 CAPSULE ORAL at 08:08

## 2020-02-12 RX ADMIN — LORAZEPAM 0.5 MG: 2 INJECTION INTRAMUSCULAR; INTRAVENOUS at 19:34

## 2020-02-12 RX ADMIN — INSULIN ASPART 1 UNITS: 100 INJECTION, SOLUTION INTRAVENOUS; SUBCUTANEOUS at 12:01

## 2020-02-12 RX ADMIN — INSULIN ASPART 4 UNITS: 100 INJECTION, SOLUTION INTRAVENOUS; SUBCUTANEOUS at 01:18

## 2020-02-12 RX ADMIN — TACROLIMUS 2 MG: 1 CAPSULE ORAL at 19:34

## 2020-02-12 RX ADMIN — HEPARIN SODIUM 5000 UNITS: 5000 INJECTION, SOLUTION INTRAVENOUS; SUBCUTANEOUS at 08:09

## 2020-02-12 ASSESSMENT — ACTIVITIES OF DAILY LIVING (ADL)
ADLS_ACUITY_SCORE: 14

## 2020-02-12 NOTE — PROGRESS NOTES
Transferred from ED at 2200  2 RN full   skin assessment completed by Jeremi Nix, JOSE and Lesli Thakkar RN.  Skin assessment finding: skin intact, no problems   Interventions/actions: other No Interventions needed at this time.     Will continue to monitor.

## 2020-02-12 NOTE — CONSULTS
GASTROENTEROLOGY CONSULTATION    Date: 02/12/2020  Date of Admission: 2/11/2020  Reason for Consultation:nausea vomiting and Hx of gastroparesis  Requested by:   Jesika Snyder MD  Brief Summary:   We were asked by Jesika Snyder MD to evaluate this patient with N/V      ASSESSMENT AND RECOMMENDATIONS:   Assessment:  44 year old male with a history of gastroparesis, type 1 diabetes mellitus, kidney pancreas transplant, coronary artery disease status post CABG, was readmitted with nausea vomiting.    History of gastroparesis  Persistent nausea vomiting  Non-anion gap metabolic acidosis  Hx LDKT now with SWATHI on CKD     Patient has history of gastroparesis, last gastric emptying scan in April 2019 showed worsening.  His current symptoms of nausea vomiting are multifactorial with hyperglycemia, non-anion gap metabolic acidosis, acute kidney injury contributing to it.  Patient had an outpatient GI evaluation for gastroparesis today which he has missed.  He was recommended to be started on erythromycin during the last admission but it was clearly stated that it will only work for a few days and then he will develop tachyphylaxis to it.  Sumatriptan has been tried in his previous admission which can be tried again for acute symptoms.  It was mentioned in the last consult that GI do not manage gastric pacemakers and also we do not recommend placement for this patient.  They are for compassionate use only and data for efficacy is quite limited.  The last option could be a venting G-tube but that can only be tried if his symptoms are persistent after having a good glycemic control and once acid base abnormality is taken care of.       Recommendations  --Recommend trying sumatriptan for acute symptoms   --Continue Zofran as needed, discontinue Compazine as it worsens symptoms of gastroparesis  --Recommend adequate glycemic control  --Recommend to check Vit B12 and folate level  --Recommend nephrology input    --Consider  dietician consultation once nausea is improved to help with gastroparesis diet    Gastroenterology outpatient follow up recommendations: Pending clinical course    Thank you for involving us in this patient's care. Please do not hesitate to contact the GI service with any questions or concerns.     Pt care plan discussed with Dr. Araiza, GI staff physician.    This note was created with voice recognition software, and while reviewed for accuracy, typos may remain.    Caroline Rae MD  GI Fellow  Pager: [phone]  -------------------------------------------------------------------------------------------------------------------           History of Present Illness:   Murtaza Fitzgerald is a 44 year old male with a history of gastroparesis, type 1 diabetes mellitus, status post kidney(2008) pancreas(2009) transplant, coronary artery disease status post CABG who was recently discharged from the hospital and came back again with similar symptoms of nausea vomiting.    Patient stated that was discharged home on 2/9, he was not having any abdominal symptoms for 1 day.  Yesterday he woke up in the morning without any symptoms of having nausea, he drank too much Powerade and started vomiting afterwards.  He continued to have vomiting and felt nauseous during the day, his vomiting was bilious.  Denied abdominal pain or sensation of bloating.  Last bowel movement was on 2/8 while he was in the hospital.    He denies smoking cigarettes but uses vape, no alcohol intake, no marijuana    He is currently not employed.            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     Noncompliance with treatment     no labs for one year     Pancreas replaced by transplant (H) Feb 2009    rejection treated with thymo     Pancreatic disease     pancreas transplant     Tobacco abuse ongoing            Past Surgical History:   Reviewed and edited as appropriate   Past Surgical History:   Procedure Laterality Date     ARTHROSCOPY KNEE WITH LATERAL MENISCECTOMY Left 7/10/2019    Procedure: Left knee arthroscopic partial lateral meniscectomy;  Surgeon: Alex Candelaria MD;  Location:  OR     BYPASS GRAFT ARTERY CORONARY N/A 8/5/2015    Procedure: BYPASS GRAFT ARTERY CORONARY;  Surgeon: Katy Neville MD;  Location: [facility] OR     ESOPHAGOSCOPY, GASTROSCOPY, DUODENOSCOPY (EGD), COMBINED N/A 1/28/2020    Procedure: ESOPHAGOGASTRODUODENOSCOPY (EGD) biopsies w/forceps;  Surgeon: Emre Gomes MD;  Location:  GI     EYE SURGERY      vitrectomy both eyes      ORTHOPEDIC SURGERY  1993    tumor removed from left knee     TRANSPLANT  2009.2008    pancrease and kidney transplant            Previous Endoscopy:   No results found. However, due to the size of the patient record, not all encounters were searched. Please check Results Review for a complete set of results.         Social History:   Reviewed and edited as appropriate  Social History     Socioeconomic History     Marital status: Single     Spouse name: Not on file     Number of children: 1     Years of education: Not on file     Highest education level: Associate degree: academic program   Occupational History     Occupation:    Social Needs     Financial resource strain: Not very hard     Food insecurity:     Worry: Never true     Inability: Never true     Transportation needs:     Medical:  No     Non-medical: No   Tobacco Use     Smoking status: Former Smoker     Packs/day: 0.30     Types: Cigarettes, Vaping Device     Last attempt to quit: 7/3/2015     Years since quittin.6     Smokeless tobacco: Never Used     Tobacco comment: E- Cig use still   Substance and Sexual Activity     Alcohol use: Not Currently     Alcohol/week: 0.0 standard drinks     Frequency: Never     Binge frequency: Never     Drug use: No     Sexual activity: Never     Partners: Female   Lifestyle     Physical activity:     Days per week: 0 days     Minutes per session: 0 min     Stress: To some extent   Relationships     Social connections:     Talks on phone: More than three times a week     Gets together: Once a week     Attends Hinduism service: Never     Active member of club or organization: No     Attends meetings of clubs or organizations: Never     Relationship status:      Intimate partner violence:     Fear of current or ex partner: Not on file     Emotionally abused: Not on file     Physically abused: Not on file     Forced sexual activity: Not on file   Other Topics Concern     Parent/sibling w/ CABG, MI or angioplasty before 65F 55M? No      Service Not Asked     Blood Transfusions No     Comment: possibly during surgery, otherwise none.     Caffeine Concern Not Asked     Occupational Exposure Not Asked     Hobby Hazards Not Asked     Sleep Concern Not Asked     Stress Concern Not Asked     Weight Concern Not Asked     Special Diet Not Asked     Back Care Not Asked     Exercise Not Asked     Bike Helmet Not Asked     Seat Belt Yes     Self-Exams Not Asked   Social History Narrative     Not on file            Family History:   Reviewed and edited as appropriate  Family History   Problem Relation Age of Onset     Unknown/Adopted Father      Heart Disease Maternal Grandfather 62            Allergies:   Reviewed and edited as appropriate     Allergies   Allergen Reactions     Benadryl  [Diphenhydramine]      Reglan Other (See Comments)     IV, delsuions            Medications:     Current Facility-Administered Medications   Medication     acetaminophen (TYLENOL) tablet 650 mg     atorvastatin (LIPITOR) tablet 40 mg     glucose gel 15-30 g    Or     dextrose 50 % injection 25-50 mL    Or     glucagon injection 1 mg     heparin ANTICOAGULANT injection 5,000 Units     hydrALAZINE (APRESOLINE) injection 10 mg     insulin aspart (NovoLOG) inj (RAPID ACTING)     insulin degludec (TRESIBA) 100 UNIT/ML injection 18 Units     labetalol (NORMODYNE/TRANDATE) syringe 20 mg     lidocaine (LMX4) cream     lidocaine 1 % 0.1-1 mL     LORazepam (ATIVAN) injection 0.5 mg     melatonin tablet 1 mg     mycophenolic acid (GENERIC EQUIVALENT) EC tablet 360 mg     naloxone (NARCAN) injection 0.1-0.4 mg     omeprazole (priLOSEC) CR capsule 40 mg     ondansetron (ZOFRAN) injection 4 mg     ondansetron (ZOFRAN-ODT) ODT tab 4-8 mg     polyethylene glycol (MIRALAX/GLYCOLAX) Packet 17 g     sodium chloride (PF) 0.9% PF flush 3 mL     sodium chloride (PF) 0.9% PF flush 3 mL     sodium chloride 0.9% infusion     tacrolimus (GENERIC EQUIVALENT) capsule 2 mg             Review of Systems:     A complete 10 point review of systems was performed and is negative except as noted in the HPI           Physical Exam:   BP (!) 165/88 (BP Location: Left arm)   Pulse 101   Temp 96.5  F (35.8  C) (Oral)   Resp 18   Wt 86.2 kg (190 lb)   SpO2 99%   BMI 28.89 kg/m    Wt:   Wt Readings from Last 2 Encounters:   02/11/20 86.2 kg (190 lb)   02/07/20 86.1 kg (189 lb 12.8 oz)      Constitutional: cooperative, pleasant  Eyes: Sclera anicteric  Ears/nose/mouth/throat: mucus membranes moist  Neck: supple  CV: No edema  Respiratory: Unlabored breathing  Abd: Nondistended, +bs, nontender, ventral hernia  Skin: warm, perfused  Neuro: AAO x 3  Psych: Normal affect  MSK: No gross deformities         Data:   Labs and imaging below were independently  reviewed and interpreted    BMP  Recent Labs   Lab 02/12/20  0710 02/11/20  1739 02/11/20  0841 02/09/20  0615    138 137 137   POTASSIUM 4.2 4.4 4.0 3.4   CHLORIDE 110* 107 108 109   CHARLY 8.7 9.4 8.8 8.3*   CO2 17* 17* 20 20   BUN 34* 33* 33* 35*   CR 5.92* 6.13* 5.93* 6.21*   * 274* 146* 125*     CBC  Recent Labs   Lab 02/12/20  0710 02/11/20  2241 02/11/20 1739 02/11/20  0841 02/09/20  0615   WBC 8.4  --  9.8 9.0 5.5   RBC 3.76*  --  4.34* 4.12* 3.43*   HGB 10.9*  --  12.6* 11.6* 9.9*   HCT 33.7*  --  38.6* 36.4* 30.8*   MCV 90  --  89 88 90   MCH 29.0  --  29.0 28.2 28.9   MCHC 32.3  --  32.6 31.9 32.1   RDW 13.7  --  13.4 13.5 13.1    313 351 352 261     INRNo lab results found in last 7 days.  LFTs  Recent Labs   Lab 02/11/20 1739 02/09/20  0615 02/07/20  1058 02/06/20  0634   ALKPHOS 172* 133 139 138   AST 14 8 13 9   ALT 15 10 11 10   BILITOTAL 0.4 0.3 0.5 0.5   PROTTOTAL 7.4 6.1* 6.5* 6.6*   ALBUMIN 3.8 2.9* 3.0* 2.9*      PANC  Recent Labs   Lab 02/11/20  1739 02/05/20  1639   LIPASE 41* 39*       Imaging:    CT Chest abd pelvis:  IMPRESSION:     1. No acute intra-abdominal pathology or evidence for small bowel  obstruction  2. Small bowel containing umbilical hernia with out evidence for  obstruction. Adjacent fat-containing ventral hernia.  3. Small hiatal hernia.  4. Stable postsurgical changes of renal and pancreas transplants

## 2020-02-12 NOTE — PROGRESS NOTES
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 Pancreas graft has since failed, on Tresiba. Recently admitted with gastroparesis and progressing CKD. Now admitted with nausea and emesis.     Graft function:  Pancreas: ANNMARIE 2/2009. Failed, now on Tresiba and sliding scale insulin prn.   Kidney: KT 11/2008. Baseline Cr 2.5-5. Cr 5.9 on admission. US and work up on recent admission within normal limits. Transplant nephrology consulted, tunneled catheter was placed and HD was initiated.   Immunosuppression management:    Tacrolimus 1 mg BID, decreased from 2 mg BID PTA. Goal ~3. Check level on 2/17. Recommend to taper off over the next month.   Myfortic 360 mg BID.   Complexity of management: Medium. Contributing factors: organ dysfunction and nausea  Hematology: HGB stable.   Cardiorespiratory: Stable on room air.    HTN: continue PTA meds   GI/Nutrition:   Gastroparesis: 4/4/19 gastric emptying study indicative of gastroparesis. Nausea and emesis on admission. CT with no evidence of SBO. GI consulted, recommending: sumatriptan, Zofran, glycemic control. Would recommend looking into initiation of domperidone if gastroparesis continues this frequently. Planned outpatient consult with GI for gastric pacer placement due to frequent exacerbations of gastroparesis. Positive bowel function.   Plan to consult dietician. Check calorie counts.    Endocrine:   DMI s/p failed pancreas transplant. PTA Tresiba 24 units and sliding scale insulin prn.   Infectious disease: AF, no leukocytosis.   Disposition: 7C     Medical Decision Making: Medium  Subsequent visit 20887 (moderate level decision making)    [place]/Fellow/Resident Provider: Denisse Ngo PA-C    Faculty: Melissa Guzman  M.D.    __________________________________________________________________  Consult requested by medicine for gastroparesis/immunosuppression  Admitted 2/11/2020 for Nausea and emesis  Transplant History: 11/4/2008 (Kidney), 2/17/2009 (Pancreas)    ROS:   A 10-point review of systems was negative except as noted above.    Curent Meds:    atorvastatin  40 mg Oral Daily     heparin ANTICOAGULANT  5,000 Units Subcutaneous BID     insulin aspart  1-6 Units Subcutaneous Q4H     insulin degludec  18 Units Subcutaneous At Bedtime     mycophenolic acid  360 mg Oral BID     omeprazole  40 mg Oral QPM     sodium chloride (PF)  3 mL Intracatheter Q8H     tacrolimus  2 mg Oral BID       Physical Exam:     Admit Weight: 86.2 kg (190 lb)    Current vitals:   BP (!) 165/88 (BP Location: Left arm)   Pulse 101   Temp 96.5  F (35.8  C) (Oral)   Resp 18   Wt 86.2 kg (190 lb)   SpO2 99%   BMI 28.89 kg/m      Vital sign ranges:    Temp:  [96.5  F (35.8  C)-98.4  F (36.9  C)] 96.5  F (35.8  C)  Pulse:  [] 101  Heart Rate:  [] 101  Resp:  [16-18] 18  BP: (153-194)/() 165/88  SpO2:  [98 %-100 %] 99 %  Patient Vitals for the past 24 hrs:   BP Temp Temp src Pulse Heart Rate Resp SpO2 Weight   02/12/20 0756 (!) 165/88 96.5  F (35.8  C) Oral -- 101 18 99 % --   02/12/20 0139 (!) 153/85 -- -- 101 -- -- -- --   02/11/20 2219 (!) 177/95 98.4  F (36.9  C) Oral 100 -- 18 98 % --   02/11/20 2159 (!) 182/96 -- -- -- 97 16 98 % --   02/11/20 2050 (!) 194/109 -- -- 95 -- -- 99 % --   02/11/20 1930 (!) 175/113 -- -- -- -- -- -- --   02/11/20 1900 (!) 166/102 -- -- -- -- -- 98 % --   02/11/20 1841 (!) 174/98 -- -- 97 -- -- 100 % --   02/11/20 1839 (!) 185/107 -- -- 98 -- -- 99 % --   02/11/20 1730 (!) 170/107 97.7  F (36.5  C) Oral -- 98 16 99 % 86.2 kg (190 lb)     General Appearance: uncomfortable.   Skin: normal, no suspicious lesions or rashes  Heart: regular rate and rhythm  Lungs: NLB on room air  Abdomen: The abdomen is  rounded and obese, and  non-tender in the generalized area. he is not tender over the kidney and pancreas graft. The wound is Healing well.  : schultz is not present.    Extremities: Tremor absent., edema: present bilaterally. 1+    Data:   CMP  Recent Labs   Lab 02/12/20  0710 02/11/20  1739 02/09/20  0615 02/08/20  0628  02/05/20  1639    138   < > 137 136   < > 136   POTASSIUM 4.2 4.4   < > 3.4 3.6   < > 4.3   CHLORIDE 110* 107   < > 109 106   < > 105   CO2 17* 17*   < > 20 21   < > 22   * 274*   < > 125* 180*   < > 182*   BUN 34* 33*   < > 35* 40*   < > 43*   CR 5.92* 6.13*   < > 6.21* 5.94*   < > 5.99*   GFRESTIMATED 11* 10*   < > 10* 11*   < > 11*   GFRESTBLACK 12* 12*   < > 12* 12*   < > 12*   CHARLY 8.7 9.4   < > 8.3* 8.7   < > 9.0   MAG  --  1.8  --   --  2.0   < >  --    PHOS 4.3 4.2  --   --   --    < >  --    LIPASE  --  41*  --   --   --   --  39*   ALBUMIN  --  3.8  --  2.9*  --    < > 3.4   BILITOTAL  --  0.4  --  0.3  --    < > 0.4   ALKPHOS  --  172*  --  133  --    < > 162*   AST  --  14  --  8  --    < > 7   ALT  --  15  --  10  --    < > 12    < > = values in this interval not displayed.     CBC  Recent Labs   Lab 02/12/20  0710 02/11/20  2241 02/11/20 1739 02/11/20  0841   HGB 10.9*  --  12.6* 11.6*   WBC 8.4  --  9.8 9.0    313 351 352   A1C  --   --   --  7.0*     CoagsNo lab results found in last 7 days.    Invalid input(s): XA   Urinalysis  Recent Labs   Lab Test 02/11/20  0850 01/27/20  0941  08/22/19  0857  01/11/19  0741   COLOR Yellow Straw   < >  --    < >  --    APPEARANCE Clear Clear   < >  --    < >  --    URINEGLC 100* >1000*   < >  --    < >  --    URINEBILI Negative Negative   < >  --    < >  --    URINEKETONE Negative Trace*   < >  --    < >  --    SG 1.020 1.011   < >  --    < >  --    UBLD Trace* Trace*   < >  --    < >  --    URINEPH 7.0 6.5   < >  --    < >  --    PROTEIN >=300* 300*   < >  --    < >  --    NITRITE Negative Negative   < >  --    < >  --     LEUKEST Negative Negative   < >  --    < >  --    RBCU 2-5* 2   < >  --    < >  --    WBCU 0 - 5 2   < >  --    < >  --    UTPG  --   --   --  2.20*  --  0.87*    < > = values in this interval not displayed.     Virology:  CMV DNA Quantitation Specimen   Date Value Ref Range Status   07/26/2016 Plasma, EDTA anticoagulant  Final     CMV Quantitative   Date Value Ref Range Status   12/27/2013 <100 <100 Copies/mL Final   11/11/2013 <100 <100 Copies/mL Final   12/21/2009 <100 <100 Copies/mL Final     Comment:     No CMV DNA detected.     CMV IgG Antibody   Date Value Ref Range Status   07/31/2013 0.49 U/mL Final     Comment:     Negative for anti-CMV IgG   02/16/2009 0.0 EU/mL Final     Comment:     Negative for anti-CMV IgG     EBV VCA IgG Antibody   Date Value Ref Range Status   07/31/2013 >750.00  Positive, suggests immunologic exposure. U/mL Final     Hepatitis C Antibody   Date Value Ref Range Status   07/31/2013 Negative NEG Final     Hep B Surface Ynes   Date Value Ref Range Status   07/31/2013 0.0  Final     Comment:     Negative, No antibody detected when the value is less than 5.0 mlU/mL.     BK Virus Result   Date Value Ref Range Status   07/08/2015 BK Virus DNA Not Detected BKNEG copies/mL Final   01/28/2015 BK Virus DNA Not Detected BKNEG copies/mL Final   12/27/2013  <1000 copies/mL Final    Unable to interpret due to internal control failure.  Recommend re-collecting   the specimen   11/11/2013 <1000 <1000 copies/mL Final   09/19/2013 <1000 <1000 copies/mL Final   07/31/2013 <1000 <1000 copies/mL Final   11/13/2012 <1000 <1000 copies/mL Final   10/11/2012 <1000 <1000 copies/mL Final   11/18/2011 <1000 <1000 copies/mL Final   03/17/2011 <1000 <1000 copies/mL Final   08/11/2009 <1000 <1000 copies/mL Final   05/05/2009 <1000 <1000 copies/mL Final   12/10/2008 <1000 <1000 copies/mL Final   12/01/2008 <1000 <1000 copies/mL Final   11/19/2008 <1000 <1000 copies/mL Final

## 2020-02-12 NOTE — PLAN OF CARE
Alert and oriented x 4. Stated that he would have some pain with the dry heaves he was having and was drinking water so that he would have something to throw up and not have pain. He had persistent nausea and emesis. Ativan and zofran were given. Up to the BR independently and voided.

## 2020-02-12 NOTE — PLAN OF CARE
Patient transferred from ED at 2200 with Gastroparesis. Asssumed care of patient from [id number].  BP hypertensive, AOVSS on RA. Some minor pain with dry heaving, declined pain medication. Zofran for nausea. Patient has been unable to tolerate any oral intake since this AM. RN skin assessment complete. BG checks. Patient up with SBA. Monitor nausea/vitals and continue with POC.

## 2020-02-12 NOTE — PLAN OF CARE
Time: 0700 - 1530    Reason for admission/Dx:   Gastroparesis [K31.84]  Intractable vomiting with nausea, unspecified vomiting type [R11.2]     BP (!) 165/88 (BP Location: Left arm)   Pulse 101   Temp 96.5  F (35.8  C) (Oral)   Resp 18   Wt 86.2 kg (190 lb)   SpO2 99%   BMI 28.89 kg/m      Shift changes: HTN, without requirement of hydralazine/labetalol intervention, intermittently tachycardic, otherwise VSS. AOx4, SBA. Significant nausea, with witnessed 500mL yellow, clear emesis in AM, which progressively improved, throughout shift, 2/2 Ativan IV and Zofran IV.  No recurrence of emesis since.  Pt tolerating clear liqs.  No BM; BS hypoactive except RLQ (active). No abdominal pain. Voiding spontaneously, without difficulty. Urine Na+ sent; results pending.     Transplant Surgery-Kidney/Pancreas consulting d/t 1) pt's failed pancreas tx. On Tresiba and sliding scale insulin. Tacro and Tac and Mycophen levels drawn 2/12 and pending and 2) plan for obtaining tunneled cath/HD line for short-run HD, per neph MD; Cr today 5.92    Plan: Obtain HD line, probable afternoon HD line, N/V mgmt

## 2020-02-12 NOTE — H&P
History and Physical    Murtaza Fitzgerald MRN# [id number]   Age: 44 year old YOB: 1975     Date of Admission:  2/11/2020    Primary care provider: Kt Ríos          Assessment and Plan:   Assessment:  Murtaza Fitzgerald is a 44 year old male with PMHx of DM1 c/b gastroparesis, CKD (s/p kidney transplant on 11/2008 secondary to diabetic nephropathy), pancreatic transplant (2/2009), CAD s/p bypass graft, HTN, GERD who presents to the ED with nausea and vomiting.     Plan:  # Intractable vomiting   # Gastroparesis   Pt presenting with intractable vomiting which began earlier this morning. He has vomited about 12 times, all non-bloody and non-bilious. Does have a recurring hx of intractable vomiting and was last hospitalized on 2/5-2/9 with similar complaints. Vomiting most likely secondary to gastroparesis and/or uremia (BUN/Crt elevated at 33 and 6.13). In the past, there have been discussions on possible gastric pacemaker for management of his gastroparesis. GI consult placed. Last bowel movement was on Saturday (2/8) and flatus passed yesterday. CT abdomen/pelvis obtained and shows no evidence of a small bowel obstruction. Of note, if vomiting acutely worsens or pt does not improve with supportive measures/correction of uremia and other workup uneventful, can obtain MRI to evaluate for PRES.    -supportive measures: IVF, antiemetics   -zofran 4 mg IV Q6H PRN, per GI avoid compazine  -0.5mg ativan IV Q6H PRN for nausea  -GI cocktail   -GI consult in the AM, prior discussions on potential gastric pacemaker       # SWATHI on CKD in setting of renal transplant   Crt 6.13 on admission, baseline is around 2.5-5. There were discussions on possible hemodialysis during his last admission, but the process was not started. Per patient, his initial renal failure was secondary to diabetic nephropathy. Given his uncontrolled diabetes, as evidenced by blood glucose of 274, UA positive for  glucosuria and proteinuria, it is likely that his current SWATHI could be due to nephropathy as well. Since this is a transplanted kidney, it is also important to rule out possible rejection. Tac and MMF levels obtained for the AM. Did not see prednisone on patient's med list, would discuss this with patient in the AM and nephrology if patient not on it. Med rec placed.   -nephrology consult in the AM to discuss possible dialysis   -NPO at midnight for possible tunneled cath in the AM   -med rec ordered   -tac and MMF levels in the AM   -on IVF     Chronic Problems:     # DM1   # Hyperglycemia   - 19 units degludec (PTA 24 units, reduced secondary to current decreased PO intake)    - med sliding scale insulin   - hypoglycemia protocol     #S/p pancreatic transplant 2/2009   #S/p renal transplant 11/2008   -  mg BID   - Tacrolimus 2 mg BID     # CAD s/p bypass graft   #HTN   Per chart review patient takes amlodipine 5mg, Carvedilol 6.25 mg BID but medications not present on patient's medication list. BP currently elevated at 177/95.  - hydralazine PRN available for BP >180/90 as patient can not currently tolerate PO meds   - AM pharm med rec     FEN: NPO   Prophylaxis: heparin   Consults: Nephrology, GI   Code Status: FULL  Disposition: Anticipate pt's admission to span two midnights or more pending further workup.    Patient seen and discussed with Dr. Benites, who agrees with above plan.    Wilder Khan MD  Internal Medicine, PGY-1   P: [phone]           Chief Complaint:   Nausea and vomiting          History of Present Illness:   Murtaza Fitzgerald is a 44 year old male with PMHx of DM1 c/b gastroparesis, CKD (s/p kidney transplant on 11/2008 secondary to diabetic nephropathy), pancreatic transplant (2/2009), CAD s/p bypass graft, HTN, GERD who presents to the ED with nausea and vomiting. Pt began vomiting this morning and it has persisted all day. He has thrown up about 12 times today, with his  "last vomit occurring about 30 minutes ago. All of his vomitus have been non-bloody and non-bilious. He typically uses Zofran at home for management, but it has not been effective for him today. His nausea and vomiting has been an ongoing issue for him for the past one and a half years with his most recent admission for intractable vomiting occurring on 2/5/19 - 2/9/19. Pt was diagnosed with DM1 33 years ago and it has been complicated by gastroparesis. Currently, his last bowel movement was on Saturday (2/8) and he had flatus yesterday. Pt denies any new restaurants, recent travels, sick contacts. He has no headaches, visual changes, joint pain, muscle aches. He does endorse sore throat, chest pain, and abdominal pain associated with his dry heaving. Overall, pt appears to be involved in actively managing his diabetes, although his blood sugars are not well-controlled. He checks his blood glucose approximately 3-4x/day, most of his readings are between 100-130. On bad days, they are as high as in the 300s. In the past, patient states he used to have readings so high \"the machine would not count it.\" Within the past month, the highest reading he has had was in the 300s.            Review of Systems:   12 point ROS negative except as stated in HPI          Past Medical History:     Medical History reviewed.   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   Past Surgical History:   Procedure Laterality Date     ARTHROSCOPY KNEE WITH LATERAL MENISCECTOMY Left 7/10/2019    Procedure: Left knee arthroscopic partial lateral meniscectomy;  Surgeon: Alex Candelaria MD;  Location: [facility] OR     BYPASS GRAFT ARTERY CORONARY N/A 2015    Procedure: BYPASS GRAFT ARTERY CORONARY;  Surgeon: Katy Neville MD;  Location: [facility] OR     ESOPHAGOSCOPY, GASTROSCOPY, DUODENOSCOPY (EGD), COMBINED N/A 2020    Procedure: ESOPHAGOGASTRODUODENOSCOPY (EGD) biopsies w/forceps;  Surgeon: Emre Gomes MD;  Location:  GI     EYE SURGERY      vitrectomy both eyes      ORTHOPEDIC SURGERY      tumor removed from left knee     TRANSPLANT  .    pancrease and kidney transplant             Social History:   Social History reviewed.   Social History     Tobacco Use     Smoking status: Former Smoker     Packs/day: 0.30     Types: Cigarettes, Vaping Device     Last attempt to quit: 7/3/2015     Years since quittin.6     Smokeless tobacco: Never Used     Tobacco comment: E- Cig use still   Substance Use Topics     Alcohol use: Not Currently     Alcohol/week: 0.0 standard drinks     Frequency: Never     Binge frequency: Never             Family History:   Family History reviewed.  Family History   Problem Relation Age of Onset     Unknown/Adopted Father      Heart Disease Maternal Grandfather 62             Allergies:     Allergies   Allergen Reactions     Benadryl [Diphenhydramine]      Reglan Other (See Comments)     IV, delsuions             Medications:   Medications Reviewed.   Current Facility-Administered Medications   Medication     ondansetron (ZOFRAN) injection 4 mg     sodium chloride 0.9% infusion     Current Outpatient Medications    Medication Sig     acetaminophen (TYLENOL) 325 MG tablet Take 2 tablets (650 mg) by mouth every 4 hours as needed for mild pain     atorvastatin (LIPITOR) 40 MG tablet Take 40 mg by mouth daily     insulin aspart (NOVOLOG FLEXPEN) 100 UNIT/ML pen Inject Subcutaneous 3 times daily (with meals) .  (1 unit per 6 grams of carbs)     insulin aspart (NOVOLOG FLEXPEN) 100 UNIT/ML pen Sliding scale = 1 unit for every 40 over blood glucose of 140     insulin degludec (TRESIBA) 100 UNIT/ML pen Inject 24 Units Subcutaneous At Bedtime     mycophenolic acid (GENERIC EQUIVALENT) 180 MG EC tablet Take 360 mg by mouth 2 times daily     omeprazole (PRILOSEC) 40 MG DR capsule Take 40 mg by mouth every evening     ondansetron (ZOFRAN-ODT) 4 MG ODT tab Take 1-2 tablets (4-8 mg) by mouth every 8 hours as needed for nausea     tacrolimus (GENERIC EQUIVALENT) 1 MG capsule Take 2 capsules (2 mg) by mouth 2 times daily     acetone, Urine, test STRP 1 strip by In Vitro route daily     YOHAN CONTOUR test strip USE TO TEST BLOOD SUGAR FIVE TIMES A DAY OR AS DIRECTED     blood glucose monitoring (NO BRAND SPECIFIED) test strip Freestyle test strips 6 times daily (NOT lite and NOT insulinx)     blood glucose monitoring (NO BRAND SPECIFIED) test strip 5 times daily ( contour NEXT)     Blood Glucose Monitoring Suppl (Efficient Power Conversion CONTOUR MONITOR) W/DEVICE KIT TO TEST BG 5 TIMIES DAILY     insulin pen needle (BD ARPITA U/F) 32G X 4 MM Use 4 daily or as directed.             Physical Exam:   Vitals were reviewed.  Blood pressure (!) 174/98, pulse 97, temperature 97.7  F (36.5  C), temperature source Oral, resp. rate 16, weight 86.2 kg (190 lb), SpO2 100 %.    General: AAOx3, NAD  Skin: Not jaundiced, no rash, no ecchymoses  HEENT: MMM, PERRLA, EOM intact  CV: RRR, normal S1S2, no murmur, clicks, rubs  Resp: Clear to auscultation bilaterally, no wheezes, rhonchi  Abd: Obese, multiple scars present, umbilical hernia, slightly tender to palpation, no  rebound tenderness, guarding, or rigidity   Extremities: warm and well perfused, palpable pulses, no edema, sensation intact on bilateral feet   Psych: linear thought processes   Neuro: No lateralizing symptoms or focal neurologic deficits         Data:   No intake/output data recorded.    ROUTINE LABS (Last four results)  CMP  Recent Labs   Lab 02/11/20  1739 02/11/20  0841 02/09/20  0615 02/08/20  0628 02/07/20  1058 02/06/20  0634    137 137 136 137 137   POTASSIUM 4.4 4.0 3.4 3.6 3.8 4.0   CHLORIDE 107 108 109 106 108 107   CO2 17* 20 20 21 22 20   ANIONGAP 13 9 8 8 8 10   * 146* 125* 180* 168* 169*   BUN 33* 33* 35* 40* 42* 43*   CR 6.13* 5.93* 6.21* 5.94* 5.98* 5.85*   GFRESTIMATED 10* 11* 10* 11* 11* 11*   GFRESTBLACK 12* 12* 12* 12* 12* 13*   CHARLY 9.4 8.8 8.3* 8.7 8.3* 8.4*   MAG 1.8  --   --  2.0 1.8  --    PHOS 4.2  --   --   --  3.9  --    PROTTOTAL 7.4  --  6.1*  --  6.5* 6.6*   ALBUMIN 3.8  --  2.9*  --  3.0* 2.9*   BILITOTAL 0.4  --  0.3  --  0.5 0.5   ALKPHOS 172*  --  133  --  139 138   AST 14  --  8  --  13 9   ALT 15  --  10  --  11 10     CBC  Recent Labs   Lab 02/11/20  1739 02/11/20  0841 02/09/20  0615 02/08/20  0628   WBC 9.8 9.0 5.5 6.8   RBC 4.34* 4.12* 3.43* 3.83*   HGB 12.6* 11.6* 9.9* 11.0*   HCT 38.6* 36.4* 30.8* 33.9*   MCV 89 88 90 89   MCH 29.0 28.2 28.9 28.7   MCHC 32.6 31.9 32.1 32.4   RDW 13.4 13.5 13.1 13.2    352 261 336     INRNo lab results found in last 7 days.  Arterial Blood GasNo lab results found in last 7 days.     Physician Attestation   I, Tremaine Benites MD, personally examined and evaluated this patient.  I discussed the patient with the resident/fellow and care team, and agree with the assessment and plan of care as documented in the note of 2/11/20.      I personally reviewed vital signs, medications, labs and imaging.    Tremaine Benites MD  Date of Service (when I saw the patient): 2/11/2020

## 2020-02-12 NOTE — CONSULTS
[facility], [place]  Transplant Nephrology Consult  Date of Admission:  2/11/2020  Today's Date: 02/12/2020  Requesting physician: Jesika Snyder MD    Assessment & Plan   # LDKT: Increased   - Baseline Cr ~ 5-6   - Proteinuria: Moderate (1-3 grams)   - Date DSA Last Checked: Jul/2016      Latest DSA: No   - BK Viremia: No   - Kidney Tx Biopsy: Dec 27, 2013; Result: No evidence of rejection    His graft function is declining and he was recently admitted and discharged from [facility] for similar complaints, and the decision was made to start HD if his symptoms reappeared. We will plan for a tunnelled HD catheter to be placed tomorrow, and to start HD at 2hr with , and , no fluid removal initially. We will aim for 3 consecutive daily runs, and then reevaluate his continued need for HD. He will need an outpatient HD unit located (he prefers [facility])    # Pancreas Tx (ANNMARIE):   - Pancreatic Exocrine Drainage: Bladder drained    - failed 2013, back on insulin.    # Immunosuppression: Tacrolimus immediate release (goal 4-6) and Mycophenolic acid (goal not followed)   - Changes: Yes - we would reocmmend to lower his MPA to 180mg BID. He may be a candidate for retranspantation, and we will opt to keep him on his current IS until then.    # Infection Prophylaxis:   - PJP: None    # Hypertension: Inadequate control;  Goal BP: < 140/90   - Volume status: Mildly hypervolemic  EDW~ TBD   - Changes: Yes - restart home meds, we will attempt flui removal likly Friday if he tolerates his Thursday HD session well.    # Diabetes: Controlled (HbA1c <7%) Last HbA1c: 7.0%   - Management as per primary team.    # Anemia in Chronic Renal Disease: Hgb: Stable      WILL: No   - Iron studies: Not checked recently    # Mineral Bone Disorder:   - Secondary renal hyperparathyroidism; PTH level: Not checked recently        On treatment: None  - Vitamin D; level: Not checked recently        On  Supplement: No  - Calcium; level: Normal        On Supplement: No  - Phosphorus; level: Normal        On Supplement: No    # Electrolytes:   - Potassium; level: Normal        On Supplement: No  - Magnesium; level: Normal        On Supplement: No  - Bicarbonate; level: Low        On Supplement: No  - Sodium; level: Normal        On Supplement: No    # Gastroparesis: Has known gastroparesis, surgery will evaluate him for a gastric pacemaker implant. In the past, GI have also suggested a G-tube vent    # Nausea/Vomiting: Could be a component of uremia and/or gastroparesis. Symptoms have improved today. We will dialyze him for 3 days, and can reevaluate his GI symptoms in 2 weeks. At that time, if symptoms persist, a gastric pacemake may be the next option.     # Transplant History:  Etiology of Kidney Failure: Diabetes mellitus type 1  Tx: LDKT and ANNMARIE  Transplant: 11/4/2008 (Kidney), 2/17/2009 (Pancreas)  Donor Type: Living Donor Class: Standard Criteria Donor  Significant changes in immunosuppression: None  Significant transplant-related complications: None    Recommendations were communicated to the primary team verbally.    Seen and discussed with Dr. Deejay Wyman MD  Pager: [phone]    REASON FOR CONSULT   Initiation of HD, Immunosuppression management    History of Present Illness   Murtaza Fitzgerald is a 44 year old male with a h/o DMT1, ESRD s/p LDKT 2008 and ANNMARIE 2009, failed pancreas 2013, now with failing kidney allograft and CKD 5 who was admitted with nausea, vomiting, and abdominal pain.     He was seen at [facility] 2/62/9 for similar complaints, and HD was discussed at that time. His symptoms improved, however, and the decision to start HD was tabled until his symptoms reappeared.     He reported 12-15 episodes daily of vomiting while discharged to home, and has been unable to keep anything down. He denies fevers, chills, or rigors. He has no sick contacts, and denies diarrhea or constipation.  He has no cough, no recent illnesses.    He has a history of CAD s/p CABG. He denies chest pain or SOB.    Cr has been ~ 5 since 11/2019. He denies any other uremic symptoms.    Review of Systems    CONSTITUTIONAL: NEGATIVE for fever, chills, change in weight  INTEGUMENTARY/SKIN: NEGATIVE for worrisome rashes, moles or lesions  EYES: NEGATIVE for vision changes or irritation  ENT/MOUTH: NEGATIVE for ear, mouth and throat problems  RESP: NEGATIVE for significant cough or SOB  BREAST: NEGATIVE for masses, tenderness or discharge  CV: NEGATIVE for chest pain, palpitations or peripheral edema  GI: NEGATIVE for abdominal pain, heartburn, or change in bowel habits  : NEGATIVE for frequency, dysuria, or hematuria  MUSCULOSKELETAL: NEGATIVE for significant arthralgias or myalgia  NEURO: NEGATIVE for weakness, dizziness or paresthesias  ENDOCRINE: NEGATIVE for temperature intolerance, skin/hair changes  HEME: NEGATIVE for bleeding problems  PSYCHIATRIC: NEGATIVE for changes in mood or affect    Past Medical History    I have reviewed this patient's medical history and updated it with pertinent information if needed.   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  Past Surgical History:   Procedure Laterality Date     ARTHROSCOPY KNEE WITH LATERAL MENISCECTOMY Left 7/10/2019    Procedure: Left knee arthroscopic partial lateral meniscectomy;  Surgeon: Alex Candelaria MD;  Location: [facility] OR     BYPASS GRAFT ARTERY CORONARY N/A 8/5/2015    Procedure: BYPASS GRAFT ARTERY CORONARY;  Surgeon: Katy Neville MD;  Location: [facility] OR     ESOPHAGOSCOPY, GASTROSCOPY, DUODENOSCOPY (EGD), COMBINED N/A 1/28/2020    Procedure: ESOPHAGOGASTRODUODENOSCOPY (EGD) biopsies w/forceps;  Surgeon: Emre Gomes MD;  Location:  GI     EYE SURGERY      vitrectomy both eyes      ORTHOPEDIC SURGERY  1993    tumor removed from left knee     TRANSPLANT  2009.2008    pancrease and kidney transplant       Family History   I have reviewed this patient's family history and updated it with pertinent information if needed.   Family History   Problem Relation Age of Onset     Unknown/Adopted Father      Heart Disease Maternal Grandfather 62     Social History   I have reviewed this patient's social history and updated it with pertinent information if needed. Murtaza Fitzgerald  reports that he quit smoking about 4 years ago. His smoking use included cigarettes and vaping device. He smoked 0.30 packs per day. He has never used smokeless tobacco. He reports previous alcohol use. He reports that he does not use drugs.    Allergies   Allergies   Allergen Reactions     Benadryl [Diphenhydramine]      Reglan Other (See Comments)     IV, delsuions     Prior to Admission Medications     atorvastatin  40 mg Oral Daily     heparin ANTICOAGULANT  5,000 Units Subcutaneous BID     insulin aspart  1-6 Units Subcutaneous Q4H     insulin degludec  18 Units Subcutaneous At Bedtime     mycophenolic acid  360 mg Oral BID     omeprazole  40 mg Oral QPM     sodium chloride (PF)  3 mL Intracatheter Q8H     tacrolimus  2  mg Oral BID       sodium chloride 1,000 mL (20 1103)       Physical Exam   Temp  Av.1  F (36.7  C)  Min: 96.5  F (35.8  C)  Max: 99  F (37.2  C)      Pulse  Av.1  Min: 74  Max: 114 Resp  Av.3  Min: 12  Max: 18  SpO2  Av.1 %  Min: 94 %  Max: 100 %     BP (!) 165/88 (BP Location: Left arm)   Pulse 101   Temp 96.5  F (35.8  C) (Oral)   Resp 18   Wt 86.2 kg (190 lb)   SpO2 99%   BMI 28.89 kg/m     Date 20 0700 - 20 0659   Shift 7727-9590 5940-5841 1567-6280 24 Hour Total   INTAKE   I.V. 1633.33   1633.33   Shift Total(mL/kg) 1633.33(18.95)   1633.33(18.95)   OUTPUT   Shift Total(mL/kg)       Weight (kg) 86.18 86.18 86.18 86.18      Admit Weight: 86.2 kg (190 lb)     GENERAL APPEARANCE: alert and no distress  HENT: mouth without ulcers or lesions  LYMPHATICS: no cervical or supraclavicular nodes  RESP: lungs clear to auscultation - no rales, rhonchi or wheezes  CV: regular rhythm, normal rate, no rub, no murmur  EDEMA: no LE edema bilaterally  ABDOMEN: soft, nondistended, nontender, bowel sounds normal  MS: extremities normal - no gross deformities noted, no evidence of inflammation in joints, no muscle tenderness  SKIN: no rash    Data   CMP  Recent Labs   Lab 20  0710 20  1739 20  0841 20  0615 20  0628 20  1058 20  0634    138 137 137 136 137 137   POTASSIUM 4.2 4.4 4.0 3.4 3.6 3.8 4.0   CHLORIDE 110* 107 108 109 106 108 107   CO2 17* 17* 20 20 21 22 20   ANIONGAP 12 13 9 8 8 8 10   * 274* 146* 125* 180* 168* 169*   BUN 34* 33* 33* 35* 40* 42* 43*   CR 5.92* 6.13* 5.93* 6.21* 5.94* 5.98* 5.85*   GFRESTIMATED 11* 10* 11* 10* 11* 11* 11*   GFRESTBLACK 12* 12* 12* 12* 12* 12* 13*   CHARLY 8.7 9.4 8.8 8.3* 8.7 8.3* 8.4*   MAG  --  1.8  --   --  2.0 1.8  --    PHOS 4.3 4.2  --   --   --  3.9  --    PROTTOTAL  --  7.4  --  6.1*  --  6.5* 6.6*   ALBUMIN  --  3.8  --  2.9*  --  3.0* 2.9*   BILITOTAL  --  0.4  --  0.3  --  0.5 0.5    ALKPHOS  --  172*  --  133  --  139 138   AST  --  14  --  8  --  13 9   ALT  --  15  --  10  --  11 10     CBC  Recent Labs   Lab 02/12/20  0710 02/11/20  2241 02/11/20  1739 02/11/20  0841 02/09/20  0615   HGB 10.9*  --  12.6* 11.6* 9.9*   WBC 8.4  --  9.8 9.0 5.5   RBC 3.76*  --  4.34* 4.12* 3.43*   HCT 33.7*  --  38.6* 36.4* 30.8*   MCV 90  --  89 88 90   MCH 29.0  --  29.0 28.2 28.9   MCHC 32.3  --  32.6 31.9 32.1   RDW 13.7  --  13.4 13.5 13.1    313 351 352 261     INRNo lab results found in last 7 days.  ABGNo lab results found in last 7 days.   Urine Studies  Recent Labs   Lab Test 02/11/20  0850 01/27/20  0941 01/03/20  1406 08/28/19  0905   COLOR Yellow Straw Light Yellow Yellow   APPEARANCE Clear Clear Clear Clear   URINEGLC 100* >1000* 30* Negative   URINEBILI Negative Negative Negative Negative   URINEKETONE Negative Trace* 10* Negative   SG 1.020 1.011 1.017 1.015   UBLD Trace* Trace* Trace* Small*   URINEPH 7.0 6.5 6.5 7.0   PROTEIN >=300* 300* 300* 100*   UROBILINOGEN 0.2  --   --  0.2   NITRITE Negative Negative Negative Negative   LEUKEST Negative Negative Negative Negative   RBCU 2-5* 2 3* 5-10*   WBCU 0 - 5 2 1 5-10*     Recent Labs   Lab Test 08/22/19  0857 01/11/19  0741 07/08/15  0747 01/28/15  0749 12/27/13  0736 11/11/13  1436 11/12/12  1649 10/11/12  0737 02/28/12  0713   UTPG 2.20* 0.87* 0.28* 0.43* 0.31* 0.30* 0.40* 0.51* 0.48*     PTH  No lab results found.  Iron Studies  No lab results found.    IMAGING:  All imaging studies reviewed by me.     Patient was seen and evaluated by me, Adam Villalba MD. I have reviewed the note and agree with the the plan of care as documented by the fellow.

## 2020-02-12 NOTE — PHARMACY-ADMISSION MEDICATION HISTORY
"Admission Medication History status for the 2/11/2020 admission is complete.  See EPIC admission navigator for Prior to Admission medications.    Medication history sources:  Patient, Pharmacy dispense report (via outside meds tab), [employer]    Medication history source reliability: Good. Pt knew all medication names and strengths    Medication adherence:  Good. Pt states he only misses doses when he \"can't keep them down\".    Changes made to PTA medication list (reason)  Added: N/A  Deleted: Amlodipine 5mg PO every day (per pt he does not take this at home because his blood pressure is only high in the hospital- was prescribed 2 days ago at last discharge)  Carvedilol 6.25mg PO BID (per pt he does not take this at home because his blood pressure is only high in the hospital- was prescribed 2 days ago at last discharge)  Erythromycin 400mg/5mL suspension: Take 5mL PO TID (per [facility] this was recently prescribed but never picked up. Per pt he does not take this med)  Metoclopramide 5mg PO QID (per pt he is allergic)  Changed: N/A    Additional medication history information (including reliability of information, actions taken by pharmacist):   - Pt was sleeping, but woke easily. Able to answer questions easily.  - Pt states that he typically tests his blood sugars 2-4 times daily.  - Pt states that he uses some combination of the two novolog sigs given below \"depending on what my numbers are\", but has not used any novolog today because he hasn't eaten anything.  - Preferred pharmacy is [facility] in [place], [place].    Medication history completed by: Breanna Cuellar, PD2 Pharmacy Intern     Prior to Admission medications    Medication Sig Last Dose Taking? Auth Provider   acetaminophen (TYLENOL) 325 MG tablet Take 2 tablets (650 mg) by mouth every 4 hours as needed for mild pain Past Month at Unknown time Yes Lizandro Dasilva PA-C   atorvastatin (LIPITOR) 40 MG tablet Take 40 mg by mouth " daily 2/10/2020 at PM Yes Unknown, Entered By History   insulin aspart (NOVOLOG FLEXPEN) 100 UNIT/ML pen Inject Subcutaneous 3 times daily (with meals) .  (1 unit per 6 grams of carbs) 2/10/2020 at Unknown time Yes Unknown, Entered By History   insulin aspart (NOVOLOG FLEXPEN) 100 UNIT/ML pen Sliding scale = 1 unit for every 40 over blood glucose of 140 2/10/2020 at Unknown time Yes Unknown, Entered By History   insulin degludec (TRESIBA) 100 UNIT/ML pen Inject 24 Units Subcutaneous At Bedtime 2/10/2020 at HS Yes Unknown, Entered By History   mycophenolic acid (GENERIC EQUIVALENT) 180 MG EC tablet Take 360 mg by mouth 2 times daily 2/10/2020 at PM Yes Unknown, Entered By History   omeprazole (PRILOSEC) 40 MG DR capsule Take 40 mg by mouth every evening 2/10/2020 at PM Yes Unknown, Entered By History   ondansetron (ZOFRAN-ODT) 4 MG ODT tab Take 1-2 tablets (4-8 mg) by mouth every 8 hours as needed for nausea 2/11/2020 at AM Yes Adam Villalba MD   tacrolimus (GENERIC EQUIVALENT) 1 MG capsule Take 2 capsules (2 mg) by mouth 2 times daily 2/10/2020 at PM Yes Kt Ríos MD     I have reviewed the above medication history.    Maren Herrmann, ChayitoD

## 2020-02-12 NOTE — PROVIDER NOTIFICATION
Notified MD at 2250 PM regarding change in condition.      Spoke with: Wilder Khan    Orders were obtained.    Comments: MD paged about patient still very nauseous and dry heaving after receiving zofran. MD aware and adjusted frequency of Zofran and adjusted PO to IV Zofran. Will continue to monitor nausea.

## 2020-02-12 NOTE — ED NOTES
[facility], [place]   ED Nurse to Floor Handoff     Murtaza Fitzgerald is a 44 year old male who speaks English and lives with family members,  in a home  They arrived in the ED by car from home    ED Chief Complaint: Nausea & Vomiting    ED Dx;   Final diagnoses:   Intractable vomiting with nausea, unspecified vomiting type   Gastroparesis         Needed?: No    Allergies:   Allergies   Allergen Reactions     Benadryl [Diphenhydramine]      Reglan Other (See Comments)     IV, delsuions   .  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     Noncompliance with treatment     no labs for one year     Pancreas replaced by transplant (H) Feb 2009    rejection treated with thymo     Pancreatic disease     pancreas transplant     Tobacco abuse ongoing      Baseline Mental status: WDL  Current Mental Status changes: at basesline    Infection present or suspected this encounter: no  Sepsis suspected: No  Isolation type: No active isolations     Activity level - Baseline/Home:  Independent  Activity Level - Current:   Independent    Bariatric equipment needed?: No    In the ED these meds were given:   Medications   0.9% sodium chloride BOLUS (0 mLs Intravenous Stopped 2/11/20 1855)     Followed by   sodium chloride 0.9% infusion ( Intravenous Rate/Dose Verify 2/11/20 2101)   ondansetron (ZOFRAN) injection 4 mg (4 mg Intravenous Given 2/11/20  2046)   prochlorperazine (COMPAZINE) injection 10 mg (10 mg Intravenous Given 2/11/20 1755)       Drips running?  No    Home pump  No    Current LDAs  Peripheral IV 02/11/20 Right (Active)   Number of days: 0       Wound 01/28/20 Anterior;Right;Lateral Ulceration dime size scabbed over, pt diabetic states he has had for 3 months. No drainage (Active)   Number of days: 14       Incision/Surgical Site 07/10/19 Left Knee (Active)   Number of days: 216       Labs results:   Labs Ordered and Resulted from Time of ED Arrival Up to the Time of Departure from the ED   CBC WITH PLATELETS DIFFERENTIAL - Abnormal; Notable for the following components:       Result Value    RBC Count 4.34 (*)     Hemoglobin 12.6 (*)     Hematocrit 38.6 (*)     Absolute Neutrophil 8.7 (*)     All other components within normal limits   COMPREHENSIVE METABOLIC PANEL - Abnormal; Notable for the following components:    Carbon Dioxide 17 (*)     Glucose 274 (*)     Urea Nitrogen 33 (*)     Creatinine 6.13 (*)     GFR Estimate 10 (*)     GFR Estimate If Black 12 (*)     Alkaline Phosphatase 172 (*)     All other components within normal limits   LIPASE - Abnormal; Notable for the following components:    Lipase 41 (*)     All other components within normal limits   MAGNESIUM   PHOSPHORUS   LACTIC ACID WHOLE BLOOD       Imaging Studies:   Recent Results (from the past 24 hour(s))   CT Abdomen Pelvis w/o Contrast    Narrative    EXAMINATION: CT abdomen/pelvis without contrast, 2/11/2020.    INDICATION: Abdominal distention, vomiting. Rule out SBO.    COMPARISON: CT abdomen 11/20/2018.    TECHNIQUE: Routine images from the level of the diaphragm through the  pelvis were obtained without contrast.     Total DLP: 932 mGy*cm.    FINDINGS:    The liver parenchyma is homogenous. No focal hepatic lesion. The  gallbladder is nondistended. No intrahepatic or extra hepatic biliary  duct dilation. The spleen is grossly unremarkable, with the exception  of a  small splenule. Atrophic native appearance of the pancreas.  Transplanted kidney noted in the right lower quadrant. Bilateral  adrenal glands are unremarkable.    Bilaterally atrophic native kidneys. Multiple likely vascular  calcifications. No appreciable renal lesion. No hydronephrosis. No  hydroureter. Postsurgical changes of left lower quadrant transplant  kidney. No appreciable renal lesion. No hydronephrosis. No  hydroureter. The bladder is well-distended. No bladder wall  irregularity.    The large and small bowel are normal in caliber. Moderate formed  colonic stool burden noted in the ascending, transverse and descending  colon. The appendix is unremarkable. The right lower quadrant  pancreatic transplant is identified and appears unremarkable. There is  a small bowel containing umbilical hernia. Adjacent fat-containing  ventral hernia. No evidence for obstruction. No significant adjacent  fat stranding. Small hiatal hernia.    The abdominal aorta is not aneurysmal. No appreciable intra-abdominal  lymphadenopathy by size criteria.    No acute osseous lesions. Mild degenerative changes of the lumbar  spine. Soft tissues are unremarkable.    Lung bases are clear. No focal airspace opacity. No pleural effusion.  Mild bibasilar atelectasis. No suspicious pulmonary nodules.      Impression    IMPRESSION:    1. No acute intra-abdominal pathology or evidence for small bowel  obstruction  2. Small bowel containing umbilical hernia with out evidence for  obstruction. Adjacent fat-containing ventral hernia.  3. Small hiatal hernia.  4. Stable postsurgical changes of renal and pancreas transplants.         I have personally reviewed the examination and initial interpretation  and I agree with the findings.    JORDAN ADAMS MD       Recent vital signs:   BP (!) 194/109   Pulse 95   Temp 97.7  F (36.5  C) (Oral)   Resp 16   Wt 86.2 kg (190 lb)   SpO2 99%   BMI 28.89 kg/m      Tupman Coma Scale Score: 15  (02/11/20 6138)       Cardiac Rhythm: Normal Sinus  Pt needs tele? No  Skin/wound Issues: None    Code Status: Full Code    Pain control: good    Nausea control: fair    Abnormal labs/tests/findings requiring intervention: n/a    Family present during ED course? Yes   Family Comments/Social Situation comments: family went home for the night    Tasks needing completion: None    Tootie Alfonso, RN  [phone]

## 2020-02-12 NOTE — PROGRESS NOTES
[facility], [facility] Progress Note - Hospitalist Service, Gold 9       Date of Admission:  2/11/2020  Assessment & Plan     Murtaza Fitzgerald is a 44 year old male with PMHx of DM1 c/b gastroparesis, CKD (s/p kidney transplant on 11/2008 secondary to diabetic nephropathy), pancreatic transplant (2/2009), CAD s/p bypass graft, HTN, GERD who presents to the ED with nausea and vomiting.      Intractable vomiting   Gastroparesis   Pt presenting with intractable vomiting which began earlier this morning. He has vomited about 12 times, all non-bloody and non-bilious. Does have a recurring hx of intractable vomiting and was last hospitalized on 2/5-2/9 with similar complaints. Vomiting most likely secondary to gastroparesis and/or uremia (BUN/Crt elevated at 33 and 6.13). In the past, there have been discussions on possible gastric pacemaker for management of his gastroparesis. GI consult placed. Last bowel movement was on Saturday (2/8) and flatus passed yesterday. CT abdomen/pelvis obtained and shows no evidence of a small bowel obstruction. Of note, if vomiting acutely worsens or pt does not improve with supportive measures/correction of uremia and other workup uneventful, can obtain MRI to evaluate for PRES.    -supportive measures: IVF, antiemetics   -zofran 4 mg IV Q6H PRN, per GI avoid compazine  -0.5mg ativan IV Q6H PRN for nausea  -GI cocktail   -GI consulted as well  It appears his nausea/vomiting might be multifactorial with Uremia, hyperglycemia and gastroparesis all contributing.   At the time of my visit his nausea has subsided.  see SWATHI below to treat uremia     # SWATHI on CKD in setting of renal transplant   Concern for transplant failure  Crt 6.13 on admission, baseline is around 2.5-5. There were discussions on possible hemodialysis during his last admission, but the process was not started.   He was seen by nephrology and plan is place dialysis catheter and  trial of HD tomorrow.   Renal transplant and pancreas transplant teams have been notified     Chronic Problems:      # DM1   # Hyperglycemia   - 19 units degludec (PTA 24 units, reduced secondary to current decreased PO intake)  and NPO status  -A1C is 7  - med sliding scale insulin   - hypoglycemia protocol      #S/p pancreatic transplant 2/2009   #S/p renal transplant 11/2008   -  mg BID   - Tacrolimus 2 mg BID   - tranplant teams consulted.     # CAD s/p bypass graft   #HTN   Per chart review patient takes amlodipine 5mg, Carvedilol 6.25 mg BID   - hydralazine PRN available for BP >180/90 as patient can not currently tolerate PO meds     Diet: Advance Diet as Tolerated: Clear Liquid Diet    DVT Prophylaxis: VTE Prophylaxis contraindicated due to line placement  Mueller Catheter: not present  Code Status: Full Code      Disposition Plan   Expected discharge: 4 - 7 days, recommended to prior living arrangement once dilaysis plan and outpatient HD arranged.  Entered: Jesika Snyder MD 02/12/2020, 11:55 AM       The patient's care was discussed with the Bedside Nurse and Patient.    Jesika Snyder MD  Hospitalist Service, [address]  Pager: [phone]  Please see sticky note for cross cover information  ______________________________________________________________________    Interval History     Patient seen and examined at bedside. He was admitted overnight with intractable nausea and vomiting. Currently he reports that his nausea is better. He was seen by the nephrology team and plan for HD tomorrow with line placement. He denies any chest pain, no shortness of breath, no issues with urinatin, he has not had a BM since sat but he reports he has not eaten much either.     Data reviewed today: I reviewed all medications, new labs and imaging results over the last 24 hours.   Physical Exam   Vital Signs: Temp: 96.5  F (35.8  C) Temp src: Oral BP: (!) 165/88 Pulse:  101 Heart Rate: 101 Resp: 18 SpO2: 99 % O2 Device: None (Room air)    Weight: 190 lbs 0 oz      Data   Recent Labs   Lab 02/12/20  0710 02/11/20  2241 02/11/20  1739 02/11/20  0841 02/09/20  0615   WBC 8.4  --  9.8 9.0 5.5   HGB 10.9*  --  12.6* 11.6* 9.9*   MCV 90  --  89 88 90    313 351 352 261     --  138 137 137   POTASSIUM 4.2  --  4.4 4.0 3.4   CHLORIDE 110*  --  107 108 109   CO2 17*  --  17* 20 20   BUN 34*  --  33* 33* 35*   CR 5.92*  --  6.13* 5.93* 6.21*   ANIONGAP 12  --  13 9 8   CHARLY 8.7  --  9.4 8.8 8.3*   *  --  274* 146* 125*   ALBUMIN  --   --  3.8  --  2.9*   PROTTOTAL  --   --  7.4  --  6.1*   BILITOTAL  --   --  0.4  --  0.3   ALKPHOS  --   --  172*  --  133   ALT  --   --  15  --  10   AST  --   --  14  --  8   LIPASE  --   --  41*  --   --      Recent Results (from the past 24 hour(s))   CT Abdomen Pelvis w/o Contrast    Narrative    EXAMINATION: CT abdomen/pelvis without contrast, 2/11/2020.    INDICATION: Abdominal distention, vomiting. Rule out SBO.    COMPARISON: CT abdomen 11/20/2018.    TECHNIQUE: Routine images from the level of the diaphragm through the  pelvis were obtained without contrast.     Total DLP: 932 mGy*cm.    FINDINGS:    The liver parenchyma is homogenous. No focal hepatic lesion. The  gallbladder is nondistended. No intrahepatic or extra hepatic biliary  duct dilation. The spleen is grossly unremarkable, with the exception  of a small splenule. Atrophic native appearance of the pancreas.  Transplanted kidney noted in the right lower quadrant. Bilateral  adrenal glands are unremarkable.    Bilaterally atrophic native kidneys. Multiple likely vascular  calcifications. No appreciable renal lesion. No hydronephrosis. No  hydroureter. Postsurgical changes of left lower quadrant transplant  kidney. No appreciable renal lesion. No hydronephrosis. No  hydroureter. The bladder is well-distended. No bladder wall  irregularity.    The large and small bowel are  normal in caliber. Moderate formed  colonic stool burden noted in the ascending, transverse and descending  colon. The appendix is unremarkable. The right lower quadrant  pancreatic transplant is identified and appears unremarkable. There is  a small bowel containing umbilical hernia. Adjacent fat-containing  ventral hernia. No evidence for obstruction. No significant adjacent  fat stranding. Small hiatal hernia.    The abdominal aorta is not aneurysmal. No appreciable intra-abdominal  lymphadenopathy by size criteria.    No acute osseous lesions. Mild degenerative changes of the lumbar  spine. Soft tissues are unremarkable.    Lung bases are clear. No focal airspace opacity. No pleural effusion.  Mild bibasilar atelectasis. No suspicious pulmonary nodules.      Impression    IMPRESSION:    1. No acute intra-abdominal pathology or evidence for small bowel  obstruction  2. Small bowel containing umbilical hernia with out evidence for  obstruction. Adjacent fat-containing ventral hernia.  3. Small hiatal hernia.  4. Stable postsurgical changes of renal and pancreas transplants.         I have personally reviewed the examination and initial interpretation  and I agree with the findings.    JORDAN ADAMS MD     Medications     sodium chloride 1,000 mL (02/12/20 1103)       atorvastatin  40 mg Oral Daily     ceFAZolin  2 g Intravenous Pre-Op/Pre-procedure x 1 dose     heparin ANTICOAGULANT  5,000 Units Subcutaneous BID     [START ON 2/13/2020] insulin aspart   Subcutaneous QAM AC     [START ON 2/13/2020] insulin aspart   Subcutaneous Daily with lunch     insulin aspart   Subcutaneous Daily with supper     insulin aspart  1-6 Units Subcutaneous Q4H     insulin degludec  18 Units Subcutaneous At Bedtime     mycophenolic acid  360 mg Oral BID     omeprazole  40 mg Oral QPM     sodium chloride (PF)  3 mL Intracatheter Q8H     tacrolimus  2 mg Oral BID

## 2020-02-12 NOTE — TELEPHONE ENCOUNTER
Notes recorded by Jack Chen MD on 2/12/2020 at 8:28 AM CST  Conner: Tyrell continues to have an elevated creatinine. This may be progression.  Can we make sure he can get in for access creation of AVF for dialysis?  He will need to continue with twice weekly labs.     PLAN:  Will send message to [place].  Make Murtaza Fitzgerald aware of plan.    OUTCOME:  Left message re: plan.

## 2020-02-12 NOTE — CONSULTS
INTERVENTIONAL RADIOLOGY CONSULT NOTE    Tyrell is a 44 year old male with history of DMI with ESKD with diabetic neuropathy and gastroparesis, s/p kidney and pancreas transplant, CAD s/p bypass, HTN, GERD. Pancreas transplant has since failed and now stage 5 CKD. Patient will require long term dialysis. Plan to initiate hemodialysis tomorrow after line placement.     Patient is on IR schedule Thursday 2/13/20 for a tunneled central venous catheter for dailysis.   Labs WNL for procedure.    Orders for NPO, scrubs and antibiotics have been entered.  Medications to be held include: Heparin BID- hold PM dose  Consent will be done prior to procedure with patient.     Platelet Count   Date Value Ref Range Status   02/12/2020 297 150 - 450 10e9/L Final     INR   Date Value Ref Range Status   08/17/2015 1.04 0.86 - 1.14 Final        Please contact the IR charge RN at [phone] for estimated time of procedure.     Case discussed with Dr. Wyman.    Brisa Marquez PA-C  Interventional Radiology

## 2020-02-13 ENCOUNTER — APPOINTMENT (OUTPATIENT)
Dept: INTERVENTIONAL RADIOLOGY/VASCULAR | Facility: CLINIC | Age: 45
DRG: 698 | End: 2020-02-13
Attending: PHYSICIAN ASSISTANT

## 2020-02-13 LAB
ANION GAP SERPL CALCULATED.3IONS-SCNC: 6 MMOL/L (ref 3–14)
BASOPHILS # BLD AUTO: 0 10E9/L (ref 0–0.2)
BASOPHILS NFR BLD AUTO: 0.4 %
BUN SERPL-MCNC: 31 MG/DL (ref 7–30)
CALCIUM SERPL-MCNC: 8.6 MG/DL (ref 8.5–10.1)
CHLORIDE SERPL-SCNC: 112 MMOL/L (ref 94–109)
CO2 SERPL-SCNC: 20 MMOL/L (ref 20–32)
CREAT SERPL-MCNC: 5.69 MG/DL (ref 0.66–1.25)
DIFFERENTIAL METHOD BLD: ABNORMAL
EOSINOPHIL # BLD AUTO: 0.1 10E9/L (ref 0–0.7)
EOSINOPHIL NFR BLD AUTO: 1.3 %
ERYTHROCYTE [DISTWIDTH] IN BLOOD BY AUTOMATED COUNT: 13.9 % (ref 10–15)
FERRITIN SERPL-MCNC: 106 NG/ML (ref 26–388)
FOLATE SERPL-MCNC: 5.8 NG/ML
GFR SERPL CREATININE-BSD FRML MDRD: 11 ML/MIN/{1.73_M2}
GLUCOSE BLDC GLUCOMTR-MCNC: 115 MG/DL (ref 70–99)
GLUCOSE BLDC GLUCOMTR-MCNC: 156 MG/DL (ref 70–99)
GLUCOSE BLDC GLUCOMTR-MCNC: 160 MG/DL (ref 70–99)
GLUCOSE BLDC GLUCOMTR-MCNC: 166 MG/DL (ref 70–99)
GLUCOSE SERPL-MCNC: 123 MG/DL (ref 70–99)
HBV SURFACE AB SERPL IA-ACNC: 2.39 M[IU]/ML
HBV SURFACE AG SERPL QL IA: NONREACTIVE
HCT VFR BLD AUTO: 32 % (ref 40–53)
HGB BLD-MCNC: 10.3 G/DL (ref 13.3–17.7)
IMM GRANULOCYTES # BLD: 0.1 10E9/L (ref 0–0.4)
IMM GRANULOCYTES NFR BLD: 0.7 %
INR PPP: 1.14 (ref 0.86–1.14)
LYMPHOCYTES # BLD AUTO: 1.6 10E9/L (ref 0.8–5.3)
LYMPHOCYTES NFR BLD AUTO: 21.8 %
MAGNESIUM SERPL-MCNC: 1.8 MG/DL (ref 1.6–2.3)
MCH RBC QN AUTO: 29.4 PG (ref 26.5–33)
MCHC RBC AUTO-ENTMCNC: 32.2 G/DL (ref 31.5–36.5)
MCV RBC AUTO: 91 FL (ref 78–100)
MONOCYTES # BLD AUTO: 0.5 10E9/L (ref 0–1.3)
MONOCYTES NFR BLD AUTO: 6.7 %
MYCOPHENOLATE SERPL LC/MS/MS-MCNC: 1.35 MG/L (ref 1–3.5)
MYCOPHENOLATE-G SERPL LC/MS/MS-MCNC: 80.4 MG/L (ref 30–95)
NEUTROPHILS # BLD AUTO: 5.2 10E9/L (ref 1.6–8.3)
NEUTROPHILS NFR BLD AUTO: 69.1 %
NRBC # BLD AUTO: 0 10*3/UL
NRBC BLD AUTO-RTO: 0 /100
PLATELET # BLD AUTO: 273 10E9/L (ref 150–450)
POTASSIUM SERPL-SCNC: 4 MMOL/L (ref 3.4–5.3)
RBC # BLD AUTO: 3.5 10E12/L (ref 4.4–5.9)
SODIUM SERPL-SCNC: 138 MMOL/L (ref 133–144)
TME LAST DOSE: 130 H
VIT B12 SERPL-MCNC: 1509 PG/ML (ref 193–986)
WBC # BLD AUTO: 7.5 10E9/L (ref 4–11)

## 2020-02-13 PROCEDURE — 25000128 H RX IP 250 OP 636: Performed by: PHYSICIAN ASSISTANT

## 2020-02-13 PROCEDURE — 02H633Z INSERTION OF INFUSION DEVICE INTO RIGHT ATRIUM, PERCUTANEOUS APPROACH: ICD-10-PCS | Performed by: PHYSICIAN ASSISTANT

## 2020-02-13 PROCEDURE — 82746 ASSAY OF FOLIC ACID SERUM: CPT | Performed by: INTERNAL MEDICINE

## 2020-02-13 PROCEDURE — 27210908 ZZH NEEDLE CR4

## 2020-02-13 PROCEDURE — 83735 ASSAY OF MAGNESIUM: CPT | Performed by: INTERNAL MEDICINE

## 2020-02-13 PROCEDURE — 25000125 ZZHC RX 250: Performed by: PHYSICIAN ASSISTANT

## 2020-02-13 PROCEDURE — 76937 US GUIDE VASCULAR ACCESS: CPT

## 2020-02-13 PROCEDURE — 87340 HEPATITIS B SURFACE AG IA: CPT | Performed by: STUDENT IN AN ORGANIZED HEALTH CARE EDUCATION/TRAINING PROGRAM

## 2020-02-13 PROCEDURE — 25000131 ZZH RX MED GY IP 250 OP 636 PS 637: Performed by: STUDENT IN AN ORGANIZED HEALTH CARE EDUCATION/TRAINING PROGRAM

## 2020-02-13 PROCEDURE — 27211193 ZZ H WOUND GLUE CR1

## 2020-02-13 PROCEDURE — 86706 HEP B SURFACE ANTIBODY: CPT | Performed by: STUDENT IN AN ORGANIZED HEALTH CARE EDUCATION/TRAINING PROGRAM

## 2020-02-13 PROCEDURE — 82728 ASSAY OF FERRITIN: CPT | Performed by: INTERNAL MEDICINE

## 2020-02-13 PROCEDURE — 5A1D70Z PERFORMANCE OF URINARY FILTRATION, INTERMITTENT, LESS THAN 6 HOURS PER DAY: ICD-10-PCS | Performed by: INTERNAL MEDICINE

## 2020-02-13 PROCEDURE — 25000128 H RX IP 250 OP 636: Performed by: STUDENT IN AN ORGANIZED HEALTH CARE EDUCATION/TRAINING PROGRAM

## 2020-02-13 PROCEDURE — 99152 MOD SED SAME PHYS/QHP 5/>YRS: CPT

## 2020-02-13 PROCEDURE — 25000128 H RX IP 250 OP 636

## 2020-02-13 PROCEDURE — C1750 CATH, HEMODIALYSIS,LONG-TERM: HCPCS

## 2020-02-13 PROCEDURE — 90937 HEMODIALYSIS REPEATED EVAL: CPT

## 2020-02-13 PROCEDURE — 27210732 ZZH ACCESSORY CR1

## 2020-02-13 PROCEDURE — C1769 GUIDE WIRE: HCPCS

## 2020-02-13 PROCEDURE — 25800030 ZZH RX IP 258 OP 636: Performed by: STUDENT IN AN ORGANIZED HEALTH CARE EDUCATION/TRAINING PROGRAM

## 2020-02-13 PROCEDURE — 25000132 ZZH RX MED GY IP 250 OP 250 PS 637: Performed by: STUDENT IN AN ORGANIZED HEALTH CARE EDUCATION/TRAINING PROGRAM

## 2020-02-13 PROCEDURE — 82607 VITAMIN B-12: CPT | Performed by: INTERNAL MEDICINE

## 2020-02-13 PROCEDURE — 85610 PROTHROMBIN TIME: CPT | Performed by: INTERNAL MEDICINE

## 2020-02-13 PROCEDURE — 36415 COLL VENOUS BLD VENIPUNCTURE: CPT | Performed by: INTERNAL MEDICINE

## 2020-02-13 PROCEDURE — 00000146 ZZHCL STATISTIC GLUCOSE BY METER IP

## 2020-02-13 PROCEDURE — 12000001 ZZH R&B MED SURG/OB UMMC

## 2020-02-13 PROCEDURE — 25000128 H RX IP 250 OP 636: Performed by: NURSE PRACTITIONER

## 2020-02-13 PROCEDURE — 25000128 H RX IP 250 OP 636: Performed by: INTERNAL MEDICINE

## 2020-02-13 PROCEDURE — 36558 INSERT TUNNELED CV CATH: CPT | Mod: LT

## 2020-02-13 PROCEDURE — 99232 SBSQ HOSP IP/OBS MODERATE 35: CPT | Performed by: INTERNAL MEDICINE

## 2020-02-13 PROCEDURE — 85025 COMPLETE CBC W/AUTO DIFF WBC: CPT | Performed by: INTERNAL MEDICINE

## 2020-02-13 PROCEDURE — 25000132 ZZH RX MED GY IP 250 OP 250 PS 637: Performed by: INTERNAL MEDICINE

## 2020-02-13 PROCEDURE — 80048 BASIC METABOLIC PNL TOTAL CA: CPT | Performed by: INTERNAL MEDICINE

## 2020-02-13 PROCEDURE — 0JH63XZ INSERTION OF TUNNELED VASCULAR ACCESS DEVICE INTO CHEST SUBCUTANEOUS TISSUE AND FASCIA, PERCUTANEOUS APPROACH: ICD-10-PCS | Performed by: PHYSICIAN ASSISTANT

## 2020-02-13 PROCEDURE — 40000141 ZZH STATISTIC PERIPHERAL IV START W/O US GUIDANCE

## 2020-02-13 RX ORDER — HEPARIN SODIUM 1000 [USP'U]/ML
3 INJECTION, SOLUTION INTRAVENOUS; SUBCUTANEOUS ONCE
Status: DISCONTINUED | OUTPATIENT
Start: 2020-02-13 | End: 2020-02-21 | Stop reason: HOSPADM

## 2020-02-13 RX ORDER — HYDRALAZINE HYDROCHLORIDE 20 MG/ML
20 INJECTION INTRAMUSCULAR; INTRAVENOUS EVERY 6 HOURS PRN
Status: COMPLETED | OUTPATIENT
Start: 2020-02-13 | End: 2020-02-13

## 2020-02-13 RX ORDER — HEPARIN SODIUM 1000 [USP'U]/ML
3 INJECTION, SOLUTION INTRAVENOUS; SUBCUTANEOUS ONCE
Status: COMPLETED | OUTPATIENT
Start: 2020-02-13 | End: 2020-02-13

## 2020-02-13 RX ORDER — HYDROMORPHONE HYDROCHLORIDE 1 MG/ML
0.5 INJECTION, SOLUTION INTRAMUSCULAR; INTRAVENOUS; SUBCUTANEOUS ONCE
Status: DISCONTINUED | OUTPATIENT
Start: 2020-02-13 | End: 2020-02-16

## 2020-02-13 RX ORDER — FENTANYL CITRATE 50 UG/ML
25-50 INJECTION, SOLUTION INTRAMUSCULAR; INTRAVENOUS EVERY 5 MIN PRN
Status: DISCONTINUED | OUTPATIENT
Start: 2020-02-13 | End: 2020-02-13 | Stop reason: HOSPADM

## 2020-02-13 RX ORDER — OXYCODONE HYDROCHLORIDE 5 MG/1
5 TABLET ORAL EVERY 6 HOURS PRN
Status: DISCONTINUED | OUTPATIENT
Start: 2020-02-13 | End: 2020-02-21 | Stop reason: HOSPADM

## 2020-02-13 RX ORDER — HYDROMORPHONE HYDROCHLORIDE 1 MG/ML
INJECTION, SOLUTION INTRAMUSCULAR; INTRAVENOUS; SUBCUTANEOUS
Status: COMPLETED
Start: 2020-02-13 | End: 2020-02-13

## 2020-02-13 RX ORDER — FLUMAZENIL 0.1 MG/ML
0.2 INJECTION, SOLUTION INTRAVENOUS
Status: DISCONTINUED | OUTPATIENT
Start: 2020-02-13 | End: 2020-02-13 | Stop reason: HOSPADM

## 2020-02-13 RX ORDER — NALOXONE HYDROCHLORIDE 0.4 MG/ML
.1-.4 INJECTION, SOLUTION INTRAMUSCULAR; INTRAVENOUS; SUBCUTANEOUS
Status: DISCONTINUED | OUTPATIENT
Start: 2020-02-13 | End: 2020-02-13 | Stop reason: HOSPADM

## 2020-02-13 RX ADMIN — ONDANSETRON 4 MG: 2 INJECTION INTRAMUSCULAR; INTRAVENOUS at 18:25

## 2020-02-13 RX ADMIN — MIDAZOLAM 2 MG: 1 INJECTION INTRAMUSCULAR; INTRAVENOUS at 09:18

## 2020-02-13 RX ADMIN — SODIUM CHLORIDE 300 ML: 9 INJECTION, SOLUTION INTRAVENOUS at 12:49

## 2020-02-13 RX ADMIN — INSULIN ASPART 1 UNITS: 100 INJECTION, SOLUTION INTRAVENOUS; SUBCUTANEOUS at 00:51

## 2020-02-13 RX ADMIN — SODIUM CHLORIDE 1000 ML: 9 INJECTION, SOLUTION INTRAVENOUS at 11:26

## 2020-02-13 RX ADMIN — ATORVASTATIN CALCIUM 40 MG: 40 TABLET, FILM COATED ORAL at 07:47

## 2020-02-13 RX ADMIN — HYDRALAZINE HYDROCHLORIDE 20 MG: 20 INJECTION INTRAMUSCULAR; INTRAVENOUS at 23:26

## 2020-02-13 RX ADMIN — INSULIN ASPART 1 UNITS: 100 INJECTION, SOLUTION INTRAVENOUS; SUBCUTANEOUS at 13:50

## 2020-02-13 RX ADMIN — HYDRALAZINE HYDROCHLORIDE 10 MG: 20 INJECTION INTRAMUSCULAR; INTRAVENOUS at 14:19

## 2020-02-13 RX ADMIN — INSULIN ASPART 1 UNITS: 100 INJECTION, SOLUTION INTRAVENOUS; SUBCUTANEOUS at 20:43

## 2020-02-13 RX ADMIN — LORAZEPAM 0.5 MG: 2 INJECTION INTRAMUSCULAR; INTRAVENOUS at 22:54

## 2020-02-13 RX ADMIN — ONDANSETRON 4 MG: 2 INJECTION INTRAMUSCULAR; INTRAVENOUS at 05:15

## 2020-02-13 RX ADMIN — MYCOPHENOLIC ACID 360 MG: 360 TABLET, DELAYED RELEASE ORAL at 20:33

## 2020-02-13 RX ADMIN — LIDOCAINE HYDROCHLORIDE 10 ML: 10 INJECTION, SOLUTION EPIDURAL; INFILTRATION; INTRACAUDAL; PERINEURAL at 09:19

## 2020-02-13 RX ADMIN — LORAZEPAM 0.5 MG: 2 INJECTION INTRAMUSCULAR; INTRAVENOUS at 01:49

## 2020-02-13 RX ADMIN — TACROLIMUS 2 MG: 1 CAPSULE ORAL at 07:47

## 2020-02-13 RX ADMIN — OMEPRAZOLE 40 MG: 20 CAPSULE, DELAYED RELEASE ORAL at 20:32

## 2020-02-13 RX ADMIN — LORAZEPAM 0.5 MG: 2 INJECTION INTRAMUSCULAR; INTRAVENOUS at 07:44

## 2020-02-13 RX ADMIN — SODIUM CHLORIDE 1000 ML: 9 INJECTION, SOLUTION INTRAVENOUS at 03:35

## 2020-02-13 RX ADMIN — HEPARIN SODIUM 2000 UNITS: 1000 INJECTION INTRAVENOUS; SUBCUTANEOUS at 09:18

## 2020-02-13 RX ADMIN — HEPARIN SODIUM 5000 UNITS: 5000 INJECTION, SOLUTION INTRAVENOUS; SUBCUTANEOUS at 20:33

## 2020-02-13 RX ADMIN — OXYCODONE HYDROCHLORIDE 5 MG: 5 TABLET ORAL at 20:43

## 2020-02-13 RX ADMIN — FENTANYL CITRATE 100 MCG: 50 INJECTION, SOLUTION INTRAMUSCULAR; INTRAVENOUS at 09:19

## 2020-02-13 RX ADMIN — HEPARIN SODIUM 2000 UNITS: 1000 INJECTION INTRAVENOUS; SUBCUTANEOUS at 09:17

## 2020-02-13 RX ADMIN — ACETAMINOPHEN 650 MG: 325 TABLET, FILM COATED ORAL at 11:22

## 2020-02-13 RX ADMIN — OXYCODONE HYDROCHLORIDE 5 MG: 5 TABLET ORAL at 15:08

## 2020-02-13 RX ADMIN — HYDROMORPHONE HYDROCHLORIDE 0.5 MG: 1 INJECTION, SOLUTION INTRAMUSCULAR; INTRAVENOUS; SUBCUTANEOUS at 17:10

## 2020-02-13 RX ADMIN — INSULIN ASPART 7 UNITS: 100 INJECTION, SOLUTION INTRAVENOUS; SUBCUTANEOUS at 16:06

## 2020-02-13 RX ADMIN — SODIUM CHLORIDE 250 ML: 9 INJECTION, SOLUTION INTRAVENOUS at 12:49

## 2020-02-13 RX ADMIN — TACROLIMUS 2 MG: 1 CAPSULE ORAL at 20:33

## 2020-02-13 RX ADMIN — CEFAZOLIN SODIUM 2 G: 2 INJECTION, SOLUTION INTRAVENOUS at 09:17

## 2020-02-13 RX ADMIN — MYCOPHENOLIC ACID 360 MG: 360 TABLET, DELAYED RELEASE ORAL at 07:47

## 2020-02-13 ASSESSMENT — ACTIVITIES OF DAILY LIVING (ADL)
ADLS_ACUITY_SCORE: 14

## 2020-02-13 ASSESSMENT — PAIN DESCRIPTION - DESCRIPTORS: DESCRIPTORS: ACHING;CONSTANT

## 2020-02-13 NOTE — PROCEDURES
[facility], [place]    Procedure: IR CVC TUNNEL PLACEMENT  Date/Time: 2/13/2020 9:19 AM  Performed by: Tremaine Sierra PA-C  Authorized by: Tremaine Sierra PA-C     UNIVERSAL PROTOCOL   Site Marked: No  Prior Images Obtained and Reviewed:  Yes  Required items: Required blood products, implants, devices and special equipment available    Patient identity confirmed:  Verbally with patient, arm band, provided demographic data and hospital-assigned identification number  Patient was reevaluated immediately before administering moderate or deep sedation or anesthesia  Confirmation Checklist:  Patient's identity using two indicators, relevant allergies, procedure was appropriate and matched the consent or emergent situation and correct equipment/implants were available  Time out: Immediately prior to the procedure a time out was called    Universal Protocol: the Joint Commission Universal Protocol was followed    Preparation: Patient was prepped and draped in usual sterile fashion           ANESTHESIA    Anesthesia: Local infiltration  Local Anesthetic:  Lidocaine 1% without epinephrine  Anesthetic Total (mL):  10      SEDATION    Patient Sedated: Yes    Sedation Type:  Moderate (conscious) sedation  Sedation:  Fentanyl and midazolam  Vital signs: Vital signs monitored during sedation    Fluoroscopy Time: 1 minute(s)  See dictated procedure note for full details.  Findings: 100 mcg and 2 mg, 20 minutes, tolerated well    Specimens: none    Complications: None    Condition: Stable    Plan: 1 hour monitored recovery. Catheter ready for immediate use.    PROCEDURE   Patient Tolerance:  Patient tolerated the procedure well with no immediate complications  Describe Procedure: 14.5 Fr 23 cm right IJ tunneled double lumen central venous catheter placement. Tip in right atrium, functions well, heparin locked and ready for use.  Length of time physician/provider present for 1:1 monitoring  during sedation: 20

## 2020-02-13 NOTE — PROGRESS NOTES
HEMODIALYSIS TREATMENT NOTE    Date: 2/13/2020  Time: 2:55 PM    Data:  Pre Wt: 88.5 kg (195 lb 1.7 oz)   Desired Wt: 88.5 kg   Post Wt: 87.5 kg (192 lb 14.4 oz)  Weight change: 1 kg  Ultrafiltration - Post Run Net Total Removed (mL): 1000 mL  Vascular Access Status: patent  Dialyzer Rinse: Streaked  Total Blood Volume Processed: 23.5 L Liters  Total Dialysis (Treatment) Time: 2 Hours    Lab:   Yes, Hep B    Interventions:Assessment:  2 hour tx complete. K3/Ca2.25,  per order. CVC utilized without complication. Pt hypertensive at baseline. Hydralazine 10 mg during HD for BP of 197/124. BP came down to ending HD BP of 151/104. 1 L of fluid obtained this tx. CVC lumens saline locked and clear guard caps applied. Hand off report given to primary nurse.     Plan:    Per nephrology team.

## 2020-02-13 NOTE — PLAN OF CARE
A&O. VSS- HTN at baseline. R PIV x 1 infusing MIVF. Denies pain. NPO for HD cath placement at 0930. Plan for dialysis after cath placement- weight taken this AM. One pre-op shower completed and one pre-op scrub. Pt still needs one more shower. BG q 4hours- sliding scale given as needed. Intermittent nausea controlled with prn ativan and prn zofran. Independent. Spontaneously voiding, not saving. No BM reported overnight. Continue with plan of care.

## 2020-02-13 NOTE — PRE-PROCEDURE
GENERAL PRE-PROCEDURE:   Procedure:  IR CVC TUNNEL PLACEMENT  Date/Time:  2/13/2020 8:41 AM    Written consent obtained?: Yes    Risks and benefits: Risks, benefits and alternatives were discussed    Consent given by:  Patient  Patient states understanding of procedure being performed: Yes    Patient's understanding of procedure matches consent: Yes    Procedure consent matches procedure scheduled: Yes    Expected level of sedation:  Moderate  Appropriately NPO:  Yes  ASA Class:  Class 2- mild systemic disease, no acute problems, no functional limitations  Mallampati  :  Grade 2- soft palate, base of uvula, tonsillar pillars, and portion of posterior pharyngeal wall visible  Lungs:  Lungs clear with good breath sounds bilaterally  Heart:  Normal heart sounds and rate  History & Physical reviewed:  History and physical reviewed and no updates needed  Statement of review:  I have reviewed the lab findings, diagnostic data, medications, and the plan for sedation

## 2020-02-13 NOTE — PROGRESS NOTES
Patient Name: Murtaza Fitzgerald  Medical Record Number: [id number]  Today's Date: 2/13/2020    Procedure: Double lumen central cathter placement for dialysis.  Proceduralist: Kayla BARILLAS.    Sedation start time: 0855  Sedation end time: 0905  Sedation medications administered: Fentanyl:100 mcg Versed:2mg       Procedure start time: 0855  Puncture time: 0855  Procedure end time: 0915    Report given to: JOSE Lomax  : n/a    Other Notes: Pt arrived to IR room 2 from . Consent reviewed. Pt denies any questions or concerns regarding procedure. Pt positioned supine and monitored per protocol. Pt tolerated procedure without any noted complications. Pt transferred back to .    Promise Pierce RN

## 2020-02-13 NOTE — PLAN OF CARE
VSS except hypertension - resolved without prn med given. Up independently. NPO prior to IV for dialysis port placement. Returned in stable condition. Discomfort at port site not improved with Tylenol - Oxycodone order requested from MD.  Intermittent nausea managed with prn Ativan and Zofran. In dialysis now for initial run. Mom at the bedside, supportive.   Continue with POC.

## 2020-02-13 NOTE — PROGRESS NOTES
GASTROENTEROLOGY PROGRESS NOTE    Date: 02/13/2020  Admit Date: 2/11/2020       ASSESSMENT AND RECOMMENDATIONS:     ASSESSMENT:  44 year old male with a history gastroparesis, type 1 diabetes mellitus, kidney pancreas transplant, coronary artery disease status post CABG, was readmitted with nausea vomiting.     History of gastroparesis  Persistent nausea vomiting  Non-anion gap metabolic acidosis  Hx LDKT now with SWATHI on CKD      Pt had symptoms improved since yesterday, B12 and folate normal unlikely to be SIBO, nephro planning for HD. Pt is expected to continue improving with control of contributing factors including hyperglycemia and metabolic abnormalities.    RECOMMENDATIONS:  --Continue Zofran for nausea  --Maintain adequate glycemic control  --Dietician consult for gastroparesis diet   --Behavioral modifications as below        - Eat smaller meals more frequently        - Chew food thoroughly        - Eat well-cooked fruits and vegetables rather than raw fruits and vegetables        - Avoid fibrous fruits and vegetables such as broccoli and oranges        - Low fat foods are preferable, small amount of fat can be added if tolerated        - Avoid carbonated drinks, alcohol and smoking        - Gentle exercise each day and avoid eating 4 hours prior to bed    Inpatient GI team will sign off.  Gastroenterology follow up recommendations: Follow up in GI clinic as outpatient     Thank you for involving us in this patient's care. Please do not hesitate to contact the GI service with any questions or concerns.      Pt care plan discussed with Dr. Araiza, GI staff physician.    This note was created with voice recognition software, and while reviewed for accuracy, typos may remain.    Caroline Rae MD  GI Fellow  Pager: [phone]  _______________________________________________________________    Subjective\events within the 24 hours:     Pt stated that he tolerated liquid and soft diet yesterday without any  vomiting, nausea also improved    4 point ROS performed and negative unless noted above.      Medications:     Current Facility-Administered Medications   Medication     acetaminophen (TYLENOL) tablet 650 mg     atorvastatin (LIPITOR) tablet 40 mg     ceFAZolin (ANCEF) intermittent infusion 2 g in 100 mL dextrose PRE-MIX     glucose gel 15-30 g    Or     dextrose 50 % injection 25-50 mL    Or     glucagon injection 1 mg     heparin ANTICOAGULANT injection 5,000 Units     HOLD: heparin SQ 12 hours pre-procedure     hydrALAZINE (APRESOLINE) injection 10 mg     insulin aspart (NovoLOG) inj (RAPID ACTING)     insulin aspart (NovoLOG) inj (RAPID ACTING)     insulin aspart (NovoLOG) inj (RAPID ACTING)     insulin aspart (NovoLOG) inj (RAPID ACTING)     insulin aspart (NovoLOG) inj (RAPID ACTING)     insulin degludec (TRESIBA) 100 UNIT/ML injection 18 Units     labetalol (NORMODYNE/TRANDATE) syringe 20 mg     lidocaine (LMX4) cream     lidocaine 1 % 0.1-1 mL     LORazepam (ATIVAN) injection 0.5 mg     melatonin tablet 1 mg     mycophenolic acid (GENERIC EQUIVALENT) EC tablet 360 mg     naloxone (NARCAN) injection 0.1-0.4 mg     omeprazole (priLOSEC) CR capsule 40 mg     ondansetron (ZOFRAN) injection 4 mg     ondansetron (ZOFRAN-ODT) ODT tab 4-8 mg     polyethylene glycol (MIRALAX/GLYCOLAX) Packet 17 g     sodium chloride (PF) 0.9% PF flush 3 mL     sodium chloride (PF) 0.9% PF flush 3 mL     sodium chloride 0.9% infusion     tacrolimus (GENERIC EQUIVALENT) capsule 2 mg       Physical Exam     Vital Signs:  BP (!) 170/90 (BP Location: Left arm)   Pulse 90   Temp 98  F (36.7  C) (Oral)   Resp 18   Wt 88.5 kg (195 lb 1.6 oz)   SpO2 95%   BMI 29.66 kg/m       Gen: A&Ox3, NAD  HEENT: ncat, perrl, eomi  Neck: supple  CV: RRR, S1, S2 heard  Lungs: CTA b/l  Abd: +bs, soft, nd/nt  Skin: no jaundice  Extremities: No edema  MS: appropriate muscle mass for age  Neuro: non focal       Data   LABS:  BMP  Recent Labs   Lab  02/13/20  0720 02/12/20  0710 02/11/20  1739 02/11/20  0841    139 138 137   POTASSIUM 4.0 4.2 4.4 4.0   CHLORIDE 112* 110* 107 108   CHARLY 8.6 8.7 9.4 8.8   CO2 20 17* 17* 20   BUN 31* 34* 33* 33*   CR 5.69* 5.92* 6.13* 5.93*   * 248* 274* 146*     CBC  Recent Labs   Lab 02/13/20  0720 02/12/20  0710 02/11/20  2241 02/11/20  1739 02/11/20  0841   WBC 7.5 8.4  --  9.8 9.0   RBC 3.50* 3.76*  --  4.34* 4.12*   HGB 10.3* 10.9*  --  12.6* 11.6*   HCT 32.0* 33.7*  --  38.6* 36.4*   MCV 91 90  --  89 88   MCH 29.4 29.0  --  29.0 28.2   MCHC 32.2 32.3  --  32.6 31.9   RDW 13.9 13.7  --  13.4 13.5    297 313 351 352     INR  Recent Labs   Lab 02/13/20  0122   INR 1.14     LFTs  Recent Labs   Lab 02/11/20  1739 02/09/20  0615 02/07/20  1058   ALKPHOS 172* 133 139   AST 14 8 13   ALT 15 10 11   BILITOTAL 0.4 0.3 0.5   PROTTOTAL 7.4 6.1* 6.5*   ALBUMIN 3.8 2.9* 3.0*      PANC  Recent Labs   Lab 02/11/20  1739   LIPASE 41*

## 2020-02-13 NOTE — PLAN OF CARE
AxOx4. VSS on RA except hypertensive. Denied pain. Reported nausea; relief with prn IV zofran and IV ativan q6h. Voiding spontaneously; no BM reported. On consistent carb diet and tolerating fairly; pt had increased appetite this shift but did not eat much. RPIV running NS @ 125mL/hr.  and 132; insulin given per orders. Pt is up independently in room. Family at bedside for majority of shift. Plan for HD cath placement in IR and first HD run tomorrow. Continue to monitor and follow plan of care.

## 2020-02-14 ENCOUNTER — APPOINTMENT (OUTPATIENT)
Dept: GENERAL RADIOLOGY | Facility: CLINIC | Age: 45
DRG: 698 | End: 2020-02-14
Attending: STUDENT IN AN ORGANIZED HEALTH CARE EDUCATION/TRAINING PROGRAM

## 2020-02-14 LAB
ANION GAP SERPL CALCULATED.3IONS-SCNC: 10 MMOL/L (ref 3–14)
BASOPHILS # BLD AUTO: 0 10E9/L (ref 0–0.2)
BASOPHILS NFR BLD AUTO: 0.2 %
BUN SERPL-MCNC: 20 MG/DL (ref 7–30)
CALCIUM SERPL-MCNC: 8.6 MG/DL (ref 8.5–10.1)
CHLORIDE SERPL-SCNC: 108 MMOL/L (ref 94–109)
CO2 SERPL-SCNC: 19 MMOL/L (ref 20–32)
CREAT SERPL-MCNC: 4.59 MG/DL (ref 0.66–1.25)
DIFFERENTIAL METHOD BLD: ABNORMAL
EOSINOPHIL # BLD AUTO: 0.1 10E9/L (ref 0–0.7)
EOSINOPHIL NFR BLD AUTO: 0.7 %
ERYTHROCYTE [DISTWIDTH] IN BLOOD BY AUTOMATED COUNT: 13.9 % (ref 10–15)
GFR SERPL CREATININE-BSD FRML MDRD: 15 ML/MIN/{1.73_M2}
GLUCOSE BLDC GLUCOMTR-MCNC: 144 MG/DL (ref 70–99)
GLUCOSE BLDC GLUCOMTR-MCNC: 146 MG/DL (ref 70–99)
GLUCOSE BLDC GLUCOMTR-MCNC: 146 MG/DL (ref 70–99)
GLUCOSE BLDC GLUCOMTR-MCNC: 163 MG/DL (ref 70–99)
GLUCOSE BLDC GLUCOMTR-MCNC: 172 MG/DL (ref 70–99)
GLUCOSE SERPL-MCNC: 171 MG/DL (ref 70–99)
HCT VFR BLD AUTO: 33.9 % (ref 40–53)
HGB BLD-MCNC: 11.1 G/DL (ref 13.3–17.7)
IMM GRANULOCYTES # BLD: 0.1 10E9/L (ref 0–0.4)
IMM GRANULOCYTES NFR BLD: 0.6 %
LYMPHOCYTES # BLD AUTO: 0.7 10E9/L (ref 0.8–5.3)
LYMPHOCYTES NFR BLD AUTO: 8.1 %
MAGNESIUM SERPL-MCNC: 1.6 MG/DL (ref 1.6–2.3)
MCH RBC QN AUTO: 29 PG (ref 26.5–33)
MCHC RBC AUTO-ENTMCNC: 32.7 G/DL (ref 31.5–36.5)
MCV RBC AUTO: 89 FL (ref 78–100)
MONOCYTES # BLD AUTO: 0.4 10E9/L (ref 0–1.3)
MONOCYTES NFR BLD AUTO: 5.2 %
NEUTROPHILS # BLD AUTO: 7.1 10E9/L (ref 1.6–8.3)
NEUTROPHILS NFR BLD AUTO: 85.2 %
NRBC # BLD AUTO: 0 10*3/UL
NRBC BLD AUTO-RTO: 0 /100
PLATELET # BLD AUTO: 283 10E9/L (ref 150–450)
POTASSIUM SERPL-SCNC: 3.9 MMOL/L (ref 3.4–5.3)
RBC # BLD AUTO: 3.83 10E12/L (ref 4.4–5.9)
SODIUM SERPL-SCNC: 136 MMOL/L (ref 133–144)
WBC # BLD AUTO: 8.3 10E9/L (ref 4–11)

## 2020-02-14 PROCEDURE — 25000128 H RX IP 250 OP 636: Performed by: STUDENT IN AN ORGANIZED HEALTH CARE EDUCATION/TRAINING PROGRAM

## 2020-02-14 PROCEDURE — 25800030 ZZH RX IP 258 OP 636: Performed by: STUDENT IN AN ORGANIZED HEALTH CARE EDUCATION/TRAINING PROGRAM

## 2020-02-14 PROCEDURE — 99232 SBSQ HOSP IP/OBS MODERATE 35: CPT | Performed by: INTERNAL MEDICINE

## 2020-02-14 PROCEDURE — 71045 X-RAY EXAM CHEST 1 VIEW: CPT

## 2020-02-14 PROCEDURE — 80048 BASIC METABOLIC PNL TOTAL CA: CPT | Performed by: INTERNAL MEDICINE

## 2020-02-14 PROCEDURE — 85025 COMPLETE CBC W/AUTO DIFF WBC: CPT | Performed by: INTERNAL MEDICINE

## 2020-02-14 PROCEDURE — 25000128 H RX IP 250 OP 636: Performed by: INTERNAL MEDICINE

## 2020-02-14 PROCEDURE — 36415 COLL VENOUS BLD VENIPUNCTURE: CPT | Performed by: INTERNAL MEDICINE

## 2020-02-14 PROCEDURE — 90937 HEMODIALYSIS REPEATED EVAL: CPT

## 2020-02-14 PROCEDURE — 25000131 ZZH RX MED GY IP 250 OP 636 PS 637: Performed by: PHYSICIAN ASSISTANT

## 2020-02-14 PROCEDURE — 25000132 ZZH RX MED GY IP 250 OP 250 PS 637: Performed by: STUDENT IN AN ORGANIZED HEALTH CARE EDUCATION/TRAINING PROGRAM

## 2020-02-14 PROCEDURE — 83735 ASSAY OF MAGNESIUM: CPT | Performed by: INTERNAL MEDICINE

## 2020-02-14 PROCEDURE — 25000131 ZZH RX MED GY IP 250 OP 636 PS 637: Performed by: STUDENT IN AN ORGANIZED HEALTH CARE EDUCATION/TRAINING PROGRAM

## 2020-02-14 PROCEDURE — 12000001 ZZH R&B MED SURG/OB UMMC

## 2020-02-14 PROCEDURE — 25000132 ZZH RX MED GY IP 250 OP 250 PS 637: Performed by: INTERNAL MEDICINE

## 2020-02-14 PROCEDURE — 00000146 ZZHCL STATISTIC GLUCOSE BY METER IP

## 2020-02-14 RX ORDER — DEXTROSE MONOHYDRATE 25 G/50ML
25-50 INJECTION, SOLUTION INTRAVENOUS
Status: DISCONTINUED | OUTPATIENT
Start: 2020-02-14 | End: 2020-02-15

## 2020-02-14 RX ORDER — LORAZEPAM 2 MG/ML
0.5 INJECTION INTRAMUSCULAR EVERY 4 HOURS PRN
Status: DISCONTINUED | OUTPATIENT
Start: 2020-02-14 | End: 2020-02-21 | Stop reason: HOSPADM

## 2020-02-14 RX ORDER — TACROLIMUS 1 MG/1
1 CAPSULE ORAL 2 TIMES DAILY
Status: DISCONTINUED | OUTPATIENT
Start: 2020-02-14 | End: 2020-02-21 | Stop reason: HOSPADM

## 2020-02-14 RX ORDER — AMOXICILLIN 250 MG
2 CAPSULE ORAL AT BEDTIME
Status: DISCONTINUED | OUTPATIENT
Start: 2020-02-14 | End: 2020-02-21 | Stop reason: HOSPADM

## 2020-02-14 RX ORDER — AMOXICILLIN 250 MG
1 CAPSULE ORAL AT BEDTIME
Status: DISCONTINUED | OUTPATIENT
Start: 2020-02-14 | End: 2020-02-14

## 2020-02-14 RX ORDER — LORAZEPAM 2 MG/ML
0.5 INJECTION INTRAMUSCULAR EVERY 4 HOURS
Status: DISCONTINUED | OUTPATIENT
Start: 2020-02-14 | End: 2020-02-14

## 2020-02-14 RX ORDER — NICOTINE POLACRILEX 4 MG
15-30 LOZENGE BUCCAL
Status: DISCONTINUED | OUTPATIENT
Start: 2020-02-14 | End: 2020-02-15

## 2020-02-14 RX ADMIN — MYCOPHENOLIC ACID 360 MG: 360 TABLET, DELAYED RELEASE ORAL at 08:45

## 2020-02-14 RX ADMIN — OMEPRAZOLE 40 MG: 20 CAPSULE, DELAYED RELEASE ORAL at 19:56

## 2020-02-14 RX ADMIN — ONDANSETRON 4 MG: 4 TABLET, ORALLY DISINTEGRATING ORAL at 17:08

## 2020-02-14 RX ADMIN — INSULIN ASPART 1 UNITS: 100 INJECTION, SOLUTION INTRAVENOUS; SUBCUTANEOUS at 01:01

## 2020-02-14 RX ADMIN — ONDANSETRON 4 MG: 2 INJECTION INTRAMUSCULAR; INTRAVENOUS at 00:42

## 2020-02-14 RX ADMIN — ONDANSETRON 4 MG: 2 INJECTION INTRAMUSCULAR; INTRAVENOUS at 07:51

## 2020-02-14 RX ADMIN — INSULIN ASPART 15 UNITS: 100 INJECTION, SOLUTION INTRAVENOUS; SUBCUTANEOUS at 17:11

## 2020-02-14 RX ADMIN — INSULIN ASPART 1 UNITS: 100 INJECTION, SOLUTION INTRAVENOUS; SUBCUTANEOUS at 05:36

## 2020-02-14 RX ADMIN — INSULIN ASPART 1 UNITS: 100 INJECTION, SOLUTION INTRAVENOUS; SUBCUTANEOUS at 08:00

## 2020-02-14 RX ADMIN — TACROLIMUS 2 MG: 1 CAPSULE ORAL at 08:45

## 2020-02-14 RX ADMIN — MYCOPHENOLIC ACID 360 MG: 360 TABLET, DELAYED RELEASE ORAL at 19:53

## 2020-02-14 RX ADMIN — SENNOSIDES AND DOCUSATE SODIUM 1 TABLET: 8.6; 5 TABLET ORAL at 21:41

## 2020-02-14 RX ADMIN — SODIUM CHLORIDE 300 ML: 9 INJECTION, SOLUTION INTRAVENOUS at 13:00

## 2020-02-14 RX ADMIN — TACROLIMUS 1 MG: 1 CAPSULE ORAL at 19:53

## 2020-02-14 RX ADMIN — LORAZEPAM 0.5 MG: 2 INJECTION INTRAMUSCULAR; INTRAVENOUS at 05:56

## 2020-02-14 RX ADMIN — ATORVASTATIN CALCIUM 40 MG: 40 TABLET, FILM COATED ORAL at 08:45

## 2020-02-14 RX ADMIN — INSULIN ASPART 1 UNITS: 100 INJECTION, SOLUTION INTRAVENOUS; SUBCUTANEOUS at 17:08

## 2020-02-14 RX ADMIN — SODIUM CHLORIDE 250 ML: 9 INJECTION, SOLUTION INTRAVENOUS at 13:00

## 2020-02-14 RX ADMIN — LORAZEPAM 0.5 MG: 2 INJECTION INTRAMUSCULAR; INTRAVENOUS at 11:37

## 2020-02-14 ASSESSMENT — ACTIVITIES OF DAILY LIVING (ADL)
ADLS_ACUITY_SCORE: 14

## 2020-02-14 NOTE — PLAN OF CARE
Alert and oriented x 4. Complained of nausea and being tired. Stated pain is tolerable and did not want any pain medication. Zofran given. Blood glucose checked Q 4 hours, 163 and 172, covered per sliding scale.

## 2020-02-14 NOTE — PROGRESS NOTES
[facility] Progress Note - Hospitalist Service, Gold 9       Date of Admission:  2/11/2020  Assessment & Plan   Murtaza Fitzgerald is a 44 year old male with PMHx of DM1 c/b gastroparesis, CKD (s/p kidney transplant on 11/2008 secondary to diabetic nephropathy), pancreatic transplant (2/2009), CAD s/p bypass graft, HTN, GERD who presents to the ED with nausea and vomiting.     SWATHI on CKD in setting of renal transplant   Concern for transplant failure  Crt 6.13 on admission, baseline is around 2.5-5.   He was seen by nephrology and plan is place dialysis catheter and trial of HD   HD catheter placed and  Trial of HD well tolerated. He complains of Pain at the catheter site  Renal transplant and pancreas transplant teams have been notified and recs will be followed  He tolerated day 1 of HD well, Day2 today appears still have hiccups and nausea     Intractable vomiting-- resolved  Gastroparesis  Pt presenting with intractable vomiting which began earlier this morning. He has vomited about 12 times, all non-bloody and non-bilious. Does have a recurring hx of intractable vomiting and was last hospitalized on 2/5-2/9 with similar complaints. Vomiting most likely secondary to gastroparesis and/or uremia (BUN/Crt elevated at 33 and 6.13). In the past, there have been discussions on possible gastric pacemaker for management of his gastroparesis. GI consult placed. Last bowel movement was on Saturday (2/8) and flatus passed yesterday. CT abdomen/pelvis obtained and shows no evidence of a small bowel obstruction. Of note, if vomiting acutely worsens or pt does not improve with supportive measures/correction of uremia and other workup uneventful, can obtain MRI to evaluate for PRES.    -supportive measures: IVF, antiemetics   -zofran 4 mg IV Q6H PRN, per GI avoid compazine, allergy listed to reglan  -0.5mg ativan IV Q6H PRN for nausea  -GI cocktail   -GI consulted as  well  It appears his nausea/vomiting might be multifactorial with Uremia, hyperglycemia and gastroparesis all contributing.   Seems to have improved. He is tolerating some meals   He is constipated and not had a BM since Saturday, adding bowel regimen  Chronic Problems:      # DM1   # Hyperglycemia   - Resume home dosing of Tresiba  - carb counting with meals  -A1C is 7  - med sliding scale insulin   - hypoglycemia protocol      #S/p pancreatic transplant 2/2009   #S/p renal transplant 11/2008   -  mg BID   - Tacrolimus 2 mg BID   - tranplant teams consulted.     # CAD s/p bypass graft   #HTN   Per chart review patient takes amlodipine 5mg, Carvedilol 6.25 mg BID  restarted  - hydralazine PRN available for BP >180/90        Diet: Consistent Carbohydrate Diet 1360-6200 Calories: High Consistent CHO (4-7 CHO units/meal)    DVT Prophylaxis: Heparin SQ  Mueller Catheter: not present  Code Status: Full Code      Disposition Plan   Expected discharge: 4 - 7 days, recommended to prior living arrangement once uremia symptoms have improved and outpatient HD is arranged..  Entered: Jesika nSyder MD 02/14/2020, 10:13 AM       The patient's care was discussed with the Bedside Nurse, Care Coordinator/, Patient and Patient's Family.    Jesika Snyder MD  Hospitalist Service, [address], [place]  Pager: [phone]  Please see sticky note for cross cover information  ______________________________________________________________________    Interval History     Patient seen and examined at bedside. No acute events overnight. Tolerated HD well. He still has hiccups. He vomiting his roast beef sandwich from dinner soon after. He has not has BM since Saturday. He denies any chest pain, no shortness o f breath, he denies abdominal pain. Urinating without issue    Data reviewed today: I reviewed all medications, new labs and imaging results over the last 24 hours.    Physical Exam    Vital Signs: Temp: 97.1  F (36.2  C) Temp src: Oral BP: (!) 172/85 Pulse: 90 Heart Rate: 96 Resp: 16 SpO2: 97 % O2 Device: None (Room air)    Weight: 195 lbs 1.6 oz    Physical Exam  Vitals signs reviewed.   Constitutional:       General: He is not in acute distress.     Appearance: Normal appearance. He is normal weight. He is not ill-appearing.   HENT:      Head: Normocephalic.      Nose: Nose normal.      Mouth/Throat:      Mouth: Mucous membranes are moist.   Eyes:      Extraocular Movements: Extraocular movements intact.      Pupils: Pupils are equal, round, and reactive to light.   Cardiovascular:      Rate and Rhythm: Normal rate and regular rhythm.      Pulses: Normal pulses.      Heart sounds: Normal heart sounds. No murmur.   Pulmonary:      Effort: Pulmonary effort is normal.      Breath sounds: Normal breath sounds. No rhonchi.   Abdominal:      General: Abdomen is flat. Bowel sounds are normal.      Palpations: Abdomen is soft.   Musculoskeletal: Normal range of motion.   Neurological:      General: No focal deficit present.      Mental Status: He is alert and oriented to person, place, and time.   Psychiatric:         Behavior: Behavior normal.         .ph  Data   Recent Labs   Lab 02/14/20  0803 02/13/20  0720 02/13/20  0122 02/12/20  0710  02/11/20  1739  02/09/20  0615   WBC 8.3 7.5  --  8.4  --  9.8   < > 5.5   HGB 11.1* 10.3*  --  10.9*  --  12.6*   < > 9.9*   MCV 89 91  --  90  --  89   < > 90    273  --  297   < > 351   < > 261   INR  --   --  1.14  --   --   --   --   --     138  --  139  --  138   < > 137   POTASSIUM 3.9 4.0  --  4.2  --  4.4   < > 3.4   CHLORIDE 108 112*  --  110*  --  107   < > 109   CO2 19* 20  --  17*  --  17*   < > 20   BUN 20 31*  --  34*  --  33*   < > 35*   CR 4.59* 5.69*  --  5.92*  --  6.13*   < > 6.21*   ANIONGAP 10 6  --  12  --  13   < > 8   CHARLY 8.6 8.6  --  8.7  --  9.4   < > 8.3*   * 123*  --  248*  --  274*   < > 125*   ALBUMIN  --   --    --   --   --  3.8  --  2.9*   PROTTOTAL  --   --   --   --   --  7.4  --  6.1*   BILITOTAL  --   --   --   --   --  0.4  --  0.3   ALKPHOS  --   --   --   --   --  172*  --  133   ALT  --   --   --   --   --  15  --  10   AST  --   --   --   --   --  14  --  8   LIPASE  --   --   --   --   --  41*  --   --     < > = values in this interval not displayed.     No results found for this or any previous visit (from the past 24 hour(s)).  Medications       sodium chloride 0.9%  250 mL Intravenous Once in dialysis     sodium chloride 0.9%  300 mL Hemodialysis Machine Once     atorvastatin  40 mg Oral Daily     gelatin absorbable  1 each Topical During Hemodialysis (from stock)     heparin ANTICOAGULANT  5,000 Units Subcutaneous BID     heparin Lock (1000 units/mL High concentration)  3 mL Intracatheter Once     heparin Lock (1000 units/mL High concentration)  3 mL Intracatheter Once     HYDROmorphone  0.5 mg Intravenous Once     insulin aspart   Subcutaneous QAM AC     insulin aspart   Subcutaneous Daily with lunch     insulin aspart   Subcutaneous Daily with supper     insulin aspart  1-6 Units Subcutaneous Q4H     insulin degludec  24 Units Subcutaneous At Bedtime     LORazepam  0.5 mg Intravenous Q4H     mycophenolic acid  360 mg Oral BID     - MEDICATION INSTRUCTIONS -   Does not apply Once     omeprazole  40 mg Oral QPM     sodium chloride (PF)  3 mL Intracatheter Q8H     sodium chloride (PF)  9 mL Intracatheter During Hemodialysis (from stock)     sodium chloride (PF)  9 mL Intracatheter During Hemodialysis (from stock)     tacrolimus  2 mg Oral BID

## 2020-02-14 NOTE — PROGRESS NOTES
Care Coordinator    Care Coordinator - Discharge Planning    Admission Date/Time:  2/11/2020  Attending MD:  Jesika Snyder MD     Data  Date of initial CC assessment:  2.14.2020  Chart reviewed, discussed with interdisciplinary team.   Patient was admitted for:   1. Intractable vomiting with nausea, unspecified vomiting type    2. Gastroparesis    3. Diabetic gastroparesis associated with type 1 diabetes mellitus (H)    4. Encounter for long-term (current) use of insulin (H)    5. Kidney replaced by transplant    6. Pancreas replaced by transplant (H)    Murtaza Fitzgerald is a 44 year old male with PMHx of DM1 c/b gastroparesis, CKD (s/p kidney transplant on 11/2008 secondary to diabetic nephropathy), pancreatic transplant (2/2009), CAD s/p bypass graft, HTN, GERD who presents to the ED with nausea and vomiting--per Dr Jesika Snyder's note 2/13.  Per Dr Adam Villalba/Bing Wyman([id number])--transplant nephrologist--pt will need new hemodialysis set up.   Plan for 4 inpatient dialysis runs and possible discharge Monday, 2/17 per Dr Wyman--I have asked him to order a CXR to rule out TB and a Hep B CORE antibody and he will.        Assessment   Concerns with insurance coverage for discharge needs: no insurance coverage.  Current Living Situation: Patient lives alone.  Support System: Supportive and Involved  Services Involved: none  Transportation at Discharge: Car and Family or friend will provide  Transportation to Medical Appointments:    - Name of caregiver: mother: Marcie Fitzgerald  Barriers to Discharge: NO Insurance--is self pay--is working with PFR Janette Vaughn to obtain [place] in the future. I have called Janette and left her a message [phone].  From 2/5-2/9 admission pt had lost his kallie 1/23/2020 and is getting some kind of payments--Janette is to find out when these end and if he will qualify for [place].      Coordination of Care and Referrals: Provided patient/family with options for Dialysis Services.  I met with  pt in his room on unit 7C--he is pleasant and his mother is here and she is pleasant. I explained my role.  Pt prefers [facility] [place] [id number]  Days: MWF 2nd shift  Line: placed on 2/13 with first HD run.  I put a note under his insurance section with Walter Vaughn's phone #.        Plan  Anticipated Discharge Date:  Monday, 2/17  Anticipated Discharge Plan:  Need to fax Hep B CORE anibody info and CXR to [facility]\"" Intake F: [phone].  Wait for [facility] to call back--Per [facility] prakash [phone]  only has a TTS 2nd shift available----or willing for 1st shift MWF--- pt CANNOT do SAturdays and I have requested they check at [facility] MWF 2nd shift or open to 1st shift-- pt needs to fill out the Patient Insurability Assessment Form--done and fax'd at 4pm.  Will follow.    CTS Handoff completed:  MELISSA Rose RN

## 2020-02-14 NOTE — DISCHARGE INSTRUCTIONS
________________________________________________________  Discharge RN please fax discharge orders to Dialysis @______________  ________________________________________________________

## 2020-02-14 NOTE — PLAN OF CARE
"Pt is alert and oriented x4. B/P runs high. Pt received hydralazine 20 mg IV PRN at the end of the evening shift and IV fluid dc'd.Oncoming RN to follow up with B/P and notify MD as needed.C/O nausea and pt had 200 ml emesis; nausea relief with PRN zofran IV and PRN ativan IV.Pt had dilaysis today with 1 L removal. C/O right new internal jugular site pain, decrease in pain with a one time dilaudid 0.5 mg IV and PRN oxy 5 mg po x1.Up independent to bathroom. Continue with plan of care.    Plan per Md not  \" Expected discharge: 4 - 7 days, recommended to prior living arrangement once dilaysis plan and outpatient HD arranged.\"   "

## 2020-02-14 NOTE — PROGRESS NOTES
[facility], [place]  Transplant Nephrology Progress Note  Date of Admission:  2/11/2020  Today's Date: 02/13/2020    Assessment & Plan   # LDKT: Increased   - Baseline Cr ~ 5-6   - Proteinuria: Moderate (1-3 grams)   - Date DSA Last Checked: Jul/2016      Latest DSA: No   - BK Viremia: No   - Kidney Tx Biopsy: Dec 27, 2013; Result: No evidence of rejection    His graft function is declining and he was recently admitted and discharged from [facility] for similar complaints, and the decision was made to start HD if his symptoms reappeared. We will aim for 3 consecutive daily runs, and then reevaluate his continued need for HD. He will need an outpatient HD unit located (he prefers [facility]). 1st run today, 2hrs,     # Pancreas Tx (ANNMARIE):   - Pancreatic Exocrine Drainage: Bladder drained    - failed 2013, back on insulin.    # Immunosuppression: Tacrolimus immediate release (goal 4-6) and Mycophenolic acid (goal not followed)   - Changes: No, lowering IS in conjunction with transplant surgery. We recommend lowering anti-metabolite first given his N/V, and keeping the stronger CNI on board.    # Infection Prophylaxis:   - PJP: None    # Hypertension: Inadequate control;  Goal BP: < 140/90   - Volume status: Mildly hypervolemic  EDW~ TBD   - Changes: Yes - restart home meds, we will attempt fluid removal today and Friday    # Diabetes: Controlled (HbA1c <7%) Last HbA1c: 7.0%   - Management as per primary team.    # Anemia in Chronic Renal Disease: Hgb: Stable      WILL: No   - Iron studies: Not checked recently    # Mineral Bone Disorder:   - Secondary renal hyperparathyroidism; PTH level: Not checked recently        On treatment: None  - Vitamin D; level: Not checked recently        On Supplement: No  - Calcium; level: Normal        On Supplement: No  - Phosphorus; level: Normal        On Supplement: No    # Electrolytes:   - Potassium; level: Normal        On Supplement: No  -  Magnesium; level: Normal        On Supplement: No  - Bicarbonate; level: Low        On Supplement: No  - Sodium; level: Normal        On Supplement: No    # Gastroparesis: Has known gastroparesis, surgery will evaluate him for a gastric pacemaker implant. In the past, GI have also suggested a G-tube vent    # Nausea/Vomiting: Could be a component of uremia and/or gastroparesis. Symptoms have improved today. We will dialyze him for 3 days, and can reevaluate his GI symptoms in 2 weeks. At that time, if symptoms persist, a gastric pacemaker may be the next option.     # Transplant History:  Etiology of Kidney Failure: Diabetes mellitus type 1  Tx: LDKT and ANNMARIE  Transplant: 11/4/2008 (Kidney), 2/17/2009 (Pancreas)  Donor Type: Living Donor Class: Standard Criteria Donor  Significant changes in immunosuppression: None  Significant transplant-related complications: None    Recommendations were communicated to the primary team verbally.    Seen and discussed with Dr. Deejay Wyman MD  Pager: [phone]    INTERVAL HISTORY  No overnight issues. He denies nausea or vomiting. He had his tunneled HD catheter placed this morning. He will have 1st session HD this afternoon. He denies chest pain or shortness of breath. He has minimal LE swelling. He denied fevers, chills, or rigors.    Prior to Admission Medications     atorvastatin  40 mg Oral Daily     heparin ANTICOAGULANT  5,000 Units Subcutaneous BID     heparin Lock (1000 units/mL High concentration)  3 mL Intracatheter Once     heparin Lock (1000 units/mL High concentration)  3 mL Intracatheter Once     HYDROmorphone  0.5 mg Intravenous Once     insulin aspart   Subcutaneous QAM AC     insulin aspart   Subcutaneous Daily with lunch     insulin aspart   Subcutaneous Daily with supper     insulin aspart  1-6 Units Subcutaneous Q4H     insulin degludec  24 Units Subcutaneous At Bedtime     mycophenolic acid  360 mg Oral BID     omeprazole  40 mg Oral QPM     sodium  chloride (PF)  3 mL Intracatheter Q8H     tacrolimus  2 mg Oral BID         Physical Exam      BP (!) 184/103 (BP Location: Right arm)   Pulse 90   Temp 96.9  F (36.1  C) (Oral)   Resp 16   Wt 88.5 kg (195 lb 1.6 oz)   SpO2 97%   BMI 29.66 kg/m        GENERAL APPEARANCE: alert and no distress  HENT: mouth without ulcers or lesions  LYMPHATICS: no cervical or supraclavicular nodes  RESP: lungs clear to auscultation - no rales, rhonchi or wheezes  CV: regular rhythm, normal rate, no rub, no murmur  EDEMA: no LE edema bilaterally  ABDOMEN: soft, nondistended, nontender, bowel sounds normal  MS: extremities normal - no gross deformities noted, no evidence of inflammation in joints, no muscle tenderness  SKIN: no rash  ACCESS: TDC    Data   CMP  Recent Labs   Lab 02/13/20  0720 02/12/20  0710 02/11/20  1739 02/11/20  0841 02/09/20  0615 02/08/20  0628 02/07/20  1058    139 138 137 137 136 137   POTASSIUM 4.0 4.2 4.4 4.0 3.4 3.6 3.8   CHLORIDE 112* 110* 107 108 109 106 108   CO2 20 17* 17* 20 20 21 22   ANIONGAP 6 12 13 9 8 8 8   * 248* 274* 146* 125* 180* 168*   BUN 31* 34* 33* 33* 35* 40* 42*   CR 5.69* 5.92* 6.13* 5.93* 6.21* 5.94* 5.98*   GFRESTIMATED 11* 11* 10* 11* 10* 11* 11*   GFRESTBLACK 13* 12* 12* 12* 12* 12* 12*   CHARLY 8.6 8.7 9.4 8.8 8.3* 8.7 8.3*   MAG 1.8  --  1.8  --   --  2.0 1.8   PHOS  --  4.3 4.2  --   --   --  3.9   PROTTOTAL  --   --  7.4  --  6.1*  --  6.5*   ALBUMIN  --   --  3.8  --  2.9*  --  3.0*   BILITOTAL  --   --  0.4  --  0.3  --  0.5   ALKPHOS  --   --  172*  --  133  --  139   AST  --   --  14  --  8  --  13   ALT  --   --  15  --  10  --  11     CBC  Recent Labs   Lab 02/13/20  0720 02/12/20  0710 02/11/20  2241 02/11/20  1739 02/11/20  0841   HGB 10.3* 10.9*  --  12.6* 11.6*   WBC 7.5 8.4  --  9.8 9.0   RBC 3.50* 3.76*  --  4.34* 4.12*   HCT 32.0* 33.7*  --  38.6* 36.4*   MCV 91 90  --  89 88   MCH 29.4 29.0  --  29.0 28.2   MCHC 32.2 32.3  --  32.6 31.9   RDW 13.9  13.7  --  13.4 13.5    297 313 351 352     INR  Recent Labs   Lab 02/13/20  0122   INR 1.14     ABGNo lab results found in last 7 days.   Urine Studies  Recent Labs   Lab Test 02/11/20  0850 01/27/20  0941 01/03/20  1406 08/28/19  0905   COLOR Yellow Straw Light Yellow Yellow   APPEARANCE Clear Clear Clear Clear   URINEGLC 100* >1000* 30* Negative   URINEBILI Negative Negative Negative Negative   URINEKETONE Negative Trace* 10* Negative   SG 1.020 1.011 1.017 1.015   UBLD Trace* Trace* Trace* Small*   URINEPH 7.0 6.5 6.5 7.0   PROTEIN >=300* 300* 300* 100*   UROBILINOGEN 0.2  --   --  0.2   NITRITE Negative Negative Negative Negative   LEUKEST Negative Negative Negative Negative   RBCU 2-5* 2 3* 5-10*   WBCU 0 - 5 2 1 5-10*     Recent Labs   Lab Test 08/22/19  0857 01/11/19  0741 07/08/15  0747 01/28/15  0749 12/27/13  0736 11/11/13  1436 11/12/12  1649 10/11/12  0737 02/28/12  0713   UTPG 2.20* 0.87* 0.28* 0.43* 0.31* 0.30* 0.40* 0.51* 0.48*     PTH  No lab results found.  Iron Studies  Recent Labs   Lab Test 02/13/20  0720   AUBRIE 106       IMAGING:  All imaging studies reviewed by me.   Patient was seen and evaluated by me, Adam Villalba MD. I have reviewed the note and agree with the the plan of care as documented by the fellow.

## 2020-02-14 NOTE — PROGRESS NOTES
[facility], [facility] Progress Note - Hospitalist Service, Gold 9       Date of Admission:  2/11/2020  Assessment & Plan     Murtaza Fitzgerald is a 44 year old male with PMHx of DM1 c/b gastroparesis, CKD (s/p kidney transplant on 11/2008 secondary to diabetic nephropathy), pancreatic transplant (2/2009), CAD s/p bypass graft, HTN, GERD who presents to the ED with nausea and vomiting.     SWATHI on CKD in setting of renal transplant   Concern for transplant failure  Crt 6.13 on admission, baseline is around 2.5-5.   He was seen by nephrology and plan is place dialysis catheter and trial of HD   HD catheter placed and  Trial of HD well tolerated. He complains of Pain at the catheter site  Renal transplant and pancreas transplant teams have been notified and recs will be followed     Intractable vomiting-- resolved  Gastroparesis  Pt presenting with intractable vomiting which began earlier this morning. He has vomited about 12 times, all non-bloody and non-bilious. Does have a recurring hx of intractable vomiting and was last hospitalized on 2/5-2/9 with similar complaints. Vomiting most likely secondary to gastroparesis and/or uremia (BUN/Crt elevated at 33 and 6.13). In the past, there have been discussions on possible gastric pacemaker for management of his gastroparesis. GI consult placed. Last bowel movement was on Saturday (2/8) and flatus passed yesterday. CT abdomen/pelvis obtained and shows no evidence of a small bowel obstruction. Of note, if vomiting acutely worsens or pt does not improve with supportive measures/correction of uremia and other workup uneventful, can obtain MRI to evaluate for PRES.    -supportive measures: IVF, antiemetics   -zofran 4 mg IV Q6H PRN, per GI avoid compazine  -0.5mg ativan IV Q6H PRN for nausea  -GI cocktail   -GI consulted as well  It appears his nausea/vomiting might be multifactorial with Uremia, hyperglycemia and gastroparesis  all contributing.   Seems to have resolved. He is tolerating some meals     Chronic Problems:      # DM1   # Hyperglycemia   - Resume home dosing of Tresiba  -A1C is 7  - med sliding scale insulin   - hypoglycemia protocol      #S/p pancreatic transplant 2/2009   #S/p renal transplant 11/2008   -  mg BID   - Tacrolimus 2 mg BID   - tranplant teams consulted.     # CAD s/p bypass graft   #HTN   Per chart review patient takes amlodipine 5mg, Carvedilol 6.25 mg BID restarted  - hydralazine PRN available for BP >180/90     Diet: Consistent Carbohydrate Diet 5089-4314 Calories: High Consistent CHO (4-7 CHO units/meal)    DVT Prophylaxis: VTE Prophylaxis contraindicated due to line placement  Mueller Catheter: not present  Code Status: Full Code      Disposition Plan   Expected discharge: 4 - 7 days, recommended to prior living arrangement once dilaysis plan and outpatient HD arranged.  Entered: Jesika Snyder MD 02/13/2020, 6:50 PM       The patient's care was discussed with the Bedside Nurse and Patient.    Jesika Snyder MD  Hospitalist Service, [address]  Pager: [phone]  Please see sticky note for cross cover information  ______________________________________________________________________    Interval History     Patient seen and examined at bedside.No acute events overnight. He underwent HD catheter placement and HD trial. He does have pain at the [facility] catheter insertion site. He has still not had a BM. He is no longer nauseous and tolerating PO. He denies any chest pain, no shortness of breath, no abdominal pain, no issues urinating.    Data reviewed today: I reviewed all medications, new labs and imaging results over the last 24 hours.   Physical Exam   Vital Signs: Temp: 96.3  F (35.7  C) Temp src: Oral BP: (!) 159/86 Pulse: 90 Heart Rate: 88 Resp: 16 SpO2: 98 % O2 Device: None (Room air) Oxygen Delivery: 8 LPM  Weight: 195 lbs 1.6 oz      Data   Recent Labs    Lab 02/13/20  0720 02/13/20  0122 02/12/20  0710 02/11/20  2241 02/11/20  1739  02/09/20  0615   WBC 7.5  --  8.4  --  9.8   < > 5.5   HGB 10.3*  --  10.9*  --  12.6*   < > 9.9*   MCV 91  --  90  --  89   < > 90     --  297 313 351   < > 261   INR  --  1.14  --   --   --   --   --      --  139  --  138   < > 137   POTASSIUM 4.0  --  4.2  --  4.4   < > 3.4   CHLORIDE 112*  --  110*  --  107   < > 109   CO2 20  --  17*  --  17*   < > 20   BUN 31*  --  34*  --  33*   < > 35*   CR 5.69*  --  5.92*  --  6.13*   < > 6.21*   ANIONGAP 6  --  12  --  13   < > 8   CHARLY 8.6  --  8.7  --  9.4   < > 8.3*   *  --  248*  --  274*   < > 125*   ALBUMIN  --   --   --   --  3.8  --  2.9*   PROTTOTAL  --   --   --   --  7.4  --  6.1*   BILITOTAL  --   --   --   --  0.4  --  0.3   ALKPHOS  --   --   --   --  172*  --  133   ALT  --   --   --   --  15  --  10   AST  --   --   --   --  14  --  8   LIPASE  --   --   --   --  41*  --   --     < > = values in this interval not displayed.     Recent Results (from the past 24 hour(s))   IR CVC Tunnel Placement > 5 Yrs of Age    Narrative    DIAGNOSIS: Kidney replaced by transplant    PROCEDURE: IR CVC TUNNEL PLACEMENT > 5 YRS OF AGE    Impression    IMPRESSION: Completed image-guided placement of 14.5 Bhutanese, 23 cm  double lumen tunneled central venous catheter via right internal  jugular vein. Catheter tip in high right atrium. Aspirates and flushes  freely, heparin locked and ready for immediate use. No complication.     ----------    CLINICAL HISTORY: Patient with history of type 1 diabetes status post  kidney transplantation 2009 admitted with intractable nausea and  vomiting now with acute kidney injury requiring long-term dialysis so  IR has been consulted for tunneled dialysis catheter placement. The  patient has a history of a tunneled dialysis catheter in 2013.     PERFORMED BY: Yaw Sierra PA-C    CONSENT: Written informed consent was obtained and is documented  in  the patient record.    MEDICATIONS: 1. 2 mg midazolam IV  2. 100 mcg fentanyl IV  3. 10 mL 1% lidocaine for local anesthesia  4. 2000 units heparin per lumen    NURSING: Patient continuously monitored by trained, independent  observer.    SEDATION TIME: 20 minutes face-to-face    FLUOROSCOPY TIME: 0.4 minutes    DESCRIPTION: The right neck and upper chest were prepped and draped in  the usual sterile fashion.      Under ultrasound guidance, the right internal jugular vein was  identified and the overlying skin was anesthetized and skin  dermatotomy was made. Under ultrasound guidance, right internal  jugular venipuncture was made with needle. Image saved documenting  venipuncture and patency.    Needle was exchanged over guidewire for a dilator under fluoroscopic  guidance. Length to right atrium was measured with guidewire.  Guidewire and inner dilator were removed. Wire was advanced into  inferior vena cava under fluoroscopic guidance and secured.     The anterior chest skin was anesthetized and incision was made after  measurements were taken. A cuffed catheter was subcutaneously tunneled  from the anterior chest incision to the internal jugular venipuncture  site after path of tunnel was anesthetized. The dilator was exchanged  over guidewire for a peel-away sheath. Guidewire was removed. Under  fluoroscopic guidance, the catheter was placed through the peel-away  sheath. Peel-away sheath was removed.      Final catheter position saved. Both catheter lumens adequately  aspirated and flushed. Each lumen was heparin locked. A catheter  retaining suture and sterile dressing were applied. The skin  dermatotomy site overlying the internal jugular venipuncture was  closed with topical adhesive.    COMPLICATIONS: No immediate concerns, the patient remained stable  throughout the procedure and tolerated it well.    ESTIMATED BLOOD LOSS: Minimal    SPECIMENS: None    EAMON HERNÁNDEZ PA-C     Medications     sodium  chloride 125 mL/hr at 02/13/20 1511       atorvastatin  40 mg Oral Daily     heparin ANTICOAGULANT  5,000 Units Subcutaneous BID     heparin Lock (1000 units/mL High concentration)  3 mL Intracatheter Once     heparin Lock (1000 units/mL High concentration)  3 mL Intracatheter Once     HYDROmorphone  0.5 mg Intravenous Once     insulin aspart   Subcutaneous QAM AC     insulin aspart   Subcutaneous Daily with lunch     insulin aspart   Subcutaneous Daily with supper     insulin aspart  1-6 Units Subcutaneous Q4H     insulin degludec  24 Units Subcutaneous At Bedtime     mycophenolic acid  360 mg Oral BID     omeprazole  40 mg Oral QPM     sodium chloride (PF)  3 mL Intracatheter Q8H     tacrolimus  2 mg Oral BID

## 2020-02-14 NOTE — PLAN OF CARE
Alert and oriented. VSS except hypertension - no prn meds available. Intermittent nausea continues - not managed with Zofran and Ativan. MD notified - increased Ativan frequency. On Consistent Carb diet - minimal po intake. Continues to report pain and dialysis catheter - declines Oxycodone. Had dialysis this afternoon. Denied nausea at dinnertime, tolerated full tray with PO Zofran given. Last BM 5 days ago. Miralax available, patient hoping that eating a regular meal will stimulate BM.  Patient's mom at bedside.   Continue with POC.

## 2020-02-14 NOTE — PROGRESS NOTES
HEMODIALYSIS TREATMENT NOTE    Date: 2/14/2020  Time: 3:42 PM    Data:  Pre Wt: 88.5 kg (195 lb 1.7 oz)   Desired Wt: 87.5 kg   Post Wt: 86.2 kg (190 lb 0.6 oz)  Weight change: - 1 kg  Ultrafiltration - Post Run Net Total Removed (mL): 1000 mL  Vascular Access Status: patent  Dialyzer Rinse: Clear, Light  Total Blood Volume Processed: 36.2 Liters  Total Dialysis (Treatment) Time: 2.5 Hours  No heparin    Lab:   No    Interventions/Assessment:  HD tx via RCVC on K3 Ca2.25 dialysate bath with  and . Keep SBP > 100. SBP in the 150s throughout treatment. Nephrology aware. UF goal decreased from 2 kg to 1 kg per nephrologist Zamzam as patient is making urine. No issues or complaints during run. CVC saline locked with clear guard caps. Post standing weight obtained and report given to primary RN.      Plan:    Next HD tx per renal team.

## 2020-02-15 LAB
ANION GAP SERPL CALCULATED.3IONS-SCNC: 4 MMOL/L (ref 3–14)
BASOPHILS # BLD AUTO: 0 10E9/L (ref 0–0.2)
BASOPHILS NFR BLD AUTO: 0.6 %
BUN SERPL-MCNC: 15 MG/DL (ref 7–30)
CALCIUM SERPL-MCNC: 8.5 MG/DL (ref 8.5–10.1)
CHLORIDE SERPL-SCNC: 104 MMOL/L (ref 94–109)
CO2 SERPL-SCNC: 27 MMOL/L (ref 20–32)
CREAT SERPL-MCNC: 4.22 MG/DL (ref 0.66–1.25)
DIFFERENTIAL METHOD BLD: ABNORMAL
EOSINOPHIL # BLD AUTO: 0.2 10E9/L (ref 0–0.7)
EOSINOPHIL NFR BLD AUTO: 2.5 %
ERYTHROCYTE [DISTWIDTH] IN BLOOD BY AUTOMATED COUNT: 14 % (ref 10–15)
GFR SERPL CREATININE-BSD FRML MDRD: 16 ML/MIN/{1.73_M2}
GLUCOSE BLDC GLUCOMTR-MCNC: 154 MG/DL (ref 70–99)
GLUCOSE BLDC GLUCOMTR-MCNC: 212 MG/DL (ref 70–99)
GLUCOSE BLDC GLUCOMTR-MCNC: 221 MG/DL (ref 70–99)
GLUCOSE BLDC GLUCOMTR-MCNC: 81 MG/DL (ref 70–99)
GLUCOSE BLDC GLUCOMTR-MCNC: 91 MG/DL (ref 70–99)
GLUCOSE SERPL-MCNC: 170 MG/DL (ref 70–99)
HCT VFR BLD AUTO: 35.4 % (ref 40–53)
HGB BLD-MCNC: 11.2 G/DL (ref 13.3–17.7)
IMM GRANULOCYTES # BLD: 0 10E9/L (ref 0–0.4)
IMM GRANULOCYTES NFR BLD: 0.5 %
LYMPHOCYTES # BLD AUTO: 1.4 10E9/L (ref 0.8–5.3)
LYMPHOCYTES NFR BLD AUTO: 22.4 %
MAGNESIUM SERPL-MCNC: 1.7 MG/DL (ref 1.6–2.3)
MCH RBC QN AUTO: 28.6 PG (ref 26.5–33)
MCHC RBC AUTO-ENTMCNC: 31.6 G/DL (ref 31.5–36.5)
MCV RBC AUTO: 91 FL (ref 78–100)
MONOCYTES # BLD AUTO: 0.6 10E9/L (ref 0–1.3)
MONOCYTES NFR BLD AUTO: 9 %
NEUTROPHILS # BLD AUTO: 4.2 10E9/L (ref 1.6–8.3)
NEUTROPHILS NFR BLD AUTO: 65 %
NRBC # BLD AUTO: 0 10*3/UL
NRBC BLD AUTO-RTO: 0 /100
PHOSPHATE SERPL-MCNC: 3.5 MG/DL (ref 2.5–4.5)
PLATELET # BLD AUTO: 260 10E9/L (ref 150–450)
POTASSIUM SERPL-SCNC: 3.9 MMOL/L (ref 3.4–5.3)
RBC # BLD AUTO: 3.91 10E12/L (ref 4.4–5.9)
SODIUM SERPL-SCNC: 136 MMOL/L (ref 133–144)
WBC # BLD AUTO: 6.4 10E9/L (ref 4–11)

## 2020-02-15 PROCEDURE — 00000146 ZZHCL STATISTIC GLUCOSE BY METER IP

## 2020-02-15 PROCEDURE — 90937 HEMODIALYSIS REPEATED EVAL: CPT

## 2020-02-15 PROCEDURE — 25000131 ZZH RX MED GY IP 250 OP 636 PS 637: Performed by: PHYSICIAN ASSISTANT

## 2020-02-15 PROCEDURE — 40000556 ZZH STATISTIC PERIPHERAL IV START W US GUIDANCE

## 2020-02-15 PROCEDURE — 25000132 ZZH RX MED GY IP 250 OP 250 PS 637: Performed by: STUDENT IN AN ORGANIZED HEALTH CARE EDUCATION/TRAINING PROGRAM

## 2020-02-15 PROCEDURE — 25000131 ZZH RX MED GY IP 250 OP 636 PS 637: Performed by: STUDENT IN AN ORGANIZED HEALTH CARE EDUCATION/TRAINING PROGRAM

## 2020-02-15 PROCEDURE — 86704 HEP B CORE ANTIBODY TOTAL: CPT | Performed by: STUDENT IN AN ORGANIZED HEALTH CARE EDUCATION/TRAINING PROGRAM

## 2020-02-15 PROCEDURE — 12000001 ZZH R&B MED SURG/OB UMMC

## 2020-02-15 PROCEDURE — 25000132 ZZH RX MED GY IP 250 OP 250 PS 637: Performed by: INTERNAL MEDICINE

## 2020-02-15 PROCEDURE — 99233 SBSQ HOSP IP/OBS HIGH 50: CPT | Performed by: INTERNAL MEDICINE

## 2020-02-15 PROCEDURE — 85025 COMPLETE CBC W/AUTO DIFF WBC: CPT | Performed by: STUDENT IN AN ORGANIZED HEALTH CARE EDUCATION/TRAINING PROGRAM

## 2020-02-15 PROCEDURE — 83735 ASSAY OF MAGNESIUM: CPT | Performed by: STUDENT IN AN ORGANIZED HEALTH CARE EDUCATION/TRAINING PROGRAM

## 2020-02-15 PROCEDURE — 36415 COLL VENOUS BLD VENIPUNCTURE: CPT | Performed by: STUDENT IN AN ORGANIZED HEALTH CARE EDUCATION/TRAINING PROGRAM

## 2020-02-15 PROCEDURE — 80048 BASIC METABOLIC PNL TOTAL CA: CPT | Performed by: STUDENT IN AN ORGANIZED HEALTH CARE EDUCATION/TRAINING PROGRAM

## 2020-02-15 PROCEDURE — 25000128 H RX IP 250 OP 636: Performed by: INTERNAL MEDICINE

## 2020-02-15 PROCEDURE — 25800030 ZZH RX IP 258 OP 636: Performed by: INTERNAL MEDICINE

## 2020-02-15 PROCEDURE — 84100 ASSAY OF PHOSPHORUS: CPT | Performed by: STUDENT IN AN ORGANIZED HEALTH CARE EDUCATION/TRAINING PROGRAM

## 2020-02-15 RX ORDER — AMLODIPINE BESYLATE 5 MG/1
5 TABLET ORAL DAILY
Status: DISCONTINUED | OUTPATIENT
Start: 2020-02-15 | End: 2020-02-21 | Stop reason: HOSPADM

## 2020-02-15 RX ORDER — HEPARIN SODIUM 1000 [USP'U]/ML
500 INJECTION, SOLUTION INTRAVENOUS; SUBCUTANEOUS
Status: COMPLETED | OUTPATIENT
Start: 2020-02-15 | End: 2020-02-15

## 2020-02-15 RX ORDER — METOCLOPRAMIDE 5 MG/1
5 TABLET ORAL 3 TIMES DAILY PRN
Status: DISCONTINUED | OUTPATIENT
Start: 2020-02-15 | End: 2020-02-21 | Stop reason: HOSPADM

## 2020-02-15 RX ORDER — CARVEDILOL 6.25 MG/1
6.25 TABLET ORAL 2 TIMES DAILY WITH MEALS
Status: DISCONTINUED | OUTPATIENT
Start: 2020-02-15 | End: 2020-02-21 | Stop reason: HOSPADM

## 2020-02-15 RX ORDER — HEPARIN SODIUM 1000 [USP'U]/ML
500 INJECTION, SOLUTION INTRAVENOUS; SUBCUTANEOUS CONTINUOUS
Status: DISCONTINUED | OUTPATIENT
Start: 2020-02-15 | End: 2020-02-15

## 2020-02-15 RX ADMIN — TACROLIMUS 1 MG: 1 CAPSULE ORAL at 20:01

## 2020-02-15 RX ADMIN — INSULIN ASPART 7 UNITS: 100 INJECTION, SOLUTION INTRAVENOUS; SUBCUTANEOUS at 19:55

## 2020-02-15 RX ADMIN — TACROLIMUS 1 MG: 1 CAPSULE ORAL at 09:31

## 2020-02-15 RX ADMIN — ATORVASTATIN CALCIUM 40 MG: 40 TABLET, FILM COATED ORAL at 09:30

## 2020-02-15 RX ADMIN — INSULIN ASPART 1 UNITS: 100 INJECTION, SOLUTION INTRAVENOUS; SUBCUTANEOUS at 14:04

## 2020-02-15 RX ADMIN — INSULIN ASPART 2 UNITS: 100 INJECTION, SOLUTION INTRAVENOUS; SUBCUTANEOUS at 21:41

## 2020-02-15 RX ADMIN — MYCOPHENOLIC ACID 360 MG: 360 TABLET, DELAYED RELEASE ORAL at 09:30

## 2020-02-15 RX ADMIN — AMLODIPINE BESYLATE 5 MG: 5 TABLET ORAL at 17:10

## 2020-02-15 RX ADMIN — OXYCODONE HYDROCHLORIDE 5 MG: 5 TABLET ORAL at 02:11

## 2020-02-15 RX ADMIN — OXYCODONE HYDROCHLORIDE 5 MG: 5 TABLET ORAL at 21:48

## 2020-02-15 RX ADMIN — OMEPRAZOLE 40 MG: 20 CAPSULE, DELAYED RELEASE ORAL at 20:01

## 2020-02-15 RX ADMIN — OXYCODONE HYDROCHLORIDE 5 MG: 5 TABLET ORAL at 11:28

## 2020-02-15 RX ADMIN — HEPARIN SODIUM 500 UNITS/HR: 1000 INJECTION INTRAVENOUS; SUBCUTANEOUS at 12:54

## 2020-02-15 RX ADMIN — SENNOSIDES AND DOCUSATE SODIUM 2 TABLET: 8.6; 5 TABLET ORAL at 21:41

## 2020-02-15 RX ADMIN — CARVEDILOL 6.25 MG: 6.25 TABLET, FILM COATED ORAL at 20:01

## 2020-02-15 RX ADMIN — SODIUM CHLORIDE 250 ML: 9 INJECTION, SOLUTION INTRAVENOUS at 12:54

## 2020-02-15 RX ADMIN — SODIUM CHLORIDE 300 ML: 9 INJECTION, SOLUTION INTRAVENOUS at 12:53

## 2020-02-15 RX ADMIN — HEPARIN SODIUM 500 UNITS: 1000 INJECTION INTRAVENOUS; SUBCUTANEOUS at 12:54

## 2020-02-15 RX ADMIN — MYCOPHENOLIC ACID 360 MG: 360 TABLET, DELAYED RELEASE ORAL at 20:01

## 2020-02-15 ASSESSMENT — ACTIVITIES OF DAILY LIVING (ADL)
ADLS_ACUITY_SCORE: 14

## 2020-02-15 NOTE — PROGRESS NOTES
HEMODIALYSIS TREATMENT NOTE    Date: 2/15/2020  Time: 1:52 PM    Data:  Pre Wt: 86.9  Desired Wt: 85.9 kg   Post Wt: 85.9  Weight change: 1 kg  Ultrafiltration - Post Run Net Total Removed (mL): 1000 mL  Vascular Access Status: CVC  Dialyzer Rinse: Clear, Light  Total Blood Volume Processed: 157.7  Total Dialysis (Treatment) Time: 3  Dialysate Bath: K 3, Ca 3  Heparin 500 units loading + 500 units/hr    Lab:   No    Interventions:  Heparin during run.  bfr 400  HD line CDI no dressing change at this time  2.2 units of heparin    Assessment:  Pt denies pain or SOB. Vitally stable through out treatment. Pt eating lunch insulin and carb coverage given.     Plan:    Will continue to monitor and follow POC per renal team.

## 2020-02-15 NOTE — PROGRESS NOTES
[facility], [place]  Transplant Nephrology Progress Note  Date of Admission:  2/11/2020  Today's Date: 02/14/2020    Assessment & Plan   # LDKT: Increased   - Baseline Cr ~ 5-6   - Proteinuria: Moderate (1-3 grams)   - Date DSA Last Checked: Jul/2016      Latest DSA: No   - BK Viremia: No   - Kidney Tx Biopsy: Dec 27, 2013; Result: No evidence of rejection    His graft function is declining and he was recently admitted and discharged from [facility] for similar complaints, and the decision was made to start HD if his symptoms reappeared. We will aim for 3 consecutive daily runs, and then reevaluate his continued need for HD. He will need an outpatient HD unit located (he prefers [facility]). 1st run 2/13 tolerated well. Will plan for  and 2.5hr run today. Next run Saturday, then break for Sunday.    # Pancreas Tx (ANNMARIE):   - Pancreatic Exocrine Drainage: Bladder drained    - failed 2013, back on insulin.    # Immunosuppression: Tacrolimus immediate release (goal 4-6) and Mycophenolic acid (goal not followed)   - Changes: No, Will keep MPA 360mg BID. Cutting tac in half to 1mg BID for 3 months, followed by 1mg daily x 3 months, then stop tacrolimus.    # Infection Prophylaxis:   - PJP: None    # Hypertension: Inadequate control;  Goal BP: < 140/90   - Volume status: Mildly hypervolemic  EDW~ TBD   - Changes: Yes - restart home meds, we will attempt fluid removal on HD    # Diabetes: Controlled (HbA1c <7%) Last HbA1c: 7.0%   - Management as per primary team.    # Anemia in Chronic Renal Disease: Hgb: Stable      WILL: No   - Iron studies: Not checked recently    # Mineral Bone Disorder:   - Secondary renal hyperparathyroidism; PTH level: Not checked recently        On treatment: None  - Vitamin D; level: Not checked recently        On Supplement: No  - Calcium; level: Normal        On Supplement: No  - Phosphorus; level: Normal        On Supplement: No    # Electrolytes:   -  Potassium; level: Normal        On Supplement: No  - Magnesium; level: Normal        On Supplement: No  - Bicarbonate; level: Low        On Supplement: No  - Sodium; level: Normal        On Supplement: No    # Gastroparesis: Has known gastroparesis, surgery will evaluate him for a gastric pacemaker implant. In the past, GI have also suggested a G-tube vent    # Nausea/Vomiting: Could be a component of uremia and/or gastroparesis. Symptoms have improved today. We will dialyze him for 3 days, and can reevaluate his GI symptoms in 2 weeks. At that time, if symptoms persist, a gastric pacemaker may be the next option.     # Transplant History:  Etiology of Kidney Failure: Diabetes mellitus type 1  Tx: LDKT and ANNMARIE  Transplant: 11/4/2008 (Kidney), 2/17/2009 (Pancreas)  Donor Type: Living Donor Class: Standard Criteria Donor  Significant changes in immunosuppression: None  Significant transplant-related complications: None    Recommendations were communicated to the primary team verbally.    Seen and discussed with Dr. Deejay Wyman MD  Pager: [phone]    INTERVAL HISTORY  Tolerated HD well, he still has nausea and vomiting, but less severe. He denies shortness of breath or chest pain. He has mild LE edema. He denies fevers, chills, or rigors. He has a good appetite. he denies itching. He has hiccups (may be from uremia). HD today    Prior to Admission Medications     atorvastatin  40 mg Oral Daily     heparin ANTICOAGULANT  5,000 Units Subcutaneous BID     heparin Lock (1000 units/mL High concentration)  3 mL Intracatheter Once     heparin Lock (1000 units/mL High concentration)  3 mL Intracatheter Once     HYDROmorphone  0.5 mg Intravenous Once     insulin aspart   Subcutaneous QAM AC     insulin aspart   Subcutaneous Daily with lunch     insulin aspart   Subcutaneous Daily with supper     insulin aspart  1-6 Units Subcutaneous Q4H     insulin degludec  24 Units Subcutaneous At Bedtime     mycophenolic acid  360  mg Oral BID     omeprazole  40 mg Oral QPM     senna-docusate  2 tablet Oral At Bedtime     sodium chloride (PF)  3 mL Intracatheter Q8H     tacrolimus  1 mg Oral BID         Physical Exam      BP (!) 172/104 (BP Location: Right arm)   Pulse 90   Temp 98.4  F (36.9  C) (Oral)   Resp 16   Wt 86.2 kg (190 lb 0.6 oz)   SpO2 97%   BMI 28.89 kg/m        GENERAL APPEARANCE: alert and no distress  HENT: mouth without ulcers or lesions  LYMPHATICS: no cervical or supraclavicular nodes  RESP: lungs clear to auscultation - no rales, rhonchi or wheezes  CV: regular rhythm, normal rate, no rub, no murmur  EDEMA: no LE edema bilaterally  ABDOMEN: soft, nondistended, nontender, bowel sounds normal  MS: extremities normal - no gross deformities noted, no evidence of inflammation in joints, no muscle tenderness  SKIN: no rash  ACCESS: TDC    Data   CMP  Recent Labs   Lab 02/14/20  0803 02/13/20  0720 02/12/20  0710 02/11/20  1739  02/09/20  0615 02/08/20  0628    138 139 138   < > 137 136   POTASSIUM 3.9 4.0 4.2 4.4   < > 3.4 3.6   CHLORIDE 108 112* 110* 107   < > 109 106   CO2 19* 20 17* 17*   < > 20 21   ANIONGAP 10 6 12 13   < > 8 8   * 123* 248* 274*   < > 125* 180*   BUN 20 31* 34* 33*   < > 35* 40*   CR 4.59* 5.69* 5.92* 6.13*   < > 6.21* 5.94*   GFRESTIMATED 15* 11* 11* 10*   < > 10* 11*   GFRESTBLACK 17* 13* 12* 12*   < > 12* 12*   CHARLY 8.6 8.6 8.7 9.4   < > 8.3* 8.7   MAG 1.6 1.8  --  1.8  --   --  2.0   PHOS  --   --  4.3 4.2  --   --   --    PROTTOTAL  --   --   --  7.4  --  6.1*  --    ALBUMIN  --   --   --  3.8  --  2.9*  --    BILITOTAL  --   --   --  0.4  --  0.3  --    ALKPHOS  --   --   --  172*  --  133  --    AST  --   --   --  14  --  8  --    ALT  --   --   --  15  --  10  --     < > = values in this interval not displayed.     CBC  Recent Labs   Lab 02/14/20  0803 02/13/20  0720 02/12/20  0710 02/11/20  2241 02/11/20  5389   HGB 11.1* 10.3* 10.9*  --  12.6*   WBC 8.3 7.5 8.4  --  9.8   RBC  3.83* 3.50* 3.76*  --  4.34*   HCT 33.9* 32.0* 33.7*  --  38.6*   MCV 89 91 90  --  89   MCH 29.0 29.4 29.0  --  29.0   MCHC 32.7 32.2 32.3  --  32.6   RDW 13.9 13.9 13.7  --  13.4    273 297 313 351     INR  Recent Labs   Lab 02/13/20  0122   INR 1.14     ABGNo lab results found in last 7 days.   Urine Studies  Recent Labs   Lab Test 02/11/20  0850 01/27/20  0941 01/03/20  1406 08/28/19  0905   COLOR Yellow Straw Light Yellow Yellow   APPEARANCE Clear Clear Clear Clear   URINEGLC 100* >1000* 30* Negative   URINEBILI Negative Negative Negative Negative   URINEKETONE Negative Trace* 10* Negative   SG 1.020 1.011 1.017 1.015   UBLD Trace* Trace* Trace* Small*   URINEPH 7.0 6.5 6.5 7.0   PROTEIN >=300* 300* 300* 100*   UROBILINOGEN 0.2  --   --  0.2   NITRITE Negative Negative Negative Negative   LEUKEST Negative Negative Negative Negative   RBCU 2-5* 2 3* 5-10*   WBCU 0 - 5 2 1 5-10*     Recent Labs   Lab Test 08/22/19  0857 01/11/19  0741 07/08/15  0747 01/28/15  0749 12/27/13  0736 11/11/13  1436 11/12/12  1649 10/11/12  0737 02/28/12  0713   UTPG 2.20* 0.87* 0.28* 0.43* 0.31* 0.30* 0.40* 0.51* 0.48*     PTH  No lab results found.  Iron Studies  Recent Labs   Lab Test 02/13/20  0720   AUBRIE 106       IMAGING:  All imaging studies reviewed by me.     Patient was seen and evaluated by me, Adam Villalba MD. I have reviewed the note and agree with the the plan of care as documented by the fellow.

## 2020-02-15 NOTE — PLAN OF CARE
Provided care from [id number].    Status: Presented with nausea and vomiting.   Vitals: Hypertensive, other VSS. RA.  Neuros: A&O x4. Denies nausea.  IV: PICC SL.  Resp/trach: PIV SL.  Diet: Carb diet of 5472-9779. Pt ate some joyce crackers with pain medication overnight.  Bowel status: BS+, last BM reportedly 2/9/2020. 1 senna given.   Glucose checks: 144. Tresiba given as ordered.  : Voiding spontaneously.   Skin: Inguinal bulge noticed.  Pain: Pain controlled with 5 mg Oxycodone.  Activity: Up ad mikal. Ambulated to the bathroom.  Social: Family visited 2/14 in the evening.  Plan: Need to arrange HD before discharge and ensure uremia symptoms are resolved. Will continue to monitor and follow POC.

## 2020-02-15 NOTE — PROGRESS NOTES
[facility], [facility] Progress Note - Hospitalist Service, Gold 9       Date of Admission:  2/11/2020  Assessment & Plan   Murtaza Fitzgerald is a 44 year old male with PMHx of DM1 c/b gastroparesis, CKD (s/p kidney transplant on 11/2008 secondary to diabetic nephropathy), pancreatic transplant (2/2009), CAD s/p bypass graft, HTN, GERD who presents to the ED with nausea and vomiting.     SWATHI on CKD in setting of renal transplant   Concern for transplant failure  Crt 6.13 on admission, baseline is around 2.5-5.   He was seen by nephrology and plan is place dialysis catheter and trial of HD   HD catheter placed and  Trial of HD well tolerated. He complains of Pain at the catheter site  Renal transplant and pancreas transplant teams have been notified and recs will be followed  He tolerated day 2 of HD well, Day3 today   Improved hiccups and nausea today, will continue to monitor closely     Intractable vomiting-- resolved  Gastroparesis  Pt presenting with intractable vomiting which began earlier this morning. He has vomited about 12 times, all non-bloody and non-bilious. Does have a recurring hx of intractable vomiting and was last hospitalized on 2/5-2/9 with similar complaints. Vomiting most likely secondary to gastroparesis and/or uremia (BUN/Crt elevated at 33 and 6.13). In the past, there have been discussions on possible gastric pacemaker for management of his gastroparesis. GI consult placed. Last bowel movement was on Saturday (2/8) and flatus passed yesterday. CT abdomen/pelvis obtained and shows no evidence of a small bowel obstruction. Of note, if vomiting acutely worsens or pt does not improve with supportive measures/correction of uremia and other workup uneventful, can obtain MRI to evaluate for PRES.    -supportive measures: IVF, antiemetics   -zofran 4 mg IV Q6H PRN, per GI avoid compazine, allergy listed to reglan  -0.5mg ativan IV Q6H PRN for  nausea  -GI cocktail   -GI consulted as well  It appears his nausea/vomiting might be multifactorial with Uremia, hyperglycemia and gastroparesis all contributing.   Seems to have improved today He is tolerating some meals now with being able to tolerate some dinner and breakfast.  Had Bm this morning.    Chronic Problems:      # DM1   # Hyperglycemia   - Resume home dosing of Tresiba  - carb counting with meals  -A1C is 7  - med sliding scale insulin   - hypoglycemia protocol      #S/p pancreatic transplant 2/2009   #S/p renal transplant 11/2008   -  mg BID   - Tacrolimus 2 mg BID   - tranplant teams consulted.     # CAD s/p bypass graft   #HTN   Per chart review patient takes amlodipine 5mg, Carvedilol 6.25 mg BID  restarted  - hydralazine PRN available for BP >180/90      Diet: Consistent Carbohydrate Diet 0093-4713 Calories: High Consistent CHO (4-7 CHO units/meal)    DVT Prophylaxis: Heparin SQ and Ambulate every shift  Mueller Catheter: not present  Code Status: Full Code      Disposition Plan   Expected discharge: 2 - 3 days, recommended to prior living arrangement once once uremic symptoms have improved and outpatient dialysis is arranged..  Entered: Jesika Snyder MD 02/15/2020, 1:00 PM       The patient's care was discussed with the Bedside Nurse, Patient and Patient's Family.    Jesika Snyder MD  Hospitalist Service, [address], [place]  Pager: [phone]  Please see sticky note for cross cover information  ______________________________________________________________________    Interval History   Patient was seen and examined at bedside, he reports improved nausea, no vomiting, no chest pain, no shortness of breath, no issues with urination  Family at bedside. Patient requesting po reglan with moderate to large meals. He is intolerant to IV reglan but takes PO reglan well    Data reviewed today: I reviewed all medications, new labs and imaging results over the  last 24 hours.     Physical Exam   Vital Signs: Temp: 98.5  F (36.9  C) Temp src: Oral BP: (!) 168/81 Pulse: 85 Heart Rate: 87 Resp: 13 SpO2: 97 % O2 Device: None (Room air)    Weight: 191 lbs 9.6 oz    Physical Exam  Vitals signs reviewed.   Constitutional:       General: He is not in acute distress.     Appearance: Normal appearance. He is normal weight. He is not ill-appearing.   HENT:      Head: Normocephalic.      Nose: Nose normal.      Mouth/Throat:      Mouth: Mucous membranes are moist.   Eyes:      Extraocular Movements: Extraocular movements intact.      Pupils: Pupils are equal, round, and reactive to light.   Cardiovascular:      Rate and Rhythm: Normal rate and regular rhythm.      Pulses: Normal pulses.      Heart sounds: Normal heart sounds. No murmur.   Pulmonary:      Effort: Pulmonary effort is normal.      Breath sounds: Normal breath sounds. No rhonchi.   Abdominal:      General: Abdomen is flat. Bowel sounds are normal.      Palpations: Abdomen is soft.   Musculoskeletal: Normal range of motion.   Neurological:      General: No focal deficit present.      Mental Status: He is alert and oriented to person, place, and time.   Psychiatric:         Behavior: Behavior normal.           Data   Recent Labs   Lab 02/15/20  0935 02/14/20  0803 02/13/20  0720 02/13/20  0122  02/11/20  1739  02/09/20  0615   WBC 6.4 8.3 7.5  --    < > 9.8   < > 5.5   HGB 11.2* 11.1* 10.3*  --    < > 12.6*   < > 9.9*   MCV 91 89 91  --    < > 89   < > 90    283 273  --    < > 351   < > 261   INR  --   --   --  1.14  --   --   --   --     136 138  --    < > 138   < > 137   POTASSIUM 3.9 3.9 4.0  --    < > 4.4   < > 3.4   CHLORIDE 104 108 112*  --    < > 107   < > 109   CO2 27 19* 20  --    < > 17*   < > 20   BUN 15 20 31*  --    < > 33*   < > 35*   CR 4.22* 4.59* 5.69*  --    < > 6.13*   < > 6.21*   ANIONGAP 4 10 6  --    < > 13   < > 8   CHARLY 8.5 8.6 8.6  --    < > 9.4   < > 8.3*   * 171* 123*  --    < >  274*   < > 125*   ALBUMIN  --   --   --   --   --  3.8  --  2.9*   PROTTOTAL  --   --   --   --   --  7.4  --  6.1*   BILITOTAL  --   --   --   --   --  0.4  --  0.3   ALKPHOS  --   --   --   --   --  172*  --  133   ALT  --   --   --   --   --  15  --  10   AST  --   --   --   --   --  14  --  8   LIPASE  --   --   --   --   --  41*  --   --     < > = values in this interval not displayed.     No results found for this or any previous visit (from the past 24 hour(s)).  Medications     heparin (porcine) 500 Units/hr (02/15/20 1254)       amLODIPine  5 mg Oral Daily     atorvastatin  40 mg Oral Daily     carvedilol  6.25 mg Oral BID w/meals     heparin ANTICOAGULANT  5,000 Units Subcutaneous BID     heparin Lock (1000 units/mL High concentration)  3 mL Intracatheter Once     heparin Lock (1000 units/mL High concentration)  3 mL Intracatheter Once     HYDROmorphone  0.5 mg Intravenous Once     insulin aspart   Subcutaneous QAM AC     insulin aspart   Subcutaneous Daily with lunch     insulin aspart   Subcutaneous Daily with supper     insulin aspart  1-6 Units Subcutaneous Q4H     insulin degludec  24 Units Subcutaneous At Bedtime     mycophenolic acid  360 mg Oral BID     omeprazole  40 mg Oral QPM     senna-docusate  2 tablet Oral At Bedtime     sodium chloride (PF)  3 mL Intracatheter Q8H     sodium chloride (PF)  9 mL Intracatheter During Hemodialysis (from stock)     sodium chloride (PF)  9 mL Intracatheter During Hemodialysis (from stock)     tacrolimus  1 mg Oral BID

## 2020-02-16 LAB
ANION GAP SERPL CALCULATED.3IONS-SCNC: 7 MMOL/L (ref 3–14)
BASOPHILS # BLD AUTO: 0.1 10E9/L (ref 0–0.2)
BASOPHILS NFR BLD AUTO: 0.7 %
BUN SERPL-MCNC: 13 MG/DL (ref 7–30)
CALCIUM SERPL-MCNC: 8.7 MG/DL (ref 8.5–10.1)
CHLORIDE SERPL-SCNC: 104 MMOL/L (ref 94–109)
CO2 SERPL-SCNC: 24 MMOL/L (ref 20–32)
CREAT SERPL-MCNC: 3.56 MG/DL (ref 0.66–1.25)
DIFFERENTIAL METHOD BLD: ABNORMAL
EOSINOPHIL # BLD AUTO: 0.2 10E9/L (ref 0–0.7)
EOSINOPHIL NFR BLD AUTO: 2.2 %
ERYTHROCYTE [DISTWIDTH] IN BLOOD BY AUTOMATED COUNT: 13.6 % (ref 10–15)
GFR SERPL CREATININE-BSD FRML MDRD: 20 ML/MIN/{1.73_M2}
GLUCOSE BLDC GLUCOMTR-MCNC: 153 MG/DL (ref 70–99)
GLUCOSE BLDC GLUCOMTR-MCNC: 166 MG/DL (ref 70–99)
GLUCOSE BLDC GLUCOMTR-MCNC: 174 MG/DL (ref 70–99)
GLUCOSE BLDC GLUCOMTR-MCNC: 202 MG/DL (ref 70–99)
GLUCOSE BLDC GLUCOMTR-MCNC: 279 MG/DL (ref 70–99)
GLUCOSE BLDC GLUCOMTR-MCNC: 378 MG/DL (ref 70–99)
GLUCOSE SERPL-MCNC: 178 MG/DL (ref 70–99)
HCT VFR BLD AUTO: 36.6 % (ref 40–53)
HGB BLD-MCNC: 11.6 G/DL (ref 13.3–17.7)
IMM GRANULOCYTES # BLD: 0 10E9/L (ref 0–0.4)
IMM GRANULOCYTES NFR BLD: 0.5 %
LYMPHOCYTES # BLD AUTO: 1.6 10E9/L (ref 0.8–5.3)
LYMPHOCYTES NFR BLD AUTO: 21.1 %
MAGNESIUM SERPL-MCNC: 1.8 MG/DL (ref 1.6–2.3)
MCH RBC QN AUTO: 28.5 PG (ref 26.5–33)
MCHC RBC AUTO-ENTMCNC: 31.7 G/DL (ref 31.5–36.5)
MCV RBC AUTO: 90 FL (ref 78–100)
MONOCYTES # BLD AUTO: 0.5 10E9/L (ref 0–1.3)
MONOCYTES NFR BLD AUTO: 6.9 %
NEUTROPHILS # BLD AUTO: 5.1 10E9/L (ref 1.6–8.3)
NEUTROPHILS NFR BLD AUTO: 68.6 %
NRBC # BLD AUTO: 0 10*3/UL
NRBC BLD AUTO-RTO: 0 /100
PLATELET # BLD AUTO: 268 10E9/L (ref 150–450)
POTASSIUM SERPL-SCNC: 3.9 MMOL/L (ref 3.4–5.3)
RBC # BLD AUTO: 4.07 10E12/L (ref 4.4–5.9)
SODIUM SERPL-SCNC: 136 MMOL/L (ref 133–144)
WBC # BLD AUTO: 7.4 10E9/L (ref 4–11)

## 2020-02-16 PROCEDURE — 00000146 ZZHCL STATISTIC GLUCOSE BY METER IP

## 2020-02-16 PROCEDURE — 36415 COLL VENOUS BLD VENIPUNCTURE: CPT | Performed by: INTERNAL MEDICINE

## 2020-02-16 PROCEDURE — 25000132 ZZH RX MED GY IP 250 OP 250 PS 637: Performed by: INTERNAL MEDICINE

## 2020-02-16 PROCEDURE — 25000131 ZZH RX MED GY IP 250 OP 636 PS 637: Performed by: PHYSICIAN ASSISTANT

## 2020-02-16 PROCEDURE — 99232 SBSQ HOSP IP/OBS MODERATE 35: CPT | Performed by: INTERNAL MEDICINE

## 2020-02-16 PROCEDURE — 25000128 H RX IP 250 OP 636: Performed by: STUDENT IN AN ORGANIZED HEALTH CARE EDUCATION/TRAINING PROGRAM

## 2020-02-16 PROCEDURE — 25000131 ZZH RX MED GY IP 250 OP 636 PS 637: Performed by: STUDENT IN AN ORGANIZED HEALTH CARE EDUCATION/TRAINING PROGRAM

## 2020-02-16 PROCEDURE — 85025 COMPLETE CBC W/AUTO DIFF WBC: CPT | Performed by: INTERNAL MEDICINE

## 2020-02-16 PROCEDURE — 83735 ASSAY OF MAGNESIUM: CPT | Performed by: INTERNAL MEDICINE

## 2020-02-16 PROCEDURE — 12000001 ZZH R&B MED SURG/OB UMMC

## 2020-02-16 PROCEDURE — 25000132 ZZH RX MED GY IP 250 OP 250 PS 637: Performed by: STUDENT IN AN ORGANIZED HEALTH CARE EDUCATION/TRAINING PROGRAM

## 2020-02-16 PROCEDURE — 80048 BASIC METABOLIC PNL TOTAL CA: CPT | Performed by: INTERNAL MEDICINE

## 2020-02-16 RX ADMIN — AMLODIPINE BESYLATE 5 MG: 5 TABLET ORAL at 09:07

## 2020-02-16 RX ADMIN — INSULIN ASPART 1 UNITS: 100 INJECTION, SOLUTION INTRAVENOUS; SUBCUTANEOUS at 14:00

## 2020-02-16 RX ADMIN — CARVEDILOL 6.25 MG: 6.25 TABLET, FILM COATED ORAL at 17:33

## 2020-02-16 RX ADMIN — ONDANSETRON 4 MG: 2 INJECTION INTRAMUSCULAR; INTRAVENOUS at 21:02

## 2020-02-16 RX ADMIN — TACROLIMUS 1 MG: 1 CAPSULE ORAL at 09:08

## 2020-02-16 RX ADMIN — POLYETHYLENE GLYCOL 3350 17 G: 17 POWDER, FOR SOLUTION ORAL at 17:33

## 2020-02-16 RX ADMIN — MYCOPHENOLIC ACID 360 MG: 360 TABLET, DELAYED RELEASE ORAL at 20:21

## 2020-02-16 RX ADMIN — CARVEDILOL 6.25 MG: 6.25 TABLET, FILM COATED ORAL at 09:06

## 2020-02-16 RX ADMIN — INSULIN ASPART: 100 INJECTION, SOLUTION INTRAVENOUS; SUBCUTANEOUS at 17:36

## 2020-02-16 RX ADMIN — INSULIN ASPART 3 UNITS: 100 INJECTION, SOLUTION INTRAVENOUS; SUBCUTANEOUS at 17:36

## 2020-02-16 RX ADMIN — TACROLIMUS 1 MG: 1 CAPSULE ORAL at 20:21

## 2020-02-16 RX ADMIN — MYCOPHENOLIC ACID 360 MG: 360 TABLET, DELAYED RELEASE ORAL at 09:07

## 2020-02-16 RX ADMIN — INSULIN ASPART 5 UNITS: 100 INJECTION, SOLUTION INTRAVENOUS; SUBCUTANEOUS at 21:38

## 2020-02-16 RX ADMIN — OXYCODONE HYDROCHLORIDE 5 MG: 5 TABLET ORAL at 21:46

## 2020-02-16 RX ADMIN — ATORVASTATIN CALCIUM 40 MG: 40 TABLET, FILM COATED ORAL at 09:06

## 2020-02-16 RX ADMIN — OMEPRAZOLE 40 MG: 20 CAPSULE, DELAYED RELEASE ORAL at 20:21

## 2020-02-16 RX ADMIN — SENNOSIDES AND DOCUSATE SODIUM 2 TABLET: 8.6; 5 TABLET ORAL at 21:46

## 2020-02-16 RX ADMIN — INSULIN ASPART 1 UNITS: 100 INJECTION, SOLUTION INTRAVENOUS; SUBCUTANEOUS at 05:25

## 2020-02-16 RX ADMIN — INSULIN ASPART 2 UNITS: 100 INJECTION, SOLUTION INTRAVENOUS; SUBCUTANEOUS at 01:05

## 2020-02-16 RX ADMIN — INSULIN ASPART 1 UNITS: 100 INJECTION, SOLUTION INTRAVENOUS; SUBCUTANEOUS at 10:21

## 2020-02-16 ASSESSMENT — ACTIVITIES OF DAILY LIVING (ADL)
ADLS_ACUITY_SCORE: 14

## 2020-02-16 NOTE — PLAN OF CARE
Alert & Orientated.  Pain managed with prn oxycodone. Declined scheduled heparin. Went to Kidney dialysis this afternoon. Tolerating  Consistent carb diet with no complaints of N&V. Had a BM this morning as per pt. Blood sugar checks q 4 hours with sliding scale and carb coverage.

## 2020-02-16 NOTE — PROGRESS NOTES
[facility], [facility] Progress Note - Hospitalist Service, Gold 9       Date of Admission:  2/11/2020  Assessment & Plan   Murtaza Fitzgerald is a 44 year old male with PMHx of DM1 c/b gastroparesis, CKD (s/p kidney transplant on 11/2008 secondary to diabetic nephropathy), pancreatic transplant (2/2009), CAD s/p bypass graft, HTN, GERD who presents to the ED with nausea and vomiting.     SWATHI on CKD in setting of renal transplant   Concern for transplant failure  Crt 6.13 on admission, baseline is around 2.5-5.   He was seen by nephrology and plan is place dialysis catheter and trial of HD   HD catheter placed and  Trial of HD well tolerated. He complains of Pain at the catheter site  Renal transplant and pancreas transplant teams have been notified and recs will be followed  He tolerated day 3 of HD well, Break today and resume tomorrow.   Resolved nausea     Intractable vomiting-- resolved  Gastroparesis  Pt presenting with intractable vomiting which began earlier this morning. He has vomited about 12 times, all non-bloody and non-bilious. Does have a recurring hx of intractable vomiting and was last hospitalized on 2/5-2/9 with similar complaints. Vomiting most likely secondary to gastroparesis and/or uremia (BUN/Crt elevated at 33 and 6.13). In the past, there have been discussions on possible gastric pacemaker for management of his gastroparesis. GI consult placed. Last bowel movement was on Saturday (2/8) and flatus passed yesterday. CT abdomen/pelvis obtained and shows no evidence of a small bowel obstruction. Of note, if vomiting acutely worsens or pt does not improve with supportive measures/correction of uremia and other workup uneventful, can obtain MRI to evaluate for PRES.    -supportive measures: IVF, antiemetics   -zofran 4 mg IV Q6H PRN, per GI avoid compazine, allergy listed to reglan  -0.5mg ativan IV Q6H PRN for nausea  -GI cocktail   -GI consulted as  well  It appears his nausea/vomiting might be multifactorial with Uremia, hyperglycemia and gastroparesis all contributing.   He has a BM yesterday and he has been tolerating meals well    Chronic Problems:      # DM1   # Hyperglycemia   - Resume home dosing of Tresiba  - carb counting with meals  -A1C is 7  - med sliding scale insulin   - hypoglycemia protocol      #S/p pancreatic transplant 2/2009   #S/p renal transplant 11/2008   -  mg BID   - Tacrolimus 2 mg BID   - transplant teams consulted.     # CAD s/p bypass graft   #HTN   Per chart review patient takes amlodipine 5mg, Carvedilol 6.25 mg BID  restarted  - hydralazine PRN available for BP >180/90      Diet: Consistent Carbohydrate Diet 9972-1088 Calories: High Consistent CHO (4-7 CHO units/meal)    DVT Prophylaxis: Heparin SQ and Ambulate every shift  Mueller Catheter: not present  Code Status: Full Code      Disposition Plan   Expected discharge: 2 - 3 days, recommended to prior living arrangement once once outpatient dialysis unit is secured.  Entered: Jesika Snyder MD 02/16/2020, 9:36 AM       The patient's care was discussed with the Bedside Nurse and Patient.    Jesika Snyder MD  Hospitalist Service, [address], [place]  Pager: [phone]  Please see sticky note for cross cover information  ______________________________________________________________________    Interval History   Patient seen and examined at bedside, No acute events overnight. He is able to tolerate PO diet. Still with mild nausea but it is tolerable. No chest pain, no shortness of breath, no abdominal pain, no vomting.    Data reviewed today: I reviewed all medications, new labs and imaging results over the last 24 hours.     Physical Exam   Vital Signs: Temp: 96.5  F (35.8  C) Temp src: Oral BP: (!) 162/98 Pulse: 92 Heart Rate: 83 Resp: 16 SpO2: 98 % O2 Device: None (Room air)    Weight: 191 lbs 9.6 oz    Physical Exam  Vitals signs  reviewed.   Constitutional:       General: He is not in acute distress.     Appearance: Normal appearance. He is normal weight. He is not ill-appearing.   HENT:      Head: Normocephalic.      Nose: Nose normal.      Mouth/Throat:      Mouth: Mucous membranes are moist.   Eyes:      Extraocular Movements: Extraocular movements intact.      Pupils: Pupils are equal, round, and reactive to light.   Cardiovascular:      Rate and Rhythm: Normal rate and regular rhythm.      Pulses: Normal pulses.      Heart sounds: Normal heart sounds. No murmur.   Pulmonary:      Effort: Pulmonary effort is normal.      Breath sounds: Normal breath sounds. No rhonchi.   Abdominal:      General: Abdomen is flat. Bowel sounds are normal.      Palpations: Abdomen is soft.   Musculoskeletal: Normal range of motion.   Neurological:      General: No focal deficit present.      Mental Status: He is alert and oriented to person, place, and time.   Psychiatric:         Behavior: Behavior normal.           Data   Recent Labs   Lab 02/15/20  0935 02/14/20  0803 02/13/20  0720 02/13/20  0122  02/11/20  1739   WBC 6.4 8.3 7.5  --    < > 9.8   HGB 11.2* 11.1* 10.3*  --    < > 12.6*   MCV 91 89 91  --    < > 89    283 273  --    < > 351   INR  --   --   --  1.14  --   --     136 138  --    < > 138   POTASSIUM 3.9 3.9 4.0  --    < > 4.4   CHLORIDE 104 108 112*  --    < > 107   CO2 27 19* 20  --    < > 17*   BUN 15 20 31*  --    < > 33*   CR 4.22* 4.59* 5.69*  --    < > 6.13*   ANIONGAP 4 10 6  --    < > 13   CHARLY 8.5 8.6 8.6  --    < > 9.4   * 171* 123*  --    < > 274*   ALBUMIN  --   --   --   --   --  3.8   PROTTOTAL  --   --   --   --   --  7.4   BILITOTAL  --   --   --   --   --  0.4   ALKPHOS  --   --   --   --   --  172*   ALT  --   --   --   --   --  15   AST  --   --   --   --   --  14   LIPASE  --   --   --   --   --  41*    < > = values in this interval not displayed.     No results found for this or any previous visit  (from the past 24 hour(s)).  Medications       amLODIPine  5 mg Oral Daily     atorvastatin  40 mg Oral Daily     carvedilol  6.25 mg Oral BID w/meals     heparin ANTICOAGULANT  5,000 Units Subcutaneous BID     heparin Lock (1000 units/mL High concentration)  3 mL Intracatheter Once     heparin Lock (1000 units/mL High concentration)  3 mL Intracatheter Once     insulin aspart   Subcutaneous QAM AC     insulin aspart   Subcutaneous Daily with lunch     insulin aspart   Subcutaneous Daily with supper     insulin aspart  1-6 Units Subcutaneous Q4H     insulin degludec  24 Units Subcutaneous At Bedtime     mycophenolic acid  360 mg Oral BID     omeprazole  40 mg Oral QPM     senna-docusate  2 tablet Oral At Bedtime     sodium chloride (PF)  3 mL Intracatheter Q8H     tacrolimus  1 mg Oral BID

## 2020-02-16 NOTE — PROGRESS NOTES
[facility], [place]   Transplant Nephrology Progress Note  Date of Admission:  2/11/2020  Today's Date: 02/16/2020    Assessment & Plan   # LDKT: Increased. Cr today 3.9 after HD yesterday              - Baseline Cr ~ 5-6              - Proteinuria: Moderate (1-3 grams)              - Date DSA Last Checked: Jul/2016      Latest DSA: No              - BK Viremia: No              - Kidney Tx Biopsy: Dec 27, 2013; Result: No evidence of rejection    # Pancreas Tx (ANNMARIE):              - Pancreatic Exocrine Drainage: Bladder drained                    - failed 2013, back on insulin.     # Immunosuppression: Tacrolimus immediate release (goal 4-6) and Mycophenolic acid (goal not followed)              - Changes: No, Will keep MPA 360mg BID. Cutting tac in half to 1mg BID for 3 months, followed by 1mg daily x 3 months, then stop tacrolimus.    # Infection Prophylaxis:   - PJP: None    # Hypertension: Controlled;  Goal BP: < 140/90   - Volume status: Mildly hypervolemic  EDW~ TBD   - Changes: No, Continue home medications    # Diabetes: Controlled (HbA1c <7%)            Last HbA1c: 7.0%              - Management as per primary team.     # Anemia in Chronic Renal Disease: Hgb: Stable      WILL: No              - Iron studies: Not checked recently     # Mineral Bone Disorder:   - Secondary renal hyperparathyroidism; PTH level: Not checked recently        On treatment: None  - Vitamin D; level: Not checked recently        On Supplement: No  - Calcium; level: Normal        On Supplement: No  - Phosphorus; level: Normal        On Supplement: No     # Electrolytes:   - Potassium; level: Normal        On Supplement: No  - Magnesium; level: Normal        On Supplement: No  - Bicarbonate; level: Low        On Supplement: No  - Sodium; level: Normal        On Supplement: No     # Gastroparesis: Has known gastroparesis, surgery will evaluate him for a gastric pacemaker implant. Previously, GI team has  suggested a G-tube vent     # Nausea/Vomiting: Possibly a component of uremia and/or gastroparesis. Symptoms have slightly improved today. We will dialyze him for 3 days, HD yesterday , GI symptoms have improved.  If symptoms persist, a gastric pacemaker may be the next option.      # Transplant History:  Etiology of Kidney Failure: Diabetes mellitus type 1  Tx: LDKT and ANNMARIE  Transplant: 11/4/2008 (Kidney), 2/17/2009 (Pancreas)  Donor Type: Living      Donor Class: Standard Criteria Donor  Significant changes in immunosuppression: None  Significant transplant-related complications: None     Recommendations were communicated to the primary team via this note.    Seen and discussed with Dr. Jameel Ventura, NP   Pager: [phone]    Physician Attestation   I, Sung Jorgensen, saw and evaluated Murtaza Fitzgerald as part of a shared visit.  I have reviewed and discussed with the advanced practice provider their history, physical and plan.    I personally reviewed the vital signs, medications and labs.    My key history or physical exam findings: Patient still has a little nausea, but overall feeling better.  Dialysis tolerated okay yesterday.    Key management decisions made by me: Would make no changes in immunosuppression.  Will hold off on dialysis today, but plan dialysis tomorrow.    Sung Jorgensen  Date of Service (when I saw the patient): 02/16/20    Interval History   Patient's tolerated HD well.  Patient states that he did vomit yesterday and he felt extremely nauseated.  Today he states that has improved.     He denies any fevers, chills, chest pain or shortness of breath.    No urinary symptoms.        Review of Systems   4 point ROS was obtained and negative except as noted in the Interval History.    MEDICATIONS:    amLODIPine  5 mg Oral Daily     atorvastatin  40 mg Oral Daily     carvedilol  6.25 mg Oral BID w/meals     heparin ANTICOAGULANT  5,000 Units Subcutaneous BID      heparin Lock (1000 units/mL High concentration)  3 mL Intracatheter Once     heparin Lock (1000 units/mL High concentration)  3 mL Intracatheter Once     insulin aspart   Subcutaneous QAM AC     insulin aspart   Subcutaneous Daily with lunch     insulin aspart   Subcutaneous Daily with supper     insulin aspart  1-6 Units Subcutaneous Q4H     insulin degludec  24 Units Subcutaneous At Bedtime     mycophenolic acid  360 mg Oral BID     omeprazole  40 mg Oral QPM     senna-docusate  2 tablet Oral At Bedtime     sodium chloride (PF)  3 mL Intracatheter Q8H     tacrolimus  1 mg Oral BID         Physical Exam   Temp  Av.8  F (36.6  C)  Min: 95.8  F (35.4  C)  Max: 99  F (37.2  C)      Pulse  Av.9  Min: 74  Max: 114 Resp  Avg: 15.3  Min: 8  Max: 24  SpO2  Av.8 %  Min: 90 %  Max: 100 %     /82   Pulse 97   Temp 98.4  F (36.9  C) (Oral)   Resp 16   Wt 86.9 kg (191 lb 9.6 oz)   SpO2 99%   BMI 29.13 kg/m     Date 02/15/20 07 - 20 0659   Shift 0891-7646 4392-4722 4018-3032 24 Hour Total   INTAKE   Other  0  0   Shift Total(mL/kg)  0(0)  0(0)   OUTPUT   Other  1000  1000   Shift Total(mL/kg)  1000(11.51)  1000(11.51)   Weight (kg) 86.91 86.91 86.91 86.91      Admit Weight: 86.2 kg (190 lb)     GENERAL APPEARANCE: alert and no distress  HENT: mouth without ulcers or lesions  LYMPHATICS: no cervical or supraclavicular nodes  RESP: lungs clear to auscultation - no rales, rhonchi or wheezes  CV: regular rhythm, normal rate, no rub, no murmur  EDEMA: mild LE edema bilaterally  ABDOMEN: soft, nondistended, nontender, bowel sounds normal  MS: extremities normal - no gross deformities noted, no evidence of inflammation in joints, no muscle tenderness  SKIN: no rash    Data   All labs reviewed by me.  CMP  Recent Labs   Lab 20  0910 02/15/20  0935 20  0803 20  0720 20  0710 20  1739    136 136 138 139 138   POTASSIUM 3.9 3.9 3.9 4.0 4.2 4.4   CHLORIDE 104 104 108  112* 110* 107   CO2 24 27 19* 20 17* 17*   ANIONGAP 7 4 10 6 12 13   * 170* 171* 123* 248* 274*   BUN 13 15 20 31* 34* 33*   CR 3.56* 4.22* 4.59* 5.69* 5.92* 6.13*   GFRESTIMATED 20* 16* 15* 11* 11* 10*   GFRESTBLACK 23* 19* 17* 13* 12* 12*   CHARLY 8.7 8.5 8.6 8.6 8.7 9.4   MAG 1.8 1.7 1.6 1.8  --  1.8   PHOS  --  3.5  --   --  4.3 4.2   PROTTOTAL  --   --   --   --   --  7.4   ALBUMIN  --   --   --   --   --  3.8   BILITOTAL  --   --   --   --   --  0.4   ALKPHOS  --   --   --   --   --  172*   AST  --   --   --   --   --  14   ALT  --   --   --   --   --  15     CBC  Recent Labs   Lab 02/16/20  0910 02/15/20  0935 02/14/20  0803 02/13/20  0720   HGB 11.6* 11.2* 11.1* 10.3*   WBC 7.4 6.4 8.3 7.5   RBC 4.07* 3.91* 3.83* 3.50*   HCT 36.6* 35.4* 33.9* 32.0*   MCV 90 91 89 91   MCH 28.5 28.6 29.0 29.4   MCHC 31.7 31.6 32.7 32.2   RDW 13.6 14.0 13.9 13.9    260 283 273     INR  Recent Labs   Lab 02/13/20  0122   INR 1.14     ABGNo lab results found in last 7 days.   Urine Studies  Recent Labs   Lab Test 02/11/20  0850 01/27/20  0941 01/03/20  1406 08/28/19  0905   COLOR Yellow Straw Light Yellow Yellow   APPEARANCE Clear Clear Clear Clear   URINEGLC 100* >1000* 30* Negative   URINEBILI Negative Negative Negative Negative   URINEKETONE Negative Trace* 10* Negative   SG 1.020 1.011 1.017 1.015   UBLD Trace* Trace* Trace* Small*   URINEPH 7.0 6.5 6.5 7.0   PROTEIN >=300* 300* 300* 100*   UROBILINOGEN 0.2  --   --  0.2   NITRITE Negative Negative Negative Negative   LEUKEST Negative Negative Negative Negative   RBCU 2-5* 2 3* 5-10*   WBCU 0 - 5 2 1 5-10*     Recent Labs   Lab Test 08/22/19  0857 01/11/19  0741 07/08/15  0747 01/28/15  0749 12/27/13  0736 11/11/13  1436 11/12/12  1649 10/11/12  0737 02/28/12  0713   UTPG 2.20* 0.87* 0.28* 0.43* 0.31* 0.30* 0.40* 0.51* 0.48*     PTH  No lab results found.  Iron Studies  Recent Labs   Lab Test 02/13/20  0720   AUBRIE 106       IMAGING:  All imaging studies reviewed by  me.

## 2020-02-16 NOTE — PLAN OF CARE
Alert & Orientated.  Up ad mikal ambulating in hallways. Hypertensive but within defined limits. Denied pain this shift.  Declined scheduled heparin., provider aware of continuous refusal. Tolerating  Consistent carb diet with no complaints of N&V. Blood sugar checks q 4 hours with sliding scale and carb coverage. Voiding but not always saving. PIV S/L and CVC double lumen WDL. Continue with POC

## 2020-02-16 NOTE — PROGRESS NOTES
[facility], [place]   Transplant Nephrology Progress Note  Date of Admission:  2/11/2020  Today's Date: 02/15/2020    Assessment & Plan   # LDKT: Increased              - Baseline Cr ~ 5-6              - Proteinuria: Moderate (1-3 grams)              - Date DSA Last Checked: Jul/2016      Latest DSA: No              - BK Viremia: No              - Kidney Tx Biopsy: Dec 27, 2013; Result: No evidence of rejection    # Pancreas Tx (ANNMARIE):              - Pancreatic Exocrine Drainage: Bladder drained                    - failed 2013, back on insulin.     # Immunosuppression: Tacrolimus immediate release (goal 4-6) and Mycophenolic acid (goal not followed)              - Changes: No, Will keep MPA 360mg BID. Cutting tac in half to 1mg BID for 3 months, followed by 1mg daily x 3 months, then stop tacrolimus.    # Infection Prophylaxis:   - PJP: None    # Hypertension: Controlled;  Goal BP: < 140/90   - Volume status: Mildly hypervolemic  EDW~ TBD   - Changes: No, Continue home medications    # Diabetes: Controlled (HbA1c <7%)            Last HbA1c: 7.0%              - Management as per primary team.     # Anemia in Chronic Renal Disease: Hgb: Stable      WILL: No              - Iron studies: Not checked recently     # Mineral Bone Disorder:   - Secondary renal hyperparathyroidism; PTH level: Not checked recently        On treatment: None  - Vitamin D; level: Not checked recently        On Supplement: No  - Calcium; level: Normal        On Supplement: No  - Phosphorus; level: Normal        On Supplement: No     # Electrolytes:   - Potassium; level: Normal        On Supplement: No  - Magnesium; level: Normal        On Supplement: No  - Bicarbonate; level: Low        On Supplement: No  - Sodium; level: Normal        On Supplement: No     # Gastroparesis: Has known gastroparesis, surgery will evaluate him for a gastric pacemaker implant. Previously, GI team has suggested a G-tube vent     #  Nausea/Vomiting: Possibly a component of uremia and/or gastroparesis. Symptoms have slightly improved today. We will dialyze him for 3 days, HD yesterday , GI symptoms have improved.  At that time, if symptoms persist, a gastric pacemaker may be the next option.      # Transplant History:  Etiology of Kidney Failure: Diabetes mellitus type 1  Tx: LDKT and ANNMARIE  Transplant: 11/4/2008 (Kidney), 2/17/2009 (Pancreas)  Donor Type: Living      Donor Class: Standard Criteria Donor  Significant changes in immunosuppression: None  Significant transplant-related complications: None     Recommendations were communicated to the primary team via this note.    Seen and discussed with Dr. Jameel Ventura, NP   Pager: [phone]    Physician Attestation   I, Sung Jorgensen, saw and evaluated Murtaza Fitzgerald as part of a shared visit.  I have reviewed and discussed with the advanced practice provider their history, physical and plan.    I personally reviewed the vital signs, medications and labs.    My key history or physical exam findings: Patient reports feeling okay.  Creatinine decreased as expected after dialysis.  He doesn't feel too volume up.    Key management decisions made by me: Will plan HD today for volume removal and clearance.  Would continue present immunosuppression.    Sung Jorgensen  Date of Service (when I saw the patient): 2/15/20    Interval History   Patient's tolerated HD well.  Patient states that he did vomit yesterday and he felt extremely nauseated    He denies any fevers, chills, chest pain or shortness of breath.    No urinary symptoms    Review of Systems   4 point ROS was obtained and negative except as noted in the Interval History.    MEDICATIONS:    amLODIPine  5 mg Oral Daily     atorvastatin  40 mg Oral Daily     carvedilol  6.25 mg Oral BID w/meals     heparin ANTICOAGULANT  5,000 Units Subcutaneous BID     heparin Lock (1000 units/mL High concentration)  3 mL  Intracatheter Once     heparin Lock (1000 units/mL High concentration)  3 mL Intracatheter Once     HYDROmorphone  0.5 mg Intravenous Once     insulin aspart   Subcutaneous QAM AC     insulin aspart   Subcutaneous Daily with lunch     insulin aspart   Subcutaneous Daily with supper     insulin aspart  1-6 Units Subcutaneous Q4H     insulin degludec  24 Units Subcutaneous At Bedtime     mycophenolic acid  360 mg Oral BID     omeprazole  40 mg Oral QPM     senna-docusate  2 tablet Oral At Bedtime     sodium chloride (PF)  3 mL Intracatheter Q8H     tacrolimus  1 mg Oral BID         Physical Exam   Temp  Av.8  F (36.6  C)  Min: 95.8  F (35.4  C)  Max: 99  F (37.2  C)      Pulse  Av.9  Min: 74  Max: 114 Resp  Avg: 15.3  Min: 8  Max: 24  SpO2  Av.8 %  Min: 90 %  Max: 100 %     /82   Pulse 80   Temp 98.5  F (36.9  C) (Oral)   Resp 13   Wt 86.9 kg (191 lb 9.6 oz)   SpO2 99%   BMI 29.13 kg/m     Date 02/15/20 07 - 20 0659   Shift 7227-5029 0815-3660 9845-0544 24 Hour Total   INTAKE   Other  0  0   Shift Total(mL/kg)  0(0)  0(0)   OUTPUT   Other  1000  1000   Shift Total(mL/kg)  1000(11.51)  1000(11.51)   Weight (kg) 86.91 86.91 86.91 86.91      Admit Weight: 86.2 kg (190 lb)     GENERAL APPEARANCE: alert and no distress  HENT: mouth without ulcers or lesions  LYMPHATICS: no cervical or supraclavicular nodes  RESP: lungs clear to auscultation - no rales, rhonchi or wheezes  CV: regular rhythm, normal rate, no rub, no murmur  EDEMA: mild LE edema bilaterally  ABDOMEN: soft, nondistended, nontender, bowel sounds normal  MS: extremities normal - no gross deformities noted, no evidence of inflammation in joints, no muscle tenderness  SKIN: no rash    Data   All labs reviewed by me.  CMP  Recent Labs   Lab 02/15/20  0935 20  0803 20  0720 20  0710 20  1739  20  0615    136 138 139 138   < > 137   POTASSIUM 3.9 3.9 4.0 4.2 4.4   < > 3.4   CHLORIDE 104 108 112*  110* 107   < > 109   CO2 27 19* 20 17* 17*   < > 20   ANIONGAP 4 10 6 12 13   < > 8   * 171* 123* 248* 274*   < > 125*   BUN 15 20 31* 34* 33*   < > 35*   CR 4.22* 4.59* 5.69* 5.92* 6.13*   < > 6.21*   GFRESTIMATED 16* 15* 11* 11* 10*   < > 10*   GFRESTBLACK 19* 17* 13* 12* 12*   < > 12*   CHARLY 8.5 8.6 8.6 8.7 9.4   < > 8.3*   MAG 1.7 1.6 1.8  --  1.8  --   --    PHOS 3.5  --   --  4.3 4.2  --   --    PROTTOTAL  --   --   --   --  7.4  --  6.1*   ALBUMIN  --   --   --   --  3.8  --  2.9*   BILITOTAL  --   --   --   --  0.4  --  0.3   ALKPHOS  --   --   --   --  172*  --  133   AST  --   --   --   --  14  --  8   ALT  --   --   --   --  15  --  10    < > = values in this interval not displayed.     CBC  Recent Labs   Lab 02/15/20  0935 02/14/20  0803 02/13/20  0720 02/12/20  0710   HGB 11.2* 11.1* 10.3* 10.9*   WBC 6.4 8.3 7.5 8.4   RBC 3.91* 3.83* 3.50* 3.76*   HCT 35.4* 33.9* 32.0* 33.7*   MCV 91 89 91 90   MCH 28.6 29.0 29.4 29.0   MCHC 31.6 32.7 32.2 32.3   RDW 14.0 13.9 13.9 13.7    283 273 297     INR  Recent Labs   Lab 02/13/20  0122   INR 1.14     ABGNo lab results found in last 7 days.   Urine Studies  Recent Labs   Lab Test 02/11/20  0850 01/27/20  0941 01/03/20  1406 08/28/19  0905   COLOR Yellow Straw Light Yellow Yellow   APPEARANCE Clear Clear Clear Clear   URINEGLC 100* >1000* 30* Negative   URINEBILI Negative Negative Negative Negative   URINEKETONE Negative Trace* 10* Negative   SG 1.020 1.011 1.017 1.015   UBLD Trace* Trace* Trace* Small*   URINEPH 7.0 6.5 6.5 7.0   PROTEIN >=300* 300* 300* 100*   UROBILINOGEN 0.2  --   --  0.2   NITRITE Negative Negative Negative Negative   LEUKEST Negative Negative Negative Negative   RBCU 2-5* 2 3* 5-10*   WBCU 0 - 5 2 1 5-10*     Recent Labs   Lab Test 08/22/19  0857 01/11/19  0741 07/08/15  0747 01/28/15  0749 12/27/13  0736 11/11/13  1436 11/12/12  1649 10/11/12  0737 02/28/12  0713   UTPG 2.20* 0.87* 0.28* 0.43* 0.31* 0.30* 0.40* 0.51* 0.48*      PTH  No lab results found.  Iron Studies  Recent Labs   Lab Test 02/13/20  0720   AUBRIE 106       IMAGING:  All imaging studies reviewed by me.

## 2020-02-17 LAB
ANION GAP SERPL CALCULATED.3IONS-SCNC: 8 MMOL/L (ref 3–14)
BASOPHILS # BLD AUTO: 0 10E9/L (ref 0–0.2)
BASOPHILS NFR BLD AUTO: 0.6 %
BUN SERPL-MCNC: 20 MG/DL (ref 7–30)
CALCIUM SERPL-MCNC: 8.7 MG/DL (ref 8.5–10.1)
CHLORIDE SERPL-SCNC: 102 MMOL/L (ref 94–109)
CO2 SERPL-SCNC: 25 MMOL/L (ref 20–32)
CREAT SERPL-MCNC: 4.45 MG/DL (ref 0.66–1.25)
DIFFERENTIAL METHOD BLD: ABNORMAL
EOSINOPHIL # BLD AUTO: 0.1 10E9/L (ref 0–0.7)
EOSINOPHIL NFR BLD AUTO: 1.8 %
ERYTHROCYTE [DISTWIDTH] IN BLOOD BY AUTOMATED COUNT: 13.5 % (ref 10–15)
GFR SERPL CREATININE-BSD FRML MDRD: 15 ML/MIN/{1.73_M2}
GLUCOSE BLDC GLUCOMTR-MCNC: 140 MG/DL (ref 70–99)
GLUCOSE BLDC GLUCOMTR-MCNC: 141 MG/DL (ref 70–99)
GLUCOSE BLDC GLUCOMTR-MCNC: 152 MG/DL (ref 70–99)
GLUCOSE BLDC GLUCOMTR-MCNC: 168 MG/DL (ref 70–99)
GLUCOSE BLDC GLUCOMTR-MCNC: 199 MG/DL (ref 70–99)
GLUCOSE BLDC GLUCOMTR-MCNC: 261 MG/DL (ref 70–99)
GLUCOSE SERPL-MCNC: 138 MG/DL (ref 70–99)
HBV CORE AB SERPL QL IA: NONREACTIVE
HCT VFR BLD AUTO: 34.2 % (ref 40–53)
HGB BLD-MCNC: 11 G/DL (ref 13.3–17.7)
IMM GRANULOCYTES # BLD: 0 10E9/L (ref 0–0.4)
IMM GRANULOCYTES NFR BLD: 0.4 %
LYMPHOCYTES # BLD AUTO: 1.4 10E9/L (ref 0.8–5.3)
LYMPHOCYTES NFR BLD AUTO: 19.9 %
MCH RBC QN AUTO: 28.9 PG (ref 26.5–33)
MCHC RBC AUTO-ENTMCNC: 32.2 G/DL (ref 31.5–36.5)
MCV RBC AUTO: 90 FL (ref 78–100)
MONOCYTES # BLD AUTO: 0.6 10E9/L (ref 0–1.3)
MONOCYTES NFR BLD AUTO: 8.3 %
NEUTROPHILS # BLD AUTO: 4.7 10E9/L (ref 1.6–8.3)
NEUTROPHILS NFR BLD AUTO: 69 %
NRBC # BLD AUTO: 0 10*3/UL
NRBC BLD AUTO-RTO: 0 /100
PLATELET # BLD AUTO: 246 10E9/L (ref 150–450)
POTASSIUM SERPL-SCNC: 4.1 MMOL/L (ref 3.4–5.3)
RBC # BLD AUTO: 3.81 10E12/L (ref 4.4–5.9)
SODIUM SERPL-SCNC: 136 MMOL/L (ref 133–144)
TACROLIMUS BLD-MCNC: 3.1 UG/L (ref 5–15)
TME LAST DOSE: ABNORMAL H
WBC # BLD AUTO: 6.8 10E9/L (ref 4–11)

## 2020-02-17 PROCEDURE — 25000132 ZZH RX MED GY IP 250 OP 250 PS 637: Performed by: STUDENT IN AN ORGANIZED HEALTH CARE EDUCATION/TRAINING PROGRAM

## 2020-02-17 PROCEDURE — 36415 COLL VENOUS BLD VENIPUNCTURE: CPT | Performed by: PHYSICIAN ASSISTANT

## 2020-02-17 PROCEDURE — 25000132 ZZH RX MED GY IP 250 OP 250 PS 637: Performed by: INTERNAL MEDICINE

## 2020-02-17 PROCEDURE — 25800030 ZZH RX IP 258 OP 636: Performed by: INTERNAL MEDICINE

## 2020-02-17 PROCEDURE — 25000128 H RX IP 250 OP 636: Performed by: INTERNAL MEDICINE

## 2020-02-17 PROCEDURE — 99232 SBSQ HOSP IP/OBS MODERATE 35: CPT | Performed by: INTERNAL MEDICINE

## 2020-02-17 PROCEDURE — 90937 HEMODIALYSIS REPEATED EVAL: CPT

## 2020-02-17 PROCEDURE — 12000001 ZZH R&B MED SURG/OB UMMC

## 2020-02-17 PROCEDURE — 80197 ASSAY OF TACROLIMUS: CPT | Performed by: PHYSICIAN ASSISTANT

## 2020-02-17 PROCEDURE — 80048 BASIC METABOLIC PNL TOTAL CA: CPT | Performed by: INTERNAL MEDICINE

## 2020-02-17 PROCEDURE — 00000146 ZZHCL STATISTIC GLUCOSE BY METER IP

## 2020-02-17 PROCEDURE — 85025 COMPLETE CBC W/AUTO DIFF WBC: CPT | Performed by: INTERNAL MEDICINE

## 2020-02-17 PROCEDURE — 25000131 ZZH RX MED GY IP 250 OP 636 PS 637: Performed by: STUDENT IN AN ORGANIZED HEALTH CARE EDUCATION/TRAINING PROGRAM

## 2020-02-17 PROCEDURE — 25000131 ZZH RX MED GY IP 250 OP 636 PS 637: Performed by: PHYSICIAN ASSISTANT

## 2020-02-17 RX ORDER — HEPARIN SODIUM 1000 [USP'U]/ML
500 INJECTION, SOLUTION INTRAVENOUS; SUBCUTANEOUS CONTINUOUS
Status: DISCONTINUED | OUTPATIENT
Start: 2020-02-17 | End: 2020-02-17

## 2020-02-17 RX ORDER — HEPARIN SODIUM 1000 [USP'U]/ML
500 INJECTION, SOLUTION INTRAVENOUS; SUBCUTANEOUS
Status: COMPLETED | OUTPATIENT
Start: 2020-02-17 | End: 2020-02-17

## 2020-02-17 RX ADMIN — SODIUM CHLORIDE 250 ML: 9 INJECTION, SOLUTION INTRAVENOUS at 07:56

## 2020-02-17 RX ADMIN — MYCOPHENOLIC ACID 360 MG: 360 TABLET, DELAYED RELEASE ORAL at 21:35

## 2020-02-17 RX ADMIN — HEPARIN SODIUM 500 UNITS/HR: 1000 INJECTION INTRAVENOUS; SUBCUTANEOUS at 07:57

## 2020-02-17 RX ADMIN — AMLODIPINE BESYLATE 5 MG: 5 TABLET ORAL at 12:46

## 2020-02-17 RX ADMIN — INSULIN ASPART 2 UNITS: 100 INJECTION, SOLUTION INTRAVENOUS; SUBCUTANEOUS at 21:42

## 2020-02-17 RX ADMIN — INSULIN ASPART 1 UNITS: 100 INJECTION, SOLUTION INTRAVENOUS; SUBCUTANEOUS at 10:11

## 2020-02-17 RX ADMIN — CARVEDILOL 6.25 MG: 6.25 TABLET, FILM COATED ORAL at 12:46

## 2020-02-17 RX ADMIN — SODIUM CHLORIDE 300 ML: 9 INJECTION, SOLUTION INTRAVENOUS at 07:56

## 2020-02-17 RX ADMIN — INSULIN ASPART 17 UNITS: 100 INJECTION, SOLUTION INTRAVENOUS; SUBCUTANEOUS at 18:31

## 2020-02-17 RX ADMIN — ATORVASTATIN CALCIUM 40 MG: 40 TABLET, FILM COATED ORAL at 12:46

## 2020-02-17 RX ADMIN — METOCLOPRAMIDE HYDROCHLORIDE 5 MG: 5 TABLET ORAL at 18:36

## 2020-02-17 RX ADMIN — TACROLIMUS 1 MG: 1 CAPSULE ORAL at 07:42

## 2020-02-17 RX ADMIN — OMEPRAZOLE 40 MG: 20 CAPSULE, DELAYED RELEASE ORAL at 21:35

## 2020-02-17 RX ADMIN — OXYCODONE HYDROCHLORIDE 5 MG: 5 TABLET ORAL at 21:42

## 2020-02-17 RX ADMIN — INSULIN ASPART 3 UNITS: 100 INJECTION, SOLUTION INTRAVENOUS; SUBCUTANEOUS at 01:04

## 2020-02-17 RX ADMIN — MYCOPHENOLIC ACID 360 MG: 360 TABLET, DELAYED RELEASE ORAL at 07:42

## 2020-02-17 RX ADMIN — SENNOSIDES AND DOCUSATE SODIUM 2 TABLET: 8.6; 5 TABLET ORAL at 21:35

## 2020-02-17 RX ADMIN — CARVEDILOL 6.25 MG: 6.25 TABLET, FILM COATED ORAL at 18:36

## 2020-02-17 RX ADMIN — TACROLIMUS 1 MG: 1 CAPSULE ORAL at 21:35

## 2020-02-17 RX ADMIN — INSULIN ASPART 1 UNITS: 100 INJECTION, SOLUTION INTRAVENOUS; SUBCUTANEOUS at 05:24

## 2020-02-17 RX ADMIN — HEPARIN SODIUM 500 UNITS: 1000 INJECTION INTRAVENOUS; SUBCUTANEOUS at 07:57

## 2020-02-17 RX ADMIN — INSULIN ASPART 1 UNITS: 100 INJECTION, SOLUTION INTRAVENOUS; SUBCUTANEOUS at 18:31

## 2020-02-17 ASSESSMENT — PAIN DESCRIPTION - DESCRIPTORS
DESCRIPTORS: ACHING;DISCOMFORT
DESCRIPTORS: ACHING

## 2020-02-17 ASSESSMENT — ACTIVITIES OF DAILY LIVING (ADL)
ADLS_ACUITY_SCORE: 14

## 2020-02-17 NOTE — PROGRESS NOTES
Care Coordinator  D/I: CXR results fax'd to [facility]. I called Virology lab [phone] and hep B Core results should be ready by 1pm.  P: I have called [facility] CC: Natalia ARMENTA and updated her--pt currently in dialysis.   Janette VaughnPFR, called and plans to either see him today or call him to figure out insurance or potential plan.  11?38 Hep B Core results fax'd.

## 2020-02-17 NOTE — PROGRESS NOTES
Patient had dialysis this morning, BP was elevated in dialysis but at the end was normal.  1 kilo was removed.  Patient is alert and oriented, no nausea this shift, and pain was tolerable, did not want to take pain med either.  Was seen by the team this afternoon.  BS seems to be stable, and patient is on carbohydrate coverage eith insulin.  Continue with plan of care.

## 2020-02-17 NOTE — PROGRESS NOTES
HEMODIALYSIS TREATMENT NOTE    Date: 2/17/2020  Time: 1200    Data:  Pre Wt: 86.5 kg (190 lb 11.2 oz)   Desired Wt: 84.5 kg   Post Wt: 85.5 kg (188 lb 7.9 oz)  Weight change: 1 kg  Ultrafiltration - Post Run Net Total Removed (mL): 1000 mL  Vascular Access Status: patent  Dialyzer Rinse: Light  Total Blood Volume Processed: 80.03 L   Total Dialysis (Treatment) Time: 4 hours     Lab: No    Assessment/Interventions:  4 hour HD run via right internal jugular dialysis catheter.  K3/Ca2.25 bath used.  Blood flow 375/400 mL/min.  Pt given 500 unit Heparin bolus followed by 500 units/hr during run.  UFG decreased 2/2 low BP after eating.  1L removed.  Report called.     Plan:  Continue with plan of care.  Next run per Renal Team.

## 2020-02-17 NOTE — PLAN OF CARE
A&OX4. VSS on room air ex hypertensive below parameters to notify. Pain managed with oxycodone this shift. Dialysis patient, voids but doesn't save. Up independently to the bathroom. On consistent carb diet, denies N/V. On blood sugar check q 4 hrs with sliding and carb coverage.  Passing gas, no BM this shift. Abdomen looks distented. PIV saline locked. Patient had an episode of emesis, Zofran given with relief. Calls appropriately, continue with plan of care.

## 2020-02-17 NOTE — PROGRESS NOTES
[facility], [place]    Medicine Progress Note - Hospitalist Service, Gold 9       Date of Admission:  2/11/2020  Assessment & Plan   Murtaza Fitzgerald is a 44 year old male with PMHx of DM1 c/b gastroparesis, CKD (s/p kidney transplant on 11/2008 secondary to diabetic nephropathy), pancreatic transplant (2/2009), CAD s/p bypass graft, HTN, GERD who presents to the ED with nausea and vomiting.     SWATHI on CKD in setting of renal transplant   Now with transplant failure  Crt 6.13 on admission, baseline is around 2.5-5.   He was seen by nephrology and plan was to place dialysis catheter and trial of HD   HD catheter placed and  Trial of HD well tolerated.  Renal transplant and pancreas transplant teams have been notified and recs will be followed  He tolerated 3 days of short runs of HD well, tolerated full session of HD 2/17  Intractable nausea now resolved still with intermittent nausea and hiccups  He needs a outpatient dialysis unit arranged with the scheduling time prior to discharge.  This should be arranged by the dialysis social work.     Intractable vomiting-- resolved  Gastroparesis  Pt presenting with intractable vomiting which began earlier this morning. He has vomited about 12 times, all non-bloody and non-bilious. Does have a recurring hx of intractable vomiting and was last hospitalized on 2/5-2/9 with similar complaints. Vomiting most likely secondary to gastroparesis and/or uremia (BUN/Crt elevated at 33 and 6.13). In the past, there have been discussions on possible gastric pacemaker for management of his gastroparesis. GI consult placed. Last bowel movement was on Saturday (2/8) and flatus passed yesterday. CT abdomen/pelvis obtained and shows no evidence of a small bowel obstruction. Of note, if vomiting acutely worsens or pt does not improve with supportive measures/correction of uremia and other workup uneventful, can obtain MRI to evaluate for PRES.     -supportive measures: IVF, antiemetics   -zofran 4 mg IV Q6H PRN, per GI avoid compazine, allergy listed to reglan  -0.5mg ativan IV Q6H PRN for nausea  -GI cocktail   -GI consulted as well  It appears his nausea/vomiting might be multifactorial with Uremia, hyperglycemia and gastroparesis all contributing.   His constipation seems to have improved now that he is eating.   Had episode of vomiting yesterday but he has not used his reglan at meal time    Chronic Problems:      # DM1   # Hyperglycemia   - Resume home dosing of Tresiba  - carb counting with meals  -A1C is 7  - med sliding scale insulin   - hypoglycemia protocol      #S/p pancreatic transplant 2/2009   #S/p renal transplant 11/2008   -  mg BID   - Tacrolimus 2 mg BID   - transplant teams consulted.     # CAD s/p bypass graft   #HTN   Per chart review patient takes amlodipine 5mg, Carvedilol 6.25 mg BID  restarted  - hydralazine PRN available for BP >180/90     BP much improved.    Diet: Consistent Carbohydrate Diet 5577-3978 Calories: High Consistent CHO (4-7 CHO units/meal)    DVT Prophylaxis: Heparin SQ  Mueller Catheter: not present  Code Status: Full Code      Disposition Plan   Expected discharge: Tomorrow, recommended to prior living arrangement once outpatient dialysis unit has been determined.  Entered: Jesika Snyder MD 02/17/2020, 3:00 PM       The patient's care was discussed with the Bedside Nurse and Patient.    Jesika Snyder MD  Hospitalist Service, [address]  Pager: [phone]  Please see sticky note for cross cover information  ______________________________________________________________________    Interval History   Patient seen and examined at bedside, No acute events overnight, he had one episode of vomiting after eating dinner. He reports he had a BM yesterday and then again today.  He offers no new complaints today.  He reports being bored while waiting for dialysis.  He denies  any chest pain no shortness of breath no abdominal pain continues to urinate at this time.    Data reviewed today: I reviewed all medications, new labs and imaging results over the last 24 hours.     Physical Exam   Vital Signs: Temp: 98  F (36.7  C) Temp src: Oral BP: (P) 112/72 Pulse: 93 Heart Rate: (P) 98 Resp: 15 SpO2: 98 % O2 Device: None (Room air)    Weight: 190 lbs 11.17 oz    Physical Exam  Vitals signs reviewed.   Constitutional:       General: He is not in acute distress.     Appearance: Normal appearance. He is normal weight. He is not ill-appearing.   HENT:      Head: Normocephalic.      Nose: Nose normal.      Mouth/Throat:      Mouth: Mucous membranes are moist.   Eyes:      Extraocular Movements: Extraocular movements intact.      Pupils: Pupils are equal, round, and reactive to light.   Cardiovascular:      Rate and Rhythm: Normal rate and regular rhythm.      Pulses: Normal pulses.      Heart sounds: Normal heart sounds. No murmur.   Pulmonary:      Effort: Pulmonary effort is normal.      Breath sounds: Normal breath sounds. No rhonchi.   Abdominal:      General: Abdomen is flat. Bowel sounds are normal.      Palpations: Abdomen is soft.   Musculoskeletal: Normal range of motion.   Neurological:      General: No focal deficit present.      Mental Status: He is alert and oriented to person, place, and time.   Psychiatric:         Behavior: Behavior normal.           Data   Recent Labs   Lab 02/17/20  0557 02/16/20  0910 02/15/20  0935  02/13/20  0122  02/11/20  1739   WBC 6.8 7.4 6.4   < >  --    < > 9.8   HGB 11.0* 11.6* 11.2*   < >  --    < > 12.6*   MCV 90 90 91   < >  --    < > 89    268 260   < >  --    < > 351   INR  --   --   --   --  1.14  --   --     136 136   < >  --    < > 138   POTASSIUM 4.1 3.9 3.9   < >  --    < > 4.4   CHLORIDE 102 104 104   < >  --    < > 107   CO2 25 24 27   < >  --    < > 17*   BUN 20 13 15   < >  --    < > 33*   CR 4.45* 3.56* 4.22*   < >  --    < >  6.13*   ANIONGAP 8 7 4   < >  --    < > 13   CHARLY 8.7 8.7 8.5   < >  --    < > 9.4   * 178* 170*   < >  --    < > 274*   ALBUMIN  --   --   --   --   --   --  3.8   PROTTOTAL  --   --   --   --   --   --  7.4   BILITOTAL  --   --   --   --   --   --  0.4   ALKPHOS  --   --   --   --   --   --  172*   ALT  --   --   --   --   --   --  15   AST  --   --   --   --   --   --  14   LIPASE  --   --   --   --   --   --  41*    < > = values in this interval not displayed.     No results found for this or any previous visit (from the past 24 hour(s)).  Medications       amLODIPine  5 mg Oral Daily     atorvastatin  40 mg Oral Daily     carvedilol  6.25 mg Oral BID w/meals     heparin ANTICOAGULANT  5,000 Units Subcutaneous BID     heparin Lock (1000 units/mL High concentration)  3 mL Intracatheter Once     heparin Lock (1000 units/mL High concentration)  3 mL Intracatheter Once     insulin aspart   Subcutaneous QAM AC     insulin aspart   Subcutaneous Daily with lunch     insulin aspart   Subcutaneous Daily with supper     insulin aspart  1-6 Units Subcutaneous Q4H     insulin degludec  24 Units Subcutaneous At Bedtime     mycophenolic acid  360 mg Oral BID     omeprazole  40 mg Oral QPM     senna-docusate  2 tablet Oral At Bedtime     sodium chloride (PF)  3 mL Intracatheter Q8H     tacrolimus  1 mg Oral BID

## 2020-02-17 NOTE — PROGRESS NOTES
[facility], [place]   Transplant Nephrology Progress Note  Date of Admission:  2/11/2020  Today's Date: 02/17/2020    Assessment & Plan   # LDKT: Increased              - Baseline Cr ~ 5-6              - Proteinuria: Moderate (1-3 grams)              - Date DSA Last Checked: Jul/2016      Latest DSA: No              - BK Viremia: No              - Kidney Tx Biopsy: Dec 27, 2013; Result: No evidence of rejection    # SWATHI on CKD s/p LDKT: Cr today 3.9 after HD yesterday. He is oliguric and will likely need long-term hemodialysis to maintain renal function. We have set him up with a outpatient HD facility for MWF HD. HD 3hrs today, EDW TBD, , , no heparin.    # Pancreas Tx (ANNMARIE):              - Pancreatic Exocrine Drainage: Bladder drained                    - Failed 2013, back on insulin.     # Immunosuppression: Tacrolimus immediate release (goal 4-6) and Mycophenolic acid (goal not followed)              - Changes: No, MPA 360mg BID. Tacrolimus 1mg BID x 3 months, then 1mg daily x 3 months, then off.    # Infection Prophylaxis:   - PJP: None    # Hypertension: Controlled;  Goal BP: < 140/90   - Volume status: Mildly hypervolemic  EDW~ TBD   - Changes: No, continue home medications    # Diabetes: Controlled (HbA1c <7%)            Last HbA1c: 7.0%              - Management as per primary team.     # Anemia in Chronic Renal Disease: Hgb: Stable      WILL: No              - Iron studies: Not checked recently     # Mineral Bone Disorder:   - Secondary renal hyperparathyroidism; PTH level: Not checked recently        On treatment: None  - Vitamin D; level: Not checked recently        On Supplement: No  - Calcium; level: Normal        On Supplement: No  - Phosphorus; level: Normal        On Supplement: No     # Electrolytes:   - Potassium; level: Normal        On Supplement: No  - Magnesium; level: Normal        On Supplement: No  - Bicarbonate; level: Low        On Supplement:  No  - Sodium; level: Normal        On Supplement: No     # Gastroparesis: Has known gastroparesis, surgery will evaluate him for a gastric pacemaker implant. Previously, GI team has suggested a G-tube vent     # Nausea/Vomiting: Possibly a component of uremia and/or gastroparesis. Symptoms have slightly improved today. We will dialyze him for 3 days, HD yesterday , GI symptoms have improved.  If symptoms persist, a gastric pacemaker may be the next option.      # Transplant History:  Etiology of Kidney Failure: Diabetes mellitus type 1  Tx: LDKT and ANNMARIE  Transplant: 11/4/2008 (Kidney), 2/17/2009 (Pancreas)  Donor Type: Living      Donor Class: Standard Criteria Donor  Significant changes in immunosuppression: None  Significant transplant-related complications: None     Recommendations were communicated to the primary team via this note.    Seen and discussed with Dr. Jameel Wyman MD   Pager: [phone]    Attestation:  This patient has been seen and evaluated by me, Sung Jorgensen MD.  I have reviewed the note and agree with plan of care as documented by the fellow.       Interval History   Mr Dainels was seen after his HD session. He tolerated it well and denies any nausea, vomiting, leg cramping, dizziness, or chest pain. He has no shortness of breath, and denies fevers, chills, or rigors. UOP remains oliguric.     Review of Systems   4 point ROS was obtained and negative except as noted in the Interval History.    MEDICATIONS:    amLODIPine  5 mg Oral Daily     atorvastatin  40 mg Oral Daily     carvedilol  6.25 mg Oral BID w/meals     heparin ANTICOAGULANT  5,000 Units Subcutaneous BID     heparin Lock (1000 units/mL High concentration)  3 mL Intracatheter Once     heparin Lock (1000 units/mL High concentration)  3 mL Intracatheter Once     insulin aspart   Subcutaneous QAM AC     insulin aspart   Subcutaneous Daily with lunch     insulin aspart   Subcutaneous Daily with supper     insulin aspart   1-6 Units Subcutaneous Q4H     insulin degludec  24 Units Subcutaneous At Bedtime     mycophenolic acid  360 mg Oral BID     omeprazole  40 mg Oral QPM     senna-docusate  2 tablet Oral At Bedtime     sodium chloride (PF)  3 mL Intracatheter Q8H     tacrolimus  1 mg Oral BID         Physical Exam    BP (P) 112/72   Pulse 93   Temp 98  F (36.7  C) (Oral)   Resp 15   Wt 86.5 kg (190 lb 11.2 oz)   SpO2 98%   BMI 29.00 kg/m       GENERAL APPEARANCE: alert and no distress  HENT: mouth without ulcers or lesions  LYMPHATICS: no cervical or supraclavicular nodes  RESP: lungs clear to auscultation - no rales, rhonchi or wheezes  CV: regular rhythm, normal rate, no rub, no murmur  EDEMA: mild LE edema bilaterally  ABDOMEN: soft, nondistended, nontender, bowel sounds normal  MS: extremities normal - no gross deformities noted, no evidence of inflammation in joints, no muscle tenderness  SKIN: no rash  ACCESS: Tunneled HD catheter in chest    Data   All labs reviewed by me.  CMP  Recent Labs   Lab 02/17/20  0557 02/16/20  0910 02/15/20  0935 02/14/20  0803 02/13/20  0720 02/12/20  0710 02/11/20  1739    136 136 136 138 139 138   POTASSIUM 4.1 3.9 3.9 3.9 4.0 4.2 4.4   CHLORIDE 102 104 104 108 112* 110* 107   CO2 25 24 27 19* 20 17* 17*   ANIONGAP 8 7 4 10 6 12 13   * 178* 170* 171* 123* 248* 274*   BUN 20 13 15 20 31* 34* 33*   CR 4.45* 3.56* 4.22* 4.59* 5.69* 5.92* 6.13*   GFRESTIMATED 15* 20* 16* 15* 11* 11* 10*   GFRESTBLACK 18* 23* 19* 17* 13* 12* 12*   CHARLY 8.7 8.7 8.5 8.6 8.6 8.7 9.4   MAG  --  1.8 1.7 1.6 1.8  --  1.8   PHOS  --   --  3.5  --   --  4.3 4.2   PROTTOTAL  --   --   --   --   --   --  7.4   ALBUMIN  --   --   --   --   --   --  3.8   BILITOTAL  --   --   --   --   --   --  0.4   ALKPHOS  --   --   --   --   --   --  172*   AST  --   --   --   --   --   --  14   ALT  --   --   --   --   --   --  15     CBC  Recent Labs   Lab 02/17/20  0557 02/16/20  0910 02/15/20  0935 02/14/20  0803   [facility]  11.0* 11.6* 11.2* 11.1*   WBC 6.8 7.4 6.4 8.3   RBC 3.81* 4.07* 3.91* 3.83*   HCT 34.2* 36.6* 35.4* 33.9*   MCV 90 90 91 89   MCH 28.9 28.5 28.6 29.0   MCHC 32.2 31.7 31.6 32.7   RDW 13.5 13.6 14.0 13.9    268 260 283     INR  Recent Labs   Lab 02/13/20  0122   INR 1.14     ABGNo lab results found in last 7 days.   Urine Studies  Recent Labs   Lab Test 02/11/20  0850 01/27/20  0941 01/03/20  1406 08/28/19  0905   COLOR Yellow Straw Light Yellow Yellow   APPEARANCE Clear Clear Clear Clear   URINEGLC 100* >1000* 30* Negative   URINEBILI Negative Negative Negative Negative   URINEKETONE Negative Trace* 10* Negative   SG 1.020 1.011 1.017 1.015   UBLD Trace* Trace* Trace* Small*   URINEPH 7.0 6.5 6.5 7.0   PROTEIN >=300* 300* 300* 100*   UROBILINOGEN 0.2  --   --  0.2   NITRITE Negative Negative Negative Negative   LEUKEST Negative Negative Negative Negative   RBCU 2-5* 2 3* 5-10*   WBCU 0 - 5 2 1 5-10*     Recent Labs   Lab Test 08/22/19  0857 01/11/19  0741 07/08/15  0747 01/28/15  0749 12/27/13  0736 11/11/13  1436 11/12/12  1649 10/11/12  0737 02/28/12  0713   UTPG 2.20* 0.87* 0.28* 0.43* 0.31* 0.30* 0.40* 0.51* 0.48*     PTH  No lab results found.  Iron Studies  Recent Labs   Lab Test 02/13/20  0720   AUBRIE 106     IMAGING:  All imaging studies reviewed by me.

## 2020-02-17 NOTE — PROGRESS NOTES
Patient left for dialysis and anti rejection medications given, alert and oriented, will order breakfast later.

## 2020-02-18 LAB
GLUCOSE BLDC GLUCOMTR-MCNC: 130 MG/DL (ref 70–99)
GLUCOSE BLDC GLUCOMTR-MCNC: 156 MG/DL (ref 70–99)
GLUCOSE BLDC GLUCOMTR-MCNC: 165 MG/DL (ref 70–99)
GLUCOSE BLDC GLUCOMTR-MCNC: 186 MG/DL (ref 70–99)
GLUCOSE BLDC GLUCOMTR-MCNC: 238 MG/DL (ref 70–99)
GLUCOSE BLDC GLUCOMTR-MCNC: 312 MG/DL (ref 70–99)

## 2020-02-18 PROCEDURE — 25000132 ZZH RX MED GY IP 250 OP 250 PS 637: Performed by: INTERNAL MEDICINE

## 2020-02-18 PROCEDURE — 25000131 ZZH RX MED GY IP 250 OP 636 PS 637: Performed by: STUDENT IN AN ORGANIZED HEALTH CARE EDUCATION/TRAINING PROGRAM

## 2020-02-18 PROCEDURE — 12000001 ZZH R&B MED SURG/OB UMMC

## 2020-02-18 PROCEDURE — 00000146 ZZHCL STATISTIC GLUCOSE BY METER IP

## 2020-02-18 PROCEDURE — 25000128 H RX IP 250 OP 636: Performed by: STUDENT IN AN ORGANIZED HEALTH CARE EDUCATION/TRAINING PROGRAM

## 2020-02-18 PROCEDURE — 99232 SBSQ HOSP IP/OBS MODERATE 35: CPT | Performed by: INTERNAL MEDICINE

## 2020-02-18 PROCEDURE — 25000131 ZZH RX MED GY IP 250 OP 636 PS 637: Performed by: PHYSICIAN ASSISTANT

## 2020-02-18 PROCEDURE — 25000132 ZZH RX MED GY IP 250 OP 250 PS 637: Performed by: STUDENT IN AN ORGANIZED HEALTH CARE EDUCATION/TRAINING PROGRAM

## 2020-02-18 RX ADMIN — OMEPRAZOLE 40 MG: 20 CAPSULE, DELAYED RELEASE ORAL at 20:36

## 2020-02-18 RX ADMIN — CARVEDILOL 6.25 MG: 6.25 TABLET, FILM COATED ORAL at 08:36

## 2020-02-18 RX ADMIN — ONDANSETRON 4 MG: 2 INJECTION INTRAMUSCULAR; INTRAVENOUS at 20:42

## 2020-02-18 RX ADMIN — INSULIN ASPART 4 UNITS: 100 INJECTION, SOLUTION INTRAVENOUS; SUBCUTANEOUS at 01:41

## 2020-02-18 RX ADMIN — SENNOSIDES AND DOCUSATE SODIUM 2 TABLET: 8.6; 5 TABLET ORAL at 22:05

## 2020-02-18 RX ADMIN — MYCOPHENOLIC ACID 360 MG: 360 TABLET, DELAYED RELEASE ORAL at 20:36

## 2020-02-18 RX ADMIN — METOCLOPRAMIDE HYDROCHLORIDE 5 MG: 5 TABLET ORAL at 18:17

## 2020-02-18 RX ADMIN — CARVEDILOL 6.25 MG: 6.25 TABLET, FILM COATED ORAL at 18:14

## 2020-02-18 RX ADMIN — INSULIN ASPART 1 UNITS: 100 INJECTION, SOLUTION INTRAVENOUS; SUBCUTANEOUS at 08:31

## 2020-02-18 RX ADMIN — INSULIN ASPART 1 UNITS: 100 INJECTION, SOLUTION INTRAVENOUS; SUBCUTANEOUS at 18:14

## 2020-02-18 RX ADMIN — ATORVASTATIN CALCIUM 40 MG: 40 TABLET, FILM COATED ORAL at 08:36

## 2020-02-18 RX ADMIN — OXYCODONE HYDROCHLORIDE 5 MG: 5 TABLET ORAL at 22:05

## 2020-02-18 RX ADMIN — TACROLIMUS 1 MG: 1 CAPSULE ORAL at 20:36

## 2020-02-18 RX ADMIN — TACROLIMUS 1 MG: 1 CAPSULE ORAL at 08:37

## 2020-02-18 RX ADMIN — INSULIN ASPART 1 UNITS: 100 INJECTION, SOLUTION INTRAVENOUS; SUBCUTANEOUS at 20:45

## 2020-02-18 RX ADMIN — INSULIN ASPART 12 UNITS: 100 INJECTION, SOLUTION INTRAVENOUS; SUBCUTANEOUS at 18:14

## 2020-02-18 RX ADMIN — INSULIN ASPART 2 UNITS: 100 INJECTION, SOLUTION INTRAVENOUS; SUBCUTANEOUS at 05:36

## 2020-02-18 RX ADMIN — MYCOPHENOLIC ACID 360 MG: 360 TABLET, DELAYED RELEASE ORAL at 08:36

## 2020-02-18 RX ADMIN — AMLODIPINE BESYLATE 5 MG: 5 TABLET ORAL at 08:36

## 2020-02-18 ASSESSMENT — ACTIVITIES OF DAILY LIVING (ADL)
ADLS_ACUITY_SCORE: 14

## 2020-02-18 NOTE — PROGRESS NOTES
"Transition Planning Update/Community Dialysis Unit Inquiry    D:  Contacted the [facility] Intake staff (ph- 5  mid morning and while on a recorded line, the staff stated that they had received all clinical information that was needed and they were waiting to hear back from the [facility] Dialysis Unit and still confirming insurance via what was called a \"fast track\" before a final decision could be made.  A/P:  Continue waiitng for a Dialysis Chair Time in the community.  Inpatient cares continue per MD team plans of care.  The inpatient Care Management Department will continue to assist with a transition plan of care.    JAZZY Christiansen., R.N., P.H.N..  Care Coordinator     Pager   [facility]    Addendum: Received an update from the  stating that they were able to sit down and assist patient with filling out an application for [facility] Insurance.  Patient is not eligible for [facility] secondary to assets.  MD Team Dr Kathleen MD was updated about the above and was given the Financial Planners note concerning insurance delays.  Per the , MNSure will take multiple days to finalize.     Addendum:  As of approximately 3:55 p.m., no update from the community dialysis/[facility] Intake staff.  The inpatient Care Management Team will continue to follow daily.  "

## 2020-02-18 NOTE — PLAN OF CARE
Alert & Orientated.  Up ad mikal ambulating in hallways. Vitals stable. Denied pain this shift.  Declined scheduled heparin. Tolerating  Consistent carb diet with no complaints of N&V. Blood sugar checks q 4 hours with sliding scale and carb coverage. Voiding but not always saving. PIV S/L and CVC double lumen WDL.    Patient awaiting outpatient dialysis placement.

## 2020-02-18 NOTE — PROGRESS NOTES
[facility]    Medicine Progress Note - Hospitalist Service, Gold 09       Date of Admission:  2/11/2020  Assessment & Plan   Murtaza Fitzgerald is a 44 year old male with PMHx of DM1 c/b gastroparesis, CKD (s/p kidney transplant on 11/2008 secondary to diabetic nephropathy), pancreatic transplant (2/2009), CAD s/p bypass graft, HTN, GERD who presents to the ED with nausea and vomiting.      ESRD in setting of renal transplant   On hemodialysis   Patient was having nausea and vomiting likely related to his uremia   - symptoms improved with dialysis   - awaiting outpatient HD placnment       Intractable vomiting-- resolved  Suspected Gastroparesis Vs Uremia   - resolved after HD   - eating and tolerating well   - continue feeding as tolerated        Chronic Problems:      # DM1   # Hyperglycemia   - Resume home dosing of Tresiba  - carb counting with meals  -A1C is 7  - med sliding scale insulin   - hypoglycemia protocol      #S/p pancreatic transplant 2/2009   #S/p renal transplant 11/2008   -  mg BID   - Tacrolimus 2 mg BID   - transplant teams consulted.     # CAD s/p bypass graft   #HTN   Per chart review patient takes amlodipine 5mg, Carvedilol 6.25 mg BID  restarted  - hydralazine PRN available for BP >180/90      BP much improved.     Diet: Consistent Carbohydrate Diet 6690-8076 Calories: High Consistent CHO (4-7 CHO units/meal)    DVT Prophylaxis: Heparin SQ  Mueller Catheter: not present  Code Status: Full Code          Disposition Plan     Expected discharge: pending dialysis chair availability. Patient does not have coverage and difficult to get outpatient dialysis Jeremy Wang MD 02/18/2020, 5:21 PM       The patient's care was discussed with the Bedside Nurse and Patient.    Jeremy Wang MD  Hospitalist Service, Gold 09  [facility]  Pager: [phone]  Please see sticky note for cross cover  information  ______________________________________________________________________    Interval History   Nausea and vomiting improved   Patient tolerating diet     Data reviewed today: I reviewed all medications, new labs and imaging results over the last 24 hours.   Physical Exam   Vital Signs: Temp: 99.3  F (37.4  C) Temp src: Oral BP: 138/82 Pulse: 88 Heart Rate: 88 Resp: 16 SpO2: 97 % O2 Device: None (Room air)    Weight: 190 lbs 11.17 oz  Constitutional: awake, alert, cooperative, no apparent distress, and appears stated age  Hematologic / Lymphatic: no cervical lymphadenopathy  Respiratory: No increased work of breathing, good air exchange, clear to auscultation bilaterally, no crackles or wheezing  Cardiovascular: Normal apical impulse, regular rate and rhythm, normal S1 and S2, no S3 or S4, and no murmur noted  GI: No scars, normal bowel sounds, soft, non-distended, non-tender, no masses palpated, no hepatosplenomegally  Skin: no bruising or bleeding  Neurologic: Awake, alert, oriented to name, place and time.  Cranial nerves II-XII are grossly intact.  Motor is 5 out of 5 bilaterally.  Cerebellar finger to nose, heel to shin intact.  Sensory is intact.  Babinski down going, Romberg negative, and gait is normal.    Data   Recent Labs   Lab 02/17/20  0557 02/16/20  0910 02/15/20  0935  02/13/20  0122   WBC 6.8 7.4 6.4   < >  --    HGB 11.0* 11.6* 11.2*   < >  --    MCV 90 90 91   < >  --     268 260   < >  --    INR  --   --   --   --  1.14    136 136   < >  --    POTASSIUM 4.1 3.9 3.9   < >  --    CHLORIDE 102 104 104   < >  --    CO2 25 24 27   < >  --    BUN 20 13 15   < >  --    CR 4.45* 3.56* 4.22*   < >  --    ANIONGAP 8 7 4   < >  --    CHARLY 8.7 8.7 8.5   < >  --    * 178* 170*   < >  --     < > = values in this interval not displayed.     No results found for this or any previous visit (from the past 24 hour(s)).  Medications       amLODIPine  5 mg Oral Daily     atorvastatin  40  mg Oral Daily     carvedilol  6.25 mg Oral BID w/meals     heparin ANTICOAGULANT  5,000 Units Subcutaneous BID     heparin Lock (1000 units/mL High concentration)  3 mL Intracatheter Once     heparin Lock (1000 units/mL High concentration)  3 mL Intracatheter Once     insulin aspart   Subcutaneous QAM AC     insulin aspart   Subcutaneous Daily with lunch     insulin aspart   Subcutaneous Daily with supper     insulin aspart  1-6 Units Subcutaneous Q4H     insulin degludec  24 Units Subcutaneous At Bedtime     mycophenolic acid  360 mg Oral BID     omeprazole  40 mg Oral QPM     senna-docusate  2 tablet Oral At Bedtime     sodium chloride (PF)  3 mL Intracatheter Q8H     tacrolimus  1 mg Oral BID

## 2020-02-18 NOTE — PROGRESS NOTES
CLINICAL NUTRITION SERVICES - ASSESSMENT NOTE     Nutrition Prescription    RECOMMENDATIONS FOR MDs/PROVIDERS TO ORDER:  None at this time    Malnutrition Status:    Non-severe malnutrition in the context of acute on chronic illness.     Recommendations already ordered by Registered Dietitian (RD):  None at this time     Future/Additional Recommendations:  1. Continue consistent carbohydrate diet   2. Consider further gastroparesis diet education if symptoms persist or if patient requests     REASON FOR ASSESSMENT  Murtaza Fitzgerald is a/an 44 year old male assessed by the dietitian for LOS.     NUTRITION HISTORY  Per H &P, PMH of DM1 c/b gastroparesis, CKD (s/p kidney transplant on 11/2008 secondary to diabetic nephropathy), pancreatic transplant (2/2009), CAD s/p bypass graft, HTN, GERD.   - Poorly controlled blood sugars- some days readings are as high as the 300s.   - Last gastric emptying scan April 2019, showed worsening  - Failed pancreas transplant 2013- returned to insulin  - Failing kidney tx   - Pt will require long term HD- initiated 2/13  - Last visit with nutrition services was 8/13/15  - Presented 2/11/20 with intractable vomiting (recurring hx of intractable vomiting and last hospitalized on 2/5-2/9). Vomiting most likely secondary to gastroparesis and or uremia. No evidence of SBO.   Per discussion with pt today:   - Since about the first of the year, appetite has been diminished due to illness and frequent hospital stays  - Eats because he knows he should not because he is hungry   - Typical meals include ramen, tator tot hot dish, sandwich (meat, cheese, luz)  - Beverages include water and powerade   - Used to follow some gastroparesis guidelines such as eating small, more frequent meals- does not much anymore    CURRENT NUTRITION ORDERS  Diet: High Consistent Carbohydrate  Intake/Tolerance:   - Per chart review, unable to tolerate any oral intake 2/11; increased appetite, but did not  "eat much ; tolerated full tray of dinner  with PO Zofran  - Per flowsheets, pt consuming % of meals past 7 days  - Pt ordering 3 meals a day consisting of:   B: scrambled eggs, hashbrowns, fruit  L: Cheeseburger, side salad, milk, rice krispie bar  D: Mac and cheese or hamburger, side salad, rice krispie bar, milk   - Pt states appetite has improved this week- pt has been able to finish 100% of meals    LABS  Glucose (last 4 readings): 156, 238, 312, 199   T mg/dL (H)  A1c: 7.0%  Vitamin B12: 1,509 pg/mL (H)  Iron: 17 micrograms/dL (L)    MEDICATIONS  Medications reviewed    ANTHROPOMETRICS  Height: 5'8\"  Lowest weight since admission (20): 86.2 kg, (190 lb 0.6 oz)  IBW: 70 kg  BMI: Overweight BMI 25-29.9  Weight History: pt states he has lost weight in the past week - weight has remained relatively stable the past week~ 5% weight loss in last month   Wt Readings from Last 10 Encounters:   20 86.5 kg (190 lb 11.2 oz)   20 86.1 kg (189 lb 12.8 oz)   20 90.7 kg (200 lb)   20 92.6 kg (204 lb 3.2 oz)   19 92.8 kg (204 lb 9.6 oz)   10/08/19 92.9 kg (204 lb 11.2 oz)   19 90.7 kg (200 lb)   19 90.7 kg (200 lb)   19 89.8 kg (198 lb)   07/10/19 90.6 kg (199 lb 12.8 oz)     Dosing Weight: 74 kg (adjusted, based on lowest weight since admission 20)    ASSESSED NUTRITION NEEDS  Estimated Energy Needs: 6391-9803 kcals/day (25 - 30 kcals/kg)  Justification: Maintenance  Estimated Protein Needs:  grams protein/day (1.2 - 1.8 grams of pro/kg)  Justification: Dialysis  Estimated Fluid Needs: 9327-0327 mL/day (25 - 30 mL/kg)   Justification: Maintenance    PHYSICAL FINDINGS  See malnutrition section below.  Trace edema +1: right and left leg     MALNUTRITION  % Intake: No decreased intake noted OR < 75% for >/= 1 month (non-severe) (pt states appetite lower the past month)  % Weight Loss:  Up to 5% in 1 month (non-severe)  Subcutaneous Fat Loss: " Facial region: mild   Muscle Loss: Temporal:  Mild and Facial & jaw region: mild  Fluid Accumulation/Edema: Does not meet criteria; Trace edema +1 legs  Malnutrition Diagnosis: Non-severe malnutrition in the context of acute on chronic illness.     NUTRITION DIAGNOSIS  Altered GI function related to gastroparesis as evidenced by intractable vomiting 12x on day of admission 2/11/20 (N/V improving throughout admission)    INTERVENTIONS  Implementation  Nutrition Education: Briefly reviewed gastroparesis diet guidelines, touching on eating smaller frequent meals to prevent further nausea/vomiting.    Goals  Patient to consume % of nutritionally adequate meal trays TID, or the equivalent with supplements/snacks.     Monitoring/Evaluation  Progress toward goals will be monitored and evaluated per protocol.    Yesica Greenberg   Dietetic Intern    I have read and agree with the above nutrition note, recs, and interventions  Eleni Munoz RD, LD  7C RD pager: [phone]

## 2020-02-18 NOTE — PLAN OF CARE
Shift [id number]     Admission: Admit 2/11/20, DMI, with kidney tx 2008, pancreatic tx 2009, CAD with bypass. Arrived in ED with dry heaving N&V. HD started for transplant failure.   Vitals: VSS,  at 0100.   Activity:Up independently.   Pain: Roxicodone  Neuro: A&Ox4.  Cardiac:  WNL  Respiratory: WNL  GI/: WNL, no nausea  Diet: Consistent carb  Lines: CVC fright side for HD. PIV.  Plan: Dialysis out patient needs to be coordinated     Continue to monitor and follow POC

## 2020-02-19 LAB
ANION GAP SERPL CALCULATED.3IONS-SCNC: 8 MMOL/L (ref 3–14)
BUN SERPL-MCNC: 35 MG/DL (ref 7–30)
CALCIUM SERPL-MCNC: 8.2 MG/DL (ref 8.5–10.1)
CHLORIDE SERPL-SCNC: 103 MMOL/L (ref 94–109)
CO2 SERPL-SCNC: 22 MMOL/L (ref 20–32)
CREAT SERPL-MCNC: 4.76 MG/DL (ref 0.66–1.25)
ERYTHROCYTE [DISTWIDTH] IN BLOOD BY AUTOMATED COUNT: 13.3 % (ref 10–15)
GFR SERPL CREATININE-BSD FRML MDRD: 14 ML/MIN/{1.73_M2}
GLUCOSE BLDC GLUCOMTR-MCNC: 125 MG/DL (ref 70–99)
GLUCOSE BLDC GLUCOMTR-MCNC: 131 MG/DL (ref 70–99)
GLUCOSE BLDC GLUCOMTR-MCNC: 167 MG/DL (ref 70–99)
GLUCOSE BLDC GLUCOMTR-MCNC: 192 MG/DL (ref 70–99)
GLUCOSE BLDC GLUCOMTR-MCNC: 203 MG/DL (ref 70–99)
GLUCOSE BLDC GLUCOMTR-MCNC: 232 MG/DL (ref 70–99)
GLUCOSE SERPL-MCNC: 306 MG/DL (ref 70–99)
HCT VFR BLD AUTO: 31.2 % (ref 40–53)
HGB BLD-MCNC: 10 G/DL (ref 13.3–17.7)
MCH RBC QN AUTO: 28.9 PG (ref 26.5–33)
MCHC RBC AUTO-ENTMCNC: 32.1 G/DL (ref 31.5–36.5)
MCV RBC AUTO: 90 FL (ref 78–100)
PLATELET # BLD AUTO: 225 10E9/L (ref 150–450)
POTASSIUM SERPL-SCNC: 4.4 MMOL/L (ref 3.4–5.3)
RBC # BLD AUTO: 3.46 10E12/L (ref 4.4–5.9)
SODIUM SERPL-SCNC: 134 MMOL/L (ref 133–144)
WBC # BLD AUTO: 7.4 10E9/L (ref 4–11)

## 2020-02-19 PROCEDURE — 25800030 ZZH RX IP 258 OP 636: Performed by: STUDENT IN AN ORGANIZED HEALTH CARE EDUCATION/TRAINING PROGRAM

## 2020-02-19 PROCEDURE — 25000131 ZZH RX MED GY IP 250 OP 636 PS 637: Performed by: STUDENT IN AN ORGANIZED HEALTH CARE EDUCATION/TRAINING PROGRAM

## 2020-02-19 PROCEDURE — 99232 SBSQ HOSP IP/OBS MODERATE 35: CPT | Performed by: INTERNAL MEDICINE

## 2020-02-19 PROCEDURE — 25000132 ZZH RX MED GY IP 250 OP 250 PS 637: Performed by: INTERNAL MEDICINE

## 2020-02-19 PROCEDURE — 90937 HEMODIALYSIS REPEATED EVAL: CPT

## 2020-02-19 PROCEDURE — 00000146 ZZHCL STATISTIC GLUCOSE BY METER IP

## 2020-02-19 PROCEDURE — 25000131 ZZH RX MED GY IP 250 OP 636 PS 637: Performed by: PHYSICIAN ASSISTANT

## 2020-02-19 PROCEDURE — 85027 COMPLETE CBC AUTOMATED: CPT | Performed by: STUDENT IN AN ORGANIZED HEALTH CARE EDUCATION/TRAINING PROGRAM

## 2020-02-19 PROCEDURE — 80048 BASIC METABOLIC PNL TOTAL CA: CPT | Performed by: STUDENT IN AN ORGANIZED HEALTH CARE EDUCATION/TRAINING PROGRAM

## 2020-02-19 PROCEDURE — 12000001 ZZH R&B MED SURG/OB UMMC

## 2020-02-19 PROCEDURE — 25000132 ZZH RX MED GY IP 250 OP 250 PS 637: Performed by: STUDENT IN AN ORGANIZED HEALTH CARE EDUCATION/TRAINING PROGRAM

## 2020-02-19 RX ADMIN — TACROLIMUS 1 MG: 1 CAPSULE ORAL at 09:24

## 2020-02-19 RX ADMIN — TACROLIMUS 1 MG: 1 CAPSULE ORAL at 19:19

## 2020-02-19 RX ADMIN — OXYCODONE HYDROCHLORIDE 5 MG: 5 TABLET ORAL at 14:09

## 2020-02-19 RX ADMIN — SODIUM CHLORIDE 300 ML: 9 INJECTION, SOLUTION INTRAVENOUS at 09:51

## 2020-02-19 RX ADMIN — INSULIN ASPART 2 UNITS: 100 INJECTION, SOLUTION INTRAVENOUS; SUBCUTANEOUS at 01:38

## 2020-02-19 RX ADMIN — SODIUM CHLORIDE 250 ML: 9 INJECTION, SOLUTION INTRAVENOUS at 09:51

## 2020-02-19 RX ADMIN — ACETAMINOPHEN 650 MG: 325 TABLET, FILM COATED ORAL at 14:09

## 2020-02-19 RX ADMIN — SENNOSIDES AND DOCUSATE SODIUM 2 TABLET: 8.6; 5 TABLET ORAL at 21:26

## 2020-02-19 RX ADMIN — INSULIN ASPART 1 UNITS: 100 INJECTION, SOLUTION INTRAVENOUS; SUBCUTANEOUS at 12:42

## 2020-02-19 RX ADMIN — MYCOPHENOLIC ACID 360 MG: 360 TABLET, DELAYED RELEASE ORAL at 09:21

## 2020-02-19 RX ADMIN — OXYCODONE HYDROCHLORIDE 5 MG: 5 TABLET ORAL at 21:26

## 2020-02-19 RX ADMIN — MYCOPHENOLIC ACID 360 MG: 360 TABLET, DELAYED RELEASE ORAL at 19:18

## 2020-02-19 RX ADMIN — OMEPRAZOLE 40 MG: 20 CAPSULE, DELAYED RELEASE ORAL at 19:18

## 2020-02-19 RX ADMIN — METOCLOPRAMIDE HYDROCHLORIDE 5 MG: 5 TABLET ORAL at 18:23

## 2020-02-19 RX ADMIN — INSULIN ASPART 2 UNITS: 100 INJECTION, SOLUTION INTRAVENOUS; SUBCUTANEOUS at 05:34

## 2020-02-19 RX ADMIN — AMLODIPINE BESYLATE 5 MG: 5 TABLET ORAL at 09:23

## 2020-02-19 RX ADMIN — CARVEDILOL 6.25 MG: 6.25 TABLET, FILM COATED ORAL at 18:23

## 2020-02-19 RX ADMIN — INSULIN ASPART 16 UNITS: 100 INJECTION, SOLUTION INTRAVENOUS; SUBCUTANEOUS at 18:23

## 2020-02-19 RX ADMIN — ATORVASTATIN CALCIUM 40 MG: 40 TABLET, FILM COATED ORAL at 09:21

## 2020-02-19 RX ADMIN — Medication: at 09:51

## 2020-02-19 RX ADMIN — CARVEDILOL 6.25 MG: 6.25 TABLET, FILM COATED ORAL at 09:22

## 2020-02-19 ASSESSMENT — PAIN DESCRIPTION - DESCRIPTORS
DESCRIPTORS: ACHING;SORE
DESCRIPTORS: ACHING;SORE

## 2020-02-19 ASSESSMENT — ACTIVITIES OF DAILY LIVING (ADL)
ADLS_ACUITY_SCORE: 14

## 2020-02-19 NOTE — PLAN OF CARE
[id number]:   Pt admitted 2/11 with nausea and vomiting. Now on HD.   Hx: DM1 s/p pancreatic transplant (2/2009) failed 2013, CKD s/p Kidney transplant (11/2018) now failing, CAD s/p bypass graft, HTN, and GERD.     Neuro: A/Ox4.  Vitals: AVSS. HTN within parameters.   Respiratory: O2 sats stable on RA  GI/: makes some urine. No BM this shift, last BM 2/17  Diet/appetite: Consistent carb diet. q4 BG check  Activity:  Independent.   Pain: PRN oxy given for CVC discomfort.   Skin: No new deficits noted.  LDA's: PIV SL. HD CVC.     Plan: Medically ready for discharge, waiting for outpatient dialysis placement. Continue with POC. Notify primary team with changes.

## 2020-02-19 NOTE — PLAN OF CARE
Alert & Orientated. Up ad mikal ambulating in hallways. Vitals stable.  Had dialysis this morning. Pain managed with prn oxycodone and tylenol. Declined scheduled heparin. Tolerating  Consistent carb diet with no complaints of N&V. Blood sugar checks q 4 hours with sliding scale and carb coverage. Voiding but not always saving. PIV S/L and CVC double lumen WDL.    Patient awaiting outpatient dialysis placement.

## 2020-02-19 NOTE — PROGRESS NOTES
HEMODIALYSIS TREATMENT NOTE    Date: 2/19/2020  Time: 10:43 AM    Data:  Pre Wt: 87.3 kg  Desired Wt: 85.3 kg   Post Wt:  85.3   Weight change: 1.4 kg  Ultrafiltration - Post Run Net Total Removed (mL): 1400 mL  Vascular Access Status: CVC  Dialyzer Rinse: Light  Total Blood Volume Processed: 70.69   Total Dialysis (Treatment) Time: 3.5 hours   Dialysate Bath: K 3, Ca 2.25  Heparin: None    Lab:   Yes, BMP and CBC sent    Interventions:  bfr 400.  HD dressing CDI.    100 ml bolus given, pt reports feeling off  Standing weight post treatment.    Assessment:  Pt vitally stable. He denies pain/SOB at this time. Tolerating dialysis well. Run unremarkable.     Plan:    Will continue to monitor and follow POC per renal team.

## 2020-02-19 NOTE — PROGRESS NOTES
[facility], [place]   Transplant Nephrology Progress Note  Date of Admission:  2/11/2020  Today's Date: 02/19/2020    Assessment & Plan   # LDKT: Failed, on HD now              - Baseline Cr ~ 5-6              - Proteinuria: Moderate (1-3 grams)              - Date DSA Last Checked: Jul/2016      Latest DSA: No              - BK Viremia: No              - Kidney Tx Biopsy: Dec 27, 2013; Result: No evidence of rejection    # SWATHI on CKD s/p LDKT: He remains oliguric and will likely need long-term hemodialysis to maintain renal function. We have set him up with a outpatient HD facility for MWF HD, however he lacks insurance and we are having difficulty with outpatient planning. HD 3.5hrs today, EDW ~85kg    # Pancreas Tx (ANNMARIE):              - Pancreatic Exocrine Drainage: Bladder drained                    - Failed 2013, back on insulin.     # Immunosuppression: Tacrolimus immediate release (goal not followed) and Mycophenolic acid (goal not followed)              - Changes: No, MPA 360mg BID. Tacrolimus 1mg BID x 3 months (~May), then 1mg daily x 3 months (~Aug), then off.    # Infection Prophylaxis:   - PJP: None    # Hypertension: Controlled;  Goal BP: < 140/90   - Volume status: Euvolemic  EDW~ 85kg   - Changes: No, continue home medications. Don't give prior to dialysis    # Diabetes: Controlled (HbA1c <7%)            Last HbA1c: 7.0%              - Management as per primary team.     # Anemia in Chronic Renal Disease: Hgb: Stable      WILL: No              - Iron studies: Not checked recently     # Mineral Bone Disorder:   - Secondary renal hyperparathyroidism; PTH level: Not checked recently        On treatment: None  - Vitamin D; level: Not checked recently        On Supplement: No  - Calcium; level: Normal        On Supplement: No  - Phosphorus; level: Normal        On Supplement: No     # Electrolytes:   - Potassium; level: Normal        On Supplement: No  - Magnesium; level:  Normal        On Supplement: No  - Bicarbonate; level: Low        On Supplement: No  - Sodium; level: Normal        On Supplement: No     # Gastroparesis: Has known gastroparesis, surgery will evaluate him for a gastric pacemaker implant. Previously, GI team has suggested a G-tube vent     # Nausea/Vomiting: Possibly a component of uremia and/or gastroparesis. Symptoms have slightly improved today. GI symptoms have improved.  If symptoms persist, a gastric pacemaker may be the next option.      # Transplant History:  Etiology of Kidney Failure: Diabetes mellitus type 1  Tx: LDKT and ANNMARIE  Transplant: 11/4/2008 (Kidney), 2/17/2009 (Pancreas)  Donor Type: Living      Donor Class: Standard Criteria Donor  Significant changes in immunosuppression: None  Significant transplant-related complications: None     Recommendations were communicated to the primary team via this note.    Seen and discussed with Dr. Jameel Wyman MD   Pager: [phone]    Attestation:  This patient has been seen and evaluated by me, Sung Jorgensen MD.  I have reviewed the note and agree with plan of care as documented by the fellow.       Interval History   Mr Daniels was seen on HD today. He reported leg cramping and had 's at the end of his run. He denies SOB or chest pain. He has no LE edema. He denies nausea or vomiting. UOP remains oliguric.     Review of Systems   4 point ROS was obtained and negative except as noted in the Interval History.    MEDICATIONS:    amLODIPine  5 mg Oral Daily     atorvastatin  40 mg Oral Daily     carvedilol  6.25 mg Oral BID w/meals     heparin ANTICOAGULANT  5,000 Units Subcutaneous BID     heparin Lock (1000 units/mL High concentration)  3 mL Intracatheter Once     heparin Lock (1000 units/mL High concentration)  3 mL Intracatheter Once     insulin aspart   Subcutaneous QAM AC     insulin aspart   Subcutaneous Daily with lunch     insulin aspart   Subcutaneous Daily with supper     insulin  aspart  1-6 Units Subcutaneous Q4H     insulin degludec  24 Units Subcutaneous At Bedtime     mycophenolic acid  360 mg Oral BID     omeprazole  40 mg Oral QPM     senna-docusate  2 tablet Oral At Bedtime     sodium chloride (PF)  3 mL Intracatheter Q8H     tacrolimus  1 mg Oral BID       Physical Exam    /56 (BP Location: Right arm)   Pulse 85   Temp 98.2  F (36.8  C) (Oral)   Resp 16   Wt 85.3 kg (188 lb 0.8 oz)   SpO2 96%   BMI 28.59 kg/m       GENERAL APPEARANCE: alert and no distress  HENT: mouth without ulcers or lesions  LYMPHATICS: no cervical or supraclavicular nodes  RESP: lungs clear to auscultation - no rales, rhonchi or wheezes  CV: regular rhythm, normal rate, no rub, no murmur  EDEMA: mild LE edema bilaterally  ABDOMEN: soft, nondistended, nontender, bowel sounds normal  MS: extremities normal - no gross deformities noted, no evidence of inflammation in joints, no muscle tenderness  SKIN: no rash  ACCESS: Tunneled HD catheter in chest    Data   All labs reviewed by me.  CMP  Recent Labs   Lab 02/19/20  0939 02/17/20  0557 02/16/20  0910 02/15/20  0935 02/14/20  0803 02/13/20  0720    136 136 136 136 138   POTASSIUM 4.4 4.1 3.9 3.9 3.9 4.0   CHLORIDE 103 102 104 104 108 112*   CO2 22 25 24 27 19* 20   ANIONGAP 8 8 7 4 10 6   * 138* 178* 170* 171* 123*   BUN 35* 20 13 15 20 31*   CR 4.76* 4.45* 3.56* 4.22* 4.59* 5.69*   GFRESTIMATED 14* 15* 20* 16* 15* 11*   GFRESTBLACK 16* 18* 23* 19* 17* 13*   CHARLY 8.2* 8.7 8.7 8.5 8.6 8.6   MAG  --   --  1.8 1.7 1.6 1.8   PHOS  --   --   --  3.5  --   --      CBC  Recent Labs   Lab 02/19/20  0939 02/17/20  0557 02/16/20  0910 02/15/20  0935   HGB 10.0* 11.0* 11.6* 11.2*   WBC 7.4 6.8 7.4 6.4   RBC 3.46* 3.81* 4.07* 3.91*   HCT 31.2* 34.2* 36.6* 35.4*   MCV 90 90 90 91   MCH 28.9 28.9 28.5 28.6   MCHC 32.1 32.2 31.7 31.6   RDW 13.3 13.5 13.6 14.0    246 268 260     INR  Recent Labs   Lab 02/13/20  0122   INR 1.14     ABGNo lab results  found in last 7 days.   Urine Studies  Recent Labs   Lab Test 02/11/20  0850 01/27/20  0941 01/03/20  1406 08/28/19  0905   COLOR Yellow Straw Light Yellow Yellow   APPEARANCE Clear Clear Clear Clear   URINEGLC 100* >1000* 30* Negative   URINEBILI Negative Negative Negative Negative   URINEKETONE Negative Trace* 10* Negative   SG 1.020 1.011 1.017 1.015   UBLD Trace* Trace* Trace* Small*   URINEPH 7.0 6.5 6.5 7.0   PROTEIN >=300* 300* 300* 100*   UROBILINOGEN 0.2  --   --  0.2   NITRITE Negative Negative Negative Negative   LEUKEST Negative Negative Negative Negative   RBCU 2-5* 2 3* 5-10*   WBCU 0 - 5 2 1 5-10*     Recent Labs   Lab Test 08/22/19  0857 01/11/19  0741 07/08/15  0747 01/28/15  0749 12/27/13  0736 11/11/13  1436 11/12/12  1649 10/11/12  0737 02/28/12  0713   UTPG 2.20* 0.87* 0.28* 0.43* 0.31* 0.30* 0.40* 0.51* 0.48*     PTH  No lab results found.  Iron Studies  Recent Labs   Lab Test 02/13/20  0720   AUBRIE 106     IMAGING:  All imaging studies reviewed by me.

## 2020-02-19 NOTE — PLAN OF CARE
[id number]: A&Ox4. VSS on RA. Up independently. Voids spontaneously. LBM was 2/17. Denies pain. L PIV SL. R CVC in place for dialysis. BG was 165, given sliding scale and carb coverage insulin. On consistent carb diet with good appetite. Will continue to monitor and follow POC.

## 2020-02-19 NOTE — PROGRESS NOTES
Transition Planning Update    D:  Spoke with Ms. Dorita Thao at [facility] at phone 2 , ext- [phone] and per her request faxed updated inpatient notes and  information to her at fax-[phone].  Per Dorita, the dialysis intake office continues to pursue financial financial clearance.  A/P:  Inpatient cares continue per MD team plans of care. The inpatient Care Management Team will continue to follow for community dialysis need and will update MD team when a community dialysis bed becomes available.    KATHI Christiansen.S.MARIA EUGENIA., R.N., P.H.N..  Care Coordinator     Pager   [facility]

## 2020-02-19 NOTE — PROGRESS NOTES
[facility] Progress Note - Hospitalist Service       Date of Admission:  2/11/2020  Assessment & Plan   Murtaza Fitzgerald is a 44 year old male with PMHx of DM1 c/b gastroparesis, CKD (s/p kidney transplant on 11/2008 secondary to diabetic nephropathy), pancreatic transplant (2/2009), CAD s/p bypass graft, HTN, GERD who presents to the ED with nausea and vomiting.      ESRD in setting of renal transplant   On hemodialysis   -Patient was having nausea and vomiting likely related to his uremia as it resolved after dialysis is initiated    - symptoms improved with dialysis   - awaiting outpatient HD placement, which is difficult due to lack of insurance coverage        Intractable vomiting-- resolved  Suspected Gastroparesis Vs Uremia   - resolved after HD which indicate nausea and vomiting could have been due to uremia all along   - eating and tolerating well   - continue feeding as tolerated        Chronic Problems:      # DM1   # Hyperglycemia: well controlled   - Resume home dosing of Tresiba  - carb counting with meals  -A1C is 7  - med sliding scale insulin   - hypoglycemia protocol      #S/p pancreatic transplant 2/2009   #S/p renal transplant 11/2008   -  mg BID   - Tacrolimus 2 mg BID   - transplant teams consulted.     # CAD s/p bypass graft   #HTN   Per chart review patient takes amlodipine 5mg, Carvedilol 6.25 mg BID  restarted  - hydralazine PRN available for BP >180/90      BP much improved.     Diet: Consistent Carbohydrate Diet 9117-8309 Calories: High Consistent CHO (4-7 CHO units/meal)    DVT Prophylaxis: Heparin SQ  Mueller Catheter: not present  Code Status: Full Code          Disposition Plan     Expected discharge: pending dialysis chair availability. Patient does not have coverage and difficult to get outpatient dialysis Jeremy Wang MD 02/18/2020, 5:21 PM   Entered: Jeremy Wang MD 02/19/2020, 11:27 AM       The patient's care  was discussed with the Bedside Nurse and Patient.    Jeremy Wang MD  Hospitalist Service  [facility], [place]    ______________________________________________________________________    Interval History   Nausea and vomiting resolved  Appetite better     Data reviewed today: I reviewed all medications, new labs and imaging results over the last 24 hours.     Physical Exam   Vital Signs: Temp: 98.6  F (37  C) Temp src: Oral BP: (!) 141/84 Pulse: 83 Heart Rate: 89 Resp: 16 SpO2: 95 % O2 Device: None (Room air)    Weight: 192 lbs 8 oz  Constitutional: awake, alert, cooperative, no apparent distress, and appears stated age  Hematologic / Lymphatic: no cervical lymphadenopathy  Respiratory: No increased work of breathing, good air exchange, clear to auscultation bilaterally, no crackles or wheezing  Cardiovascular: Normal apical impulse, regular rate and rhythm, normal S1 and S2, no S3 or S4, and no murmur noted  GI: No scars, normal bowel sounds, soft, non-distended, non-tender, no masses palpated, no hepatosplenomegally  Neurologic: Awake, alert, oriented to name, place and time.  Cranial nerves II-XII are grossly intact.  Motor is 5 out of 5 bilaterally.  Cerebellar finger to nose, heel to shin intact.  Sensory is intact.  Babinski down going, Romberg negative, and gait is normal.    Data   Recent Labs   Lab 02/19/20  0939 02/17/20  0557 02/16/20  0910  02/13/20  0122   WBC 7.4 6.8 7.4   < >  --    HGB 10.0* 11.0* 11.6*   < >  --    MCV 90 90 90   < >  --     246 268   < >  --    INR  --   --   --   --  1.14    136 136   < >  --    POTASSIUM 4.4 4.1 3.9   < >  --    CHLORIDE 103 102 104   < >  --    CO2 22 25 24   < >  --    BUN 35* 20 13   < >  --    CR 4.76* 4.45* 3.56*   < >  --    ANIONGAP 8 8 7   < >  --    CHARLY 8.2* 8.7 8.7   < >  --    * 138* 178*   < >  --     < > = values in this interval not displayed.     No results found for this or any previous visit  (from the past 24 hour(s)).  Medications       amLODIPine  5 mg Oral Daily     atorvastatin  40 mg Oral Daily     carvedilol  6.25 mg Oral BID w/meals     heparin ANTICOAGULANT  5,000 Units Subcutaneous BID     heparin Lock (1000 units/mL High concentration)  3 mL Intracatheter Once     heparin Lock (1000 units/mL High concentration)  3 mL Intracatheter Once     insulin aspart   Subcutaneous QAM AC     insulin aspart   Subcutaneous Daily with lunch     insulin aspart   Subcutaneous Daily with supper     insulin aspart  1-6 Units Subcutaneous Q4H     insulin degludec  24 Units Subcutaneous At Bedtime     mycophenolic acid  360 mg Oral BID     omeprazole  40 mg Oral QPM     senna-docusate  2 tablet Oral At Bedtime     sodium chloride (PF)  3 mL Intracatheter Q8H     sodium chloride (PF)  9 mL Intracatheter During Hemodialysis (from stock)     sodium chloride (PF)  9 mL Intracatheter During Hemodialysis (from stock)     tacrolimus  1 mg Oral BID

## 2020-02-20 LAB
GLUCOSE BLDC GLUCOMTR-MCNC: 117 MG/DL (ref 70–99)
GLUCOSE BLDC GLUCOMTR-MCNC: 131 MG/DL (ref 70–99)
GLUCOSE BLDC GLUCOMTR-MCNC: 222 MG/DL (ref 70–99)
GLUCOSE BLDC GLUCOMTR-MCNC: 245 MG/DL (ref 70–99)
GLUCOSE BLDC GLUCOMTR-MCNC: 262 MG/DL (ref 70–99)
PLATELET # BLD AUTO: 215 10E9/L (ref 150–450)

## 2020-02-20 PROCEDURE — 25000132 ZZH RX MED GY IP 250 OP 250 PS 637: Performed by: STUDENT IN AN ORGANIZED HEALTH CARE EDUCATION/TRAINING PROGRAM

## 2020-02-20 PROCEDURE — 25000128 H RX IP 250 OP 636: Performed by: STUDENT IN AN ORGANIZED HEALTH CARE EDUCATION/TRAINING PROGRAM

## 2020-02-20 PROCEDURE — 36415 COLL VENOUS BLD VENIPUNCTURE: CPT | Performed by: STUDENT IN AN ORGANIZED HEALTH CARE EDUCATION/TRAINING PROGRAM

## 2020-02-20 PROCEDURE — 12000001 ZZH R&B MED SURG/OB UMMC

## 2020-02-20 PROCEDURE — 99232 SBSQ HOSP IP/OBS MODERATE 35: CPT | Performed by: INTERNAL MEDICINE

## 2020-02-20 PROCEDURE — 85049 AUTOMATED PLATELET COUNT: CPT | Performed by: STUDENT IN AN ORGANIZED HEALTH CARE EDUCATION/TRAINING PROGRAM

## 2020-02-20 PROCEDURE — 25000131 ZZH RX MED GY IP 250 OP 636 PS 637: Performed by: STUDENT IN AN ORGANIZED HEALTH CARE EDUCATION/TRAINING PROGRAM

## 2020-02-20 PROCEDURE — 25000132 ZZH RX MED GY IP 250 OP 250 PS 637: Performed by: INTERNAL MEDICINE

## 2020-02-20 PROCEDURE — 25000131 ZZH RX MED GY IP 250 OP 636 PS 637: Performed by: INTERNAL MEDICINE

## 2020-02-20 PROCEDURE — 25000131 ZZH RX MED GY IP 250 OP 636 PS 637: Performed by: PHYSICIAN ASSISTANT

## 2020-02-20 PROCEDURE — 00000146 ZZHCL STATISTIC GLUCOSE BY METER IP

## 2020-02-20 RX ADMIN — ACETAMINOPHEN 650 MG: 325 TABLET, FILM COATED ORAL at 21:19

## 2020-02-20 RX ADMIN — ONDANSETRON 4 MG: 2 INJECTION INTRAMUSCULAR; INTRAVENOUS at 08:30

## 2020-02-20 RX ADMIN — TACROLIMUS 1 MG: 1 CAPSULE ORAL at 19:14

## 2020-02-20 RX ADMIN — AMLODIPINE BESYLATE 5 MG: 5 TABLET ORAL at 08:40

## 2020-02-20 RX ADMIN — CARVEDILOL 6.25 MG: 6.25 TABLET, FILM COATED ORAL at 18:16

## 2020-02-20 RX ADMIN — MYCOPHENOLIC ACID 360 MG: 360 TABLET, DELAYED RELEASE ORAL at 19:14

## 2020-02-20 RX ADMIN — METOCLOPRAMIDE HYDROCHLORIDE 5 MG: 5 TABLET ORAL at 18:21

## 2020-02-20 RX ADMIN — OXYCODONE HYDROCHLORIDE 5 MG: 5 TABLET ORAL at 21:19

## 2020-02-20 RX ADMIN — INSULIN ASPART 17 UNITS: 100 INJECTION, SOLUTION INTRAVENOUS; SUBCUTANEOUS at 18:16

## 2020-02-20 RX ADMIN — ATORVASTATIN CALCIUM 40 MG: 40 TABLET, FILM COATED ORAL at 08:39

## 2020-02-20 RX ADMIN — CARVEDILOL 6.25 MG: 6.25 TABLET, FILM COATED ORAL at 08:40

## 2020-02-20 RX ADMIN — TACROLIMUS 1 MG: 1 CAPSULE ORAL at 08:40

## 2020-02-20 RX ADMIN — OMEPRAZOLE 40 MG: 20 CAPSULE, DELAYED RELEASE ORAL at 19:14

## 2020-02-20 RX ADMIN — MYCOPHENOLIC ACID 360 MG: 360 TABLET, DELAYED RELEASE ORAL at 08:41

## 2020-02-20 ASSESSMENT — ACTIVITIES OF DAILY LIVING (ADL)
ADLS_ACUITY_SCORE: 14

## 2020-02-20 ASSESSMENT — PAIN DESCRIPTION - DESCRIPTORS: DESCRIPTORS: ACHING;SORE

## 2020-02-20 NOTE — PLAN OF CARE
/65 (BP Location: Right arm)   Pulse 85   Temp 98.2  F (36.8  C) (Oral)   Resp 16   Wt 85.3 kg (188 lb 0.8 oz)   SpO2 98%   BMI 28.59 kg/m      Reason for admission: Nausea and vomiting.  Hx of DM! C/b gastroparesis, CKD (kidney transplant 11/2008), pancreas transplant (2/2009), CAD s/p graft, HTN, GERD.  Vitals: VSS.   Activity: Up independently.  Pain: Mild shoulder pain, pt requested no intervention at this time.  Neuro: AO x 4.  Cardiac: WDL.    Respiratory: WDL.  GI/: Oliguric (especially when dialyzed recently)  Diet: Consistent carb.  Lines: R CVC, L PIV.  Skin/Wounds: WDL.   Plan: Social work looking for OP dialysis.  Pt does not have insurance.      Continue to monitor and follow POC    Eric Champion RN on 2/20/2020 at 6:33 AM

## 2020-02-20 NOTE — PLAN OF CARE
Alert & Orientated. Up ad mikal ambulating in hallways. Vitals stable.  Denied pain this shift. Declined scheduled heparin. Complained of nausea this morning. Nausea resolved after prn zofran given. After zofran pt able to tolerate reg diet. Blood sugar checks q 4 hours with sliding scale and carb coverage. Voiding but not always saving. PIV S/L and CVC double lumen WDL.    Plan is to discharge once outpatient dialysis spot becomes available.

## 2020-02-20 NOTE — PLAN OF CARE
/75 (BP Location: Right arm)   Pulse 85   Temp 98.2  F (36.8  C) (Oral)   Resp 16   Wt 85.3 kg (188 lb 0.8 oz)   SpO2 96%   BMI 28.59 kg/m    VSS, A&Ox4, pain controlled with PO oxycodone. Compazine given after dinner for nausea with good relief. Left PIV saline locked, Left CVC saline locked. Passing gas, pt states he had a BM after dialysis. Made no urine this shift. BG checked ACHS and corrected with sliding scale and carb coverage. Up ad mikal, moving throughout the halls freely.

## 2020-02-20 NOTE — PROGRESS NOTES
[facility], [facility] Progress Note - Hospitalist Service, Gold 09       Date of Admission:  2/11/2020  Assessment & Plan   Murtaza Fitzgerald is a 44 year old male with PMHx of DM1 c/b gastroparesis, CKD (s/p kidney transplant on 11/2008 secondary to diabetic nephropathy), pancreatic transplant (2/2009), CAD s/p bypass graft, HTN, GERD who presents to the ED with nausea and vomiting which was thought to be due to      ESRD in setting of renal transplant   On hemodialysis   -Patient was having nausea and vomiting likely related to his uremia as it resolved after dialysis is initiated    - symptoms improved with dialysis   - awaiting outpatient HD placement, which is difficult due to lack of insurance coverage        Intractable vomiting-- resolved  Suspected Gastroparesis Vs Uremia   - resolved after HD which indicate nausea and vomiting could have been due to uremia all along   - eating and tolerating well   - continue feeding as tolerated        Chronic Problems:      # DM1   # Hyperglycemia: well controlled   - Resume home dosing of Tresiba  - carb counting with meals  -A1C is 7  - med sliding scale insulin   - hypoglycemia protocol      #S/p pancreatic transplant 2/2009   #S/p renal transplant 11/2008   -  mg BID   - Tacrolimus 2 mg BID   - transplant teams consulted.     # CAD s/p bypass graft   #HTN   Per chart review patient takes amlodipine 5mg, Carvedilol 6.25 mg BID  restarted  - hydralazine PRN available for BP >180/90      BP much improved.     Diet: Consistent Carbohydrate Diet 7462-1885 Calories: High Consistent CHO (4-7 CHO units/meal)    DVT Prophylaxis: Heparin SQ  Mueller Catheter: not present  Code Status: Full Code          Disposition Plan     Expected discharge: pending dialysis chair availability. Patient does not have coverage and difficult to get outpatient dialysis   Entered: Jeremy Wang MD 02/20/2020, 10:52 AM       The patient's care  was discussed with the Bedside Nurse and Patient.    Jeremy Wang MD  Hospitalist Service, Gold 09  [facility], [place]  Pager: [phone]  Please see sticky note for cross cover information  ______________________________________________________________________    Interval History   Patient doing well, no nausea and vomiting   Tolerating HD     Data reviewed today: I reviewed all medications, new labs and imaging results over the last 24 hours.       Physical Exam   Vital Signs: Temp: 97.9  F (36.6  C) Temp src: Oral BP: (!) 147/96 Pulse: 85 Heart Rate: 91 Resp: 16 SpO2: 98 % O2 Device: None (Room air)    Weight: 188 lbs .84 oz  Constitutional: awake, alert, cooperative, no apparent distress, and appears stated age  Hematologic / Lymphatic: no cervical lymphadenopathy  Respiratory: No increased work of breathing, good air exchange, clear to auscultation bilaterally, no crackles or wheezing  Cardiovascular: Normal apical impulse, regular rate and rhythm, normal S1 and S2, no S3 or S4, and no murmur noted  GI: No scars, normal bowel sounds, soft, non-distended, non-tender, no masses palpated, no hepatosplenomegally  Neurologic: Awake, alert, oriented to name, place and time.  Cranial nerves II-XII are grossly intact.  Motor is 5 out of 5 bilaterally.  Cerebellar finger to nose, heel to shin intact.  Sensory is intact.  Babinski down going, Romberg negative, and gait is normal.    Data   Recent Labs   Lab 02/20/20  0716 02/19/20  0939 02/17/20  0557 02/16/20  0910   WBC  --  7.4 6.8 7.4   HGB  --  10.0* 11.0* 11.6*   MCV  --  90 90 90    225 246 268   NA  --  134 136 136   POTASSIUM  --  4.4 4.1 3.9   CHLORIDE  --  103 102 104   CO2  --  22 25 24   BUN  --  35* 20 13   CR  --  4.76* 4.45* 3.56*   ANIONGAP  --  8 8 7   CHARLY  --  8.2* 8.7 8.7   GLC  --  306* 138* 178*     No results found for this or any previous visit (from the past 24 hour(s)).  Medications       amLODIPine  5 mg Oral  Daily     atorvastatin  40 mg Oral Daily     carvedilol  6.25 mg Oral BID w/meals     heparin ANTICOAGULANT  5,000 Units Subcutaneous BID     heparin Lock (1000 units/mL High concentration)  3 mL Intracatheter Once     heparin Lock (1000 units/mL High concentration)  3 mL Intracatheter Once     insulin aspart   Subcutaneous QAM AC     insulin aspart   Subcutaneous Daily with lunch     insulin aspart   Subcutaneous Daily with supper     insulin aspart  1-7 Units Subcutaneous TID AC     insulin aspart  1-5 Units Subcutaneous At Bedtime     insulin degludec  24 Units Subcutaneous At Bedtime     mycophenolic acid  360 mg Oral BID     omeprazole  40 mg Oral QPM     senna-docusate  2 tablet Oral At Bedtime     sodium chloride (PF)  3 mL Intracatheter Q8H     tacrolimus  1 mg Oral BID

## 2020-02-21 VITALS
OXYGEN SATURATION: 99 % | RESPIRATION RATE: 14 BRPM | DIASTOLIC BLOOD PRESSURE: 91 MMHG | TEMPERATURE: 99 F | SYSTOLIC BLOOD PRESSURE: 167 MMHG | WEIGHT: 192.1 LBS | HEART RATE: 79 BPM | BODY MASS INDEX: 29.21 KG/M2

## 2020-02-21 LAB
ALBUMIN SERPL-MCNC: 2.9 G/DL (ref 3.4–5)
ALP SERPL-CCNC: 139 U/L (ref 40–150)
ALT SERPL W P-5'-P-CCNC: 11 U/L (ref 0–70)
ANION GAP SERPL CALCULATED.3IONS-SCNC: 7 MMOL/L (ref 3–14)
AST SERPL W P-5'-P-CCNC: 10 U/L (ref 0–45)
BILIRUB SERPL-MCNC: 0.3 MG/DL (ref 0.2–1.3)
BUN SERPL-MCNC: 42 MG/DL (ref 7–30)
CALCIUM SERPL-MCNC: 8.4 MG/DL (ref 8.5–10.1)
CHLORIDE SERPL-SCNC: 101 MMOL/L (ref 94–109)
CO2 SERPL-SCNC: 25 MMOL/L (ref 20–32)
CREAT SERPL-MCNC: 4.89 MG/DL (ref 0.66–1.25)
ERYTHROCYTE [DISTWIDTH] IN BLOOD BY AUTOMATED COUNT: 13.1 % (ref 10–15)
GFR SERPL CREATININE-BSD FRML MDRD: 14 ML/MIN/{1.73_M2}
GLUCOSE BLDC GLUCOMTR-MCNC: 155 MG/DL (ref 70–99)
GLUCOSE BLDC GLUCOMTR-MCNC: 169 MG/DL (ref 70–99)
GLUCOSE BLDC GLUCOMTR-MCNC: 169 MG/DL (ref 70–99)
GLUCOSE BLDC GLUCOMTR-MCNC: 173 MG/DL (ref 70–99)
GLUCOSE SERPL-MCNC: 178 MG/DL (ref 70–99)
HCT VFR BLD AUTO: 31.9 % (ref 40–53)
HGB BLD-MCNC: 10.2 G/DL (ref 13.3–17.7)
MCH RBC QN AUTO: 28.7 PG (ref 26.5–33)
MCHC RBC AUTO-ENTMCNC: 32 G/DL (ref 31.5–36.5)
MCV RBC AUTO: 90 FL (ref 78–100)
PLATELET # BLD AUTO: 213 10E9/L (ref 150–450)
POTASSIUM SERPL-SCNC: 5 MMOL/L (ref 3.4–5.3)
PROT SERPL-MCNC: 6.7 G/DL (ref 6.8–8.8)
RBC # BLD AUTO: 3.56 10E12/L (ref 4.4–5.9)
SODIUM SERPL-SCNC: 134 MMOL/L (ref 133–144)
WBC # BLD AUTO: 6.3 10E9/L (ref 4–11)

## 2020-02-21 PROCEDURE — 90937 HEMODIALYSIS REPEATED EVAL: CPT

## 2020-02-21 PROCEDURE — 25000132 ZZH RX MED GY IP 250 OP 250 PS 637: Performed by: STUDENT IN AN ORGANIZED HEALTH CARE EDUCATION/TRAINING PROGRAM

## 2020-02-21 PROCEDURE — 80053 COMPREHEN METABOLIC PANEL: CPT | Performed by: INTERNAL MEDICINE

## 2020-02-21 PROCEDURE — 36415 COLL VENOUS BLD VENIPUNCTURE: CPT | Performed by: INTERNAL MEDICINE

## 2020-02-21 PROCEDURE — 00000146 ZZHCL STATISTIC GLUCOSE BY METER IP

## 2020-02-21 PROCEDURE — 99239 HOSP IP/OBS DSCHRG MGMT >30: CPT | Performed by: INTERNAL MEDICINE

## 2020-02-21 PROCEDURE — 25000131 ZZH RX MED GY IP 250 OP 636 PS 637: Performed by: STUDENT IN AN ORGANIZED HEALTH CARE EDUCATION/TRAINING PROGRAM

## 2020-02-21 PROCEDURE — 25800030 ZZH RX IP 258 OP 636: Performed by: STUDENT IN AN ORGANIZED HEALTH CARE EDUCATION/TRAINING PROGRAM

## 2020-02-21 PROCEDURE — 85027 COMPLETE CBC AUTOMATED: CPT | Performed by: INTERNAL MEDICINE

## 2020-02-21 PROCEDURE — 25000131 ZZH RX MED GY IP 250 OP 636 PS 637: Performed by: PHYSICIAN ASSISTANT

## 2020-02-21 PROCEDURE — 25000132 ZZH RX MED GY IP 250 OP 250 PS 637: Performed by: INTERNAL MEDICINE

## 2020-02-21 RX ORDER — CARVEDILOL 6.25 MG/1
6.25 TABLET ORAL 2 TIMES DAILY WITH MEALS
Qty: 60 TABLET | Refills: 0 | Status: SHIPPED | OUTPATIENT
Start: 2020-02-21 | End: 2020-04-09

## 2020-02-21 RX ORDER — AMLODIPINE BESYLATE 5 MG/1
5 TABLET ORAL DAILY
Qty: 30 TABLET | Refills: 0 | Status: SHIPPED | OUTPATIENT
Start: 2020-02-22 | End: 2020-04-09

## 2020-02-21 RX ADMIN — AMLODIPINE BESYLATE 5 MG: 5 TABLET ORAL at 08:16

## 2020-02-21 RX ADMIN — CARVEDILOL 6.25 MG: 6.25 TABLET, FILM COATED ORAL at 18:28

## 2020-02-21 RX ADMIN — OXYCODONE HYDROCHLORIDE 5 MG: 5 TABLET ORAL at 03:17

## 2020-02-21 RX ADMIN — TACROLIMUS 1 MG: 1 CAPSULE ORAL at 08:17

## 2020-02-21 RX ADMIN — SODIUM CHLORIDE 250 ML: 9 INJECTION, SOLUTION INTRAVENOUS at 14:11

## 2020-02-21 RX ADMIN — SODIUM CHLORIDE 300 ML: 9 INJECTION, SOLUTION INTRAVENOUS at 14:07

## 2020-02-21 RX ADMIN — CARVEDILOL 6.25 MG: 6.25 TABLET, FILM COATED ORAL at 08:16

## 2020-02-21 RX ADMIN — ATORVASTATIN CALCIUM 40 MG: 40 TABLET, FILM COATED ORAL at 08:16

## 2020-02-21 RX ADMIN — MYCOPHENOLIC ACID 360 MG: 360 TABLET, DELAYED RELEASE ORAL at 08:17

## 2020-02-21 RX ADMIN — ACETAMINOPHEN 650 MG: 325 TABLET, FILM COATED ORAL at 03:17

## 2020-02-21 ASSESSMENT — ACTIVITIES OF DAILY LIVING (ADL)
ADLS_ACUITY_SCORE: 14

## 2020-02-21 ASSESSMENT — PAIN DESCRIPTION - DESCRIPTORS
DESCRIPTORS: ACHING
DESCRIPTORS: ACHING

## 2020-02-21 NOTE — PLAN OF CARE
BP (!) 156/90   Pulse 93   Temp 99.2  F (37.3  C) (Oral)   Resp 16   Wt 85.3 kg (188 lb 0.8 oz)   SpO2 97%   BMI 28.59 kg/m    VSS, A&Ox4, Pain controlled with PO oxycodone and tylenol. Requested Reglan once after dinner. Left CVC and PIV saline locked. Pt states had a bowel movement earlier in the afternoon. Didn't make urine this shift, which he explains is normal due to dialysis. BG ACHS and corrected with sliding scale and carb coverage. Up ad mikal, and walking independently throughout the evening.

## 2020-02-21 NOTE — PROGRESS NOTES
Transition Planning Update    D:  Received update from the [facility] Dialysis Admission staff that they now have financial clearance on behalf of [employer]ata Mckeon.  The following information has been embedded in patients discharge orders:    New Hemodialysis    Please fax discharge orders to the [facility] D[facility] Unit     Ph:  [phone]     Fax: [phone]     Your dialysis treatment/chair times will be on Tuesdays, Thursdays and Saturdays     Dialysis Times-1:00 p.m.     On your first dialysis treatment day, Tuesday February 25. 2020:  The staff has asked that you arrive at 11:45 a.m.on this day so that they can do admission paperwork.  Thank you.     Dialysis Access: Tunneled Line     A/P: MD team Dr. Wang was updated via page and the unit Charge RN was updated.  Inpatient cares continue per MD team plans of care.  Patient is scheduled to have dialysis today per report in interdisciplinary rounds. Discharge date pending MD team plans of care.  On the day of discharge, discharge orders to be faxed to the above dialysis unit.    Natalia Kaur,  KATHI.S.MARIA EUGENIA., R.N., P.H.N..  Care Coordinator     Pager   [facility]

## 2020-02-21 NOTE — DISCHARGE SUMMARY
[facility], [place]  Hospitalist Discharge Summary       Date of Admission:  2/11/2020  Date of Discharge:  2/21/2020  Discharging Provider: Jeremy Wang MD      Discharge Diagnoses   ESRD started on HD, transplant renal failure   Nausea and vomiting due to Uremia   Diabetes       Follow-ups Needed After Discharge   Follow-up Appointments     Adult [facility] Follow-up and recommended labs and tests      Follow up with primary care provider, Kt Ríos, within 7 days for   hospital follow- up.  No follow up labs or test are needed.      Appointments on [place] and/or [facility] (with [facility] or [facility]   provider or service). Call [phone] if you haven't heard regarding   these appointments within 7 days of discharge.         Follow Up and recommended labs and tests      New Hemodilaysis    Please fax discharge orders to the [facility] Dialysis Unit    Ph:  [phone]    Fax: [phone]    Your dialysis treatment/chair times will be on Tuesdays, Thursdays and   Saturdays    Dialysis Times-1:00 p.m.    On your first dialysis treatment day, Tuesday February 25. 2020:  The   staff has asked that you arrive at 11:45 a.m.on this day so that they can   do admission paperwork.  Thank you.    Dialysis Access: Tunneled Line             Unresulted Labs Ordered in the Past 30 Days of this Admission     No orders found from 1/12/2020 to 2/12/2020.      These results will be followed up by [facility]    Discharge Disposition   Discharged to home  Condition at discharge: Stable    Hospital Course   Murtaza Fitzgerald is a 44 year old male with PMHx of DM1 c/b gastroparesis, CKD (s/p kidney transplant on 11/2008 secondary to diabetic nephropathy), pancreatic transplant (2/2009), CAD s/p bypass graft, HTN, GERD who presents to the ED with nausea and vomiting which was thought to be due to tetraparesis but symptoms resolved with dialysis   Patient was scheduled to for 2/25 1200 T  [place] to continue outpatient HD   He is symptom free      ESRD in setting of renal transplant   On hemodialysis   -Patient was having nausea and vomiting likely related to his uremia as it resolved after dialysis is initiated    - symptoms improved with dialysis   - awaiting outpatient HD placement, which is difficult due to lack of insurance coverage        Intractable vomiting-- resolved  Suspected Gastroparesis Vs Uremia   - resolved after HD which indicate nausea and vomiting could have been due to uremia all along   - eating and tolerating well   - continue feeding as tolerated        Chronic Problems:      # DM1   # Hyperglycemia: well controlled   - Resume home dosing of Tresiba  - carb counting with meals  -A1C is 7  - med sliding scale insulin   - hypoglycemia protocol      #S/p pancreatic transplant 2/2009   #S/p renal transplant 11/2008   -  mg BID   - Tacrolimus 2 mg BID   - transplant teams consulted.     # CAD s/p bypass graft   #HTN   Per chart review patient takes amlodipine 5mg, Carvedilol 6.25 mg BID  restarted  - hydralazine PRN available for BP >180/90      BP much improved.     Consultations This Hospital Stay   MEDICATION HISTORY IP PHARMACY CONSULT  GI LUMINAL ADULT IP CONSULT  NEPHROLOGY GENERAL ADULT IP CONSULT  GI LUMINAL ADULT IP CONSULT  NEPHROLOGY KIDNEY/PANCREAS TRANSPLANT ADULT IP CONSULT  TRANSPLANT SURGERY PANCREAS ADULT IP CONSULT  INTERVENTIONAL RADIOLOGY ADULT/PEDS IP CONSULT  VASCULAR ACCESS CARE ADULT IP CONSULT  VASCULAR ACCESS CARE ADULT IP CONSULT  MEDICATION HISTORY IP PHARMACY CONSULT    Code Status   Full Code    Time Spent on this Encounter   I, Jeremy Wang MD, personally saw the patient today and spent greater than 30 minutes discharging this patient.       Jeremy Wang MD  [facility], [place]  ______________________________________________________________________    Physical Exam   Vital Signs: Temp: 98.8  F (37.1  C) Temp src:  Oral BP: (!) 142/87 Pulse: 84 Heart Rate: 84 Resp: 12 SpO2: 98 % O2 Device: None (Room air)    Weight: 192 lbs 1.6 oz  Constitutional: awake, alert, cooperative, no apparent distress, and appears stated age  Hematologic / Lymphatic: no cervical lymphadenopathy  Respiratory: No increased work of breathing, good air exchange, clear to auscultation bilaterally, no crackles or wheezing  Cardiovascular: Normal apical impulse, regular rate and rhythm, normal S1 and S2, no S3 or S4, and no murmur noted  GI: No scars, normal bowel sounds, soft, non-distended, non-tender, no masses palpated, no hepatosplenomegally  Neurologic: Awake, alert, oriented to name, place and time.  Cranial nerves II-XII are grossly intact.  Motor is 5 out of 5 bilaterally.  Cerebellar finger to nose, heel to shin intact.  Sensory is intact.  Babinski down going, Romberg negative, and gait is normal.       Primary Care Physician   Kt Ríos    Discharge Orders      Follow Up and recommended labs and tests    New Hemodilaysis    Please fax discharge orders to the [facility] Dialysis Unit    Ph:      Fax: [phone]    Your dialysis treatment/chair times will be on Tuesdays, Thursdays and Saturdays    Dialysis Times-1:00 p.m.    On your first dialysis treatment day, Tuesday February 25. 2020:  The staff has asked that you arrive at 11:45 a.m.on this day so that they can do admission paperwork.  Thank you.    Dialysis Access: Tunneled Line     Reason for your hospital stay    Patient was admitted to the hospital with ESRD and was started on HD     Adult [facility] Follow-up and recommended labs and tests    Follow up with primary care provider, Kt Ríos, within 7 days for hospital follow- up.  No follow up labs or test are needed.      Appointments on [place] and/or [facility] (with [facility] or [facility] provider or service). Call [phone] if you haven't heard regarding these appointments within 7 days of discharge.      Activity    Your activity upon discharge: activity as tolerated     Full Code     Diet    Follow this diet upon discharge: Orders Placed This Encounter      Consistent Carbohydrate Diet 0652-1376 Calories: High Consistent CHO (4-7 CHO units/meal)       Significant Results and Procedures   Most Recent 3 CBC's:  Recent Labs   Lab Test 02/21/20  1211 02/20/20  0716 02/19/20  0939 02/17/20  0557   WBC 6.3  --  7.4 6.8   HGB 10.2*  --  10.0* 11.0*   MCV 90  --  90 90    215 225 246     Most Recent 3 BMP's:  Recent Labs   Lab Test 02/21/20  1211 02/19/20  0939 02/17/20  0557    134 136   POTASSIUM 5.0 4.4 4.1   CHLORIDE 101 103 102   CO2 25 22 25   BUN 42* 35* 20   CR 4.89* 4.76* 4.45*   ANIONGAP 7 8 8   CHARLY 8.4* 8.2* 8.7   * 306* 138*       Discharge Medications   Current Discharge Medication List      START taking these medications    Details   amLODIPine 5 MG PO tablet Take 1 tablet (5 mg) by mouth daily  Qty: 30 tablet, Refills: 0    Associated Diagnoses: Hypertension goal BP (blood pressure) < 140/90      carvedilol 6.25 MG PO tablet Take 1 tablet (6.25 mg) by mouth 2 times daily (with meals)  Qty: 60 tablet, Refills: 0    Associated Diagnoses: Hypertension goal BP (blood pressure) < 140/90         CONTINUE these medications which have NOT CHANGED    Details   acetaminophen (TYLENOL) 325 MG tablet Take 2 tablets (650 mg) by mouth every 4 hours as needed for mild pain  Qty: 50 tablet, Refills: 0    Associated Diagnoses: Acute lateral meniscus tear of left knee, initial encounter      atorvastatin (LIPITOR) 40 MG tablet Take 40 mg by mouth daily      !! insulin aspart (NOVOLOG FLEXPEN) 100 UNIT/ML pen Inject Subcutaneous 3 times daily (with meals) .  (1 unit per 6 grams of carbs)      !! insulin aspart (NOVOLOG FLEXPEN) 100 UNIT/ML pen Sliding scale = 1 unit for every 40 over blood glucose of 140      insulin degludec (TRESIBA) 100 UNIT/ML pen Inject 24 Units Subcutaneous At Bedtime       mycophenolic acid (GENERIC EQUIVALENT) 180 MG EC tablet Take 360 mg by mouth 2 times daily      omeprazole (PRILOSEC) 40 MG DR capsule Take 40 mg by mouth every evening      ondansetron (ZOFRAN-ODT) 4 MG ODT tab Take 1-2 tablets (4-8 mg) by mouth every 8 hours as needed for nausea  Qty: 60 tablet, Refills: 1    Associated Diagnoses: Acute lateral meniscus tear of left knee, initial encounter      tacrolimus (GENERIC EQUIVALENT) 1 MG capsule Take 2 capsules (2 mg) by mouth 2 times daily  Qty: 60 capsule, Refills: 11    Associated Diagnoses: Kidney transplanted; Immunosuppression (H)      acetone, Urine, test STRP 1 strip by In Vitro route daily  Qty: 50 each, Refills: 3    Comments: Urine ketone stix  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 YOHAN CONTOUR test strip USE TO TEST BLOOD SUGAR FIVE TIMES A DAY OR AS DIRECTED  Qty: 450 each, Refills: 1    Associated Diagnoses: Type 1 diabetes mellitus, uncontrolled (H)      !! 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 - Potential duplicate medications found. Please discuss with  provider.        Allergies   Allergies   Allergen Reactions     Benadryl [Diphenhydramine]      Reglan Other (See Comments)     IV, adamaris

## 2020-02-21 NOTE — PLAN OF CARE
Hypertensive but within notify parameters. AOVSS. Regular Carb count diet. Up ad mikal. Denies nausea. Pain in right shoulder managed with PRN oxycodone and tylenol. Left CVC double lumen and PIC-SL. No BM overnight, +gas, +bs. Did not make urine overnight, pt states this is normal for him due to dialysis. Continue POC.

## 2020-02-22 ENCOUNTER — HOSPITAL ENCOUNTER (EMERGENCY)
Facility: CLINIC | Age: 45
Discharge: HOME | End: 2020-02-22
Attending: INTERNAL MEDICINE | Admitting: INTERNAL MEDICINE

## 2020-02-22 VITALS
HEART RATE: 92 BPM | RESPIRATION RATE: 16 BRPM | WEIGHT: 190 LBS | HEIGHT: 68 IN | OXYGEN SATURATION: 97 % | TEMPERATURE: 98 F | BODY MASS INDEX: 28.79 KG/M2 | DIASTOLIC BLOOD PRESSURE: 71 MMHG | SYSTOLIC BLOOD PRESSURE: 104 MMHG

## 2020-02-22 DIAGNOSIS — R11.2 NAUSEA AND VOMITING, INTRACTABILITY OF VOMITING NOT SPECIFIED, UNSPECIFIED VOMITING TYPE: ICD-10-CM

## 2020-02-22 LAB
ALBUMIN SERPL-MCNC: 3.7 G/DL (ref 3.4–5)
ALP SERPL-CCNC: 176 U/L (ref 40–150)
ALT SERPL W P-5'-P-CCNC: 14 U/L (ref 0–70)
ANION GAP SERPL CALCULATED.3IONS-SCNC: 8 MMOL/L (ref 3–14)
AST SERPL W P-5'-P-CCNC: 8 U/L (ref 0–45)
BASOPHILS # BLD AUTO: 0 10E9/L (ref 0–0.2)
BASOPHILS NFR BLD AUTO: 0.4 %
BILIRUB SERPL-MCNC: 0.5 MG/DL (ref 0.2–1.3)
BUN SERPL-MCNC: 32 MG/DL (ref 7–30)
CALCIUM SERPL-MCNC: 10 MG/DL (ref 8.5–10.1)
CHLORIDE SERPL-SCNC: 99 MMOL/L (ref 94–109)
CO2 SERPL-SCNC: 27 MMOL/L (ref 20–32)
CREAT SERPL-MCNC: 4.19 MG/DL (ref 0.66–1.25)
DIFFERENTIAL METHOD BLD: ABNORMAL
EOSINOPHIL # BLD AUTO: 0 10E9/L (ref 0–0.7)
EOSINOPHIL NFR BLD AUTO: 0 %
ERYTHROCYTE [DISTWIDTH] IN BLOOD BY AUTOMATED COUNT: 13.1 % (ref 10–15)
GFR SERPL CREATININE-BSD FRML MDRD: 16 ML/MIN/{1.73_M2}
GLUCOSE SERPL-MCNC: 292 MG/DL (ref 70–99)
HCT VFR BLD AUTO: 37.3 % (ref 40–53)
HGB BLD-MCNC: 12 G/DL (ref 13.3–17.7)
IMM GRANULOCYTES # BLD: 0 10E9/L (ref 0–0.4)
IMM GRANULOCYTES NFR BLD: 0.5 %
LYMPHOCYTES # BLD AUTO: 0.8 10E9/L (ref 0.8–5.3)
LYMPHOCYTES NFR BLD AUTO: 10.1 %
MCH RBC QN AUTO: 28.6 PG (ref 26.5–33)
MCHC RBC AUTO-ENTMCNC: 32.2 G/DL (ref 31.5–36.5)
MCV RBC AUTO: 89 FL (ref 78–100)
MONOCYTES # BLD AUTO: 0.3 10E9/L (ref 0–1.3)
MONOCYTES NFR BLD AUTO: 4.4 %
NEUTROPHILS # BLD AUTO: 6.6 10E9/L (ref 1.6–8.3)
NEUTROPHILS NFR BLD AUTO: 84.6 %
NRBC # BLD AUTO: 0 10*3/UL
NRBC BLD AUTO-RTO: 0 /100
PLATELET # BLD AUTO: 309 10E9/L (ref 150–450)
POTASSIUM SERPL-SCNC: 4.2 MMOL/L (ref 3.4–5.3)
PROT SERPL-MCNC: 8.3 G/DL (ref 6.8–8.8)
RBC # BLD AUTO: 4.19 10E12/L (ref 4.4–5.9)
SODIUM SERPL-SCNC: 134 MMOL/L (ref 133–144)
TROPONIN I SERPL-MCNC: <0.015 UG/L (ref 0–0.04)
WBC # BLD AUTO: 7.8 10E9/L (ref 4–11)

## 2020-02-22 PROCEDURE — 25800030 ZZH RX IP 258 OP 636: Performed by: INTERNAL MEDICINE

## 2020-02-22 PROCEDURE — 99284 EMERGENCY DEPT VISIT MOD MDM: CPT | Mod: 25

## 2020-02-22 PROCEDURE — 85025 COMPLETE CBC W/AUTO DIFF WBC: CPT | Performed by: INTERNAL MEDICINE

## 2020-02-22 PROCEDURE — 25000128 H RX IP 250 OP 636: Performed by: INTERNAL MEDICINE

## 2020-02-22 PROCEDURE — 84484 ASSAY OF TROPONIN QUANT: CPT | Performed by: INTERNAL MEDICINE

## 2020-02-22 PROCEDURE — 96375 TX/PRO/DX INJ NEW DRUG ADDON: CPT

## 2020-02-22 PROCEDURE — 80053 COMPREHEN METABOLIC PANEL: CPT | Performed by: INTERNAL MEDICINE

## 2020-02-22 PROCEDURE — 25000132 ZZH RX MED GY IP 250 OP 250 PS 637: Performed by: INTERNAL MEDICINE

## 2020-02-22 PROCEDURE — 96361 HYDRATE IV INFUSION ADD-ON: CPT

## 2020-02-22 PROCEDURE — 25000125 ZZHC RX 250: Performed by: INTERNAL MEDICINE

## 2020-02-22 PROCEDURE — 96374 THER/PROPH/DIAG INJ IV PUSH: CPT

## 2020-02-22 RX ORDER — PROCHLORPERAZINE MALEATE 5 MG
5-10 TABLET ORAL EVERY 6 HOURS PRN
Qty: 20 TABLET | Refills: 0 | Status: SHIPPED | OUTPATIENT
Start: 2020-02-22 | End: 2020-04-09

## 2020-02-22 RX ORDER — ONDANSETRON 2 MG/ML
4 INJECTION INTRAMUSCULAR; INTRAVENOUS EVERY 30 MIN PRN
Status: DISCONTINUED | OUTPATIENT
Start: 2020-02-22 | End: 2020-02-22 | Stop reason: HOSPADM

## 2020-02-22 RX ADMIN — ONDANSETRON 4 MG: 2 INJECTION INTRAMUSCULAR; INTRAVENOUS at 15:55

## 2020-02-22 RX ADMIN — SODIUM CHLORIDE 250 ML: 9 INJECTION, SOLUTION INTRAVENOUS at 15:55

## 2020-02-22 RX ADMIN — PROCHLORPERAZINE EDISYLATE 5 MG: 5 INJECTION INTRAMUSCULAR; INTRAVENOUS at 15:55

## 2020-02-22 RX ADMIN — LIDOCAINE HYDROCHLORIDE 30 ML: 20 SOLUTION ORAL; TOPICAL at 16:23

## 2020-02-22 ASSESSMENT — ENCOUNTER SYMPTOMS
DIARRHEA: 0
NAUSEA: 1
VOMITING: 1

## 2020-02-22 ASSESSMENT — MIFFLIN-ST. JEOR: SCORE: 1736.33

## 2020-02-22 NOTE — PROGRESS NOTES
[facility], [place]   Transplant Nephrology Progress Note  Date of Admission:  2/11/2020  Today's Date: 02/21/2020    Assessment & Plan   # LDKT: Failed, on HD now              - Baseline Cr ~ 5-6              - Proteinuria: Moderate (1-3 grams)              - Date DSA Last Checked: Jul/2016      Latest DSA: No              - BK Viremia: No              - Kidney Tx Biopsy: Dec 27, 2013; Result: No evidence of rejection    # SWATHI on CKD s/p LDKT: He remains oliguric and will likely need long-term hemodialysis to maintain renal function. We have set him up with a outpatient HD facility for TTS HD. HD 3.5hrs today, EDW ~85kg, no heparin, , , He should tolerate going Friday to Tuesday without any issues.     # Pancreas Tx (ANNMARIE):              - Pancreatic Exocrine Drainage: Bladder drained                    - Failed 2013, back on insulin.     # Immunosuppression: Tacrolimus immediate release (goal not followed) and Mycophenolic acid (goal not followed)              - Changes: No, MPA 360mg BID. Tacrolimus 1mg BID x 3 months (~May), then 1mg daily x 3 months (~Aug), then off.    # Infection Prophylaxis:   - PJP: None    # Hypertension: Controlled;  Goal BP: < 140/90   - Volume status: Euvolemic  EDW~ 85kg   - Changes: No, continue home medications. Hold BP meds prior to dialysis    # Diabetes: Controlled (HbA1c <7%)            Last HbA1c: 7.0%              - Management as per primary team.     # Anemia in Chronic Renal Disease: Hgb: Stable      WILL: No              - Iron studies: Not checked recently     # Mineral Bone Disorder:   - Secondary renal hyperparathyroidism; PTH level: Not checked recently        On treatment: None  - Vitamin D; level: Not checked recently        On Supplement: No  - Calcium; level: Normal        On Supplement: No  - Phosphorus; level: Normal        On Supplement: No     # Electrolytes:   - Potassium; level: Normal        On Supplement: No  -  Magnesium; level: Normal        On Supplement: No  - Bicarbonate; level: Low        On Supplement: No  - Sodium; level: Normal        On Supplement: No     # Gastroparesis: Has known gastroparesis, surgery will evaluate him for a gastric pacemaker implant. Previously, GI team has suggested a G-tube vent     # Nausea/Vomiting: Possibly a component of uremia and/or gastroparesis. Symptoms have  Improved with HD.      # Transplant History:  Etiology of Kidney Failure: Diabetes mellitus type 1  Tx: LDKT and ANNMARIE  Transplant: 11/4/2008 (Kidney), 2/17/2009 (Pancreas)  Donor Type: Living      Donor Class: Standard Criteria Donor  Significant changes in immunosuppression: None  Significant transplant-related complications: None     Recommendations were communicated to the primary team via this note.    Seen and discussed with Dr. Deejay Wyman MD   Pager: [phone]    Interval History   Mr Daniels was seen on HD today. He had no issues on HD and tolerated his run well. He denies nausea, denies vomiting, denies diarrhea, and has no constipation. He denies abdominal pain. He has no SOB or chest pain. Cr remains elevated between HD sessions. He is anuric.     Review of Systems   4 point ROS was obtained and negative except as noted in the Interval History.    MEDICATIONS:      Physical Exam    BP (!) 167/91   Pulse 79   Temp 99  F (37.2  C) (Oral)   Resp 14   Wt 87.1 kg (192 lb 1.6 oz)   SpO2 99%   BMI 29.21 kg/m       GENERAL APPEARANCE: alert and no distress  HENT: mouth without ulcers or lesions  LYMPHATICS: no cervical or supraclavicular nodes  RESP: lungs clear to auscultation - no rales, rhonchi or wheezes  CV: regular rhythm, normal rate, no rub, no murmur  EDEMA: mild LE edema bilaterally  ABDOMEN: soft, nondistended, nontender, bowel sounds normal  MS: extremities normal - no gross deformities noted, no evidence of inflammation in joints, no muscle tenderness  SKIN: no rash  ACCESS: Tunneled HD catheter in  chest    Data   All labs reviewed by me.  CMP  Recent Labs   Lab 02/21/20  1211 02/19/20  0939 02/17/20  0557 02/16/20  0910 02/15/20  0935    134 136 136 136   POTASSIUM 5.0 4.4 4.1 3.9 3.9   CHLORIDE 101 103 102 104 104   CO2 25 22 25 24 27   ANIONGAP 7 8 8 7 4   * 306* 138* 178* 170*   BUN 42* 35* 20 13 15   CR 4.89* 4.76* 4.45* 3.56* 4.22*   GFRESTIMATED 14* 14* 15* 20* 16*   GFRESTBLACK 16* 16* 18* 23* 19*   CHARLY 8.4* 8.2* 8.7 8.7 8.5   MAG  --   --   --  1.8 1.7   PHOS  --   --   --   --  3.5   PROTTOTAL 6.7*  --   --   --   --    ALBUMIN 2.9*  --   --   --   --    BILITOTAL 0.3  --   --   --   --    ALKPHOS 139  --   --   --   --    AST 10  --   --   --   --    ALT 11  --   --   --   --      CBC  Recent Labs   Lab 02/21/20  1211 02/20/20  0716 02/19/20  0939 02/17/20  0557 02/16/20  0910   HGB 10.2*  --  10.0* 11.0* 11.6*   WBC 6.3  --  7.4 6.8 7.4   RBC 3.56*  --  3.46* 3.81* 4.07*   HCT 31.9*  --  31.2* 34.2* 36.6*   MCV 90  --  90 90 90   MCH 28.7  --  28.9 28.9 28.5   MCHC 32.0  --  32.1 32.2 31.7   RDW 13.1  --  13.3 13.5 13.6    215 225 246 268     INR  No lab results found in last 7 days.  ABGNo lab results found in last 7 days.   Urine Studies  Recent Labs   Lab Test 02/11/20  0850 01/27/20  0941 01/03/20  1406 08/28/19  0905   COLOR Yellow Straw Light Yellow Yellow   APPEARANCE Clear Clear Clear Clear   URINEGLC 100* >1000* 30* Negative   URINEBILI Negative Negative Negative Negative   URINEKETONE Negative Trace* 10* Negative   SG 1.020 1.011 1.017 1.015   UBLD Trace* Trace* Trace* Small*   URINEPH 7.0 6.5 6.5 7.0   PROTEIN >=300* 300* 300* 100*   UROBILINOGEN 0.2  --   --  0.2   NITRITE Negative Negative Negative Negative   LEUKEST Negative Negative Negative Negative   RBCU 2-5* 2 3* 5-10*   WBCU 0 - 5 2 1 5-10*     Recent Labs   Lab Test 08/22/19  0857 01/11/19  0741 07/08/15  0747 01/28/15  0749 12/27/13  0736 11/11/13  1436 11/12/12  1649 10/11/12  0737 02/28/12  0713   UTPG  2.20* 0.87* 0.28* 0.43* 0.31* 0.30* 0.40* 0.51* 0.48*     PTH  No lab results found.  Iron Studies  Recent Labs   Lab Test 02/13/20  0720   AUBRIE 106     IMAGING:  All imaging studies reviewed by me.     Patient was seen and evaluated by me, Adam Villalba MD. I have reviewed the note and agree with the the plan of care as documented by the fellow.

## 2020-02-22 NOTE — PLAN OF CARE
Discharge  Pt is alert and oriented x4.B/P runs high, below parameter, pt is on B/P meds. Pt reported tolerable pain on right shoulder; declined pain med. Bs in the 160's, covered per sliding scale and pt had carb coverage.Pt had dialysis today with 1 kilo off per dialysis nurse.Dialysis access dressing is clean and dry. PIV removed, tip of cathter was intact.Discharge paper work faxed to  [facility]. Pt to have dialysis at  [facility] on Tuesdays,Thursdays and Saturdays. Pt to continue to take medication and to follow up with clinic appointments per discharge instruction. Family to give pt ride home.

## 2020-02-22 NOTE — PROGRESS NOTES
HEMODIALYSIS TREATMENT NOTE    Date: 2/21/2020  Time: 6:12 PM    Data:  Pre Wt: 87.1 kg (192 lb 0.3 oz)   Desired Wt: 86.1 kg   Weight change: - 1 kg  Ultrafiltration - Post Run Net Total Removed (mL): 1000 mL  Vascular Access Status: patent  Dialyzer Rinse: Streaked, Light  Total Blood Volume Processed: 79.3 Liters  Total Dialysis (Treatment) Time: 3.5 Hours  No heparin    Lab:   No    Interventions/Assessment:  Stable HD tx via RCVC on K2 Ca3 dialysate bath with . Keep SBP > 100 per order. Pt 2.6 kg above EDW of 84.5 kg. Pt stated that with last dialysis treatment on Wednesday he did not feel well during and post run. Pt agreed to pull 1 kg today. No issues. CVC saline locked with clear guard caps. Post /97 and report given to primary RN.      Plan:    Pt to discharge home later this evening.

## 2020-02-22 NOTE — ED AVS SNAPSHOT
[facility]  [address]  [facility] [address]  Phone:  [phone]  Fax:  [phone]                                    Murtaza Fitzgerald   MRN: [id number]    Department:  [facility] Emergency [facility]   Date of Visit:  2/22/2020           After Visit Summary Signature Page    I have received my discharge instructions, and my questions have been answered. I have discussed any challenges I see with this plan with the nurse or doctor.    ..........................................................................................................................................  Patient/Patient Representative Signature      ..........................................................................................................................................  Patient Representative Print Name and Relationship to Patient    ..................................................               ................................................  Date                                   Time    ..........................................................................................................................................  Reviewed by Signature/Title    ...................................................              ..............................................  Date                                               Time          22EPIC Rev 08/18

## 2020-02-22 NOTE — ED TRIAGE NOTES
[facility] and since discharge has been nauseated and vomiting. Patient on dialysis.

## 2020-02-22 NOTE — DISCHARGE INSTRUCTIONS
Discharge Instructions  Vomiting    You have been seen today for vomiting. This is usually caused by a virus, but some bacteria, parasites, medicines or other medical conditions can cause similar symptoms. At this time your doctor does not find that your vomiting is a sign of anything dangerous or life-threatening. However, sometimes the signs of serious illness do not show up right away. If you have new or worse symptoms, you may need to be seen again in the emergency department or by your primary doctor. Remember that serious problems like appendicitis can start as vomiting.     Return to the Emergency Department if:  You keep throwing up and you are not able to keep liquids down.   You feel you are getting dehydrated, such as being very thirsty, not urinating at least every 8-12 hours, or feeling faint or lightheaded.   You develop a new fever, or your fever continues for more than 2 days.   You have belly pain that seems worse than cramps, is in one spot, or is getting worse over time.   You have blood in your vomit or stools.   You feel very weak.  You are not starting to improve within 24 hours of your visit here.     What can I do to help myself?  The most important thing to do is to drink clear liquids. If you have been vomiting a lot, it is best to have only small, frequent sips of liquids. Drinking too much at once may cause more vomiting. If you are vomiting often, you must replace minerals, sodium and potassium lost with your illness. Pedialyte  and sports drinks can help you replace these minerals. You can also drink clear liquids such as water, weak tea, apple juice, and 7-Up . Avoid acid liquids (orange), caffeine (coffee) or alcohol. Do not drink milk until you no longer have diarrhea.   After liquids are staying down, you may start eating mild foods. Soda crackers, toast, plain noodles, gelatin, applesauce and bananas are good first choices. Avoid foods that have acid, are spicy, fatty or have a  lot of fiber (such as meats, coarse grains, vegetables). You may start eating these foods again in about 3 days when you are better.   Sometimes treatment includes prescription medicine to prevent nausea and vomiting. If your doctor prescribes these for you, take them as directed.   Don t take ibuprofen, or other nonsteroidal anti-inflammatory medicines without checking with your healthcare provider.   If you were given a prescription for medicine here today, be sure to read all of the information (including the package insert) that comes with your prescription.  This will include important information about the medicine, its side effects, and any warnings that you need to know about.  The pharmacist who fills the prescription can provide more information and answer questions you may have about the medicine.  If you have questions or concerns that the pharmacist cannot address, please call or return to the Emergency Department.       Opioid Medication Information    Pain medications are among the most commonly prescribed medicines, so we are including this information for all our patients. If you did not receive pain medication or get a prescription for pain medicine, you can ignore it.     You may have been given a prescription for an opioid (narcotic) pain medicine and/or have received a pain medicine while here in the Emergency Department. These medicines can make you drowsy or impaired. You must not drive, operate dangerous equipment, or engage in any other dangerous activities while taking these medications. If you drive while taking these medications, you could be arrested for DUI, or driving under the influence. Do not drink any alcohol while you are taking these medications.     Opioid pain medications can cause addiction. If you have a history of chemical dependency of any type, you are at a higher risk of becoming addicted to pain medications.  Only take these prescribed medications to treat your pain when  all other options have been tried. Take it for as short a time and as few doses as possible. Store your pain pills in a secure place, as they are frequently stolen and provide a dangerous opportunity for children or visitors in your house to start abusing these powerful medications. We will not replace any lost or stolen medicine.  As soon as your pain is better, you should flush all your remaining medication.     Many prescription pain medications contain Tylenol  (acetaminophen), including Vicodin , Tylenol #3 , Norco , Lortab , and Percocet .  You should not take any extra pills of Tylenol  if you are using these prescription medications or you can get very sick.  Do not ever take more than 3000 mg of acetaminophen in any 24 hour period.    All opioids tend to cause constipation. Drink plenty of water and eat foods that have a lot of fiber, such as fruits, vegetables, prune juice, apple juice and high fiber cereal.  Take a laxative if you don t move your bowels at least every other day. Miralax , Milk of Magnesia, Colace , or Senna  can be used to keep you regular.      Remember that you can always come back to the Emergency Department if you are not able to see your regular doctor in the amount of time listed above, if you get any new symptoms, or if there is anything that worries you.

## 2020-02-24 ENCOUNTER — PATIENT OUTREACH (OUTPATIENT)
Dept: CARE COORDINATION | Facility: CLINIC | Age: 45
End: 2020-02-24

## 2020-02-24 ENCOUNTER — TELEPHONE (OUTPATIENT)
Dept: PEDIATRICS | Facility: CLINIC | Age: 45
End: 2020-02-24

## 2020-02-24 ASSESSMENT — ACTIVITIES OF DAILY LIVING (ADL): DEPENDENT_IADLS:: INDEPENDENT

## 2020-02-24 NOTE — TELEPHONE ENCOUNTER
Please contact patient for In-patient follow up.  [phone] (home)     Visit date: 022120  Diagnosis listed: Intractable Vomiting With Nausea, Unspecified Vomiting Type, Hypertension Goal Bp (Blood Pressure) < 140/90  Number of visits in past 12 months: 2 ED / 4 IP

## 2020-02-24 NOTE — LETTER
[facility]  [address] [place] [address]    February 24, 2020    Murtaza Payton Justin  [address] [place] [address]      Dear Murtaza,    I am a clinic care coordinator who works with Kt Ríos MD at [facility]. I wanted to thank you for spending the time to talk with me.  Below is a description of clinic care coordination and how I can further assist you.      The clinic care coordinator team is made up of a registered nurse,  and community health worker who understand the health care system. The goal of clinic care coordination is to help you manage your health and improve access to the health care system in the most efficient manner. The team can assist you in meeting your health care goals by providing education, coordinating services, strengthening the communication among your providers  and supporting you with any resource needs.    Please feel free to contact me, at [phone] with any questions or concerns. We are focused on providing you with the highest-quality healthcare experience possible and that all starts with you.     Sincerely,     Marcie Pastrana RN  Care Coordination  Phone:  [phone]  Email: [email or web address]  [facility], [place], [place] and [facility]

## 2020-02-24 NOTE — LETTER
[facility]  Complex Care Plan  About Me:    Patient Name:  Murtaza Landeros    YOB: 1975  Age:         44 year old   Armida MRN:    [id number] Telephone Information:  Home Phone [phone]   Mobile [phone]       Address:  [address]  [facility] [address] Email address:  [email or web address]      Emergency Contact(s)    Name Relationship Lgl Grd Work Phone Home Phone Mobile Phone   1. LEXA LANDEROS Mother No  [phone]   2. ANA CARTER Daughter No  [phone]           Primary language:  English     needed? No   [facility]:  [phone] op. 1  Other communication barriers: None  Preferred Method of Communication:  Danie  Current living arrangement: I live in a private home with family(Lives with daughter and IRMA, who rent out pts basement)  Mobility Status/ Medical Equipment: Independent    My Access Plan  Medical Emergency 911   Primary [facility] - [phone]   24 Hour Appointment Line [phone] or  [phone]) (toll-free)   24 Hour Nurse Line [phone] (toll-free)   Preferred Urgent Care [facility] [phone]   Preferred Hospital [facility]  [phone]   Preferred Pharmacy [facility] - [place], [place] - [address]     Behavioral Health Crisis Line The National Suicide Prevention Lifeline at 1-152.233.8363 or 911             My Care Team Members  Patient Care Team       Relationship Specialty Notifications Start End    Kt Ríos MD PCP - General Internal Medicine  9/27/13     Phone: [phone] Fax: [phone] [address] [place] [address]    Sung Jorgensen MD MD Nephrology  5/11/12     Phone: [phone] Fax: [phone]         [address] [place] [address] [facility] [address] [address]    Kt Ríos MD Assigned PCP   1/1/17      Phone: [phone] Fax: [phone] [address] [place] [address]    Vania Gutierrez, RN Diabetes Educator Diabetes Education  8/28/18     Phone: [phone] Fax: [phone]        Garima Long, RN Specialty Care Coordinator Nephrology Abnormal results only, Admissions 11/25/19     Phone: [phone]         Marcie Pastrana, RN Lead Care Coordinator Primary Care - CC  2/10/20     Phone: [phone]                 My Care Plans  Self Management and Treatment Plan  Goals and (Comments)  Goals        General    finances     Notes - Note created  2/24/2020 10:00 AM by Marcie Pastrana, RN    Goal Statement: Patient with new HD.  He is not able to drive and therefore cannot work.  Patient will apply for and get approved for SSDI.  Patient was without insurance for a few months.  He will have active insurance as of 1 March 20, however he is concerned about hospital bills he accumulated between 1 jan and 1 march.  Date Goal set: 2/24/20  Barriers: patient is attempting to do SSDI application on own.  He is not familiar with services that offer assistance for this.  Strengths: patient well supported by family.  Receptive to help/feedback.  Date to Achieve By: 1 June 2020  Patient expressed understanding of goal: yes  Action steps to achieve this goal:   1. RN CC will provide information on  to assist with SSDI process.  2. I will follow up with providers as recommended.  3. I will reach out to RN CC for assistance with medical bills as they come in.        Problem Solving (pt-stated)     Notes - Note created  8/28/2018  3:15 PM by Vania Gutierrez, RN    Goal Statement: keep a fast acting carbohydrate with at all times  Measure of Success: patient to report  Supportive Steps to Achieve: patient to stop at pharmacy and  glucose tabs  Barriers: none   Strengths: has a better understanding  Date to Achieve By: 9/10/18  Patient expressed understanding of goal: yes                Advance Care Plans/Directives Type:   Type Advanced Care Plans/Directives: Advanced Directive - On File    My Medical and Care Information  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    Care Coordination Start Date: 2/24/2020   Frequency of Care Coordination: 6 weeks   Form Last Updated: 02/24/2020

## 2020-02-24 NOTE — TELEPHONE ENCOUNTER
"  ED/Discharge Protocol    \"Hi, my name is Jun Dalal RN, a registered nurse, and I am calling on behalf of Dr. Ríos's office at [facility].  I am calling to follow up and see how things are going for you after your recent visit.\"    \"I see that you were in the (ER/UC/IP) on 02/22/2020.    How are you doing now that you are home?\"   Denies nausea today and yesterday. Eating a normal diet. Denies pain.   Denies fever.   Patient has not taken the compazine yet.  Patient reports feeling better.     Is patient experiencing symptoms that may require a hospital visit?  No.    Discharge Instructions    \"Let's review your discharge instructions.  What is/are the follow-up recommendations?  Pt. Response: Follow up with Transplant Team.   Appointment scheduled for 4/9/20 with Kidney Transplant with   Kidney/Pancreas 3.    \"Were you instructed to make a follow-up appointment?\"  Pt. Response: Yes.  Has appointment been made?   Yes  April 9, 2020.     \"When you see the provider, I would recommend that you bring your discharge instructions with you.    Medications    \"How many new medications are you on since your hospitalization/ED visit?\"    0-1  \"How many of your current medicines changed (dose, timing, name, etc.) while you were in the hospital/ED visit?\"   0-1  \"Do you have questions about your medications?\"   No  \"Were you newly diagnosed with heart failure, COPD, diabetes or did you have a heart attack?\"   No  For patients on insulin: \"Did you start on insulin in the hospital or did you have your insulin dose changed?\"   No  Post Discharge Medication Reconciliation Status: discharge medications reconciled, continue medications without change.    Was MTM referral placed (*Make sure to put transitions as reason for referral)?   No    Call Summary    \"Do you have any questions or concerns about your condition or care plan at the moment?\"    No  Triage nurse advice given: Continue plan of care, follow up with Transplant " "Team April 9, 2020.       Patient was in ER 13 times in the past year (assess appropriateness of ER visits.)      \"If you have questions or things don't continue to improve, we encourage you contact us through the main clinic number,  [phone].  Even if the clinic is not open, triage nurses are available 24/7 to help you.     We would like you to know that our clinic has extended hours (provide information).  We also have urgent care (provide details on closest location and hours/contact info)\"      \"Thank you for your time and take care!\"    Transplant Office Phone Number: [phone].    Jun Dalal RN on 2/24/2020 at 10:31 AM    "

## 2020-02-24 NOTE — PROGRESS NOTES
Clinic Care Coordination Contact    Referral Information:  Referral Source: IP Report    Chart reviewed.  Patient was admitted to [facility] 2/11-2/21 for initiation of hemodialysis.      Patient was also admitted to [facility] ED on 2/22 with complaints of uncontrolled nausea and vomiting.    RN CC spoke with patient over phone.  Introduced self and role in clinic.  Patient has been enrolled in care coordination services.    Primary Diagnosis: Other (include Comment box)(initiation of HD)    Chief Complaint   Patient presents with     Clinic Care Coordination - Post Hospital     [facility] 2/11-2/21   ED 2/22        Universal Utilization: Multiple ED/IP admissions.  High No Show rate.  Clinic Utilization  Difficulty keeping appointments:: No  Compliance Concerns: No  No-Show Concerns: Yes  No PCP office visit in Past Year: No  Utilization    Last refreshed: 2/24/2020  8:53 AM:  Hospital Admissions 4           Last refreshed: 2/24/2020  8:53 AM:  ED Visits 9           Last refreshed: 2/24/2020  8:53 AM:  No Show Count (past year) 10              Current as of: 2/24/2020  8:53 AM            Clinical Concerns:  Current Medical Concerns:     New HD.  Patient states HD is going ok.  He develops nausea after his runs.  Patient will have his first out patient run tomorrow.  He will be initiating HD at [facility] Tues/Thurs/Sat schedule.  Patient has rides arranged for this week, but going forward will need some transportation assistance.  Rn CC will provide patient with transportation resources available in [facility].      Current Behavioral Concerns: no concerns.  Education Provided to patient: Transportation, SSDI, [place] care.       Medication Management:  Not reviewed this encounter.     Functional Status:  Independent.  No DME.    Living Situation:  Current living arrangement:: I live in a private home with family(Lives with daughter and IRMA, who rent out pts basement)  Type of residence:: Private home -  stairs    Lifestyle & Psychosocial Needs:  Lifestyle     Physical activity:     Days per week: 0 days     Minutes per session: 0 min     Stress: To some extent     Social Needs     Financial resource strain: Not very hard     Food insecurity:     Worry: Never true     Inability: Never true     Transportation needs:     Medical: No     Non-medical: No     Patient voiced financial concerns.  Patient is not able to work.  He is not receiving unemployment anymore.  He spoke to financial workers in hospital who assisted patient with establishing insurance - which will be active 1 March and information on how to apply for SSDI.  Patient stated paperwork for SSDI is very complex.  Rn CC suggested patient look into using [employer] to help with application process.  Patient is interested, RN CC will sent patient more information.    Patient explained he had a gap in insurance coverage from 1 jan -1 march.  He is concerned about the medical bills he is about to receive.  Rn CC explained [facility] and [employer] that can help.  Patient stated he will wait to see what bills come in the mail before moving forward on this.    Diet:: Regular, Diabetic diet  Inadequate nutrition (GOAL):: No  Tube Feeding: No  Significant changes in sleep pattern (GOAL): No  Transportation means:: Family     Holiness or spiritual beliefs that impact treatment:: No  Mental health DX:: No  Mental health management concern (GOAL):: No  Informal Support system:: Children, Friends, Parent   Socioeconomic History     Marital status: Single     Spouse name: Not on file     Number of children: 1     Years of education: Not on file     Highest education level: Associate degree: academic program   Occupational History     Occupation:    Relationships     Social connections:     Talks on phone: More than three times a week     Gets together: Once a week     Attends Orthodoxy service: Never     Active member of club or  organization: No     Attends meetings of clubs or organizations: Never     Relationship status:      Intimate partner violence:     Fear of current or ex partner: Not on file     Emotionally abused: Not on file     Physically abused: Not on file     Forced sexual activity: Not on file     Tobacco Use     Smoking status: Former Smoker     Packs/day: 0.30     Types: Cigarettes, Vaping Device     Last attempt to quit: 7/3/2015     Years since quittin.6     Smokeless tobacco: Never Used     Tobacco comment: E- Cig use still   Substance and Sexual Activity     Alcohol use: Not Currently     Alcohol/week: 0.0 standard drinks     Frequency: Never     Binge frequency: Never     Drug use: No     Sexual activity: Never     Partners: Female        Resources and Interventions:  Community Resources: None  Supplies used at home:: Diabetic Supplies  Equipment Currently Used at Home: glucometer    Advance Care Plan/Directive  Type Advanced Care Plans/Directives: Advanced Directive - On File  Advanced Care Plan/Directive Status: Not Applicable    Referrals Placed: Transportation     Goals:   Goals        General    finances     Notes - Note created  2020 10:00 AM by Marcie Pastrana RN    Goal Statement: Patient with new HD.  He is not able to drive and therefore cannot work.  Patient will apply for and get approved for SSDI.  Patient was without insurance for a few months.  He will have active insurance as of , however he is concerned about hospital bills he accumulated between  and .  Date Goal set: 20  Barriers: patient is attempting to do SSDI application on own.  He is not familiar with services that offer assistance for this.  Strengths: patient well supported by family.  Receptive to help/feedback.  Date to Achieve By: 2020  Patient expressed understanding of goal: yes  Action steps to achieve this goal:   1. RN CC will provide information on  to assist  with SSDI process.  2. I will follow up with providers as recommended.  3. I will reach out to RN CC for assistance with medical bills as they come in.        Problem Solving (pt-stated)     Notes - Note created  8/28/2018  3:15 PM by Vania Gutierrez, RN    Goal Statement: keep a fast acting carbohydrate with at all times  Measure of Success: patient to report  Supportive Steps to Achieve: patient to stop at pharmacy and  glucose tabs  Barriers: none   Strengths: has a better understanding  Date to Achieve By: 9/10/18  Patient expressed understanding of goal: yes              Outreach Frequency: 6 weeks  Pt reports understanding and denies any additional questions or concerns at this times. RN CC engaged in [employer] communication during encounter.    Plan:   Patient will go to HD as scheduled.  He will follow up with providers as recommended.  Patient will continue to work on SSDI application process.  RN CC will send patient info on [employer] services, and Disability specialists.    Rn CC will outreach again in 6 weeks.    Marcie Pastrana RN  Care Coordination  Phone:  [phone]  Email: [email or web address]  [facility] [place], [place] and [facility]

## 2020-02-26 ENCOUNTER — ALLIED HEALTH/NURSE VISIT (OUTPATIENT)
Dept: PHARMACY | Facility: CLINIC | Age: 45
End: 2020-02-26

## 2020-02-26 ENCOUNTER — CARE COORDINATION (OUTPATIENT)
Dept: NEPHROLOGY | Facility: CLINIC | Age: 45
End: 2020-02-26

## 2020-02-26 DIAGNOSIS — R11.2 INTRACTABLE NAUSEA AND VOMITING: ICD-10-CM

## 2020-02-26 DIAGNOSIS — E10.59 TYPE 1 DIABETES MELLITUS WITH DIABETIC CARDIOMYOPATHY (H): ICD-10-CM

## 2020-02-26 DIAGNOSIS — I10 HYPERTENSION GOAL BP (BLOOD PRESSURE) < 140/90: ICD-10-CM

## 2020-02-26 DIAGNOSIS — R52 PAIN: ICD-10-CM

## 2020-02-26 DIAGNOSIS — N18.6 ESRD (END STAGE RENAL DISEASE) ON DIALYSIS (H): Primary | ICD-10-CM

## 2020-02-26 DIAGNOSIS — Z94.83 PANCREAS REPLACED BY TRANSPLANT (H): ICD-10-CM

## 2020-02-26 DIAGNOSIS — K31.84 GASTROPARESIS: ICD-10-CM

## 2020-02-26 DIAGNOSIS — I25.10 CORONARY ARTERY DISEASE, NON-OCCLUSIVE: ICD-10-CM

## 2020-02-26 DIAGNOSIS — I43 TYPE 1 DIABETES MELLITUS WITH DIABETIC CARDIOMYOPATHY (H): ICD-10-CM

## 2020-02-26 DIAGNOSIS — Z99.2 ESRD (END STAGE RENAL DISEASE) ON DIALYSIS (H): Primary | ICD-10-CM

## 2020-02-26 DIAGNOSIS — Z94.0 KIDNEY REPLACED BY TRANSPLANT: ICD-10-CM

## 2020-02-26 DIAGNOSIS — K21.9 GASTROESOPHAGEAL REFLUX DISEASE, ESOPHAGITIS PRESENCE NOT SPECIFIED: ICD-10-CM

## 2020-02-26 PROCEDURE — 99607 MTMS BY PHARM ADDL 15 MIN: CPT | Performed by: PHARMACIST

## 2020-02-26 PROCEDURE — 99605 MTMS BY PHARM NP 15 MIN: CPT | Performed by: PHARMACIST

## 2020-02-26 NOTE — PROGRESS NOTES
MTM ENCOUNTER  SUBJECTIVE/OBJECTIVE:                Murtaza Fitzgerald is a 44 year old male called for a transitions of care visit.  He was discharged from [facility] ED on 2/22/2020 for nausea/vomiting due to uremia.     Chief Complaint: Hospital follow-up.    Allergies/ADRs: Reviewed in Epic  Tobacco:  reports that he quit smoking about 4 years ago. His smoking use included cigarettes and vaping device. He smoked 0.30 packs per day. He has never used smokeless tobacco.  Alcohol: none  Caffeine: 1-2 cans of diet mountain de[employer]ruHearing daiShopSuey  Activity: none at this time.   PMH: Reviewed in Epic    Medication Adherence/Access:  Patient takes medications directly from bottles.  Patient takes medications 2 time(s) per day.   Per patient, misses medication 0 times per week.   Medication barriers: insurance not active until March 1st, but details that he has enough medication at home to last until that time.  The patient fills medications at [place]: YES - [place].    ESRD/Hemodialysis/PancreaticTransplant (2/2009)/Renal Transplant (11/2008): Patient presented to the ED with nausea/vomiting which was thought to be due to gastroparesis, but symptoms resolved with dialysis. Please see discharge note for a complete summary. Of note, he reports attending dialysis sessions on Tuesday/Thursday/Saturday at [facility] in [place]. Patient reports that since returning home things have been improved.     Current medications include mycophenolate 360mg (2x 180mg tablets) two times daily and tacrolimus 1mg by mouth two times daily. He denies any side effects at this time.     Last Comprehensive Metabolic Panel:  Sodium   Date Value Ref Range Status   02/22/2020 134 133 - 144 mmol/L Final     Potassium   Date Value Ref Range Status   02/22/2020 4.2 3.4 - 5.3 mmol/L Final     Chloride   Date Value Ref Range Status   02/22/2020 99 94 - 109 mmol/L Final     Carbon Dioxide   Date Value Ref Range Status   02/22/2020 27 20 - 32  mmol/L Final     Anion Gap   Date Value Ref Range Status   02/22/2020 8 3 - 14 mmol/L Final     Glucose   Date Value Ref Range Status   02/22/2020 292 (H) 70 - 99 mg/dL Final     Urea Nitrogen   Date Value Ref Range Status   02/22/2020 32 (H) 7 - 30 mg/dL Final     Creatinine   Date Value Ref Range Status   02/22/2020 4.19 (H) 0.66 - 1.25 mg/dL Final     GFR Estimate   Date Value Ref Range Status   02/22/2020 16 (L) >60 mL/min/[1.73_m2] Final     Comment:     Non  GFR Calc  Starting 12/18/2018, serum creatinine based estimated GFR (eGFR) will be   calculated using the Chronic Kidney Disease Epidemiology Collaboration   (CKD-EPI) equation.       Calcium   Date Value Ref Range Status   02/22/2020 10.0 8.5 - 10.1 mg/dL Final     Bilirubin Total   Date Value Ref Range Status   02/22/2020 0.5 0.2 - 1.3 mg/dL Final     Alkaline Phosphatase   Date Value Ref Range Status   02/22/2020 176 (H) 40 - 150 U/L Final     ALT   Date Value Ref Range Status   02/22/2020 14 0 - 70 U/L Final     AST   Date Value Ref Range Status   02/22/2020 8 0 - 45 U/L Final     Diabetes:  Patient currently taking Tresiba 24 units daily at bedtime, Novolog 3 units three times daily with meals (1 unit per 6 grams of CHO) plus sliding scale of 1 unit for every 40 mg/dL over 140mg/dL.   Pt is not experiencing side effects.  SMBG: before meals (vary times). Ranges (patient reported): as low as 100mg/dL and to the highest of 260mg/dL, but no pattern to note   Patient is not experiencing hypoglycemia  Recent symptoms of high blood sugar? polydipsia and polyuria  Eye exam: up to date  Foot exam: up to date  ACEi/ARB: No.   Urine Albumin:   Lab Results   Component Value Date    UMALCR 474.96 (H) 01/11/2019   Aspirin: Not taking. It was stopped pre-procedural and never restarted.   Diet/Exercise: none at this time  Lab Results   Component Value Date    A1C 7.0 02/11/2020    A1C 6.7 01/28/2020    A1C 6.7 11/19/2019    A1C 6.8 06/28/2019    A1C  6.8 01/11/2019     Gastroparesis/Nausea/Vomiting: Current medications include ondansetron 4-8mg every 8 hours as needed for nausea and prochlorperazine 5-10mg (1-2 tablets) every 6 hours as needed for nausea/vomiting. Patient reports using ondansetron 2-3x/week. He has not filled the prochlorperazine. Continues to have some nausea in the mornings, but this is improved from previously lasting throughout the day.     Hypertension/CAD: Current medications include amlodipine 5mg daily, carvedilol 6.25mg two times daily, and atorvastatin 40mg daily. Tyrell reports that he does not have amlodipine or carvedilol at home currently and will  these medications once his insurance is active.   Patient does not self-monitor BP. He previously owned one, but lost it and looking to get a new one. Patient reports no current medication side effects.  BP Readings from Last 3 Encounters:   02/22/20 104/71   02/21/20 (!) 167/91   02/09/20 132/82     Lab Results   Component Value Date    CHOL 166 02/11/2020     Lab Results   Component Value Date    HDL 46 02/11/2020     Lab Results   Component Value Date    LDL 88 02/11/2020     Lab Results   Component Value Date    TRIG 159 02/11/2020     Lab Results   Component Value Date    CHOLHDLRATIO 4.1 07/08/2015     GERD: Current medications include: Prilosec (omeprazole) 40mg once daily. Pt c/o no current symptoms.   The patient does notice symptoms if they miss a dose.  Patient has not tried a trial off of therapy and is not interested in doing so. Patient feels that current regimen is effective.    Pain: Current medications include acetaminophen 650mg every 4 hours as needed for mild pain. Patient reports infrequent use of acetaminophen for pain.    Today's Vitals: There were no vitals taken for this visit. - telephonic outreach     ASSESSMENT:                Medication Adherence: fair, his insurance is not active until March 1st - therefore, unable to refill medications until that  time.     ESRD&Hemodialysis/Pancreatic/Renal Transplant: Improved. Follows with nephrology clinic.     Diabetes: Needs Improvement. Patient is meeting A1c goal of < 7%. Self monitoring of blood glucose is not at goal of fasting  mg/dL and post prandial < 180 mg/dL. SMBG monitoring is fluctuating at this time.     Gastroparesis/Nausea&Vomiting: Improved. Continues to have nausea, but requiring less use of anti-nausea medications. He has not been able to fill the prescription for prochlorperazine due to lapsed insurance coverage.     Hypertension/CAD: Stable. Patient is meeting BP goal of < 140/90mmHg. Patient needs to refill amlodipine upon active insurance coverage on March 1st.      GERD: Stable.  Current treatment is effective. Pt would benefit from the following changes: taper off proton pump inhibitor, but patient is not interested in this at the moment.    Pain: Stable.     PLAN:                Post Discharge Medication Reconciliation Status: discharge medications reconciled, continue medications without change.    1. Be sure to  the prescription for prochlorperazine once your insurance is active for nausea prevention.   2. Refill both amlodipine and carvedilol for blood pressure management once insurance is active on March 1st.     I spent 20 minutes with this patient today. A copy of the visit note was provided to the patient's primary care provider.    Will follow up as needed or requested.    The patient was sent via [employer] a summary of these recommendations.     Stefanie Stevens, PharmD  Medication Therapy Management Pharmacist  [facility]  [phone]

## 2020-02-26 NOTE — Clinical Note
Dr. Ríos, Sending as FYI.Thank you, Myriam Stevens, PharmDMedication Therapy Management [phone]

## 2020-03-20 ENCOUNTER — HOSPITAL ENCOUNTER (EMERGENCY)
Facility: CLINIC | Age: 45
Discharge: HOME | End: 2020-03-20
Attending: PHYSICIAN ASSISTANT | Admitting: PHYSICIAN ASSISTANT
Payer: COMMERCIAL

## 2020-03-20 VITALS
TEMPERATURE: 97.9 F | HEART RATE: 109 BPM | RESPIRATION RATE: 16 BRPM | OXYGEN SATURATION: 99 % | DIASTOLIC BLOOD PRESSURE: 111 MMHG | SYSTOLIC BLOOD PRESSURE: 175 MMHG

## 2020-03-20 DIAGNOSIS — K31.84 GASTROPARESIS: ICD-10-CM

## 2020-03-20 DIAGNOSIS — I10 ESSENTIAL HYPERTENSION: ICD-10-CM

## 2020-03-20 DIAGNOSIS — N18.6 ESRD (END STAGE RENAL DISEASE) ON DIALYSIS (H): ICD-10-CM

## 2020-03-20 DIAGNOSIS — R11.2 NON-INTRACTABLE VOMITING WITH NAUSEA, UNSPECIFIED VOMITING TYPE: ICD-10-CM

## 2020-03-20 DIAGNOSIS — Z99.2 ESRD (END STAGE RENAL DISEASE) ON DIALYSIS (H): ICD-10-CM

## 2020-03-20 LAB
ALBUMIN SERPL-MCNC: 3.5 G/DL (ref 3.4–5)
ALP SERPL-CCNC: 181 U/L (ref 40–150)
ALT SERPL W P-5'-P-CCNC: 12 U/L (ref 0–70)
ANION GAP SERPL CALCULATED.3IONS-SCNC: 12 MMOL/L (ref 3–14)
AST SERPL W P-5'-P-CCNC: 11 U/L (ref 0–45)
BASOPHILS # BLD AUTO: 0.1 10E9/L (ref 0–0.2)
BASOPHILS NFR BLD AUTO: 0.5 %
BILIRUB SERPL-MCNC: 0.4 MG/DL (ref 0.2–1.3)
BUN SERPL-MCNC: 33 MG/DL (ref 7–30)
CALCIUM SERPL-MCNC: 8.9 MG/DL (ref 8.5–10.1)
CHLORIDE SERPL-SCNC: 103 MMOL/L (ref 94–109)
CO2 SERPL-SCNC: 21 MMOL/L (ref 20–32)
CREAT SERPL-MCNC: 6.01 MG/DL (ref 0.66–1.25)
DIFFERENTIAL METHOD BLD: ABNORMAL
EOSINOPHIL # BLD AUTO: 0 10E9/L (ref 0–0.7)
EOSINOPHIL NFR BLD AUTO: 0 %
ERYTHROCYTE [DISTWIDTH] IN BLOOD BY AUTOMATED COUNT: 13.2 % (ref 10–15)
GFR SERPL CREATININE-BSD FRML MDRD: 11 ML/MIN/{1.73_M2}
GLUCOSE BLDC GLUCOMTR-MCNC: 242 MG/DL (ref 70–99)
GLUCOSE SERPL-MCNC: 276 MG/DL (ref 70–99)
HCT VFR BLD AUTO: 33 % (ref 40–53)
HGB BLD-MCNC: 10.5 G/DL (ref 13.3–17.7)
IMM GRANULOCYTES # BLD: 0 10E9/L (ref 0–0.4)
IMM GRANULOCYTES NFR BLD: 0.3 %
LIPASE SERPL-CCNC: 32 U/L (ref 73–393)
LYMPHOCYTES # BLD AUTO: 0.9 10E9/L (ref 0.8–5.3)
LYMPHOCYTES NFR BLD AUTO: 9.4 %
MCH RBC QN AUTO: 28.9 PG (ref 26.5–33)
MCHC RBC AUTO-ENTMCNC: 31.8 G/DL (ref 31.5–36.5)
MCV RBC AUTO: 91 FL (ref 78–100)
MONOCYTES # BLD AUTO: 0.3 10E9/L (ref 0–1.3)
MONOCYTES NFR BLD AUTO: 3.7 %
NEUTROPHILS # BLD AUTO: 8 10E9/L (ref 1.6–8.3)
NEUTROPHILS NFR BLD AUTO: 86.1 %
NRBC # BLD AUTO: 0 10*3/UL
NRBC BLD AUTO-RTO: 0 /100
PLATELET # BLD AUTO: 291 10E9/L (ref 150–450)
POTASSIUM SERPL-SCNC: 3.7 MMOL/L (ref 3.4–5.3)
PROT SERPL-MCNC: 7.6 G/DL (ref 6.8–8.8)
RBC # BLD AUTO: 3.63 10E12/L (ref 4.4–5.9)
SODIUM SERPL-SCNC: 136 MMOL/L (ref 133–144)
WBC # BLD AUTO: 9.3 10E9/L (ref 4–11)

## 2020-03-20 PROCEDURE — 96361 HYDRATE IV INFUSION ADD-ON: CPT

## 2020-03-20 PROCEDURE — 85025 COMPLETE CBC W/AUTO DIFF WBC: CPT | Performed by: PHYSICIAN ASSISTANT

## 2020-03-20 PROCEDURE — 99284 EMERGENCY DEPT VISIT MOD MDM: CPT | Mod: 25

## 2020-03-20 PROCEDURE — 83690 ASSAY OF LIPASE: CPT | Performed by: PHYSICIAN ASSISTANT

## 2020-03-20 PROCEDURE — 25000132 ZZH RX MED GY IP 250 OP 250 PS 637: Performed by: PHYSICIAN ASSISTANT

## 2020-03-20 PROCEDURE — 96374 THER/PROPH/DIAG INJ IV PUSH: CPT

## 2020-03-20 PROCEDURE — 25800030 ZZH RX IP 258 OP 636: Performed by: PHYSICIAN ASSISTANT

## 2020-03-20 PROCEDURE — 00000146 ZZHCL STATISTIC GLUCOSE BY METER IP

## 2020-03-20 PROCEDURE — 25000128 H RX IP 250 OP 636: Performed by: PHYSICIAN ASSISTANT

## 2020-03-20 PROCEDURE — 96375 TX/PRO/DX INJ NEW DRUG ADDON: CPT

## 2020-03-20 PROCEDURE — 25000125 ZZHC RX 250: Performed by: PHYSICIAN ASSISTANT

## 2020-03-20 PROCEDURE — 80053 COMPREHEN METABOLIC PANEL: CPT | Performed by: PHYSICIAN ASSISTANT

## 2020-03-20 RX ORDER — SODIUM CHLORIDE 9 MG/ML
INJECTION, SOLUTION INTRAVENOUS ONCE
Status: COMPLETED | OUTPATIENT
Start: 2020-03-20 | End: 2020-03-20

## 2020-03-20 RX ORDER — ONDANSETRON 4 MG/1
4 TABLET, ORALLY DISINTEGRATING ORAL EVERY 8 HOURS PRN
Qty: 10 TABLET | Refills: 0 | Status: SHIPPED | OUTPATIENT
Start: 2020-03-20 | End: 2020-03-31

## 2020-03-20 RX ORDER — PROCHLORPERAZINE MALEATE 5 MG
5 TABLET ORAL EVERY 8 HOURS PRN
Qty: 20 TABLET | Refills: 0 | Status: SHIPPED | OUTPATIENT
Start: 2020-03-20 | End: 2020-06-30

## 2020-03-20 RX ORDER — ONDANSETRON 2 MG/ML
4 INJECTION INTRAMUSCULAR; INTRAVENOUS ONCE
Status: COMPLETED | OUTPATIENT
Start: 2020-03-20 | End: 2020-03-20

## 2020-03-20 RX ADMIN — SODIUM CHLORIDE: 9 INJECTION, SOLUTION INTRAVENOUS at 17:50

## 2020-03-20 RX ADMIN — PROCHLORPERAZINE EDISYLATE 5 MG: 5 INJECTION INTRAMUSCULAR; INTRAVENOUS at 17:27

## 2020-03-20 RX ADMIN — LIDOCAINE HYDROCHLORIDE 30 ML: 20 SOLUTION ORAL; TOPICAL at 18:16

## 2020-03-20 RX ADMIN — ONDANSETRON 4 MG: 2 INJECTION INTRAMUSCULAR; INTRAVENOUS at 17:27

## 2020-03-20 ASSESSMENT — ENCOUNTER SYMPTOMS
NAUSEA: 1
SHORTNESS OF BREATH: 0
VOMITING: 1

## 2020-03-20 NOTE — ED TRIAGE NOTES
Pt with N/V x24 hours. Pt gets dialysis, last run was yesterday. ABCs intact, vomiting during triage.

## 2020-03-20 NOTE — ED PROVIDER NOTES
History     Chief Complaint:  Nausea & Vomiting      HPI   Murtaza Fitzgerald is a 44 year old male, with a history of type 1 diabetes and gastroparesis, who presents with nausea and vomiting. He has dialysis on Tuesdays, Thursdays, and Saturdays. After dialysis yesterday, he had [employer] for dinner and started vomiting since then. He thinks this is his gastroparesis acting up. He denies experiencing chest pain, abdominal pain, or shortness of breath.  States that he has not taken his BP medications today due to vomiting. Patient is a pancreas and kidney transplant patient from [facility].     Allergies:  Benadryl  Reglan    Medications:    Amlodipine  Carvedilol  Insulin  Tresiba  Prilosec  Zofran  Tacrolimus  Mycophenolic acid    Past Medical History:    Adhesive capsulitis of right shoulder  SWATHI  CMV (cytomegalovirus infection) status negative  Coronary artery disease involving native coronary artery of native heart without angina pectoris  Dehydration  Diabetes mellitus, type 1  Dialysis patient  Dyslipidemia  Gastroesophageal reflux disease  History of blood transfusion  Hyperlipidemia   Hypertension  Immunosuppressed status  Intractable nausea and vomiting  Kidney transplant status, living related donor  Migraines  Mycotic aneurysm of kidney transplant  Noncompliance with treatment  Pancreas replaced by transplant  Pancreatic disease  Tobacco abuse  Vomiting    Past Surgical History:    Knee arthroscopy  Bypass graft artery coronary  EGD  Eye surgery  CVC tunnel placement   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    Cardiovascular: Negative for chest pain.   Gastrointestinal: Positive for nausea and vomiting.   All other systems reviewed and are  negative.      Physical Exam   First Vitals:  Patient Vitals for the past 24 hrs:   BP Temp Temp src Pulse Heart Rate Resp SpO2   03/20/20 1927 -- -- -- -- -- 16 --   03/20/20 1855 (!) 175/111 -- -- 109 -- -- 99 %   03/20/20 1700 (!) 187/114 97.9  F (36.6  C) Oral 102 102 18 100 %     Physical Exam  General: Alert and interactive. Appears uncomfortable. Emesis bag in hand.   Eyes: The pupils are equal and round. EOMs intact. No scleral icterus.  ENT: No abnormalities to the external nose or ears. Mucous membranes moist. Posterior oropharynx is non-erythematous. Neck: Trachea is in the midline. No nuchal rigidity.     CV: Mild tachycardia. S1 and S2 normal without murmur, click, gallop or rub. Dialysis tunnel catheter to right upper chest wall.   Resp: Breath sounds are clear bilaterally, without rhonchi, wheezes, rales. Non-labored, no retractions or accessory muscle use.     GI: Abdomen is soft without distension. Multiple scars to abdomen.   MS: Moving all extremities well. Good muscle tone.   Skin: Warm and dry. No rash or lesions noted.  Neuro: Alert and oriented x 3. No focal neurologic deficits. Good strength and sensation in upper and lower extremities.   Psych: Awake. Alert.  Normal affect. Appropriate interactions.  Lymph: No anterior or posterior cervical lymphadenopathy noted.    Emergency Department Course     Laboratory:  CBC: WBC: 9.3, HGB: 10.5(L), PLT: 291    CMP: Glucose 276(H), BUN: 33(H), GFR: 11(L), Alkphos: 181(L) o/w WNL (Creatinine: 6.01(H))    Lipase: 32(L)    Glucose: 242 (H)    Interventions:  1727 Zofran 3 mg IV  1727 Compazine 5 mg IV  1750 NS IV  1816 GI Cocktail 30 mL Oral    Emergency Department Course:  Nursing notes and vitals reviewed. (1711) I performed an exam of the patient as documented above.     IV inserted. Medicine administered as documented above. Blood drawn. This was sent to the lab for further testing, results above.     1856 I rechecked the patient and discussed the  results of his workup thus far.     Findings and plan explained to the Patient. Patient discharged home with instructions regarding supportive care, medications, and reasons to return. The importance of close follow-up was reviewed. The patient was prescribed Zofran and Compazine.    I personally reviewed the laboratory results with the Patient and answered all related questions prior to discharge.       Impression & Plan      Medical Decision Making:  Murtaza Fitzgerald is a 44 year old male who presents with acute nausea, vomiting in the setting of DM gastroparesis. Vomiting likely secondary to uremia. No signs of DKA here. He has a benign abdominal exam without focal RLQ or LLQ tenderness to suggest diverticulitis or appendicitis, SBO, or pelvic pathology. Makes small amount of urine; no changes to this recently. No fevers/chills to suggest sepsis. No leukocytosis. I did discuss the sometimes vague initial constellation of symptoms early in the course of acute abdominal processes, and the need for immediate return should pain or other concerning symptoms develop. Symptoms are improved after small bolus of IV fluids and symptomatic treatment. Laboratory workup shows no electrolyte abnormalities, chronic anemia. Able to tolerate food and oral fluids after medical interventions here. The abdominal re-exam remains benign. Continues to be hypertensive; has not taken hypertension medications today. Will have him continue to monitor BP and take his medications at home. I believe he is safe for discharge in improved condition at this time with strict return precautions for recurrent vomiting, pain, fever or any other concerning symptoms. Will dialyze tomorrow.         Diagnosis:    ICD-10-CM    1. Non-intractable vomiting with nausea, unspecified vomiting type  R11.2    2. Gastroparesis  K31.84    3. ESRD (end stage renal disease) on dialysis (H)  N18.6     Z99.2    4. Essential hypertension  I10       Disposition:  discharged to home with prescription for Zofran and Compazine.     Discharge Medications:  New Prescriptions    ONDANSETRON (ZOFRAN ODT) 4 MG ODT TAB    Take 1 tablet (4 mg) by mouth every 8 hours as needed for nausea    PROCHLORPERAZINE (COMPAZINE) 5 MG TABLET    Take 1 tablet (5 mg) by mouth every 8 hours as needed for nausea     Scribe Disclosure:  I, Roberth Godinez, am serving as a scribe on 3/20/2020 at 5:11 PM to personally document services performed by Dayanna Dominguez, * based on my observations and the provider's statements to me.     Roberth Godinez  3/20/2020   [facility] EMERGENCY DEPARTMENT       Dayanna Dominguez PA-C  03/20/20 [id number]

## 2020-03-20 NOTE — DISCHARGE INSTRUCTIONS
DO NOT MISS DIALYSIS.   Take Zofran and Reglan for nausea/vomiting.   Take your HTN medications.   See primary care in a couple of days.     Discharge Instructions  Vomiting    You have been seen today for vomiting (throwing up). This is usually caused by a virus, but some bacteria, parasites, medicines or other medical conditions can cause similar symptoms. At this time your provider does not find that your vomiting is a sign of anything dangerous or life-threatening. However, sometimes the signs of serious illness do not show up right away. If you have new or worse symptoms, you may need to be seen again in the Emergency Department or by your primary provider. Remember that serious problems like appendicitis can start as vomiting.    Generally, every Emergency Department visit should have a follow-up clinic visit with either a primary or a specialty clinic/provider. Please follow-up as instructed by your emergency provider today.    Return to the Emergency Department if:  You keep vomiting and you are not able to keep liquids down.   You feel you are getting dehydrated, such as being very thirsty, not urinating (peeing) at least every 8-12 hours, or feeling faint or lightheaded.   You develop a new fever, or your fever continues for more than 2 days.   You have abdominal (belly pain) that seems worse than cramps, is in one spot, or is getting worse over time. Appendicitis usually causes pain in the right lower abdomen (to the right and below your belly button) so watch for pain in this location.  You have blood in your vomit or stools.   You feel very weak.  You are not starting to improve within 24 hours of your visit here.     What can I do to help myself?  The most important thing to do is to drink clear liquids. If you have been vomiting a lot, it is best to have only small, frequent sips of liquids. Drinking too much at once may cause more vomiting. If you are vomiting often, you must replace minerals, sodium  and potassium lost with your illness. Pedialyte  is the best available rehydration liquid but some find that it doesn t taste good so sports drinks are an alterative. You can also drink clear liquids such as water, weak tea, apple juice, and 7-Up . Avoid acid liquids (orange), caffeine (coffee) or alcohol. Do not drink milk until you no longer have diarrhea (loose stools).   After liquids are staying down, you may start eating mild foods. Soda crackers, toast, plain noodles, gelatin, applesauce and bananas are good first choices. Avoid foods that have acid, are spicy, fatty or have a lot of fiber (such as meats, coarse grains, vegetables). You may start eating these foods again in about 3 days when you are better.   Sometimes treatment includes prescription medicine to prevent nausea (sick to your stomach) and vomiting. If your provider prescribes these for you, take them as directed.   Do not take ibuprofen, naproxen, or other nonsteroidal anti-inflammatory (NSAID) medicines without checking with your healthcare provider.     If you were given a prescription for medicine here today, be sure to read all of the information (including the package insert) that comes with your prescription.  This will include important information about the medicine, its side effects, and any warnings that you need to know about.  The pharmacist who fills the prescription can provide more information and answer questions you may have about the medicine.  If you have questions or concerns that the pharmacist cannot address, please call or return to the Emergency Department.     Remember that you can always come back to the Emergency Department if you are not able to see your regular provider in the amount of time listed above, if you get any new symptoms, or if there is anything that worries you.

## 2020-03-20 NOTE — ED AVS SNAPSHOT
[facility] Emergency Department  [address]  [facility] [address]  Phone:  [phone]  Fax:  [phone]                                    Murtaza Fitzgerald   MRN: [id number]    Department:  [facility] Emergency Department   Date of Visit:  3/20/2020           After Visit Summary Signature Page    I have received my discharge instructions, and my questions have been answered. I have discussed any challenges I see with this plan with the nurse or doctor.    ..........................................................................................................................................  Patient/Patient Representative Signature      ..........................................................................................................................................  Patient Representative Print Name and Relationship to Patient    ..................................................               ................................................  Date                                   Time    ..........................................................................................................................................  Reviewed by Signature/Title    ...................................................              ..............................................  Date                                               Time          22EPIC Rev 08/18

## 2020-03-23 ENCOUNTER — PATIENT OUTREACH (OUTPATIENT)
Dept: CARE COORDINATION | Facility: CLINIC | Age: 45
End: 2020-03-23

## 2020-03-23 NOTE — PROGRESS NOTES
Clinic Care Coordination Contact    Follow Up Progress Note   Patient was seen in ED on 3/21 for acute nausea, vomiting in the setting of DM gastroparesis.  Patient was given fluids and discharged home    RN CC spoke with patient over phone.    Assessment: patient states he is still feeling nauseated.  He no longer vomiting and holding down food.  He explained providers in ED think a lot of his symptoms will better after dialysis.  Patient states he has not completed a full run this past week due to the machine clotting off, which likely lead to his symptoms.  Patient next has HD tomorrow - normal schedule is Tues/THurs/Sat.    Patient has no concerns about transportation at this time.  Daughter has been available more due to COVID-19.  He did verify he received the transportation resources that were mailed to him.    Patient also explained that he has applied for SSDI, all necessary documentation has been sent in as well.  He is just waiting for a response now.    Goals addressed this encounter:   Goals Addressed                 This Visit's Progress      finances   On track     Goal Statement: Patient with new HD.  He is not able to drive and therefore cannot work.  Patient will apply for and get approved for SSDI.  Patient was without insurance for a few months.  He will have active insurance as of 1 March 20, however he is concerned about hospital bills he accumulated between 1 jan and 1 march.  Date Goal set: 2/24/20  Barriers: patient is attempting to do SSDI application on own.  He is not familiar with services that offer assistance for this.  Strengths: patient well supported by family.  Receptive to help/feedback.  Date to Achieve By: 1 June 2020  Patient expressed understanding of goal: yes  Action steps to achieve this goal:   1. RN CC will provide information on  to assist with SSDI process.  2. I will follow up with providers as recommended.  3. I will reach out to RN CC for  assistance with medical bills as they come in.    As of today's date 3/23/2020 goal is met at 26 - 50%.   Goal Status:  Showing progress  Patient has applied for SSDI. He has sent in all needed documentation, just waiting to hear back from them now.                 Intervention/Education provided during outreach:  NO new needs at this time.    Plan:   Patient will go to HD as scheduled.      RN CC will follow up with patient in 6 weeks to assess goal progression and patient needs.    Marcie Pastrana RN  Care Coordination  Phone:  [phone]  Email: [email or web address]  [facility], [place], [place] and [facility]

## 2020-03-23 NOTE — LETTER
[facility] Home  Complex Care Plan  About Me:    Patient Name:  Murtaza Landeros    YOB: 1975  Age:         44 year old   [facility] MRN:    [id number] Telephone Information:  Home Phone [phone]   Mobile [phone]       Address:  [address]  [facility] [address] Email address:  [email or web address]      Emergency Contact(s)    Name Relationship Lgl Grd Work Phone Home Phone Mobile Phone   1. LEXA LANDEROS Mother No  [phone]   2. ANA CARTER Daughter No  [phone]           Primary language:  English     needed? No   [facility] [employer]:  [phone] op. 1  Other communication barriers: None  Preferred Method of Communication:  Danie  Current living arrangement:    Mobility Status/ Medical Equipment:      Health Maintenance  Health Maintenance Reviewed: Not assessed    My Access Plan  Medical Emergency 911   Primary Clinic Line [facility] - [phone]   24 Hour Appointment Line [phone] or  [phone] ([phone]) (toll-free)   24 Hour Nurse Line [phone] (toll-free)   Preferred Urgent Care     Preferred Hospital     Preferred Pharmacy [facility] [place], [place] - [facility] [address]     Behavioral Health Crisis Line The National Suicide Prevention Lifeline at 1-352.795.6002 or 911             My Care Team Members  Patient Care Team       Relationship Specialty Notifications Start End    Kt Ríos MD PCP - General Internal Medicine  9/27/13     Phone: [phone] Fax: [phone]         [address] [place] [address] [address] [place] [address]    Sung Jorgensen MD MD Nephrology  5/11/12     Phone: [phone] Fax: [phone]         [address] [facility] [address]    Kt Ríos MD Assigned PCP   1/1/17     Phone: [phone] Fax: [phone] [address] [facility] [address] [place] [address]    Vania Gutierrez, RN Diabetes  Educator Diabetes Education  8/28/18     Phone: [phone] Fax: [phone]        Marcie Pastrana, RN Lead Care Coordinator Primary Care - CC  2/10/20     Phone: [phone]                 My Care Plans  Self Management and Treatment Plan  Goals and (Comments)  Goals        General    finances     Notes - Note edited  3/23/2020 12:23 PM by Marcie Pastrana, RN    Goal Statement: Patient with new HD.  He is not able to drive and therefore cannot work.  Patient will apply for and get approved for SSDI.  Patient was without insurance for a few months.  He will have active insurance as of 1 March 20, however he is concerned about hospital bills he accumulated between 1 jan and 1 march.  Date Goal set: 2/24/20  Barriers: patient is attempting to do SSDI application on own.  He is not familiar with services that offer assistance for this.  Strengths: patient well supported by family.  Receptive to help/feedback.  Date to Achieve By: 1 June 2020  Patient expressed understanding of goal: yes  Action steps to achieve this goal:   1. RN CC will provide information on  to assist with SSDI process.  2. I will follow up with providers as recommended.  3. I will reach out to RN CC for assistance with medical bills as they come in.    As of today's date 3/23/2020 goal is met at 26 - 50%.   Goal Status:  Showing progress  Patient has applied for SSDI. He has sent in all needed documentation, just waiting to hear back from them now.          Problem Solving (pt-stated)     Notes - Note created  8/28/2018  3:15 PM by Vania Gutierrez, RN    Goal Statement: keep a fast acting carbohydrate with at all times  Measure of Success: patient to report  Supportive Steps to Achieve: patient to stop at pharmacy and  glucose tabs  Barriers: none   Strengths: has a better understanding  Date to Achieve By: 9/10/18  Patient expressed understanding of goal: yes                 My Medical and Care Information  Problem  List   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

## 2020-03-27 RX ORDER — INSULIN ASPART 100 [IU]/ML
INJECTION, SOLUTION INTRAVENOUS; SUBCUTANEOUS
OUTPATIENT
Start: 2020-03-27

## 2020-03-27 NOTE — TELEPHONE ENCOUNTER
Patient also stated that he is in need of Contour next test strips but I did not see them on the refill list please help patient.

## 2020-03-31 ENCOUNTER — HOSPITAL ENCOUNTER (EMERGENCY)
Facility: CLINIC | Age: 45
Discharge: HOME | End: 2020-03-31
Attending: EMERGENCY MEDICINE | Admitting: EMERGENCY MEDICINE
Payer: COMMERCIAL

## 2020-03-31 VITALS
WEIGHT: 176.37 LBS | HEIGHT: 68 IN | BODY MASS INDEX: 26.73 KG/M2 | OXYGEN SATURATION: 96 % | SYSTOLIC BLOOD PRESSURE: 181 MMHG | TEMPERATURE: 96.9 F | RESPIRATION RATE: 20 BRPM | DIASTOLIC BLOOD PRESSURE: 107 MMHG | HEART RATE: 95 BPM

## 2020-03-31 DIAGNOSIS — R11.2 NAUSEA AND VOMITING, INTRACTABILITY OF VOMITING NOT SPECIFIED, UNSPECIFIED VOMITING TYPE: ICD-10-CM

## 2020-03-31 DIAGNOSIS — I10 HYPERTENSION, UNSPECIFIED TYPE: ICD-10-CM

## 2020-03-31 LAB
ALBUMIN SERPL-MCNC: 3.2 G/DL (ref 3.4–5)
ALP SERPL-CCNC: 159 U/L (ref 40–150)
ALT SERPL W P-5'-P-CCNC: 9 U/L (ref 0–70)
ANION GAP SERPL CALCULATED.3IONS-SCNC: 7 MMOL/L (ref 3–14)
AST SERPL W P-5'-P-CCNC: 10 U/L (ref 0–45)
BASOPHILS # BLD AUTO: 0 10E9/L (ref 0–0.2)
BASOPHILS NFR BLD AUTO: 0.5 %
BILIRUB SERPL-MCNC: 0.4 MG/DL (ref 0.2–1.3)
BUN SERPL-MCNC: 31 MG/DL (ref 7–30)
CALCIUM SERPL-MCNC: 8.8 MG/DL (ref 8.5–10.1)
CHLORIDE SERPL-SCNC: 102 MMOL/L (ref 94–109)
CO2 SERPL-SCNC: 26 MMOL/L (ref 20–32)
CREAT SERPL-MCNC: 5.87 MG/DL (ref 0.66–1.25)
DIFFERENTIAL METHOD BLD: ABNORMAL
EOSINOPHIL # BLD AUTO: 0.1 10E9/L (ref 0–0.7)
EOSINOPHIL NFR BLD AUTO: 1.3 %
ERYTHROCYTE [DISTWIDTH] IN BLOOD BY AUTOMATED COUNT: 13.6 % (ref 10–15)
GFR SERPL CREATININE-BSD FRML MDRD: 11 ML/MIN/{1.73_M2}
GLUCOSE BLDC GLUCOMTR-MCNC: 211 MG/DL (ref 70–99)
GLUCOSE SERPL-MCNC: 247 MG/DL (ref 70–99)
HCT VFR BLD AUTO: 31.3 % (ref 40–53)
HGB BLD-MCNC: 9.7 G/DL (ref 13.3–17.7)
IMM GRANULOCYTES # BLD: 0.1 10E9/L (ref 0–0.4)
IMM GRANULOCYTES NFR BLD: 0.6 %
LIPASE SERPL-CCNC: 41 U/L (ref 73–393)
LYMPHOCYTES # BLD AUTO: 1.3 10E9/L (ref 0.8–5.3)
LYMPHOCYTES NFR BLD AUTO: 15 %
MCH RBC QN AUTO: 28.7 PG (ref 26.5–33)
MCHC RBC AUTO-ENTMCNC: 31 G/DL (ref 31.5–36.5)
MCV RBC AUTO: 93 FL (ref 78–100)
MONOCYTES # BLD AUTO: 0.5 10E9/L (ref 0–1.3)
MONOCYTES NFR BLD AUTO: 5.8 %
NEUTROPHILS # BLD AUTO: 6.5 10E9/L (ref 1.6–8.3)
NEUTROPHILS NFR BLD AUTO: 76.8 %
NRBC # BLD AUTO: 0 10*3/UL
NRBC BLD AUTO-RTO: 0 /100
PLATELET # BLD AUTO: 363 10E9/L (ref 150–450)
POTASSIUM SERPL-SCNC: 4 MMOL/L (ref 3.4–5.3)
PROT SERPL-MCNC: 7.3 G/DL (ref 6.8–8.8)
RBC # BLD AUTO: 3.38 10E12/L (ref 4.4–5.9)
SODIUM SERPL-SCNC: 135 MMOL/L (ref 133–144)
WBC # BLD AUTO: 8.4 10E9/L (ref 4–11)

## 2020-03-31 PROCEDURE — 36415 COLL VENOUS BLD VENIPUNCTURE: CPT | Performed by: EMERGENCY MEDICINE

## 2020-03-31 PROCEDURE — 83690 ASSAY OF LIPASE: CPT | Performed by: EMERGENCY MEDICINE

## 2020-03-31 PROCEDURE — 85025 COMPLETE CBC W/AUTO DIFF WBC: CPT | Performed by: EMERGENCY MEDICINE

## 2020-03-31 PROCEDURE — 25000128 H RX IP 250 OP 636: Performed by: EMERGENCY MEDICINE

## 2020-03-31 PROCEDURE — 00000146 ZZHCL STATISTIC GLUCOSE BY METER IP

## 2020-03-31 PROCEDURE — 80053 COMPREHEN METABOLIC PANEL: CPT | Performed by: EMERGENCY MEDICINE

## 2020-03-31 PROCEDURE — 99284 EMERGENCY DEPT VISIT MOD MDM: CPT | Mod: 25

## 2020-03-31 PROCEDURE — 25800030 ZZH RX IP 258 OP 636: Performed by: EMERGENCY MEDICINE

## 2020-03-31 PROCEDURE — 96374 THER/PROPH/DIAG INJ IV PUSH: CPT

## 2020-03-31 PROCEDURE — 96375 TX/PRO/DX INJ NEW DRUG ADDON: CPT

## 2020-03-31 PROCEDURE — 25000132 ZZH RX MED GY IP 250 OP 250 PS 637: Performed by: EMERGENCY MEDICINE

## 2020-03-31 RX ORDER — AMLODIPINE BESYLATE 5 MG/1
5 TABLET ORAL ONCE
Status: COMPLETED | OUTPATIENT
Start: 2020-03-31 | End: 2020-03-31

## 2020-03-31 RX ORDER — CARVEDILOL 6.25 MG/1
6.25 TABLET ORAL ONCE
Status: COMPLETED | OUTPATIENT
Start: 2020-03-31 | End: 2020-03-31

## 2020-03-31 RX ORDER — ONDANSETRON 4 MG/1
4 TABLET, ORALLY DISINTEGRATING ORAL EVERY 8 HOURS PRN
Qty: 10 TABLET | Refills: 0 | Status: SHIPPED | OUTPATIENT
Start: 2020-03-31 | End: 2020-05-13

## 2020-03-31 RX ORDER — ONDANSETRON 2 MG/ML
4 INJECTION INTRAMUSCULAR; INTRAVENOUS ONCE
Status: COMPLETED | OUTPATIENT
Start: 2020-03-31 | End: 2020-03-31

## 2020-03-31 RX ADMIN — CARVEDILOL 6.25 MG: 6.25 TABLET, FILM COATED ORAL at 10:19

## 2020-03-31 RX ADMIN — SODIUM CHLORIDE 500 ML: 9 INJECTION, SOLUTION INTRAVENOUS at 08:39

## 2020-03-31 RX ADMIN — ONDANSETRON 4 MG: 2 INJECTION INTRAMUSCULAR; INTRAVENOUS at 08:33

## 2020-03-31 RX ADMIN — PROCHLORPERAZINE EDISYLATE 10 MG: 5 INJECTION INTRAMUSCULAR; INTRAVENOUS at 08:35

## 2020-03-31 RX ADMIN — AMLODIPINE BESYLATE 5 MG: 5 TABLET ORAL at 10:18

## 2020-03-31 ASSESSMENT — ENCOUNTER SYMPTOMS
DIARRHEA: 0
FREQUENCY: 0
DYSURIA: 0
COUGH: 0
DIFFICULTY URINATING: 0
ABDOMINAL PAIN: 1
VOMITING: 1
HEMATURIA: 0
FEVER: 0
NAUSEA: 1

## 2020-03-31 ASSESSMENT — MIFFLIN-ST. JEOR: SCORE: 1674.5

## 2020-03-31 NOTE — ED PROVIDER NOTES
History     Chief Complaint:    Nausea & Vomiting      The history is provided by the patient.      Murtaza Fitzgerald is a 44 year old male with a history of type I diabetes, gastroparesis, CKD on dialysis, and s/p kidney and pancreas transplant who presents with ongoing vomiting that onset 24 hours ago. He states this feels similar to his previous episodes of gastroparesis but his central lower abdominal pain that is worse than usual. He has not been able to take his usual medications today as he cannot keep anything down but has been table to take his insulin. He usually takes Reglan for nausea and also has compazine and Zofran at home; he cannot keep down his compazine and ran out of Zofran yesterday. He was evaluated in the ED visit 11 days ago for similar symptoms and felt improved after this visit. He denies fever, cough, congestion, diarrhea, or urinary symptoms. Of note, a few weeks ago the patient had some pulmonary edema that resolved after adjustment of his dialysis but he denies any other complications during dialysis. His last run was two days ago and he has dialysis later today at 1300. Additionally, he is waiting for a script refill for his insulin test strips and therefore does not know what his sugars have been for the last week.     Allergies:  Benadryl  Reglan     Medications:    Amlodipine  Carvedilol  Insulin  Tresiba  Prilosec  Zofran  Tacrolimus  Mycophenolic acid  Lipitor  Compazine      Past Medical History:    Adhesive capsulitis of right shoulder  SWATHI  Coronary artery disease involving native coronary artery of native heart without angina pectoris  Dehydration  CKD  Diabetes mellitus, type 1  Dialysis patient  Dyslipidemia  Gastroesophageal reflux disease  History of blood transfusion  Hyperlipidemia   Hypertension  Migraines  Mycotic aneurysm of kidney transplant  Pancreatic disease     Past Surgical History:    Knee arthroscopy  Bypass graft artery coronary  EGD  Eye  "surgery  CVC tunnel placement   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   Denies alcohol use  Denies drug use  Marital Status:  Single [1]    Review of Systems   Constitutional: Negative for fever.   HENT: Negative for congestion.    Respiratory: Negative for cough.    Gastrointestinal: Positive for abdominal pain, nausea and vomiting. Negative for diarrhea.   Genitourinary: Negative for difficulty urinating, dysuria, frequency and hematuria.   All other systems reviewed and are negative.        Physical Exam     Patient Vitals for the past 24 hrs:   BP Temp Temp src Pulse Heart Rate Resp SpO2 Height Weight   03/31/20 0845 (!) 174/96 -- -- -- -- -- 98 % -- --   03/31/20 0800 (!) 187/112 -- -- 89 -- -- 100 % -- --   03/31/20 0726 (!) 191/117 96.9  F (36.1  C) Temporal -- 90 20 100 % 1.727 m (5' 8\") 80 kg (176 lb 5.9 oz)       Physical Exam  Gen: alert  HEENT: PERRL, oropharynx clear  Neck: normal ROM  CV: RRR, no murmurs  Pulm: breath sounds equal, lungs clear  Abd: Soft, nontender. reducible umbilical hernia.   Back: no evidence of injury, no cva tenderness  MSK: no deformity, moves all extremities  Skin: no rash  Neuro: alert, appropriate conversation and interaction    Emergency Department Course     Laboratory:  Laboratory findings were communicated with the patient who voiced understanding of the findings.    CBC: WBC: 8.4, HGB: 9.7 (L), PLT: 363  CMP: Glucose 247 (H), BUN 31 (H), Creatinine 5.87 (H), GFR Estimate 11 (L), Albumin 3.2 (L), ALKPHOS 1596 (H) o/w WNL   Lipase: 41 (L)  Glucose by meter: 211 (H)    Interventions:  0833 Zofran 4 mg IV  0835 Compazine 10 mg IV   0839 0.9% NaCl Bolus 500mLs IV   Amlodipine 5 mg Oral   Coreg 6.25 mg Oral     Emergency Department Course:  Past medical records, nursing notes, and vitals reviewed.    IV was inserted and blood " was drawn for laboratory testing, results above.    0747: I performed an exam of the patient as documented above.   0906: Patient rechecked and updated.      Findings and plan explained to the Patient. Patient discharged home with instructions regarding supportive care, medications, and reasons to return. The importance of close follow-up was reviewed. The patient was prescribed Zofran and blood glucose test strips.      I personally reviewed the laboratory results with the patient and answered all related questions prior to discharge.      Impression & Plan     Medical Decision Making:  The patient presents for nausea and vomiting. Patient's complex past medical history was reviewed. Of note, the patient is due for dialysis today. Potassium within normal limits. No evidence of overt volume overload. BUN not severely elevated today. Patient reports recurrence of his nausea secondary to chronic gastroparesis. Improved after medications and fluids here. Patient noted to be significantly hypertensive upon arrival. He is out of his blood pressure medications. No chest pain, shortness of breath. Blood pressures improved without intervention. Home blood pressure medications given here. Patient was instructed to pick these up at the pharmacy. Refill of diabetic test strips and Zofran given. Abdominal exam is benign without evidence of acute infectious or obstructive process. Discharged home and patient is to dialyze later today.     Discharge Diagnosis:    ICD-10-CM    1. Nausea and vomiting, intractability of vomiting not specified, unspecified vomiting type  R11.2 CBC with platelets differential     Comprehensive metabolic panel   2. Hypertension, unspecified type  I10      Disposition:  Discharged home.       Discharge Medications:  New Prescriptions    BLOOD GLUCOSE (NO BRAND SPECIFIED) TEST STRIP    Use to test blood sugar 5 times daily or as directed.    ONDANSETRON (ZOFRAN ODT) 4 MG ODT TAB    Take 1 tablet (4 mg)  by mouth every 8 hours as needed for nausea or vomiting     Scribe Disclosure:  I, Elida Lopez, am serving as a scribe at 7:30 AM on 3/31/2020 to document services personally performed by Brigette Oseguera MD based on my observations and the provider's statements to me.     3/31/2020   [facility] EMERGENCY DEPARTMENT       Brigette Oseguera MD  03/31/20 1012

## 2020-03-31 NOTE — ED TRIAGE NOTES
"Pt has nausea, vomiting and abdominal pain for past 24 hours.  No blood in vomit.  Pt reports hx of similar episodes and \"gastroparesis acting up\".  "

## 2020-03-31 NOTE — ED AVS SNAPSHOT
[facility] Emergency Department  [address]  [facility] [address]  Phone:  [phone]  Fax:  [phone]                                    Murtaza Fitzgerald   MRN: [id number]    Department:  [facility] Emergency Department   Date of Visit:  3/31/2020           After Visit Summary Signature Page    I have received my discharge instructions, and my questions have been answered. I have discussed any challenges I see with this plan with the nurse or doctor.    ..........................................................................................................................................  Patient/Patient Representative Signature      ..........................................................................................................................................  Patient Representative Print Name and Relationship to Patient    ..................................................               ................................................  Date                                   Time    ..........................................................................................................................................  Reviewed by Signature/Title    ...................................................              ..............................................  Date                                               Time          22EPIC Rev 08/18

## 2020-04-01 ENCOUNTER — PATIENT OUTREACH (OUTPATIENT)
Dept: CARE COORDINATION | Facility: CLINIC | Age: 45
End: 2020-04-01

## 2020-04-01 NOTE — PROGRESS NOTES
Programs Applying For: [facility]   County:  Case #:  [facility] Worker:   Cesar #:   PMI #: [id number]  Tracking: For renewal  Date Applied:     Outreach:  4/16/2020: FRW received a vm from patient stating he mailed his application in yesterday. FRW will make outreach next week to see if he contacted [facility].   4/15/2020: FRW called patient and left a vm with call back information. FRW will make outreach in 1 week to see if he received the jocelyne care application and call [facility] to report income change.   4/1/2020: FRW called patient and completed the jocelyne care application with him. FRW will mail to patient to sign and date and patient will mail to billing department with verifications. Patient's unemployment benefits ran out in Feb and FRW advised patient to call [facility] to report the income change as he may qualify for MA now. FRW will make outreach in 2 weeks to see if patient was able to send in application and call [facility].       Health Insurance Information:     Blue Plus [facility]    Major Programs  This subscriber is eligible for BB: [place] [facility].  Elig Type M5: [facility]FP GROUP V  Eligibility Begin Date: 03/01/2020  Eligibility End Date: --/--/----  This subscriber is eligible for the following service types: Medical Care ,  Chiropractic ,  Dental Care ,  Hospital ,  Hospital - Inpatient ,  Hospital - Outpatient ,  Emergency Services ,  Pharmacy ,  Professional (Physician) Visit - Office ,  Vision (Optometry) ,  Mental Health ,  Urgent Care  Prepaid Health Plan  This subscriber receives BB01 - [place] [facility] delivered through [employer]. The phone numbers is [phone] ().      Referral:     Program Interested In:  Assistance with jocelyne care and [place] medical bills.       Any other information for FRW?   Patient currently has about $23,000 in medical debt

## 2020-04-01 NOTE — PROGRESS NOTES
Clinic Care Coordination Contact    Follow Up Progress Note BRAD HIGH.  8 ED/IP visits so far this year.    Chart review:  Patient was seen at [facility] ED on 3/31 with complaints of nausea and vomiting likely secondary to gastroparesis.    Assessment: Spoke with patient over phone.  Patient endorses feeling better, N/V has subsided.  He is able to keep food and water down.  ED provider refilled Zofran and BP medications.  Patient has not yet picked up from pharmacy.  Plans to do that this morning.  He did completed HD run yesterday as well.    Discussed goal.  Patient has not heard back from SSDI yet.  He explains that he applied for this on his own, did not use an .  He is concerned about the medical bills he is receiving and not able to pay.  Currently has about $23, 000 in medical bills.  We discussed Jocelyne care.  He is interested in applying.  Will refer patient to FRW to see if she can assist with applying for jocelyne care.  Patient agreed to referral.    Goals addressed this encounter:   Goals Addressed                 This Visit's Progress      finances   On track     Goal Statement: Patient with new HD.  He is not able to drive and therefore cannot work.  Patient will apply for and get approved for SSDI.  Patient was without insurance for a few months.  He will have active insurance as of 1 March 20, however he is concerned about hospital bills he accumulated between 1 jan and 1 march.  Date Goal set: 2/24/20  Barriers: patient is attempting to do SSDI application on own.  He is not familiar with services that offer assistance for this.  Strengths: patient well supported by family.  Receptive to help/feedback.  Date to Achieve By: 1 June 2020  Patient expressed understanding of goal: yes  Action steps to achieve this goal:   1. RN CC will provide information on  to assist with SSDI process.  2. I will follow up with providers as recommended.  3. I will reach out to RN CC for  assistance with medical bills as they come in.    As of today's date 4/1/2020 goal is met at 26 - 50%.   Goal Status:  Ongoing  Patient has not heard back from SSDI yet.  Patient filed on his own. Did not use . Patient has many hospital bills.  Interested  in applying for jocelyne care.  Will refer to FRW.  As of today's date 3/23/2020 goal is met at 26 - 50%.   Goal Status:  Showing progress  Patient has applied for SSDI. He has sent in all needed documentation, just waiting to hear back from them now.                 Intervention/Education provided during outreach: Delegated to CHW to refer patient to FRW.   Patient to work with FRW to apply for jocelyne care for extensive medical bills.     Outreach Frequency: 6 weeks    Plan:   RN CC will continue to follow.      Marcie Pastrana RN  Care Coordination  Phone:  [phone]  Email: [email or web address]  [facility] [place] [place] and [facility]

## 2020-04-01 NOTE — PROGRESS NOTES
CHW delegation from ABDULAZIZ Moya RN on 04/01/20:  Can you please refer patient to FRW.  Needs assist with bills from .  Interested in jocelyne care.    04/01/20:  FRW referral has been made.  Delegation completed  ______________________  Next Outreach:  05/01/20  Planned Outreach Frequency: Monthly  Preferred Phone Number: [phone]    Enrollment Date:  02/24/20  Last Care Plan Assessment:  02/24/20

## 2020-04-07 ENCOUNTER — TELEPHONE (OUTPATIENT)
Dept: TRANSPLANT | Facility: CLINIC | Age: 45
End: 2020-04-07

## 2020-04-08 ENCOUNTER — TELEPHONE (OUTPATIENT)
Dept: TRANSPLANT | Facility: CLINIC | Age: 45
End: 2020-04-08

## 2020-04-08 DIAGNOSIS — I43 TYPE 1 DIABETES MELLITUS WITH DIABETIC CARDIOMYOPATHY (H): Primary | ICD-10-CM

## 2020-04-08 DIAGNOSIS — E10.59 TYPE 1 DIABETES MELLITUS WITH DIABETIC CARDIOMYOPATHY (H): Primary | ICD-10-CM

## 2020-04-08 NOTE — TELEPHONE ENCOUNTER
In light of the COVID 19 pandemic, [facility] cares about your safety and we are calling to convert your scheduled in-person clinic visit to a phone visit on 4/9 date.  The appointment will be on the same date and time.  What is the best number for the provider to call you at?  *[phone]

## 2020-04-08 NOTE — TELEPHONE ENCOUNTER
Pending Prescriptions:                       Disp   Refills    insulin aspart (NOVOLOG FLEXPEN) 100 UNIT*                    Sig: Inject Subcutaneous 3 times daily (with meals) .            (1 unit per 6 grams of carbs)    insulin degludec (TRESIBA) 100 UNIT/ML pen                    Sig: Inject 24 Units Subcutaneous At Bedtime    blood glucose (NO BRAND SPECIFIED) test s*1 Box  0            Sig: Use to test blood sugar 5 times daily or as           directed.  PT USES CONTOUR TEST STRIPS    Pt will be out of supplies in 2 days; pt requested over 1.5 week ago, initially, he stated.  Please advise.  Please call pt at [phone] when rx's have been completed and/or with questions.     Thank you.  roni

## 2020-04-09 ENCOUNTER — VIRTUAL VISIT (OUTPATIENT)
Dept: NEPHROLOGY | Facility: CLINIC | Age: 45
End: 2020-04-09
Attending: INTERNAL MEDICINE
Payer: COMMERCIAL

## 2020-04-09 DIAGNOSIS — K31.84 GASTROPARESIS: ICD-10-CM

## 2020-04-09 DIAGNOSIS — Z48.298 AFTERCARE FOLLOWING ORGAN TRANSPLANT: ICD-10-CM

## 2020-04-09 DIAGNOSIS — R11.2 NAUSEA AND VOMITING, INTRACTABILITY OF VOMITING NOT SPECIFIED, UNSPECIFIED VOMITING TYPE: ICD-10-CM

## 2020-04-09 DIAGNOSIS — D84.9 IMMUNOSUPPRESSED STATUS (H): ICD-10-CM

## 2020-04-09 DIAGNOSIS — Z94.0 KIDNEY REPLACED BY TRANSPLANT: Primary | ICD-10-CM

## 2020-04-09 DIAGNOSIS — Z94.83 PANCREAS REPLACED BY TRANSPLANT (H): ICD-10-CM

## 2020-04-09 RX ORDER — METOCLOPRAMIDE 5 MG/1
5 TABLET ORAL
Qty: 120 TABLET | Refills: 11 | Status: SHIPPED | OUTPATIENT
Start: 2020-04-09 | End: 2021-01-22

## 2020-04-09 NOTE — PROGRESS NOTES
"TRANSPLANT NEPHROLOGY TELEMEDICINE VISIT    Murtaza Fitzgerald is a 44 year old male who is being evaluated via a billable telemedicine (video/telephone) visit on April 9, 2020.     The patient has been notified of following:     \"This telemedicine (video/telephone) visit will be conducted via a video/phone call between you and your physician. We have found that certain health care needs can be provided without the need for a physical exam.  This service lets us provide the care you need with a short video/phone conversation.  If a prescription is necessary, we can send it directly to your pharmacy.  If lab work is needed, we can place an order for that and you can then stop by our lab to have the test done at a later time.    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 Living donor re-transplant will call in.    # Pancreas Tx (ANNMARIE):   - Failed    # Immunosuppression: Tacrolimus immediate release (goal 4-6) and Mycophenolic acid (goal not followed)   - Changes: No     The patient will continue on his current immunosuppression as he continues to have significant urine output and he has a potential living donor that is going to come forward on his behalf for kidney retransplant.  If the patient's living donor does not get approved and his urine output drops to less than 500 mL in a 24-hour period I would recommend tapering his immune drugs at that time to get him off of both the tacrolimus and MPA  Given his significant nausea and vomiting I would start with tapering the Myfortic to 180 mg p.o. twice daily for 1 month then 180 mg daily for 1 month then off.  After the Myfortic is discontinued then he could have his tacrolimus decreased by 50% for the next month and then " discontinue it a month after.    # Infection Prophylaxis:   - PJP: None    # Hypertension: Controlled;  Goal BP: < 140/90 pre hd and < 130/80 post dialysis.    # Diabetes: Controlled (HbA1c <7%) Last HbA1c: 7%   - Management as per Endocrinology.    # Anemia in Chronic Renal Disease: Hgb: Stable      WILL: Yes    #Gastroparesis: follow up with Dr Guzman for gastric pacer discussion. I refilled his reglan.      # Skin Cancer Risk:    - Discussed sun protection and recommend regular follow up with Dermatology.    # Medical Compliance: Yes    # COVID-19 Virus Review: Discussed COVID-19 virus and the potential medical risks.  Reviewed preventative health recommendations, which includes washing hands for 20 seconds, avoid touching your face, and social distancing.  Asked patient to inform the transplant center if they are exposed or diagnosed with this virus.    # Transplant History:  Etiology of Kidney Failure: Diabetes mellitus type 1  Tx: LDKT  Significant changes in immunosuppression: None  Significant transplant-related complications: None    Transplant Office Phone Number: [phone]    Assessment and plan was discussed with the patient and he voiced his understanding and agreement.    Return Visit: 12 months    Murtaza Fitzgerald has a clinical telephone visit for kidney transplant, pancreas transplant and immunosuppression management.    History of Present Illness      The patient is a 44-year-old male with history of end-stage kidney disease due to diabetes who is status post kidney transplant and pancreas after kidney transplant both of which have now failed.  The patient has been restarted on hemodialysis and is tolerating this reasonably well.    He continues to have problems with his gastroparesis symptoms and is planning on having a evaluation with Dr. Alan balderrama regarding a gastric pacemaker placement.  He also needs a refill of his Reglan which I have done today.    Besides his nausea and  vomiting he otherwise feels in his usual state of health.  He denies any chest pain or breathing difficulties.  When he does have large fluid gain he does have some orthopnea.  He has recently started furosemide on nondialysis days.    From an immunosuppression standpoint the patient continues on tacrolimus and Myfortic due to the fact that he may have a living donor and continues to have significant urine output.    He denies any fever shakes or chills.  He is moving his bowels appropriately.    Recent Hospitalizations:  [] No [x] Yes    New Medical Issues: [x] No [] Yes    Decreased energy: [x] No [] Yes    Chest pain or SOB with exertion:  [x] No [] Yes    Appetite change or weight change: [x] No [] Yes    Nausea, vomiting or diarrhea:  [] No [x] Yes    Fever, sweats or chills: [x] No [] Yes    Leg swelling: [x] No [] Yes      Home BP: at goal    Review of Systems   A comprehensive review of systems was obtained and negative, except as noted in the HPI or PMH.    I have reviewed and updated the patient's Past Medical History, Social History, and Medication List.    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)     Coronary artery disease involving native coronary artery of native heart without angina pectoris     Adhesive capsulitis of right shoulder     Dehydration     SWATHI (acute kidney injury) (H)     Intractable nausea and vomiting     Vomiting     Allergies  Benadryl [diphenhydramine] and Reglan    Medications  Current Outpatient Medications   Medication Sig      acetaminophen (TYLENOL) 325 MG tablet Take 2 tablets (650 mg) by mouth every 4 hours as needed for mild pain (Patient not taking: Reported on 2/26/2020)     acetone, Urine, test STRP 1 strip by In Vitro route daily (Patient not taking: Reported on 2/26/2020)     amLODIPine 5 MG PO tablet Take 1 tablet (5 mg) by mouth daily (Patient not taking: Reported on 2/26/2020)     atorvastatin (LIPITOR) 40 MG tablet Take 40 mg by mouth daily     blood glucose (NO BRAND SPECIFIED) test strip Use to test blood sugar 5 times daily or as directed.     carvedilol 6.25 MG PO tablet Take 1 tablet (6.25 mg) by mouth 2 times daily (with meals) (Patient not taking: Reported on 2/26/2020)     insulin aspart (NOVOLOG FLEXPEN) 100 UNIT/ML pen Inject Subcutaneous 3 times daily (with meals) .  (1 unit per 6 grams of carbs)     insulin aspart (NOVOLOG FLEXPEN) 100 UNIT/ML pen Sliding scale = 1 unit for every 40 over blood glucose of 140     insulin degludec (TRESIBA) 100 UNIT/ML pen Inject 24 Units Subcutaneous At Bedtime     insulin pen needle (BD ARPITA U/F) 32G X 4 MM Use 4 daily or as directed.     mycophenolic acid (GENERIC EQUIVALENT) 180 MG EC tablet Take 360 mg by mouth 2 times daily     omeprazole (PRILOSEC) 40 MG DR capsule Take 40 mg by mouth every evening     prochlorperazine (COMPAZINE) 5 MG tablet Take 1 tablet (5 mg) by mouth every 8 hours as needed for nausea     prochlorperazine (COMPAZINE) 5 MG tablet Take 1-2 tablets (5-10 mg) by mouth every 6 hours as needed for nausea or vomiting (Patient not taking: Reported on 2/26/2020)     tacrolimus (GENERIC EQUIVALENT) 1 MG capsule Take 1 mg by mouth 2 times daily      No current facility-administered medications for this visit.      Phone call contact time:  Call Started at 1310  Call Ended at 1326    Jack Chen MD

## 2020-04-14 NOTE — TELEPHONE ENCOUNTER
Needs to get today, has been waiting since April 8th,.    Rebekah Santoyo, [facility]  Bob/Janelle

## 2020-04-15 ENCOUNTER — TELEPHONE (OUTPATIENT)
Dept: ENDOCRINOLOGY | Facility: CLINIC | Age: 45
End: 2020-04-15

## 2020-04-15 DIAGNOSIS — E10.59 TYPE 1 DIABETES MELLITUS WITH DIABETIC CARDIOMYOPATHY (H): ICD-10-CM

## 2020-04-15 DIAGNOSIS — I43 TYPE 1 DIABETES MELLITUS WITH DIABETIC CARDIOMYOPATHY (H): ICD-10-CM

## 2020-04-15 RX ORDER — INSULIN ASPART 100 [IU]/ML
INJECTION, SOLUTION INTRAVENOUS; SUBCUTANEOUS
Qty: 15 ML | Refills: 1 | Status: SHIPPED | OUTPATIENT
Start: 2020-04-15 | End: 2020-04-15

## 2020-04-15 RX ORDER — INSULIN ASPART 100 [IU]/ML
INJECTION, SOLUTION INTRAVENOUS; SUBCUTANEOUS
Qty: 15 ML | Refills: 3 | Status: ON HOLD | OUTPATIENT
Start: 2020-04-15 | End: 2020-12-17

## 2020-04-15 NOTE — TELEPHONE ENCOUNTER
Called patient and informed him that the prescription was sent to the pharmacy.  Writer apologized for the delay.  Patient verbalized understanding.

## 2020-04-15 NOTE — TELEPHONE ENCOUNTER
Rx sent.    For Novolog pen and Lantus.  Off note- per Epic- Lantus is not covered. If it is not covered then can be changed to Basaglar- same dose.  Tresiba discontinued from chart.    Please inform patient.

## 2020-04-15 NOTE — TELEPHONE ENCOUNTER
Called patient to get daily doses and he states that novolog is 1 unit for 6 carb averaging about 45 units a day.   Tresiba is 24 units. He states that if insurance does not cover tresiba e is ok to switch to lantus. ((he had insurance  Change)   Patient also needs the pens because with new insurance he is having difficulties with pump

## 2020-04-15 NOTE — TELEPHONE ENCOUNTER
Endo staff- can you please take a look into this?    Last visit 12/2019.    He is on insulin pump and dexcom.    Does he need insulin a[art vials or pend?  tresiba- is he using it in case of pump failure?    Based on 12/2019 note- tresiba is not on list.    Please check with pt and pend the medications accordingly.      Thank you.

## 2020-04-15 NOTE — TELEPHONE ENCOUNTER
On the new prescriptions sent in today we have a few questions:    Novolog needs to have an estimated daily dose added to the sig for billing purpose    Tresiba is not covered by insurance.  Would you like to change medication or try for a PA?    Thanks!  Kira Arredondo, Pharmacy [facility]  [phone]

## 2020-04-17 ENCOUNTER — TELEPHONE (OUTPATIENT)
Dept: TRANSPLANT | Facility: CLINIC | Age: 45
End: 2020-04-17

## 2020-04-17 DIAGNOSIS — N18.6 ESRD ON DIALYSIS (H): Primary | ICD-10-CM

## 2020-04-17 DIAGNOSIS — Z99.2 ESRD ON DIALYSIS (H): Primary | ICD-10-CM

## 2020-04-17 DIAGNOSIS — Z45.2 ENCOUNTER FOR ADJUSTMENT AND MANAGEMENT OF VASCULAR ACCESS DEVICE: ICD-10-CM

## 2020-04-17 NOTE — TELEPHONE ENCOUNTER
I see. Thanks for the update.       You  Jyoti Diaz, RN 2 months ago       Conner,   He started dialysis with a CVC tunneled line in house today. We'll have to wait until he discharge to outpatient dialysis and then set vascular access consult.   Hope this helps   Thanks   Jyoti Capone, JOSE  You 2 months ago       Dr. Gustavo Motley want creation of AVF for dialysis.   Can you set up?   Thanks.   Jyoti Lopez RN 2 months ago           Notes recorded by Jack Chen MD on 2/12/2020 at 8:28 AM CST  Conner: Tyrell continues to have an elevated creatinine. This may be progression.  Can we make sure he can get in for access creation of AVF for dialysis?  He will need to continue with twice weekly labs.      PLAN:  Will send message to [place].  Make Murtaza Fitzgerald aware of plan.     OUTCOME:  Left message re: plan.        Writer was unable to contact patient D/T frequent hospitalization or ED visits until today.  Writer called Anca Luis dialysis RN regarding fistula consult appt to be scheduled. Dialysis RN agreed with fistula surgical consult should be set up. Writer recommended dialysis RN to discuss with Dr. Seo (dialysis nephrologist) and patient for AV fistula plan. Writer provided contact phone number and instructed dialysis RN to call writer with any questions or concerns. Dialysis RN verbalized acceptance and understanding of plan.  Received a returned call from Anca Luis dialysis RN, who reports that  does not agree with fistula consult rather PD cath placement consult instead after discussion. She also discussed with the patient he prefers PD as well. She recommended writer to discuss with the patient directly.  Writer called the patient who does not want to have a fistula in his arm and he is not aware of having discussion with his Tx team as well. He agrees with  for PD cath consult in clinic with Dr. Guzman.  Writer will send task to [facility]   and call him for appt. Patient verbalized acceptance and understanding of plan.

## 2020-04-22 ENCOUNTER — PATIENT OUTREACH (OUTPATIENT)
Dept: CARE COORDINATION | Facility: CLINIC | Age: 45
End: 2020-04-22

## 2020-04-22 NOTE — PROGRESS NOTES
Programs Applying For: J[place]elyne Care   County:  Case #:  [facility] Worker:   Cesar #:   PMI #: [id number]  Tracking: For renewal  Date Applied:     Outreach:  4/22/2020: FRW [place] and [place]. FRW called patient and left a vm with call back information. FRW will make outreach in 1 week.   4/16/2020: FRW received a vm from patient stating he mailed his application in yesterday. FRW will make outreach next week to see if he contacted [place].   4/15/2020: FRW called patient and left a vm with call back information. FRW will make outreach in 1 week to see if he received the jocelyne care application and call [employer] to report income change.   4/1/2020: FRW called patient and completed the jocelyne care application with him. FRW will mail to patient to sign and date and patient will mail to billing department with verifications. Patient's unemployment benefits ran out in Feb and FRW advised patient to call [place] to report the income change as he may qualify for MA now. FRW will make outreach in 2 weeks to see if patient was able to send in application and call [employer].       Health Insurance Information:     Blue Plus [facility]    Major Programs  This subscriber is eligible for BB: [place] [facility].  [place] Type M5: [employer]  Eligibility Begin Date: 03/01/2020  Eligibility End Date: --/--/----  This subscriber is eligible for the following service types: Medical Care ,  Chiropractic ,  Dental Care ,  Hospital ,  Hospital - Inpatient ,  Hospital - Outpatient ,  Emergency Services ,  Pharmacy ,  Professional (Physician) Visit - Office ,  Vision (Optometry) ,  Mental Health ,  Urgent Care  Prepaid Health Plan  This subscriber receives BB01 - [place] delivered through [employer]. The phone numbers is [phone] ().      Referral:     Program Interested In:  Assistance with jocelyne care and [place] medical bills.       Any other information for FRW?   Patient currently has about  $23,000 in medical debt

## 2020-04-29 ENCOUNTER — PATIENT OUTREACH (OUTPATIENT)
Dept: CARE COORDINATION | Facility: CLINIC | Age: 45
End: 2020-04-29

## 2020-04-29 NOTE — PROGRESS NOTES
Programs Applying For: Jocelyne Care   County:  Case #:  [facility] Worker:   Cesar #:   PMI #: [id number]  Tracking: For renewal  Date Applied:     Outreach:  4/29/2020: FRW [place], [place]. FRW called patient and left a vm with call back information. FRW will make outreach in 1 week.     Closed Encounters:  4/22/2020: FRW [place] and [place]. FRW called patient and left a vm with call back information. FRW will make outreach in 1 week.   4/16/2020: FRW received a vm from patient stating he mailed his application in yesterday. FRW will make outreach next week to see if he contacted [employer].   4/15/2020: FRW called patient and left a vm with call back information. FRW will make outreach in 1 week to see if he received the jocelyne care application and call [employer] to report income change.   4/1/2020: FRW called patient and completed the jocelyne care application with him. FRW will mail to patient to sign and date and patient will mail to billing department with verifications. Patient's unemployment benefits ran out in Feb and FRW advised patient to call [employer] to report the income change as he may qualify for MA now. FRW will make outreach in 2 weeks to see if patient was able to send in application and call [employer].       Health Insurance Information:     Blue Plus [facility]    Major Programs  This subscriber is eligible for BB: [place] [facility].  [place] Type M5: [employer]  Eligibility Begin Date: 03/01/2020  Eligibility End Date: --/--/----  This subscriber is eligible for the following service types: Medical Care ,  Chiropractic ,  Dental Care ,  Hospital ,  Hospital - Inpatient ,  Hospital - Outpatient ,  Emergency Services ,  Pharmacy ,  Professional (Physician) Visit - Office ,  Vision (Optometry) ,  Mental Health ,  Urgent Care  Prepaid Health Plan  This subscriber receives BB01 - [place] delivered through [employer]. The phone numbers is  [phone] ([phone]).      Referral:     Program Interested In:  Assistance with [facility] and [place] medical bills.       Any other information for FRW?   Patient currently has about $23,000 in medical debt

## 2020-05-04 ENCOUNTER — REFERRAL (OUTPATIENT)
Dept: TRANSPLANT | Facility: CLINIC | Age: 45
End: 2020-05-04

## 2020-05-04 ENCOUNTER — OFFICE VISIT (OUTPATIENT)
Dept: TRANSPLANT | Facility: CLINIC | Age: 45
End: 2020-05-04
Attending: SURGERY
Payer: MEDICARE

## 2020-05-04 VITALS
TEMPERATURE: 98.5 F | OXYGEN SATURATION: 99 % | WEIGHT: 188.2 LBS | HEART RATE: 105 BPM | DIASTOLIC BLOOD PRESSURE: 94 MMHG | BODY MASS INDEX: 28.62 KG/M2 | SYSTOLIC BLOOD PRESSURE: 143 MMHG

## 2020-05-04 DIAGNOSIS — T86.12 KIDNEY TRANSPLANT FAILURE: ICD-10-CM

## 2020-05-04 DIAGNOSIS — Z99.2 ESRD ON DIALYSIS (H): Primary | ICD-10-CM

## 2020-05-04 DIAGNOSIS — Z01.818 PRE-TRANSPLANT EVALUATION FOR PANCREAS TRANSPLANT: ICD-10-CM

## 2020-05-04 DIAGNOSIS — Z45.2 ADJUSTMENT AND MANAGEMENT OF VASCULAR ACCESS DEVICE: ICD-10-CM

## 2020-05-04 DIAGNOSIS — Z01.818 PRE-TRANSPLANT EVALUATION FOR KIDNEY TRANSPLANT: ICD-10-CM

## 2020-05-04 DIAGNOSIS — N18.6 ESRD ON DIALYSIS (H): Primary | ICD-10-CM

## 2020-05-04 DIAGNOSIS — N18.6 ESRD (END STAGE RENAL DISEASE) (H): Primary | ICD-10-CM

## 2020-05-04 DIAGNOSIS — Z87.891 HISTORY OF TOBACCO USE: ICD-10-CM

## 2020-05-04 DIAGNOSIS — E10.9 TYPE 1 DIABETES MELLITUS (H): ICD-10-CM

## 2020-05-04 DIAGNOSIS — E78.5 HYPERLIPIDEMIA: ICD-10-CM

## 2020-05-04 DIAGNOSIS — Z76.82 ORGAN TRANSPLANT CANDIDATE: ICD-10-CM

## 2020-05-04 DIAGNOSIS — Z45.2 ENCOUNTER FOR ADJUSTMENT AND MANAGEMENT OF VASCULAR ACCESS DEVICE: ICD-10-CM

## 2020-05-04 DIAGNOSIS — N18.6 END STAGE RENAL DISEASE (H): ICD-10-CM

## 2020-05-04 RX ORDER — TORSEMIDE 20 MG/1
TABLET ORAL
COMMUNITY
Start: 2020-04-03 | End: 2020-08-20

## 2020-05-04 NOTE — PROGRESS NOTES
Dialysis Access Service  Consult Note    Referred by Dr. Seo for surgical consult of peritoneal dialysis access.    HPI: Mr. Tata Fitzgerald is being seen today for placement of peritoneal dialysis access due to End Stage renal failure from diabetes mellitus type 1 and hypertension.  S/P kidney transplant on 11/14/2008 and pancreas transplant on 11/17/2009. Transplanted Pancreas failed in 2012 and resumes insulin dependent. Transplanted kidney function declined D/T recurrent SWATHI episodes. He resumes HD with CVC tunneled line on 2/13/2020. C/O midline abdominal incision hernia. He is right handed. Mr. Tata Fitzgerald is dialyzing at [facility] ([facility])  [address]  [place] [address].        Risk factors for complications of PD catheter access are:         Yes No  Hx of abdominal surgery  [x]   []    Comment:  Kidney and pancreas transplant     Hx of  hernia repair/hydrocele [x]         []  Comment: midline abdominal incision hernia  History of previous PD catheter []     [x]  Comment:     Respiratory problems   []     [x]  Comment:   Obesity (BMI >30)  [x]     []  Comment: 31.1  Diabetes    [x]        []  Comment: type I  Anticoagulation:   []         [x]   Agent:                                      Current immunosuppression [x]     []  Comment:  Tacrolimus       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     Noncompliance with treatment     no labs for one  year     Pancreas replaced by transplant (H) Feb 2009    rejection treated with thymo     Pancreatic disease     pancreas transplant     Tobacco abuse ongoing       Past Surgical History:   Procedure Laterality Date     ARTHROSCOPY KNEE WITH LATERAL MENISCECTOMY Left 7/10/2019    Procedure: Left knee arthroscopic partial lateral meniscectomy;  Surgeon: Alex Candelaria MD;  Location: [facility] OR     BYPASS GRAFT ARTERY CORONARY N/A 8/5/2015    Procedure: BYPASS GRAFT ARTERY CORONARY;  Surgeon: Katy Neville MD;  Location: [facility] OR     ESOPHAGOSCOPY, GASTROSCOPY, DUODENOSCOPY (EGD), COMBINED N/A 1/28/2020    Procedure: ESOPHAGOGASTRODUODENOSCOPY (EGD) biopsies w/forceps;  Surgeon: Emre Gomes MD;  Location:  GI     EYE SURGERY      vitrectomy both eyes      IR CVC TUNNEL PLACEMENT > 5 YRS OF AGE  2/13/2020     ORTHOPEDIC SURGERY  1993    tumor removed from left knee     TRANSPLANT  2009.2008    pancrease and kidney transplant       Family History   Problem Relation Age of Onset     Unknown/Adopted Father      Heart Disease Maternal Grandfather 62       Social History     Tobacco Use     Smoking status: Former Smoker     Packs/day: 0.30     Types: Vaping Device     Smokeless tobacco: Never Used     Tobacco comment: E- Cig    Substance Use Topics     Alcohol use: Not Currently     Alcohol/week: 0.0 standard drinks     Frequency: Never     Binge frequency: Never         ROS: 10 point ROS neg other than the symptoms noted above in the HPI.                 Current Outpatient Medications:      atorvastatin (LIPITOR) 40 MG tablet, Take 40 mg by mouth daily, Disp: , Rfl:      blood glucose (NO BRAND SPECIFIED) test strip, Use to test blood sugar 5 times daily or as directed., Disp: 1 Box, Rfl: 0     insulin aspart (NOVOLOG FLEXPEN) 100 UNIT/ML pen, 1 unit per 6 grams of carbs with each meal. About 45 units/day., Disp: 15 mL, Rfl: 3     insulin aspart (NOVOLOG FLEXPEN) 100 UNIT/ML pen, Sliding scale = 1 unit for every  40 over blood glucose of 140, Disp: , Rfl:      insulin glargine (LANTUS PEN) 100 UNIT/ML pen, Inject 24 Units Subcutaneous At Bedtime, Disp: 15 mL, Rfl: 1     insulin pen needle (BD ARPITA U/F) 32G X 4 MM, Use 4 daily or as directed., Disp: 120 each, Rfl: 11     metoclopramide (REGLAN) 5 MG tablet, Take 1 tablet (5 mg) by mouth 4 times daily (before meals and nightly), Disp: 120 tablet, Rfl: 11     mycophenolic acid (GENERIC EQUIVALENT) 180 MG EC tablet, Take 360 mg by mouth 2 times daily, Disp: , Rfl:      omeprazole (PRILOSEC) 40 MG DR capsule, Take 40 mg by mouth every evening, Disp: , Rfl:      prochlorperazine (COMPAZINE) 5 MG tablet, Take 1 tablet (5 mg) by mouth every 8 hours as needed for nausea, Disp: 20 tablet, Rfl: 0     tacrolimus (GENERIC EQUIVALENT) 1 MG capsule, Take 1 mg by mouth 2 times daily , Disp: 60 capsule, Rfl: 11     torsemide (DEMADEX) 20 MG tablet, Take on Sunday Monday Wednesday and friday, Disp: , Rfl:      acetaminophen (TYLENOL) 325 MG tablet, Take 2 tablets (650 mg) by mouth every 4 hours as needed for mild pain (Patient not taking: Reported on 2/26/2020), Disp: 50 tablet, Rfl: 0     acetone, Urine, test STRP, 1 strip by In Vitro route daily (Patient not taking: Reported on 2/26/2020), Disp: 50 each, Rfl: 3                  PHYSICAL EXAM:  Temp:  [98.5  F (36.9  C)] 98.5  F (36.9  C)  Pulse:  [105] 105  BP: (143)/(94) 143/94  SpO2:  [99 %] 99 %  Constitutional: Alert and oriented in no acute distress  HEENT: sclera anicteric, MMM, conjunctiva pink  Chest: HD catheter present on the right side.  CV:  NSR  : No CVA tenderness  Abdomen: old incision ascites absent. Incisional hernia  Beltline location in relation to umbilicus:  Extremities:  1+  Skin:  No rashes or jaundice  Neuro: normal gait  Psych: normal mood and affect    Admission on 03/31/2020, Discharged on 03/31/2020   Component Date Value Ref Range Status     Lipase 03/31/2020 41* 73 - 393 U/L Final     Glucose 03/31/2020 211*  70 - 99 mg/dL Final    Dr/RN Notified     WBC 03/31/2020 8.4  4.0 - 11.0 10e9/L Final     RBC Count 03/31/2020 3.38* 4.4 - 5.9 10e12/L Final     Hemoglobin 03/31/2020 9.7* 13.3 - 17.7 g/dL Final     Hematocrit 03/31/2020 31.3* 40.0 - 53.0 % Final     MCV 03/31/2020 93  78 - 100 fl Final     MCH 03/31/2020 28.7  26.5 - 33.0 pg Final     MCHC 03/31/2020 31.0* 31.5 - 36.5 g/dL Final     RDW 03/31/2020 13.6  10.0 - 15.0 % Final     Platelet Count 03/31/2020 363  150 - 450 10e9/L Final     Diff Method 03/31/2020 Automated Method   Final     % Neutrophils 03/31/2020 76.8  % Final     % Lymphocytes 03/31/2020 15.0  % Final     % Monocytes 03/31/2020 5.8  % Final     % Eosinophils 03/31/2020 1.3  % Final     % Basophils 03/31/2020 0.5  % Final     % Immature Granulocytes 03/31/2020 0.6  % Final     Nucleated RBCs 03/31/2020 0  0 /100 Final     Absolute Neutrophil 03/31/2020 6.5  1.6 - 8.3 10e9/L Final     Absolute Lymphocytes 03/31/2020 1.3  0.8 - 5.3 10e9/L Final     Absolute Monocytes 03/31/2020 0.5  0.0 - 1.3 10e9/L Final     Absolute Eosinophils 03/31/2020 0.1  0.0 - 0.7 10e9/L Final     Absolute Basophils 03/31/2020 0.0  0.0 - 0.2 10e9/L Final     Abs Immature Granulocytes 03/31/2020 0.1  0 - 0.4 10e9/L Final     Absolute Nucleated RBC 03/31/2020 0.0   Final     Sodium 03/31/2020 135  133 - 144 mmol/L Final     Potassium 03/31/2020 4.0  3.4 - 5.3 mmol/L Final     Chloride 03/31/2020 102  94 - 109 mmol/L Final     Carbon Dioxide 03/31/2020 26  20 - 32 mmol/L Final     Anion Gap 03/31/2020 7  3 - 14 mmol/L Final     Glucose 03/31/2020 247* 70 - 99 mg/dL Final     Urea Nitrogen 03/31/2020 31* 7 - 30 mg/dL Final     Creatinine 03/31/2020 5.87* 0.66 - 1.25 mg/dL Final     GFR Estimate 03/31/2020 11* >60 mL/min/[1.73_m2] Final    Comment: Non  GFR Calc  Starting 12/18/2018, serum creatinine based estimated GFR (eGFR) will be   calculated using the Chronic Kidney Disease Epidemiology Collaboration   (CKD-EPI)  equation.       GFR Estimate If Black 03/31/2020 13* >60 mL/min/[1.73_m2] Final    Comment:  GFR Calc  Starting 12/18/2018, serum creatinine based estimated GFR (eGFR) will be   calculated using the Chronic Kidney Disease Epidemiology Collaboration   (CKD-EPI) equation.       Calcium 03/31/2020 8.8  8.5 - 10.1 mg/dL Final     Bilirubin Total 03/31/2020 0.4  0.2 - 1.3 mg/dL Final     Albumin 03/31/2020 3.2* 3.4 - 5.0 g/dL Final     Protein Total 03/31/2020 7.3  6.8 - 8.8 g/dL Final     Alkaline Phosphatase 03/31/2020 159* 40 - 150 U/L Final     ALT 03/31/2020 9  0 - 70 U/L Final     AST 03/31/2020 10  0 - 45 U/L Final            Assessment & Plan: Mr. Tata Fitzgerald is a fair candidate for peritoneal  dialysis access. I would not recommend PD D/T H/O multiple previous surgeries in the abdomen, large ventral hernia, and risk of pancreatitis of  transplanted pancreas. I would recommend dialysis vascular access for hemodialysis, may consider home HD.  We will reschedule dialysis vascular access consult (video visit) after BUE vein mapping.  I also recommend kidney transplant referral.    The surgical risks and benefits were reviewed and questions were answered. We discussed the day of surgery plan, anesthesia, postop care, risk of infection, bleeding, thrombosis, possible need for intraoperative omentectomy or newly detected hernia repair or obliteration of patent processus vaginalis (if male), drain pain or catheter dysfunction, and possible future need for surgical revision or removal. This was contrasted with morbidity and mortality risk of long-term catheter based hemodialysis access. The patient does not wish to proceed with surgery for permanent access creation at this time. The patient was counselled to contact our nurse coordinator, JORDAN Gomez (Sum), CNS at [phone] with any questions or concerns.  Thank you for the opportunity to participate in Mr. Tata Fitzgerald's care.    Adeel Rosales)  Jonny ORTEGA, CNS  Dialysis Vascular Access/SOT Clinical Nurse Specialist    Solid Organ Transplant Service - [facility]   Phone # [phone]  Pager # [phone]    I have reviewed history, examined patient and discussed plan with the fellow/resident/DENVER.  I concur with the findings in this note.       Risks of the surgical procedure including but not limited to the rare risk of mortality discussed in detail. Patient verbalized good understanding and had several pertinent questions which were answered satisfactorily.       TT: 45 min, CT: 25 min.    .

## 2020-05-04 NOTE — Clinical Note
Referral is for K but also needs another pancreas. Please make appt.with Dr. Guzman as he made the referral. Dialysis T,Th, Sat See smart set orders and also added PFTs. See smart set orders. Thanks,  Reena

## 2020-05-04 NOTE — LETTER
Murtaza Fitzgerald  [address] [address]     Dear Murtaza,    Thank you for your interest in the Transplant Center at [facility], [facility]. We look forward to being a part of your care team and assisting you through the transplant process.    As we discussed, your transplant coordinator is Reena Borja (Kidney).  You may call your coordinator at any time with questions or concerns.  Your first scheduled call will be on 5/22/2020.  If this needs to change, call [phone].    Please complete the following.    1. Fill out and return the enclosed forms    Authorization for Electronic Communication    Authorization to Discuss Protected Health Information    Authorization for Release of Protected Health Information    Authorization for Care Everywhere Release of Information    2. Sign up for:    [employer], access to your electronic medical record (see enclosed pamphlet)    [employer], a transplant education website    You can use these tools to learn more about your transplant, communicate with your care team, and track your medical details      Sincerely,      Solid Organ Transplant  [facility], [facility]    cc: Dialysis Unit PCP Care Team

## 2020-05-04 NOTE — LETTER
5/4/2020       RE: Murtaza Fitzgerald  [address] [facility] [address]     Dear Colleague,    Thank you for referring your patient, Murtaza Fitzgerald, to the [facility] SOLID ORGAN TRANSPLANT at [facility]. Please see a copy of my visit note below.    Dialysis Access Service  Consult Note    Referred by Dr. Seo for surgical consult of peritoneal dialysis access.    HPI: Mr. Tata Fitzgerald is being seen today for placement of peritoneal dialysis access due to End Stage renal failure from diabetes mellitus type 1 and hypertension.  S/P kidney transplant on 11/14/2008 and pancreas transplant on 11/17/2009. Transplanted Pancreas failed in 2012 and resumes insulin dependent. Transplanted kidney function declined D/T recurrent SWATHI episodes. He resumes HD with CVC tunneled line on 2/13/2020. C/O midline abdominal incision hernia. He is right handed. Mr. Tata Fitzgerald is dialyzing at [facility] ([facility])  [address]  [facility] [address].        Risk factors for complications of PD catheter access are:         Yes No  Hx of abdominal surgery  [x]   []    Comment:  Kidney and pancreas transplant     Hx of  hernia repair/hydrocele [x]         []  Comment: midline abdominal incision hernia  History of previous PD catheter []     [x]  Comment:     Respiratory problems   []     [x]  Comment:   Obesity (BMI >30)  [x]     []  Comment: 31.1  Diabetes    [x]        []  Comment: type I  Anticoagulation:   []         [x]   Agent:                                      Current immunosuppression [x]     []  Comment:  Tacrolimus       Past Medical History:   Diagnosis Date     CMV (cytomegalovirus infection) status negative 2011     Coronary artery disease, non-occlusive 2008    angio showed diffuse disease with no lesions     Diabetes mellitus, type 1     Diagnosed at age 11     Dialysis patient (H)     prior to kidney transplant      Dyslipidemia      Gastroesophageal reflux disease      History of blood transfusion      Hypertension      Immunosuppressed status (H)      Kidney transplant status, living related donor 2008          Migraines      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     Noncompliance with treatment     no labs for one year     Pancreas replaced by transplant (H) Feb 2009    rejection treated with thymo     Pancreatic disease     pancreas transplant     Tobacco abuse ongoing       Past Surgical History:   Procedure Laterality Date     ARTHROSCOPY KNEE WITH LATERAL MENISCECTOMY Left 7/10/2019    Procedure: Left knee arthroscopic partial lateral meniscectomy;  Surgeon: Alex Candelaria MD;  Location:  OR     BYPASS GRAFT ARTERY CORONARY N/A 8/5/2015    Procedure: BYPASS GRAFT ARTERY CORONARY;  Surgeon: Katy Neville MD;  Location: [facility] OR     ESOPHAGOSCOPY, GASTROSCOPY, DUODENOSCOPY (EGD), COMBINED N/A 1/28/2020    Procedure: ESOPHAGOGASTRODUODENOSCOPY (EGD) biopsies w/forceps;  Surgeon: Emre Gomes MD;  Location:  GI     EYE SURGERY      vitrectomy both eyes      IR CVC TUNNEL PLACEMENT > 5 YRS OF AGE  2/13/2020     ORTHOPEDIC SURGERY  1993    tumor removed from left knee     TRANSPLANT  2009.2008    pancrease and kidney transplant       Family History   Problem Relation Age of Onset     Unknown/Adopted Father      Heart Disease Maternal Grandfather 62       Social History     Tobacco Use     Smoking status: Former Smoker     Packs/day: 0.30     Types: Vaping Device     Smokeless tobacco: Never Used     Tobacco comment: E- Cig    Substance Use Topics     Alcohol use: Not Currently     Alcohol/week: 0.0 standard drinks     Frequency: Never     Binge frequency: Never         ROS: 10 point ROS neg other than the symptoms noted above in the HPI.                 Current Outpatient Medications:      atorvastatin (LIPITOR) 40 MG tablet, Take 40 mg by mouth daily, Disp: , Rfl:      blood glucose (NO BRAND  SPECIFIED) test strip, Use to test blood sugar 5 times daily or as directed., Disp: 1 Box, Rfl: 0     insulin aspart (NOVOLOG FLEXPEN) 100 UNIT/ML pen, 1 unit per 6 grams of carbs with each meal. About 45 units/day., Disp: 15 mL, Rfl: 3     insulin aspart (NOVOLOG FLEXPEN) 100 UNIT/ML pen, Sliding scale = 1 unit for every 40 over blood glucose of 140, Disp: , Rfl:      insulin glargine (LANTUS PEN) 100 UNIT/ML pen, Inject 24 Units Subcutaneous At Bedtime, Disp: 15 mL, Rfl: 1     insulin pen needle (BD ARPITA U/F) 32G X 4 MM, Use 4 daily or as directed., Disp: 120 each, Rfl: 11     metoclopramide (REGLAN) 5 MG tablet, Take 1 tablet (5 mg) by mouth 4 times daily (before meals and nightly), Disp: 120 tablet, Rfl: 11     mycophenolic acid (GENERIC EQUIVALENT) 180 MG EC tablet, Take 360 mg by mouth 2 times daily, Disp: , Rfl:      omeprazole (PRILOSEC) 40 MG DR capsule, Take 40 mg by mouth every evening, Disp: , Rfl:      prochlorperazine (COMPAZINE) 5 MG tablet, Take 1 tablet (5 mg) by mouth every 8 hours as needed for nausea, Disp: 20 tablet, Rfl: 0     tacrolimus (GENERIC EQUIVALENT) 1 MG capsule, Take 1 mg by mouth 2 times daily , Disp: 60 capsule, Rfl: 11     torsemide (DEMADEX) 20 MG tablet, Take on Sunday Monday Wednesday and friday, Disp: , Rfl:      acetaminophen (TYLENOL) 325 MG tablet, Take 2 tablets (650 mg) by mouth every 4 hours as needed for mild pain (Patient not taking: Reported on 2/26/2020), Disp: 50 tablet, Rfl: 0     acetone, Urine, test STRP, 1 strip by In Vitro route daily (Patient not taking: Reported on 2/26/2020), Disp: 50 each, Rfl: 3                  PHYSICAL EXAM:  Temp:  [98.5  F (36.9  C)] 98.5  F (36.9  C)  Pulse:  [105] 105  BP: (143)/(94) 143/94  SpO2:  [99 %] 99 %  Constitutional: Alert and oriented in no acute distress  HEENT: sclera anicteric, MMM, conjunctiva pink  Chest: HD catheter present on the right side.  CV:  NSR  : No CVA tenderness  Abdomen: old incision ascites absent.  Incisional hernia  Beltline location in relation to umbilicus:  Extremities:  1+  Skin:  No rashes or jaundice  Neuro: normal gait  Psych: normal mood and affect    Admission on 03/31/2020, Discharged on 03/31/2020   Component Date Value Ref Range Status     Lipase 03/31/2020 41* 73 - 393 U/L Final     Glucose 03/31/2020 211* 70 - 99 mg/dL Final    Dr/RN Notified     WBC 03/31/2020 8.4  4.0 - 11.0 10e9/L Final     RBC Count 03/31/2020 3.38* 4.4 - 5.9 10e12/L Final     Hemoglobin 03/31/2020 9.7* 13.3 - 17.7 g/dL Final     Hematocrit 03/31/2020 31.3* 40.0 - 53.0 % Final     MCV 03/31/2020 93  78 - 100 fl Final     MCH 03/31/2020 28.7  26.5 - 33.0 pg Final     MCHC 03/31/2020 31.0* 31.5 - 36.5 g/dL Final     RDW 03/31/2020 13.6  10.0 - 15.0 % Final     Platelet Count 03/31/2020 363  150 - 450 10e9/L Final     Diff Method 03/31/2020 Automated Method   Final     % Neutrophils 03/31/2020 76.8  % Final     % Lymphocytes 03/31/2020 15.0  % Final     % Monocytes 03/31/2020 5.8  % Final     % Eosinophils 03/31/2020 1.3  % Final     % Basophils 03/31/2020 0.5  % Final     % Immature Granulocytes 03/31/2020 0.6  % Final     Nucleated RBCs 03/31/2020 0  0 /100 Final     Absolute Neutrophil 03/31/2020 6.5  1.6 - 8.3 10e9/L Final     Absolute Lymphocytes 03/31/2020 1.3  0.8 - 5.3 10e9/L Final     Absolute Monocytes 03/31/2020 0.5  0.0 - 1.3 10e9/L Final     Absolute Eosinophils 03/31/2020 0.1  0.0 - 0.7 10e9/L Final     Absolute Basophils 03/31/2020 0.0  0.0 - 0.2 10e9/L Final     Abs Immature Granulocytes 03/31/2020 0.1  0 - 0.4 10e9/L Final     Absolute Nucleated RBC 03/31/2020 0.0   Final     Sodium 03/31/2020 135  133 - 144 mmol/L Final     Potassium 03/31/2020 4.0  3.4 - 5.3 mmol/L Final     Chloride 03/31/2020 102  94 - 109 mmol/L Final     Carbon Dioxide 03/31/2020 26  20 - 32 mmol/L Final     Anion Gap 03/31/2020 7  3 - 14 mmol/L Final     Glucose 03/31/2020 247* 70 - 99 mg/dL Final     Urea Nitrogen 03/31/2020 31* 7 - 30  mg/dL Final     Creatinine 03/31/2020 5.87* 0.66 - 1.25 mg/dL Final     GFR Estimate 03/31/2020 11* >60 mL/min/[1.73_m2] Final    Comment: Non  GFR Calc  Starting 12/18/2018, serum creatinine based estimated GFR (eGFR) will be   calculated using the Chronic Kidney Disease Epidemiology Collaboration   (CKD-EPI) equation.       GFR Estimate If Black 03/31/2020 13* >60 mL/min/[1.73_m2] Final    Comment:  GFR Calc  Starting 12/18/2018, serum creatinine based estimated GFR (eGFR) will be   calculated using the Chronic Kidney Disease Epidemiology Collaboration   (CKD-EPI) equation.       Calcium 03/31/2020 8.8  8.5 - 10.1 mg/dL Final     Bilirubin Total 03/31/2020 0.4  0.2 - 1.3 mg/dL Final     Albumin 03/31/2020 3.2* 3.4 - 5.0 g/dL Final     Protein Total 03/31/2020 7.3  6.8 - 8.8 g/dL Final     Alkaline Phosphatase 03/31/2020 159* 40 - 150 U/L Final     ALT 03/31/2020 9  0 - 70 U/L Final     AST 03/31/2020 10  0 - 45 U/L Final            Assessment & Plan: Mr. Tata Fitzgerald is a fair candidate for peritoneal  dialysis access. I would not recommend PD D/T H/O multiple previous surgeries in the abdomen, large ventral hernia, and risk of pancreatitis of  transplanted pancreas. I would recommend dialysis vascular access for hemodialysis, may consider home HD.  We will reschedule dialysis vascular access consult (video visit) after BUE vein mapping.  I also recommend kidney transplant referral.    The surgical risks and benefits were reviewed and questions were answered. We discussed the day of surgery plan, anesthesia, postop care, risk of infection, bleeding, thrombosis, possible need for intraoperative omentectomy or newly detected hernia repair or obliteration of patent processus vaginalis (if male), drain pain or catheter dysfunction, and possible future need for surgical revision or removal. This was contrasted with morbidity and mortality risk of long-term catheter based hemodialysis  access. The patient does not wish to proceed with surgery for permanent access creation at this time. The patient was counselled to contact our nurse coordinator, JORDAN Gomez CNS (Sum) at [phone] with any questions or concerns.  Thank you for the opportunity to participate in Mr. Tata Fitzgerald's care.    JIN Osorio (Sum)  Dialysis Vascular Access/SOT Clinical Nurse Specialist    Solid Organ Transplant Service - [facility]   Phone # [phone]  Pager # [phone]    I have reviewed history, examined patient and discussed plan with the fellow/resident/DENVER.  I concur with the findings in this note.       Risks of the surgical procedure including but not limited to the rare risk of mortality discussed in detail. Patient verbalized good understanding and had several pertinent questions which were answered satisfactorily.       TT: 45 min, CT: 25 min.    .

## 2020-05-06 ENCOUNTER — PATIENT OUTREACH (OUTPATIENT)
Dept: CARE COORDINATION | Facility: CLINIC | Age: 45
End: 2020-05-06

## 2020-05-06 VITALS — WEIGHT: 185 LBS | HEIGHT: 68 IN | BODY MASS INDEX: 28.04 KG/M2

## 2020-05-06 ASSESSMENT — MIFFLIN-ST. JEOR: SCORE: 1713.65

## 2020-05-06 NOTE — PROGRESS NOTES
Programs Applying For: [facility]   County:  Case #:  [facility] Worker:   Cesar #:   PMI #: [id number]  Tracking: For renewal, check coverage in August  Date Applied:     Outreach:  5/14/2020: FRW received a message stating patient was approved for MA but has not heard back on the jocelyne care yet. FRW is moving outreach out 1 week as patient is in the hosp.  5/6/2020: FRW checked and patient still has a self pay balance. FRW called patient and left a vm with call back information. FRW will make one additional outreach in 1 week.        Closed Encounters:  4/29/2020: FRW [place], [place]. FRW called patient and left a vm with call back information. FRW will make outreach in 1 week.  4/22/2020: FRW [place] and [place]. FRW called patient and left a vm with call back information. FRW will make outreach in 1 week.   4/16/2020: FRW received a vm from patient stating he mailed his application in yesterday. FRW will make outreach next week to see if he contacted [employer].   4/15/2020: FRW called patient and left a vm with call back information. FRW will make outreach in 1 week to see if he received the jocelyne care application and call [employer] to report income change.   4/1/2020: FRW called patient and completed the jocelyne care application with him. FRW will mail to patient to sign and date and patient will mail to billing department with verifications. Patient's unemployment benefits ran out in Feb and FRW advised patient to call [employer] to report the income change as he may qualify for MA now. FRW will make outreach in 2 weeks to see if patient was able to send in application and call [employer].       Health Insurance Information:     [employer]    Major Programs  This subscriber is eligible for BB: [place] [facility].  Jeimy Type M5: [employer]  Eligibility Begin Date: 03/01/2020  Eligibility End Date: --/--/----  This subscriber is eligible for the  following service types: Medical Care ,  Chiropractic ,  Dental Care ,  Hospital ,  Hospital - Inpatient ,  Hospital - Outpatient ,  Emergency Services ,  Pharmacy ,  Professional (Physician) Visit - Office ,  Vision (Optometry) ,  Mental Health ,  Urgent Care  Prepaid Health Plan  This subscriber receives BB01 - [place] delivered through [employer]. The phone numbers is [phone] ().      Referral:     Program Interested In:  Assistance with [facility] and [facility] medical bills.       Any other information for FRW?   Patient currently has about $23,000 in medical debt

## 2020-05-06 NOTE — TELEPHONE ENCOUNTER
PCP: Dr. Kt Ríos  Referring Provider: Dr. Melissa Guzman   Referring Diagnosis: ESRD/Type 1 DM    Is patient under the age of 65? Yes  Is patient diabetic? Yes  Is patient on insulin? Yes  Was patient offered a pancreas transplant referral? Yes    Is patient in a group home/assisted living? No  Does patient have a guardian? No    Referral intake process completed.  Patient is aware that after financial approval is received, medical records will be requested.   Patient confirmed for a callback from transplant coordinator on 5/222020. (within 2 weeks)  Tentative evaluation date 6/24/2020. (within 4 weeks) (asked Sania SOLANO in scheduling to add patient to calendar.)    Confirmed coordinator will discuss evaluation process in more detail at the time of their call.   Patient is aware of the need to arrange age appropriate cancer screening, vaccinations, and dental care.  Reminded patient to complete questionnaire, complete medical records release, and review packet prior to evaluation visit .  Assessed patient for special needs (ie--wheelchair, assistance, guardian, and ):  No  Patient instructed to call [phone] with questions.     Ana Oglesby, BS, LPN   Solid Organ Transplant

## 2020-05-08 ENCOUNTER — DOCUMENTATION ONLY (OUTPATIENT)
Dept: TRANSPLANT | Facility: CLINIC | Age: 45
End: 2020-05-08

## 2020-05-12 ENCOUNTER — PATIENT OUTREACH (OUTPATIENT)
Dept: CARE COORDINATION | Facility: CLINIC | Age: 45
End: 2020-05-12

## 2020-05-12 NOTE — PROGRESS NOTES
Clinic Care Coordination Contact    Follow Up Progress Note      Assessment: spoke with patient to follow up. No new concerns to be addressed. Patient states he is feeling fine.  We discussed goal, patient states he was approved for MA- has not received card yet, but has not heard about the SSDI yet nor jocelyne care.    FEDERICO Kirby is following as well and will update her.    Goals addressed this encounter:   Goals Addressed                 This Visit's Progress      finances   On track     Goal Statement: Patient with new HD.  He is not able to drive and therefore cannot work.  Patient will apply for and get approved for SSDI.  Patient was without insurance for a few months.  He will have active insurance as of 1 March 20, however he is concerned about hospital bills he accumulated between 1 jan and 1 march.  Date Goal set: 2/24/20  Barriers: patient is attempting to do SSDI application on own.  He is not familiar with services that offer assistance for this.  Strengths: patient well supported by family.  Receptive to help/feedback.  Date to Achieve By: 1 June 2020  Patient expressed understanding of goal: yes  Action steps to achieve this goal:   1. RN CC will provide information on  to assist with SSDI process.  2. I will follow up with providers as recommended.  3. I will reach out to RN CC for assistance with medical bills as they come in.    As of today's date 5/12/2020 goal is met at 51 - 75%.   Goal Status:  Showing progress - patient states he was told he was approved for MA, but has not received a card yet.  He has not heard back from SSDI yet.  He has not heard if Jocelyne care was approved or not.    As of today's date 4/1/2020 goal is met at 26 - 50%.   Goal Status:  Ongoing  Patient has not heard back from SSDI yet.  Patient filed on his own. Did not use . Patient has many hospital bills.  Interested  in applying for jocelyne care.  Will refer to FEDERICO.  As of today's date  3/23/2020 goal is met at 26 - 50%.   Goal Status:  Showing progress  Patient has applied for SSDI. He has sent in all needed documentation, just waiting to hear back from them now.                 Intervention/Education provided during outreach: Patient encouraged to call with any questions, concerns, or needs.     Outreach Frequency: 6 weeks    Plan:   FRW will continue to follow up with patient on financial goals.  RN CC will continue to follow.    Marcie Pastrana RN-BSN  Care Coordination  Phone:  [phone]  Email: [email or web address], [place] [facility]

## 2020-05-13 ENCOUNTER — HOSPITAL ENCOUNTER (INPATIENT)
Facility: CLINIC | Age: 45
LOS: 1 days | Discharge: HOME | End: 2020-05-14
Attending: EMERGENCY MEDICINE | Admitting: HOSPITALIST
Payer: COMMERCIAL

## 2020-05-13 DIAGNOSIS — N18.6 ESRD (END STAGE RENAL DISEASE) ON DIALYSIS (H): ICD-10-CM

## 2020-05-13 DIAGNOSIS — R11.2 NAUSEA VOMITING AND DIARRHEA: ICD-10-CM

## 2020-05-13 DIAGNOSIS — E10.10 DIABETIC KETOACIDOSIS WITHOUT COMA ASSOCIATED WITH TYPE 1 DIABETES MELLITUS (H): ICD-10-CM

## 2020-05-13 DIAGNOSIS — Z99.2 ESRD (END STAGE RENAL DISEASE) ON DIALYSIS (H): ICD-10-CM

## 2020-05-13 DIAGNOSIS — R19.7 NAUSEA VOMITING AND DIARRHEA: ICD-10-CM

## 2020-05-13 PROBLEM — E11.10 DKA (DIABETIC KETOACIDOSES): Status: ACTIVE | Noted: 2020-05-13

## 2020-05-13 LAB
ALBUMIN SERPL-MCNC: 4 G/DL (ref 3.4–5)
ALP SERPL-CCNC: 134 U/L (ref 40–150)
ALT SERPL W P-5'-P-CCNC: 15 U/L (ref 0–70)
ANION GAP SERPL CALCULATED.3IONS-SCNC: 10 MMOL/L (ref 3–14)
ANION GAP SERPL CALCULATED.3IONS-SCNC: 19 MMOL/L (ref 3–14)
AST SERPL W P-5'-P-CCNC: 11 U/L (ref 0–45)
BASE DEFICIT BLDV-SCNC: 7.8 MMOL/L
BASOPHILS # BLD AUTO: 0 10E9/L (ref 0–0.2)
BASOPHILS NFR BLD AUTO: 0.3 %
BILIRUB DIRECT SERPL-MCNC: 0.1 MG/DL (ref 0–0.2)
BILIRUB SERPL-MCNC: 0.7 MG/DL (ref 0.2–1.3)
BUN SERPL-MCNC: 35 MG/DL (ref 7–30)
BUN SERPL-MCNC: 41 MG/DL (ref 7–30)
CALCIUM SERPL-MCNC: 9 MG/DL (ref 8.5–10.1)
CALCIUM SERPL-MCNC: 9.1 MG/DL (ref 8.5–10.1)
CHLORIDE SERPL-SCNC: 101 MMOL/L (ref 94–109)
CHLORIDE SERPL-SCNC: 93 MMOL/L (ref 94–109)
CO2 SERPL-SCNC: 17 MMOL/L (ref 20–32)
CO2 SERPL-SCNC: 26 MMOL/L (ref 20–32)
CREAT SERPL-MCNC: 5.69 MG/DL (ref 0.66–1.25)
CREAT SERPL-MCNC: 5.98 MG/DL (ref 0.66–1.25)
DIFFERENTIAL METHOD BLD: ABNORMAL
EOSINOPHIL # BLD AUTO: 0 10E9/L (ref 0–0.7)
EOSINOPHIL NFR BLD AUTO: 0 %
ERYTHROCYTE [DISTWIDTH] IN BLOOD BY AUTOMATED COUNT: 12.9 % (ref 10–15)
GFR SERPL CREATININE-BSD FRML MDRD: 11 ML/MIN/{1.73_M2}
GFR SERPL CREATININE-BSD FRML MDRD: 11 ML/MIN/{1.73_M2}
GLUCOSE BLDC GLUCOMTR-MCNC: 114 MG/DL (ref 70–99)
GLUCOSE BLDC GLUCOMTR-MCNC: 120 MG/DL (ref 70–99)
GLUCOSE BLDC GLUCOMTR-MCNC: 121 MG/DL (ref 70–99)
GLUCOSE BLDC GLUCOMTR-MCNC: 122 MG/DL (ref 70–99)
GLUCOSE BLDC GLUCOMTR-MCNC: 128 MG/DL (ref 70–99)
GLUCOSE BLDC GLUCOMTR-MCNC: 138 MG/DL (ref 70–99)
GLUCOSE BLDC GLUCOMTR-MCNC: 140 MG/DL (ref 70–99)
GLUCOSE BLDC GLUCOMTR-MCNC: 141 MG/DL (ref 70–99)
GLUCOSE BLDC GLUCOMTR-MCNC: 146 MG/DL (ref 70–99)
GLUCOSE BLDC GLUCOMTR-MCNC: 155 MG/DL (ref 70–99)
GLUCOSE BLDC GLUCOMTR-MCNC: 181 MG/DL (ref 70–99)
GLUCOSE BLDC GLUCOMTR-MCNC: 223 MG/DL (ref 70–99)
GLUCOSE BLDC GLUCOMTR-MCNC: 301 MG/DL (ref 70–99)
GLUCOSE BLDC GLUCOMTR-MCNC: 341 MG/DL (ref 70–99)
GLUCOSE BLDC GLUCOMTR-MCNC: 398 MG/DL (ref 70–99)
GLUCOSE BLDC GLUCOMTR-MCNC: 449 MG/DL (ref 70–99)
GLUCOSE BLDC GLUCOMTR-MCNC: 455 MG/DL (ref 70–99)
GLUCOSE BLDC GLUCOMTR-MCNC: 527 MG/DL (ref 70–99)
GLUCOSE BLDC GLUCOMTR-MCNC: 547 MG/DL (ref 70–99)
GLUCOSE BLDC GLUCOMTR-MCNC: 555 MG/DL (ref 70–99)
GLUCOSE BLDC GLUCOMTR-MCNC: 584 MG/DL (ref 70–99)
GLUCOSE SERPL-MCNC: 220 MG/DL (ref 70–99)
GLUCOSE SERPL-MCNC: 596 MG/DL (ref 70–99)
HBA1C MFR BLD: 6.9 % (ref 0–5.6)
HCO3 BLDV-SCNC: 18 MMOL/L (ref 21–28)
HCT VFR BLD AUTO: 37.8 % (ref 40–53)
HGB BLD-MCNC: 11.9 G/DL (ref 13.3–17.7)
IMM GRANULOCYTES # BLD: 0.1 10E9/L (ref 0–0.4)
IMM GRANULOCYTES NFR BLD: 0.6 %
INTERPRETATION ECG - MUSE: NORMAL
KETONES BLD-SCNC: 6.1 MMOL/L (ref 0–0.6)
LIPASE SERPL-CCNC: 37 U/L (ref 73–393)
LYMPHOCYTES # BLD AUTO: 0.5 10E9/L (ref 0.8–5.3)
LYMPHOCYTES NFR BLD AUTO: 5.8 %
MAGNESIUM SERPL-MCNC: 2.3 MG/DL (ref 1.6–2.3)
MCH RBC QN AUTO: 29.1 PG (ref 26.5–33)
MCHC RBC AUTO-ENTMCNC: 31.5 G/DL (ref 31.5–36.5)
MCV RBC AUTO: 92 FL (ref 78–100)
MONOCYTES # BLD AUTO: 0.1 10E9/L (ref 0–1.3)
MONOCYTES NFR BLD AUTO: 1.1 %
NEUTROPHILS # BLD AUTO: 8.2 10E9/L (ref 1.6–8.3)
NEUTROPHILS NFR BLD AUTO: 92.2 %
NRBC # BLD AUTO: 0 10*3/UL
NRBC BLD AUTO-RTO: 0 /100
O2/TOTAL GAS SETTING VFR VENT: ABNORMAL %
OXYHGB MFR BLDV: 80 %
PCO2 BLDV: 39 MM HG (ref 40–50)
PH BLDV: 7.28 PH (ref 7.32–7.43)
PHOSPHATE SERPL-MCNC: 6.7 MG/DL (ref 2.5–4.5)
PLATELET # BLD AUTO: 224 10E9/L (ref 150–450)
PLATELET # BLD AUTO: 261 10E9/L (ref 150–450)
PO2 BLDV: 49 MM HG (ref 25–47)
POTASSIUM SERPL-SCNC: 3.7 MMOL/L (ref 3.4–5.3)
POTASSIUM SERPL-SCNC: 4.3 MMOL/L (ref 3.4–5.3)
PROT SERPL-MCNC: 8.3 G/DL (ref 6.8–8.8)
RBC # BLD AUTO: 4.09 10E12/L (ref 4.4–5.9)
SARS-COV-2 PCR COMMENT: NORMAL
SARS-COV-2 RNA SPEC QL NAA+PROBE: NEGATIVE
SARS-COV-2 RNA SPEC QL NAA+PROBE: NORMAL
SODIUM SERPL-SCNC: 129 MMOL/L (ref 133–144)
SODIUM SERPL-SCNC: 137 MMOL/L (ref 133–144)
SPECIMEN SOURCE: NORMAL
SPECIMEN SOURCE: NORMAL
WBC # BLD AUTO: 8.8 10E9/L (ref 4–11)

## 2020-05-13 PROCEDURE — 25800030 ZZH RX IP 258 OP 636: Performed by: EMERGENCY MEDICINE

## 2020-05-13 PROCEDURE — 83735 ASSAY OF MAGNESIUM: CPT | Performed by: EMERGENCY MEDICINE

## 2020-05-13 PROCEDURE — 80048 BASIC METABOLIC PNL TOTAL CA: CPT | Performed by: HOSPITALIST

## 2020-05-13 PROCEDURE — 25000132 ZZH RX MED GY IP 250 OP 250 PS 637: Performed by: PHYSICIAN ASSISTANT

## 2020-05-13 PROCEDURE — 96366 THER/PROPH/DIAG IV INF ADDON: CPT

## 2020-05-13 PROCEDURE — 96361 HYDRATE IV INFUSION ADD-ON: CPT

## 2020-05-13 PROCEDURE — 25000125 ZZHC RX 250: Performed by: EMERGENCY MEDICINE

## 2020-05-13 PROCEDURE — 25000128 H RX IP 250 OP 636: Performed by: EMERGENCY MEDICINE

## 2020-05-13 PROCEDURE — 20000003 ZZH R&B ICU

## 2020-05-13 PROCEDURE — 80048 BASIC METABOLIC PNL TOTAL CA: CPT | Performed by: EMERGENCY MEDICINE

## 2020-05-13 PROCEDURE — 80076 HEPATIC FUNCTION PANEL: CPT | Performed by: EMERGENCY MEDICINE

## 2020-05-13 PROCEDURE — 36415 COLL VENOUS BLD VENIPUNCTURE: CPT | Performed by: PHYSICIAN ASSISTANT

## 2020-05-13 PROCEDURE — 82805 BLOOD GASES W/O2 SATURATION: CPT | Performed by: EMERGENCY MEDICINE

## 2020-05-13 PROCEDURE — 83690 ASSAY OF LIPASE: CPT | Performed by: EMERGENCY MEDICINE

## 2020-05-13 PROCEDURE — 87635 SARS-COV-2 COVID-19 AMP PRB: CPT | Performed by: EMERGENCY MEDICINE

## 2020-05-13 PROCEDURE — 25000131 ZZH RX MED GY IP 250 OP 636 PS 637: Performed by: PHYSICIAN ASSISTANT

## 2020-05-13 PROCEDURE — 85049 AUTOMATED PLATELET COUNT: CPT | Performed by: PHYSICIAN ASSISTANT

## 2020-05-13 PROCEDURE — 99285 EMERGENCY DEPT VISIT HI MDM: CPT | Mod: 25

## 2020-05-13 PROCEDURE — 00000146 ZZHCL STATISTIC GLUCOSE BY METER IP

## 2020-05-13 PROCEDURE — 83036 HEMOGLOBIN GLYCOSYLATED A1C: CPT | Performed by: EMERGENCY MEDICINE

## 2020-05-13 PROCEDURE — C9113 INJ PANTOPRAZOLE SODIUM, VIA: HCPCS | Performed by: PHYSICIAN ASSISTANT

## 2020-05-13 PROCEDURE — 25000125 ZZHC RX 250: Performed by: PHYSICIAN ASSISTANT

## 2020-05-13 PROCEDURE — 5A1D70Z PERFORMANCE OF URINARY FILTRATION, INTERMITTENT, LESS THAN 6 HOURS PER DAY: ICD-10-PCS | Performed by: INTERNAL MEDICINE

## 2020-05-13 PROCEDURE — 25000128 H RX IP 250 OP 636: Performed by: PHYSICIAN ASSISTANT

## 2020-05-13 PROCEDURE — 36415 COLL VENOUS BLD VENIPUNCTURE: CPT | Performed by: HOSPITALIST

## 2020-05-13 PROCEDURE — 99223 1ST HOSP IP/OBS HIGH 75: CPT | Mod: AI | Performed by: PHYSICIAN ASSISTANT

## 2020-05-13 PROCEDURE — 84100 ASSAY OF PHOSPHORUS: CPT | Performed by: EMERGENCY MEDICINE

## 2020-05-13 PROCEDURE — 96365 THER/PROPH/DIAG IV INF INIT: CPT

## 2020-05-13 PROCEDURE — 85025 COMPLETE CBC W/AUTO DIFF WBC: CPT | Performed by: EMERGENCY MEDICINE

## 2020-05-13 PROCEDURE — 82010 KETONE BODYS QUAN: CPT | Performed by: EMERGENCY MEDICINE

## 2020-05-13 PROCEDURE — 96374 THER/PROPH/DIAG INJ IV PUSH: CPT | Mod: 59

## 2020-05-13 PROCEDURE — 93005 ELECTROCARDIOGRAM TRACING: CPT

## 2020-05-13 PROCEDURE — 96375 TX/PRO/DX INJ NEW DRUG ADDON: CPT

## 2020-05-13 RX ORDER — ONDANSETRON 2 MG/ML
4 INJECTION INTRAMUSCULAR; INTRAVENOUS ONCE
Status: COMPLETED | OUTPATIENT
Start: 2020-05-13 | End: 2020-05-13

## 2020-05-13 RX ORDER — DOXERCALCIFEROL 4 UG/2ML
4 INJECTION INTRAVENOUS
Status: COMPLETED | OUTPATIENT
Start: 2020-05-13 | End: 2020-05-14

## 2020-05-13 RX ORDER — METOCLOPRAMIDE 5 MG/1
5 TABLET ORAL EVERY 6 HOURS PRN
Status: DISCONTINUED | OUTPATIENT
Start: 2020-05-13 | End: 2020-05-14 | Stop reason: HOSPADM

## 2020-05-13 RX ORDER — ATORVASTATIN CALCIUM 40 MG/1
40 TABLET, FILM COATED ORAL EVERY EVENING
Status: DISCONTINUED | OUTPATIENT
Start: 2020-05-13 | End: 2020-05-14 | Stop reason: HOSPADM

## 2020-05-13 RX ORDER — MYCOPHENOLIC ACID 360 MG/1
360 TABLET, DELAYED RELEASE ORAL 2 TIMES DAILY
Status: DISCONTINUED | OUTPATIENT
Start: 2020-05-13 | End: 2020-05-14 | Stop reason: HOSPADM

## 2020-05-13 RX ORDER — NALOXONE HYDROCHLORIDE 0.4 MG/ML
.1-.4 INJECTION, SOLUTION INTRAMUSCULAR; INTRAVENOUS; SUBCUTANEOUS
Status: DISCONTINUED | OUTPATIENT
Start: 2020-05-13 | End: 2020-05-14 | Stop reason: HOSPADM

## 2020-05-13 RX ORDER — NICOTINE POLACRILEX 4 MG
15-30 LOZENGE BUCCAL
Status: DISCONTINUED | OUTPATIENT
Start: 2020-05-13 | End: 2020-05-13

## 2020-05-13 RX ORDER — HEPARIN SODIUM 5000 [USP'U]/.5ML
5000 INJECTION, SOLUTION INTRAVENOUS; SUBCUTANEOUS EVERY 12 HOURS
Status: DISCONTINUED | OUTPATIENT
Start: 2020-05-13 | End: 2020-05-14 | Stop reason: HOSPADM

## 2020-05-13 RX ORDER — ONDANSETRON 2 MG/ML
4 INJECTION INTRAMUSCULAR; INTRAVENOUS EVERY 6 HOURS PRN
Status: DISCONTINUED | OUTPATIENT
Start: 2020-05-13 | End: 2020-05-14 | Stop reason: HOSPADM

## 2020-05-13 RX ORDER — TACROLIMUS 1 MG/1
1 CAPSULE ORAL 2 TIMES DAILY
Status: DISCONTINUED | OUTPATIENT
Start: 2020-05-13 | End: 2020-05-14 | Stop reason: HOSPADM

## 2020-05-13 RX ORDER — DEXTROSE MONOHYDRATE 100 MG/ML
INJECTION, SOLUTION INTRAVENOUS CONTINUOUS PRN
Status: DISCONTINUED | OUTPATIENT
Start: 2020-05-13 | End: 2020-05-13

## 2020-05-13 RX ORDER — SODIUM CHLORIDE 9 MG/ML
INJECTION, SOLUTION INTRAVENOUS CONTINUOUS
Status: DISCONTINUED | OUTPATIENT
Start: 2020-05-13 | End: 2020-05-13

## 2020-05-13 RX ORDER — ACETAMINOPHEN 325 MG/1
650 TABLET ORAL EVERY 4 HOURS PRN
Status: DISCONTINUED | OUTPATIENT
Start: 2020-05-13 | End: 2020-05-14 | Stop reason: HOSPADM

## 2020-05-13 RX ORDER — ONDANSETRON 4 MG/1
4 TABLET, ORALLY DISINTEGRATING ORAL EVERY 6 HOURS PRN
Status: DISCONTINUED | OUTPATIENT
Start: 2020-05-13 | End: 2020-05-14 | Stop reason: HOSPADM

## 2020-05-13 RX ORDER — SODIUM CHLORIDE 9 MG/ML
INJECTION, SOLUTION INTRAVENOUS CONTINUOUS
Status: DISCONTINUED | OUTPATIENT
Start: 2020-05-13 | End: 2020-05-14 | Stop reason: HOSPADM

## 2020-05-13 RX ORDER — HYDRALAZINE HYDROCHLORIDE 20 MG/ML
10 INJECTION INTRAMUSCULAR; INTRAVENOUS EVERY 6 HOURS PRN
Status: DISCONTINUED | OUTPATIENT
Start: 2020-05-13 | End: 2020-05-14 | Stop reason: HOSPADM

## 2020-05-13 RX ORDER — PANTOPRAZOLE SODIUM 40 MG/1
40 TABLET, DELAYED RELEASE ORAL
Status: DISCONTINUED | OUTPATIENT
Start: 2020-05-14 | End: 2020-05-14 | Stop reason: HOSPADM

## 2020-05-13 RX ORDER — NITROGLYCERIN 0.4 MG/1
0.4 TABLET SUBLINGUAL EVERY 5 MIN PRN
Status: DISCONTINUED | OUTPATIENT
Start: 2020-05-13 | End: 2020-05-14 | Stop reason: HOSPADM

## 2020-05-13 RX ORDER — SODIUM CHLORIDE 9 MG/ML
INJECTION, SOLUTION INTRAVENOUS CONTINUOUS
Status: ACTIVE | OUTPATIENT
Start: 2020-05-13 | End: 2020-05-13

## 2020-05-13 RX ORDER — LIDOCAINE 40 MG/G
CREAM TOPICAL
Status: DISCONTINUED | OUTPATIENT
Start: 2020-05-13 | End: 2020-05-14 | Stop reason: HOSPADM

## 2020-05-13 RX ORDER — HEPARIN SODIUM 1000 [USP'U]/ML
500 INJECTION, SOLUTION INTRAVENOUS; SUBCUTANEOUS
Status: DISCONTINUED | OUTPATIENT
Start: 2020-05-13 | End: 2020-05-14

## 2020-05-13 RX ORDER — DEXTROSE MONOHYDRATE 25 G/50ML
25-50 INJECTION, SOLUTION INTRAVENOUS
Status: DISCONTINUED | OUTPATIENT
Start: 2020-05-13 | End: 2020-05-14 | Stop reason: HOSPADM

## 2020-05-13 RX ORDER — HEPARIN SODIUM 1000 [USP'U]/ML
500 INJECTION, SOLUTION INTRAVENOUS; SUBCUTANEOUS CONTINUOUS
Status: DISCONTINUED | OUTPATIENT
Start: 2020-05-13 | End: 2020-05-14

## 2020-05-13 RX ORDER — PROCHLORPERAZINE MALEATE 10 MG
10 TABLET ORAL EVERY 6 HOURS PRN
Status: DISCONTINUED | OUTPATIENT
Start: 2020-05-13 | End: 2020-05-14 | Stop reason: HOSPADM

## 2020-05-13 RX ORDER — PROCHLORPERAZINE 25 MG
25 SUPPOSITORY, RECTAL RECTAL EVERY 12 HOURS PRN
Status: DISCONTINUED | OUTPATIENT
Start: 2020-05-13 | End: 2020-05-14 | Stop reason: HOSPADM

## 2020-05-13 RX ORDER — DEXTROSE MONOHYDRATE 25 G/50ML
25-50 INJECTION, SOLUTION INTRAVENOUS
Status: DISCONTINUED | OUTPATIENT
Start: 2020-05-13 | End: 2020-05-13

## 2020-05-13 RX ORDER — NICOTINE POLACRILEX 4 MG
15-30 LOZENGE BUCCAL
Status: DISCONTINUED | OUTPATIENT
Start: 2020-05-13 | End: 2020-05-14 | Stop reason: HOSPADM

## 2020-05-13 RX ADMIN — ONDANSETRON 4 MG: 2 INJECTION INTRAMUSCULAR; INTRAVENOUS at 06:44

## 2020-05-13 RX ADMIN — ATORVASTATIN CALCIUM 40 MG: 40 TABLET, FILM COATED ORAL at 19:36

## 2020-05-13 RX ADMIN — PROCHLORPERAZINE EDISYLATE 10 MG: 5 INJECTION INTRAMUSCULAR; INTRAVENOUS at 22:04

## 2020-05-13 RX ADMIN — MYCOPHENOLIC ACID 360 MG: 360 TABLET, DELAYED RELEASE ORAL at 19:36

## 2020-05-13 RX ADMIN — TACROLIMUS 1 MG: 0.5 CAPSULE ORAL at 19:35

## 2020-05-13 RX ADMIN — PROCHLORPERAZINE EDISYLATE 5 MG: 5 INJECTION INTRAMUSCULAR; INTRAVENOUS at 06:45

## 2020-05-13 RX ADMIN — PROCHLORPERAZINE EDISYLATE 10 MG: 5 INJECTION INTRAMUSCULAR; INTRAVENOUS at 15:55

## 2020-05-13 RX ADMIN — ONDANSETRON 4 MG: 2 INJECTION INTRAMUSCULAR; INTRAVENOUS at 12:46

## 2020-05-13 RX ADMIN — ONDANSETRON 4 MG: 2 INJECTION INTRAMUSCULAR; INTRAVENOUS at 19:35

## 2020-05-13 RX ADMIN — Medication 1 UNITS/HR: at 23:03

## 2020-05-13 RX ADMIN — HEPARIN SODIUM 5000 UNITS: 10000 INJECTION, SOLUTION INTRAVENOUS; SUBCUTANEOUS at 12:48

## 2020-05-13 RX ADMIN — PROCHLORPERAZINE EDISYLATE 5 MG: 5 INJECTION INTRAMUSCULAR; INTRAVENOUS at 08:02

## 2020-05-13 RX ADMIN — SODIUM CHLORIDE 250 ML: 9 INJECTION, SOLUTION INTRAVENOUS at 06:54

## 2020-05-13 RX ADMIN — Medication 13 UNITS/HR: at 12:10

## 2020-05-13 RX ADMIN — PANTOPRAZOLE SODIUM 40 MG: 40 INJECTION, POWDER, FOR SOLUTION INTRAVENOUS at 12:48

## 2020-05-13 RX ADMIN — SODIUM CHLORIDE 5.5 UNITS/HR: 9 INJECTION, SOLUTION INTRAVENOUS at 07:47

## 2020-05-13 RX ADMIN — SODIUM CHLORIDE 10 ML/HR: 9 INJECTION, SOLUTION INTRAVENOUS at 07:46

## 2020-05-13 ASSESSMENT — ACTIVITIES OF DAILY LIVING (ADL)
DRESS: 0-->INDEPENDENT
ADLS_ACUITY_SCORE: 11
AMBULATION: 0-->INDEPENDENT
RETIRED_EATING: 0-->INDEPENDENT
ADLS_ACUITY_SCORE: 14
SWALLOWING: 0-->SWALLOWS FOODS/LIQUIDS WITHOUT DIFFICULTY
FALL_HISTORY_WITHIN_LAST_SIX_MONTHS: NO
TOILETING: 0-->INDEPENDENT
TRANSFERRING: 0-->INDEPENDENT
RETIRED_COMMUNICATION: 0-->UNDERSTANDS/COMMUNICATES WITHOUT DIFFICULTY
COGNITION: 0 - NO COGNITION ISSUES REPORTED
ADLS_ACUITY_SCORE: 11
BATHING: 0-->INDEPENDENT

## 2020-05-13 ASSESSMENT — ENCOUNTER SYMPTOMS
FEVER: 0
DIARRHEA: 1
ABDOMINAL PAIN: 1
ABDOMINAL DISTENTION: 0
NAUSEA: 1
VOMITING: 1

## 2020-05-13 ASSESSMENT — MIFFLIN-ST. JEOR: SCORE: 1642.75

## 2020-05-13 NOTE — PLAN OF CARE
Call received from Nicolas at the [facility] microbioloy lab stating this patient's COVID test is negative

## 2020-05-13 NOTE — PHARMACY-ADMISSION MEDICATION HISTORY
Admission medication history interview status for this patient is complete. See [facility] admission navigator for allergy information, prior to admission medications and immunization status.     Medication history interview done via telephone during Covid-19 pandemic, indicate source(s): Patient  Medication history resources (including written lists, pill bottles, clinic record):None  Primary pharmacy:[facility]    Changes made to PTA medication list:  Added: none  Deleted: zofran  Changed: novolog sliding scale from 1 unit for every 40 over BG of 140 to 1 units for every 20 over BG of 140    Actions taken by pharmacist (provider contacted, etc):None     Additional medication history information:None    Medication reconciliation/reorder completed by provider prior to medication history?  N   (Y/N)     For patients on insulin therapy: Y  (Y/N)  Sliding scale Novolog: Y  Do you have a baseline novolog pre-meal dose:   1 __ units/6 gram carb with meals  Do you eat three meals a day:  Y  How many times do you check your blood glucose per day:  3-4  How many episodes of hypoglycemia do you have per week: rare      Prior to Admission medications    Medication Sig Last Dose Taking? Auth Provider   acetaminophen (TYLENOL) 325 MG tablet Take 2 tablets (650 mg) by mouth every 4 hours as needed for mild pain  at prn Yes Lizandro Dasilva PA-C   atorvastatin (LIPITOR) 40 MG tablet Take 40 mg by mouth daily Past Week at Unknown time Yes Unknown, Entered By History   insulin aspart (NOVOLOG FLEXPEN) 100 UNIT/ML pen 1 unit per 6 grams of carbs with each meal. About 45 units/day. 5/12/2020 at Unknown time Yes Patricia Costa MD   insulin aspart (NOVOLOG FLEXPEN) 100 UNIT/ML pen Sliding scale = 1 unit for every 20 over blood glucose of 140 5/12/2020 at Unknown time Yes Unknown, Entered By History   insulin glargine (LANTUS PEN) 100 UNIT/ML pen Inject 24 Units Subcutaneous At Bedtime 5/11/2020 at hs Yes Patricia Costa MD    metoclopramide (REGLAN) 5 MG tablet Take 1 tablet (5 mg) by mouth 4 times daily (before meals and nightly) 5/12/2020 at Unknown time Yes Jack Chen MD   mycophenolic acid (GENERIC EQUIVALENT) 180 MG EC tablet Take 360 mg by mouth 2 times daily 5/10/2020 Yes Unknown, Entered By History   omeprazole (PRILOSEC) 40 MG DR capsule Take 40 mg by mouth every evening 5/10/2020 Yes Unknown, Entered By History   prochlorperazine (COMPAZINE) 5 MG tablet Take 1 tablet (5 mg) by mouth every 8 hours as needed for nausea 5/12/2020 at prn Yes Dayanna Dominguez PA-C   tacrolimus (GENERIC EQUIVALENT) 1 MG capsule Take 1 mg by mouth 2 times daily  5/10/2020 Yes Kt Ríos MD   torsemide (DEMADEX) 20 MG tablet Take on Sunday Monday Wednesday and friday 5/11/2020 Yes Reported, Patient   acetone, Urine, test STRP 1 strip by In Vitro route daily  Patient not taking: Reported on 2/26/2020   Marcie Lozano, JORDAN CNP   blood glucose (NO BRAND SPECIFIED) test strip Use to test blood sugar 5 times daily or as directed.   Patricia Costa MD   insulin pen needle (BD ARPITA U/F) 32G X 4 MM Use 4 daily or as directed.   Kt Ríos MD

## 2020-05-13 NOTE — CONSULTS
RENAL CONSULTATION NOTE\      REFERRING MD:  Jose    REASON FOR CONSULTATION:  ESRD management      A/P:     Admitted with nausea vomiting and found to be in DKA      1.  End-stage renal disease   -diabetic nephropathy   -[facility] dialysis [place] [facility]  2.  Dialysis dependence   -Dr. JOHN Verduzco   -IJ access   -target weight 84.5 kg   -UF 1 to 3 liters  3.  LDKT   -failed  4.  Pancreas transplant   -failed   -remains immunosuppressed   -follows at [facility] transplant   -last evaluated 04/09/20  5.  Anemia   -no current WILL  6.  Secondary hyperparathyroidism   -Hectorol  7.  HTN   -post dialysis systolic pressures 120 to 140  8.  Hyponatremia   -corrects to 137 base on blood sugar 596   9.  DKA   -serum ketone level 6.1.   -AGMA with a delta HCO3- equivalent to the rising ketones.      Dialyzed 5/12/2020 without event  No indication for urgent dialysis today    Plan schedule dialysis 5/14/2020  Dialysis orders entered    DKA management per hospitalist service        HPI:     Patient presented to the emergency room with nausea and emesis.  Diagnostic evaluation consistent with diabetic ketoacidosis.    Presenting blood sugar 596.  Ketones noted to be 6.1 mmol/l.  In this setting is noted to have an anion gap of 19 and a serum bicarbonate of 17 mmol/L.    This is consistent with a anion gap metabolic acidosis secondary to DKA.  Blood sugar control should reduce his ketonemia thereby correcting his acidosis.    Similarly, his serum sodium 129 should correct as his blood sugar is decreased.    His potassium is not problematic    As noted he dialyzed yesterday without event.  He had not felt well for a couple of days prior to presentation and as such as ultrafiltration yesterday was less than 1 L.    He denies headache or chest pain at this time.  He has not had recent issues related to his dialysis runs.    His current access is a tunneled dialysis catheter for which she has not had issues by report.    He remains  immunosuppressed in the hopes of a second transplant.  He is following with the transplant service at the [facility].        ROS:      A complete 10 point review of systems was performed and is negative except as noted above.    PMH:      Past Medical History:   Diagnosis Date     CMV (cytomegalovirus infection) status negative 2011     Coronary artery disease, non-occlusive 2008    angio showed diffuse disease with no lesions     Diabetes mellitus, type 1     Diagnosed at age 11. 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     Noncompliance with treatment     no labs for one year     Pancreas replaced by transplant (H) Feb 2009    rejection treated with thymo     Pancreatic disease     pancreas transplant     Tobacco abuse ongoing       PSH:      Past Surgical History:   Procedure Laterality Date     ABDOMEN SURGERY       ARTHROSCOPY KNEE WITH LATERAL MENISCECTOMY Left 7/10/2019    Procedure: Left knee arthroscopic partial lateral meniscectomy;  Surgeon: Alex Candelaria MD;  Location: [facility] OR     BIOPSY      [facility] 2009     BYPASS GRAFT ARTERY CORONARY N/A 8/5/2015    Procedure: BYPASS GRAFT ARTERY CORONARY;  Surgeon: Katy Neville MD;  Location: [facility] OR     CORONARY ANGIOGRAPHY ADULT ORDER      2015     ESOPHAGOSCOPY, GASTROSCOPY, DUODENOSCOPY (EGD), COMBINED N/A 1/28/2020    Procedure: ESOPHAGOGASTRODUODENOSCOPY (EGD) biopsies w/forceps;  Surgeon: Emre Gomes MD;  Location:  GI     EYE SURGERY      vitrectomy both eyes      IR CVC TUNNEL PLACEMENT > 5 YRS OF AGE  2/13/2020     ORTHOPEDIC SURGERY  1993    tumor removed from left knee     TRANSPLANT  2009.2008    pancrease and kidney transplant        MEDICATIONS:      No current outpatient medications on file.       ALLERGIES:      Allergies as of 05/13/2020 - Reviewed 05/13/2020   Allergen Reaction Noted     Benadryl [diphenhydramine] Other (See Comments) 06/13/2019     Reglan Other (See Comments) 05/11/2012       FH:      Family History   Problem Relation Age of Onset     Unknown/Adopted Father      Heart Disease Maternal Grandfather 62       SH:    Social History     Socioeconomic History     Marital status: Single     Spouse name: Not on file     Number of children: 1     Years of education: Not on file     Highest education level: Associate degree: academic program   Occupational History     Occupation:    Social Needs     Financial resource strain: Not very hard     Food insecurity     Worry: Never true     Inability: Never true     Transportation needs     Medical: No     Non-medical: No   Tobacco Use     Smoking status: Former Smoker     Packs/day: 0.30     Types: Vaping Device     Smokeless tobacco: Never Used     Tobacco comment: E- Cig    Substance and Sexual Activity     Alcohol use: Not Currently     Alcohol/week: 0.0 standard drinks     Frequency: Never     Binge frequency: Never     Drug use: No     Sexual activity: Never     Partners: Female   Lifestyle     Physical activity     Days per week: 0 days     Minutes per session: 0 min     Stress: To some extent   Relationships     Social connections     Talks on phone: More than three times a week     Gets together: Once a week     Attends Sabianism service: Never     Active member of club or organization: No     Attends meetings of clubs or organizations: Never     Relationship status:      Intimate partner violence     Fear of current or ex partner: Not on file     Emotionally abused: Not on file     Physically abused: Not on file     Forced sexual activity: Not on file   Other Topics Concern     Parent/sibling w/ CABG, MI or angioplasty before 65F 55M? No       Service Not Asked     Blood Transfusions No     Comment: possibly during surgery, otherwise none.     Caffeine Concern Not Asked     Occupational Exposure Not Asked     Hobby Hazards Not Asked     Sleep Concern Not Asked     Stress Concern Not Asked     Weight Concern Not Asked     Special Diet Not Asked     Back Care Not Asked     Exercise Not Asked     Bike Helmet Not Asked     Seat Belt Yes     Self-Exams Not Asked   Social History Narrative     Not on file       PHYSICAL EXAM:      Vitals were reviewed  Patient Vitals for the past 8 hrs:   BP Temp Temp src Pulse Heart Rate Resp SpO2   05/13/20 0900 (!) 157/85 -- -- 101 98 15 98 %   05/13/20 0800 (!) 169/90 -- -- 100 103 12 99 %   05/13/20 0700 (!) 172/87 -- -- 91 -- -- 98 %   05/13/20 0655 -- -- -- -- -- -- 98 %   05/13/20 0650 (!) 170/85 -- -- 92 -- -- --   05/13/20 0630 -- 97.7  F (36.5  C) -- -- -- -- --   05/13/20 0623 (!) 182/109 -- Oral -- 108 22 100 %     No intake/output data recorded.      There were no vitals filed for this visit.      GENERAL: awake, alert, follows  HEENT: NC/AT, PERRLA, EOMI, non icteric, pharynx moist without lesion  NECK: supple, no masses or adenopathy  RESP:  clear anteriorly  CV: RRR, normal S1 S2  ABDOMEN: soft, nontender, no HSM or masses and bowel sounds normal  NEURO: speech normal and cranial nerves 2-12 intact  PSYCH: affect normal/bright  EXT: warm, no edema      LABS:      Recent Labs   Lab 05/13/20  0637   *   POTASSIUM 4.3   CHLORIDE 93*   CO2 17*   ANIONGAP 19*   *   BUN 35*   CR 5.69*   GFRESTIMATED 11*   GFRESTBLACK 13*   CHARLY 9.0     No lab results found in last 7 days.  Recent Labs   Lab 05/13/20  0637   ALBUMIN 4.0     Recent Labs   Lab 05/13/20 0637   HGB 11.9*       DIAGNOSTICS:  Reviewed      SHADY Bundy    [facility] consultants  [phone]

## 2020-05-13 NOTE — PLAN OF CARE
ICU End of Shift Summary.  For vital signs and complete assessments, please see documentation flowsheets.      Pertinent assessments: A&Ox4. VSS. Afebrile. denies pain or SOB. Tele; SR/ ST. LS: clear. +BS. C/o nausea and dry heaving. Voided using the urinal. Up with A1. 2 PIV. Right chest wall HD access CDI  Major Shift Events:     New admit for DKA, arrived on the floor at 1130 am    Insulin gtt initiated ED and titrated per protocol    IVF orders not initiated per Dr. Minaya due to potassium level and patient's kidney function    Zofran and compazine given for nausea and dry heaving        Plan (Upcoming Events): continue DKA management and insulin gtt, follow up labs, HD in am  Discharge/Transfer Needs: continue ICU/ IMC cares for now     Bedside Shift Report Completed : yes  Bedside Safety Check Completed: yes

## 2020-05-13 NOTE — ED PROVIDER NOTES
History     Chief Complaint:  Nausea & Vomiting    The history is provided by the patient.      Murtaza Fitzgerald is a 44 year old type I diabetic male, with history of kidney and pancreas transplant, CAD, hypertension, hyperlipidemia, CKD, gastroparesis, currently on dialysis amongst others as noted below, who presents alone for evaluation of nausea and vomiting for the last three days.  Pain started slowly but seem similar to when he has had gastroparesis in the past.  He also reported 3-4 loose diarrheal stools since last night.  He has not had any fevers or bloody/stools.  Fevers.  He says he has some mild abdominal pain from retching.  He states he is not been able to keep any of his oral medications down but has been taking his insulin.  Though he admits that he did not take his nighttime insulin last night.  Patient reported that his blood sugars a little on the high side running into the 180s and 200s yesterday.  He did not check his blood sugar this morning.  The patient is on dialysis and last dialyzed yesterday.  Denies any chest pain or shortness of breath.    Allergies:  Benadryl  Reglan     Medications:    Lipitor  Novolog flexpen  Lantus pen  Reglan  Prilosec  Compazine  Demadex  Mycophenolic acid    Past Medical History:    Cytomegalovirus infection status negative  CAD, non-occlusive  Diabetes type I  Dialysis patient  Dyslipidemia  GERD  Hypertension  Immunosuppressed status  Kidney transplant status, living related donor  Migraine  Mycotic aneurysm of kidney transplant  Noncompliance with treatment  Pancreas replaced by transplant (pancreatic disease)  Tobacco abuse  CKD  Gastroparesis     Past Surgical History:    Abdomen surgery  Arthroscopy knee with lateral meniscectomy - left  Biopsy  Bypass graft artery coronary  Coronary angiography adult order  EGD, combined  Eye surgery - vitrectomy both eyes  CVC tunnel placement >5 yrs of age  Orthopedic surgery - tumor removed from  knee  Transplant - pancreas and kidney transplant  CABG x3    Family History:    History reviewed. No pertinent family history.       Social History:  The patient was unaccompanied to the ED.  Smoking Status: Former  Smokeless Tobacco: E-cig  Alcohol Use: Not currently  Drug Use: No   Marital Status:  Single [1]     Review of Systems   Constitutional: Negative for fever.   Cardiovascular: Negative for chest pain.   Gastrointestinal: Positive for abdominal pain, diarrhea, nausea and vomiting. Negative for abdominal distention.   Skin: Negative for rash.   All other systems reviewed and are negative.      Physical Exam     Patient Vitals for the past 24 hrs:   BP Temp Temp src Pulse Heart Rate Resp SpO2   05/13/20 0655 -- -- -- -- -- -- 98 %   05/13/20 0650 (!) 170/85 -- -- 92 -- -- --   05/13/20 0630 -- 97.7  F (36.5  C) -- -- -- -- --   05/13/20 0623 (!) 182/109 -- Oral -- 108 22 100 %       Physical Exam  Vital signs and nursing notes reviewed.     Constitutional: laying on gurney appears somewhat uncomfortable  HENT: Oropharynx is clear and moist, no obvious oral lesions or thrush seen  Eyes: Conjunctivae are normal bilaterally. Pupils equal  Neck: normal range of motion  Cardiovascular: Rate 90 bpm at time of my exam, regular rhythm, normal heart sounds.   Pulmonary/Chest: Effort normal and breath sounds normal. No respiratory distress.   Abdominal: Soft. Bowel sounds are hypoactive but present in all 4 quadrants. No significant tenderness to palpation. No rebound or guarding.   Musculoskeletal: No joint swelling or edema.   Neurological: Alert and oriented. No focal weakness  Skin: Skin is warm and dry. No rash noted.   Psych: normal affect    Emergency Department Course     ECG:  ECG taken at 0739, ECG read at 0741 by Dr. Yonny Ureña MD  Sinus tachycardia  Otherwise normal ECG  When compared with ECG from 1/27/2020:  Incomplete right bundle branch block is no longer present.  Rate 104 bpm. MS interval 150.  QRS duration 92. QT/QTc 368/483. P-R-T axes 75 47 78.      Laboratory:  Laboratory findings were communicated with the patient who voiced understanding of the findings.    CBC: HGB 11.9 (L) o/w WNL (WBC 8.8, )  BMP:  (L), Chloride 93 (L), Carbon Dioxide 17 (L), Anion Gap 19 (H), Glucose 596 (HH), Bun 35 (H), Creatinine 5.69 (H), GFR Estimate 11 (L) o/w WNL  Lipase: 37 (L)  Hepatic Panel: Bilirubin Direct 0.1, Bilirubin total 0.7, Albumin 4.0, Protein Total 8.3, Alkphos 134, Alt 15, Ast 11  Blood gas venous: pH Venous 7.28 (L), pCO2 Venous 39 (L), pO2 Venous 49 (H), Bicarbonate Venous 18 (L), Oxyhemoglobin Venous 80, Base Excess venous 7.8   Ketone beta-hydroxybutyrate Quantitative: 6.1 (HH)   Glucose by Meter (Collected 0637): 596 (HH)   Glucose by Meter (Collected 0639): 555 (HH)  Glucose by Meter (Collected 0747): 527 (HH)    Hemoglobin A1c: pending    C. Diff toxin B PCR: Pending - sample not provided in the ED  Enteric Bacteria and Virus by KETTY stool: Pending - sample not provided in the ED    Asymptomatic COVID-19 (Coronavirus) PCR by Nasopharyngeal Swab: Pending     Interventions:  0644 Zofran 4 mg IV  0645 Compazine 5 mg IV  0654 0.9% NaCl Bolus 250 mL IV   0746 0.9% NaCl Infusion 10 mL/hr IV  0747 Insulin 5.5 units/hr IV  0802 Compazine 5 mg IV    Emergency Department Course:  Past medical records, nursing notes, and vitals reviewed.    (0633)   I performed an exam of the patient as documented above. History obtained from patient.    IV was inserted and blood was drawn for laboratory testing, results above.    (0655)   Updated regarding patient's glucose level.    (0706)   Updated regarding critical lab value of Ketone of 6.1.     EKG obtained in the ED, see results above.      (1428)   I rechecked the patient and discussed the results of his workup thus far. Discussed plan of care and admission is indicated. Patient is agreeable to this plan.    (0089)   I spoke with Dr. Price of the  Hospitalist service regarding patient's presentation, findings, and plan of care, who will discuss with Roxy Garcia PA-C, who agrees to accept patient for further care, evaluation and monitoring.     A nasal swab was obtained for laboratory testing, findings above.      Findings and plan explained to the Patient who consents to admission. Discussed the patient with Roxy Garcia PA-C on behalf of Dr. Minaya, who will admit the patient to an [facility] bed for further monitoring, evaluation, and treatment.    I personally reviewed the laboratory results with the Patient and answered all related questions prior to admission.     Impression & Plan     Medical Decision Making:  The patient is a 44-year-old male presents to the emergency department with diarrhea.  His symptoms of nausea vomiting started approximately 3 days ago stools last night.  On examination he has high blood pressure with mild tachycardia.  He does not appear to be overly sick for distress but is mostly nauseous and has been dry heaving.  Labs were obtained showing findings of mild DKA with hyperglycemia above 500.  Patient does have a known history of gastroparesis and at times medication noncompliance.  He states he took his insulin yesterday but forgot to take it last night.  Patient was given gentle IV fluids and then started on insulin drip.  Patient does not have any overt infectious symptoms of fever cough sore throat, has no chest pain and a normal appearing EKG without signs of ST segment elevation.  Plan is to test his stools for possible infectious etiology though he is not having excessive diarrhea here in the emergency department.  We will also do COVID testing due to the GI complaints.  Discussed the case with Dr. Price as well as the PA for the hospitalist team.  The plan will be to admit him to the [facility] unit.  I discussed the results and the plan with the patient and he understands and is in agreement.    Critical care time: 30  minutes    Diagnosis:    ICD-10-CM   1. Diabetic ketoacidosis without coma associated with type 1 diabetes mellitus (H)  E10.10   2. Nausea vomiting and diarrhea  R11.2    R19.7   3. ESRD (end stage renal disease) on dialysis (H)  N18.6    Z99.2       Disposition:  Admitted to Roxy Garcia PA-C on behalf of Dr. Minaya.    Scribe Disclosure:  I, Nahomy Croft, am serving as a scribe at 6:26 AM on 5/13/2020 to document services personally performed by Yonny Ureña MD based on my observations and the provider's statements to me.   5/13/2020   [facility] EMERGENCY DEPARTMENT       Yonny Ureña MD  05/13/20 0812

## 2020-05-13 NOTE — LETTER
"   Transition Communication Hand-off for Care Transitions to Next Level of Care Provider    Name: Murtaza Fitzgerald  : 1975  MRN #: [id number]  Primary Care Provider: Kt Ríos  Primary Care MD Name: Dr. Ríos  Primary Clinic: [address] [place] [address] [address] [place] [address]  Primary Care Clinic Name: [facility]  Reason for Hospitalization:  ESRD (end stage renal disease) on dialysis (H) [N18.6, Z99.2]  Nausea vomiting and diarrhea [R11.2, R19.7]  Diabetic ketoacidosis without coma associated with type 1 diabetes mellitus (H) [E10.10]  Admit Date/Time: 2020  6:24 AM  Discharge Date: 20  Payor Source: Payor: BLUE PLUS / Plan: [employer] / Product Type: HMO /     Readmission Assessment Measure (BRAD) Risk Score/category: High           Reason for Communication Hand-off Referral: Non-adherence to plan of care related to:  Other DKA    Discharge Plan:       Concern for non-adherence with plan of care:   YES  Discharge Needs Assessment: diabetes management      Already enrolled in Tele-monitoring program and name of program:  none  Follow-up specialty is recommended: Yes    Follow-up plan:  No future appointments.    Any outstanding tests or procedures:                Key Recommendations:  RN CTS following d/t High BRAD and admission for DKA. Patient has type 1 Diabetes with an A1C 6.9. Patient states no issue or concern with obtaining medications. Patient states \"I pretty much eat what I want within reason.\" Patient declined offer of additional education at this time regarding nutrition. Patient given educational resource for DKA. Reviewed symptoms of DKA and when to seek care. Patient understanding, stating \"I've had this 2 or 3 times before.\"   Patient has had 4 other hospitalizations within the previous 6 months for gastroparesis, ketosis, and started dialysis in 2020. Patient has a history of gastroparesis, CAD, HTN, and a pancreatic transplant in . " Patient follows with transplant at [facility].     Please follow up with patient for community and diabetic support and for resolution of acute illness.     Drea Draper MA/RN Case Manager  Inpatient Care Coordination  [facility]   [phone]    AVS/Discharge Summary is the source of truth; this is a helpful guide for improved communication of patient story

## 2020-05-13 NOTE — ED NOTES
[facility]  ED Nurse Handoff Report    Murtaza Fitzgerald is a 44 year old male   ED Chief complaint: Nausea & Vomiting  . ED Diagnosis:   Final diagnoses:   Diabetic ketoacidosis without coma associated with type 1 diabetes mellitus (H)   Nausea vomiting and diarrhea   ESRD (end stage renal disease) on dialysis (H)     Allergies:   Allergies   Allergen Reactions     Benadryl [Diphenhydramine] Other (See Comments)     Restless legs     Reglan Other (See Comments)     IV, delsuions       Code Status: Full Code  Activity level - Baseline/Home:  Independent. Activity Level - Current:   Stand by Assist. Lift room needed: No. Bariatric: No   Needed: No   Isolation: Yes Cdiff pending and COVID precautions. Infection: Not Applicable  C-Diff Pending.     Vital Signs:   Vitals:    05/13/20 0623 05/13/20 0630 05/13/20 0650 05/13/20 0655   BP: (!) 182/109  (!) 170/85    Pulse:   92    Resp: 22      Temp:  97.7  F (36.5  C)     TempSrc: Oral      SpO2: 100%   98%       Cardiac Rhythm:  ,      Pain level:    Patient confused: No. Patient Falls Risk: Yes.   Elimination Status: Has voided   Patient Report - Initial Complaint: nausea, vomiting, weakness. Focused Assessment:     Pt here for nausea, vomiting, all around not feeling well. Pt has hx type 1 DM since age 11, triple cardiac bypass 2015, kidney transplant. Has been on dialysis since Feb 2020 - has port in R chest.     Dialysis 3 days a week. Pt states he is due for a run tomorrow morning.     Tests Performed:   No orders to display     Labs Ordered and Resulted from Time of ED Arrival Up to the Time of Departure from the ED   CBC WITH PLATELETS DIFFERENTIAL - Abnormal; Notable for the following components:       Result Value    RBC Count 4.09 (*)     Hemoglobin 11.9 (*)     Hematocrit 37.8 (*)     Absolute Lymphocytes 0.5 (*)     All other components within normal limits   BASIC METABOLIC PANEL - Abnormal; Notable for the following  components:    Sodium 129 (*)     Chloride 93 (*)     Carbon Dioxide 17 (*)     Anion Gap 19 (*)     Glucose 596 (*)     Urea Nitrogen 35 (*)     Creatinine 5.69 (*)     GFR Estimate 11 (*)     GFR Estimate If Black 13 (*)     All other components within normal limits   LIPASE - Abnormal; Notable for the following components:    Lipase 37 (*)     All other components within normal limits   BLOOD GAS VENOUS AND OXYHGB - Abnormal; Notable for the following components:    Ph Venous 7.28 (*)     PCO2 Venous 39 (*)     PO2 Venous 49 (*)     Bicarbonate Venous 18 (*)     All other components within normal limits   KETONE BETA-HYDROXYBUTYRATE QUANTITATIVE - Abnormal; Notable for the following components:    Ketone Quantitative 6.1 (*)     All other components within normal limits   GLUCOSE BY METER - Abnormal; Notable for the following components:    Glucose 555 (*)     All other components within normal limits   HEPATIC PANEL   GLUCOSE MONITOR NURSING POCT   HEMOGLOBIN A1C   PERIPHERAL IV CATHETER   NOTIFY PHYSICIAN   NOTIFY PHYSICIAN   PATIENT CARE ORDER   IP CARBOHYDRATE COUNTING BY NURSING   PATIENT EDUCATION   ASSESS FOR HYPOGLYCEMIA SYMPTOMS   NOTIFY PHYSICIAN   ENTERIC BACTERIA AND VIRUS PANEL BY KETTY STOOL   CLOSTRIDIUM DIFFICILE TOXIN B     Treatments provided: monitoring, fluids, insulin  Family Comments: no family present  OBS brochure/video discussed/provided to patient:  Yes  ED Medications:   Medications   0.9% sodium chloride BOLUS (250 mLs Intravenous New Bag 5/13/20 0654)   dextrose 10% infusion (has no administration in time range)   sodium chloride 0.9% infusion (has no administration in time range)   insulin 1 unit/mL in saline (NovoLIN, HumuLIN Regular) drip - ADULT IV Infusion (has no administration in time range)   glucose gel 15-30 g (has no administration in time range)     Or   dextrose 50 % injection 25-50 mL (has no administration in time range)     Or   glucagon injection 1 mg (has no  administration in time range)   ondansetron (ZOFRAN) injection 4 mg (4 mg Intravenous Given 5/13/20 0644)   prochlorperazine (COMPAZINE) injection 5 mg (5 mg Intravenous Given 5/13/20 0645)     Drips infusing:  Yes  For the majority of the shift, the patient's behavior Green. Interventions performed were rounding.    Sepsis treatment initiated: No       ED Nurse Name/Phone Number: Christa Parker RN,   7:34 AM

## 2020-05-13 NOTE — PROGRESS NOTES
Paged Dr. Minaya at 1120 for the following:  new DKA arrived on the floor, can you please PUT  IVF fluids orders. only NS ordered for now. can you please changed it to step one and step two. thanks

## 2020-05-13 NOTE — H&P
[facility]    Hospitalist History and Physical    Name: Murtaza Fitzgerald    MRN: [id number]  YOB: 1975    Age: 44 year old  Date of Admission:  5/13/2020  Date of Service (when I saw the patient): 05/13/20    Assessment & Plan   Murtaza Fitzgerald is a 44 year old male with a complex PMH significant for DM1 complicated by gastroparesis, ESRD on HD (s/p failed kidney transplant in 11/2008 2/2 diabetic nephropathy), s/p pancreatic transplant (2/2009) on immunosuppression, CAD s/p bypass graft, HTN, GERD, and currently vapes who presents with nausea, vomiting, and diarrhea.     ED workup reveals: mildly tachycardic, hypertensive up to 180/100s, sodium of 129, chloride of 93, carbon dioxide of 17, BUN of 35, creatinine of 5.69, ketones of 6.1, normal lipase, initial blood glucose of 596 with recheck of 555, VBG shows pH of 7.28, pCO2 of 39, pO2 of 49, bicarbonate of 18, Hgb of 11.9, and EKG shows rate of 104 bpm in sinus tachycardia with incomplete RBBB.      #DKA  #Intractable nausea, vomiting, diarrhea: onset of nausea and vomiting the evening of 5/10 with inability to tolerate any PO except for ice chips the last few days with onset of nonbloody diarrhea with 3-4 episodes on 5/12. No improvement in symptoms with PTA Zofran and Compazine. Initial blood glucose of 596 with only mild improvement with IVFs and starting insulin gtt. Serum ketones elevated at 6.1, anion gap of 19, mild hyponatremia of 129, and  hypochloremia of 93, and VBG shows pH of 7.28 with bicarbonate of 18.    - Continue insulin gtt  - IVF hydration with NS at 100 ml/hour for 4 hours  - Check phosphorus and magnesium   - Check c diff and enteric stool panel   - PRN antiemetics   - Recheck CMP this afternoon to monitor for improvement   - Asymptomatic COVID19 rule out due to presenting GI symptoms, low suspicion, no need for precautions    #ESRD on hemodialysis: creatinine of 5.69 today. Dialysis every  T/Th/Sat with last dialysis session yesterday that patient reports completing without any issues.   - Nephrology consult  - Follow BMP daily     #DM1 complicated by gastroparesis: most recent HgbA1c of 6.9 today, follows with Dr. Costa of endocrinology. Home regimen currently includes Novolog 1 unit for every 6 grams of carbohydrate, Novolog sliding scale insulin, and Lantus 24 units at bedtime. Checks blood glucose levels 3-4 times per day. Missed evening Lantus dose on 5/12.  - Hold PTA insulin regimen while on gtt     #S/p renal transplant (2008)  #S/p kidney transplant (2009): transplanted pancreas failed in 2012 and transplanted kidney function declined due to recurrent SWATHI episodes. Based on last transplant telemedicine visit on 4/9/20 the patient is to continue on immunosuppression with plans to taper off Mycophenolic acid from 180 mg BID for 1 month then 180 mg daily for 1 month then off followed by taper off Tacrolimus.    - Continue PTA Tacrolimus (goal of 4-6) and Mycophenolic acid (goal of 1-3.5)    #CAD s/p bypass graft  #HTN: hypertensive up to 180s/100s, reportedly not taking any antihypertensives due to not being provided prescriptions at last discharge. Based on most recent admission the patient is suppose to be taking Amlodipine 5 mg daily and Carvedilol 6.25mg BID, hold off on restarting for now.   - Continue to monitor BP and assess if antihypertensives needed   - Continue PTA Atorvastatin     #Anemia of chronic disease: stable with Hgb of 11.9    DVT Prophylaxis: Heparin SQ  Code Status: Full Code, discussed with patient   Disposition: Expected stay >2 midnights, will admit to [facility] [place] on insulin gtt     Primary Care Physician   Kt Ríos    Chief Complaint   Nausea and vomiting     History obtained from discussion with ED provider, Dr. Ureña, chart review, and interview with patient.     History of Present Illness   Murtaza Fitzgerald is a 44 year old male who presents with  nausea and vomiting since 5/10 with onset of diarrhea yesterday evening.  The patient states that Sunday evening he had [employer] and shortly after this developed nausea with nonbloody emesis about every 3 hours.  His vomiting progressed to about every 20 minutes yesterday evening along with dry heaving.  The patient states that last night he started having nonbloody diarrhea and has had a total of 3-4 episodes since onset.  He notes generalized abdominal pain from vomiting.  He has mild lightheadedness with ambulation.  He has only been able to tolerate ice chips since onset of his symptoms.  He has tried taking his previously prescribed Zofran and Compazine at home without any improvement in his symptoms. Denies any associated fevers, chills, chest pain, shortness of breath, urinary symptoms, or numbness or tingling.  The patient states that due to feeling ill yesterday he did not take his evening Lantus dose.  The patient states that he is not currently on blood pressure medications and if he was prescribed them at his last discharge he never received these prescriptions. Patient states that he checks his blood sugar approximately 3-4 times per day and they been elevated over the last few days in the 200-250s.  The patient last underwent dialysis yesterday without any issues.  He states that his current issue is his ongoing nausea.  The patient does have a history of gastroparesis and has been considering looking into a gastric stimulator for management.  Denies any recent travel or known sick contacts.  Denies any cough, congestion, headache, loss of taste or smell, or known COVID19 exposure.  The patient's daughter and son-in-law live in his basement with him.    Past Medical History    Past Medical History:   Diagnosis Date     CMV (cytomegalovirus infection) status negative 2011     Coronary artery disease, non-occlusive 2008    angio showed diffuse disease with no lesions     Diabetes mellitus, type 1      Diagnosed at age 11. Takes Insulin      Dialysis patient (H)     prior to kidney transplant     Dyslipidemia      Gastroesophageal reflux disease      History of blood transfusion      Hypertension      Immunosuppressed status (H)      Kidney transplant status, living related donor           Migraines      Mycotic aneurysm of kidney transplant (H) 2008     Noncompliance with treatment     no labs for one year     Pancreas replaced by transplant (H) 2009    rejection treated with thymo     Pancreatic disease     pancreas transplant     Tobacco abuse ongoing     Past Surgical History   Past Surgical History:   Procedure Laterality Date     ABDOMEN SURGERY       ARTHROSCOPY KNEE WITH LATERAL MENISCECTOMY Left 7/10/2019    Procedure: Left knee arthroscopic partial lateral meniscectomy;  Surgeon: Alex Candelaria MD;  Location: [facility] OR     BIOPSY      [facility]      BYPASS GRAFT ARTERY CORONARY N/A 2015    Procedure: BYPASS GRAFT ARTERY CORONARY;  Surgeon: Katy Neville MD;  Location:  OR     CORONARY ANGIOGRAPHY ADULT ORDER           ESOPHAGOSCOPY, GASTROSCOPY, DUODENOSCOPY (EGD), COMBINED N/A 2020    Procedure: ESOPHAGOGASTRODUODENOSCOPY (EGD) biopsies w/forceps;  Surgeon: Emre Gomes MD;  Location:  GI     EYE SURGERY      vitrectomy both eyes      IR CVC TUNNEL PLACEMENT > 5 YRS OF AGE  2020     ORTHOPEDIC SURGERY  1993    tumor removed from left knee     TRANSPLANT  .    pancrease and kidney transplant     Prior to Admission Medications   Prior to Admission Medications   Prescriptions Last Dose Informant Patient Reported? Taking?   acetaminophen (TYLENOL) 325 MG tablet  Self No No   Sig: Take 2 tablets (650 mg) by mouth every 4 hours as needed for mild pain   Patient not taking: Reported on 2020   acetone, Urine, test STRP  Self No No   Si strip by In Vitro route daily   Patient not taking: Reported on 2020   atorvastatin (LIPITOR) 40 MG tablet  Self  Yes No   Sig: Take 40 mg by mouth daily   blood glucose (NO BRAND SPECIFIED) test strip   No No   Sig: Use to test blood sugar 5 times daily or as directed.   insulin aspart (NOVOLOG FLEXPEN) 100 UNIT/ML pen  Self Yes No   Sig: Sliding scale = 1 unit for every 40 over blood glucose of 140   insulin aspart (NOVOLOG FLEXPEN) 100 UNIT/ML pen   No No   Si unit per 6 grams of carbs with each meal. About 45 units/day.   insulin glargine (LANTUS PEN) 100 UNIT/ML pen   No No   Sig: Inject 24 Units Subcutaneous At Bedtime   insulin pen needle (BD ARPITA U/F) 32G X 4 MM  Self No No   Sig: Use 4 daily or as directed.   metoclopramide (REGLAN) 5 MG tablet   No No   Sig: Take 1 tablet (5 mg) by mouth 4 times daily (before meals and nightly)   mycophenolic acid (GENERIC EQUIVALENT) 180 MG EC tablet  Self Yes No   Sig: Take 360 mg by mouth 2 times daily   omeprazole (PRILOSEC) 40 MG DR capsule  Self Yes No   Sig: Take 40 mg by mouth every evening   ondansetron (ZOFRAN ODT) 4 MG ODT tab   No No   Sig: Take 1 tablet (4 mg) by mouth every 8 hours as needed for nausea or vomiting   prochlorperazine (COMPAZINE) 5 MG tablet   No No   Sig: Take 1 tablet (5 mg) by mouth every 8 hours as needed for nausea   tacrolimus (GENERIC EQUIVALENT) 1 MG capsule  Self Yes No   Sig: Take 1 mg by mouth 2 times daily    torsemide (DEMADEX) 20 MG tablet   Yes No   Sig: Take on  and friday      Facility-Administered Medications: None     Allergies   Allergies   Allergen Reactions     Benadryl [Diphenhydramine] Other (See Comments)     Restless legs     Reglan Other (See Comments)     IV, delsuions     Social History   Social History     Tobacco Use     Smoking status: Former Smoker     Packs/day: 0.30     Types: Vaping Device     Smokeless tobacco: Never Used     Tobacco comment: E- Cig    Substance Use Topics     Alcohol use: Not Currently     Alcohol/week: 0.0 standard drinks     Frequency: Never     Binge frequency: Never      Social History     Social History Narrative     Not on file     Family History   Family history reviewed with patient and is noncontributory.    Review of Systems   A Comprehensive greater than 10 system review of systems was carried out.  Pertinent positives and negatives are noted above.  Otherwise negative for contributory information.    Physical Exam   Temp: 97.7  F (36.5  C) Temp src: Oral BP: (!) 170/85 Pulse: 92 Heart Rate: 108 Resp: 22 SpO2: 98 % O2 Device: None (Room air)    Vital Signs with Ranges  Temp:  [97.7  F (36.5  C)] 97.7  F (36.5  C)  Pulse:  [92] 92  Heart Rate:  [108] 108  Resp:  [22] 22  BP: (170-182)/() 170/85  SpO2:  [98 %-100 %] 98 %  0 lbs 0 oz    GEN:  Alert, oriented x 3, appears uncomfortable, no overt distress  HEENT:  Normocephalic/atraumatic, no scleral icterus, no nasal discharge, mouth appears dry.  CV:  Regular rate and rhythm, no murmur or JVD.  S1 + S2 noted, no S3 or S4.  LUNGS:  Clear to auscultation bilaterally without rales/rhonchi/wheezing/retractions.  Symmetric chest rise on inhalation noted.  ABD:  Active bowel sounds, soft, generalized abdominal tenderness with palpation, non-distended.  No rebound/guarding/rigidity.  EXT:  No edema.  No cyanosis.  No acute joint synovitis noted.  SKIN:  Dry to touch, no exanthems noted in the visualized areas.  NEURO:  Symmetric muscle strength, sensation to touch grossly intact.  Coordination symmetric on general exam.  No new focal deficits appreciated.    Data   Data reviewed today:  I personally reviewed the EKG tracing showing rate of 104 bpm in sinus tachycardia with incomplete RBBB.    Results for orders placed or performed during the hospital encounter of 05/13/20   CBC with platelets differential     Status: Abnormal   Result Value Ref Range    WBC 8.8 4.0 - 11.0 10e9/L    RBC Count 4.09 (L) 4.4 - 5.9 10e12/L    Hemoglobin 11.9 (L) 13.3 - 17.7 g/dL    Hematocrit 37.8 (L) 40.0 - 53.0 %    MCV 92 78 - 100 fl    MCH  29.1 26.5 - 33.0 pg    MCHC 31.5 31.5 - 36.5 g/dL    RDW 12.9 10.0 - 15.0 %    Platelet Count 261 150 - 450 10e9/L    Diff Method Automated Method     % Neutrophils 92.2 %    % Lymphocytes 5.8 %    % Monocytes 1.1 %    % Eosinophils 0.0 %    % Basophils 0.3 %    % Immature Granulocytes 0.6 %    Nucleated RBCs 0 0 /100    Absolute Neutrophil 8.2 1.6 - 8.3 10e9/L    Absolute Lymphocytes 0.5 (L) 0.8 - 5.3 10e9/L    Absolute Monocytes 0.1 0.0 - 1.3 10e9/L    Absolute Eosinophils 0.0 0.0 - 0.7 10e9/L    Absolute Basophils 0.0 0.0 - 0.2 10e9/L    Abs Immature Granulocytes 0.1 0 - 0.4 10e9/L    Absolute Nucleated RBC 0.0    Basic metabolic panel     Status: Abnormal   Result Value Ref Range    Sodium 129 (L) 133 - 144 mmol/L    Potassium 4.3 3.4 - 5.3 mmol/L    Chloride 93 (L) 94 - 109 mmol/L    Carbon Dioxide 17 (L) 20 - 32 mmol/L    Anion Gap 19 (H) 3 - 14 mmol/L    Glucose 596 (HH) 70 - 99 mg/dL    Urea Nitrogen 35 (H) 7 - 30 mg/dL    Creatinine 5.69 (H) 0.66 - 1.25 mg/dL    GFR Estimate 11 (L) >60 mL/min/[1.73_m2]    GFR Estimate If Black 13 (L) >60 mL/min/[1.73_m2]    Calcium 9.0 8.5 - 10.1 mg/dL   Lipase     Status: Abnormal   Result Value Ref Range    Lipase 37 (L) 73 - 393 U/L   Blood gas venous and oxyhgb     Status: Abnormal   Result Value Ref Range    Ph Venous 7.28 (L) 7.32 - 7.43 pH    PCO2 Venous 39 (L) 40 - 50 mm Hg    PO2 Venous 49 (H) 25 - 47 mm Hg    Bicarbonate Venous 18 (L) 21 - 28 mmol/L    FIO2 Room Air     Oxyhemoglobin Venous 80 %    Base Deficit Venous 7.8 mmol/L   Ketone Beta-Hydroxybutyrate Quantitative     Status: Abnormal   Result Value Ref Range    Ketone Quantitative 6.1 (HH) 0.0 - 0.6 mmol/L   Glucose by meter     Status: Abnormal   Result Value Ref Range    Glucose 555 (HH) 70 - 99 mg/dL   Hepatic panel     Status: None   Result Value Ref Range    Bilirubin Direct 0.1 0.0 - 0.2 mg/dL    Bilirubin Total 0.7 0.2 - 1.3 mg/dL    Albumin 4.0 3.4 - 5.0 g/dL    Protein Total 8.3 6.8 - 8.8 g/dL     Alkaline Phosphatase 134 40 - 150 U/L    ALT 15 0 - 70 U/L    AST 11 0 - 45 U/L   EKG 12-lead, tracing only     Status: None (Preliminary result)   Result Value Ref Range    Interpretation ECG Click View Image link to view waveform and result      Roxy BARILLAS I discussed the patient with Dr. Minaya and he agrees with the above plan.

## 2020-05-14 VITALS
DIASTOLIC BLOOD PRESSURE: 78 MMHG | OXYGEN SATURATION: 98 % | HEIGHT: 66 IN | BODY MASS INDEX: 28.66 KG/M2 | RESPIRATION RATE: 20 BRPM | SYSTOLIC BLOOD PRESSURE: 131 MMHG | HEART RATE: 96 BPM | WEIGHT: 178.35 LBS | TEMPERATURE: 98.5 F

## 2020-05-14 LAB
ALBUMIN SERPL-MCNC: 3.8 G/DL (ref 3.4–5)
ALP SERPL-CCNC: 114 U/L (ref 40–150)
ALT SERPL W P-5'-P-CCNC: 13 U/L (ref 0–70)
ANION GAP SERPL CALCULATED.3IONS-SCNC: 10 MMOL/L (ref 3–14)
AST SERPL W P-5'-P-CCNC: 12 U/L (ref 0–45)
BASOPHILS # BLD AUTO: 0 10E9/L (ref 0–0.2)
BASOPHILS NFR BLD AUTO: 0.1 %
BILIRUB SERPL-MCNC: 0.4 MG/DL (ref 0.2–1.3)
BUN SERPL-MCNC: 44 MG/DL (ref 7–30)
CALCIUM SERPL-MCNC: 9.3 MG/DL (ref 8.5–10.1)
CHLORIDE SERPL-SCNC: 100 MMOL/L (ref 94–109)
CO2 SERPL-SCNC: 26 MMOL/L (ref 20–32)
CREAT SERPL-MCNC: 7.05 MG/DL (ref 0.66–1.25)
DIFFERENTIAL METHOD BLD: ABNORMAL
EOSINOPHIL # BLD AUTO: 0 10E9/L (ref 0–0.7)
EOSINOPHIL NFR BLD AUTO: 0 %
ERYTHROCYTE [DISTWIDTH] IN BLOOD BY AUTOMATED COUNT: 13.1 % (ref 10–15)
GFR SERPL CREATININE-BSD FRML MDRD: 9 ML/MIN/{1.73_M2}
GLUCOSE BLDC GLUCOMTR-MCNC: 111 MG/DL (ref 70–99)
GLUCOSE BLDC GLUCOMTR-MCNC: 119 MG/DL (ref 70–99)
GLUCOSE BLDC GLUCOMTR-MCNC: 120 MG/DL (ref 70–99)
GLUCOSE BLDC GLUCOMTR-MCNC: 129 MG/DL (ref 70–99)
GLUCOSE BLDC GLUCOMTR-MCNC: 134 MG/DL (ref 70–99)
GLUCOSE BLDC GLUCOMTR-MCNC: 152 MG/DL (ref 70–99)
GLUCOSE BLDC GLUCOMTR-MCNC: 156 MG/DL (ref 70–99)
GLUCOSE BLDC GLUCOMTR-MCNC: 156 MG/DL (ref 70–99)
GLUCOSE BLDC GLUCOMTR-MCNC: 163 MG/DL (ref 70–99)
GLUCOSE BLDC GLUCOMTR-MCNC: 163 MG/DL (ref 70–99)
GLUCOSE BLDC GLUCOMTR-MCNC: 169 MG/DL (ref 70–99)
GLUCOSE BLDC GLUCOMTR-MCNC: 169 MG/DL (ref 70–99)
GLUCOSE BLDC GLUCOMTR-MCNC: 179 MG/DL (ref 70–99)
GLUCOSE BLDC GLUCOMTR-MCNC: 186 MG/DL (ref 70–99)
GLUCOSE BLDC GLUCOMTR-MCNC: 225 MG/DL (ref 70–99)
GLUCOSE BLDC GLUCOMTR-MCNC: 280 MG/DL (ref 70–99)
GLUCOSE BLDC GLUCOMTR-MCNC: 391 MG/DL (ref 70–99)
GLUCOSE SERPL-MCNC: 170 MG/DL (ref 70–99)
HCT VFR BLD AUTO: 34.2 % (ref 40–53)
HGB BLD-MCNC: 10.9 G/DL (ref 13.3–17.7)
IMM GRANULOCYTES # BLD: 0.1 10E9/L (ref 0–0.4)
IMM GRANULOCYTES NFR BLD: 0.6 %
LYMPHOCYTES # BLD AUTO: 1.2 10E9/L (ref 0.8–5.3)
LYMPHOCYTES NFR BLD AUTO: 11.6 %
MCH RBC QN AUTO: 29.3 PG (ref 26.5–33)
MCHC RBC AUTO-ENTMCNC: 31.9 G/DL (ref 31.5–36.5)
MCV RBC AUTO: 92 FL (ref 78–100)
MONOCYTES # BLD AUTO: 0.7 10E9/L (ref 0–1.3)
MONOCYTES NFR BLD AUTO: 6.5 %
NEUTROPHILS # BLD AUTO: 8.7 10E9/L (ref 1.6–8.3)
NEUTROPHILS NFR BLD AUTO: 81.2 %
NRBC # BLD AUTO: 0 10*3/UL
NRBC BLD AUTO-RTO: 0 /100
PLATELET # BLD AUTO: 225 10E9/L (ref 150–450)
POTASSIUM SERPL-SCNC: 4 MMOL/L (ref 3.4–5.3)
PROT SERPL-MCNC: 7.7 G/DL (ref 6.8–8.8)
RBC # BLD AUTO: 3.72 10E12/L (ref 4.4–5.9)
SODIUM SERPL-SCNC: 136 MMOL/L (ref 133–144)
WBC # BLD AUTO: 10.7 10E9/L (ref 4–11)

## 2020-05-14 PROCEDURE — 85025 COMPLETE CBC W/AUTO DIFF WBC: CPT | Performed by: PHYSICIAN ASSISTANT

## 2020-05-14 PROCEDURE — 25000131 ZZH RX MED GY IP 250 OP 636 PS 637: Performed by: INTERNAL MEDICINE

## 2020-05-14 PROCEDURE — 36415 COLL VENOUS BLD VENIPUNCTURE: CPT | Performed by: PHYSICIAN ASSISTANT

## 2020-05-14 PROCEDURE — 99239 HOSP IP/OBS DSCHRG MGMT >30: CPT | Performed by: INTERNAL MEDICINE

## 2020-05-14 PROCEDURE — 00000146 ZZHCL STATISTIC GLUCOSE BY METER IP

## 2020-05-14 PROCEDURE — 25000128 H RX IP 250 OP 636: Performed by: INTERNAL MEDICINE

## 2020-05-14 PROCEDURE — 25000132 ZZH RX MED GY IP 250 OP 250 PS 637: Performed by: HOSPITALIST

## 2020-05-14 PROCEDURE — 90937 HEMODIALYSIS REPEATED EVAL: CPT

## 2020-05-14 PROCEDURE — 25000128 H RX IP 250 OP 636: Performed by: PHYSICIAN ASSISTANT

## 2020-05-14 PROCEDURE — 25000131 ZZH RX MED GY IP 250 OP 636 PS 637: Performed by: PHYSICIAN ASSISTANT

## 2020-05-14 PROCEDURE — 25800030 ZZH RX IP 258 OP 636: Performed by: INTERNAL MEDICINE

## 2020-05-14 PROCEDURE — 80053 COMPREHEN METABOLIC PANEL: CPT | Performed by: PHYSICIAN ASSISTANT

## 2020-05-14 RX ORDER — DEXTROSE MONOHYDRATE 100 MG/ML
INJECTION, SOLUTION INTRAVENOUS CONTINUOUS PRN
Status: DISCONTINUED | OUTPATIENT
Start: 2020-05-14 | End: 2020-05-14 | Stop reason: HOSPADM

## 2020-05-14 RX ADMIN — MYCOPHENOLIC ACID 360 MG: 360 TABLET, DELAYED RELEASE ORAL at 12:02

## 2020-05-14 RX ADMIN — PROCHLORPERAZINE EDISYLATE 10 MG: 5 INJECTION INTRAMUSCULAR; INTRAVENOUS at 04:54

## 2020-05-14 RX ADMIN — PROCHLORPERAZINE EDISYLATE 10 MG: 5 INJECTION INTRAMUSCULAR; INTRAVENOUS at 10:38

## 2020-05-14 RX ADMIN — SODIUM CHLORIDE 300 ML: 9 INJECTION, SOLUTION INTRAVENOUS at 08:10

## 2020-05-14 RX ADMIN — PANTOPRAZOLE SODIUM 40 MG: 40 TABLET, DELAYED RELEASE ORAL at 12:01

## 2020-05-14 RX ADMIN — INSULIN GLARGINE 24 UNITS: 100 INJECTION, SOLUTION SUBCUTANEOUS at 15:34

## 2020-05-14 RX ADMIN — DOXERCALCIFEROL 4 MCG: 4 INJECTION, SOLUTION INTRAVENOUS at 09:24

## 2020-05-14 RX ADMIN — ONDANSETRON 4 MG: 2 INJECTION INTRAMUSCULAR; INTRAVENOUS at 01:39

## 2020-05-14 RX ADMIN — HEPARIN SODIUM 500 UNITS/HR: 1000 INJECTION INTRAVENOUS; SUBCUTANEOUS at 08:10

## 2020-05-14 RX ADMIN — ONDANSETRON 4 MG: 2 INJECTION INTRAMUSCULAR; INTRAVENOUS at 07:56

## 2020-05-14 RX ADMIN — TACROLIMUS 1 MG: 0.5 CAPSULE ORAL at 12:01

## 2020-05-14 ASSESSMENT — MIFFLIN-ST. JEOR: SCORE: 1651.75

## 2020-05-14 ASSESSMENT — ACTIVITIES OF DAILY LIVING (ADL)
ADLS_ACUITY_SCORE: 13
ADLS_ACUITY_SCORE: 11

## 2020-05-14 NOTE — CONSULTS
"Care Transition Initial Assessment - RN        Met with: Patient.  DATA   Active Problems:    DKA (diabetic ketoacidoses) (H)       Cognitive Status: awake and alert.  Primary Care Clinic [facility]  Primary Care MD Name: Dr. Roís  Contact information and PCP information verified: Yes  Lives With: child(christiana), adult   Living Arrangements: homeless                 Insurance concerns: No Insurance issues identified  ASSESSMENT  Patient currently receives the following services:  Not asked.         Identified issues/concerns regarding health management: RN CTS following d/t High BRAD and admission for DKA. Patient has type 1 Diabetes with an A1C 6.9. Patient states no issue or concern with obtaining medications. Patient states \"I pretty much eat what I want within reason.\" Patient declined offer of additional education at this time regarding nutrition. Patient given educational resource for DKA. Reviewed symptoms of DKA and when to seek care. Patient understanding, stating \"I've had this 2 or 3 times before.\"   Patient has had 4 other hospitalizations within the previous 6 months for gastroparesis, ketosis, and started dialysis in February 2020. Patient has a history of gastroparesis, CAD, HTN, and a pancreatic transplant in 2009. Patient follows with transplant at [facility].   Patient stated no concern with returning home upon discharge. Patient states that will have support from friends and family as needed.   Handoff will be given to clinic care coordinator as they are already following with patient for further follow up.   PLAN  Financial costs for the patient were not discussed .  Patient given options and choices for discharge Yes .  Patient/family is agreeable to the plan?  Yes: pending medical readiness.   Patient anticipates discharging to Home .        Patient anticipates needs for home equipment: No  Transportation/person available to transport on day of discharge  is self. Patient drove self to " hospital.   Plan/Disposition: Home   Appointments: None scheduled at this time. Patient did not want assistance with scheduling.       Care  (CTS) will continue to follow as needed.    Drea Draper MA/RN Case Manager  Inpatient Care Coordination  [facility]   [phone]

## 2020-05-14 NOTE — PROGRESS NOTES
Potassium   Date Value Ref Range Status   05/14/2020 4.0 3.4 - 5.3 mmol/L Final     Hemoglobin   Date Value Ref Range Status   05/14/2020 10.9 (L) 13.3 - 17.7 g/dL Final     Creatinine   Date Value Ref Range Status   05/14/2020 7.05 (H) 0.66 - 1.25 mg/dL Final     Urea Nitrogen   Date Value Ref Range Status   05/14/2020 44 (H) 7 - 30 mg/dL Final     Sodium   Date Value Ref Range Status   05/14/2020 136 133 - 144 mmol/L Final     INR   Date Value Ref Range Status   02/13/2020 1.14 0.86 - 1.14 Final       DIALYSIS PROCEDURE NOTE  Hepatitis status of previous patient on machine log was checked and verified ok to use with this patients hepatitis status.  Patient dialyzed for 3.5 hrs. on a 3 K bath with a net fluid removal of  .5L.  A BFR of 400 ml/min was obtained via a RIJ.    The treatment plan was discussed with Dr. Hall during the treatment.  Total heparin received during the treatment: 2250 units.   Line flushed, clamped and capped with heparin 1:1000 3 mL (3000 units) per lumen  Meds  given: Hectoral. Complications: none    Procedure education provided orally to patient, patientverbalized understanding  ICEBOAT? Timeout performed pre-treatment  I: Patient was identified using 2 identifiers  C: Consent obtained/verified current before treatment  E: Equipment preventative maintenance is current and dialysis delivery system OK to use  B: Hepatitis B Surface Antigen: neg; Draw Date: 5/7/20      Hepatitis B Surface Antibody: susceptible; Draw Date: 2/25/20  O: Dialysis orders present and complete prior to treatment  A: Vascular access verified and assessed prior to treatment  T: Treatment was performed at a clinically appropriate time  ?: Patient was allowed to ask questions and address concerns prior to treatment  See flowsheet in EPIC for further details and post assessment.  Machine water alarm in place and functioning. Transducer pods intact and checked every 15min.  Report received from: MARIA EUGENIA Horn  Report given  to: MARIA EUGENIA Horn RN  Chlorine/Chloramine water system checked every 4 hours.  Outpatient Dialysis at [place]

## 2020-05-14 NOTE — PLAN OF CARE
Pt transferred to floor at roughly 1445. Writer settled pt. VSS. Denies pain. Will continue to monitor.

## 2020-05-14 NOTE — PLAN OF CARE
ICU End of Shift Summary.  For vital signs and complete assessments, please see documentation flowsheets.     Pertinent assessments: A/Ox4. AVSS ex HTN. 2LPM O2 via n/c when sleeping- desats for very brief periods to low-mid 80%'s. Tele SR. LS clear/diminished. Hemodialysis today. Denies pain. New PIV in right arm. CVC for dialysis WDL. SBA.   Major Shift Events: Dialysis run today. dry heaving/ vomit 1x in AM, prn compazine given. Appetite starting to return this afternoon, ate % of lunch. New PIV.   Plan (Upcoming Events): Plan for lantus at 5pm (pt normally takes home lantus around 9pm), and stop insulin gtt at 7pm. Transfer out of ICU, report given to receiving RN.   Discharge/Transfer Needs: Back to home per pt. Nephrology following, gets dialysis T/Thurs/Sat.    Bedside Shift Report Completed : y  Bedside Safety Check Completed: [email or web address]

## 2020-05-14 NOTE — DISCHARGE SUMMARY
[facility]  Discharge Summary  Name: Murtaza Fitzgerald    MRN: [id number]  YOB: 1975    Age: 44 year old  Date of Discharge:  5/14/2020  Date of Admission: 5/13/2020  Primary Care Provider: Kt Ríos  Discharge Physician:  Marcelle Loera MD  Discharging Service:  Hospitalist      Discharge Diagnoses:  1. DM1 with DKA  2. Nausea, vomiting and diarrhea  3. ESRD on HD  4. History of renal and pancreas transplant  5. CAD  6. AOCD     Follow-ups Needed After Discharge   Routine follow up with PCP, resume regular dialysis schedule    Unresulted Labs Ordered in the Past 30 Days of this Admission     No orders found from 10/16/2018 to 12/16/2018.        Hospital Course:  Murtaza Fitzgerald is a 44 year old male with past medical history of DM1 with associated gastroparesis, ESRD on HD (after failed renal transplant in 11/2008 due to diabetic nephropathy), s/p pancreatic transplant (2/2009) on immunosuppressive medications, CAD s/p CABG, HTN and GERD who was admitted on 5/13/2020 with nausea, emesis and diarrhea and was found to have DKA.  Treated initially with insulin drip.  Had planned to transition back to subcutaneous insulin this evening but patient felt much better, tolerated a regular diet and requested discharge home today.      1. DM1 with DKA: Had several days of nausea, vomiting and diarrhea PTA.  Initial blood glucose of 596 with AGMA and ketones elevated to 6.1.  Started on insulin drip.  PTA he is on Lantus 24 units QHS, Aspart sliding scale as well as aspart 1 unit per 6 grams of carbohydrates with meals.  Currently no evidence of infection.  DKA likely from GI symptoms and not taking insulin.  He was feeling much better on the afternoon of discharge. Tolerated a regular diet.  He is not willing to remain in the hospital any longer.  He will be given his regular Lantus dosing early (prior to discharge) and continue with his PTA sliding scale insulin and carb  "counting regimen.     2. Intractable nausea, vomiting, diarrhea - Resolved: onset of nausea and vomiting the evening of 5/10 with inability to tolerate any PO except for ice chips the last few days with onset of nonbloody diarrhea with 3-4 episodes on 5/12. Diarrhea has resolved. Does have gastroparesis and intermittent episodes of GI symptoms.  No leukocytosis or fever.  COVID-19 was checked and negative.  He tolerated a regular diet this afternoon.  Requested discharge.  States that sometimes when he has this his symptoms resolve after having HD.  Has antiemetics at home already if recurrence of symptoms.     3. ESRD on hemodialysis: Dialysis every T/Th/Sat.  Nephrology consulted, HD on 5/14.  Resume regular schedule upon discharge.     4. S/p renal transplant (2008) and kidney transplant (2009): transplanted pancreas failed in 2012 and transplanted kidney function declined due to recurrent SWATHI episodes. Based on last transplant telemedicine visit on 4/9/20 the patient is to continue on immunosuppression with plans to taper off Mycophenolic acid from 180 mg BID for 1 month then 180 mg daily for 1 month then off followed by taper off Tacrolimus.  Continue PTA medications.     5. CAD s/p bypass graft and HTN: Continue PTA cardiac medications.     6. Anemia of chronic disease: stable with Hgb of 11.9     Discharge Disposition:  Discharged to home     Allergies:  Allergies   Allergen Reactions     Benadryl [Diphenhydramine] Other (See Comments)     Restless legs     Reglan Other (See Comments)     IV, delsuions        Condition on Discharge:  Discharge condition: Stable   Discharge vitals: Blood pressure 131/78, pulse 96, temperature 98.5  F (36.9  C), temperature source Oral, resp. rate 20, height 1.676 m (5' 6\"), weight 80.9 kg (178 lb 5.6 oz), SpO2 98 %.   Code status on discharge: Full Code     History of Illness:  See detailed admission note for full details.    Physical Exam:  Blood pressure 131/78, pulse 96, " "temperature 98.5  F (36.9  C), temperature source Oral, resp. rate 20, height 1.676 m (5' 6\"), weight 80.9 kg (178 lb 5.6 oz), SpO2 98 %.  Wt Readings from Last 1 Encounters:   05/14/20 80.9 kg (178 lb 5.6 oz)     General: Alert, awake, no acute distress.  HEENT: Normocephalic and atraumatic, eyes anicteric and without scleral injection, EOMI, face symmetric, MMM.  Cardiac: RRR, normal S1, S2. No m/g/r, no LE edema.  Pulmonary: Normal chest rise, normal work of breathing.  Lungs CTAB without crackles or wheezing.  Abdomen: soft, non-tender, non-distended.  Normoactive bowel sounds, no guarding or rebound tenderness.  Extremities: no deformities.  Warm, well perfused.  Skin: no rashes or lesions.  Warm and Dry.  Neuro: No focal deficits.  Speech clear.  Coordination and strength grossly normal.  Psych: Alert and oriented x3. Appropriate affect.    Procedures other than Imaging:  Insulin drip  HD     Imaging:  Results for orders placed or performed during the hospital encounter of 02/11/20   CT Abdomen Pelvis w/o Contrast    Narrative    EXAMINATION: CT abdomen/pelvis without contrast, 2/11/2020.    INDICATION: Abdominal distention, vomiting. Rule out SBO.    COMPARISON: CT abdomen 11/20/2018.    TECHNIQUE: Routine images from the level of the diaphragm through the  pelvis were obtained without contrast.     Total DLP: 932 mGy*cm.    FINDINGS:    The liver parenchyma is homogenous. No focal hepatic lesion. The  gallbladder is nondistended. No intrahepatic or extra hepatic biliary  duct dilation. The spleen is grossly unremarkable, with the exception  of a small splenule. Atrophic native appearance of the pancreas.  Transplanted kidney noted in the right lower quadrant. Bilateral  adrenal glands are unremarkable.    Bilaterally atrophic native kidneys. Multiple likely vascular  calcifications. No appreciable renal lesion. No hydronephrosis. No  hydroureter. Postsurgical changes of left lower quadrant " transplant  kidney. No appreciable renal lesion. No hydronephrosis. No  hydroureter. The bladder is well-distended. No bladder wall  irregularity.    The large and small bowel are normal in caliber. Moderate formed  colonic stool burden noted in the ascending, transverse and descending  colon. The appendix is unremarkable. The right lower quadrant  pancreatic transplant is identified and appears unremarkable. There is  a small bowel containing umbilical hernia. Adjacent fat-containing  ventral hernia. No evidence for obstruction. No significant adjacent  fat stranding. Small hiatal hernia.    The abdominal aorta is not aneurysmal. No appreciable intra-abdominal  lymphadenopathy by size criteria.    No acute osseous lesions. Mild degenerative changes of the lumbar  spine. Soft tissues are unremarkable.    Lung bases are clear. No focal airspace opacity. No pleural effusion.  Mild bibasilar atelectasis. No suspicious pulmonary nodules.      Impression    IMPRESSION:    1. No acute intra-abdominal pathology or evidence for small bowel  obstruction  2. Small bowel containing umbilical hernia with out evidence for  obstruction. Adjacent fat-containing ventral hernia.  3. Small hiatal hernia.  4. Stable postsurgical changes of renal and pancreas transplants.         I have personally reviewed the examination and initial interpretation  and I agree with the findings.    JORDAN ADAMS MD   IR CVC Tunnel Placement > 5 Yrs of Age    Narrative    DIAGNOSIS: Kidney replaced by transplant    PROCEDURE: IR CVC TUNNEL PLACEMENT > 5 YRS OF AGE    Impression    IMPRESSION: Completed image-guided placement of 14.5 Wallisian, 23 cm  double lumen tunneled central venous catheter via right internal  jugular vein. Catheter tip in high right atrium. Aspirates and flushes  freely, heparin locked and ready for immediate use. No complication.     ----------    CLINICAL HISTORY: Patient with history of type 1 diabetes status post  kidney  transplantation 2009 admitted with intractable nausea and  vomiting now with acute kidney injury requiring long-term dialysis so  IR has been consulted for tunneled dialysis catheter placement. The  patient has a history of a tunneled dialysis catheter in 2013.     PERFORMED BY: Yaw Sierra PA-C    CONSENT: Written informed consent was obtained and is documented in  the patient record.    MEDICATIONS: 1. 2 mg midazolam IV  2. 100 mcg fentanyl IV  3. 10 mL 1% lidocaine for local anesthesia  4. 2000 units heparin per lumen    NURSING: Patient continuously monitored by trained, independent  observer.    SEDATION TIME: 20 minutes face-to-face    FLUOROSCOPY TIME: 0.4 minutes    DESCRIPTION: The right neck and upper chest were prepped and draped in  the usual sterile fashion.      Under ultrasound guidance, the right internal jugular vein was  identified and the overlying skin was anesthetized and skin  dermatotomy was made. Under ultrasound guidance, right internal  jugular venipuncture was made with needle. Image saved documenting  venipuncture and patency.    Needle was exchanged over guidewire for a dilator under fluoroscopic  guidance. Length to right atrium was measured with guidewire.  Guidewire and inner dilator were removed. Wire was advanced into  inferior vena cava under fluoroscopic guidance and secured.     The anterior chest skin was anesthetized and incision was made after  measurements were taken. A cuffed catheter was subcutaneously tunneled  from the anterior chest incision to the internal jugular venipuncture  site after path of tunnel was anesthetized. The dilator was exchanged  over guidewire for a peel-away sheath. Guidewire was removed. Under  fluoroscopic guidance, the catheter was placed through the peel-away  sheath. Peel-away sheath was removed.      Final catheter position saved. Both catheter lumens adequately  aspirated and flushed. Each lumen was heparin locked. A catheter  retaining suture  and sterile dressing were applied. The skin  dermatotomy site overlying the internal jugular venipuncture was  closed with topical adhesive.    COMPLICATIONS: No immediate concerns, the patient remained stable  throughout the procedure and tolerated it well.    ESTIMATED BLOOD LOSS: Minimal    SPECIMENS: None    EAMON HERNÁNDEZ PA-C   XR Chest Port 1 View    Narrative    EXAM: XR CHEST PORT 1 VW  2/14/2020 12:57 PM     HISTORY:  please r/o TB as part of dialysis unit admit       COMPARISON:  Chest x-ray 9/4/2015    FINDINGS:   Portable semiupright view the chest. Postsurgical changes of coronary  artery bypass with intact surgical clips and median sternotomy wires.  Right central venous catheter tip projects over the high right atrium.  Hazy airspace opacity in the left lower lobe. No pleural effusion or  pneumothorax. No acute osseous abnormality.      Impression    IMPRESSION:   No evidence for tuberculosis. Hazy opacity left lung base likely  atelectasis.    I have personally reviewed the examination and initial interpretation  and I agree with the findings.    DEE SEVILLA MD     *Note: Due to a large number of results and/or encounters for the requested time period, some results have not been displayed. A complete set of results can be found in Results Review.        Consultations:  Consultations This Hospital Stay   NEPHROLOGY IP CONSULT  CARE COORDINATOR IP CONSULT     Recent Lab Results:  Recent Labs   Lab 05/14/20  0519 05/13/20  1315 05/13/20  0637   WBC 10.7  --  8.8   HGB 10.9*  --  11.9*   HCT 34.2*  --  37.8*   MCV 92  --  92    224 261     Recent Labs   Lab 05/14/20  0519 05/13/20  1315 05/13/20  0637    137 129*   POTASSIUM 4.0 3.7 4.3   CHLORIDE 100 101 93*   CO2 26 26 17*   ANIONGAP 10 10 19*   * 220* 596*   BUN 44* 41* 35*   CR 7.05* 5.98* 5.69*   GFRESTIMATED 9* 11* 11*   GFRESTBLACK 10* 12* 13*   CHARLY 9.3 9.1 9.0          Pending Results:    Unresulted Labs Ordered in the Past  30 Days of this Admission     No orders found for last 31 day(s).           Discharge Instructions and Follow-Up:   Discharge Orders      Reason for your hospital stay    You were hospitalized for nausea, vomiting and diarrhea and were found to have DKA.  You were on an insulin drip.  Your symptoms have improved and you requested discharge from the hospital. Your medications were not changed.     Follow-up and recommended labs and tests     Continue your regular dialysis schedule.     Activity    Your activity upon discharge: activity as tolerated     Full Code     Diet    Follow this diet upon discharge: Orders Placed This Encounter      Advance Diet as Tolerated: Regular Diet Adult; 4840-0655 Calories: Moderate Consistent CHO (4-6 CHO units/meal)     Discharge Medications   Current Discharge Medication List      CONTINUE these medications which have NOT CHANGED    Details   acetaminophen (TYLENOL) 325 MG tablet Take 2 tablets (650 mg) by mouth every 4 hours as needed for mild pain  Qty: 50 tablet, Refills: 0    Associated Diagnoses: Acute lateral meniscus tear of left knee, initial encounter      atorvastatin (LIPITOR) 40 MG tablet Take 40 mg by mouth daily      !! insulin aspart (NOVOLOG FLEXPEN) 100 UNIT/ML pen 1 unit per 6 grams of carbs with each meal. About 45 units/day.  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 insulin aspart (NOVOLOG FLEXPEN) 100 UNIT/ML pen Sliding scale = 1 unit for every 20 over blood glucose of 140      insulin glargine (LANTUS PEN) 100 UNIT/ML pen Inject 24 Units Subcutaneous At Bedtime  Qty: 15 mL, Refills: 1    Comments: If Lantus is not covered by insurance, may substitute Basaglar at same dose and frequency.    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)      metoclopramide (REGLAN) 5 MG tablet Take 1 tablet (5 mg) by mouth 4 times daily (before meals and nightly)  Qty: 120  tablet, Refills: 11    Associated Diagnoses: Gastroparesis      mycophenolic acid (GENERIC EQUIVALENT) 180 MG EC tablet Take 360 mg by mouth 2 times daily      omeprazole (PRILOSEC) 40 MG DR capsule Take 40 mg by mouth every evening      prochlorperazine (COMPAZINE) 5 MG tablet Take 1 tablet (5 mg) by mouth every 8 hours as needed for nausea  Qty: 20 tablet, Refills: 0      tacrolimus (GENERIC EQUIVALENT) 1 MG capsule Take 1 mg by mouth 2 times daily   Qty: 60 capsule, Refills: 11    Associated Diagnoses: Kidney transplanted; Immunosuppression (H)      torsemide (DEMADEX) 20 MG tablet Take on Sunday Monday Wednesday and friday      acetone, Urine, test STRP 1 strip by In Vitro route daily  Qty: 50 each, Refills: 3    Comments: Urine ketone stix  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 - Potential duplicate medications found. Please discuss with provider.          Time Spent on this Encounter   I, Marcelle Loera MD, personally saw the patient today and spent greater than 30 minutes discharging this patient.    Marcelle Loera MD

## 2020-05-14 NOTE — PLAN OF CARE
ICU End of Shift Summary.  For vital signs and complete assessments, please see documentation flowsheets.     Pertinent assessments: 44 y.o male remains in ICU on insulin gtt. Alert & oriented x3. Flat affect. VSS. SR to Stach. No reports of pain. Bowel sounds active. Lung sounds clear, faint crackles in bases. Repositioning independently.   Major Shift Events: Nausea & emesis overnight, PRN zofran and compazine given. No other major shifts events. NC in place overnight, some episodes of apnea however patient denies use of cpap.   Plan (Upcoming Events): HD today. Attempt to wean from insulin gtt.   Discharge/Transfer Needs: tbd    Bedside Shift Report Completed : y  Bedside Safety Check Completed:y

## 2020-05-14 NOTE — PROGRESS NOTES
Renal Medicine Inpatient Dialysis Note                                Murtaza Fitzgerald MRN# [id number]   Age: 44 year old YOB: 1975   Date of Admission: 5/13/2020 Hospital LOS: 1          Assessment/Plan:     Admitted with nausea vomiting and found to be in DKA        1.  End-stage renal disease              -diabetic nephropathy              -[facility] dialysis [facility]  2.  Dialysis dependence              -Dr. JOHN Verduzco              -IJ access              -target weight 84.5 kg              -UF 1 to 3 liters  3.  LDKT              -failed  4.  Pancreas transplant              -failed              -remains immunosuppressed              -follows at [facility] transplant              -last evaluated 04/09/20  5.  Anemia              -no current WILL  6.  Secondary hyperparathyroidism              -Hectorol  7.  HTN              -post dialysis systolic pressures 120 to 140  8.  Hyponatremia              -corrects to 137 base on blood sugar 596   9.  DKA              -serum ketone level 6.1.              -AGMA with a delta HCO3- equivalent to the rising ketones.      [facility] dialysis schedule      Interval History:     Dialysis run parameters reviewed with dialysis RN at patient bedside.    3.5 hour run  3K bath  0.5 liter UF  IJ access    Hectorol    Stable initial portion of run    ROS     GENERAL: NAD, No fever,chills  R: NEGATIVE for significant cough or SOB  CV: NEGATIVE for chest pain, palpitations  EXT: no change edema  ROS otherwise negative    Dialysis Parameters:     Vitals were reviewed  Temp: 98.5  F (36.9  C) Temp src: Oral BP: (!) 181/99 Pulse: 94 Heart Rate: 90 Resp: 12 SpO2: 98 % O2 Device: Nasal cannula Oxygen Delivery: 2 LPM  I/O last 3 completed shifts:  In: 507.83 [I.V.:507.83]  Out: 900 [Urine:500; Emesis/NG output:400]    Vitals:    05/13/20 1130 05/14/20 0500   Weight: 80 kg (176 lb 5.9 oz) 80.9 kg (178 lb 5.6 oz)       Current Weight: 80.9  Dry Weight: 84.5  Dialysis Temp:  "36.5  C  Access Device: IJ  Access Site: right  Dialyzer: Revaclear  Dialysis Bath: 3  Sodium Profile: n  UF Goal: 0.5 liter  Blood Flow Rate (mL/min): 400  Total Treatment Time (hrs): 3.5  Heparin: Low dose as required      EPO dose: n  Zemplar: y  IV Fe: n      Medications and Allergies:     Reviewed      Physical Exam:     Seen and examined during course of dialysis run    BP (!) 181/99   Pulse 94   Temp 98.5  F (36.9  C)   Resp 12   Ht 1.676 m (5' 6\")   Wt 80.9 kg (178 lb 5.6 oz)   SpO2 98%   BMI 28.79 kg/m      GENERAL: awake, alert, follows  HEENT: NC/AT  CV: RRR, normal S1 S2  MS: no edema  SKIN: catheter site clean without drainage  NEURO: speech normal and cranial nerves 2-12 intact  PSYCH: affect normal/bright    Data:       Recent Labs   Lab 05/14/20  0519      POTASSIUM 4.0   CHLORIDE 100   CO2 26   ANIONGAP 10   *   BUN 44*   CR 7.05*   GFRESTIMATED 9*   GFRESTBLACK 10*   CHARLY 9.3     Recent Labs   Lab 05/14/20  0519 05/13/20  0637   PHOS  --  6.7*   HGB 10.9* 11.9*         G Ambrocio Bundy MD    [facility] - Nephrology  [phone]          "

## 2020-05-14 NOTE — PROGRESS NOTES
[facility]  Hospitalist Progress Note  Marcelle Loera MD 05/14/20  Text Page  Pager: [phone] (7am-6pm)    Reason for Stay (Diagnosis): N/V, DKA         Assessment and Plan:      Summary of Stay: Murtaza Fitzgerald is a 44 year old male with past medical history of DM1 with associated gastroparesis, ESRD on HD (after failed renal transplant in 11/2008 due to diabetic nephropathy), s/p pancreatic transplant (2/2009) on immunosuppressive medications, CAD s/p CABG, HTN and GERD who was admitted on 5/13/2020 with nausea, emesis and diarrhea and was found to have DKA.  DKA has resolved, will transition off the insulin drip this evening.  Still significant nausea but able to tolerate a few bites of food this afternoon.    Problem List/Assessment and Plan:     1. DM1 with DKA: Had several days of nausea, vomiting and diarrhea PTA.  Initial blood glucose of 596 with AGMA and ketones elevated to 6.1.  Started on insulin drip.  PTA he is on Lantus 24 units QHS, Aspart sliding scale as well as aspart 1 unit per 6 grams of carbohydrates with meals.  Currently no evidence of infection.  DKA likely from GI symptoms and not taking insulin.    - Continue insulin drip for now (change to regular insulin drip, not DKA drip)  - Start Lantus 12 units this evening at 5 PM then stop insulin drip at 7 PM  - Start NPO Medium sliding scale insulin this evening when Lantus started as I do not expect him to be able to eat much  - Will add back aspart carb counting for tomorrow AM    2. Intractable nausea, vomiting, diarrhea: onset of nausea and vomiting the evening of 5/10 with inability to tolerate any PO except for ice chips the last few days with onset of nonbloody diarrhea with 3-4 episodes on 5/12. Diarrhea has resolved. Still having nausea and dry heaves but overall improving compared to admission.  Does have gastroparesis and intermittent episodes of GI symptoms.  No leukocytosis or fever.  COVID-19 was  checked and negative.  - Antiemetics as needed  - ADAT     3. ESRD on hemodialysis: Dialysis every T/Th/Sat.  Nephrology consulted, HD today.  - Nephrology consult     4. S/p renal transplant (2008) and kidney transplant (2009): transplanted pancreas failed in 2012 and transplanted kidney function declined due to recurrent SWATHI episodes. Based on last transplant telemedicine visit on 4/9/20 the patient is to continue on immunosuppression with plans to taper off Mycophenolic acid from 180 mg BID for 1 month then 180 mg daily for 1 month then off followed by taper off Tacrolimus.    - Continue PTA Tacrolimus (goal of 4-6) and Mycophenolic acid (goal of 1-3.5)     5. CAD s/p bypass graft and HTN: Continue PTA cardiac medications.     6. Anemia of chronic disease: stable with Hgb of 11.9     Diet: Advance Diet as Tolerated: Clear Liquid Diet; 5784-0460 Calories: Moderate Consistent CHO (4-6 CHO units/meal)    DVT Prophylaxis: Pneumatic Compression Devices  Mueller Catheter: not present  Code Status: Full Code      Disposition Plan   Expected discharge: 1-2 days, recommended to prior living arrangement once diet advanced, stable blood glucose.  Entered: Marcelle Loera MD 05/14/2020, 9:43 AM       The patient's care was discussed with the Bedside Nurse and Patient.    Hospitalist Service  [facility]          Interval History (Subjective):      Patient was seen with his bedside nurse this morning while on hemodialysis.  He states that he continues to feel nauseous and has dry heaves.  He has tried taking sips of water but has emesis after this.  He only has abdominal pain with dry heaving.  Diarrhea has resolved.  Denies chest pain or shortness of breath.    States that at baseline he does not have daily nausea and vomiting but has issues with intermittent severe episodes.                  Physical Exam:      Last Vital Signs:  BP (!) 151/96   Pulse 94   Temp 98.5  F (36.9  C)   Resp 12   Ht 1.676 m (5'  "6\")   Wt 80.9 kg (178 lb 5.6 oz)   SpO2 98%   BMI 28.79 kg/m      General: Alert, awake, no acute distress.  HEENT: Normocephalic and atraumatic, eyes anicteric and without scleral injection, EOMI, face symmetric, MMM.  Cardiac: RRR, normal S1, S2. No m/g/r, no LE edema.  Pulmonary: Normal chest rise, normal work of breathing.  Lungs CTAB without crackles or wheezing.  Abdomen: soft, non-tender, non-distended.  Normoactive bowel sounds, no guarding or rebound tenderness.  Extremities: no deformities.  Warm, well perfused.  Skin: no rashes or lesions.  Warm and Dry.  Neuro: No focal deficits.  Speech clear.  Coordination and strength grossly normal.  Psych: Alert and oriented x3. Appropriate affect.         Medications:      All current medications were reviewed with changes reflected in problem list.         Data:      All new lab and imaging data was reviewed.   Labs:  Recent Labs   Lab 05/14/20 0519 05/13/20  1315 05/13/20  0637    137 129*   POTASSIUM 4.0 3.7 4.3   CHLORIDE 100 101 93*   CO2 26 26 17*   ANIONGAP 10 10 19*   * 220* 596*   BUN 44* 41* 35*   CR 7.05* 5.98* 5.69*   GFRESTIMATED 9* 11* 11*   GFRESTBLACK 10* 12* 13*   CHARLY 9.3 9.1 9.0     Recent Labs   Lab 05/14/20 0519 05/13/20  1315 05/13/20  0637   WBC 10.7  --  8.8   HGB 10.9*  --  11.9*   HCT 34.2*  --  37.8*   MCV 92  --  92    224 261      Imaging:   No results found for this or any previous visit (from the past 24 hour(s)).    Marcelle Loera MD         "

## 2020-05-14 NOTE — PROGRESS NOTES
Patient discharged and wheeled down. Dizzy and sweating, brought up to his room, BP 84/57, . Patient transferred in bed, patient laid trendelenburg position. /62. MD called and notified, but patient stated his going home. Patient sit up, denied dizziness, /63.

## 2020-05-15 ENCOUNTER — VIRTUAL VISIT (OUTPATIENT)
Dept: PEDIATRICS | Facility: CLINIC | Age: 45
End: 2020-05-15
Payer: COMMERCIAL

## 2020-05-15 ENCOUNTER — TELEPHONE (OUTPATIENT)
Dept: PEDIATRICS | Facility: CLINIC | Age: 45
End: 2020-05-15

## 2020-05-15 DIAGNOSIS — I10 HYPERTENSION GOAL BP (BLOOD PRESSURE) < 140/90: ICD-10-CM

## 2020-05-15 DIAGNOSIS — Z94.0 KIDNEY REPLACED BY TRANSPLANT: ICD-10-CM

## 2020-05-15 DIAGNOSIS — R11.0 NAUSEA: Primary | ICD-10-CM

## 2020-05-15 DIAGNOSIS — E78.5 HYPERLIPIDEMIA WITH TARGET LDL LESS THAN 100: ICD-10-CM

## 2020-05-15 DIAGNOSIS — E86.0 DEHYDRATION: ICD-10-CM

## 2020-05-15 DIAGNOSIS — D84.9 IMMUNOSUPPRESSED STATUS (H): ICD-10-CM

## 2020-05-15 PROBLEM — N17.9 AKI (ACUTE KIDNEY INJURY) (H): Status: RESOLVED | Noted: 2020-01-03 | Resolved: 2020-05-15

## 2020-05-15 PROBLEM — R11.2 INTRACTABLE NAUSEA AND VOMITING: Status: RESOLVED | Noted: 2020-01-27 | Resolved: 2020-05-15

## 2020-05-15 PROBLEM — R11.10 VOMITING: Status: RESOLVED | Noted: 2020-02-11 | Resolved: 2020-05-15

## 2020-05-15 PROCEDURE — 99495 TRANSJ CARE MGMT MOD F2F 14D: CPT | Mod: 95 | Performed by: INTERNAL MEDICINE

## 2020-05-15 RX ORDER — ONDANSETRON 4 MG/1
4 TABLET, ORALLY DISINTEGRATING ORAL EVERY 8 HOURS PRN
Qty: 30 TABLET | Refills: 1 | Status: ON HOLD | OUTPATIENT
Start: 2020-05-15 | End: 2021-03-01

## 2020-05-15 NOTE — TELEPHONE ENCOUNTER
Please contact patient for In-patient follow up.  [phone] (home)     Visit date: 051420  Diagnosis listed: Diabetic Ketoacidosis Without Coma Associated With Type 1 Diabetes Mellitus (H)  Number of visits in past 12 months: 5 ED / 5 IP

## 2020-05-15 NOTE — PROGRESS NOTES
"Murtaza Fitzgerald is a 44 year old male who is being evaluated via a billable video visit.      The patient has been notified of following:     \"This video visit will be conducted via a call between you and your physician/provider. We have found that certain health care needs can be provided without the need for an in-person physical exam.  This service lets us provide the care you need with a video conversation.  If a prescription is necessary we can send it directly to your pharmacy.  If lab work is needed we can place an order for that and you can then stop by our lab to have the test done at a later time.    Video visits are billed at different rates depending on your insurance coverage.  Please reach out to your insurance provider with any questions.    If during the course of the call the physician/provider feels a video visit is not appropriate, you will not be charged for this service.\"    Patient has given verbal consent for Video visit? Yes    How would you like to obtain your AVS? Danie    Patient would like the video invitation sent by: Text to cell phone: [phone]    Will anyone else be joining your video visit? No    Subjective     Murtaza Fitzgerald is a 44 year old male who presents today via video visit for the following health issues:    [facility]    Hospital Follow-up Visit:    Hospital/Nursing Home/IP Rehab Facility: [facility]  Date of Admission: 05/13/20  Date of Discharge: /05/14/20  Reason(s) for Admission:       Discharge Diagnoses:  1. DM1 with DKA  2. Nausea, vomiting and diarrhea  3. ESRD on HD  4. History of renal and pancreas transplant  5. CAD  6. AOCD      Was your hospitalization related to COVID-19? No   Problems taking medications regularly:  None, pt states his nausea is not getting better   Medication changes since discharge: None  Problems adhering to non-medication therapy:  None    Summary of hospitalization:  [facility] discharge " [facility] PT DAILY TREATMENT NOTE - [facility] 2-15    Patient Name: Darrian Villarreal  Date:2019  : 1981  [x]  Patient  Verified  Payor: Rowena Price / Plan: Juan Miguel Zheng [id number] PPO / Product Type: PPO /    In time: 812a Out time: 940a  Total Treatment Time (min): 80  Total Timed Codes (min): 80  1:1 Treatment Time ( only): 60  Visit #:  19    Treatment Area: Left ankle pain [M25.572]    SUBJECTIVE  Pain Level (0-10 scale): 0/10  Any medication changes, allergies to medications, adverse drug reactions, diagnosis change, or new procedure performed?: [x] No    [] Yes (see summary sheet for update)  Subjective functional status/changes:   [] No changes reported  Patient reports he is not worse from last session.      OBJECTIVE    Modality rationale: decrease inflammation and decrease pain to improve the patients ability to perform ADLs and reduce pain levels   Min Type Additional Details       [] Estim: []Att   []Unatt    []TENS instruct                  []IFC  []Premod   []NMES                     []Other:  []w/US   []w/ice   []w/heat  Position:  Location:       []  Traction: [] Cervical       []Lumbar                       [] Prone          []Supine                       []Intermittent   []Continuous Lbs:  [] before manual  [] after manual  []w/heat    []  Ultrasound: []Continuous   [] Pulsed                       at: []1MHz   []3MHz Location:  W/cm2:    [] Paraffin         Location:   []w/heat   To go []  Ice     []  Heat  []  Ice massage Position:  Location:    []  Laser  []  Other: Position:  Location:      []  Vasopneumatic Device Pressure:       [] lo [] med [] hi   Temperature:      [x] Skin assessment post-treatment:  [x]intact []redness- no adverse reaction    []redness  adverse reaction:     40 min Therapeutic Exercise:  [x] See flow sheet :   Rationale: increase ROM and increase strength to improve the patients ability to perform ADLs and reduce pain levels    15 min Neuromuscular Re-education:  [x]  See "summary reviewed  Diagnostic Tests/Treatments reviewed.  Follow up needed: none  Other Healthcare Providers Involved in Patient s Care:         None  Update since discharge: improved.   Post Discharge Medication Reconciliation: discharge medications reconciled, continue medications without change.  Plan of care communicated with patient          Woke up this past Monday, nauseous.  Worsened and had vomiting frequently. Was in DKA when admitted; kept 2 nights and discharged. (Had wanted to keep him another night, but wanted to leave)    Today is nauseous, but no vomiting.  No fevers.  No sore throat, no cough.  Mild stomach ache.  Urinating normal.  Ate some breakfast.      Is on dialysis now, and is tapering off his rejection meds; to possibly get another kidney and pancreas.  Getting TRS dialysis. Tolerating well.  Uses port in chest; is getting a vein mapping soon for fistula placement tp tide him over until posssilbe new kidney transplant.     Lantus: 24 units QHS. Using 6 units per carb of novolog; to get pump soon while awaiting possible pancreas trasplant    For nausea; taking compazine and reglan. Still with nausea, however.  If takes compazine and zofran together, this can work. Nausea is worse when first wakes up; then after breakfast, can feel worse.  Or, if vomits, will be \"fine the rest of the day.\"  Weight has been mostly stable;     Surgars today:  255 when awakening.  Not checking urine for ketones.     Hospital Course:  Murtaza Fitzgerald is a 44 year old male with past medical history of DM1 with associated gastroparesis, ESRD on HD (after failed renal transplant in 11/2008 due to diabetic nephropathy), s/p pancreatic transplant (2/2009) on immunosuppressive medications, CAD s/p CABG, HTN and GERD who was admitted on 5/13/2020 with nausea, emesis and diarrhea and was found to have DKA.  Treated initially with insulin drip.  Had planned to transition back to subcutaneous insulin this evening " flow sheet : wobble board ankle plantarflexion with cueing for isolated movement balancing body weight over ankle; manual resistance isokinetic resistance ankle dorsiflexion/plantarflexion and inversion/eversion   Rationale: increase ROM, increase strength, improve coordination, improve balance and increase proprioception  to improve the patients ability to maintain dynamic balance with activity        25 min Manual Therapy:  STM/MFR incision (lateral mobilization) and gastroc; A-P talocrural mobs grade 3-4   Rationale: decrease pain, increase ROM and increase tissue extensibility  to improve the patients ability to perform ADLs and reduce pain levels                         With   [] TE   [] TA   [] neuro   [] other: Patient Education: [x] Review HEP    [] Progressed/Changed HEP based on:   [] positioning   [] body mechanics   [] transfers   [] heat/ice application    [] other:      Other Objective/Functional Measures: none noted     Pain Level (0-10 scale) post treatment: 0/10    ASSESSMENT/Changes in Function:   More aggressive with scar mob medial/lateral mobilization. Advance with single limb eccentric ankle plantarflexion and was then able to perform lunges without pain and correct technique. Patient will continue to benefit from skilled PT services to modify and progress therapeutic interventions, address functional mobility deficits, address ROM deficits, address strength deficits, analyze and address soft tissue restrictions, analyze and cue movement patterns, analyze and modify body mechanics/ergonomics and assess and modify postural abnormalities to attain remaining goals. []  See Plan of Care  []  See progress note/recertification  []  See Discharge Summary         Progress towards goals / Updated goals:  Short Term Goals: To be accomplished in 3-4 weeks:  1)  Pt will be independent with HEP.  MET  2) Pt will demonstrate >/= 6 degrees to be able to ambulate on level surface with more normalized but patient felt much better, tolerated a regular diet and requested discharge home today.      1. DM1 with DKA: Had several days of nausea, vomiting and diarrhea PTA.  Initial blood glucose of 596 with AGMA and ketones elevated to 6.1.  Started on insulin drip.  PTA he is on Lantus 24 units QHS, Aspart sliding scale as well as aspart 1 unit per 6 grams of carbohydrates with meals.  Currently no evidence of infection.  DKA likely from GI symptoms and not taking insulin.  He was feeling much better on the afternoon of discharge. Tolerated a regular diet.  He is not willing to remain in the hospital any longer.  He will be given his regular Lantus dosing early (prior to discharge) and continue with his PTA sliding scale insulin and carb counting regimen.     2. Intractable nausea, vomiting, diarrhea - Resolved: onset of nausea and vomiting the evening of 5/10 with inability to tolerate any PO except for ice chips the last few days with onset of nonbloody diarrhea with 3-4 episodes on 5/12. Diarrhea has resolved. Does have gastroparesis and intermittent episodes of GI symptoms.  No leukocytosis or fever.  COVID-19 was checked and negative.  He tolerated a regular diet this afternoon.  Requested discharge.  States that sometimes when he has this his symptoms resolve after having HD.  Has antiemetics at home already if recurrence of symptoms.     3. ESRD on hemodialysis: Dialysis every T/Th/Sat.  Nephrology consulted, HD on 5/14.  Resume regular schedule upon discharge.     4. S/p renal transplant (2008) and kidney transplant (2009): transplanted pancreas failed in 2012 and transplanted kidney function declined due to recurrent SWATHI episodes. Based on last transplant telemedicine visit on 4/9/20 the patient is to continue on immunosuppression with plans to taper off Mycophenolic acid from 180 mg BID for 1 month then 180 mg daily for 1 month then off followed by taper off Tacrolimus.  Continue PTA medications.     5. CAD  gait without increase of pain MET  3) Pt will be able to navigate stairs with step over step pattern without pain. MET      Long Term Goals: To be accomplished in 20-24 weeks:  1)  Pt will be able to squat to the ground without deviation and even distribution of weight. 2) Pt will be able to perform jumping motion without valgus collapse of knee durign concentric and eccentric phases   3) Pt will be able to run >/= 1 mile without pain. 4) Pt will be able to perform lateral cutting movements without pain or instability.   5) Pt will be able to demonstrate rotational control with jumping movements >/= to contralateral limb.   . .     PLAN  [x]  Upgrade activities as tolerated     [x]  Continue plan of care  [x]  Update interventions per flow sheet       []  Discharge due to:_  []  Other:_      Sveta Werner PT, DPT 9/17/2019 s/p bypass graft and HTN: Continue PTA cardiac medications.     6. 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)     Coronary artery disease involving native coronary artery of native heart without angina pectoris     Adhesive capsulitis of right shoulder     Dehydration     Past Surgical History:   Procedure Laterality Date     ABDOMEN SURGERY       ARTHROSCOPY KNEE WITH LATERAL MENISCECTOMY Left 7/10/2019    Procedure: Left knee arthroscopic partial lateral meniscectomy;  Surgeon: Alex Candelaria MD;  Location: [facility] OR     BIOPSY      [facility] 2009     BYPASS GRAFT ARTERY CORONARY N/A 8/5/2015    Procedure: BYPASS GRAFT ARTERY CORONARY;  Surgeon: Katy Neville MD;  Location: [facility] OR     CORONARY ANGIOGRAPHY ADULT ORDER      2015     ESOPHAGOSCOPY, GASTROSCOPY, DUODENOSCOPY (EGD), COMBINED N/A 1/28/2020    Procedure: ESOPHAGOGASTRODUODENOSCOPY (EGD) biopsies w/forceps;  Surgeon: Emre Gomes MD;  Location:  GI     EYE SURGERY      vitrectomy both eyes      IR CVC TUNNEL PLACEMENT > 5 YRS OF AGE  2/13/2020     ORTHOPEDIC SURGERY  1993    tumor removed from left knee     TRANSPLANT  2009.2008    pancrease and kidney transplant       Social History     Tobacco Use     Smoking status: Former Smoker     Packs/day: 0.30     Types: Vaping Device     Smokeless tobacco: Never Used     Tobacco comment: E- Cig    Substance Use Topics     Alcohol use: Not Currently     Alcohol/week: 0.0 standard drinks      Frequency: Never     Binge frequency: Never     Family History   Problem Relation Age of Onset     Unknown/Adopted Father      Heart Disease Maternal Grandfather 62         Current Outpatient Medications   Medication Sig Dispense Refill     acetaminophen (TYLENOL) 325 MG tablet Take 2 tablets (650 mg) by mouth every 4 hours as needed for mild pain 50 tablet 0     atorvastatin (LIPITOR) 40 MG tablet Take 40 mg by mouth daily       insulin aspart (NOVOLOG FLEXPEN) 100 UNIT/ML pen 1 unit per 6 grams of carbs with each meal. About 45 units/day. 15 mL 3     insulin aspart (NOVOLOG FLEXPEN) 100 UNIT/ML pen Sliding scale = 1 unit for every 20 over blood glucose of 140       insulin glargine (LANTUS PEN) 100 UNIT/ML pen Inject 24 Units Subcutaneous At Bedtime 15 mL 1     metoclopramide (REGLAN) 5 MG tablet Take 1 tablet (5 mg) by mouth 4 times daily (before meals and nightly) 120 tablet 11     mycophenolic acid (GENERIC EQUIVALENT) 180 MG EC tablet Take 360 mg by mouth 2 times daily       omeprazole (PRILOSEC) 40 MG DR capsule Take 40 mg by mouth every evening       ondansetron (ZOFRAN-ODT) 4 MG ODT tab Take 1 tablet (4 mg) by mouth every 8 hours as needed for nausea 30 tablet 1     prochlorperazine (COMPAZINE) 5 MG tablet Take 1 tablet (5 mg) by mouth every 8 hours as needed for nausea 20 tablet 0     tacrolimus (GENERIC EQUIVALENT) 1 MG capsule Take 1 mg by mouth 2 times daily  60 capsule 11     torsemide (DEMADEX) 20 MG tablet Take on Sunday Monday Wednesday and friday       acetone, Urine, test STRP 1 strip by In Vitro route daily (Patient not taking: Reported on 2/26/2020) 50 each 3     blood glucose (NO BRAND SPECIFIED) test strip Use to test blood sugar 5 times daily or as directed. 1 Box 0     insulin pen needle (BD ARPITA U/F) 32G X 4 MM Use 4 daily or as directed. 120 each 11     Allergies   Allergen Reactions     Benadryl [Diphenhydramine] Other (See Comments)     Restless legs     Reglan Other (See Comments)      IV, delsuions     Recent Labs   Lab Test 05/14/20  0519 05/13/20  1315 05/13/20  0637 03/31/20  0809  02/11/20  0841  01/28/20  0629  12/31/19  1144  11/19/19  0900 10/22/19  0908 08/28/19  0900   A1C  --   --  6.9*  --   --  7.0*  --  6.7*  --   --   --  6.7*  --   --    LDL  --   --   --   --   --  88  --   --   --   --   --  145*  --  94   HDL  --   --   --   --   --  46  --   --   --   --   --  39*  --  32*   TRIG  --   --   --   --   --  159*  --   --   --   --   --  184*  --  133   ALT 13  --  15 9   < >  --    < > 13   < >  --   --   --   --   --    CR 7.05* 5.98* 5.69* 5.87*   < > 5.93*   < > 5.56*   < > 4.78*   < > 3.84* 3.18* 2.55*   GFRESTIMATED 9* 11* 11* 11*   < > 11*   < > 12*   < > 14*   < > 18* 23* 30*   GFRESTBLACK 10* 12* 13* 13*   < > 12*   < > 13*   < > 16*   < > 21* 26* 35*   POTASSIUM 4.0 3.7 4.3 4.0   < > 4.0   < > 4.1   < > 3.8   < > 3.7 3.8 4.5   TSH  --   --   --   --   --   --   --   --   --  1.98  --   --  1.76  --     < > = values in this interval not displayed.      BP Readings from Last 3 Encounters:   05/14/20 131/78   05/04/20 (!) 143/94   03/31/20 (!) 181/107    Wt Readings from Last 3 Encounters:   05/14/20 80.9 kg (178 lb 5.6 oz)   05/06/20 83.9 kg (185 lb)   05/04/20 85.4 kg (188 lb 3.2 oz)                    Reviewed and updated as needed this visit by Provider         Review of Systems   CONSTITUTIONAL: NEGATIVE for fever, chills, change in weight  INTEGUMENTARY/SKIN: NEGATIVE for worrisome rashes, moles or lesions  EYES: NEGATIVE for vision changes or irritation  ENT/MOUTH: NEGATIVE for ear, mouth and throat problems  RESP: NEGATIVE for significant cough or SOB  BREAST: NEGATIVE for masses, tenderness or discharge  CV: NEGATIVE for chest pain, palpitations or peripheral edema  GI: NEGATIVE for nausea, abdominal pain, heartburn, or change in bowel habits  : NEGATIVE for frequency, dysuria, or hematuria  MUSCULOSKELETAL: NEGATIVE for significant arthralgias or myalgia  NEURO:  "NEGATIVE for weakness, dizziness or paresthesias  ENDOCRINE: NEGATIVE for temperature intolerance, skin/hair changes  HEME: NEGATIVE for bleeding problems  PSYCHIATRIC: NEGATIVE for changes in mood or affect      Objective    There were no vitals taken for this visit.  Estimated body mass index is 28.79 kg/m  as calculated from the following:    Height as of 5/13/20: 1.676 m (5' 6\").    Weight as of 5/14/20: 80.9 kg (178 lb 5.6 oz).  Physical Exam     GENERAL: Healthy, alert and no distress  EYES: Eyes grossly normal to inspection.  No discharge or erythema, or obvious scleral/conjunctival abnormalities.  RESP: No audible wheeze, cough, or visible cyanosis.  No visible retractions or increased work of breathing.    SKIN: Visible skin clear. No significant rash, abnormal pigmentation or lesions.  NEURO: Cranial nerves grossly intact.  Mentation and speech appropriate for age.  PSYCH: Mentation appears normal, affect normal/bright, judgement and insight intact, normal speech and appearance well-groomed.      Diagnostic Test Results:  Labs reviewed in Epic        Assessment & Plan     (R11.0) Nausea  (primary encounter diagnosis)  Comment:   Plan: ondansetron (ZOFRAN-ODT) 4 MG ODT tab        In past, [email or web address] compazine and zofran have worked for him.   Will refill zofran; has enough compazine.  Follow up as needed.      (Z94.0) Kidney replaced by transplant  Comment:   Plan: Now with three times per week dialisys; awaiting possible new kidney.     (D89.9) Immunosuppressed status (H)  Comment:   Plan: Remains immunosuppressed until transplant team tapers off rejection meds; uncertain when or if this will occur .    (I10) Hypertension goal BP (blood pressure) < 140/90  Comment:   Plan: controlled.     (E78.5) Hyperlipidemia with target LDL less than 100  Comment:   Plan: statin.     (E86.0) Dehydration  Comment:   Plan: No evidence of DKA or anion gap on yesterday's labs.      diabetes mellitus:  Continue current " "lantus and novolog; diabetes blood test (A1c) controlled.  Discussed importance of drinking, eating, and insulin.           BMI:   Estimated body mass index is 28.79 kg/m  as calculated from the following:    Height as of 5/13/20: 1.676 m (5' 6\").    Weight as of 5/14/20: 80.9 kg (178 lb 5.6 oz).   Weight management plan: Patient referred to endocrine and/or weight management specialty        See Patient Instructions    No follow-ups on file.    Kt Ríos MD  [facility]      Video-Visit Details    Type of service:  Video Visit    Video End Time:2:24 PM    Originating Location (pt. Location): Home    Distant Location (provider location):  [facility]     Platform used for Video Visit: Chilltime    No follow-ups on file.       Kt Ríos MD        "

## 2020-05-15 NOTE — TELEPHONE ENCOUNTER
"ED/Discharge Protocol    \"Hi, my name is Rosalia May RN, a registered nurse, and I am calling on behalf of Dr. Ríos's office at [facility].  I am calling to follow up and see how things are going for you after your recent visit.\"    \"I see that you were in the (ER/UC/IP) on 5/13-5/14 for nausea, vomiting, diarrhea and found to have DKA.    How are you doing now that you are home?\" \"Nausea has started to come back, but otherwise doing OK\"    Is patient experiencing symptoms that may require a hospital visit?  No    Discharge Instructions    \"Let's review your discharge instructions.  What is/are the follow-up recommendations?  Pt. Response: Follow up with PCP    \"Were you instructed to make a follow-up appointment?\"  Pt. Response: Yes.  Has appointment been made?   No.  \"Can I help you schedule that appointment?\" Video visit scheduled for today      \"When you see the provider, I would recommend that you bring your discharge instructions with you.    Medications    \"How many new medications are you on since your hospitalization/ED visit?\"    0  \"How many of your current medicines changed (dose, timing, name, etc.) while you were in the hospital/ED visit?\"   0  \"Do you have questions about your medications?\"   No  \"Were you newly diagnosed with heart failure, COPD, diabetes or did you have a heart attack?\"   No  For patients on insulin: \"Did you start on insulin in the hospital or did you have your insulin dose changed?\"   Was on insulin drip due to DKA, but no changes made to insulin regimen at home  Was MTM referral placed (*Make sure to put transitions as reason for referral)?   No    Call Summary    \"Do you have any questions or concerns about your condition or care plan at the moment?\"    No  Triage nurse advice given: N/a Video visit hospital f/u scheduled for 5/15/20    \"If you have questions or things don't continue to improve, we encourage you contact us through the main clinic number,  [phone].  " "Even if the clinic is not open, triage nurses are available 24/7 to help you.     We would like you to know that our clinic has extended hours (provide information).  We also have urgent care (provide details on closest location and hours/contact info)\"      \"Thank you for your time and take care!\"    Rosalia ARMENTA RN          "

## 2020-05-15 NOTE — PROGRESS NOTES
"   Transition Communication Hand-off for Care Transitions to Next Level of Care Provider    Name: Murtaza Fitzgerald  : 1975  MRN #: [id number]  Primary Care Provider: Kt Ríos  Primary Care MD Name: Dr. Ríos  Primary Clinic: [address] [place] [address] [address] [place] [address]  Primary Care Clinic Name: [facility]  Reason for Hospitalization:  ESRD (end stage renal disease) on dialysis (H) [N18.6, Z99.2]  Nausea vomiting and diarrhea [R11.2, R19.7]  Diabetic ketoacidosis without coma associated with type 1 diabetes mellitus (H) [E10.10]  Admit Date/Time: 2020  6:24 AM  Discharge Date: 20  Payor Source: Payor: BLUE PLUS / Plan: BLUE PLUS [employer] / Product Type: HMO /     Readmission Assessment Measure (BRAD) Risk Score/category: High           Reason for Communication Hand-off Referral: Non-adherence to plan of care related to:  Other DKA    Discharge Plan:       Concern for non-adherence with plan of care:   YES  Discharge Needs Assessment: diabetes management      Already enrolled in Tele-monitoring program and name of program:  none  Follow-up specialty is recommended: Yes    Follow-up plan:  No future appointments.    Any outstanding tests or procedures:                Key Recommendations:  RN CTS following d/t High BRAD and admission for DKA. Patient has type 1 Diabetes with an A1C 6.9. Patient states no issue or concern with obtaining medications. Patient states \"I pretty much eat what I want within reason.\" Patient declined offer of additional education at this time regarding nutrition. Patient given educational resource for DKA. Reviewed symptoms of DKA and when to seek care. Patient understanding, stating \"I've had this 2 or 3 times before.\"   Patient has had 4 other hospitalizations within the previous 6 months for gastroparesis, ketosis, and started dialysis in 2020. Patient has a history of gastroparesis, CAD, HTN, and a pancreatic transplant in . " Patient follows with transplant at [facility].     Please follow up with patient for community and diabetic support and for resolution of acute illness.     Drea Draper MA/RN Case Manager  Inpatient Care Coordination  [facility]   [phone]    AVS/Discharge Summary is the source of truth; this is a helpful guide for improved communication of patient story

## 2020-05-18 ENCOUNTER — PATIENT OUTREACH (OUTPATIENT)
Dept: CARE COORDINATION | Facility: CLINIC | Age: 45
End: 2020-05-18

## 2020-05-18 NOTE — PROGRESS NOTES
Clinic Care Coordination Contact    Follow Up Progress Note      Patient admitted to [facility] 5/13-5/14 for treatment of DKA. Admitting blood sugar was 596.  Likely secondary to GI symptoms and not taking insulin.  Patient improved with treatment and was discharged home.    CTS did follow while IP and writer did received handoff.    Assessment: Spoke with patient over phone. He is feeling much better.  He explains that he had a gastroparesis flare that caused severe N/V/D.  He was not able to keep any PO down and didn't take insulin which is why he went into DKA.  Patient endorses having issues with gastroparesis intermittently.  Patient states his blood sugar this morning was 126.  Denies recurrent of N/V.  He is able to keep food down.    Patient has not had any follow up regarding goal.  He is still waiting to hear back from SSDI and [facility] care.  Goal is ongoing. Will update [facility].    Goals addressed this encounter:   Goals Addressed                 This Visit's Progress      finances   On track     Goal Statement: Patient with new HD.  He is not able to drive and therefore cannot work.  Patient will apply for and get approved for SSDI.  Patient was without insurance for a few months.  He will have active insurance as of 1 March 20, however he is concerned about hospital bills he accumulated between 1 jan and 1 march.  Date Goal set: 2/24/20  Barriers: patient is attempting to do SSDI application on own.  He is not familiar with services that offer assistance for this.  Strengths: patient well supported by family.  Receptive to help/feedback.  Date to Achieve By: 1 June 2020  Patient expressed understanding of goal: yes  Action steps to achieve this goal:   1. RN CC will provide information on  to assist with SSDI process.  2. I will follow up with providers as recommended.  3. I will reach out to RN CC for assistance with medical bills as they come in.    As of today's date 5/18/2020 goal is met  at 51 - 75%.   Goal Status:  Showing progress - patient states he was told he was approved for MA, but has not received a card yet.  He has not heard back from SSDI yet.  He has not heard if [facility] care was approved or not.    As of today's date 4/1/2020 goal is met at 26 - 50%.   Goal Status:  Ongoing  Patient has not heard back from SSDI yet.  Patient filed on his own. Did not use . Patient has many hospital bills.  Interested  in applying for [facility] care.  Will refer to FRW.  As of today's date 3/23/2020 goal is met at 26 - 50%.   Goal Status:  Showing progress  Patient has applied for SSDI. He has sent in all needed documentation, just waiting to hear back from them now.                 Intervention/Education provided during outreach: No new needs identified.  In CTS handoff it stated patient was homeless, in fact he is not. He is Living with daughter and IRMA.  They are planning to sell home in a few months, at that time patient plans to move in with his mother in [place].  Patient does not plan to change PCP.     Medication reconciliation status: Medications reviewed and reconciled.  Continue medications without change.    Outreach Frequency: 6 weeks    Plan: Patient will follow up with providers as recommended.  RN CC will update FRW on harper care status.   Patient denied any new needs/concerns.    Marcie Pastrana RN-BSN  Care Coordination  Phone:  [phone]  Email: [email or web address]  [facility], [place], [place] and [facility]

## 2020-05-18 NOTE — LETTER
Dear Murtaza,     Oswaldo is an updated Complex Care Plan for your continued enrollment in Care Coordination. Please let us know if you have additional questions, concerns, or goals that we can assist with.    Sincerely,    Marcie Pastrana RN          [facility]  Complex Care Plan  About Me:    Patient Name:  Murtaza Landeros    YOB: 1975  Age:         44 year old   [facility] MRN:    [id number] Telephone Information:  Home Phone [phone]   Mobile [phone]       Address:  [address] [place] [address] Email address:  [email or web address]      Emergency Contact(s)    Name Relationship Lgl Grd Work Phone Home Phone Mobile Phone   1. LEXA LANDEROS Mother No  [phone]   2. ANA CARTER Daughter No  [phone]           Primary language:  English     needed? No   [facility]:  [phone] op. 1  Other communication barriers: None  Preferred Method of Communication:  Danie  Current living arrangement:    Mobility Status/ Medical Equipment:      Health Maintenance  Health Maintenance Reviewed: Not assessed    My Access Plan  Medical Emergency 911   Primary Clinic Line [facility] - [phone]   24 Hour Appointment Line [phone] or  [phone]) (toll-free)   24 Hour Nurse Line [phone] (toll-free)   Preferred Urgent Care     Preferred Hospital     Preferred Pharmacy [facility] [place], [place] - [address]     Behavioral Health Crisis Line The National Suicide Prevention Lifeline at 1-422.700.4261 or 911             My Care Team Members  Patient Care Team       Relationship Specialty Notifications Start End    Kt Ríos MD PCP - General Internal Medicine  9/27/13     Phone: [phone] Fax: [phone] [address] [place] [address]    Sung Jorgensen MD MD Nephrology  5/11/12     Phone:  [phone] Fax: [phone]         [address] [facility] [address] [facility] [address] [facility] [address]    Kt Ríos MD Assigned PCP   1/1/17     Phone: [phone] Fax: [phone] [address]  [place] [address]    Vania Gutierrez, RN Diabetes Educator Diabetes Education  8/28/18     Phone: [phone] Fax: [phone]        Marcie Pastrana, RN Lead Care Coordinator Primary Care - CC  2/10/20     Phone: [phone]         Mirta Daniel Financial Resource Worker   4/1/20     P, [phone] F. [phone]    Roxy Fernandez Financial Resource Worker   4/1/20     P. [phone]            My Care Plans  Self Management and Treatment Plan  Goals and (Comments)  Goals        General    finances     Notes - Note edited  5/18/2020  1:26 PM by Marcie Pastrana, RN    Goal Statement: Patient with new HD.  He is not able to drive and therefore cannot work.  Patient will apply for and get approved for SSDI.  Patient was without insurance for a few months.  He will have active insurance as of 1 March 20, however he is concerned about hospital bills he accumulated between 1 jan and 1 march.  Date Goal set: 2/24/20  Barriers: patient is attempting to do SSDI application on own.  He is not familiar with services that offer assistance for this.  Strengths: patient well supported by family.  Receptive to help/feedback.  Date to Achieve By: 1 June 2020  Patient expressed understanding of goal: yes  Action steps to achieve this goal:   1. RN CC will provide information on  to assist with SSDI process.  2. I will follow up with providers as recommended.  3. I will reach out to RN CC for assistance with medical bills as they come in.    As of today's date 5/18/2020 goal is met at 51 - 75%.   Goal Status:  Showing progress - patient states he was told he was approved for MA, but has not received a card yet.  He has not heard back from SSDI yet.  He has not heard if [facility] was  approved or not.    As of today's date 4/1/2020 goal is met at 26 - 50%.   Goal Status:  Ongoing  Patient has not heard back from SSDI yet.  Patient filed on his own. Did not use . Patient has many hospital bills.  Interested  in applying for jocelyne care.  Will refer to FRW.  As of today's date 3/23/2020 goal is met at 26 - 50%.   Goal Status:  Showing progress  Patient has applied for SSDI. He has sent in all needed documentation, just waiting to hear back from them now.          Problem Solving (pt-stated)     Notes - Note created  8/28/2018  3:15 PM by Vania Gutierrez RN    Goal Statement: keep a fast acting carbohydrate with at all times  Measure of Success: patient to report  Supportive Steps to Achieve: patient to stop at pharmacy and  glucose tabs  Barriers: none   Strengths: has a better understanding  Date to Achieve By: 9/10/18  Patient expressed understanding of goal: yes               Action Plans on File:                       Advance Care Plans/Directives Type:        My Medical and Care Information  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    Care Coordination Start Date: 2/24/2020   Frequency of Care Coordination: 6 weeks   Form Last Updated: 05/18/2020

## 2020-05-21 ENCOUNTER — PATIENT OUTREACH (OUTPATIENT)
Dept: CARE COORDINATION | Facility: CLINIC | Age: 45
End: 2020-05-21

## 2020-05-21 ENCOUNTER — DOCUMENTATION ONLY (OUTPATIENT)
Dept: TRANSPLANT | Facility: CLINIC | Age: 45
End: 2020-05-21

## 2020-05-21 NOTE — Clinical Note
Good Afternoon Jyoti: Mr. Tata Fitzgerald was found to have initiated dialysis on 02.13.20.  Date verified with [facility].  Thank You, Jessica Vazquez [facility]

## 2020-05-21 NOTE — PROGRESS NOTES
Received notification of failed Kidney from [facility] routine follow up    Fail is indicated by resumption of dialysis  Date of organ failure was reported as 02.13.20   Cause of failure is reported as Chronic rejection  Biopsy was done at [facility] 12/27/2013  TIS verfication is complete.

## 2020-05-21 NOTE — PROGRESS NOTES
Programs Applying For: Jocelyne Care   [facility]:  Case #:  [facility] Worker:   Cesar #:   PMI #: [id number]  Tracking: For renewal, check coverage in August  Date Applied:     Outreach:  5/21/2020: FRW [place]. FRW called patient and he states he was approved for Medicare, not MA. He states he sent in his jocelyne care application over a month ago and has not heard back yet. FRW advised patient I will contact the billing department to check on the status and follow up with him. FRW called [facility] billing and talked to Bere, she states he was denied to being over the asset limit but he was approved for being medically indigent due to having [place] so a lot of balances were written off. FRW called patient to let him know, he states he has a large balance in his account so we would not qualify for MA either. FRW will remove patient from panel as jocelyne care was denied. Please refer to FRW for future needs.      Closed Encounters:  5/14/2020: FRW received a message stating patient was approved for MA but has not heard back on the jocelyne care yet. FRW is moving outreach out 1 week as patient is in the hosp.  5/6/2020: FRW checked and patient still has a self pay balance. FRW called patient and left a vm with call back information. FRW will make one additional outreach in 1 week.   4/29/2020: [place], [place]. FRW called patient and left a vm with call back information. FRW will make outreach in 1 week.  4/22/2020: [place]W [place] and [place]. FRW called patient and left a vm with call back information. FRW will make outreach in 1 week.   4/16/2020: FRW received a vm from patient stating he mailed his application in yesterday. FRW will make outreach next week to see if he contacted [place].   4/15/2020: FRW called patient and left a vm with call back information. FRW will make outreach in 1 week to see if he received the jocelyne care  application and call [facility] to report income change.   4/1/2020: FRW called patient and completed the jocelyne care application with him. FRW will mail to patient to sign and date and patient will mail to billing department with verifications. Patient's unemployment benefits ran out in Feb and FRW advised patient to call [facility] to report the income change as he may qualify for MA now. FRW will make outreach in 2 weeks to see if patient was able to send in application and call [facility].       Health Insurance Information:     Blue Plus [facility]    Major Programs  This subscriber is eligible for BB: [place] [facility].  Elig Type M5: [place] [facility]  Eligibility Begin Date: 03/01/2020  Eligibility End Date: --/--/----  This subscriber is eligible for the following service types: Medical Care ,  Chiropractic ,  Dental Care ,  Hospital ,  Hospital - Inpatient ,  Hospital - Outpatient ,  Emergency Services ,  Pharmacy ,  Professional (Physician) Visit - Office ,  Vision (Optometry) ,  Mental Health ,  Urgent Care  Prepaid Health Plan  This subscriber receives BB01 - [facility] delivered through [employer]. The phone numbers is [phone] ().      Referral:     Program Interested In:  Assistance with jocelyne care and [employer] medical bills.       Any other information for FRW?   Patient currently has about $23,000 in medical debt

## 2020-05-22 NOTE — TELEPHONE ENCOUNTER
I left a VM that we received a referral for consideration of a kidney transplant and pt.was scheduled with me for this call between 8:30-12:30. Please CB, my contact information was left on the VM.

## 2020-05-22 NOTE — TELEPHONE ENCOUNTER
"I reviewed pt's medical/surgical record to date in preparation for fist Coordinator call in Epic.    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 Pt.was referred by Dr. Guzman for kidney TX.DM Type 1 since age 11. Pt.on dialysis - waiting for 2728.      \"He remained inactive on the pancreas transplant list following a CABG in 2015; however, he was encouraged to follow up with cardiology for clearance to reactive.\" As of 10/2019 notes appears pt.not interested in pancreas. In talking with pt.today he is have some questions about K/P together. Will schedule PKE with Dr. Guzman and PFR checking on if the pt.would have financial clearance for pancreas as well. Pt.states all records through  since his first TX 2013.      S/P CABG X 3 8/15/15/ . CAD DX 2008 . Appears last Cardiology was with  4/6/2016 here with BLANCO Patrick MD .Denies SOB, pulmonary issues, or exercise intolerance , or use of O2. Had 5//13/2020 EKG here.     GERD. Gastroparesis. 1/28/20 EGD and BX noted.     Several hospitalizations in the last year due to gastroparesis and DKA. 5/14/2020 DKA.    Noted non-compliance in the past with no labs for a year. Pt.stated that was a long time ago when \"I was young and foolish\"    Pt.still vapes and states his working on quitting PTFs ordered. Denies alcohol or recreational drug use, or mental health care providers.     Need to follow up on if he is UTD with dental and dermatology.    Pt. stated independent with ADLs.    See further records in Epic .    Contacted patient and introduced myself as his Transplant Coordinator, also introduced the role of the Transplant Coordinator in the transplant process.  Explained the purpose of this call including reviewing next steps and answering questions.    Confirmed Referring Provider, Dialysis Center, and " Primary Care Physician. Notified patient of the importance of continued communication with referring providers and primary care physicians.    Reviewed components of transplant evaluation process including necessary appointments, tests, and procedures.    Answered questions for patient regarding evaluation, provided my name and contact information and requested they call with any additional questions.  I also stated that due to Covid 19 his PKE may be changed to virtual appts. Pt.stated he has capability to do this and I confirmed his email address  Determined that patient would like additional information regarding transplant by:     Drop Down choices: Mail, Email, MyChart, Phone Call   Encourage MyChart   Notified patient that he  will hear from a Transplant  to schedule evaluation. Will have the pt.see Megan Rios as he has questions about K/P. Pt.verbalized understanding of content presented to him today.  Smart set orders generated and routed to  .

## 2020-05-22 NOTE — TELEPHONE ENCOUNTER
I spoke with the pt.that our  was ending out all pre virtual PKE information via his email today and to please read through all the information by 5/26/2020 afternoon. I talked about clicking on the test link to make sure camera and microphone work, wait to sign [employer] and [employer]A forms until after discussion with him after PKE, and verbally went through the MTP and how to connect to  view the modules before PKE. I will contact him 5/26/2020 afternoon to make sure he is all set to go for PKE on 5/27/2020. Pt.verbalized understanding of information discussed with him today.

## 2020-05-26 NOTE — PROGRESS NOTES
Assessment and Plan:  # Kidney/Pancreas Transplant Evaluation: Patient is a good candidate overall. Benefits of a living donor transplant were discussed.    # ESKD from diabetes mellitus type 1: s/p LDKT in November 2008, now failed and back on dialysis since February 2020. He currently remains on full dose myfortic and tacrolimus. He may benefit from a second kidney transplant.     # DM Type 1: s/p ANNMARIE in February 2009, since failed (2/2 rejection around 2012) and on the pancreas wait list for several years (inactive per patient choice). Currently using lantus 24 units and novolog 40-45 units daily. He may benefit from a second pancreas transplant.    # Cardiac Risk: he will need risk assessment given known CAD s/p 3 vessel CABG in 2015 and multiple longstanding risk factors. He had a negative stress test in July 2019. He is asymptomatic with exertion.    # Gastroparesis: he has had multiple recent admits for this. Last GES April 2019 with T1/2 210 minutes, worse when compared to 2017 study (156 min). Most recent EGD January 2020 with negative pathology. Recommend he re-establish care with GI and discuss gastric pacemaker (had previously been discussed but patient declined as symptoms had improved).    # PAD screen: patient had a non-contrast abd/pelv CT on 2/11/2020 in our system that is available for review.    # Health Maintenance: Dermatology: Not up to datederm    Discussed the risks and benefits of a transplant, including the risk of surgery and immunosuppression medications.  Patient's overall evaluation will be discussed in the Transplant Program's regular meeting with a final recommendation on the patients suitability for transplant to be made at that time.  Patient was seen in conjunction with Dr. Jack Chen as part of a shared visit.    Evaluation:  Murtaza Payton Justineffie was seen in consultation at the request of Dr. Melissa Guzman for evaluation as a potential kidney/pancreas transplant  recipient.    Reason for Visit:  Murtaza Fitzgerald is a 44-year-old male with ESKD from failed graft (h/o ESKD from type 1 diabetes s/p LDKT 2008), who presents for kidney/pancreas transplant evaluation.    History of Present Illness:    Mr. Tata Fitzgerald is a 44-year-old male with history of ESKD from type 1 diabetes s/p LDKT November 2008 followed by ANNMARIE February 2009, both since failed (pancreas 2/2 rejection, kidney 2/2 repetitive SWATHI in setting of gastroparesis (no rejection on 2013 biopsy)), on dialysis since February 2020, HTN, CAD s/p 3 vessel CABG, gastroparesis, DKA, former tobacco use, and orthostatic hypotension (not recently an issue for past few years, per patient report). He has been listed on the pancreas list for a few years, but has been inactive per his choice as he would prefer to undergo SPK. Now that he has started dialysis, he is here to consider kidney and pancreas transplant. He has struggled most recently with gastroparesis requiring multiple ER visits and a few admissions. He has not recently seen GI.          Kidney Disease Hx: Kidney function decreasing since November 2019 (likely pre-renal, as above).  Dialysis is going OK. He still has some urine output, but not much.       Kidney Disease Dx: failed graft       Biopsy Proven: No         On Dialysis: Yes, Date initiated: February 2020 and Dialysis Type: [facility]       H/o Kidney Stones: No       H/o Recurrent/Frequent UTI: No         Diabetic Hx: Type 1        Medication History: currently using lantus 24 units and novolog 40-45 units daily       Diabetic Control: Controlled (HbA1c <7%)   Last HbA1c: 6.9%       Hypoglycemic Unawareness: No         Cardiac/Vascular Disease Risk Factors:        Cardiac Risk Factors: Diabetes, Hypertension and CKD       Known CAD: Yes; s/p 3 vessel CABG 2015       Known PAD/Caludication Symptoms: No       Known Heart Failure: No       Arrhythmia: No       Pulmonary Hypertension: No        Valvular Disease: No       Other: None         Functional Capacity/Frailty:        He sometimes goes for walks (~ 1 mile) for exercise, but not frequently. He denies chest pain, SOB, or claudication.    Fatigue/Decreased Energy: [x] No [] Yes    Chest Pain or SOB with Exertion: [x] No [] Yes    Significant Weight Change: [x] No [] Yes    Nausea, Vomiting or Diarrhea: [x] No [] Yes    Fever, Sweats or Chills:  [x] No [] Yes    Leg Swelling [x] No [] Yes        History of Cancer: None    Other Significant Medical Issues:   As above    Review of Systems:  A comprehensive review of systems was obtained and negative, except as noted in the HPI or PMH.    Past Medical History:   Medical record was reviewed and PMH was discussed with patient and noted below.  Past Medical History:   Diagnosis Date     CMV (cytomegalovirus infection) status negative 2011     Coronary artery disease, non-occlusive 2008    angio showed diffuse disease with no lesions     Diabetes mellitus, type 1     Diagnosed at age 11. 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     Noncompliance with treatment     no labs for one year     Pancreas replaced by transplant (H) Feb 2009    rejection treated with thymo     Pancreatic disease     pancreas transplant     Tobacco abuse ongoing       Past Social History:   Past Surgical History:   Procedure Laterality Date     ABDOMEN SURGERY       ARTHROSCOPY KNEE WITH LATERAL MENISCECTOMY Left 7/10/2019    Procedure: Left knee arthroscopic partial lateral meniscectomy;  Surgeon: Alex Candelaria MD;  Location: [facility] OR     BIOPSY      [facility] 2009     BYPASS GRAFT ARTERY CORONARY N/A 8/5/2015    Procedure: BYPASS GRAFT ARTERY  CORONARY;  Surgeon: Katy Neville MD;  Location: [facility] OR     CORONARY ANGIOGRAPHY ADULT ORDER      2015     ESOPHAGOSCOPY, GASTROSCOPY, DUODENOSCOPY (EGD), COMBINED N/A 1/28/2020    Procedure: ESOPHAGOGASTRODUODENOSCOPY (EGD) biopsies w/forceps;  Surgeon: Emre Gomes MD;  Location:  GI     EYE SURGERY      vitrectomy both eyes      IR CVC TUNNEL PLACEMENT > 5 YRS OF AGE  2/13/2020     ORTHOPEDIC SURGERY  1993    tumor removed from left knee     TRANSPLANT  2009.2008    pancrease and kidney transplant     Personal history of bleeding or anesthesia problems: No    Family History:  Family History   Problem Relation Age of Onset     Unknown/Adopted Father      Heart Disease Maternal Grandfather 62       Personal History:   Social History     Socioeconomic History     Marital status: Single     Spouse name: Not on file     Number of children: 1     Years of education: Not on file     Highest education level: Associate degree: academic program   Occupational History     Occupation:    Social Needs     Financial resource strain: Not very hard     Food insecurity     Worry: Never true     Inability: Never true     Transportation needs     Medical: No     Non-medical: No   Tobacco Use     Smoking status: Former Smoker     Packs/day: 0.30     Types: Vaping Device     Smokeless tobacco: Never Used     Tobacco comment: E- Cig    Substance and Sexual Activity     Alcohol use: Not Currently     Alcohol/week: 0.0 standard drinks     Frequency: Never     Binge frequency: Never     Drug use: No     Sexual activity: Never     Partners: Female   Lifestyle     Physical activity     Days per week: 0 days     Minutes per session: 0 min     Stress: To some extent   Relationships     Social connections     Talks on phone: More than three times a week     Gets together: Once a week     Attends [employer] service: Never     Active member of club or organization: No     Attends meetings of clubs or organizations:  Never     Relationship status:      Intimate partner violence     Fear of current or ex partner: Not on file     Emotionally abused: Not on file     Physically abused: Not on file     Forced sexual activity: Not on file   Other Topics Concern     Parent/sibling w/ CABG, MI or angioplasty before 65F 55M? No      Service Not Asked     Blood Transfusions No     Comment: possibly during surgery, otherwise none.     Caffeine Concern Not Asked     Occupational Exposure Not Asked     Hobby Hazards Not Asked     Sleep Concern Not Asked     Stress Concern Not Asked     Weight Concern Not Asked     Special Diet Not Asked     Back Care Not Asked     Exercise Not Asked     Bike Helmet Not Asked     Seat Belt Yes     Self-Exams Not Asked   Social History Narrative     Not on file       Allergies:  Allergies   Allergen Reactions     Benadryl [Diphenhydramine] Other (See Comments)     Restless legs     Reglan Other (See Comments)     IV, delsuions       Medications:  Current Outpatient Medications   Medication Sig     acetaminophen (TYLENOL) 325 MG tablet Take 2 tablets (650 mg) by mouth every 4 hours as needed for mild pain     acetone, Urine, test STRP 1 strip by In Vitro route daily (Patient not taking: Reported on 2/26/2020)     atorvastatin (LIPITOR) 40 MG tablet Take 40 mg by mouth daily     blood glucose (NO BRAND SPECIFIED) test strip Use to test blood sugar 5 times daily or as directed.     insulin aspart (NOVOLOG FLEXPEN) 100 UNIT/ML pen 1 unit per 6 grams of carbs with each meal. About 45 units/day.     insulin aspart (NOVOLOG FLEXPEN) 100 UNIT/ML pen Sliding scale = 1 unit for every 20 over blood glucose of 140     insulin glargine (LANTUS PEN) 100 UNIT/ML pen Inject 24 Units Subcutaneous At Bedtime     insulin pen needle (BD ARPITA U/F) 32G X 4 MM Use 4 daily or as directed.     metoclopramide (REGLAN) 5 MG tablet Take 1 tablet (5 mg) by mouth 4 times daily (before meals and nightly)     mycophenolic  acid (GENERIC EQUIVALENT) 180 MG EC tablet Take 360 mg by mouth 2 times daily     omeprazole (PRILOSEC) 40 MG DR capsule Take 40 mg by mouth every evening     ondansetron (ZOFRAN-ODT) 4 MG ODT tab Take 1 tablet (4 mg) by mouth every 8 hours as needed for nausea     prochlorperazine (COMPAZINE) 5 MG tablet Take 1 tablet (5 mg) by mouth every 8 hours as needed for nausea     tacrolimus (GENERIC EQUIVALENT) 1 MG capsule Take 1 mg by mouth 2 times daily      torsemide (DEMADEX) 20 MG tablet Take on Sunday Monday Wednesday and friday     No current facility-administered medications for this visit.        Exam:  GENERAL: Healthy, alert and no distress  EYES: Eyes grossly normal to inspection.  No discharge or erythema, or obvious scleral/conjunctival abnormalities.  RESP: No audible wheeze, cough, or visible cyanosis.  No visible retractions or increased work of breathing.    SKIN: Visible skin clear. No significant rash, abnormal pigmentation or lesions.  NEURO: Cranial nerves grossly intact.  Mentation and speech appropriate for age.  PSYCH: Mentation appears normal, affect normal/bright, judgement and insight intact, normal speech and appearance well-groomed.

## 2020-05-27 ENCOUNTER — VIRTUAL VISIT (OUTPATIENT)
Dept: TRANSPLANT | Facility: CLINIC | Age: 45
End: 2020-05-27
Attending: PHYSICIAN ASSISTANT
Payer: COMMERCIAL

## 2020-05-27 ENCOUNTER — ALLIED HEALTH/NURSE VISIT (OUTPATIENT)
Dept: TRANSPLANT | Facility: CLINIC | Age: 45
End: 2020-05-27

## 2020-05-27 VITALS — HEIGHT: 68 IN | WEIGHT: 187.83 LBS | BODY MASS INDEX: 28.47 KG/M2

## 2020-05-27 DIAGNOSIS — E78.5 HYPERLIPIDEMIA: ICD-10-CM

## 2020-05-27 DIAGNOSIS — N18.6 ESRD (END STAGE RENAL DISEASE) (H): ICD-10-CM

## 2020-05-27 DIAGNOSIS — Z01.818 PRE-TRANSPLANT EVALUATION FOR KIDNEY TRANSPLANT: ICD-10-CM

## 2020-05-27 DIAGNOSIS — N18.6 END STAGE RENAL DISEASE (H): ICD-10-CM

## 2020-05-27 DIAGNOSIS — E10.9 TYPE 1 DIABETES MELLITUS (H): ICD-10-CM

## 2020-05-27 DIAGNOSIS — Z01.818 PRE-TRANSPLANT EVALUATION FOR PANCREAS TRANSPLANT: ICD-10-CM

## 2020-05-27 DIAGNOSIS — N18.6 TYPE 1 DIABETES MELLITUS WITH CHRONIC KIDNEY DISEASE ON CHRONIC DIALYSIS (H): ICD-10-CM

## 2020-05-27 DIAGNOSIS — Z87.891 HISTORY OF TOBACCO USE: ICD-10-CM

## 2020-05-27 DIAGNOSIS — T86.12 KIDNEY TRANSPLANT FAILURE: ICD-10-CM

## 2020-05-27 DIAGNOSIS — Z99.2 TYPE 1 DIABETES MELLITUS WITH CHRONIC KIDNEY DISEASE ON CHRONIC DIALYSIS (H): ICD-10-CM

## 2020-05-27 DIAGNOSIS — Z76.82 AWAITING ORGAN TRANSPLANT: Primary | ICD-10-CM

## 2020-05-27 DIAGNOSIS — Z76.82 ORGAN TRANSPLANT CANDIDATE: ICD-10-CM

## 2020-05-27 DIAGNOSIS — E10.22 TYPE 1 DIABETES MELLITUS WITH CHRONIC KIDNEY DISEASE ON CHRONIC DIALYSIS (H): ICD-10-CM

## 2020-05-27 ASSESSMENT — MIFFLIN-ST. JEOR: SCORE: 1726.5

## 2020-05-27 NOTE — PROGRESS NOTES
Summary    Team s concerns/comments:   Good candidate. Already on wait list for pancreas  Cardiology  [place]  Wean tacrolimus  Dermatology     Candidacy category: Yellow    Action/Plan: Review CT from 2/11/2020 at radiology review meeting. Spoke to Tyrell and he has no questions at this time. He is having visual issues setting up his zoom and will have his daughter help him tonight.    Expected Selection Meeting Discussion: [place] 6/3/2020

## 2020-05-27 NOTE — LETTER
"    5/27/2020         RE: Murtaza Fitzgerald  [address] [facility] [id number]        Dear Colleague,    Thank you for referring your patient, Murtaza Fitzgerald, to the [facility]PL[facility]. Please see a copy of my visit note below.    Murtaza Fitzgerald is a 44 year old male who is being evaluated via a billable video visit.      The patient has been notified of following:     \"This video visit will be conducted via a call between you and your physician/provider. We have found that certain health care needs can be provided without the need for an in-person physical exam.  This service lets us provide the care you need with a video conversation.  If a prescription is necessary we can send it directly to your pharmacy.  If lab work is needed we can place an order for that and you can then stop by our lab to have the test done at a later time.    Video visits are billed at different rates depending on your insurance coverage.  Please reach out to your insurance provider with any questions.    If during the course of the call the physician/provider feels a video visit is not appropriate, you will not be charged for this service.\"    Patient has given verbal consent for Video visit? Yes    How would you like to obtain your AVS? [facility]    Patient would like the video invitation sent by: Send to e-mail at: [email or web address]    Will anyone else be joining your video visit? No      Video-Visit Details    Type of service:  Video Visit      Originating Location (pt. Location): Home    Distant Location (provider location):  [facility]     Platform used for Video Visit: Doximity    Transplant Surgery Consult Note    Medical record number: [id number]  YOB: 1975,   Consult requested by Dr. Chen for evaluation of kidney and pancreas transplant candidacy.    Assessment and Recommendations: Mr. Tata Fitzgerald is a good candidate for " transplantation and has a good understanding of the risks and benefits of this approach to management of renal failure and diabetes. The following issues should be addressed prior to transplant:     45 yo with h/o type 1 DM and eSRD received a K tx in  and a px tx in .   Px failed after a few years.   Insulin about 60 units/day  Kidney is failing and is on dialysis since Feb  Is interested in KP tx. Would be a good candidate if vascular imaging is favorable  Will get CT and Doppler  On FK/Myfortic  Consider weaning Tac  Decrease to 0.5 mg bid for 1 month, then 0.5 mg every day for a month and then stop  Will discuss with Dr Chen to get agreement on this plan.     Risks of the surgical procedure including but not limited to the rare risk of mortality discussed in detail. Patient verbalized good understanding and had several pertinent questions which were answered satisfactorily.         The majority of our visit was spent in counselling, discussing the medical and surgical risks of living or  donor kidney and pancreas transplantation, either in a simultaneous or sequential fashion. I contrasted approximate wait time for SPK vs living vs  donor kidneys from normal (0-85%) or higher (%) kidney donor profile index (KDPI) donors and their associated outcomes. I would not recommend this individual to consider kidneys from high KDPI donors. The reason for this decision is best summarized as: multi-organ transplant candidate.  Access to transplant will be impacted by living donor availability and overall candidacy for SPK, as well as the influence of blood type and degree of sensitization. We discussed advantages of preemptive transplant as well as living donor kidney transplant, and graft and patient survival outcomes associated with these options. Potential surgical complications of kidney and pancreas transplantation include bleeding, clotting, infection, wound complications, anastomotic  failure and other issues such as cardiac complications, pneumonia, deep venous thrombosis, pulmonary embolism, post transplant diabetes and death. We discussed the need for protocol biopsy of the kidney and the possible need for a ureteral stent (and subsequent removal). We discussed benefits and risks associated with different approaches to exocrine drainage of pancreatic secretions. We also discussed differences in the average length of stay, recovery process, and posttransplant lab and monitoring protocol. We discussed the risk of graft rejection and recurrent diabetic nephropathy in the setting of poor glycemic control. I emphasized the need for strict immunosuppression adherence and the potential for complications of immunosuppression such as skin cancer or lymphoma, as well as a very low but not zero risk of donor-derived disease transmission risks (infection, cancer). Mr. Tata Fitzgerald asked good questions and the patient's candidacy will be reviewed at our Multidisciplinary Selection Committee. Thank you for the opportunity to participate in Mr. Tata Fitzgerald's care.    Total time: 45 minutes  Counselling time: 25 minutes    .  ---------------------------------------------------------------------------------------------------    HPI: Mr. 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 The patient has had diabetes for 33 years. Management is by Lantus per diabetic educator  Novolog per diabetic educator. TThe diabetes is controlled.    Complications of diabetes include:    Retinopathy:  Yes   Neuropathy: Yes   Gastroparesis:  yes    The patient is on dialysis.    Has potential kidney donors:  No.  Interested in participation in paired exchange if a donor is willing: Doesn't know     The patient has the following pertinent history:       No    Yes  Dialysis:    []      [x] via:       Blood Transfusion                  []      [x]  Number of units:   Most recently:  Pregnancy:    [x]       [] Number:       Previous Transplant:  []      [x] Details:    Cancer    [x]      [] Comment:   Kidney stones   [x]      [] Comment:      Recurrent infections  [x]      []  Type:                  Bladder dysfunction  [x]      [] Cause:    Claudication   [x]      [] Distance:    Previous Amputation  [x]      [] Cause:     Chronic anticoagulation  [x]      [] Indication:  Hindu  [x]      []     Past Medical History:   Diagnosis Date     CMV (cytomegalovirus infection) status negative 2011     Coronary artery disease, non-occlusive 2008    angio showed diffuse disease with no lesions     Diabetes mellitus, type 1     Diagnosed at age 11. 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     Noncompliance with treatment     no labs for one year     Pancreas replaced by transplant (H) Feb 2009    rejection treated with thymo     Pancreatic disease     pancreas transplant     Tobacco abuse ongoing     Past Surgical History:   Procedure Laterality Date     ABDOMEN SURGERY       ARTHROSCOPY KNEE WITH LATERAL MENISCECTOMY Left 7/10/2019    Procedure: Left knee arthroscopic partial lateral meniscectomy;  Surgeon: Alex Candelaria MD;  Location: [facility] OR     BIOPSY      [facility] 2009     BYPASS GRAFT ARTERY CORONARY N/A 8/5/2015    Procedure: BYPASS GRAFT ARTERY CORONARY;  Surgeon: Katy Neville MD;  Location: [facility] OR     CORONARY ANGIOGRAPHY ADULT ORDER      2015     ESOPHAGOSCOPY, GASTROSCOPY, DUODENOSCOPY (EGD), COMBINED N/A 1/28/2020    Procedure: ESOPHAGOGASTRODUODENOSCOPY (EGD) biopsies w/forceps;  Surgeon: Emre Gomes MD;  Location:  GI     EYE SURGERY      vitrectomy both eyes      IR CVC TUNNEL PLACEMENT > 5 YRS  OF AGE  2/13/2020     ORTHOPEDIC SURGERY  1993    tumor removed from left knee     TRANSPLANT  2009.2008    pancrease and kidney transplant     Family History   Problem Relation Age of Onset     Unknown/Adopted Father      Heart Disease Maternal Grandfather 62     Social History     Socioeconomic History     Marital status: Single     Spouse name: Not on file     Number of children: 1     Years of education: Not on file     Highest education level: Associate degree: academic program   Occupational History     Occupation:    Social Needs     Financial resource strain: Not very hard     Food insecurity     Worry: Never true     Inability: Never true     Transportation needs     Medical: No     Non-medical: No   Tobacco Use     Smoking status: Former Smoker     Packs/day: 0.30     Types: Vaping Device     Smokeless tobacco: Never Used     Tobacco comment: E- Cig    Substance and Sexual Activity     Alcohol use: Not Currently     Alcohol/week: 0.0 standard drinks     Frequency: Never     Binge frequency: Never     Drug use: No     Sexual activity: Never     Partners: Female   Lifestyle     Physical activity     Days per week: 0 days     Minutes per session: 0 min     Stress: To some extent   Relationships     Social connections     Talks on phone: More than three times a week     Gets together: Once a week     Attends Latter day service: Never     Active member of club or organization: No     Attends meetings of clubs or organizations: Never     Relationship status:      Intimate partner violence     Fear of current or ex partner: Not on file     Emotionally abused: Not on file     Physically abused: Not on file     Forced sexual activity: Not on file   Other Topics Concern     Parent/sibling w/ CABG, MI or angioplasty before 65F 55M? No      Service Not Asked     Blood Transfusions No     Comment: possibly during surgery, otherwise none.     Caffeine Concern Not Asked     Occupational  Exposure Not Asked     Hobby Hazards Not Asked     Sleep Concern Not Asked     Stress Concern Not Asked     Weight Concern Not Asked     Special Diet Not Asked     Back Care Not Asked     Exercise Not Asked     Bike Helmet Not Asked     Seat Belt Yes     Self-Exams Not Asked   Social History Narrative     Not on file       ROS:   CONSTITUTIONAL:  No fevers or chills  EYES: negative for icterus  ENT:  negative for hearing loss, tinnitus and sore throat  RESPIRATORY:  negative for cough, sputum, dyspnea  CARDIOVASCULAR:  negative for chest pain Fatigue  GASTROINTESTINAL:  negative for nausea, vomiting, diarrhea or constipation  GENITOURINARY:  negative for incontinence, dysuria, bladder emptying problems  HEME:  No easy bruising  INTEGUMENT:  negative for rash and pruritus  NEURO:  Negative for headache, seizure disorder    Allergies:   Allergies   Allergen Reactions     Benadryl [Diphenhydramine] Other (See Comments)     Restless legs     Reglan Other (See Comments)     IV, delsuions       Medications:  Prescription Medications as of 2020       Rx Number Disp Refills Start End Last Dispensed Date Next Fill Date Owning Pharmacy    acetaminophen (TYLENOL) 325 MG tablet  50 tablet 0 7/10/2019    [place], [place] - [address] [address]    Sig: Take 2 tablets (650 mg) by mouth every 4 hours as needed for mild pain    Class: Local Print    Route: Oral    acetone, Urine, test STRP  50 each 3 8/10/2018    [facility] [address] [place] [address] Ave    Si strip by In Vitro route daily    Class: E-Prescribe    Notes to Pharmacy: Urine ketone stix    Route: In Vitro    atorvastatin (LIPITOR) 40 MG tablet            Sig: Take 40 mg by mouth daily    Class: Historical    Route: Oral    blood glucose (NO BRAND SPECIFIED) test strip  1 Box 0 4/15/2020    [facility] [address]    Sig: Use to test blood sugar 5 times daily or  as directed.    Class: E-Prescribe    insulin aspart (NOVOLOG FLEXPEN) 100 UNIT/ML pen  15 mL 3 4/15/2020    [facility] [address]    Si unit per 6 grams of carbs with each meal. About 45 units/day.    Class: E-Prescribe    insulin aspart (NOVOLOG FLEXPEN) 100 UNIT/ML pen            Sig: Sliding scale = 1 unit for every 20 over blood glucose of 140    Class: Historical    Route: Subcutaneous    insulin glargine (LANTUS PEN) 100 UNIT/ML pen  15 mL 1 4/15/2020    [facility] [address]    Sig: Inject 24 Units Subcutaneous At Bedtime    Class: E-Prescribe    Notes to Pharmacy: If Lantus is not covered by insurance, may substitute Basaglar at same dose and frequency.      Route: Subcutaneous    insulin pen needle (BD ARPITA U/F) 32G X 4 MM  120 each 11 8/15/2016    [place], [place] - [address]    Sig: Use 4 daily or as directed.    Class: E-Prescribe    metoclopramide (REGLAN) 5 MG tablet  120 tablet 11 2020    [facility] [address]    Sig: Take 1 tablet (5 mg) by mouth 4 times daily (before meals and nightly)    Class: E-Prescribe    Route: Oral    mycophenolic acid (GENERIC EQUIVALENT) 180 MG EC tablet            Sig: Take 360 mg by mouth 2 times daily    Class: Historical    Route: Oral    omeprazole (PRILOSEC) 40 MG DR capsule            Sig: Take 40 mg by mouth every evening    Class: Historical    Route: Oral    ondansetron (ZOFRAN-ODT) 4 MG ODT tab  30 tablet 1 5/15/2020    [place], [place] - [address]    Sig: Take 1 tablet (4 mg) by mouth every 8 hours as needed for nausea    Class: E-Prescribe    Route: Oral    prochlorperazine (COMPAZINE) 5 MG tablet  20 tablet 0 3/20/2020        Sig: Take 1 tablet (5 mg) by mouth every 8 hours as needed for nausea    Class: Local Print    Route: Oral     tacrolimus (GENERIC EQUIVALENT) 1 MG capsule  60 capsule 11 8/23/2019    [place], [place] - [address]    Sig: Take 1 mg by mouth 2 times daily     Class: Historical    Route: Oral    Renewals     Renewal provider:  Sung Jorgensen MD          torsemide (DEMADEX) 20 MG tablet    4/3/2020        Sig: Take on Sunday Monday Wednesday and friday    Class: Historical          Constitutional - A&O in NAD.   Eyes - no redness or discharge.  Sclera anicteric  Respiratory - no cough, no labored breathing  Musculoskeletal - range of motion normal  Skin - no discoloration, no jaundice  Neurological - no tremors.  No facial droop or dysarthria  Psychiatric - normal mood and affect  The rest of a comprehensive physical examination is deferred due to PHE (public health emergency) video visit restrictions    Diagnostics:   Recent Results (from the past 672 hour(s))   CBC with platelets differential    Collection Time: 05/13/20  6:37 AM   Result Value Ref Range    WBC 8.8 4.0 - 11.0 10e9/L    RBC Count 4.09 (L) 4.4 - 5.9 10e12/L    Hemoglobin 11.9 (L) 13.3 - 17.7 g/dL    Hematocrit 37.8 (L) 40.0 - 53.0 %    MCV 92 78 - 100 fl    MCH 29.1 26.5 - 33.0 pg    MCHC 31.5 31.5 - 36.5 g/dL    RDW 12.9 10.0 - 15.0 %    Platelet Count 261 150 - 450 10e9/L    Diff Method Automated Method     % Neutrophils 92.2 %    % Lymphocytes 5.8 %    % Monocytes 1.1 %    % Eosinophils 0.0 %    % Basophils 0.3 %    % Immature Granulocytes 0.6 %    Nucleated RBCs 0 0 /100    Absolute Neutrophil 8.2 1.6 - 8.3 10e9/L    Absolute Lymphocytes 0.5 (L) 0.8 - 5.3 10e9/L    Absolute Monocytes 0.1 0.0 - 1.3 10e9/L    Absolute Eosinophils 0.0 0.0 - 0.7 10e9/L    Absolute Basophils 0.0 0.0 - 0.2 10e9/L    Abs Immature Granulocytes 0.1 0 - 0.4 10e9/L    Absolute Nucleated RBC 0.0    Basic metabolic panel    Collection Time: 05/13/20  6:37 AM   Result Value Ref Range    Sodium 129 (L) 133 - 144 mmol/L    Potassium 4.3 3.4 -  5.3 mmol/L    Chloride 93 (L) 94 - 109 mmol/L    Carbon Dioxide 17 (L) 20 - 32 mmol/L    Anion Gap 19 (H) 3 - 14 mmol/L    Glucose 596 (HH) 70 - 99 mg/dL    Urea Nitrogen 35 (H) 7 - 30 mg/dL    Creatinine 5.69 (H) 0.66 - 1.25 mg/dL    GFR Estimate 11 (L) >60 mL/min/[1.73_m2]    GFR Estimate If Black 13 (L) >60 mL/min/[1.73_m2]    Calcium 9.0 8.5 - 10.1 mg/dL   Lipase    Collection Time: 05/13/20  6:37 AM   Result Value Ref Range    Lipase 37 (L) 73 - 393 U/L   Ketone Beta-Hydroxybutyrate Quantitative    Collection Time: 05/13/20  6:37 AM   Result Value Ref Range    Ketone Quantitative 6.1 (HH) 0.0 - 0.6 mmol/L   Hepatic panel    Collection Time: 05/13/20  6:37 AM   Result Value Ref Range    Bilirubin Direct 0.1 0.0 - 0.2 mg/dL    Bilirubin Total 0.7 0.2 - 1.3 mg/dL    Albumin 4.0 3.4 - 5.0 g/dL    Protein Total 8.3 6.8 - 8.8 g/dL    Alkaline Phosphatase 134 40 - 150 U/L    ALT 15 0 - 70 U/L    AST 11 0 - 45 U/L   Hemoglobin A1c    Collection Time: 05/13/20  6:37 AM   Result Value Ref Range    Hemoglobin A1C 6.9 (H) 0 - 5.6 %   Magnesium    Collection Time: 05/13/20  6:37 AM   Result Value Ref Range    Magnesium 2.3 1.6 - 2.3 mg/dL   Phosphorus    Collection Time: 05/13/20  6:37 AM   Result Value Ref Range    Phosphorus 6.7 (H) 2.5 - 4.5 mg/dL   Glucose by meter    Collection Time: 05/13/20  6:39 AM   Result Value Ref Range    Glucose 555 (HH) 70 - 99 mg/dL   Blood gas venous and oxyhgb    Collection Time: 05/13/20  6:40 AM   Result Value Ref Range    Ph Venous 7.28 (L) 7.32 - 7.43 pH    PCO2 Venous 39 (L) 40 - 50 mm Hg    PO2 Venous 49 (H) 25 - 47 mm Hg    Bicarbonate Venous 18 (L) 21 - 28 mmol/L    FIO2 Room Air     Oxyhemoglobin Venous 80 %    Base Deficit Venous 7.8 mmol/L   EKG 12-lead, tracing only    Collection Time: 05/13/20  7:39 AM   Result Value Ref Range    Interpretation ECG Click View Image link to view waveform and result    Glucose by meter    Collection Time: 05/13/20  7:47 AM   Result Value Ref  Range    Glucose 527 (HH) 70 - 99 mg/dL   Asymptomatic COVID-19 Virus (Coronavirus) by PCR Nasopharyngeal    Collection Time: 05/13/20  8:08 AM    Specimen: Nasopharyngeal   Result Value Ref Range    COVID-19 Virus PCR to [facility] - Source Nasopharyngeal     COVID-19 Virus PCR to [facility] - Result       Test received-See reflex to IDDL test SARS CoV2 (COVID-19) Virus RT-PCR   SARS-CoV-2 COVID-19 Virus (Coronavirus) RT-PCR Nasopharyngeal    Collection Time: 05/13/20  8:08 AM    Specimen: Nasopharyngeal   Result Value Ref Range    SARS-CoV-2 Virus Specimen Source Nasopharyngeal     SARS-CoV-2 PCR Result NEGATIVE     SARS-CoV-2 PCR Comment       The Simplexa COVID-19 direct PCR assay by [employer] on the [employer] instrument has been   given Emergency Use Authorization (EUA) for the in vitro qualitative detection of RNA from   the SARS-CoV2 virus in nasopharyngeal swabs in viral transport medium from patients with   signs and symptoms of infection who are suspected of COVID-19. Performance is unknown in   asymptomatic patients.     Glucose by meter    Collection Time: 05/13/20  8:21 AM   Result Value Ref Range    Glucose 584 (HH) 70 - 99 mg/dL   Glucose by meter    Collection Time: 05/13/20  8:46 AM   Result Value Ref Range    Glucose 547 (HH) 70 - 99 mg/dL   Glucose by meter    Collection Time: 05/13/20  9:24 AM   Result Value Ref Range    Glucose 455 (H) 70 - 99 mg/dL   Glucose by meter    Collection Time: 05/13/20 10:01 AM   Result Value Ref Range    Glucose 449 (H) 70 - 99 mg/dL   Glucose by meter    Collection Time: 05/13/20 10:32 AM   Result Value Ref Range    Glucose 398 (H) 70 - 99 mg/dL   Glucose by meter    Collection Time: 05/13/20 11:07 AM   Result Value Ref Range    Glucose 341 (H) 70 - 99 mg/dL   Glucose by meter    Collection Time: 05/13/20 12:04 PM   Result Value Ref Range    Glucose 301 (H) 70 - 99 mg/dL   Glucose by meter    Collection Time: 05/13/20  1:02 PM   Result Value Ref Range    Glucose 223 (H)  70 - 99 mg/dL   Platelet count    Collection Time: 05/13/20  1:15 PM   Result Value Ref Range    Platelet Count 224 150 - 450 10e9/L   Basic metabolic panel    Collection Time: 05/13/20  1:15 PM   Result Value Ref Range    Sodium 137 133 - 144 mmol/L    Potassium 3.7 3.4 - 5.3 mmol/L    Chloride 101 94 - 109 mmol/L    Carbon Dioxide 26 20 - 32 mmol/L    Anion Gap 10 3 - 14 mmol/L    Glucose 220 (H) 70 - 99 mg/dL    Urea Nitrogen 41 (H) 7 - 30 mg/dL    Creatinine 5.98 (H) 0.66 - 1.25 mg/dL    GFR Estimate 11 (L) >60 mL/min/[1.73_m2]    GFR Estimate If Black 12 (L) >60 mL/min/[1.73_m2]    Calcium 9.1 8.5 - 10.1 mg/dL   Glucose by meter    Collection Time: 05/13/20  1:33 PM   Result Value Ref Range    Glucose 181 (H) 70 - 99 mg/dL   Glucose by meter    Collection Time: 05/13/20  2:06 PM   Result Value Ref Range    Glucose 146 (H) 70 - 99 mg/dL   Glucose by meter    Collection Time: 05/13/20  3:04 PM   Result Value Ref Range    Glucose 120 (H) 70 - 99 mg/dL   Glucose by meter    Collection Time: 05/13/20  4:06 PM   Result Value Ref Range    Glucose 155 (H) 70 - 99 mg/dL   Glucose by meter    Collection Time: 05/13/20  4:57 PM   Result Value Ref Range    Glucose 140 (H) 70 - 99 mg/dL   Glucose by meter    Collection Time: 05/13/20  5:57 PM   Result Value Ref Range    Glucose 121 (H) 70 - 99 mg/dL   Glucose by meter    Collection Time: 05/13/20  7:03 PM   Result Value Ref Range    Glucose 120 (H) 70 - 99 mg/dL   Glucose by meter    Collection Time: 05/13/20  7:23 PM   Result Value Ref Range    Glucose 122 (H) 70 - 99 mg/dL   Glucose by meter    Collection Time: 05/13/20  8:18 PM   Result Value Ref Range    Glucose 141 (H) 70 - 99 mg/dL   Glucose by meter    Collection Time: 05/13/20  9:08 PM   Result Value Ref Range    Glucose 138 (H) 70 - 99 mg/dL   Glucose by meter    Collection Time: 05/13/20 10:03 PM   Result Value Ref Range    Glucose 128 (H) 70 - 99 mg/dL   Glucose by meter    Collection Time: 05/13/20 11:00 PM    Result Value Ref Range    Glucose 114 (H) 70 - 99 mg/dL   Glucose by meter    Collection Time: 05/14/20 12:01 AM   Result Value Ref Range    Glucose 134 (H) 70 - 99 mg/dL   Glucose by meter    Collection Time: 05/14/20  1:01 AM   Result Value Ref Range    Glucose 152 (H) 70 - 99 mg/dL   Glucose by meter    Collection Time: 05/14/20  2:01 AM   Result Value Ref Range    Glucose 169 (H) 70 - 99 mg/dL   Glucose by meter    Collection Time: 05/14/20  3:00 AM   Result Value Ref Range    Glucose 186 (H) 70 - 99 mg/dL   Glucose by meter    Collection Time: 05/14/20  4:10 AM   Result Value Ref Range    Glucose 163 (H) 70 - 99 mg/dL   Glucose by meter    Collection Time: 05/14/20  4:58 AM   Result Value Ref Range    Glucose 156 (H) 70 - 99 mg/dL   Comprehensive metabolic panel    Collection Time: 05/14/20  5:19 AM   Result Value Ref Range    Sodium 136 133 - 144 mmol/L    Potassium 4.0 3.4 - 5.3 mmol/L    Chloride 100 94 - 109 mmol/L    Carbon Dioxide 26 20 - 32 mmol/L    Anion Gap 10 3 - 14 mmol/L    Glucose 170 (H) 70 - 99 mg/dL    Urea Nitrogen 44 (H) 7 - 30 mg/dL    Creatinine 7.05 (H) 0.66 - 1.25 mg/dL    GFR Estimate 9 (L) >60 mL/min/[1.73_m2]    GFR Estimate If Black 10 (L) >60 mL/min/[1.73_m2]    Calcium 9.3 8.5 - 10.1 mg/dL    Bilirubin Total 0.4 0.2 - 1.3 mg/dL    Albumin 3.8 3.4 - 5.0 g/dL    Protein Total 7.7 6.8 - 8.8 g/dL    Alkaline Phosphatase 114 40 - 150 U/L    ALT 13 0 - 70 U/L    AST 12 0 - 45 U/L   CBC with platelets differential    Collection Time: 05/14/20  5:19 AM   Result Value Ref Range    WBC 10.7 4.0 - 11.0 10e9/L    RBC Count 3.72 (L) 4.4 - 5.9 10e12/L    Hemoglobin 10.9 (L) 13.3 - 17.7 g/dL    Hematocrit 34.2 (L) 40.0 - 53.0 %    MCV 92 78 - 100 fl    MCH 29.3 26.5 - 33.0 pg    MCHC 31.9 31.5 - 36.5 g/dL    RDW 13.1 10.0 - 15.0 %    Platelet Count 225 150 - 450 10e9/L    Diff Method Automated Method     % Neutrophils 81.2 %    % Lymphocytes 11.6 %    % Monocytes 6.5 %    % Eosinophils 0.0 %     % Basophils 0.1 %    % Immature Granulocytes 0.6 %    Nucleated RBCs 0 0 /100    Absolute Neutrophil 8.7 (H) 1.6 - 8.3 10e9/L    Absolute Lymphocytes 1.2 0.8 - 5.3 10e9/L    Absolute Monocytes 0.7 0.0 - 1.3 10e9/L    Absolute Eosinophils 0.0 0.0 - 0.7 10e9/L    Absolute Basophils 0.0 0.0 - 0.2 10e9/L    Abs Immature Granulocytes 0.1 0 - 0.4 10e9/L    Absolute Nucleated RBC 0.0    Glucose by meter    Collection Time: 05/14/20  6:11 AM   Result Value Ref Range    Glucose 169 (H) 70 - 99 mg/dL   Glucose by meter    Collection Time: 05/14/20  7:10 AM   Result Value Ref Range    Glucose 179 (H) 70 - 99 mg/dL   Glucose by meter    Collection Time: 05/14/20  8:01 AM   Result Value Ref Range    Glucose 163 (H) 70 - 99 mg/dL   Glucose by meter    Collection Time: 05/14/20  9:01 AM   Result Value Ref Range    Glucose 129 (H) 70 - 99 mg/dL   Glucose by meter    Collection Time: 05/14/20 10:02 AM   Result Value Ref Range    Glucose 111 (H) 70 - 99 mg/dL   Glucose by meter    Collection Time: 05/14/20 11:06 AM   Result Value Ref Range    Glucose 119 (H) 70 - 99 mg/dL   Glucose by meter    Collection Time: 05/14/20 12:01 PM   Result Value Ref Range    Glucose 156 (H) 70 - 99 mg/dL   Glucose by meter    Collection Time: 05/14/20  1:22 PM   Result Value Ref Range    Glucose 225 (H) 70 - 99 mg/dL   Glucose by meter    Collection Time: 05/14/20  2:15 PM   Result Value Ref Range    Glucose 391 (H) 70 - 99 mg/dL   Glucose by meter    Collection Time: 05/14/20  2:57 PM   Result Value Ref Range    Glucose 280 (H) 70 - 99 mg/dL     No results found for: CPRA        Again, thank you for allowing me to participate in the care of your patient.        Sincerely,        Melissa Guzman MD

## 2020-05-27 NOTE — LETTER
"    5/27/2020         RE: Murtaza Fitzgerald  [address] [facility] [id number]        Dear Colleague,    Thank you for referring your patient, Murtaza Fitzgerald, to the [facility]. Please see a copy of my visit note below.    Murtaza Fitzgerald is a 44 year old male who is being evaluated via a billable telephone visit.      The patient has been notified of following:     \"This telephone visit will be conducted via a call between you and your physician/provider. We have found that certain health care needs can be provided without the need for a physical exam.  This service lets us provide the care you need with a short phone conversation.  If a prescription is necessary we can send it directly to your pharmacy.  If lab work is needed we can place an order for that and you can then stop by our lab to have the test done at a later time.    Telephone visits are billed at different rates depending on your insurance coverage. During this emergency period, for some insurers they may be billed the same as an in-person visit.  Please reach out to your insurance provider with any questions.    If during the course of the call the physician/provider feels a telephone visit is not appropriate, you will not be charged for this service.\"    Patient has given verbal consent for Telephone visit?  yes    Phone call duration: 15 minutes    Outpatient MNT: Kidney Pancreas Transplant Evaluation    Current BMI: 28.8 (HT 66 in,  lbs/81 kg)- data from 5/14  BMI is within recommendation of <35 for KP transplant     Time Spent: 15 minutes  Visit Type: F/U   Referring Physician: Megan   Pt accompanied by: self    Medical dx associated with RD referral  - DM I  - ESRD    History of previous txp: kidney 11/2008, pancreas 2/2009   Frequency of BG checks: 2-3x/day   Dialysis: yes    Dialysis Modality: HD  Days/Times:  am   Dialysis Start: 2/2020  Pt receives protein " supplement with dialysis: N    Nutrition Assessment  Pt reports he typically cooks at home. He reports his phosphorus has recently been an issue, otherwise diet-related labs appear wnl.     Appetite: usually good, but some days may be poor/reduced from baseline     Vitamins, Supplements, Pertinent Meds: binder (new Rx; hasn't started yet)   Herbal Medicines/Supplements: none     Diet Recall  Breakfast Non-HD days: cereal, frozen waffles, or toast with PB    Lunch Bologna or ham s/w on non-HD days, otherwise picks up fast food on dialysis days ([employer]'s, [employer], [employer], [employer])   Dinner Cereal, BBQ ribs, pizza rolls    Snacks Granola bar, apple   Beverages 2 Diet Dew/day, water, occasional milk    Alcohol None    Dining out 3x/week      Physical Activity  Walking, but pt reports not routine      Anthropometrics  Height:   66 in   BMI:    28.8    Weight Status:Overweight BMI 25-29.9   Weight:  178 lbs (5/14)            IBW (lb): 142  % IBW: 125 Wt Hx: Pt reports no edema. Weight typically fluctuates within a 20 lb range, otherwise weight stable.     Adj/dosing BW: 151 lbs/69 kg      Labs  Lab Results   Component Value Date    A1C 6.9 05/13/2020    A1C 7.0 02/11/2020    A1C 6.7 01/28/2020    A1C 6.7 11/19/2019    A1C 6.8 06/28/2019     Potassium   Date Value Ref Range Status   05/14/2020 4.0 3.4 - 5.3 mmol/L Final     PHOSPHORUS: 6.7 on 5/13, prev wnl levels     Malnutrition  % Intake: No decreased intake noted  % Weight Loss: None noted  Subcutaneous Fat Loss: None  Muscle Loss: None  Fluid Accumulation/Edema: None noted  Malnutrition Diagnosis: Patient does not meet two of the above criteria necessary for diagnosing malnutrition     Estimated Nutrition Needs  Energy  5319-4955     (25-30 kcal/kg for maintenance)     Protein  83-97    (1.2-1.4 g/kg for HD/PD needs)         Fluid  1 ml/kcal or per MD   Micronutrient   Na+: <2000 mg/day  K+: 3243-9962 mg/day  Phos: 800-1000 mg/day            Nutrition  Diagnosis  Excessive Na+ intake r/t food and nutrition related knowledge deficit AEB diet recall reveals high Na+ foods.    Food and nutrition related knowledge deficit r/t pre transplant eval AEB pt verbalized not hearing pre/post transplant diet guidelines.    Nutrition Intervention  Nutrition education provided:  Discussed sodium intake (low sodium foods and drinks, seasoning food without salt and tips for low sodium diet). Reviewed wnl K level and elevated Phos level. Encouraged pt to monitor and reduce intake of processed food, both for sodium content and phos content. Reviewed need for adequate protein intake for dialysis.     Reviewed post txp diet guidelines in brief (will review in further detail post txp):  (1) Review of proper food safety measures d/t immunosuppressant therapy post-op and increased risk for food-borne illness    (2) Avoid the following post txp d/t risk for rejection, unknown effects on the organs, and/or potential interactions with immunosuppressants:  - Herbal, Chinese, holistic, chiropractic, natural, alternative medicines and supplements  - Detoxes and cleanses  - Weight loss pills  - Protein powders or other products with extracts or herbs (ie green tea extract)    (3) Med regimen and possible side effects    Patient Understanding: Pt verbalized understanding of education provided.  Expected Compliance: Good  Follow-Up Plans: PRN     Nutrition Goals  1. Limit Na+ <2000mg/day  2. Pt to verbalize understanding of 3 aspects of post txp education provided      Provided pt with contact info.   Roxy Brunson RD, LD, CCTD  Pgr [phone]                          Again, thank you for allowing me to participate in the care of your patient.        Sincerely,        Roxy Brunson RD

## 2020-05-27 NOTE — LETTER
5/27/2020         RE: Murtaza Fitzgerald  [address] [id number]        Dear Colleague,    Thank you for referring your patient, Murtaza Fitzgerald, to the [facility] SOL[facility]GAN TRANSPL[facility]. Please see a copy of my visit note below.    Psychosocial Re-Assessment  Patient Name/ Age: Murtaza Fitzgerald 44 year old   Medical Record #: [id number]  Duration of Interview:     30   min  Process:   Telephone Interview                (counseling < 50%)   Present at Appointment: Tyrell        :VERONIKA Higgins, [facility] Date:  May 27, 2020        Type of transplant: Kidney/Pancreas    Donor type:   Tyrell indicated he does not know of any potential kidney donors at this time.   Cadaver   Prior Transplants:    Yes    2008 LRT Kidney; 2009 Pancreas Status of Transplant:  Transplanted kidney started to fail in 2019; transplanted pancreas 2012       Current Living Situation    Location:   [address] [place] [id number]  With Whom: Daughter Yesi (24), her  and son       Family/ Social Support:    Tyrell' father lives in [place] and his mother in [place], [place].  He has three sisters (2-[place] and 1-[place]).   Available, helpful   Committed relationship:  Tyrell indicated he is  and does not consider his ex-wife a part of his support system.   Single   Other supports:   Friends Available, helpful       Activities/ Functional Ability    Current level:  Tyrell wears corrective lenses. Ambulatory, visually impaired and independent with ADL's     Transportation Other: Tyrell indicated he relies on his mother or daughter assists with transportation.       Vocational/Employment/Financial     Employment   Disabled   Job Description      Income  Recently approved for SSDI but has not started receiving payments.  Currently living off of his savings.   Other:  Savings   Insurance  Medicare [employer] and [employer].  See under notable  items.    At this time, patient can afford medication costs:  Yes  Medicare and [employer]       Medical Status    Current mode of treatment for ESRD Dialysis   Complications - Diabetes controlled with insulin. Eyes and Neuropathy       Behavioral    Tobacco Use Yes  Chemical Dependency No   Tyrell indicated he is vaping 10mg of nicotine off and on through out the day.  He is working on quitting and will be decreasing to 6mg soon.        Psychiatric Impairment No    Reading ability Good  Education Level: AA Degree Recent Legal History No    Coping Style/Strategies: Tyrell indicated when under stress he will talk to friends or play video games.   Ability to Adhere to Complex Medical Regime: Yes     Adherence History:  Tyrell indicated he will usually follow his physician's recommendations.  It was recommended in 2013 Tyrell sign a compliance contract due to missing labs.        Education  _X_ Medicare  _X_ Rehabilitation  _X_ Donor issues  _X_ Community resources  _X_ Post discharge housing  _X_ Financial resources  _X_ Medical insurance options  _X_ Psych adjustment  _X_ Family adjustment  _X_ Health Care Directive - Yes, On File   Psychosocial Risks of Transplant Reviewed and Discussed:  _X_ Increased stress related to emotional,            family, social, employment or financial           situation  _X_ Affect on work and/or disability benefits  _X_ Affect on future life insurance  _X_ Transplant outcome expectations may           not be met  _X_ Mental Health Risks: anxiety,           depression, PTSD, guilt, grief and           chronic fatigue     Notable Items:   Tyrell indicated he became eligible for Medicare due to dialysis this month and also has [employer].  Informed patient he may no longer be eligible for [employer].  He indicated his income is above the limit for [place].  This writer did contact his dialysis social worker Nichelle .  She indicated they are aware and are working with Tyrell to obtain a Medicare supplement plan  and Part D.  Also Tyrell indicated he will be moving to [place] with his mother and sister.        Final Evaluation/Assessment   Patient seemed to process information well. Appeared well informed, motivated and able to follow post transplant requirements. Behavior was appropriate during interview. Has adequate income and insurance coverage. Adequate social support. No major contraindications noted for transplant.  At this time patient appears to understand the risks and benefits of transplant.      Recommendation  Acceptable    Selection Criteria Met:  Plan for support Yes   Chemical Dependence Yes   Smoking No  Mental Health Yes   Adequate Finances Yes    Signature: VERONIKA Higgins, [facility]   Title: Clinical        Again, thank you for allowing me to participate in the care of your patient.        Sincerely,        VERONIKA Hollingsworth

## 2020-05-27 NOTE — PROGRESS NOTES
"Murtaza Fitzgerald is a 44 year old male who is being evaluated via a billable video visit.      The patient has been notified of following:     \"This video visit will be conducted via a call between you and your physician/provider. We have found that certain health care needs can be provided without the need for an in-person physical exam.  This service lets us provide the care you need with a video conversation.  If a prescription is necessary we can send it directly to your pharmacy.  If lab work is needed we can place an order for that and you can then stop by our lab to have the test done at a later time.    Video visits are billed at different rates depending on your insurance coverage.  Please reach out to your insurance provider with any questions.    If during the course of the call the physician/provider feels a video visit is not appropriate, you will not be charged for this service.\"    Patient has given verbal consent for Video visit? Yes    How would you like to obtain your AVS? Danie    Patient would like the video invitation sent by: Send to e-mail at: [email or web address]    Will anyone else be joining your video visit? No      Video-Visit Details    Type of service:  Video Visit      Originating Location (pt. Location): Home    Distant Location (provider location):  [facility]     Platform used for Video Visit: [email or web address]    Transplant Surgery Consult Note    Medical record number: [id number]  YOB: 1975,   Consult requested by Dr. Chen for evaluation of kidney and pancreas transplant candidacy.    Assessment and Recommendations: Mr. Tata Fitzgerald is a good candidate for transplantation and has a good understanding of the risks and benefits of this approach to management of renal failure and diabetes. The following issues should be addressed prior to transplant:     45 yo with h/o type 1 DM and eSRD received a K tx in 2008 and a px tx in 2009.   Px failed " after a few years.   Insulin about 60 units/day  Kidney is failing and is on dialysis since Feb  Is interested in KP tx. Would be a good candidate if vascular imaging is favorable  Will get CT and Doppler  On FK/Myfortic  Consider weaning Tac  Decrease to 0.5 mg bid for 1 month, then 0.5 mg every day for a month and then stop  Will discuss with Dr Chen to get agreement on this plan.     Risks of the surgical procedure including but not limited to the rare risk of mortality discussed in detail. Patient verbalized good understanding and had several pertinent questions which were answered satisfactorily.         The majority of our visit was spent in counselling, discussing the medical and surgical risks of living or  donor kidney and pancreas transplantation, either in a simultaneous or sequential fashion. I contrasted approximate wait time for SPK vs living vs  donor kidneys from normal (0-85%) or higher (%) kidney donor profile index (KDPI) donors and their associated outcomes. I would not recommend this individual to consider kidneys from high KDPI donors. The reason for this decision is best summarized as: multi-organ transplant candidate.  Access to transplant will be impacted by living donor availability and overall candidacy for SPK, as well as the influence of blood type and degree of sensitization. We discussed advantages of preemptive transplant as well as living donor kidney transplant, and graft and patient survival outcomes associated with these options. Potential surgical complications of kidney and pancreas transplantation include bleeding, clotting, infection, wound complications, anastomotic failure and other issues such as cardiac complications, pneumonia, deep venous thrombosis, pulmonary embolism, post transplant diabetes and death. We discussed the need for protocol biopsy of the kidney and the possible need for a ureteral stent (and subsequent removal). We discussed benefits  and risks associated with different approaches to exocrine drainage of pancreatic secretions. We also discussed differences in the average length of stay, recovery process, and posttransplant lab and monitoring protocol. We discussed the risk of graft rejection and recurrent diabetic nephropathy in the setting of poor glycemic control. I emphasized the need for strict immunosuppression adherence and the potential for complications of immunosuppression such as skin cancer or lymphoma, as well as a very low but not zero risk of donor-derived disease transmission risks (infection, cancer). Mr. Tata Fitzgerald asked good questions and the patient's candidacy will be reviewed at our Multidisciplinary Selection Committee. Thank you for the opportunity to participate in Mr. Tata Fitzgerald's care.    Total time: 45 minutes  Counselling time: 25 minutes    .  ---------------------------------------------------------------------------------------------------    HPI: Mr. 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 The patient has had diabetes for 33 years. Management is by Lantus per diabetic educator  Novolog per diabetic educator. TThe diabetes is controlled.    Complications of diabetes include:    Retinopathy:  Yes   Neuropathy: Yes   Gastroparesis:  yes    The patient is on dialysis.    Has potential kidney donors:  No.  Interested in participation in paired exchange if a donor is willing: Doesn't know     The patient has the following pertinent history:       No    Yes  Dialysis:    []      [x] via:       Blood Transfusion                  []      [x]  Number of units:   Most recently:  Pregnancy:    [x]      [] Number:       Previous Transplant:  []      [x] Details:    Cancer    [x]      [] Comment:   Kidney stones   [x]      [] Comment:      Recurrent infections  [x]      []  Type:                  Bladder dysfunction  [x]      [] Cause:    Claudication   [x]      [] Distance:    Previous  Amputation  [x]      [] Cause:     Chronic anticoagulation  [x]      [] Indication:  Rastafarian  [x]      []     Past Medical History:   Diagnosis Date     CMV (cytomegalovirus infection) status negative 2011     Coronary artery disease, non-occlusive 2008    angio showed diffuse disease with no lesions     Diabetes mellitus, type 1     Diagnosed at age 11. 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     Noncompliance with treatment     no labs for one year     Pancreas replaced by transplant (H) Feb 2009    rejection treated with thymo     Pancreatic disease     pancreas transplant     Tobacco abuse ongoing     Past Surgical History:   Procedure Laterality Date     ABDOMEN SURGERY       ARTHROSCOPY KNEE WITH LATERAL MENISCECTOMY Left 7/10/2019    Procedure: Left knee arthroscopic partial lateral meniscectomy;  Surgeon: Alex Candelaria MD;  Location: [facility] OR     BIOPSY      [facility] 2009     BYPASS GRAFT ARTERY CORONARY N/A 8/5/2015    Procedure: BYPASS GRAFT ARTERY CORONARY;  Surgeon: Katy Neville MD;  Location: [facility] OR     CORONARY ANGIOGRAPHY ADULT ORDER      2015     ESOPHAGOSCOPY, GASTROSCOPY, DUODENOSCOPY (EGD), COMBINED N/A 1/28/2020    Procedure: ESOPHAGOGASTRODUODENOSCOPY (EGD) biopsies w/forceps;  Surgeon: Emre Gomes MD;  Location:  GI     EYE SURGERY      vitrectomy both eyes      IR CVC TUNNEL PLACEMENT > 5 YRS OF AGE  2/13/2020     ORTHOPEDIC SURGERY  1993    tumor removed from left knee     TRANSPLANT  2009.2008    pancrease and kidney transplant     Family History   Problem Relation Age of Onset     Unknown/Adopted Father      Heart Disease Maternal Grandfather 62     Social History      Socioeconomic History     Marital status: Single     Spouse name: Not on file     Number of children: 1     Years of education: Not on file     Highest education level: Associate degree: academic program   Occupational History     Occupation:    Social Needs     Financial resource strain: Not very hard     Food insecurity     Worry: Never true     Inability: Never true     Transportation needs     Medical: No     Non-medical: No   Tobacco Use     Smoking status: Former Smoker     Packs/day: 0.30     Types: Vaping Device     Smokeless tobacco: Never Used     Tobacco comment: E- Cig    Substance and Sexual Activity     Alcohol use: Not Currently     Alcohol/week: 0.0 standard drinks     Frequency: Never     Binge frequency: Never     Drug use: No     Sexual activity: Never     Partners: Female   Lifestyle     Physical activity     Days per week: 0 days     Minutes per session: 0 min     Stress: To some extent   Relationships     Social connections     Talks on phone: More than three times a week     Gets together: Once a week     Attends Jain service: Never     Active member of club or organization: No     Attends meetings of clubs or organizations: Never     Relationship status:      Intimate partner violence     Fear of current or ex partner: Not on file     Emotionally abused: Not on file     Physically abused: Not on file     Forced sexual activity: Not on file   Other Topics Concern     Parent/sibling w/ CABG, MI or angioplasty before 65F 55M? No      Service Not Asked     Blood Transfusions No     Comment: possibly during surgery, otherwise none.     Caffeine Concern Not Asked     Occupational Exposure Not Asked     Hobby Hazards Not Asked     Sleep Concern Not Asked     Stress Concern Not Asked     Weight Concern Not Asked     Special Diet Not Asked     Back Care Not Asked     Exercise Not Asked     Bike Helmet Not Asked     Seat Belt Yes     Self-Exams Not Asked    Social History Narrative     Not on file       ROS:   CONSTITUTIONAL:  No fevers or chills  EYES: negative for icterus  ENT:  negative for hearing loss, tinnitus and sore throat  RESPIRATORY:  negative for cough, sputum, dyspnea  CARDIOVASCULAR:  negative for chest pain Fatigue  GASTROINTESTINAL:  negative for nausea, vomiting, diarrhea or constipation  GENITOURINARY:  negative for incontinence, dysuria, bladder emptying problems  HEME:  No easy bruising  INTEGUMENT:  negative for rash and pruritus  NEURO:  Negative for headache, seizure disorder    Allergies:   Allergies   Allergen Reactions     Benadryl [Diphenhydramine] Other (See Comments)     Restless legs     Reglan Other (See Comments)     IV, delsuions       Medications:  Prescription Medications as of 2020       Rx Number Disp Refills Start End Last Dispensed Date Next Fill Date Owning Pharmacy    acetaminophen (TYLENOL) 325 MG tablet  50 tablet 0 7/10/2019    [place], [place] - [address]    Sig: Take 2 tablets (650 mg) by mouth every 4 hours as needed for mild pain    Class: Local Print    Route: Oral    acetone, Urine, test STRP  50 each 3 8/10/2018    [facility] [address]    Si strip by In Vitro route daily    Class: E-Prescribe    Notes to Pharmacy: Urine ketone stix    Route: In Vitro    atorvastatin (LIPITOR) 40 MG tablet            Sig: Take 40 mg by mouth daily    Class: Historical    Route: Oral    blood glucose (NO BRAND SPECIFIED) test strip  1 Box 0 4/15/2020    [facility] [address]    Sig: Use to test blood sugar 5 times daily or as directed.    Class: E-Prescribe    insulin aspart (NOVOLOG FLEXPEN) 100 UNIT/ML pen  15 mL 3 4/15/2020    [facility] [address]    Si unit per 6 grams of carbs with each meal. About 45 units/day.    Class: E-Prescribe     insulin aspart (NOVOLOG FLEXPEN) 100 UNIT/ML pen            Sig: Sliding scale = 1 unit for every 20 over blood glucose of 140    Class: Historical    Route: Subcutaneous    insulin glargine (LANTUS PEN) 100 UNIT/ML pen  15 mL 1 4/15/2020    [address]    Sig: Inject 24 Units Subcutaneous At Bedtime    Class: E-Prescribe    Notes to Pharmacy: If Lantus is not covered by insurance, may substitute Basaglar at same dose and frequency.      Route: Subcutaneous    insulin pen needle (BD ARPITA U/F) 32G X 4 MM  120 each 11 8/15/2016    [place], [place] - [address]    Sig: Use 4 daily or as directed.    Class: E-Prescribe    metoclopramide (REGLAN) 5 MG tablet  120 tablet 11 4/9/2020    [address]    Sig: Take 1 tablet (5 mg) by mouth 4 times daily (before meals and nightly)    Class: E-Prescribe    Route: Oral    mycophenolic acid (GENERIC EQUIVALENT) 180 MG EC tablet            Sig: Take 360 mg by mouth 2 times daily    Class: Historical    Route: Oral    omeprazole (PRILOSEC) 40 MG DR capsule            Sig: Take 40 mg by mouth every evening    Class: Historical    Route: Oral    ondansetron (ZOFRAN-ODT) 4 MG ODT tab  30 tablet 1 5/15/2020    [facility], [address]    Sig: Take 1 tablet (4 mg) by mouth every 8 hours as needed for nausea    Class: E-Prescribe    Route: Oral    prochlorperazine (COMPAZINE) 5 MG tablet  20 tablet 0 3/20/2020        Sig: Take 1 tablet (5 mg) by mouth every 8 hours as needed for nausea    Class: Local Print    Route: Oral    tacrolimus (GENERIC EQUIVALENT) 1 MG capsule  60 capsule 11 8/23/2019    [address]    Sig: Take 1 mg by mouth 2 times daily     Class: Historical    Route: Oral    Renewals     Renewal provider:  Sung Jorgensen MD           torsemide (DEMADEX) 20 MG tablet    4/3/2020        Sig: Take on Sunday Monday Wednesday and friday    Class: Historical          Constitutional - A&O in NAD.   Eyes - no redness or discharge.  Sclera anicteric  Respiratory - no cough, no labored breathing  Musculoskeletal - range of motion normal  Skin - no discoloration, no jaundice  Neurological - no tremors.  No facial droop or dysarthria  Psychiatric - normal mood and affect  The rest of a comprehensive physical examination is deferred due to PHE (public health emergency) video visit restrictions    Diagnostics:   Recent Results (from the past 672 hour(s))   CBC with platelets differential    Collection Time: 05/13/20  6:37 AM   Result Value Ref Range    WBC 8.8 4.0 - 11.0 10e9/L    RBC Count 4.09 (L) 4.4 - 5.9 10e12/L    Hemoglobin 11.9 (L) 13.3 - 17.7 g/dL    Hematocrit 37.8 (L) 40.0 - 53.0 %    MCV 92 78 - 100 fl    MCH 29.1 26.5 - 33.0 pg    MCHC 31.5 31.5 - 36.5 g/dL    RDW 12.9 10.0 - 15.0 %    Platelet Count 261 150 - 450 10e9/L    Diff Method Automated Method     % Neutrophils 92.2 %    % Lymphocytes 5.8 %    % Monocytes 1.1 %    % Eosinophils 0.0 %    % Basophils 0.3 %    % Immature Granulocytes 0.6 %    Nucleated RBCs 0 0 /100    Absolute Neutrophil 8.2 1.6 - 8.3 10e9/L    Absolute Lymphocytes 0.5 (L) 0.8 - 5.3 10e9/L    Absolute Monocytes 0.1 0.0 - 1.3 10e9/L    Absolute Eosinophils 0.0 0.0 - 0.7 10e9/L    Absolute Basophils 0.0 0.0 - 0.2 10e9/L    Abs Immature Granulocytes 0.1 0 - 0.4 10e9/L    Absolute Nucleated RBC 0.0    Basic metabolic panel    Collection Time: 05/13/20  6:37 AM   Result Value Ref Range    Sodium 129 (L) 133 - 144 mmol/L    Potassium 4.3 3.4 - 5.3 mmol/L    Chloride 93 (L) 94 - 109 mmol/L    Carbon Dioxide 17 (L) 20 - 32 mmol/L    Anion Gap 19 (H) 3 - 14 mmol/L    Glucose 596 (HH) 70 - 99 mg/dL    Urea Nitrogen 35 (H) 7 - 30 mg/dL    Creatinine 5.69 (H) 0.66 - 1.25 mg/dL    GFR Estimate 11 (L) >60 mL/min/[1.73_m2]    GFR  Estimate If Black 13 (L) >60 mL/min/[1.73_m2]    Calcium 9.0 8.5 - 10.1 mg/dL   Lipase    Collection Time: 05/13/20  6:37 AM   Result Value Ref Range    Lipase 37 (L) 73 - 393 U/L   Ketone Beta-Hydroxybutyrate Quantitative    Collection Time: 05/13/20  6:37 AM   Result Value Ref Range    Ketone Quantitative 6.1 (HH) 0.0 - 0.6 mmol/L   Hepatic panel    Collection Time: 05/13/20  6:37 AM   Result Value Ref Range    Bilirubin Direct 0.1 0.0 - 0.2 mg/dL    Bilirubin Total 0.7 0.2 - 1.3 mg/dL    Albumin 4.0 3.4 - 5.0 g/dL    Protein Total 8.3 6.8 - 8.8 g/dL    Alkaline Phosphatase 134 40 - 150 U/L    ALT 15 0 - 70 U/L    AST 11 0 - 45 U/L   Hemoglobin A1c    Collection Time: 05/13/20  6:37 AM   Result Value Ref Range    Hemoglobin A1C 6.9 (H) 0 - 5.6 %   Magnesium    Collection Time: 05/13/20  6:37 AM   Result Value Ref Range    Magnesium 2.3 1.6 - 2.3 mg/dL   Phosphorus    Collection Time: 05/13/20  6:37 AM   Result Value Ref Range    Phosphorus 6.7 (H) 2.5 - 4.5 mg/dL   Glucose by meter    Collection Time: 05/13/20  6:39 AM   Result Value Ref Range    Glucose 555 (HH) 70 - 99 mg/dL   Blood gas venous and oxyhgb    Collection Time: 05/13/20  6:40 AM   Result Value Ref Range    Ph Venous 7.28 (L) 7.32 - 7.43 pH    PCO2 Venous 39 (L) 40 - 50 mm Hg    PO2 Venous 49 (H) 25 - 47 mm Hg    Bicarbonate Venous 18 (L) 21 - 28 mmol/L    FIO2 Room Air     Oxyhemoglobin Venous 80 %    Base Deficit Venous 7.8 mmol/L   EKG 12-lead, tracing only    Collection Time: 05/13/20  7:39 AM   Result Value Ref Range    Interpretation ECG Click View Image link to view waveform and result    Glucose by meter    Collection Time: 05/13/20  7:47 AM   Result Value Ref Range    Glucose 527 (HH) 70 - 99 mg/dL   Asymptomatic COVID-19 Virus (Coronavirus) by PCR Nasopharyngeal    Collection Time: 05/13/20  8:08 AM    Specimen: Nasopharyngeal   Result Value Ref Range    COVID-19 Virus PCR to [facility] - Source Nasopharyngeal     COVID-19 Virus PCR to [facility]  - Result       Test received-See reflex to IDDL test SARS CoV2 (COVID-19) Virus RT-PCR   SARS-CoV-2 COVID-19 Virus (Coronavirus) RT-PCR Nasopharyngeal    Collection Time: 05/13/20  8:08 AM    Specimen: Nasopharyngeal   Result Value Ref Range    SARS-CoV-2 Virus Specimen Source Nasopharyngeal     SARS-CoV-2 PCR Result NEGATIVE     SARS-CoV-2 PCR Comment       The Simplexa COVID-19 direct PCR assay by [email or web address] on the Outcome Referrals instrument has been   given Emergency Use Authorization (EUA) for the in vitro qualitative detection of RNA from   the SARS-CoV2 virus in nasopharyngeal swabs in viral transport medium from patients with   signs and symptoms of infection who are suspected of COVID-19. Performance is unknown in   asymptomatic patients.     Glucose by meter    Collection Time: 05/13/20  8:21 AM   Result Value Ref Range    Glucose 584 (HH) 70 - 99 mg/dL   Glucose by meter    Collection Time: 05/13/20  8:46 AM   Result Value Ref Range    Glucose 547 (HH) 70 - 99 mg/dL   Glucose by meter    Collection Time: 05/13/20  9:24 AM   Result Value Ref Range    Glucose 455 (H) 70 - 99 mg/dL   Glucose by meter    Collection Time: 05/13/20 10:01 AM   Result Value Ref Range    Glucose 449 (H) 70 - 99 mg/dL   Glucose by meter    Collection Time: 05/13/20 10:32 AM   Result Value Ref Range    Glucose 398 (H) 70 - 99 mg/dL   Glucose by meter    Collection Time: 05/13/20 11:07 AM   Result Value Ref Range    Glucose 341 (H) 70 - 99 mg/dL   Glucose by meter    Collection Time: 05/13/20 12:04 PM   Result Value Ref Range    Glucose 301 (H) 70 - 99 mg/dL   Glucose by meter    Collection Time: 05/13/20  1:02 PM   Result Value Ref Range    Glucose 223 (H) 70 - 99 mg/dL   Platelet count    Collection Time: 05/13/20  1:15 PM   Result Value Ref Range    Platelet Count 224 150 - 450 10e9/L   Basic metabolic panel    Collection Time: 05/13/20  1:15 PM   Result Value Ref Range    Sodium 137 133 - 144 mmol/L    Potassium 3.7 3.4 - 5.3 mmol/L     Chloride 101 94 - 109 mmol/L    Carbon Dioxide 26 20 - 32 mmol/L    Anion Gap 10 3 - 14 mmol/L    Glucose 220 (H) 70 - 99 mg/dL    Urea Nitrogen 41 (H) 7 - 30 mg/dL    Creatinine 5.98 (H) 0.66 - 1.25 mg/dL    GFR Estimate 11 (L) >60 mL/min/[1.73_m2]    GFR Estimate If Black 12 (L) >60 mL/min/[1.73_m2]    Calcium 9.1 8.5 - 10.1 mg/dL   Glucose by meter    Collection Time: 05/13/20  1:33 PM   Result Value Ref Range    Glucose 181 (H) 70 - 99 mg/dL   Glucose by meter    Collection Time: 05/13/20  2:06 PM   Result Value Ref Range    Glucose 146 (H) 70 - 99 mg/dL   Glucose by meter    Collection Time: 05/13/20  3:04 PM   Result Value Ref Range    Glucose 120 (H) 70 - 99 mg/dL   Glucose by meter    Collection Time: 05/13/20  4:06 PM   Result Value Ref Range    Glucose 155 (H) 70 - 99 mg/dL   Glucose by meter    Collection Time: 05/13/20  4:57 PM   Result Value Ref Range    Glucose 140 (H) 70 - 99 mg/dL   Glucose by meter    Collection Time: 05/13/20  5:57 PM   Result Value Ref Range    Glucose 121 (H) 70 - 99 mg/dL   Glucose by meter    Collection Time: 05/13/20  7:03 PM   Result Value Ref Range    Glucose 120 (H) 70 - 99 mg/dL   Glucose by meter    Collection Time: 05/13/20  7:23 PM   Result Value Ref Range    Glucose 122 (H) 70 - 99 mg/dL   Glucose by meter    Collection Time: 05/13/20  8:18 PM   Result Value Ref Range    Glucose 141 (H) 70 - 99 mg/dL   Glucose by meter    Collection Time: 05/13/20  9:08 PM   Result Value Ref Range    Glucose 138 (H) 70 - 99 mg/dL   Glucose by meter    Collection Time: 05/13/20 10:03 PM   Result Value Ref Range    Glucose 128 (H) 70 - 99 mg/dL   Glucose by meter    Collection Time: 05/13/20 11:00 PM   Result Value Ref Range    Glucose 114 (H) 70 - 99 mg/dL   Glucose by meter    Collection Time: 05/14/20 12:01 AM   Result Value Ref Range    Glucose 134 (H) 70 - 99 mg/dL   Glucose by meter    Collection Time: 05/14/20  1:01 AM   Result Value Ref Range    Glucose 152 (H) 70 - 99 mg/dL    Glucose by meter    Collection Time: 05/14/20  2:01 AM   Result Value Ref Range    Glucose 169 (H) 70 - 99 mg/dL   Glucose by meter    Collection Time: 05/14/20  3:00 AM   Result Value Ref Range    Glucose 186 (H) 70 - 99 mg/dL   Glucose by meter    Collection Time: 05/14/20  4:10 AM   Result Value Ref Range    Glucose 163 (H) 70 - 99 mg/dL   Glucose by meter    Collection Time: 05/14/20  4:58 AM   Result Value Ref Range    Glucose 156 (H) 70 - 99 mg/dL   Comprehensive metabolic panel    Collection Time: 05/14/20  5:19 AM   Result Value Ref Range    Sodium 136 133 - 144 mmol/L    Potassium 4.0 3.4 - 5.3 mmol/L    Chloride 100 94 - 109 mmol/L    Carbon Dioxide 26 20 - 32 mmol/L    Anion Gap 10 3 - 14 mmol/L    Glucose 170 (H) 70 - 99 mg/dL    Urea Nitrogen 44 (H) 7 - 30 mg/dL    Creatinine 7.05 (H) 0.66 - 1.25 mg/dL    GFR Estimate 9 (L) >60 mL/min/[1.73_m2]    GFR Estimate If Black 10 (L) >60 mL/min/[1.73_m2]    Calcium 9.3 8.5 - 10.1 mg/dL    Bilirubin Total 0.4 0.2 - 1.3 mg/dL    Albumin 3.8 3.4 - 5.0 g/dL    Protein Total 7.7 6.8 - 8.8 g/dL    Alkaline Phosphatase 114 40 - 150 U/L    ALT 13 0 - 70 U/L    AST 12 0 - 45 U/L   CBC with platelets differential    Collection Time: 05/14/20  5:19 AM   Result Value Ref Range    WBC 10.7 4.0 - 11.0 10e9/L    RBC Count 3.72 (L) 4.4 - 5.9 10e12/L    Hemoglobin 10.9 (L) 13.3 - 17.7 g/dL    Hematocrit 34.2 (L) 40.0 - 53.0 %    MCV 92 78 - 100 fl    MCH 29.3 26.5 - 33.0 pg    MCHC 31.9 31.5 - 36.5 g/dL    RDW 13.1 10.0 - 15.0 %    Platelet Count 225 150 - 450 10e9/L    Diff Method Automated Method     % Neutrophils 81.2 %    % Lymphocytes 11.6 %    % Monocytes 6.5 %    % Eosinophils 0.0 %    % Basophils 0.1 %    % Immature Granulocytes 0.6 %    Nucleated RBCs 0 0 /100    Absolute Neutrophil 8.7 (H) 1.6 - 8.3 10e9/L    Absolute Lymphocytes 1.2 0.8 - 5.3 10e9/L    Absolute Monocytes 0.7 0.0 - 1.3 10e9/L    Absolute Eosinophils 0.0 0.0 - 0.7 10e9/L    Absolute Basophils 0.0 0.0  - 0.2 10e9/L    Abs Immature Granulocytes 0.1 0 - 0.4 10e9/L    Absolute Nucleated RBC 0.0    Glucose by meter    Collection Time: 05/14/20  6:11 AM   Result Value Ref Range    Glucose 169 (H) 70 - 99 mg/dL   Glucose by meter    Collection Time: 05/14/20  7:10 AM   Result Value Ref Range    Glucose 179 (H) 70 - 99 mg/dL   Glucose by meter    Collection Time: 05/14/20  8:01 AM   Result Value Ref Range    Glucose 163 (H) 70 - 99 mg/dL   Glucose by meter    Collection Time: 05/14/20  9:01 AM   Result Value Ref Range    Glucose 129 (H) 70 - 99 mg/dL   Glucose by meter    Collection Time: 05/14/20 10:02 AM   Result Value Ref Range    Glucose 111 (H) 70 - 99 mg/dL   Glucose by meter    Collection Time: 05/14/20 11:06 AM   Result Value Ref Range    Glucose 119 (H) 70 - 99 mg/dL   Glucose by meter    Collection Time: 05/14/20 12:01 PM   Result Value Ref Range    Glucose 156 (H) 70 - 99 mg/dL   Glucose by meter    Collection Time: 05/14/20  1:22 PM   Result Value Ref Range    Glucose 225 (H) 70 - 99 mg/dL   Glucose by meter    Collection Time: 05/14/20  2:15 PM   Result Value Ref Range    Glucose 391 (H) 70 - 99 mg/dL   Glucose by meter    Collection Time: 05/14/20  2:57 PM   Result Value Ref Range    Glucose 280 (H) 70 - 99 mg/dL     No results found for: CPRA

## 2020-05-27 NOTE — PROGRESS NOTES
"Murtaza Fitzgerald is a 44 year old male who is being evaluated via a billable telephone visit.      The patient has been notified of following:     \"This telephone visit will be conducted via a call between you and your physician/provider. We have found that certain health care needs can be provided without the need for a physical exam.  This service lets us provide the care you need with a short phone conversation.  If a prescription is necessary we can send it directly to your pharmacy.  If lab work is needed we can place an order for that and you can then stop by our lab to have the test done at a later time.    Telephone visits are billed at different rates depending on your insurance coverage. During this emergency period, for some insurers they may be billed the same as an in-person visit.  Please reach out to your insurance provider with any questions.    If during the course of the call the physician/provider feels a telephone visit is not appropriate, you will not be charged for this service.\"    Patient has given verbal consent for Telephone visit?  yes    Phone call duration: 15 minutes    Outpatient MNT: Kidney Pancreas Transplant Evaluation    Current BMI: 28.8 (HT 66 in,  lbs/81 kg)- data from 5/14  BMI is within recommendation of <35 for KP transplant     Time Spent: 15 minutes  Visit Type: F/U   Referring Physician: Megan   Pt accompanied by: self    Medical dx associated with RD referral  - DM I  - ESRD    History of previous txp: kidney 11/2008, pancreas 2/2009   Frequency of BG checks: 2-3x/day   Dialysis: yes    Dialysis Modality: HD  Days/Times:  am   Dialysis Start: 2/2020  Pt receives protein supplement with dialysis: N    Nutrition Assessment  Pt reports he typically cooks at home. He reports his phosphorus has recently been an issue, otherwise diet-related labs appear wnl.     Appetite: usually good, but some days may be poor/reduced from baseline     Vitamins, " Supplements, Pertinent Meds: binder (new Rx; hasn't started yet)   Herbal Medicines/Supplements: none     Diet Recall  Breakfast Non-HD days: cereal, frozen waffles, or toast with PB    Lunch Bologna or ham s/w on non-HD days, otherwise picks up fast food on dialysis days ([employer], [employer], [employer], [employer])   Dinner Cereal, BBQ ribs, pizza rolls    Snacks Granola bar, apple   Beverages 2 Diet Dew/day, water, occasional milk    Alcohol None    Dining out 3x/week      Physical Activity  Walking, but pt reports not routine      Anthropometrics  Height:   66 in   BMI:    28.8    Weight Status:Overweight BMI 25-29.9   Weight:  178 lbs (5/14)            IBW (lb): 142  % IBW: 125 Wt Hx: Pt reports no edema. Weight typically fluctuates within a 20 lb range, otherwise weight stable.     Adj/dosing BW: 151 lbs/69 kg      Labs  Lab Results   Component Value Date    A1C 6.9 05/13/2020    A1C 7.0 02/11/2020    A1C 6.7 01/28/2020    A1C 6.7 11/19/2019    A1C 6.8 06/28/2019     Potassium   Date Value Ref Range Status   05/14/2020 4.0 3.4 - 5.3 mmol/L Final     PHOSPHORUS: 6.7 on 5/13, prev wnl levels     Malnutrition  % Intake: No decreased intake noted  % Weight Loss: None noted  Subcutaneous Fat Loss: None  Muscle Loss: None  Fluid Accumulation/Edema: None noted  Malnutrition Diagnosis: Patient does not meet two of the above criteria necessary for diagnosing malnutrition     Estimated Nutrition Needs  Energy  7944-6774     (25-30 kcal/kg for maintenance)     Protein  83-97    (1.2-1.4 g/kg for HD/PD needs)         Fluid  1 ml/kcal or per MD   Micronutrient   Na+: <2000 mg/day  K+: 4784-3964 mg/day  Phos: 800-1000 mg/day            Nutrition Diagnosis  Excessive Na+ intake r/t food and nutrition related knowledge deficit AEB diet recall reveals high Na+ foods.    Food and nutrition related knowledge deficit r/t pre transplant eval AEB pt verbalized not hearing pre/post transplant diet guidelines.    Nutrition  Intervention  Nutrition education provided:  Discussed sodium intake (low sodium foods and drinks, seasoning food without salt and tips for low sodium diet). Reviewed wnl K level and elevated Phos level. Encouraged pt to monitor and reduce intake of processed food, both for sodium content and phos content. Reviewed need for adequate protein intake for dialysis.     Reviewed post txp diet guidelines in brief (will review in further detail post txp):  (1) Review of proper food safety measures d/t immunosuppressant therapy post-op and increased risk for food-borne illness    (2) Avoid the following post txp d/t risk for rejection, unknown effects on the organs, and/or potential interactions with immunosuppressants:  - Herbal, Chinese, holistic, chiropractic, natural, alternative medicines and supplements  - Detoxes and cleanses  - Weight loss pills  - Protein powders or other products with extracts or herbs (ie green tea extract)    (3) Med regimen and possible side effects    Patient Understanding: Pt verbalized understanding of education provided.  Expected Compliance: Good  Follow-Up Plans: PRN     Nutrition Goals  1. Limit Na+ <2000mg/day  2. Pt to verbalize understanding of 3 aspects of post txp education provided      Provided pt with contact info.   Roxy Brunson, RD, LD, CCTD  [facility] [phone]

## 2020-05-27 NOTE — LETTER
5/27/2020         RE: Murtaza Fitzgerald  [address] [id number]        Dear Colleague,    Thank you for referring your patient, Murtaza Fitzgerald, to the [facility]. Please see a copy of my visit note below.    Assessment and Plan:  # Kidney/Pancreas Transplant Evaluation: Patient is a good candidate overall. Benefits of a living donor transplant were discussed.    # ESKD from diabetes mellitus type 1: s/p LDKT in November 2008, now failed and back on dialysis since February 2020. He currently remains on full dose myfortic and tacrolimus. He may benefit from a second kidney transplant.     # DM Type 1: s/p ANNMARIE in February 2009, since failed (2/2 rejection around 2012) and on the pancreas wait list for several years (inactive per patient choice). Currently using lantus 24 units and novolog 40-45 units daily. He may benefit from a second pancreas transplant.    # Cardiac Risk: he will need risk assessment given known CAD s/p 3 vessel CABG in 2015 and multiple longstanding risk factors. He had a negative stress test in July 2019. He is asymptomatic with exertion.    # Gastroparesis: he has had multiple recent admits for this. Last GES April 2019 with T1/2 210 minutes, worse when compared to 2017 study (156 min). Most recent EGD January 2020 with negative pathology. Recommend he re-establish care with GI and discuss gastric pacemaker (had previously been discussed but patient declined as symptoms had improved).    # PAD screen: patient had a non-contrast abd/pelv CT on 2/11/2020 in our system that is available for review.    # Health Maintenance: Dermatology: Not up to datederm    Discussed the risks and benefits of a transplant, including the risk of surgery and immunosuppression medications.  Patient's overall evaluation will be discussed in the Transplant Program's regular meeting with a final recommendation on the patients suitability for transplant  to be made at that time.  Patient was seen in conjunction with Dr. Jack Chen as part of a shared visit.    Evaluation:  Murtaza Fitzgerald was seen in consultation at the request of Dr. Melissa Guzman for evaluation as a potential kidney/pancreas transplant recipient.    Reason for Visit:  Murtaza Fitzgerald is a 44-year-old male with ESKD from failed graft (h/o ESKD from type 1 diabetes s/p LDKT 2008), who presents for kidney/pancreas transplant evaluation.    History of Present Illness:    Mr. Tata Fitzgerald is a 44-year-old male with history of ESKD from type 1 diabetes s/p LDKT November 2008 followed by ANNMARIE February 2009, both since failed (pancreas 2/2 rejection, kidney 2/2 repetitive SWATHI in setting of gastroparesis (no rejection on 2013 biopsy)), on dialysis since February 2020, HTN, CAD s/p 3 vessel CABG, gastroparesis, DKA, former tobacco use, and orthostatic hypotension (not recently an issue for past few years, per patient report). He has been listed on the pancreas list for a few years, but has been inactive per his choice as he would prefer to undergo SPK. Now that he has started dialysis, he is here to consider kidney and pancreas transplant. He has struggled most recently with gastroparesis requiring multiple ER visits and a few admissions. He has not recently seen GI.          Kidney Disease Hx: Kidney function decreasing since November 2019 (likely pre-renal, as above).  Dialysis is going OK. He still has some urine output, but not much.       Kidney Disease Dx: failed graft       Biopsy Proven: No         On Dialysis: Yes, Date initiated: February 2020 and Dialysis Type: Incenter HD       H/o Kidney Stones: No       H/o Recurrent/Frequent UTI: No         Diabetic Hx: Type 1        Medication History: currently using lantus 24 units and novolog 40-45 units daily       Diabetic Control: Controlled (HbA1c <7%)   Last HbA1c: 6.9%       Hypoglycemic Unawareness: No          Cardiac/Vascular Disease Risk Factors:        Cardiac Risk Factors: Diabetes, Hypertension and CKD       Known CAD: Yes; s/p 3 vessel CABG 2015       Known PAD/Caludication Symptoms: No       Known Heart Failure: No       Arrhythmia: No       Pulmonary Hypertension: No       Valvular Disease: No       Other: None         Functional Capacity/Frailty:        He sometimes goes for walks (~ 1 mile) for exercise, but not frequently. He denies chest pain, SOB, or claudication.    Fatigue/Decreased Energy: [x] No [] Yes    Chest Pain or SOB with Exertion: [x] No [] Yes    Significant Weight Change: [x] No [] Yes    Nausea, Vomiting or Diarrhea: [x] No [] Yes    Fever, Sweats or Chills:  [x] No [] Yes    Leg Swelling [x] No [] Yes        History of Cancer: None    Other Significant Medical Issues:   As above    Review of Systems:  A comprehensive review of systems was obtained and negative, except as noted in the HPI or PMH.    Past Medical History:   Medical record was reviewed and PMH was discussed with patient and noted below.  Past Medical History:   Diagnosis Date     CMV (cytomegalovirus infection) status negative 2011     Coronary artery disease, non-occlusive 2008    angio showed diffuse disease with no lesions     Diabetes mellitus, type 1     Diagnosed at age 11. 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     Noncompliance with treatment     no labs for one year     Pancreas replaced by transplant (H) Feb 2009    rejection treated with thymo     Pancreatic disease     pancreas transplant     Tobacco abuse ongoing       Past Social History:   Past Surgical History:   Procedure Laterality Date     ABDOMEN SURGERY        ARTHROSCOPY KNEE WITH LATERAL MENISCECTOMY Left 7/10/2019    Procedure: Left knee arthroscopic partial lateral meniscectomy;  Surgeon: Alex Candelaria MD;  Location: [facility] OR     BIOPSY      [facility] 2009     BYPASS GRAFT ARTERY CORONARY N/A 8/5/2015    Procedure: BYPASS GRAFT ARTERY CORONARY;  Surgeon: Katy Neville MD;  Location: [facility] OR     CORONARY ANGIOGRAPHY ADULT ORDER      2015     ESOPHAGOSCOPY, GASTROSCOPY, DUODENOSCOPY (EGD), COMBINED N/A 1/28/2020    Procedure: ESOPHAGOGASTRODUODENOSCOPY (EGD) biopsies w/forceps;  Surgeon: Emre Gomes MD;  Location:  GI     EYE SURGERY      vitrectomy both eyes      IR CVC TUNNEL PLACEMENT > 5 YRS OF AGE  2/13/2020     ORTHOPEDIC SURGERY  1993    tumor removed from left knee     TRANSPLANT  2009.2008    pancrease and kidney transplant     Personal history of bleeding or anesthesia problems: No    Family History:  Family History   Problem Relation Age of Onset     Unknown/Adopted Father      Heart Disease Maternal Grandfather 62       Personal History:   Social History     Socioeconomic History     Marital status: Single     Spouse name: Not on file     Number of children: 1     Years of education: Not on file     Highest education level: Associate degree: academic program   Occupational History     Occupation:    Social Needs     Financial resource strain: Not very hard     Food insecurity     Worry: Never true     Inability: Never true     Transportation needs     Medical: No     Non-medical: No   Tobacco Use     Smoking status: Former Smoker     Packs/day: 0.30     Types: Vaping Device     Smokeless tobacco: Never Used     Tobacco comment: E- Cig    Substance and Sexual Activity     Alcohol use: Not Currently     Alcohol/week: 0.0 standard drinks     Frequency: Never     Binge frequency: Never     Drug use: No     Sexual activity: Never     Partners: Female   Lifestyle     Physical activity     Days per week: 0 days     Minutes per session: 0  min     Stress: To some extent   Relationships     Social connections     Talks on phone: More than three times a week     Gets together: Once a week     Attends Voodoo service: Never     Active member of club or organization: No     Attends meetings of clubs or organizations: Never     Relationship status:      Intimate partner violence     Fear of current or ex partner: Not on file     Emotionally abused: Not on file     Physically abused: Not on file     Forced sexual activity: Not on file   Other Topics Concern     Parent/sibling w/ CABG, MI or angioplasty before 65F 55M? No      Service Not Asked     Blood Transfusions No     Comment: possibly during surgery, otherwise none.     Caffeine Concern Not Asked     Occupational Exposure Not Asked     Hobby Hazards Not Asked     Sleep Concern Not Asked     Stress Concern Not Asked     Weight Concern Not Asked     Special Diet Not Asked     Back Care Not Asked     Exercise Not Asked     Bike Helmet Not Asked     Seat Belt Yes     Self-Exams Not Asked   Social History Narrative     Not on file       Allergies:  Allergies   Allergen Reactions     Benadryl [Diphenhydramine] Other (See Comments)     Restless legs     Reglan Other (See Comments)     IV, delsuions       Medications:  Current Outpatient Medications   Medication Sig     acetaminophen (TYLENOL) 325 MG tablet Take 2 tablets (650 mg) by mouth every 4 hours as needed for mild pain     acetone, Urine, test STRP 1 strip by In Vitro route daily (Patient not taking: Reported on 2/26/2020)     atorvastatin (LIPITOR) 40 MG tablet Take 40 mg by mouth daily     blood glucose (NO BRAND SPECIFIED) test strip Use to test blood sugar 5 times daily or as directed.     insulin aspart (NOVOLOG FLEXPEN) 100 UNIT/ML pen 1 unit per 6 grams of carbs with each meal. About 45 units/day.     insulin aspart (NOVOLOG FLEXPEN) 100 UNIT/ML pen Sliding scale = 1 unit for every 20 over blood glucose of 140     insulin  "glargine (LANTUS PEN) 100 UNIT/ML pen Inject 24 Units Subcutaneous At Bedtime     insulin pen needle (BD ARPITA U/F) 32G X 4 MM Use 4 daily or as directed.     metoclopramide (REGLAN) 5 MG tablet Take 1 tablet (5 mg) by mouth 4 times daily (before meals and nightly)     mycophenolic acid (GENERIC EQUIVALENT) 180 MG EC tablet Take 360 mg by mouth 2 times daily     omeprazole (PRILOSEC) 40 MG DR capsule Take 40 mg by mouth every evening     ondansetron (ZOFRAN-ODT) 4 MG ODT tab Take 1 tablet (4 mg) by mouth every 8 hours as needed for nausea     prochlorperazine (COMPAZINE) 5 MG tablet Take 1 tablet (5 mg) by mouth every 8 hours as needed for nausea     tacrolimus (GENERIC EQUIVALENT) 1 MG capsule Take 1 mg by mouth 2 times daily      torsemide (DEMADEX) 20 MG tablet Take on Sunday Monday Wednesday and friday     No current facility-administered medications for this visit.        Exam:  GENERAL: Healthy, alert and no distress  EYES: Eyes grossly normal to inspection.  No discharge or erythema, or obvious scleral/conjunctival abnormalities.  RESP: No audible wheeze, cough, or visible cyanosis.  No visible retractions or increased work of breathing.    SKIN: Visible skin clear. No significant rash, abnormal pigmentation or lesions.  NEURO: Cranial nerves grossly intact.  Mentation and speech appropriate for age.  PSYCH: Mentation appears normal, affect normal/bright, judgement and insight intact, normal speech and appearance well-groomed.        Murtaza Fitzgerald is a 44 year old male who is being evaluated via a billable video visit.      The patient has been notified of following:     \"This video visit will be conducted via a call between you and your physician/provider. We have found that certain health care needs can be provided without the need for an in-person physical exam.  This service lets us provide the care you need with a video conversation.  If a prescription is necessary we can send it directly to " "your pharmacy.  If lab work is needed we can place an order for that and you can then stop by our lab to have the test done at a later time.    Video visits are billed at different rates depending on your insurance coverage.  Please reach out to your insurance provider with any questions.    If during the course of the call the physician/provider feels a video visit is not appropriate, you will not be charged for this service.\"    Patient has given verbal consent for Video visit? Yes    How would you like to obtain your AVS? Josefinat    Patient would like the video invitation sent by: Send to e-mail at: [email or web address]    Will anyone else be joining your video visit? No        Video-Visit Details    Type of service:  Video Visit    Video Start Time: 1015  Video End Time: 1100    Originating Location (pt. Location): Home    Distant Location (provider location):   [email or web address] [facility]     Platform used for Video Visit: Arash Chen MD          Patient was seen by myself, Dr. Jack Chen, in conjunction with Desi Moser, as part of a shared visit.    I personally reviewed past medical and surgical history, vital signs, medications and labs.  Present and past medical history, along with significant physical exam findings were all reviewed with DENVER.    My garcia findings:  Murtaza Fitzgerald is a 44 year old year old male with ESKD from diabetes mellitus type 1, who presents for Kidney/Pancreas transplant evaluation.    Patient was transplanted with L kid 2008, ANNMARIE in 02/2009. 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    Back on HD now since 02/2020. Kidney transplant biopsy showed no rejection in 2013. No recent biopsies. Thought due to SWATHI episodes in setting of gastroparesis.     Orthostatic hypotension: on fludrocortisone in the past.     Gastroparesis: follows with GI and was considering gastric pacemaker for this. Gastric emptying study - 04/2019 with worsening. EGD 01/2020 with " no problems.     Immunosuppression: on tac and myfortic.     CAD risk: 3 vessel bypass in 2015. Negative stress in 07/2019.    No cp, sob. +n/v, no f/s/c.     ?living donors.    Key management decisions made by me and discussed with DENVER:  1. Kidney/Pancreas transplant evaluation - patient is a fair candidate overall. Benefits of a living donor transplant were discussed.  The patient does not know if he has any living donors available at this time.  He will need to complete his work-up as below and once completed he can be listed actively for a kidney transplant.    We also discussed being listed for simultaneous pancreas and kidney which will be determined based off of his cardiac work-up as well as evaluation for target vessels.  2. ESKD from diabetes mellitus type 1: Continue on hemodialysis  3. DMI: Continue to work with his endocrinologist on glycemic control  4. Orthostatic hypotension: The patient has significant orthostatic hypotension due to his type 1 diabetes and goal should be to improve glycemic control to an A1c of 7 g%.  He can work with his local nephrologist about any medicines that may be necessary to improve him symptomatically such as fludrocortisone or Midodrine  5. Gastroparesis: The patient has severe gastroparesis that has necessitated hospitalization approximately 6 times in the last year.  His last hospitalization was last month.  He will need to have improved control of his gastroparesis before he could be deemed a candidate for either kidney or pancreas transplant.  He is interested in pursuing a gastric pacemaker.  6. CAD: will need cardiology assessment.  7. Skin: needs derm followup.     The combined time for this visit between the advanced practice provider and myself was 60 minutes of which > 50% of time was spent counseling regarding the options for replacing kidney function. All questions were answered.      Approximately 31 minutes of non face-to-face time were spent in review of  the patient's medical record to date.  This included review of previous: clinic visits, hospital records, lab results, imaging studies, and procedural documentation.  The findings from this review are summarized in the above note.      Again, thank you for allowing me to participate in the care of your patient.        Sincerely,        ROSMERY

## 2020-05-27 NOTE — PROGRESS NOTES
Patient was seen by myself, Dr. Jack Chen, in conjunction with Desi Moser, as part of a shared visit.    I personally reviewed past medical and surgical history, vital signs, medications and labs.  Present and past medical history, along with significant physical exam findings were all reviewed with DENVER.    My garcia findings:  Murtaza Fitzgerald is a 44 year old year old male with ESKD from diabetes mellitus type 1, who presents for Kidney/Pancreas transplant evaluation.    Patient was transplanted with L kid 2008, ANNMARIE in 02/2009. 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    Back on HD now since 02/2020. Kidney transplant biopsy showed no rejection in 2013. No recent biopsies. Thought due to SWATHI episodes in setting of gastroparesis.     Orthostatic hypotension: on fludrocortisone in the past.     Gastroparesis: follows with GI and was considering gastric pacemaker for this. Gastric emptying study - 04/2019 with worsening. EGD 01/2020 with no problems.     Immunosuppression: on tac and myfortic.     CAD risk: 3 vessel bypass in 2015. Negative stress in 07/2019.    No cp, sob. +n/v, no f/s/c.     ?living donors.    Key management decisions made by me and discussed with DENVER:  1. Kidney/Pancreas transplant evaluation - patient is a fair candidate overall. Benefits of a living donor transplant were discussed.  The patient does not know if he has any living donors available at this time.  He will need to complete his work-up as below and once completed he can be listed actively for a kidney transplant.    We also discussed being listed for simultaneous pancreas and kidney which will be determined based off of his cardiac work-up as well as evaluation for target vessels.  2. ESKD from diabetes mellitus type 1: Continue on hemodialysis  3. DMI: Continue to work with his endocrinologist on glycemic control  4. Orthostatic hypotension: The patient has significant orthostatic hypotension due to his type 1  diabetes and goal should be to improve glycemic control to an A1c of 7 g%.  He can work with his local nephrologist about any medicines that may be necessary to improve him symptomatically such as fludrocortisone or Midodrine  5. Gastroparesis: The patient has severe gastroparesis that has necessitated hospitalization approximately 6 times in the last year.  His last hospitalization was last month.  He will need to have improved control of his gastroparesis before he could be deemed a candidate for either kidney or pancreas transplant.  He is interested in pursuing a gastric pacemaker.  6. CAD: will need cardiology assessment.  7. Skin: needs derm followup.     The combined time for this visit between the advanced practice provider and myself was 60 minutes of which > 50% of time was spent counseling regarding the options for replacing kidney function. All questions were answered.      Approximately 31 minutes of non face-to-face time were spent in review of the patient's medical record to date.  This included review of previous: clinic visits, hospital records, lab results, imaging studies, and procedural documentation.  The findings from this review are summarized in the above note.

## 2020-05-27 NOTE — PROGRESS NOTES
"Murtaza Fitzgerald is a 44 year old male who is being evaluated via a billable video visit.      The patient has been notified of following:     \"This video visit will be conducted via a call between you and your physician/provider. We have found that certain health care needs can be provided without the need for an in-person physical exam.  This service lets us provide the care you need with a video conversation.  If a prescription is necessary we can send it directly to your pharmacy.  If lab work is needed we can place an order for that and you can then stop by our lab to have the test done at a later time.    Video visits are billed at different rates depending on your insurance coverage.  Please reach out to your insurance provider with any questions.    If during the course of the call the physician/provider feels a video visit is not appropriate, you will not be charged for this service.\"    Patient has given verbal consent for Video visit? Yes    How would you like to obtain your AVS? [place]    Patient would like the video invitation sent by: Send to e-mail at: [email or web address]    Will anyone else be joining your video visit? No        Video-Visit Details    Type of service:  Video Visit    Video Start Time: 1015  Video End Time: 1100    Originating Location (pt. Location): Home    Distant Location (provider location):  [facility]GAN TRANSPLANT     Platform used for Video Visit: Arash Chen MD      "

## 2020-05-27 NOTE — PROGRESS NOTES
Psychosocial Re-Assessment  Patient Name/ Age: Murtaza Fitzgerald 44 year old   Medical Record #: [id number]  Duration of Interview:     30   min  Process:   Telephone Interview                (counseling < 50%)   Present at Appointment: Tyrell        :VERONIKA Higgins, [facility] Date:  May 27, 2020        Type of transplant: Kidney/Pancreas    Donor type:   Tyrell indicated he does not know of any potential kidney donors at this time.   Cadaver   Prior Transplants:    Yes    2008 LRT Kidney; 2009 Pancreas Status of Transplant:  Transplanted kidney started to fail in 2019; transplanted pancreas 2012       Current Living Situation    Location:   [address] [place] [id number]  With Whom: Daughter Yesi (24), her  and son       Family/ Social Support:    Tyrell' father lives in [place] and his mother in [place], [place].  He has three sisters (2-[place] and 1-[place]).   Available, helpful   Committed relationship:  Tyrell indicated he is  and does not consider his ex-wife a part of his support system.   Single   Other supports:   Friends Available, helpful       Activities/ Functional Ability    Current level:  Tyrell wears corrective lenses. Ambulatory, visually impaired and independent with ADL's     Transportation Other: Tyrell indicated he relies on his mother or daughter assists with transportation.       Vocational/Employment/Financial     Employment   Disabled   Job Description      Income  Recently approved for SSDI but has not started receiving payments.  Currently living off of his savings.   Other:  Savings   Insurance  Medicare Parts A and B and [employer].  See under notable items.    At this time, patient can afford medication costs:  Yes  Medicare and [employer]       Medical Status    Current mode of treatment for ESRD Dialysis   Complications - Diabetes controlled with insulin. Eyes and Neuropathy       Behavioral    Tobacco Use Yes  Chemical  Dependency No   Tyrell indicated he is vaping 10mg of nicotine off and on through out the day.  He is working on quitting and will be decreasing to 6mg soon.        Psychiatric Impairment No    Reading ability Good  Education Level: AA Degree Recent Legal History No    Coping Style/Strategies: Tyrell indicated when under stress he will talk to friends or play video games.   Ability to Adhere to Complex Medical Regime: Yes     Adherence History:  Tyrell indicated he will usually follow his physician's recommendations.  It was recommended in 2013 Tyrell sign a compliance contract due to missing labs.        Education  _X_ Medicare  _X_ Rehabilitation  _X_ Donor issues  _X_ Community resources  _X_ Post discharge housing  _X_ Financial resources  _X_ Medical insurance options  _X_ Psych adjustment  _X_ Family adjustment  _X_ Health Care Directive - Yes, On File   Psychosocial Risks of Transplant Reviewed and Discussed:  _X_ Increased stress related to emotional,            family, social, employment or financial           situation  _X_ Affect on work and/or disability benefits  _X_ Affect on future life insurance  _X_ Transplant outcome expectations may           not be met  _X_ Mental Health Risks: anxiety,           depression, PTSD, guilt, grief and           chronic fatigue     Notable Items:   Tyrell indicated he became eligible for Medicare due to dialysis this month and also has [employer].  Informed patient he may no longer be eligible for [employer].  He indicated his income is above the limit for [place].  This writer did contact his dialysis social worker Nichelle .  She indicated they are aware and are working with Tyrell to obtain a Medicare supplement plan and Part D.  Also Tyrell indicated he will be moving to [place] with his mother and sister.        Final Evaluation/Assessment   Patient seemed to process information well. Appeared well informed, motivated and able to follow post transplant requirements. Behavior was  appropriate during interview. Has adequate income and insurance coverage. Adequate social support. No major contraindications noted for transplant.  At this time patient appears to understand the risks and benefits of transplant.      Recommendation  Acceptable    Selection Criteria Met:  Plan for support Yes   Chemical Dependence Yes   Smoking No  Mental Health Yes   Adequate Finances Yes    Signature: VERONIKA Higgins, [facility]   Title: Clinical

## 2020-06-03 ENCOUNTER — COMMITTEE REVIEW (OUTPATIENT)
Dept: TRANSPLANT | Facility: CLINIC | Age: 45
End: 2020-06-03

## 2020-06-03 NOTE — COMMITTEE REVIEW
Abdominal Committee Review Note     Evaluation Date: 5/27/2020  Committee Review Date: 6/3/2020    Organ being evaluated for: Kidney/Pancreas    Transplant Phase: Evaluation  Transplant Status: Active    Transplant Coordinator: Reena Borja  Transplant Surgeon: Dr. Guzman     Referring Physician: Melissa Guzman    Primary Diagnosis: Diabetes Mellitus - Type I  Secondary Diagnosis: ESRD    Committee Review Members:  Nephrology Jack Chen MD, Mikhail Rachel, APRN CNP   Nurse Lisa Bean, RN, Reena Borja, RN   Nutrition Roxy Brunson, RD   Pharmacy Murtaza Colon, [facility]    - Clinical Zee Goerge, [facility], Farzana Gonzalez, [facility]   Transplant Yasemin Moser PA-C, My Bnasal, JOSE, Jacinto Urban MD, Sania Ventura, NP, Amalia Rose, JOSE, Antonia Rene, RN, Melissa Guzman MD, Sung Jorgensen MD   Transplant Surgery Jacinto Urban MD, Isamar Colby MD, MD       Transplant Eligibility: Insulin-dependent diabetes mellitus, Irreversible chronic kidney disease treated w/dialysis or expected need for dialysis    Committee Review Decision: Needs Re-presentation    Relative Contraindications: Compliance    Absolute Contraindications: None    Committee Chair Melissa Guzman MD verbally attested to the Committee's decision  Reviewed the patient's medical records and evaluation results to date. Pt.attended virtual (Surgeon/Nephrology) and phone visits(SW/ Nutrition) PKE on 5/27/2020 due to Covid 19.     Committee Discussion Details: The team discussed the following items:  LDKT 11/4/2008- now failed and on dialysis since 2/2020 thought due to SWATHI episodes in the setting of severe gastroparesis. Pt.had a pancreas after a kidney 2/17/2009 (failed 2012). Pt.is listed for pancreas inactive since 8/8/2013,pt. states per choice as he would like a SKP. Dr. Jorgensen stated pt.has had a past history of non-compliance : not getting labs or keeping transplant  appts.post transplant. It was noted and recommended in 2013 that pt.sign a compliance contract due to missing labs. Team determined pt.will need a 3 month compliance contract. It was discussed between Dr. Jorgensen and Ely(KRISTI) whether pt.appeared depressed or needed neuro physic.Per Ely -pt.just quit smoking and he also denied depression. Per Dr. Guzman,Dr. Jorgensen and Ely it was determined they have worked with the pt.a long time and that this appears to be a normal affect for this pt. No neuro physic recommended at this time. Pt's severe gastroparesis with hospitalizations over the last year discussed. Needs to re-establish care with GI.  Will need to wean off Tacro per Dr. Guzman/Dr. Chen. Cardiology risk assessment. Review CT ABD/Pelvis at Imaging meeting 6/3/2020. Iliacs. Needs to follow with local Nephrology.  Ely talked about pt's  insurance   Pt. to obtain a Medicare supplement plan and Part D- working with dialysis SW.    The patient will need to successfully complete Compliance contract before determining kidney or SKP candidacy.Pt. is on dialysis and incomplete evaluation so will not be listed.  Pt.to complete 3 month Compliance contract and  the following items, prior to becoming active on the wait list for kidney or kidney/pancreas transplant:    1) Cardiology risk assessment       2) Elvis iliac artery US      3) Wean off Tacro plan     4. Due for Dermatology    5)Gastroparesis -re-establish with GI. Will need improved control of gastroparesis before being deemed a candidate for either kidney or pancreas transplant    6/ Compliance Contract for 3 months: all appts., labs, recommended tests and will need to be re- presented to Selection Committee    7. Vaping-stop     8. Orthostatic hypotension- continue to work with local nephrologist    9. Continue with Endocrinology-improved  glycemic control    10. Still needs Labs, CXR, EKG(completed 5/13/2020), Echo, PFTs- not able to do yet as PKE was  virtual.    11.Pt. to obtain a Medicare supplement plan and Part D      CT ABD/Pelvis was viewed 5/28/2019 by Dr. Murillo - see Epic review note.    Per Dr. Chen and Dr. Guzman  being  listed for simultaneous pancreas and kidney will be determined based off of his cardiac work-up as well as evaluation for target vessels.     Not a candidate yet- needs represented: Compliance contract and improved control of gastroparesis before being  deemed a candidate for kidney or pancreas transplant. On dialysis since 2/25/2020.

## 2020-06-08 ENCOUNTER — TELEPHONE (OUTPATIENT)
Dept: TRANSPLANT | Facility: CLINIC | Age: 45
End: 2020-06-08

## 2020-06-08 DIAGNOSIS — K31.84 GASTROPARESIS: ICD-10-CM

## 2020-06-08 DIAGNOSIS — N18.6 TYPE 1 DIABETES MELLITUS WITH CHRONIC KIDNEY DISEASE ON CHRONIC DIALYSIS (H): ICD-10-CM

## 2020-06-08 DIAGNOSIS — Z01.818 PRE-TRANSPLANT EVALUATION FOR KIDNEY TRANSPLANT: ICD-10-CM

## 2020-06-08 DIAGNOSIS — Z87.891 HISTORY OF TOBACCO USE: ICD-10-CM

## 2020-06-08 DIAGNOSIS — E10.22 TYPE 1 DIABETES MELLITUS WITH CHRONIC KIDNEY DISEASE ON CHRONIC DIALYSIS (H): ICD-10-CM

## 2020-06-08 DIAGNOSIS — T86.12 KIDNEY TRANSPLANT FAILURE: ICD-10-CM

## 2020-06-08 DIAGNOSIS — Z99.2 ESRD ON DIALYSIS (H): ICD-10-CM

## 2020-06-08 DIAGNOSIS — Z76.82 AWAITING ORGAN TRANSPLANT: Primary | ICD-10-CM

## 2020-06-08 DIAGNOSIS — N18.6 ESRD (END STAGE RENAL DISEASE) (H): ICD-10-CM

## 2020-06-08 DIAGNOSIS — Z99.2 TYPE 1 DIABETES MELLITUS WITH CHRONIC KIDNEY DISEASE ON CHRONIC DIALYSIS (H): ICD-10-CM

## 2020-06-08 DIAGNOSIS — N18.6 ESRD ON DIALYSIS (H): ICD-10-CM

## 2020-06-08 DIAGNOSIS — Z76.82 ORGAN TRANSPLANT CANDIDATE: ICD-10-CM

## 2020-06-08 NOTE — Clinical Note
Orders for:  He needs to have GI first Derm GI Cards PFTs Elvis iliac dopplers all sent  Please also schedule orders pt.has that were not completed due to virtual PKE: Labs CXR EKG- not needed -completed 5/13/2020 Echo   Thanks,   Reena

## 2020-06-08 NOTE — TELEPHONE ENCOUNTER
Contacted patient to review outcome of selection committee meeting from 6/3/2020 (See selection committee encounter).   Explained to patient that he needs to complete all components of the evaluation to be eligible for active status on the waiting list or to proceed with a live donor kidney transplant.   Reviewed next steps based on outcomes( see summary letter for further details):   1) Cardiology risk assessment   2) Elvis iliac artery US   3) Wean off Tacro   4. Due for Dermatology   5)Gastroparesis -re-establish with GI. Will need improved control of gastroparesis before being deemed a candidate for either kidney or pancreas transplant   6/ Compliance Contract for 3 months: all appts., labs, recommended tests and will need to be re- presented to Selection Committee   7. Vaping-stop   8. Orthostatic hypotension- continue to work with local nephrologist   9. Continue with Endocrinology-improved  glycemic control   10. Still needs Labs, CXR, EKG(completed 5/13/20/20), Echo, PFTs- not able to do yet as PKE was virtual.   11.Pt. to obtain a Medicare supplement plan and Part D- follow dialysis SW   Pt.made aware that he needs to complete the Compliance Contract and GI for improved gastroparesis before he can be deemed a candidate for either kidney or pancreas.  Patient will not be listed (patient is on dialysis and evaluation is not complete), patient and care providers will receive:    - An Evaluation Summary Letter indicating what is needed to complete evaluation-discussed with patient if they would like to have testing done with  [facility] or locally  * Compliance contract generated and routed to pt, and CCs as well.  Confirmed with patient that on successful completion of  outstanding components, patient is eligible for active status and then  will receive a follow-up call.   Confirmed that patient has contact information for additional questions or concerns.   Pt.on dialysis since 2/ 25/2020. Dr. Guzman  recommended NO to KDPI>85 % . I discussed this with the pt.and he will complete Docusign. Pt. verbalized understanding of content discussed with him today. Selection Committee Letter and Compliance Contract  generated and routed to administrative staff. Orders sent.

## 2020-06-08 NOTE — LETTER
Murtaza Fitzgerald  [address] [address]            2020      [facility] Agreement for Transplant Candidates  2020  Patient: Tyrell Fitzgerald      MRN: [id number]    Because you have had a problem in the past following a treatment plan, we ask that you read and sign this agreement. Here are the specific reasons we are asking this:    1.Some non-compliance with post-transplant care in the past. Follow up labs.       To have a successful transplant, you must follow your treatment plan, both before and after your transplant. We ask that you agree to the followin.  I will keep all my medical appointments, including dialysis sessions (if applicable), as required by my health care team.    2.  I will get all blood work or other tests as required by my health care team.    3.  I will take all medicines prescribed for me by my health care team, and I will take them as prescribed. I will tell my health care team promptly if I have trouble getting my medicines, paying for them, or dealing with side effects, or if I have any other problems or concerns.    4.  I understand that my status on the transplant waitlist will change to  inactive  if I do not follow my treatment plan. (To follow my treatment plan I must keep all my appointments, take all my medicines, get all my tests, and follow all my health care team s recommendations.) While my status is inactive, I will not be called for a transplant, and I cannot schedule a living donor transplant. I will not be able to have a transplant if I do not follow my treatment plan.    5.  If my status changes to inactive because I do not follow my treatment plan, I understand that it will stay inactive until I have followed my treatment plan for a certain period of time (3  months from Mitzi 3, 2020 to Sept. 3, 2020), to be set by my health care team. Before I can be considered for kidney transplant candidacy or a   living donor transplant can be scheduled, I         must have a letter from my doctor confirming that I have followed my treatment plan for this period of time.      6.  While my status is active, I will follow all recommendations from my health care team. To stay active, I must give my health care team whatever information it asks for.  It is my responsibility to make sure my health care team gets this information.    7. If I disagree with a recommendation, I will discuss this with my health care team. My health care team has the final say about whether I am following my treatment plan and what my status should be.      I have read this agreement. I understand that my status on the transplant waitlist will not be active unless I follow my treatment plan.  I understand that I will be given a copy of this agreement, as will the members of my health care team.       Name___________________________________  Date_________    Transplant Coordinator- Reena Borja RN BSN Pre Kidney/Pancreas Transplant Coordinator  6/3/2020 _____________________ Date_________    Transplant Surgeon- Dr. Guzman 6/3/2020________________________ Date_________    Please sign and  return this Compliance Contract via the enclosed envelope or fax to [phone] ATTN: Reena Borja RN Pre Kidney/ Pancreas Transplant Coordinator             CCs: Kt Ríos MD(PCP);  [facility]; Igor Wade MD

## 2020-06-08 NOTE — LETTER
06/08/20        Murtaza Payton Justineffie  [address] [facility] [address]        Dear Muratza,    It was a pleasure to start your evaluation for consideration of a kidney and simultaneous kidney/pancreas transplant. Your evaluation began on 5/27/2020 as virtual visits due to the Covid 19 pandemic. Your evaluation results were discussed at our Multidisciplinary Selection Committee on 6/3/2020. The Committee is requesting the following items are completed before determining your candidacy:       1. Compliance Contract x 3 months from 6/3/2020 to 9/3/2020. Please see enclosed compliance contract, read/sign and return to our Office if you are in agreement in the envelope provided. Please maintain compliance during this 3 months time frame by following the recommendations of your regular providers for medications, dialysis schedule, clinic appointments, labs, etc. At the end of this 3 months time frame ( 9/3/2020 ), please make arrangements for your nephrologist and primary care provider to write a note to our Office on your behalf which describes their opinion of your compliance during this time and their opinion of your ability to be compliant with a post transplant routine. Please have these notes faxed to our Office at [phone].     2. Cardiology appointment to evaluate your heart status to make a pre-operative              recommendation to proceed with transplant.    3. GI consult. Will need improved control of gastroparesis before being deemed a              candidate for either kidney or pancreas transplant    4. Dermatology as part of ongoing annual post transplant follow up.    5. Bilateral iliac artery ultrasound to look for any blockages or narrowed areas.     6. Wean off tacrolimus per transplant nephrology team at [facility]. Transplant Coordinator,Jyoti Mondragon, will contact you once she is told to proceed with a plan.      7. Pulmonary Function Tests (PFTs) due to smoking history.        Vaping- recommended to quit.     8. Labs, CXR, EKG(completed 5/13/2020), Echo not completed yet as 5/27/2020 were     virtual evaluation appointments.     9. Orthostatic hypotension- continue to work with local nephrologist     10. Continue with Endocrinology on improved glycemic control     11 Continue to work with dialysis Social Worker to obtain a Medicare supplement       plan and Part D    12. Dental work to remain up to date.           A  from our Office will call you to make appointments for items #2- 8.  Items 9-12 to continue to be followed by your primary care providers.      Upon successful completion of the above items, your results will be reviewed at the Multidisciplinary Selection Committee for approval. You have not been added onto the  kidney transplant wait list at this time because you are already on dialysis. Once added onto the wait list, your wait time will begin with the start date of your chronic dialysis 2/25/2020.    Please have any potential live donors register now online with our Program to initiate their [facility] at [email or web address]. You will be notified by our Office in the event of an approved live donor.    For any questions , please contact myself at  the main transplant Office number at [phone].    Sincerely,      Reena Borja RN BSN Pre Kidney/Pancreas Transplant Coordinator  Solid Organ Transplant  [facility]      Enclosures: UNOS Letter      CCs: Kt Ríos MD (PCP) [facility] , Patricia Costa MD

## 2020-06-09 ENCOUNTER — TELEPHONE (OUTPATIENT)
Dept: TRANSPLANT | Facility: CLINIC | Age: 45
End: 2020-06-09

## 2020-06-09 NOTE — TELEPHONE ENCOUNTER
Jack Chen MD Ututalum, Teresa, RN Perfect.     d    Previous Messages     ----- Message -----   From: Jyoti Diaz RN   Sent: 6/9/2020  10:09 AM CDT   To: Jack Chen MD   Subject: mmf wean                                         Current MMF dose 360 mg BID     PLAN:   Decrease dose to 180 mg BID x 1 month,   Then decrease to 180 mg daily x 1 month,   Then decrease to 180 mg every other day x 1 month,   Then discontinue.     Are okay with this?         Current MMF dose 360 mg BID     PLAN:   Decrease dose to 180 mg BID x 1 month,   Then decrease to 180 mg daily x 1 month,   Then decrease to 180 mg every other day x 1 month,   Then discontinue.      OUTCOME:  Left VM.  MyChart message sent.    ADDENDUM:  Called back to confirm he received plan.  Will call if he will need mmf refills to complete the wean.

## 2020-06-09 NOTE — TELEPHONE ENCOUNTER
Jack Chen MD [place], Teresa, JOSE; Reena Borja, JOSE Prieto. Wean the MPA first as he has significant nausea.     Thanks,     d      Current MMF dose 360 mg BID    PLAN:  Decrease dose to 180 mg BID x 1 month,  Then decrease to 180 mg daily x 1 month,  Then decrease to 180 mg every other day x 1 month,  Then discontinue.

## 2020-06-10 NOTE — TELEPHONE ENCOUNTER
RECORDS RECEIVED FROM: N/A   DATE RECEIVED: 2020   NOTES STATUS DETAILS   OFFICE NOTE from referring provider Internal  SOT clinic referral 20   OFFICE NOTE from other specialist Internal 19 Office visit with Dr. Kt Ríos ( [facility])   DISCHARGE SUMMARY from hospital Internal 2020 ([facility])  2020 ([facility])   1/3/2020 ([facility])   19 ([facility])  3/5/19 ([facility])      *more ED/Hospital visits in Pt's chart    OPERATIVE REPORT N/A     MEDICATION LIST Internal             ENDOSCOPY  Internal EGD: 2020 [facility]   COLONOSCOPY N/A     ERCP N/A     EUS N/A     STOOL TESTING N/A     PERTINENT LABS Internal     PATHOLOGY REPORTS (RELATED) Internal     IMAGING (CT, MRI, EGD) Internal CT ABD PELVIS 20  XR Abdomen: 19, 8/19/15, 8/17/15  NM Gastric Emptyin19, 17  CT Abdomen Pelvis: 18  XR Chest: 9/4/15, 8/27/15  CT Chest Abdomen Pelvis: 8/27/15  US Abdomen: 8/23/15      REFERRAL INFORMATION     Date referral was placed: 2020   Date all records received: N/A   Date records were scanned into Epic: N/A   Date records were sent to Provider to review: N/A   Date and recommendation received from provider:  LETTER SENT  SCHEDULE APPOINTMENT   Date patient was contacted to schedule: 2/10/2020

## 2020-06-15 DIAGNOSIS — E10.59 TYPE 1 DIABETES MELLITUS WITH DIABETIC CARDIOMYOPATHY (H): ICD-10-CM

## 2020-06-15 DIAGNOSIS — I43 TYPE 1 DIABETES MELLITUS WITH DIABETIC CARDIOMYOPATHY (H): ICD-10-CM

## 2020-06-16 ENCOUNTER — PRE VISIT (OUTPATIENT)
Dept: GASTROENTEROLOGY | Facility: CLINIC | Age: 45
End: 2020-06-16

## 2020-06-16 ENCOUNTER — VIRTUAL VISIT (OUTPATIENT)
Dept: GASTROENTEROLOGY | Facility: CLINIC | Age: 45
End: 2020-06-16
Payer: MEDICARE

## 2020-06-16 DIAGNOSIS — Z99.2 ESRD ON DIALYSIS (H): ICD-10-CM

## 2020-06-16 DIAGNOSIS — T86.12 KIDNEY TRANSPLANT FAILURE: ICD-10-CM

## 2020-06-16 DIAGNOSIS — E10.22 TYPE 1 DIABETES MELLITUS WITH CHRONIC KIDNEY DISEASE ON CHRONIC DIALYSIS (H): ICD-10-CM

## 2020-06-16 DIAGNOSIS — N18.6 ESRD (END STAGE RENAL DISEASE) (H): ICD-10-CM

## 2020-06-16 DIAGNOSIS — Z99.2 TYPE 1 DIABETES MELLITUS WITH CHRONIC KIDNEY DISEASE ON CHRONIC DIALYSIS (H): ICD-10-CM

## 2020-06-16 DIAGNOSIS — Z01.818 PRE-TRANSPLANT EVALUATION FOR KIDNEY TRANSPLANT: ICD-10-CM

## 2020-06-16 DIAGNOSIS — K31.84 GASTROPARESIS: ICD-10-CM

## 2020-06-16 DIAGNOSIS — Z76.82 ORGAN TRANSPLANT CANDIDATE: ICD-10-CM

## 2020-06-16 DIAGNOSIS — Z76.82 AWAITING ORGAN TRANSPLANT: ICD-10-CM

## 2020-06-16 DIAGNOSIS — N18.6 TYPE 1 DIABETES MELLITUS WITH CHRONIC KIDNEY DISEASE ON CHRONIC DIALYSIS (H): ICD-10-CM

## 2020-06-16 DIAGNOSIS — Z87.891 HISTORY OF TOBACCO USE: ICD-10-CM

## 2020-06-16 DIAGNOSIS — N18.6 ESRD ON DIALYSIS (H): ICD-10-CM

## 2020-06-16 ASSESSMENT — PAIN SCALES - GENERAL: PAINLEVEL: NO PAIN (0)

## 2020-06-16 NOTE — LETTER
6/16/2020         RE: Murtaza Fitzgerald  [address] [id number]        Dear Colleague,    Thank you for referring your patient, Murtaza Fitzgerald, to the [facility]. Please see a copy of my visit note below.    Murtaza Fitzgerald is a 44 year old male who is being evaluated via a billable video visit.        Video-Visit Details    Type of service:  Video Visit    Video Start Time: 1:23 PM  Video End Time: 1:51 PM    Originating Location (pt. Location): Home    Distant Location (provider location):  [facility]     Platform used for Video Visit: Arash Baxter PA-C      GI CLINIC VISIT    CC/REFERRING MD:  Yasemin Moser  REASON FOR CONSULTATION: nausea and vomiting, gastroparesis    ASSESSMENT/PLAN:  44-year-old male with history of gastroparesis, type 1 diabetes, kidney transplant in 11/2008, pancreas transplant in 2/2009, CAD s/p CABG with history of repeated admissions due to nausea, vomiting and gastroparesis.    1.  Gastroparesis: His most recent gastric emptying study was 4/19/2019, showing slightly worse compared to 11/28/2017.  There has been mention of abdominal pain in documentation, however upon discussing with the patient today, it seems that he is perceiving abdominal pain as a sensation of nausea, rather than overt abdominal pain which he denies today.  There was a consideration of possible pancreatitis, although I think this is very unlikely given his description of his symptoms.  Certainly abdominal pain can be attributed to significant gastroparesis, but again he denies overt abdominal pain. We had an extensive conversation regarding medical management of gastroparesis.  This includes the following:  -- Small frequent meals meals (6-7), half of which should be liquid in consistency  -- Stick to low fat and low fiber diet  -- Avoid eating late at night before bed (wait 3  hours after eating before laying down)  -- Start food and symptom journal   -- Dietitian consultation  -- Minimize any medications that can decrease gut motility.  -- Important to have an adequate bowel regimen to allow for frequency (any backup of stool will make gastroparesis worse).  I have suggested the patient to start MiraLAX on a regular basis.  --Adequate glycemic control, patient has A1c less than 7, latest was 6.9 in May 2020 (this is the most important aspect of management of gastroparesis)    At this time I do not recommend erythromycin as this has limited use and can cause tachyphylaxis.  Other options to consider in the future is G-tube venting and feeding through J tube, assuming that he does NOT have small bowel dysmotility.  If the patient would be a candidate for gastric pacemaker, the gastroenterology service at the [facility] does not manage gastric pacemakers and we do not recommend placement.  They are for compassionate use only and data for efficacy is quite limited.    RTC 3 months    Thank you for this consultation.  It was a pleasure to participate in the care of this patient; please contact us with any further questions.       Jas Baxter PA-C  Division of Gastroenterology, Hepatology and Nutrition  [facility]      HPI  44-year-old male with history of gastroparesis, type 1 diabetes, kidney transplant in 11/2008, pancreas transplant in 2/2009, CAD s/p CABG with history of repeated admissions due to nausea, vomiting and gastroparesis.      Patient presents today for further evaluation of gastroparesis prompted by his transplant evaluation for repeat kidney and/or pancreas transplantation.    Patient often will get into spells of recurrent nausea and vomiting that will lead to hospitalization.  Most recently his admission in May 2020 he began a spell with nausea and vomiting which led to him forgetting to take his insulin, leading to DKA.      Currently he feels  that he is stable.  Episodes of nausea and/or vomiting occur less than once per week either in the middle of the night or possibly during the day.  When he vomits it is usually dry heaves, occasionally can be dry chewed up food.  He denies any hematemesis.  He denies any breakthrough heartburn and takes Prilosec 40 mg right before bed.  He usually eats dinner between 6 and 8 PM.  He denies any dysphasia or odynophagia.  He denies any abdominal pain.    His current bowel pattern is 3-4 stools per week.  Stools can be hard, occasionally soft, rarely loose.  He is not on a bowel regimen.  When he first started dialysis he had a problem with constipation and infrequent stools but he feels that this has improved.  He denies any blood in the stool.  His last hemoglobin A1c was on 5/13/2020 and was 6.9.    ROS:    No fevers or chills  No weight loss  No blurry vision, double vision or change in vision  No sore throat  No lymphadenopathy  No headache, paraesthesias, or weakness in a limb  No shortness of breath or wheezing  No chest pain or pressure  No arthralgias or myalgias  No rashes or skin changes  No odynophagia or dysphagia  No BRBPR, hematochezia, melena  No dysuria, frequency or urgency  No hot/cold intolerance or polyria  No anxiety or depression    PROBLEM LIST  Patient Active Problem List    Diagnosis Date Noted     Dehydration 02/25/2019     Priority: Medium     Adhesive capsulitis of right shoulder 07/05/2018     Priority: Medium     Coronary artery disease involving native coronary artery of native heart without angina pectoris 09/05/2017     Priority: Medium     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       Coronary artery disease, non-occlusive      Priority: Medium     On 8/5/15:  1. Triple vessel coronary artery bypass grafting procedure; left internal mammary artery to left anterior descending coronary artery, separate reversed saphenous vein grafts to the obtuse marginal and right posterior descending coronary artery.   2. Open procurement of greater saphenous vein, right lower extremity.        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     Noncompliance with treatment     no labs for one year     Pancreas replaced by transplant (H) Feb 2009    rejection treated with thymo     Pancreatic disease     pancreas transplant     Tobacco abuse ongoing       PREVIOUS SURGERIES:  Past Surgical History:   Procedure Laterality Date     ABDOMEN SURGERY       ARTHROSCOPY KNEE WITH LATERAL MENISCECTOMY Left 7/10/2019    Procedure: Left  knee arthroscopic partial lateral meniscectomy;  Surgeon: Alex Candelaria MD;  Location: [facility] OR     BIOPSY      [facility] 2009     BYPASS GRAFT ARTERY CORONARY N/A 8/5/2015    Procedure: BYPASS GRAFT ARTERY CORONARY;  Surgeon: Katy Neville MD;  Location: [facility] OR     CORONARY ANGIOGRAPHY ADULT ORDER      2015     ESOPHAGOSCOPY, GASTROSCOPY, DUODENOSCOPY (EGD), COMBINED N/A 1/28/2020    Procedure: ESOPHAGOGASTRODUODENOSCOPY (EGD) biopsies w/forceps;  Surgeon: Emre oGmes MD;  Location:  GI     EYE SURGERY      vitrectomy both eyes      IR CVC TUNNEL PLACEMENT > 5 YRS OF AGE  2/13/2020     ORTHOPEDIC SURGERY  1993    tumor removed from left knee     TRANSPLANT  2009.2008    pancrease and kidney transplant       ALLERGIES:     Allergies   Allergen Reactions     Benadryl [Diphenhydramine] Other (See Comments)     Restless legs     Reglan Other (See Comments)     IV, delsuions       PERTINENT MEDICATIONS:    Current Outpatient Medications:      acetaminophen (TYLENOL) 325 MG tablet, Take 2 tablets (650 mg) by mouth every 4 hours as needed for mild pain, Disp: 50 tablet, Rfl: 0     atorvastatin (LIPITOR) 40 MG tablet, Take 0.5 tablets (20 mg) by mouth daily, Disp: 45 tablet, Rfl: 3     blood glucose (NO BRAND SPECIFIED) test strip, Use to test blood sugar 5 times daily or as directed., Disp: 1 Box, Rfl: 0     insulin aspart (NOVOLOG FLEXPEN) 100 UNIT/ML pen, 1 unit per 6 grams of carbs with each meal. About 45 units/day., Disp: 15 mL, Rfl: 3     insulin aspart (NOVOLOG FLEXPEN) 100 UNIT/ML pen, Sliding scale = 1 unit for every 20 over blood glucose of 140, Disp: , Rfl:      insulin glargine (LANTUS PEN) 100 UNIT/ML pen, Inject 24 Units Subcutaneous At Bedtime, Disp: 15 mL, Rfl: 1     insulin pen needle (BD ARPITA U/F) 32G X 4 MM, Use 4 daily or as directed., Disp: 120 each, Rfl: 11     metoclopramide (REGLAN) 5 MG tablet, Take 1 tablet (5 mg) by mouth 4 times daily (before meals and nightly), Disp: 120 tablet,  Rfl: 11     mycophenolic acid (GENERIC EQUIVALENT) 180 MG EC tablet, Take 360 mg by mouth 2 times daily, Disp: , Rfl:      omeprazole (PRILOSEC) 40 MG DR capsule, Take 40 mg by mouth every evening, Disp: , Rfl:      ondansetron (ZOFRAN-ODT) 4 MG ODT tab, Take 1 tablet (4 mg) by mouth every 8 hours as needed for nausea, Disp: 30 tablet, Rfl: 1     prochlorperazine (COMPAZINE) 5 MG tablet, Take 1 tablet (5 mg) by mouth every 8 hours as needed for nausea, Disp: 20 tablet, Rfl: 0     tacrolimus (GENERIC EQUIVALENT) 1 MG capsule, Take 1 mg by mouth 2 times daily , Disp: 60 capsule, Rfl: 11     torsemide (DEMADEX) 20 MG tablet, Take on Sunday Monday Wednesday and friday, Disp: , Rfl:      acetone, Urine, test STRP, 1 strip by In Vitro route daily (Patient not taking: Reported on 2/26/2020), Disp: 50 each, Rfl: 3    SOCIAL HISTORY:  Social History     Socioeconomic History     Marital status: Single     Spouse name: Not on file     Number of children: 1     Years of education: Not on file     Highest education level: Associate degree: academic program   Occupational History     Occupation:    Social Needs     Financial resource strain: Not very hard     Food insecurity     Worry: Never true     Inability: Never true     Transportation needs     Medical: No     Non-medical: No   Tobacco Use     Smoking status: Former Smoker     Packs/day: 0.30     Types: Vaping Device     Smokeless tobacco: Never Used     Tobacco comment: E- Cig    Substance and Sexual Activity     Alcohol use: Not Currently     Alcohol/week: 0.0 standard drinks     Frequency: Never     Binge frequency: Never     Drug use: No     Sexual activity: Never     Partners: Female   Lifestyle     Physical activity     Days per week: 0 days     Minutes per session: 0 min     Stress: To some extent   Relationships     Social connections     Talks on phone: More than three times a week     Gets together: Once a week     Attends Faith service:  Never     Active member of club or organization: No     Attends meetings of clubs or organizations: Never     Relationship status:      Intimate partner violence     Fear of current or ex partner: Not on file     Emotionally abused: Not on file     Physically abused: Not on file     Forced sexual activity: Not on file   Other Topics Concern     Parent/sibling w/ CABG, MI or angioplasty before 65F 55M? No      Service Not Asked     Blood Transfusions No     Comment: possibly during surgery, otherwise none.     Caffeine Concern Not Asked     Occupational Exposure Not Asked     Hobby Hazards Not Asked     Sleep Concern Not Asked     Stress Concern Not Asked     Weight Concern Not Asked     Special Diet Not Asked     Back Care Not Asked     Exercise Not Asked     Bike Helmet Not Asked     Seat Belt Yes     Self-Exams Not Asked   Social History Narrative     Not on file       FAMILY HISTORY:  FH of CRC: None  FH of IBD: None  Family History   Problem Relation Age of Onset     Unknown/Adopted Father      Heart Disease Maternal Grandfather 62       Past/family/social history reviewed and no changes    PHYSICAL EXAMINATION:  Constitutional: aaox3, cooperative, pleasant, not dyspneic/diaphoretic, no acute distress  Vitals reviewed: There were no vitals taken for this visit.  Wt:   Wt Readings from Last 2 Encounters:   05/27/20 85.2 kg (187 lb 13.3 oz)   05/14/20 80.9 kg (178 lb 5.6 oz)      General appearance:  Healthy appearing adult, in no acute distress, obese  Eyes: Sclera anicteric, Pupils round and reactive to light  Ears, nose, mouth and throat: No obvious external lesions of ears and nose, Hearing intact  Neck: Symmetric, No obvious external lesions  Respiratory: Normal respiration, no use of accessory muscles   Skin: No rashes or jaundice   Psychiatric: Oriented to person, place and time, Appropriate mood and affect.     PERTINENT STUDIES:    Orders Only on 02/11/2020   Component Date Value Ref Range  Status     WBC 02/11/2020 9.0  4.0 - 11.0 10e9/L Final     RBC Count 02/11/2020 4.12* 4.4 - 5.9 10e12/L Final     Hemoglobin 02/11/2020 11.6* 13.3 - 17.7 g/dL Final     Hematocrit 02/11/2020 36.4* 40.0 - 53.0 % Final     MCV 02/11/2020 88  78 - 100 fl Final     MCH 02/11/2020 28.2  26.5 - 33.0 pg Final     MCHC 02/11/2020 31.9  31.5 - 36.5 g/dL Final     RDW 02/11/2020 13.5  10.0 - 15.0 % Final     Platelet Count 02/11/2020 352  150 - 450 10e9/L Final     Sodium 02/11/2020 137  133 - 144 mmol/L Final     Potassium 02/11/2020 4.0  3.4 - 5.3 mmol/L Final     Chloride 02/11/2020 108  94 - 109 mmol/L Final     Carbon Dioxide 02/11/2020 20  20 - 32 mmol/L Final     Anion Gap 02/11/2020 9  3 - 14 mmol/L Final     Glucose 02/11/2020 146* 70 - 99 mg/dL Final     Urea Nitrogen 02/11/2020 33* 7 - 30 mg/dL Final     Creatinine 02/11/2020 5.93* 0.66 - 1.25 mg/dL Final     GFR Estimate 02/11/2020 11* >60 mL/min/[1.73_m2] Corrected     GFR Estimate If Black 02/11/2020 12* >60 mL/min/[1.73_m2] Corrected     Calcium 02/11/2020 8.8  8.5 - 10.1 mg/dL Final     Tacrolimus Last Dose 02/11/2020 2/10/2020 2020   Final     Tacrolimus Level 02/11/2020 4.2* 5.0 - 15.0 ug/L Final     Hemoglobin A1C 02/11/2020 7.0* 0 - 5.6 % Final     Cholesterol 02/11/2020 166  <200 mg/dL Final     Triglycerides 02/11/2020 159* <150 mg/dL Final     HDL Cholesterol 02/11/2020 46  >39 mg/dL Final     LDL Cholesterol Calculated 02/11/2020 88  <100 mg/dL Final     Non HDL Cholesterol 02/11/2020 120  <130 mg/dL Final     Color Urine 02/11/2020 Yellow   Final     Appearance Urine 02/11/2020 Clear   Final     Glucose Urine 02/11/2020 100* NEG^Negative mg/dL Final     Bilirubin Urine 02/11/2020 Negative  NEG^Negative Final     Ketones Urine 02/11/2020 Negative  NEG^Negative mg/dL Final     Specific Gravity Urine 02/11/2020 1.020  1.003 - 1.035 Final     pH Urine 02/11/2020 7.0  5.0 - 7.0 pH Final     Protein Albumin Urine 02/11/2020 >=300* NEG^Negative mg/dL Final      Urobilinogen Urine 02/11/2020 0.2  0.2 - 1.0 EU/dL Final     Nitrite Urine 02/11/2020 Negative  NEG^Negative Final     Blood Urine 02/11/2020 Trace* NEG^Negative Final     Leukocyte Esterase Urine 02/11/2020 Negative  NEG^Negative Final     Source 02/11/2020 Midstream Urine   Final     WBC Urine 02/11/2020 0 - 5  OTO5^0 - 5 /HPF Final     RBC Urine 02/11/2020 2-5* OTO2^O - 2 /HPF Final           Again, thank you for allowing me to participate in the care of your patient.        Sincerely,        Jas Baxter PA-C

## 2020-06-16 NOTE — TELEPHONE ENCOUNTER
Routing to MA/TC pool. The Pt is due for an OV with PCP. Please call and help them schedule. Please assess if the Pt has enough medication to get them through until their upcoming future visit, or if they need a refill.     Please route back to refill pool with response    Genny Patel RN on 6/16/2020 at 9:02 AM

## 2020-06-16 NOTE — PROGRESS NOTES
"Murtaza Fitzgerald is a 44 year old male who is being evaluated via a billable video visit.      The patient has been notified of following:     \"This video visit will be conducted via a call between you and your physician/provider. We have found that certain health care needs can be provided without the need for an in-person physical exam.  This service lets us provide the care you need with a video conversation.  If a prescription is necessary we can send it directly to your pharmacy.  If lab work is needed we can place an order for that and you can then stop by our lab to have the test done at a later time.    Video visits are billed at different rates depending on your insurance coverage.  Please reach out to your insurance provider with any questions.    If during the course of the call the physician/provider feels a video visit is not appropriate, you will not be charged for this service.\"    Patient has given verbal consent for Video visit? Yes    Will anyone else be joining your video visit? No     Send invite link to cell: [phone]        Video-Visit Details    Type of service:  Video Visit    Video Start Time: 1:23 PM  Video End Time: 1:51 PM    Originating Location (pt. Location): Home    Distant Location (provider location):  [facility]     Platform used for Video Visit: Arash Baxter PA-C      GI CLINIC VISIT    CC/REFERRING MD:  Yasemin Moser  REASON FOR CONSULTATION: nausea and vomiting, gastroparesis    ASSESSMENT/PLAN:  44-year-old male with history of gastroparesis, type 1 diabetes, kidney transplant in 11/2008, pancreas transplant in 2/2009, CAD s/p CABG with history of repeated admissions due to nausea, vomiting and gastroparesis.    1.  Gastroparesis: His most recent gastric emptying study was 4/19/2019, showing slightly worse compared to 11/28/2017.  There has been mention of abdominal pain in documentation, however upon discussing " with the patient today, it seems that he is perceiving abdominal pain as a sensation of nausea, rather than overt abdominal pain which he denies today.  There was a consideration of possible pancreatitis, although I think this is very unlikely given his description of his symptoms.  Certainly abdominal pain can be attributed to significant gastroparesis, but again he denies overt abdominal pain. We had an extensive conversation regarding medical management of gastroparesis.  This includes the following:  -- Small frequent meals meals (6-7), half of which should be liquid in consistency  -- Stick to low fat and low fiber diet  -- Avoid eating late at night before bed (wait 3 hours after eating before laying down)  -- Start food and symptom journal   -- Dietitian consultation  -- Minimize any medications that can decrease gut motility.  -- Important to have an adequate bowel regimen to allow for frequency (any backup of stool will make gastroparesis worse).  I have suggested the patient to start MiraLAX on a regular basis.  --Adequate glycemic control, patient has A1c less than 7, latest was 6.9 in May 2020 (this is the most important aspect of management of gastroparesis)    At this time I do not recommend erythromycin as this has limited use and can cause tachyphylaxis.  Other options to consider in the future is G-tube venting and feeding through J tube, assuming that he does NOT have small bowel dysmotility.  If the patient would be a candidate for gastric pacemaker, the gastroenterology service at the [facility] does not manage gastric pacemakers and we do not recommend placement.  They are for compassionate use only and data for efficacy is quite limited.    RTC 3 months    Thank you for this consultation.  It was a pleasure to participate in the care of this patient; please contact us with any further questions.       Jas Baxter PA-C  Division of Gastroenterology, Hepatology and  Nutrition  [facility]      HPI  44-year-old male with history of gastroparesis, type 1 diabetes, kidney transplant in 11/2008, pancreas transplant in 2/2009, CAD s/p CABG with history of repeated admissions due to nausea, vomiting and gastroparesis.      Patient presents today for further evaluation of gastroparesis prompted by his transplant evaluation for repeat kidney and/or pancreas transplantation.    Patient often will get into spells of recurrent nausea and vomiting that will lead to hospitalization.  Most recently his admission in May 2020 he began a spell with nausea and vomiting which led to him forgetting to take his insulin, leading to DKA.      Currently he feels that he is stable.  Episodes of nausea and/or vomiting occur less than once per week either in the middle of the night or possibly during the day.  When he vomits it is usually dry heaves, occasionally can be dry chewed up food.  He denies any hematemesis.  He denies any breakthrough heartburn and takes Prilosec 40 mg right before bed.  He usually eats dinner between 6 and 8 PM.  He denies any dysphasia or odynophagia.  He denies any abdominal pain.    His current bowel pattern is 3-4 stools per week.  Stools can be hard, occasionally soft, rarely loose.  He is not on a bowel regimen.  When he first started dialysis he had a problem with constipation and infrequent stools but he feels that this has improved.  He denies any blood in the stool.  His last hemoglobin A1c was on 5/13/2020 and was 6.9.    ROS:    No fevers or chills  No weight loss  No blurry vision, double vision or change in vision  No sore throat  No lymphadenopathy  No headache, paraesthesias, or weakness in a limb  No shortness of breath or wheezing  No chest pain or pressure  No arthralgias or myalgias  No rashes or skin changes  No odynophagia or dysphagia  No BRBPR, hematochezia, melena  No dysuria, frequency or urgency  No hot/cold intolerance or polyria  No  anxiety or depression    PROBLEM LIST  Patient Active Problem List    Diagnosis Date Noted     Dehydration 02/25/2019     Priority: Medium     Adhesive capsulitis of right shoulder 07/05/2018     Priority: Medium     Coronary artery disease involving native coronary artery of native heart without angina pectoris 09/05/2017     Priority: Medium     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       Coronary artery disease, non-occlusive      Priority: Medium     On 8/5/15:  1. Triple vessel coronary artery bypass grafting procedure; left internal mammary artery to left anterior descending coronary artery, separate reversed saphenous vein grafts to the obtuse marginal and right posterior descending coronary artery.   2. Open procurement of greater saphenous vein, right lower extremity.        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     Noncompliance with treatment     no labs for one year     Pancreas replaced by transplant (H) Feb 2009    rejection treated with thymo     Pancreatic disease     pancreas transplant     Tobacco abuse ongoing       PREVIOUS SURGERIES:  Past Surgical History:   Procedure Laterality Date     ABDOMEN SURGERY       ARTHROSCOPY KNEE WITH LATERAL MENISCECTOMY Left 7/10/2019    Procedure: Left knee arthroscopic partial lateral meniscectomy;  Surgeon: Alex Candelaria MD;  Location: [facility] OR     BIOPSY      [facility] 2009     BYPASS GRAFT ARTERY CORONARY N/A 8/5/2015    Procedure: BYPASS GRAFT ARTERY CORONARY;  Surgeon: Katy Neville MD;  Location: [facility] OR     CORONARY ANGIOGRAPHY ADULT ORDER      2015     ESOPHAGOSCOPY, GASTROSCOPY, DUODENOSCOPY (EGD), COMBINED N/A 1/28/2020    Procedure: ESOPHAGOGASTRODUODENOSCOPY (EGD) biopsies w/forceps;  Surgeon: Emre Gomes MD;  Location:  GI     EYE SURGERY      vitrectomy both eyes      IR CVC TUNNEL PLACEMENT > 5 YRS OF AGE  2/13/2020     ORTHOPEDIC SURGERY  1993    tumor removed from left knee     TRANSPLANT  2009.2008    pancrease and kidney transplant       ALLERGIES:     Allergies   Allergen Reactions     Benadryl [Diphenhydramine] Other (See Comments)     Restless legs     Reglan Other (See Comments)     IV, delsuions       PERTINENT MEDICATIONS:    Current Outpatient Medications:      acetaminophen (TYLENOL) 325 MG tablet, Take 2 tablets (650 mg) by mouth every 4 hours as needed for mild pain, Disp: 50 tablet, Rfl: 0     atorvastatin (LIPITOR) 40 MG tablet, Take 0.5 tablets (20 mg) by mouth daily, Disp: 45 tablet, Rfl: 3     blood glucose (NO BRAND SPECIFIED) test strip,  Use to test blood sugar 5 times daily or as directed., Disp: 1 Box, Rfl: 0     insulin aspart (NOVOLOG FLEXPEN) 100 UNIT/ML pen, 1 unit per 6 grams of carbs with each meal. About 45 units/day., Disp: 15 mL, Rfl: 3     insulin aspart (NOVOLOG FLEXPEN) 100 UNIT/ML pen, Sliding scale = 1 unit for every 20 over blood glucose of 140, Disp: , Rfl:      insulin glargine (LANTUS PEN) 100 UNIT/ML pen, Inject 24 Units Subcutaneous At Bedtime, Disp: 15 mL, Rfl: 1     insulin pen needle (BD ARPITA U/F) 32G X 4 MM, Use 4 daily or as directed., Disp: 120 each, Rfl: 11     metoclopramide (REGLAN) 5 MG tablet, Take 1 tablet (5 mg) by mouth 4 times daily (before meals and nightly), Disp: 120 tablet, Rfl: 11     mycophenolic acid (GENERIC EQUIVALENT) 180 MG EC tablet, Take 360 mg by mouth 2 times daily, Disp: , Rfl:      omeprazole (PRILOSEC) 40 MG DR capsule, Take 40 mg by mouth every evening, Disp: , Rfl:      ondansetron (ZOFRAN-ODT) 4 MG ODT tab, Take 1 tablet (4 mg) by mouth every 8 hours as needed for nausea, Disp: 30 tablet, Rfl: 1     prochlorperazine (COMPAZINE) 5 MG tablet, Take 1 tablet (5 mg) by mouth every 8 hours as needed for nausea, Disp: 20 tablet, Rfl: 0     tacrolimus (GENERIC EQUIVALENT) 1 MG capsule, Take 1 mg by mouth 2 times daily , Disp: 60 capsule, Rfl: 11     torsemide (DEMADEX) 20 MG tablet, Take on Sunday Monday Wednesday and friday, Disp: , Rfl:      acetone, Urine, test STRP, 1 strip by In Vitro route daily (Patient not taking: Reported on 2/26/2020), Disp: 50 each, Rfl: 3    SOCIAL HISTORY:  Social History     Socioeconomic History     Marital status: Single     Spouse name: Not on file     Number of children: 1     Years of education: Not on file     Highest education level: Associate degree: academic program   Occupational History     Occupation:    Social Needs     Financial resource strain: Not very hard     Food insecurity     Worry: Never true     Inability: Never true      Transportation needs     Medical: No     Non-medical: No   Tobacco Use     Smoking status: Former Smoker     Packs/day: 0.30     Types: Vaping Device     Smokeless tobacco: Never Used     Tobacco comment: E- Cig    Substance and Sexual Activity     Alcohol use: Not Currently     Alcohol/week: 0.0 standard drinks     Frequency: Never     Binge frequency: Never     Drug use: No     Sexual activity: Never     Partners: Female   Lifestyle     Physical activity     Days per week: 0 days     Minutes per session: 0 min     Stress: To some extent   Relationships     Social connections     Talks on phone: More than three times a week     Gets together: Once a week     Attends [employer] service: Never     Active member of club or organization: No     Attends meetings of clubs or organizations: Never     Relationship status:      Intimate partner violence     Fear of current or ex partner: Not on file     Emotionally abused: Not on file     Physically abused: Not on file     Forced sexual activity: Not on file   Other Topics Concern     Parent/sibling w/ CABG, MI or angioplasty before 65F 55M? No      Service Not Asked     Blood Transfusions No     Comment: possibly during surgery, otherwise none.     Caffeine Concern Not Asked     Occupational Exposure Not Asked     Hobby Hazards Not Asked     Sleep Concern Not Asked     Stress Concern Not Asked     Weight Concern Not Asked     Special Diet Not Asked     Back Care Not Asked     Exercise Not Asked     Bike Helmet Not Asked     Seat Belt Yes     Self-Exams Not Asked   Social History Narrative     Not on file       FAMILY HISTORY:  FH of CRC: None  FH of IBD: None  Family History   Problem Relation Age of Onset     Unknown/Adopted Father      Heart Disease Maternal Grandfather 62       Past/family/social history reviewed and no changes    PHYSICAL EXAMINATION:  Constitutional: aaox3, cooperative, pleasant, not dyspneic/diaphoretic, no acute distress  Vitals  reviewed: There were no vitals taken for this visit.  Wt:   Wt Readings from Last 2 Encounters:   05/27/20 85.2 kg (187 lb 13.3 oz)   05/14/20 80.9 kg (178 lb 5.6 oz)      General appearance:  Healthy appearing adult, in no acute distress, obese  Eyes: Sclera anicteric, Pupils round and reactive to light  Ears, nose, mouth and throat: No obvious external lesions of ears and nose, Hearing intact  Neck: Symmetric, No obvious external lesions  Respiratory: Normal respiration, no use of accessory muscles   Skin: No rashes or jaundice   Psychiatric: Oriented to person, place and time, Appropriate mood and affect.     PERTINENT STUDIES:    Orders Only on 02/11/2020   Component Date Value Ref Range Status     WBC 02/11/2020 9.0  4.0 - 11.0 10e9/L Final     RBC Count 02/11/2020 4.12* 4.4 - 5.9 10e12/L Final     Hemoglobin 02/11/2020 11.6* 13.3 - 17.7 g/dL Final     Hematocrit 02/11/2020 36.4* 40.0 - 53.0 % Final     MCV 02/11/2020 88  78 - 100 fl Final     MCH 02/11/2020 28.2  26.5 - 33.0 pg Final     MCHC 02/11/2020 31.9  31.5 - 36.5 g/dL Final     RDW 02/11/2020 13.5  10.0 - 15.0 % Final     Platelet Count 02/11/2020 352  150 - 450 10e9/L Final     Sodium 02/11/2020 137  133 - 144 mmol/L Final     Potassium 02/11/2020 4.0  3.4 - 5.3 mmol/L Final     Chloride 02/11/2020 108  94 - 109 mmol/L Final     Carbon Dioxide 02/11/2020 20  20 - 32 mmol/L Final     Anion Gap 02/11/2020 9  3 - 14 mmol/L Final     Glucose 02/11/2020 146* 70 - 99 mg/dL Final     Urea Nitrogen 02/11/2020 33* 7 - 30 mg/dL Final     Creatinine 02/11/2020 5.93* 0.66 - 1.25 mg/dL Final     GFR Estimate 02/11/2020 11* >60 mL/min/[1.73_m2] Corrected     GFR Estimate If Black 02/11/2020 12* >60 mL/min/[1.73_m2] Corrected     Calcium 02/11/2020 8.8  8.5 - 10.1 mg/dL Final     Tacrolimus Last Dose 02/11/2020 2/10/2020 2020   Final     Tacrolimus Level 02/11/2020 4.2* 5.0 - 15.0 ug/L Final     Hemoglobin A1C 02/11/2020 7.0* 0 - 5.6 % Final     Cholesterol  02/11/2020 166  <200 mg/dL Final     Triglycerides 02/11/2020 159* <150 mg/dL Final     HDL Cholesterol 02/11/2020 46  >39 mg/dL Final     LDL Cholesterol Calculated 02/11/2020 88  <100 mg/dL Final     Non HDL Cholesterol 02/11/2020 120  <130 mg/dL Final     Color Urine 02/11/2020 Yellow   Final     Appearance Urine 02/11/2020 Clear   Final     Glucose Urine 02/11/2020 100* NEG^Negative mg/dL Final     Bilirubin Urine 02/11/2020 Negative  NEG^Negative Final     Ketones Urine 02/11/2020 Negative  NEG^Negative mg/dL Final     Specific Gravity Urine 02/11/2020 1.020  1.003 - 1.035 Final     pH Urine 02/11/2020 7.0  5.0 - 7.0 pH Final     Protein Albumin Urine 02/11/2020 >=300* NEG^Negative mg/dL Final     Urobilinogen Urine 02/11/2020 0.2  0.2 - 1.0 EU/dL Final     Nitrite Urine 02/11/2020 Negative  NEG^Negative Final     Blood Urine 02/11/2020 Trace* NEG^Negative Final     Leukocyte Esterase Urine 02/11/2020 Negative  NEG^Negative Final     Source 02/11/2020 Midstream Urine   Final     WBC Urine 02/11/2020 0 - 5  OTO5^0 - 5 /HPF Final     RBC Urine 02/11/2020 2-5* OTO2^O - 2 /HPF Final

## 2020-06-16 NOTE — NURSING NOTE
Chief Complaint   Patient presents with     Consult     New consult       There were no vitals filed for this visit.    There is no height or weight on file to calculate BMI.    Deanna Montemayor, CMA

## 2020-06-17 RX ORDER — PEN NEEDLE, DIABETIC 32GX 5/32"
NEEDLE, DISPOSABLE MISCELLANEOUS
Qty: 120 EACH | Refills: 0 | Status: SHIPPED | OUTPATIENT
Start: 2020-06-17

## 2020-06-17 NOTE — TELEPHONE ENCOUNTER
Patient has appointment scheduled for 6/30/2020.   Patient need refill of medication.     Daysi Delarosa MA

## 2020-06-18 ENCOUNTER — TELEPHONE (OUTPATIENT)
Dept: GASTROENTEROLOGY | Facility: CLINIC | Age: 45
End: 2020-06-18

## 2020-06-29 ENCOUNTER — HOSPITAL ENCOUNTER (EMERGENCY)
Facility: CLINIC | Age: 45
Discharge: HOME | End: 2020-06-29
Attending: PHYSICIAN ASSISTANT | Admitting: PHYSICIAN ASSISTANT
Payer: MEDICARE

## 2020-06-29 VITALS
HEART RATE: 89 BPM | DIASTOLIC BLOOD PRESSURE: 109 MMHG | SYSTOLIC BLOOD PRESSURE: 202 MMHG | RESPIRATION RATE: 18 BRPM | OXYGEN SATURATION: 99 % | TEMPERATURE: 98.5 F

## 2020-06-29 DIAGNOSIS — K31.84 GASTROPARESIS: ICD-10-CM

## 2020-06-29 LAB
ALBUMIN SERPL-MCNC: 3.9 G/DL (ref 3.4–5)
ALP SERPL-CCNC: 115 U/L (ref 40–150)
ALT SERPL W P-5'-P-CCNC: 14 U/L (ref 0–70)
ANION GAP SERPL CALCULATED.3IONS-SCNC: 14 MMOL/L (ref 3–14)
AST SERPL W P-5'-P-CCNC: 13 U/L (ref 0–45)
BASOPHILS # BLD AUTO: 0.1 10E9/L (ref 0–0.2)
BASOPHILS NFR BLD AUTO: 0.7 %
BILIRUB SERPL-MCNC: 0.4 MG/DL (ref 0.2–1.3)
BUN SERPL-MCNC: 54 MG/DL (ref 7–30)
CALCIUM SERPL-MCNC: 9.2 MG/DL (ref 8.5–10.1)
CHLORIDE SERPL-SCNC: 97 MMOL/L (ref 94–109)
CO2 SERPL-SCNC: 20 MMOL/L (ref 20–32)
CREAT SERPL-MCNC: 8.07 MG/DL (ref 0.66–1.25)
DIFFERENTIAL METHOD BLD: ABNORMAL
EOSINOPHIL # BLD AUTO: 0.1 10E9/L (ref 0–0.7)
EOSINOPHIL NFR BLD AUTO: 1.2 %
ERYTHROCYTE [DISTWIDTH] IN BLOOD BY AUTOMATED COUNT: 13.4 % (ref 10–15)
GFR SERPL CREATININE-BSD FRML MDRD: 7 ML/MIN/{1.73_M2}
GLUCOSE BLDC GLUCOMTR-MCNC: 420 MG/DL (ref 70–99)
GLUCOSE SERPL-MCNC: 433 MG/DL (ref 70–99)
HCT VFR BLD AUTO: 37.4 % (ref 40–53)
HGB BLD-MCNC: 11.9 G/DL (ref 13.3–17.7)
IMM GRANULOCYTES # BLD: 0.1 10E9/L (ref 0–0.4)
IMM GRANULOCYTES NFR BLD: 0.5 %
LIPASE SERPL-CCNC: 64 U/L (ref 73–393)
LYMPHOCYTES # BLD AUTO: 1 10E9/L (ref 0.8–5.3)
LYMPHOCYTES NFR BLD AUTO: 11 %
MCH RBC QN AUTO: 29.6 PG (ref 26.5–33)
MCHC RBC AUTO-ENTMCNC: 31.8 G/DL (ref 31.5–36.5)
MCV RBC AUTO: 93 FL (ref 78–100)
MONOCYTES # BLD AUTO: 0.3 10E9/L (ref 0–1.3)
MONOCYTES NFR BLD AUTO: 3.7 %
NEUTROPHILS # BLD AUTO: 7.6 10E9/L (ref 1.6–8.3)
NEUTROPHILS NFR BLD AUTO: 82.9 %
NRBC # BLD AUTO: 0 10*3/UL
NRBC BLD AUTO-RTO: 0 /100
PLATELET # BLD AUTO: 291 10E9/L (ref 150–450)
POTASSIUM SERPL-SCNC: 4.9 MMOL/L (ref 3.4–5.3)
PROT SERPL-MCNC: 8.3 G/DL (ref 6.8–8.8)
RBC # BLD AUTO: 4.02 10E12/L (ref 4.4–5.9)
SODIUM SERPL-SCNC: 131 MMOL/L (ref 133–144)
WBC # BLD AUTO: 9.1 10E9/L (ref 4–11)

## 2020-06-29 PROCEDURE — 85025 COMPLETE CBC W/AUTO DIFF WBC: CPT | Performed by: PHYSICIAN ASSISTANT

## 2020-06-29 PROCEDURE — 25800030 ZZH RX IP 258 OP 636: Performed by: PHYSICIAN ASSISTANT

## 2020-06-29 PROCEDURE — 96376 TX/PRO/DX INJ SAME DRUG ADON: CPT

## 2020-06-29 PROCEDURE — 83690 ASSAY OF LIPASE: CPT | Performed by: PHYSICIAN ASSISTANT

## 2020-06-29 PROCEDURE — 25000132 ZZH RX MED GY IP 250 OP 250 PS 637: Performed by: PHYSICIAN ASSISTANT

## 2020-06-29 PROCEDURE — 96361 HYDRATE IV INFUSION ADD-ON: CPT

## 2020-06-29 PROCEDURE — 99284 EMERGENCY DEPT VISIT MOD MDM: CPT | Mod: 25

## 2020-06-29 PROCEDURE — 25000128 H RX IP 250 OP 636: Performed by: PHYSICIAN ASSISTANT

## 2020-06-29 PROCEDURE — 96375 TX/PRO/DX INJ NEW DRUG ADDON: CPT

## 2020-06-29 PROCEDURE — 80053 COMPREHEN METABOLIC PANEL: CPT | Performed by: PHYSICIAN ASSISTANT

## 2020-06-29 PROCEDURE — 96374 THER/PROPH/DIAG INJ IV PUSH: CPT

## 2020-06-29 PROCEDURE — 00000146 ZZHCL STATISTIC GLUCOSE BY METER IP

## 2020-06-29 RX ORDER — ONDANSETRON 2 MG/ML
4 INJECTION INTRAMUSCULAR; INTRAVENOUS ONCE
Status: COMPLETED | OUTPATIENT
Start: 2020-06-29 | End: 2020-06-29

## 2020-06-29 RX ORDER — SODIUM CHLORIDE 9 MG/ML
1000 INJECTION, SOLUTION INTRAVENOUS CONTINUOUS
Status: DISCONTINUED | OUTPATIENT
Start: 2020-06-29 | End: 2020-06-29 | Stop reason: HOSPADM

## 2020-06-29 RX ADMIN — ONDANSETRON 4 MG: 2 INJECTION INTRAMUSCULAR; INTRAVENOUS at 14:34

## 2020-06-29 RX ADMIN — PROCHLORPERAZINE EDISYLATE 5 MG: 5 INJECTION INTRAMUSCULAR; INTRAVENOUS at 12:47

## 2020-06-29 RX ADMIN — SODIUM CHLORIDE 1000 ML: 9 INJECTION, SOLUTION INTRAVENOUS at 12:01

## 2020-06-29 RX ADMIN — ONDANSETRON 4 MG: 2 INJECTION INTRAMUSCULAR; INTRAVENOUS at 12:01

## 2020-06-29 RX ADMIN — ONDANSETRON 4 MG: 2 INJECTION INTRAMUSCULAR; INTRAVENOUS at 12:47

## 2020-06-29 RX ADMIN — PROCHLORPERAZINE EDISYLATE 10 MG: 5 INJECTION INTRAMUSCULAR; INTRAVENOUS at 12:01

## 2020-06-29 RX ADMIN — PROCHLORPERAZINE EDISYLATE 5 MG: 5 INJECTION INTRAMUSCULAR; INTRAVENOUS at 14:35

## 2020-06-29 RX ADMIN — SODIUM CHLORIDE 5 UNITS: 9 INJECTION, SOLUTION INTRAVENOUS at 13:09

## 2020-06-29 ASSESSMENT — ENCOUNTER SYMPTOMS
SORE THROAT: 0
CHILLS: 0
DIARRHEA: 0
ABDOMINAL PAIN: 1
RHINORRHEA: 0
SHORTNESS OF BREATH: 0
FEVER: 0
COUGH: 0
NAUSEA: 1
BLOOD IN STOOL: 0
CONSTIPATION: 0
VOMITING: 1

## 2020-06-29 NOTE — ED PROVIDER NOTES
History     Chief Complaint:  Nausea & Vomiting      HPI   [facility], [place] Fitzgerald is an immunosuppressed 44 year old male with a history of hypertension, CAD, type I diabetes, GERD, gastroparesis, and ESRD on hemodialysis T/Th/Sat, s/p kidney and pancreas transplant, who presents to the ED for evaluation of nausea and vomiting. The patient reports that after eating yesterday afternoon he experienced the gradual onset of reflux, nausea, vomiting, and diffuse abdominal pain. His symptoms persisted through the night, and thus the patient decided to present to the ED this morning. Here he endorses persistent nausea and is actively retching, although his abdominal pain has resolved. He denies any associated hematemesis, diarrhea, constipation, bloody stool, fevers, chills, cough, rhinorrhea, sore throat, chest pain, shortness of breath, or other symptoms. The patient does still produce some urine, although this has been somewhat decreased lately. Of note, the patient states that today's symptoms feels very similar to past flares of gastroparesis, for which he gets relief with Zofran and compazine.     Allergies:  Benadryl [Diphenhydramine]  Reglan      Medications:    Lipitor  Novolog  Lantus  Reglan  Omeprazole  Zofran  Compazine  Tacrolimus  Torsemide      Past Medical History:    Immunosuppressed status  Mycotic aneurysm of kidney transplant  CAD, non-occlusive  Hypertension  Hyperlipidemia   DM1  Adhesive capsulitis of right shoulder  Dehydration  ESRD on hemodialysis  GERD  Migraines  Noncompliance with treatment  Pancreatic disease  Gastroparesis    Past Surgical History:    Abdominal surgery  Left knee arthroscopy with partial lateral meniscectomy   Biopsy  Bypass graft artery coronary  Coronary angiography  EGD  Vitrectomy, bilateral  IR CVC tunnel placement  Left knee tumor removal  Pancreas and kidney transplant    Family History:    No past pertinent family history.    Social History:  Smoking Status:  Former smoker  Negative for alcohol use.  Negative for drug use.   Marital Status:  Single [1]     Review of Systems   Constitutional: Negative for chills and fever.   HENT: Negative for rhinorrhea and sore throat.    Respiratory: Negative for cough and shortness of breath.    Cardiovascular: Negative for chest pain.   Gastrointestinal: Positive for abdominal pain, nausea and vomiting. Negative for blood in stool, constipation and diarrhea.   Genitourinary: Positive for decreased urine volume.   Allergic/Immunologic: Positive for immunocompromised state.   All other systems reviewed and are negative.    Physical Exam     Patient Vitals for the past 24 hrs:   BP Temp Temp src Pulse Resp SpO2   06/29/20 1546 (!) 202/109 98.5  F (36.9  C) Oral 89 18 99 %   06/29/20 1257 (!) 201/113 98.3  F (36.8  C) Oral 96 18 99 %   06/29/20 1139 (!) 218/121 97.8  F (36.6  C) -- 105 18 100 %      Physical Exam  Vitals signs and nursing note reviewed.   Constitutional:       General: He is not in acute distress.     Appearance: He is not diaphoretic.   HENT:      Head: Normocephalic and atraumatic.   Eyes:      General: No scleral icterus.  Cardiovascular:      Rate and Rhythm: Normal rate and regular rhythm.   Pulmonary:      Effort: Pulmonary effort is normal. No respiratory distress.   Abdominal:      General: There is no distension.      Palpations: Abdomen is soft. There is no mass.      Tenderness: There is no abdominal tenderness. There is no guarding.   Musculoskeletal:         General: No tenderness.   Skin:     General: Skin is warm.      Findings: No rash.   Neurological:      Mental Status: He is alert.       Emergency Department Course   Laboratory:  CBC: WBC: 9.1, HGB: 11.9 (L), PLT: 291  CMP: Glucose 433 (H), BUN 54 (H), Creatinine 8.07 (H), GFR 7 (L), o/w WNL   Lipase: 64 (L)  1310 Glucose by meter: 420 (H)     Interventions:  1201 NS 1L IV   Zofran, 4 mg, IV injection   Compazine 10 mg IV  1247 Zofran, 4 mg, IV  injection   Compazine 5 mg IV  1309 Humulin/Novolin 5 units IV  1434 Zofran, 4 mg, IV injection  1435 Compazine 5 mg IV     Emergency Department Course:  Past medical records, nursing notes, and vitals reviewed.     1143 I performed an exam of the patient as documented above.     IV inserted. Medicine administered as documented above. Blood drawn. This was sent to the lab for further testing, results above.    1207 I rechecked the patient and discussed the results of his workup thus far.     1233 I reevaluated the patient and provided an update in regards to his ED course. He informed me that he was still nauseous.      1321 I rechecked the patient at this time.    1346 I reevaluated the patient, who felt improved and was tolerating water without difficulty.    1412 I rechecked the patient again, and he asked for more anti-nausea medication.    1439 I reevaluated the patient. We discussed the plan for discharge, and he felt comfortable going home.     Findings and plan explained to the Patient. Patient discharged home with instructions regarding supportive care, medications, and reasons to return. The importance of close follow-up was reviewed.    I personally reviewed the laboratory results with the Patient and answered all related questions prior to discharge.     Impression & Plan    Medical Decision Making:  He presents for evaluation of nausea and vomiting without abdominal pain. He voices a long standing history of gastroparesis with this episode similar to prior episodes. He is afebrile, benign abdominal examination, and without abdominal pain ruling down bowel obstruction, volvulus. Given the degree of emesis basic metabolic labs obtained to evaluate for electrolyte disturbances, hypo/hyperglycemia. This was reassuring presently and in line with prior testing. He was symptomatically managed and appears candidate for discharge.     Diagnosis:    ICD-10-CM    1. Gastroparesis  K31.84         Disposition:  discharged to home      Scribe Disclosure:  I, Savi Villanueva, am serving as a scribe on 6/29/2020 at 11:43 AM to personally document services performed by Gonzalez Krause PA-C based on my observations and the provider's statements to me.      Savi Villanueva  6/29/2020   [facility] EMERGENCY DEPARTMENT       Gonzalez Krause PA-C  06/29/20 [id number]

## 2020-06-29 NOTE — ED TRIAGE NOTES
Presents to the ED reporting a flare of gastroparesis. States is having abdominal pain and vomiting similar to other flare ups.

## 2020-06-29 NOTE — ED AVS SNAPSHOT
[facility] Emergency Department  [address]  [facility] [address]  Phone:  [phone]  Fax:  [phone]                                    Murtaza Fitzgerald   MRN: [id number]    Department:  [facility] Emergency Department   Date of Visit:  6/29/2020           After Visit Summary Signature Page    I have received my discharge instructions, and my questions have been answered. I have discussed any challenges I see with this plan with the nurse or doctor.    ..........................................................................................................................................  Patient/Patient Representative Signature      ..........................................................................................................................................  Patient Representative Print Name and Relationship to Patient    ..................................................               ................................................  Date                                   Time    ..........................................................................................................................................  Reviewed by Signature/Title    ...................................................              ..............................................  Date                                               Time          22EPIC Rev 08/18

## 2020-06-30 ENCOUNTER — VIRTUAL VISIT (OUTPATIENT)
Dept: PEDIATRICS | Facility: CLINIC | Age: 45
End: 2020-06-30
Payer: MEDICARE

## 2020-06-30 DIAGNOSIS — Z99.2 ESRD (END STAGE RENAL DISEASE) ON DIALYSIS (H): ICD-10-CM

## 2020-06-30 DIAGNOSIS — R11.2 NAUSEA AND VOMITING, INTRACTABILITY OF VOMITING NOT SPECIFIED, UNSPECIFIED VOMITING TYPE: Primary | ICD-10-CM

## 2020-06-30 DIAGNOSIS — E10.59 TYPE 1 DIABETES MELLITUS WITH DIABETIC CARDIOMYOPATHY (H): ICD-10-CM

## 2020-06-30 DIAGNOSIS — E78.5 HYPERLIPIDEMIA WITH TARGET LDL LESS THAN 100: ICD-10-CM

## 2020-06-30 DIAGNOSIS — N18.6 ESRD (END STAGE RENAL DISEASE) ON DIALYSIS (H): ICD-10-CM

## 2020-06-30 DIAGNOSIS — I43 TYPE 1 DIABETES MELLITUS WITH DIABETIC CARDIOMYOPATHY (H): ICD-10-CM

## 2020-06-30 PROCEDURE — 99214 OFFICE O/P EST MOD 30 MIN: CPT | Mod: 95 | Performed by: INTERNAL MEDICINE

## 2020-06-30 RX ORDER — PROCHLORPERAZINE MALEATE 5 MG
5 TABLET ORAL EVERY 8 HOURS PRN
Qty: 30 TABLET | Refills: 1 | Status: ON HOLD | OUTPATIENT
Start: 2020-06-30 | End: 2021-03-01

## 2020-06-30 NOTE — PROGRESS NOTES
"Murtaza Fitzgerald is a 44 year old male who is being evaluated via a billable telephone visit.      The patient has been notified of following:     \"This telephone visit will be conducted via a call between you and your physician/provider. We have found that certain health care needs can be provided without the need for a physical exam.  This service lets us provide the care you need with a short phone conversation.  If a prescription is necessary we can send it directly to your pharmacy.  If lab work is needed we can place an order for that and you can then stop by our lab to have the test done at a later time.    Telephone visits are billed at different rates depending on your insurance coverage. During this emergency period, for some insurers they may be billed the same as an in-person visit.  Please reach out to your insurance provider with any questions.    If during the course of the call the physician/provider feels a telephone visit is not appropriate, you will not be charged for this service.\"    Patient has given verbal consent for Telephone visit?  Yes    What phone number would you like to be contacted at? [phone]    How would you like to obtain your AVS? Danie Gutierrez     Murtaza Fitzgerald is a 44 year old male who presents via phone visit today for the following health issues:    HPI     Diabetes Follow-up    How often are you checking your blood sugar? Two times daily  Blood sugar testing frequency justification:  Frequent hypoglycemia  What time of day are you checking your blood sugars (select all that apply)?  Before meals  Have you had any blood sugars above 200?  Yes, sometimes, 06/30/20 : 520  Have you had any blood sugars below 70?  No    What symptoms do you notice when your blood sugar is low?  None    What concerns do you have today about your diabetes? None     Do you have any of these symptoms? (Select all that apply)  No numbness or tingling in feet.  No " "redness, sores or blisters on feet.  No complaints of excessive thirst.  No reports of blurry vision.  No significant changes to weight.      Hyperlipidemia Follow-Up      Are you regularly taking any medication or supplement to lower your cholesterol?   Yes- Atorvastatin 40mg     Are you having muscle aches or other side effects that you think could be caused by your cholesterol lowering medication?  No    Hypertension Follow-up      Do you check your blood pressure regularly outside of the clinic? No     Are you following a low salt diet? No    Are your blood pressures ever more than 140 on the top number (systolic) OR more   than 90 on the bottom number (diastolic), for example 140/90? No    BP Readings from Last 2 Encounters:   06/29/20 (!) 202/109   05/14/20 131/78     Hemoglobin A1C (%)   Date Value   05/13/2020 6.9 (H)   02/11/2020 7.0 (H)     LDL Cholesterol Calculated (mg/dL)   Date Value   02/11/2020 88   11/19/2019 145 (H)         How many servings of fruits and vegetables do you eat daily?  0-1    On average, how many sweetened beverages do you drink each day (Examples: soda, juice, sweet tea, etc.  Do NOT count diet or artificially sweetened beverages)?   0    How many days per week do you exercise enough to make your heart beat faster? 3 or less    How many minutes a day do you exercise enough to make your heart beat faster? 9 or less    How many days per week do you miss taking your medication? 0      ED/UC Followup:    Facility:  [facility]  Date of visit: 06/29/20  Reason for visit: Abdominal pain, Nausea, Vomiting         2 nights ago, stomach \"felt nasty\", and felt sick.  Vomiting, dry heaving every 15-20 minutes, and eventually went to emergency room; given IVF and eventually felt better with zofran and compazine.    Since discharge yesterday has still had nausea, even overnight.  Would wake up and get sick overnight.  Since this AM, symptoms improved,  now has nausea every few hours.  Went " to dialysis, and feels that dialysis has helped. Has home supply of compazine and zofran; helping.    24 units lantus and 6 units / carb of mealtime.   This AM was 130 (despite a reading of 400s in emergency room).    Still in transplant service; awaiting new pancreas and kidney transplant eventually.        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)     Coronary artery disease involving native coronary artery of native heart without angina pectoris     Adhesive capsulitis of right shoulder     Dehydration     Past Surgical History:   Procedure Laterality Date     ABDOMEN SURGERY       ARTHROSCOPY KNEE WITH LATERAL MENISCECTOMY Left 7/10/2019    Procedure: Left knee arthroscopic partial lateral meniscectomy;  Surgeon: Alex Candelaria MD;  Location:  OR     BIOPSY      [facility] 2009     BYPASS GRAFT ARTERY CORONARY N/A 8/5/2015    Procedure: BYPASS GRAFT ARTERY CORONARY;  Surgeon: Katy Neville MD;  Location: [facility] OR     CORONARY ANGIOGRAPHY ADULT ORDER      2015     ESOPHAGOSCOPY, GASTROSCOPY, DUODENOSCOPY (EGD), COMBINED N/A 1/28/2020    Procedure: ESOPHAGOGASTRODUODENOSCOPY (EGD) biopsies w/forceps;  Surgeon: Emre Gomes MD;  Location:  GI     EYE SURGERY      vitrectomy both eyes      IR CVC TUNNEL PLACEMENT > 5 YRS OF AGE  2/13/2020     ORTHOPEDIC SURGERY  1993    tumor removed from left knee     TRANSPLANT  2009.2008    pancrease and kidney transplant       Social History     Tobacco Use     Smoking status: Former Smoker     Packs/day: 0.30     Types: Vaping Device     Smokeless  tobacco: Never Used     Tobacco comment: E- Cig    Substance Use Topics     Alcohol use: Not Currently     Alcohol/week: 0.0 standard drinks     Frequency: Never     Binge frequency: Never     Family History   Problem Relation Age of Onset     Unknown/Adopted Father      Heart Disease Maternal Grandfather 62         Current Outpatient Medications   Medication Sig Dispense Refill     atorvastatin (LIPITOR) 40 MG tablet Take 0.5 tablets (20 mg) by mouth daily 45 tablet 3     insulin aspart (NOVOLOG FLEXPEN) 100 UNIT/ML pen 1 unit per 6 grams of carbs with each meal. About 45 units/day. 15 mL 3     insulin glargine (LANTUS PEN) 100 UNIT/ML pen Inject 24 Units Subcutaneous At Bedtime 15 mL 1     metoclopramide (REGLAN) 5 MG tablet Take 1 tablet (5 mg) by mouth 4 times daily (before meals and nightly) 120 tablet 11     omeprazole (PRILOSEC) 40 MG DR capsule Take 40 mg by mouth every evening       ondansetron (ZOFRAN-ODT) 4 MG ODT tab Take 1 tablet (4 mg) by mouth every 8 hours as needed for nausea 30 tablet 1     prochlorperazine (COMPAZINE) 5 MG tablet Take 1 tablet (5 mg) by mouth every 8 hours as needed for nausea 20 tablet 0     tacrolimus (GENERIC EQUIVALENT) 1 MG capsule Take 1 mg by mouth 2 times daily  60 capsule 11     torsemide (DEMADEX) 20 MG tablet Take on Sunday Monday Wednesday and friday       acetaminophen (TYLENOL) 325 MG tablet Take 2 tablets (650 mg) by mouth every 4 hours as needed for mild pain 50 tablet 0     acetone, Urine, test STRP 1 strip by In Vitro route daily (Patient not taking: Reported on 2/26/2020) 50 each 3     blood glucose (NO BRAND SPECIFIED) test strip Use to test blood sugar 5 times daily or as directed. 1 Box 0     insulin aspart (NOVOLOG FLEXPEN) 100 UNIT/ML pen Sliding scale = 1 unit for every 20 over blood glucose of 140       insulin pen needle (BD ARPITA U/F) 32G X 4 MM miscellaneous Use 4 daily or as directed. 120 each 0     mycophenolic acid (GENERIC EQUIVALENT) 180 MG EC  tablet Take 360 mg by mouth 2 times daily       Allergies   Allergen Reactions     Benadryl [Diphenhydramine] Other (See Comments)     Restless legs     Reglan Other (See Comments)     IV, delsuions     BP Readings from Last 3 Encounters:   06/29/20 (!) 202/109   05/14/20 131/78   05/04/20 (!) 143/94    Wt Readings from Last 3 Encounters:   05/27/20 85.2 kg (187 lb 13.3 oz)   05/14/20 80.9 kg (178 lb 5.6 oz)   05/06/20 83.9 kg (185 lb)                    Reviewed and updated as needed this visit by Provider         Review of Systems   CONSTITUTIONAL: NEGATIVE for fever, chills, change in weight  INTEGUMENTARY/SKIN: NEGATIVE for worrisome rashes, moles or lesions  EYES: NEGATIVE for vision changes or irritation  ENT/MOUTH: NEGATIVE for ear, mouth and throat problems  RESP: NEGATIVE for significant cough or SOB  BREAST: NEGATIVE for masses, tenderness or discharge  CV: NEGATIVE for chest pain, palpitations or peripheral edema  GI: NEGATIVE for nausea, abdominal pain, heartburn, or change in bowel habits  : NEGATIVE for frequency, dysuria, or hematuria  MUSCULOSKELETAL: NEGATIVE for significant arthralgias or myalgia  NEURO: NEGATIVE for weakness, dizziness or paresthesias  ENDOCRINE: NEGATIVE for temperature intolerance, skin/hair changes  HEME: NEGATIVE for bleeding problems  PSYCHIATRIC: NEGATIVE for changes in mood or affect       Objective   Reported vitals:  There were no vitals taken for this visit.   healthy, alert and no distress  PSYCH: Alert and oriented times 3; coherent speech, normal   rate and volume, able to articulate logical thoughts, able   to abstract reason, no tangential thoughts, no hallucinations   or delusions  His affect is normal  RESP: No cough, no audible wheezing, able to talk in full sentences  Remainder of exam unable to be completed due to telephone visits    Diagnostic Test Results:  Labs reviewed in Epic        Assessment/Plan:  1. Nausea and vomiting, intractability of vomiting not  specified, unspecified vomiting type  Improved symptoms; may have been a combination of his uremia with his baseline gastroparesis.  In either case, he seems to be better now.  He is getting dialysis at the time of this visit, and he seems to be improving his symptoms as well.  He has a home supply of both Zofran and Compazine to use should his symptoms recur.  In the meantime he will continue his metoclopramide with each meal, as well as dietary control.  He has spoken with gastroenterology and there are no plans at present per doing a gastric pacemaker.  - prochlorperazine (COMPAZINE) 5 MG tablet; Take 1 tablet (5 mg) by mouth every 8 hours as needed for nausea  Dispense: 30 tablet; Refill: 1    2. 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  His blood pressure was high in the emergency room owing to his acute presentation, but has been normal despite his readings in the past.    3. ESRD (end stage renal disease) on dialysis (H)  He continues on dialysis Tuesday Thursday Saturday, with Demadex that he takes Monday Wednesday Friday.  Ongoing evaluation for new kidney/pancreas transplant.     4. Hyperlipidemia with target LDL less than 100  He continues on statin.      No follow-ups on file.      Phone call duration:  23 minutes    Kt Ríos MD

## 2020-07-01 ENCOUNTER — ANCILLARY PROCEDURE (OUTPATIENT)
Dept: ULTRASOUND IMAGING | Facility: CLINIC | Age: 45
End: 2020-07-01
Attending: CLINICAL NURSE SPECIALIST
Payer: MEDICARE

## 2020-07-01 DIAGNOSIS — N18.6 ESRD ON DIALYSIS (H): ICD-10-CM

## 2020-07-01 DIAGNOSIS — Z45.2 ENCOUNTER FOR ADJUSTMENT AND MANAGEMENT OF VASCULAR ACCESS DEVICE: ICD-10-CM

## 2020-07-01 DIAGNOSIS — Z99.2 ESRD ON DIALYSIS (H): ICD-10-CM

## 2020-07-17 ENCOUNTER — TELEPHONE (OUTPATIENT)
Dept: DERMATOLOGY | Facility: CLINIC | Age: 45
End: 2020-07-17

## 2020-07-17 ENCOUNTER — TELEPHONE (OUTPATIENT)
Dept: TRANSPLANT | Facility: CLINIC | Age: 45
End: 2020-07-17

## 2020-07-17 NOTE — TELEPHONE ENCOUNTER
Called patient, he has no lesions of concern currently, got him scheduled for next avalible transplant skin exam slot 8/31 advised the patient to call If he has any concerning lesions come up before then.     Lali JUAREZ CMA

## 2020-07-17 NOTE — TELEPHONE ENCOUNTER
[facility] Call Center    Phone Message    May a detailed message be left on voicemail: yes     Reason for Call: Appointment Intake    Referring Provider Name: Dr. Borja  Diagnosis and/or Symptoms: TSC    Pt has Dialysis on Tues, Thurs and Sat.    Action Taken: Message routed to:  [facility] DERM     Travel Screening: Not Applicable

## 2020-07-17 NOTE — TELEPHONE ENCOUNTER
Called patient to schedule 2nd day appts for kidney eval, CXR, Echo, US, labs, CVC & PFT, he confirmed for Wed, Aug 19 and he is on my chart.

## 2020-07-20 DIAGNOSIS — E10.59 TYPE 1 DIABETES MELLITUS WITH DIABETIC CARDIOMYOPATHY (H): ICD-10-CM

## 2020-07-20 DIAGNOSIS — I43 TYPE 1 DIABETES MELLITUS WITH DIABETIC CARDIOMYOPATHY (H): ICD-10-CM

## 2020-07-22 RX ORDER — INSULIN GLARGINE 100 [IU]/ML
INJECTION, SOLUTION SUBCUTANEOUS
Qty: 15 ML | Refills: 0 | Status: SHIPPED | OUTPATIENT
Start: 2020-07-22 | End: 2020-11-02

## 2020-07-22 NOTE — TELEPHONE ENCOUNTER
"Requested Prescriptions   Pending Prescriptions Disp Refills     LANTUS SOLOSTAR 100 UNIT/ML soln [Pharmacy Med Name: LANTUS SOLOSTAR 100UNIT/ML SOPN] 15 mL 1     Sig: INJECT 24 UNITS SUBCUTANEOUSLY AT BEDTIME       Long Acting Insulin Protocol Failed - 7/20/2020  8:03 PM        Failed - Recent (6 mo) or future (30 days) visit within the authorizing provider's specialty     Patient had office visit in the last 6 months or has a visit in the next 30 days with authorizing provider or within the authorizing provider's specialty.  See \"Patient Info\" tab in inbasket, or \"Choose Columns\" in Meds & Orders section of the refill encounter.            Passed - Serum creatinine on file in past 12 months     Recent Labs   Lab Test 06/29/20  1155  07/11/19  0649   CR 8.07*   < >  --    CREAT  --   --  2.7*    < > = values in this interval not displayed.       Ok to refill medication if creatinine is low          Passed - HgbA1C in past 3 or 6 months     If HgbA1C is 8 or greater, it needs to be on file within the past 3 months.  If less than 8, must be on file within the past 6 months.     Recent Labs   Lab Test 05/13/20  0637   A1C 6.9*             Passed - Medication is active on med list        Passed - Patient is age 18 or older             Last office visit 12-2-19    Medication is being filled for 1 time refill only due to:  due for diab appt.  [employer] message sent.      "

## 2020-07-23 ENCOUNTER — TRANSFERRED RECORDS (OUTPATIENT)
Dept: HEALTH INFORMATION MANAGEMENT | Facility: CLINIC | Age: 45
End: 2020-07-23

## 2020-07-23 LAB — RETINOPATHY: POSITIVE

## 2020-07-27 NOTE — TELEPHONE ENCOUNTER
RECORDS RECEIVED FROM:   DATE RECEIVED: 8.19.20    NOTES STATUS DETAILS   OFFICE NOTE from referring provider    Internal  Reena Borja   OFFICE NOTE from other cardiologist    Internal  Heath A    DISCHARGE SUMMARY from hospital    na    DISCHARGE REPORT from the ER   na    OPERATIVE REPORT    na    MEDICATION LIST   Internal     LABS     BMP   Internal  5.13.20    CBC   Internal  2.21.20   CMP   Internal  6.29.20    Lipids   Internal  2.11.20    TSH   Internal  12.31.19    DIAGNOSTIC PROCEDURES     EKG   Internal  5.13.20, 1.27.20, 1.3.20 4.20.19, 3.5.19, 2.5.19,   Monitor Reports   na    IMAGING (DISC & REPORT)      Echo   Internal  7.9.19,    Stress Tests   na    Cath   na    MRI/MRA   na    CT/CTA   na

## 2020-08-17 ENCOUNTER — TELEPHONE (OUTPATIENT)
Dept: TRANSPLANT | Facility: CLINIC | Age: 45
End: 2020-08-17

## 2020-08-17 NOTE — TELEPHONE ENCOUNTER
LVM for patient that Dr. Guzman will not be in clinic today but his appt will be with his resident.    Latisha Weems, CMA

## 2020-08-19 ENCOUNTER — PRE VISIT (OUTPATIENT)
Dept: CARDIOLOGY | Facility: CLINIC | Age: 45
End: 2020-08-19

## 2020-08-19 ENCOUNTER — VIRTUAL VISIT (OUTPATIENT)
Dept: CARDIOLOGY | Facility: CLINIC | Age: 45
End: 2020-08-19
Attending: INTERNAL MEDICINE
Payer: MEDICARE

## 2020-08-19 ENCOUNTER — TELEPHONE (OUTPATIENT)
Dept: TRANSPLANT | Facility: CLINIC | Age: 45
End: 2020-08-19

## 2020-08-19 DIAGNOSIS — Z76.82 AWAITING ORGAN TRANSPLANT: ICD-10-CM

## 2020-08-19 DIAGNOSIS — Z99.2 ESRD ON DIALYSIS (H): ICD-10-CM

## 2020-08-19 DIAGNOSIS — N18.6 TYPE 1 DIABETES MELLITUS WITH CHRONIC KIDNEY DISEASE ON CHRONIC DIALYSIS (H): ICD-10-CM

## 2020-08-19 DIAGNOSIS — R06.09 DOE (DYSPNEA ON EXERTION): Primary | ICD-10-CM

## 2020-08-19 DIAGNOSIS — Z87.891 HISTORY OF TOBACCO USE: ICD-10-CM

## 2020-08-19 DIAGNOSIS — Z76.82 ORGAN TRANSPLANT CANDIDATE: ICD-10-CM

## 2020-08-19 DIAGNOSIS — E10.22 TYPE 1 DIABETES MELLITUS WITH CHRONIC KIDNEY DISEASE ON CHRONIC DIALYSIS (H): ICD-10-CM

## 2020-08-19 DIAGNOSIS — T86.12 KIDNEY TRANSPLANT FAILURE: ICD-10-CM

## 2020-08-19 DIAGNOSIS — N18.6 ESRD (END STAGE RENAL DISEASE) (H): ICD-10-CM

## 2020-08-19 DIAGNOSIS — Z01.818 PRE-TRANSPLANT EVALUATION FOR KIDNEY TRANSPLANT: ICD-10-CM

## 2020-08-19 DIAGNOSIS — N18.6 ESRD ON DIALYSIS (H): ICD-10-CM

## 2020-08-19 DIAGNOSIS — Z99.2 TYPE 1 DIABETES MELLITUS WITH CHRONIC KIDNEY DISEASE ON CHRONIC DIALYSIS (H): ICD-10-CM

## 2020-08-19 DIAGNOSIS — K31.84 GASTROPARESIS: ICD-10-CM

## 2020-08-19 PROCEDURE — 99203 OFFICE O/P NEW LOW 30 MIN: CPT | Mod: 95 | Performed by: INTERNAL MEDICINE

## 2020-08-19 NOTE — Clinical Note
Pt called late this afternoon that he cancelled all in person appts today due to not feeling well and needs them rescheduled: CXR, Echo, Elvis Iliac Artery US, PFTs, Transplant labs .  He did complete his virtual Cardiology appt. Thanks , Reena 
None

## 2020-08-19 NOTE — PATIENT INSTRUCTIONS
Patient Instructions:  It was a pleasure to see you in the cardiology clinic today.      If you have any questions, you can reach my nurse, Carey LOPEZ LPN, at [phone].  Press Option #1 for the [facility], and then press Option #4 for nursing.    We are encouraging the use of [employer] to communicate with your HealthCare Provider    Medication Changes: None.    Recommendations: None.    Studies Ordered:  - Echocardiogram  - Nuclear Medicine Lexiscan Stress Test    Prep for ren-nuc stress test: Report to the  [facility] Waiting Room at the [facility]. The hospital is located at [address] [place] [address] on the [place] bank of the [place].    This test can take up to 3 hours.    NPO 3 hours prior, Water is ok and encouraged  NO alcohol, smoking, caffeine, or decaf products 12 hours prior  TAKE ALL medications as prescribed, hold the following medications if they pertain to you:   If you take Aggrenox or dipyridamole (Persantine, Permole), stop taking it 48 hours before your test.   If you take Viagra, Cialis or Levitra, stop taking it 48 hours before your test.   If you take theophylline or aminophylline, stop taking it 12 hours before your test.   For patients with diabetes:If you take insulin, call your diabetes care team. Ask if you should take a 1/2 dose the morning of your test.   If you take diabetes medicine by mouth, don t take it on the morning of your test. Bring it with you to take after the test. (If you have questions, call your diabetes care team.)      Do not take nitrates on the day of your test. Do not wear your Nitro-Patch    If you have questions regarding your appointment date and time, please contact scheduling at [phone].  Any other questions regarding testing can be referred to Carey.    The results from today include: None.    Please follow up: With Dr. Vogel based on results of testing.    Sincerely,    Yovany Vogel MD     If you have an urgent need after hours (8:00 am  to 4:30 pm) please call [phone] and ask for the cardiology fellow on call.

## 2020-08-19 NOTE — PROGRESS NOTES
Service Date: 2020      Kt Ríos MD    [facility]    [address] [place]  [address]       RE: Murtaza Fitzgerald   MRN: [id number]   : 1975      Dear Dr. Ríos:      It was a pleasure participating in the care of your patient, . Murtaza Fitzgerald.  As you know, Tyrell is a 44-year-old gentleman who I saw today over virtual video visit via [employer] in preoperative evaluation prior to a second kidney and pancreas transplant.      His past medical history is significant for the followin.  Type 1 diabetes since age 11.   2.  Hypertension.   3.  Hyperlipidemia.    4.  Status post kidney transplant 2008 failed.  Back on dialysis since 2020.   5.  Status post pancreas transplant 2009 failed 3 years later.  Currently on insulin.   6.  Depression.   7.  Tobacco abuse in the past.   8.  Knee tumor surgery.   9.  Gastroparesis.      His cardiac history is significant for known coronary disease and normal LV systolic function.  He was completely asymptomatic but was found to have a positive stress test and eventually multivessel disease on coronary angiography which led to 3-vessel coronary bypass surgery in 10/2015 (LIMA to LAD, vein graft to OM, vein graft to PDA).      The patient has not really ever had any symptoms.  He is also fairly sedentary and does not exercise much.  He can walk only about 15-20 minutes and does not do so on a regular basis.  He otherwise denies new chest pain, problems breathing, PND, orthopnea, edema, palpitations, syncope or near-syncope.      In terms of his cardiac risk factors, he does have diabetes and hypertension.  He quit smoking in , smoked a pack a day for about 20 years.  Denies alcohol use.  His grandfather had heart trouble in his 60s.  He does have hyperlipidemia.        He is disabled and used to be a .      CURRENT MEDICATIONS:     1.  Atorvastatin 20 mg a day.    2.  Insulin.   3.  Torsemide Sunday, Monday, Wednesday, Friday 20 mg.      PHYSICAL EXAMINATION via video:     PSYCHIATRIC:  He is alert and oriented x3.   GENERAL:  He is comfortable, well groomed.   HEENT:  Eyes do not appear grossly erythematous or have exudate.   RESPIRATORY:  He is breathing comfortably without gross cough.      The remainder of the comprehensive physical exam was deferred secondary to the COVID-19 pandemic and secondary to video visit restrictions.      LABORATORY DATA:       Dobutamine stress echo 07/09/2019 reveals normal LV systolic function without gross valvular pathology, no significant inducible ischemia.   EKG 05/15/2020, sinus tach 104.  Nonspecific T-wave changes aVL.     06/29/2020 Potassium 4.9, GFR 7, hemoglobin 11.9.        IMPRESSION:      Tyrell is a 44-year-old gentleman whose past medical history is significant for a prior kidney transplant in 11/2008, subsequently failed, currently on dialysis, and a pancreas transplant in 02/2009, subsequently failed, currently on insulin, whose cardiac history is significant for known multivessel coronary artery disease and normal LV systolic function.  He had 3-vessel coronary bypass surgery performed on 07/09/2019 (LIMA to LAD, vein graft to OM, vein graft to PDA).      He presents now for preoperative evaluation prior to kidney transplant.      From a clinical standpoint, he leads a very sedentary lifestyle and does not exercise at all, besides walking for 15 minutes at maximum.  However, he does get short of breath whenever he does too much activity and also considering the fact that he had been asymptomatic even prior to his multivessel coronary bypass surgery despite having severe underlying disease, further noninvasive evaluation would be warranted.        PLAN:     1.  Echocardiogram to rule out significant new structural pathology.     2.  Pharmacologic nuclear stress test in order to rule out significant inducible ischemia.      3.  If the above tests are unremarkable, then he will be approved for his procedure at acceptable perioperative risk for event; otherwise, further updates to follow.      Once again, it was a pleasure participating in the care of your patient, . Doug Fitzgerald.  Please feel free to contact me anytime if you have any questions regarding his care in the future.      Sincerely,            ROGER HUMMEL MD        Addendum 10/5/20:    Pharm nuc stress reveals a moderate amount of anteroapical ischemia    Overall LV systolic function is borderline reduced EF 50%, mild diffuse hypokinesis      Impression:    Abnormal stress imaging in patient with history of known CAD s/p CAB (previously asymptomatic prior to CAB)    Plan:    1.  Coronary angiogram (patient already on dialysis)       Addendum 3/11/21:    Cath 3/3/21 reveals:    LM 45%  LAD  prox  Cx 80% prox  RCA     LIMA to LAD patent  SVG to OM3 patent  SVG to RPDA      Medical therapy recommended    Plan:    1.  Followup virtual video appointment scheduled  at 1030am to discuss results and clinical progress    2. Fasting lipids and TSH prior to appt please     D: 2020   T: 2020   MT: SADIE      Name:     DOUG PAIZ   MRN:      [id number]        Account:      [id number]   :      1975           Service Date: 2020      Document: [id number]

## 2020-08-19 NOTE — PROGRESS NOTES
"Murtaza Fitzgerald is a 44 year old male who is being evaluated via a billable video visit.      The patient has been notified of following:     \"This video visit will be conducted via a call between you and your physician/provider. We have found that certain health care needs can be provided without the need for an in-person physical exam.  This service lets us provide the care you need with a video conversation.  If a prescription is necessary we can send it directly to your pharmacy.  If lab work is needed we can place an order for that and you can then stop by our lab to have the test done at a later time.    If during the course of the call the physician/provider feels a video visit is not appropriate, you will not be charged for this service.\"    Patient has given verbal consent for Video visit? Yes  How would you like to obtain your AVS? Mail a copy  If you are dropped from the video visit, the video invite should be resent to: Text to cell phone: [phone]  Will anyone else be joining your video visit? No       Video-Visit Details    Type of service:  Video Visit    Video Start Time:101pm    Video End Time:115pm    Originating Location (pt. Location):patient home      Distant Location (provider location):  home office    Platform used for Video Visit: [employer]      See dictation #[id number]    "

## 2020-08-19 NOTE — LETTER
"2020    RE: Murtzaa Fitzgerald  [address] [facility] [address]     Dear Colleague,    Thank you for the opportunity to participate in the care of your patient, Murtaza Fitzgerald, at the [facility]. Please see a copy of my visit note below.    Murtaza Fitzgerald is a 44 year old male who is being evaluated via a billable video visit.      The patient has been notified of following:     \"This video visit will be conducted via a call between you and your physician/provider. We have found that certain health care needs can be provided without the need for an in-person physical exam.  This service lets us provide the care you need with a video conversation.  If a prescription is necessary we can send it directly to your pharmacy.  If lab work is needed we can place an order for that and you can then stop by our lab to have the test done at a later time.    If during the course of the call the physician/provider feels a video visit is not appropriate, you will not be charged for this service.\"    Patient has given verbal consent for Video visit? Yes  How would you like to obtain your AVS? Mail a copy  If you are dropped from the video visit, the video invite should be resent to: Text to cell phone: [phone]  Will anyone else be joining your video visit? No       Video-Visit Details    Type of service:  Video Visit    Video Start Time:101pm    Video End Time:115pm    Originating Location (pt. Location):patient home      Distant Location (provider location):  home office    Platform used for Video Visit: [employer]      See dictation #[id number]      Service Date: 2020      Kt Ríos MD    [address]  [address]       RE: Murtaza Fitzgerald   MRN: [id number]   : 1975      Dear Dr. Ríos:      It was a pleasure participating in the " care of your patient, Mr. Murtaza Fitzgerald.  As you know, Tyrlel is a 44-year-old gentleman who I saw today over virtual video visit via [employer] in preoperative evaluation prior to a second kidney and pancreas transplant.      His past medical history is significant for the followin.  Type 1 diabetes since age 11.   2.  Hypertension.   3.  Hyperlipidemia.    4.  Status post kidney transplant 2008 failed.  Back on dialysis since 2020.   5.  Status post pancreas transplant 2009 failed 3 years later.  Currently on insulin.   6.  Depression.   7.  Tobacco abuse in the past.   8.  Knee tumor surgery.   9.  Gastroparesis.      His cardiac history is significant for known coronary disease and normal LV systolic function.  He was completely asymptomatic but was found to have a positive stress test and eventually multivessel disease on coronary angiography which led to 3-vessel coronary bypass surgery in 10/2015 (LIMA to LAD, vein graft to OM, vein graft to PDA).      The patient has not really ever had any symptoms.  He is also fairly sedentary and does not exercise much.  He can walk only about 15-20 minutes and does not do so on a regular basis.  He otherwise denies new chest pain, problems breathing, PND, orthopnea, edema, palpitations, syncope or near-syncope.      In terms of his cardiac risk factors, he does have diabetes and hypertension.  He quit smoking in , smoked a pack a day for about 20 years.  Denies alcohol use.  His grandfather had heart trouble in his 60s.  He does have hyperlipidemia.        He is disabled and used to be a .      CURRENT MEDICATIONS:     1.  Atorvastatin 20 mg a day.   2.  Insulin.   3.  Torsemide , Monday, Wednesday, Friday 20 mg.      PHYSICAL EXAMINATION via video:     PSYCHIATRIC:  He is alert and oriented x3.   GENERAL:  He is comfortable, well groomed.   HEENT:  Eyes do not appear grossly erythematous or have exudate.    RESPIRATORY:  He is breathing comfortably without gross cough.      The remainder of the comprehensive physical exam was deferred secondary to the COVID-19 pandemic and secondary to video visit restrictions.      LABORATORY DATA:       Dobutamine stress echo 07/09/2019 reveals normal LV systolic function without gross valvular pathology, no significant inducible ischemia.   EKG 05/15/2020, sinus tach 104.  Nonspecific T-wave changes aVL.     06/29/2020 Potassium 4.9, GFR 7, hemoglobin 11.9.        IMPRESSION:      Tyrell is a 44-year-old gentleman whose past medical history is significant for a prior kidney transplant in 11/2008, subsequently failed, currently on dialysis, and a pancreas transplant in 02/2009, subsequently failed, currently on insulin, whose cardiac history is significant for known multivessel coronary artery disease and normal LV systolic function.  He had 3-vessel coronary bypass surgery performed on 07/09/2019 (LIMA to LAD, vein graft to OM, vein graft to PDA).      He presents now for preoperative evaluation prior to kidney transplant.      From a clinical standpoint, he leads a very sedentary lifestyle and does not exercise at all, besides walking for 15 minutes at maximum.  However, he does get short of breath whenever he does too much activity and also considering the fact that he had been asymptomatic even prior to his multivessel coronary bypass surgery despite having severe underlying disease, further noninvasive evaluation would be warranted.        PLAN:     1.  Echocardiogram to rule out significant new structural pathology.     2.  Pharmacologic nuclear stress test in order to rule out significant inducible ischemia.     3.  If the above tests are unremarkable, then he will be approved for his procedure at acceptable perioperative risk for event; otherwise, further updates to follow.      Once again, it was a pleasure participating in the care of your patient, Mr. Murtaza Payton  Amilcar.  Please feel free to contact me anytime if you have any questions regarding his care in the future.      Sincerely,          ROGER HUMMEL MD        D: 2020   T: 2020   MT: SADIE    Name:     DOUG PAIZ   MRN:      [id number]        Account:      [id number]   :      1975           Service Date: 2020    Document: [id number]

## 2020-08-19 NOTE — TELEPHONE ENCOUNTER
I returned pt's call. He stated he was not feeling well and cancelled hs CXR, Echo, Elvis iliac artery US, Transplant Labs, PFTs for today and they will need to be rescheduled. I did send this communication to the scheduling team. Pt did complete the virtual Cardiology consult today .

## 2020-08-20 DIAGNOSIS — I15.1 HTN, KIDNEY TRANSPLANT RELATED: Primary | ICD-10-CM

## 2020-08-20 DIAGNOSIS — Z94.0 HTN, KIDNEY TRANSPLANT RELATED: Primary | ICD-10-CM

## 2020-08-20 RX ORDER — TORSEMIDE 20 MG/1
20 TABLET ORAL 2 TIMES DAILY
Qty: 180 TABLET | Refills: 3 | Status: ON HOLD | OUTPATIENT
Start: 2020-08-20 | End: 2020-12-06

## 2020-08-24 ENCOUNTER — TELEPHONE (OUTPATIENT)
Dept: PEDIATRICS | Facility: CLINIC | Age: 45
End: 2020-08-24

## 2020-08-24 NOTE — TELEPHONE ENCOUNTER
Forms/Letter Request    Name of form/letter: dept of public safety    Have you been seen for this request: No    Do we have the form/letter: Yes: give to provider    When is form/letter needed by: asap    How would you like the form/letter returned: Fax    Patient Notified form requests are processed in 3-5 business days:Yes    Okay to leave a detailed message? Yes Cell number on file:    Telephone Information:   Mobile [phone]

## 2020-08-25 ENCOUNTER — TELEPHONE (OUTPATIENT)
Dept: ENDOCRINOLOGY | Facility: CLINIC | Age: 45
End: 2020-08-25

## 2020-08-25 NOTE — TELEPHONE ENCOUNTER
LAST OFFICE/VIRTUAL VISIT:  12/02/19    FUTURE OFFICE/VIRTUAL VISIT:  None    Lab Results   Component Value Date    A1C 6.9 05/13/2020    A1C 7.0 02/11/2020    A1C 6.7 01/28/2020    A1C 6.7 11/19/2019    A1C 6.8 06/28/2019         Rebekah Santoyo CMA  [facility]  Bob/Janelle

## 2020-08-27 ENCOUNTER — MYC MEDICAL ADVICE (OUTPATIENT)
Dept: DERMATOLOGY | Facility: CLINIC | Age: 45
End: 2020-08-27

## 2020-08-27 ENCOUNTER — TELEPHONE (OUTPATIENT)
Dept: DERMATOLOGY | Facility: CLINIC | Age: 45
End: 2020-08-27

## 2020-08-27 NOTE — TELEPHONE ENCOUNTER
Form was faxed and sent to scanning.      Rebekah Santoyo, [facility]  Bob/Janelle

## 2020-08-27 NOTE — TELEPHONE ENCOUNTER
Voicemail left with patient in regards to their upcoming appointment needing to be scheduled as a telephone or video visit with pictures of active areas in their MyChart. 72 hours or less prior to their scheduled appointment time. Clinic phone number provided.    Appointment needs to stay as in person if there are lesions of concern.    Nadeem Frank, [facility]

## 2020-08-27 NOTE — TELEPHONE ENCOUNTER
[facility] Call Center    Phone Message    May a detailed message be left on voicemail: yes     Reason for Call: Other: Pt called and said that he can change his appt to a telephone appt for 8/31. Pt states that he does not have any concerning areas. Please call him back if you have any questions. Thanks     Action Taken: Message routed to:  [facility] DERM    Travel Screening: Not Applicable

## 2020-08-31 ENCOUNTER — VIRTUAL VISIT (OUTPATIENT)
Dept: DERMATOLOGY | Facility: CLINIC | Age: 45
End: 2020-08-31
Payer: MEDICARE

## 2020-08-31 DIAGNOSIS — Z87.891 HISTORY OF TOBACCO USE: ICD-10-CM

## 2020-08-31 DIAGNOSIS — N18.6 ESRD ON DIALYSIS (H): ICD-10-CM

## 2020-08-31 DIAGNOSIS — Z01.818 PRE-TRANSPLANT EVALUATION FOR KIDNEY TRANSPLANT: ICD-10-CM

## 2020-08-31 DIAGNOSIS — K31.84 GASTROPARESIS: ICD-10-CM

## 2020-08-31 DIAGNOSIS — T86.12 KIDNEY TRANSPLANT FAILURE: ICD-10-CM

## 2020-08-31 DIAGNOSIS — Z99.2 ESRD ON DIALYSIS (H): ICD-10-CM

## 2020-08-31 DIAGNOSIS — Z76.82 ORGAN TRANSPLANT CANDIDATE: ICD-10-CM

## 2020-08-31 NOTE — PROGRESS NOTES
Dermatology Phone Visit: Phone Number:[phone]    Dermatology Problem List:  1. Kidney transplant in  - tacrolimus; prior mycophenolic acid   - being worked up for second kidney transplant   - no personal history of skin cancer or family history of melanoma    Patient opted to conduct today's return visit via telephone vs an in person visit to the clinic.    Encounter Date: Aug 31, 2020    Chief complaint:   Chief Complaint   Patient presents with     Skin Check     Tyrell does not have any areas of concern but has a history of a transplant       I spoke with: Murtaza Fitzgerald    The reason for the telephone visit was:   Skin check    Pertinent history and review of systems:  No concerning skin changes or lesions    No personal history of skin cancer    No family history of melanoma    Assessment/Advice/instructions given to patient/guardian including prescriptions, follow up appointment or orders for diagnostic testin. History of kidney transplant on chronic immunosuppression: no concerning lesions today. Discussed sun protective behaviors including OTC spf 30+ sunscreen use and sun avoidance strategies. Full body skin check post-COVID-19.    RTC in 6 months    Phone call contact time:  Call Started at: 11:00  Call Ended at: 11:07    Staff only:    Kt Shaffer MD   of Dermatology  Department of Dermatology  [facility] [employer] [facility]

## 2020-08-31 NOTE — NURSING NOTE
Dermatology Rooming Note    Murtaza Fitzgerald's goals for this visit include:   Chief Complaint   Patient presents with     Skin Check     Tyrell does not have any areas of concern but has a history of a transplant     Nadeem Frank CMA

## 2020-08-31 NOTE — LETTER
2020       RE: Murtaza Fitzgerald  [address] [place]  [facility] [address]     Dear Colleague,    Thank you for referring your patient, Murtaza Fitzgerald, to the [facility] DERMATOLOGY at [facility]. Please see a copy of my visit note below.    Dermatology Phone Visit: Phone Number:[phone]    Dermatology Problem List:  1. Kidney transplant in  - tacrolimus; prior mycophenolic acid   - being worked up for second kidney transplant   - no personal history of skin cancer or family history of melanoma    Patient opted to conduct today's return visit via telephone vs an in person visit to the clinic.    Encounter Date: Aug 31, 2020    Chief complaint:   Chief Complaint   Patient presents with     Skin Check     Tyrell does not have any areas of concern but has a history of a transplant       I spoke with: Murtaza Fitzgerald    The reason for the telephone visit was:   Skin check    Pertinent history and review of systems:  No concerning skin changes or lesions    No personal history of skin cancer    No family history of melanoma    Assessment/Advice/instructions given to patient/guardian including prescriptions, follow up appointment or orders for diagnostic testin. History of kidney transplant on chronic immunosuppression: no concerning lesions today. Discussed sun protective behaviors including OTC spf 30+ sunscreen use and sun avoidance strategies. Full body skin check post-COVID-19.    RTC in 6 months    Phone call contact time:  Call Started at: 11:00  Call Ended at: 11:07    Staff only:    Kt Shaffer MD   of Dermatology  Department of Dermatology  [facility] [employer] [facility]

## 2020-09-21 ENCOUNTER — OFFICE VISIT (OUTPATIENT)
Dept: TRANSPLANT | Facility: CLINIC | Age: 45
End: 2020-09-21
Attending: SURGERY
Payer: MEDICARE

## 2020-09-21 VITALS
HEART RATE: 104 BPM | DIASTOLIC BLOOD PRESSURE: 102 MMHG | BODY MASS INDEX: 29.65 KG/M2 | WEIGHT: 195 LBS | SYSTOLIC BLOOD PRESSURE: 170 MMHG | OXYGEN SATURATION: 98 %

## 2020-09-21 DIAGNOSIS — Z99.2 ESRD ON DIALYSIS (H): Primary | ICD-10-CM

## 2020-09-21 DIAGNOSIS — N18.6 ESRD ON DIALYSIS (H): Primary | ICD-10-CM

## 2020-09-21 DIAGNOSIS — Z45.2 ADJUSTMENT AND MANAGEMENT OF VASCULAR ACCESS DEVICE: ICD-10-CM

## 2020-09-21 NOTE — LETTER
9/21/2020         RE: Murtaza Fitzgerald  [address]  [facility] [address]        Dear Colleague,    Thank you for referring your patient, Murtaza Fitzgerald, to the [facility] SOLID ORGAN TRANSPLANT. Please see a copy of my visit note below.    Dialysis Access Service  Consult Note    Referred by Dr. Schneider for surgical consult of permanent dialysis access.    HPI: Mr. Tata Fitzgerald is being seen today for placement of permanent dialysis access due to End Stage renal failure from diabetes mellitus type 1.  He is right handed. Mr. Tata Fitzgerald is dialyzing at [place] [id number] [facility] [address].      Risk factors for vascular access:         Yes No  Hx of CVC    [x]    []   Comment: right internal jugular  Hx of PICC line         []     [x]  Comment:   Hx of Pacemaker    []     [x]  Comment:   History of failed access:  []         [x]  Comment:  SVC syndrome   []      [x]  Comment:  Heart Failure    []     [x]  EF:  55 - 60%   Periph arterial disease  []     [x]  Comment:  Prior Fracture/Surgery  []     [x]  Location:   DVT    []    [x]   Location:  Diabetes    [x]        []  Comment: Type I  Neuropathy   [x]     []  Comment: less sensation from both hands  Anticoagulation:   []    [x]  Agent:      Anticoagulation contraindication:[]  [x]     Details:                   Pediatric    []         [x]  Age:                  Hx of transplant   [x]    []  Comment: K?P on 2/17/2009 and Kidney on 11/4/2008    Current immunosuppression [x]    []  Comment: Tacrolimus,            ROS: 10 point ROS neg other than the symptoms noted above in the HPI.        Past Medical History:   Diagnosis Date     CMV (cytomegalovirus infection) status negative 2011     Coronary artery disease, non-occlusive 2008    angio showed diffuse disease with no lesions     Diabetes mellitus, type 1     Diagnosed at age 11. 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     Noncompliance with treatment     no labs for one year     Pancreas replaced by transplant (H) Feb 2009    rejection treated with thymo     Pancreatic disease     pancreas transplant     Tobacco abuse ongoing       Past Surgical History:   Procedure Laterality Date     ABDOMEN SURGERY       ARTHROSCOPY KNEE WITH LATERAL MENISCECTOMY Left 7/10/2019    Procedure: Left knee arthroscopic partial lateral meniscectomy;  Surgeon: Alex Candelaria MD;  Location: [facility] OR     BIOPSY      [facility] 2009     BYPASS GRAFT ARTERY CORONARY N/A 8/5/2015    Procedure: BYPASS GRAFT ARTERY CORONARY;  Surgeon: Katy Neville MD;  Location: [facility] OR     CORONARY ANGIOGRAPHY ADULT ORDER      2015     ESOPHAGOSCOPY, GASTROSCOPY, DUODENOSCOPY (EGD), COMBINED N/A 1/28/2020    Procedure: ESOPHAGOGASTRODUODENOSCOPY (EGD) biopsies w/forceps;  Surgeon: Emre Gomes MD;  Location:  GI     EYE SURGERY      vitrectomy both eyes      IR CVC TUNNEL PLACEMENT > 5 YRS OF AGE  2/13/2020     ORTHOPEDIC SURGERY  1993    tumor removed from left knee     TRANSPLANT  2009.2008    pancrease and kidney transplant       Family History   Problem Relation Age of Onset     Unknown/Adopted Father      Heart Disease Maternal Grandfather 62     Melanoma No family hx of      Skin Cancer No family hx of        Social History     Tobacco Use     Smoking status: Former Smoker     Packs/day: 0.30     Types: Vaping Device     Smokeless tobacco: Never Used     Tobacco comment: E- Cig    Substance Use Topics     Alcohol use: Not Currently     Alcohol/week: 0.0 standard drinks     Frequency: Never     Binge frequency: Never         Current Outpatient Medications:      acetaminophen (TYLENOL) 325 MG  tablet, Take 2 tablets (650 mg) by mouth every 4 hours as needed for mild pain, Disp: 50 tablet, Rfl: 0     atorvastatin (LIPITOR) 40 MG tablet, Take 0.5 tablets (20 mg) by mouth daily, Disp: 45 tablet, Rfl: 3     blood glucose (NO BRAND SPECIFIED) test strip, Use to test blood sugar 5 times daily or as directed., Disp: 1 Box, Rfl: 0     insulin aspart (NOVOLOG FLEXPEN) 100 UNIT/ML pen, 1 unit per 6 grams of carbs with each meal. About 45 units/day., Disp: 15 mL, Rfl: 3     insulin aspart (NOVOLOG FLEXPEN) 100 UNIT/ML pen, Sliding scale = 1 unit for every 20 over blood glucose of 140, Disp: , Rfl:      insulin pen needle (BD ARPITA U/F) 32G X 4 MM miscellaneous, Use 4 daily or as directed., Disp: 120 each, Rfl: 0     LANTUS SOLOSTAR 100 UNIT/ML soln, INJECT 24 UNITS SUBCUTANEOUSLY AT BEDTIME, Disp: 15 mL, Rfl: 0     metoclopramide (REGLAN) 5 MG tablet, Take 1 tablet (5 mg) by mouth 4 times daily (before meals and nightly), Disp: 120 tablet, Rfl: 11     mycophenolic acid (GENERIC EQUIVALENT) 180 MG EC tablet, Take 360 mg by mouth 2 times daily, Disp: , Rfl:      omeprazole (PRILOSEC) 40 MG DR capsule, Take 40 mg by mouth every evening, Disp: , Rfl:      ondansetron (ZOFRAN-ODT) 4 MG ODT tab, Take 1 tablet (4 mg) by mouth every 8 hours as needed for nausea, Disp: 30 tablet, Rfl: 1     prochlorperazine (COMPAZINE) 5 MG tablet, Take 1 tablet (5 mg) by mouth every 8 hours as needed for nausea, Disp: 30 tablet, Rfl: 1     tacrolimus (GENERIC EQUIVALENT) 1 MG capsule, Take 1 mg by mouth 2 times daily , Disp: 60 capsule, Rfl: 11     torsemide (DEMADEX) 20 MG tablet, Take 1 tablet (20 mg) by mouth 2 times daily May hold as needed on dialysis days., Disp: 180 tablet, Rfl: 3     acetone, Urine, test STRP, 1 strip by In Vitro route daily (Patient not taking: Reported on 2/26/2020), Disp: 50 each, Rfl: 3                        PHYSICAL EXAM:  Pulse:  [104] 104  BP: (170)/(102) 170/102  SpO2:  [98 %] 98 %  Constitutional: alert and  cooperative  HEENT: sclera anicteric, MMM, conjunctiva pink  Chest: HD catheter present on the right side.  CV:  NSR  : No CVA tenderness  Abdomen: old incision   Skin:  No rashes or jaundice  Neuro: normal gait  Psych: normal mood and affect  EXTREMITY EXAM:   Vein Exam: Obvious suitable veins?    Left arm: YES   []           NO  []       Comment:    Right arm: YES   []           NO  []       Comment:   Arterial Exam:   Radial   L: 3+ R: 3+   Ulnar   L: 3+;R: 3+  Patent Palmar arch  L: YES   []  NO   []       R: YES   []   NO   []        Capillary refill:  L: < 2 sec, R:< 2 sec    Sensory exam:   Left hand: Normal   []       Abnormal   []     Comment:    Right hand: Normal   []       Abnormal   []     Comment:     Motor exam normal:   Left hand: Normal   []       Abnormal   []     Comment:     Right hand: Normal   []       Abnormal   []     Comment:                       Mapping Reviewed:  Target vein: 2.5 mm left basilic vein  Target artery brachial artery at the distal upper arm    Assessment & Plan: Mr. Tata Fitzgerald is a good candidate for placement of permanent dialysis access.  I would recommend left brachial artery to basilic vein AV fistula creation (2 stages). Will plan for MAC and scalene block for the first stage.      The surgical risks and benefits were reviewed and questions were answered. We discussed the day of surgery plan, anesthesia, postop care, risk of infection, numbness, injury to surrounding structures, bleeding, thrombosis, steal syndrome, possible need for future angioplasty or surgical revision, as well as nonmaturation or need for site abandonment. This was contrasted with morbidity and mortality risk of long-term catheter based hemodialysis access. The patient does wish to proceed with surgery for permanent access creation at this time. The patient was counselled to contact our nurse coordinator, JORDAN Gomez (Sum), JIN at [phone] with any questions or concerns.  Thank you  for the opportunity to participate in Mr. Tata Fitzgerald's care.    Adeel (Tolu) JIN Baldwin  Dialysis Vascular Access/SOT Clinical Nurse Specialist    Solid Organ Transplant Service - [facility]   Phone # [phone]  Pager # [phone]    TT: 35 min, CT: 25 min.      Gilson Archuleta MD

## 2020-09-21 NOTE — PROGRESS NOTES
Dialysis Access Service  Consult Note    Referred by Dr. Schneider for surgical consult of permanent dialysis access.    HPI: Mr. Tata Fitzgerald is being seen today for placement of permanent dialysis access due to End Stage renal failure from diabetes mellitus type 1.  He is right handed. Mr. Tata Fitzgerald is dialyzing at [address] [address].      Risk factors for vascular access:         Yes No  Hx of CVC    [x]    []   Comment: right internal jugular  Hx of PICC line         []     [x]  Comment:   Hx of Pacemaker    []     [x]  Comment:   History of failed access:  []         [x]  Comment:  SVC syndrome   []      [x]  Comment:  Heart Failure    []     [x]  EF:  55 - 60%   Periph arterial disease  []     [x]  Comment:  Prior Fracture/Surgery  []     [x]  Location:   DVT    []    [x]   Location:  Diabetes    [x]        []  Comment: Type I  Neuropathy   [x]     []  Comment: less sensation from both hands  Anticoagulation:   []    [x]  Agent:      Anticoagulation contraindication:[]  [x]     Details:                   Pediatric    []         [x]  Age:                  Hx of transplant   [x]    []  Comment: K?P on 2/17/2009 and Kidney on 11/4/2008    Current immunosuppression [x]    []  Comment: Tacrolimus,            ROS: 10 point ROS neg other than the symptoms noted above in the HPI.        Past Medical History:   Diagnosis Date     CMV (cytomegalovirus infection) status negative 2011     Coronary artery disease, non-occlusive 2008    angio showed diffuse disease with no lesions     Diabetes mellitus, type 1     Diagnosed at age 11. 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      Noncompliance with treatment     no labs for one year     Pancreas replaced by transplant (H) Feb 2009    rejection treated with thymo     Pancreatic disease     pancreas transplant     Tobacco abuse ongoing       Past Surgical History:   Procedure Laterality Date     ABDOMEN SURGERY       ARTHROSCOPY KNEE WITH LATERAL MENISCECTOMY Left 7/10/2019    Procedure: Left knee arthroscopic partial lateral meniscectomy;  Surgeon: Alex Candelaria MD;  Location: [facility] OR     BIOPSY      [facility] 2009     BYPASS GRAFT ARTERY CORONARY N/A 8/5/2015    Procedure: BYPASS GRAFT ARTERY CORONARY;  Surgeon: Katy Neville MD;  Location:  OR     CORONARY ANGIOGRAPHY ADULT ORDER      2015     ESOPHAGOSCOPY, GASTROSCOPY, DUODENOSCOPY (EGD), COMBINED N/A 1/28/2020    Procedure: ESOPHAGOGASTRODUODENOSCOPY (EGD) biopsies w/forceps;  Surgeon: Emre Gomes MD;  Location:  GI     EYE SURGERY      vitrectomy both eyes      IR CVC TUNNEL PLACEMENT > 5 YRS OF AGE  2/13/2020     ORTHOPEDIC SURGERY  1993    tumor removed from left knee     TRANSPLANT  2009.2008    pancrease and kidney transplant       Family History   Problem Relation Age of Onset     Unknown/Adopted Father      Heart Disease Maternal Grandfather 62     Melanoma No family hx of      Skin Cancer No family hx of        Social History     Tobacco Use     Smoking status: Former Smoker     Packs/day: 0.30     Types: Vaping Device     Smokeless tobacco: Never Used     Tobacco comment: E- Cig    Substance Use Topics     Alcohol use: Not Currently     Alcohol/week: 0.0 standard drinks     Frequency: Never     Binge frequency: Never         Current Outpatient Medications:      acetaminophen (TYLENOL) 325 MG tablet, Take 2 tablets (650 mg) by mouth every 4 hours as needed for mild pain, Disp: 50 tablet, Rfl: 0     atorvastatin (LIPITOR) 40 MG tablet, Take 0.5 tablets (20 mg) by mouth daily, Disp: 45 tablet, Rfl: 3     blood glucose (NO BRAND SPECIFIED) test strip, Use to test  blood sugar 5 times daily or as directed., Disp: 1 Box, Rfl: 0     insulin aspart (NOVOLOG FLEXPEN) 100 UNIT/ML pen, 1 unit per 6 grams of carbs with each meal. About 45 units/day., Disp: 15 mL, Rfl: 3     insulin aspart (NOVOLOG FLEXPEN) 100 UNIT/ML pen, Sliding scale = 1 unit for every 20 over blood glucose of 140, Disp: , Rfl:      insulin pen needle (BD ARPITA U/F) 32G X 4 MM miscellaneous, Use 4 daily or as directed., Disp: 120 each, Rfl: 0     LANTUS SOLOSTAR 100 UNIT/ML soln, INJECT 24 UNITS SUBCUTANEOUSLY AT BEDTIME, Disp: 15 mL, Rfl: 0     metoclopramide (REGLAN) 5 MG tablet, Take 1 tablet (5 mg) by mouth 4 times daily (before meals and nightly), Disp: 120 tablet, Rfl: 11     mycophenolic acid (GENERIC EQUIVALENT) 180 MG EC tablet, Take 360 mg by mouth 2 times daily, Disp: , Rfl:      omeprazole (PRILOSEC) 40 MG DR capsule, Take 40 mg by mouth every evening, Disp: , Rfl:      ondansetron (ZOFRAN-ODT) 4 MG ODT tab, Take 1 tablet (4 mg) by mouth every 8 hours as needed for nausea, Disp: 30 tablet, Rfl: 1     prochlorperazine (COMPAZINE) 5 MG tablet, Take 1 tablet (5 mg) by mouth every 8 hours as needed for nausea, Disp: 30 tablet, Rfl: 1     tacrolimus (GENERIC EQUIVALENT) 1 MG capsule, Take 1 mg by mouth 2 times daily , Disp: 60 capsule, Rfl: 11     torsemide (DEMADEX) 20 MG tablet, Take 1 tablet (20 mg) by mouth 2 times daily May hold as needed on dialysis days., Disp: 180 tablet, Rfl: 3     acetone, Urine, test STRP, 1 strip by In Vitro route daily (Patient not taking: Reported on 2/26/2020), Disp: 50 each, Rfl: 3                        PHYSICAL EXAM:  Pulse:  [104] 104  BP: (170)/(102) 170/102  SpO2:  [98 %] 98 %  Constitutional: alert and cooperative  HEENT: sclera anicteric, MMM, conjunctiva pink  Chest: HD catheter present on the right side.  CV:  NSR  : No CVA tenderness  Abdomen: old incision   Skin:  No rashes or jaundice  Neuro: normal gait  Psych: normal mood and affect  EXTREMITY EXAM:   Vein  Exam: Obvious suitable veins?    Left arm: YES   []           NO  []       Comment:    Right arm: YES   []           NO  []       Comment:   Arterial Exam:   Radial   L: 3+ R: 3+   Ulnar   L: 3+;R: 3+  Patent Palmar arch  L: YES   []  NO   []       R: YES   []   NO   []        Capillary refill:  L: < 2 sec, R:< 2 sec    Sensory exam:   Left hand: Normal   []       Abnormal   []     Comment:    Right hand: Normal   []       Abnormal   []     Comment:     Motor exam normal:   Left hand: Normal   []       Abnormal   []     Comment:     Right hand: Normal   []       Abnormal   []     Comment:                       Mapping Reviewed:  Target vein: 2.5 mm left basilic vein  Target artery brachial artery at the distal upper arm    Assessment & Plan: Mr. Tata Fitzgerald is a good candidate for placement of permanent dialysis access.  I would recommend left brachial artery to basilic vein AV fistula creation (2 stages). Will plan for MAC and scalene block for the first stage.      The surgical risks and benefits were reviewed and questions were answered. We discussed the day of surgery plan, anesthesia, postop care, risk of infection, numbness, injury to surrounding structures, bleeding, thrombosis, steal syndrome, possible need for future angioplasty or surgical revision, as well as nonmaturation or need for site abandonment. This was contrasted with morbidity and mortality risk of long-term catheter based hemodialysis access. The patient does wish to proceed with surgery for permanent access creation at this time. The patient was counselled to contact our nurse coordinator, JORDAN Goemz CNS (Sum) at [phone] with any questions or concerns.  Thank you for the opportunity to participate in Mr. Tata Fitzgerald's care.    JIN Osorio (Sum)  Dialysis Vascular Access/SOT Clinical Nurse Specialist    Solid Organ Transplant Service - [facility]   Phone # [phone]  Pager # [phone]    TT: 35 min, CT: 25  luis Archuleta MD

## 2020-09-22 ENCOUNTER — TELEPHONE (OUTPATIENT)
Dept: TRANSPLANT | Facility: CLINIC | Age: 45
End: 2020-09-22

## 2020-09-22 DIAGNOSIS — Z99.2 ESRD ON DIALYSIS (H): Primary | ICD-10-CM

## 2020-09-22 DIAGNOSIS — N18.6 ESRD ON DIALYSIS (H): Primary | ICD-10-CM

## 2020-09-22 DIAGNOSIS — Z01.818 PRE-OP EXAMINATION: ICD-10-CM

## 2020-09-25 ENCOUNTER — PREP FOR PROCEDURE (OUTPATIENT)
Dept: TRANSPLANT | Facility: CLINIC | Age: 45
End: 2020-09-25

## 2020-09-25 DIAGNOSIS — N18.6 ENCOUNTER REGARDING VASCULAR ACCESS FOR DIALYSIS FOR ESRD (H): ICD-10-CM

## 2020-09-25 DIAGNOSIS — Z99.2 ENCOUNTER REGARDING VASCULAR ACCESS FOR DIALYSIS FOR ESRD (H): ICD-10-CM

## 2020-09-25 DIAGNOSIS — N18.6 ESRD (END STAGE RENAL DISEASE) (H): Primary | ICD-10-CM

## 2020-09-25 DIAGNOSIS — Z11.59 ENCOUNTER FOR SCREENING FOR OTHER VIRAL DISEASES: Primary | ICD-10-CM

## 2020-09-30 ENCOUNTER — OFFICE VISIT (OUTPATIENT)
Dept: SURGERY | Facility: CLINIC | Age: 45
End: 2020-09-30
Payer: MEDICARE

## 2020-09-30 ENCOUNTER — PRE VISIT (OUTPATIENT)
Dept: SURGERY | Facility: CLINIC | Age: 45
End: 2020-09-30

## 2020-09-30 ENCOUNTER — ANESTHESIA EVENT (OUTPATIENT)
Dept: SURGERY | Facility: CLINIC | Age: 45
End: 2020-09-30
Payer: MEDICARE

## 2020-09-30 VITALS
DIASTOLIC BLOOD PRESSURE: 94 MMHG | OXYGEN SATURATION: 100 % | HEART RATE: 89 BPM | WEIGHT: 193 LBS | RESPIRATION RATE: 14 BRPM | SYSTOLIC BLOOD PRESSURE: 149 MMHG | BODY MASS INDEX: 29.25 KG/M2 | HEIGHT: 68 IN

## 2020-09-30 DIAGNOSIS — Z01.818 PRE-OP EXAMINATION: Primary | ICD-10-CM

## 2020-09-30 RX ORDER — COVID-19 ANTIGEN TEST
220 KIT MISCELLANEOUS PRN
COMMUNITY

## 2020-09-30 RX ORDER — CALCIUM CARBONATE 500 MG/1
2 TABLET, CHEWABLE ORAL 3 TIMES DAILY
Status: ON HOLD | COMMUNITY
End: 2021-03-01

## 2020-09-30 ASSESSMENT — LIFESTYLE VARIABLES: TOBACCO_USE: 1

## 2020-09-30 ASSESSMENT — PAIN SCALES - GENERAL: PAINLEVEL: NO PAIN (0)

## 2020-09-30 ASSESSMENT — MIFFLIN-ST. JEOR: SCORE: 1744.94

## 2020-09-30 NOTE — ANESTHESIA PREPROCEDURE EVALUATION
Anesthesia Pre-Procedure Evaluation    Patient: Murtaza Fitzgerald   MRN:     [id number] Gender:   male   Age:    45 year old :      1975        Preoperative Diagnosis: * No surgery found *        LABS:  CBC:   Lab Results   Component Value Date    WBC 9.1 2020    WBC 10.7 2020    HGB 11.9 (L) 2020    HGB 10.9 (L) 2020    HCT 37.4 (L) 2020    HCT 34.2 (L) 2020     2020     2020     BMP:   Lab Results   Component Value Date     (L) 2020     2020    POTASSIUM 4.9 2020    POTASSIUM 4.0 2020    CHLORIDE 97 2020    CHLORIDE 100 2020    CO2 20 2020    CO2 26 2020    BUN 54 (H) 2020    BUN 44 (H) 2020    CR 8.07 (H) 2020    CR 7.05 (H) 2020     (H) 2020     (H) 2020     COAGS:   Lab Results   Component Value Date    PTT 32 2015    INR 1.14 2020    FIBR 217 2015     POC:   Lab Results   Component Value Date     (H) 2020     OTHER:   Lab Results   Component Value Date    PH 7.34 (L) 2015    LACT 1.9 2020    A1C 6.9 (H) 2020    CHARLY 9.2 2020    PHOS 6.7 (H) 2020    MAG 2.3 2020    ALBUMIN 3.9 2020    PROTTOTAL 8.3 2020    ALT 14 2020    AST 13 2020    ALKPHOS 115 2020    BILITOTAL 0.4 2020    LIPASE 64 (L) 2020    AMYLASE 56 10/28/2014    TSH 1.98 2019    T4 1.25 2013    CRP 4.4 2008        Preop Vitals    BP Readings from Last 3 Encounters:   20 (!) 149/94   20 (!) 170/102   20 (!) 202/109    Pulse Readings from Last 3 Encounters:   20 89   20 104   20 89      Resp Readings from Last 3 Encounters:   20 14   20 18   20 20    SpO2 Readings from Last 3 Encounters:   20 100%   20 98%   20 99%      Temp Readings from Last 1 Encounters:   20  "98.5  F (36.9  C) (Oral)    Ht Readings from Last 1 Encounters:   09/30/20 1.727 m (5' 8\")      Wt Readings from Last 1 Encounters:   09/30/20 87.5 kg (193 lb)    Estimated body mass index is 29.35 kg/m  as calculated from the following:    Height as of this encounter: 1.727 m (5' 8\").    Weight as of this encounter: 87.5 kg (193 lb).     LDA:        Past Medical History:   Diagnosis Date     CMV (cytomegalovirus infection) status negative 2011     Coronary artery disease, non-occlusive 2008    angio showed diffuse disease with no lesions     Diabetes mellitus, type 1     Diagnosed at age 11. 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     Noncompliance with treatment     no labs for one year     Pancreas replaced by transplant (H) Feb 2009    rejection treated with thymo     Pancreatic disease     pancreas transplant     Tobacco abuse ongoing      Past Surgical History:   Procedure Laterality Date     ABDOMEN SURGERY       ARTHROSCOPY KNEE WITH LATERAL MENISCECTOMY Left 7/10/2019    Procedure: Left knee arthroscopic partial lateral meniscectomy;  Surgeon: Alex Candelaria MD;  Location:  OR     BIOPSY      [facility] 2009     BYPASS GRAFT ARTERY CORONARY N/A 8/5/2015    Procedure: BYPASS GRAFT ARTERY CORONARY;  Surgeon: Katy Neville MD;  Location: [facility] OR     CORONARY ANGIOGRAPHY ADULT ORDER      2015     ESOPHAGOSCOPY, GASTROSCOPY, DUODENOSCOPY (EGD), COMBINED N/A 1/28/2020    Procedure: ESOPHAGOGASTRODUODENOSCOPY (EGD) biopsies w/forceps;  Surgeon: Emre Gomes MD;  Location:  GI     EYE SURGERY      vitrectomy both eyes      IR CVC TUNNEL PLACEMENT > 5 YRS OF AGE  2/13/2020     ORTHOPEDIC SURGERY  1993    " tumor removed from left knee     TRANSPLANT  2009.2008    pancrease and kidney transplant      Allergies   Allergen Reactions     Benadryl [Diphenhydramine] Other (See Comments)     Restless legs     Reglan Other (See Comments)     IV, delsuions        Anesthesia Evaluation     . Pt has had prior anesthetic. Type: General and MAC    No history of anesthetic complications          ROS/MED HX    ENT/Pulmonary:     (+)KRISSY risk factors snores loudly, hypertension, tobacco use (Quick smoking cigarettes in 2016.  smoked 20 years 1 PPD.Now vapes. ), , . .    Neurologic:     (+)neuropathy - hands, feet and legs.,     Cardiovascular:     (+) Dyslipidemia, hypertension--CAD, -CABG-date: 2015, . : . . . :. . Previous cardiac testing date:results:Stress Testdate:2019 results: date: results: date: results:         (-) taking anticoagulants/antiplatelets   METS/Exercise Tolerance: Comment: METS<4.  Reports a sedentary lifestyle.     Hematologic:     (+) Anemia, History of Transfusion no previous transfusion reaction -      Musculoskeletal: Comment: Left knee arthroscopy 2019.         GI/Hepatic:     (+) GERD Asymptomatic on medication,       Renal/Genitourinary:     (+) chronic renal disease (HD T, Th, Sat), type: ESRD, Pt requires dialysis, type: Hemodialysis,       Endo:     (+) type I DM (Hospitalized earlier this year for ketoacidosis. ), Last HgA1c: 7.2 Using insulin - not using insulin pump Normal glucose range: -130's Previously admitted for DM/DKA Diabetic complications: nephropathy neuropathy retinopathy gastroparesis cardiac problems, .      Psychiatric:  - neg psychiatric ROS       Infectious Disease:  - neg infectious disease ROS       Malignancy:      - no malignancy   Other:    (+) No chance of pregnancy C-spine cleared: Yes, no H/O Chronic Pain,                       PHYSICAL EXAM:   Mental Status/Neuro: A/A/O   Airway: Facies: Feasible  Mallampati: II  Mouth/Opening: Full  TM distance: > 6 cm  Neck ROM:  Limited   Respiratory: Auscultation: CTAB     Resp. Rate: Normal     Resp. Effort: Normal      CV: Rhythm: Regular  Rate: Age appropriate  Heart: Normal Sounds  Edema: None   Comments:      Dental: Normal Dentition                Assessment:   ASA SCORE: 3    H&P: History and physical reviewed and following examination; no interval change.   Smoking Status:  Non-Smoker/Unknown        Plan:   Anes. Type:  MAC   Pre-Medication: None   Induction:  N/a   Airway: Native Airway   Access/Monitoring: PIV   Maintenance: N/a     Postop Plan:   Postop Pain: None  Postop Sedation/Airway: Not planned  Disposition: Outpatient     PONV Management:   Adult Risk Factors:, Non-Smoker   Prevention: Ondansetron     CONSENT: Direct conversation   Plan and risks discussed with: Patient   Blood Products: Consent Deferred (Minimal Blood Loss)       Comments for Plan/Consent:  Patient has a stress test that showed ischemic changes on the EKG. TTE this year showed no regional wall motion. BNP was elevated and troponin was positive. Plan is for light MAC. Patient has a peripheral nerve block that is working very well. Plan is to maintain MAPs at baseline and keep HR < 80bpm.               PAC Discussion and Assessment    ASA Classification: 3  Case is suitable for: [facility]  Anesthetic techniques and relevant risks discussed: MAC with GA as backup  Invasive monitoring and risk discussed:   Types:   Possibility and Risk of blood transfusion discussed:   NPO instructions given:   Additional anesthetic preparation and risks discussed:   Needs early admission to pre-op area:   Other:     PAC Resident/NP Anesthesia Assessment:  Murtaza Fitzgerald is a 45-year-old male scheduled for CREATION, ARTERIOVENOUS FISTULA, LEFT UPPER EXTREMITY brachial basilic first stage with intraoperative ultrasound on 10/7/2020 with Dr. Guzman at [facility] under MAC with block.    Mr. Tata Fitzgerald has a history of insulin dependent  diabetes mellitus since he was a child.  He is s/p renal (11/2008) and pancreas transplant (02/2009) both have subsequently failed.  He has been on hemodialysis since earlier this year.  He was seen in surgical consult of permanent dialysis access on 9/21/2020 by Dr. Archuleta.  Through the evaluation, Dr. Archuleta counseled Mr. Tata Fitzgerald on the above procedure.  Mr. Tata Fitzgerald has opted to proceed.     Notable past medical history includes CAD s/p 3-vessel coronary bypass surgery in 10/2015 (LIMA to LAD, vein graft to OM, vein graft to PDA); HTN;  IDDM onset as a child; gastroparesis; ESRD s/p kidney transplant 11/2008, subsequently failed on hemodialysis; pancreas transplant in 02/2009, subsequently failed, currently on insulin; immunosuppressed; h/o blood transfusions and h/o tobacco dependence now vapes.     Dx:  ESRD    PROCEDURES  DSE 7/9/2019    Interpretation Summary  This test indicates a low probability of severe occlusive coronary artery  disease. No hemodynamically significant valvular abnormalities on 2D or color  flow imaging    He has the following specific operative considerations:   - KRISSY # of risks 3/8 = intermediate  - VTE risk:  0.5%  - Risk of PONV score = 1.  If > 2, anti-emetic intervention recommended.      #  Cardiology   - Known CAD s/p 3V CABG 8/2015.  Followed by Dr. Vogel with last visit on 8/19/20.  Given patient was asymptomatic prior to his multivessel coronary bypass surgery, Dr. Vogel ordered NM stress test and echocardiogram that will be done this Friday, 10/2/20.  He may proceed with above procedure if these tests are unremarkable. METS:  <4. RCRI : Coronary Artery Disease (MI, positive stress test, angina, Qs on EKG), IDDM, and Cr>2.  11 % risk of major adverse cardiac event.         - HTN, hold diuretic DOS.         #  Pulmonary   - 20 pack years smoking hx.  Now vaping.  Encouraged to quit.    #  Hematology   -  Anemia 2/2 ESRD, on iron with dialysis runs. Hgb 11.9  (6/2020).   - h/o blood transfusions without reaction.      #  GI -   - GERD, take PPI DOS   - Gastroparesis 2/2 IDDM, take Reglan as prescribed.     #  Renal     - ESRD s/p kidney transplant 11/2008, subsequently failed on hemodialysis T, Th and Sat since February of year.  Right chest access.  Above procedure planned.    #  Endocrine   -  IDDM since childhood s/p pancreas transplant 02/2009, subsequently failed, currently on insulin. Hold morning short acting insulin DOS. Take 80% of last scheduled dose of long acting insulin prior to procedure.  Recommend close monitoring of the patient's blood glucose levels throughout the perioperative period and treat per Fox guidelines.  - Immunosuppressed 2/2 transplants, take immunosuppression as prescribed DOS.   -  Overweight, BMI >25.0    #  ID  - COVID-19 testing per surgeon's office    #   Anesthesia considerations  - Review of anesthesia records:  Glidescope used 7/10/2019.   - Refer to PAC assessment in anesthesia records      Arrival time, NPO, shower and medication instructions provided by nursing staff today.  Preparing For Your Surgery handout given.      Reviewed and Signed by PAC Mid-Level Provider/Resident  Mid-Level Provider/Resident: Brit ORTEGA CNP  Date: 9/30/20  Time: 16:00    Attending Anesthesiologist Anesthesia Assessment:        Anesthesiologist:   Date:   Time:   Pass/Fail:   Disposition:     PAC Pharmacist Assessment:        Pharmacist:   Date:   Time:    JORDAN Noble CNP

## 2020-09-30 NOTE — H&P
Pre-Operative H & P     CC:  Preoperative exam to assess for increased cardiopulmonary risk while undergoing surgery and anesthesia.    Date of Encounter: 9/30/2020  Primary Care Physician:  Kt Ríos Tata Fitzgerald is a 45 year old male who presents for pre-operative H & P in preparation for CREATION, ARTERIOVENOUS FISTULA, LEFT UPPER EXTREMITY brachial basilic first stage with intraoperative ultrasound on 10/7/2020 with Dr. Guzman at [facility] under MAC with block.    Mr. Tata Fitzgerald has a history of insulin dependent diabetes mellitus since he was a child.  He is s/p renal (11/2008) and pancreas transplant (02/2009) both have subsequently failed.  He has been on hemodialysis since earlier this year.  He was seen in surgical consult of permanent dialysis access on 9/21/2020 by Dr. Archuleta.  Through the evaluation, Dr. Archuleta counseled Mr. Tata Fitzgerald on the above procedure.  Mr. Tata Fitzgerald has opted to proceed.     Notable past medical history includes CAD s/p 3-vessel coronary bypass surgery in 10/2015 (LIMA to LAD, vein graft to OM, vein graft to PDA); HTN;  IDDM onset as a child; gastroparesis; ESRD s/p kidney transplant 11/2008, subsequently failed on hemodialysis; pancreas transplant in 02/2009, subsequently failed, currently on insulin; immunosuppressed; h/o blood transfusions and h/o tobacco dependence now vapes.     Dx:  ESRD     History is obtained from the patient and electronic health record.     Past Medical History  Past Medical History:   Diagnosis Date     CMV (cytomegalovirus infection) status negative 2011     Coronary artery disease, non-occlusive 2008    angio showed diffuse disease with no lesions     Diabetes mellitus, type 1     Diagnosed at age 11. 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     Noncompliance with treatment     no labs for one year     Pancreas replaced by transplant (H) Feb 2009    rejection treated with thymo     Pancreatic disease     pancreas transplant     Tobacco abuse ongoing       Past Surgical History  Past Surgical History:   Procedure Laterality Date     ABDOMEN SURGERY       ARTHROSCOPY KNEE WITH LATERAL MENISCECTOMY Left 7/10/2019    Procedure: Left knee arthroscopic partial lateral meniscectomy;  Surgeon: Alex Candelaria MD;  Location: [facility] OR     BIOPSY      [facility] 2009     BYPASS GRAFT ARTERY CORONARY N/A 8/5/2015    Procedure: BYPASS GRAFT ARTERY CORONARY;  Surgeon: Katy Neville MD;  Location: [facility] OR     CORONARY ANGIOGRAPHY ADULT ORDER      2015     ESOPHAGOSCOPY, GASTROSCOPY, DUODENOSCOPY (EGD), COMBINED N/A 1/28/2020    Procedure: ESOPHAGOGASTRODUODENOSCOPY (EGD) biopsies w/forceps;  Surgeon: Emre Gomes MD;  Location:  GI     EYE SURGERY      vitrectomy both eyes      IR CVC TUNNEL PLACEMENT > 5 YRS OF AGE  2/13/2020     ORTHOPEDIC SURGERY  1993    tumor removed from left knee     TRANSPLANT  2009.2008    pancrease and kidney transplant       Hx of Blood transfusions/reactions: yes without reaction     Hx of abnormal bleeding or anti-platelet use: denies    Menstrual history: No LMP for male patient.    Steroid use in the last year: deneis     Personal or FH with difficulty with Anesthesia:  Glidescope used with knee surgery 2019    Prior to Admission Medications  Current Outpatient Medications   Medication Sig Dispense Refill     calcium carbonate (TUMS) 500 MG chewable tablet Take 1 chew tab by mouth 3 times daily       insulin aspart (NOVOLOG FLEXPEN) 100 UNIT/ML pen 1 unit per 6 grams of carbs with each meal. About 45 units/day. 15 mL 3     insulin aspart (NOVOLOG FLEXPEN) 100 UNIT/ML pen  Sliding scale = 1 unit for every 20 over blood glucose of 140       LANTUS SOLOSTAR 100 UNIT/ML soln INJECT 24 UNITS SUBCUTANEOUSLY AT BEDTIME 15 mL 0     metoclopramide (REGLAN) 5 MG tablet Take 1 tablet (5 mg) by mouth 4 times daily (before meals and nightly) 120 tablet 11     naproxen sodium 220 MG capsule Take 220 mg by mouth as needed (PT last dose 9.28.2020)       omeprazole (PRILOSEC) 40 MG DR capsule Take 40 mg by mouth every evening       tacrolimus (GENERIC EQUIVALENT) 1 MG capsule Take 1 mg by mouth 2 times daily  60 capsule 11     torsemide (DEMADEX) 20 MG tablet Take 1 tablet (20 mg) by mouth 2 times daily May hold as needed on dialysis days. 180 tablet 3     acetone, Urine, test STRP 1 strip by In Vitro route daily (Patient not taking: Reported on 2/26/2020) 50 each 3     blood glucose (NO BRAND SPECIFIED) test strip Use to test blood sugar 5 times daily or as directed. 1 Box 0     insulin pen needle (BD ARPITA U/F) 32G X 4 MM miscellaneous Use 4 daily or as directed. 120 each 0     ondansetron (ZOFRAN-ODT) 4 MG ODT tab Take 1 tablet (4 mg) by mouth every 8 hours as needed for nausea 30 tablet 1     prochlorperazine (COMPAZINE) 5 MG tablet Take 1 tablet (5 mg) by mouth every 8 hours as needed for nausea 30 tablet 1       Allergies  Allergies   Allergen Reactions     Benadryl [Diphenhydramine] Other (See Comments)     Restless legs     Reglan Other (See Comments)     IV, delsuions       Social History  Social History     Socioeconomic History     Marital status: Single     Spouse name: Not on file     Number of children: 1     Years of education: Not on file     Highest education level: Associate degree: academic program   Occupational History     Occupation:    Social Needs     Financial resource strain: Not very hard     Food insecurity     Worry: Never true     Inability: Never true     Transportation needs     Medical: No     Non-medical: No   Tobacco Use     Smoking status: Former  Smoker     Packs/day: 1.00     Years: 20.00     Pack years: 20.00     Types: Vaping Device, Cigarettes     Last attempt to quit: 2016     Years since quittin.7     Smokeless tobacco: Never Used     Tobacco comment: E- Cig use since    Substance and Sexual Activity     Alcohol use: Not Currently     Alcohol/week: 0.0 standard drinks     Frequency: Never     Binge frequency: Never     Drug use: No     Sexual activity: Never     Partners: Female   Lifestyle     Physical activity     Days per week: 0 days     Minutes per session: 0 min     Stress: To some extent   Relationships     Social connections     Talks on phone: More than three times a week     Gets together: Once a week     Attends Sikh service: Never     Active member of club or organization: No     Attends meetings of clubs or organizations: Never     Relationship status:      Intimate partner violence     Fear of current or ex partner: Not on file     Emotionally abused: Not on file     Physically abused: Not on file     Forced sexual activity: Not on file   Other Topics Concern     Parent/sibling w/ CABG, MI or angioplasty before 65F 55M? No      Service Not Asked     Blood Transfusions No     Comment: possibly during surgery, otherwise none.     Caffeine Concern Not Asked     Occupational Exposure Not Asked     Hobby Hazards Not Asked     Sleep Concern Not Asked     Stress Concern Not Asked     Weight Concern Not Asked     Special Diet Not Asked     Back Care Not Asked     Exercise Not Asked     Bike Helmet Not Asked     Seat Belt Yes     Self-Exams Not Asked   Social History Narrative     Not on file       Family History  Family History   Problem Relation Age of Onset     Unknown/Adopted Father      Heart Disease Maternal Grandfather 62     Melanoma No family hx of      Skin Cancer No family hx of            ROS/MED HX    ENT/Pulmonary:     (+)KRISSY risk factors snores loudly, hypertension, tobacco use (Quick smoking  "cigarettes in 2016.  smoked 20 years 1 PPD.Now vapes. ), , . .    Neurologic:     (+)neuropathy - hands, feet and legs.,     Cardiovascular:     (+) Dyslipidemia, hypertension--CAD, -CABG-date: 2015, . : . . . :. . Previous cardiac testing date:results:Stress Testdate:2019 results: date: results: date: results:         (-) taking anticoagulants/antiplatelets   METS/Exercise Tolerance: Comment: METS<4.  Reports a sedentary lifestyle.     Hematologic:     (+) Anemia, History of Transfusion no previous transfusion reaction -      Musculoskeletal: Comment: Left knee arthroscopy 2019.         GI/Hepatic:     (+) GERD Asymptomatic on medication,       Renal/Genitourinary:     (+) chronic renal disease (HD T, Th, Sat), type: ESRD, Pt requires dialysis, type: Hemodialysis,       Endo:     (+) type I DM (Hospitalized earlier this year for ketoacidosis. ), Last HgA1c: 7.2 Using insulin - not using insulin pump Normal glucose range: -130's Previously admitted for DM/DKA Diabetic complications: nephropathy neuropathy retinopathy gastroparesis cardiac problems, .      Psychiatric:  - neg psychiatric ROS       Infectious Disease:  - neg infectious disease ROS       Malignancy:      - no malignancy   Other:    (+) No chance of pregnancy C-spine cleared: Yes, no H/O Chronic Pain,             BP: (!) 149/94 Pulse: 89   Resp: 14 SpO2: 100 %         193 lbs 0 oz  5' 8\"   Body mass index is 29.35 kg/m .       Physical Exam  Constitutional: Awake, alert, cooperative, no apparent distress, and appears stated age.  Eyes: Pupils equal, round and reactive to light, extra ocular muscles intact, sclera clear, conjunctiva normal.  HENT: Normocephalic, oral pharynx with moist mucus membranes, good dentition. No goiter appreciated.   Respiratory: Clear to auscultation bilaterally, no crackles or wheezing.  Cardiovascular: Regular rate and rhythm, normal S1 and S2, and no murmur noted.  Carotids +2, no bruits. No edema.   GI: Normal bowel " sounds, soft, non-distended, non-tender, no masses palpated, no hepatosplenomegaly.    Lymph/Hematologic: No cervical lymphadenopathy and no supraclavicular lymphadenopathy.  Genitourinary:  deferred  Skin: Warm and dry.  Right chest access with dressing clean, dry and intact.   Musculoskeletal: Limited neck extension. There is no redness, warmth, or swelling of the joints. Gross motor strength is normal.    Neurologic: Awake, alert, oriented to name, place and time. Cranial nerves II-XII are grossly intact. Gait is normal.   Neuropsychiatric: Calm, cooperative. Normal affect.     Labs: (personally reviewed)  Lab Results   Component Value Date    WBC 9.1 06/29/2020     Lab Results   Component Value Date    RBC 4.02 06/29/2020     Lab Results   Component Value Date    HGB 11.9 06/29/2020     Lab Results   Component Value Date    HCT 37.4 06/29/2020     Lab Results   Component Value Date    MCV 93 06/29/2020     Lab Results   Component Value Date    MCH 29.6 06/29/2020     Lab Results   Component Value Date    MCHC 31.8 06/29/2020     Lab Results   Component Value Date    RDW 13.4 06/29/2020     Lab Results   Component Value Date     06/29/2020     Last Comprehensive Metabolic Panel:  Sodium   Date Value Ref Range Status   06/29/2020 131 (L) 133 - 144 mmol/L Final     Potassium   Date Value Ref Range Status   06/29/2020 4.9 3.4 - 5.3 mmol/L Final     Chloride   Date Value Ref Range Status   06/29/2020 97 94 - 109 mmol/L Final     Carbon Dioxide   Date Value Ref Range Status   06/29/2020 20 20 - 32 mmol/L Final     Anion Gap   Date Value Ref Range Status   06/29/2020 14 3 - 14 mmol/L Final     Glucose   Date Value Ref Range Status   06/29/2020 433 (H) 70 - 99 mg/dL Final     Urea Nitrogen   Date Value Ref Range Status   06/29/2020 54 (H) 7 - 30 mg/dL Final     Creatinine   Date Value Ref Range Status   06/29/2020 8.07 (H) 0.66 - 1.25 mg/dL Final     GFR Estimate   Date Value Ref Range Status   06/29/2020 7 (L)  >60 mL/min/[1.73_m2] Final     Comment:     Non  GFR Calc  Starting 12/18/2018, serum creatinine based estimated GFR (eGFR) will be   calculated using the Chronic Kidney Disease Epidemiology Collaboration   (CKD-EPI) equation.       Calcium   Date Value Ref Range Status   06/29/2020 9.2 8.5 - 10.1 mg/dL Final     Hemoglobin A1C   Date Value Ref Range Status   05/13/2020 6.9 (H) 0 - 5.6 % Final     Comment:     Normal <5.7% Prediabetes 5.7-6.4%  Diabetes 6.5% or higher - adopted from ADA   consensus guidelines.         PROCEDURES  DSE 7/9/2019    Interpretation Summary  This test indicates a low probability of severe occlusive coronary artery  disease. No hemodynamically significant valvular abnormalities on 2D or color  flow imaging    ECG 5/2020   Sinus tachycardia  Otherwise normal ECG  When compared with ECG of 27-JAN-2020 06:55,  Incomplete right bundle branch block is no longer Present    ASSESSMENT and PLAN  Murtaza Fitzgerald is a 45 year old male scheduled to undergo CREATION, ARTERIOVENOUS FISTULA, LEFT UPPER EXTREMITY brachial basilic first stage with intraoperative ultrasound on 10/7/2020 with Dr. Guzman at [facility] under MAC with block.    He has the following specific operative considerations:   - KRISSY # of risks 3/8 = intermediate  - VTE risk:  0.5%  - Risk of PONV score = 1.  If > 2, anti-emetic intervention recommended.      #  Cardiology   - Known CAD s/p 3V CABG 8/2015.  Followed by Dr. Vogel with last visit on 8/19/20.  Given patient was asymptomatic prior to his multivessel coronary bypass surgery, Dr. Vogel ordered NM stress test and echocardiogram that will be done this Friday, 10/2/20.  He may proceed with above procedure if these tests are unremarkable. METS:  <4. RCRI : Coronary Artery Disease (MI, positive stress test, angina, Qs on EKG), IDDM, and Cr>2.  11 % risk of major adverse cardiac event.         - HTN, hold diuretic DOS.         #   Pulmonary   - 20 pack years smoking hx.  Now vaping.  Encouraged to quit.    #  Hematology   -  Anemia 2/2 ESRD, on iron with dialysis runs. Hgb 11.9 (6/2020).   - h/o blood transfusions without reaction.      #  GI -   - GERD, take PPI DOS   - Gastroparesis 2/2 IDDM, take Reglan as prescribed.     #  Renal     - ESRD s/p kidney transplant 11/2008, subsequently failed on hemodialysis T, Th and Sat since February of year.  Right chest access.  Above procedure planned.    #  Endocrine   -  IDDM since childhood s/p pancreas transplant 02/2009, subsequently failed, currently on insulin. Hold morning short acting insulin DOS. Take 80% of last scheduled dose of long acting insulin prior to procedure.  Recommend close monitoring of the patient's blood glucose levels throughout the perioperative period and treat per [place] guidelines.  - Immunosuppressed 2/2 transplants, take immunosuppression as prescribed DOS.   -  Overweight, BMI >25.0    #  ID  - COVID-19 testing per surgeon's office    #   Anesthesia considerations  - Review of anesthesia records:  Glidescope used 7/10/2019.   - Refer to PAC assessment in anesthesia records      Arrival time, NPO, shower and medication instructions provided by nursing staff today.  Preparing For Your Surgery handout given.  Additional labs per surgeon DOS.        JORDAN Noble CNP  Preoperative Assessment Center  [facility]  Clinic and Surgery Center  Phone: [phone]  Fax: [phone]

## 2020-09-30 NOTE — PATIENT INSTRUCTIONS
Preparing for Your Surgery      Name:  Murtaza Fitzgerald   MRN:  [id number]   :  1975   Today's Date:  2020       Arriving for surgery:  Surgery date:  10/7/20  Arrival time:  10:00 am    Restrictions due to COVID 19:  Patients are allowed one visitor in the pre-op period  All visitors must wear a mask  No visitors under 18  No ill visitors   parking is not available     Please come to:   [facility] [address]  [address] [place]  [address]    Park in the Patient Visitor Parking Ramp on [facility]     -    Please proceed to the Surgery Lounge on the 3rd floor. [phone]?     - ?If you are in need of directions, wheelchair or escort please stop at the Information Desk in the lobby.  Inform the information person that you are here for surgery; a wheelchair and escort will be provided to the Surgery Lounge .?     What can I eat or drink?  -  You may eat and drink normally for up to 8 hours before your surgery.   -  You may have clear liquids until 2 hours before surgery.   Examples of clear liquids:  Water  Clear broth  Juices (apple, white grape, white cranberry  and cider) without pulp  Noncarbonated, powder based beverages  (lemonade and Daniel-Aid)  Sodas (Sprite, 7-Up, ginger ale and seltzer)  Coffee or tea (without milk or cream)  Gatorade    -  No Alcohol for at least 24 hours before surgery     Which medicines can I take?    Hold Aspirin for 7 days before surgery.   Hold Multivitamins for 7 days before surgery.  Hold Supplements for 7 days before surgery.  Hold Ibuprofen (Advil, Motrin) for 1 day before surgery--unless otherwise directed by surgeon.  Hold Naproxen (Aleve) for 4 days before surgery.  Take 19 units of Lantus Solostar insulin the night before surgery.  -  DO NOT take these medications the day of surgery:  novolog insulin + demadex if both are normally taken in the morning.  -  PLEASE TAKE these medications the day of  surgery:  Tylenol if needed; take all other scheduled morning medications.    How do I prepare myself?  - Please take 2 showers before surgery using Scrubcare or Hibiclens soap.    Use this soap only from the neck to your toes.     Leave the soap on your skin for one minute--then rinse thoroughly.      You may use your own shampoo and conditioner; no other hair products.   - Please remove all jewelry and body piercings.  - No lotions, deodorants or fragrance.  - No makeup or fingernail polish.   - Bring your ID and insurance card.    - All patients are required to have a Covid-19 test within 4 days of surgery/procedure.      -Patients will be contacted by the [facility] scheduling team within 1 week of surgery to make an appointment.      - Patients may call the Scheduling team at [phone] if they have not been scheduled within 4 days of  surgery.      ALL PATIENTS GOING HOME THE SAME DAY OF SURGERY ARE REQUIRED TO HAVE A RESPONSIBLE ADULT TO DRIVE AND BE IN ATTENDANCE WITH THEM FOR 24 HOURS FOLLOWING SURGERY     Questions or Concerns:    - For any questions regarding the day of surgery or your hospital stay, please contact the Pre Admission Nursing Office at [phone].       - If you have health changes between today and your surgery please call your surgeon.       For questions after surgery please call your surgeons office.

## 2020-09-30 NOTE — TELEPHONE ENCOUNTER
FUTURE VISIT INFORMATION      SURGERY INFORMATION:    Date: 10/7/20    Location: [facility] OR    Surgeon:  Melissa Guzman MD    Anesthesia Type:  MAC with Block    Procedure: CREATION, ARTERIOVENOUS FISTULA, LEFT UPPER EXTREMITY brachial basilic first stage with intraoperative ultrasound   RECORDS REQUESTED FROM:       [facility]    Pertinent Medical History: CAD, Mycotic aneurysm of kidney transplant, hypertension    Most recent EKG+ Tracin20    Most recent ECHO: 20    Most recent Cardiac Stress Test: 20    Most recent PFT's: 13

## 2020-10-01 DIAGNOSIS — Z99.2 ESRD ON DIALYSIS (H): Primary | ICD-10-CM

## 2020-10-01 DIAGNOSIS — Z45.2 ENCOUNTER FOR ADJUSTMENT AND MANAGEMENT OF VASCULAR ACCESS DEVICE: ICD-10-CM

## 2020-10-01 DIAGNOSIS — N18.6 ESRD ON DIALYSIS (H): Primary | ICD-10-CM

## 2020-10-01 RX ORDER — CEFAZOLIN SODIUM 2 G/50ML
2 SOLUTION INTRAVENOUS
Status: CANCELLED | OUTPATIENT
Start: 2020-10-07

## 2020-10-02 ENCOUNTER — ANCILLARY PROCEDURE (OUTPATIENT)
Dept: ULTRASOUND IMAGING | Facility: CLINIC | Age: 45
End: 2020-10-02
Attending: PHYSICIAN ASSISTANT
Payer: MEDICARE

## 2020-10-02 ENCOUNTER — HOSPITAL ENCOUNTER (OUTPATIENT)
Dept: NUCLEAR MEDICINE | Facility: CLINIC | Age: 45
Setting detail: NUCLEAR MEDICINE
End: 2020-10-02
Attending: INTERNAL MEDICINE
Payer: MEDICARE

## 2020-10-02 ENCOUNTER — HOSPITAL ENCOUNTER (OUTPATIENT)
Dept: CARDIOLOGY | Facility: CLINIC | Age: 45
End: 2020-10-02
Attending: INTERNAL MEDICINE
Payer: MEDICARE

## 2020-10-02 ENCOUNTER — ANCILLARY PROCEDURE (OUTPATIENT)
Dept: GENERAL RADIOLOGY | Facility: CLINIC | Age: 45
End: 2020-10-02
Attending: PHYSICIAN ASSISTANT
Payer: MEDICARE

## 2020-10-02 DIAGNOSIS — Z01.818 PRE-TRANSPLANT EVALUATION FOR KIDNEY TRANSPLANT: ICD-10-CM

## 2020-10-02 DIAGNOSIS — N18.6 END STAGE RENAL DISEASE (H): ICD-10-CM

## 2020-10-02 DIAGNOSIS — Z76.82 ORGAN TRANSPLANT CANDIDATE: ICD-10-CM

## 2020-10-02 DIAGNOSIS — Z01.818 PRE-TRANSPLANT EVALUATION FOR PANCREAS TRANSPLANT: ICD-10-CM

## 2020-10-02 DIAGNOSIS — E10.22 TYPE 1 DIABETES MELLITUS WITH CHRONIC KIDNEY DISEASE ON CHRONIC DIALYSIS (H): ICD-10-CM

## 2020-10-02 DIAGNOSIS — N18.6 END STAGE RENAL DISEASE (H): Primary | ICD-10-CM

## 2020-10-02 DIAGNOSIS — R06.09 DOE (DYSPNEA ON EXERTION): ICD-10-CM

## 2020-10-02 DIAGNOSIS — E78.5 HYPERLIPIDEMIA: ICD-10-CM

## 2020-10-02 DIAGNOSIS — Z76.82 AWAITING ORGAN TRANSPLANT: ICD-10-CM

## 2020-10-02 DIAGNOSIS — N18.6 TYPE 1 DIABETES MELLITUS WITH CHRONIC KIDNEY DISEASE ON CHRONIC DIALYSIS (H): ICD-10-CM

## 2020-10-02 DIAGNOSIS — E10.9 TYPE 1 DIABETES MELLITUS (H): ICD-10-CM

## 2020-10-02 DIAGNOSIS — T86.12 KIDNEY TRANSPLANT FAILURE: ICD-10-CM

## 2020-10-02 DIAGNOSIS — Z87.891 HISTORY OF TOBACCO USE: ICD-10-CM

## 2020-10-02 DIAGNOSIS — N18.6 ESRD (END STAGE RENAL DISEASE) (H): ICD-10-CM

## 2020-10-02 DIAGNOSIS — K31.84 GASTROPARESIS: ICD-10-CM

## 2020-10-02 DIAGNOSIS — Z99.2 TYPE 1 DIABETES MELLITUS WITH CHRONIC KIDNEY DISEASE ON CHRONIC DIALYSIS (H): ICD-10-CM

## 2020-10-02 DIAGNOSIS — Z99.2 ESRD ON DIALYSIS (H): ICD-10-CM

## 2020-10-02 DIAGNOSIS — N18.6 ESRD ON DIALYSIS (H): ICD-10-CM

## 2020-10-02 LAB
ABO + RH BLD: NORMAL
ALBUMIN SERPL-MCNC: 3.6 G/DL (ref 3.4–5)
ALP SERPL-CCNC: 95 U/L (ref 40–150)
ALT SERPL W P-5'-P-CCNC: 16 U/L (ref 0–70)
ANION GAP SERPL CALCULATED.3IONS-SCNC: 11 MMOL/L (ref 3–14)
APTT PPP: 32 SEC (ref 22–37)
AST SERPL W P-5'-P-CCNC: 8 U/L (ref 0–45)
BASOPHILS # BLD AUTO: 0.1 10E9/L (ref 0–0.2)
BASOPHILS NFR BLD AUTO: 1 %
BILIRUB SERPL-MCNC: 0.5 MG/DL (ref 0.2–1.3)
BLD GP AB SCN SERPL QL: NORMAL
BLOOD BANK CMNT PATIENT-IMP: NORMAL
BUN SERPL-MCNC: 32 MG/DL (ref 7–30)
C PEPTIDE SERPL-MCNC: 0.7 NG/ML (ref 0.9–6.9)
CALCIUM SERPL-MCNC: 9.4 MG/DL (ref 8.5–10.1)
CARDIOLIPIN ANTIBODY IGG: <1.6 GPL-U/ML (ref 0–19.9)
CARDIOLIPIN ANTIBODY IGM: 0.2 MPL-U/ML (ref 0–19.9)
CHLORIDE SERPL-SCNC: 94 MMOL/L (ref 94–109)
CMV IGG SERPL QL IA: 0.3 AI (ref 0–0.8)
CO2 SERPL-SCNC: 30 MMOL/L (ref 20–32)
CREAT SERPL-MCNC: 6.92 MG/DL (ref 0.66–1.25)
DIFFERENTIAL METHOD BLD: ABNORMAL
EBV VCA IGG SER QL IA: >8 AI (ref 0–0.8)
EOSINOPHIL # BLD AUTO: 0.2 10E9/L (ref 0–0.7)
EOSINOPHIL NFR BLD AUTO: 3 %
ERYTHROCYTE [DISTWIDTH] IN BLOOD BY AUTOMATED COUNT: 13.1 % (ref 10–15)
GFR SERPL CREATININE-BSD FRML MDRD: 9 ML/MIN/{1.73_M2}
GLUCOSE SERPL-MCNC: 148 MG/DL (ref 70–99)
HBA1C MFR BLD: 6.9 % (ref 0–5.6)
HBV CORE AB SERPL QL IA: NONREACTIVE
HBV SURFACE AB SERPL IA-ACNC: 527.23 M[IU]/ML
HBV SURFACE AG SERPL QL IA: NONREACTIVE
HCT VFR BLD AUTO: 33.9 % (ref 40–53)
HCV AB SERPL QL IA: NONREACTIVE
HGB BLD-MCNC: 10.8 G/DL (ref 13.3–17.7)
HIV 1+2 AB+HIV1 P24 AG SERPL QL IA: NONREACTIVE
IMM GRANULOCYTES # BLD: 0.1 10E9/L (ref 0–0.4)
IMM GRANULOCYTES NFR BLD: 0.9 %
INR PPP: 1.08 (ref 0.86–1.14)
LYMPHOCYTES # BLD AUTO: 1.4 10E9/L (ref 0.8–5.3)
LYMPHOCYTES NFR BLD AUTO: 19.7 %
MCH RBC QN AUTO: 29.9 PG (ref 26.5–33)
MCHC RBC AUTO-ENTMCNC: 31.9 G/DL (ref 31.5–36.5)
MCV RBC AUTO: 94 FL (ref 78–100)
MONOCYTES # BLD AUTO: 0.4 10E9/L (ref 0–1.3)
MONOCYTES NFR BLD AUTO: 5.4 %
NEUTROPHILS # BLD AUTO: 4.9 10E9/L (ref 1.6–8.3)
NEUTROPHILS NFR BLD AUTO: 70 %
NRBC # BLD AUTO: 0 10*3/UL
NRBC BLD AUTO-RTO: 0 /100
PLATELET # BLD AUTO: 351 10E9/L (ref 150–450)
POTASSIUM SERPL-SCNC: 4.6 MMOL/L (ref 3.4–5.3)
PROT SERPL-MCNC: 8.3 G/DL (ref 6.8–8.8)
RADIOLOGIST FLAGS: NORMAL
RBC # BLD AUTO: 3.61 10E12/L (ref 4.4–5.9)
SODIUM SERPL-SCNC: 135 MMOL/L (ref 133–144)
SPECIMEN EXP DATE BLD: NORMAL
SPECIMEN EXP DATE BLD: NORMAL
T PALLIDUM AB SER QL: NONREACTIVE
VZV IGG SER QL IA: 4.4 AI (ref 0–0.8)
WBC # BLD AUTO: 7 10E9/L (ref 4–11)

## 2020-10-02 PROCEDURE — 86803 HEPATITIS C AB TEST: CPT | Performed by: PATHOLOGY

## 2020-10-02 PROCEDURE — 85025 COMPLETE CBC W/AUTO DIFF WBC: CPT | Performed by: PATHOLOGY

## 2020-10-02 PROCEDURE — 86704 HEP B CORE ANTIBODY TOTAL: CPT | Performed by: PATHOLOGY

## 2020-10-02 PROCEDURE — 86147 CARDIOLIPIN ANTIBODY EA IG: CPT | Performed by: PATHOLOGY

## 2020-10-02 PROCEDURE — 78452 HT MUSCLE IMAGE SPECT MULT: CPT | Mod: 26 | Performed by: RADIOLOGY

## 2020-10-02 PROCEDURE — 84681 ASSAY OF C-PEPTIDE: CPT | Performed by: PATHOLOGY

## 2020-10-02 PROCEDURE — 93978 VASCULAR STUDY: CPT | Performed by: RADIOLOGY

## 2020-10-02 PROCEDURE — 81240 F2 GENE: CPT | Performed by: PATHOLOGY

## 2020-10-02 PROCEDURE — 86481 TB AG RESPONSE T-CELL SUSP: CPT | Performed by: PATHOLOGY

## 2020-10-02 PROCEDURE — G0452 MOLECULAR PATHOLOGY INTERPR: HCPCS | Mod: 26 | Performed by: PATHOLOGY

## 2020-10-02 PROCEDURE — 86787 VARICELLA-ZOSTER ANTIBODY: CPT | Performed by: PATHOLOGY

## 2020-10-02 PROCEDURE — 85730 THROMBOPLASTIN TIME PARTIAL: CPT | Performed by: PATHOLOGY

## 2020-10-02 PROCEDURE — 36415 COLL VENOUS BLD VENIPUNCTURE: CPT | Performed by: PATHOLOGY

## 2020-10-02 PROCEDURE — 86886 COOMBS TEST INDIRECT TITER: CPT | Performed by: PATHOLOGY

## 2020-10-02 PROCEDURE — 85390 FIBRINOLYSINS SCREEN I&R: CPT | Performed by: PATHOLOGY

## 2020-10-02 PROCEDURE — 85610 PROTHROMBIN TIME: CPT | Performed by: PATHOLOGY

## 2020-10-02 PROCEDURE — A9502 TC99M TETROFOSMIN: HCPCS | Performed by: INTERNAL MEDICINE

## 2020-10-02 PROCEDURE — 85670 THROMBIN TIME PLASMA: CPT | Performed by: PATHOLOGY

## 2020-10-02 PROCEDURE — 93016 CV STRESS TEST SUPVJ ONLY: CPT | Performed by: INTERNAL MEDICINE

## 2020-10-02 PROCEDURE — 94375 RESPIRATORY FLOW VOLUME LOOP: CPT | Performed by: INTERNAL MEDICINE

## 2020-10-02 PROCEDURE — 93018 CV STRESS TEST I&R ONLY: CPT | Performed by: INTERNAL MEDICINE

## 2020-10-02 PROCEDURE — 250N000011 HC RX IP 250 OP 636: Performed by: INTERNAL MEDICINE

## 2020-10-02 PROCEDURE — 94726 PLETHYSMOGRAPHY LUNG VOLUMES: CPT | Performed by: INTERNAL MEDICINE

## 2020-10-02 PROCEDURE — 86665 EPSTEIN-BARR CAPSID VCA: CPT | Performed by: PATHOLOGY

## 2020-10-02 PROCEDURE — 86706 HEP B SURFACE ANTIBODY: CPT | Performed by: PATHOLOGY

## 2020-10-02 PROCEDURE — 93306 TTE W/DOPPLER COMPLETE: CPT

## 2020-10-02 PROCEDURE — 71046 X-RAY EXAM CHEST 2 VIEWS: CPT | Mod: GC | Performed by: RADIOLOGY

## 2020-10-02 PROCEDURE — 94729 DIFFUSING CAPACITY: CPT | Performed by: INTERNAL MEDICINE

## 2020-10-02 PROCEDURE — 83036 HEMOGLOBIN GLYCOSYLATED A1C: CPT | Performed by: PATHOLOGY

## 2020-10-02 PROCEDURE — 255N000002 HC RX 255 OP 636: Performed by: INTERNAL MEDICINE

## 2020-10-02 PROCEDURE — 999N001110 HC STATISTIC HIV 1/2 ANTIGEN/ANTIBODY PRETRANSPLANT ONLY: Performed by: PATHOLOGY

## 2020-10-02 PROCEDURE — 85613 RUSSELL VIPER VENOM DILUTED: CPT | Performed by: PATHOLOGY

## 2020-10-02 PROCEDURE — 93017 CV STRESS TEST TRACING ONLY: CPT

## 2020-10-02 PROCEDURE — 78452 HT MUSCLE IMAGE SPECT MULT: CPT

## 2020-10-02 PROCEDURE — 87340 HEPATITIS B SURFACE AG IA: CPT | Performed by: PATHOLOGY

## 2020-10-02 PROCEDURE — 86644 CMV ANTIBODY: CPT | Performed by: PATHOLOGY

## 2020-10-02 PROCEDURE — 86481 TB AG RESPONSE T-CELL SUSP: CPT | Performed by: INTERNAL MEDICINE

## 2020-10-02 PROCEDURE — 80053 COMPREHEN METABOLIC PANEL: CPT | Performed by: PATHOLOGY

## 2020-10-02 PROCEDURE — 81241 F5 GENE: CPT | Performed by: PATHOLOGY

## 2020-10-02 PROCEDURE — 93306 TTE W/DOPPLER COMPLETE: CPT | Mod: 26 | Performed by: STUDENT IN AN ORGANIZED HEALTH CARE EDUCATION/TRAINING PROGRAM

## 2020-10-02 PROCEDURE — 343N000001 HC RX 343: Performed by: INTERNAL MEDICINE

## 2020-10-02 PROCEDURE — 86905 BLOOD TYPING RBC ANTIGENS: CPT | Performed by: PATHOLOGY

## 2020-10-02 PROCEDURE — 86780 TREPONEMA PALLIDUM: CPT | Performed by: PATHOLOGY

## 2020-10-02 RX ORDER — AMINOPHYLLINE 25 MG/ML
50-100 INJECTION, SOLUTION INTRAVENOUS
Status: DISCONTINUED | OUTPATIENT
Start: 2020-10-02 | End: 2020-10-03 | Stop reason: HOSPADM

## 2020-10-02 RX ORDER — ACYCLOVIR 200 MG/1
0-1 CAPSULE ORAL
Status: DISCONTINUED | OUTPATIENT
Start: 2020-10-02 | End: 2020-10-03 | Stop reason: HOSPADM

## 2020-10-02 RX ORDER — REGADENOSON 0.08 MG/ML
0.4 INJECTION, SOLUTION INTRAVENOUS ONCE
Status: COMPLETED | OUTPATIENT
Start: 2020-10-02 | End: 2020-10-02

## 2020-10-02 RX ORDER — ALBUTEROL SULFATE 90 UG/1
2 AEROSOL, METERED RESPIRATORY (INHALATION) EVERY 5 MIN PRN
Status: DISCONTINUED | OUTPATIENT
Start: 2020-10-02 | End: 2020-10-03 | Stop reason: HOSPADM

## 2020-10-02 RX ADMIN — REGADENOSON 0.4 MG: 0.08 INJECTION, SOLUTION INTRAVENOUS at 11:33

## 2020-10-02 RX ADMIN — HUMAN ALBUMIN MICROSPHERES AND PERFLUTREN 5 ML: 10; .22 INJECTION, SOLUTION INTRAVENOUS at 10:00

## 2020-10-02 RX ADMIN — TETROFOSMIN 38.9 MCI.: 1.38 INJECTION, POWDER, LYOPHILIZED, FOR SOLUTION INTRAVENOUS at 11:41

## 2020-10-02 RX ADMIN — TETROFOSMIN 10.4 MCI.: 1.38 INJECTION, POWDER, LYOPHILIZED, FOR SOLUTION INTRAVENOUS at 10:06

## 2020-10-02 NOTE — PROGRESS NOTES
Pt here for Lexiscan nuclear stress test.  Medication and side effects reviewed with patient. Lung sounds clear to auscultation bilaterally.  Denied caffeine use.  Patient tolerated Lexiscan dose without any adverse reactions.  VSS.  Monitored post injection and then taken to the [facility] waiting room and instructed to wait there for nuclear medicine tech for follow up imaging.

## 2020-10-03 DIAGNOSIS — Z11.59 ENCOUNTER FOR SCREENING FOR OTHER VIRAL DISEASES: ICD-10-CM

## 2020-10-03 PROCEDURE — 99207 PR NO CHARGE LOS: CPT

## 2020-10-03 PROCEDURE — U0003 INFECTIOUS AGENT DETECTION BY NUCLEIC ACID (DNA OR RNA); SEVERE ACUTE RESPIRATORY SYNDROME CORONAVIRUS 2 (SARS-COV-2) (CORONAVIRUS DISEASE [COVID-19]), AMPLIFIED PROBE TECHNIQUE, MAKING USE OF HIGH THROUGHPUT TECHNOLOGIES AS DESCRIBED BY CMS-2020-01-R: HCPCS | Performed by: FAMILY MEDICINE

## 2020-10-04 LAB
SARS-COV-2 RNA SPEC QL NAA+PROBE: NOT DETECTED
SPECIMEN SOURCE: NORMAL

## 2020-10-05 LAB
COPATH REPORT: NORMAL
CV STRESS MAX HR HE: 102
DLCOCOR-%PRED-PRE: 63 %
DLCOCOR-PRE: 18.12 ML/MIN/MMHG
DLCOUNC-%PRED-PRE: 57 %
DLCOUNC-PRE: 16.58 ML/MIN/MMHG
DLCOUNC-PRED: 28.71 ML/MIN/MMHG
ERV-%PRED-PRE: 96 %
ERV-PRE: 1.17 L
ERV-PRED: 1.22 L
EXPTIME-PRE: 6.24 SEC
FEF2575-%PRED-PRE: 118 %
FEF2575-PRE: 4.25 L/SEC
FEF2575-PRED: 3.58 L/SEC
FEFMAX-%PRED-PRE: 72 %
FEFMAX-PRE: 7.04 L/SEC
FEFMAX-PRED: 9.65 L/SEC
FEV1-%PRED-PRE: 84 %
FEV1-PRE: 3.07 L
FEV1FEV6-PRE: 86 %
FEV1FEV6-PRED: 81 %
FEV1FVC-PRE: 86 %
FEV1FVC-PRED: 81 %
FEV1SVC-PRE: 86 %
FEV1SVC-PRED: 72 %
FIFMAX-PRE: 3.95 L/SEC
FRCPLETH-%PRED-PRE: 88 %
FRCPLETH-PRE: 2.95 L
FRCPLETH-PRED: 3.34 L
FVC-%PRED-PRE: 80 %
FVC-PRE: 3.57 L
FVC-PRED: 4.45 L
IC-%PRED-PRE: 63 %
IC-PRE: 2.41 L
IC-PRED: 3.81 L
RATE PRESSURE PRODUCT: NORMAL
RVPLETH-%PRED-PRE: 89 %
RVPLETH-PRE: 1.78 L
RVPLETH-PRED: 1.98 L
STRESS ECHO BASELINE DIASTOLIC HE: 101
STRESS ECHO BASELINE HR: 92
STRESS ECHO BASELINE SYSTOLIC BP: 175
STRESS ECHO CALCULATED PERCENT HR: 58 %
STRESS ECHO LAST STRESS DIASTOLIC BP: 65
STRESS ECHO LAST STRESS SYSTOLIC BP: 129
STRESS ECHO TARGET HR: 177
THROMBIN TIME: 16.4 SEC (ref 13–19)
TLCPLETH-%PRED-PRE: 79 %
TLCPLETH-PRE: 5.35 L
TLCPLETH-PRED: 6.72 L
VA-%PRED-PRE: 67 %
VA-PRE: 4.22 L
VC-%PRED-PRE: 71 %
VC-PRE: 3.58 L
VC-PRED: 5.03 L

## 2020-10-06 ENCOUNTER — CARE COORDINATION (OUTPATIENT)
Dept: SURGERY | Facility: CLINIC | Age: 45
End: 2020-10-06

## 2020-10-06 LAB
BLD GP AB SCN TITR SERPL: NORMAL {TITER}
GAMMA INTERFERON BACKGROUND BLD IA-ACNC: 0.02 IU/ML
GAMMA INTERFERON BACKGROUND BLD IA-ACNC: NORMAL IU/ML
LA PPP-IMP: NEGATIVE
M TB IFN-G BLD-IMP: NORMAL
M TB IFN-G CD4+ BCKGRND COR BLD-ACNC: 9.98 IU/ML
M TB IFN-G CD4+ BCKGRND COR BLD-ACNC: NORMAL IU/ML
M TB TUBERC IFN-G BLD QL: NEGATIVE
MITOGEN IGNF BCKGRD COR BLD-ACNC: 0.01 IU/ML
MITOGEN IGNF BCKGRD COR BLD-ACNC: 0.02 IU/ML
MITOGEN IGNF BCKGRD COR BLD-ACNC: NORMAL IU/ML
MITOGEN IGNF BCKGRD COR BLD-ACNC: NORMAL IU/ML

## 2020-10-07 ENCOUNTER — ANESTHESIA (OUTPATIENT)
Dept: SURGERY | Facility: CLINIC | Age: 45
End: 2020-10-07
Payer: MEDICARE

## 2020-10-07 ENCOUNTER — TELEPHONE (OUTPATIENT)
Dept: CARDIOLOGY | Facility: CLINIC | Age: 45
End: 2020-10-07

## 2020-10-07 ENCOUNTER — HOSPITAL ENCOUNTER (OUTPATIENT)
Facility: CLINIC | Age: 45
Discharge: HOME | End: 2020-10-07
Attending: SURGERY | Admitting: SURGERY
Payer: MEDICARE

## 2020-10-07 ENCOUNTER — ANCILLARY PROCEDURE (OUTPATIENT)
Dept: ULTRASOUND IMAGING | Facility: CLINIC | Age: 45
End: 2020-10-07
Payer: MEDICARE

## 2020-10-07 VITALS
OXYGEN SATURATION: 95 % | BODY MASS INDEX: 28.1 KG/M2 | TEMPERATURE: 97.9 F | SYSTOLIC BLOOD PRESSURE: 136 MMHG | HEIGHT: 68 IN | WEIGHT: 185.41 LBS | RESPIRATION RATE: 15 BRPM | HEART RATE: 77 BPM | DIASTOLIC BLOOD PRESSURE: 82 MMHG

## 2020-10-07 DIAGNOSIS — N18.6 ENCOUNTER REGARDING VASCULAR ACCESS FOR DIALYSIS FOR ESRD (H): ICD-10-CM

## 2020-10-07 DIAGNOSIS — Z99.2 ESRD ON DIALYSIS (H): ICD-10-CM

## 2020-10-07 DIAGNOSIS — Z99.2 ENCOUNTER REGARDING VASCULAR ACCESS FOR DIALYSIS FOR ESRD (H): ICD-10-CM

## 2020-10-07 DIAGNOSIS — N18.6 ESRD (END STAGE RENAL DISEASE) (H): ICD-10-CM

## 2020-10-07 DIAGNOSIS — N18.6 ESRD (END STAGE RENAL DISEASE) ON DIALYSIS (H): Primary | ICD-10-CM

## 2020-10-07 DIAGNOSIS — Z45.2 ENCOUNTER FOR ADJUSTMENT AND MANAGEMENT OF VASCULAR ACCESS DEVICE: ICD-10-CM

## 2020-10-07 DIAGNOSIS — N18.6 ESRD ON DIALYSIS (H): ICD-10-CM

## 2020-10-07 DIAGNOSIS — Z99.2 ESRD (END STAGE RENAL DISEASE) ON DIALYSIS (H): Primary | ICD-10-CM

## 2020-10-07 LAB
ABO + RH BLD: NORMAL
ABO + RH BLD: NORMAL
APTT PPP: 30 SEC (ref 22–37)
BASOPHILS # BLD AUTO: 0.1 10E9/L (ref 0–0.2)
BASOPHILS NFR BLD AUTO: 0.9 %
BLD GP AB SCN SERPL QL: NORMAL
BLOOD BANK CMNT PATIENT-IMP: NORMAL
CREAT SERPL-MCNC: 8.34 MG/DL (ref 0.66–1.25)
DIFFERENTIAL METHOD BLD: ABNORMAL
EOSINOPHIL # BLD AUTO: 0.2 10E9/L (ref 0–0.7)
EOSINOPHIL NFR BLD AUTO: 2.7 %
ERYTHROCYTE [DISTWIDTH] IN BLOOD BY AUTOMATED COUNT: 13.2 % (ref 10–15)
GFR SERPL CREATININE-BSD FRML MDRD: 7 ML/MIN/{1.73_M2}
GLUCOSE BLDC GLUCOMTR-MCNC: 182 MG/DL (ref 70–99)
GLUCOSE BLDC GLUCOMTR-MCNC: 205 MG/DL (ref 70–99)
GLUCOSE BLDC GLUCOMTR-MCNC: 218 MG/DL (ref 70–99)
GLUCOSE SERPL-MCNC: 218 MG/DL (ref 70–99)
HBA1C MFR BLD: 7 % (ref 0–5.6)
HCT VFR BLD AUTO: 31.7 % (ref 40–53)
HGB BLD-MCNC: 10.4 G/DL (ref 13.3–17.7)
IMM GRANULOCYTES # BLD: 0.1 10E9/L (ref 0–0.4)
IMM GRANULOCYTES NFR BLD: 0.9 %
INR PPP: 1.1 (ref 0.86–1.14)
LYMPHOCYTES # BLD AUTO: 1.2 10E9/L (ref 0.8–5.3)
LYMPHOCYTES NFR BLD AUTO: 21 %
MCH RBC QN AUTO: 30.2 PG (ref 26.5–33)
MCHC RBC AUTO-ENTMCNC: 32.8 G/DL (ref 31.5–36.5)
MCV RBC AUTO: 92 FL (ref 78–100)
MONOCYTES # BLD AUTO: 0.5 10E9/L (ref 0–1.3)
MONOCYTES NFR BLD AUTO: 9.9 %
NEUTROPHILS # BLD AUTO: 3.5 10E9/L (ref 1.6–8.3)
NEUTROPHILS NFR BLD AUTO: 64.6 %
NRBC # BLD AUTO: 0 10*3/UL
NRBC BLD AUTO-RTO: 0 /100
NT-PROBNP SERPL-MCNC: ABNORMAL PG/ML (ref 0–450)
PLATELET # BLD AUTO: 289 10E9/L (ref 150–450)
POTASSIUM SERPL-SCNC: 4.7 MMOL/L (ref 3.4–5.3)
RBC # BLD AUTO: 3.44 10E12/L (ref 4.4–5.9)
SPECIMEN EXP DATE BLD: NORMAL
TROPONIN I SERPL-MCNC: 0.03 UG/L (ref 0–0.04)
WBC # BLD AUTO: 5.5 10E9/L (ref 4–11)

## 2020-10-07 PROCEDURE — 93005 ELECTROCARDIOGRAM TRACING: CPT

## 2020-10-07 PROCEDURE — 272N000001 HC OR GENERAL SUPPLY STERILE: Performed by: SURGERY

## 2020-10-07 PROCEDURE — 250N000011 HC RX IP 250 OP 636: Performed by: NURSE ANESTHETIST, CERTIFIED REGISTERED

## 2020-10-07 PROCEDURE — 370N000002 HC ANESTHESIA TECHNICAL FEE, EACH ADDTL 15 MIN: Performed by: SURGERY

## 2020-10-07 PROCEDURE — 86901 BLOOD TYPING SEROLOGIC RH(D): CPT | Performed by: ANESTHESIOLOGY

## 2020-10-07 PROCEDURE — 250N000011 HC RX IP 250 OP 636: Performed by: ANESTHESIOLOGY

## 2020-10-07 PROCEDURE — 250N000011 HC RX IP 250 OP 636: Performed by: CLINICAL NURSE SPECIALIST

## 2020-10-07 PROCEDURE — 258N000003 HC RX IP 258 OP 636: Performed by: NURSE ANESTHETIST, CERTIFIED REGISTERED

## 2020-10-07 PROCEDURE — 85025 COMPLETE CBC W/AUTO DIFF WBC: CPT | Performed by: CLINICAL NURSE SPECIALIST

## 2020-10-07 PROCEDURE — 85730 THROMBOPLASTIN TIME PARTIAL: CPT | Performed by: CLINICAL NURSE SPECIALIST

## 2020-10-07 PROCEDURE — 83880 ASSAY OF NATRIURETIC PEPTIDE: CPT | Mod: GZ | Performed by: ANESTHESIOLOGY

## 2020-10-07 PROCEDURE — 250N000011 HC RX IP 250 OP 636: Performed by: SURGERY

## 2020-10-07 PROCEDURE — 86900 BLOOD TYPING SEROLOGIC ABO: CPT | Performed by: ANESTHESIOLOGY

## 2020-10-07 PROCEDURE — 36830 ARTERY-VEIN NONAUTOGRAFT: CPT | Mod: GC | Performed by: SURGERY

## 2020-10-07 PROCEDURE — 36415 COLL VENOUS BLD VENIPUNCTURE: CPT | Performed by: ANESTHESIOLOGY

## 2020-10-07 PROCEDURE — 250N000011 HC RX IP 250 OP 636: Performed by: STUDENT IN AN ORGANIZED HEALTH CARE EDUCATION/TRAINING PROGRAM

## 2020-10-07 PROCEDURE — 761N000003 HC RECOVERY PHASE 1 LEVEL 2 FIRST HR: Performed by: SURGERY

## 2020-10-07 PROCEDURE — 85610 PROTHROMBIN TIME: CPT | Performed by: CLINICAL NURSE SPECIALIST

## 2020-10-07 PROCEDURE — 999N000139 HC STATISTIC PRE-PROCEDURE ASSESSMENT II: Performed by: SURGERY

## 2020-10-07 PROCEDURE — 84484 ASSAY OF TROPONIN QUANT: CPT | Performed by: ANESTHESIOLOGY

## 2020-10-07 PROCEDURE — 82565 ASSAY OF CREATININE: CPT | Performed by: ANESTHESIOLOGY

## 2020-10-07 PROCEDURE — 36415 COLL VENOUS BLD VENIPUNCTURE: CPT | Performed by: CLINICAL NURSE SPECIALIST

## 2020-10-07 PROCEDURE — 360N000022 HC SURGERY LEVEL 3 1ST 30 MIN - UMMC: Performed by: SURGERY

## 2020-10-07 PROCEDURE — 250N000009 HC RX 250: Performed by: STUDENT IN AN ORGANIZED HEALTH CARE EDUCATION/TRAINING PROGRAM

## 2020-10-07 PROCEDURE — 999N001017 HC STATISTIC GLUCOSE BY METER IP

## 2020-10-07 PROCEDURE — 83036 HEMOGLOBIN GLYCOSYLATED A1C: CPT | Performed by: CLINICAL NURSE SPECIALIST

## 2020-10-07 PROCEDURE — 761N000007 HC RECOVERY PHASE 2 EACH 15 MINS: Performed by: SURGERY

## 2020-10-07 PROCEDURE — 250N000002 HC ISOFLURANE, EA 15 MIN: Performed by: SURGERY

## 2020-10-07 PROCEDURE — 250N000009 HC RX 250: Performed by: NURSE ANESTHETIST, CERTIFIED REGISTERED

## 2020-10-07 PROCEDURE — 999N000054 HC STATISTIC EKG NON-CHARGEABLE

## 2020-10-07 PROCEDURE — 258N000003 HC RX IP 258 OP 636: Performed by: SURGERY

## 2020-10-07 PROCEDURE — C1763 CONN TISS, NON-HUMAN: HCPCS | Performed by: SURGERY

## 2020-10-07 PROCEDURE — 370N000001 HC ANESTHESIA TECHNICAL FEE, 1ST 30 MIN: Performed by: SURGERY

## 2020-10-07 PROCEDURE — 86850 RBC ANTIBODY SCREEN: CPT | Performed by: ANESTHESIOLOGY

## 2020-10-07 PROCEDURE — 84132 ASSAY OF SERUM POTASSIUM: CPT | Performed by: ANESTHESIOLOGY

## 2020-10-07 PROCEDURE — 82947 ASSAY GLUCOSE BLOOD QUANT: CPT | Performed by: ANESTHESIOLOGY

## 2020-10-07 PROCEDURE — 250N000013 HC RX MED GY IP 250 OP 250 PS 637: Mod: GY | Performed by: ANESTHESIOLOGY

## 2020-10-07 PROCEDURE — 360N000023 HC SURGERY LEVEL 3 EA 15 ADDTL MIN UMMC: Performed by: SURGERY

## 2020-10-07 PROCEDURE — 93010 ELECTROCARDIOGRAM REPORT: CPT | Mod: 59 | Performed by: INTERNAL MEDICINE

## 2020-10-07 DEVICE — GRAFT ARTERY BOVINE CAROTID ARTEGRAFT 7MMX40CM AG840: Type: IMPLANTABLE DEVICE | Site: ARM | Status: FUNCTIONAL

## 2020-10-07 RX ORDER — CLOPIDOGREL BISULFATE 75 MG/1
75 TABLET ORAL DAILY
Qty: 30 TABLET | Refills: 0 | Status: ON HOLD | OUTPATIENT
Start: 2020-10-07 | End: 2020-12-06

## 2020-10-07 RX ORDER — OXYCODONE HYDROCHLORIDE 5 MG/1
5 TABLET ORAL EVERY 6 HOURS PRN
Qty: 18 TABLET | Refills: 0 | Status: ON HOLD | OUTPATIENT
Start: 2020-10-07 | End: 2020-12-15

## 2020-10-07 RX ORDER — CEFAZOLIN SODIUM 2 G/100ML
2 INJECTION, SOLUTION INTRAVENOUS
Status: COMPLETED | OUTPATIENT
Start: 2020-10-07 | End: 2020-10-07

## 2020-10-07 RX ORDER — NALOXONE HYDROCHLORIDE 0.4 MG/ML
.1-.4 INJECTION, SOLUTION INTRAMUSCULAR; INTRAVENOUS; SUBCUTANEOUS
Status: DISCONTINUED | OUTPATIENT
Start: 2020-10-07 | End: 2020-10-07 | Stop reason: HOSPADM

## 2020-10-07 RX ORDER — FENTANYL CITRATE 50 UG/ML
25-50 INJECTION, SOLUTION INTRAMUSCULAR; INTRAVENOUS
Status: DISCONTINUED | OUTPATIENT
Start: 2020-10-07 | End: 2020-10-07 | Stop reason: HOSPADM

## 2020-10-07 RX ORDER — HEPARIN SODIUM 1000 [USP'U]/ML
INJECTION, SOLUTION INTRAVENOUS; SUBCUTANEOUS PRN
Status: DISCONTINUED | OUTPATIENT
Start: 2020-10-07 | End: 2020-10-07

## 2020-10-07 RX ORDER — LIDOCAINE 40 MG/G
CREAM TOPICAL
Status: DISCONTINUED | OUTPATIENT
Start: 2020-10-07 | End: 2020-10-07 | Stop reason: HOSPADM

## 2020-10-07 RX ORDER — ONDANSETRON 4 MG/1
4 TABLET, ORALLY DISINTEGRATING ORAL EVERY 30 MIN PRN
Status: DISCONTINUED | OUTPATIENT
Start: 2020-10-07 | End: 2020-10-07 | Stop reason: HOSPADM

## 2020-10-07 RX ORDER — OXYCODONE HYDROCHLORIDE 5 MG/1
5 TABLET ORAL EVERY 4 HOURS PRN
Status: DISCONTINUED | OUTPATIENT
Start: 2020-10-07 | End: 2020-10-07 | Stop reason: HOSPADM

## 2020-10-07 RX ORDER — PROPOFOL 10 MG/ML
INJECTION, EMULSION INTRAVENOUS CONTINUOUS PRN
Status: DISCONTINUED | OUTPATIENT
Start: 2020-10-07 | End: 2020-10-07

## 2020-10-07 RX ORDER — HYDROMORPHONE HYDROCHLORIDE 1 MG/ML
.3-.5 INJECTION, SOLUTION INTRAMUSCULAR; INTRAVENOUS; SUBCUTANEOUS EVERY 10 MIN PRN
Status: DISCONTINUED | OUTPATIENT
Start: 2020-10-07 | End: 2020-10-07 | Stop reason: HOSPADM

## 2020-10-07 RX ORDER — ACETAMINOPHEN 325 MG/1
650 TABLET ORAL
Status: DISCONTINUED | OUTPATIENT
Start: 2020-10-07 | End: 2020-10-07 | Stop reason: HOSPADM

## 2020-10-07 RX ORDER — ONDANSETRON 2 MG/ML
4 INJECTION INTRAMUSCULAR; INTRAVENOUS EVERY 30 MIN PRN
Status: DISCONTINUED | OUTPATIENT
Start: 2020-10-07 | End: 2020-10-07 | Stop reason: HOSPADM

## 2020-10-07 RX ORDER — ACETAMINOPHEN 325 MG/1
975 TABLET ORAL ONCE
Status: COMPLETED | OUTPATIENT
Start: 2020-10-07 | End: 2020-10-07

## 2020-10-07 RX ORDER — BUPIVACAINE HYDROCHLORIDE 5 MG/ML
INJECTION, SOLUTION EPIDURAL; INTRACAUDAL PRN
Status: DISCONTINUED | OUTPATIENT
Start: 2020-10-07 | End: 2020-10-07

## 2020-10-07 RX ORDER — ONDANSETRON 4 MG/1
4 TABLET, ORALLY DISINTEGRATING ORAL
Status: DISCONTINUED | OUTPATIENT
Start: 2020-10-07 | End: 2020-10-07 | Stop reason: HOSPADM

## 2020-10-07 RX ORDER — PROPOFOL 10 MG/ML
INJECTION, EMULSION INTRAVENOUS PRN
Status: DISCONTINUED | OUTPATIENT
Start: 2020-10-07 | End: 2020-10-07

## 2020-10-07 RX ORDER — SODIUM CHLORIDE 9 MG/ML
INJECTION, SOLUTION INTRAVENOUS CONTINUOUS
Status: DISCONTINUED | OUTPATIENT
Start: 2020-10-07 | End: 2020-10-07 | Stop reason: HOSPADM

## 2020-10-07 RX ORDER — AMOXICILLIN 250 MG
1-2 CAPSULE ORAL 2 TIMES DAILY
Qty: 30 TABLET | Refills: 0 | Status: ON HOLD | OUTPATIENT
Start: 2020-10-07 | End: 2020-12-06

## 2020-10-07 RX ORDER — MEPERIDINE HYDROCHLORIDE 25 MG/ML
12.5 INJECTION INTRAMUSCULAR; INTRAVENOUS; SUBCUTANEOUS
Status: DISCONTINUED | OUTPATIENT
Start: 2020-10-07 | End: 2020-10-07 | Stop reason: HOSPADM

## 2020-10-07 RX ORDER — FLUMAZENIL 0.1 MG/ML
0.2 INJECTION, SOLUTION INTRAVENOUS
Status: DISCONTINUED | OUTPATIENT
Start: 2020-10-07 | End: 2020-10-07 | Stop reason: HOSPADM

## 2020-10-07 RX ORDER — SODIUM CHLORIDE 9 MG/ML
INJECTION, SOLUTION INTRAVENOUS CONTINUOUS PRN
Status: DISCONTINUED | OUTPATIENT
Start: 2020-10-07 | End: 2020-10-07

## 2020-10-07 RX ORDER — OXYCODONE HYDROCHLORIDE 5 MG/1
5 TABLET ORAL
Status: DISCONTINUED | OUTPATIENT
Start: 2020-10-07 | End: 2020-10-07 | Stop reason: HOSPADM

## 2020-10-07 RX ORDER — HYDRALAZINE HYDROCHLORIDE 20 MG/ML
2.5-5 INJECTION INTRAMUSCULAR; INTRAVENOUS EVERY 10 MIN PRN
Status: DISCONTINUED | OUTPATIENT
Start: 2020-10-07 | End: 2020-10-07 | Stop reason: HOSPADM

## 2020-10-07 RX ORDER — SODIUM CHLORIDE, SODIUM LACTATE, POTASSIUM CHLORIDE, CALCIUM CHLORIDE 600; 310; 30; 20 MG/100ML; MG/100ML; MG/100ML; MG/100ML
INJECTION, SOLUTION INTRAVENOUS CONTINUOUS
Status: DISCONTINUED | OUTPATIENT
Start: 2020-10-07 | End: 2020-10-07 | Stop reason: HOSPADM

## 2020-10-07 RX ORDER — LIDOCAINE HYDROCHLORIDE 20 MG/ML
INJECTION, SOLUTION INFILTRATION; PERINEURAL PRN
Status: DISCONTINUED | OUTPATIENT
Start: 2020-10-07 | End: 2020-10-07

## 2020-10-07 RX ORDER — LABETALOL 20 MG/4 ML (5 MG/ML) INTRAVENOUS SYRINGE
10
Status: DISCONTINUED | OUTPATIENT
Start: 2020-10-07 | End: 2020-10-07 | Stop reason: HOSPADM

## 2020-10-07 RX ORDER — FENTANYL CITRATE 50 UG/ML
INJECTION, SOLUTION INTRAMUSCULAR; INTRAVENOUS PRN
Status: DISCONTINUED | OUTPATIENT
Start: 2020-10-07 | End: 2020-10-07

## 2020-10-07 RX ORDER — ONDANSETRON 2 MG/ML
INJECTION INTRAMUSCULAR; INTRAVENOUS PRN
Status: DISCONTINUED | OUTPATIENT
Start: 2020-10-07 | End: 2020-10-07

## 2020-10-07 RX ORDER — ALBUTEROL SULFATE 0.83 MG/ML
2.5 SOLUTION RESPIRATORY (INHALATION) EVERY 4 HOURS PRN
Status: DISCONTINUED | OUTPATIENT
Start: 2020-10-07 | End: 2020-10-07 | Stop reason: HOSPADM

## 2020-10-07 RX ORDER — ACETAMINOPHEN 325 MG/1
650 TABLET ORAL EVERY 4 HOURS PRN
Qty: 50 TABLET | Refills: 0 | Status: ON HOLD | OUTPATIENT
Start: 2020-10-07 | End: 2021-03-01

## 2020-10-07 RX ORDER — EPHEDRINE SULFATE 50 MG/ML
INJECTION, SOLUTION INTRAMUSCULAR; INTRAVENOUS; SUBCUTANEOUS PRN
Status: DISCONTINUED | OUTPATIENT
Start: 2020-10-07 | End: 2020-10-07

## 2020-10-07 RX ORDER — KETAMINE HYDROCHLORIDE 10 MG/ML
INJECTION INTRAMUSCULAR; INTRAVENOUS PRN
Status: DISCONTINUED | OUTPATIENT
Start: 2020-10-07 | End: 2020-10-07

## 2020-10-07 RX ADMIN — SODIUM CHLORIDE: 900 INJECTION INTRAVENOUS at 16:33

## 2020-10-07 RX ADMIN — PHENYLEPHRINE HYDROCHLORIDE 100 MCG: 10 INJECTION INTRAVENOUS at 15:40

## 2020-10-07 RX ADMIN — ONDANSETRON 4 MG: 2 INJECTION INTRAMUSCULAR; INTRAVENOUS at 16:26

## 2020-10-07 RX ADMIN — SODIUM CHLORIDE: 900 INJECTION INTRAVENOUS at 13:45

## 2020-10-07 RX ADMIN — SUGAMMADEX 200 MG: 100 INJECTION, SOLUTION INTRAVENOUS at 16:33

## 2020-10-07 RX ADMIN — FENTANYL CITRATE 50 MCG: 50 INJECTION, SOLUTION INTRAMUSCULAR; INTRAVENOUS at 14:45

## 2020-10-07 RX ADMIN — FENTANYL CITRATE 50 MCG: 50 INJECTION, SOLUTION INTRAMUSCULAR; INTRAVENOUS at 11:56

## 2020-10-07 RX ADMIN — Medication 20 MG: at 14:30

## 2020-10-07 RX ADMIN — LIDOCAINE HYDROCHLORIDE 60 MG: 20 INJECTION, SOLUTION INFILTRATION; PERINEURAL at 14:45

## 2020-10-07 RX ADMIN — MIDAZOLAM 1 MG: 1 INJECTION INTRAMUSCULAR; INTRAVENOUS at 11:56

## 2020-10-07 RX ADMIN — ROCURONIUM BROMIDE 10 MG: 10 INJECTION INTRAVENOUS at 14:45

## 2020-10-07 RX ADMIN — ROCURONIUM BROMIDE 30 MG: 10 INJECTION INTRAVENOUS at 15:38

## 2020-10-07 RX ADMIN — PROPOFOL 50 MG: 10 INJECTION, EMULSION INTRAVENOUS at 14:45

## 2020-10-07 RX ADMIN — Medication 10 MG: at 14:58

## 2020-10-07 RX ADMIN — MIDAZOLAM 1 MG: 1 INJECTION INTRAMUSCULAR; INTRAVENOUS at 13:53

## 2020-10-07 RX ADMIN — PROPOFOL 100 MCG/KG/MIN: 10 INJECTION, EMULSION INTRAVENOUS at 14:00

## 2020-10-07 RX ADMIN — BUPIVACAINE HYDROCHLORIDE 30 ML: 5 INJECTION, SOLUTION EPIDURAL; INTRACAUDAL at 12:00

## 2020-10-07 RX ADMIN — ONDANSETRON 4 MG: 2 INJECTION INTRAMUSCULAR; INTRAVENOUS at 17:33

## 2020-10-07 RX ADMIN — ACETAMINOPHEN 975 MG: 325 TABLET, FILM COATED ORAL at 17:43

## 2020-10-07 RX ADMIN — PROCHLORPERAZINE EDISYLATE 5 MG: 5 INJECTION INTRAMUSCULAR; INTRAVENOUS at 18:23

## 2020-10-07 RX ADMIN — FENTANYL CITRATE 50 MCG: 50 INJECTION, SOLUTION INTRAMUSCULAR; INTRAVENOUS at 15:29

## 2020-10-07 RX ADMIN — CEFAZOLIN 2 G: 10 INJECTION, POWDER, FOR SOLUTION INTRAVENOUS at 14:05

## 2020-10-07 RX ADMIN — HEPARIN SODIUM 2000 UNITS: 1000 INJECTION INTRAVENOUS; SUBCUTANEOUS at 16:01

## 2020-10-07 ASSESSMENT — MIFFLIN-ST. JEOR: SCORE: 1710.5

## 2020-10-07 NOTE — PROGRESS NOTES
B/P: 154/104, T: 97.7, P: 88, R: 13  Pt tolerated regional block at bedside. Will continue to monitor.

## 2020-10-07 NOTE — BRIEF OP NOTE
[facility]     Brief Operative Note    Pre-operative diagnosis: ESRD (end stage renal disease) (H) [N18.6]  Encounter regarding vascular access for dialysis for ESRD (H) [N18.6, Z99.2]  Post-operative diagnosis Same as pre-operative diagnosis    Procedure: Procedure(s):  CREATION, ARTERIOVENOUS GRAFT placement LEFT UPPER EXTREMITY   with intraoperative ultrasound  Surgeon: Surgeon(s) and Role:     * Melissa Guzman MD - Primary     * Den Cancino MD - Resident - Assisting     * Misti Lopez MD - Fellow - Assisting  Anesthesia: Combined General with Block   Estimated blood loss: Less than 10 ml  Drains: None  Specimens: * No specimens in log *  Findings:   flow of 504cc/min.  Complications: None.  Implants:   Implant Name Type Inv. Item Serial No.  Lot No. LRB No. Used Action   GRAFT ARTERY BOVINE CAROTID ARTEGRAFT [id number]  Bone/Tissue/Biologic GRAFT ARTERY BOVINE CAROTID ARTEGRAFT [id number]   [facility] [place]  [id number] Left 1 Implanted

## 2020-10-07 NOTE — PROGRESS NOTES
Mr. Tata Fitzgerald was seen in the PAC clinic on 9.30.20.  He had a Lexiscan NM stress test ordered by Dr. Vogel, patient's cardiologist.  The test was done on Friday, 10/2/20.  The Lexiscan stress test was mildly positive with a summed stress score of 4. I communicated with Dr. Vogel on Monday, 10/5/20, of the results and his recommendations.  He ordered a coronary angiogram and recommended to hold off on elective procedures for now.  Messaged Dr. Guzman.

## 2020-10-07 NOTE — DISCHARGE INSTRUCTIONS
[facility] [place]. Same-Day Surgery Adult Discharge Orders & Instructions     For 24 hours after surgery    1. Get plenty of rest.  A responsible adult must stay with you for at least 24 hours after you leave the hospital.   2. Do not drive or use heavy equipment.  If you have weakness or tingling, don't drive or use heavy equipment until this feeling goes away.  3. Do not drink alcohol.  4. Avoid strenuous or risky activities.  Ask for help when climbing stairs.   5. You may feel lightheaded.  IF so, sit for a few minutes before standing.  Have someone help you get up.   6. If you have nausea (feel sick to your stomach): Drink only clear liquids such as apple juice, ginger ale, broth or 7-Up.  Rest may also help.  Be sure to drink enough fluids.  Move to a regular diet as you feel able.  7. You may have a slight fever. Call the doctor if your fever is over 100 F (37.7 C) (taken under the tongue) or lasts longer than 24 hours.  8. You may have a dry mouth, a sore throat, muscle aches or trouble sleeping.  These should go away after 24 hours.  9. Do not make important or legal decisions.   Call your doctor for any of the followin.  Signs of infection (fever, growing tenderness at the surgery site, a large amount of drainage or bleeding, severe pain, foul-smelling drainage, redness, swelling).    2. It has been over 8 to 10 hours since surgery and you are still not able to urinate (pass water).    3.  Headache for over 24 hours.    4.  Numbness, tingling or weakness the day after surgery (if you had spinal anesthesia).  To contact a doctor, call Dr Guzman's office at [phone] or:        [phone] and ask for the resident on call for the kidney transplant team (answered 24 hours a day)      Emergency Department: [facility]: [phone]       (TTY for hearing impaired: [phone])

## 2020-10-07 NOTE — TELEPHONE ENCOUNTER
Called and left  for Pt requesting a return call to clinic to discuss results.  Clinic telephone provided.    NM stress testing showed area of reduced blood flow.  Echo shows borderline reduced pump function.  Recommendation to proceed with CORS.    Carey Belle LPN

## 2020-10-07 NOTE — ANESTHESIA CARE TRANSFER NOTE
Patient: Murtaza Fitzgerald    Procedure(s):  CREATION, ARTERIOVENOUS GRAFT placement LEFT UPPER EXTREMITY   with intraoperative ultrasound    Diagnosis: ESRD (end stage renal disease) (H) [N18.6]  Encounter regarding vascular access for dialysis for ESRD (H) [N18.6, Z99.2]  Diagnosis Additional Information: No value filed.    Anesthesia Type:   MAC     Note:  Airway :Nasal Cannula  Patient transferred to:PACU  Comments: Awake, tolerated wellHandoff Report: Identifed the Patient, Identified the Reponsible Provider, Reviewed the pertinent medical history, Discussed the surgical course, Reviewed Intra-OP anesthesia mangement and issues during anesthesia, Set expectations for post-procedure period and Allowed opportunity for questions and acknowledgement of understanding      Vitals: (Last set prior to Anesthesia Care Transfer)    CRNA VITALS  10/7/2020 1620 - 10/7/2020 1650      10/7/2020             Pulse:  90    SpO2:  99 %                Electronically Signed By: JORDAN Tom CRNA  October 7, 2020  4:50 PM

## 2020-10-07 NOTE — ANESTHESIA POSTPROCEDURE EVALUATION
Anesthesia POST Procedure Evaluation    Patient: Murtaza Fitzgerald   MRN:     [id number] Gender:   male   Age:    45 year old :      1975        Preoperative Diagnosis: ESRD (end stage renal disease) (H) [N18.6]  Encounter regarding vascular access for dialysis for ESRD (H) [N18.6, Z99.2]   Procedure(s):  CREATION, ARTERIOVENOUS GRAFT placement LEFT UPPER EXTREMITY   with intraoperative ultrasound   Postop Comments: No value filed.     Anesthesia Type: MAC          Postop Pain Control: Uneventful            Sign Out: Well controlled pain   PONV: No   Neuro/Psych: Uneventful            Sign Out: Acceptable/Baseline neuro status   Airway/Respiratory: Uneventful            Sign Out: Acceptable/Baseline resp. status   CV/Hemodynamics: Uneventful            Sign Out: Acceptable CV status   Other NRE: NONE   DID A NON-ROUTINE EVENT OCCUR? No         Last Anesthesia Record Vitals:  CRNA VITALS  10/7/2020 1611 - 10/7/2020 1657      10/7/2020             EKG:  Sinus rhythm          Last PACU Vitals:  Vitals Value Taken Time   /79 10/07/20 1650   Temp     Pulse 80 10/07/20 1656   Resp     SpO2 98 % 10/07/20 1656   Temp src     NIBP     Pulse     SpO2     Resp     Temp     Ht Rate     Temp 2     Vitals shown include unvalidated device data.      Electronically Signed By: Ezequiel Diallo MD, 2020, 4:57 PM

## 2020-10-07 NOTE — ANESTHESIA PROCEDURE NOTES
Peripheral Nerve Block Procedure Note      Staff -   Anesthesiologist:  Rodney Talley MD  Resident/Fellow: Rome Goncalves DO  Performed By: resident  Procedure performed by resident/CRNA in presence of a teaching physician.    Location: Pre-op  Procedure Start/Stop TImes:      10/7/2020 11:55 AM     10/7/2020 12:03 PM    patient identified, IV checked, site marked, risks and benefits discussed, informed consent, monitors and equipment checked, pre-op evaluation, at physician/surgeon's request and post-op pain management      Correct Patient: Yes      Correct Position: Yes      Correct Site: Yes      Correct Procedure: Yes      Correct Laterality:  Yes    Site Marked:  Yes  Procedure details:     Procedure:  Supraclavicular    ASA:  4    Diagnosis:  Postoperative pain relief    Laterality:  Left    Position:  Supine    Sterile Prep: chloraprep, mask and sterile gloves      Local skin infiltration:  None    Needle:  Insulated    Needle gauge:  21    Needle length (mm):  110    Ultrasound: Yes      Ultrasound used to identify targeted nerve, plexus, or vascular structure and placed a needle adjacent to it      Permanent Image entered into patiient's record      Abnormal pain on injection: No      Blood Aspirated: No      Paresthesias:  No    Bleeding at site: No      Bolus via:  Needle    Infusion Method:  Single Shot    Complications:  None  Assessment/Narrative:     Injection made incrementally with aspirations every (mL):  5     Non complicated supraclavicular block

## 2020-10-08 LAB — INTERPRETATION ECG - MUSE: NORMAL

## 2020-10-08 NOTE — OR NURSING
Discharge instructions reviewed with patient and patient's sister Lali. All questions answered. Prescriptions picked up at pharmacy.

## 2020-10-12 LAB
PROTOCOL CUTOFF: NORMAL
SA1 CELL: NORMAL
SA1 COMMENTS: NORMAL
SA1 HI RISK ABY: NORMAL
SA1 MOD RISK ABY: NORMAL
SA1 TEST METHOD: NORMAL
SA2 CELL: NORMAL
SA2 COMMENTS: NORMAL
SA2 HI RISK ABY UA: NORMAL
SA2 MOD RISK ABY: NORMAL
SA2 TEST METHOD: NORMAL
UNACCEPTABLE ANTIGEN: NORMAL
UNOS CPRA: 98

## 2020-10-14 ENCOUNTER — TELEPHONE (OUTPATIENT)
Dept: TRANSPLANT | Facility: CLINIC | Age: 45
End: 2020-10-14

## 2020-10-15 ENCOUNTER — TELEPHONE (OUTPATIENT)
Dept: NEPHROLOGY | Facility: CLINIC | Age: 45
End: 2020-10-15

## 2020-10-15 NOTE — TELEPHONE ENCOUNTER
Called and left VM requesting a return call to clinic to discuss results.  Clinic telephone provided.    Carey Belle LPN

## 2020-10-15 NOTE — TELEPHONE ENCOUNTER
[facility] Call Center    Phone Message    May a detailed message be left on voicemail: yes     Reason for Call: [facility] called requesting a form they sent on Tuesday 10/13/20 be signed and faxed back. Please advise. Thank you.    Action Taken: Message routed to:  Adult Clinics: Nephrology p [id number]    Travel Screening: Not Applicable

## 2020-10-15 NOTE — TELEPHONE ENCOUNTER
"Received VM from Dr. Michelle Schneider regarding patient C/O post op pain in left arm after surgery. S/P left upper arm AV graft construction with  on 10/7/2020.  Writer called the patient regarding post op pain in left arm. He reports that he is still having pain in left arm and finished last Oxycodone yesterday. Writer recommended in terms of refill prescription for pain meds, would like to see him in clinic to assess his left arm sooner than F/U appt with this writer next week. Patient agrees to wait until next week appt. Writer also recommended to take Tylenol as needed for pain.  Writer also reminded him that cardiology team contacted him for coronary angiogram per [facility] note on 10/6/2020. He denies received any VM.  Writer will contact cardiology team to set appt. Writer provided contact phone number and instructed patient to call writer with any questions or concerns. Patient verbalized acceptance and understanding of plan.    Epic staff message sent to cardiology team  Carey Belle LPN Kao, Yeewan S, APRN CNS Hello Sum,     Yes, I will try and call him again.     Thank you,     Carey    Previous Messages    ----- Message -----   From: Adeel Fuller APRN CNS   Sent: 10/13/2020   3:55 PM CDT   To: KODY Perez,   This mutual patient is under care of Dr. Guzman for LUE AV graft surgery on 10/7/20 .   Per [facility] note on 10/6/20, Lela Rios ordered a coronary angiogram.   Per your note on 10/7/20   \"Called and left VM for Pt requesting a return call to clinic to discuss results.  Clinic telephone provided.   NM stress testing showed area of reduced blood flow.  Echo shows borderline reduced pump function.  Recommendation to proceed with CORS.\"     I spoke with the patient today, he reports that he did not receive VM from you. Would you call him again to schedule CORS.   Thanks,   Tolu Denis (Sum) JIN Baldwin   Dialysis Vascular Access/SOT Clinical Nurse " Specialist     Solid Organ Transplant Service - Critical access hospital   Phone # [phone]   Pager # [phone]

## 2020-10-16 NOTE — TELEPHONE ENCOUNTER
Dr. Magana will be available and in clinic on 10/19. Signature to be obtained at that time.     Randi Garcia RN, BSN  Nephrology Care Coordinator  [facility]

## 2020-10-23 ENCOUNTER — OFFICE VISIT (OUTPATIENT)
Dept: TRANSPLANT | Facility: CLINIC | Age: 45
End: 2020-10-23
Attending: CLINICAL NURSE SPECIALIST
Payer: MEDICARE

## 2020-10-23 VITALS
OXYGEN SATURATION: 99 % | BODY MASS INDEX: 29.63 KG/M2 | HEART RATE: 89 BPM | SYSTOLIC BLOOD PRESSURE: 173 MMHG | DIASTOLIC BLOOD PRESSURE: 90 MMHG | WEIGHT: 194.9 LBS | TEMPERATURE: 98.7 F

## 2020-10-23 DIAGNOSIS — Z48.89 ENCOUNTER FOR POST SURGICAL WOUND CHECK: ICD-10-CM

## 2020-10-23 DIAGNOSIS — Z09 FOLLOW-UP EXAMINATION AFTER VASCULAR SURGERY: ICD-10-CM

## 2020-10-23 DIAGNOSIS — Z99.2 ESRD ON DIALYSIS (H): Primary | ICD-10-CM

## 2020-10-23 DIAGNOSIS — N18.6 ESRD ON DIALYSIS (H): Primary | ICD-10-CM

## 2020-10-23 PROCEDURE — G0463 HOSPITAL OUTPT CLINIC VISIT: HCPCS

## 2020-10-23 PROCEDURE — 99024 POSTOP FOLLOW-UP VISIT: CPT | Mod: 95 | Performed by: CLINICAL NURSE SPECIALIST

## 2020-10-23 ASSESSMENT — PAIN SCALES - GENERAL: PAINLEVEL: NO PAIN (0)

## 2020-10-23 NOTE — PROGRESS NOTES
Dialysis Access Service   Progress Note    S:  Mr. Tata Fitzgerald is being seen today for surgical followup of his dialysis access.  He reports no issues with the wound, and  no steal syndrome of the distal extremity. C/O pain in left elbow crease around incision site and swelling in left arm. Denies pain the rest of left arm, numbness/tingling, or a change of color/temperature in left hand and fingers. S/P Creation of Left upper arm Arteriovenous brachial artery to axillary vein bovine graft on 10/7/2020.     O:  Temp:  [98.7  F (37.1  C)] 98.7  F (37.1  C)  Pulse:  [89] 89  BP: (173)/(90) 173/90  SpO2:  [99 %] 99 %  GENERAL: alert, cooperative  Circulation:   Radial pulse 3+  Ulnar pulse  3+   Capillary refill:  capillary refill < 2 sec    Sensory exam:   arm: Normal   [x]           Abnormal   []          Comment:    hand: Normal   [x]           Abnormal   []          Comment:   Motor exam:   arm: Normal   [x]           Abnormal   []          Comment:    hand: Normal   [x]           Abnormal   []          Comment:    Access: L upper extremity wound(s) healed non-tender with Derma Thompson intact. Mild venous hypertension, ++ thrill and bruit via hand held doppler present. Moderate to mild edema in left arm without apparent infection.    Assessment & Plan: Mr. Tata Fitzgerald's dialysis access has matured well at this time point.    1. Elevate left arm with support above heart level as tolerated  2. Check thrill twice a day  3. Continue no blood draw and BP from left arm  4. Follow up in clinic in 2 weeks    We would like to see the patient back in the clinic in 2 weeks time to assess progress. The patient was counselled to contact our nurse coordinator, JORDAN Gomez CNS (Sum) at [phone] with any questions or concerns.  Thank you for the opportunity to participate in Mr. Tata Fitzgerald's care.    TT: 20 min  CT: 15 min    JIN Osorio (Sum)  Dialysis Vascular Access/SOT Clinical Nurse Specialist    Solid  Organ Transplant Service - [facility]   Phone # [phone]  Pager # [phone]

## 2020-10-23 NOTE — LETTER
10/23/2020         RE: Murtaza Fitzgerald  [address]  [place] [address]      Dialysis Access Service   Progress Note    S:  Mr. Tata Fitzgerald is being seen today for surgical followup of his dialysis access.  He reports no issues with the wound, and  no steal syndrome of the distal extremity. C/O pain in left elbow crease around incision site and swelling in left arm. Denies pain the rest of left arm, numbness/tingling, or a change of color/temperature in left hand and fingers. S/P Creation of Left upper arm Arteriovenous brachial artery to axillary vein bovine graft on 10/7/2020.     O:  Temp:  [98.7  F (37.1  C)] 98.7  F (37.1  C)  Pulse:  [89] 89  BP: (173)/(90) 173/90  SpO2:  [99 %] 99 %  GENERAL: alert, cooperative  Circulation:   Radial pulse 3+  Ulnar pulse  3+   Capillary refill:  capillary refill < 2 sec    Sensory exam:   arm: Normal   [x]           Abnormal   []          Comment:    hand: Normal   [x]           Abnormal   []          Comment:   Motor exam:   arm: Normal   [x]           Abnormal   []          Comment:    hand: Normal   [x]           Abnormal   []          Comment:    Access: L upper extremity wound(s) healed non-tender with Derma Thompson intact. Mild venous hypertension, ++ thrill and bruit via hand held doppler present. Moderate to mild edema in left arm without apparent infection.    Assessment & Plan: Mr. Tata Sheikh dialysis access has matured well at this time point.    1. Elevate left arm with support above heart level as tolerated  2. Check thrill twice a day  3. Continue no blood draw and BP from left arm  4. Follow up in clinic in 2 weeks    We would like to see the patient back in the clinic in 2 weeks time to assess progress. The patient was counselled to contact our nurse coordinator, JORDAN Gomez (Sum), CNS at [phone] with any questions or concerns.  Thank you for the opportunity to participate in Mr. Tata Fitzgerald's care.    TT: 20 min  CT: 15  kevin Denis ([facility]) Jonny ORTEGA, CNS  Dialysis Vascular Access/SOT Clinical Nurse Specialist    Solid Organ Transplant Service - [facility]   Phone # [phone]  Pager # [phone]

## 2020-10-23 NOTE — NURSING NOTE
Chief Complaint   Patient presents with     RECHECK     Fistula f/u     Blood pressure (!) 173/90, pulse 89, temperature 98.7  F (37.1  C), temperature source Oral, weight 88.4 kg (194 lb 14.4 oz), SpO2 99 %.    Latisha Weems, CMA

## 2020-10-29 ENCOUNTER — TRANSFERRED RECORDS (OUTPATIENT)
Dept: HEALTH INFORMATION MANAGEMENT | Facility: CLINIC | Age: 45
End: 2020-10-29

## 2020-11-02 ENCOUNTER — TELEPHONE (OUTPATIENT)
Dept: TRANSPLANT | Facility: CLINIC | Age: 45
End: 2020-11-02

## 2020-11-02 DIAGNOSIS — I43 TYPE 1 DIABETES MELLITUS WITH DIABETIC CARDIOMYOPATHY (H): ICD-10-CM

## 2020-11-02 DIAGNOSIS — E10.59 TYPE 1 DIABETES MELLITUS WITH DIABETIC CARDIOMYOPATHY (H): ICD-10-CM

## 2020-11-02 NOTE — TELEPHONE ENCOUNTER
Patient Call: Transplant Illness  Per Tyrell is having gastro paresis reflux with emesis   Needed to cancel his appt today 11/02/2020 Fist[facility] Follow up     Duration of illness: 24 hours  Transplanted organ? Pre K/P eval   Illness: Vomiting greather than 24 hours

## 2020-11-02 NOTE — TELEPHONE ENCOUNTER
Signed forms have been faxed to [facility] @ [phone]. A copy has also been placed into scanning.      Jaime Garcia LPN    Rheumatology / Pulmonology  [facility]

## 2020-11-03 ENCOUNTER — HOSPITAL ENCOUNTER (EMERGENCY)
Facility: CLINIC | Age: 45
Discharge: HOME | End: 2020-11-03
Admitting: EMERGENCY MEDICINE
Payer: MEDICARE

## 2020-11-03 VITALS
TEMPERATURE: 98.8 F | DIASTOLIC BLOOD PRESSURE: 77 MMHG | BODY MASS INDEX: 28.79 KG/M2 | SYSTOLIC BLOOD PRESSURE: 145 MMHG | HEIGHT: 68 IN | OXYGEN SATURATION: 97 % | WEIGHT: 190 LBS | HEART RATE: 94 BPM

## 2020-11-03 LAB
ALBUMIN SERPL-MCNC: 3.3 G/DL (ref 3.4–5)
ALP SERPL-CCNC: 91 U/L (ref 40–150)
ALT SERPL W P-5'-P-CCNC: 17 U/L (ref 0–70)
ANION GAP SERPL CALCULATED.3IONS-SCNC: 9 MMOL/L (ref 3–14)
AST SERPL W P-5'-P-CCNC: 7 U/L (ref 0–45)
BASOPHILS # BLD AUTO: 0.1 10E9/L (ref 0–0.2)
BASOPHILS NFR BLD AUTO: 0.8 %
BILIRUB SERPL-MCNC: 0.4 MG/DL (ref 0.2–1.3)
BUN SERPL-MCNC: 28 MG/DL (ref 7–30)
CALCIUM SERPL-MCNC: 9.2 MG/DL (ref 8.5–10.1)
CHLORIDE SERPL-SCNC: 90 MMOL/L (ref 94–109)
CO2 SERPL-SCNC: 32 MMOL/L (ref 20–32)
CREAT SERPL-MCNC: 6.04 MG/DL (ref 0.66–1.25)
DIFFERENTIAL METHOD BLD: ABNORMAL
EOSINOPHIL # BLD AUTO: 0.1 10E9/L (ref 0–0.7)
EOSINOPHIL NFR BLD AUTO: 2 %
ERYTHROCYTE [DISTWIDTH] IN BLOOD BY AUTOMATED COUNT: 13.8 % (ref 10–15)
GFR SERPL CREATININE-BSD FRML MDRD: 10 ML/MIN/{1.73_M2}
GLUCOSE SERPL-MCNC: 292 MG/DL (ref 70–99)
HCT VFR BLD AUTO: 28.4 % (ref 40–53)
HGB BLD-MCNC: 9.3 G/DL (ref 13.3–17.7)
IMM GRANULOCYTES # BLD: 0.1 10E9/L (ref 0–0.4)
IMM GRANULOCYTES NFR BLD: 0.8 %
LIPASE SERPL-CCNC: 34 U/L (ref 73–393)
LYMPHOCYTES # BLD AUTO: 0.9 10E9/L (ref 0.8–5.3)
LYMPHOCYTES NFR BLD AUTO: 13.8 %
MCH RBC QN AUTO: 30.1 PG (ref 26.5–33)
MCHC RBC AUTO-ENTMCNC: 32.7 G/DL (ref 31.5–36.5)
MCV RBC AUTO: 92 FL (ref 78–100)
MONOCYTES # BLD AUTO: 0.4 10E9/L (ref 0–1.3)
MONOCYTES NFR BLD AUTO: 6.1 %
NEUTROPHILS # BLD AUTO: 4.9 10E9/L (ref 1.6–8.3)
NEUTROPHILS NFR BLD AUTO: 76.5 %
NRBC # BLD AUTO: 0 10*3/UL
NRBC BLD AUTO-RTO: 0 /100
PLATELET # BLD AUTO: 257 10E9/L (ref 150–450)
POTASSIUM SERPL-SCNC: 4.2 MMOL/L (ref 3.4–5.3)
PROT SERPL-MCNC: 7.6 G/DL (ref 6.8–8.8)
RBC # BLD AUTO: 3.09 10E12/L (ref 4.4–5.9)
SODIUM SERPL-SCNC: 131 MMOL/L (ref 133–144)
WBC # BLD AUTO: 6.4 10E9/L (ref 4–11)

## 2020-11-03 PROCEDURE — 96374 THER/PROPH/DIAG INJ IV PUSH: CPT

## 2020-11-03 PROCEDURE — 250N000011 HC RX IP 250 OP 636: Performed by: EMERGENCY MEDICINE

## 2020-11-03 PROCEDURE — 85025 COMPLETE CBC W/AUTO DIFF WBC: CPT | Performed by: EMERGENCY MEDICINE

## 2020-11-03 PROCEDURE — 80053 COMPREHEN METABOLIC PANEL: CPT | Performed by: EMERGENCY MEDICINE

## 2020-11-03 PROCEDURE — 999N000104 HC STATISTIC NO CHARGE

## 2020-11-03 PROCEDURE — 83690 ASSAY OF LIPASE: CPT | Performed by: EMERGENCY MEDICINE

## 2020-11-03 RX ORDER — ONDANSETRON 2 MG/ML
4 INJECTION INTRAMUSCULAR; INTRAVENOUS ONCE
Status: COMPLETED | OUTPATIENT
Start: 2020-11-03 | End: 2020-11-03

## 2020-11-03 RX ADMIN — ONDANSETRON 4 MG: 2 INJECTION INTRAMUSCULAR; INTRAVENOUS at 16:11

## 2020-11-03 ASSESSMENT — MIFFLIN-ST. JEOR: SCORE: 1731.33

## 2020-11-03 NOTE — ED PROVIDER NOTES
[place] EMERGENCY DEPARTMENT ([facility])  November 3, 2020  History     Chief Complaint   Patient presents with     Abdominal Pain     HPI  Murtaza Fitzgerald is a 45 year old male with a PMH of DM 1, HLD, S/P kidney transplant, mycotic aneurysm of kidney transplant, HTN, CAD S/P CABG G x3, immunosuppressed status, S/P pancreas transplant, and ESRD on dialysis who presents the ED today complaining of abdominal pain. ***    Patient was recently seen on 10/31/2020 at [facility] ED for gastroparesis due to diabetes.  She has been experiencing nausea and vomiting for the past 24 hours.  She usually tries to relieve this at home with oral Compazine and Zofran. IV line established and he was given a 500 cc bolus of normal saline, Compazine 25 mg IV and Zofran 8 mg IV.  Patient felt markedly improved and wished to be discharged home.    I have reviewed the Medications, Allergies, Past Medical and Surgical History, and Social History in the [facility] system.  PAST MEDICAL HISTORY:   Past Medical History:   Diagnosis Date     CMV (cytomegalovirus infection) status negative 2011     Coronary artery disease, non-occlusive 2008    angio showed diffuse disease with no lesions     Diabetes mellitus, type 1     Diagnosed at age 11. 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     Noncompliance with treatment     no labs for one year     Pancreas replaced by transplant (H) Feb 2009    rejection treated with thymo     Pancreatic disease     pancreas transplant     Tobacco abuse ongoing       PAST SURGICAL HISTORY:   Past Surgical History:   Procedure Laterality Date     ABDOMEN SURGERY       ARTHROSCOPY  "KNEE WITH LATERAL MENISCECTOMY Left 7/10/2019    Procedure: Left knee arthroscopic partial lateral meniscectomy;  Surgeon: Alex Candelaria MD;  Location: [facility] OR     BIOPSY      [facility]      BYPASS GRAFT ARTERY CORONARY N/A 2015    Procedure: BYPASS GRAFT ARTERY CORONARY;  Surgeon: Katy Neville MD;  Location: [facility] OR     CORONARY ANGIOGRAPHY ADULT ORDER      2015     CREATE FISTULA ARTERIOVENOUS UPPER EXTREMITY Left 10/7/2020    Procedure: CREATION, ARTERIOVENOUS GRAFT placement LEFT UPPER EXTREMITY   with intraoperative ultrasound;  Surgeon: Melissa Guzman MD;  Location: [facility] OR     ESOPHAGOSCOPY, GASTROSCOPY, DUODENOSCOPY (EGD), COMBINED N/A 2020    Procedure: ESOPHAGOGASTRODUODENOSCOPY (EGD) biopsies w/forceps;  Surgeon: Emre Gomes MD;  Location:  GI     EYE SURGERY      vitrectomy both eyes      IR CVC TUNNEL PLACEMENT > 5 YRS OF AGE  2020     ORTHOPEDIC SURGERY  1993    tumor removed from left knee     TRANSPLANT  .    pancrease and kidney transplant       Past medical history, past surgical history, medications, and allergies were reviewed with the patient. Additional pertinent items: {NONE:280171::\"None\"}    FAMILY HISTORY:   Family History   Problem Relation Age of Onset     Unknown/Adopted Father      Heart Disease Maternal Grandfather 62     Melanoma No family hx of      Skin Cancer No family hx of        SOCIAL HISTORY:   Social History     Tobacco Use     Smoking status: Former Smoker     Packs/day: 1.00     Years: 20.00     Pack years: 20.00     Types: Vaping Device, Cigarettes     Quit date: 2016     Years since quittin.8     Smokeless tobacco: Never Used     Tobacco comment: E- Cig use since    Substance Use Topics     Alcohol use: Not Currently     Alcohol/week: 0.0 standard drinks     Frequency: Never     Binge frequency: Never     Social history was reviewed with the patient. Additional pertinent items: {NONE:988492::\"None\"}      Patient's " Medications   New Prescriptions    No medications on file   Previous Medications    ACETAMINOPHEN (TYLENOL) 325 MG TABLET    Take 2 tablets (650 mg) by mouth every 4 hours as needed for mild pain    ACETONE, URINE, TEST STRP    1 strip by In Vitro route daily    BLOOD GLUCOSE (NO BRAND SPECIFIED) TEST STRIP    Use to test blood sugar 5 times daily or as directed.    CALCIUM CARBONATE (TUMS) 500 MG CHEWABLE TABLET    Take 1 chew tab by mouth 3 times daily    CLOPIDOGREL (PLAVIX) 75 MG TABLET    Take 1 tablet (75 mg) by mouth daily    INSULIN ASPART (NOVOLOG FLEXPEN) 100 UNIT/ML PEN    Sliding scale = 1 unit for every 20 over blood glucose of 140    INSULIN ASPART (NOVOLOG FLEXPEN) 100 UNIT/ML PEN    1 unit per 6 grams of carbs with each meal. About 45 units/day.    INSULIN PEN NEEDLE (BD ARPITA U/F) 32G X 4 MM MISCELLANEOUS    Use 4 daily or as directed.    LANTUS SOLOSTAR 100 UNIT/ML SOLN    INJECT 24 UNITS SUBCUTANEOUSLY AT BEDTIME    METOCLOPRAMIDE (REGLAN) 5 MG TABLET    Take 1 tablet (5 mg) by mouth 4 times daily (before meals and nightly)    NAPROXEN SODIUM 220 MG CAPSULE    Take 220 mg by mouth as needed (PT last dose 9.28.2020)    OMEPRAZOLE (PRILOSEC) 40 MG DR CAPSULE    Take 40 mg by mouth every evening    ONDANSETRON (ZOFRAN-ODT) 4 MG ODT TAB    Take 1 tablet (4 mg) by mouth every 8 hours as needed for nausea    OXYCODONE (ROXICODONE) 5 MG TABLET    Take 1 tablet (5 mg) by mouth every 6 hours as needed for moderate to severe pain    PROCHLORPERAZINE (COMPAZINE) 5 MG TABLET    Take 1 tablet (5 mg) by mouth every 8 hours as needed for nausea    SENNA-DOCUSATE (SENOKOT-S/PERICOLACE) 8.6-50 MG TABLET    Take 1-2 tablets by mouth 2 times daily    TACROLIMUS (GENERIC EQUIVALENT) 1 MG CAPSULE    Take 1 mg by mouth 2 times daily     TORSEMIDE (DEMADEX) 20 MG TABLET    Take 1 tablet (20 mg) by mouth 2 times daily May hold as needed on dialysis days.   Modified Medications    No medications on file   Discontinued  "Medications    No medications on file          Allergies   Allergen Reactions     Diphenhydramine Other (See Comments)     Restless legs     Metoclopramide      Other reaction(s): Confusion, Unknown     Reglan Other (See Comments)     IV, delsuions        Review of Systems  {Complete vs limited ROS:393443::\"A complete review of systems was performed with pertinent positives and negatives noted in the HPI, and all other systems negative.\"}    Physical Exam   BP: (!) 145/77  Pulse: 94  Temp: 98.8  F (37.1  C)  Height: 172.7 cm (5' 8\")  Weight: 86.2 kg (190 lb)  SpO2: 97 %      Physical Exam    ED Course        Procedures        {EKG done?:349578::\" \"}    {ed addendum:532767::\" \"}  {trauma activation or Fall?:189032::\" \"}  {Sepsis/Septic Shock/Stemi/Stroke:456878::\" \"}       Results for orders placed or performed during the hospital encounter of 11/03/20 (from the past 24 hour(s))   CBC with platelets differential   Result Value Ref Range    WBC 6.4 4.0 - 11.0 10e9/L    RBC Count 3.09 (L) 4.4 - 5.9 10e12/L    Hemoglobin 9.3 (L) 13.3 - 17.7 g/dL    Hematocrit 28.4 (L) 40.0 - 53.0 %    MCV 92 78 - 100 fl    MCH 30.1 26.5 - 33.0 pg    MCHC 32.7 31.5 - 36.5 g/dL    RDW 13.8 10.0 - 15.0 %    Platelet Count 257 150 - 450 10e9/L    Diff Method Automated Method     % Neutrophils 76.5 %    % Lymphocytes 13.8 %    % Monocytes 6.1 %    % Eosinophils 2.0 %    % Basophils 0.8 %    % Immature Granulocytes 0.8 %    Nucleated RBCs 0 0 /100    Absolute Neutrophil 4.9 1.6 - 8.3 10e9/L    Absolute Lymphocytes 0.9 0.8 - 5.3 10e9/L    Absolute Monocytes 0.4 0.0 - 1.3 10e9/L    Absolute Eosinophils 0.1 0.0 - 0.7 10e9/L    Absolute Basophils 0.1 0.0 - 0.2 10e9/L    Abs Immature Granulocytes 0.1 0 - 0.4 10e9/L    Absolute Nucleated RBC 0.0      *Note: Due to a large number of results and/or encounters for the requested time period, some results have not been displayed. A complete set of results can be found in Results Review.     Medications "   ondansetron (ZOFRAN) injection 4 mg (4 mg Intravenous Given 11/3/20 2868)             Assessments & Plan (with Medical Decision Making)   ***    I have reviewed the nursing notes.    I have reviewed the findings, diagnosis, plan and need for follow up with the patient.    New Prescriptions    No medications on file       Final diagnoses:   None       11/3/2020   [facility] EMERGENCY DEPARTMENT

## 2020-11-03 NOTE — TELEPHONE ENCOUNTER
Routing refill request to provider for review/approval because:  Lazara given x1 and patient did not follow up, please advise  Pt has been in the hospital 2 times in October so unsure if his dose needs to be adjusted  Linda Moran RN, BSN

## 2020-11-03 NOTE — ED TRIAGE NOTES
T1DM, pancreas transplant 2009 failed in 2013  On dialysis Tues Thurs Saturday  Full run today  Mid abdominal pain one week duration, goes away when he lays down but returns with activity  Vomiting ever 3 hours  Ate a sandwich after dialysis and kept it down  Diarrhea liquid twice Sunday, once Monday, once today  Frequent belching, relieves the pain and nausea

## 2020-11-04 ENCOUNTER — TELEPHONE (OUTPATIENT)
Dept: TRANSPLANT | Facility: CLINIC | Age: 45
End: 2020-11-04

## 2020-11-04 NOTE — ED NOTES
States unwillingness to stay and be seen, states his symptoms are improved. DMSE form signed and pt will return if Sx worsen

## 2020-11-04 NOTE — TELEPHONE ENCOUNTER
Reviewed chart for Kidney/Pancreas trasnplant.  Compliance contract for 6/2/2020- 9/2/2020 is missing. Additionally there are no nephrology physician's notes stating that he was compliant with dialysis, taking prescribed medications and having labs drawn during that time.  However- pt states that he never got the contract- thinks it may have gotten lost in the mail because he moved on June 1st. His dialysis unit states that he is very compliant with dialysis- never misses a run and never cuts a run short. They will fax us the last 2-3 provider notes.   On 10/2/2020 he had a Lexiscan stress test that showed:  - that the nuclear stress test was mildly positive.  Summed stress score is 4.  -There is mild reversible ischemia in the [id number] anterior wall and mid anterolateral wall.  - Left ventricular function is moderately reduced. There is global hypokinesia of the left ventricle consistent with cardiomyopathy  .Because of this, Dr Vogel ( cardiology) recommended pt have a coronary angiogram which has yet to be done. Pt states that cardiology has not called him about this and he will call Dr Vogel's office to discuss scheduling the angiogram.  There is no note from a dentist clearing him for transplant. Pt will contact his dentist about this. He was given the office phone and fax numbers.

## 2020-11-09 ENCOUNTER — TELEPHONE (OUTPATIENT)
Dept: CARDIOLOGY | Facility: CLINIC | Age: 45
End: 2020-11-09

## 2020-11-09 ENCOUNTER — OFFICE VISIT (OUTPATIENT)
Dept: TRANSPLANT | Facility: CLINIC | Age: 45
End: 2020-11-09
Attending: SURGERY
Payer: MEDICARE

## 2020-11-09 VITALS
OXYGEN SATURATION: 98 % | DIASTOLIC BLOOD PRESSURE: 87 MMHG | HEART RATE: 89 BPM | WEIGHT: 190.5 LBS | BODY MASS INDEX: 28.97 KG/M2 | SYSTOLIC BLOOD PRESSURE: 161 MMHG

## 2020-11-09 DIAGNOSIS — Z99.2 ESRD ON DIALYSIS (H): Primary | ICD-10-CM

## 2020-11-09 DIAGNOSIS — N18.6 ESRD ON DIALYSIS (H): Primary | ICD-10-CM

## 2020-11-09 PROCEDURE — 99024 POSTOP FOLLOW-UP VISIT: CPT | Mod: 95 | Performed by: SURGERY

## 2020-11-09 PROCEDURE — G0463 HOSPITAL OUTPT CLINIC VISIT: HCPCS

## 2020-11-09 ASSESSMENT — PAIN SCALES - GENERAL: PAINLEVEL: NO PAIN (0)

## 2020-11-09 NOTE — PROGRESS NOTES
Dialysis Access Service  Consult Note    HPI: Mr. Tata Fitzgerald is being seen today for follow-up of LEFT upper extremity AV graft (brachial to axillary). This was created 10/7/20. He tolerated the procedure well. He did have some swelling in his left arm following surgery, but this has been steadily improving. He does feel that his left hand is a little colder than his right, but it is not significant. He denies weakness/numbness/pain in that hand. He is otherwise in his usual state of health without complaints.     Risk factors for vascular access:         Yes No  Hx of CVC    [x]    []   Comment: indwelling RIJ tunneled line currently  Hx of PICC line         []     [x]  Comment:   Hx of Pacemaker    []     [x]  Comment:   History of failed access:  []         []  Comment:  SVC syndrome   []      [x]  Comment:  Heart Failure    []     [x]  EF:    Periph arterial disease  []     [x]  Comment:  Prior Fracture/Surgery  []     [x]  Location:   DVT    []    [x]   Location:  Diabetes    [x]        []  Comment:  Neuropathy   []     [x]  Comment:   Anticoagulation:   []    [x]  Agent:      Anticoagulation contraindication:[]  [x]     Details:                   Pediatric    []         [x]  Age:                  Hx of transplant   []    [x]  Comment:     Current immunosuppression []    [x]  Comment:             ROS: 10 point ROS neg other than the symptoms noted above in the HPI.        Past Medical History:   Diagnosis Date     CMV (cytomegalovirus infection) status negative 2011     Coronary artery disease, non-occlusive 2008    angio showed diffuse disease with no lesions     Diabetes mellitus, type 1     Diagnosed at age 11. Takes Insulin      Dialysis patient (H)     prior to kidney transplant     Dyslipidemia      Gastroesophageal reflux disease      History of blood transfusion      Hypertension      Immunosuppressed status (H)      Kidney transplant status, living related donor 2008          Migraines      Mycotic  aneurysm of kidney transplant (H) 2008     Noncompliance with treatment     no labs for one year     Pancreas replaced by transplant (H) 2009    rejection treated with thymo     Pancreatic disease     pancreas transplant     Tobacco abuse ongoing       Past Surgical History:   Procedure Laterality Date     ABDOMEN SURGERY       ARTHROSCOPY KNEE WITH LATERAL MENISCECTOMY Left 7/10/2019    Procedure: Left knee arthroscopic partial lateral meniscectomy;  Surgeon: Alex Candelaria MD;  Location: [facility] OR     BIOPSY      [facility]      BYPASS GRAFT ARTERY CORONARY N/A 2015    Procedure: BYPASS GRAFT ARTERY CORONARY;  Surgeon: Katy Neville MD;  Location: [facility] OR     CORONARY ANGIOGRAPHY ADULT ORDER      2015     CREATE FISTULA ARTERIOVENOUS UPPER EXTREMITY Left 10/7/2020    Procedure: CREATION, ARTERIOVENOUS GRAFT placement LEFT UPPER EXTREMITY   with intraoperative ultrasound;  Surgeon: Melissa Guzman MD;  Location: [facility] OR     ESOPHAGOSCOPY, GASTROSCOPY, DUODENOSCOPY (EGD), COMBINED N/A 2020    Procedure: ESOPHAGOGASTRODUODENOSCOPY (EGD) biopsies w/forceps;  Surgeon: Emre Gomes MD;  Location:  GI     EYE SURGERY      vitrectomy both eyes      IR CVC TUNNEL PLACEMENT > 5 YRS OF AGE  2020     ORTHOPEDIC SURGERY      tumor removed from left knee     TRANSPLANT  .    pancrease and kidney transplant       Family History   Problem Relation Age of Onset     Unknown/Adopted Father      Heart Disease Maternal Grandfather 62     Melanoma No family hx of      Skin Cancer No family hx of        Social History     Tobacco Use     Smoking status: Former Smoker     Packs/day: 1.00     Years: 20.00     Pack years: 20.00     Types: Vaping Device, Cigarettes     Quit date: 2016     Years since quittin.8     Smokeless tobacco: Never Used     Tobacco comment: E- Cig use since    Substance Use Topics     Alcohol use: Not Currently     Alcohol/week: 0.0 standard drinks      Frequency: Never     Binge frequency: Never         Current Outpatient Medications:      acetaminophen (TYLENOL) 325 MG tablet, Take 2 tablets (650 mg) by mouth every 4 hours as needed for mild pain, Disp: 50 tablet, Rfl: 0     blood glucose (NO BRAND SPECIFIED) test strip, Use to test blood sugar 5 times daily or as directed., Disp: 1 Box, Rfl: 0     calcium carbonate (TUMS) 500 MG chewable tablet, Take 1 chew tab by mouth 3 times daily, Disp: , Rfl:      clopidogrel (PLAVIX) 75 MG tablet, Take 1 tablet (75 mg) by mouth daily, Disp: 30 tablet, Rfl: 0     insulin aspart (NOVOLOG FLEXPEN) 100 UNIT/ML pen, 1 unit per 6 grams of carbs with each meal. About 45 units/day., Disp: 15 mL, Rfl: 3     insulin aspart (NOVOLOG FLEXPEN) 100 UNIT/ML pen, Sliding scale = 1 unit for every 20 over blood glucose of 140, Disp: , Rfl:      insulin glargine (LANTUS SOLOSTAR) 100 UNIT/ML pen, Inject 24 Units Subcutaneous At Bedtime, Disp: 15 mL, Rfl: 11     insulin pen needle (BD ARPITA U/F) 32G X 4 MM miscellaneous, Use 4 daily or as directed., Disp: 120 each, Rfl: 0     metoclopramide (REGLAN) 5 MG tablet, Take 1 tablet (5 mg) by mouth 4 times daily (before meals and nightly), Disp: 120 tablet, Rfl: 11     naproxen sodium 220 MG capsule, Take 220 mg by mouth as needed (PT last dose 9.28.2020), Disp: , Rfl:      omeprazole (PRILOSEC) 40 MG DR capsule, Take 40 mg by mouth every evening, Disp: , Rfl:      ondansetron (ZOFRAN-ODT) 4 MG ODT tab, Take 1 tablet (4 mg) by mouth every 8 hours as needed for nausea, Disp: 30 tablet, Rfl: 1     oxyCODONE (ROXICODONE) 5 MG tablet, Take 1 tablet (5 mg) by mouth every 6 hours as needed for moderate to severe pain, Disp: 18 tablet, Rfl: 0     prochlorperazine (COMPAZINE) 5 MG tablet, Take 1 tablet (5 mg) by mouth every 8 hours as needed for nausea, Disp: 30 tablet, Rfl: 1     senna-docusate (SENOKOT-S/PERICOLACE) 8.6-50 MG tablet, Take 1-2 tablets by mouth 2 times daily, Disp: 30 tablet, Rfl: 0      tacrolimus (GENERIC EQUIVALENT) 1 MG capsule, Take 1 mg by mouth 2 times daily , Disp: 60 capsule, Rfl: 11     torsemide (DEMADEX) 20 MG tablet, Take 1 tablet (20 mg) by mouth 2 times daily May hold as needed on dialysis days., Disp: 180 tablet, Rfl: 3     acetone, Urine, test STRP, 1 strip by In Vitro route daily (Patient not taking: Reported on 2/26/2020), Disp: 50 each, Rfl: 3                        PHYSICAL EXAM:  Pulse:  [89] 89  BP: (161)/(87) 161/87  SpO2:  [98 %] 98 %  Constitutional: healthy, alert and cooperative  HEENT: sclera anicteric, MMM, conjunctiva pink  Chest: HD catheter present on the right side.  CV:  NSR  : No CVA tenderness  Skin:  No rashes or jaundice  Neuro: normal gait  Psych: normal mood and affect  EXTREMITY EXAM:   Vein Exam: Obvious suitable veins?    Left arm: YES   [x]           NO  []       Comment:    Right arm: YES   [x]           NO  []       Comment:   Arterial Exam:   Radial   L: 2+ R: 2+   Ulnar   L: 2+;R: 2+  Patent Palmar arch  L: YES   [x]  NO   []       R: YES   [x]   NO   []        Capillary refill:  L: <3sec, R:<3sec    Sensory exam:   Left hand: Normal   [x]       Abnormal   []     Comment:    Right hand: Normal   [x]       Abnormal   []     Comment:     Motor exam normal:   Left hand: Normal   [x]       Abnormal   []     Comment:     Right hand: Normal   [x]       Abnormal   []     Comment:              Left arm examined in detail. Graft with strong thrill. Palpable radial pulse in left hand. Cap refill <3 seconds. Some swelling in left upper arm to elbow. Motor/sensory intact. Grossly does not feel cool/cold compared to right hand.            Assessment & Plan: Mr. Tata Fitzgerald is recovering well from left upper extremity AV graft creation.     -OK to use for dialysis access  -provided HD is successful through graft, OK to remove central line.  -f/u as needed  -reviewed needs for transplant listing. Planning for angiogram and dental clearance.     The surgical  risks and benefits were reviewed and questions were answered. We discussed the day of surgery plan, anesthesia, postop care, risk of infection, numbness, injury to surrounding structures, bleeding, thrombosis, steal syndrome, possible need for future angioplasty or surgical revision, as well as nonmaturation or need for site abandonment. This was contrasted with morbidity and mortality risk of long-term catheter based hemodialysis access. The patient was counselled to contact our nurse coordinator, JORDAN Gomez (Sum), [facility] at [phone] with any questions or concerns.  Thank you for the opportunity to participate in Mr. Tata Fitzgerald's care.    Total time: 15 minutes  Counseling time: 10 minutes    Gilson Archuleta MD

## 2020-11-09 NOTE — TELEPHONE ENCOUNTER
Health Call Center    Phone Message    May a detailed message be left on voicemail: no     Reason for Call: Order(s): Other:   Reason for requested: Angiogram  Date needed: Asap  Provider name: Dr. Vogel  Comments: Pt had transplant eval done, notes from encounter dated 11/4 state Dr. Vogel recommends angiogram, but no order is placed in chart. Please call Pt back to discuss.      Action Taken: Message routed to:  [facility]    Travel Screening: Not Applicable

## 2020-11-09 NOTE — LETTER
11/9/2020         RE: Murtaza Fitzgerald  [address]  [facility] [address]      Dialysis Access Service  Consult Note    HPI: Mr. Tata Fitzgerald is being seen today for follow-up of LEFT upper extremity AV graft (brachial to axillary). This was created 10/7/20. He tolerated the procedure well. He did have some swelling in his left arm following surgery, but this has been steadily improving. He does feel that his left hand is a little colder than his right, but it is not significant. He denies weakness/numbness/pain in that hand. He is otherwise in his usual state of health without complaints.     Risk factors for vascular access:         Yes No  Hx of CVC    [x]    []   Comment: indwelling RIJ tunneled line currently  Hx of PICC line         []     [x]  Comment:   Hx of Pacemaker    []     [x]  Comment:   History of failed access:  []         []  Comment:  SVC syndrome   []      [x]  Comment:  Heart Failure    []     [x]  EF:    Periph arterial disease  []     [x]  Comment:  Prior Fracture/Surgery  []     [x]  Location:   DVT    []    [x]   Location:  Diabetes    [x]        []  Comment:  Neuropathy   []     [x]  Comment:   Anticoagulation:   []    [x]  Agent:      Anticoagulation contraindication:[]  [x]     Details:                   Pediatric    []         [x]  Age:                  Hx of transplant   []    [x]  Comment:     Current immunosuppression []    [x]  Comment:             ROS: 10 point ROS neg other than the symptoms noted above in the HPI.        Past Medical History:   Diagnosis Date     CMV (cytomegalovirus infection) status negative 2011     Coronary artery disease, non-occlusive 2008    angio showed diffuse disease with no lesions     Diabetes mellitus, type 1     Diagnosed at age 11. Takes Insulin      Dialysis patient (H)     prior to kidney transplant     Dyslipidemia      Gastroesophageal reflux disease      History of blood transfusion      Hypertension      Immunosuppressed  status (H)      Kidney transplant status, living related donor           Migraines      Mycotic aneurysm of kidney transplant (H) 2008     Noncompliance with treatment     no labs for one year     Pancreas replaced by transplant (H) 2009    rejection treated with thymo     Pancreatic disease     pancreas transplant     Tobacco abuse ongoing       Past Surgical History:   Procedure Laterality Date     ABDOMEN SURGERY       ARTHROSCOPY KNEE WITH LATERAL MENISCECTOMY Left 7/10/2019    Procedure: Left knee arthroscopic partial lateral meniscectomy;  Surgeon: Alex Candelaria MD;  Location: [facility] OR     BIOPSY      [facility]      BYPASS GRAFT ARTERY CORONARY N/A 2015    Procedure: BYPASS GRAFT ARTERY CORONARY;  Surgeon: Katy Neville MD;  Location: [facility]     CORONARY ANGIOGRAPHY ADULT ORDER           CREATE FISTULA ARTERIOVENOUS UPPER EXTREMITY Left 10/7/2020    Procedure: CREATION, ARTERIOVENOUS GRAFT placement LEFT UPPER EXTREMITY   with intraoperative ultrasound;  Surgeon: Melissa Guzman MD;  Location: [facility] OR     ESOPHAGOSCOPY, GASTROSCOPY, DUODENOSCOPY (EGD), COMBINED N/A 2020    Procedure: ESOPHAGOGASTRODUODENOSCOPY (EGD) biopsies w/forceps;  Surgeon: Emre Gomes MD;  Location:  GI     EYE SURGERY      vitrectomy both eyes      IR CVC TUNNEL PLACEMENT > 5 YRS OF AGE  2020     ORTHOPEDIC SURGERY  1993    tumor removed from left knee     TRANSPLANT  .    pancrease and kidney transplant       Family History   Problem Relation Age of Onset     Unknown/Adopted Father      Heart Disease Maternal Grandfather 62     Melanoma No family hx of      Skin Cancer No family hx of        Social History     Tobacco Use     Smoking status: Former Smoker     Packs/day: 1.00     Years: 20.00     Pack years: 20.00     Types: Vaping Device, Cigarettes     Quit date: 2016     Years since quittin.8     Smokeless tobacco: Never Used     Tobacco comment: E- Cig use since     Substance Use Topics     Alcohol use: Not Currently     Alcohol/week: 0.0 standard drinks     Frequency: Never     Binge frequency: Never         Current Outpatient Medications:      acetaminophen (TYLENOL) 325 MG tablet, Take 2 tablets (650 mg) by mouth every 4 hours as needed for mild pain, Disp: 50 tablet, Rfl: 0     blood glucose (NO BRAND SPECIFIED) test strip, Use to test blood sugar 5 times daily or as directed., Disp: 1 Box, Rfl: 0     calcium carbonate (TUMS) 500 MG chewable tablet, Take 1 chew tab by mouth 3 times daily, Disp: , Rfl:      clopidogrel (PLAVIX) 75 MG tablet, Take 1 tablet (75 mg) by mouth daily, Disp: 30 tablet, Rfl: 0     insulin aspart (NOVOLOG FLEXPEN) 100 UNIT/ML pen, 1 unit per 6 grams of carbs with each meal. About 45 units/day., Disp: 15 mL, Rfl: 3     insulin aspart (NOVOLOG FLEXPEN) 100 UNIT/ML pen, Sliding scale = 1 unit for every 20 over blood glucose of 140, Disp: , Rfl:      insulin glargine (LANTUS SOLOSTAR) 100 UNIT/ML pen, Inject 24 Units Subcutaneous At Bedtime, Disp: 15 mL, Rfl: 11     insulin pen needle (BD ARPITA U/F) 32G X 4 MM miscellaneous, Use 4 daily or as directed., Disp: 120 each, Rfl: 0     metoclopramide (REGLAN) 5 MG tablet, Take 1 tablet (5 mg) by mouth 4 times daily (before meals and nightly), Disp: 120 tablet, Rfl: 11     naproxen sodium 220 MG capsule, Take 220 mg by mouth as needed (PT last dose 9.28.2020), Disp: , Rfl:      omeprazole (PRILOSEC) 40 MG DR capsule, Take 40 mg by mouth every evening, Disp: , Rfl:      ondansetron (ZOFRAN-ODT) 4 MG ODT tab, Take 1 tablet (4 mg) by mouth every 8 hours as needed for nausea, Disp: 30 tablet, Rfl: 1     oxyCODONE (ROXICODONE) 5 MG tablet, Take 1 tablet (5 mg) by mouth every 6 hours as needed for moderate to severe pain, Disp: 18 tablet, Rfl: 0     prochlorperazine (COMPAZINE) 5 MG tablet, Take 1 tablet (5 mg) by mouth every 8 hours as needed for nausea, Disp: 30 tablet, Rfl: 1     senna-docusate  (SENOKOT-S/PERICOLACE) 8.6-50 MG tablet, Take 1-2 tablets by mouth 2 times daily, Disp: 30 tablet, Rfl: 0     tacrolimus (GENERIC EQUIVALENT) 1 MG capsule, Take 1 mg by mouth 2 times daily , Disp: 60 capsule, Rfl: 11     torsemide (DEMADEX) 20 MG tablet, Take 1 tablet (20 mg) by mouth 2 times daily May hold as needed on dialysis days., Disp: 180 tablet, Rfl: 3     acetone, Urine, test STRP, 1 strip by In Vitro route daily (Patient not taking: Reported on 2/26/2020), Disp: 50 each, Rfl: 3                        PHYSICAL EXAM:  Pulse:  [89] 89  BP: (161)/(87) 161/87  SpO2:  [98 %] 98 %  Constitutional: healthy, alert and cooperative  HEENT: sclera anicteric, MMM, conjunctiva pink  Chest: HD catheter present on the right side.  CV:  NSR  : No CVA tenderness  Skin:  No rashes or jaundice  Neuro: normal gait  Psych: normal mood and affect  EXTREMITY EXAM:   Vein Exam: Obvious suitable veins?    Left arm: YES   [x]           NO  []       Comment:    Right arm: YES   [x]           NO  []       Comment:   Arterial Exam:   Radial   L: 2+ R: 2+   Ulnar   L: 2+;R: 2+  Patent Palmar arch  L: YES   [x]  NO   []       R: YES   [x]   NO   []        Capillary refill:  L: <3sec, R:<3sec    Sensory exam:   Left hand: Normal   [x]       Abnormal   []     Comment:    Right hand: Normal   [x]       Abnormal   []     Comment:     Motor exam normal:   Left hand: Normal   [x]       Abnormal   []     Comment:     Right hand: Normal   [x]       Abnormal   []     Comment:              Left arm examined in detail. Graft with strong thrill. Palpable radial pulse in left hand. Cap refill <3 seconds. Some swelling in left upper arm to elbow. Motor/sensory intact. Grossly does not feel cool/cold compared to right hand.            Assessment & Plan: Mr. Tata Fitzgerald is recovering well from left upper extremity AV graft creation.     -OK to use for dialysis access  -provided HD is successful through graft, OK to remove central line.  -f/u as  needed  -reviewed needs for transplant listing. Planning for angiogram and dental clearance.     The surgical risks and benefits were reviewed and questions were answered. We discussed the day of surgery plan, anesthesia, postop care, risk of infection, numbness, injury to surrounding structures, bleeding, thrombosis, steal syndrome, possible need for future angioplasty or surgical revision, as well as nonmaturation or need for site abandonment. This was contrasted with morbidity and mortality risk of long-term catheter based hemodialysis access. The patient was counselled to contact our nurse coordinator, JORDAN Gomez CNS (Sum) at [phone] with any questions or concerns.  Thank you for the opportunity to participate in Mr. Tata Fitzgerald's care.    Total time: 15 minutes  Counseling time: 10 minutes    MD Gilson Pacheco MD

## 2020-11-09 NOTE — NURSING NOTE
Chief Complaint   Patient presents with     RECHECK     Fistula F/U     Blood pressure (!) 161/87, pulse 89, weight 86.4 kg (190 lb 8 oz), SpO2 98 %.    Latisha Weems, [facility]]

## 2020-11-11 DIAGNOSIS — Z95.1 S/P CABG X 3: ICD-10-CM

## 2020-11-11 DIAGNOSIS — I25.10 CORONARY ARTERY DISEASE INVOLVING NATIVE CORONARY ARTERY OF NATIVE HEART WITHOUT ANGINA PECTORIS: ICD-10-CM

## 2020-11-11 DIAGNOSIS — R94.39 ABNORMAL CARDIOVASCULAR STRESS TEST: Primary | ICD-10-CM

## 2020-11-11 DIAGNOSIS — R06.09 DOE (DYSPNEA ON EXERTION): ICD-10-CM

## 2020-11-11 DIAGNOSIS — I43 TYPE 1 DIABETES MELLITUS WITH DIABETIC CARDIOMYOPATHY (H): ICD-10-CM

## 2020-11-11 DIAGNOSIS — E10.59 TYPE 1 DIABETES MELLITUS WITH DIABETIC CARDIOMYOPATHY (H): ICD-10-CM

## 2020-11-11 DIAGNOSIS — R93.1 ABNORMAL ECHOCARDIOGRAM: ICD-10-CM

## 2020-11-11 DIAGNOSIS — I10 HYPERTENSION GOAL BP (BLOOD PRESSURE) < 140/90: ICD-10-CM

## 2020-11-11 DIAGNOSIS — N18.6 ESRD (END STAGE RENAL DISEASE) ON DIALYSIS (H): ICD-10-CM

## 2020-11-11 DIAGNOSIS — Z99.2 ESRD (END STAGE RENAL DISEASE) ON DIALYSIS (H): ICD-10-CM

## 2020-11-11 RX ORDER — LIDOCAINE 40 MG/G
CREAM TOPICAL
Status: CANCELLED | OUTPATIENT
Start: 2020-11-11

## 2020-11-11 RX ORDER — SODIUM CHLORIDE 9 MG/ML
INJECTION, SOLUTION INTRAVENOUS CONTINUOUS
Status: CANCELLED | OUTPATIENT
Start: 2020-11-11

## 2020-11-11 RX ORDER — POTASSIUM CHLORIDE 1500 MG/1
20 TABLET, EXTENDED RELEASE ORAL
Status: CANCELLED | OUTPATIENT
Start: 2020-11-11

## 2020-11-11 RX ORDER — POTASSIUM CHLORIDE 1500 MG/1
40 TABLET, EXTENDED RELEASE ORAL
Status: CANCELLED | OUTPATIENT
Start: 2020-11-11

## 2020-11-11 NOTE — TELEPHONE ENCOUNTER
Called and left VM requesting a return call to clinic.  Clinic telephone provided.  [employer] also sent.    Carey Belle LPN

## 2020-11-11 NOTE — PROGRESS NOTES
Date: 11/11/2020    Time of Call: 3:10 PM     Diagnosis:  Abnormal Lexiscan     [ TORB ] Ordering provider: Dr Yovany Vogel  Order: [facility]     Order received by: Carey Belle LPN     Follow-up/additional notes:

## 2020-11-12 DIAGNOSIS — I10 BENIGN ESSENTIAL HYPERTENSION: Primary | ICD-10-CM

## 2020-11-12 RX ORDER — CARVEDILOL 6.25 MG/1
6.25 TABLET ORAL 2 TIMES DAILY WITH MEALS
Qty: 180 TABLET | Refills: 3 | Status: SHIPPED | OUTPATIENT
Start: 2020-11-12 | End: 2021-06-18

## 2020-11-19 ENCOUNTER — TELEPHONE (OUTPATIENT)
Dept: VASCULAR SURGERY | Facility: CLINIC | Age: 45
End: 2020-11-19

## 2020-11-19 NOTE — TELEPHONE ENCOUNTER
[facility] Call Center    Phone Message    May a detailed message be left on voicemail: yes     Reason for Call: Other: [facility] called. The pt needs his dialysis catheter removed. Please contact the pt to set up an appt. Thanks.     Action Taken: Message routed to:  [facility]    Travel Screening: Not Applicable

## 2020-11-23 NOTE — TELEPHONE ENCOUNTER
Called pt to f/up on having his dialysis catheter removed.     Left a msg    Informed him that he will need to have orders placed and then Dr. Archuleta's team or [facility] can call and schedule this procedure with IR.     Left direct line for him to return my call if there are any other questions.     *note routed to Tolu GREENWOOD RN, BSN  Interventional Radiology/Vascular  Nurse Coordinator   Phone: [phone]  Fax: [phone]

## 2020-11-27 DIAGNOSIS — Z48.89 ENCOUNTER FOR POST SURGICAL WOUND CHECK: Primary | ICD-10-CM

## 2020-11-27 DIAGNOSIS — Z99.2 ESRD ON DIALYSIS (H): ICD-10-CM

## 2020-11-27 DIAGNOSIS — N18.6 ESRD ON DIALYSIS (H): ICD-10-CM

## 2020-11-27 DIAGNOSIS — Z09 FOLLOW-UP EXAMINATION AFTER VASCULAR SURGERY: ICD-10-CM

## 2020-11-30 DIAGNOSIS — Z11.59 ENCOUNTER FOR SCREENING FOR OTHER VIRAL DISEASES: Primary | ICD-10-CM

## 2020-11-30 DIAGNOSIS — Z01.812 PRE-OPERATIVE LABORATORY EXAMINATION: Primary | ICD-10-CM

## 2020-12-03 DIAGNOSIS — Z11.59 ENCOUNTER FOR SCREENING FOR OTHER VIRAL DISEASES: ICD-10-CM

## 2020-12-03 PROCEDURE — U0003 INFECTIOUS AGENT DETECTION BY NUCLEIC ACID (DNA OR RNA); SEVERE ACUTE RESPIRATORY SYNDROME CORONAVIRUS 2 (SARS-COV-2) (CORONAVIRUS DISEASE [COVID-19]), AMPLIFIED PROBE TECHNIQUE, MAKING USE OF HIGH THROUGHPUT TECHNOLOGIES AS DESCRIBED BY CMS-2020-01-R: HCPCS | Performed by: NURSE PRACTITIONER

## 2020-12-04 LAB
LABORATORY COMMENT REPORT: NORMAL
SARS-COV-2 RNA SPEC QL NAA+PROBE: NEGATIVE
SARS-COV-2 RNA SPEC QL NAA+PROBE: NORMAL
SPECIMEN SOURCE: NORMAL
SPECIMEN SOURCE: NORMAL

## 2020-12-06 ENCOUNTER — APPOINTMENT (OUTPATIENT)
Dept: CARDIOLOGY | Facility: CLINIC | Age: 45
DRG: 291 | End: 2020-12-06
Payer: MEDICARE

## 2020-12-06 ENCOUNTER — HEALTH MAINTENANCE LETTER (OUTPATIENT)
Age: 45
End: 2020-12-06

## 2020-12-06 ENCOUNTER — HOSPITAL ENCOUNTER (INPATIENT)
Facility: CLINIC | Age: 45
LOS: 1 days | Discharge: HOME | DRG: 291 | End: 2020-12-07
Attending: EMERGENCY MEDICINE | Admitting: INTERNAL MEDICINE
Payer: MEDICARE

## 2020-12-06 ENCOUNTER — APPOINTMENT (OUTPATIENT)
Dept: GENERAL RADIOLOGY | Facility: CLINIC | Age: 45
DRG: 291 | End: 2020-12-06
Attending: EMERGENCY MEDICINE
Payer: MEDICARE

## 2020-12-06 DIAGNOSIS — I50.9 ACUTE CONGESTIVE HEART FAILURE, UNSPECIFIED HEART FAILURE TYPE (H): ICD-10-CM

## 2020-12-06 DIAGNOSIS — R06.09 DOE (DYSPNEA ON EXERTION): Primary | ICD-10-CM

## 2020-12-06 DIAGNOSIS — R94.39 ABNORMAL CARDIOVASCULAR STRESS TEST: ICD-10-CM

## 2020-12-06 DIAGNOSIS — E87.70 HYPERVOLEMIA, UNSPECIFIED HYPERVOLEMIA TYPE: ICD-10-CM

## 2020-12-06 DIAGNOSIS — Z20.828 CONTACT WITH AND (SUSPECTED) EXPOSURE TO OTHER VIRAL COMMUNICABLE DISEASES: ICD-10-CM

## 2020-12-06 LAB
ALBUMIN SERPL-MCNC: 3.8 G/DL (ref 3.4–5)
ALP SERPL-CCNC: 198 U/L (ref 40–150)
ALT SERPL W P-5'-P-CCNC: 35 U/L (ref 0–70)
ANION GAP SERPL CALCULATED.3IONS-SCNC: 10 MMOL/L (ref 3–14)
AST SERPL W P-5'-P-CCNC: 16 U/L (ref 0–45)
BASOPHILS # BLD AUTO: 0.1 10E9/L (ref 0–0.2)
BASOPHILS NFR BLD AUTO: 1.6 %
BILIRUB SERPL-MCNC: 1.1 MG/DL (ref 0.2–1.3)
BUN SERPL-MCNC: 24 MG/DL (ref 7–30)
CALCIUM SERPL-MCNC: 9.7 MG/DL (ref 8.5–10.1)
CHLORIDE SERPL-SCNC: 90 MMOL/L (ref 94–109)
CO2 SERPL-SCNC: 29 MMOL/L (ref 20–32)
CREAT SERPL-MCNC: 6.38 MG/DL (ref 0.66–1.25)
DIFFERENTIAL METHOD BLD: ABNORMAL
EOSINOPHIL # BLD AUTO: 0.4 10E9/L (ref 0–0.7)
EOSINOPHIL NFR BLD AUTO: 7.2 %
ERYTHROCYTE [DISTWIDTH] IN BLOOD BY AUTOMATED COUNT: 14.6 % (ref 10–15)
GFR SERPL CREATININE-BSD FRML MDRD: 10 ML/MIN/{1.73_M2}
GLUCOSE BLDC GLUCOMTR-MCNC: 159 MG/DL (ref 70–99)
GLUCOSE BLDC GLUCOMTR-MCNC: 187 MG/DL (ref 70–99)
GLUCOSE BLDC GLUCOMTR-MCNC: 246 MG/DL (ref 70–99)
GLUCOSE SERPL-MCNC: 150 MG/DL (ref 70–99)
HCT VFR BLD AUTO: 39 % (ref 40–53)
HGB BLD-MCNC: 12.2 G/DL (ref 13.3–17.7)
IMM GRANULOCYTES # BLD: 0.1 10E9/L (ref 0–0.4)
IMM GRANULOCYTES NFR BLD: 0.9 %
INTERPRETATION ECG - MUSE: NORMAL
LACTATE BLD-SCNC: 1.5 MMOL/L (ref 0.7–2)
LYMPHOCYTES # BLD AUTO: 0.8 10E9/L (ref 0.8–5.3)
LYMPHOCYTES NFR BLD AUTO: 13.6 %
MCH RBC QN AUTO: 30.2 PG (ref 26.5–33)
MCHC RBC AUTO-ENTMCNC: 31.3 G/DL (ref 31.5–36.5)
MCV RBC AUTO: 97 FL (ref 78–100)
MONOCYTES # BLD AUTO: 0.4 10E9/L (ref 0–1.3)
MONOCYTES NFR BLD AUTO: 7.2 %
NEUTROPHILS # BLD AUTO: 3.9 10E9/L (ref 1.6–8.3)
NEUTROPHILS NFR BLD AUTO: 69.5 %
NRBC # BLD AUTO: 0 10*3/UL
NRBC BLD AUTO-RTO: 0 /100
NT-PROBNP SERPL-MCNC: ABNORMAL PG/ML (ref 0–450)
PLATELET # BLD AUTO: 297 10E9/L (ref 150–450)
POTASSIUM SERPL-SCNC: 5.1 MMOL/L (ref 3.4–5.3)
PROT SERPL-MCNC: 8.5 G/DL (ref 6.8–8.8)
RBC # BLD AUTO: 4.04 10E12/L (ref 4.4–5.9)
SODIUM SERPL-SCNC: 129 MMOL/L (ref 133–144)
TROPONIN I SERPL-MCNC: <0.015 UG/L (ref 0–0.04)
WBC # BLD AUTO: 5.6 10E9/L (ref 4–11)

## 2020-12-06 PROCEDURE — 250N000011 HC RX IP 250 OP 636: Performed by: EMERGENCY MEDICINE

## 2020-12-06 PROCEDURE — 84484 ASSAY OF TROPONIN QUANT: CPT | Performed by: EMERGENCY MEDICINE

## 2020-12-06 PROCEDURE — 93308 TTE F-UP OR LMTD: CPT | Performed by: EMERGENCY MEDICINE

## 2020-12-06 PROCEDURE — 93010 ELECTROCARDIOGRAM REPORT: CPT | Mod: 59 | Performed by: EMERGENCY MEDICINE

## 2020-12-06 PROCEDURE — 93308 TTE F-UP OR LMTD: CPT | Mod: 26 | Performed by: EMERGENCY MEDICINE

## 2020-12-06 PROCEDURE — 94660 CPAP INITIATION&MGMT: CPT

## 2020-12-06 PROCEDURE — 83605 ASSAY OF LACTIC ACID: CPT | Performed by: EMERGENCY MEDICINE

## 2020-12-06 PROCEDURE — 999N000208 ECHOCARDIOGRAM COMPLETE

## 2020-12-06 PROCEDURE — 71046 X-RAY EXAM CHEST 2 VIEWS: CPT

## 2020-12-06 PROCEDURE — 999N001017 HC STATISTIC GLUCOSE BY METER IP

## 2020-12-06 PROCEDURE — 255N000002 HC RX 255 OP 636: Performed by: INTERNAL MEDICINE

## 2020-12-06 PROCEDURE — 250N000012 HC RX MED GY IP 250 OP 636 PS 637: Performed by: STUDENT IN AN ORGANIZED HEALTH CARE EDUCATION/TRAINING PROGRAM

## 2020-12-06 PROCEDURE — 99222 1ST HOSP IP/OBS MODERATE 55: CPT | Mod: GC | Performed by: INTERNAL MEDICINE

## 2020-12-06 PROCEDURE — 83880 ASSAY OF NATRIURETIC PEPTIDE: CPT | Performed by: EMERGENCY MEDICINE

## 2020-12-06 PROCEDURE — 85025 COMPLETE CBC W/AUTO DIFF WBC: CPT | Performed by: EMERGENCY MEDICINE

## 2020-12-06 PROCEDURE — G0257 UNSCHED DIALYSIS ESRD PT HOS: HCPCS

## 2020-12-06 PROCEDURE — 250N000013 HC RX MED GY IP 250 OP 250 PS 637: Performed by: STUDENT IN AN ORGANIZED HEALTH CARE EDUCATION/TRAINING PROGRAM

## 2020-12-06 PROCEDURE — 250N000011 HC RX IP 250 OP 636: Performed by: STUDENT IN AN ORGANIZED HEALTH CARE EDUCATION/TRAINING PROGRAM

## 2020-12-06 PROCEDURE — 258N000003 HC RX IP 258 OP 636: Performed by: INTERNAL MEDICINE

## 2020-12-06 PROCEDURE — 999N000157 HC STATISTIC RCP TIME EA 10 MIN

## 2020-12-06 PROCEDURE — 71046 X-RAY EXAM CHEST 2 VIEWS: CPT | Mod: 26 | Performed by: RADIOLOGY

## 2020-12-06 PROCEDURE — 99285 EMERGENCY DEPT VISIT HI MDM: CPT | Mod: 25 | Performed by: EMERGENCY MEDICINE

## 2020-12-06 PROCEDURE — 80053 COMPREHEN METABOLIC PANEL: CPT | Performed by: EMERGENCY MEDICINE

## 2020-12-06 PROCEDURE — 93005 ELECTROCARDIOGRAM TRACING: CPT | Performed by: EMERGENCY MEDICINE

## 2020-12-06 PROCEDURE — 5A1D70Z PERFORMANCE OF URINARY FILTRATION, INTERMITTENT, LESS THAN 6 HOURS PER DAY: ICD-10-PCS | Performed by: INTERNAL MEDICINE

## 2020-12-06 PROCEDURE — 99223 1ST HOSP IP/OBS HIGH 75: CPT | Mod: AI | Performed by: INTERNAL MEDICINE

## 2020-12-06 PROCEDURE — 93306 TTE W/DOPPLER COMPLETE: CPT | Mod: 26 | Performed by: INTERNAL MEDICINE

## 2020-12-06 PROCEDURE — U0003 INFECTIOUS AGENT DETECTION BY NUCLEIC ACID (DNA OR RNA); SEVERE ACUTE RESPIRATORY SYNDROME CORONAVIRUS 2 (SARS-COV-2) (CORONAVIRUS DISEASE [COVID-19]), AMPLIFIED PROBE TECHNIQUE, MAKING USE OF HIGH THROUGHPUT TECHNOLOGIES AS DESCRIBED BY CMS-2020-01-R: HCPCS | Performed by: EMERGENCY MEDICINE

## 2020-12-06 PROCEDURE — 206N000001 HC R&B BMT UMMC

## 2020-12-06 PROCEDURE — C9803 HOPD COVID-19 SPEC COLLECT: HCPCS | Performed by: EMERGENCY MEDICINE

## 2020-12-06 PROCEDURE — 90937 HEMODIALYSIS REPEATED EVAL: CPT

## 2020-12-06 RX ORDER — PROCHLORPERAZINE 25 MG
25 SUPPOSITORY, RECTAL RECTAL EVERY 12 HOURS PRN
Status: DISCONTINUED | OUTPATIENT
Start: 2020-12-06 | End: 2020-12-07 | Stop reason: HOSPADM

## 2020-12-06 RX ORDER — NALOXONE HYDROCHLORIDE 0.4 MG/ML
0.4 INJECTION, SOLUTION INTRAMUSCULAR; INTRAVENOUS; SUBCUTANEOUS
Status: DISCONTINUED | OUTPATIENT
Start: 2020-12-06 | End: 2020-12-07 | Stop reason: HOSPADM

## 2020-12-06 RX ORDER — NALOXONE HYDROCHLORIDE 0.4 MG/ML
0.2 INJECTION, SOLUTION INTRAMUSCULAR; INTRAVENOUS; SUBCUTANEOUS
Status: DISCONTINUED | OUTPATIENT
Start: 2020-12-06 | End: 2020-12-07 | Stop reason: HOSPADM

## 2020-12-06 RX ORDER — ONDANSETRON 2 MG/ML
4 INJECTION INTRAMUSCULAR; INTRAVENOUS
Status: COMPLETED | OUTPATIENT
Start: 2020-12-06 | End: 2020-12-06

## 2020-12-06 RX ORDER — NICOTINE POLACRILEX 4 MG
15-30 LOZENGE BUCCAL
Status: DISCONTINUED | OUTPATIENT
Start: 2020-12-06 | End: 2020-12-07 | Stop reason: HOSPADM

## 2020-12-06 RX ORDER — DEXTROSE MONOHYDRATE 25 G/50ML
25-50 INJECTION, SOLUTION INTRAVENOUS
Status: DISCONTINUED | OUTPATIENT
Start: 2020-12-06 | End: 2020-12-07 | Stop reason: HOSPADM

## 2020-12-06 RX ORDER — METOCLOPRAMIDE 5 MG/1
5 TABLET ORAL
Status: DISCONTINUED | OUTPATIENT
Start: 2020-12-06 | End: 2020-12-07 | Stop reason: HOSPADM

## 2020-12-06 RX ORDER — AMOXICILLIN 250 MG
1 CAPSULE ORAL 2 TIMES DAILY
Status: DISCONTINUED | OUTPATIENT
Start: 2020-12-06 | End: 2020-12-07 | Stop reason: HOSPADM

## 2020-12-06 RX ORDER — OXYCODONE HYDROCHLORIDE 5 MG/1
5 TABLET ORAL EVERY 4 HOURS PRN
Status: DISCONTINUED | OUTPATIENT
Start: 2020-12-06 | End: 2020-12-07 | Stop reason: HOSPADM

## 2020-12-06 RX ORDER — LABETALOL HYDROCHLORIDE 5 MG/ML
10 INJECTION, SOLUTION INTRAVENOUS ONCE
Status: COMPLETED | OUTPATIENT
Start: 2020-12-06 | End: 2020-12-06

## 2020-12-06 RX ORDER — CARVEDILOL 3.12 MG/1
6.25 TABLET ORAL 2 TIMES DAILY WITH MEALS
Status: DISCONTINUED | OUTPATIENT
Start: 2020-12-06 | End: 2020-12-07 | Stop reason: HOSPADM

## 2020-12-06 RX ORDER — ONDANSETRON 2 MG/ML
4 INJECTION INTRAMUSCULAR; INTRAVENOUS EVERY 6 HOURS PRN
Status: DISCONTINUED | OUTPATIENT
Start: 2020-12-06 | End: 2020-12-07 | Stop reason: HOSPADM

## 2020-12-06 RX ORDER — LANOLIN ALCOHOL/MO/W.PET/CERES
3 CREAM (GRAM) TOPICAL AT BEDTIME
Status: DISCONTINUED | OUTPATIENT
Start: 2020-12-06 | End: 2020-12-07 | Stop reason: HOSPADM

## 2020-12-06 RX ORDER — POLYETHYLENE GLYCOL 3350 17 G/17G
17 POWDER, FOR SOLUTION ORAL DAILY
Status: DISCONTINUED | OUTPATIENT
Start: 2020-12-06 | End: 2020-12-07 | Stop reason: HOSPADM

## 2020-12-06 RX ORDER — LIDOCAINE 40 MG/G
CREAM TOPICAL
Status: DISCONTINUED | OUTPATIENT
Start: 2020-12-06 | End: 2020-12-07 | Stop reason: HOSPADM

## 2020-12-06 RX ORDER — ONDANSETRON 4 MG/1
4 TABLET, ORALLY DISINTEGRATING ORAL EVERY 6 HOURS PRN
Status: DISCONTINUED | OUTPATIENT
Start: 2020-12-06 | End: 2020-12-07 | Stop reason: HOSPADM

## 2020-12-06 RX ORDER — ACETAMINOPHEN 325 MG/1
650 TABLET ORAL EVERY 4 HOURS PRN
Status: DISCONTINUED | OUTPATIENT
Start: 2020-12-06 | End: 2020-12-07 | Stop reason: HOSPADM

## 2020-12-06 RX ORDER — AMOXICILLIN 250 MG
2 CAPSULE ORAL 2 TIMES DAILY
Status: DISCONTINUED | OUTPATIENT
Start: 2020-12-06 | End: 2020-12-07 | Stop reason: HOSPADM

## 2020-12-06 RX ORDER — CALCIUM CARBONATE 500 MG/1
500 TABLET, CHEWABLE ORAL 3 TIMES DAILY
Status: DISCONTINUED | OUTPATIENT
Start: 2020-12-06 | End: 2020-12-07 | Stop reason: HOSPADM

## 2020-12-06 RX ORDER — PROCHLORPERAZINE MALEATE 5 MG
10 TABLET ORAL EVERY 6 HOURS PRN
Status: DISCONTINUED | OUTPATIENT
Start: 2020-12-06 | End: 2020-12-07 | Stop reason: HOSPADM

## 2020-12-06 RX ORDER — ONDANSETRON 2 MG/ML
4 INJECTION INTRAMUSCULAR; INTRAVENOUS ONCE
Status: COMPLETED | OUTPATIENT
Start: 2020-12-06 | End: 2020-12-06

## 2020-12-06 RX ADMIN — CALCIUM CARBONATE (ANTACID) CHEW TAB 500 MG 500 MG: 500 CHEW TAB at 09:58

## 2020-12-06 RX ADMIN — INSULIN GLARGINE 20 UNITS: 100 INJECTION, SOLUTION SUBCUTANEOUS at 23:57

## 2020-12-06 RX ADMIN — PROCHLORPERAZINE EDISYLATE 10 MG: 5 INJECTION INTRAMUSCULAR; INTRAVENOUS at 07:40

## 2020-12-06 RX ADMIN — SODIUM CHLORIDE 300 ML: 9 INJECTION, SOLUTION INTRAVENOUS at 14:45

## 2020-12-06 RX ADMIN — LABETALOL HYDROCHLORIDE 10 MG: 5 INJECTION, SOLUTION INTRAVENOUS at 08:39

## 2020-12-06 RX ADMIN — ONDANSETRON 4 MG: 2 INJECTION INTRAMUSCULAR; INTRAVENOUS at 06:39

## 2020-12-06 RX ADMIN — METOCLOPRAMIDE 5 MG: 5 TABLET ORAL at 09:58

## 2020-12-06 RX ADMIN — SODIUM CHLORIDE 250 ML: 9 INJECTION, SOLUTION INTRAVENOUS at 14:45

## 2020-12-06 RX ADMIN — CARVEDILOL 6.25 MG: 3.12 TABLET, FILM COATED ORAL at 18:00

## 2020-12-06 RX ADMIN — OMEPRAZOLE 40 MG: 20 CAPSULE, DELAYED RELEASE ORAL at 19:55

## 2020-12-06 RX ADMIN — INSULIN ASPART 1 UNITS: 100 INJECTION, SOLUTION INTRAVENOUS; SUBCUTANEOUS at 17:53

## 2020-12-06 RX ADMIN — METOCLOPRAMIDE 5 MG: 5 TABLET ORAL at 17:52

## 2020-12-06 RX ADMIN — INSULIN ASPART 5 UNITS: 100 INJECTION, SOLUTION INTRAVENOUS; SUBCUTANEOUS at 13:33

## 2020-12-06 RX ADMIN — ONDANSETRON 4 MG: 2 INJECTION INTRAMUSCULAR; INTRAVENOUS at 06:40

## 2020-12-06 RX ADMIN — CARVEDILOL 6.25 MG: 3.12 TABLET, FILM COATED ORAL at 09:58

## 2020-12-06 RX ADMIN — Medication: at 14:45

## 2020-12-06 RX ADMIN — HUMAN ALBUMIN MICROSPHERES AND PERFLUTREN 5 ML: 10; .22 INJECTION, SOLUTION INTRAVENOUS at 09:20

## 2020-12-06 RX ADMIN — ONDANSETRON 4 MG: 2 INJECTION INTRAMUSCULAR; INTRAVENOUS at 12:10

## 2020-12-06 RX ADMIN — CALCIUM CARBONATE (ANTACID) CHEW TAB 500 MG 500 MG: 500 CHEW TAB at 17:53

## 2020-12-06 ASSESSMENT — ACTIVITIES OF DAILY LIVING (ADL)
ADLS_ACUITY_SCORE: 13
ADLS_ACUITY_SCORE: 13

## 2020-12-06 ASSESSMENT — MIFFLIN-ST. JEOR
SCORE: 1731.33
SCORE: 1716.5

## 2020-12-06 NOTE — CONSULTS
Nephrology Initial Consult  December 6, 2020      Murtaza Fitzgerald MRN:[id number] YOB: 1975  Date of Admission:12/6/2020  Primary care provider: Kt Ríos  Requesting physician: Charanjit Quiroz MD    ASSESSMENT AND RECOMMENDATIONS:     Murtaza Fitzgerald is a 45 year old male with a history of CAD s/p CABG, s/p failed pancreas/kidney transplant, ESRD on HD (T/Th/F) who was admitted on 12/6/2020 with concerns for volume overload in setting of possible CHF exacerbation versus incomplete dialysis run.     ESRD  On HD TTS   Still makes some minimal urine.  Dialyzes at [facility]  Primary Nephrologist  - Dr Schneider  Primary Axis: Left AV fistula with good bruit  Secondary access: Right IJ TDC.  Plan was to remove with tomorrow but now he is admitted.  Target weight: 85 kg.  His current weight is 86.2 kg  Blood pressure: Stable  Volume status: Hypervolemic resulting in hypoxia.  Electrolytes: No acute issues  Acid-base status: No acute issues  Anemia: Hemoglobin: 12.2  BMD: Calcium 9.7.  Albumin 3.8.      Recommendations  1.  HD run today.  Target UF 2 L, provided patient does not become hypotensive.  Reassess daily for further need for additional HD runs.. Subsequently we will pursue TTS schedule  --We will continue to challenge his dry weight  2.  Will seek rounding report on Monday from his dialysis center  3.  Agree with primary team's further cardiac work-up, including TTE    #Hx of failed kidney/pancreas transplant   He is no longer on immunosuppressants.  He is being evaluated for transplantation but he is not active in the list.      Recommendations were communicated to primary team via note and verbally   Seen and discussed with Dr. Beverley Perez MD   Renal fellow   [phone]      REASON FOR CONSULT:  ESRD , hypervolemic state    HISTORY OF PRESENT ILLNESS:  Admitting provider and nursing notes reviewed  Murtaza Fitzgerald is a 45 year old  admitted with  Murtaza Yenclaudette Fitzgerald is a 45 year old male with a history of CAD s/p CABG x3, s/p failed pancreas/kidney transplant, ESRD on HD (T/Th/F) who presents with concerns for shortness of breath.  He had his regular run on Saturday.  Despite that he continued to feel short of breath.  He had a Covid test on 12/3/2020 which was negative.  He went to outside hospital ED on Friday night for his symptoms.  EKG and troponins were negative.  Chest x-ray showed vascular congestion.  Potassium was 5.7 with sodium 125.  Case was discussed with nephrology on-call and a dose of Kayexalate was advised and follow-up for outpatient dialysis.  Patient symptoms did not get better so he decided to come to [facility] ER.  He was subsequently admitted.    Patient does endorse shortness of breath.  No chest pain  Labs showed sodium of 129, potassium of 5.1, bicarb of 29.  N-terminal proBNP 20,000.  Troponins were normal  WBC normal.  Hemoglobin 12.2, platelets 297  Chest x-ray unremarkable.  TTE : Technically difficult study.Extremely poor acoustic windows.  Moderately (EF 35-40%) reduced left ventricular function is present. LVEF 37%, reduced from prior.Grade III diastolic dysfunction with elevated estimated left-sided filling  pressures.Right ventricular function, chamber size, wall motion, and thickness are.normal.No significant valvular abnormalities were noted.          PAST MEDICAL HISTORY:  Reviewed with patient on 12/06/2020     Past Medical History:   Diagnosis Date     CMV (cytomegalovirus infection) status negative 2011     Coronary artery disease, non-occlusive 2008    angio showed diffuse disease with no lesions     Diabetes mellitus, type 1     Diagnosed at age 11. 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 2009     BYPASS GRAFT ARTERY CORONARY N/A 8/5/2015    Procedure: BYPASS GRAFT ARTERY CORONARY;  Surgeon: Katy Neville MD;  Location: [facility] OR     CORONARY ANGIOGRAPHY ADULT ORDER      2015     CREATE FISTULA ARTERIOVENOUS UPPER EXTREMITY Left 10/7/2020    Procedure: CREATION, ARTERIOVENOUS GRAFT placement LEFT UPPER EXTREMITY   with intraoperative ultrasound;  Surgeon: Melissa Guzman MD;  Location: [facility] OR     ESOPHAGOSCOPY, GASTROSCOPY, DUODENOSCOPY (EGD), COMBINED N/A 1/28/2020    Procedure: ESOPHAGOGASTRODUODENOSCOPY (EGD) biopsies w/forceps;  Surgeon: Emre Gomes MD;  Location:  GI     EYE SURGERY      vitrectomy both eyes      IR CVC TUNNEL PLACEMENT > 5 YRS OF AGE  2/13/2020     ORTHOPEDIC SURGERY  1993    tumor removed from left knee     TRANSPLANT  2009.2008    pancrease and kidney transplant        MEDICATIONS:  PTA Meds  Prior to Admission medications    Medication Sig Last Dose Taking? Auth Provider   acetaminophen (TYLENOL) 325 MG tablet Take 2 tablets (650 mg) by mouth every 4 hours as needed for mild pain   Misti Lopez MD   acetone, Urine, test STRP 1 strip by In Vitro route daily  Patient not taking: Reported on 2/26/2020   Marcie Lozano APRN CNP   blood glucose (NO BRAND SPECIFIED) test strip Use to test blood sugar 5 times daily or as directed.   Patricia Costa MD   calcium carbonate  (TUMS) 500 MG chewable tablet Take 1 chew tab by mouth 3 times daily   Reported, Patient   carvedilol (COREG) 6.25 MG tablet Take 1 tablet (6.25 mg) by mouth 2 times daily (with meals)   Michelle Schneider MD   clopidogrel (PLAVIX) 75 MG tablet Take 1 tablet (75 mg) by mouth daily   Misti Lopez MD   insulin aspart (NOVOLOG FLEXPEN) 100 UNIT/ML pen 1 unit per 6 grams of carbs with each meal. About 45 units/day.   Patricia Costa MD   insulin aspart (NOVOLOG FLEXPEN) 100 UNIT/ML pen Sliding scale = 1 unit for every 20 over blood glucose of 140   Unknown, Entered By History   insulin glargine (LANTUS SOLOSTAR) 100 UNIT/ML pen Inject 24 Units Subcutaneous At Bedtime   Kt Ríos MD   insulin pen needle (BD ARPITA U/F) 32G X 4 MM miscellaneous Use 4 daily or as directed.   Patricia Costa MD   metoclopramide (REGLAN) 5 MG tablet Take 1 tablet (5 mg) by mouth 4 times daily (before meals and nightly)   Jack Chen MD   naproxen sodium 220 MG capsule Take 220 mg by mouth as needed (PT last dose 9.28.2020)   Reported, Patient   omeprazole (PRILOSEC) 40 MG DR capsule Take 40 mg by mouth every evening   Unknown, Entered By History   ondansetron (ZOFRAN-ODT) 4 MG ODT tab Take 1 tablet (4 mg) by mouth every 8 hours as needed for nausea   Kt Ríos MD   oxyCODONE (ROXICODONE) 5 MG tablet Take 1 tablet (5 mg) by mouth every 6 hours as needed for moderate to severe pain   Misti Lopez MD   prochlorperazine (COMPAZINE) 5 MG tablet Take 1 tablet (5 mg) by mouth every 8 hours as needed for nausea   Kt Ríos MD   senna-docusate (SENOKOT-S/PERICOLACE) 8.6-50 MG tablet Take 1-2 tablets by mouth 2 times daily   Misti Lopez MD   tacrolimus (GENERIC EQUIVALENT) 1 MG capsule Take 1 mg by mouth 2 times daily    Kt Ríos MD   torsemide (DEMADEX) 20 MG tablet Take 1 tablet (20 mg) by mouth 2 times daily May hold as needed on dialysis days.   Katelyn Lopez, NP       Current Meds    calcium carbonate  500 mg Oral TID     carvedilol  6.25 mg Oral BID w/meals     metoclopramide  5 mg Oral 4x Daily AC & HS     omeprazole  40 mg Oral QPM     ondansetron  4 mg Intravenous Once     polyethylene glycol  17 g Oral Daily     senna-docusate  1 tablet Oral BID    Or     senna-docusate  2 tablet Oral BID     sodium chloride (PF)  10 mL Intravenous Once     sodium chloride (PF)  3 mL Intracatheter Q8H     Infusion Meds      ALLERGIES:    Allergies   Allergen Reactions     Diphenhydramine Other (See Comments)     Restless legs     Metoclopramide      Other reaction(s): Confusion, Unknown     Reglan Other (See Comments)     IV, delsuions       REVIEW OF SYSTEMS:  A comprehensive of systems was negative except as noted above.    SOCIAL HISTORY:   Social History     Socioeconomic History     Marital status: Single     Spouse name: Not on file     Number of children: 1     Years of education: Not on file     Highest education level: Associate degree: academic program   Occupational History     Occupation:    Social Needs     Financial resource strain: Not very hard     Food insecurity     Worry: Never true     Inability: Never true     Transportation needs     Medical: No     Non-medical: No   Tobacco Use     Smoking status: Former Smoker     Packs/day: 1.00     Years: 20.00     Pack years: 20.00     Types: Vaping Device, Cigarettes     Quit date: 2016     Years since quittin.9     Smokeless tobacco: Never Used     Tobacco comment: E- Cig use since    Substance and Sexual Activity     Alcohol use: Not Currently     Alcohol/week: 0.0 standard drinks     Frequency: Never     Binge frequency: Never     Drug use: No     Sexual activity: Never     Partners: Female   Lifestyle     Physical activity     Days per week: 0 days     Minutes per session: 0 min     Stress: To some extent   Relationships     Social connections     Talks on phone: More than three times a week      "Gets together: Once a week     Attends Congregation service: Never     Active member of club or organization: No     Attends meetings of clubs or organizations: Never     Relationship status:      Intimate partner violence     Fear of current or ex partner: Not on file     Emotionally abused: Not on file     Physically abused: Not on file     Forced sexual activity: Not on file   Other Topics Concern     Parent/sibling w/ CABG, MI or angioplasty before 65F 55M? No      Service Not Asked     Blood Transfusions No     Comment: possibly during surgery, otherwise none.     Caffeine Concern Not Asked     Occupational Exposure Not Asked     Hobby Hazards Not Asked     Sleep Concern Not Asked     Stress Concern Not Asked     Weight Concern Not Asked     Special Diet Not Asked     Back Care Not Asked     Exercise Not Asked     Bike Helmet Not Asked     Seat Belt Yes     Self-Exams Not Asked   Social History Narrative     Not on file     Reviewed with patient     FAMILY MEDICAL HISTORY:   Family History   Problem Relation Age of Onset     Unknown/Adopted Father      Heart Disease Maternal Grandfather 62     Melanoma No family hx of      Skin Cancer No family hx of      Reviewed with patient     PHYSICAL EXAM:   Temp  Av.9  F (36.6  C)  Min: 97.9  F (36.6  C)  Max: 97.9  F (36.6  C)      Pulse  Av  Min: 89  Max: 89 Resp  Av  Min: 16  Max: 16  SpO2  Av %  Min: 98 %  Max: 100 %       BP (!) 180/102   Pulse 89   Temp 97.9  F (36.6  C) (Oral)   Resp 16   Ht 1.727 m (5' 8\")   Wt 86.2 kg (190 lb)   SpO2 100%   BMI 28.89 kg/m        Admit Weight: 86.2 kg (190 lb)     GENERAL APPEARANCE: No distress,No awake  EYES: no  scleral icterus, pupils equal  Endo: no goiter, no moon facies  Lymphatics: no cervical or supraclavicular LAD  Pulmonary: lungs clear to auscultation with equal breath sounds bilaterally, no clubbing  CV: regular rhythm, normal rate, no rub   - JVD    - Edema 1+   GI: soft, " nontender, normal bowel sounds  MS: no evidence of inflammation in joints, no muscle tenderness  : nofoley  SKIN: no rash, warm, dry, no cyanosis  NEURO: face symmetric,no asterixis     LABS:   CMP  Recent Labs   Lab 12/06/20  0622   *   POTASSIUM 5.1   CHLORIDE 90*   CO2 29   ANIONGAP 10   *   BUN 24   CR 6.38*   GFRESTIMATED 10*   GFRESTBLACK 11*   CHARLY 9.7   PROTTOTAL 8.5   ALBUMIN 3.8   BILITOTAL 1.1   ALKPHOS 198*   AST 16   ALT 35     CBC  Recent Labs   Lab 12/06/20  0622   HGB 12.2*   WBC 5.6   RBC 4.04*   HCT 39.0*   MCV 97   MCH 30.2   MCHC 31.3*   RDW 14.6        INRNo lab results found in last 7 days.  ABGNo lab results found in last 7 days.   URINE STUDIES  Recent Labs   Lab Test 02/11/20  0850 01/27/20  0941 01/03/20  1406 08/28/19  0905   COLOR Yellow Straw Light Yellow Yellow   APPEARANCE Clear Clear Clear Clear   URINEGLC 100* >1000* 30* Negative   URINEBILI Negative Negative Negative Negative   URINEKETONE Negative Trace* 10* Negative   SG 1.020 1.011 1.017 1.015   UBLD Trace* Trace* Trace* Small*   URINEPH 7.0 6.5 6.5 7.0   PROTEIN >=300* 300* 300* 100*   UROBILINOGEN 0.2  --   --  0.2   NITRITE Negative Negative Negative Negative   LEUKEST Negative Negative Negative Negative   RBCU 2-5* 2 3* 5-10*   WBCU 0 - 5 2 1 5-10*     Recent Labs   Lab Test 08/22/19  0857 01/11/19  0741 07/08/15  0747 01/28/15  0749 12/27/13  0736 11/11/13  1436 11/12/12  1649 10/11/12  0737   UTPG 2.20* 0.87* 0.28* 0.43* 0.31* 0.30* 0.40* 0.51*     PTH  No lab results found.  IRON STUDIES  Recent Labs   Lab Test 02/13/20  0720   AUBRIE 106       IMAGING:  All imaging studies reviewed by me.     Bruno Perez MD    I was present with the fellow during the history and exam.  I discussed the case with the fellow and agree with the findings as documented in the assessment and plan. Dialysis today and will evaluate further tomorrow.  Need to remove catheter as vascular access has been used successfully for  sandee Lowery

## 2020-12-06 NOTE — ED PROVIDER NOTES
ED Provider Note  [facility]      History     Chief Complaint   Patient presents with     Shortness of Breath     fluid up; requesting additional dialysis     HPI  Murtaza Fitzgerald is a 45 year old male history of kidney failure, end-stage renal disease, CAD status post CABG x3, history of failed transplant who presents due to concern of fluid overload.  Patient was seen in May full [facility] recently for similar evaluation at that time his laboratories were unrevealing for an emergent indication for dialysis he was ultimately discharged with plan for initiation of dialysis on Monday or Tuesday.  Unfortunately last night his breathing got worse.  He was completely unable to lay flat and began to feel somewhat nauseous and weak.  Multiple episodes of vomiting.  Presented to the emergency department subsequently    Denies associated chest pain.  Endorses  abdominal pain associated with retching and the epigastric pain.  No diarrhea.  No trauma.  No syncopal episodes, falls or head injury.    Believes he has lost weight from worsening gastroparesis in the last several months and that his dry weight should actually be lower than it currently is estimated during dialysis-suspects he is under dialyzed from a fluid perspective    Past Medical History  Past Medical History:   Diagnosis Date     CMV (cytomegalovirus infection) status negative 2011     Coronary artery disease, non-occlusive 2008    angio showed diffuse disease with no lesions     Diabetes mellitus, type 1     Diagnosed at age 11. 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     Noncompliance with treatment     no labs for one year      Pancreas replaced by transplant (H) Feb 2009    rejection treated with thymo     Pancreatic disease     pancreas transplant     Tobacco abuse ongoing     Past Surgical History:   Procedure Laterality Date     ABDOMEN SURGERY       ARTHROSCOPY KNEE WITH LATERAL MENISCECTOMY Left 7/10/2019    Procedure: Left knee arthroscopic partial lateral meniscectomy;  Surgeon: Alex Candelaria MD;  Location: [facility] OR     BIOPSY      [facility] 2009     BYPASS GRAFT ARTERY CORONARY N/A 8/5/2015    Procedure: BYPASS GRAFT ARTERY CORONARY;  Surgeon: Katy Neville MD;  Location: [facility] OR     CORONARY ANGIOGRAPHY ADULT ORDER      2015     CREATE FISTULA ARTERIOVENOUS UPPER EXTREMITY Left 10/7/2020    Procedure: CREATION, ARTERIOVENOUS GRAFT placement LEFT UPPER EXTREMITY   with intraoperative ultrasound;  Surgeon: Melissa Guzman MD;  Location: [facility] OR     ESOPHAGOSCOPY, GASTROSCOPY, DUODENOSCOPY (EGD), COMBINED N/A 1/28/2020    Procedure: ESOPHAGOGASTRODUODENOSCOPY (EGD) biopsies w/forceps;  Surgeon: Emre Gomes MD;  Location:  GI     EYE SURGERY      vitrectomy both eyes      IR CVC TUNNEL PLACEMENT > 5 YRS OF AGE  2/13/2020     ORTHOPEDIC SURGERY  1993    tumor removed from left knee     TRANSPLANT  2009.2008    pancrease and kidney transplant          acetaminophen (TYLENOL) 325 MG tablet       acetone, Urine, test STRP       blood glucose (NO BRAND SPECIFIED) test strip       calcium carbonate (TUMS) 500 MG chewable tablet       carvedilol (COREG) 6.25 MG tablet       clopidogrel (PLAVIX) 75 MG tablet       insulin aspart (NOVOLOG FLEXPEN) 100 UNIT/ML pen       insulin aspart (NOVOLOG FLEXPEN) 100 UNIT/ML pen       insulin glargine (LANTUS SOLOSTAR) 100 UNIT/ML pen       insulin pen needle (BD ARPITA U/F) 32G X 4 MM miscellaneous       metoclopramide (REGLAN) 5 MG tablet       naproxen sodium 220 MG capsule       omeprazole (PRILOSEC) 40 MG DR capsule       ondansetron (ZOFRAN-ODT) 4 MG ODT tab       oxyCODONE (ROXICODONE) 5  "MG tablet       prochlorperazine (COMPAZINE) 5 MG tablet       senna-docusate (SENOKOT-S/PERICOLACE) 8.6-50 MG tablet       tacrolimus (GENERIC EQUIVALENT) 1 MG capsule       torsemide (DEMADEX) 20 MG tablet      Allergies   Allergen Reactions     Diphenhydramine Other (See Comments)     Restless legs     Metoclopramide      Other reaction(s): Confusion, Unknown     Reglan Other (See Comments)     IV, delsuions     Family History  Family History   Problem Relation Age of Onset     Unknown/Adopted Father      Heart Disease Maternal Grandfather 62     Melanoma No family hx of      Skin Cancer No family hx of      Social History   Social History     Tobacco Use     Smoking status: Former Smoker     Packs/day: 1.00     Years: 20.00     Pack years: 20.00     Types: Vaping Device, Cigarettes     Quit date: 2016     Years since quittin.9     Smokeless tobacco: Never Used     Tobacco comment: E- Cig use since    Substance Use Topics     Alcohol use: Not Currently     Alcohol/week: 0.0 standard drinks     Frequency: Never     Binge frequency: Never     Drug use: No      Past medical history, past surgical history, medications, allergies, family history, and social history were reviewed with the patient. No additional pertinent items.       Review of Systems  A complete review of systems was performed with pertinent positives and negatives noted in the HPI, and all other systems negative.    Physical Exam   BP: (!) 180/102  Pulse: 89  Temp: 97.9  F (36.6  C)  Resp: 16  Height: 172.7 cm (5' 8\")  Weight: 86.2 kg (190 lb)  SpO2: 100 %  Physical Exam  GEN: Nauseous, non toxic, cooperative and conversant.   HEENT: The head is normocephalic and atraumatic. Pupils are equal round and reactive to light.  Face is somewhat boggy appearing possibly due to sleeplessness.  Extraocular motions are intact. There is no facial swelling. The neck is nontender and supple.   CV: Regular rate . 2+ radial pulses bilaterally.  PULM: " Unlabored breathing  ABD: Soft, gastric tenderness to palpation, nondistended.   EXT: Full range of motion.  No edema.  Extremities are cool and clammy, patient nauseous and vomiting  NEURO: Cranial nerves II through XII are intact and symmetric. Bilateral upper and lower extremities grossly show full range of motion without any focal deficits.   PSYCH: Calm and cooperative, interactive.     ED Course      Procedures  Results for orders placed during the hospital encounter of 12/06/20   POC US ECHO LIMITED    Impression Bedside, focused cardiac ultrasound performed and interpreted by me.    Indication: Shortness of breath    Parasternal long, parasternal short, apical four-chamber views were acquired for gross evaluation of pericardial effusion alone.  Image quality was satisfactory for evaluation of pericardial effusion. There is no evidence of free fluid within the pericardium.  Global function appears minimally diminished.  B-lines are present bilaterally and are prominent at the apex    Impression: Bedside limited cardiac ultrasound showing minimally decreased global function, no pericardial effusion.  Dense of pulmonary edema              EKG at 0645.    sob at time of ECG.  ECG interpretted by me within 10 minutes of production  NSR at 87 bpm. Normal axis.  Normal intervals. Delayed R-wave progress. No ST-T elevations or depressions. Pathologic T-wave inversion are absent     Interpretation: Abnormal ECG           Results for orders placed or performed during the hospital encounter of 12/06/20   XR Chest 2 Views     Status: None (Preliminary result)    Impression    IMPRESSION:   1. No acute cardiopulmonary disease.  2. The right IJ tunneled central venous catheter is in stable  position.   POC US ECHO LIMITED     Status: None    Impression    Bedside, focused cardiac ultrasound performed and interpreted by me.    Indication: Shortness of breath    Parasternal long, parasternal short, apical four-chamber views  were acquired for gross evaluation of pericardial effusion alone.  Image quality was satisfactory for evaluation of pericardial effusion. There is no evidence of free fluid within the pericardium.  Global function appears minimally diminished.  B-lines are present bilaterally and are prominent at the apex    Impression: Bedside limited cardiac ultrasound showing minimally decreased global function, no pericardial effusion.  Dense of pulmonary edema     CBC with platelets differential     Status: Abnormal   Result Value Ref Range    WBC 5.6 4.0 - 11.0 10e9/L    RBC Count 4.04 (L) 4.4 - 5.9 10e12/L    Hemoglobin 12.2 (L) 13.3 - 17.7 g/dL    Hematocrit 39.0 (L) 40.0 - 53.0 %    MCV 97 78 - 100 fl    MCH 30.2 26.5 - 33.0 pg    MCHC 31.3 (L) 31.5 - 36.5 g/dL    RDW 14.6 10.0 - 15.0 %    Platelet Count 297 150 - 450 10e9/L    Diff Method Automated Method     % Neutrophils 69.5 %    % Lymphocytes 13.6 %    % Monocytes 7.2 %    % Eosinophils 7.2 %    % Basophils 1.6 %    % Immature Granulocytes 0.9 %    Nucleated RBCs 0 0 /100    Absolute Neutrophil 3.9 1.6 - 8.3 10e9/L    Absolute Lymphocytes 0.8 0.8 - 5.3 10e9/L    Absolute Monocytes 0.4 0.0 - 1.3 10e9/L    Absolute Eosinophils 0.4 0.0 - 0.7 10e9/L    Absolute Basophils 0.1 0.0 - 0.2 10e9/L    Abs Immature Granulocytes 0.1 0 - 0.4 10e9/L    Absolute Nucleated RBC 0.0    Comprehensive metabolic panel     Status: Abnormal   Result Value Ref Range    Sodium 129 (L) 133 - 144 mmol/L    Potassium 5.1 3.4 - 5.3 mmol/L    Chloride 90 (L) 94 - 109 mmol/L    Carbon Dioxide 29 20 - 32 mmol/L    Anion Gap 10 3 - 14 mmol/L    Glucose 150 (H) 70 - 99 mg/dL    Urea Nitrogen 24 7 - 30 mg/dL    Creatinine 6.38 (H) 0.66 - 1.25 mg/dL    GFR Estimate 10 (L) >60 mL/min/[1.73_m2]    GFR Estimate If Black 11 (L) >60 mL/min/[1.73_m2]    Calcium 9.7 8.5 - 10.1 mg/dL    Bilirubin Total 1.1 0.2 - 1.3 mg/dL    Albumin 3.8 3.4 - 5.0 g/dL    Protein Total 8.5 6.8 - 8.8 g/dL    Alkaline Phosphatase  198 (H) 40 - 150 U/L    ALT 35 0 - 70 U/L    AST 16 0 - 45 U/L   Lactic acid whole blood     Status: None   Result Value Ref Range    Lactic Acid 1.5 0.7 - 2.0 mmol/L   Troponin I     Status: None   Result Value Ref Range    Troponin I ES <0.015 0.000 - 0.045 ug/L   Nt probnp inpatient     Status: Abnormal   Result Value Ref Range    N-Terminal Pro BNP Inpatient 20,590 (H) 0 - 450 pg/mL   EKG 12-lead, tracing only     Status: None (Preliminary result)   Result Value Ref Range    Interpretation ECG Click View Image link to view waveform and result      Medications   ondansetron (ZOFRAN) injection 4 mg (has no administration in time range)   labetalol (NORMODYNE/TRANDATE) injection 10 mg (has no administration in time range)   ondansetron (ZOFRAN) injection 4 mg (4 mg Intravenous Given 12/6/20 0639)   prochlorperazine (COMPAZINE) injection 10 mg (10 mg Intravenous Given 12/6/20 0740)        Assessments & Plan (with Medical Decision Making)   45-year-old male with end-stage renal disease on dialysis, last dialyzed on Thursday presenting with concern for fluid overload, with apnea and nausea with vomiting.    DDx is broad including fluid overload, ACS, cardiogenic shock, sepsis, acute pulmonary edema, pneumonia, bowel obstruction, colitis, enteritis, among other causes.    Laboratories notable for BNP of greater than 20,000, troponin is negative  Lactate 1.5, potassium 5.1  ECG without evidence of acute ischemia  Echocardiogram at bedside showing diminished global function, likely similar to his baseline.  There is extensive pulmonary edema on lung ultrasound system with fluid overload  Chest x-ray shows no acute pulmonary disease, likely not accurate from a fluid standpoint in this clinical setting    Patient was discussed with the internal medicine service and admitted for anticipated hemodialysis.   Patient responded fairly well to Compazine and Zofran for antiemetic control.  Given labetalol 10 mg IV for significant  hypertension    Patient signed out to Dr. Quiroz for continued monitoring, awaiting hospital bed    I have reviewed the nursing notes. I have reviewed the findings, diagnosis, plan and need for follow up with the patient.    New Prescriptions    No medications on file       Final diagnoses:   Acute congestive heart failure, unspecified heart failure type (H)   Hypervolemia, unspecified hypervolemia type       --  Akash Todd  [facility] EMERGENCY DEPARTMENT  12/6/2020     Akash Todd MD  12/06/20 0804

## 2020-12-06 NOTE — PROGRESS NOTES
..Patient admitted to:5c  Admitted from:ed  Arrived by:wheelchair  Reason for admission:needs dialysis  Patient accompanied by:self  Belongings:in room  Teaching:admission  Skin double check completed by:kacey- multiple scabs and bruises and ripped toenails that had bled

## 2020-12-06 NOTE — H&P
[facility] [place]     History and Physical - [facility]jay[facility] 3 Service        Date of Admission:  12/6/2020    Assessment & Plan   Murtaza Fitzgerald is a 45 year old male with a history of CAD s/p CABG x3, s/p failed pancreas/kidney transplant, ESRD on HD (T/Th/F) who was admitted on 12/6/2020 with concerns for volume overload in setting of possible CHF exacerbation versus incomplete dialysis run.     #Dyspnea   #Concern for HF exacerbation   #Hx of CAD s/p CABG x3, 2015   Patient's reports symptoms of orthopnea, PND, and mild exertional dyspnea. His BNP on admission is 20,590, which is lower than 28,143 back in 10/2020. On clinical exam, patient's lungs sound fairly clear bilaterally with mild JVD but no significant peripheral edema. On CXR, cardiac silhouette is normal with no signs of pleural effusion. Low concern for infectious process at this time. He has no reports of chest pain at this time, EKG is unchanged from previous. Low concern for ACS or other intrathoracic process. Patient will require improved diuretic regimen pending Nephrology dialysis recommendations.     - Complete ECHO pending  - Will require readjustment of diuretic regimen   - Continue PTA Carvedilol 6.25 mg BID   - Wean O2 as tolerated   - Strict I/O   - Daily Weights  - BNP Q48H     #ESRD on HD (T/Th/S)   #Hx of failed kidney/pancreas transplant   The patient gets [facility] and is followed by Dr. Jack Chen. He reports that his dry weight is 85 kg, but believes that may not be true given his ongoing symptoms. Patients electrolytes on admission are largely unremarkable with K 5.1, Na 129, BUN 24, with no signs of acid/base disturbance. Patient no longer takes Tacrolimus for immunosuppression.     - ESRD consult, recs appreciated   - Monitor electrolytes   - Replete PRN     #Nausea, Vomiting, improving   Patient endorses ongoing n/v over the past year. He has been evaluated multiple  "times in the emergency department over the past few months, without any definitive conclusion. He usually receives symptomatic treatment. On clinical exam at time of admission, patient endorsed ongoing nausea and mild emesis but otherwise no focal abdominal pain, hematemesis, dysphagia, or chest discomfort.     - Continue to monitor   - PRN Zofran and Compazine   - PTA Omeprazole and TUMs   - PTA Reglan 5 mg QID     #T1DM   - Glargine 20 U at Bedtime   - HSSI   - Hypoglycemia protocol          Diet:   Dialysis diet   Fluids: PO intake  DVT Prophylaxis: Pneumatic Compression Devices  Mueller Catheter: not present  Code Status:   Full Code         Disposition Plan   Expected discharge: 1-2 days, recommended to prior living arrangement once medically stable and safe disposition plan in place.  Entered: Hugo Sadler MD 12/06/2020, 10:35 AM     The patient's care was discussed with the Attending Physician, Dr. Hawkins.    Hugo Sadler MD  [address]   Contact information available via [facility] Paging/Directory  Please see sign in/sign out for up to date coverage information  ______________________________________________________________________    Chief Complaint   Shortness of breath     History is obtained from the patient     History of Present Illness   Murtaza Fitzgerald is a 45 year old male with a history of CAD s/p CABG x3, s/p failed pancreas/kidney transplant, ESRD on HD (T/Th/F) who presents with concerns for shortness of breath.     The patient states that he has been having ongoing nausea/vomiting for the past one year (multiple ER visits). He states that he has not had reports of hematemesis and usually just has dry heaving. Additionally, over the past few months he has had intermittent shortness of breath, which he reports is usually more noticeable when he feels he has gotten improper dialysis and is \"feeling fluid up\". " "The patient got dialyzed yesterday and states that he continued to feel short of breath afterwards thus prompting him present to [facility] ED where was he discharged after some monitoring and without acute interventions. This morning, he began to feel nauseous with some mild emesis in association with his ongoing dyspnea thus prompting him to present here for evaluation.     Here, the patient reports that he usually feels mildly dyspneic at baseline and reports snoring, orthopnea, and PND; he states that he sleeps propped up with a couple of pillows. The patient states that he started dialyzing earlier this year and usually gets about 3.5 - 4.5 kg removed. He states that his dry weight is around 85 kg, but \"I don't know if that's true\". He started using his left arm fistula for HD three weeks ago and reports that he makes \"very little urine\". He has about 1-2 watery stools/day for the past 2 weeks but otherwise does not report any associated chest pain, dysphagia, odynophagia, reflux symptoms, focal abdominal pain, lightheadedness, headaches, syncope, fevers, or cough. Of note, the patient no longer takes tacrolimus, Plavix, or aspirin. He takes Carvedilol 6.25 BID but has stopped taking the Torsemide over the past couple months because \"it does not work for me\". The patient has no further acute complaints or concerns at this time.     ECHO 10/02/2020  Technically difficult study.  Borderline (EF 50-55%) reduced left ventricular function is present. Mild  diffuse hypokinesis is present. Mild concentric wall thickening consistent  with left ventricular hypertrophy is present.  Global right ventricular function is mildly reduced. The right ventricle is  normal size.  No significant valvular abnormalities.  Right ventricular systolic pressure is 26mmHg above the right atrial pressure.  No pericardial effusion is present.  This study was compared with the study from 7/9/2019. Based on the direct  visual comparison, the " biventricular ejection fraction on the contrasted  images appears to be lower compared to the resting tomograms from the  07/09/2019 study.    Lexiscan 10/2/2020     The nuclear stress test is mildly positive.  Summed stress score is 4.     There is mild reversible ischemia in the [id number] anterior wall and mid anterolateral wall.     Left ventricular function is moderately reduced. There is global hypokinesia of the left ventricle consistent with cardiomyopathy.     There is no prior study for comparison.       Review of Systems    The 10 point Review of Systems is negative other than noted in the HPI or here.     Past Medical History      Past Medical History:   Diagnosis Date     CMV (cytomegalovirus infection) status negative 2011     Coronary artery disease, non-occlusive 2008    angio showed diffuse disease with no lesions     Diabetes mellitus, type 1     Diagnosed at age 11. 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     Noncompliance with treatment     no labs for one year     Pancreas replaced by transplant (H) Feb 2009    rejection treated with thymo     Pancreatic disease     pancreas transplant     Tobacco abuse ongoing       Past Surgical History     Past Surgical History:   Procedure Laterality Date     ABDOMEN SURGERY       ARTHROSCOPY KNEE WITH LATERAL MENISCECTOMY Left 7/10/2019    Procedure: Left knee arthroscopic partial lateral meniscectomy;  Surgeon: Alex Candelaria MD;  Location: [facility] OR     BIOPSY      [facility] 2009     BYPASS GRAFT ARTERY CORONARY N/A 8/5/2015    Procedure: BYPASS GRAFT ARTERY CORONARY;  Surgeon: Katy Neville MD;  Location: [facility] OR     CORONARY ANGIOGRAPHY ADULT ORDER            CREATE FISTULA ARTERIOVENOUS UPPER EXTREMITY Left 10/7/2020    Procedure: CREATION, ARTERIOVENOUS GRAFT placement LEFT UPPER EXTREMITY   with intraoperative ultrasound;  Surgeon: Melissa Guzman MD;  Location: [facility] OR     ESOPHAGOSCOPY, GASTROSCOPY, DUODENOSCOPY (EGD), COMBINED N/A 2020    Procedure: ESOPHAGOGASTRODUODENOSCOPY (EGD) biopsies w/forceps;  Surgeon: Emre Gomes MD;  Location:  GI     EYE SURGERY      vitrectomy both eyes      IR CVC TUNNEL PLACEMENT > 5 YRS OF AGE  2020     ORTHOPEDIC SURGERY  1993    tumor removed from left knee     TRANSPLANT  .    pancrease and kidney transplant    Triple bypass in    Kidney/pancreas tx 2008 - left side     Social History   Vapes daily  Stopped drinking ETOH 5-6 years ago   No drug use   Lives w/ mom and sister in [place]   Unemployed: On disability     Family History   I have reviewed this patient's family history and updated it with pertinent information if needed.  Family History   Problem Relation Age of Onset     Unknown/Adopted Father      Heart Disease Maternal Grandfather 62     Melanoma No family hx of      Skin Cancer No family hx of        Prior to Admission Medications   Prior to Admission Medications   Prescriptions Last Dose Informant Patient Reported? Taking?   acetaminophen (TYLENOL) 325 MG tablet   No No   Sig: Take 2 tablets (650 mg) by mouth every 4 hours as needed for mild pain   acetone, Urine, test STRP  Self No No   Si strip by In Vitro route daily   Patient not taking: Reported on 2020   blood glucose (NO BRAND SPECIFIED) test strip   No No   Sig: Use to test blood sugar 5 times daily or as directed.   calcium carbonate (TUMS) 500 MG chewable tablet   Yes No   Sig: Take 1 chew tab by mouth 3 times daily   carvedilol (COREG) 6.25 MG tablet   No No   Sig: Take 1 tablet (6.25 mg) by mouth 2 times daily (with meals)   clopidogrel (PLAVIX) 75 MG tablet   No No   Sig: Take 1 tablet (75 mg) by mouth  daily   insulin aspart (NOVOLOG FLEXPEN) 100 UNIT/ML pen  Self Yes No   Sig: Sliding scale = 1 unit for every 20 over blood glucose of 140   insulin aspart (NOVOLOG FLEXPEN) 100 UNIT/ML pen   No No   Si unit per 6 grams of carbs with each meal. About 45 units/day.   insulin glargine (LANTUS SOLOSTAR) 100 UNIT/ML pen   No No   Sig: Inject 24 Units Subcutaneous At Bedtime   insulin pen needle (BD ARPITA U/F) 32G X 4 MM miscellaneous   No No   Sig: Use 4 daily or as directed.   metoclopramide (REGLAN) 5 MG tablet   No No   Sig: Take 1 tablet (5 mg) by mouth 4 times daily (before meals and nightly)   naproxen sodium 220 MG capsule   Yes No   Sig: Take 220 mg by mouth as needed (PT last dose 2020)   omeprazole (PRILOSEC) 40 MG DR capsule  Self Yes No   Sig: Take 40 mg by mouth every evening   ondansetron (ZOFRAN-ODT) 4 MG ODT tab   No No   Sig: Take 1 tablet (4 mg) by mouth every 8 hours as needed for nausea   oxyCODONE (ROXICODONE) 5 MG tablet   No No   Sig: Take 1 tablet (5 mg) by mouth every 6 hours as needed for moderate to severe pain   prochlorperazine (COMPAZINE) 5 MG tablet   No No   Sig: Take 1 tablet (5 mg) by mouth every 8 hours as needed for nausea   senna-docusate (SENOKOT-S/PERICOLACE) 8.6-50 MG tablet   No No   Sig: Take 1-2 tablets by mouth 2 times daily   tacrolimus (GENERIC EQUIVALENT) 1 MG capsule  Self Yes No   Sig: Take 1 mg by mouth 2 times daily    torsemide (DEMADEX) 20 MG tablet   No No   Sig: Take 1 tablet (20 mg) by mouth 2 times daily May hold as needed on dialysis days.      Facility-Administered Medications: None     Allergies   Allergies   Allergen Reactions     Diphenhydramine Other (See Comments)     Restless legs     Metoclopramide      Other reaction(s): Confusion, Unknown     Reglan Other (See Comments)     IV, delsuions       Physical Exam   Vital Signs: Temp: 97.9  F (36.6  C) Temp src: Oral BP: (!) 180/102 Pulse: 89   Resp: 16 SpO2: 100 % O2 Device: None (Room air)    Weight:  190 lbs 0 oz    Constitutional: somnolent but arousable intermittently snores, cooperative, mild distress and appears stated age. Sitting up in bed. Emesis bag on lap.   Eyes: Lids and lashes normal, pupils equal, round and reactive to light, extra ocular muscles intact, sclera clear, conjunctiva normal  ENT: Normocephalic, without obvious abnormality, atraumatic, dry mucous membranes. Neck supple, normal ROM. Mild JVD   Hematologic / Lymphatic: no cervical lymphadenopathy  Respiratory: No increased work of breathing, good air exchange, clear to auscultation bilaterally, no crackles or wheezing  Cardiovascular: RRR. Mild S4. No appreciable murmurs. Fistula intact, appreciable thrill.   GI:  Soft, non tender to palpation. No masses. No rebound. No rigidity.   Skin: Warm and dry. No noticeable rashes.   Musculoskeletal: Moving all extremities spontaneously   Neurologic: Non focal  Neuropsychiatric: Alert and cooperative. Answers questions appropriately. Good mood and affect.     Data   Data reviewed today: I reviewed all medications, new labs and imaging results over the last 24 hours.     Most Recent 3 CBC's:  Recent Labs   Lab Test 12/06/20 0622 11/03/20  1608 10/07/20  1119   WBC 5.6 6.4 5.5   HGB 12.2* 9.3* 10.4*   MCV 97 92 92    257 289     Most Recent 3 BMP's:  Recent Labs   Lab Test 12/06/20 0622 11/03/20  1608 10/07/20  1119 10/02/20  0841   * 131*  --  135   POTASSIUM 5.1 4.2 4.7 4.6   CHLORIDE 90* 90*  --  94   CO2 29 32  --  30   BUN 24 28  --  32*   CR 6.38* 6.04* 8.34* 6.92*   ANIONGAP 10 9  --  11   CHARLY 9.7 9.2  --  9.4   * 292* 218* 148*     Most Recent 3 Troponin's:  Lab Test 12/06/20 0622   TROPI <0.015   TROPONIN  --      Recent Results (from the past 24 hour(s))   POC US ECHO LIMITED    Impression    Bedside, focused cardiac ultrasound performed and interpreted by me.    Indication: Shortness of breath    Parasternal long, parasternal short, apical four-chamber views were  acquired for gross evaluation of pericardial effusion alone.  Image quality was satisfactory for evaluation of pericardial effusion. There is no evidence of free fluid within the pericardium.  Global function appears minimally diminished.  B-lines are present bilaterally and are prominent at the apex    Impression: Bedside limited cardiac ultrasound showing minimally decreased global function, no pericardial effusion.  Dense of pulmonary edema     XR Chest 2 Views    Narrative    EXAM: XR CHEST 2 VW  12/6/2020 7:21 AM     HISTORY:  shortness of breath       COMPARISON:  10/2/2020    FINDINGS:   PA and lateral radiograph the chest. Postoperative changes of the  chest with intact sternotomy wires. The right IJ CVC projects over the  right atrium. Cardiac silhouette size is normal. No pleural effusion.  No pneumothorax. No focal airspace opacities. The visualized upper  abdomen is unremarkable.      Impression    IMPRESSION:   1. No acute cardiopulmonary disease.  2. The right IJ tunneled central venous catheter is in stable  position.    I have personally reviewed the examination and initial interpretation  and I agree with the findings.    ESTEFANI MORATAYA MD

## 2020-12-06 NOTE — ED TRIAGE NOTES
45M patient - presenting via Triage; seen earlier in night at [place] ED.  Patient states he is fluid up and is needing / requesting additional dialysis.  Patient normally dialyzes Tues/Thur/Sat (and did so yesterday).  Patient also retching / heaving, with nausea.  Airway patent, though states he is SOB with the extra fluid.

## 2020-12-06 NOTE — PROGRESS NOTES
HEMODIALYSIS TREATMENT NOTE    Date: 12/6/2020  Time: 4:27 PM    Data:  Pre Wt:  84.7 kg  Desired Wt: 82.7 kg   Post Wt:  82.7 kg (estimated)  Weight change:  1.9 kg  Ultrafiltration - Post Run Net Total Removed (mL):  1900 mLs  Vascular Access Status: Fistula / patent  Dialyzer Rinse:  light  Total Blood Volume Processed: 43.5 L  Total Dialysis (Treatment) Time:  2 hours  Dialysate Bath: K 2, Ca 2.5, Na 132, Bicarb 32  Heparin: None    Lab:   No    Interventions:  Patient placed on 2 lpm (increased to 4L shortly after) of O2 via nasal cannula per patient request d/t feeling SOB  AVF cannulated with 15 gauge needles - 400 BFR achieved  1.9 L of fluid removed without complications  Pressure applied to venous and arterial cannulation sites post-treatment until hemostasis achieved    Assessment:  HD for ESRD patient admitted with fluid overload. Tolerated well until last 10 min when SBP dropped into 80s. Patient c/o feeling warm but otherwise asymptomatic. BP rebounded with rinseback. Slept for most of run.      Plan:    Per renal team.

## 2020-12-06 NOTE — ED NOTES
[facility]    ED Nurse to Floor Handoff     Mandojelanianna EDUAR Fitzgerald is a 45 year old male who speaks English and lives with a spouse,  in a home  They arrived in the ED by car from home    ED Chief Complaint: Shortness of Breath (fluid up; requesting additional dialysis)    ED Dx;   Final diagnoses:   None         Needed?: No    Allergies:   Allergies   Allergen Reactions     Diphenhydramine Other (See Comments)     Restless legs     Metoclopramide      Other reaction(s): Confusion, Unknown     Reglan Other (See Comments)     IV, delsuions   .  Past Medical Hx:   Past Medical History:   Diagnosis Date     CMV (cytomegalovirus infection) status negative 2011     Coronary artery disease, non-occlusive 2008    angio showed diffuse disease with no lesions     Diabetes mellitus, type 1     Diagnosed at age 11. 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     Noncompliance with treatment     no labs for one year     Pancreas replaced by transplant (H) Feb 2009    rejection treated with thymo     Pancreatic disease     pancreas transplant     Tobacco abuse ongoing      Baseline Mental status: WDL  Current Mental Status changes: at basesline    Infection present or suspected this encounter: no  Sepsis suspected: No  Isolation type: No active isolations  Patient tested for COVID 19 prior to admission: YES     Activity level - Baseline/Home:  Independent  Activity Level - Current:   Independent    Bariatric equipment needed?: No    In the ED these meds were given:   Medications   ondansetron (ZOFRAN) injection 4 mg (has no administration in time range)   ondansetron (ZOFRAN)  "injection 4 mg (4 mg Intravenous Given 12/6/20 0639)       Drips running?  No    Home pump  No    Current LDAs  Peripheral IV 12/06/20 Right Upper forearm (Active)   Site Assessment WDL 12/06/20 0627   Line Status Saline locked 12/06/20 0627   Dressing Intervention New dressing  12/06/20 0627   Phlebitis Scale 0-->no symptoms 12/06/20 0627   Infiltration Scale 0 12/06/20 0627   Number of days: 0       Hemodialysis Vascular Access Subclavian vein catheter Right Subclavian (Active)   Number of days: 297       Wound 01/28/20 Anterior;Right;Lateral Ulceration dime size scabbed over, pt diabetic states he has had for 3 months. No drainage (Active)   Number of days: 313       Incision/Surgical Site 10/07/20 Left Arm (Active)   Number of days: 60       Labs results:   Labs Ordered and Resulted from Time of ED Arrival Up to the Time of Departure from the ED   CBC WITH PLATELETS DIFFERENTIAL - Abnormal; Notable for the following components:       Result Value    RBC Count 4.04 (*)     Hemoglobin 12.2 (*)     Hematocrit 39.0 (*)     MCHC 31.3 (*)     All other components within normal limits   COMPREHENSIVE METABOLIC PANEL - Abnormal; Notable for the following components:    Sodium 129 (*)     Chloride 90 (*)     Glucose 150 (*)     Creatinine 6.38 (*)     GFR Estimate 10 (*)     GFR Estimate If Black 11 (*)     All other components within normal limits   LACTIC ACID WHOLE BLOOD   TROPONIN I   PERIPHERAL IV CATHETER       Imaging Studies: No results found for this or any previous visit (from the past 24 hour(s)).    Recent vital signs:   BP (!) 180/102   Pulse 89   Temp 97.9  F (36.6  C) (Oral)   Resp 16   Ht 1.727 m (5' 8\")   Wt 86.2 kg (190 lb)   SpO2 100%   BMI 28.89 kg/m      Stigler Coma Scale Score: 15 (12/06/20 0614)       Cardiac Rhythm: Normal Sinus  Pt needs tele? No  Skin/wound Issues: None    Code Status: Full Code    Pain control: good    Nausea control: fair    Abnormal labs/tests/findings requiring " intervention: SEE LABS / IMAGING    Family present during ED course? No   Family Comments/Social Situation comments: N/a    Tasks needing completion: Orders, prn    Gilberto Echevarria RN  [facility] -- [phone] [place] [facility]  [phone] [facility]

## 2020-12-06 NOTE — PHARMACY-ADMISSION MEDICATION HISTORY
Admission medication history interview status for the 12/6/2020 admission is complete. See EPIC admission navigator for allergy information, pharmacy, prior to admission medications and immunization status.     Medication history interview sources:  Pharmacy records, patient (via phone)    Changes made to PTA medication list (reason)  Added: None  Deleted: None  Changed: None    Medication history completed by: Drea Chiang, PharmD, BCOP    Medication Sig Last Dose Taking?   blood glucose (NO BRAND SPECIFIED) test strip Use to test blood sugar 5 times daily or as directed. 12/5/2020 at Unknown time Yes   calcium carbonate (TUMS) 500 MG chewable tablet Take 1 chew tab by mouth 3 times daily 12/5/2020 at Unknown time Yes   carvedilol (COREG) 6.25 MG tablet Take 1 tablet (6.25 mg) by mouth 2 times daily (with meals) 12/5/2020 at Unknown time Yes   insulin aspart (NOVOLOG FLEXPEN) 100 UNIT/ML pen 1 unit per 6 grams of carbs with each meal. About 45 units/day. 12/5/2020 at Unknown time Yes   insulin aspart (NOVOLOG FLEXPEN) 100 UNIT/ML pen Sliding scale = 1 unit for every 20 over blood glucose of 140 12/5/2020 at Unknown time Yes   insulin glargine (LANTUS SOLOSTAR) 100 UNIT/ML pen Inject 24 Units Subcutaneous At Bedtime 12/5/2020 at Unknown time Yes   metoclopramide (REGLAN) 5 MG tablet Take 1 tablet (5 mg) by mouth 4 times daily (before meals and nightly) 12/5/2020 at Unknown time Yes   omeprazole (PRILOSEC) 40 MG DR capsule Take 40 mg by mouth every evening 12/5/2020 at Unknown time Yes   ondansetron (ZOFRAN-ODT) 4 MG ODT tab Take 1 tablet (4 mg) by mouth every 8 hours as needed for nausea 12/6/2020 at Unknown time Yes   prochlorperazine (COMPAZINE) 5 MG tablet Take 1 tablet (5 mg) by mouth every 8 hours as needed for nausea 12/6/2020 at Unknown time Yes   acetaminophen (TYLENOL) 325 MG tablet Take 2 tablets (650 mg) by mouth every 4 hours as needed for mild pain More than a month at Unknown time PRN   naproxen sodium  220 MG capsule Take 220 mg by mouth as needed (PT last dose 9.28.2020) More than a month at Unknown time PRN   oxyCODONE (ROXICODONE) 5 MG tablet Take 1 tablet (5 mg) by mouth every 6 hours as needed for moderate to severe pain More than a month at Unknown time PRN

## 2020-12-07 ENCOUNTER — PATIENT OUTREACH (OUTPATIENT)
Dept: CARE COORDINATION | Facility: CLINIC | Age: 45
End: 2020-12-07

## 2020-12-07 ENCOUNTER — APPOINTMENT (OUTPATIENT)
Dept: INTERVENTIONAL RADIOLOGY/VASCULAR | Facility: CLINIC | Age: 45
DRG: 291 | End: 2020-12-07
Attending: NURSE PRACTITIONER
Payer: MEDICARE

## 2020-12-07 VITALS
SYSTOLIC BLOOD PRESSURE: 134 MMHG | DIASTOLIC BLOOD PRESSURE: 87 MMHG | WEIGHT: 181.2 LBS | OXYGEN SATURATION: 97 % | RESPIRATION RATE: 16 BRPM | BODY MASS INDEX: 27.46 KG/M2 | HEART RATE: 78 BPM | HEIGHT: 68 IN | TEMPERATURE: 98.1 F

## 2020-12-07 LAB
ANION GAP SERPL CALCULATED.3IONS-SCNC: 9 MMOL/L (ref 3–14)
B-HCG FREE SERPL-ACNC: 1.1 [IU]/L (ref 0.86–1.14)
BUN SERPL-MCNC: 22 MG/DL (ref 7–30)
CALCIUM SERPL-MCNC: 9.5 MG/DL (ref 8.5–10.1)
CHLORIDE SERPL-SCNC: 96 MMOL/L (ref 94–109)
CO2 SERPL-SCNC: 29 MMOL/L (ref 20–32)
CREAT SERPL-MCNC: 6.03 MG/DL (ref 0.66–1.25)
ERYTHROCYTE [DISTWIDTH] IN BLOOD BY AUTOMATED COUNT: 14.8 % (ref 10–15)
GFR SERPL CREATININE-BSD FRML MDRD: 10 ML/MIN/{1.73_M2}
GLUCOSE BLDC GLUCOMTR-MCNC: 105 MG/DL (ref 70–99)
GLUCOSE BLDC GLUCOMTR-MCNC: 144 MG/DL (ref 70–99)
GLUCOSE BLDC GLUCOMTR-MCNC: 262 MG/DL (ref 70–99)
GLUCOSE BLDC GLUCOMTR-MCNC: 45 MG/DL (ref 70–99)
GLUCOSE BLDC GLUCOMTR-MCNC: 460 MG/DL (ref 70–99)
GLUCOSE BLDC GLUCOMTR-MCNC: 74 MG/DL (ref 70–99)
GLUCOSE SERPL-MCNC: 105 MG/DL (ref 70–99)
HCT VFR BLD AUTO: 36.8 % (ref 40–53)
HGB BLD-MCNC: 11.6 G/DL (ref 13.3–17.7)
MCH RBC QN AUTO: 31.1 PG (ref 26.5–33)
MCHC RBC AUTO-ENTMCNC: 31.5 G/DL (ref 31.5–36.5)
MCV RBC AUTO: 99 FL (ref 78–100)
PLATELET # BLD AUTO: 264 10E9/L (ref 150–450)
POTASSIUM SERPL-SCNC: 4.7 MMOL/L (ref 3.4–5.3)
RBC # BLD AUTO: 3.73 10E12/L (ref 4.4–5.9)
SARS-COV-2 RNA SPEC QL NAA+PROBE: NOT DETECTED
SODIUM SERPL-SCNC: 134 MMOL/L (ref 133–144)
SPECIMEN SOURCE: NORMAL
WBC # BLD AUTO: 5.3 10E9/L (ref 4–11)

## 2020-12-07 PROCEDURE — 250N000012 HC RX MED GY IP 250 OP 636 PS 637: Performed by: STUDENT IN AN ORGANIZED HEALTH CARE EDUCATION/TRAINING PROGRAM

## 2020-12-07 PROCEDURE — 999N001017 HC STATISTIC GLUCOSE BY METER IP

## 2020-12-07 PROCEDURE — 02PAX3Z REMOVAL OF INFUSION DEVICE FROM HEART, EXTERNAL APPROACH: ICD-10-PCS | Performed by: PHYSICIAN ASSISTANT

## 2020-12-07 PROCEDURE — 250N000009 HC RX 250: Performed by: PHYSICIAN ASSISTANT

## 2020-12-07 PROCEDURE — 0JPTXXZ REMOVAL OF TUNNELED VASCULAR ACCESS DEVICE FROM TRUNK SUBCUTANEOUS TISSUE AND FASCIA, EXTERNAL APPROACH: ICD-10-PCS | Performed by: PHYSICIAN ASSISTANT

## 2020-12-07 PROCEDURE — 250N000013 HC RX MED GY IP 250 OP 250 PS 637: Performed by: STUDENT IN AN ORGANIZED HEALTH CARE EDUCATION/TRAINING PROGRAM

## 2020-12-07 PROCEDURE — 80048 BASIC METABOLIC PNL TOTAL CA: CPT | Performed by: STUDENT IN AN ORGANIZED HEALTH CARE EDUCATION/TRAINING PROGRAM

## 2020-12-07 PROCEDURE — 85027 COMPLETE CBC AUTOMATED: CPT | Performed by: STUDENT IN AN ORGANIZED HEALTH CARE EDUCATION/TRAINING PROGRAM

## 2020-12-07 PROCEDURE — 94660 CPAP INITIATION&MGMT: CPT

## 2020-12-07 PROCEDURE — 99239 HOSP IP/OBS DSCHRG MGMT >30: CPT | Mod: GC | Performed by: INTERNAL MEDICINE

## 2020-12-07 PROCEDURE — 36415 COLL VENOUS BLD VENIPUNCTURE: CPT | Performed by: STUDENT IN AN ORGANIZED HEALTH CARE EDUCATION/TRAINING PROGRAM

## 2020-12-07 PROCEDURE — 999N000105 HC STATISTIC NO DOCUMENTATION TO SUPPORT CHARGE

## 2020-12-07 PROCEDURE — 36589 REMOVAL TUNNELED CV CATH: CPT

## 2020-12-07 PROCEDURE — 85610 PROTHROMBIN TIME: CPT

## 2020-12-07 PROCEDURE — 36589 REMOVAL TUNNELED CV CATH: CPT | Performed by: PHYSICIAN ASSISTANT

## 2020-12-07 RX ORDER — LIDOCAINE HYDROCHLORIDE 10 MG/ML
1-30 INJECTION, SOLUTION EPIDURAL; INFILTRATION; INTRACAUDAL; PERINEURAL
Status: COMPLETED | OUTPATIENT
Start: 2020-12-07 | End: 2020-12-07

## 2020-12-07 RX ADMIN — CALCIUM CARBONATE (ANTACID) CHEW TAB 500 MG 500 MG: 500 CHEW TAB at 08:58

## 2020-12-07 RX ADMIN — INSULIN ASPART 9 UNITS: 100 INJECTION, SOLUTION INTRAVENOUS; SUBCUTANEOUS at 13:30

## 2020-12-07 RX ADMIN — CARVEDILOL 6.25 MG: 3.12 TABLET, FILM COATED ORAL at 08:58

## 2020-12-07 RX ADMIN — LIDOCAINE HYDROCHLORIDE 4 ML: 10 INJECTION, SOLUTION EPIDURAL; INFILTRATION; INTRACAUDAL; PERINEURAL at 15:17

## 2020-12-07 RX ADMIN — CALCIUM CARBONATE (ANTACID) CHEW TAB 500 MG 500 MG: 500 CHEW TAB at 13:38

## 2020-12-07 RX ADMIN — METOCLOPRAMIDE 5 MG: 5 TABLET ORAL at 09:04

## 2020-12-07 RX ADMIN — METOCLOPRAMIDE 5 MG: 5 TABLET ORAL at 13:38

## 2020-12-07 RX ADMIN — INSULIN ASPART 13 UNITS: 100 INJECTION, SOLUTION INTRAVENOUS; SUBCUTANEOUS at 10:46

## 2020-12-07 ASSESSMENT — ACTIVITIES OF DAILY LIVING (ADL)
ADLS_ACUITY_SCORE: 13

## 2020-12-07 ASSESSMENT — MIFFLIN-ST. JEOR: SCORE: 1691.42

## 2020-12-07 NOTE — PROGRESS NOTES
Pt discharged to: Home  Via: self transport  Accompanied by: Self  Belongings: sent with patient  Teaching: Done per unit routine    BG was 262 at 1638. 3 units given per sliding scale orders. Pt discharged home after.

## 2020-12-07 NOTE — PLAN OF CARE
Pt falling asleep at drop of hat said he had not slept for 3 days. Pt had dialysis took off 1900 ml as BP was softer at end. Pt refused eating all  day -due to nausea after dialysis pt ordered food. Pt during dialysis needed some o2 as sats dip in sleep and upon returning.  Questioning whether pt has sleep apnea due to snoring, pauses in breathing and jerking during sleep. Pt was told to get sleep study test but never got in to get it done, pt needed to be on 2-4 L and still desats until awoken by alarms in room. Pt refused lunch time reglan and calcium says he only takes with eating. Pt  and 159- insulin sliding scale given.  Problem: Adult Inpatient Plan of Care  Goal: Plan of Care Review  Outcome: No Change  Flowsheets (Taken 12/6/2020 1819)  Plan of Care Reviewed With: patient  Progress: no change  Goal: Patient-Specific Goal (Individualized)  Outcome: No Change  Goal: Absence of Hospital-Acquired Illness or Injury  Outcome: No Change  Intervention: Identify and Manage Fall Risk  Recent Flowsheet Documentation  Taken 12/6/2020 0935 by Latisha Carreno RN  Safety Promotion/Fall Prevention: nonskid shoes/slippers when out of bed  Intervention: Prevent Skin Injury  Recent Flowsheet Documentation  Taken 12/6/2020 0935 by Latisha Carreno RN  Body Position: position changed independently  Goal: Optimal Comfort and Wellbeing  Outcome: No Change  Goal: Readiness for Transition of Care  Outcome: No Change  Intervention: Mutually Develop Transition Plan  Recent Flowsheet Documentation  Taken 12/6/2020 1100 by Latisha Carreno RN  Equipment Currently Used at Home: none     Problem: Diabetes Comorbidity  Goal: Blood Glucose Level Within Targeted Range  Outcome: No Change     Problem: Adjustment to Illness (Chronic Kidney Disease)  Goal: Optimal Coping with Chronic Illness  Outcome: No Change     Problem: Electrolyte Imbalance (Chronic Kidney Disease)  Goal: Electrolyte Balance  Outcome: No Change     Problem:  Fluid Volume Excess (Chronic Kidney Disease)  Goal: Fluid Balance  Outcome: No Change     Problem: Functional Decline (Chronic Kidney Disease)  Goal: Optimal Functional Ability  Outcome: No Change     Problem: Hematologic Alteration (Chronic Kidney Disease)  Goal: Absence of Anemia Signs/Symptoms  Outcome: No Change     Problem: Oral Intake Inadequate (Chronic Kidney Disease)  Goal: Optimal Oral Intake  Outcome: No Change     Problem: Renal Function Impairment (Chronic Kidney Disease)  Goal: Laboratory Values and Blood Pressure Within Desired Range  Outcome: No Change

## 2020-12-07 NOTE — DISCHARGE SUMMARY
[facility]   Discharge Summary - Medicine & Pediatrics       Date of Admission:  12/6/2020  Date of Discharge:  12/7/2020  Discharging Provider: Dr. Hawkins  Discharge Service: Juan 3    Discharge Diagnoses   HFrEF  CAD   ESRD on HD  Hx of failed kidney/pancreas transplant  T1DM    Follow-ups Needed After Discharge   Follow-up Appointments     Adult [facility] Follow-up and recommended labs and tests      Follow up with primary care provider, Kt Ríos, within 7 days for   hospital follow- up.      Please follow up with Dr. Vogel of Cardiology about continuing transplant   evaluation. Referral has been sent.     Referral has been placed for follow up with Sleep Study clinic and Heart   Failure clinic.     Appointments on [place] and/or [facility] (with [facility] or [facility]   provider or service). Call [phone] if you haven't heard regarding   these appointments within 7 days of discharge.             Unresulted Labs Ordered in the Past 30 Days of this Admission     No orders found for last 31 day(s).          Discharge Disposition   Discharged to home  Condition at discharge: Stable    Hospital Course   Murtaza Fitzgerald is a 45 year old male with a history of CAD s/p CABG x3, s/p failed pancreas/kidney transplant, ESRD on HD (T/Th/F) who was admitted on 12/6/2020 with concerns for volume overload in setting of possible CHF exacerbation versus incomplete dialysis run. Patient received dialysis run on 12/6/2020 and had an echocardiogram completed which showed concerning for worsening HF (EF 37%). Patient was clinically stable at the time of discharge.     The following problems were addressed during his hospitalization:    #HFrEF  #Hx of CAD s/p CABG x3, 2015  Patient's reports symptoms of orthopnea, PND, and mild exertional dyspnea. His BNP on admission is 20,590, which is lower than 28,143 back in 10/2020. On clinical exam, patient's lungs sound fairly  "clear bilaterally with mild JVD but no significant peripheral edema. On CXR, cardiac silhouette is normal with no signs of pleural effusion. Low concern for infectious process at this time. He had no reports of chest pain throughout his time inpatient. Low concern for ACS or other intrathoracic process. EF was 37% with diffuse hypokinesis and grade III diastolic dysfunction. Patient was clinically and hemodynamically stable at the time of his discharge.     - Continue PTA [facility]   - [facility] referral   - Cardiology referral for completion of transplant work up     #ESRD on HD (T/Th/S)   #Hx of failed kidney/pancreas transplant   The patient gets dialysis through [facility] and is followed by Dr. Jack Chen. He reports that his dry weight is 85 kg, but believes that may not be true given his ongoing symptoms. Patients electrolytes on admission are largely unremarkable with K 5.1, Na 129, BUN 24, with no signs of acid/base disturbance. Patient had 2L UF run on 12/6. He felt symptomatic relief, his weight at the time of discharge was measured at 82.2 kg. Patient had his double lumen CVAC in right chest removed by IR prior to discharge.     - Continue T/Th/S outpatient dialysis schedule     #Concern for KRISSY  Patient with concerns for obstructive sleep apnea. He noted marked improvement in how he was feeling after being put on CPAP overnight. Reports that he has been given sleep referral before but never followed up, but will this time around because of \"how great he feels after CPAP\".     - Sleep Study referral placed     #Nausea, Vomiting, improved  Patient endorses ongoing n/v over the past year. He has been evaluated multiple times in the emergency department over the past few months, without any definitive conclusion. He usually receives symptomatic treatment. On clinical exam at time of admission, patient endorsed ongoing nausea and mild emesis but otherwise no focal abdominal pain, hematemesis, dysphagia, or chest " discomfort.     #T1DM   Continue PTA insulin regimen     Consultations This Hospital Stay   NEPHROLOGY ESRD ADULT IP CONSULT  INTERVENTIONAL RADIOLOGY ADULT/PEDS IP CONSULT    Code Status   Full Code       The patient was discussed with [facility]  [facility] [address] [facility] [address] [address]  Phone: [phone]  Fax: [phone]  ______________________________________________________________________    Physical Exam   Vital Signs: Temp: 98.1  F (36.7  C) Temp src: Axillary BP: 136/75 Pulse: 84   Resp: 16 SpO2: 97 % O2 Device: None (Room air) Oxygen Delivery: 4 LPM  Weight: 181 lbs 3.2 oz  Constitutional:  Sitting up in bed. Alert and cooperative.   Eyes: Lids and lashes normal, pupils equal, round and reactive to light, extra ocular muscles intact, sclera clear, conjunctiva normal  ENT: Normocephalic, without obvious abnormality, atraumatic, dry mucous membranes. Neck supple, normal ROM.    Hematologic / Lymphatic: no cervical lymphadenopathy  Respiratory: No increased work of breathing, good air exchange, clear to auscultation bilaterally, no crackles or wheezing  Cardiovascular: RRR. Mild S4. No appreciable murmurs. Fistula intact, appreciable thrill.   GI:  Soft, non tender to palpation. No masses. No rebound. No rigidity.   Skin: Warm and dry. No noticeable rashes.   Musculoskeletal: Moving all extremities spontaneously   Neurologic: Non focal  Neuropsychiatric: Alert and cooperative. Answers questions appropriately. Good mood and affect.       Primary Care Physician   Kt Ríos    Discharge Orders      [place]LE[place] EVALU[place] & MANAG[place] [phone] (Age 18 and up)      Follow-Up with [facility]      Cardiology Eval Adult Referral      Reason for your hospital stay    You were seen in the hospital with concerns for fluid overload. You had an echocardiogram  completed and dialysis run on 12/6/2020.     Adult [facility] Follow-up and recommended labs and tests    Follow up with primary care provider, Kt Ríos, within 7 days for hospital follow- up.      Please follow up with Dr. Vogel of Cardiology about continuing transplant evaluation. Referral has been sent.     Referral has been placed for follow up with Sleep Study clinic and Heart Failure clinic.     Appointments on [place] and/or [facility] (with [facility] or [facility] provider or service). Call [phone] if you haven't heard regarding these appointments within 7 days of discharge.     Activity    Your activity upon discharge: activity as tolerated     When to contact your care team    Please return to the hospital if you have fevers, worsening intractable chest pain, and worsening shortness of breath.     Discharge Instructions    - Referrals have been placed for follow up with Heart Failure clinic and Sleep Study Clinic.     - Please continue with you previous, prior to admission, dialysis schedule (T, Th, S)     Full Code     Diet    Follow this diet upon discharge: Orders Placed This Encounter      Dialysis Diet       Significant Results and Procedures   Most Recent 3 CBC's:  Recent Labs   Lab Test 12/07/20  0351 12/06/20 0622 11/03/20  1608   WBC 5.3 5.6 6.4   HGB 11.6* 12.2* 9.3*   MCV 99 97 92    297 257     Most Recent 3 BMP's:  Recent Labs   Lab Test 12/07/20  0351 12/06/20 0622 11/03/20  1608    129* 131*   POTASSIUM 4.7 5.1 4.2   CHLORIDE 96 90* 90*   CO2 29 29 32   BUN 22 24 28   CR 6.03* 6.38* 6.04*   ANIONGAP 9 10 9   CHARLY 9.5 9.7 9.2   * 150* 292*     Most Recent 3 Hemoglobins:  Recent Labs   Lab Test 12/07/20  0351 12/06/20 0622 11/03/20  1608   HGB 11.6* 12.2* 9.3*     ,   Results for orders placed or performed during the hospital encounter of 12/06/20   XR Chest 2 Views    Narrative    EXAM: XR CHEST 2 VW  12/6/2020 7:21 AM     HISTORY:  shortness of breath        COMPARISON:  10/2/2020    FINDINGS:   PA and lateral radiograph the chest. Postoperative changes of the  chest with intact sternotomy wires. The right IJ CVC projects over the  right atrium. Cardiac silhouette size is normal. No pleural effusion.  No pneumothorax. No focal airspace opacities. The visualized upper  abdomen is unremarkable.      Impression    IMPRESSION:   1. No acute cardiopulmonary disease.  2. The right IJ tunneled central venous catheter is in stable  position.    I have personally reviewed the examination and initial interpretation  and I agree with the findings.    ESTEFANI MORATAYA MD   POC US ECHO LIMITED    Impression    Bedside, focused cardiac ultrasound performed and interpreted by me.    Indication: Shortness of breath    Parasternal long, parasternal short, apical four-chamber views were acquired for gross evaluation of pericardial effusion alone.  Image quality was satisfactory for evaluation of pericardial effusion. There is no evidence of free fluid within the pericardium.  Global function appears minimally diminished.  B-lines are present bilaterally and are prominent at the apex    Impression: Bedside limited cardiac ultrasound showing minimally decreased global function, no pericardial effusion.  Dense of pulmonary edema     IR CVC Tunnel Removal Right    Narrative    Murtaza Payton [facility]  [id number]    [id number]    UNKNOWN  TEMPORARY  ;    IR CVC TUNNEL REMOVAL RIGHT  RIJ Tunneled CVC removal    -----    PROCEDURE:  Removal of tunneled central venous catheter (CVC)      Impression    IMPRESSION:  Completed removal of dual lumen tunneled central venous  access catheter via RIGHT chest. Dx: No longer needed. Remington.    -----    CLINICAL HISTORY:  The patient has a tunneled central venous catheter;   therapy was complete and catheter removal was requested, no longer  needed.    PERFORMED BY:  Murtaza Macedo, SEGUNDO, PA-C (Interventional  Radiology)    MEDICATIONS:  1%  "lidocaine was used for local anesthesia    DESCRIPTION:  The catheter and its entry site into the skin were  prepped and draped in usual sterile fashion.  Physical examination  demonstrated no erythema, tenderness, fluctuance, or discharge at the  catheter exit site or along the tract.  Lidocaine injection and  dissection were used around the catheter and subcutaneous cuff.     Using steady traction, the entire catheter was removed without  difficulty, cuff removed completely.  Compression was applied over the  venipuncture site, as well as along the tract, until good hemostasis  was achieved.  Sterile dressing was applied to the exit site wound.    COMPLICATIONS:  No immediate complication    ESTIMATED BLOOD LOSS:  Minimal    SPECIMENS:  None    TIME:  A total of 20 minutes was spent with the patient.  Greater than  50% of the time was spent in counseling, education, and coordination  of care.  -----    \"The physician assistant (PA) who performed this procedure and signed  the above report is licensed to practice in the [facility]  pursuant to [place] Statute 147A.09.  This includes meeting the Statute and  [place] Board of Medical Practice requirement of a collaborating  physician.\"  -----    DOUG MALDONADO PA-C   Echocardiogram Complete    Narrative    [id number]  [id number]  [id number]  [id number]^ANGY^JERAMIE^S           [facility]  Echocardiography Laboratory  [address] [place] [address]     Name: SKY LANDEROS JONATHANLUZMARIASAMEER WITT  MRN: [id number]  : 1975  Study Date: 2020 08:51 AM  Age: 45 yrs  Gender: Male  Patient Location: [facility]  Reason For Study: Dyspnea  Ordering Physician: JERAMIE TRAVIS  Performed By: Gretchen Garcia RDCS     BSA: 2.0 m2  Height: 68 in  Weight: 190 lb  HR: 76  BP: 180/102 mmHg  _____________________________________________________________________________  __        Procedure  Complete Portable Echo Adult. " Contrast Optison. Technically difficult  study.Extremely poor acoustic windows. Patient was given 5 ml mixture of 3 ml  Optison and 6 ml saline. 4 ml wasted. The final echo results were communicated  to Dr. Hawkins. The final echo results were communicated to the ordering  physician by pager .  _____________________________________________________________________________  __        Interpretation Summary  Technically difficult study.Extremely poor acoustic windows.  Moderately (EF 35-40%) reduced left ventricular function is present. LVEF 37%  based on biplane 2D tracing. Moderate diffuse hypokinesis is present.  Grade III diastolic dysfunction with elevated estimated left-sided filling  pressures.  Right ventricular function, chamber size, wall motion, and thickness are  normal.  No significant valvular abnormalities were noted.  This study was compared with the study from 10/2/20: LVEF has decreased.  _____________________________________________________________________________  __        Left Ventricle  Left ventricular size is normal. LVEF 37% based on biplane 2D tracing. Mild  concentric wall thickening consistent with left ventricular hypertrophy is  present. Moderately (EF 35-40%) reduced left ventricular function is present.  Grade III or advanced diastolic dysfunction. Diastolic Doppler findings (E/E'  ratio and/or other parameters) suggest left ventricular filling pressures are  increased. Moderate diffuse hypokinesis is present.     Right Ventricle  Right ventricular function, chamber size, wall motion, and thickness are  normal.     Mitral Valve  Mild mitral annular calcification is present. Trace mitral insufficiency is  present.     Aortic Valve  Mild aortic valve calcification is present.        Tricuspid Valve  The tricuspid valve is normal. Trace tricuspid insufficiency is present. The  peak velocity of the tricuspid regurgitant jet is not obtainable. Pulmonary  artery systolic pressure cannot be  assessed.     Pulmonic Valve  The pulmonic valve is normal. Trace pulmonic insufficiency is present.     Vessels  The aorta root cannot be assessed. The inferior vena cava cannot be assessed.  The pulmonary artery cannot be assessed.     Pericardium  No pericardial effusion is present.     Compared to Previous Study  This study was compared with the study from 10/2/20 . LVEF has decreased.     _____________________________________________________________________________  __  MMode/2D Measurements & Calculations  IVSd: 1.4 cm  LVIDd: 4.4 cm  LVIDs: 3.7 cm  LVPWd: 1.6 cm  FS: 15.4 %     LV mass(C)d: 255.3 grams  LV mass(C)dI: 127.7 grams/m2  Ao root diam: 3.4 cm  asc Aorta Diam: 3.2 cm  LVOT diam: 2.0 cm  LVOT area: 3.1 cm2     EF(MOD-bp): 37.1 %  LA Volume (BP): 61.6 ml  LA Volume Index (BP): 30.8 ml/m2  RWT: 0.72           Doppler Measurements & Calculations  MV E max amaya: 124.0 cm/sec  MV A max amaya: 47.1 cm/sec  MV E/A: 2.6  MV dec slope: 853.0 cm/sec2  PA acc time: 0.11 sec  E/E' av.0  Lateral E/e': 27.8  Medial E/e': 24.3     _____________________________________________________________________________  __           Report approved by: Lorena Haro 2020 04:33 PM        *Note: Due to a large number of results and/or encounters for the requested time period, some results have not been displayed. A complete set of results can be found in Results Review.       Discharge Medications   Current Discharge Medication List      CONTINUE these medications which have NOT CHANGED    Details   blood glucose (NO BRAND SPECIFIED) test strip Use to test blood sugar 5 times daily or as directed.  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 insulin aspart (NOVOLOG FLEXPEN) 100 UNIT/ML pen 1 unit per 6 grams of carbs with each meal. About 45 units/day.  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 insulin aspart (NOVOLOG FLEXPEN) 100 UNIT/ML pen Sliding scale = 1 unit for every 20 over blood glucose of 140      insulin glargine (LANTUS SOLOSTAR) 100 UNIT/ML pen Inject 24 Units Subcutaneous At Bedtime  Qty: 15 mL, Refills: 11    Comments: If Lantus is not covered by insurance, may substitute Basaglar at same dose and frequency.    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)      metoclopramide (REGLAN) 5 MG tablet Take 1 tablet (5 mg) by mouth 4 times daily (before meals and nightly)  Qty: 120 tablet, Refills: 11    Associated Diagnoses: Gastroparesis      omeprazole (PRILOSEC) 40 MG DR capsule Take 40 mg by mouth every evening      ondansetron (ZOFRAN-ODT) 4 MG ODT tab Take 1 tablet (4 mg) by mouth every 8 hours as needed for nausea  Qty: 30 tablet, Refills: 1    Associated Diagnoses: Nausea      prochlorperazine (COMPAZINE) 5 MG tablet Take 1 tablet (5 mg) by mouth every 8 hours as needed for nausea  Qty: 30 tablet, Refills: 1    Associated Diagnoses: Nausea and vomiting, intractability of vomiting not specified, unspecified vomiting type      acetaminophen (TYLENOL) 325 MG tablet Take 2 tablets (650 mg) by mouth every 4 hours as needed for mild pain  Qty: 50 tablet, Refills: 0    Associated Diagnoses: ESRD (end stage renal disease) on dialysis (H)      acetone, Urine, test STRP 1 strip by In Vitro route daily  Qty: 50 each, Refills: 3    Comments: Urine ketone stix  Associated  Diagnoses: 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 - Potential duplicate medications found. Please discuss with provider.        Allergies   Allergies   Allergen Reactions     Diphenhydramine Other (See Comments)     Restless legs     Metoclopramide      Other reaction(s): Confusion, Unknown     Reglan Other (See Comments)     IV, delsuions

## 2020-12-07 NOTE — PROGRESS NOTES
Care Management Discharge Note    Spoke with bedside RN, plan for discharge to home today. No additional needs at this time.  RNCC available as needed.    Denisse Dial RN, BSN  Care Coordinator, [facility] Orth[facility]  Phone [phone]  Pager [phone]

## 2020-12-07 NOTE — PLAN OF CARE
"Assumed cares from 23:00 to 07:30.   Blood pressure 125/70, pulse 84, temperature 98.1  F (36.7  C), temperature source Axillary, resp. rate 16, height 1.727 m (5' 8\"), weight 84.7 kg (186 lb 11.7 oz), SpO2 99 %.    Pt had uneventful shift. A/O x 4. Slept with Hospital C-PAP  on 30 % Fi02 with 02 saturations in 99 to 100 %. Pt had scheduled Lantus insulin 20 unit at midnight. BG at that time was 144. BG re-checked at 04:00 AM and it was 105. Pt denies Pain/N/V/D. Creatine this morning was 6.03. Left upper arm Fistula with dressing on site, + bruit/+ thrill. May be having Dialysis again today.  Continue to monitor pt and follow plan of care.      Problem: Adult Inpatient Plan of Care  Goal: Absence of Hospital-Acquired Illness or Injury  Intervention: Identify and Manage Fall Risk  Recent Flowsheet Documentation  Taken 12/7/2020 0400 by Carla Villar RN  Safety Promotion/Fall Prevention: activity supervised  Taken 12/7/2020 0000 by Carla Villar RN  Safety Promotion/Fall Prevention: activity supervised  Intervention: Prevent Skin Injury  Recent Flowsheet Documentation  Taken 12/7/2020 0400 by Carla Villar RN  Body Position: position changed independently  Taken 12/7/2020 0000 by Carla Villar RN  Body Position: position changed independently     Problem: Adult Inpatient Plan of Care  Goal: Absence of Hospital-Acquired Illness or Injury  Intervention: Prevent Skin Injury  Recent Flowsheet Documentation  Taken 12/7/2020 0400 by Carla Villar RN  Body Position: position changed independently  Taken 12/7/2020 0000 by Carla Villar RN  Body Position: position changed independently     Problem: Functional Decline (Chronic Kidney Disease)  Goal: Optimal Functional Ability  Intervention: Optimize Functional Ability  Recent Flowsheet Documentation  Taken 12/7/2020 0400 by Carla Villar RN  Activity Management: activity adjusted per tolerance  Taken 12/7/2020 0000 by Carla Villar" RN  Activity Management: activity adjusted per tolerance

## 2020-12-07 NOTE — PLAN OF CARE
Pt glucose 45 this am, recovered to 74 post apple juice and joyce crackers.  Pt ate a good breakfast and then stated he does carb counting and coverage at home, 1 Unit of insulin per 6 grams of carbs.  Pt glucose was 460 after carb coverage for breakfast.  MD notified.  Pt went to IR for dialysis line removal and will plan on discharge this pm.

## 2020-12-07 NOTE — PROCEDURES
Murtaza Payton [facility]  [id number]    Completed removal of dual lumen tunneled central venous access catheter via RIGHT chest. Dx: No longer needed. Remington.

## 2020-12-07 NOTE — DISCHARGE SUMMARY
Dialysis Discharge Summary Brief    [facility]  Division of Nephrology  Nephrology Discharge Dialysis Orders  Ph: [phone]  Fax: [phone]    Murtaza Fitzgerald  MRN: [id number]  YOB: 1975    [facility]: [place]  Primary Nephrologist: Dr Schneider    Date of Admission: 12/6/2020  Date of Discharge: 12/7/2020  Discharge Diagnosis: KRISSY, new reduced EF.       Pt admitted with SOB which he thought was extra volume.  We did remove ~2L with extra run yesterday, wt down to 82.2kg on our scale here this am.  He did feel better after wearing bipap overnight and feels better treatment of his KRISSY is something he will be working on.  His EF is notably down from 55% in Oct to 35-40% now, will follow up with cardiology for this and likely will need cath eventually (would have likely gotten one for kidney tx eval).  My pager is below for any ?'s, will go back to outpt HD tomorrow, did not miss any runs.        [x] Resume all previous dialysis orders with only EDW changed as below.     New Orders (if not applicable put NA):  Estimated Dry Weight Was 85kg, down to 82.2kg this am, could set at 83kg.    Dialysis Duration    Dialysis Access Fistula, removed tunneled HD cath today.      Antibiotics (dose per dialysis, end date)            Labs to be drawn at dialysis    Other major changes to dialysis prescription (e.g. Dialysate bath, heparin, blood flow rate, etc)      Medication changes (also fax the unit a copy of the discharge summary)              Name of provider completing this form: JORDAN Hill CNS,   [phone]

## 2020-12-07 NOTE — PROVIDER NOTIFICATION
Nicko ha  Pt is on continuous o2 monitor. He clearly has sleep apnea and drops into the 70s-80s several times a minute. He is on 4L o2 and when awake sats 100%. He adamantly refuses to try CPAP.

## 2020-12-07 NOTE — CONSULTS
"    Interventional Radiology Consult Service Note    Patient is on IR schedule 12/7 for a RIJ TCVC Removal.   Platelets WNLs, INR pending. COVID neg 12/3  No NPO required.  Consent will be done prior to procedure.     [place] [phone] for estimated time of procedure.     Case discussed with JIN Enamorado. This is a 45 year old male with a history of CAD s/p CABG x3, s/p failed pancreas/kidney transplant, ESRD on HD (T/Th/F) who was admitted on 12/6/2020 with concerns for volume overload in setting of possible CHF exacerbation versus incomplete dialysis run. Pt with hx of RIJ TCVC placed by IR on 2/13/20. He now has a functional AVF and the line is no longer needed. Pt was on IR outpatient schedule at the [facility] for line removal 12/7 prior to his admission. We are now consulted for inpatient line removal.     Expected date of discharge: 12/7 vs 12/8?    Vitals:   BP (!) 149/86 (BP Location: Right arm)   Pulse 82   Temp 98.2  F (36.8  C) (Axillary)   Resp 16   Ht 1.727 m (5' 8\")   Wt 82.2 kg (181 lb 3.2 oz)   SpO2 97%   BMI 27.55 kg/m      Pertinent Labs:     Lab Results   Component Value Date    WBC 5.3 12/07/2020    WBC 5.6 12/06/2020    WBC 6.4 11/03/2020       Lab Results   Component Value Date    HGB 11.6 12/07/2020    HGB 12.2 12/06/2020    HGB 9.3 11/03/2020       Lab Results   Component Value Date     12/07/2020     12/06/2020     11/03/2020       Lab Results   Component Value Date    INR 1.10 10/07/2020    PTT 30 10/07/2020       Lab Results   Component Value Date    POTASSIUM 4.7 12/07/2020        JORDAN Vazquez CNP  Interventional Radiology  Pager: [phone]    "

## 2020-12-08 ENCOUNTER — PATIENT OUTREACH (OUTPATIENT)
Dept: CARE COORDINATION | Facility: CLINIC | Age: 45
End: 2020-12-08

## 2020-12-08 DIAGNOSIS — Z71.89 OTHER SPECIFIED COUNSELING: ICD-10-CM

## 2020-12-08 NOTE — PROGRESS NOTES
[facility]: Post-Discharge Note  SITUATION                                                      Admission:    Admission Date: 12/06/20   Reason for Admission: CAD  Discharge:   Discharge Date: 12/07/20  Discharge Diagnosis: CAD    BACKGROUND                                                      Murtaza Fitzgerald is a 45 year old male with a history of CAD s/p CABG x3, s/p failed pancreas/kidney transplant, ESRD on HD (T/Th/F) who was admitted on 12/6/2020 with concerns for volume overload in setting of possible CHF exacerbation versus incomplete dialysis run. Patient received dialysis run on 12/6/2020 and had an echocardiogram completed which showed concerning for worsening HF (EF 37%). Patient was clinically stable at the time of discharge    ASSESSMENT      Discharge Assessment  Patient reports symptoms are: Improved  Does the patient have all of their medications?: Yes  Does patient know what their new medications are for?: Yes  Does patient have a follow-up appointment scheduled?: Yes  Does patient have any other questions or concerns?: No    Post-op  Did the patient have surgery or a procedure: No  Fever: No  Chills: No  Eating & Drinking: eating and drinking without complaints/concerns  PO Intake: regular diet  Bowel Function: normal  Urinary Status: voiding without complaint/concerns        PLAN                                                      Outpatient Plan:  Adult [facility] Follow-up and recommended labs and tests      Follow up with primary care provider, Kt Ríos, within 7 days for   hospital follow- up.       Please follow up with Dr. Vogel of Cardiology about continuing transplant   evaluation. Referral has been sent.      Referral has been placed for follow up with Sleep Study clinic and Heart   Failure clinic.      Appointments on [place] and/or [facility] (with [facility] or [facility]   provider or service). Call [phone] if you haven't heard regarding   these  appointments within 7 days of discharge.               No future appointments.        Ruth Larose, CMA

## 2020-12-08 NOTE — PROGRESS NOTES
Clinic Care Coordination Contact  Community Health Worker Initial Outreach       Patient accepts CC: No, I'm good. Patient was previously enrolled in CCC.

## 2020-12-10 NOTE — UTILIZATION REVIEW
Admission Status; Secondary Review Determination    No action to be taken. Please contact me on my Email : [email or web address] if you have any questions.    As part of the [place] Utilization review plan, a self-audit is done on Medicare inpatient admission with less than 2 midnights stay. The 2014 IPPS Final Rule allows outpatient billing in the event that a hospital determines that an inpatient admission was not medically necessary under utilization review process.     (x) Outpatient status would be Appropriate- Short Stay- Post discharge review.    RATIONALE FOR DETERMINATION  Patient presented with SOB. Low concern for ACS. Thought to be due to inadequate dialysis  Symptoms improved with dialysis and patient discharged home the next day       Patient was admitted and discharge after one night stay. Record was sent by [facility] for a PA review. Based on the  severity of illness, intensity of service provided, expected LOS and risk for adverse outcome make the care appropriate for further outpatient/observation; however, doesn't meet criteria for hospital inpatient admission.       The information on this document is developed by the utilization review team in order for the business office to ensure compliance.  This only denotes the appropriateness of proper admission status and does not reflect the quality of care rendered.       The definitions of Inpatient Status and Observation Status used in making the determination above are those provided in the CMS Coverage Manual, Chapter 1 and Chapter 6, section 70.4.     Please cont me if you want to discuss further about this admission episode.      Micheal Lott MD, FACP, YAS  Medical Director - Utilization management  Staff Hospitalist  [facility]    Pager: [phone]

## 2020-12-14 ENCOUNTER — TRANSFERRED RECORDS (OUTPATIENT)
Dept: HEALTH INFORMATION MANAGEMENT | Facility: CLINIC | Age: 45
End: 2020-12-14

## 2020-12-14 ENCOUNTER — HOSPITAL ENCOUNTER (INPATIENT)
Facility: CLINIC | Age: 45
LOS: 3 days | Discharge: HOME | DRG: 073 | End: 2020-12-17
Attending: INTERNAL MEDICINE | Admitting: INTERNAL MEDICINE
Payer: MEDICARE

## 2020-12-14 ENCOUNTER — APPOINTMENT (OUTPATIENT)
Dept: GENERAL RADIOLOGY | Facility: CLINIC | Age: 45
DRG: 073 | End: 2020-12-14
Attending: NURSE PRACTITIONER
Payer: MEDICARE

## 2020-12-14 PROBLEM — N18.6 ESRD (END STAGE RENAL DISEASE) (H): Chronic | Status: ACTIVE | Noted: 2020-09-25

## 2020-12-14 PROBLEM — I16.0 HYPERTENSIVE URGENCY: Status: ACTIVE | Noted: 2020-12-14

## 2020-12-14 PROBLEM — I25.10 CORONARY ARTERY DISEASE INVOLVING NATIVE CORONARY ARTERY OF NATIVE HEART WITHOUT ANGINA PECTORIS: Chronic | Status: ACTIVE | Noted: 2017-09-05

## 2020-12-14 LAB
ALT SERPL-CCNC: 11 IU/L (ref 12–68)
AST SERPL-CCNC: 11 IU/L (ref 15–37)
CREAT SERPL-MCNC: 9.17 MG/DL (ref 0.7–1.3)
GFR SERPL CREATININE-BSD FRML MDRD: 6 ML/MIN
GLUCOSE BLDC GLUCOMTR-MCNC: 163 MG/DL (ref 70–99)
GLUCOSE BLDC GLUCOMTR-MCNC: 184 MG/DL (ref 70–99)
GLUCOSE SERPL-MCNC: 342 MG/DL (ref 70–110)
MAGNESIUM SERPL-MCNC: 2.3 MG/DL (ref 1.6–2.3)
PHOSPHATE SERPL-MCNC: 6.2 MG/DL (ref 2.5–4.5)
POTASSIUM SERPL-SCNC: 4.7 MMOL/L (ref 3.5–5.1)

## 2020-12-14 PROCEDURE — 250N000013 HC RX MED GY IP 250 OP 250 PS 637: Performed by: NURSE PRACTITIONER

## 2020-12-14 PROCEDURE — 94660 CPAP INITIATION&MGMT: CPT

## 2020-12-14 PROCEDURE — 250N000012 HC RX MED GY IP 250 OP 636 PS 637: Performed by: NURSE PRACTITIONER

## 2020-12-14 PROCEDURE — 99222 1ST HOSP IP/OBS MODERATE 55: CPT | Mod: AI | Performed by: INTERNAL MEDICINE

## 2020-12-14 PROCEDURE — 84100 ASSAY OF PHOSPHORUS: CPT | Performed by: NURSE PRACTITIONER

## 2020-12-14 PROCEDURE — 999N000157 HC STATISTIC RCP TIME EA 10 MIN

## 2020-12-14 PROCEDURE — 99207 PR APP CREDIT; MD BILLING SHARED VISIT: CPT | Performed by: NURSE PRACTITIONER

## 2020-12-14 PROCEDURE — 74019 RADEX ABDOMEN 2 VIEWS: CPT

## 2020-12-14 PROCEDURE — 999N001017 HC STATISTIC GLUCOSE BY METER IP

## 2020-12-14 PROCEDURE — 74019 RADEX ABDOMEN 2 VIEWS: CPT | Mod: 26 | Performed by: RADIOLOGY

## 2020-12-14 PROCEDURE — 83735 ASSAY OF MAGNESIUM: CPT | Performed by: NURSE PRACTITIONER

## 2020-12-14 PROCEDURE — 120N000002 HC R&B MED SURG/OB UMMC

## 2020-12-14 PROCEDURE — 250N000011 HC RX IP 250 OP 636: Performed by: NURSE PRACTITIONER

## 2020-12-14 PROCEDURE — 36415 COLL VENOUS BLD VENIPUNCTURE: CPT | Performed by: NURSE PRACTITIONER

## 2020-12-14 RX ORDER — BISACODYL 5 MG
15 TABLET, DELAYED RELEASE (ENTERIC COATED) ORAL DAILY PRN
Status: DISCONTINUED | OUTPATIENT
Start: 2020-12-14 | End: 2020-12-17 | Stop reason: HOSPADM

## 2020-12-14 RX ORDER — POLYETHYLENE GLYCOL 3350 17 G/17G
17 POWDER, FOR SOLUTION ORAL DAILY PRN
Status: DISCONTINUED | OUTPATIENT
Start: 2020-12-14 | End: 2020-12-17 | Stop reason: HOSPADM

## 2020-12-14 RX ORDER — METOCLOPRAMIDE 5 MG/1
5 TABLET ORAL
Status: DISCONTINUED | OUTPATIENT
Start: 2020-12-14 | End: 2020-12-17 | Stop reason: HOSPADM

## 2020-12-14 RX ORDER — LIDOCAINE 40 MG/G
CREAM TOPICAL
Status: DISCONTINUED | OUTPATIENT
Start: 2020-12-14 | End: 2020-12-17 | Stop reason: HOSPADM

## 2020-12-14 RX ORDER — BISACODYL 5 MG
5 TABLET, DELAYED RELEASE (ENTERIC COATED) ORAL DAILY PRN
Status: DISCONTINUED | OUTPATIENT
Start: 2020-12-14 | End: 2020-12-17 | Stop reason: HOSPADM

## 2020-12-14 RX ORDER — SIMETHICONE 80 MG
80 TABLET,CHEWABLE ORAL EVERY 6 HOURS PRN
Status: DISCONTINUED | OUTPATIENT
Start: 2020-12-14 | End: 2020-12-17 | Stop reason: HOSPADM

## 2020-12-14 RX ORDER — CARVEDILOL 3.12 MG/1
6.25 TABLET ORAL 2 TIMES DAILY WITH MEALS
Status: DISCONTINUED | OUTPATIENT
Start: 2020-12-14 | End: 2020-12-17 | Stop reason: HOSPADM

## 2020-12-14 RX ORDER — CALCIUM CARBONATE 500 MG/1
500 TABLET, CHEWABLE ORAL 3 TIMES DAILY
Status: DISCONTINUED | OUTPATIENT
Start: 2020-12-14 | End: 2020-12-17 | Stop reason: HOSPADM

## 2020-12-14 RX ORDER — ONDANSETRON 2 MG/ML
4 INJECTION INTRAMUSCULAR; INTRAVENOUS EVERY 6 HOURS PRN
Status: DISCONTINUED | OUTPATIENT
Start: 2020-12-14 | End: 2020-12-17 | Stop reason: HOSPADM

## 2020-12-14 RX ORDER — NICOTINE POLACRILEX 4 MG
15-30 LOZENGE BUCCAL
Status: DISCONTINUED | OUTPATIENT
Start: 2020-12-14 | End: 2020-12-17 | Stop reason: HOSPADM

## 2020-12-14 RX ORDER — ONDANSETRON 4 MG/1
4 TABLET, ORALLY DISINTEGRATING ORAL EVERY 6 HOURS PRN
Status: DISCONTINUED | OUTPATIENT
Start: 2020-12-14 | End: 2020-12-17 | Stop reason: HOSPADM

## 2020-12-14 RX ORDER — ACETAMINOPHEN 325 MG/1
650 TABLET ORAL EVERY 4 HOURS PRN
Status: DISCONTINUED | OUTPATIENT
Start: 2020-12-14 | End: 2020-12-16

## 2020-12-14 RX ORDER — PROCHLORPERAZINE MALEATE 5 MG
10 TABLET ORAL EVERY 6 HOURS PRN
Status: DISCONTINUED | OUTPATIENT
Start: 2020-12-14 | End: 2020-12-17 | Stop reason: HOSPADM

## 2020-12-14 RX ORDER — BISACODYL 5 MG
10 TABLET, DELAYED RELEASE (ENTERIC COATED) ORAL DAILY PRN
Status: DISCONTINUED | OUTPATIENT
Start: 2020-12-14 | End: 2020-12-17 | Stop reason: HOSPADM

## 2020-12-14 RX ORDER — DEXTROSE MONOHYDRATE 25 G/50ML
25-50 INJECTION, SOLUTION INTRAVENOUS
Status: DISCONTINUED | OUTPATIENT
Start: 2020-12-14 | End: 2020-12-17 | Stop reason: HOSPADM

## 2020-12-14 RX ORDER — PROCHLORPERAZINE 25 MG
25 SUPPOSITORY, RECTAL RECTAL EVERY 12 HOURS PRN
Status: DISCONTINUED | OUTPATIENT
Start: 2020-12-14 | End: 2020-12-17 | Stop reason: HOSPADM

## 2020-12-14 RX ADMIN — METOCLOPRAMIDE 5 MG: 5 TABLET ORAL at 22:12

## 2020-12-14 RX ADMIN — CARVEDILOL 6.25 MG: 3.12 TABLET, FILM COATED ORAL at 20:28

## 2020-12-14 RX ADMIN — CALCIUM CARBONATE (ANTACID) CHEW TAB 500 MG 500 MG: 500 CHEW TAB at 20:27

## 2020-12-14 RX ADMIN — METOCLOPRAMIDE 5 MG: 5 TABLET ORAL at 20:27

## 2020-12-14 RX ADMIN — ONDANSETRON 4 MG: 2 INJECTION INTRAMUSCULAR; INTRAVENOUS at 18:01

## 2020-12-14 RX ADMIN — INSULIN GLARGINE 18 UNITS: 100 INJECTION, SOLUTION SUBCUTANEOUS at 22:12

## 2020-12-14 RX ADMIN — OMEPRAZOLE 40 MG: 20 CAPSULE, DELAYED RELEASE ORAL at 20:27

## 2020-12-14 ASSESSMENT — ACTIVITIES OF DAILY LIVING (ADL)
PATIENT_/_FAMILY_COMMUNICATION_STYLE: SPOKEN LANGUAGE (ENGLISH OR BILINGUAL)
CONCENTRATING,_REMEMBERING_OR_MAKING_DECISIONS_DIFFICULTY: NO
TOILETING_ISSUES: NO
DIFFICULTY_EATING/SWALLOWING: NO
HEARING_DIFFICULTY_OR_DEAF: NO
WALKING_OR_CLIMBING_STAIRS_DIFFICULTY: NO
DRESSING/BATHING_DIFFICULTY: NO
ADLS_ACUITY_SCORE: 12
FALL_HISTORY_WITHIN_LAST_SIX_MONTHS: NO
WEAR_GLASSES_OR_BLIND: NO
DIFFICULTY_COMMUNICATING: NO
DOING_ERRANDS_INDEPENDENTLY_DIFFICULTY: NO

## 2020-12-14 NOTE — PROGRESS NOTES
[facility]  Transfer Triage Note    Date of call: 12/14/20  Time of call: 8:30 AM    Is pandemic COVID-19 a concern? NO    Reason for transfer: Procedure can be done here and not at referring hospital  Further diagnostic work up, management, and consultation for specialized care   Diagnosis: Vomiting, hypertensive urgency    Outside Records: Available  Additional records requested to be faxed to [phone].    Stability of Patient: Patient is vitally stable, with no critical labs, and will likely remain stable throughout the transfer process  ICU: No    Expected Time of Arrival for Transfer: 0-8 hours    Arrival Location:  [address] [address] Phone: [phone]    Recommendations for Management and Stabilization: Given    Additional Comments: Pt is a 45y M w/ PMHx significant for CAD s/p CABG, T2DM, ESRD s/p kidney transplant on dialysis, and hypertension who presented to [facility] ED for vomiting. Patient was hypertensive and borderline tachycardic, afebrile. Patient was given compazine and Zofran for continuous dry heaving with minimal improvement, and he has received a small volume of fluids. POC glucose was 284, patient was initiated on regular insulin.  Reportedly his abdomen is benign, and the ER provider has forgone imaging at this time.Trop and EKG are reassuring. Patient will require transfer and treatment at hospital able to accommodate his dialysis needs, accepted to [facility] bed without tele.    Siomara Gil MS-4     I was present throughout the triage call and the above note has been edited to reflect the plan of care.   Patt Acuña MD

## 2020-12-14 NOTE — H&P
[facility]     History and Physical - Hospitalist Service, Gold Night        Date of Admission:  12/14/2020    Assessment & Plan   Murtaza Fitzgerald is a 45 year old male with past medical history significant for CAD s/p CABG x 3 (2015), HFrEF (35-40%), hx type 1 DM, s/p failed pancreas/kidney transplant (2008), ESRD on HD TTS (2/2020), and recent hospital admission 12/6-12/7 for volume overload in setting of possible CHF exacerbation vs incomplete HD run.  He presented to the ED at [facility] today with refractory vomiting and HTN urgency with BPs 198/106 and transferred to [facility] due to need for dialysis.    # Nausea, vomiting   # Hx gastroparesis   Reports vomiting last 1-2 days, no hematemesis.  Had an episode of diarrhea on Sun AM.  Last able to tolerate meals on Saturday.  Denies pain with eating or difficulty swallowing.  Diagnosed with gastroparesis about 1 year ago.  Reports that symptoms were pretty well controlled until about 3 weeks ago.  Has umbilical hernia that is reducible.  Denies abdominal pain, bloating, fullness.    - Check AXR 2 view to evaluate for obstruction   - Check Mag, Phos   - Continue scheduled Reglan QID   - PRN anti-emetics, simethicone      # Hypertensive urgency (resolved)   # HTN, CAD s/p CABG x 3 (2015)  # HFrEF   Currently stable, denies dyspnea but does endorse orthopnea which is not new.  Last echocardiogram on 12/6/2020 with EF 35-40%, moderate diffuse hypokinesis, grade III diastolic dysfunction w/ elevated left sided filling pressures, no valvular abnormalities.  Denies headache and acute vision changes.  BPs 157/84 on last check.   - Daily weights   - I/Os   - [facility] clinic follow up outpatient (needs to be scheduled prior to discharge)   - Follow up with Cardiology for completion of transplant work-up, needs [facility]      # ESRD on HD TTS (2/2020)  # S/p failed pancreas/kidney transplant (2008)  Dialyzes TTS at [facility] via [facility]  AVF.  Follows w/ Dr. Chen.  Dry weight ~ 85kg.  Last HD 12/12, on scheduled.  K wnl 4.7, BUN 45, Cr 9.17 prior to transfer from [place].  Na 131, c/w baseline.  Recently had double lumen CVAC removed on 12/7 by IR.  Makes very small amount or urine.      - Nephrology consult placed   - Check BMP, Mag, Phos in AM   - Daily weights   - Dialysis diet      # Hx type 1 DM - S/p failed pancreas/kidney transplant as above.  Last Hgb A1c 7.0% in 10/2020.  On Lantus 24 units at HS, carb coverage 1u per 6g CHO w/ meals, and custom sliding scale insulin with 1u per every 20 over 140.  During last admission, had isolated hypoglycemia to 45 in setting ongoing nausea and vomiting.    - Given minimal PO intake and ongoing vomiting, will start Lantus at 18 units HS for tonight, can increase back to usual 24 units HS dose if able to tolerate PO   - Continue carb coverage 1u per 6g CHO TID w/ meals   - HSSI   - BG checks ac/hs   - Hypoglycemia protocol in place     # Suspected KRISSY - Has been experiencing orthopnea, had CPAP at HS during last admission and tolerated very well.  Has outpatient sleep study scheduled for tomorrow 12/15, which he will likely miss if still inpatient.    - RT consult for CPAP        Diet:  Dialysis   DVT Prophylaxis: Pneumatic Compression Devices  Mueller Catheter: not present  Code Status:   FULL          Disposition Plan   Expected discharge: 2 - 3 days, recommended to prior living arrangement once tolerating oral intake, nausea and vomiting resolved, and BPs normalized.  Entered: JORDAN Amos CNP 12/14/2020, 5:11 PM     The patient's care was discussed with the Attending Physician, Dr. Raudel Tam.    JORDAN Amos CNP  [facility]   Contact information available via [facility] Paging/Directory  Please see sign in/sign out for up to date coverage information    ______________________________________________________________________    Chief  "Complaint   \"I've been throwing up\"     History is obtained from the patient and chart review.      History of Present Illness   Murtaza Fitzgerald is a 45 year old male with past medical history significant for CAD s/p CABG x 3 (2015), HFrEF (35-40%), hx type 1 DM, s/p failed pancreas/kidney transplant (2008), ESRD on HD TTS (2/2020), and recent hospital admission 12/6-12/7 for volume overload in setting of possible CHF exacerbation vs incomplete HD run.  He presented to the ED at [facility] today with refractory vomiting and HTN urgency with BPs 198/106 and transferred to [facility] due to need for dialysis.    Tyrell is seen in his hospital room.  He reports episodes of emesis occurring about every 15 minutes since Sunday night, and kept vomiting all night into early AM hours today.  Started out as food, now mostly bile and dry heaves.  Denies abdominal apart from epigastric soreness from heaving.  He denies any hematemesis.  No dysphagia or odynophagia.  Reports small episode of diarrhea yesterday, but none since.  He has gastroparesis diagnosed 1 year ago and reports throwing up a few times a week, but symptoms have worsened in duration over the last ~ 3 weeks.  He denies chest pain, dyspnea, cough, sore throat, ear pain, fevers, chills, and headaches.      Review of Systems    The 10 point Review of Systems is negative other than noted in the HPI or here.     Past Medical History    I have reviewed this patient's medical history and updated it with pertinent information if needed.   Past Medical History:   Diagnosis Date     CMV (cytomegalovirus infection) status negative 2011     Coronary artery disease, non-occlusive 2008    angio showed diffuse disease with no lesions     Diabetes mellitus, type 1     Diagnosed at age 11. 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  Past Surgical History:   Procedure Laterality Date     ABDOMEN SURGERY       ARTHROSCOPY KNEE WITH LATERAL MENISCECTOMY Left 7/10/2019    Procedure: Left knee arthroscopic partial lateral meniscectomy;  Surgeon: Alex Candelaria MD;  Location: [facility] OR     BIOPSY      [facility] 2009     BYPASS GRAFT ARTERY CORONARY N/A 8/5/2015    Procedure: BYPASS GRAFT ARTERY CORONARY;  Surgeon: Katy Neville MD;  Location: [facility] OR     CORONARY ANGIOGRAPHY ADULT ORDER      2015     CREATE FISTULA ARTERIOVENOUS UPPER EXTREMITY Left 10/7/2020    Procedure: CREATION, ARTERIOVENOUS GRAFT placement LEFT UPPER EXTREMITY   with intraoperative ultrasound;  Surgeon: Melissa Guzman MD;  Location: [facility] OR     ESOPHAGOSCOPY, GASTROSCOPY, DUODENOSCOPY (EGD), COMBINED N/A 1/28/2020    Procedure: ESOPHAGOGASTRODUODENOSCOPY (EGD) biopsies w/forceps;  Surgeon: Emre Gomes MD;  Location:  GI     EYE SURGERY      vitrectomy both eyes      IR CVC TUNNEL PLACEMENT > 5 YRS OF AGE  2/13/2020     IR CVC TUNNEL REMOVAL RIGHT  12/7/2020     ORTHOPEDIC SURGERY  1993    tumor removed from left knee     TRANSPLANT  2009.2008    pancrease and kidney transplant       Social History   I have reviewed this patient's social history and updated it with pertinent information if needed.  Social History     Tobacco Use     Smoking status: Former Smoker     Packs/day: 1.00     Years: 20.00     Pack years: 20.00     Types: Vaping Device, Cigarettes     Quit date:  2016     Years since quittin.9     Smokeless tobacco: Never Used     Tobacco comment: E- Cig use since    Substance Use Topics     Alcohol use: Not Currently     Alcohol/week: 0.0 standard drinks     Frequency: Never     Binge frequency: Never     Drug use: No       Family History   I have reviewed this patient's family history and updated it with pertinent information if needed.  Family History   Problem Relation Age of Onset     Unknown/Adopted Father      Heart Disease Maternal Grandfather 62     Melanoma No family hx of      Skin Cancer No family hx of        Prior to Admission Medications   Prior to Admission Medications   Prescriptions Last Dose Informant Patient Reported? Taking?   acetaminophen (TYLENOL) 325 MG tablet  Self No No   Sig: Take 2 tablets (650 mg) by mouth every 4 hours as needed for mild pain   acetone, Urine, test STRP  Self No No   Si strip by In Vitro route daily   Patient not taking: Reported on 2020   blood glucose (NO BRAND SPECIFIED) test strip  Self No No   Sig: Use to test blood sugar 5 times daily or as directed.   calcium carbonate (TUMS) 500 MG chewable tablet  Self Yes No   Sig: Take 1 chew tab by mouth 3 times daily   carvedilol (COREG) 6.25 MG tablet  Self No No   Sig: Take 1 tablet (6.25 mg) by mouth 2 times daily (with meals)   insulin aspart (NOVOLOG FLEXPEN) 100 UNIT/ML pen  Self Yes No   Sig: Sliding scale = 1 unit for every 20 over blood glucose of 140   insulin aspart (NOVOLOG FLEXPEN) 100 UNIT/ML pen  Self No No   Si unit per 6 grams of carbs with each meal. About 45 units/day.   insulin glargine (LANTUS SOLOSTAR) 100 UNIT/ML pen  Self No No   Sig: Inject 24 Units Subcutaneous At Bedtime   insulin pen needle (BD ARPITA U/F) 32G X 4 MM miscellaneous  Self No No   Sig: Use 4 daily or as directed.   metoclopramide (REGLAN) 5 MG tablet  Self No No   Sig: Take 1 tablet (5 mg) by mouth 4 times daily (before meals and nightly)   naproxen sodium 220 MG  capsule  Self Yes No   Sig: Take 220 mg by mouth as needed (PT last dose 9.28.2020)   omeprazole (PRILOSEC) 40 MG DR capsule  Self Yes No   Sig: Take 40 mg by mouth every evening   ondansetron (ZOFRAN-ODT) 4 MG ODT tab  Self No No   Sig: Take 1 tablet (4 mg) by mouth every 8 hours as needed for nausea   oxyCODONE (ROXICODONE) 5 MG tablet  Self No No   Sig: Take 1 tablet (5 mg) by mouth every 6 hours as needed for moderate to severe pain   prochlorperazine (COMPAZINE) 5 MG tablet  Self No No   Sig: Take 1 tablet (5 mg) by mouth every 8 hours as needed for nausea      Facility-Administered Medications: None     Allergies   Allergies   Allergen Reactions     Diphenhydramine Other (See Comments)     Restless legs     Metoclopramide      Other reaction(s): Confusion, Unknown     Reglan Other (See Comments)     IV, delsuions       Physical Exam   Vital Signs: Temp: 97  F (36.1  C) Temp src: Oral BP: (!) 157/84 Pulse: 95   Resp: 18 SpO2: 98 % O2 Device: None (Room air)    Weight: 0 lbs 0 oz    GENERAL: Alert and oriented x 3. Well nourished, well developed.  No acute distress.    HEENT: Normocephalic, atraumatic. Anicteric sclera. Mucous membranes moist.   CV: RRR. S1, S2. No murmurs appreciated.   RESPIRATORY: Effort normal on room air. Lungs CTAB with no wheezing, rales, or rhonchi.   GI: Abdomen soft and non distended, bowel sounds present x all 4 quadrants. Hernia present, reducible.  No tenderness, rebound, or guarding.   NEUROLOGICAL: No focal deficits. Follows commands.  Strength equal in upper and lower extremities.   MUSCULOSKELETAL: No joint swelling or tenderness. Moves all extremities.   EXTREMITIES: No gross deformities. No peripheral edema.   SKIN: Grossly warm, dry, and intact. No jaundice. No rashes.       Data   Data reviewed today: I reviewed all medications, new labs and imaging results over the last 24 hours.     No lab results found in last 7 days.    Most Recent 3 CBC's:  Recent Labs   Lab Test  12/07/20  0351 12/06/20  0622 11/03/20  1608   WBC 5.3 5.6 6.4   HGB 11.6* 12.2* 9.3*   MCV 99 97 92    297 257     Most Recent 3 BMP's:  Recent Labs   Lab Test 12/07/20  0351 12/06/20  0622 11/03/20  1608    129* 131*   POTASSIUM 4.7 5.1 4.2   CHLORIDE 96 90* 90*   CO2 29 29 32   BUN 22 24 28   CR 6.03* 6.38* 6.04*   ANIONGAP 9 10 9   CHARLY 9.5 9.7 9.2   * 150* 292*     Most Recent 2 LFT's:  Recent Labs   Lab Test 12/06/20  0622 11/03/20  1608   AST 16 7   ALT 35 17   ALKPHOS 198* 91   BILITOTAL 1.1 0.4     Most Recent 3 INR's:  Recent Labs   Lab Test 10/07/20  1119 10/02/20  0841 02/13/20  0122   INR 1.10 1.08 1.14     No results found for this or any previous visit (from the past 24 hour(s)).

## 2020-12-15 ENCOUNTER — APPOINTMENT (OUTPATIENT)
Dept: PHYSICAL THERAPY | Facility: CLINIC | Age: 45
DRG: 073 | End: 2020-12-15
Attending: NURSE PRACTITIONER
Payer: MEDICARE

## 2020-12-15 LAB
ANION GAP SERPL CALCULATED.3IONS-SCNC: 11 MMOL/L (ref 3–14)
BUN SERPL-MCNC: 56 MG/DL (ref 7–30)
CALCIUM SERPL-MCNC: 9.1 MG/DL (ref 8.5–10.1)
CHLORIDE SERPL-SCNC: 95 MMOL/L (ref 94–109)
CO2 SERPL-SCNC: 28 MMOL/L (ref 20–32)
CREAT SERPL-MCNC: 10.5 MG/DL (ref 0.66–1.25)
ERYTHROCYTE [DISTWIDTH] IN BLOOD BY AUTOMATED COUNT: 14.3 % (ref 10–15)
GFR SERPL CREATININE-BSD FRML MDRD: 5 ML/MIN/{1.73_M2}
GLUCOSE BLDC GLUCOMTR-MCNC: 144 MG/DL (ref 70–99)
GLUCOSE BLDC GLUCOMTR-MCNC: 211 MG/DL (ref 70–99)
GLUCOSE BLDC GLUCOMTR-MCNC: 236 MG/DL (ref 70–99)
GLUCOSE BLDC GLUCOMTR-MCNC: 240 MG/DL (ref 70–99)
GLUCOSE BLDC GLUCOMTR-MCNC: 260 MG/DL (ref 70–99)
GLUCOSE SERPL-MCNC: 160 MG/DL (ref 70–99)
HCT VFR BLD AUTO: 31.9 % (ref 40–53)
HGB BLD-MCNC: 10.5 G/DL (ref 13.3–17.7)
MCH RBC QN AUTO: 31.6 PG (ref 26.5–33)
MCHC RBC AUTO-ENTMCNC: 32.9 G/DL (ref 31.5–36.5)
MCV RBC AUTO: 96 FL (ref 78–100)
PLATELET # BLD AUTO: 236 10E9/L (ref 150–450)
POTASSIUM SERPL-SCNC: 4.5 MMOL/L (ref 3.4–5.3)
RBC # BLD AUTO: 3.32 10E12/L (ref 4.4–5.9)
SODIUM SERPL-SCNC: 135 MMOL/L (ref 133–144)
WBC # BLD AUTO: 6.7 10E9/L (ref 4–11)

## 2020-12-15 PROCEDURE — 97161 PT EVAL LOW COMPLEX 20 MIN: CPT | Mod: GP

## 2020-12-15 PROCEDURE — 80048 BASIC METABOLIC PNL TOTAL CA: CPT | Performed by: NURSE PRACTITIONER

## 2020-12-15 PROCEDURE — 999N000157 HC STATISTIC RCP TIME EA 10 MIN

## 2020-12-15 PROCEDURE — 36415 COLL VENOUS BLD VENIPUNCTURE: CPT | Performed by: NURSE PRACTITIONER

## 2020-12-15 PROCEDURE — 99221 1ST HOSP IP/OBS SF/LOW 40: CPT | Mod: 25 | Performed by: INTERNAL MEDICINE

## 2020-12-15 PROCEDURE — 258N000003 HC RX IP 258 OP 636: Performed by: INTERNAL MEDICINE

## 2020-12-15 PROCEDURE — G0378 HOSPITAL OBSERVATION PER HR: HCPCS

## 2020-12-15 PROCEDURE — 90937 HEMODIALYSIS REPEATED EVAL: CPT

## 2020-12-15 PROCEDURE — 90935 HEMODIALYSIS ONE EVALUATION: CPT | Performed by: INTERNAL MEDICINE

## 2020-12-15 PROCEDURE — 250N000012 HC RX MED GY IP 250 OP 636 PS 637: Performed by: NURSE PRACTITIONER

## 2020-12-15 PROCEDURE — 97530 THERAPEUTIC ACTIVITIES: CPT | Mod: GP

## 2020-12-15 PROCEDURE — 250N000011 HC RX IP 250 OP 636: Performed by: NURSE PRACTITIONER

## 2020-12-15 PROCEDURE — 99232 SBSQ HOSP IP/OBS MODERATE 35: CPT | Performed by: INTERNAL MEDICINE

## 2020-12-15 PROCEDURE — 120N000002 HC R&B MED SURG/OB UMMC

## 2020-12-15 PROCEDURE — 250N000013 HC RX MED GY IP 250 OP 250 PS 637: Performed by: NURSE PRACTITIONER

## 2020-12-15 PROCEDURE — 999N000111 HC STATISTIC OT IP EVAL DEFER: Performed by: OCCUPATIONAL THERAPIST

## 2020-12-15 PROCEDURE — 999N001017 HC STATISTIC GLUCOSE BY METER IP

## 2020-12-15 PROCEDURE — 85027 COMPLETE CBC AUTOMATED: CPT | Performed by: NURSE PRACTITIONER

## 2020-12-15 PROCEDURE — 94660 CPAP INITIATION&MGMT: CPT

## 2020-12-15 RX ORDER — HEPARIN SODIUM 5000 [USP'U]/.5ML
5000 INJECTION, SOLUTION INTRAVENOUS; SUBCUTANEOUS EVERY 12 HOURS
Status: DISCONTINUED | OUTPATIENT
Start: 2020-12-15 | End: 2020-12-17 | Stop reason: HOSPADM

## 2020-12-15 RX ADMIN — PROCHLORPERAZINE EDISYLATE 10 MG: 5 INJECTION INTRAMUSCULAR; INTRAVENOUS at 09:20

## 2020-12-15 RX ADMIN — CARVEDILOL 6.25 MG: 3.12 TABLET, FILM COATED ORAL at 17:22

## 2020-12-15 RX ADMIN — INSULIN ASPART 5 UNITS: 100 INJECTION, SOLUTION INTRAVENOUS; SUBCUTANEOUS at 08:10

## 2020-12-15 RX ADMIN — INSULIN ASPART 10 UNITS: 100 INJECTION, SOLUTION INTRAVENOUS; SUBCUTANEOUS at 18:40

## 2020-12-15 RX ADMIN — METOCLOPRAMIDE 5 MG: 5 TABLET ORAL at 17:22

## 2020-12-15 RX ADMIN — METOCLOPRAMIDE 5 MG: 5 TABLET ORAL at 08:09

## 2020-12-15 RX ADMIN — METOCLOPRAMIDE 5 MG: 5 TABLET ORAL at 21:21

## 2020-12-15 RX ADMIN — INSULIN GLARGINE 18 UNITS: 100 INJECTION, SOLUTION SUBCUTANEOUS at 21:21

## 2020-12-15 RX ADMIN — CALCIUM CARBONATE (ANTACID) CHEW TAB 500 MG 500 MG: 500 CHEW TAB at 08:09

## 2020-12-15 RX ADMIN — CALCIUM CARBONATE (ANTACID) CHEW TAB 500 MG 500 MG: 500 CHEW TAB at 21:21

## 2020-12-15 RX ADMIN — CARVEDILOL 6.25 MG: 3.12 TABLET, FILM COATED ORAL at 08:09

## 2020-12-15 RX ADMIN — OMEPRAZOLE 40 MG: 20 CAPSULE, DELAYED RELEASE ORAL at 21:21

## 2020-12-15 RX ADMIN — ONDANSETRON 4 MG: 2 INJECTION INTRAMUSCULAR; INTRAVENOUS at 08:09

## 2020-12-15 RX ADMIN — METOCLOPRAMIDE 5 MG: 5 TABLET ORAL at 11:40

## 2020-12-15 RX ADMIN — SODIUM CHLORIDE 250 ML: 9 INJECTION, SOLUTION INTRAVENOUS at 12:31

## 2020-12-15 RX ADMIN — SODIUM CHLORIDE 300 ML: 9 INJECTION, SOLUTION INTRAVENOUS at 12:30

## 2020-12-15 RX ADMIN — Medication: at 12:31

## 2020-12-15 ASSESSMENT — ACTIVITIES OF DAILY LIVING (ADL)
ADLS_ACUITY_SCORE: 12

## 2020-12-15 NOTE — PROGRESS NOTES
HEMODIALYSIS TREATMENT NOTE    Date: 12/15/2020  Time: 4:30 PM    Data:  Pre Wt:83.2kg     Desired Wt: 80.2 kg   Post Wt: 80.7kg   Ultrafiltration - Post Run Net Total Removed (mL):  2000 ML  Vascular Access Status: Fistula  patent  Dialyzer Rinse:    Total Blood Volume Processed: 79.9 L   Total Dialysis (Treatment) Time:3.5hours     Dialysate Bath: K 2, Ca 2.5  Heparin: None    Lab:   No    Assessment / Interventions: 3.5 hours HD via LAVF thrill & bruit present. 2 15g needle cannulated with good flow. .  Seen by MD & NP during treatment with order to reduced UF to 2.0kg. same done. Pt monitoring every 15 minutes, hemodynamic stable & Crit -line with the limit. Completed treatment time, no issues, blood rinsed back, hemostasis obtained in less than 10 minutes,hand off report given & pt send back in a stable condition.         Plan:    Cont. With Renal Team.

## 2020-12-15 NOTE — PROGRESS NOTES
[facility]     Medicine Progress Note - Hospitalist Service       Date of Admission:  12/14/2020  Assessment & Plan       Murtaza Fitzgerald is a 45 year old male with past medical history significant for CAD s/p CABG x 3 (2015), HFrEF (35-40%), hx type 1 DM, s/p failed pancreas/kidney transplant (2008), ESRD on HD TTS (2/2020), and recent hospital admission 12/6-12/7 for volume overload in setting of possible CHF exacerbation vs incomplete HD run.  He presented to the ED at [place] today with refractory vomiting and HTN urgency with BPs 198/106 and transferred to [facility] due to need for dialysis. BP improved but still only able to tolerate minimal PO fluids.     Nausea and Vomiting  Diarrhea   Concern for gastroparesis flare with constipation --> diarrhea as moderate stool burden on Abdominal x-ray (no obstruction).   -GI consulted, appreciate assistance.   -Enteric stool panel and C. Diff sent per GI recommendations   -Continue gastroparesis treatment as previous - reglan QID, antiemetics PRN, simethicone PRN. Trial antiemetics prior to meals per GI recommendations.   -Follow-up COVID-19 test done at [place], low suspicion   -If enteric panel negative, will consider treatment for constipation     Hypertensive Urgency - resolved   ESRD on HD T/Th/Sa, S/p failed pancreas/kidney transplant (2008)  /106 at [place] ED prior to admission. Likely due to volume overload.   BP improved.   -Continue HD T/Th/Sa, nephrology consulted   -Continue home carvedilol 6.25mg BID     -Continue home calcium carbonate     LUE swelling  -LUE doppler ordered     HFrEF - not in exacerbation   Last echocardiogram on 12/6/2020 with EF 35-40%, moderate diffuse hypokinesis, grade III diastolic dysfunction w/ elevated left sided filling pressures, no valvular abnormalities.  - [facility] follow up outpatient (needs to be scheduled prior to discharge)   - Follow up with  Cardiology for completion of transplant work-up, needs RHC      DM1   Home regimen: S/p failed pancreas/kidney transplant as above.  Last Hgb A1c 7.0% in 10/2020.  On Lantus 24 units at HS, carb coverage 1u per 6g CHO w/ meals, and custom sliding scale insulin with 1u per every 20 over 140.  During last admission, had isolated hypoglycemia to 45 in setting ongoing nausea and vomiting.    -continue lantus at decreased dose of 18u given decreased PO  intake  -Continue carb coverage 1u per 6g CHO TID w/ meals   -HSSI   -BG checks ac/hs   -Hypoglycemia protocol in place     Suspected KRISSY   Has been experiencing orthopnea, had CPAP at HS during last admission and tolerated very well.  Has outpatient sleep study scheduled for 12/15 which will need to be rescheduled.   -BIPAP overnight      Chronic Anemia   Likely related to renal disease. At baseline        Diet: Dialysis Diet    DVT Prophylaxis: Heparin SQ  Mueller Catheter: not present  Code Status: Full Code           Disposition Plan   Expected discharge: 2 - 3 days, recommended to prior living arrangement once tolerating PO .  Entered: Winsome Dennis DO 12/15/2020, 2:34 PM       The patient's care was discussed with the Bedside Nurse and Patient.    Winsome Dennis DO  Hospitalist Service  [facility]   Contact information available via [facility] Paging/Directory  Please see sign in/sign out for up to date coverage information  ______________________________________________________________________    Interval History   No overnight events reported.  Reports ongoing nausea, inability to eat solid food but can keep down small amounts of water. No abdominal pain. Reports watery diarrhea associated with nausea over the last 3 weeks.   Swelling RUE reported since early October.   He denies dyspnea, cough, fevers.   Dialysis today.     Data reviewed today: I reviewed all medications, new labs and imaging results over the last 24 hours. I  personally reviewed the abdominal x-ray image(s) showing no obstruction, moderate stool burden.    Physical Exam   Vital Signs: Temp: 98.1  F (36.7  C) Temp src: Oral BP: (!) 158/85 Pulse: 84   Resp: 16 SpO2: 98 % O2 Device: None (Room air)    Weight: 183 lbs 6.76 oz  Constitutional: no apparent distress, pleasant and cooperative   Cardiovascular: regular rate and rhythm, normal S1 and S2, no murmurs, rubs, or gallops noted noted, no bilateral lower extremity edema   Respiratory: clear to auscultation bilaterally, no wheezing, rales, or rhonchi, normal work of breathing   GI: abdomen soft, non tender, non distended, bowel sounds present. Multiple reducible hernias   Neuro: alert and oriented, no gross focal deficits noted   Extremities: 1+ edema LUE, fistula upper arm with palpable thrill and no erythema     Data   Recent Labs   Lab 12/15/20  0547   WBC 6.7   HGB 10.5*   MCV 96         POTASSIUM 4.5   CHLORIDE 95   CO2 28   BUN 56*   CR 10.50*   ANIONGAP 11   CHARLY 9.1   *     Recent Results (from the past 24 hour(s))   XR Abdomen 2 Views    Narrative    Exam: XR ABDOMEN 2 VW, 12/14/2020 7:59 PM    Indication: refractory nausea and vomiting, evaluate for possible  obstruction    Comparison: CT from 12/11/2020    Findings:   Partially visualized chest images stable sternotomy wires,  mediastinal/hilar surgical clips. No focal opacities. Bones appear  stable. Relative paucity of small bowel gas. Dense stool within the  otherwise unremarkable colon. Surgical clips/anastomosis in the right  lower quadrant.      Impression    Impression: Nonobstructive bowel gas pattern. Mild to moderate stool  volume.    I have personally reviewed the examination and initial interpretation  and I agree with the findings.    APRYL HERNANDEZ MD     Medications       calcium carbonate  500 mg Oral TID     carvedilol  6.25 mg Oral BID w/meals     heparin ANTICOAGULANT  5,000 Units Subcutaneous Q12H     insulin aspart    Subcutaneous TID AC     insulin aspart  1-10 Units Subcutaneous TID AC     insulin aspart  1-7 Units Subcutaneous At Bedtime     insulin glargine  18 Units Subcutaneous At Bedtime     metoclopramide  5 mg Oral 4x Daily AC & HS     omeprazole  40 mg Oral QPM     sodium chloride (PF)  3 mL Intracatheter Q8H

## 2020-12-15 NOTE — PLAN OF CARE
Cares 1900 to 0700    /70 (BP Location: Right arm)   Pulse 88   Temp 95.3  F (35.2  C) (Oral)   Resp 16   Wt 83.2 kg (183 lb 6.8 oz)   SpO2 97%   BMI 27.89 kg/m      Pain: Intermittent discomfort in abdomen  Neuros: Alert & oriented x4.   Cardiac: WDL. Denies chest pain.   Resp: Denies SOB, on RA. Bipap at night.   GI/: Endorses nausea.   Nutrition: Dialysis diet, carb counts. No appetite d/t nausea.   IV/Drains: R PIV SL.   Skin: No new concerns.   Activity: SBA/I.   Labs: Pending    Events this shift: Abdominal xray completed. Pt slept with Bipap most of night. Uneventful.

## 2020-12-15 NOTE — UTILIZATION REVIEW
"  Admission Status; Secondary Review Determination         Under the authority of the Utilization Management Committee, the utilization review process indicated a secondary review on the above patient.  The review outcome is based on review of the medical records, discussions with staff, and applying clinical experience noted on the date of the review.          (x) Observation Status Appropriate - This patient does not meet hospital inpatient criteria and is placed in observation status. If this patient's primary payer is Medicare and was admitted as an inpatient, Condition Code 44 should be used and patient status changed to \"observation\".     RATIONALE FOR DETERMINATION   45 year old male with past medical history significant for CAD s/p CABG x 3 (2015), HFrEF (35-40%), hx type 1 DM, s/p failed pancreas/kidney transplant (2008), ESRD on HD TTS (2/2020), and recent hospital admission 12/6-12/7 for volume overload in setting of possible CHF exacerbation vs incomplete HD run.  He presented to the ED at [facility] with refractory vomiting and HTN urgency with BPs 198/106 and transferred to [facility] due to need for dialysis.     Blood pressure this morning 136/70 pulse 88 afebrile, on dialysis diet. The severity of illness, intensity of service provided, expected LOS and risk for adverse outcome make the care appropriate for further observation; however, doesn't meet criteria for hospital inpatient admission. Dr Dennis notified of this determination.    This document was produced using voice recognition software.      The information on this document is developed by the utilization review team in order for the business office to ensure compliance.  This only denotes the appropriateness of proper admission status and does not reflect the quality of care rendered.         The definitions of Inpatient Status and Observation Status used in making the determination above are those provided in the CMS Coverage Manual, " Chapter 1 and Chapter 6, section 70.4.      Sincerely,     LAUREL AMAYA MD    System Medical Director  Utilization Management  [facility].

## 2020-12-15 NOTE — PLAN OF CARE
OT5B: CX: After chart review and discussion with physical therapy, no IP OT needs identified. Pt up Ind in his room, no limitations in ADLs. Will complete order.

## 2020-12-15 NOTE — PLAN OF CARE
Assumed cares 2642-5047     BP (!) 157/84   Pulse 95   Temp 97  F (36.1  C) (Oral)   Resp 18   SpO2 98%      Pain: Patient has abdominal pain from dry heaving. Antimetics given.   Neuro:  A&Ox4.   Respiratory: Lungs clear and equal. Diminished in lower lobes. Cough.   Cardiac/Neurovascular: HR and pulse WDL. No numbness or tingling present.   GI/: Patient is on hemodialysis. No BM reported.   Nutrition: NPO. Pt continuously nauseous.   Activity: Independent/SBA.   Skin: No concerns. Fistula in left arm.    Lines: PIV R arm (SL).   Events this shift: Patient was admitted from [facility]. Plan for dialysis tomorrow. Respiratory paged and asked about CPAP for pt to use tonight.      Plan: Continue to monitor.

## 2020-12-15 NOTE — PROGRESS NOTES
12/15/20 0852   Quick Adds   Type of Visit Initial PT Evaluation   Living Environment   Current Living Arrangements house   Home Accessibility stairs to enter home   Number of Stairs, Main Entrance 2   Stair Railings, Main Entrance none   Transportation Anticipated family or friend will provide   Living Environment Comments Pt reports living with family, does not specify who.   Self-Care   Usual Activity Tolerance fair   Current Activity Tolerance fair   Regular Exercise No   Equipment Currently Used at Home none   Activity/Exercise/Self-Care Comment No regular exercise, primarily incative.   Disability/Function   Hearing Difficulty or Deaf no   Wear Glasses or Blind no   Concentrating, Remembering or Making Decisions Difficulty no   Difficulty Communicating no   Difficulty Eating/Swallowing no   Walking or Climbing Stairs Difficulty no   Dressing/Bathing Difficulty no   Toileting issues no   Doing Errands Independently Difficulty (such as shopping) no   Fall history within last six months no   Change in Functional Status Since Onset of Current Illness/Injury no   General Information   Onset of Illness/Injury or Date of Surgery 12/14/20   Referring Physician Carey Kay, JORDAN CNP   Patient/Family Therapy Goals Statement (PT) to return home   Pertinent History of Current Problem (include personal factors and/or comorbidities that impact the POC) Per chart, Murtaza Fitzgerald is a 45 year old male with past medical history significant for CAD s/p CABG x 3 (2015), HFrEF (35-40%), hx type 1 DM, s/p failed pancreas/kidney transplant (2008), ESRD on HD TTS (2/2020), and recent hospital admission 12/6-12/7 for volume overload in setting of possible CHF exacerbation vs incomplete HD run.  He presented to the ED at [facility] with refractory vomiting and HTN urgency with BPs 198/106 and transferred to [facility] due to need for dialysis.   Existing Precautions/Restrictions no known  precautions/restrictions   General Observations Activity orders: up ad mikal   Cognition   Orientation Status (Cognition) oriented x 4   Affect/Mental Status (Cognition) WNL   Follows Commands (Cognition) WNL   Integumentary/Edema   Integumentary/Edema no deficits were identifed   Posture    Posture Forward head position;Protracted shoulders   Range of Motion (ROM)   ROM Quick Adds ROM WFL   Strength   Manual Muscle Testing Quick Adds Strength WFL   Bed Mobility   Comment (Bed Mobility) independent   Transfers   Transfer Safety Comments independent   Gait/Stairs (Locomotion)   Comment (Gait/Stairs) independent   Balance   Balance Comments Good static standing balance   Sensory Examination   Sensory Perception patient reports no sensory changes   Clinical Impression   Criteria for Skilled Therapeutic Intervention yes, treatment indicated   PT Diagnosis (PT) impaired functional mobility   Influenced by the following impairments decreased strength and endurance   Functional limitations due to impairments Gait, stairs   Clinical Presentation Stable/Uncomplicated   Clinical Presentation Rationale PMH and clinical judgment   Clinical Decision Making (Complexity) low complexity   Therapy Frequency (PT) One time eval and treatment only   Predicted Duration of Therapy Intervention (days/wks) 1 visit   Planned Therapy Interventions (PT) balance training;home exercise program;strengthening   Anticipated Equipment Needs at Discharge (PT)   (none)   Risk & Benefits of therapy have been explained evaluation/treatment results reviewed;care plan/treatment goals reviewed;participants voiced agreement with care plan;participants included;patient   PT Discharge Planning    PT Discharge Recommendation (DC Rec) home   PT Rationale for DC Rec Pt at functional baseline, no further acute PT indicated.   PT Brief overview of current status  Independent, encourage pt to ambulate frequently throughout the day to prevent deconditioning.   Total  Evaluation Time   Total Evaluation Time (Minutes) 5

## 2020-12-15 NOTE — PHARMACY-ADMISSION MEDICATION HISTORY
Admission medication history interview status for the 12/14/2020 admission is complete. See Epic admission navigator for allergy information, pharmacy, prior to admission medications and immunization status.     Medication history interview sources:    - Patient via room phone due to COVID-19 restrictions    Changes made to PTA medication list (reason)  Added: None  Deleted: Oxycodone 5 mg tablet as needed (patient stated not taking as he does not have any left)  Changed:   - Calcium carbonate 500 mg 1 tablet three times daily --> 2 tablets three times daily   - Omeprazole 40 mg once daily --> 80 mg once daily     Additional medication history information (including reliability of information, actions taken by pharmacist):  - Patient was a reliable historian who was able to readily describe his medication names, directions and last doses  - Patient verified allergies and reactions       Prior to Admission medications    Medication Sig Last Dose Taking? Auth Provider   calcium carbonate (TUMS) 500 MG chewable tablet Take 2 chew tab by mouth 3 times daily  12/12/2020 Yes Reported, Patient   carvedilol (COREG) 6.25 MG tablet Take 1 tablet (6.25 mg) by mouth 2 times daily (with meals) 12/13/2020 Yes Michelle Schneider MD   insulin aspart (NOVOLOG FLEXPEN) 100 UNIT/ML pen 1 unit per 6 grams of carbs with each meal. About 45 units/day. 12/13/2020 Yes Patircia Costa MD   insulin aspart (NOVOLOG FLEXPEN) 100 UNIT/ML pen Sliding scale = 1 unit for every 20 over blood glucose of 140 12/13/2020 Yes Unknown, Entered By History   insulin glargine (LANTUS SOLOSTAR) 100 UNIT/ML pen Inject 24 Units Subcutaneous At Bedtime 12/13/2020 Yes Kt Ríos MD   metoclopramide (REGLAN) 5 MG tablet Take 1 tablet (5 mg) by mouth 4 times daily (before meals and nightly) 12/13/2020 Yes Jack Chen MD   omeprazole (PRILOSEC) 40 MG DR capsule Take 80 mg by mouth every evening  12/13/2020 Yes Unknown, Entered By History    acetaminophen (TYLENOL) 325 MG tablet Take 2 tablets (650 mg) by mouth every 4 hours as needed for mild pain Unknown  Misti Lopez MD   acetone, Urine, test STRP 1 strip by In Vitro route daily  Patient not taking: Reported on 2/26/2020   Marcie Lozano APRN CNP   blood glucose (NO BRAND SPECIFIED) test strip Use to test blood sugar 5 times daily or as directed.   Patricia Costa MD   insulin pen needle (BD ARPITA U/F) 32G X 4 MM miscellaneous Use 4 daily or as directed.   Patricia Costa MD   naproxen sodium 220 MG capsule Take 220 mg by mouth as needed  Unknown at Unknown time  Reported, Patient   ondansetron (ZOFRAN-ODT) 4 MG ODT tab Take 1 tablet (4 mg) by mouth every 8 hours as needed for nausea Unknown at Unknown time  Kt Ríos MD   prochlorperazine (COMPAZINE) 5 MG tablet Take 1 tablet (5 mg) by mouth every 8 hours as needed for nausea Unknown at Unknown time  Kt Ríos MD         Medication history completed by:   Michelle Zamarripa, PharmD  PGY-1 Pharmacy Resident

## 2020-12-15 NOTE — PROGRESS NOTES
"CLINICAL NUTRITION SERVICES - ASSESSMENT NOTE     Nutrition Prescription    RECOMMENDATIONS FOR MDs/PROVIDERS TO ORDER:  Consider adding phos binder to daily regimen to better manage phos levels    Malnutrition Status:    Patient does not meet two of the established criteria necessary for diagnosing malnutrition    Recommendations already ordered by Registered Dietitian (RD):  None at this time    Future/Additional Recommendations:  - Continue Dialysis diet  - Consider further education on phosphorous containing foods to help manage elevated phos     REASON FOR ASSESSMENT  Murtaza Fitzgerald is a/an 45 year old male assessed by the dietitian for Admission Nutrition Risk Screen for positive    NUTRITION HISTORY  - Pt reports vomiting last 1-2 days.  Had an episode of diarrhea on Sun AM.  Last able to tolerate meals on Saturday.  Diagnosed with gastroparesis about 1 year ago.  Reports that symptoms were pretty well controlled until about 3 weeks ago.  - Per Nephrology PA, patient was seen on dialysis, still somewhat nauseated.  - HD T-Th-S  - Unable to obtain nutrition history from pt due to pt at dialysis and unable to reach via phone prior to HD  - Per previous outpatient RD note, pt's weight typically fluctuates within a 20 lb range    CURRENT NUTRITION ORDERS  Diet: Dialysis  Intake/Tolerance: Intake 25% of breakfast - [place] toast, scrambled eggs, blueberry muffin, milk and apple juice.    LABS  BUN 56 (H)  Cr 10.5 (H)  GFR 5 (L)  Phos 6.2 (H)  -240    MEDICATIONS  HSSI  Reglan    ANTHROPOMETRICS  Height: 172.7 cm (5' 8\")  Most Recent Weight: 83.2 kg (183 lb 6.8 oz)    IBW: 70 kg  BMI: Overweight BMI 25-29.9  Weight History: 11# (6%) wt loss in 2 months  Wt Readings from Last 10 Encounters:   12/14/20 83.2 kg (183 lb 6.8 oz)   12/07/20 82.2 kg (181 lb 3.2 oz)   11/09/20 86.4 kg (190 lb 8 oz)   10/23/20 88.4 kg (194 lb 14.4 oz)   10/07/20 84.1 kg (185 lb 6.5 oz)   09/30/20 87.5 kg (193 lb) "   09/21/20 88.5 kg (195 lb)   05/27/20 85.2 kg (187 lb 13.3 oz)   05/14/20 80.9 kg (178 lb 5.6 oz)   05/06/20 83.9 kg (185 lb)     Dosing Weight: 83 kg - ABW 12/14    ASSESSED NUTRITION NEEDS  Estimated Energy Needs: 1306-5836 kcals/day (25 - 30 kcals/kg)  Justification: Maintenance  Estimated Protein Needs: 100-115 grams protein/day (1.2 - 1.4 grams of pro/kg)  Justification: Dialysis  Estimated Fluid Needs:  (1 mL/kcal)   Justification: Maintenance and Per provider pending fluid status    PHYSICAL FINDINGS  See malnutrition section below.    MALNUTRITION  % Intake: Decreased intake does not meet criteria  % Weight Loss: Weight loss does not meet criteria  Subcutaneous Fat Loss: Unable to assess  Muscle Loss: Unable to assess  Fluid Accumulation/Edema: None noted  Malnutrition Diagnosis: Patient does not meet two of the established criteria necessary for diagnosing malnutrition    NUTRITION DIAGNOSIS  Inadequate oral intake related to nausea and emesis as evidenced by pt reports last able to tolerate meals on Saturday (reduced intake 2+ days).     INTERVENTIONS  Implementation  None at this time     Goals  Patient to consume % of nutritionally adequate meal trays TID, or the equivalent with supplements/snacks.     Monitoring/Evaluation  Progress toward goals will be monitored and evaluated per protocol.    Jessica Patel RDN, LD  5A/5B Pg [phone]

## 2020-12-15 NOTE — CONSULTS
GASTROENTEROLOGY CONSULTATION      Date of Admission:  12/14/2020          ASSESSMENT AND RECOMMENDATIONS:     45 year old male with a history of CAD s/p CABG x 3, HEFrEF (35%), DM1 with failed pancreas/kidney transplant (2018) c/b gastsroparesis, ESRD on HD (2/2020), who presents to the hospital with refractory vomiting and HTN urgency with need for dialysis.  GI luminal was consulted for management or other options for gastroparesis symptoms.  Patient presenting with hypertensive urgency and need for dialysis.  Patient with underlying moderate-severe gastroparesis, as seen by gastric emptying study in April 2019.  Gastroparesis can be contributing to current symptoms, as patient has had cyclical vomiting refractory to reglan over the course of this year.  However, it is prudent to first rule out other etiologies.  Although patient endorses not missing any dialysis, patient presented with hypertensive urgency and a BUN/Creatinine that was higher than he has ever had in the past.  This indicates that either a dialysis session was missed, there is a worsening of CKD, or last dialysis run was not a full run.  Could also be worsening of heart failure.  Regardless, given patient's BUN is three times baseline, uremia could be contributing to patient's acute two-day refractory nausea and vomiting.  Furthermore, patient has had three weeks of this worsening nausea and vomiting, but also has had watery stools several times per day over the past three weeks as well.  It is important to rule out infectious causes of these gastrointestinal symptoms.  Given history of abdominal surgery, important to consider SBO, however KUB showed non-obstructive gas pattern, and patient has been having bowel movements and passing flatus.   CT shows atrophied pancreas, so possible that pancreatic insufficiency could be contributing, especially considering patient had failed pancreas transplant as well.       RECOMMENDATIONS  -Recommend  "ruling out infectious process that may be contributing to gastrointestinal symptoms:   --Enteric panel   --C diff  -Recommend ordering fecal elastase to assess for pancreatic insufficiency  -Continue dialysis to decrease BUN/creatinine, which could also contribute to symptoms  -Supportive cares for gastroparesis   --Low fat, low fiber diet   --Encourage smaller meals   --Anti-emetics can be given prior to meals for symptom control  -Will consider further gastroparesis treatment if symptoms continue to be refractory despite interventions and workup above.    Thank you for involving us in this patient's care. Please do not hesitate to contact the GI service with any questions or concerns.     Patient care plan discussed with Dr. Raman Francis, GI staff physician.    Trae Hutchinson MD  Gastroenterology   PGY-1  Pager [phone]            Chief Complaint:   We were asked by Dr. Winsome Dennis of Medicine to evaluate this patient with gastroparesis.    History is obtained from the patient and the medical record.          History of Present Illness:   Murtaza Fitzgerald is a 45 year old male with a history of CAD s/p CABG x 3, HEFrEF (35%), DM1 with failed pancreas/kidney transplant (2018) c/b gastsroparesis, ESRD on HD (2/2020), who presents to the hospital with refractory vomiting and HTN urgency with need for dialysis.  GI luminal was consulted for management or other options for gastroparesis symptoms.  Patient presents with two days of severe nausea and vomiting, endorsing emesis every 15 to 30 minutes over the two days prior to presenting to the ED.  He noticed that about three weeks ago, the nausea and vomiting began to increase in frequency.  In addition, around the same time, patient began to have several \"watery, poorly-formed\" stools per day that were nonbloody.  He tried immodium for the diarrhea, but did not improve his symptoms.  This diarrhea has continued up until this current hospitalization, " last bowel movement on the day of presenting to the ED.  Denies recent abx use.  No significant abdominal pain, fullness, or bloating.  Patient has not had an episode of emesis since admission, however still has waves of severe nausea, and has not been able to eat much since admission.  Denies missing dialysis sessions.  Also denies chest pain, SOB, abdominal pain.  Passing flatus.  Only abdominal surgery was the pancreas/kidney transplant in 2018.    Patient was diagnosed with gastroparesis in 2019, despite having appropriate control of his diabetes.  Since then, he has had cyclical episodes of increased nausea and vomiting.  At baseline since his gastroparesis diagnosis, he endorses 2-3 episodes of vomiting per week, usually associated with meals.  About once a month, he endorses a similar episode as described above, where nausea and vomiting become severe and refractory to home reglan.  At such a point, he usually presents to the ED, and n/v is improved with combination of zofran and compazine.  However, these episodes aren't usually associated with diarrhea.  Had most recent gastric emptying study in April 2019, which showed abnormally delayed gastric emptying, worse from previous studies.  Patient with retention of 35% gastric contents at 4 hours, consistent with moderate-severe gastroparesis.            Past Medical History:   Reviewed and edited as appropriate  Past Medical History:   Diagnosis Date     CMV (cytomegalovirus infection) status negative 2011     Coronary artery disease, non-occlusive 2008    angio showed diffuse disease with no lesions     Diabetes mellitus, type 1     Diagnosed at age 11. 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     Noncompliance with treatment     no labs for one year     Pancreas replaced by transplant (H) Feb 2009    rejection treated with thymo     Pancreatic disease     pancreas transplant     Tobacco abuse ongoing            Past Surgical History:   Reviewed and edited as appropriate   Past Surgical History:   Procedure Laterality Date     ABDOMEN SURGERY       ARTHROSCOPY KNEE WITH LATERAL MENISCECTOMY Left 7/10/2019    Procedure: Left knee arthroscopic partial lateral meniscectomy;  Surgeon: Alex Candelaria MD;  Location: [facility] OR     BIOPSY      [facility] 2009     BYPASS GRAFT ARTERY CORONARY N/A 8/5/2015    Procedure: BYPASS GRAFT ARTERY CORONARY;  Surgeon: Katy Neville MD;  Location: [facility] OR     CORONARY ANGIOGRAPHY ADULT ORDER      2015     CREATE FISTULA ARTERIOVENOUS UPPER EXTREMITY Left 10/7/2020    Procedure: CREATION, ARTERIOVENOUS GRAFT placement LEFT UPPER EXTREMITY   with intraoperative ultrasound;  Surgeon: Melissa Guzman MD;  Location: [facility] OR     ESOPHAGOSCOPY, GASTROSCOPY, DUODENOSCOPY (EGD), COMBINED N/A 1/28/2020    Procedure: ESOPHAGOGASTRODUODENOSCOPY (EGD) biopsies w/forceps;  Surgeon: Emre Gomes MD;  Location:  GI     EYE SURGERY      vitrectomy both eyes      IR CVC TUNNEL PLACEMENT > 5 YRS OF AGE  2/13/2020     IR CVC TUNNEL REMOVAL RIGHT  12/7/2020     ORTHOPEDIC SURGERY  1993    tumor removed from left knee     TRANSPLANT  2009.2008    pancrease and kidney transplant            Previous Endoscopy:   No results found. However, due to the size of the patient record, not all encounters were searched. Please check Results Review for a complete set of results.         Social History:   Reviewed and edited as appropriate  Social History     Socioeconomic History     Marital status: Single     Spouse name: Not on file     Number of children: 1     Years of education: Not on file     Highest education level: Associate degree: academic program   Occupational  History     Occupation:    Social Needs     Financial resource strain: Not very hard     Food insecurity     Worry: Never true     Inability: Never true     Transportation needs     Medical: No     Non-medical: No   Tobacco Use     Smoking status: Former Smoker     Packs/day: 1.00     Years: 20.00     Pack years: 20.00     Types: Vaping Device, Cigarettes     Quit date: 2016     Years since quittin.9     Smokeless tobacco: Never Used     Tobacco comment: E- Cig use since    Substance and Sexual Activity     Alcohol use: Not Currently     Alcohol/week: 0.0 standard drinks     Frequency: Never     Binge frequency: Never     Drug use: No     Sexual activity: Never     Partners: Female   Lifestyle     Physical activity     Days per week: 0 days     Minutes per session: 0 min     Stress: To some extent   Relationships     Social connections     Talks on phone: More than three times a week     Gets together: Once a week     Attends Restorationism service: Never     Active member of club or organization: No     Attends meetings of clubs or organizations: Never     Relationship status:      Intimate partner violence     Fear of current or ex partner: Not on file     Emotionally abused: Not on file     Physically abused: Not on file     Forced sexual activity: Not on file   Other Topics Concern     Parent/sibling w/ CABG, MI or angioplasty before 65F 55M? No      Service Not Asked     Blood Transfusions No     Comment: possibly during surgery, otherwise none.     Caffeine Concern Not Asked     Occupational Exposure Not Asked     Hobby Hazards Not Asked     Sleep Concern Not Asked     Stress Concern Not Asked     Weight Concern Not Asked     Special Diet Not Asked     Back Care Not Asked     Exercise Not Asked     Bike Helmet Not Asked     Seat Belt Yes     Self-Exams Not Asked   Social History Narrative     Not on file            Family History:   Reviewed and edited as  appropriate  No known history of gastrointestinal/liver disease or  gastrointestinal malignancies       Allergies:   Reviewed and edited as appropriate     Allergies   Allergen Reactions     Diphenhydramine Other (See Comments)     Restless legs     Metoclopramide      Other reaction(s): Confusion, Unknown     Reglan Other (See Comments)     Reaction only to IV formulation -->delsuions            Medications:     Medications Prior to Admission   Medication Sig Dispense Refill Last Dose     calcium carbonate (TUMS) 500 MG chewable tablet Take 2 chew tab by mouth 3 times daily    12/12/2020     carvedilol (COREG) 6.25 MG tablet Take 1 tablet (6.25 mg) by mouth 2 times daily (with meals) 180 tablet 3 12/13/2020     insulin aspart (NOVOLOG FLEXPEN) 100 UNIT/ML pen 1 unit per 6 grams of carbs with each meal. About 45 units/day. 15 mL 3 12/13/2020     insulin aspart (NOVOLOG FLEXPEN) 100 UNIT/ML pen Sliding scale = 1 unit for every 20 over blood glucose of 140   12/13/2020     insulin glargine (LANTUS SOLOSTAR) 100 UNIT/ML pen Inject 24 Units Subcutaneous At Bedtime 15 mL 11 12/13/2020     metoclopramide (REGLAN) 5 MG tablet Take 1 tablet (5 mg) by mouth 4 times daily (before meals and nightly) 120 tablet 11 12/13/2020     omeprazole (PRILOSEC) 40 MG DR capsule Take 80 mg by mouth every evening    12/13/2020     acetaminophen (TYLENOL) 325 MG tablet Take 2 tablets (650 mg) by mouth every 4 hours as needed for mild pain 50 tablet 0 Unknown     acetone, Urine, test STRP 1 strip by In Vitro route daily (Patient not taking: Reported on 2/26/2020) 50 each 3      blood glucose (NO BRAND SPECIFIED) test strip Use to test blood sugar 5 times daily or as directed. 1 Box 0      insulin pen needle (BD ARPITA U/F) 32G X 4 MM miscellaneous Use 4 daily or as directed. 120 each 0      naproxen sodium 220 MG capsule Take 220 mg by mouth as needed    Unknown at Unknown time     ondansetron (ZOFRAN-ODT) 4 MG ODT tab Take 1 tablet (4 mg) by  mouth every 8 hours as needed for nausea 30 tablet 1 Unknown at Unknown time     prochlorperazine (COMPAZINE) 5 MG tablet Take 1 tablet (5 mg) by mouth every 8 hours as needed for nausea 30 tablet 1 Unknown at Unknown time             Review of Systems:     A complete review of systems was performed and is negative except as noted in the HPI           Physical Exam:   BP (!) 158/85   Pulse 84   Temp 98.1  F (36.7  C) (Oral)   Resp 16   Wt 83.2 kg (183 lb 6.8 oz)   SpO2 98%   BMI 27.89 kg/m    Wt:   Wt Readings from Last 2 Encounters:   12/14/20 83.2 kg (183 lb 6.8 oz)   12/07/20 82.2 kg (181 lb 3.2 oz)      Constitutional: cooperative, pleasant, not dyspneic/diaphoretic, no acute distress  Eyes: Sclera anicteric/injected  Ears/nose/mouth/throat: Normal oropharynx without ulcers or exudate, mucus membranes moist, hearing intact  Neck: supple, thyroid normal size  CV: No edema  Respiratory: Unlabored breathing  Lymph: No axillary, submandibular, supraclavicular or inguinal lymphadenopathy  Abdomen: Nondistended, +bs, no hepatosplenomegaly, nontender, no peritoneal signs  Skin: warm, perfused, no jaundice  Neuro: AAO x 3, No asterixis  Psych: Normal affect  MSK: Able to move all extremities; grossly nonfocal         Data:   Labs and imaging below were independently reviewed and interpreted    BMP  Recent Labs   Lab 12/15/20  0547      POTASSIUM 4.5   CHLORIDE 95   CHARLY 9.1   CO2 28   BUN 56*   CR 10.50*   *     CBC  Recent Labs   Lab 12/15/20  0547   WBC 6.7   RBC 3.32*   HGB 10.5*   HCT 31.9*   MCV 96   MCH 31.6   MCHC 32.9   RDW 14.3        INRNo lab results found in last 7 days.  LFTsNo lab results found in last 7 days.   PANCNo lab results found in last 7 days.    Imaging:

## 2020-12-15 NOTE — CONSULTS
Nephrology Initial Consult  December 15, 2020      Murtaza Fitzgerald MRN:[id number] YOB: 1975  Date of Admission:12/14/2020  Primary care provider: Kt Ríos  Requesting physician: Patt Acuña MD    ASSESSMENT AND RECOMMENDATIONS:   Murtaza Fitzgerald is a 45 year old male with PMH of CAD s/p CABG x 3 (2015), HFrEF (35-40%), gastroparesis, DMI, s/p failed pancreas/kidney transplant (2008), ESRD, recently admitted with volume overload and worsening heart failure, admitted now with HTN and vomiting.      ESKD: s/p failed panc/kidney tx, dialyzes TTS at [facility] with Dr. Schneider. Access: LUE AVG. Run time: 3 hrs. EDW: 82.5 kg. Is being evaluated for transplant.  - Dialysis today per TTS schedule    Volume: EDW 82.5 kg (reduced during recent admission); usual outpt UF 2.5 to 4.5 kg; O2 98% on RA; no peripheral edema, pt states he gets abdominal fullness with extra fluid on. He makes minimal urine.  - Wt 83.2 kg yesterday; will get post HD weight today  - Will continue to gently challenge EDW in setting of HTN      BP: currently 150's; pressures at /106; usual pre HD pressures 140-180's, post pressures 100-150's, lowest pressure on HD ~ 95; PTA coreg 6.25 mg bid  - Please hold BP meds prior to dialysis  - Will gradually attempt to reduce EDW for better blood pressure control  - Goal 140-150's for now, should be gradually decreased over weeks  - Continue PTA coreg    Anemia: hgb 10.5 g/dL; recent labs with ferritin 245, iron 44, IS 17, hgb 9-10's. PTA epogen 2000 units per HD, just completed iron loading with new labs pending.  - Hold WILL, hgb > 10.0    BMD: Ca 9.1, phos 6.2; recent PTH 1207, phos 6's; on hectorol 4 mcg per HD; tums 750 mg tid WM  - Continue hectorol via HD, tums WM    Recommendations were communicated to primary team via this note    Seen and discussed with Dr. Soledad Vernon, PA -C  [phone]    Physician Attestation   IJatin  Soledad, saw and evaluated Murtaza Fitzgerald as part of a shared visit.  I have reviewed and discussed with the advanced practice provider their history, physical and plan.    I personally reviewed the vital signs, medications, labs and imaging.    My key history or physical exam findings: 45yM w/ ESRD on HD TTS via LUE AVF, failed SPK, CAD s/p CABG, recently admit for volume overload 2/2 CHF exacerbation, now p/w HTN, N/V. Seen on HD today. Plan to challenge EDW by 0.5kg to reduce BP.     Key management decisions made by me: Seen on HD. Continue TTS schedule. Plan to challenge EDW today by 0.5kg.     Jatin Rowe  Date of Service (when I saw the patient): 12/15/20      REASON FOR CONSULT: ESKD/dialysis    HISTORY OF PRESENT ILLNESS:  Murtaza Fitzgerald is a 45 year old male with PMH of CAD s/p CABG x 3 (2015), HFrEF (35-40%), gastroparesis, DMI, s/p failed pancreas/kidney transplant (2008), ESRD, recently admitted with volume overload and worsening heart failure, admitted now with HTN and vomiting. Pt presented to [place] ED as he states he couldn't stop vomiting. He was unable to keep Coreg down as well. BP's 198/106 there. He was transferred here for dialysis. Outside dialysis records were obtained and reviewed. The patient was seen on dialysis, still somewhat nauseated. He says this occurs somewhat randomly, likely due to his gastroparesis. Given his elevated pressures at baseline, will continue to challenge EDW as able in the hopes of gradually reducing his blood pressures. He currently denies SOB, CP, chills    PAST MEDICAL HISTORY:  Reviewed with patient on 12/15/2020     Past Medical History:   Diagnosis Date     CMV (cytomegalovirus infection) status negative 2011     Coronary artery disease, non-occlusive 2008    angio showed diffuse disease with no lesions     Diabetes mellitus, type 1     Diagnosed at age 11. 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     Noncompliance with treatment     no labs for one year     Pancreas replaced by transplant (H) Feb 2009    rejection treated with thymo     Pancreatic disease     pancreas transplant     Tobacco abuse ongoing       Past Surgical History:   Procedure Laterality Date     ABDOMEN SURGERY       ARTHROSCOPY KNEE WITH LATERAL MENISCECTOMY Left 7/10/2019    Procedure: Left knee arthroscopic partial lateral meniscectomy;  Surgeon: Alex Candelaria MD;  Location: [facility] OR     BIOPSY      [facility] 2009     BYPASS GRAFT ARTERY CORONARY N/A 8/5/2015    Procedure: BYPASS GRAFT ARTERY CORONARY;  Surgeon: Katy Neville MD;  Location: [facility] OR     CORONARY ANGIOGRAPHY ADULT ORDER      2015     CREATE FISTULA ARTERIOVENOUS UPPER EXTREMITY Left 10/7/2020    Procedure: CREATION, ARTERIOVENOUS GRAFT placement LEFT UPPER EXTREMITY   with intraoperative ultrasound;  Surgeon: Melissa Guzman MD;  Location: [facility] OR     ESOPHAGOSCOPY, GASTROSCOPY, DUODENOSCOPY (EGD), COMBINED N/A 1/28/2020    Procedure: ESOPHAGOGASTRODUODENOSCOPY (EGD) biopsies w/forceps;  Surgeon: Emre Gomes MD;  Location:  GI     EYE SURGERY      vitrectomy both eyes      IR CVC TUNNEL PLACEMENT > 5 YRS OF AGE  2/13/2020     IR CVC TUNNEL REMOVAL RIGHT  12/7/2020     ORTHOPEDIC SURGERY  1993    tumor removed from left knee     TRANSPLANT  2009.2008    pancrease and kidney transplant        MEDICATIONS:  PTA Meds  Prior to Admission medications    Medication Sig Last Dose Taking? Auth Provider   acetaminophen (TYLENOL) 325 MG tablet Take 2 tablets (650 mg) by mouth every 4 hours as needed for mild pain   Misti Lopez MD   acetone, Urine, test STRP 1 strip by In Vitro route daily  Patient  not taking: Reported on 2/26/2020   Marcie Lozano APRN CNP   blood glucose (NO BRAND SPECIFIED) test strip Use to test blood sugar 5 times daily or as directed.   Patricia Costa MD   calcium carbonate (TUMS) 500 MG chewable tablet Take 1 chew tab by mouth 3 times daily   Reported, Patient   carvedilol (COREG) 6.25 MG tablet Take 1 tablet (6.25 mg) by mouth 2 times daily (with meals)   Michelle Schneider MD   insulin aspart (NOVOLOG FLEXPEN) 100 UNIT/ML pen 1 unit per 6 grams of carbs with each meal. About 45 units/day.   Patricia Costa MD   insulin aspart (NOVOLOG FLEXPEN) 100 UNIT/ML pen Sliding scale = 1 unit for every 20 over blood glucose of 140   Unknown, Entered By History   insulin glargine (LANTUS SOLOSTAR) 100 UNIT/ML pen Inject 24 Units Subcutaneous At Bedtime   Kt Ríos MD   insulin pen needle (BD ARPITA U/F) 32G X 4 MM miscellaneous Use 4 daily or as directed.   Patricia Costa MD   metoclopramide (REGLAN) 5 MG tablet Take 1 tablet (5 mg) by mouth 4 times daily (before meals and nightly)   Jack Chen MD   naproxen sodium 220 MG capsule Take 220 mg by mouth as needed (PT last dose 9.28.2020)   Reported, Patient   omeprazole (PRILOSEC) 40 MG DR capsule Take 40 mg by mouth every evening   Unknown, Entered By History   ondansetron (ZOFRAN-ODT) 4 MG ODT tab Take 1 tablet (4 mg) by mouth every 8 hours as needed for nausea   Kt Ríos MD   oxyCODONE (ROXICODONE) 5 MG tablet Take 1 tablet (5 mg) by mouth every 6 hours as needed for moderate to severe pain   Misti Lopez MD   prochlorperazine (COMPAZINE) 5 MG tablet Take 1 tablet (5 mg) by mouth every 8 hours as needed for nausea   Kt Ríos MD      Current Meds    calcium carbonate  500 mg Oral TID     carvedilol  6.25 mg Oral BID w/meals     insulin aspart   Subcutaneous TID AC     insulin aspart  1-10 Units Subcutaneous TID AC     insulin aspart  1-7 Units Subcutaneous At Bedtime     insulin  glargine  18 Units Subcutaneous At Bedtime     metoclopramide  5 mg Oral 4x Daily AC & HS     omeprazole  40 mg Oral QPM     sodium chloride (PF)  3 mL Intracatheter Q8H     Infusion Meds      ALLERGIES:    Allergies   Allergen Reactions     Diphenhydramine Other (See Comments)     Restless legs     Metoclopramide      Other reaction(s): Confusion, Unknown     Reglan Other (See Comments)     IV, delsuions       REVIEW OF SYSTEMS:  A comprehensive of systems was negative except as noted above.    SOCIAL HISTORY:   Social History     Socioeconomic History     Marital status: Single     Spouse name: Not on file     Number of children: 1     Years of education: Not on file     Highest education level: Associate degree: academic program   Occupational History     Occupation:    Social Needs     Financial resource strain: Not very hard     Food insecurity     Worry: Never true     Inability: Never true     Transportation needs     Medical: No     Non-medical: No   Tobacco Use     Smoking status: Former Smoker     Packs/day: 1.00     Years: 20.00     Pack years: 20.00     Types: Vaping Device, Cigarettes     Quit date: 2016     Years since quittin.9     Smokeless tobacco: Never Used     Tobacco comment: E- Cig use since    Substance and Sexual Activity     Alcohol use: Not Currently     Alcohol/week: 0.0 standard drinks     Frequency: Never     Binge frequency: Never     Drug use: No     Sexual activity: Never     Partners: Female   Lifestyle     Physical activity     Days per week: 0 days     Minutes per session: 0 min     Stress: To some extent   Relationships     Social connections     Talks on phone: More than three times a week     Gets together: Once a week     Attends Buddhist service: Never     Active member of club or organization: No     Attends meetings of clubs or organizations: Never     Relationship status:      Intimate partner violence     Fear of current or ex  partner: Not on file     Emotionally abused: Not on file     Physically abused: Not on file     Forced sexual activity: Not on file   Other Topics Concern     Parent/sibling w/ CABG, MI or angioplasty before 65F 55M? No      Service Not Asked     Blood Transfusions No     Comment: possibly during surgery, otherwise none.     Caffeine Concern Not Asked     Occupational Exposure Not Asked     Hobby Hazards Not Asked     Sleep Concern Not Asked     Stress Concern Not Asked     Weight Concern Not Asked     Special Diet Not Asked     Back Care Not Asked     Exercise Not Asked     Bike Helmet Not Asked     Seat Belt Yes     Self-Exams Not Asked   Social History Narrative     Not on file     Reviewed with patient     FAMILY MEDICAL HISTORY:   Family History   Problem Relation Age of Onset     Unknown/Adopted Father      Heart Disease Maternal Grandfather 62     Melanoma No family hx of      Skin Cancer No family hx of      Reviewed with patient     PHYSICAL EXAM:   Temp  Av.8  F (36.6  C)  Min: 95.3  F (35.2  C)  Max: 98.7  F (37.1  C)      Pulse  Av.6  Min: 78  Max: 95 Resp  Avg: 15.4  Min: 9  Max: 20  FiO2 (%)  Av %  Min: 30 %  Max: 30 %  SpO2  Av.7 %  Min: 81 %  Max: 100 %       /70 (BP Location: Right arm)   Pulse 88   Temp 95.3  F (35.2  C) (Oral)   Resp 16   Wt 83.2 kg (183 lb 6.8 oz)   SpO2 97%   BMI 27.89 kg/m        Admit Weight: 83.2 kg (183 lb 6.8 oz)     GENERAL APPEARANCE: alert, NAD  EYES: no scleral icterus, pupils equal  Pulmonary: lungs clear to auscultation with equal breath sounds bilaterally   CV: regular rhythm, normal rate   - Edema trace peripheral  GI: soft   MS: no evidence of inflammation in joints, no muscle tenderness  : no schultz  SKIN: no rash, warm, dry, no cyanosis  NEURO: face symmetric, a/o   Access: LUE AVG    LABS:   CMP  Recent Labs   Lab 12/15/20  0547 20  1845     --    POTASSIUM 4.5  --    CHLORIDE 95  --    CO2 28  --    ANIONGAP  11  --    *  --    BUN 56*  --    CR 10.50*  --    GFRESTIMATED 5*  --    GFRESTBLACK 6*  --    CHARLY 9.1  --    MAG  --  2.3   PHOS  --  6.2*     CBC  Recent Labs   Lab 12/15/20  0547   HGB 10.5*   WBC 6.7   RBC 3.32*   HCT 31.9*   MCV 96   MCH 31.6   MCHC 32.9   RDW 14.3        INRNo lab results found in last 7 days.  ABGNo lab results found in last 7 days.   URINE STUDIES  Recent Labs   Lab Test 02/11/20  0850 01/27/20  0941 01/03/20  1406 08/28/19  0905   COLOR Yellow Straw Light Yellow Yellow   APPEARANCE Clear Clear Clear Clear   URINEGLC 100* >1000* 30* Negative   URINEBILI Negative Negative Negative Negative   URINEKETONE Negative Trace* 10* Negative   SG 1.020 1.011 1.017 1.015   UBLD Trace* Trace* Trace* Small*   URINEPH 7.0 6.5 6.5 7.0   PROTEIN >=300* 300* 300* 100*   UROBILINOGEN 0.2  --   --  0.2   NITRITE Negative Negative Negative Negative   LEUKEST Negative Negative Negative Negative   RBCU 2-5* 2 3* 5-10*   WBCU 0 - 5 2 1 5-10*     Recent Labs   Lab Test 08/22/19  0857 01/11/19  0741 07/08/15  0747 01/28/15  0749 12/27/13  0736 11/11/13  1436 11/12/12  1649 10/11/12  0737   UTPG 2.20* 0.87* 0.28* 0.43* 0.31* 0.30* 0.40* 0.51*     PTH  No lab results found.  IRON STUDIES  Recent Labs   Lab Test 02/13/20  0720   AUBRIE 106       IMAGING:  Reviewed     Maddy Vernon PA-C

## 2020-12-16 ENCOUNTER — APPOINTMENT (OUTPATIENT)
Dept: ULTRASOUND IMAGING | Facility: CLINIC | Age: 45
DRG: 073 | End: 2020-12-16
Attending: INTERNAL MEDICINE
Payer: MEDICARE

## 2020-12-16 LAB
ANION GAP SERPL CALCULATED.3IONS-SCNC: 8 MMOL/L (ref 3–14)
BUN SERPL-MCNC: 24 MG/DL (ref 7–30)
C COLI+JEJUNI+LARI FUSA STL QL NAA+PROBE: NOT DETECTED
C DIFF TOX B STL QL: NORMAL
CALCIUM SERPL-MCNC: 9.3 MG/DL (ref 8.5–10.1)
CHLORIDE SERPL-SCNC: 96 MMOL/L (ref 94–109)
CO2 SERPL-SCNC: 28 MMOL/L (ref 20–32)
CREAT SERPL-MCNC: 6.68 MG/DL (ref 0.66–1.25)
EC STX1 GENE STL QL NAA+PROBE: NOT DETECTED
EC STX2 GENE STL QL NAA+PROBE: NOT DETECTED
ENTERIC PATHOGEN COMMENT: NORMAL
ERYTHROCYTE [DISTWIDTH] IN BLOOD BY AUTOMATED COUNT: 14.2 % (ref 10–15)
GFR SERPL CREATININE-BSD FRML MDRD: 9 ML/MIN/{1.73_M2}
GLUCOSE BLDC GLUCOMTR-MCNC: 166 MG/DL (ref 70–99)
GLUCOSE BLDC GLUCOMTR-MCNC: 187 MG/DL (ref 70–99)
GLUCOSE BLDC GLUCOMTR-MCNC: 198 MG/DL (ref 70–99)
GLUCOSE BLDC GLUCOMTR-MCNC: 225 MG/DL (ref 70–99)
GLUCOSE BLDC GLUCOMTR-MCNC: 227 MG/DL (ref 70–99)
GLUCOSE SERPL-MCNC: 213 MG/DL (ref 70–99)
HCT VFR BLD AUTO: 34.4 % (ref 40–53)
HGB BLD-MCNC: 10.9 G/DL (ref 13.3–17.7)
MCH RBC QN AUTO: 30.4 PG (ref 26.5–33)
MCHC RBC AUTO-ENTMCNC: 31.7 G/DL (ref 31.5–36.5)
MCV RBC AUTO: 96 FL (ref 78–100)
NOROV GI+II ORF1-ORF2 JNC STL QL NAA+PR: NOT DETECTED
PLATELET # BLD AUTO: 248 10E9/L (ref 150–450)
POTASSIUM SERPL-SCNC: 4.2 MMOL/L (ref 3.4–5.3)
RBC # BLD AUTO: 3.59 10E12/L (ref 4.4–5.9)
RVA NSP5 STL QL NAA+PROBE: NOT DETECTED
SALMONELLA SP RPOD STL QL NAA+PROBE: NOT DETECTED
SHIGELLA SP+EIEC IPAH STL QL NAA+PROBE: NOT DETECTED
SODIUM SERPL-SCNC: 132 MMOL/L (ref 133–144)
SPECIMEN SOURCE: NORMAL
V CHOL+PARA RFBL+TRKH+TNAA STL QL NAA+PR: NOT DETECTED
WBC # BLD AUTO: 5.4 10E9/L (ref 4–11)
Y ENTERO RECN STL QL NAA+PROBE: NOT DETECTED

## 2020-12-16 PROCEDURE — 85027 COMPLETE CBC AUTOMATED: CPT | Performed by: NURSE PRACTITIONER

## 2020-12-16 PROCEDURE — 80048 BASIC METABOLIC PNL TOTAL CA: CPT | Performed by: NURSE PRACTITIONER

## 2020-12-16 PROCEDURE — 93990 DOPPLER FLOW TESTING: CPT | Mod: 26 | Performed by: RADIOLOGY

## 2020-12-16 PROCEDURE — 250N000011 HC RX IP 250 OP 636: Performed by: NURSE PRACTITIONER

## 2020-12-16 PROCEDURE — 999N001017 HC STATISTIC GLUCOSE BY METER IP

## 2020-12-16 PROCEDURE — 82656 EL-1 FECAL QUAL/SEMIQ: CPT | Performed by: INTERNAL MEDICINE

## 2020-12-16 PROCEDURE — 99232 SBSQ HOSP IP/OBS MODERATE 35: CPT | Performed by: INTERNAL MEDICINE

## 2020-12-16 PROCEDURE — 87506 IADNA-DNA/RNA PROBE TQ 6-11: CPT | Performed by: INTERNAL MEDICINE

## 2020-12-16 PROCEDURE — 120N000002 HC R&B MED SURG/OB UMMC

## 2020-12-16 PROCEDURE — 94660 CPAP INITIATION&MGMT: CPT

## 2020-12-16 PROCEDURE — 250N000013 HC RX MED GY IP 250 OP 250 PS 637: Performed by: INTERNAL MEDICINE

## 2020-12-16 PROCEDURE — 999N000157 HC STATISTIC RCP TIME EA 10 MIN

## 2020-12-16 PROCEDURE — 36415 COLL VENOUS BLD VENIPUNCTURE: CPT | Performed by: NURSE PRACTITIONER

## 2020-12-16 PROCEDURE — 93990 DOPPLER FLOW TESTING: CPT

## 2020-12-16 PROCEDURE — 250N000013 HC RX MED GY IP 250 OP 250 PS 637: Performed by: NURSE PRACTITIONER

## 2020-12-16 RX ORDER — AMOXICILLIN 250 MG
2 CAPSULE ORAL 2 TIMES DAILY
Status: DISCONTINUED | OUTPATIENT
Start: 2020-12-16 | End: 2020-12-16

## 2020-12-16 RX ORDER — ONDANSETRON 4 MG/1
4 TABLET, ORALLY DISINTEGRATING ORAL EVERY 6 HOURS PRN
Status: DISCONTINUED | OUTPATIENT
Start: 2020-12-16 | End: 2020-12-16

## 2020-12-16 RX ORDER — SENNOSIDES 8.6 MG
8.6 TABLET ORAL DAILY
Status: DISCONTINUED | OUTPATIENT
Start: 2020-12-16 | End: 2020-12-17 | Stop reason: HOSPADM

## 2020-12-16 RX ORDER — ONDANSETRON 2 MG/ML
4 INJECTION INTRAMUSCULAR; INTRAVENOUS EVERY 6 HOURS PRN
Status: DISCONTINUED | OUTPATIENT
Start: 2020-12-16 | End: 2020-12-16

## 2020-12-16 RX ORDER — ACETAMINOPHEN 325 MG/1
650 TABLET ORAL EVERY 4 HOURS PRN
Status: DISCONTINUED | OUTPATIENT
Start: 2020-12-16 | End: 2020-12-17 | Stop reason: HOSPADM

## 2020-12-16 RX ORDER — POLYETHYLENE GLYCOL 3350 17 G/17G
17 POWDER, FOR SOLUTION ORAL DAILY
Status: DISCONTINUED | OUTPATIENT
Start: 2020-12-16 | End: 2020-12-17 | Stop reason: HOSPADM

## 2020-12-16 RX ORDER — AMOXICILLIN 250 MG
1 CAPSULE ORAL 2 TIMES DAILY
Status: DISCONTINUED | OUTPATIENT
Start: 2020-12-16 | End: 2020-12-16

## 2020-12-16 RX ORDER — POLYETHYLENE GLYCOL 3350 17 G/17G
17 POWDER, FOR SOLUTION ORAL DAILY
Status: DISCONTINUED | OUTPATIENT
Start: 2020-12-16 | End: 2020-12-16

## 2020-12-16 RX ORDER — ACETAMINOPHEN 650 MG/1
650 SUPPOSITORY RECTAL EVERY 4 HOURS PRN
Status: DISCONTINUED | OUTPATIENT
Start: 2020-12-16 | End: 2020-12-17 | Stop reason: HOSPADM

## 2020-12-16 RX ADMIN — SENNOSIDES 8.6 MG: 8.6 TABLET, FILM COATED ORAL at 12:13

## 2020-12-16 RX ADMIN — ONDANSETRON 4 MG: 2 INJECTION INTRAMUSCULAR; INTRAVENOUS at 08:20

## 2020-12-16 RX ADMIN — PROCHLORPERAZINE EDISYLATE 10 MG: 5 INJECTION INTRAMUSCULAR; INTRAVENOUS at 18:11

## 2020-12-16 RX ADMIN — CALCIUM CARBONATE (ANTACID) CHEW TAB 500 MG 500 MG: 500 CHEW TAB at 16:21

## 2020-12-16 RX ADMIN — ONDANSETRON 4 MG: 4 TABLET, ORALLY DISINTEGRATING ORAL at 16:21

## 2020-12-16 RX ADMIN — CARVEDILOL 6.25 MG: 3.12 TABLET, FILM COATED ORAL at 08:20

## 2020-12-16 RX ADMIN — CARVEDILOL 6.25 MG: 3.12 TABLET, FILM COATED ORAL at 18:11

## 2020-12-16 RX ADMIN — INSULIN ASPART 3 UNITS: 100 INJECTION, SOLUTION INTRAVENOUS; SUBCUTANEOUS at 08:25

## 2020-12-16 RX ADMIN — METOCLOPRAMIDE 5 MG: 5 TABLET ORAL at 08:20

## 2020-12-16 RX ADMIN — CALCIUM CARBONATE (ANTACID) CHEW TAB 500 MG 500 MG: 500 CHEW TAB at 21:12

## 2020-12-16 RX ADMIN — OMEPRAZOLE 40 MG: 20 CAPSULE, DELAYED RELEASE ORAL at 21:12

## 2020-12-16 RX ADMIN — METOCLOPRAMIDE 5 MG: 5 TABLET ORAL at 12:13

## 2020-12-16 RX ADMIN — CALCIUM CARBONATE (ANTACID) CHEW TAB 500 MG 500 MG: 500 CHEW TAB at 08:20

## 2020-12-16 RX ADMIN — PROCHLORPERAZINE EDISYLATE 10 MG: 5 INJECTION INTRAMUSCULAR; INTRAVENOUS at 10:28

## 2020-12-16 RX ADMIN — POLYETHYLENE GLYCOL 3350 17 G: 17 POWDER, FOR SOLUTION ORAL at 12:13

## 2020-12-16 ASSESSMENT — ACTIVITIES OF DAILY LIVING (ADL)
ADLS_ACUITY_SCORE: 12
ADLS_ACUITY_SCORE: 12

## 2020-12-16 NOTE — CONSULTS
Care Management Initial Consult    General Information  Assessment completed with: Patient   Type of CM/SW Visit: Initial Assessment  Primary Care Provider verified and updated as needed: Yes   Readmission within the last 30 days: current reason for admission unrelated to previous admission   Return Category: Exacerbation of disease    Reason for Consult: discharge planning  Advance Care Planning: Has ACP documents on file.      Communication Assessment  Patient's communication style: spoken language (English or Bilingual)    Hearing Difficulty or Deaf: no   Wear Glasses or Blind: no    Cognitive  Cognitive/Neuro/Behavioral: WDL                      Living Environment:   People in home: family  Current living Arrangements: house      Able to return to prior arrangements: yes    Family/Social Support:  Care provided by: self  Provides care for: no one  Marital Status:   Support System: Adult child, parents, siblings   Description of Support System: Supportive, Involved         Current Resources:   Skilled Home Care Services:    Community Resources: Dialysis Services  Equipment currently used at home: none  Supplies currently used at home: Diabetic Supplies    Lifestyle & Psychosocial Needs:  Lifestyle     Physical activity     Days per week: 0 days     Minutes per session: 0 min     Stress: To some extent     Social Needs     Financial resource strain: Not very hard     Food insecurity     Worry: Never true     Inability: Never true     Transportation needs     Medical: No     Non-medical: No     Socioeconomic History     Marital status: Single     Spouse name: Not on file     Number of children: 1     Years of education: Not on file     Highest education level: Associate degree: academic program   Occupational History     Occupation:    Relationships     Social connections     Talks on phone: More than three times a week     Gets together: Once a week     Attends Restoration service: Never      Active member of club or organization: No     Attends meetings of clubs or organizations: Never     Relationship status:      Intimate partner violence     Fear of current or ex partner: Not on file     Emotionally abused: Not on file     Physically abused: Not on file     Forced sexual activity: Not on file     Tobacco Use     Smoking status: Former Smoker     Packs/day: 1.00     Years: 20.00     Pack years: 20.00     Types: Vaping Device, Cigarettes     Quit date: 2016     Years since quittin.9     Smokeless tobacco: Never Used     Tobacco comment: E- Cig use since    Substance and Sexual Activity     Alcohol use: Not Currently     Alcohol/week: 0.0 standard drinks     Frequency: Never     Binge frequency: Never     Drug use: No     Sexual activity: Never     Partners: Female       Additional Information:  Care management assessment completed via telephone. Patient notified of observation status, declined hand out and copy of MOON, all questions answered at this time. Patient confirms that he lives locally w/family, independently. Patient [facility] OP HD [facility] in [place] , they have been notified of his admission. Family/friend will provide transport at discharge, patient voices no additional concerns at this time. RNCC will continue to follow for discharge planning.     [facility] ( HD w/Dr. Schneider)  Phone: [phone]  Fax: [phone]    Jacey Lomeli, RNCC, BSN    [facility]    [facility]  [address] [place] [id number]    [email or web address]    Office: [phone] Pager: [phone]  To contact the weekend RNCC, page [phone].

## 2020-12-16 NOTE — PLAN OF CARE
[facility] cares [phone]     /69   Pulse 91   Temp 98.1  F (36.7  C) (Oral)   Resp 15   Wt 80.7 kg (177 lb 14.6 oz)   SpO2 96%   BMI 27.05 kg/m         Pain: Patient has abdominal pain from dry heaving. Antimetics given.   Neuro:  A&Ox4.   Respiratory: Lungs clear and equal. Diminished in lower lobes. Cough.   Cardiac/Neurovascular: HR and pulse WDL. No numbness or tingling present.   GI/: Patient is on hemodialysis. No BM reported.   Nutrition: Regular diet. Adequate intake today.   Activity: Independent/SBA.   Skin: No concerns. Fistula in left arm.    Lines: PIV R arm (SL).   Events this shift: Patient given Zofran and compazine this AM. Dialysis this afternoon, pulled 2L of fluid. MD switched pt to observation, paged and asked to put in goals. No response. Stool sample orders placed, told pt and placed hat in toilet. Will collect after pt has BM.      Plan: Continue to monitor.

## 2020-12-16 NOTE — PLAN OF CARE
Cares 1900 to 0700    /60 (BP Location: Right arm)   Pulse 78   Temp 96.7  F (35.9  C) (Axillary)   Resp 15   Wt 80.7 kg (177 lb 14.6 oz)   SpO2 100%   BMI 27.05 kg/m      Pain: Denies   Neuros: Alert & oriented x4.   Cardiac: WDL. Denies chest pain.   Resp: Denies SOB, on RA. Bipap at night.   GI/: Denied nausea, constipation.   Nutrition: Dialysis diet, on carb counts.  IV/Drains: R PIV SL. Fistula on L arm.   Skin: No new concerns.   Activity: SBA/I.   Labs:  & 213. Pt refused 0200 BG check.      Events this shift: VSS. Pt slept with Bipap most of night, refused Heparin subcutaneous stating that he already tends to bleed easily.     Pt is under observation, but has no observation goals in active orders. Notified provider to place obs goals.     POC: Stool culture needs to be collected, pt aware, hat in toilet. Per MD notes, recommended to prior living arrangement once tolerating PO .

## 2020-12-16 NOTE — UTILIZATION REVIEW
"    Admission Status; Secondary Review Determination         Under the authority of the Utilization Management Committee, the utilization review process indicated a secondary review on the above patient.  The review outcome is based on review of the medical records, discussions with staff, and applying clinical experience noted on the date of the review.        (x)      Inpatient Status Appropriate - This patient's medical care is consistent with medical management for inpatient care and reasonable inpatient medical practice.      () Observation Status Appropriate - This patient does not meet hospital inpatient criteria and is placed in observation status. If this patient's primary payer is Medicare and was admitted as an inpatient, Condition Code 44 should be used and patient status changed to \"observation\".   () Admission Status NOT Appropriate - This patient's medical care is not consistent with medical management for Inpatient or Observation Status.          RATIONALE FOR DETERMINATION     \"Murtaza Fitzgerald is a 45 year old male with past medical history significant for CAD s/p CABG x 3 (2015), HFrEF (35-40%), hx type 1 DM, s/p failed pancreas/kidney transplant (2008), ESRD on HD TTS (2/2020), and recent hospital admission 12/6-12/7 for volume overload in setting of possible CHF exacerbation vs incomplete HD run.  He presented to the ED at [facility] today with refractory vomiting and HTN urgency with BPs 198/106 and transferred to [facility] due to need for dialysis. BP improved but still only able to tolerate minimal PO fluids.\"    Pt has ongoing N/V and requiring IV anti-emetics to include IV zofran and IV compazine. I spoke to Dr Dennis who is caring for the patient and given persistent nausea and inability to tolerate PO and requiring IV anti-emetics, he will stay > 2 MN and inpatient status is appropriate.         The severity of illness, intensity of service provided, expected LOS and risk for " adverse outcome make the care complex, high risk and appropriate for hospital admission.        The information on this document is developed by the utilization review team in order for the business office to ensure compliance.  This only denotes the appropriateness of proper admission status and does not reflect the quality of care rendered.         The definitions of Inpatient Status and Observation Status used in making the determination above are those provided in the CMS Coverage Manual, Chapter 1 and Chapter 6, section 70.4.      Sincerely,     PREET KERNS MD    Physician Advisor  Utilization Review/ Case Management  [facility].

## 2020-12-16 NOTE — PROGRESS NOTES
"GI Progress Note  Date of Service:  12/16/2020          GASTROENTEROLOGY PROGRESS NOTE    Date of Admission: 12/14/2020  Reason for Admission: Gastroparesis with intractable vomiting, HTN urgency, need for dialysis.      ASSESSMENT:  45 year old male with a history of CAD s/p CABG x 3, HEFrEF (35%), DM1 with failed pancreas/kidney transplant (2018) c/b gastsroparesis, ESRD on HD (2/2020), who presents to the hospital with refractory vomiting and HTN urgency with need for dialysis.  GI luminal was consulted for management or other options for gastroparesis symptoms.   Today, patient significantly improved in symptoms, with increased PO intake, no vomiting or diarrhea, and baseline nausea.  As this improvement occurred after dialysis, it is reasonable to conclude that uremia contributed to his symptoms, in addition to his baseline of moderate to severe gastroparesis.  At this time, patient's symptoms are controlled with current management.    RECOMMENDATIONS:  -Recommend ruling out infectious processes that may be contributing to gastrointestinal symptoms, however, less likely at this point:              --Enteric panel              --C diff  -Follow up fecal elastase to assess for pancreatic insufficiency  -Continue dialysis per nephrology  -Supportive cares for gastroparesis              --Low fat, low fiber diet              --Encourage smaller meals              --Anti-emetics can be given prior to meals for symptom control    -GI will sign off at this time.  Please do not hesitate to contact us with any questions, or re-consult if the need arises    The patient was discussed and plan agreed upon with GI staff, Dr Francis.      Trae Hutchinson MD  Gastroenterology, PGY1  Pager [phone]  _______________________________________________________________      Subjective: Nursing notes and 24hr events reviewed. Patient seen and examined at 9am. Patient reports improvement of GI symptoms.  Says that he felt \"so much " "better\" after dialysis, with significant improvement in nausea.  No episodes of vomiting in last 24 hours.  Able to eat significant amount of lunch and dinner yesterday, without difficulty.  Denies any loose stools over past 24 hours.  Endorses some increase in nausea this morning, improved with zofran.  Denies fevers/chills, chest pain, SOB.  Feels well overall.    ROS:   4 pt ROS negative unless noted in subjective.     Objective:  Blood pressure 129/60, pulse 78, temperature 96.7  F (35.9  C), temperature source Axillary, resp. rate 15, weight 80.7 kg (177 lb 14.6 oz), SpO2 100 %.  Gen: Sitting up in bed.  Appears well.  In NAD.  HEENT: NCAT. Conjunctiva clear. Sclera anicteric  CV: RRR, Peripheral perfusion intact  Resp: Non-labored work of breathing.  Negative for crackles, wheezes  Abd: Soft, NT, ND, no guarding or rebound  -Midline scar from kidney/pancreas transplant surgery - clean, skin intact, no erythema  -Midline 3cm x 3cm hernia, nonpainful, fully reducible  Msk: no gross deformity  Skin: No jaundice  Ext: warm, well perfused   Neuro: grossly normal  Mental status/Psych: A&O. Asks/answers questions appropriately     Date 12/16/20 0700 - 12/17/20 0659   Shift 7357-8674 7531-1898 4358-7683 24 Hour Total   INTAKE   P.O. 240   240   Shift Total(mL/kg) 240(2.97)   240(2.97)   OUTPUT   Shift Total(mL/kg)       Weight (kg) 80.7 80.7 80.7 80.7         PROCEDURES:  n/a    LABS:  BMP  Recent Labs   Lab 12/16/20  0543 12/15/20  0547   * 135   POTASSIUM 4.2 4.5   CHLORIDE 96 95   CHARLY 9.3 9.1   CO2 28 28   BUN 24 56*   CR 6.68* 10.50*   * 160*     CBC  Recent Labs   Lab 12/16/20  0543 12/15/20  0547   WBC 5.4 6.7   RBC 3.59* 3.32*   HGB 10.9* 10.5*   HCT 34.4* 31.9*   MCV 96 96   MCH 30.4 31.6   MCHC 31.7 32.9   RDW 14.2 14.3    236     INRNo lab results found in last 7 days.  LFTsNo lab results found in last 7 days.   PANCNo lab results found in last 7 days.      IMAGING:  (Personally " reviewed)

## 2020-12-17 ENCOUNTER — PATIENT OUTREACH (OUTPATIENT)
Dept: CARE COORDINATION | Facility: CLINIC | Age: 45
End: 2020-12-17

## 2020-12-17 VITALS
DIASTOLIC BLOOD PRESSURE: 63 MMHG | BODY MASS INDEX: 27.37 KG/M2 | TEMPERATURE: 98.2 F | SYSTOLIC BLOOD PRESSURE: 109 MMHG | OXYGEN SATURATION: 99 % | HEART RATE: 93 BPM | RESPIRATION RATE: 18 BRPM | WEIGHT: 180 LBS

## 2020-12-17 LAB
ANION GAP SERPL CALCULATED.3IONS-SCNC: 11 MMOL/L (ref 3–14)
BUN SERPL-MCNC: 32 MG/DL (ref 7–30)
CALCIUM SERPL-MCNC: 9.6 MG/DL (ref 8.5–10.1)
CHLORIDE SERPL-SCNC: 97 MMOL/L (ref 94–109)
CO2 SERPL-SCNC: 25 MMOL/L (ref 20–32)
CREAT SERPL-MCNC: 8.63 MG/DL (ref 0.66–1.25)
ERYTHROCYTE [DISTWIDTH] IN BLOOD BY AUTOMATED COUNT: 13.9 % (ref 10–15)
GFR SERPL CREATININE-BSD FRML MDRD: 7 ML/MIN/{1.73_M2}
GLUCOSE BLDC GLUCOMTR-MCNC: 144 MG/DL (ref 70–99)
GLUCOSE BLDC GLUCOMTR-MCNC: 163 MG/DL (ref 70–99)
GLUCOSE BLDC GLUCOMTR-MCNC: 175 MG/DL (ref 70–99)
GLUCOSE SERPL-MCNC: 154 MG/DL (ref 70–99)
HCT VFR BLD AUTO: 36.4 % (ref 40–53)
HGB BLD-MCNC: 11.9 G/DL (ref 13.3–17.7)
MCH RBC QN AUTO: 31.1 PG (ref 26.5–33)
MCHC RBC AUTO-ENTMCNC: 32.7 G/DL (ref 31.5–36.5)
MCV RBC AUTO: 95 FL (ref 78–100)
PLATELET # BLD AUTO: 233 10E9/L (ref 150–450)
POTASSIUM SERPL-SCNC: 4.1 MMOL/L (ref 3.4–5.3)
RBC # BLD AUTO: 3.83 10E12/L (ref 4.4–5.9)
SODIUM SERPL-SCNC: 133 MMOL/L (ref 133–144)
WBC # BLD AUTO: 5.5 10E9/L (ref 4–11)

## 2020-12-17 PROCEDURE — 90935 HEMODIALYSIS ONE EVALUATION: CPT | Performed by: INTERNAL MEDICINE

## 2020-12-17 PROCEDURE — 99239 HOSP IP/OBS DSCHRG MGMT >30: CPT | Performed by: INTERNAL MEDICINE

## 2020-12-17 PROCEDURE — 80048 BASIC METABOLIC PNL TOTAL CA: CPT | Performed by: NURSE PRACTITIONER

## 2020-12-17 PROCEDURE — 999N001017 HC STATISTIC GLUCOSE BY METER IP

## 2020-12-17 PROCEDURE — 85027 COMPLETE CBC AUTOMATED: CPT | Performed by: NURSE PRACTITIONER

## 2020-12-17 PROCEDURE — 90937 HEMODIALYSIS REPEATED EVAL: CPT

## 2020-12-17 PROCEDURE — 999N000157 HC STATISTIC RCP TIME EA 10 MIN

## 2020-12-17 PROCEDURE — 250N000011 HC RX IP 250 OP 636: Performed by: INTERNAL MEDICINE

## 2020-12-17 PROCEDURE — 94660 CPAP INITIATION&MGMT: CPT

## 2020-12-17 PROCEDURE — 250N000013 HC RX MED GY IP 250 OP 250 PS 637: Performed by: INTERNAL MEDICINE

## 2020-12-17 PROCEDURE — 250N000013 HC RX MED GY IP 250 OP 250 PS 637: Performed by: NURSE PRACTITIONER

## 2020-12-17 PROCEDURE — 250N000009 HC RX 250: Performed by: INTERNAL MEDICINE

## 2020-12-17 PROCEDURE — 36415 COLL VENOUS BLD VENIPUNCTURE: CPT | Performed by: NURSE PRACTITIONER

## 2020-12-17 PROCEDURE — 258N000003 HC RX IP 258 OP 636: Performed by: INTERNAL MEDICINE

## 2020-12-17 RX ORDER — DOXERCALCIFEROL 4 UG/2ML
4 INJECTION INTRAVENOUS
Status: COMPLETED | OUTPATIENT
Start: 2020-12-17 | End: 2020-12-17

## 2020-12-17 RX ADMIN — PROCHLORPERAZINE MALEATE 10 MG: 5 TABLET ORAL at 12:50

## 2020-12-17 RX ADMIN — METOCLOPRAMIDE 5 MG: 5 TABLET ORAL at 12:41

## 2020-12-17 RX ADMIN — SIMETHICONE 80 MG: 80 TABLET, CHEWABLE ORAL at 07:56

## 2020-12-17 RX ADMIN — CALCIUM CARBONATE (ANTACID) CHEW TAB 500 MG 500 MG: 500 CHEW TAB at 12:40

## 2020-12-17 RX ADMIN — DOXERCALCIFEROL 4 MCG: 4 INJECTION INTRAVENOUS at 08:59

## 2020-12-17 RX ADMIN — CARVEDILOL 6.25 MG: 3.12 TABLET, FILM COATED ORAL at 06:51

## 2020-12-17 RX ADMIN — INSULIN ASPART 5 UNITS: 100 INJECTION, SOLUTION INTRAVENOUS; SUBCUTANEOUS at 14:03

## 2020-12-17 RX ADMIN — METOCLOPRAMIDE 5 MG: 5 TABLET ORAL at 07:46

## 2020-12-17 RX ADMIN — POLYETHYLENE GLYCOL 3350 17 G: 17 POWDER, FOR SOLUTION ORAL at 07:46

## 2020-12-17 RX ADMIN — BISACODYL 5 MG: 5 TABLET, COATED ORAL at 12:50

## 2020-12-17 RX ADMIN — INSULIN ASPART 2 UNITS: 100 INJECTION, SOLUTION INTRAVENOUS; SUBCUTANEOUS at 07:56

## 2020-12-17 RX ADMIN — SODIUM CHLORIDE 300 ML: 9 INJECTION, SOLUTION INTRAVENOUS at 08:56

## 2020-12-17 RX ADMIN — SODIUM CHLORIDE 250 ML: 9 INJECTION, SOLUTION INTRAVENOUS at 08:56

## 2020-12-17 RX ADMIN — Medication: at 08:57

## 2020-12-17 RX ADMIN — SENNOSIDES 8.6 MG: 8.6 TABLET, FILM COATED ORAL at 07:46

## 2020-12-17 RX ADMIN — LIDOCAINE HYDROCHLORIDE 0.5 ML: 10 INJECTION, SOLUTION EPIDURAL; INFILTRATION; INTRACAUDAL; PERINEURAL at 08:56

## 2020-12-17 RX ADMIN — ACETAMINOPHEN 650 MG: 325 TABLET, FILM COATED ORAL at 07:56

## 2020-12-17 ASSESSMENT — ACTIVITIES OF DAILY LIVING (ADL)
ADLS_ACUITY_SCORE: 12

## 2020-12-17 NOTE — PROVIDER NOTIFICATION
Provider notified (name): Raman Varner MD  Reason for notification: /91  Recommendation/request given to provider: Would you like scheduled Coreg given early or order PRN BP medication?  Response from provider: No response from provider. Given scheduled Coreg.

## 2020-12-17 NOTE — PROGRESS NOTES
HEMODIALYSIS TREATMENT NOTE    Date: 12/17/2020  Time: 1:57 PM    Data:  Pre Wt: 81.6 kg (179 lb 14.3 oz)   Desired Wt: 80.1 kg   Post Wt: 80.1 kg (176 lb 9.4 oz)  Weight change: 1.5 kg  Ultrafiltration - Post Run Net Total Removed (mL): 1500 mL  Vascular Access Status: CVC  patent  Dialyzer Rinse: Light, Streaked  Total Blood Volume Processed: 84.3 L   Total Dialysis (Treatment) Time: 3.5   Dialysate Bath: K 3, Ca 2.25  Heparin: None    Lab:   No    Assessment / Interventions:  ESRD pt seen in HD unit for 3.5 hour run with UFG ordered at 3L, however pt weighed at 81.6kg which is under EDW.  Started with pt agreement to pull 2L, then BP dropped significantly and reduced to 1, then MD ok with 1.5L as BP started increasing again.   NET UF 1500ml.    AVG used with no concerns today, 15 gauge needles used, lidocaine used per pt request. Hemostasis achieved after 10 minutes of pressure on sites, with minimal blood loss.     VSS, SBP>100 throughout run, afebrile, SATs % on RA, sinus rhythm.     Handoff Report given to GAYE Brice on 5B.       Plan:    Per Renal Team.

## 2020-12-17 NOTE — PROGRESS NOTES
Nutrition Brief - Consulted for Gastroparesis and poor po intake    Interventions:  -Visited with patient. He was ready to discharge home.   -Reviewed gastroparesis diet guidelines on low fat / low fiber / small frequent meal intake as tolerated .  Provided handout for home use. Encouraged patient to keep a food diary including symptoms to trend in future.           - Eat smaller meals more frequently        - Encouraged well cooked fruits and vegetables rather than raw fruits and vegetables        - Avoid fibrous fruits and vegetables such as broccoli and oranges, as the may cause bezoars        - Low fat foods are preferable, small amount of fat can be added if tolerated        - Avoid carbonated drinks    Shahla Velazco RD/DONAL  Pager [phone]

## 2020-12-17 NOTE — PLAN OF CARE
Assumed cares: [id number]    Events: Dialysis in AM, significant improvement in pain/nausea post dialysis.   VS: VSS on RA except HTN.   IV: PIV saline locked.     Neuro: A&Ox4  Resp: Lung sounds clear except congested.   GI/: Anuric, on hemodialysis. Last BM yesterday. C/o constipation.   Nutrition: Pt attempting lunch.   Skin: CDI.  Pain: Pt c/o pain in abdomen in the morning w/ adequate relief from PRN tylenol.   Activity: Pt up independently.     Pt to tell nurse post meal if he feels appropriate to discharge.   Observation goal is to tolerate PO intake. Once met, MD states okay to discharge.

## 2020-12-17 NOTE — PLAN OF CARE
Assumed cares [id number]. VSS ex HTN, BiPAP overnight. BP in /91. Given scheduled Coreg. A&Ox4. Endorsed intermittent nausea, managing with Zofran and Compazine. Denies any other pain. Fistula in L arm. RPIV SL.  and 144. No Novolog given per sliding scale. Dialysis diet but reports no appetite. Continues to refused Heparin injection. Will continue to monitor and update MD with changes.

## 2020-12-17 NOTE — PLAN OF CARE
[facility] cares [phone]     BP (!) 174/87 (BP Location: Right arm)   Pulse 93   Temp 98.1  F (36.7  C) (Oral)   Resp 18   Wt 80.7 kg (177 lb 14.6 oz)   SpO2 97%   BMI 27.05 kg/m       Pain: Patient has abdominal pain from dry heaving. Antimetics given.   Neuro:  A&Ox4.   Respiratory: Lungs clear and equal. Diminished in lower lobes. Cough.   Cardiac/Neurovascular: HR and pulse WDL. No numbness or tingling present.   GI/: Patient is on hemodialysis. One small BM.  Nutrition: Regular diet. Minimal intake today.   Activity: Independent/SBA.   Skin: No concerns. Fistula in left arm.    Lines: PIV R arm (SL).   Events this shift: Patient given Zofran and compazine this AM. Patient was switched from observation back to inpatient. Stool sample collected and sent to lab. High BP this evening, blood pressure medication given.      Plan: Continue to monitor. Possible Discharge tomorrow.

## 2020-12-17 NOTE — PROGRESS NOTES
[facility]     Medicine Progress Note - Hospitalist Service       Date of Admission:  12/14/2020  Assessment & Plan       Murtaza Fitzgerald is a 45 year old male with past medical history significant for CAD s/p CABG x 3 (2015), HFrEF (35-40%), hx type 1 DM, s/p failed pancreas/kidney transplant (2008), ESRD on HD TTS (2/2020), and recent hospital admission 12/6-12/7 for volume overload in setting of possible CHF exacerbation vs incomplete HD run.  He presented to the ED at [place] today with refractory vomiting and HTN urgency with BPs 198/106 and transferred to [facility] due to need for dialysis. BP improved but still only able to tolerate minimal PO fluids.     Nausea and Vomiting  Diarrhea   Concern for gastroparesis flare with constipation leading to diarrhea as moderate stool burden on Abdominal x-ray (no obstruction).   -GI consulted, appreciate assistance.   -Enteric stool panel and C. Diff sent per GI recommendations -- pending   -Continue gastroparesis treatment as previous - reglan QID, antiemetics PRN, simethicone PRN. Trial antiemetics prior to meals per GI recommendations. Discussed this with patient today.   -Follow-up COVID-19 test done at [place], low suspicion   -Will also treat for constipation today with miralax/senna as this may be contributing to his gastroparesis     Hypertensive Urgency - resolved   ESRD on HD T/Th/Sa, S/p failed pancreas/kidney transplant (2008)  /106 at [place] ED prior to admission. Likely due to volume overload.   BP improved.   -Continue HD T/Th/Sa, nephrology consulted   -Continue home carvedilol 6.25mg BID     -Continue home calcium carbonate     LUE swelling  -LUE doppler ordered - pending     HFrEF - not in exacerbation   Last echocardiogram on 12/6/2020 with EF 35-40%, moderate diffuse hypokinesis, grade III diastolic dysfunction w/ elevated left sided filling pressures, no valvular  abnormalities.  - [facility] follow up outpatient (needs to be scheduled prior to discharge)   - Follow up with Cardiology for completion of transplant work-up, needs RHC      DM1   Home regimen: S/p failed pancreas/kidney transplant as above.  Last Hgb A1c 7.0% in 10/2020.  On Lantus 24 units at HS, carb coverage 1u per 6g CHO w/ meals, and custom sliding scale insulin with 1u per every 20 over 140.  During last admission, had isolated hypoglycemia to 45 in setting ongoing nausea and vomiting.    -continue lantus at decreased dose given decreased PO  Intake - but with hyperglycemia so will increase to 20 unit(s) today.   -Continue carb coverage 1u per 6g CHO TID w/ meals   -HSSI   -BG checks ac/hs   -Hypoglycemia protocol in place     Suspected KRISSY   Has been experiencing orthopnea, had CPAP at HS during last admission and tolerated very well.  Has outpatient sleep study scheduled for 12/15 which will need to be rescheduled.   -BIPAP overnight      Chronic Anemia   Likely related to renal disease. At baseline        Diet: Dialysis Diet    DVT Prophylaxis: Heparin SQ  Mueller Catheter: not present  Code Status: Full Code           Disposition Plan   Expected discharge: 2 - 3 days, recommended to prior living arrangement once tolerating PO .  Entered: Winsome Dennis DO 12/16/2020, 6:59 PM       The patient's care was discussed with the Bedside Nurse and Patient.    Winsome Dennis DO  Hospitalist Service  [facility]   Contact information available via [facility] Paging/Directory  Please see sign in/sign out for up to date coverage information  ______________________________________________________________________    Interval History   No overnight events reported.  He still has nausea and was unable to eat dinner but did eat a small amount of breakfast. NO vomiting, no diarrhea in fact no BM in last few days. Not able to give s stool sample yet.  Denies fevers, dyspnea, chest  pain, abdominal pain.     Data reviewed today: I reviewed all medications, new labs and imaging results over the last 24 hours. I personally reviewed no images or EKG's today.    Physical Exam   Vital Signs: Temp: 98.1  F (36.7  C) Temp src: Oral BP: (!) 174/87 Pulse: 93   Resp: 18 SpO2: 97 % O2 Device: None (Room air)    Weight: 177 lbs 14.58 oz  Constitutional: no apparent distress, pleasant and cooperative   Cardiovascular: regular rate and rhythm, normal S1 and S2, no murmurs, rubs, or gallops noted noted, no bilateral lower extremity edema   Respiratory: clear to auscultation bilaterally, no wheezing, rales, or rhonchi, normal work of breathing   GI: abdomen soft, non tender, non distended, bowel sounds present. Multiple reducible hernias   Neuro: alert and oriented, no gross focal deficits noted   Extremities: 1+ edema LUE, fistula upper arm with palpable thrill and no erythema     Data   Recent Labs   Lab 12/16/20  0543 12/15/20  0547   WBC 5.4 6.7   HGB 10.9* 10.5*   MCV 96 96    236   * 135   POTASSIUM 4.2 4.5   CHLORIDE 96 95   CO2 28 28   BUN 24 56*   CR 6.68* 10.50*   ANIONGAP 8 11   CHARLY 9.3 9.1   * 160*     Recent Results (from the past 24 hour(s))   US Ext Arterial Venous Dialys Acs Graft    Narrative    Exam: Duplex ultrasound of the left extremity dialysis graft dated  12/16/2020 9:43 AM.    Comparison examination: None available    Clinical history: Left upper extremity dialysis graft created  10/7/2020 with arm swelling.     Ordering clinician: LYUDMILA JACK    Technique: Grayscale (B-mode), color and duplex Doppler ultrasound of  the arterial inflow, dialysis graft, and outflow vein obtained with  spectral waveform analysis. Velocity measurements obtained with angle  correction at or less than 60 degrees. Flow volumes obtained within a  segment of the venous outflow.     Findings:    Left upper extremity:    Brachial artery proximal to graft: 340 cm/sec  Brachial artery distal  to graft: 274 cm/sec    Ulnar artery, origin: 71 cm/sec  Radial artery, proximal forearm: 36 cm/sec    Arterial anastomosis: 558 cm/sec w/angle correction, 459 cm/sec w/o  angle correction , 3.8 mm    Dialysis graft, just central to anastomosis: 746 cm/sec and 571 cm/sec    Dialysis graft, peripheral to central: 375 cm/sec, 166 cm/sec, 141  cm/sec, 154 cm/sec    Venous anastomosis: 122 cm/sec    Flow volume obtained within the graft which is 2761 and 3039 mL/minin  the mid and upper arm respectively.    Axillary vein: 271 cm/sec, 186 cm/sec, 117 cm/sec  Subclavian vein: 211 cm/sec  Innominate vein: 118 cm/sec    Basilic and cephalic vein are fully compressible in the upper arm.      Impression    Impression:    1. Arterial inflow: Patent    2. Arterial anastomosis: Patent with velocity elevation up to 746  cm/s, however flow volumes are high in the graft.    3. Intra-graft: No stenosis with hi flow volumes measuring up to 3039  mL/min in the upper arm.     4. Venous anastomosis: Patent    5. Venous outflow: Patent with a perhaps mild stenosis of the axillary  vein just above the anastomosis on color Doppler.    VERONICA SHAW     Medications       calcium carbonate  500 mg Oral TID     carvedilol  6.25 mg Oral BID w/meals     heparin ANTICOAGULANT  5,000 Units Subcutaneous Q12H     insulin aspart   Subcutaneous TID AC     insulin aspart  1-10 Units Subcutaneous TID AC     insulin aspart  1-7 Units Subcutaneous At Bedtime     insulin glargine  18 Units Subcutaneous At Bedtime     metoclopramide  5 mg Oral 4x Daily AC & HS     omeprazole  40 mg Oral QPM     polyethylene glycol  17 g Oral Daily     sennosides  8.6 mg Oral Daily     sodium chloride (PF)  3 mL Intracatheter Q8H

## 2020-12-17 NOTE — PLAN OF CARE
Pt discharged home with family at 4pm via car. Pt up independently. PIV removed. Belongings returned to patient. Discharge paperwork provided. Main discharge education and questions given to pt.

## 2020-12-17 NOTE — PROGRESS NOTES
Care Management Discharge Note    Discharge Date: 12/18/20    Discharge Disposition:  Home    Discharge Services:  Outpatient hemodialysis    Discharge DME:  None noted.     Discharge Transportation: Family or friend will provide    Additional Information:  Patient medically ready for discharge to home.Orders faxed to Anca Whalen at this time. Patient contacted via hospital phone, no answer, will re-attempt to complete IMM. No additional needs noted.    [facility] (T/Th/Sa HD w/Dr. Schneider)  Phone: [phone]  Fax: [phone]    Jacey Lomeli, RNCC, BSN    [facility]    Medicine Group  [address] [place] [id number]    [email or web address]    Office: [phone] Pager: [phone]  To contact the weekend RNCC, page [phone].

## 2020-12-17 NOTE — PROGRESS NOTES
Nephrology Progress Note  12/17/2020         Assessment & Recommendations:   Murtaza Fitzgerald is a 45 year old male with PMH of CAD s/p CABG x 3 (2015), HFrEF (35-40%), gastroparesis, DMI, s/p failed pancreas/kidney transplant (2008), ESRD, recently admitted with volume overload and worsening heart failure, admitted now with HTN and vomiting.       ESKD: s/p failed panc/kidney tx, dialyzes TTS at [facility] with Dr. Schneider. Access: LUE AVG. Run time: 3 hrs. EDW: 82.5 kg. Is being evaluated for transplant.  - Dialysis per TTS schedule     Volume: EDW 82.5 kg (reduced during recent admission); usual outpt UF 2.5 to 4.5 kg; O2 98% on RA; no peripheral edema, pt states he gets abdominal fullness with extra fluid on. He makes minimal urine.  - Will continue to gently challenge EDW in setting of HTN  - 1 kg UF goal today        BP: currently 150's; pressures at /106; usual pre HD pressures 140-180's, post pressures 100-150's, lowest pressure on HD ~ 95; PTA coreg 6.25 mg bid  - Please hold BP meds prior to dialysis  - Will gradually attempt to reduce EDW for better blood pressure control  - Continue PTA coreg  - Pt's BPs are very responsive to UF     Anemia: hgb 11.9 g/dL; recent labs with ferritin 245, iron 44, IS 17, hgb 9-10's. PTA epogen 2000 units per HD, just completed iron loading with new labs pending.  - Hold WILL, hgb > 10.0     BMD: Ca 9.6, phos 6.2; recent PTH 1207, phos 6's; on hectorol 4 mcg per HD; tums 750 mg tid WM  - Continue hectorol via HD, tums WM    Recommendations were communicated to primary team via this note    Seen and discussed with Dr. Soledad Vernon, PA -C  [phone]    Physician Attestation   I, Jatin Rowe, saw and evaluated Murtaza Fitzgerald as part of a shared visit.  I have reviewed and discussed with the advanced practice provider their history, physical and plan.    I personally reviewed the vital signs, medications and labs.    My key  history or physical exam findings: ESRD, volume overload. Seen on HD. Bps improved with EDW challenge.     Key management decisions made by me: Continue TTS HD schedule. Pt has not eaten. Would recommend making sure he eats prior to D/C    Jatin Rossdez  Date of Service (when I saw the patient): 12/17/20    Interval History :   Seen on dialysis, UF goal reduced based on weight and BP's dropping from 180's into 120's early on. Will -1000 kg today. Pt states he is still having difficulty with PO intake. Denies CP, SOB, chills    Review of Systems:   4 point ROS neg other than as noted above    Physical Exam:   I/O last 3 completed shifts:  In: 360 [P.O.:360]  Out: 50 [Emesis/NG output:50]   /77   Pulse 86   Temp 97.8  F (36.6  C) (Oral)   Resp 16   Wt 81.6 kg (180 lb)   SpO2 94%   BMI 27.37 kg/m       GENERAL APPEARANCE: alert, NAD  EYES: no scleral icterus, pupils equal  Pulmonary: lungs clear to auscultation with equal breath sounds bilaterally   CV: regular rhythm, normal rate   - Edema trace peripheral  GI: soft   MS: no evidence of inflammation in joints, no muscle tenderness  : no schultz  SKIN: no rash, warm, dry, no cyanosis  NEURO: face symmetric, a/o   Access: LUE AVG    Labs:   All labs reviewed by me  Electrolytes/Renal -   Recent Labs   Lab Test 12/17/20  0549 12/16/20  0543 12/15/20  0547 12/14/20  1845 05/13/20  0637 05/13/20  0637 02/16/20  0910 02/16/20  0910 02/15/20  0935    132* 135  --    < > 129*   < > 136 136   POTASSIUM 4.1 4.2 4.5  --    < > 4.3   < > 3.9 3.9   CHLORIDE 97 96 95  --    < > 93*   < > 104 104   CO2 25 28 28  --    < > 17*   < > 24 27   BUN 32* 24 56*  --    < > 35*   < > 13 15   CR 8.63* 6.68* 10.50*  --    < > 5.69*   < > 3.56* 4.22*   * 213* 160*  --    < > 596*   < > 178* 170*   CHARLY 9.6 9.3 9.1  --    < > 9.0   < > 8.7 8.5   MAG  --   --   --  2.3  --  2.3  --  1.8 1.7   PHOS  --   --   --  6.2*  --  6.7*  --   --  3.5    < > = values in this  interval not displayed.       CBC -   Recent Labs   Lab Test 12/17/20  0549 12/16/20  0543 12/15/20  0547   WBC 5.5 5.4 6.7   HGB 11.9* 10.9* 10.5*    248 236       LFTs -   Recent Labs   Lab Test 12/06/20  0622 11/03/20  1608 10/02/20  0841   ALKPHOS 198* 91 95   BILITOTAL 1.1 0.4 0.5   ALT 35 17 16   AST 16 7 8   PROTTOTAL 8.5 7.6 8.3   ALBUMIN 3.8 3.3* 3.6       Iron Panel -   Recent Labs   Lab Test 02/13/20  0720   AUBRIE 106         Imaging:  Reviewed    Current Medications:    calcium carbonate  500 mg Oral TID     carvedilol  6.25 mg Oral BID w/meals     gelatin absorbable  1 each Topical During Hemodialysis (from stock)     heparin ANTICOAGULANT  5,000 Units Subcutaneous Q12H     insulin aspart   Subcutaneous TID AC     insulin aspart  1-10 Units Subcutaneous TID AC     insulin aspart  1-7 Units Subcutaneous At Bedtime     insulin glargine  20 Units Subcutaneous At Bedtime     metoclopramide  5 mg Oral 4x Daily AC & HS     omeprazole  40 mg Oral QPM     polyethylene glycol  17 g Oral Daily     sennosides  8.6 mg Oral Daily     sodium chloride (PF)  3 mL Intracatheter Q8H       - MEDICATION INSTRUCTIONS -       Maddy Vernon PA-C

## 2020-12-18 ENCOUNTER — PATIENT OUTREACH (OUTPATIENT)
Dept: CARE COORDINATION | Facility: CLINIC | Age: 45
End: 2020-12-18

## 2020-12-18 DIAGNOSIS — Z71.89 OTHER SPECIFIED COUNSELING: ICD-10-CM

## 2020-12-18 LAB — ELASTASE PANC STL-MCNT: 344 UG/G

## 2020-12-18 NOTE — PROGRESS NOTES
Clinic Care Coordination Contact  [facility]/Voicemail       Clinical Data: Care Coordinator Outreach  Outreach attempted x 1.  Left message on patient's voicemail with call back information and requested return call.  Plan:  Care Coordinator will try to reach patient again in 1-2 business days.

## 2020-12-18 NOTE — DISCHARGE SUMMARY
[facility]   Hospitalist Discharge Summary      Date of Admission:  12/14/2020  Date of Discharge:  12/17/2020  4:03 PM  Discharging Provider: Winsome Dennis DO  Discharge Team: Hospitalist Service, Gold 8    Discharge Diagnoses   Nausea and Vomiting   Gastroparesis Flare  Diarrhea - resolved   Hypertensive Urgency - resolved  ESRD on HD   LUE swelling   Chronic HFrEF   DM1   Suspected KRISSY   Chronic Anemia     Follow-ups Needed After Discharge   Follow-up Appointments     Adult [facility] Follow-up and recommended labs and tests      Follow up with primary care provider, Kt Ríos, within 7 days for   hospital follow- up.  No follow up labs or test are needed.      Appointments on [place] and/or [facility] (with [facility] or [facility]   provider or service). Call [phone] if you haven't heard regarding   these appointments within 7 days of discharge.             Unresulted Labs Ordered in the Past 30 Days of this Admission     Date and Time Order Name Status Description    12/15/2020 1653 Elastase Fecal In process       These results will be followed up by PCP.    Discharge Disposition   Discharged to home  Condition at discharge: Stable  Patient ready to discharge to a skilled nursing facility as soon as possible in order to create capacity for patients related to the COVID-19 pandemic.    Hospital Course         Murtaza Fitzgerald is a 45 year old male with past medical history significant for CAD s/p CABG x 3 (2015), HFrEF (35-40%), hx type 1 DM, s/p failed pancreas/kidney transplant (2008), ESRD on HD TTS (2/2020), and recent hospital admission 12/6-12/7 for volume overload in setting of possible CHF exacerbation vs incomplete HD run.  He presented to the ED at [facility] with refractory vomiting and HTN urgency with BPs 198/106 and transferred to [facility] due to need for dialysis. BP improved but remained hospitalized due to severe nausea that did not  allow him to take in enough PO intake.     Nausea and Vomiting  Concern for gastroparesis flare, exacerbated by constipation (moderate stool burden on KUB at admission) with contribution of uremia (although no missed dialysis sessions but nausea improved significantly with dialysis)   GI consulted for assistance in gastroparesis management.   Initially unable to tolerate PO including minimal fluids but by the time of discharge nausea had resolved and tolerated meals without nausea.   -Continue reglan 5mg QID with meals, zofran and compazine PRN.   -Recommended small meals and trying antiemetics 30 min before a meal.     Diarrhea/Constipation    Initially with diarrhea then constipation later in admission. Constipation leading to diarrhea as moderate stool burden on Abdominal x-ray (no obstruction).   COVID test negative 12/13 at [facility] [place] ED (from where patient was transferred). Stool too formed to test for C. Diff in sample sent. Enteric stool pathogen panel negative.   With senna/miralax, constipation resolved at discharge.   -Continue treatment of constipation at home with regular miralax to ensure regular bowel movements   -Fecal elastase pending at time of discharge - to be followed up by PCP to evaluate for possible pancreatic insufficiency     Hypertensive Urgency - resolved   ESRD on HD T/Th/Sa, S/p failed pancreas/kidney transplant (2008)  /106 at [place] ED prior to admission. Likely due to volume overload.   BP improved after HD and remained stable  During admission.   -Continue HD T/Th/Sa, nephrology consulted   -Continue home carvedilol 6.25mg BID     -Continue home calcium carbonate     LUE swelling  Related to new fistula placed in October 2020. US of arterial/venous graft without clots. Likely related to post-op changes from new fistula.     Chronic HFrEF   Last echocardiogram on 12/6/2020 with EF 35-40%, moderate diffuse hypokinesis, grade III diastolic dysfunction w/ elevated left sided  filling pressures, no valvular abnormalities.  - [facility] clinic follow up outpatient  - Follow up with Cardiology for completion of transplant work-up, needs RHC      DM1   S/p failed pancreas/kidney transplant as above.  Last Hgb A1c 7.0% in 10/2020.  On Lantus 24 units at HS, carb coverage 1u per 6g CHO w/ meals, and custom sliding scale insulin with 1u per every 20 over 140. Lantus at decreased dose of 18-20 during admission while poor PO intake.   -Resume lantus at PTA dose of 24 unit(s) at discharge   -Continue carb coverage 1u per 6g CHO TID w/ meals     Suspected KRISSY   Has been experiencing orthopnea, had CPAP at HS during last admission and tolerated very well.  Had outpatient sleep study scheduled for 12/15 which will need to be rescheduled.   -Reschedule sleep study     Chronic Anemia   Likely related to renal disease. At baseline       Consultations This Hospital Stay   NEPHROLOGY ESRD ADULT IP CONSULT  PHYSICAL THERAPY ADULT IP CONSULT  OCCUPATIONAL THERAPY ADULT IP CONSULT  RESPIRATORY CARE IP CONSULT  GI LUMINAL ADULT IP CONSULT  MEDICATION HISTORY IP PHARMACY CONSULT  CARE MANAGEMENT / SOCIAL WORK IP CONSULT  NUTRITION SERVICES ADULT IP CONSULT    Code Status   Prior    Time Spent on this Encounter   IWinsome DO, personally saw the patient today and spent greater than 30 minutes discharging this patient.       Winsome Dennis DO  [facility] [address] [address]  Phone: [phone]  ______________________________________________________________________    Physical Exam   Vital Signs: Temp: 98.2  F (36.8  C) Temp src: Oral BP: 109/63 Pulse: 93   Resp: 18 SpO2: 99 % O2 Device: None (Room air)    Weight: 180 lbs 0 oz  Constitutional: no apparent distress, pleasant and cooperative   Cardiovascular: regular rate and rhythm, normal S1 and S2, no murmurs, rubs, or gallops noted noted, no bilateral lower extremity edema   Respiratory: clear to auscultation  bilaterally, no wheezing, rales, or rhonchi, normal work of breathing   GI: abdomen soft, non tender, non distended, bowel sounds present. Multiple reducible hernias   Neuro: alert and oriented, no gross focal deficits noted   Extremities: 1+ edema LUE, fistula upper arm with palpable thrill           Primary Care Physician   Kt Ríos    Discharge Orders      Reason for your hospital stay    You were hospitalized for nausea and vomiting due to your gastroparesis. This improved with time, dialysis, medications including Reglan, compazine, and Zofran, as well as with moving your bowels. You were seen by GI who recommended continuing small meals and taking your zofran or compazine before you eat. You can continue taking miralax 1-2 x/day as needed for constipation. Keeping your bowels regular will also help you gastroparesis. Continue taking your insulin as you were doing before. Well controlled blood sugars also help your gastroparesis not to flare.     Adult [facility] Follow-up and recommended labs and tests    Follow up with primary care provider, Kt Ríos, within 7 days for hospital follow- up.  No follow up labs or test are needed.      Appointments on [place] and/or [facility] (with [facility] or [facility] provider or service). Call [phone] if you haven't heard regarding these appointments within 7 days of discharge.     Activity    Your activity upon discharge: activity as tolerated     Discharge Instructions    Continue taking all medications as previously prescribed. No changes were made to your medications during this admission.     Diet    Follow this diet upon discharge: Orders Placed This Encounter      Dialysis Diet       Significant Results and Procedures   Most Recent 3 CBC's:  Recent Labs   Lab Test 12/17/20  0549 12/16/20  0543 12/15/20  0547   WBC 5.5 5.4 6.7   HGB 11.9* 10.9* 10.5*   MCV 95 96 96    248 236     Most Recent 3 BMP's:  Recent Labs   Lab Test 12/17/20  0549  12/16/20  0543 12/15/20  0547    132* 135   POTASSIUM 4.1 4.2 4.5   CHLORIDE 97 96 95   CO2 25 28 28   BUN 32* 24 56*   CR 8.63* 6.68* 10.50*   ANIONGAP 11 8 11   CHARLY 9.6 9.3 9.1   * 213* 160*   ,   Results for orders placed or performed during the hospital encounter of 12/14/20   XR Abdomen 2 Views    Narrative    Exam: XR ABDOMEN 2 VW, 12/14/2020 7:59 PM    Indication: refractory nausea and vomiting, evaluate for possible  obstruction    Comparison: CT from 12/11/2020    Findings:   Partially visualized chest images stable sternotomy wires,  mediastinal/hilar surgical clips. No focal opacities. Bones appear  stable. Relative paucity of small bowel gas. Dense stool within the  otherwise unremarkable colon. Surgical clips/anastomosis in the right  lower quadrant.      Impression    Impression: Nonobstructive bowel gas pattern. Mild to moderate stool  volume.    I have personally reviewed the examination and initial interpretation  and I agree with the findings.    APRYL HERNANDEZ MD   US Ext Arterial Venous Dialys Acs Graft    Narrative    Exam: Duplex ultrasound of the left extremity dialysis graft dated  12/16/2020 9:43 AM.    Comparison examination: None available    Clinical history: Left upper extremity dialysis graft created  10/7/2020 with arm swelling.     Ordering clinician: LYUDMILA JACK    Technique: Grayscale (B-mode), color and duplex Doppler ultrasound of  the arterial inflow, dialysis graft, and outflow vein obtained with  spectral waveform analysis. Velocity measurements obtained with angle  correction at or less than 60 degrees. Flow volumes obtained within a  segment of the venous outflow.     Findings:    Left upper extremity:    Brachial artery proximal to graft: 340 cm/sec  Brachial artery distal to graft: 274 cm/sec    Ulnar artery, origin: 71 cm/sec  Radial artery, proximal forearm: 36 cm/sec    Arterial anastomosis: 558 cm/sec w/angle correction, 459 cm/sec w/o  angle  correction , 3.8 mm    Dialysis graft, just central to anastomosis: 746 cm/sec and 571 cm/sec    Dialysis graft, peripheral to central: 375 cm/sec, 166 cm/sec, 141  cm/sec, 154 cm/sec    Venous anastomosis: 122 cm/sec    Flow volume obtained within the graft which is 2761 and 3039 mL/minin  the mid and upper arm respectively.    Axillary vein: 271 cm/sec, 186 cm/sec, 117 cm/sec  Subclavian vein: 211 cm/sec  Innominate vein: 118 cm/sec    Basilic and cephalic vein are fully compressible in the upper arm.      Impression    Impression:    1. Arterial inflow: Patent    2. Arterial anastomosis: Patent with velocity elevation up to 746  cm/s, however flow volumes are high in the graft.    3. Intra-graft: No stenosis with hi flow volumes measuring up to 3039  mL/min in the upper arm.     4. Venous anastomosis: Patent    5. Venous outflow: Patent with a perhaps mild stenosis of the axillary  vein just above the anastomosis on color Doppler.    VERONICA SHAW     *Note: Due to a large number of results and/or encounters for the requested time period, some results have not been displayed. A complete set of results can be found in Results Review.       Discharge Medications   Discharge Medication List as of 12/17/2020  3:01 PM      CONTINUE these medications which have NOT CHANGED    Details   acetaminophen (TYLENOL) 325 MG tablet Take 2 tablets (650 mg) by mouth every 4 hours as needed for mild pain, Disp-50 tablet, R-0, Local Print      acetone, Urine, test STRP 1 strip by In Vitro route dailyDisp-50 each, G-[id number] ketone stix      blood glucose (NO BRAND SPECIFIED) test strip Use to test blood sugar 5 times daily or as directed., Disp-1 Box, R-0, E-Prescribe      calcium carbonate (TUMS) 500 MG chewable tablet Take 2 chew tab by mouth 3 times daily , Historical      carvedilol (COREG) 6.25 MG tablet Take 1 tablet (6.25 mg) by mouth 2 times daily (with meals), Disp-180 tablet, R-3, E-Prescribe      insulin  aspart (NOVOLOG FLEXPEN) 100 UNIT/ML pen Sliding scale = 1 unit for every 20 over blood glucose of 140, Historical      insulin glargine (LANTUS SOLOSTAR) 100 UNIT/ML pen Inject 24 Units Subcutaneous At Bedtime, Disp-15 mL, R-11, E-PrescribeIf Lantus is not covered by insurance, may substitute Basaglar at same dose and frequency.        insulin pen needle (BD ARPITA U/F) 32G X 4 MM miscellaneous Use 4 daily or as directed.Disp-120 each, [id number]      metoclopramide (REGLAN) 5 MG tablet Take 1 tablet (5 mg) by mouth 4 times daily (before meals and nightly), Disp-120 tablet, R-11, E-Prescribe      naproxen sodium 220 MG capsule Take 220 mg by mouth as needed , Historical      omeprazole (PRILOSEC) 40 MG DR capsule Take 80 mg by mouth every evening , Historical      ondansetron (ZOFRAN-ODT) 4 MG ODT tab Take 1 tablet (4 mg) by mouth every 8 hours as needed for nausea, Disp-30 tablet, R-1, E-Prescribe      prochlorperazine (COMPAZINE) 5 MG tablet Take 1 tablet (5 mg) by mouth every 8 hours as needed for nausea, Disp-30 tablet, R-1, E-Prescribe           Allergies   Allergies   Allergen Reactions     Diphenhydramine Other (See Comments)     Restless legs     Metoclopramide      Other reaction(s): Confusion, Unknown     Reglan Other (See Comments)     Reaction only to IV formulation -->delsuions

## 2020-12-18 NOTE — PROGRESS NOTES
The patient was contacted by another MA for post-hospital follow up. Will close encounter at this time.    Yusra Aleman CMA, [facility]  Post Hospital Discharge Team

## 2020-12-18 NOTE — DISCHARGE SUMMARY
Dialysis Discharge Summary Brief    [facility]  Division of Nephrology  Nephrology Discharge Dialysis Orders  Ph: [phone]  Fax: [phone]    Murtaza Fitzgerald  MRN: [id number]  YOB: 1975    [facility] Unit: [place]  Primary Nephrologist: Dr. Schneider/Katelyn Lopez NP    Date of Admission: 12/14/2020  Date of Discharge: 12/17/2020    Please page me at  with any questions. Note EDW reduced to 80 kg.  Thanks much. Maddy Vernon PA-C    Murtaza Fitzgerald is a 45 year old male with PMH of CAD s/p CABG x 3 (2015), HFrEF (35-40%), gastroparesis, DMI, s/p failed pancreas/kidney transplant (2008), ESRD, recently admitted with volume overload and worsening heart failure, admitted now with HTN and vomiting.       ESKD: s/p failed panc/kidney tx, dialyzes TTS at [facility] with Dr. Schneider. Access: LUE AVG. Run time: 3 hrs. EDW: 82.5 kg. Is being evaluated for transplant.  - Last dialyzed Thurs 12/17     Volume:   - Re-established EDW at 80 kg     BP: Continue coreg 6.25 mg bid     Anemia: hgb 11.9 g/dL       [x] Resume all previous dialysis orders with exception as noted below    New Orders (if not applicable put NA):  Estimated Dry Weight 80 kg   Dialysis Duration    Dialysis Access    Antibiotics (dose per dialysis, end date)            Labs to be drawn at dialysis    Other major changes to dialysis prescription (e.g. Dialysate bath, heparin, blood flow rate, etc)      Medication changes (also fax the unit a copy of the discharge summary)              Name of physician completing this form: Maddy Vernon PA-C

## 2020-12-18 NOTE — LETTER
[facility]ARE [facility]ORDINATION  [address] [place] [address]    December 21, 2020    Murtaza Payton Justin  [address]  [place] [address]      Dear Murtaza,    I am a clinic community health worker who works with Kt Ríos MD at [facility]. I have been trying to reach you recently to introduce Clinic Care Coordination and to see if there was anything I could assist you with.  Below is a description of clinic care coordination and how I can further assist you.      The clinic care coordination team is made up of a registered nurse,  and community health worker who understand the health care system. The goal of clinic care coordination is to help you manage your health and improve access to the health care system in the most efficient manner. The team can assist you in meeting your health care goals by providing education, coordinating services, strengthening the communication among your providers and supporting you with any resource needs.    Please feel free to contact me at [phone] with any questions or concerns. We are focused on providing you with the highest-quality healthcare experience possible and that all starts with you.     Sincerely,   Lulu

## 2020-12-21 NOTE — PROGRESS NOTES
Clinic Care Coordination Contact  [facility]/Voicemail       Clinical Data: Care Coordinator Outreach  Outreach attempted x 2.  Left message on patient's voicemail with call back information and requested return call.  Plan: Care Coordinator will send care coordination introduction letter with care coordinator contact information and explanation of care coordination services via [employer]. Care Coordinator will do no further outreaches at this time.

## 2020-12-28 NOTE — PROGRESS NOTES
Pt here for elective dobutamine stress test, goal HR achieved with 30 mcg/kg/min of IV dobutamine, post echo pt was given a total of 4 doses of  1mg IV metoprolol with return to baseline HR   yes

## 2021-01-06 ENCOUNTER — TELEPHONE (OUTPATIENT)
Dept: PEDIATRICS | Facility: CLINIC | Age: 46
End: 2021-01-06

## 2021-01-06 NOTE — TELEPHONE ENCOUNTER
Prior Authorization Retail Medication Request    Medication/Dose: Novolog flexpen 100u/ml  ICD code (if different than what is on RX):  E10.59, I43  Previously Tried and Failed:    Rationale:  Uses with Lantus for control of diabetes/ sliding scale coverage    Insurance Name:  [employer]  Insurance ID:  [id number]      Pharmacy Information (if different than what is on RX)  Name:  [facility]  Phone: [phone]

## 2021-01-07 NOTE — TELEPHONE ENCOUNTER
Central Prior Authorization Team   Phone: [phone]      PA Initiation    Medication: Novolog flexpen 100u/ml  Insurance Company: [employer] ([employer]) - Phone [phone] Fax [phone]  Pharmacy Filling the Rx: [id number] [place] [address]  Filling Pharmacy Phone: [phone]  Filling Pharmacy Fax:    Start Date: 1/7/2021

## 2021-01-07 NOTE — TELEPHONE ENCOUNTER
Prior Authorization Not Needed per Insurance    Medication: Novolog flexpen 100u/ml  Insurance Company: [employer] - Phone [phone] Fax [phone]  Expected CoPay:      Pharmacy Filling the Rx: [id number] [place] [id number] [address]  Pharmacy Notified: Yes  Patient Notified: Yes  **Instructed pharmacy to notify patient when script is ready to /ship.**

## 2021-01-08 ENCOUNTER — NURSE TRIAGE (OUTPATIENT)
Dept: NURSING | Facility: CLINIC | Age: 46
End: 2021-01-08

## 2021-01-08 NOTE — TELEPHONE ENCOUNTER
Called to be connected to the nurse line at [facility].  Caller supposed to have a telephone visit today at 3.25 pm, missed this.  Laure Mendes RN    Additional Information    Negative: [1] Caller is not with the adult (patient) AND [2] reporting urgent symptoms    Negative: Lab result questions    Negative: Medication questions    Negative: Caller can't be reached by phone    Negative: Caller has already spoken to PCP or another triager    Negative: RN needs further essential information from caller in order to complete triage    Negative: Requesting regular office appointment    Negative: [1] Caller requesting NON-URGENT health information AND [2] PCP's office is the best resource    Negative: Health Information question, no triage required and triager able to answer question    Negative: General information question, no triage required and triager able to answer question    Negative: Question about upcoming scheduled test, no triage required and triager able to answer question    Negative: [1] Caller is not with the adult (patient) AND [2] probable NON-URGENT symptoms    [1] Follow-up call to recent contact AND [2] information only call, no triage required     Missed telephone visit    Protocols used: INFORMATION ONLY CALL-A-

## 2021-01-22 ENCOUNTER — VIRTUAL VISIT (OUTPATIENT)
Dept: PEDIATRICS | Facility: CLINIC | Age: 46
End: 2021-01-22
Payer: COMMERCIAL

## 2021-01-22 DIAGNOSIS — E10.59 TYPE 1 DIABETES MELLITUS WITH DIABETIC CARDIOMYOPATHY (H): Primary | ICD-10-CM

## 2021-01-22 DIAGNOSIS — K21.00 GASTROESOPHAGEAL REFLUX DISEASE WITH ESOPHAGITIS WITHOUT HEMORRHAGE: ICD-10-CM

## 2021-01-22 DIAGNOSIS — I25.10 CORONARY ARTERY DISEASE, NON-OCCLUSIVE: Chronic | ICD-10-CM

## 2021-01-22 DIAGNOSIS — K31.84 GASTROPARESIS: ICD-10-CM

## 2021-01-22 DIAGNOSIS — G47.00 INSOMNIA, UNSPECIFIED TYPE: ICD-10-CM

## 2021-01-22 DIAGNOSIS — I43 TYPE 1 DIABETES MELLITUS WITH DIABETIC CARDIOMYOPATHY (H): Primary | ICD-10-CM

## 2021-01-22 DIAGNOSIS — Z94.0 KIDNEY REPLACED BY TRANSPLANT: ICD-10-CM

## 2021-01-22 DIAGNOSIS — E78.5 HYPERLIPIDEMIA WITH TARGET LDL LESS THAN 100: Chronic | ICD-10-CM

## 2021-01-22 PROBLEM — E86.0 DEHYDRATION: Status: RESOLVED | Noted: 2019-02-25 | Resolved: 2021-01-22

## 2021-01-22 PROBLEM — E87.70 FLUID OVERLOAD: Status: RESOLVED | Noted: 2020-12-06 | Resolved: 2021-01-22

## 2021-01-22 PROBLEM — I16.0 HYPERTENSIVE URGENCY: Status: RESOLVED | Noted: 2020-12-14 | Resolved: 2021-01-22

## 2021-01-22 PROCEDURE — 99214 OFFICE O/P EST MOD 30 MIN: CPT | Mod: 95 | Performed by: INTERNAL MEDICINE

## 2021-01-22 RX ORDER — OMEPRAZOLE 40 MG/1
80 CAPSULE, DELAYED RELEASE ORAL EVERY EVENING
Qty: 180 CAPSULE | Refills: 11 | Status: SHIPPED | OUTPATIENT
Start: 2021-01-22 | End: 2022-01-01

## 2021-01-22 RX ORDER — METOCLOPRAMIDE 5 MG/1
5 TABLET ORAL
Qty: 120 TABLET | Refills: 11 | Status: SHIPPED | OUTPATIENT
Start: 2021-01-22

## 2021-01-22 RX ORDER — TRAZODONE HYDROCHLORIDE 50 MG/1
50 TABLET, FILM COATED ORAL
Qty: 30 TABLET | Refills: 2 | Status: ON HOLD | OUTPATIENT
Start: 2021-01-22 | End: 2021-03-19

## 2021-01-22 RX ORDER — ATORVASTATIN CALCIUM 20 MG/1
20 TABLET, FILM COATED ORAL DAILY
Qty: 90 TABLET | Refills: 3 | Status: SHIPPED | OUTPATIENT
Start: 2021-01-22 | End: 2021-04-07

## 2021-01-22 NOTE — PATIENT INSTRUCTIONS
"It was good talking with you earlier today.  Here's a summary of what we discussed:    1)  Let's have you reschedule with cardiology for your lower ejection fraction and discussion of angiogram for your kidney transplant    2)  Let's begin aspirin and atorvastatin for your diabetes mellitus.    3)  Set up a nonfasting diabetes blood test (A1c) blood draw within a few weeks.    4) Refilled your reglan and omprazole.    5)  With respect to insomnia,  the book \"No More Sleepless Nights\" by Rob Mandujano and begin the workbook contained therein.     6) We can also begin a trial of trazodone nightly before bed as needed.     7)  Here is the resource we are using to keep people up to date about vaccine prioritization, and how we'll try to let people know when it is their turn:     [email or web address]        Kt Ríos MD  Internal Medicine and Pediatrics     "

## 2021-01-22 NOTE — PROGRESS NOTES
"Tyrell is a 45 year old who is being evaluated via a billable telephone visit.      What phone number would you like to be contacted at? [phone]  How would you like to obtain your AVS? MyChart  Assessment & Plan     Kidney replaced by transplant  Management per nephrology; dialysis three times per week.     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   - aspirin (ASA) 81 MG EC tablet; Take 1 tablet (81 mg) by mouth daily  - atorvastatin (LIPITOR) 20 MG tablet; Take 1 tablet (20 mg) by mouth daily  - Hemoglobin A1c; Future    Hyperlipidemia with target LDL less than 100  As above.      Insomnia, unspecified type  Trial of trazodone.   - traZODone (DESYREL) 50 MG tablet; Take 1 tablet (50 mg) by mouth nightly as needed for sleep    Gastroparesis  Refilled; uses frequently.   - metoclopramide (REGLAN) 5 MG tablet; Take 1 tablet (5 mg) by mouth 4 times daily (before meals and nightly)    Gastroesophageal reflux disease with esophagitis without hemorrhage  High dose due to significant gastroparesis and gastroesophageal reflux disease.   - omeprazole (PRILOSEC) 40 MG DR capsule; Take 2 capsules (80 mg) by mouth every evening    Coronary artery disease, non-occlusive  Parameters well controlled.  Continue secondary risk factor modification for BP, cholesterol, anticoagulation, and smoking cessation.                          BMI:   Estimated body mass index is 27.37 kg/m  as calculated from the following:    Height as of 12/6/20: 1.727 m (5' 8\").    Weight as of 12/17/20: 81.6 kg (180 lb).             Return in about 3 months (around 4/22/2021) for diabetes followup, with me, in person, using a video visit.    Kt Ríos MD  [facility]    Subjective     Tyrell is a 45 year old who presents to clinic today for the following health issues     HPI       Diabetes Follow-up    How often are you checking your blood sugar? " Two times daily  Blood sugar testing frequency justification:  Uncontrolled diabetes and Risk of hypoglycemia with medication(s)  What time of day are you checking your blood sugars (select all that apply)?  Before and after meals  Have you had any blood sugars above 200?  Yes   Have you had any blood sugars below 70?  Yes     What symptoms do you notice when your blood sugar is low?  Shaky and sweaty    What concerns do you have today about your diabetes? None     Do you have any of these symptoms? (Select all that apply)  Numbness in feet and Burning in feet      Has been having trouble staying asleep.  Wakes up every 1-2 hours.  Not sure why; if has extra fluid doesn't sleep at all.  THis is new for him.  Has tried over-the-counter melatonin.    Diabetes:  Checking 2 times per day.   Sugars in , and before dinner 120-140.    Taking 1 unit of novolog per 6 g carbs, and 24 lantus.    Admitted 12/14:    Hospital Course           Murtaza Fitzgerald is a 45 year old male with past medical history significant for CAD s/p CABG x 3 (2015), HFrEF (35-40%), hx type 1 DM, s/p failed pancreas/kidney transplant (2008), ESRD on HD TTS (2/2020), and recent hospital admission 12/6-12/7 for volume overload in setting of possible CHF exacerbation vs incomplete HD run.  He presented to the ED at [place] today with refractory vomiting and HTN urgency with BPs 198/106 and transferred to [facility] due to need for dialysis. BP improved but remained hospitalized due to severe nausea that did not allow him to take in enough PO intake.      Nausea and Vomiting  Concern for gastroparesis flare, exacerbated by constipation (moderate stool burden on KUB at admission) with contribution of uremia (although no missed dialysis sessions but nausea improved significantly with dialysis)   GI consulted for assistance in gastroparesis management.   Initially unable to tolerate PO including minimal fluids but by the time of discharge  nausea had resolved and tolerated meals without nausea.   -Continue reglan 5mg QID with meals, zofran and compazine PRN.   -Recommended small meals and trying antiemetics 30 min before a meal.      Diarrhea/Constipation    Initially with diarrhea then constipation later in admission. Constipation leading to diarrhea as moderate stool burden on Abdominal x-ray (no obstruction).   COVID test negative 12/13 at [facility] (from where patient was transferred). Stool too formed to test for C. Diff in sample sent. Enteric stool pathogen panel negative.   With senna/miralax, constipation resolved at discharge.   -Continue treatment of constipation at home with regular miralax to ensure regular bowel movements   -Fecal elastase pending at time of discharge - to be followed up by PCP to evaluate for possible pancreatic insufficiency      Hypertensive Urgency - resolved   ESRD on HD T/Th/Sa, S/p failed pancreas/kidney transplant (2008)  /106 at [facility] ED prior to admission. Likely due to volume overload.   BP improved after HD and remained stable  During admission.   -Continue HD T/Th/Sa, nephrology consulted   -Continue home carvedilol 6.25mg BID     -Continue home calcium carbonate      LUE swelling  Related to new fistula placed in October 2020. US of arterial/venous graft without clots. Likely related to post-op changes from new fistula.      Chronic HFrEF   Last echocardiogram on 12/6/2020 with EF 35-40%, moderate diffuse hypokinesis, grade III diastolic dysfunction w/ elevated left sided filling pressures, no valvular abnormalities.  - [facility] clinic follow up outpatient  - Follow up with Cardiology for completion of transplant work-up, needs RHC       DM1   S/p failed pancreas/kidney transplant as above.  Last Hgb A1c 7.0% in 10/2020.  On Lantus 24 units at HS, carb coverage 1u per 6g CHO w/ meals, and custom sliding scale insulin with 1u per every 20 over 140. Lantus at decreased dose of 18-20 during  admission while poor PO intake.   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  Had outpatient sleep study scheduled for 12/15 which will need to be rescheduled.   -Reschedule sleep study      Chronic Anemia   Likely related to renal disease. At baseline                 Hypertension Follow-up      Do you check your blood pressure regularly outside of the clinic? Yes - at dialysis    Are you following a low salt diet? Yes    Are your blood pressures ever more than 140 on the top number (systolic) OR more   than 90 on the bottom number (diastolic), for example 140/90? Yes    BP Readings from Last 2 Encounters:   12/17/20 109/63   12/07/20 134/87     Hemoglobin A1C (%)   Date Value   10/07/2020 7.0 (H)   10/02/2020 6.9 (H)     LDL Cholesterol Calculated (mg/dL)   Date Value   02/11/2020 88   11/19/2019 145 (H)               Review of Systems   CONSTITUTIONAL: NEGATIVE for fever, chills, change in weight  INTEGUMENTARY/SKIN: NEGATIVE for worrisome rashes, moles or lesions  EYES: NEGATIVE for vision changes or irritation  ENT/MOUTH: NEGATIVE for ear, mouth and throat problems  RESP: NEGATIVE for significant cough or SOB  BREAST: NEGATIVE for masses, tenderness or discharge  CV: NEGATIVE for chest pain, palpitations or peripheral edema  GI: NEGATIVE for nausea, abdominal pain, heartburn, or change in bowel habits  : NEGATIVE for frequency, dysuria, or hematuria  MUSCULOSKELETAL: NEGATIVE for significant arthralgias or myalgia  NEURO: NEGATIVE for weakness, dizziness or paresthesias  ENDOCRINE: NEGATIVE for temperature intolerance, skin/hair changes  HEME: NEGATIVE for bleeding problems  PSYCHIATRIC: NEGATIVE for changes in mood or affect      Objective           Vitals:  No vitals were obtained today due to virtual visit.    Physical Exam    healthy, alert and no distress  PSYCH: Alert and oriented times 3; coherent speech, normal   rate and volume, able to articulate logical thoughts, able   to abstract reason, no tangential thoughts, no hallucinations   or delusions  His affect is normal  RESP: No cough, no audible wheezing, able to talk in full sentences  Remainder of exam unable to be completed due to telephone visits                Phone call duration: 18 minutes

## 2021-02-01 ENCOUNTER — TELEPHONE (OUTPATIENT)
Dept: CARDIOLOGY | Facility: CLINIC | Age: 46
End: 2021-02-01

## 2021-02-01 NOTE — LETTER
February 5, 2021      TO: Murtaza WITT Tata Fitzgerald  [address]  [facility] [address]         Dear Murtaza,    You are scheduled for a Coronary Angiogram on 03/03/2021 at the [facility], [address] [place] [address], [place] [address]. You are to check in at the [facility] Waiting Area at 12:30 PM.     When you arrive you will be escorted back to the pre procedure area called 2A. Here they will insert an IV, draw labs, and obtain a short medical history. Please bring an updated list of your current medications.    A physician will come and talk with you about the procedure and obtain consent.    A nurse from the Cardiac Catheterization Lab will then escort you to the procedure area. You will be receiving sedation during the procedure so you will need someone to drive you to and from the hospital.    After the procedure you will recover for a short period (2 - 6 hours) on 6B. You will be discharged with instructions for post procedure care. However, depending on what the angiogram shows you may have to have stents placed and this might require an overnight stay. We ask that you bring a small bag of necessities for your comfort if you would need to stay overnight. DO NOT BRING ANY VALUABLES!      Pre procedure instructions:      1.) Make arrangements to have a  as you will not be allowed to drive following the procedure. Someone should stay with you the night after your procedure.  2.)  Do not eat any solid food or milk products for 8 hours prior to the exam. You may drink clear liquids until 2 hours prior to the exam. Clear liquids include the following: water, Jell-O, clear broth, apple juice or any non-carbonated drink that you can see through (no pop!).   3.) Do not drink alcohol or smoke 24 hours prior to test.    4.) Hold these meds:  Do not take your oral diabetic medication or short acting insulin the day of the procedure.  Lantus can be taken as prescribed.  Do not  take metformin the day of and for 2 days following, contact your PCP or Endocrinologist regarding glucose control during this time.  Hold your warfarin (2-3 days prior), pradaxa (2 days prior), Eliquis/Xarelto 1 day prior.   5.) You may take your other morning medications as prescribed with a sip of water. If you currently take an aspirin, continue taking your same dose. If not, take a 325 mg aspirin the day before and the day of your procedure.     **You will need a test for COVID-19 3-4 days prior to the procedure.  Our scheduling team will contact you in regards to this appointment.  If you are unable to answer, they will leave a voicemail.  Please listen to this voicemail and ensure you follow the provided instructions.      Please do not hesitate to call me if you have any questions or concerns.    Sincerely,      Carey LOPEZ LPN  Cardiology Nurse Coordinator for Dr Yovany Vogel  [facility]

## 2021-02-01 NOTE — TELEPHONE ENCOUNTER
[facility] Call Center    Phone Message    May a detailed message be left on voicemail: yes     Reason for Call: Other: Pt was told he would receive a call about setting up an angiogram, but hasn't heard anything. Please give pt a call to schedule.     Action Taken: Message routed to:  [facility] cardio    Travel Screening: Not Applicable

## 2021-02-02 NOTE — TELEPHONE ENCOUNTER
Called and left  for Pt requesting a return call to clinic to discuss CORS.  Requested also to know any dates or times within the next 2 weeks that Pt is unavailable, if he would be able to make a 6:30 AM check in if that is the available time and confirmation of dialysis days if he is currently on dialysis.  Clinic telephone provided.    Carey Belle LPN

## 2021-02-03 NOTE — TELEPHONE ENCOUNTER
[facility] Call Center    Phone Message    May a detailed message be left on voicemail: yes     Reason for Call: Other: Pt returning call from [place], would like a call back to discuss his availability.     Action Taken: Message routed to:  [facility] cardio    Travel Screening: Not Applicable

## 2021-02-05 PROBLEM — Z95.1 S/P CABG X 3: Status: ACTIVE | Noted: 2020-11-11

## 2021-02-05 PROBLEM — I25.10 CORONARY ARTERY DISEASE INVOLVING NATIVE CORONARY ARTERY OF NATIVE HEART WITHOUT ANGINA PECTORIS: Status: ACTIVE | Noted: 2020-11-11

## 2021-02-05 NOTE — TELEPHONE ENCOUNTER
Called and confirmed CORS date and time of 03/03/2021 at 12:30 PM.  Pt agreed to date.  Letter will be sent with date, time and prep at Pt's request.    Carey Belle LPN

## 2021-02-05 NOTE — TELEPHONE ENCOUNTER
Called and spoke with Pt.  He confirmed he does dialysis on tuesdays and thursdays.  Discussed we would then be looking at a Wednesday.  Pt noted he was not available 02/24, but he could do other wednesdays and could arrive for first case.  Informed Pt cath lab scheduling would be contacted and Pt would be called back with availability.  Pt verbalized understanding, agreed to current plan and denied any further questions.    Carey Belle LPN

## 2021-02-10 NOTE — TELEPHONE ENCOUNTER
Called and left  for Pt informing him current policy is that COVID test is needed prior to procedure, even if vaccine has been given.  Requested return call to clinic to discuss further if needed.  Clinic telephone provided.    Carey Belle LPN

## 2021-02-17 DIAGNOSIS — Z11.59 ENCOUNTER FOR SCREENING FOR OTHER VIRAL DISEASES: ICD-10-CM

## 2021-02-27 DIAGNOSIS — Z11.59 ENCOUNTER FOR SCREENING FOR OTHER VIRAL DISEASES: ICD-10-CM

## 2021-02-27 LAB
SARS-COV-2 RNA RESP QL NAA+PROBE: NORMAL
SPECIMEN SOURCE: NORMAL

## 2021-02-27 PROCEDURE — U0003 INFECTIOUS AGENT DETECTION BY NUCLEIC ACID (DNA OR RNA); SEVERE ACUTE RESPIRATORY SYNDROME CORONAVIRUS 2 (SARS-COV-2) (CORONAVIRUS DISEASE [COVID-19]), AMPLIFIED PROBE TECHNIQUE, MAKING USE OF HIGH THROUGHPUT TECHNOLOGIES AS DESCRIBED BY CMS-2020-01-R: HCPCS | Performed by: INTERNAL MEDICINE

## 2021-02-27 PROCEDURE — U0005 INFEC AGEN DETEC AMPLI PROBE: HCPCS | Performed by: INTERNAL MEDICINE

## 2021-02-28 ENCOUNTER — HOSPITAL ENCOUNTER (INPATIENT)
Facility: CLINIC | Age: 46
LOS: 1 days | Discharge: HOME | DRG: 073 | End: 2021-03-01
Attending: INTERNAL MEDICINE | Admitting: INTERNAL MEDICINE
Payer: COMMERCIAL

## 2021-02-28 DIAGNOSIS — I43 TYPE 1 DIABETES MELLITUS WITH DIABETIC CARDIOMYOPATHY (H): ICD-10-CM

## 2021-02-28 DIAGNOSIS — E10.59 TYPE 1 DIABETES MELLITUS WITH DIABETIC CARDIOMYOPATHY (H): ICD-10-CM

## 2021-02-28 PROBLEM — R11.2 NAUSEA & VOMITING: Status: ACTIVE | Noted: 2021-02-28

## 2021-02-28 LAB
ANION GAP SERPL CALCULATED.3IONS-SCNC: 13 MMOL/L (ref 3–14)
BASE EXCESS BLDV CALC-SCNC: 4.9 MMOL/L
BUN SERPL-MCNC: 44 MG/DL (ref 7–30)
CALCIUM SERPL-MCNC: 9 MG/DL (ref 8.5–10.1)
CHLORIDE SERPL-SCNC: 92 MMOL/L (ref 94–109)
CO2 SERPL-SCNC: 29 MMOL/L (ref 20–32)
CREAT SERPL-MCNC: 7.92 MG/DL (ref 0.66–1.25)
GFR SERPL CREATININE-BSD FRML MDRD: 8 ML/MIN/{1.73_M2}
GLUCOSE BLDC GLUCOMTR-MCNC: 171 MG/DL (ref 70–99)
GLUCOSE BLDC GLUCOMTR-MCNC: 175 MG/DL (ref 70–99)
GLUCOSE BLDC GLUCOMTR-MCNC: 66 MG/DL (ref 70–99)
GLUCOSE BLDC GLUCOMTR-MCNC: 79 MG/DL (ref 70–99)
GLUCOSE SERPL-MCNC: 84 MG/DL (ref 70–99)
HCO3 BLDV-SCNC: 31 MMOL/L (ref 21–28)
LABORATORY COMMENT REPORT: NORMAL
O2/TOTAL GAS SETTING VFR VENT: ABNORMAL %
PCO2 BLDV: 49 MM HG (ref 40–50)
PH BLDV: 7.4 PH (ref 7.32–7.43)
PO2 BLDV: 49 MM HG (ref 25–47)
POTASSIUM SERPL-SCNC: 4.3 MMOL/L (ref 3.4–5.3)
SARS-COV-2 RNA RESP QL NAA+PROBE: NEGATIVE
SODIUM SERPL-SCNC: 135 MMOL/L (ref 133–144)
SPECIMEN SOURCE: NORMAL

## 2021-02-28 PROCEDURE — 82010 KETONE BODYS QUAN: CPT | Performed by: INTERNAL MEDICINE

## 2021-02-28 PROCEDURE — 999N001017 HC STATISTIC GLUCOSE BY METER IP

## 2021-02-28 PROCEDURE — 80048 BASIC METABOLIC PNL TOTAL CA: CPT | Performed by: INTERNAL MEDICINE

## 2021-02-28 PROCEDURE — 120N000011 HC R&B TRANSPLANT UMMC

## 2021-02-28 PROCEDURE — 36415 COLL VENOUS BLD VENIPUNCTURE: CPT | Performed by: INTERNAL MEDICINE

## 2021-02-28 PROCEDURE — 250N000012 HC RX MED GY IP 250 OP 636 PS 637: Performed by: INTERNAL MEDICINE

## 2021-02-28 PROCEDURE — 82803 BLOOD GASES ANY COMBINATION: CPT | Performed by: INTERNAL MEDICINE

## 2021-02-28 PROCEDURE — 250N000013 HC RX MED GY IP 250 OP 250 PS 637: Performed by: INTERNAL MEDICINE

## 2021-02-28 PROCEDURE — 99223 1ST HOSP IP/OBS HIGH 75: CPT | Mod: AI | Performed by: INTERNAL MEDICINE

## 2021-02-28 RX ORDER — NICOTINE POLACRILEX 4 MG
15-30 LOZENGE BUCCAL
Status: DISCONTINUED | OUTPATIENT
Start: 2021-02-28 | End: 2021-03-01 | Stop reason: HOSPADM

## 2021-02-28 RX ORDER — LIDOCAINE 40 MG/G
CREAM TOPICAL
Status: DISCONTINUED | OUTPATIENT
Start: 2021-02-28 | End: 2021-03-01 | Stop reason: HOSPADM

## 2021-02-28 RX ORDER — DEXTROSE MONOHYDRATE 25 G/50ML
25-50 INJECTION, SOLUTION INTRAVENOUS
Status: DISCONTINUED | OUTPATIENT
Start: 2021-02-28 | End: 2021-03-01 | Stop reason: HOSPADM

## 2021-02-28 RX ORDER — ASPIRIN 81 MG/1
81 TABLET ORAL DAILY
Status: DISCONTINUED | OUTPATIENT
Start: 2021-03-01 | End: 2021-03-01 | Stop reason: HOSPADM

## 2021-02-28 RX ORDER — PROCHLORPERAZINE 25 MG
25 SUPPOSITORY, RECTAL RECTAL EVERY 12 HOURS PRN
Status: DISCONTINUED | OUTPATIENT
Start: 2021-02-28 | End: 2021-03-01 | Stop reason: HOSPADM

## 2021-02-28 RX ORDER — CARVEDILOL 6.25 MG/1
6.25 TABLET ORAL 2 TIMES DAILY WITH MEALS
Status: DISCONTINUED | OUTPATIENT
Start: 2021-02-28 | End: 2021-03-01 | Stop reason: HOSPADM

## 2021-02-28 RX ORDER — CALCIUM CARBONATE 500 MG/1
1000 TABLET, CHEWABLE ORAL 3 TIMES DAILY
Status: DISCONTINUED | OUTPATIENT
Start: 2021-02-28 | End: 2021-03-01 | Stop reason: HOSPADM

## 2021-02-28 RX ORDER — ONDANSETRON 2 MG/ML
4 INJECTION INTRAMUSCULAR; INTRAVENOUS EVERY 6 HOURS PRN
Status: DISCONTINUED | OUTPATIENT
Start: 2021-02-28 | End: 2021-03-01 | Stop reason: HOSPADM

## 2021-02-28 RX ORDER — METOCLOPRAMIDE 5 MG/1
5 TABLET ORAL
Status: DISCONTINUED | OUTPATIENT
Start: 2021-02-28 | End: 2021-03-01 | Stop reason: HOSPADM

## 2021-02-28 RX ORDER — POLYETHYLENE GLYCOL 3350 17 G/17G
17 POWDER, FOR SOLUTION ORAL DAILY PRN
Status: DISCONTINUED | OUTPATIENT
Start: 2021-02-28 | End: 2021-03-01 | Stop reason: HOSPADM

## 2021-02-28 RX ORDER — ACETAMINOPHEN 325 MG/1
650 TABLET ORAL EVERY 4 HOURS PRN
Status: DISCONTINUED | OUTPATIENT
Start: 2021-02-28 | End: 2021-03-01 | Stop reason: HOSPADM

## 2021-02-28 RX ORDER — PROCHLORPERAZINE MALEATE 5 MG
10 TABLET ORAL EVERY 6 HOURS PRN
Status: DISCONTINUED | OUTPATIENT
Start: 2021-02-28 | End: 2021-03-01 | Stop reason: HOSPADM

## 2021-02-28 RX ORDER — ATORVASTATIN CALCIUM 20 MG/1
20 TABLET, FILM COATED ORAL EVERY EVENING
Status: DISCONTINUED | OUTPATIENT
Start: 2021-02-28 | End: 2021-03-01 | Stop reason: HOSPADM

## 2021-02-28 RX ORDER — ONDANSETRON 4 MG/1
4 TABLET, ORALLY DISINTEGRATING ORAL EVERY 6 HOURS PRN
Status: DISCONTINUED | OUTPATIENT
Start: 2021-02-28 | End: 2021-03-01 | Stop reason: HOSPADM

## 2021-02-28 RX ADMIN — METOCLOPRAMIDE 5 MG: 5 TABLET ORAL at 19:50

## 2021-02-28 RX ADMIN — INSULIN GLARGINE 24 UNITS: 100 INJECTION, SOLUTION SUBCUTANEOUS at 21:50

## 2021-02-28 RX ADMIN — ATORVASTATIN CALCIUM 20 MG: 20 TABLET, FILM COATED ORAL at 19:50

## 2021-02-28 RX ADMIN — OMEPRAZOLE 80 MG: 20 CAPSULE, DELAYED RELEASE ORAL at 19:49

## 2021-02-28 RX ADMIN — CARVEDILOL 6.25 MG: 6.25 TABLET, FILM COATED ORAL at 19:50

## 2021-02-28 ASSESSMENT — MIFFLIN-ST. JEOR: SCORE: 1682.8

## 2021-02-28 ASSESSMENT — ACTIVITIES OF DAILY LIVING (ADL): ADLS_ACUITY_SCORE: 12

## 2021-02-28 NOTE — PROGRESS NOTES
[facility]  Transfer Triage Note    Date of call: 02/28/21  Time of call: 2:20 PM    Is pandemic COVID-19 a concern? NO    Reason for transfer: Procedure can be done here and not at referring hospital   Diagnosis: DKA on patient on dialysis    Outside Records: Not available  Additional records requested to be faxed to [phone].    Stability of Patient: Patient is vitally stable, with no critical labs, and will likely remain stable throughout the transfer process  ICU: No    Expected Time of Arrival for Transfer: 0-8 hours    Arrival Location:  [address] [address] Phone: [phone]    Recommendations for Management and Stabilization: Not needed    Additional Comments   46 yo man with DM and failed renal transplant back on HD presenting to [facility] for gastroparesis. Has known diagnosis of this and usually able to get under control in the ED with IV antiemetics but failing this time. Also noted to have mild DKA with sugars in the 300s, AG of 20, and + beta hydroxybuterate on labs today.  Of note, scheduled for cath on 3/3 to finish workup to be re-listed for kidney.  Normally has HD on Tues, Thurs, Sat but was to have an extra run on this Monday (tomorrow) in anticipation of the cath; [facility] does not do HD or they likely would have admitted him there.  Given some insulin already and AG improving.  Accepted for med/surg bed  COVID done yesterday in anticipation of procedure is pending.    MD Idalia Arriola MD

## 2021-03-01 ENCOUNTER — PATIENT OUTREACH (OUTPATIENT)
Dept: CARE COORDINATION | Facility: CLINIC | Age: 46
End: 2021-03-01

## 2021-03-01 VITALS
DIASTOLIC BLOOD PRESSURE: 75 MMHG | WEIGHT: 180.9 LBS | OXYGEN SATURATION: 97 % | TEMPERATURE: 98.1 F | BODY MASS INDEX: 27.41 KG/M2 | HEART RATE: 79 BPM | HEIGHT: 68 IN | RESPIRATION RATE: 18 BRPM | SYSTOLIC BLOOD PRESSURE: 119 MMHG

## 2021-03-01 LAB
ALBUMIN SERPL-MCNC: 3.4 G/DL (ref 3.4–5)
ALP SERPL-CCNC: 199 U/L (ref 40–150)
ALT SERPL W P-5'-P-CCNC: 19 U/L (ref 0–70)
ANION GAP SERPL CALCULATED.3IONS-SCNC: 12 MMOL/L (ref 3–14)
AST SERPL W P-5'-P-CCNC: 15 U/L (ref 0–45)
BASOPHILS # BLD AUTO: 0.1 10E9/L (ref 0–0.2)
BASOPHILS NFR BLD AUTO: 1.2 %
BILIRUB SERPL-MCNC: 1.4 MG/DL (ref 0.2–1.3)
BUN SERPL-MCNC: 47 MG/DL (ref 7–30)
CALCIUM SERPL-MCNC: 9.2 MG/DL (ref 8.5–10.1)
CHLORIDE SERPL-SCNC: 90 MMOL/L (ref 94–109)
CO2 SERPL-SCNC: 31 MMOL/L (ref 20–32)
CREAT SERPL-MCNC: 8.97 MG/DL (ref 0.66–1.25)
DIFFERENTIAL METHOD BLD: ABNORMAL
EOSINOPHIL # BLD AUTO: 0.3 10E9/L (ref 0–0.7)
EOSINOPHIL NFR BLD AUTO: 4.7 %
ERYTHROCYTE [DISTWIDTH] IN BLOOD BY AUTOMATED COUNT: 15.1 % (ref 10–15)
GFR SERPL CREATININE-BSD FRML MDRD: 6 ML/MIN/{1.73_M2}
GLUCOSE BLDC GLUCOMTR-MCNC: 124 MG/DL (ref 70–99)
GLUCOSE BLDC GLUCOMTR-MCNC: 222 MG/DL (ref 70–99)
GLUCOSE SERPL-MCNC: 105 MG/DL (ref 70–99)
HCT VFR BLD AUTO: 33.2 % (ref 40–53)
HGB BLD-MCNC: 10.7 G/DL (ref 13.3–17.7)
IMM GRANULOCYTES # BLD: 0 10E9/L (ref 0–0.4)
IMM GRANULOCYTES NFR BLD: 0.6 %
INR PPP: 1.24 (ref 0.86–1.14)
LYMPHOCYTES # BLD AUTO: 1 10E9/L (ref 0.8–5.3)
LYMPHOCYTES NFR BLD AUTO: 14.3 %
MCH RBC QN AUTO: 31.2 PG (ref 26.5–33)
MCHC RBC AUTO-ENTMCNC: 32.2 G/DL (ref 31.5–36.5)
MCV RBC AUTO: 97 FL (ref 78–100)
MONOCYTES # BLD AUTO: 0.7 10E9/L (ref 0–1.3)
MONOCYTES NFR BLD AUTO: 10.5 %
NEUTROPHILS # BLD AUTO: 4.7 10E9/L (ref 1.6–8.3)
NEUTROPHILS NFR BLD AUTO: 68.7 %
NRBC # BLD AUTO: 0 10*3/UL
NRBC BLD AUTO-RTO: 0 /100
PLATELET # BLD AUTO: 239 10E9/L (ref 150–450)
POTASSIUM SERPL-SCNC: 4.5 MMOL/L (ref 3.4–5.3)
PROT SERPL-MCNC: 7 G/DL (ref 6.8–8.8)
RBC # BLD AUTO: 3.43 10E12/L (ref 4.4–5.9)
SODIUM SERPL-SCNC: 133 MMOL/L (ref 133–144)
WBC # BLD AUTO: 6.8 10E9/L (ref 4–11)

## 2021-03-01 PROCEDURE — 99238 HOSP IP/OBS DSCHRG MGMT 30/<: CPT | Performed by: STUDENT IN AN ORGANIZED HEALTH CARE EDUCATION/TRAINING PROGRAM

## 2021-03-01 PROCEDURE — 250N000013 HC RX MED GY IP 250 OP 250 PS 637: Performed by: INTERNAL MEDICINE

## 2021-03-01 PROCEDURE — 99222 1ST HOSP IP/OBS MODERATE 55: CPT | Performed by: PHYSICIAN ASSISTANT

## 2021-03-01 PROCEDURE — 250N000012 HC RX MED GY IP 250 OP 636 PS 637: Performed by: INTERNAL MEDICINE

## 2021-03-01 PROCEDURE — 80053 COMPREHEN METABOLIC PANEL: CPT | Performed by: INTERNAL MEDICINE

## 2021-03-01 PROCEDURE — 250N000012 HC RX MED GY IP 250 OP 636 PS 637: Performed by: STUDENT IN AN ORGANIZED HEALTH CARE EDUCATION/TRAINING PROGRAM

## 2021-03-01 PROCEDURE — 85025 COMPLETE CBC W/AUTO DIFF WBC: CPT | Performed by: INTERNAL MEDICINE

## 2021-03-01 PROCEDURE — 85610 PROTHROMBIN TIME: CPT | Performed by: INTERNAL MEDICINE

## 2021-03-01 PROCEDURE — 999N001017 HC STATISTIC GLUCOSE BY METER IP

## 2021-03-01 PROCEDURE — 36415 COLL VENOUS BLD VENIPUNCTURE: CPT | Performed by: INTERNAL MEDICINE

## 2021-03-01 RX ADMIN — METOCLOPRAMIDE 5 MG: 5 TABLET ORAL at 11:19

## 2021-03-01 RX ADMIN — INSULIN ASPART 1 UNITS: 100 INJECTION, SOLUTION INTRAVENOUS; SUBCUTANEOUS at 12:16

## 2021-03-01 RX ADMIN — ASPIRIN 81 MG: 81 TABLET ORAL at 07:40

## 2021-03-01 RX ADMIN — INSULIN ASPART 15 UNITS: 100 INJECTION, SOLUTION INTRAVENOUS; SUBCUTANEOUS at 08:35

## 2021-03-01 RX ADMIN — CARVEDILOL 6.25 MG: 6.25 TABLET, FILM COATED ORAL at 07:40

## 2021-03-01 RX ADMIN — METOCLOPRAMIDE 5 MG: 5 TABLET ORAL at 07:40

## 2021-03-01 RX ADMIN — INSULIN ASPART 7 UNITS: 100 INJECTION, SOLUTION INTRAVENOUS; SUBCUTANEOUS at 12:17

## 2021-03-01 ASSESSMENT — ACTIVITIES OF DAILY LIVING (ADL)
ADLS_ACUITY_SCORE: 12

## 2021-03-01 ASSESSMENT — MIFFLIN-ST. JEOR: SCORE: 1690.06

## 2021-03-01 NOTE — PROGRESS NOTES
Nursing Focus: Admission    D: Patient admitted from [facility] ED for N/V elevated blood sugar. Patient has a history of Failed kidney transplant, on dialysis.    I: Upon arrival to the unit patient was oriented to room, unit, and call light. Patient s height, weight, and vital signs were obtained. Allergies reviewed and allergy band applied. MD notified of patient s arrival on the unit. Adult AVS completed. Head to toe assessment completed. Full skin assessment completed. Education assessment completed. Care plan initiated.    A: Vital signs stable upon admission. Patient rates pain at zero. Patient s skin intact.    P: Continue to monitor patient s n/v and blood sugar and intervene as needed. Continue with plan of care. Notify MD with any concerns or changes in patient status.

## 2021-03-01 NOTE — PROGRESS NOTES
Pt's blood sugar 66 at 1846, gave  Apple juice 8 0z,  Went up to 79, next RN will check. Pt is free of s/s of low bs.

## 2021-03-01 NOTE — CONSULTS
Care Management Initial Consult    General Information  Assessment completed with: Patient, Care Team Member, -chart review,    Type of CM/SW Visit: Initial Assessment    Primary Care Provider verified and updated as needed: Yes   Readmission within the last 30 days:        Reason for Consult: care coordination/care conference, discharge planning  Advance Care Planning:            Communication Assessment  Patient's communication style: spoken language (English or Bilingual)    Hearing Difficulty or Deaf: no   Wear Glasses or Blind: no    Cognitive  Cognitive/Neuro/Behavioral: WDL                      Living Environment:   People in home: other relative(s)     Current living Arrangements: house      Able to return to prior arrangements: yes       Family/Social Support:  Care provided by: self  Provides care for: no one     Other (specify)          Description of Support System: Supportive, Involved    Support Assessment: Adequate family and caregiver support    Current Resources:   Patient receiving home care services: No     Community Resources: Dialysis Services  Equipment currently used at home: none  Supplies currently used at home: Diabetic Supplies    Employment/Financial:  Employment Status:          Financial Concerns: No concerns identified           Lifestyle & Psychosocial Needs:  Lifestyle     Physical activity     Days per week: 0 days     Minutes per session: 0 min     Stress: To some extent     Social Needs     Financial resource strain: Not very hard     Food insecurity     Worry: Never true     Inability: Never true     Transportation needs     Medical: No     Non-medical: No     Socioeconomic History     Marital status: Single     Spouse name: Not on file     Number of children: 1     Years of education: Not on file     Highest education level: Associate degree: academic program   Occupational History     Occupation:    Relationships     Social connections     Talks on phone:  More than three times a week     Gets together: Once a week     Attends Latter day service: Never     Active member of club or organization: No     Attends meetings of clubs or organizations: Never     Relationship status:      Intimate partner violence     Fear of current or ex partner: Not on file     Emotionally abused: Not on file     Physically abused: Not on file     Forced sexual activity: Not on file     Tobacco Use     Smoking status: Former Smoker     Packs/day: 1.00     Years: 20.00     Pack years: 20.00     Types: Vaping Device, Cigarettes     Quit date: 2016     Years since quittin.1     Smokeless tobacco: Never Used     Tobacco comment: E- Cig use since    Substance and Sexual Activity     Alcohol use: Not Currently     Alcohol/week: 0.0 standard drinks     Frequency: Never     Binge frequency: Never     Drug use: No     Sexual activity: Never     Partners: Female       Functional Status:  Prior to admission patient needed assistance:   Pt managed all his own care needs.           Additional Information:  Pt status reviewed/discussed during care team rounds.  Pt admitted d/t nausea and vomiting.  Pt with a complex medical history significant for DM1, ESRD s/p kidney/pancreas transplant () now failed and back on T/T/S HD (2020), CAD s/p 3V CABG (), HFrEF (EF 35-40%), recently diagnosed diabetic gastroparesis.       Met with pt.  Introduced RNCC role.  Pt notes no concerns or needs at this time.  Pt confirmed outpatient dialysis TTHSAT at [facility]. Per pt his family will be able to transport him home when cleared for discharge.  Currently no RNCC needs noted.      Outpatient Dialysis:  [facility]  [address] [place] [address]  Phone: [phone]  Fax: [phone]  Tuesday, Thursday, Saturday    Rocio Be, RN BSN, PHN, ACM-RN  7A RN Care Coordinator  Phone: [phone]  Pager [phone]    3/1/2021 12:20 PM

## 2021-03-01 NOTE — H&P
[facility]    History and Physical - Hospitalist Service, Gold Night        Date of Admission:  2/28/2021    Assessment & Plan   Murtaza Fitzgerald is a 45 year old male with medical hx DM1, ESRD s/p kidney/pancreas transplant (2008) now failed and back on T/T/S HD (02/2020), CAD s/p 3V CABG (2015), HFrEF (EF 35-40%), recently diagnosed diabetic gastroparesis who presented to an OSH ED with nausea/vomiting, inability to tolerate PO, and DKA transferred to [facility] d/t anticipated HD need and no HD capabilities at OSH.     # Nausea/Vomiting  # Chronic Diabetic Gastroparesis  Reports nausea vomiting over last 48 hours, vomiting is nonbilious nonbloody.  Did not improve with Reglan and oral Zofran and Compazine at home.  Has not been able to tolerate any p.o. for almost 48 hours.  Received IV Zofran and Compazine at [place] ED with improvement in symptoms.  Denied nausea at time of arrival to [facility] and was hoping to try to eat a bland dinner and drink some water.  Has a known diagnosis of gastroparesis, diagnosed in 2020, attributed to longstanding type 1 diabetes.  Denies any abdominal pain at time of admission.  Has had intermittent constipation and diarrhea in the past, patient states stool is looser than normal for approximately past 1 month, but are formed, last bowel movement this morning 2/28.  -Continue PTA p.o. Reglan 4 times daily  -IV plus p.o. Zofran as needed, IV plus p.o. Compazine as needed  -EKG to assess baseline QTC  -Seems that nausea vomiting is not well controlled on home regimen, may need to adjust, consider Zyprexa, Ativan, scopolamine etc.  -No indication for any stool testing at this time    # DKA - resolved  # DM1  Blood sugar in high 300s at [place] ED, initial anion gap 20, beta hydroxybutyrate 5.25.  Given these findings was treated for DKA with 1 L normal saline, 10 units insulin, 2 hours of an insulin drip at unknown rate.  Blood  sugar had improved to 170s prior to transfer so insulin drip discontinued.  First blood sugar check on arrival 66, improved with apple juice.  Repeat BMP with normal bicarb, normal anion gap, beta hydroxybutyrate pending.  Venous blood gas obtained on arrival to [facility] with normal pH.  No longer in DKA, suspect precipitated by nausea vomiting and poor p.o. intake as well as missed Lantus dosing and evening 2/27.  -Resume prior to admission Lantus 24 units nightly -given DM 1, patient needs basal insulin even when not taking p.o.  -Resume prior to admission NovoLog sliding scale, patient is on 1 unit per 20 greater than 140, specialized scale entered every 4 hours overnight, if blood sugar stabilized can go to before every meal plus at bedtime in a.m.    # S/p kidney/pancreas transplant (2008) - now failed  # ESRD on HD T/Th/Sat (since 2/2020)   Patient undergoing work-up for repeat transplant, due for cardiac catheterization on Wednesday, March 3.  Plan had been to undergo ultrafiltration on Monday, scheduled HD on Tuesday, followed by cath on Wednesday.  Patient transferred to [facility] for HD capabilities.  Patient's weight 81.3 kg on arrival, actually less than typical dry weight, unclear whether he will actually need UF tomorrow, defer to nephrology  -Nephrology consult placed for possible UF tomorrow    # Chronic HFrEF  # Hx CAD s/p 3V CABG (2015)   # HTN  Denies any symptoms of decompensated heart failure, weight is actually under typical dry weight.  No chest pain or shortness of breath.  Planning for cardiac catheterization as part of his transplant work-up as above on Wednesday, March 3.  Blood pressure high, 170s over 80s, at outside hospital ED, returned to baseline by time of transfer to [facility]  -Continue PTA aspirin 81 mg daily, atorvastatin 20 mg daily, Coreg 6.25 mg twice daily    # Chronic Anemia  Hemoglobin 12.6 on admission, consistent with baseline, attributed to anemia of chronic disease plus renal  failure.    # GERD  -Continue PTA omeprazole 80 mg nightly    # HLD  -Continue PTA atorvastatin 20 mg daily as above     # Suspected KRISSY   Patient suspected to have obstructive sleep apnea, has been attempting to get outpatient sleep study but has been repeatedly need to be rescheduled due to hospitalizations or insurance issues patient does not want to use CPAP while hospitalized given anticipated short stay and that he cannot use it as an outpatient.  -If prolonged stay, consider RT consult for CPAP overnight     Diet: Dialysis Diet    DVT Prophylaxis: Low Risk/Ambulatory with no VTE prophylaxis indicated  Mueller Catheter: not present  Code Status: Full Code           Disposition Plan   Expected discharge: 2 - 3 days, recommended to prior living arrangement once nausea/vomiting controlled with PO meds, BG under control, ad HD plan determined.  Entered: Carmina Arnold MD 02/28/2021, 6:11 PM     The patient's care was discussed with the Bedside Nurse and Patient.    Carmina Arnold MD  [facility]  Contact information available via [facility] Paging/Directory  Please see sign in/sign out for up to date coverage information    ______________________________________________________________________    Chief Complaint   Nausea/Vomiting     History is obtained from the patient    History of Present Illness   Murtaza Fitzgerald is a 45 year old male with medical hx DM1, ESRD s/p kidney/pancreas transplant (2008) now failed and back on T/T/S HD (02/2020), CAD s/p 3V CABG (2015), HFrEF (EF 35-40%), recently diagnosed diabetic gastroparesis who presented to an OSH ED with nausea/vomiting, inability to tolerate PO, and DKA transferred to [facility] d/t anticipated HD need and no HD capabilities at OSH.     The patient states he has been in his usual state of health until about 48 hours ago when he developed worsening nausea vomiting that he was unable to control with oral  Zofran and Compazine at home. He has a known diagnosis of gastroparesis, attributed to longstanding type 1 diabetes, he has had intermittent periods of nausea vomiting that he can typically control with oral antiemetics at home for a dose of IV antibiotics in the ED.  However, this time he was not unable to get control of his nausea and vomiting at home and due to this has been  unable to take any p.o. for 48 hours and presented to the [place] emergency department.  In the emergency department care, the patient continued to have nausea and several episodes of nonbilious nonbloody emesis, despite IV Zofran and Compazine.  Additionally, while in the [place] ED, he was noted to have an initial blood pressure of 170/86, and a blood sugar in the high 300s.  He was also noted to have an anion gap of 20 and beta hydroxybutyrate over 5, a venous blood gas was not obtained so unclear if pH was abnormal.  Patient notes to me that due to feeling unwell, he fell asleep last night without taking his regular Lantus, and has not had any Lantus since Friday night.  Has had only minimal NovoLog over the last 48 hours as he has not eaten much and has felt too sick.  Of note, the patient is being worked up for renal transplant and is due to undergo cardiac catheterization in setting of reduced ejection fraction heart failure and known CAD on Wednesday 3/3.  The plan had been for the patient to undergo ultrafiltration only on Monday followed by regularly scheduled HD on Tuesday to optimize fluid status for cardiac catheterization on Wednesday.    In the emergency department [place], patient received 1 L normal saline, IV Zofran, IV Compazine, 10 units of regular insulin.  Shortly thereafter, due to elevated beta hydroxybutyrate, patient was started on an insulin drip and was on the drip for about 2 hours, read unknown.  The drip was stopped prior to transfer with improvement of blood sugar into the low 200s.  He is not on  an insulin drip at the time of his arrival to [facility].  He was primarily transferred to [facility] because of anticipated need for UF on Monday per prior plan as above.  They do not have HD or UF capacity at [facility].    At time of arrival, patient states that nausea is now under better control, has not vomited since before leaving the [place] ED.  States that his nausea is well enough that he would like to try eating a bland dinner and drinking some water.  Of note, on arrival, patient's weight is 81.3 kg, which patient notes is actually under his dry weight and so is not sure that he actually will need UF tomorrow.     Review of Systems    The 10 point Review of Systems is negative other than noted in the HPI or here.     Past Medical History    I have reviewed this patient's medical history and updated it with pertinent information if needed.   Past Medical History:   Diagnosis Date     CMV (cytomegalovirus infection) status negative 2011     Coronary artery disease, non-occlusive 2008    angio showed diffuse disease with no lesions     Diabetes mellitus, type 1     Diagnosed at age 11. 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  Past Surgical History:   Procedure Laterality Date     ABDOMEN SURGERY       ARTHROSCOPY KNEE WITH LATERAL MENISCECTOMY Left 7/10/2019    Procedure:  Left knee arthroscopic partial lateral meniscectomy;  Surgeon: Alex Candelaria MD;  Location: [facility] OR     BIOPSY      [facility]      BYPASS GRAFT ARTERY CORONARY N/A 2015    Procedure: BYPASS GRAFT ARTERY CORONARY;  Surgeon: Katy Neville MD;  Location: [facility] OR     CORONARY ANGIOGRAPHY ADULT ORDER      2015     CREATE FISTULA ARTERIOVENOUS UPPER EXTREMITY Left 10/7/2020    Procedure: CREATION, ARTERIOVENOUS GRAFT placement LEFT UPPER EXTREMITY   with intraoperative ultrasound;  Surgeon: Melissa Guzman MD;  Location: [facility] OR     ESOPHAGOSCOPY, GASTROSCOPY, DUODENOSCOPY (EGD), COMBINED N/A 2020    Procedure: ESOPHAGOGASTRODUODENOSCOPY (EGD) biopsies w/forceps;  Surgeon: Emre Gomes MD;  Location:  GI     EYE SURGERY      vitrectomy both eyes      IR CVC TUNNEL PLACEMENT > 5 YRS OF AGE  2020     IR CVC TUNNEL REMOVAL RIGHT  2020     ORTHOPEDIC SURGERY  1993    tumor removed from left knee     TRANSPLANT  .    pancrease and kidney transplant       Social History   I have reviewed this patient's social history and updated it with pertinent information if needed.  Social History     Tobacco Use     Smoking status: Former Smoker     Packs/day: 1.00     Years: 20.00     Pack years: 20.00     Types: Vaping Device, Cigarettes     Quit date: 2016     Years since quittin.1     Smokeless tobacco: Never Used     Tobacco comment: E- Cig use since    Substance Use Topics     Alcohol use: Not Currently     Alcohol/week: 0.0 standard drinks     Frequency: Never     Binge frequency: Never     Drug use: No       Family History   I have reviewed this patient's family history and updated it with pertinent information if needed.  Family History   Problem Relation Age of Onset     Unknown/Adopted Father      Heart Disease Maternal Grandfather 62     Melanoma No family hx of      Skin Cancer No family hx of        Prior to Admission Medications   Prior to Admission Medications    Prescriptions Last Dose Informant Patient Reported? Taking?   acetaminophen (TYLENOL) 325 MG tablet  Self No No   Sig: Take 2 tablets (650 mg) by mouth every 4 hours as needed for mild pain   acetone, Urine, test STRP  Self No No   Si strip by In Vitro route daily   Patient not taking: Reported on 2020   aspirin (ASA) 81 MG EC tablet   No No   Sig: Take 1 tablet (81 mg) by mouth daily   atorvastatin (LIPITOR) 20 MG tablet   No No   Sig: Take 1 tablet (20 mg) by mouth daily   blood glucose (NO BRAND SPECIFIED) test strip  Self No No   Sig: Use to test blood sugar 5 times daily or as directed.   calcium carbonate (TUMS) 500 MG chewable tablet  Self Yes No   Sig: Take 2 chew tab by mouth 3 times daily    carvedilol (COREG) 6.25 MG tablet  Self No No   Sig: Take 1 tablet (6.25 mg) by mouth 2 times daily (with meals)   insulin aspart (NOVOLOG FLEXPEN) 100 UNIT/ML pen   No No   Sig: Sliding scale = 1 unit for every 20 over blood glucose of 140, average 45 units/day   insulin glargine (LANTUS SOLOSTAR) 100 UNIT/ML pen  Self No No   Sig: Inject 24 Units Subcutaneous At Bedtime   insulin pen needle (BD ARPITA U/F) 32G X 4 MM miscellaneous  Self No No   Sig: Use 4 daily or as directed.   metoclopramide (REGLAN) 5 MG tablet   No No   Sig: Take 1 tablet (5 mg) by mouth 4 times daily (before meals and nightly)   naproxen sodium 220 MG capsule  Self Yes No   Sig: Take 220 mg by mouth as needed    omeprazole (PRILOSEC) 40 MG DR capsule   No No   Sig: Take 2 capsules (80 mg) by mouth every evening   ondansetron (ZOFRAN-ODT) 4 MG ODT tab  Self No No   Sig: Take 1 tablet (4 mg) by mouth every 8 hours as needed for nausea   prochlorperazine (COMPAZINE) 5 MG tablet  Self No No   Sig: Take 1 tablet (5 mg) by mouth every 8 hours as needed for nausea   traZODone (DESYREL) 50 MG tablet   No No   Sig: Take 1 tablet (50 mg) by mouth nightly as needed for sleep      Facility-Administered Medications: None     Allergies   Allergies    Allergen Reactions     Diphenhydramine Other (See Comments)     Restless legs     Metoclopramide      Other reaction(s): Confusion, Unknown     Reglan Other (See Comments)     Reaction only to IV formulation -->delsuions       Physical Exam   Vital Signs: Temp: 99  F (37.2  C) Temp src: Oral BP: 100/64 Pulse: 93   Resp: 20 SpO2: 96 % O2 Device: None (Room air)    Weight: 179 lbs 4.8 oz    General: AAOx3, NAD, sitting up in bed, conversing easily   HEENT: NC/AT, MMM, oropharynx clear, anicteric sclera, conjunctiva normal, no adenopathy  CV: RRR, normal S1S2, no murmur, clicks, rubs appreciated  Resp: Non-labored respirations, CTAB, good air movement, no wheezes, rhonchi  Abd: Soft, slightly distended, non-tender, normoactive bs, no HSM, no masses appreciated  Extremities: wwp,  no pedal edema  Skin: No obvious rashes or lesions  Neuro: A/Ox3, CN2-12 intact, No lateralizing symptoms or focal neurologic deficits  Psych: Appropriate mood    Data   Data reviewed today: I reviewed all medications, new labs and imaging results over the last 24 hours.     Recent Labs   Lab 02/28/21  1930      POTASSIUM 4.3   CHLORIDE 92*   CO2 29   BUN 44*   CR 7.92*   ANIONGAP 13   CHARLY 9.0   GLC 84     No results found for this or any previous visit (from the past 24 hour(s)).

## 2021-03-01 NOTE — PROGRESS NOTES
"/75 (BP Location: Right arm)   Pulse 79   Temp 98.1  F (36.7  C) (Oral)   Resp 18   Ht 1.727 m (5' 8\")   Wt 82.1 kg (180 lb 14.4 oz)   SpO2 97%   BMI 27.51 kg/m       [id number]. VSS, Pt reported no pain/ nausea/vomting.   DISCHARGE:  Patient with orders to discharge to home.   Discharge instructions, medications & follow ups reviewed. Copy of discharge summary given to pt. Right PIV removed. Patient in stable condition. AVSS. PT had no further questions regarding discharge instructions and medications. Plan for routine dialysis tomorrow.    "

## 2021-03-01 NOTE — CONSULTS
Nephrology Initial Consult  March 1, 2021      Murtaza Fitzgerald MRN:[id number] YOB: 1975  Date of Admission:2/28/2021  Primary care provider: Kt Ríos  Requesting physician: Carmina Arnold MD    ASSESSMENT AND RECOMMENDATIONS:   Murtaza Fitzgerald is a 45 year old male with medical hx DM1, ESRD s/p kidney/pancreas transplant (2008) now failed and back on T/T/S HD (02/2020), CAD s/p 3V CABG (2015), HFrEF (EF 35-40%), recently diagnosed diabetic gastroparesis who presented to an OSH ED with nausea/vomiting, inability to tolerate PO, and DKA transferred to [facility] d/t anticipated HD need and no HD capabilities at OSH.      ESKD: dialyzes TTS at [facility] with Dr. Schneider. EDW 80.5 kg. Run time 3 hrs. Access: LUE AVG  - No plan for UF run today, pt is just over EDW and should achieve EDW post run tomorrow per his usual TTS schedule  - Being worked up for transplant with angiogram planned for Wed 3/3    Volume: EDW 80.5 kg  - Just over EDW    BP: normotensive.        Recommendations were communicated to primary team via this note       Maddy Vernon PA-C   [phone]      REASON FOR CONSULT: ESKD/dialysis    HISTORY OF PRESENT ILLNESS:  Murtaza Fitzgerald is a 45 year old male with medical hx DM1, ESRD s/p kidney/pancreas transplant (2008) now failed and back on T/T/S HD (02/2020), CAD s/p 3V CABG (2015), HFrEF (EF 35-40%), recently diagnosed diabetic gastroparesis who presented to an OSH ED with nausea/vomiting, inability to tolerate PO, and DKA transferred to [facility] d/t anticipated HD need and no HD capabilities at OSH. Patient was seen bedside today. He is just over EDW due to vomiting for the last several days. He is feeling back to his baseline and is anxious to go home. He will dialyze tomorrow at his [facility] unit. He denies current n/v, CP, SOB, chills    PAST MEDICAL HISTORY:  Reviewed with patient on 03/01/2021     Past Medical History:   Diagnosis  Date     CMV (cytomegalovirus infection) status negative 2011     Coronary artery disease, non-occlusive 2008    angio showed diffuse disease with no lesions     Diabetes mellitus, type 1     Diagnosed at age 11. 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     Noncompliance with treatment     no labs for one year     Pancreas replaced by transplant (H) Feb 2009    rejection treated with thymo     Pancreatic disease     pancreas transplant     Tobacco abuse ongoing       Past Surgical History:   Procedure Laterality Date     ABDOMEN SURGERY       ARTHROSCOPY KNEE WITH LATERAL MENISCECTOMY Left 7/10/2019    Procedure: Left knee arthroscopic partial lateral meniscectomy;  Surgeon: Alex Candelaria MD;  Location: [facility] OR     BIOPSY      [facility] 2009     BYPASS GRAFT ARTERY CORONARY N/A 8/5/2015    Procedure: BYPASS GRAFT ARTERY CORONARY;  Surgeon: Katy Neville MD;  Location: [facility] OR     CORONARY ANGIOGRAPHY ADULT ORDER      2015     CREATE FISTULA ARTERIOVENOUS UPPER EXTREMITY Left 10/7/2020    Procedure: CREATION, ARTERIOVENOUS GRAFT placement LEFT UPPER EXTREMITY   with intraoperative ultrasound;  Surgeon: Melissa Guzman MD;  Location: [facility] OR     ESOPHAGOSCOPY, GASTROSCOPY, DUODENOSCOPY (EGD), COMBINED N/A 1/28/2020    Procedure: ESOPHAGOGASTRODUODENOSCOPY (EGD) biopsies w/forceps;  Surgeon: Emre Gomes MD;  Location:  GI     EYE SURGERY      vitrectomy both eyes      IR CVC TUNNEL PLACEMENT > 5 YRS OF AGE  2/13/2020     IR CVC TUNNEL REMOVAL RIGHT  12/7/2020     ORTHOPEDIC SURGERY  1993    tumor removed from left knee     TRANSPLANT  2009.2008    pancrease and kidney transplant        MEDICATIONS:  PTA  Meds  Prior to Admission medications    Medication Sig Last Dose Taking? Auth Provider   aspirin (ASA) 81 MG EC tablet Take 1 tablet (81 mg) by mouth daily Past Week at 2/26/21 Yes Kt Ríos MD   atorvastatin (LIPITOR) 20 MG tablet Take 1 tablet (20 mg) by mouth daily Past Week at 2/26/21 Yes Kt Ríos MD   carvedilol (COREG) 6.25 MG tablet Take 1 tablet (6.25 mg) by mouth 2 times daily (with meals) Past Week at 2/26/21 Yes Michelle Schneider MD   insulin aspart (NOVOLOG FLEXPEN) 100 UNIT/ML pen Sliding scale = 1 unit for every 20 over blood glucose of 140, average 45 units/day  Patient taking differently: Sliding scale = 1 unit for every 20 over blood glucose of 140, average 45 units/day  Sliding scale = 1 unit for every 6 carbs with meals Past Week at 2/27/21 Yes Kt Ríos MD   insulin glargine (LANTUS SOLOSTAR) 100 UNIT/ML pen Inject 24 Units Subcutaneous At Bedtime Past Week at 2/26/21 Yes Kt Ríos MD   metoclopramide (REGLAN) 5 MG tablet Take 1 tablet (5 mg) by mouth 4 times daily (before meals and nightly) Past Week at 2/26/21 Yes Kt Ríos MD   naproxen sodium 220 MG capsule Take 220 mg by mouth as needed  Past Week at Unknown time Yes Reported, Patient   omeprazole (PRILOSEC) 40 MG DR capsule Take 2 capsules (80 mg) by mouth every evening Past Week at 2/26/21 Yes Kt Ríos MD   traZODone (DESYREL) 50 MG tablet Take 1 tablet (50 mg) by mouth nightly as needed for sleep Past Month at not currently taking Yes Kt Ríos MD   acetone, Urine, test STRP 1 strip by In Vitro route daily  Patient not taking: Reported on 2/26/2020 Unknown at Unknown time  Marcie Lozano APRN CNP   blood glucose (NO BRAND SPECIFIED) test strip Use to test blood sugar 5 times daily or as directed. Unknown at Unknown time  Patricia Costa MD   insulin pen needle (BD ARPITA U/F) 32G X 4 MM miscellaneous Use 4 daily or as directed. Unknown at Unknown time  Patricia Costa MD       Current Meds    aspirin  81 mg Oral Daily     atorvastatin  20 mg Oral QPM     calcium carbonate  1,000 mg Oral TID     carvedilol  6.25 mg Oral BID w/meals     insulin aspart   Subcutaneous TID AC     insulin aspart  1 Units Subcutaneous Q4H     insulin glargine  24 Units Subcutaneous At Bedtime     metoclopramide  5 mg Oral 4x Daily AC & HS     omeprazole  80 mg Oral QPM     Infusion Meds      ALLERGIES:    Allergies   Allergen Reactions     Diphenhydramine Other (See Comments)     Restless legs     Metoclopramide      Other reaction(s): Confusion, Unknown     Reglan Other (See Comments)     Reaction only to IV formulation -->delsuions       REVIEW OF SYSTEMS:  A comprehensive of systems was negative except as noted above.    SOCIAL HISTORY:   Social History     Socioeconomic History     Marital status: Single     Spouse name: Not on file     Number of children: 1     Years of education: Not on file     Highest education level: Associate degree: academic program   Occupational History     Occupation:    Social Needs     Financial resource strain: Not very hard     Food insecurity     Worry: Never true     Inability: Never true     Transportation needs     Medical: No     Non-medical: No   Tobacco Use     Smoking status: Former Smoker     Packs/day: 1.00     Years: 20.00     Pack years: 20.00     Types: Vaping Device, Cigarettes     Quit date: 2016     Years since quittin.1     Smokeless tobacco: Never Used     Tobacco comment: E- Cig use since 2016   Substance and Sexual Activity     Alcohol use: Not Currently     Alcohol/week: 0.0 standard drinks     Frequency: Never     Binge frequency: Never     Drug use: No     Sexual activity: Never     Partners: Female   Lifestyle     Physical activity     Days per week: 0 days     Minutes per session: 0 min     Stress: To some extent   Relationships     Social connections     Talks on phone: More than three times a week     Gets together: Once a  "week     Attends [employer] service: Never     Active member of club or organization: No     Attends meetings of clubs or organizations: Never     Relationship status:      Intimate partner violence     Fear of current or ex partner: Not on file     Emotionally abused: Not on file     Physically abused: Not on file     Forced sexual activity: Not on file   Other Topics Concern     Parent/sibling w/ CABG, MI or angioplasty before 65F 55M? No      Service Not Asked     Blood Transfusions No     Comment: possibly during surgery, otherwise none.     Caffeine Concern Not Asked     Occupational Exposure Not Asked     Hobby Hazards Not Asked     Sleep Concern Not Asked     Stress Concern Not Asked     Weight Concern Not Asked     Special Diet Not Asked     Back Care Not Asked     Exercise Not Asked     Bike Helmet Not Asked     Seat Belt Yes     Self-Exams Not Asked   Social History Narrative     Not on file     Reviewed with patient    FAMILY MEDICAL HISTORY:   Family History   Problem Relation Age of Onset     Unknown/Adopted Father      Heart Disease Maternal Grandfather 62     Melanoma No family hx of      Skin Cancer No family hx of      Reviewed with patient     PHYSICAL EXAM:   Temp  Av.5  F (36.9  C)  Min: 98.1  F (36.7  C)  Max: 99  F (37.2  C)      Pulse  Av.2  Min: 79  Max: 93 Resp  Av.3  Min: 18  Max: 20  SpO2  Av.3 %  Min: 90 %  Max: 97 %       /75 (BP Location: Right arm)   Pulse 79   Temp 98.1  F (36.7  C) (Oral)   Resp 18   Ht 1.727 m (5' 8\")   Wt 82.1 kg (180 lb 14.4 oz)   SpO2 97%   BMI 27.51 kg/m        Admit Weight: 81.3 kg (179 lb 4.8 oz)     GENERAL APPEARANCE: alert, NAD  EYES: no scleral icterus, pupils equal  Pulmonary: lungs clear to auscultation with equal breath sounds bilaterally  CV: regular rhythm, normal rate, no rub   - Edema mild facial  GI: soft, nontender, normal bowel sounds  MS: no evidence of inflammation in joints, no muscle " tenderness  : no schultz  SKIN: no rash, warm, dry, no cyanosis  NEURO: face symmetric, a/o  Access: AVG    LABS:   CMP  Recent Labs   Lab 03/01/21  0616 02/28/21  1930    135   POTASSIUM 4.5 4.3   CHLORIDE 90* 92*   CO2 31 29   ANIONGAP 12 13   * 84   BUN 47* 44*   CR 8.97* 7.92*   GFRESTIMATED 6* 8*   GFRESTBLACK 7* 9*   CHARLY 9.2 9.0   PROTTOTAL 7.0  --    ALBUMIN 3.4  --    BILITOTAL 1.4*  --    ALKPHOS 199*  --    AST 15  --    ALT 19  --      CBC  Recent Labs   Lab 03/01/21  0616   HGB 10.7*   WBC 6.8   RBC 3.43*   HCT 33.2*   MCV 97   MCH 31.2   MCHC 32.2   RDW 15.1*        INR  Recent Labs   Lab 03/01/21  0616   INR 1.24*     ABG  Recent Labs   Lab 02/28/21  1930   O2PER 21%      URINE STUDIES  Recent Labs   Lab Test 02/11/20  0850 01/27/20  0941 01/03/20  1406 08/28/19  0905   COLOR Yellow Straw Light Yellow Yellow   APPEARANCE Clear Clear Clear Clear   URINEGLC 100* >1000* 30* Negative   URINEBILI Negative Negative Negative Negative   URINEKETONE Negative Trace* 10* Negative   SG 1.020 1.011 1.017 1.015   UBLD Trace* Trace* Trace* Small*   URINEPH 7.0 6.5 6.5 7.0   PROTEIN >=300* 300* 300* 100*   UROBILINOGEN 0.2  --   --  0.2   NITRITE Negative Negative Negative Negative   LEUKEST Negative Negative Negative Negative   RBCU 2-5* 2 3* 5-10*   WBCU 0 - 5 2 1 5-10*     Recent Labs   Lab Test 08/22/19  0857 01/11/19  0741 07/08/15  0747 01/28/15  0749 12/27/13  0736 11/11/13  1436   UTPG 2.20* 0.87* 0.28* 0.43* 0.31* 0.30*     PTH  No lab results found.  IRON STUDIES  Recent Labs   Lab Test 02/13/20  0720   AUBRIE 106       IMAGING:  Reviewed    Maddy Vernon PA-C

## 2021-03-01 NOTE — PROGRESS NOTES
Admitted/transferred from:  [place] [facility]   2 RN   skin assessment completed by Emerald Chavis, RN and Idalia Raymond RN  Skin assessment finding: Scares, no open areas.  Interventions/actions: None.     Will continue to monitor.

## 2021-03-01 NOTE — UTILIZATION REVIEW
Admission Status; Secondary Review Determination    Under the authority of the Utilization Management Committee, the utilization review process indicated a secondary review on the above patient. The review outcome is based on review of the medical records, discussions with staff, and applying clinical experience noted on the date of the review.    (x) Inpatient Status Appropriate - This patient's medical care is consistent with medical management for inpatient care and reasonable inpatient medical practice.    RATIONALE FOR DETERMINATION: 45-year-old male with medical hx DM1, ESRD s/p kidney/pancreas transplant (2008) now failed and back on T/T/S HD (02/2020), CAD s/p 3V CABG (2015), HFrEF (EF 35-40%), recently diagnosed diabetic gastroparesis who presented to an OSH ED with nausea/vomiting, inability to tolerate PO > 48 hours with associated early diabetic ketoacidosis.  Patient will require antiemetics, and careful IV fluids with history of ischemic cardiomyopathy and end-stage renal disease with expectation of greater than 2 nights in the hospital prior to adequate resolution for safe discharge.    At the time of admission with the information available to the attending physician more than 2 nights Hospital complex care was anticipated, based on patient risk of adverse outcome if treated as outpatient and complex care required. Inpatient admission is appropriate based on the Medicare guidelines.    This document was produced using voice recognition software    The information on this document is developed by the utilization review team in order for the business office to ensure compliance. This only denotes the appropriateness of proper admission status and does not reflect the quality of care rendered.    The definitions of Inpatient Status and Observation Status used in making the determination above are those provided in the CMS Coverage Manual, Chapter 1 and Chapter 6, section 70.4.    Sincerely,    Ellis  MD Natalie  Utilization Review  Physician Advisor  [facility].

## 2021-03-01 NOTE — PHARMACY-ADMISSION MEDICATION HISTORY
Admission medication history interview status for the 2/28/2021 admission is complete. See Epic admission navigator for allergy information, pharmacy, prior to admission medications and immunization status.     Medication history interview sources:  Patient and surescripts    Changes made to PTA medication list (reason)  Added: none  Deleted: acetaminophen, calcium carbonate (Tums), ondansetron, prochlorperazine  Changed: None    Additional medication history information (including reliability of information, actions taken by pharmacist):  Has not been taking the trazodone recently but wanted to keep it on his medication list.  Tyrell was an excellent historian and knew his medications well.      Prior to Admission medications    Medication Sig Last Dose Taking? Auth Provider   aspirin (ASA) 81 MG EC tablet Take 1 tablet (81 mg) by mouth daily Past Week at 2/26/21 Yes Kt Ríos MD   atorvastatin (LIPITOR) 20 MG tablet Take 1 tablet (20 mg) by mouth daily Past Week at 2/26/21 Yes tK Ríos MD   carvedilol (COREG) 6.25 MG tablet Take 1 tablet (6.25 mg) by mouth 2 times daily (with meals) Past Week at 2/26/21 Yes Michelle Schneider MD   insulin aspart (NOVOLOG FLEXPEN) 100 UNIT/ML pen Sliding scale = 1 unit for every 20 over blood glucose of 140, average 45 units/day  Patient taking differently: Sliding scale = 1 unit for every 20 over blood glucose of 140, average 45 units/day  Sliding scale = 1 unit for every 6 carbs with meals Past Week at 2/27/21 Yes Kt Ríos MD   insulin glargine (LANTUS SOLOSTAR) 100 UNIT/ML pen Inject 24 Units Subcutaneous At Bedtime Past Week at 2/26/21 Yes Kt Ríos MD   metoclopramide (REGLAN) 5 MG tablet Take 1 tablet (5 mg) by mouth 4 times daily (before meals and nightly) Past Week at 2/26/21 Yes Kt Ríos MD   naproxen sodium 220 MG capsule Take 220 mg by mouth as needed  Past Week at Unknown time Yes Reported, Patient   omeprazole (PRILOSEC) 40 MG DR capsule  Take 2 capsules (80 mg) by mouth every evening Past Week at 2/26/21 Yes Kt Ríos MD   traZODone (DESYREL) 50 MG tablet Take 1 tablet (50 mg) by mouth nightly as needed for sleep Past Month at not currently taking Yes Kt Ríos MD   acetone, Urine, test STRP 1 strip by In Vitro route daily  Patient not taking: Reported on 2/26/2020 Unknown at Unknown time  Marcie Lozano APRN CNP   blood glucose (NO BRAND SPECIFIED) test strip Use to test blood sugar 5 times daily or as directed. Unknown at Unknown time  Patricia Costa MD   insulin pen needle (BD ARPITA U/F) 32G X 4 MM miscellaneous Use 4 daily or as directed. Unknown at Unknown time  Patricia Costa MD         Medication history completed by: Jose G Lozano [facility] on 3/1/2021 at 10:33 AM

## 2021-03-01 NOTE — PLAN OF CARE
"/49 (BP Location: Right arm)   Pulse 87   Temp 98.5  F (36.9  C) (Oral)   Resp 18   Ht 1.727 m (5' 8\")   Wt 81.3 kg (179 lb 4.8 oz)   SpO2 97%   BMI 27.26 kg/m        [id number]  Soft Bps, OVSS on RA (q8 checks).  A+Ox4, flat affect. Denies pain or nausea. On scheduled Reglan 4 times daily and nightly. BG q4, 171, 175 and 124. Dialysis diet. R PIV SL (from OSH). L fistula, +thrill and bruit. Oliguric, per pt makes dribbles of urine/day. LBM 2/28. Skin intact. Up ad mikal/SBA. Scheduled for cardiac cath as OP on 3/3 to finish workup to be re-listed for kidney. Possible plan for UF today and HD today. Nephrology consulted. Will continue to monitor and notify team with changes.   "

## 2021-03-02 ENCOUNTER — TELEPHONE (OUTPATIENT)
Dept: CARDIOLOGY | Facility: CLINIC | Age: 46
End: 2021-03-02

## 2021-03-02 LAB — B-OH-BUTYR SERPL-MCNC: 28.3 MG/DL (ref 0–3)

## 2021-03-02 NOTE — DISCHARGE SUMMARY
[facility]  Hospitalist Discharge Summary      Date of Admission:  2/28/2021  Date of Discharge:  3/1/2021  1:48 PM  Discharging Provider: Sammy Rivero  Discharge Team: Hospitalist Service, Gold 2    Discharge Diagnoses   # ESRD on HD T/Th/Sat, hx of failed kidney/pancreas txp (2008) undergoing evaluation for kidney transplant  # Nausea and vomiting 2/2 chronic diabetic gastroparesis, resolved  # DKA 2/2 DM1 and missing insulin dose, resolved  # Chronic HFrEF  # CAD s/p 3v CABG (2015)  # HTN    Follow-ups Needed After Discharge   Follow-up Appointments     Adult [facility] Follow-up and recommended labs and tests      Follow up with primary care provider, Kt Ríos, within 7 days for   hospital follow- up.      Appointments on [place] and/or [facility] (with [facility] or [facility]   provider or service). Call [phone] if you haven't heard regarding   these appointments within 7 days of discharge.             Unresulted Labs Ordered in the Past 30 Days of this Admission     Date and Time Order Name Status Description    2/28/2021 1910 Beta hydroxybutyrate level In process           Discharge Disposition   Discharged to home  Condition at discharge: Stable    Hospital Course   Patient was transferred from [facility] to [facility] due to anticipated HD need and no HD capabilities at [place]. Patient is undergoing evaluation for kidney transplant.      # Nausea and vomiting 2/2 chronic diabetic gastroparesis, resolved  Patient had 2 days of nausea, vomiting that did not resolve with antiemetics at home. He has a history of longstanding gastroparesis. In the ED at [place], he was treated with IV antiemetics and IVFs. By time of transfer to [facility], symptoms resolved. He ate breakfast with no problems. He stated antiemetics do not work for him and he declined trying different medications and wished to discharge home.     # DKA 2/2 DM1 and missing insulin dose,  resolved  Due to above symptoms, he missed taking Lantus for the last 2 days prior to admission. He only took minimal Novolog as well as he had not eaten anything and was feeling too ill. He presented to [place] in mild DKA, resolved with 2 hours of insulin gtt and some IVFs. He arrived to [facility] without insulin gtt and not in DKA. By time of discharge, he was stable on home regimen of insulin. He did ask for refills of test strips which I sent to his pharmacy.    # ESRD on HD T/Th/Sat, hx of failed kidney/pancreas txp (2008) undergoing evaluation for repeat transplant  Patient undergoing transplant evaluation. Plan for cardiac cath on 3/3. Outpatient plan was for extra UF run on 3/1 prior to regular dialysis on 3/2, in preparation for heart cath on 3/3. This is the reason he was transferred to [facility]. However, his EDW is 80.5 kg and admission weight was 81.3 kg. Likely he lost significant fluid during his 2 days of vomiting. He was seen by Nephrology and deemed to have no need for an extra UF run 3/1. Plan to resume regular dialysis schedule 3/2.     # Chronic HFrEF, stable    # CAD s/p 3v CABG (2015). Repeat cardiac cath on 3/3.    # HTN, stable. He was hypertensive on arrival to [place] but normotensive throughout his admission here.     Consultations This Hospital Stay   NEPHROLOGY ESRD ADULT IP CONSULT  CARE MANAGEMENT / SOCIAL WORK IP CONSULT  MEDICATION HISTORY IP PHARMACY CONSULT    Code Status   Full Code    Time Spent on this Encounter   I, Sammy Rivero, personally saw the patient today and spent less than or equal to 30 minutes discharging this patient.       Sammy Rivero MD (Sally)  Internal Medicine/Pediatrics  [place] Hospitalist  [facility] [address]  [address] [place] [id number]  Phone: [phone]  ______________________________________________________________________    Physical Exam   Vital Signs: Temp: 98.1  F (36.7  C) Temp src: Oral BP: 119/75 Pulse: 79    Resp: 18 SpO2: 97 % O2 Device: None (Room air)    Weight: 180 lbs 14.4 oz    Physical Exam  General: awake, alert, in no acute distress  HEENT: NCAT, EOMI, sclera anicteric, no nasal discharge, MMM  CV: RRR, no murmurs noted  Resp: CTAB, no increased WOB  Abd: Soft, nontender, nondistended, +BS, no rebound or guarding  MSK: some peripheral edema, extremities warm and well perfused  Skin: warm, dry, no jaundice  Neuro: CN II-XII grossly intact. No focal deficits. Alert and oriented x4.         Primary Care Physician   Kt Ríos    Discharge Orders      Reason for your hospital stay    You were transferred from [place] to [facility] to consider extra dialysis prior to heart cath on Wednesday. Our kidney team has determined you do not need dialysis today, can get your routine dialysis tomorrow.     Adult [facility] Follow-up and recommended labs and tests    Follow up with primary care provider, Kt Ríos, within 7 days for hospital follow- up.      Appointments on [place] and/or [facility] (with [facility] or [facility] provider or service). Call [phone] if you haven't heard regarding these appointments within 7 days of discharge.     Activity    Your activity upon discharge: activity as tolerated     When to contact your care team    Call your primary doctor if you have any of the following: nausea, vomiting, high blood sugars, abdominal pain, shortness of breath, fever/chills, or any new concerning symptoms.     Full Code     Diet    Follow this diet upon discharge: Orders Placed This Encounter      Dialysis Diet       Significant Results and Procedures   Most Recent 3 CBC's:  Recent Labs   Lab Test 03/01/21  0616 12/17/20  0549 12/16/20  0543   WBC 6.8 5.5 5.4   HGB 10.7* 11.9* 10.9*   MCV 97 95 96    233 248     Most Recent 3 BMP's:  Recent Labs   Lab Test 03/01/21  0616 02/28/21  1930 12/17/20  0549    135 133   POTASSIUM 4.5 4.3 4.1   CHLORIDE 90* 92* 97   CO2 31 29 25   BUN 47* 44* 32*   CR  8.97* 7.92* 8.63*   ANIONGAP 12 13 11   CHARLY 9.2 9.0 9.6   * 84 154*     Most Recent 2 LFT's:  Recent Labs   Lab Test 03/01/21  0616 12/14/20 12/06/20  0622   AST 15 11* 16   ALT 19 11* 35   ALKPHOS 199*  --  198*   BILITOTAL 1.4*  --  1.1     Most Recent 6 glucoses:  Recent Labs   Lab Test 03/01/21  0616 02/28/21  1930 12/17/20  0549 12/16/20  0543 12/15/20  0547 12/14/20   * 84 154* 213* 160* 342*     Discharge Medications   Discharge Medication List as of 3/1/2021  1:24 PM      CONTINUE these medications which have CHANGED    Details   blood glucose (NO BRAND SPECIFIED) test strip Use to test blood sugar 5 times daily or as directed., Disp-200 strip, R-1, E-Prescribe         CONTINUE these medications which have NOT CHANGED    Details   acetone, Urine, test STRP 1 strip by In Vitro route dailyDisp-50 each, [id number] ketone stix      aspirin (ASA) 81 MG EC tablet Take 1 tablet (81 mg) by mouth daily, OTC      atorvastatin (LIPITOR) 20 MG tablet Take 1 tablet (20 mg) by mouth daily, Disp-90 tablet, R-3, E-Prescribe      carvedilol (COREG) 6.25 MG tablet Take 1 tablet (6.25 mg) by mouth 2 times daily (with meals), Disp-180 tablet, R-3, E-Prescribe      insulin aspart (NOVOLOG FLEXPEN) 100 UNIT/ML pen Sliding scale = 1 unit for every 20 over blood glucose of 140, average 45 units/day, Disp-45 mL, R-0, E-Prescribe      insulin glargine (LANTUS SOLOSTAR) 100 UNIT/ML pen Inject 24 Units Subcutaneous At Bedtime, Disp-15 mL, R-11, E-PrescribeIf Lantus is not covered by insurance, may substitute Basaglar at same dose and frequency.        insulin pen needle (BD ARPITA U/F) 32G X 4 MM miscellaneous Use 4 daily or as directed.Disp-120 each, [id number]      metoclopramide (REGLAN) 5 MG tablet Take 1 tablet (5 mg) by mouth 4 times daily (before meals and nightly), Disp-120 tablet, R-11, E-Prescribe      naproxen sodium 220 MG capsule Take 220 mg by mouth as needed , Historical      omeprazole  (PRILOSEC) 40 MG DR capsule Take 2 capsules (80 mg) by mouth every evening, Disp-180 capsule, R-11, E-Prescribe      traZODone (DESYREL) 50 MG tablet Take 1 tablet (50 mg) by mouth nightly as needed for sleep, Disp-30 tablet, R-2, E-Prescribe         STOP taking these medications       acetaminophen (TYLENOL) 325 MG tablet Comments:   Reason for Stopping:         calcium carbonate (TUMS) 500 MG chewable tablet Comments:   Reason for Stopping:             Allergies   Allergies   Allergen Reactions     Diphenhydramine Other (See Comments)     Restless legs     Metoclopramide      Other reaction(s): Confusion, Unknown     Reglan Other (See Comments)     Reaction only to IV formulation -->delsuions

## 2021-03-02 NOTE — DISCHARGE SUMMARY
Dialysis Discharge Summary Brief    [facility]  Division of Nephrology  Nephrology Discharge Dialysis Orders  Ph: [phone]  Fax: [phone]    Murtaza Fitzgerald  MRN: [id number]  YOB: 1975    [facility]: [place]  Primary Nephrologist: Dr. Schneider/Katelyn Lopez NP    Date of Admission: 2/28/2021  Date of Discharge: 3/1/2021    Pt was treated for DKA, he was not dialyzed during the admission. Please page me at  with any questions. Thanks much. Maddy Vernon PA-C    Discharge Diagnosis:    ICD-10-CM    1. 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     I have reviewed and agree with all the recommendations and orders detailed in this document.         [x] Resume all previous dialysis orders with exception as noted below    New Orders (if not applicable put NA):  Estimated Dry Weight No change (80.5 kg)   Dialysis Duration    Dialysis Access    Antibiotics (dose per dialysis, end date)            Labs to be drawn at dialysis    Other major changes to dialysis prescription (e.g. Dialysate bath, heparin, blood flow rate, etc)      Medication changes (also fax the unit a copy of the discharge summary)              Name of physician completing this form: Maddy Vernon PA-C

## 2021-03-02 NOTE — PROGRESS NOTES
[facility]: Post-Discharge Note  SITUATION                                                      Admission:    Admission Date: 02/28/21   Reason for Admission: Nausea & vomiting  Discharge:   Discharge Date: 03/01/21  Discharge Diagnosis: Nausea & vomiting    BACKGROUND                                                      Patient was transferred from [facility] to [facility] due to anticipated HD need and no HD capabilities at [place]. Patient is undergoing evaluation for kidney transplant.      ASSESSMENT      Discharge Assessment  Patient reports symptoms are: Improved  Does the patient have all of their medications?: Yes  Does patient know what their new medications are for?: Yes  Does patient have a follow-up appointment scheduled?: Yes  Does patient have any other questions or concerns?: No    Post-op  Did the patient have surgery or a procedure: No  Fever: No  Chills: No  Eating & Drinking: eating and drinking without complaints/concerns  Bowel Function: normal  Urinary Status: voiding without complaint/concerns      PLAN                                                      Outpatient Plan:   Follow up with primary care provider, Kt Ríos, within 7 days for   hospital follow- up.         Future Appointments   Date Time Provider Department Center   3/3/2021 12:30 PM UU LAB GOLD WAITING [facility]   3/3/2021  1:00 PM [facility]           Hali Heredia

## 2021-03-03 ENCOUNTER — APPOINTMENT (OUTPATIENT)
Dept: LAB | Facility: CLINIC | Age: 46
End: 2021-03-03
Attending: INTERNAL MEDICINE
Payer: COMMERCIAL

## 2021-03-03 ENCOUNTER — APPOINTMENT (OUTPATIENT)
Dept: MEDSURG UNIT | Facility: CLINIC | Age: 46
End: 2021-03-03
Attending: INTERNAL MEDICINE
Payer: COMMERCIAL

## 2021-03-03 ENCOUNTER — HOSPITAL ENCOUNTER (OUTPATIENT)
Facility: CLINIC | Age: 46
Discharge: HOME | End: 2021-03-03
Attending: INTERNAL MEDICINE | Admitting: INTERNAL MEDICINE
Payer: COMMERCIAL

## 2021-03-03 VITALS
SYSTOLIC BLOOD PRESSURE: 162 MMHG | HEIGHT: 68 IN | DIASTOLIC BLOOD PRESSURE: 96 MMHG | OXYGEN SATURATION: 98 % | TEMPERATURE: 97.9 F | HEART RATE: 84 BPM | WEIGHT: 180 LBS | BODY MASS INDEX: 27.28 KG/M2 | RESPIRATION RATE: 14 BRPM

## 2021-03-03 DIAGNOSIS — I10 HYPERTENSION GOAL BP (BLOOD PRESSURE) < 140/90: ICD-10-CM

## 2021-03-03 DIAGNOSIS — Z95.1 S/P CABG X 3: ICD-10-CM

## 2021-03-03 DIAGNOSIS — R93.1 ABNORMAL ECHOCARDIOGRAM: ICD-10-CM

## 2021-03-03 DIAGNOSIS — N18.6 ESRD (END STAGE RENAL DISEASE) ON DIALYSIS (H): ICD-10-CM

## 2021-03-03 DIAGNOSIS — R06.09 DOE (DYSPNEA ON EXERTION): ICD-10-CM

## 2021-03-03 DIAGNOSIS — R94.39 ABNORMAL CARDIOVASCULAR STRESS TEST: ICD-10-CM

## 2021-03-03 DIAGNOSIS — E10.59 TYPE 1 DIABETES MELLITUS WITH DIABETIC CARDIOMYOPATHY (H): ICD-10-CM

## 2021-03-03 DIAGNOSIS — I25.10 CORONARY ARTERY DISEASE INVOLVING NATIVE CORONARY ARTERY OF NATIVE HEART WITHOUT ANGINA PECTORIS: ICD-10-CM

## 2021-03-03 DIAGNOSIS — I43 TYPE 1 DIABETES MELLITUS WITH DIABETIC CARDIOMYOPATHY (H): ICD-10-CM

## 2021-03-03 DIAGNOSIS — Z99.2 ESRD (END STAGE RENAL DISEASE) ON DIALYSIS (H): ICD-10-CM

## 2021-03-03 PROBLEM — Z98.890 STATUS POST CORONARY ANGIOGRAM: Status: ACTIVE | Noted: 2021-03-03

## 2021-03-03 LAB
ANION GAP SERPL CALCULATED.3IONS-SCNC: 9 MMOL/L (ref 3–14)
APTT PPP: 28 SEC (ref 22–37)
BUN SERPL-MCNC: 49 MG/DL (ref 7–30)
CALCIUM SERPL-MCNC: 9.4 MG/DL (ref 8.5–10.1)
CHLORIDE SERPL-SCNC: 90 MMOL/L (ref 94–109)
CO2 SERPL-SCNC: 30 MMOL/L (ref 20–32)
CREAT SERPL-MCNC: 7.93 MG/DL (ref 0.66–1.25)
ERYTHROCYTE [DISTWIDTH] IN BLOOD BY AUTOMATED COUNT: 15.1 % (ref 10–15)
GFR SERPL CREATININE-BSD FRML MDRD: 7 ML/MIN/{1.73_M2}
GLUCOSE BLDC GLUCOMTR-MCNC: 282 MG/DL (ref 70–99)
GLUCOSE SERPL-MCNC: 325 MG/DL (ref 70–99)
HCT VFR BLD AUTO: 34.3 % (ref 40–53)
HGB BLD-MCNC: 11.1 G/DL (ref 13.3–17.7)
MCH RBC QN AUTO: 32 PG (ref 26.5–33)
MCHC RBC AUTO-ENTMCNC: 32.4 G/DL (ref 31.5–36.5)
MCV RBC AUTO: 99 FL (ref 78–100)
PLATELET # BLD AUTO: 216 10E9/L (ref 150–450)
POTASSIUM SERPL-SCNC: 4.9 MMOL/L (ref 3.4–5.3)
RBC # BLD AUTO: 3.47 10E12/L (ref 4.4–5.9)
SODIUM SERPL-SCNC: 129 MMOL/L (ref 133–144)
WBC # BLD AUTO: 6.7 10E9/L (ref 4–11)

## 2021-03-03 PROCEDURE — 85730 THROMBOPLASTIN TIME PARTIAL: CPT | Performed by: INTERNAL MEDICINE

## 2021-03-03 PROCEDURE — 250N000011 HC RX IP 250 OP 636: Performed by: INTERNAL MEDICINE

## 2021-03-03 PROCEDURE — 272N000001 HC OR GENERAL SUPPLY STERILE: Performed by: INTERNAL MEDICINE

## 2021-03-03 PROCEDURE — 93455 CORONARY ART/GRFT ANGIO S&I: CPT | Performed by: INTERNAL MEDICINE

## 2021-03-03 PROCEDURE — C1894 INTRO/SHEATH, NON-LASER: HCPCS | Performed by: INTERNAL MEDICINE

## 2021-03-03 PROCEDURE — 85027 COMPLETE CBC AUTOMATED: CPT | Performed by: INTERNAL MEDICINE

## 2021-03-03 PROCEDURE — 93010 ELECTROCARDIOGRAM REPORT: CPT | Performed by: INTERNAL MEDICINE

## 2021-03-03 PROCEDURE — 250N000009 HC RX 250: Performed by: INTERNAL MEDICINE

## 2021-03-03 PROCEDURE — 99153 MOD SED SAME PHYS/QHP EA: CPT | Performed by: INTERNAL MEDICINE

## 2021-03-03 PROCEDURE — 36415 COLL VENOUS BLD VENIPUNCTURE: CPT | Performed by: INTERNAL MEDICINE

## 2021-03-03 PROCEDURE — C1887 CATHETER, GUIDING: HCPCS | Performed by: INTERNAL MEDICINE

## 2021-03-03 PROCEDURE — 999N000054 HC STATISTIC EKG NON-CHARGEABLE

## 2021-03-03 PROCEDURE — 250N000011 HC RX IP 250 OP 636: Performed by: NURSE PRACTITIONER

## 2021-03-03 PROCEDURE — 82962 GLUCOSE BLOOD TEST: CPT

## 2021-03-03 PROCEDURE — 99152 MOD SED SAME PHYS/QHP 5/>YRS: CPT | Performed by: INTERNAL MEDICINE

## 2021-03-03 PROCEDURE — 250N000013 HC RX MED GY IP 250 OP 250 PS 637: Performed by: INTERNAL MEDICINE

## 2021-03-03 PROCEDURE — 80048 BASIC METABOLIC PNL TOTAL CA: CPT | Performed by: INTERNAL MEDICINE

## 2021-03-03 PROCEDURE — 999N000142 HC STATISTIC PROCEDURE PREP ONLY

## 2021-03-03 RX ORDER — ONDANSETRON 2 MG/ML
4 INJECTION INTRAMUSCULAR; INTRAVENOUS EVERY 6 HOURS PRN
Status: DISCONTINUED | OUTPATIENT
Start: 2021-03-03 | End: 2021-03-03 | Stop reason: HOSPADM

## 2021-03-03 RX ORDER — FENTANYL CITRATE 50 UG/ML
INJECTION, SOLUTION INTRAMUSCULAR; INTRAVENOUS
Status: DISCONTINUED | OUTPATIENT
Start: 2021-03-03 | End: 2021-03-03 | Stop reason: HOSPADM

## 2021-03-03 RX ORDER — ACETAMINOPHEN 325 MG/1
650 TABLET ORAL EVERY 4 HOURS PRN
Status: DISCONTINUED | OUTPATIENT
Start: 2021-03-03 | End: 2021-03-03 | Stop reason: HOSPADM

## 2021-03-03 RX ORDER — DOBUTAMINE HYDROCHLORIDE 200 MG/100ML
2-20 INJECTION INTRAVENOUS CONTINUOUS PRN
Status: DISCONTINUED | OUTPATIENT
Start: 2021-03-03 | End: 2021-03-03 | Stop reason: HOSPADM

## 2021-03-03 RX ORDER — NALOXONE HYDROCHLORIDE 0.4 MG/ML
0.2 INJECTION, SOLUTION INTRAMUSCULAR; INTRAVENOUS; SUBCUTANEOUS
Status: DISCONTINUED | OUTPATIENT
Start: 2021-03-03 | End: 2021-03-03

## 2021-03-03 RX ORDER — FENTANYL CITRATE 50 UG/ML
25-50 INJECTION, SOLUTION INTRAMUSCULAR; INTRAVENOUS
Status: ACTIVE | OUTPATIENT
Start: 2021-03-03 | End: 2021-03-03

## 2021-03-03 RX ORDER — ARGATROBAN 1 MG/ML
150 INJECTION, SOLUTION INTRAVENOUS
Status: DISCONTINUED | OUTPATIENT
Start: 2021-03-03 | End: 2021-03-03 | Stop reason: HOSPADM

## 2021-03-03 RX ORDER — NITROGLYCERIN 20 MG/100ML
10-200 INJECTION INTRAVENOUS CONTINUOUS PRN
Status: DISCONTINUED | OUTPATIENT
Start: 2021-03-03 | End: 2021-03-03 | Stop reason: HOSPADM

## 2021-03-03 RX ORDER — ARGATROBAN 1 MG/ML
350 INJECTION, SOLUTION INTRAVENOUS
Status: DISCONTINUED | OUTPATIENT
Start: 2021-03-03 | End: 2021-03-03 | Stop reason: HOSPADM

## 2021-03-03 RX ORDER — FLUMAZENIL 0.1 MG/ML
0.2 INJECTION, SOLUTION INTRAVENOUS
Status: DISCONTINUED | OUTPATIENT
Start: 2021-03-03 | End: 2021-03-03 | Stop reason: HOSPADM

## 2021-03-03 RX ORDER — ATROPINE SULFATE 0.1 MG/ML
0.5 INJECTION INTRAVENOUS
Status: DISCONTINUED | OUTPATIENT
Start: 2021-03-03 | End: 2021-03-03 | Stop reason: HOSPADM

## 2021-03-03 RX ORDER — POTASSIUM CHLORIDE 750 MG/1
40 TABLET, EXTENDED RELEASE ORAL
Status: DISCONTINUED | OUTPATIENT
Start: 2021-03-03 | End: 2021-03-03 | Stop reason: HOSPADM

## 2021-03-03 RX ORDER — POTASSIUM CHLORIDE 750 MG/1
20 TABLET, EXTENDED RELEASE ORAL
Status: DISCONTINUED | OUTPATIENT
Start: 2021-03-03 | End: 2021-03-03 | Stop reason: HOSPADM

## 2021-03-03 RX ORDER — NALOXONE HYDROCHLORIDE 0.4 MG/ML
0.4 INJECTION, SOLUTION INTRAMUSCULAR; INTRAVENOUS; SUBCUTANEOUS
Status: DISCONTINUED | OUTPATIENT
Start: 2021-03-03 | End: 2021-03-03 | Stop reason: HOSPADM

## 2021-03-03 RX ORDER — LIDOCAINE 40 MG/G
CREAM TOPICAL
Status: DISCONTINUED | OUTPATIENT
Start: 2021-03-03 | End: 2021-03-03 | Stop reason: HOSPADM

## 2021-03-03 RX ORDER — EPTIFIBATIDE 2 MG/ML
1 INJECTION, SOLUTION INTRAVENOUS CONTINUOUS PRN
Status: DISCONTINUED | OUTPATIENT
Start: 2021-03-03 | End: 2021-03-03 | Stop reason: HOSPADM

## 2021-03-03 RX ORDER — DOPAMINE HYDROCHLORIDE 160 MG/100ML
2-20 INJECTION, SOLUTION INTRAVENOUS CONTINUOUS PRN
Status: DISCONTINUED | OUTPATIENT
Start: 2021-03-03 | End: 2021-03-03 | Stop reason: HOSPADM

## 2021-03-03 RX ORDER — TIROFIBAN HYDROCHLORIDE 50 UG/ML
0.07 INJECTION INTRAVENOUS CONTINUOUS PRN
Status: DISCONTINUED | OUTPATIENT
Start: 2021-03-03 | End: 2021-03-03 | Stop reason: HOSPADM

## 2021-03-03 RX ORDER — NALOXONE HYDROCHLORIDE 0.4 MG/ML
0.2 INJECTION, SOLUTION INTRAMUSCULAR; INTRAVENOUS; SUBCUTANEOUS
Status: DISCONTINUED | OUTPATIENT
Start: 2021-03-03 | End: 2021-03-03 | Stop reason: HOSPADM

## 2021-03-03 RX ORDER — NALOXONE HYDROCHLORIDE 0.4 MG/ML
0.4 INJECTION, SOLUTION INTRAMUSCULAR; INTRAVENOUS; SUBCUTANEOUS
Status: DISCONTINUED | OUTPATIENT
Start: 2021-03-03 | End: 2021-03-03

## 2021-03-03 RX ORDER — HEPARIN SODIUM 10000 [USP'U]/100ML
100-1000 INJECTION, SOLUTION INTRAVENOUS CONTINUOUS PRN
Status: DISCONTINUED | OUTPATIENT
Start: 2021-03-03 | End: 2021-03-03 | Stop reason: HOSPADM

## 2021-03-03 RX ORDER — EPTIFIBATIDE 2 MG/ML
180 INJECTION, SOLUTION INTRAVENOUS EVERY 10 MIN PRN
Status: DISCONTINUED | OUTPATIENT
Start: 2021-03-03 | End: 2021-03-03 | Stop reason: HOSPADM

## 2021-03-03 RX ORDER — SODIUM CHLORIDE 9 MG/ML
INJECTION, SOLUTION INTRAVENOUS CONTINUOUS
Status: DISCONTINUED | OUTPATIENT
Start: 2021-03-03 | End: 2021-03-03 | Stop reason: HOSPADM

## 2021-03-03 RX ORDER — IOPAMIDOL 755 MG/ML
INJECTION, SOLUTION INTRAVASCULAR
Status: DISCONTINUED | OUTPATIENT
Start: 2021-03-03 | End: 2021-03-03 | Stop reason: HOSPADM

## 2021-03-03 RX ADMIN — FENTANYL CITRATE 25 MCG: 50 INJECTION, SOLUTION INTRAMUSCULAR; INTRAVENOUS at 15:11

## 2021-03-03 RX ADMIN — ASPIRIN 325 MG ORAL TABLET 325 MG: 325 PILL ORAL at 13:34

## 2021-03-03 ASSESSMENT — MIFFLIN-ST. JEOR: SCORE: 1681.48

## 2021-03-03 NOTE — PROGRESS NOTES
D/I/A: Manual pressure held for 20 minutes to R groin.  Sheath, size 4FR RFA,  pulled by Jose Garcia RN.  Site CDI, no hematoma.  Stasis achieved at 1535. Off bedrest @ 5:35pm.  A+O x4 and making needs known.  Denies pain or sob.  VSSA.  Tolerated sheath removal well.  P: Continue to monitor status.  Discharge to home once meeting criteria.

## 2021-03-03 NOTE — Clinical Note
Lab results reviewed and abnormals discussed with physician.  CONTRAST RISK DOSE 25/19. Patient is on HD.

## 2021-03-03 NOTE — PROGRESS NOTES
Pt arrives on 2A for Coronary Angiogram. /100 other vss. Consent signed. 20G PIV placed in right AC 2/2 to Fistula in left arm. Gave 325mg PO ASA (pt didn't take any medications this am. Groin prep complete. +3 pedal pulses marks.      Blood sugar 325

## 2021-03-03 NOTE — PROGRESS NOTES
D/I/A: Discharge instructions reviewed with patient's mother, Marcie, in person.  Questions answered at this time.  P: Continue to monitor status.  Discharge once meeting criteria.

## 2021-03-03 NOTE — DISCHARGE INSTRUCTIONS
"  Going home after a Coronary Angiogram    PROCEDURE SITE:   Femoral (Groin)  It is normal to have soreness, mild bruising or a small lump at the puncture site. You may shower but please do not use a hot tub, bath tub or pool for 2 days. Do not apply any lotion or powder near the site for 2 days. For 2 days when you cough, sneeze or push with a bowel movement place your hand over the puncture site and apply gentle pressure. For the first 2 days avoid squatting. Avoid lifting more than 10 pounds for at least 5 days. Further activity restrictions as below. If you feel a \"pop\" with pain and/or notice significant increase in pain, swelling or bleeding from the site- immediately lie down, press firmly on the site and proceed to the nearest medical facility.    MEDICATIONS:   1. If you are on Metformin (Glucophage) or any medications that contain this, you should not take it for at least 48 hours (2 days) from the time of your procedure. If you have baseline kidney problems, you may be instructed to also have a lab test drawn in 2-3 days before getting the okay to restart it.  2. If you have not been continued on other medications you were previously taking at home it is probably for reason though please discuss your concerns with any of your doctors.     DIET:  We recommend a diet low in saturated fat, trans fat and cholesterol. In addition it will be helpful to be cautious of sodium intake and carbohydrates. Try to increase the amount of lean meats you eat like fish and chicken, but avoid frying; and reduce the amount of red meat you eat. Eat more fresh fruits and vegetables and try to avoid canned and processed food. Please reference the handouts you received for more specific information.      OTHER INFORMATION:  1. If you are a smoker, quitting smoking will be one of the most important things you can do for yourself. There are nicotine replacement options or medications they might be able to be prescribed. Please " discuss this with your doctors. Consider calling the QuitPlan at 7-947-782-YFPG ([phone]) as they can offer ongoing support after discharge.    CALL YOUR DOCTOR IF:  -You have a large or growing lump/bump around the procedure site  -The site is red, swollen, hot, tender or has drainage  -You have hives, a rash or unusual itching  -You have increasing or worsening shortness of breath or chest pain           Should you need to contact us:  Cardiology clinic for scheduling or triage nurse questions/concerns:  [phone]

## 2021-03-03 NOTE — PROGRESS NOTES
D/I/A: Manual pressure held for 20 minutes to 6320-4330.  Sheath, size 4f,  pulled by Jose GARCIA.  Site CDI, no hematoma.  Stasis achieved at 1537. Off bedrest @ 1735.  A+O x4 and making needs known.  Denies pain or sob.  VSSA.  Tolerated sheath removal well.  P: Continue to monitor status.  Discharge to home once meeting criteria.

## 2021-03-03 NOTE — PRE-PROCEDURE
GENERAL PRE-PROCEDURE:   Procedure:  Coronary angiogram with possible PCI  Date/Time:  3/3/2021 1:26 PM    Verbal consent obtained?: Yes    Written consent obtained?: Yes    Risks and benefits: Risks, benefits and alternatives were discussed    DC Plan: Appropriate discharge home plan in place for patients who are going home after procedure   Consent given by:  Patient  Patient states understanding of procedure being performed: Yes    Patient's understanding of procedure matches consent: Yes    Procedure consent matches procedure scheduled: Yes    Expected level of sedation:  Moderate  Appropriately NPO:  Yes  ASA Class:  Class 2- mild systemic disease, no acute problems, no functional limitations  Mallampati  :  Grade 2- soft palate, base of uvula, tonsillar pillars, and portion of posterior pharyngeal wall visible  Lungs:  Lungs clear with good breath sounds bilaterally  Heart:  Normal heart sounds and rate  History & Physical reviewed:  History and physical reviewed and no updates needed  Statement of review:  I have reviewed the lab findings, diagnostic data, medications, and the plan for sedation    LUE AV Fistula present    Brenda Mckenna APRN, CNP  [facility] Cardiology

## 2021-03-03 NOTE — PROGRESS NOTES
D/I/A: Pt roomed on 3C in bay 34.  Arrived via litter and accompanied by Randi CROWE RN On/Off: Off monitor.  VSSA.  Rhythm upon arrival SR on monitor.  Denies pain or sob.  Reviewed activity restrictions and when to notify RN, ie-changes to breathing or increased chest pressure or chest pain.  CCL access:  4f sheath in right groin in place upon arrival.  P: Continue to monitor status.  Discharge to home once meeting criteria.

## 2021-03-04 LAB — INTERPRETATION ECG - MUSE: NORMAL

## 2021-03-09 DIAGNOSIS — Z71.89 OTHER SPECIFIED COUNSELING: Primary | Chronic | ICD-10-CM

## 2021-03-11 ENCOUNTER — TRANSFERRED RECORDS (OUTPATIENT)
Dept: HEALTH INFORMATION MANAGEMENT | Facility: CLINIC | Age: 46
End: 2021-03-11

## 2021-03-11 DIAGNOSIS — Z11.59 ENCOUNTER FOR SCREENING FOR OTHER VIRAL DISEASES: Primary | ICD-10-CM

## 2021-03-11 DIAGNOSIS — N18.6 END STAGE RENAL DISEASE (H): Primary | ICD-10-CM

## 2021-03-11 LAB — RETINOPATHY: POSITIVE

## 2021-03-12 ENCOUNTER — PATIENT OUTREACH (OUTPATIENT)
Dept: CARE COORDINATION | Facility: CLINIC | Age: 46
End: 2021-03-12

## 2021-03-12 ENCOUNTER — TELEPHONE (OUTPATIENT)
Dept: CARE COORDINATION | Facility: CLINIC | Age: 46
End: 2021-03-12

## 2021-03-12 NOTE — PROGRESS NOTES
Clinic Care Coordination Contact    Clinic Care Coordination Contact  OUTREACH    Referral Information:  Referral Source: IP Report    Primary Diagnosis: GI Disorders    Chief Complaint   Patient presents with     Clinic Care Coordination - Post Hospital     GI conditions        Universal Utilization:      Utilization    Last refreshed: 3/12/2021 11:53 AM: Hospital Admissions 6           Last refreshed: 3/12/2021 11:53 AM: ED Visits 5           Last refreshed: 3/12/2021 11:53 AM: No Show Count (past year) 5              Current as of: 3/12/2021 11:53 AM              Clinical Concerns:  Current Medical Concerns:    Patient Active Problem List   Diagnosis     Immunosuppressed status (H)     Mycotic aneurysm of kidney transplant (H)     Kidney replaced by transplant     Coronary artery disease, non-occlusive     CMV (cytomegalovirus infection) status negative     Hypertension goal BP (blood pressure) < 140/90     Hyperlipidemia with target LDL less than 100     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  Pt indicates he is feeling better but it took some time to recover from this most recent illness. Reviewed upcoming apts and discussed continued f/u with specialists.       SWCC introduced care coordination and discussed role. Pt denied any care coordination needs and decline ongoing care coordination.      Health Maintenance Reviewed: Due/Overdue   Health  Maintenance Due   Topic Date Due     HF ACTION PLAN  Never done     COVID-19 Vaccine (1 of 2) Never done     HEPATITIS B IMMUNIZATION (1 of 3 - Risk Recombivax 3-dose series) 09/15/1996     MEDICARE ANNUAL WELLNESS VISIT  2020     MICROALBUMIN  2020     DIABETIC FOOT EXAM  2020     LIPID  2021       Clinical Pathway: None    Medication Management:  Independent. No concerns.      Lifestyle & Psychosocial Needs:  Lifestyle     Physical activity     Days per week: 0 days     Minutes per session: 0 min     Stress: To some extent     Social Needs     Financial resource strain: Not very hard     Food insecurity     Worry: Never true     Inability: Never true     Transportation needs     Medical: No     Non-medical: No                  Socioeconomic History     Marital status: Single     Spouse name: Not on file     Number of children: 1     Years of education: Not on file     Highest education level: Associate degree: academic program   Occupational History     Occupation:    Relationships     Social connections     Talks on phone: More than three times a week     Gets together: Once a week     Attends [employer] service: Never     Active member of club or organization: No     Attends meetings of clubs or organizations: Never     Relationship status:      Intimate partner violence     Fear of current or ex partner: Not on file     Emotionally abused: Not on file     Physically abused: Not on file     Forced sexual activity: Not on file     Tobacco Use     Smoking status: Former Smoker     Packs/day: 1.00     Years: 20.00     Pack years: 20.00     Types: Vaping Device, Cigarettes     Quit date: 2016     Years since quittin.1     Smokeless tobacco: Never Used     Tobacco comment: E- Cig use since    Substance and Sexual Activity     Alcohol use: Not Currently     Alcohol/week: 0.0 standard drinks     Frequency: Never     Binge frequency: Never     Drug use: No      Sexual activity: Never     Partners: Female        Care Coordinator has reviewed patient's Social Determinants of Health (SDoH) on this date. Upon review, changes were not  made.        Patient/Caregiver understanding: Pt reports understanding and denies any additional questions or concerns at this times. SW CC engaged in AIDET communication during encounter.         Future Appointments              In 1 week U2A ROOM 6 [facility] Unit [facility]A [facility] O    In 1 week [facility]    In 3 weeks Yovany Vogel MD [facility]          Plan: No further outreaches will be made at this time unless a new referral is made or a change in the pt's status occurs. Patient was provided with this writer's contact information and encouraged to call with any questions or concerns.     KATIA Napoles  Clinic Care Coordinator  [facility]  [facility]  [phone]  [email or web address]

## 2021-03-12 NOTE — TELEPHONE ENCOUNTER
Called patient.     Patient reports ongoing issues with gastroparesis, lack of appetite, and new issues with sleeping.     Having trouble falling asleep and staying asleep for a while now.  Patient would like to discuss medical marijuana.     Advised patient schedule phone visit with Dr. Ríos.  Patient agreed.     Patient scheduled for phone visit with Dr. Ríos on 3/17/21.    Jun Dalal RN on 3/12/2021 at 4:03 PM

## 2021-03-12 NOTE — TELEPHONE ENCOUNTER
[facility] outreached to pt to review recent ED/Hospital visit and introduce care coordiantion.      Pt denied any care coordination needs, but did have a question for PCP:     Pt indicates d/t gastroparesis pt indicates he has had lack of appetite and trouble sleeping- pt would like to discuss medical marijuana with PCP/care team as he has heard this could assist in managing these concerns.      Routing to RN PAL for review per pt request.     Jas Grimaldo TINO  Clinic Care Coordinator  [facility]  [facility]  [phone]  [email or web address]

## 2021-03-15 ENCOUNTER — TELEPHONE (OUTPATIENT)
Dept: TRANSPLANT | Facility: CLINIC | Age: 46
End: 2021-03-15

## 2021-03-15 NOTE — TELEPHONE ENCOUNTER
Called pt and introduced myself as his coordinator, provided my direct line.  Checked in on eval status, he recently had an angio, has f/u w/ cards on 4/9. I informed him he needs a full body skin check per last derm note, he will call to schedule that.  He also has an appointment on 3/17 to address potential medical marijuana for treatment of his gastroparesis.  He is currently trying to get a referral for a gastric pacemaker.  He reports his symptoms are not improving and about weekly he cycles through nausea and diarrhea.  Pt stated he was recently seen by his dentist- will request clearance from [facility] as well as vaccination record/input on compliance from his dialysis center.  I asked the pt to update me with any changes and explained once we wrap up the remaining consults/tests, I will present his case at selection committee.  Pt had no further questions for me and was in good agreement w/ the plan.

## 2021-03-15 NOTE — TELEPHONE ENCOUNTER
Pt's SW from dialysis called me back, she stated the pt began dialyzing at their center in July 2020 and has been compliant w/ attendance.  He does not miss runs and is pro-active when needing to reschedule.  She will talk w/ nursing and nephrology to get more insight into his compliance w/ medications.  She mentioned that he had a lapse in insurance and wasn't taking his medications as prescribed at that time but he has worked that out.  She also mentioned that last year had been life changing for him and she thinks overall he is a compliant pt.  She will send over his vaccination record and any updates from nephrology or nursing - they meet on Wednesday 3/17.

## 2021-03-15 NOTE — TELEPHONE ENCOUNTER
Called pt's dialysis unit for letter for compliance and an updated vaccination record. SW is out today but the  sent her a note for follow up when she is back in the office.  I provided my direct line and fax number.

## 2021-03-17 ENCOUNTER — TELEPHONE (OUTPATIENT)
Dept: INTERVENTIONAL RADIOLOGY/VASCULAR | Facility: CLINIC | Age: 46
End: 2021-03-17

## 2021-03-17 ENCOUNTER — VIRTUAL VISIT (OUTPATIENT)
Dept: PEDIATRICS | Facility: CLINIC | Age: 46
End: 2021-03-17
Payer: COMMERCIAL

## 2021-03-17 DIAGNOSIS — E10.59 TYPE 1 DIABETES MELLITUS WITH DIABETIC CARDIOMYOPATHY (H): ICD-10-CM

## 2021-03-17 DIAGNOSIS — K31.84 GASTROPARESIS: Primary | ICD-10-CM

## 2021-03-17 DIAGNOSIS — I43 TYPE 1 DIABETES MELLITUS WITH DIABETIC CARDIOMYOPATHY (H): ICD-10-CM

## 2021-03-17 DIAGNOSIS — N18.6 ESRD (END STAGE RENAL DISEASE) (H): ICD-10-CM

## 2021-03-17 PROCEDURE — 99213 OFFICE O/P EST LOW 20 MIN: CPT | Mod: 95 | Performed by: INTERNAL MEDICINE

## 2021-03-17 NOTE — PROGRESS NOTES
"Tyrell is a 45 year old who is being evaluated via a billable telephone visit.      What phone number would you like to be contacted at? [phone]  How would you like to obtain your AVS? MyChart    Assessment & Plan     Gastroparesis  Severe.  He has tried multiple agents at home, and still needs to go into the emergency room now on an increasing frequency in order to get IV forms of Zofran and Compazine.  He seems to respond rather quickly to this, but the fact that he is going into the emergency room so frequently makes him and me both want to try something for improved control of his nausea.  His nausea is mainly due to his type 1 diabetes.  I will message our care team to see if there is a referral we can make to have him be evaluated for medical marijuana prescriptions.  He is amenable to this.    ESRD (end stage renal disease) (H)  He is now on dialysis 3 times a week, as his previous kidney transplant has been rejected.    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  His recent diabetes blood test (A1c) from October was within normal limits, and will be repeated when he follows up in person this spring.                BMI:   Estimated body mass index is 27.6 kg/m  as calculated from the following:    Height as of 3/3/21: 1.72 m (5' 7.72\").    Weight as of 3/3/21: 81.6 kg (180 lb).           No follow-ups on file.    Kt Ríos MD  [facility]    Subjective   Tyrell is a 45 year old who presents for the following health issues  accompanied by his self :    HPI     Pt would like to talk about medical marijuana today for his severe gastroparesis.     Still having insomnia and abd pain / gastroparesis.   Symptoms currently are nausea, significant enough to require zofran and compazine. Now has to go almost weekly  (oral versions not working).   Has also been tried on reglan, but still ends up in " emergency room freuqently, responding relatively quickly to IVF and IV versions of zofran and compazine.  Wants to see if prescription for marijuana or dronabinol is an option.  Has not tried any over-the-counter products with marijuana>     Has been trying trazadone for insomnia, but not working.  Usually goes to sleep 9 PM or 10PM but only sleeps for 20 min or so, then will be up for hours.         Review of Systems   CONSTITUTIONAL: NEGATIVE for fever, chills, change in weight  INTEGUMENTARY/SKIN: NEGATIVE for worrisome rashes, moles or lesions  EYES: NEGATIVE for vision changes or irritation  ENT/MOUTH: NEGATIVE for ear, mouth and throat problems  RESP: NEGATIVE for significant cough or SOB  CV: NEGATIVE for chest pain, palpitations or peripheral edema  : NEGATIVE for frequency, dysuria, or hematuria  MUSCULOSKELETAL: NEGATIVE for significant arthralgias or myalgia  NEURO: NEGATIVE for weakness, dizziness or paresthesias  PSYCHIATRIC: NEGATIVE for changes in mood or affect      Objective           Vitals:  No vitals were obtained today due to virtual visit.    Physical Exam   healthy, alert and no distress  PSYCH: Alert and oriented times 3; coherent speech, normal   rate and volume, able to articulate logical thoughts, able   to abstract reason, no tangential thoughts, no hallucinations   or delusions  His affect is normal  RESP: No cough, no audible wheezing, able to talk in full sentences  Remainder of exam unable to be completed due to telephone visits                Phone call duration: 15 minutes

## 2021-03-19 ENCOUNTER — APPOINTMENT (OUTPATIENT)
Dept: INTERVENTIONAL RADIOLOGY/VASCULAR | Facility: CLINIC | Age: 46
End: 2021-03-19
Attending: INTERNAL MEDICINE
Payer: COMMERCIAL

## 2021-03-19 ENCOUNTER — HOSPITAL ENCOUNTER (OUTPATIENT)
Facility: CLINIC | Age: 46
Discharge: HOME | End: 2021-03-19
Attending: INTERNAL MEDICINE | Admitting: RADIOLOGY
Payer: COMMERCIAL

## 2021-03-19 ENCOUNTER — APPOINTMENT (OUTPATIENT)
Dept: MEDSURG UNIT | Facility: CLINIC | Age: 46
End: 2021-03-19
Attending: INTERNAL MEDICINE
Payer: COMMERCIAL

## 2021-03-19 VITALS
DIASTOLIC BLOOD PRESSURE: 71 MMHG | WEIGHT: 190 LBS | HEIGHT: 68 IN | BODY MASS INDEX: 28.79 KG/M2 | RESPIRATION RATE: 16 BRPM | HEART RATE: 81 BPM | OXYGEN SATURATION: 98 % | TEMPERATURE: 98.6 F | SYSTOLIC BLOOD PRESSURE: 122 MMHG

## 2021-03-19 DIAGNOSIS — N18.6 END STAGE RENAL DISEASE (H): ICD-10-CM

## 2021-03-19 LAB
ANION GAP SERPL CALCULATED.3IONS-SCNC: 8 MMOL/L (ref 3–14)
BUN SERPL-MCNC: 38 MG/DL (ref 7–30)
CALCIUM SERPL-MCNC: 9 MG/DL (ref 8.5–10.1)
CHLORIDE SERPL-SCNC: 90 MMOL/L (ref 94–109)
CO2 SERPL-SCNC: 32 MMOL/L (ref 20–32)
CREAT SERPL-MCNC: 6.59 MG/DL (ref 0.66–1.25)
GFR SERPL CREATININE-BSD FRML MDRD: 9 ML/MIN/{1.73_M2}
GLUCOSE SERPL-MCNC: 272 MG/DL (ref 70–99)
POTASSIUM SERPL-SCNC: 4.1 MMOL/L (ref 3.4–5.3)
SODIUM SERPL-SCNC: 130 MMOL/L (ref 133–144)

## 2021-03-19 PROCEDURE — 36902 INTRO CATH DIALYSIS CIRCUIT: CPT | Mod: GC | Performed by: RADIOLOGY

## 2021-03-19 PROCEDURE — 250N000011 HC RX IP 250 OP 636: Performed by: RADIOLOGY

## 2021-03-19 PROCEDURE — 250N000011 HC RX IP 250 OP 636: Performed by: NURSE PRACTITIONER

## 2021-03-19 PROCEDURE — C1725 CATH, TRANSLUMIN NON-LASER: HCPCS

## 2021-03-19 PROCEDURE — 250N000009 HC RX 250: Performed by: RADIOLOGY

## 2021-03-19 PROCEDURE — C1769 GUIDE WIRE: HCPCS

## 2021-03-19 PROCEDURE — 99152 MOD SED SAME PHYS/QHP 5/>YRS: CPT | Mod: GC | Performed by: RADIOLOGY

## 2021-03-19 PROCEDURE — 36902 INTRO CATH DIALYSIS CIRCUIT: CPT

## 2021-03-19 PROCEDURE — C2623 CATH, TRANSLUMIN, DRUG-COAT: HCPCS

## 2021-03-19 PROCEDURE — C1887 CATHETER, GUIDING: HCPCS

## 2021-03-19 PROCEDURE — 99153 MOD SED SAME PHYS/QHP EA: CPT

## 2021-03-19 PROCEDURE — 255N000002 HC RX 255 OP 636: Performed by: RADIOLOGY

## 2021-03-19 PROCEDURE — 80048 BASIC METABOLIC PNL TOTAL CA: CPT | Performed by: RADIOLOGY

## 2021-03-19 PROCEDURE — 99152 MOD SED SAME PHYS/QHP 5/>YRS: CPT

## 2021-03-19 PROCEDURE — 272N000302 HC DEVICE INFLATION CR5

## 2021-03-19 PROCEDURE — 272N000564 HC SHEATH CR2

## 2021-03-19 PROCEDURE — 999N000132 HC STATISTIC PP CARE STAGE 1

## 2021-03-19 PROCEDURE — 272N000504 HC NEEDLE CR4

## 2021-03-19 RX ORDER — FENTANYL CITRATE 50 UG/ML
25-50 INJECTION, SOLUTION INTRAMUSCULAR; INTRAVENOUS EVERY 5 MIN PRN
Status: DISCONTINUED | OUTPATIENT
Start: 2021-03-19 | End: 2021-03-19 | Stop reason: HOSPADM

## 2021-03-19 RX ORDER — FLUMAZENIL 0.1 MG/ML
0.2 INJECTION, SOLUTION INTRAVENOUS
Status: DISCONTINUED | OUTPATIENT
Start: 2021-03-19 | End: 2021-03-19 | Stop reason: HOSPADM

## 2021-03-19 RX ORDER — NALOXONE HYDROCHLORIDE 0.4 MG/ML
0.2 INJECTION, SOLUTION INTRAMUSCULAR; INTRAVENOUS; SUBCUTANEOUS
Status: DISCONTINUED | OUTPATIENT
Start: 2021-03-19 | End: 2021-03-19 | Stop reason: HOSPADM

## 2021-03-19 RX ORDER — NALOXONE HYDROCHLORIDE 0.4 MG/ML
0.4 INJECTION, SOLUTION INTRAMUSCULAR; INTRAVENOUS; SUBCUTANEOUS
Status: DISCONTINUED | OUTPATIENT
Start: 2021-03-19 | End: 2021-03-19 | Stop reason: HOSPADM

## 2021-03-19 RX ORDER — CALCIUM ACETATE 667 MG/1
667 CAPSULE ORAL
COMMUNITY

## 2021-03-19 RX ORDER — LIDOCAINE 40 MG/G
CREAM TOPICAL
Status: DISCONTINUED | OUTPATIENT
Start: 2021-03-19 | End: 2021-03-19 | Stop reason: HOSPADM

## 2021-03-19 RX ORDER — NICOTINE POLACRILEX 4 MG
15-30 LOZENGE BUCCAL
Status: DISCONTINUED | OUTPATIENT
Start: 2021-03-19 | End: 2021-03-19 | Stop reason: HOSPADM

## 2021-03-19 RX ORDER — NICOTINE POLACRILEX 4 MG
15-30 LOZENGE BUCCAL
Status: CANCELLED | OUTPATIENT
Start: 2021-03-19

## 2021-03-19 RX ORDER — DEXTROSE MONOHYDRATE 25 G/50ML
25-50 INJECTION, SOLUTION INTRAVENOUS
Status: CANCELLED | OUTPATIENT
Start: 2021-03-19

## 2021-03-19 RX ORDER — DEXTROSE MONOHYDRATE 25 G/50ML
25-50 INJECTION, SOLUTION INTRAVENOUS
Status: DISCONTINUED | OUTPATIENT
Start: 2021-03-19 | End: 2021-03-19 | Stop reason: HOSPADM

## 2021-03-19 RX ORDER — IODIXANOL 320 MG/ML
100 INJECTION, SOLUTION INTRAVASCULAR ONCE
Status: COMPLETED | OUTPATIENT
Start: 2021-03-19 | End: 2021-03-19

## 2021-03-19 RX ORDER — HEPARIN SODIUM 200 [USP'U]/100ML
1 INJECTION, SOLUTION INTRAVENOUS CONTINUOUS PRN
Status: DISCONTINUED | OUTPATIENT
Start: 2021-03-19 | End: 2021-03-19 | Stop reason: HOSPADM

## 2021-03-19 RX ADMIN — IODIXANOL 70 ML: 320 INJECTION, SOLUTION INTRAVASCULAR at 14:35

## 2021-03-19 RX ADMIN — HEPARIN SODIUM 1 BAG: 200 INJECTION, SOLUTION INTRAVENOUS at 14:07

## 2021-03-19 RX ADMIN — MIDAZOLAM 1 MG: 1 INJECTION INTRAMUSCULAR; INTRAVENOUS at 13:57

## 2021-03-19 RX ADMIN — FENTANYL CITRATE 25 MCG: 50 INJECTION, SOLUTION INTRAMUSCULAR; INTRAVENOUS at 14:19

## 2021-03-19 RX ADMIN — MIDAZOLAM 0.5 MG: 1 INJECTION INTRAMUSCULAR; INTRAVENOUS at 14:19

## 2021-03-19 RX ADMIN — FENTANYL CITRATE 50 MCG: 50 INJECTION, SOLUTION INTRAMUSCULAR; INTRAVENOUS at 13:56

## 2021-03-19 RX ADMIN — LIDOCAINE HYDROCHLORIDE 2 ML: 10 INJECTION, SOLUTION EPIDURAL; INFILTRATION; INTRACAUDAL; PERINEURAL at 14:08

## 2021-03-19 ASSESSMENT — MIFFLIN-ST. JEOR: SCORE: 1731.33

## 2021-03-19 NOTE — IR NOTE
Patient Name: Murtaza Fitzgerald  Medical Record Number: [id number]  Today's Date: 3/19/2021    Procedure: left arm fistulogram and possible intervention  Proceduralist: Dr JC Donaldson, Dr LINA Smiley    Sedation start time: 1356  Sedation end time: 1430  Sedation medications administered: 1.5 mg versed, 75 mcg fentanyl  Total sedation time: 34 min  Sedation Notes: none    Procedure start time: 1355  Procedure end time: 1430    Report given to: Henrietta GARCIA    : none    Other Notes: Pt arrived to IR room 4 from . Consent reviewed, pt confirmed. Pt denies any questions or concerns regarding procedure. Pt positioned supine and monitored per protocol. Site cleansed and dressed per protocol. Pt tolerated procedure without any noted complications. Pt transferred back to .

## 2021-03-19 NOTE — PROCEDURES
[facility]    Procedure: Fistulogram and plasty    Date/Time: 3/19/2021 2:39 PM  Performed by: Manohar Smiley MD  Authorized by: Manohar Smiley MD   IR Fellow Physician: Manohar Smiley    UNIVERSAL PROTOCOL   Site Marked: NA  Prior Images Obtained and Reviewed:  Yes  Required items: Required blood products, implants, devices and special equipment available    Patient identity confirmed:  Verbally with patient, arm band, provided demographic data and hospital-assigned identification number  Patient was reevaluated immediately before administering moderate or deep sedation or anesthesia  Confirmation Checklist:  Patient's identity using two indicators, relevant allergies, procedure was appropriate and matched the consent or emergent situation and correct equipment/implants were available  Time out: Immediately prior to the procedure a time out was called    Universal Protocol: the Joint Commission Universal Protocol was followed    Preparation: Patient was prepped and draped in usual sterile fashion    ESBL (mL):  2         ANESTHESIA    Anesthesia: Local infiltration  Local Anesthetic:  Lidocaine 1% without epinephrine  Anesthetic Total (mL):  3      SEDATION    Patient Sedated: Yes    Sedation Type:  Moderate (conscious) sedation  Vital signs: Vital signs monitored during sedation    See dictated procedure note for full details.  Findings: Venous outflow stenosis.   Venoplasty and DCB to 7.5 mm with good result.    Specimens: none    Complications: None    Condition: Stable    Plan: Bed rest for 1 hr    PROCEDURE   Patient Tolerance:  Patient tolerated the procedure well with no immediate complications    Length of time physician/provider present for 1:1 monitoring during sedation: 45

## 2021-03-19 NOTE — PROGRESS NOTES
Patient arrived via cart from IR with RN s/p left arm fistulogram with intervention. Left arm tip stop CDI, no hematoma, +bruit/+thrill. VSS. Patient denies pain, tolerates regular diet. Family at bedside.

## 2021-03-19 NOTE — DISCHARGE INSTRUCTIONS
[facility]      Interventional Radiology  Discharge Instructions Following Fistulogram      ? You may resume normal activities as tolerated, but avoid any strenuous activity or heavy lifting (>10 lbs.) involving your access arm.    ? Elevate and rest your arm as much as possible for the first 24 hours after the procedure.  This will promote healing and reduce swelling.     ? If bleeding or oozing should occur, apply fingertip pressure to puncture site to control bleeding, but avoid excessive pressure as this may clot off the fistula.  Hold light pressure for 10 minutes, or until bleeding/oozing is controlled.  If excessive bleeding is noted or if you are having difficulty controlling the bleeding with direct pressure, call 911.     ? If you develop fever, chills, excessive pain/tenderness or drainage at the puncture site, or have questions call your Doctor or Dialysis Center.     ? If you received sedation for your procedure: An adult should stay with you for 24 hours, Do Not drive a motor vehicle, operate machinery, or drink alcoholic beverages for 24 hours.     ? You may resume your normal medications immediately    ? If you notice sudden loss of pulse or thrill (buzzing feeling) in your fistula, contact your Doctor or Dialysis unit immediately.         [facility] INTERVENTIONAL RADIOLOGY DEPARTMENT  Procedure Physician: Dr. Smiley                                                  Date of procedure: March 19, 2021  Telephone Numbers: [phone] Monday-Friday 8:00 am to 4:30 pm  [phone] After 4:30 pm Monday-Friday, Weekends & Holidays.   Ask for the Interventional Radiologist on call.  Someone is on call 24 hrs/day  [facility] toll free number: [phone] Monday-Friday 8:00 am to 4:30 pm  [facility] Emergency Dept: [phone]

## 2021-03-19 NOTE — IP AVS SNAPSHOT
[facility] [address] [address]                                    After Visit Summary   3/19/2021    Murtaza Fitzgerald    MRN: [id number]           After Visit Summary Signature Page    I have received my discharge instructions, and my questions have been answered. I have discussed any challenges I see with this plan with the nurse or doctor.    ..........................................................................................................................................  Patient/Patient Representative Signature      ..........................................................................................................................................  Patient Representative Print Name and Relationship to Patient    ..................................................               ................................................  Date                                   Time    ..........................................................................................................................................  Reviewed by Signature/Title    ...................................................              ..............................................  Date                                               Time          22EPIC Rev 08/18

## 2021-03-19 NOTE — IP AVS SNAPSHOT
MRN:[id number]                      After Visit Summary   3/19/2021    Murtaza Fitzgerald    MRN: [id number]           Visit Information        Department      3/19/2021 10:25 AM [facility] [address] [place]          Review of your medicines      UNREVIEWED medicines. 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)      Dose: 20 mg  Take 1 tablet (20 mg) by mouth daily  Quantity: 90 tablet  Refills: 3     calcium acetate 667 MG Caps capsule  Commonly known as: PHOSLO      Dose: 667 mg  Take 667 mg by mouth 3 times daily (with meals)  Refills: 0     carvedilol 6.25 MG tablet  Commonly known as: COREG  Used for: Benign essential hypertension      Dose: 6.25 mg  Take 1 tablet (6.25 mg) by mouth 2 times daily (with meals)  Quantity: 180 tablet  Refills: 3     insulin glargine 100 UNIT/ML pen  Commonly known as: Lantus SoloStar  Used for: 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)      Dose: 24 Units  Inject 24 Units Subcutaneous At Bedtime  Quantity: 15 mL  Refills: 11     metoclopramide 5 MG tablet  Commonly known as: REGLAN  Used for: Gastroparesis      Dose: 5 mg  Take 1 tablet (5 mg) by mouth 4 times daily (before meals and nightly)  Quantity: 120 tablet  Refills: 11     naproxen sodium 220 MG capsule      Dose: 220 mg  Take 220 mg by mouth as needed  Refills: 0     NovoLOG FLEXPEN 100 UNIT/ML pen  Used for: 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)  Generic drug: insulin aspart      Sliding scale = 1 unit for every 20 over blood glucose of 140, average 45 units/day  Quantity: 45 mL  Refills: 0     omeprazole 40  MG DR capsule  Commonly known as: priLOSEC  Used for: Gastroesophageal reflux disease with esophagitis without hemorrhage      Dose: 80 mg  Take 2 capsules (80 mg) by mouth every evening  Quantity: 180 capsule  Refills: 11        CONTINUE these medicines which have NOT CHANGED       Dose / Directions   acetone urine test strip  Commonly known as: KETOSTIX  Used for: 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  Quantity: 200 strip  Refills: 1              Protect others around you: Learn how to safely use, store and throw away your medicines at www.disposemymeds.org.       Follow-ups after your visit       Your next 10 appointments already scheduled    Apr 07, 2021 10:30 AM  (Arrive by 10:15 AM)  Video Visit with Yovany Vogel MD  [facility] ([facility] ) [address] [place] [id number] [phone]   Please Note: This is a virtual visit; there is no need to come to the facility.          Care Instructions       Further instructions from your care team         [facility]      Interventional Radiology  Discharge Instructions Following Fistulogram      ? You may resume normal activities as tolerated, but avoid any strenuous activity or heavy lifting (>10 lbs.) involving your access arm.    ? Elevate and rest your arm as much as possible for the first 24 hours after the procedure.  This will promote healing and reduce  swelling.     ? If bleeding or oozing should occur, apply fingertip pressure to puncture site to control bleeding, but avoid excessive pressure as this may clot off the fistula.  Hold light pressure for 10 minutes, or until bleeding/oozing is controlled.  If excessive bleeding is noted or if you are having difficulty controlling the bleeding with direct pressure, call 131.     ? If you develop fever, chills, excessive pain/tenderness or drainage at the puncture site, or have questions call your Doctor or Dialysis Center.     ? If you received sedation for your procedure: An adult should stay with you for 24 hours, Do Not drive a motor vehicle, operate machinery, or drink alcoholic beverages for 24 hours.     ? You may resume your normal medications immediately    ? If you notice sudden loss of pulse or thrill (buzzing feeling) in your fistula, contact your Doctor or Dialysis unit immediately.         [facility] INTERVENTIONAL RADIOLOGY DEPARTMENT  Procedure Physician: Dr. Smiley                                                  Date of procedure: March 19, 2021  Telephone Numbers: [phone] Monday-Friday 8:00 am to 4:30 pm  [phone] After 4:30 pm Monday-Friday, Weekends & Holidays.   Ask for the Interventional Radiologist on call.  Someone is on call 24 hrs/day  [facility] toll free number: [phone] Monday-Friday 8:00 am to 4:30 pm  [facility] Emergency Dept: [phone]            Additional Information About Your Visit       MyChart Information    [employer] gives you secure access to your electronic health record. If you see a primary care provider, you can also send messages to your care team and make appointments. If you have questions, please call your primary care clinic.  If you do not have a primary care provider, please call [phone] and they will assist you.       Care EveryWhere ID    This is your Care EveryWhere ID. This could be used by other organizations to access your [facility]  "records  [id number]       Your Vitals Were  Most recent update: 3/19/2021  2:53 PM    Blood Pressure   123/73 (BP Location: Right arm)          Pulse   78          Temperature   98.6  F (37  C) (Oral)          Respirations   18          Height   1.727 m (5' 8\")             Weight   86.2 kg (190 lb)    Pulse Oximetry   94%    BMI (Body Mass Index)   28.89 kg/m           Primary Care Provider Office Phone # Fax #    Kt Ríos [phone]      Equal Access to Services    [facility]: Hadii aad ku hadasho Soomaali, waaxda luqadaha, qaybta kaalmada adeegyada, waxay briannain haycharbeln adeluis kharashelby laallyson . So [facility] [phone].    ATENCIÓN: Si habla español, tiene a chatterjee disposición servicios gratuitos de asistencia lingüística. Llame al [phone].    We comply with applicable federal and state civil rights laws, including the [place] Human Rights Act. We do not discriminate on the basis of race, color, creed, Jain, national origin, marital status, age, disability, sex, sexual orientation, or gender identity.    If you would like an itemization of your charges they will now be available in [employer] 30 days after discharge. To access the itemized statements in [employer] go to billing/billing summary. From there select view account. There will be multiple tabs showing an overview of your account, detail, payments, and communications. From the communications tab you can see your monthly statements, your itemized statements, and any billing letters generated for your account. If you do not have a [employer] account and need help getting access please contact [employer] support at [phone].  If you would prefer to have your itemized statements mailed please contact our automated itemized bill request line at [phone] option  2.       Thank you!    Thank you for choosing [facility] for your care. Our goal is always to provide you with excellent care. Hearing back from our patients is one way we can " continue to improve our services. Please take a few minutes to complete the written survey that you may receive in the mail after you visit with us. Thank you!            Medication List      Medications          Morning Afternoon Evening Bedtime As Needed    acetone urine test strip  Also known as: KETOSTIX  INSTRUCTIONS: 1 strip by In Vitro route daily  Doctor's comments: Urine ketone stix                     BD ARPITA U/F 32G X 4 MM miscellaneous  INSTRUCTIONS: Use 4 daily or as directed.  Generic drug: insulin pen needle                     blood glucose test strip  Also known as: NO BRAND SPECIFIED  INSTRUCTIONS: Use to test blood sugar 5 times daily or as directed.                       ASK your doctor about these medications          Morning Afternoon Evening Bedtime As Needed    aspirin 81 MG EC tablet  Also known as: ASA  INSTRUCTIONS: Take 1 tablet (81 mg) by mouth daily                     atorvastatin 20 MG tablet  Also known as: LIPITOR  INSTRUCTIONS: Take 1 tablet (20 mg) by mouth daily                     calcium acetate 667 MG Caps capsule  Also known as: PHOSLO  INSTRUCTIONS: Take 667 mg by mouth 3 times daily (with meals)                     carvedilol 6.25 MG tablet  Also known as: COREG  INSTRUCTIONS: Take 1 tablet (6.25 mg) by mouth 2 times daily (with meals)                     insulin glargine 100 UNIT/ML pen  Also known as: Lantus SoloStar  INSTRUCTIONS: Inject 24 Units Subcutaneous At Bedtime  Doctor's comments: If Lantus is not covered by insurance, may substitute Basaglar at same dose and frequency.                       metoclopramide 5 MG tablet  Also known as: REGLAN  INSTRUCTIONS: Take 1 tablet (5 mg) by mouth 4 times daily (before meals and nightly)                     naproxen sodium 220 MG capsule  INSTRUCTIONS: Take 220 mg by mouth as needed                     NovoLOG FLEXPEN 100 UNIT/ML pen  INSTRUCTIONS: Sliding scale = 1 unit for every 20 over blood glucose of 140, average 45  units/day  Generic drug: insulin aspart                     omeprazole 40 MG DR capsule  Also known as: priLOSEC  INSTRUCTIONS: Take 2 capsules (80 mg) by mouth every evening

## 2021-03-19 NOTE — PROGRESS NOTES
Arrived from home for a fistulagram.  VSS.  Had full run of dialysis yesterday.  Strong bruit and thrill of left arm fistula.  This is a routine 6 month fistulagram.  Denies pain.  Sister at bedside.

## 2021-03-19 NOTE — PRE-PROCEDURE
GENERAL PRE-PROCEDURE:   Procedure:  L arm fistulogram and possible interventions  Date/Time:  3/19/2021 12:13 PM    Verbal consent obtained?: Yes    Written consent obtained?: Yes    Risks and benefits: Risks, benefits and alternatives were discussed    Consent given by:  Patient  Patient states understanding of procedure being performed: Yes    Patient's understanding of procedure matches consent: Yes    Procedure consent matches procedure scheduled: Yes    Expected level of sedation:  Moderate  Appropriately NPO:  Yes  ASA Class:  Class 3- Severe systemic disease, definite functional limitations  Mallampati  :  Grade 2- soft palate, base of uvula, tonsillar pillars, and portion of posterior pharyngeal wall visible  Lungs:  Lungs clear with good breath sounds bilaterally  Heart:  Normal heart sounds and rate  History & Physical reviewed:  History and physical reviewed and no updates needed  Statement of review:  I have reviewed the lab findings, diagnostic data, medications, and the plan for sedation

## 2021-03-19 NOTE — PROGRESS NOTES
Tolerated bedrest without problems.  Tolerated food and fluids.  Anuric.  Left arm fistula with a strong bruit and thrill; tip stop in place.  Reviewed discharge instructions with patient.  PIV removed.  Discharged to home with sister.

## 2021-03-29 ENCOUNTER — TELEPHONE (OUTPATIENT)
Dept: TRANSPLANT | Facility: CLINIC | Age: 46
End: 2021-03-29

## 2021-03-29 DIAGNOSIS — E10.9 TYPE 1 DIABETES MELLITUS (H): ICD-10-CM

## 2021-03-29 DIAGNOSIS — Z01.818 ENCOUNTER FOR PRE-TRANSPLANT EVALUATION FOR KIDNEY AND PANCREAS TRANSPLANT: ICD-10-CM

## 2021-03-29 DIAGNOSIS — E78.5 HYPERLIPIDEMIA: ICD-10-CM

## 2021-03-29 DIAGNOSIS — I10 ESSENTIAL HYPERTENSION: ICD-10-CM

## 2021-03-29 NOTE — TELEPHONE ENCOUNTER
Pt called requesting the phone number to schedule derm and to let me know he is not making any progress w/ his GI issues.  He is not eligible for medical marijuana- he told me that he would need to have a prognosis of one year or less to live to be eligible.  We will see if there are any other places/specialists we can refer him to. Will follow up w/ pt after speaking w/ team.

## 2021-04-07 ENCOUNTER — VIRTUAL VISIT (OUTPATIENT)
Dept: CARDIOLOGY | Facility: CLINIC | Age: 46
End: 2021-04-07
Attending: INTERNAL MEDICINE
Payer: COMMERCIAL

## 2021-04-07 DIAGNOSIS — E10.59 TYPE 1 DIABETES MELLITUS WITH DIABETIC CARDIOMYOPATHY (H): ICD-10-CM

## 2021-04-07 DIAGNOSIS — E78.5 DYSLIPIDEMIA: ICD-10-CM

## 2021-04-07 DIAGNOSIS — I43 TYPE 1 DIABETES MELLITUS WITH DIABETIC CARDIOMYOPATHY (H): ICD-10-CM

## 2021-04-07 DIAGNOSIS — I25.5 ISCHEMIC CARDIOMYOPATHY: Primary | ICD-10-CM

## 2021-04-07 DIAGNOSIS — I10 HYPERTENSION, UNSPECIFIED TYPE: ICD-10-CM

## 2021-04-07 PROCEDURE — 99214 OFFICE O/P EST MOD 30 MIN: CPT | Mod: 95 | Performed by: INTERNAL MEDICINE

## 2021-04-07 RX ORDER — ATORVASTATIN CALCIUM 20 MG/1
40 TABLET, FILM COATED ORAL DAILY
Qty: 180 TABLET | Refills: 3 | Status: SHIPPED | OUTPATIENT
Start: 2021-04-07

## 2021-04-07 NOTE — LETTER
"4/7/2021      RE: Murtaza Fitzgerald  [address]  [place] [address]       Dear Colleague,    Thank you for the opportunity to participate in the care of your patient, Murtaza Fitzgerald, at the [facility] [place] [facility]. Please see a copy of my visit note below.    Tyrell is a 45 year old who is being evaluated via a billable video visit.      How would you like to obtain your AVS? MyChart  If the video visit is dropped, the invitation should be resent by: Text to cell phone: [phone]  Will anyone else be joining your video visit? No      Vitals - Patient Reported  Weight (Patient Reported): 83.4 kg (183 lb 13.8 oz)  Height (Patient Reported): 172.7 cm (5' 8\")  BMI (Based on Pt Reported Ht/Wt): 27.96  Pain Score: No Pain (0)(No SOB)    Vitals were taken and medications reconciled.     Oscar Brown CMA  10:07 AM    The patient has been notified of following:     \"This video visit will be conducted via a call between you and your physician/provider. We have found that certain health care needs can be provided without the need for an in-person physical exam.  This service lets us provide the care you need with a video conversation.  If a prescription is necessary we can send it directly to your pharmacy.  If lab work is needed we can place an order for that and you can then stop by our lab to have the test done at a later time.    Video visits are billed at different rates depending on your insurance coverage.  Please reach out to your insurance provider with any questions.    If during the course of the call the physician/provider feels a video visit is not appropriate, you will not be charged for this service.\"    Patient has given verbal consent for video visit? Yes    How would you like to obtain your AVS? Mail    Video-Visit Details    Type of service:  Video Visit    Video Start Time:1041am    Video End " Time:1059am    Total visit time including video visit, chart review, charting, coordination of care = 51min    Originating Location (pt. Location):patient home      Distant Location (provider location):  home office    Platform used for Video Visit: [employer]    See dictation #[id number]    Service Date: 2021      Kt Ríos MD    [facility]   [address] [place] [id number]       RE:    Murtaza Fitzgerald   MRN:  [id number]   :  1975      Dear Dr. Ríos:      It was a pleasure participating in the care of your patient, . Murtaza Fitzgerald.  As you know, he is a 45-year-old gentleman who I saw today via virtual video visit via [employer] for a moderate ischemic cardiomyopathy, hypertension, hyperlipidemia and preoperative evaluation prior to second kidney and pancreas transplant.      His past medical history is significant for the followin.  Type 1 diabetes since age 11.   2.  Hypertension.   3.  Hyperlipidemia, now status post kidney transplant 2008, failed.  Back on dialysis 2020.   4.  Status post pancreas transplant in , failed 3 years later.  Currently on insulin.   5.  Depression.   6.  Tobacco abuse in the past.   7.  Knee tumor surgery.   8.  Gastroparesis.      His cardiac history is significant for a moderate ischemic cardiomyopathy.  He was asymptomatic, but found to have a positive stress test, which eventually led a 3-vessel coronary bypass surgery in  (LIMA to LAD, vein graft to OM, vein graft to PDA).      I last saw him 2020 and ordered a coronary angiogram, which was performed on 2021 that revealed the following:   Left main 45% lesion.   LAD chronically occluded proximally.   Circumflex 80% proximal lesion.   RCA .   LIMA to LAD patent.   SVG to OM3 patent.   SVG to right PDA chronically occluded.      Medical therapy was recommended.      His echocardiogram in 10/2020 revealed an ejection fraction of 50%-55% range;  however, his repeat echo on 2020 revealed a decrease in LV systolic function to the 37% range.      In terms of the patient's symptoms, he has never really had any symptoms.  He is able to walk for about 5 miles on flat ground without chest pain or shortness of breath.  He has a little bit of back pain that is musculoskeletal in nature.  He otherwise denies best shortness of breath, PND, orthopnea, edema, palpitations, syncope or near-syncope.  He says his dry weight after dialysis is 82 kg and he says that his blood pressures typically run quite high, but he has not been keeping track of them at home.      In terms of his current medicines he is currently takin.  Aspirin 81 mg a day.   2.  Lipitor 20 mg a day.   3.  Carvedilol 6.25 mg twice daily.   4.  Insulin.      PHYSICAL EXAMINATION:   VITAL SIGNS:  His last recorded blood pressure of 2020 was in the 160 mmHg range.   GENERAL:  He appears comfortable, well groomed.   PSYCHIATRIC:  He is alert and oriented x3.   HEENT:  His eyes do not appear grossly erythematous or have exudate.   RESPIRATORY:  He is breathing comfortably without gross cough.      The remainder of the comprehensive physical exam was deferred secondary to COVID-19 pandemic and secondary to video visit restrictions.      LABORATORY DATA:  On 2020, his LDL was 88.  His triglycerides were 159.  On 2021, potassium 4.1, GFR 9.  Hemoglobin was 11.1.      Echocardiogram 2020 reveals an ejection fraction of 37% without significant valvular pathology.  EF decreased since 10/2020.      IMPRESSION:   Tyrell is a 45-year-old gentleman whose past medical history is significant for prior kidney transplant 2008 that subsequently failed, who is currently on dialysis, who also had a pancreas transplant in 2009 that subsequently failed, currently on insulin, who has several active cardiac issues:   1.  Moderate ischemic cardiomyopathy.   The patient had 3-vessel coronary  bypass surgery performed in 10/2015 (LIMA to LAD, vein graft to OM, vein graft to PDA).  His coronary angiogram 03/03/2021 reveals that his LIMA to LAD is patent.  His SVG to OM3 is patent; however, his SVG to right PDA is chronically occluded and his native right coronary artery is also chronically occluded.  Medical therapy was recommended.   Additionally, his echocardiogram from 10/2020 originally reported LV ejection fraction in the 50%-55% range, which dropped to the 37% range as of 12/06/2020.   I suspect that not only his chronically occluded right coronary territory is contributing, but blood pressure and heart failure regimen issues might also help boost his ejection fraction in the future.   2.  Hypertension.     He says his blood pressures tend to run quite high, which would not be optimal in terms of his existing cardiomyopathy and may explain at least partially why his ejection fraction dropped from October to 12/2020.  Achieving more optimal blood pressure with a goal of approximately 115 mmHg systolic without symptoms would be more ideal.  We will gather further information and make adjustments accordingly.   3.  Hyperlipidemia.     His goal LDL is less than 70.  It most recently was 88 with mildly elevated triglycerides.  We will perform further adjustments.      PLAN:   1.  Increase Lipitor to 40 mg a day.   2.  He will buy an arm blood pressure cuff and record his blood pressures several hours after taking morning meds and after dialysis after resting quietly for 10 minutes.  We will touch base via [employer] in 2 weeks, and if his blood pressures are not in the goal range of 115 mmHg systolic at that time, we will plan on increasing carvedilol and/or adding an afterload reduction, possibly with ACE inhibitors at that time.   3.  He will follow up in [facility] in approximately 1 month for additional optimization of heart failure regimen and blood pressures.     4.  Once optimized from heart  failure and blood pressure standpoint, then we will repeat another echocardiogram after 3 months of being on optimal medical therapy to check for improvement in overall LV systolic function.  At this point in time, we will reevaluate his ejection fraction and we can hope for an increase in the overall function, which may help improve his overall future cardiac prognosis and therefore may improve his likelihood of a better outcome for a renal transplantation in the future.      Further updates to follow.      Once again, it was a pleasure participating in the care of your patient, Mr. Doug Fitzgerald.  Please feel free to contact me at any time if any questions regarding his care in the future.      Sincerely,         Yovany Vogel MD              D: 2021   T: 2021   MT: ZAID      Name:     DOUG PAIZ   MRN:      [id number]        Account:      [id number]   :      1975           Service Date: 2021      Document: [id number]

## 2021-04-07 NOTE — PROGRESS NOTES
"Tyrell is a 45 year old who is being evaluated via a billable video visit.      How would you like to obtain your AVS? MyChart  If the video visit is dropped, the invitation should be resent by: Text to cell phone: [phone]  Will anyone else be joining your video visit? No      Vitals - Patient Reported  Weight (Patient Reported): 83.4 kg (183 lb 13.8 oz)  Height (Patient Reported): 172.7 cm (5' 8\")  BMI (Based on Pt Reported Ht/Wt): 27.96  Pain Score: No Pain (0)(No SOB)    Vitals were taken and medications reconciled.     Oscar Brown CMA  10:07 AM    The patient has been notified of following:     \"This video visit will be conducted via a call between you and your physician/provider. We have found that certain health care needs can be provided without the need for an in-person physical exam.  This service lets us provide the care you need with a video conversation.  If a prescription is necessary we can send it directly to your pharmacy.  If lab work is needed we can place an order for that and you can then stop by our lab to have the test done at a later time.    Video visits are billed at different rates depending on your insurance coverage.  Please reach out to your insurance provider with any questions.    If during the course of the call the physician/provider feels a video visit is not appropriate, you will not be charged for this service.\"    Patient has given verbal consent for video visit? Yes    How would you like to obtain your AVS? Mail    Video-Visit Details    Type of service:  Video Visit    Video Start Time:1041am    Video End Time:1059am    Total visit time including video visit, chart review, charting, coordination of care = 51min    Originating Location (pt. Location):patient home      Distant Location (provider location):  home office    Platform used for Video Visit: [employer]    See dictation #[id number]  "

## 2021-04-07 NOTE — PATIENT INSTRUCTIONS
Patient Instructions:  It was a pleasure to see you in the cardiology clinic today.      If you have any questions, you can reach my nurse, Carey LOPEZ LPN, at [phone].  Press Option #1 for the [facility], and then press Option #4 for nursing.    We are encouraging the use of "[employer]hart to communicate with your HealthCare Provider    Medication Changes: None.    Recommendations:  - Obtain a home blood pressure monitor for your upper arm.  - Please monitor and record your blood pressure and pulse daily.  Ideally this is done 2-3 hours after taking your blood pressure medication(s) and after resting quietly for 5-10 minutes.  We would also like this done again after dialysis.  Please contact clinic with your readings in about 2 weeks.      Studies Ordered: None.    The results from today include: None.    Please follow up: With [facility] in 1 month.    Sincerely,    Yovany Vogel MD     If you have an urgent need after hours (8:00 am to 4:30 pm) please call [phone] and ask for the cardiology fellow on call.

## 2021-04-07 NOTE — PROGRESS NOTES
Service Date: 2021      Kt Ríos MD    [facility]   [address] [place] [address]       RE:    Murtaza Fitzgerald   MRN:  [id number]   :  1975      Dear Dr. Ríos:      It was a pleasure participating in the care of your patient, . Murtaza Fitzgerald.  As you know, he is a 45-year-old gentleman who I saw today via virtual video visit via [employer] for a moderate ischemic cardiomyopathy, hypertension, hyperlipidemia and preoperative evaluation prior to second kidney and pancreas transplant.      His past medical history is significant for the followin.  Type 1 diabetes since age 11.   2.  Hypertension.   3.  Hyperlipidemia, now status post kidney transplant 2008, failed.  Back on dialysis 2020.   4.  Status post pancreas transplant in , failed 3 years later.  Currently on insulin.   5.  Depression.   6.  Tobacco abuse in the past.   7.  Knee tumor surgery.   8.  Gastroparesis.      His cardiac history is significant for a moderate ischemic cardiomyopathy.  He was asymptomatic, but found to have a positive stress test, which eventually led a 3-vessel coronary bypass surgery in  (LIMA to LAD, vein graft to OM, vein graft to PDA).      I last saw him 2020 and ordered a coronary angiogram, which was performed on 2021 that revealed the following:     Left main 45% lesion.   LAD chronically occluded proximally.   Circumflex 80% proximal lesion.   RCA .   LIMA to LAD patent.   SVG to OM3 patent.   SVG to right PDA chronically occluded.      Medical therapy was recommended.      His echocardiogram in 10/2020 revealed an ejection fraction of 50%-55% range; however, his repeat echo on 2020 revealed a decrease in LV systolic function to the 37% range.      In terms of the patient's symptoms, he has never really had any symptoms.  He is able to walk for about 5 miles on flat ground without chest pain or shortness of breath.  He  has a little bit of back pain that is musculoskeletal in nature.  He otherwise denies best shortness of breath, PND, orthopnea, edema, palpitations, syncope or near-syncope.  He says his dry weight after dialysis is 82 kg and he says that his blood pressures typically run quite high, but he has not been keeping track of them at home.      In terms of his current medicines he is currently takin.  Aspirin 81 mg a day.   2.  Lipitor 20 mg a day.   3.  Carvedilol 6.25 mg twice daily.   4.  Insulin.      PHYSICAL EXAMINATION:     VITAL SIGNS:  His last recorded blood pressure of 2020 was in the 160 mmHg range.   GENERAL:  He appears comfortable, well groomed.   PSYCHIATRIC:  He is alert and oriented x3.   HEENT:  His eyes do not appear grossly erythematous or have exudate.   RESPIRATORY:  He is breathing comfortably without gross cough.      The remainder of the comprehensive physical exam was deferred secondary to COVID-19 pandemic and secondary to video visit restrictions.      LABORATORY DATA:  On 2020, his LDL was 88.  His triglycerides were 159.  On 2021, potassium 4.1, GFR 9.  Hemoglobin was 11.1.      Echocardiogram 2020 reveals an ejection fraction of 37% without significant valvular pathology.  EF decreased since 10/2020.        IMPRESSION:       Tyrell is a 45-year-old gentleman whose past medical history is significant for prior kidney transplant 2008 that subsequently failed, who is currently on dialysis, who also had a pancreas transplant in 2009 that subsequently failed, currently on insulin, who has several active cardiac issues:     1.  Moderate ischemic cardiomyopathy.     The patient had 3-vessel coronary bypass surgery performed in 10/2015 (LIMA to LAD, vein graft to OM, vein graft to PDA).  His coronary angiogram 2021 reveals that his LIMA to LAD is patent.  His SVG to OM3 is patent; however, his SVG to right PDA is chronically occluded and his native right  coronary artery is also chronically occluded.  Medical therapy was recommended.     Additionally, his echocardiogram from 10/2020 originally reported LV ejection fraction in the 50%-55% range, which dropped to the 37% range as of 12/06/2020.     I suspect that not only his chronically occluded right coronary territory is contributing, but blood pressure and heart failure regimen issues might also help boost his ejection fraction in the future.     2.  Hypertension.       He says his blood pressures tend to run quite high, which would not be optimal in terms of his existing cardiomyopathy and may explain at least partially why his ejection fraction dropped from October to 12/2020.      Achieving more optimal blood pressure with a goal of approximately 115 mmHg systolic without symptoms would be more ideal.  We will gather further information and make adjustments accordingly.     3.  Hyperlipidemia.       His goal LDL is less than 70.  It most recently was 88 with mildly elevated triglycerides.  We will perform further adjustments.        PLAN:     1.  Increase Lipitor to 40 mg a day.     2.  He will buy an arm blood pressure cuff and record his blood pressures several hours after taking morning meds and after dialysis after resting quietly for 10 minutes.      We will touch base via [employer] in 2 weeks, and if his blood pressures are not in the goal range of 115 mmHg systolic at that time, we will plan on increasing carvedilol and/or adding an afterload reduction, possibly with ACE inhibitors at that time.     3.  He will follow up in the [facility] in approximately 1 month for additional optimization of heart failure regimen and blood pressures.       4.  Once optimized from heart failure and blood pressure standpoint, then we will repeat another echocardiogram after 3 months of being on optimal medical therapy to check for improvement in overall LV systolic function.      At this point in time, we will  reevaluate his ejection fraction and we can hope for an increase in the overall function, which may help improve his overall future cardiac prognosis and therefore may improve his likelihood of a more positive outcome for a renal transplantation in the future.      Further updates to follow.      Once again, it was a pleasure participating in the care of your patient, Mr. Doug Fitzgerald.  Please feel free to contact me at any time if any questions regarding his care in the future.      Sincerely,               ROGER HUMMEL MD        Addendum 21:    Home BPs running 135-145mmHg systolic with pulse in 80-90bpm range    Plan:    1.  Increase Coreg to 12.5mg po BID    2.  Followup [facility] 2 weeks to aid with rapid uptitration of CHF regimen and optimization of BPs (goal 115mmHg systolic without sxs)         D: 2021   T: 2021   MT: ZAID      Name:     DOUG PAIZ   MRN:      [id number]        Account:      [id number]   :      1975           Service Date: 2021      Document: [id number]

## 2021-04-11 ENCOUNTER — HEALTH MAINTENANCE LETTER (OUTPATIENT)
Age: 46
End: 2021-04-11

## 2021-04-12 ENCOUNTER — OFFICE VISIT (OUTPATIENT)
Dept: DERMATOLOGY | Facility: CLINIC | Age: 46
End: 2021-04-12
Payer: COMMERCIAL

## 2021-04-12 DIAGNOSIS — L73.9 FOLLICULITIS: ICD-10-CM

## 2021-04-12 DIAGNOSIS — Z01.818 ENCOUNTER FOR PRE-TRANSPLANT EVALUATION FOR KIDNEY AND PANCREAS TRANSPLANT: ICD-10-CM

## 2021-04-12 DIAGNOSIS — L29.9 PRURITUS: Primary | ICD-10-CM

## 2021-04-12 DIAGNOSIS — I10 ESSENTIAL HYPERTENSION: ICD-10-CM

## 2021-04-12 PROCEDURE — 99214 OFFICE O/P EST MOD 30 MIN: CPT | Performed by: DERMATOLOGY

## 2021-04-12 RX ORDER — TRIAMCINOLONE ACETONIDE 1 MG/G
OINTMENT TOPICAL
Qty: 30 G | Refills: 3 | Status: ON HOLD | OUTPATIENT
Start: 2021-04-12 | End: 2021-09-03

## 2021-04-12 RX ORDER — HYDROCORTISONE 25 MG/G
OINTMENT TOPICAL
Qty: 30 G | Refills: 1 | Status: ON HOLD | OUTPATIENT
Start: 2021-04-12 | End: 2021-09-03

## 2021-04-12 ASSESSMENT — PAIN SCALES - GENERAL: PAINLEVEL: NO PAIN (0)

## 2021-04-12 NOTE — LETTER
4/12/2021       RE: Murtaza Fitzgerald  [address]  [facility] [address]     Dear Colleague,    Thank you for referring your patient, Murtaza Fitzgerald, to the [facility] [place] at [facility]. Please see a copy of my visit note below.    [facility] Dermatology Note  Encounter Date: Apr 12, 2021  Office Visit     Dermatology Problem List:  1. Kidney transplant in 2008 - tacrolimus; prior mycophenolic acid              - being worked up for second kidney transplant and pancreatic transplant.              - no personal history of skin cancer or family history of melanoma    ____________________________________________    Assessment & Plan:   # FBSE for Renal Transplant   Kidney transplant in 2008, which failed and patient is now on hemodialysis. Previously on tacrolimus; prior mycophenolic acid. Patient is undergoing renal and pancreatic transplant eval.   - being worked up for second kidney transplant and pancreatic transplant.  - no personal history of skin cancer or family history of melanoma  - no concerning skin findings on exam today.     2. Benign findings: lentigines, cherry angiomas, nevi    3. 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  - hydrocortisone ointment BID for the face, groin, axillae  - triamcinolone ointment BID for the body.     4. Folliculitis  Involving the trunk. Discussed potential side effects of BPO including irritation and dryness.  - BPO wash every day.     Procedures Performed:   None    Follow-up: 1 year(s) in-person, or earlier for new or changing lesions    Staff and Resident:     Dr Connie Ayala MD PGY-2  Medicine-Dermatology  I, Renetta Rosales MD, saw this patient with the resident and agree with the resident s findings and plan of care as documented in the resident s note.    ____________________________________________    CC: No chief  complaint on file.    HPI:  Mr. Murtaza Fitzgerald pmhx DM1, gastroparesis, retinopathy, neuropathy, with prior renal transplant in 2008, off imunosuppresion 2/2 renal txplt failed and patient is now on hemodialysis and undergoing eval for second renal transplant with pancreatic transplant presents today in f/u for skin check pre renal + pancreatic transplant. LV was virtual 2/2 covid 19.     Patient is otherwise feeling well, without additional skin concerns.    Labs Reviewed:  N/A    Physical Exam:  Vitals: There were no vitals taken for this visit.  SKIN: Full skin, which includes the head/face, both arms, chest, back, abdomen,both legs, genitalia and/or groin buttocks, digits and/or nails, was examined.  - excoriated papules on  The arms, back c/w excoriated prurigo nodules  - Multiple regular brown pigmented macules and papules are identified on the penile shaft, back, shoulders, extremities.   - There are dome shaped bright red papules on the trunk. .   - There are superifical open and closed comedones around the hair shaft.   - No other lesions of concern on areas examined.     Medications:  Current Outpatient Medications   Medication     acetone, Urine, test STRP     aspirin (ASA) 81 MG EC tablet     atorvastatin (LIPITOR) 20 MG tablet     blood glucose (NO BRAND SPECIFIED) test strip     calcium acetate (PHOSLO) 667 MG CAPS capsule     carvedilol (COREG) 6.25 MG tablet     insulin aspart (NOVOLOG FLEXPEN) 100 UNIT/ML pen     insulin glargine (LANTUS SOLOSTAR) 100 UNIT/ML pen     insulin pen needle (BD ARPITA U/F) 32G X 4 MM miscellaneous     metoclopramide (REGLAN) 5 MG tablet     naproxen sodium 220 MG capsule     omeprazole (PRILOSEC) 40 MG DR capsule     No current facility-administered medications for this visit.       Past Medical History:   Patient Active Problem List   Diagnosis     Mycotic aneurysm of kidney transplant (H)     Kidney replaced by transplant     Coronary artery disease,  non-occlusive     CMV (cytomegalovirus infection) status negative     Hypertension goal BP (blood pressure) < 140/90     Hyperlipidemia with target LDL less than 100     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)     Adhesive capsulitis of right shoulder     ESRD (end stage renal disease) on dialysis (H)     ESRD (end stage renal disease) (H)     Encounter regarding vascular access for dialysis for ESRD (H)     NUÑEZ (dyspnea on exertion)     Abnormal cardiovascular stress test     Abnormal echocardiogram     Gastroparesis     S/P CABG x 3     Coronary artery disease involving native coronary artery of native heart without angina pectoris     Nausea & vomiting     Status post coronary angiogram     Past Medical History:   Diagnosis Date     CMV (cytomegalovirus infection) status negative 2011     Coronary artery disease, non-occlusive 2008    angio showed diffuse disease with no lesions     Diabetes mellitus, type 1     Diagnosed at age 11. 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

## 2021-04-12 NOTE — NURSING NOTE
Dermatology Rooming Note    Murtaza Fitzgerald's goals for this visit include:   Chief Complaint   Patient presents with     Derm Problem     Tyrell is here today for a transplant skin check. No concerns.     FERMIN Poon

## 2021-04-12 NOTE — PATIENT INSTRUCTIONS
1. Skin check pre-transplant.  - no concerning skin lesions today.   - benzoyl peroxide wash: use on acne spots, white heads, folliculitis. Apply once a day for 1-2 minutes on areas of whiteheads or acne then rinse off.  - triamcinolone ointment: Apply twice a day to the body for itchy spots as needed. Do not apply to face, groin, genitals.   - hydrocortisone ointment:  Apply to the face, armpits, groin twice a day if needed for itchy spots.  - if you note any changes in your skin, please make an appointment with us.     Return to clinic in 1 year or sooner if needed.    Patient Education     Checking for Skin Cancer  You can find cancer early by checking your skin each month. There are 3 kinds of skin cancer. They are melanoma, basal cell carcinoma, and squamous cell carcinoma. Doing monthly skin checks is the best way to find new marks or skin changes. Follow the instructions below for checking your skin.   The ABCDEs of checking moles for melanoma   Check your moles or growths for signs of melanoma using ABCDE:     Asymmetry: the sides of the mole or growth don t match    Border: the edges are ragged, notched, or blurred    Color: the color within the mole or growth varies    Diameter: the mole or growth is larger than 6 mm (size of a pencil eraser)    Evolving: the size, shape, or color of the mole or growth is changing (evolving is not shown in the images below)    Checking for other types of skin cancer  Basal cell carcinoma or squamous cell carcinoma have symptoms such as:       A spot or mole that looks different from all other marks on your skin    Changes in how an area feels, such as itching, tenderness, or pain    Changes in the skin's surface, such as oozing, bleeding, or scaliness    A sore that does not heal    New swelling or redness beyond the border of a mole    Who s at risk?  Anyone can get skin cancer. But you are at greater risk if you have:     Fair skin, light-colored hair, or light-colored  eyes    Many moles or abnormal moles on your skin    A history of sunburns from sunlight or tanning beds    A family history of skin cancer    A history of exposure to radiation or chemicals    A weakened immune system  If you have had skin cancer in the past, you are at risk for recurring skin cancer.   How to check your skin  Do your monthly skin checkups in front of a full-length mirror. Check all parts of your body, including your:     Head (ears, face, neck, and scalp)    Torso (front, back, and sides)    Arms (tops, undersides, upper, and lower armpits)    Hands (palms, backs, and fingers, including under the nails)    Buttocks and genitals    Legs (front, back, and sides)    Feet (tops, soles, toes, including under the nails, and between toes)  If you have a lot of moles, take digital photos of them each month. Make sure to take photos both up close and from a distance. These can help you see if any moles change over time.   Most skin changes are not cancer. But if you see any changes in your skin, call your doctor right away. Only he or she can diagnose a problem. If you have skin cancer, seeing your doctor can be the first step toward getting the treatment that could save your life.   [employer] last reviewed this educational content on 4/1/2019 2000-2020 [employer]. [address] [place] [address]. All rights reserved. This information is not intended as a substitute for professional medical care. Always follow your healthcare professional's instructions.       When should I call my doctor?    If you are worsening or not improving, please, contact us or seek urgent care as noted below.     Who should I call with questions (adults)?    [facility] (adult and pediatric): [phone]     [facility] (adult): [phone]    For urgent needs outside of business hours call the [facility] at [phone] and ask for  the dermatology resident on call    If this is a medical emergency and you are unable to reach an ER, Call 911      Who should I call with questions (pediatric)?  [facility]- Pediatric Dermatology  Dr. Kelly Bautista, Dr. Oskar Main, Dr. Zainab Addison, Brooke Lawrence, ZACK Blas, Dr. Sandie Ragland & Dr. Kt Best  Non Urgent  Nurse Triage Line; [phone]- Laurie and Bri GARCIA Care Coordinators   [place] (/Complex ) [phone]    If you need a prescription refill, please contact your pharmacy. Refills are approved or denied by our Physicians during normal business hours, Monday through Fridays  Per office policy, refills will not be granted if you have not been seen within the past year (or sooner depending on your child's condition)    Scheduling Information:  Pediatric Appointment Scheduling and Call Center [phone]  Radiology Scheduling- [phone]  Sedation Unit Scheduling- [phone]  [place] Scheduling- General [phone]; Pediatric Dermatology [phone]  Main  Services: [phone]  Vietnamese: [phone]  Maltese: [phone]  Hmong/Ghanaian/Andre: [phone]  Preadmission Nursing Department Fax Number: [phone] (Fax all pre-operative paperwork to this number)    For urgent matters arising during evenings, weekends, or holidays that cannot wait for normal business hours please call [phone] and ask for the Dermatology Resident On-Call to be paged.

## 2021-04-12 NOTE — PROGRESS NOTES
[facility] Dermatology Note  Encounter Date: Apr 12, 2021  Office Visit     Dermatology Problem List:  1. Kidney transplant in 2008 - tacrolimus; prior mycophenolic acid              - being worked up for second kidney transplant and pancreatic transplant.              - no personal history of skin cancer or family history of melanoma    ____________________________________________    Assessment & Plan:   # FBSE for Renal Transplant   Kidney transplant in 2008, which failed and patient is now on hemodialysis. Previously on tacrolimus; prior mycophenolic acid. Patient is undergoing renal and pancreatic transplant eval.   - being worked up for second kidney transplant and pancreatic transplant.  - no personal history of skin cancer or family history of melanoma  - no concerning skin findings on exam today.     2. Benign findings: lentigines, cherry angiomas, nevi    3. 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  - hydrocortisone ointment BID for the face, groin, axillae  - triamcinolone ointment BID for the body.     4. Folliculitis  Involving the trunk. Discussed potential side effects of BPO including irritation and dryness.  - BPO wash every day.     Procedures Performed:   None    Follow-up: 1 year(s) in-person, or earlier for new or changing lesions    Staff and Resident:     Dr Connie Ayala MD PGY-2  Medicine-Dermatology  I, Renetta Rosales MD, saw this patient with the resident and agree with the resident s findings and plan of care as documented in the resident s note.    ____________________________________________    CC: No chief complaint on file.    HPI:  Mr. Murtaza Fitzgerald pmhx DM1, gastroparesis, retinopathy, neuropathy, with prior renal transplant in 2008, off imunosuppresion 2/2 renal txplt failed and patient is now on hemodialysis and undergoing eval for second renal transplant with pancreatic transplant presents today in f/u for skin check  pre renal + pancreatic transplant. LV was virtual 2/2 covid 19.     Patient is otherwise feeling well, without additional skin concerns.    Labs Reviewed:  N/A    Physical Exam:  Vitals: There were no vitals taken for this visit.  SKIN: Full skin, which includes the head/face, both arms, chest, back, abdomen,both legs, genitalia and/or groin buttocks, digits and/or nails, was examined.  - excoriated papules on  The arms, back c/w excoriated prurigo nodules  - Multiple regular brown pigmented macules and papules are identified on the penile shaft, back, shoulders, extremities.   - There are dome shaped bright red papules on the trunk. .   - There are superifical open and closed comedones around the hair shaft.   - No other lesions of concern on areas examined.     Medications:  Current Outpatient Medications   Medication     acetone, Urine, test STRP     aspirin (ASA) 81 MG EC tablet     atorvastatin (LIPITOR) 20 MG tablet     blood glucose (NO BRAND SPECIFIED) test strip     calcium acetate (PHOSLO) 667 MG CAPS capsule     carvedilol (COREG) 6.25 MG tablet     insulin aspart (NOVOLOG FLEXPEN) 100 UNIT/ML pen     insulin glargine (LANTUS SOLOSTAR) 100 UNIT/ML pen     insulin pen needle (BD ARPITA U/F) 32G X 4 MM miscellaneous     metoclopramide (REGLAN) 5 MG tablet     naproxen sodium 220 MG capsule     omeprazole (PRILOSEC) 40 MG DR capsule     No current facility-administered medications for this visit.       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)     Adhesive capsulitis of right shoulder     ESRD (end stage renal disease) on dialysis (H)     ESRD (end stage  renal disease) (H)     Encounter regarding vascular access for dialysis for ESRD (H)     NUÑEZ (dyspnea on exertion)     Abnormal cardiovascular stress test     Abnormal echocardiogram     Gastroparesis     S/P CABG x 3     Coronary artery disease involving native coronary artery of native heart without angina pectoris     Nausea & vomiting     Status post coronary angiogram     Past Medical History:   Diagnosis Date     CMV (cytomegalovirus infection) status negative 2011     Coronary artery disease, non-occlusive 2008    angio showed diffuse disease with no lesions     Diabetes mellitus, type 1     Diagnosed at age 11. 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

## 2021-04-16 ENCOUNTER — TELEPHONE (OUTPATIENT)
Dept: PEDIATRICS | Facility: CLINIC | Age: 46
End: 2021-04-16

## 2021-04-16 NOTE — TELEPHONE ENCOUNTER
Prior Authorization Retail Medication Request    Medication/Dose: insulin aspart (NOVOLOG FLEXPEN) 100 UNIT/ML pen  ICD code (if different than what is on RX):    Previously Tried and Failed:    Rationale:      Insurance Name:  1844-[employer]AGE Ph: [phone]   Insurance ID: [id number]        Pharmacy Information (if different than what is on RX)  Name:  [facility]  Phone:  [phone]  Fax: [phone]    Herminia Joy CMA

## 2021-04-19 NOTE — TELEPHONE ENCOUNTER
Prior Authorization Not Needed per Insurance    Medication: NovoLOG FlexPen 100UNIT/ML pen-injectors  Insurance Company: [employer] - Phone [phone] Fax [phone]  Expected CoPay:      Pharmacy Filling the Rx: [id number] [place] [id number] AT [facility] [address]  Pharmacy Notified: Yes  Patient Notified: Yes    PA not needed, pharmacy received paid claim and medication has already been picked up.

## 2021-04-27 DIAGNOSIS — E83.39 HYPERPHOSPHATEMIA: Primary | ICD-10-CM

## 2021-04-27 RX ORDER — LANTHANUM CARBONATE 750 MG/1
1500 TABLET, CHEWABLE ORAL
Qty: 900 TABLET | Refills: 3 | Status: SHIPPED | OUTPATIENT
Start: 2021-04-27 | End: 2021-05-18

## 2021-04-30 ENCOUNTER — TELEPHONE (OUTPATIENT)
Dept: TRANSPLANT | Facility: CLINIC | Age: 46
End: 2021-04-30

## 2021-04-30 ENCOUNTER — TELEPHONE (OUTPATIENT)
Dept: PEDIATRICS | Facility: CLINIC | Age: 46
End: 2021-04-30

## 2021-04-30 DIAGNOSIS — I43 TYPE 1 DIABETES MELLITUS WITH DIABETIC CARDIOMYOPATHY (H): Primary | ICD-10-CM

## 2021-04-30 DIAGNOSIS — E10.59 TYPE 1 DIABETES MELLITUS WITH DIABETIC CARDIOMYOPATHY (H): Primary | ICD-10-CM

## 2021-04-30 RX ORDER — INSULIN PUMP CONTROLLER
1 EACH MISCELLANEOUS
Qty: 5 EACH | Refills: 11 | Status: SHIPPED | OUTPATIENT
Start: 2021-04-30

## 2021-04-30 RX ORDER — PROCHLORPERAZINE 25 MG/1
SUPPOSITORY RECTAL
Qty: 3 EACH | Refills: 11 | Status: SHIPPED | OUTPATIENT
Start: 2021-04-30

## 2021-04-30 RX ORDER — PROCHLORPERAZINE 25 MG/1
SUPPOSITORY RECTAL
Qty: 1 EACH | Refills: 3 | Status: SHIPPED | OUTPATIENT
Start: 2021-04-30

## 2021-04-30 NOTE — TELEPHONE ENCOUNTER
Called pt and provided Dr. Marin Beaulieu's info from [facility] as he may be a resource for management of the pt's gastroparesis.  The pt mentioned he is eating more frequently as of recently and has been feeling better. He is working w/ cards per their care plan. He saw dermatology and was cleared earlier this month.  He will let me know if he is seen by Dr. Beaulieu so that I can request records and will continue w/ the cards w/u.  He had no further questions for me at this time.

## 2021-04-30 NOTE — TELEPHONE ENCOUNTER
Call received from Ludmila GARCIA with [facility]. [phone].   Ludmila GARCIA is a coordinator of care for this patient.     Patient needed sleep study.  Request for sleep center in [place].     [place] [facility] [phone].    Requested:  [facility], Sleep Disorder Diagnostic at fax   #[phone].    Ludmila GARCIA verbalized that she will notify patient of [place] [facility] and to check with insurance regarding [facility].    Patient has switched insurance from [employer] to [employer].    Patient is requesting to have a new order for Dexcom and Omnipod as required by the new [employer] insurance.     Routing to Dr. Ríos:    -Ok for Dexcom and Omnipod?  Order Pended.     Jun Dalal RN on 4/30/2021 at 3:31 PM

## 2021-05-13 ENCOUNTER — TELEPHONE (OUTPATIENT)
Dept: PEDIATRICS | Facility: CLINIC | Age: 46
End: 2021-05-13

## 2021-05-13 NOTE — TELEPHONE ENCOUNTER
Ludmila GARCIA case manager with [facility] called in.     Wanted to know if Omnipod, Dexcom had been ordered.     These supplies were ordered by Dr. Ríos on 4/30/21.    Jun Dalal RN on 5/13/2021 at 9:50 AM

## 2021-05-18 DIAGNOSIS — E83.39 HYPERPHOSPHATEMIA: ICD-10-CM

## 2021-05-18 RX ORDER — LANTHANUM CARBONATE 750 MG/1
1500 TABLET, CHEWABLE ORAL
Qty: 900 TABLET | Refills: 3 | Status: ON HOLD | OUTPATIENT
Start: 2021-05-18 | End: 2021-09-03

## 2021-05-20 ENCOUNTER — TELEPHONE (OUTPATIENT)
Dept: NEPHROLOGY | Facility: CLINIC | Age: 46
End: 2021-05-20

## 2021-05-20 DIAGNOSIS — T82.898A PROBLEM WITH DIALYSIS ACCESS, INITIAL ENCOUNTER (H): Primary | ICD-10-CM

## 2021-05-20 NOTE — TELEPHONE ENCOUNTER
Received call from JOSE Becerra, at [facility].  Pt has order from Katelyn Lopez for an ultrasound of AVG d/t prolonged bleeding during and after dialysis from the arterial (inlet) site.  Per Mariam, they have attempted to cannulate in different areas on the arterial side, without a change in outcome.  The cannulation site will bleed as soon as it's cannulated, and sometimes results in having to end tx early.  After HD, the site has prolonged bleeding to achieve hemostasis.    Sent to scheduling pool to schedule as level one, on non dialysis day, within 1 week (preferably tomorrow or Monday) and to notify pt, and fax appointment to [facility].    Pt s/p LUE AVG creation on 10/7/2020 with MD Guzman.  Pt had a fistulogram and declot on 3/19/21.    Will follow up as needed.    BETH LEWIS RN on 5/20/2021 at 11:55 AM  Dialysis Access Care Coordinator  Phone: [phone]

## 2021-05-24 ENCOUNTER — ANCILLARY PROCEDURE (OUTPATIENT)
Dept: ULTRASOUND IMAGING | Facility: CLINIC | Age: 46
End: 2021-05-24
Attending: NURSE PRACTITIONER
Payer: COMMERCIAL

## 2021-05-24 DIAGNOSIS — T82.898A PROBLEM WITH DIALYSIS ACCESS, INITIAL ENCOUNTER (H): ICD-10-CM

## 2021-05-24 PROCEDURE — 93990 DOPPLER FLOW TESTING: CPT | Mod: GC | Performed by: RADIOLOGY

## 2021-05-26 DIAGNOSIS — T82.898A PROBLEM WITH DIALYSIS ACCESS, INITIAL ENCOUNTER (H): Primary | ICD-10-CM

## 2021-05-26 NOTE — TELEPHONE ENCOUNTER
Ultrasound was completed on 5/24/21.    Will review results with NP Katelyn Lopez and ask if she thinks a fistulogram would be appropriate.    Will follow up accordingly.    BETH LEWIS RN on 5/26/2021 at 12:04 PM  Dialysis Access Care Coordinator  Phone: [phone]

## 2021-06-04 ENCOUNTER — HOSPITAL ENCOUNTER (OUTPATIENT)
Facility: CLINIC | Age: 46
End: 2021-06-04
Admitting: RADIOLOGY
Payer: COMMERCIAL

## 2021-06-04 DIAGNOSIS — Z11.59 ENCOUNTER FOR SCREENING FOR OTHER VIRAL DISEASES: ICD-10-CM

## 2021-06-07 ENCOUNTER — TELEPHONE (OUTPATIENT)
Dept: INTERVENTIONAL RADIOLOGY/VASCULAR | Facility: CLINIC | Age: 46
End: 2021-06-07

## 2021-06-07 RX ORDER — SODIUM CHLORIDE 9 MG/ML
INJECTION, SOLUTION INTRAVENOUS CONTINUOUS
Status: CANCELLED | OUTPATIENT
Start: 2021-06-07

## 2021-06-07 RX ORDER — LIDOCAINE 40 MG/G
CREAM TOPICAL
Status: CANCELLED | OUTPATIENT
Start: 2021-06-07

## 2021-06-07 RX ORDER — NICOTINE POLACRILEX 4 MG
15-30 LOZENGE BUCCAL
Status: CANCELLED | OUTPATIENT
Start: 2021-06-07

## 2021-06-07 RX ORDER — DEXTROSE MONOHYDRATE 25 G/50ML
25-50 INJECTION, SOLUTION INTRAVENOUS
Status: CANCELLED | OUTPATIENT
Start: 2021-06-07

## 2021-06-07 RX ORDER — HEPARIN SODIUM 200 [USP'U]/100ML
1 INJECTION, SOLUTION INTRAVENOUS CONTINUOUS PRN
Status: CANCELLED | OUTPATIENT
Start: 2021-06-07

## 2021-06-08 ENCOUNTER — TELEPHONE (OUTPATIENT)
Dept: INTERVENTIONAL RADIOLOGY/VASCULAR | Facility: CLINIC | Age: 46
End: 2021-06-08

## 2021-06-08 NOTE — TELEPHONE ENCOUNTER
I spoke with pt and he did not get his Covid test done on 6/7 as scheduled. He will ask [facility] if they can do the test today and if they can get the results back for his procedure tomorrow. If not, pt will reschedule fistulagram. Julissa GARCIA

## 2021-06-17 ENCOUNTER — PATIENT OUTREACH (OUTPATIENT)
Dept: GASTROENTEROLOGY | Facility: CLINIC | Age: 46
End: 2021-06-17

## 2021-06-17 NOTE — PROGRESS NOTES
This pt is overdue for a follow-up appt with Jas Baxter (per Dr. Schneider with Nephrology), I do not schedule for your location. Can someone from your team reach out to him to schedule that?       Contacted pt to discuss follow up appt    Left a message for pt with the number to call to schedule   Also a my chart message.

## 2021-06-18 ENCOUNTER — CARE COORDINATION (OUTPATIENT)
Dept: NEPHROLOGY | Facility: CLINIC | Age: 46
End: 2021-06-18

## 2021-06-18 ENCOUNTER — TELEPHONE (OUTPATIENT)
Dept: NEPHROLOGY | Facility: CLINIC | Age: 46
End: 2021-06-18

## 2021-06-18 NOTE — TELEPHONE ENCOUNTER
Call from Rissa, at [facility].  Pt never had fistulogram as scheduled d/t not getting COVID-19 PCR test as instructed.  Pt never rescheduled fistulogram.  Pt still bleeding during dialysis treatment.    Called pt and LVM with instructions that a  would be calling him to reschedule the fistulogram, and reiterated the importance of doing the COVID-19 test on the date instructed and where they schedule it or by calling the number given to him, so that the test is back in time and in our system so IR can see the results.    Told Rissa from [facility] the same instructions.  She will follow up with pt to make sure things get scheduled and call writer back if the pt hasn't scheduled it next week.    BETH LEWIS RN on 6/18/2021 at 3:12 PM  Dialysis Access Care Coordinator  Phone: [phone]

## 2021-06-18 NOTE — ADDENDUM NOTE
Addended by: VIRGEN MARS on: 11/19/2019 09:02 AM     Modules accepted: Orders    
Statement Selected

## 2021-06-22 DIAGNOSIS — Z11.59 ENCOUNTER FOR SCREENING FOR OTHER VIRAL DISEASES: Primary | ICD-10-CM

## 2021-06-22 DIAGNOSIS — T82.898A PROBLEM WITH DIALYSIS ACCESS, INITIAL ENCOUNTER (H): ICD-10-CM

## 2021-06-22 NOTE — PROGRESS NOTES
Patient was contacted by an RN for post DC follow up so no duplicate post DC follow up call will be made     Surgery/Procedure: amputation 2nd digit left foot     Special Equipment: to be determined     Location: [facility]    Date: 01/02/19    Time: 730am     Surgeon: Dr. Zaman    Assist: no    Length of Surgery: 60 min     OR Confirmed/ : Klaudia golden on 12/10/18    Orders In:  Yes    Provider Team Notified:  Yes    Entered on fruux / [employer] Calendar:  Yes    Post Op: 1/9/19 @

## 2021-06-25 ENCOUNTER — MYC MEDICAL ADVICE (OUTPATIENT)
Dept: INTERVENTIONAL RADIOLOGY/VASCULAR | Facility: CLINIC | Age: 46
End: 2021-06-25

## 2021-06-28 DIAGNOSIS — I50.9 HEART FAILURE, UNSPECIFIED (H): ICD-10-CM

## 2021-06-28 DIAGNOSIS — I25.5 ISCHEMIC CARDIOMYOPATHY: Primary | ICD-10-CM

## 2021-06-29 PROBLEM — R51.9 HEADACHE: Status: ACTIVE | Noted: 2021-06-29

## 2021-06-29 PROBLEM — Z03.89 CMV (CYTOMEGALOVIRUS INFECTION) STATUS NEGATIVE: Status: ACTIVE | Noted: 2020-12-14

## 2021-06-29 PROBLEM — E11.40 DIABETIC NEUROPATHY (H): Status: ACTIVE | Noted: 2021-06-29

## 2021-06-29 PROBLEM — R93.1 ABNORMAL ECHOCARDIOGRAM: Status: ACTIVE | Noted: 2020-11-11

## 2021-06-29 PROBLEM — N28.9 KIDNEY DISORDER: Status: ACTIVE | Noted: 2021-06-29

## 2021-06-29 PROBLEM — R94.39 ABNORMAL CARDIOVASCULAR STRESS TEST: Status: ACTIVE | Noted: 2020-11-11

## 2021-06-30 ENCOUNTER — TELEPHONE (OUTPATIENT)
Dept: INTERVENTIONAL RADIOLOGY/VASCULAR | Facility: CLINIC | Age: 46
End: 2021-06-30

## 2021-06-30 DIAGNOSIS — I43 TYPE 1 DIABETES MELLITUS WITH DIABETIC CARDIOMYOPATHY (H): ICD-10-CM

## 2021-06-30 DIAGNOSIS — E10.59 TYPE 1 DIABETES MELLITUS WITH DIABETIC CARDIOMYOPATHY (H): ICD-10-CM

## 2021-07-01 NOTE — TELEPHONE ENCOUNTER
Prescription approved per [facility] Refill Protocol.  Sania Amor RN

## 2021-07-05 ENCOUNTER — APPOINTMENT (OUTPATIENT)
Dept: MEDSURG UNIT | Facility: CLINIC | Age: 46
End: 2021-07-05
Attending: RADIOLOGY
Payer: COMMERCIAL

## 2021-07-13 ENCOUNTER — TELEPHONE (OUTPATIENT)
Dept: PEDIATRICS | Facility: CLINIC | Age: 46
End: 2021-07-13

## 2021-07-13 NOTE — TELEPHONE ENCOUNTER
General Call:     Who is calling:  Lazarus, from [facility]    Reason for Call:  Would like most recent A1C result    What are your questions or concerns:  Please call Lazarus back from [facility] with latest A1C result     Date of last appointment with provider: 5/13/21    Okay to leave a detailed message:Yes at Other phone number:  [phone]

## 2021-07-13 NOTE — TELEPHONE ENCOUNTER
Called Lazarus, from [facility] at: [phone].    Most recent documented A1C for this patient is 10/7/21, and it was 7.0%.    Lazarus verbalized understanding.    Routing to Dr. Ríos:  Last A1C for this patient appears to be 10/7/21.  -Should we schedule a lab visit for A1C?    Jun Dalal RN on 7/13/2021 at 1:50 PM

## 2021-07-21 ENCOUNTER — TRANSFERRED RECORDS (OUTPATIENT)
Dept: HEALTH INFORMATION MANAGEMENT | Facility: CLINIC | Age: 46
End: 2021-07-21

## 2021-07-21 DIAGNOSIS — I43 TYPE 1 DIABETES MELLITUS WITH DIABETIC CARDIOMYOPATHY (H): ICD-10-CM

## 2021-07-21 DIAGNOSIS — E10.59 TYPE 1 DIABETES MELLITUS WITH DIABETIC CARDIOMYOPATHY (H): ICD-10-CM

## 2021-07-22 RX ORDER — INSULIN ASPART 100 [IU]/ML
INJECTION, SOLUTION INTRAVENOUS; SUBCUTANEOUS
Qty: 45 ML | Refills: 0 | Status: SHIPPED | OUTPATIENT
Start: 2021-07-22 | End: 2021-10-29

## 2021-07-22 NOTE — TELEPHONE ENCOUNTER
Routing refill request to provider for review/approval because:  Labs not current:  A1c    Visit is up to date. Patient does have upcoming lab only appointment scheduled for 7/23/21. RN will issue one time 90 day [place] refill. Please place additional lab order needed if ok.   Walter MANCERA RN

## 2021-07-23 ENCOUNTER — LAB (OUTPATIENT)
Dept: LAB | Facility: CLINIC | Age: 46
End: 2021-07-23
Payer: COMMERCIAL

## 2021-07-23 ENCOUNTER — OFFICE VISIT (OUTPATIENT)
Dept: CARDIOLOGY | Facility: CLINIC | Age: 46
End: 2021-07-23
Attending: NURSE PRACTITIONER
Payer: COMMERCIAL

## 2021-07-23 VITALS
DIASTOLIC BLOOD PRESSURE: 90 MMHG | WEIGHT: 189 LBS | HEIGHT: 68 IN | SYSTOLIC BLOOD PRESSURE: 159 MMHG | OXYGEN SATURATION: 99 % | BODY MASS INDEX: 28.64 KG/M2 | HEART RATE: 88 BPM

## 2021-07-23 DIAGNOSIS — I50.9 HEART FAILURE, UNSPECIFIED (H): ICD-10-CM

## 2021-07-23 DIAGNOSIS — E10.59 TYPE 1 DIABETES MELLITUS WITH DIABETIC CARDIOMYOPATHY (H): ICD-10-CM

## 2021-07-23 DIAGNOSIS — I25.10 CORONARY ARTERY DISEASE INVOLVING NATIVE CORONARY ARTERY OF NATIVE HEART WITHOUT ANGINA PECTORIS: ICD-10-CM

## 2021-07-23 DIAGNOSIS — I25.5 ISCHEMIC CARDIOMYOPATHY: ICD-10-CM

## 2021-07-23 DIAGNOSIS — N18.6 ESRD (END STAGE RENAL DISEASE) (H): ICD-10-CM

## 2021-07-23 DIAGNOSIS — Z76.82 AWAITING ORGAN TRANSPLANT: ICD-10-CM

## 2021-07-23 DIAGNOSIS — I43 TYPE 1 DIABETES MELLITUS WITH DIABETIC CARDIOMYOPATHY (H): ICD-10-CM

## 2021-07-23 DIAGNOSIS — I10 BENIGN ESSENTIAL HYPERTENSION: ICD-10-CM

## 2021-07-23 DIAGNOSIS — I50.22 CHRONIC SYSTOLIC HEART FAILURE (H): Primary | ICD-10-CM

## 2021-07-23 LAB
ANION GAP SERPL CALCULATED.3IONS-SCNC: 6 MMOL/L (ref 3–14)
BUN SERPL-MCNC: 33 MG/DL (ref 7–30)
CALCIUM SERPL-MCNC: 9 MG/DL (ref 8.5–10.1)
CHLORIDE BLD-SCNC: 97 MMOL/L (ref 94–109)
CO2 SERPL-SCNC: 31 MMOL/L (ref 20–32)
CREAT SERPL-MCNC: 6.31 MG/DL (ref 0.66–1.25)
GFR SERPL CREATININE-BSD FRML MDRD: 10 ML/MIN/1.73M2
GLUCOSE BLD-MCNC: 177 MG/DL (ref 70–99)
HBA1C MFR BLD: 6.9 % (ref 0–5.6)
NT-PROBNP SERPL-MCNC: ABNORMAL PG/ML (ref 0–125)
POTASSIUM BLD-SCNC: 5.3 MMOL/L (ref 3.4–5.3)
SODIUM SERPL-SCNC: 134 MMOL/L (ref 133–144)

## 2021-07-23 PROCEDURE — 99213 OFFICE O/P EST LOW 20 MIN: CPT | Performed by: NURSE PRACTITIONER

## 2021-07-23 PROCEDURE — 83880 ASSAY OF NATRIURETIC PEPTIDE: CPT | Performed by: PATHOLOGY

## 2021-07-23 PROCEDURE — 83036 HEMOGLOBIN GLYCOSYLATED A1C: CPT | Performed by: PATHOLOGY

## 2021-07-23 PROCEDURE — 36415 COLL VENOUS BLD VENIPUNCTURE: CPT | Performed by: PATHOLOGY

## 2021-07-23 PROCEDURE — 80048 BASIC METABOLIC PNL TOTAL CA: CPT | Performed by: PATHOLOGY

## 2021-07-23 PROCEDURE — G0463 HOSPITAL OUTPT CLINIC VISIT: HCPCS

## 2021-07-23 RX ORDER — ONDANSETRON 8 MG/1
TABLET, ORALLY DISINTEGRATING ORAL
COMMUNITY
Start: 2021-07-21

## 2021-07-23 RX ORDER — CARVEDILOL 6.25 MG/1
6.25 TABLET ORAL
COMMUNITY
Start: 2020-11-12 | End: 2021-07-23

## 2021-07-23 RX ORDER — CALCIUM CARBONATE 500 MG/1
1 TABLET, CHEWABLE ORAL 2 TIMES DAILY
COMMUNITY

## 2021-07-23 RX ORDER — AMOXICILLIN 250 MG
1 CAPSULE ORAL
Status: ON HOLD | COMMUNITY
Start: 2020-12-21 | End: 2021-09-03

## 2021-07-23 RX ORDER — PROCHLORPERAZINE MALEATE 10 MG
10 TABLET ORAL
COMMUNITY
Start: 2020-11-04

## 2021-07-23 RX ORDER — CARVEDILOL 6.25 MG/1
6.25 TABLET ORAL 2 TIMES DAILY WITH MEALS
Qty: 60 TABLET | Refills: 3 | Status: SHIPPED | OUTPATIENT
Start: 2021-07-23 | End: 2021-08-13

## 2021-07-23 ASSESSMENT — MIFFLIN-ST. JEOR: SCORE: 1730.77

## 2021-07-23 ASSESSMENT — PAIN SCALES - GENERAL: PAINLEVEL: NO PAIN (0)

## 2021-07-23 NOTE — LETTER
7/23/2021      RE: Murtaza Fitzgerald  [address]  [facility] [address]       Dear Colleague,    Thank you for the opportunity to participate in the care of your patient, Murtaza Fitzgerald, at the [facility] [place] at [facility]. Please see a copy of my visit note below.      HPI: Murtaza is a 45 year old White male with a past medical history of :     1.  Type 1 diabetes since age 11.   2.  Hypertension.   3.  Hyperlipidemia, now status post kidney transplant 11/2008, failed.  Back on dialysis 02/2020.   4.  Status post pancreas transplant in 2009, failed 3 years later.  Currently on insulin.   5.  Depression.   6.  Tobacco abuse in the past.   7.  Knee tumor surgery.   8.  Gastroparesis.      His cardiac history is significant for a moderate ischemic cardiomyopathy.  He was asymptomatic, but found to have a positive stress test, which eventually led a 3-vessel coronary bypass surgery in 2015 (LIMA to LAD, vein graft to OM, vein graft to PDA).     Initial CORE appt-  Referred by Dr. Vogel     No SOB at rest unless he has extra fluid. Dialysis T,Th, Sat. No NUÑEZ even with walking. He has no swelling in the legs, ankles, feet. He can feel distention in the abdomen.  He says his dry weight after dialysis is 82 kg. Some days he needs an extra dialysis run due to weigh gain.  His blood pressures typically run quite high, but he has not been keeping track of them at home. Last A1C 7.2 ( not today). wants to get on the transplant list.    Denies SOB, NUÑEZ, PND, orthopnea, edema, weight gain, chest pain, palpitations, lightheadedness, dizziness, near syncopal/syncopal episodes. Murtaza has been following salt and fluid restrictions.      PAST MEDICAL HISTORY:  Past Medical History:   Diagnosis Date     CMV (cytomegalovirus infection) status negative 2011     Coronary artery disease, non-occlusive 2008    angio showed diffuse disease  with no lesions     Diabetes mellitus, type 1     Diagnosed at age 11. Takes Insulin      Dialysis patient (H)     prior to kidney transplant     Dyslipidemia      Gastroesophageal reflux disease      History of blood transfusion      Hypertension      Immunosuppressed status (H)      Kidney transplant status, living related donor           Migraines      Mycotic aneurysm of kidney transplant (H) 2008     Noncompliance with treatment     no labs for one year     Pancreas replaced by transplant (H) 2009    rejection treated with thymo     Pancreatic disease     pancreas transplant     Tobacco abuse ongoing       FAMILY HISTORY:  Family History   Problem Relation Age of Onset     Unknown/Adopted Father      Heart Disease Maternal Grandfather 62     Melanoma No family hx of      Skin Cancer No family hx of        SOCIAL HISTORY:  Social History     Tobacco Use     Smoking status: Former Smoker     Packs/day: 1.00     Years: 20.00     Pack years: 20.00     Types: Vaping Device, Cigarettes     Quit date: 2016     Years since quittin.5     Smokeless tobacco: Never Used     Tobacco comment: E- Cig use since    Substance Use Topics     Alcohol use: Not Currently     Alcohol/week: 0.0 standard drinks     Drug use: No           CURRENT MEDICATIONS:  Current Outpatient Medications   Medication Sig Dispense Refill     aspirin (ASA) 81 MG EC tablet Take 1 tablet (81 mg) by mouth daily       atorvastatin (LIPITOR) 20 MG tablet Take 2 tablets (40 mg) by mouth daily 180 tablet 3     blood glucose (NO BRAND SPECIFIED) test strip Use to test blood sugar 5 times daily or as directed. 200 strip 2     calcium carbonate (TUMS) 500 MG chewable tablet Take 1 chew tab by mouth 2 times daily       carvedilol (COREG) 6.25 MG tablet Take 6.25 mg by mouth       insulin aspart (NOVOLOG FLEXPEN) 100 UNIT/ML pen Sliding scale = 1 unit for every 20 over blood glucose of 140, average 45 units/day 45 mL 0     insulin glargine  (LANTUS SOLOSTAR) 100 UNIT/ML pen Inject 24 Units Subcutaneous At Bedtime 15 mL 11     insulin pen needle (BD ARPITA U/F) 32G X 4 MM miscellaneous Use 4 daily or as directed. 120 each 0     metoclopramide (REGLAN) 5 MG tablet Take 1 tablet (5 mg) by mouth 4 times daily (before meals and nightly) 120 tablet 11     naproxen sodium 220 MG capsule Take 220 mg by mouth as needed        omeprazole (PRILOSEC) 40 MG DR capsule Take 2 capsules (80 mg) by mouth every evening 180 capsule 11     ondansetron (ZOFRAN-ODT) 8 MG ODT tab DISSOLVE 1 TABLET ON TOP OF TONGUE EVERY 8 HOURS FOR 2 DAYS AS NEEDED FOR NAUSEA AND VOMITING       prochlorperazine (COMPAZINE) 10 MG tablet Take 10 mg by mouth       senna-docusate (SENOKOT-S/PERICOLACE) 8.6-50 MG tablet Take 1 tablet by mouth PRN       acetone, Urine, test STRP 1 strip by In Vitro route daily (Patient not taking: Reported on 7/23/2021) 50 each 3     benzoyl peroxide 5 % external liquid For folliculitis. Apply once a day for 1-2 minutes on areas of whiteheads or acne then rinse off. (Patient not taking: Reported on 7/23/2021) 140 g 4     calcium acetate (PHOSLO) 667 MG CAPS capsule Take 667 mg by mouth 3 times daily (with meals) (Patient not taking: Reported on 7/23/2021)       carvedilol (COREG) 12.5 MG tablet Take 1 tablet (12.5 mg) by mouth 2 times daily (with meals) (Patient not taking: Reported on 7/23/2021) 180 tablet 1     Continuous Blood Gluc Sensor (DEXCOM G6 SENSOR) MISC Change every 10 days. (Patient not taking: Reported on 7/23/2021) 3 each 11     Continuous Blood Gluc Transmit (DEXCOM G6 TRANSMITTER) MISC Change every 3 months. (Patient not taking: Reported on 7/23/2021) 1 each 3     hydrocortisone 2.5 % ointment Apply if you have itch rash on the face, genitals, armpit. (Patient not taking: Reported on 7/23/2021) 30 g 1     Insulin Disposable Pump (OMNIPOD DASH 5 PACK PODS) MISC 1 each every 48 hours (Patient not taking: Reported on 7/23/2021) 5 each 11     lanthanum  "(FOSRENOL) 750 MG chewable tablet Take 2 tablets (1,500 mg) by mouth 5 times daily With meals and snacks. (Patient not taking: Reported on 7/23/2021) 900 tablet 3     triamcinolone (KENALOG) 0.1 % external ointment Apply twice a day to areas of itchy rash. Do not apply to the face, groin,armpits. (Patient not taking: Reported on 7/23/2021) 30 g 3       ROS:  Review Of Systems  Skin: negative  Eyes: negative  Ears/Nose/Throat: negative  Respiratory: No shortness of breath, dyspnea on exertion, cough, or hemoptysis  Cardiovascular: negative  Gastrointestinal: negative  Genitourinary: negative  Musculoskeletal: negative  Neurologic: negative  Psychiatric: negative  Hematologic/Lymphatic/Immunologic: negative  Endocrine: negative      EXAM:  BP (!) 162/90 (BP Location: Right arm, Patient Position: Chair, Cuff Size: Adult Regular)   Pulse 88   Ht 1.734 m (5' 8.25\")   Wt 85.7 kg (189 lb)   SpO2 99%   BMI 28.53 kg/m    Home weight:  General: alert, articulate, and in no acute distress.  HEENT: normocephalic, atraumatic, anicteric sclera, EOMI, mucosa moist, no cyanosis.   Neck: neck supple.  No adenopathy, masses, or carotid bruits.  JVP elevated 11-12  Heart: regular rhythm, normal S1/S2, no murmur, gallop, rub.  Precordium quiet with normal PMI.     Lungs: clear, no rales, ronchi, or wheezing.  No accessory muscle use, respirations unlabored.   Abdomen: soft, non-tender, bowel sounds present, no hepatomegaly  Extremities: no pitting edema.   No cyanosis.     Neurological: alert and oriented x 3.  normal speech and affect, no gross motor deficits  Skin:  No ecchymoses, rashes, or clubbing.    Labs:  CBC RESULTS:  Lab Results   Component Value Date    WBC 6.7 03/03/2021    RBC 3.47 (L) 03/03/2021    HGB 11.1 (L) 03/03/2021    HCT 34.3 (L) 03/03/2021    MCV 99 03/03/2021    MCH 32.0 03/03/2021    MCHC 32.4 03/03/2021    RDW 15.1 (H) 03/03/2021     03/03/2021       CMP RESULTS:  Lab Results   Component Value Date "     (L) 2021    POTASSIUM 4.1 2021    CHLORIDE 90 (L) 2021    CO2 32 2021    ANIONGAP 8 2021     (H) 2021    BUN 38 (H) 2021    CR 6.59 (H) 2021    GFRESTIMATED 9 (L) 2021    GFRESTBLACK 11 (L) 2021    CHARLY 9.0 2021    BILITOTAL 1.4 (H) 2021    ALBUMIN 3.4 2021    ALKPHOS 199 (H) 2021    ALT 19 2021    AST 15 2021        INR RESULTS:  Lab Results   Component Value Date    INR 1.24 (H) 2021       No components found for: CK  Lab Results   Component Value Date    MAG 2.3 2020     No results found for: NTBNP  @BRIEFLABR (dig)@    Most recent echocardiogram:  No results found for this or any previous visit (from the past 8760 hour(s)).      Assessment and Plan:    In summary,Murtaza  is a  45 year old male who is here for an initial visit to CORE clinic. He appears hypervolemic today and will have his dialysis run tomorrow as planned.  He wants to increase his EF to be eligible for transplant. He was only taking 6.25 mg Carvedilol daily and not on the 12.5 mg BID as Dr. Vogel had recommended for him. [facility]      1.  Chronic sytolic heart failure .  Stage C  NYHA Class III  ACEi/ARB: - introduce at upcoming appointments  BB: yes- increase to 6.25 mg BID   Aldosterone antagonist: contraindicated due to renal dysfunction  SCD prophylaxis: decision deferred during medication uptitration  Fluid status: hypervolemic  Anticoagulation:   Antiplatelet:  ASA dose   Sleep apnea:  NSAID use:  Contraindicated.  Avoid use.  Remote Monitorin.  Other comordbid conditions:      #.  Hypertension.        He says his blood pressures tend to run quite high, which would not be optimal in terms of his existing cardiomyopathy and may explain at least partially why his ejection fraction dropped from October to 2020.       Achieving more optimal blood pressure with a goal of  approximately 115 mmHg systolic without symptoms would be more ideal.  We will gather further information and make adjustments accordingly.      #.  Hyperlipidemia.        His goal LDL is less than 70.  It most recently was 88 with mildly elevated triglycerides.  We will perform further adjustments.     # DM II - 7/23/21 A1C - 6.9.  Insulin dependant - PCP following       3.  Follow-up   Carvedilol 6.25 mg BID  CORE 2 weeks       Sumit ORTEGA, CNP  [facility]                 Please do not hesitate to contact me if you have any questions/concerns.     Sincerely,     Sumit Griffin, JORDAN CNP

## 2021-07-23 NOTE — PROGRESS NOTES
HPI: Murtaza is a 45 year old White male with a past medical history of :     1.  Type 1 diabetes since age 11.   2.  Hypertension.   3.  Hyperlipidemia, now status post kidney transplant 11/2008, failed.  Back on dialysis 02/2020.   4.  Status post pancreas transplant in 2009, failed 3 years later.  Currently on insulin.   5.  Depression.   6.  Tobacco abuse in the past.   7.  Knee tumor surgery.   8.  Gastroparesis.      His cardiac history is significant for a moderate ischemic cardiomyopathy.  He was asymptomatic, but found to have a positive stress test, which eventually led a 3-vessel coronary bypass surgery in 2015 (LIMA to LAD, vein graft to OM, vein graft to PDA).     Initial CORE appt-  Referred by Dr. Vogel     No SOB at rest unless he has extra fluid. Dialysis T,Th, Sat. No NUÑEZ even with walking. He has no swelling in the legs, ankles, feet. He can feel distention in the abdomen.  He says his dry weight after dialysis is 82 kg. Some days he needs an extra dialysis run due to weigh gain.  His blood pressures typically run quite high, but he has not been keeping track of them at home. Last A1C 7.2 ( not today). wants to get on the transplant list.    Denies SOB, NUÑEZ, PND, orthopnea, edema, weight gain, chest pain, palpitations, lightheadedness, dizziness, near syncopal/syncopal episodes. Murtaza has been following salt and fluid restrictions.      PAST MEDICAL HISTORY:  Past Medical History:   Diagnosis Date     CMV (cytomegalovirus infection) status negative 2011     Coronary artery disease, non-occlusive 2008    angio showed diffuse disease with no lesions     Diabetes mellitus, type 1     Diagnosed at age 11. Takes Insulin      Dialysis patient (H)     prior to kidney transplant     Dyslipidemia      Gastroesophageal reflux disease      History of blood transfusion      Hypertension      Immunosuppressed status (H)      Kidney transplant status, living related donor 2008          Migraines       Mycotic aneurysm of kidney transplant (H) 2008     Noncompliance with treatment     no labs for one year     Pancreas replaced by transplant (H) 2009    rejection treated with thymo     Pancreatic disease     pancreas transplant     Tobacco abuse ongoing       FAMILY HISTORY:  Family History   Problem Relation Age of Onset     Unknown/Adopted Father      Heart Disease Maternal Grandfather 62     Melanoma No family hx of      Skin Cancer No family hx of        SOCIAL HISTORY:  Social History     Tobacco Use     Smoking status: Former Smoker     Packs/day: 1.00     Years: 20.00     Pack years: 20.00     Types: Vaping Device, Cigarettes     Quit date: 2016     Years since quittin.5     Smokeless tobacco: Never Used     Tobacco comment: E- Cig use since    Substance Use Topics     Alcohol use: Not Currently     Alcohol/week: 0.0 standard drinks     Drug use: No           CURRENT MEDICATIONS:  Current Outpatient Medications   Medication Sig Dispense Refill     aspirin (ASA) 81 MG EC tablet Take 1 tablet (81 mg) by mouth daily       atorvastatin (LIPITOR) 20 MG tablet Take 2 tablets (40 mg) by mouth daily 180 tablet 3     blood glucose (NO BRAND SPECIFIED) test strip Use to test blood sugar 5 times daily or as directed. 200 strip 2     calcium carbonate (TUMS) 500 MG chewable tablet Take 1 chew tab by mouth 2 times daily       carvedilol (COREG) 6.25 MG tablet Take 6.25 mg by mouth       insulin aspart (NOVOLOG FLEXPEN) 100 UNIT/ML pen Sliding scale = 1 unit for every 20 over blood glucose of 140, average 45 units/day 45 mL 0     insulin glargine (LANTUS SOLOSTAR) 100 UNIT/ML pen Inject 24 Units Subcutaneous At Bedtime 15 mL 11     insulin pen needle (BD ARPITA U/F) 32G X 4 MM miscellaneous Use 4 daily or as directed. 120 each 0     metoclopramide (REGLAN) 5 MG tablet Take 1 tablet (5 mg) by mouth 4 times daily (before meals and nightly) 120 tablet 11     naproxen sodium 220 MG capsule Take 220 mg by  mouth as needed        omeprazole (PRILOSEC) 40 MG DR capsule Take 2 capsules (80 mg) by mouth every evening 180 capsule 11     ondansetron (ZOFRAN-ODT) 8 MG ODT tab DISSOLVE 1 TABLET ON TOP OF TONGUE EVERY 8 HOURS FOR 2 DAYS AS NEEDED FOR NAUSEA AND VOMITING       prochlorperazine (COMPAZINE) 10 MG tablet Take 10 mg by mouth       senna-docusate (SENOKOT-S/PERICOLACE) 8.6-50 MG tablet Take 1 tablet by mouth PRN       acetone, Urine, test STRP 1 strip by In Vitro route daily (Patient not taking: Reported on 7/23/2021) 50 each 3     benzoyl peroxide 5 % external liquid For folliculitis. Apply once a day for 1-2 minutes on areas of whiteheads or acne then rinse off. (Patient not taking: Reported on 7/23/2021) 140 g 4     calcium acetate (PHOSLO) 667 MG CAPS capsule Take 667 mg by mouth 3 times daily (with meals) (Patient not taking: Reported on 7/23/2021)       carvedilol (COREG) 12.5 MG tablet Take 1 tablet (12.5 mg) by mouth 2 times daily (with meals) (Patient not taking: Reported on 7/23/2021) 180 tablet 1     Continuous Blood Gluc Sensor (DEXCOM G6 SENSOR) MISC Change every 10 days. (Patient not taking: Reported on 7/23/2021) 3 each 11     Continuous Blood Gluc Transmit (DEXCOM G6 TRANSMITTER) MISC Change every 3 months. (Patient not taking: Reported on 7/23/2021) 1 each 3     hydrocortisone 2.5 % ointment Apply if you have itch rash on the face, genitals, armpit. (Patient not taking: Reported on 7/23/2021) 30 g 1     Insulin Disposable Pump (OMNIPOD DASH 5 PACK PODS) MISC 1 each every 48 hours (Patient not taking: Reported on 7/23/2021) 5 each 11     lanthanum (FOSRENOL) 750 MG chewable tablet Take 2 tablets (1,500 mg) by mouth 5 times daily With meals and snacks. (Patient not taking: Reported on 7/23/2021) 900 tablet 3     triamcinolone (KENALOG) 0.1 % external ointment Apply twice a day to areas of itchy rash. Do not apply to the face, groin,armpits. (Patient not taking: Reported on 7/23/2021) 30 g 3  "      ROS:  Review Of Systems  Skin: negative  Eyes: negative  Ears/Nose/Throat: negative  Respiratory: No shortness of breath, dyspnea on exertion, cough, or hemoptysis  Cardiovascular: negative  Gastrointestinal: negative  Genitourinary: negative  Musculoskeletal: negative  Neurologic: negative  Psychiatric: negative  Hematologic/Lymphatic/Immunologic: negative  Endocrine: negative      EXAM:  BP (!) 162/90 (BP Location: Right arm, Patient Position: Chair, Cuff Size: Adult Regular)   Pulse 88   Ht 1.734 m (5' 8.25\")   Wt 85.7 kg (189 lb)   SpO2 99%   BMI 28.53 kg/m    Home weight:  General: alert, articulate, and in no acute distress.  HEENT: normocephalic, atraumatic, anicteric sclera, EOMI, mucosa moist, no cyanosis.   Neck: neck supple.  No adenopathy, masses, or carotid bruits.  JVP elevated 11-12  Heart: regular rhythm, normal S1/S2, no murmur, gallop, rub.  Precordium quiet with normal PMI.     Lungs: clear, no rales, ronchi, or wheezing.  No accessory muscle use, respirations unlabored.   Abdomen: soft, non-tender, bowel sounds present, no hepatomegaly  Extremities: no pitting edema.   No cyanosis.     Neurological: alert and oriented x 3.  normal speech and affect, no gross motor deficits  Skin:  No ecchymoses, rashes, or clubbing.    Labs:  CBC RESULTS:  Lab Results   Component Value Date    WBC 6.7 03/03/2021    RBC 3.47 (L) 03/03/2021    HGB 11.1 (L) 03/03/2021    HCT 34.3 (L) 03/03/2021    MCV 99 03/03/2021    MCH 32.0 03/03/2021    MCHC 32.4 03/03/2021    RDW 15.1 (H) 03/03/2021     03/03/2021       CMP RESULTS:  Lab Results   Component Value Date     (L) 03/19/2021    POTASSIUM 4.1 03/19/2021    CHLORIDE 90 (L) 03/19/2021    CO2 32 03/19/2021    ANIONGAP 8 03/19/2021     (H) 03/19/2021    BUN 38 (H) 03/19/2021    CR 6.59 (H) 03/19/2021    GFRESTIMATED 9 (L) 03/19/2021    GFRESTBLACK 11 (L) 03/19/2021    CHARLY 9.0 03/19/2021    BILITOTAL 1.4 (H) 03/01/2021    ALBUMIN 3.4 " 2021    ALKPHOS 199 (H) 2021    ALT 19 2021    AST 15 2021        INR RESULTS:  Lab Results   Component Value Date    INR 1.24 (H) 2021       No components found for: CK  Lab Results   Component Value Date    MAG 2.3 2020     No results found for: NTBNP  @BRIEFLABR (dig)@    Most recent echocardiogram:  No results found for this or any previous visit (from the past 8760 hour(s)).      Assessment and Plan:    In summary,Murtaza  is a  45 year old male who is here for an initial visit to CORE clinic. He appears hypervolemic today and will have his dialysis run tomorrow as planned.  He wants to increase his EF to be eligible for transplant. He was only taking 6.25 mg Carvedilol daily and not on the 12.5 mg BID as Dr. Vogel had recommended for him. We will continue to advance him to [facility].      1.  Chronic sytolic heart failure .  Stage C  NYHA Class III  ACEi/ARB: - introduce at upcoming appointments  BB: yes- increase to 6.25 mg BID   Aldosterone antagonist: contraindicated due to renal dysfunction  SCD prophylaxis: decision deferred during medication uptitration  Fluid status: hypervolemic  Anticoagulation:   Antiplatelet:  ASA dose   Sleep apnea:  NSAID use:  Contraindicated.  Avoid use.  Remote Monitorin.  Other comordbid conditions:      #.  Hypertension.        He says his blood pressures tend to run quite high, which would not be optimal in terms of his existing cardiomyopathy and may explain at least partially why his ejection fraction dropped from October to 2020.       Achieving more optimal blood pressure with a goal of approximately 115 mmHg systolic without symptoms would be more ideal.  We will gather further information and make adjustments accordingly.      #.  Hyperlipidemia.        His goal LDL is less than 70.  It most recently was 88 with mildly elevated triglycerides.  We will perform further adjustments.     # DM II - 21 A1C - 6.9.  Insulin  dependant - PCP following       3.  Follow-up   Carvedilol 6.25 mg BID  CORE 2 weeks       Sumit ORTEGA, CNP  [facility]

## 2021-07-23 NOTE — NURSING NOTE
Chief Complaint   Patient presents with     New Patient     New CORE, 46 yo male      Vitals were taken and medication reconciled.    MICHAEL Weldon  9:05 AM     Patient last seen 4/16/19, has not come for any f/u appt, no future appts scheduled.   Forwarded to MD to advise refill.

## 2021-07-23 NOTE — PATIENT INSTRUCTIONS
Take your medicines every day, as directed    Changes made today:  o Take your Carvedilol (Coreg) at 6.25 mg two times daily   o    Monitor Your Weight and Symptoms    Contact us if you:      Gain 2 pounds in one day or 5 pounds in one week    Feel more short of breath    Notice more leg swelling    Feel lightheadeded   Change your lifestyle    Limit Salt or Sodium:    2000 mg  Limit Fluids:    2000 mL or approximately 64 ounces  Eat a Heart Healthy Diet    Low in saturated fats  Stay Active:    Aim to move at least 150 minutes every  week         To Contact us    During Business Hours:  [phone], option # 1 (University)  Then option # 4 (medical questions)     After hours, weekends or holidays:   [phone], Option #4  Ask to speak to the On-Call Cardiologist. Inform them you are a CORE/heart failure patient at the [facility].     Use [employer] allows you to communicate directly with your heart team through secure messaging.    [employer] can be accessed any time on your phone, computer, or tablet.    If you need assistance, we'd be happy to help!         Keep your Heart Appointments:    [facility] in 2 weeks.

## 2021-07-28 ENCOUNTER — MYC MEDICAL ADVICE (OUTPATIENT)
Dept: INTERVENTIONAL RADIOLOGY/VASCULAR | Facility: CLINIC | Age: 46
End: 2021-07-28

## 2021-07-30 ENCOUNTER — TELEPHONE (OUTPATIENT)
Dept: INTERVENTIONAL RADIOLOGY/VASCULAR | Facility: CLINIC | Age: 46
End: 2021-07-30

## 2021-08-03 ENCOUNTER — TELEPHONE (OUTPATIENT)
Dept: INTERVENTIONAL RADIOLOGY/VASCULAR | Facility: CLINIC | Age: 46
End: 2021-08-03

## 2021-08-03 NOTE — TELEPHONE ENCOUNTER
There is no documentation that pt has done his pre-procedure Covid test for his fistulagram appt on 8/4. I called his preferred number and left a VM with the instructions regarding the need for a Covid test for his procedure. I left the IR , Covid , and the IR nurse line numbers. This is the third message left for this procedure. Julissa GARCIA

## 2021-08-04 DIAGNOSIS — Z11.59 ENCOUNTER FOR SCREENING FOR OTHER VIRAL DISEASES: ICD-10-CM

## 2021-08-04 DIAGNOSIS — I50.22 CHRONIC SYSTOLIC HEART FAILURE (H): Primary | ICD-10-CM

## 2021-08-05 ENCOUNTER — PATIENT OUTREACH (OUTPATIENT)
Dept: CARDIOLOGY | Facility: CLINIC | Age: 46
End: 2021-08-05

## 2021-08-05 NOTE — TELEPHONE ENCOUNTER
Called [facility] to check in after increasing Coreg to 6/25 mg BID 2 weeks ago (was only taking daily). Left message and asked for call back with BP/HR log. Poacht Appt also sent. Jessica Balbuena RN

## 2021-08-13 ENCOUNTER — OFFICE VISIT (OUTPATIENT)
Dept: CARDIOLOGY | Facility: CLINIC | Age: 46
End: 2021-08-13
Attending: NURSE PRACTITIONER
Payer: COMMERCIAL

## 2021-08-13 ENCOUNTER — LAB (OUTPATIENT)
Dept: LAB | Facility: CLINIC | Age: 46
End: 2021-08-13
Attending: NURSE PRACTITIONER
Payer: COMMERCIAL

## 2021-08-13 VITALS
HEART RATE: 74 BPM | SYSTOLIC BLOOD PRESSURE: 157 MMHG | WEIGHT: 184.8 LBS | OXYGEN SATURATION: 100 % | DIASTOLIC BLOOD PRESSURE: 80 MMHG | BODY MASS INDEX: 27.89 KG/M2

## 2021-08-13 DIAGNOSIS — E10.59 TYPE 1 DIABETES MELLITUS WITH DIABETIC CARDIOMYOPATHY (H): ICD-10-CM

## 2021-08-13 DIAGNOSIS — N18.6 ESRD (END STAGE RENAL DISEASE) (H): ICD-10-CM

## 2021-08-13 DIAGNOSIS — I25.10 CORONARY ARTERY DISEASE INVOLVING NATIVE CORONARY ARTERY OF NATIVE HEART WITHOUT ANGINA PECTORIS: ICD-10-CM

## 2021-08-13 DIAGNOSIS — I50.22 CHRONIC SYSTOLIC HEART FAILURE (H): ICD-10-CM

## 2021-08-13 DIAGNOSIS — I10 BENIGN ESSENTIAL HYPERTENSION: ICD-10-CM

## 2021-08-13 DIAGNOSIS — I25.5 ISCHEMIC CARDIOMYOPATHY: ICD-10-CM

## 2021-08-13 DIAGNOSIS — Z76.82 AWAITING ORGAN TRANSPLANT: ICD-10-CM

## 2021-08-13 DIAGNOSIS — I43 TYPE 1 DIABETES MELLITUS WITH DIABETIC CARDIOMYOPATHY (H): ICD-10-CM

## 2021-08-13 DIAGNOSIS — I50.22 CHRONIC SYSTOLIC HEART FAILURE (H): Primary | ICD-10-CM

## 2021-08-13 LAB
ANION GAP SERPL CALCULATED.3IONS-SCNC: 8 MMOL/L (ref 3–14)
BUN SERPL-MCNC: 22 MG/DL (ref 7–30)
CALCIUM SERPL-MCNC: 9.5 MG/DL (ref 8.5–10.1)
CHLORIDE BLD-SCNC: 94 MMOL/L (ref 94–109)
CO2 SERPL-SCNC: 34 MMOL/L (ref 20–32)
CREAT SERPL-MCNC: 6.66 MG/DL (ref 0.66–1.25)
GFR SERPL CREATININE-BSD FRML MDRD: 9 ML/MIN/1.73M2
GLUCOSE BLD-MCNC: 216 MG/DL (ref 70–99)
POTASSIUM BLD-SCNC: 4.7 MMOL/L (ref 3.4–5.3)
SODIUM SERPL-SCNC: 136 MMOL/L (ref 133–144)

## 2021-08-13 PROCEDURE — 80048 BASIC METABOLIC PNL TOTAL CA: CPT | Performed by: PATHOLOGY

## 2021-08-13 PROCEDURE — G0463 HOSPITAL OUTPT CLINIC VISIT: HCPCS

## 2021-08-13 PROCEDURE — 99214 OFFICE O/P EST MOD 30 MIN: CPT | Performed by: NURSE PRACTITIONER

## 2021-08-13 PROCEDURE — 36415 COLL VENOUS BLD VENIPUNCTURE: CPT | Performed by: PATHOLOGY

## 2021-08-13 RX ORDER — CARVEDILOL 12.5 MG/1
12.5 TABLET ORAL 2 TIMES DAILY WITH MEALS
Qty: 60 TABLET | Refills: 1 | Status: SHIPPED | OUTPATIENT
Start: 2021-08-13 | End: 2021-10-28

## 2021-08-13 ASSESSMENT — PAIN SCALES - GENERAL: PAINLEVEL: NO PAIN (0)

## 2021-08-13 NOTE — NURSING NOTE
Chief Complaint   Patient presents with     Follow Up     Return CORE, 46 yo male with chronic systolic heart failure presents for follow up with labs prior      Vitals were taken and medications reconciled.    Cosmo Andujar, EMT  7:54 AM

## 2021-08-13 NOTE — PROGRESS NOTES
HPI: Murtaza is a 45 year old White male with a past medical history of :     1.  Type 1 diabetes since age 11.   2.  Hypertension.   3.  Hyperlipidemia, now status post kidney transplant 11/2008, failed.  Back on dialysis 02/2020.   4.  Status post pancreas transplant in 2009, failed 3 years later.  Currently on insulin.   5.  Depression.   6.  Tobacco abuse in the past.   7.  Knee tumor surgery.   8.  Gastroparesis.      His cardiac history is significant for a moderate ischemic cardiomyopathy.  He was asymptomatic, but found to have a positive stress test, which eventually led a 3-vessel coronary bypass surgery in 2015 (LIMA to LAD, vein graft to OM, vein graft to PDA).     Follow up CORE appt-  Referred by Dr. Vogel    No SOB at rest unless he has extra fluid. Dialysis T,Th, Sat. No NUÑEZ even with walking. He has no swelling in the legs, ankles, feet. He can feel distention in the abdomen.  He says his dry weight after dialysis is 82 kg. Some days he needs an extra dialysis run due to weigh gain.  His blood pressures typically run quite high, but he has not been keeping track of them at home. Last A1C 6.9 wants to get on the transplant list.    Denies SOB, NUÑEZ, PND, orthopnea, edema, chest pain, palpitations, lightheadedness, dizziness, near syncopal/syncopal episodes. Murtaza has been following salt and fluid restrictions.      PAST MEDICAL HISTORY:  Past Medical History:   Diagnosis Date     CMV (cytomegalovirus infection) status negative 2011     Coronary artery disease, non-occlusive 2008    angio showed diffuse disease with no lesions     Diabetes mellitus, type 1     Diagnosed at age 11. Takes Insulin      Dialysis patient (H)     prior to kidney transplant     Dyslipidemia      Gastroesophageal reflux disease      History of blood transfusion      Hypertension      Immunosuppressed status (H)      Kidney transplant status, living related donor 2008          Migraines      Mycotic aneurysm of kidney  transplant (H) 2008     Noncompliance with treatment     no labs for one year     Pancreas replaced by transplant (H) 2009    rejection treated with thymo     Pancreatic disease     pancreas transplant     Tobacco abuse ongoing       FAMILY HISTORY:  Family History   Problem Relation Age of Onset     Unknown/Adopted Father      Heart Disease Maternal Grandfather 62     Melanoma No family hx of      Skin Cancer No family hx of        SOCIAL HISTORY:  Social History     Tobacco Use     Smoking status: Former Smoker     Packs/day: 1.00     Years: 20.00     Pack years: 20.00     Types: Vaping Device, Cigarettes     Quit date: 2016     Years since quittin.6     Smokeless tobacco: Never Used     Tobacco comment: E- Cig use since    Substance Use Topics     Alcohol use: Not Currently     Alcohol/week: 0.0 standard drinks     Drug use: No           CURRENT MEDICATIONS:  Current Outpatient Medications   Medication Sig Dispense Refill     aspirin (ASA) 81 MG EC tablet Take 1 tablet (81 mg) by mouth daily       atorvastatin (LIPITOR) 20 MG tablet Take 2 tablets (40 mg) by mouth daily 180 tablet 3     blood glucose (NO BRAND SPECIFIED) test strip Use to test blood sugar 5 times daily or as directed. 200 strip 2     calcium carbonate (TUMS) 500 MG chewable tablet Take 1 chew tab by mouth 2 times daily       carvedilol (COREG) 6.25 MG tablet Take 1 tablet (6.25 mg) by mouth 2 times daily (with meals) 60 tablet 3     insulin aspart (NOVOLOG FLEXPEN) 100 UNIT/ML pen Sliding scale = 1 unit for every 20 over blood glucose of 140, average 45 units/day 45 mL 0     insulin glargine (LANTUS SOLOSTAR) 100 UNIT/ML pen Inject 24 Units Subcutaneous At Bedtime 15 mL 11     insulin pen needle (BD ARPITA U/F) 32G X 4 MM miscellaneous Use 4 daily or as directed. 120 each 0     naproxen sodium 220 MG capsule Take 220 mg by mouth as needed        omeprazole (PRILOSEC) 40 MG DR capsule Take 2 capsules (80 mg) by mouth every  evening 180 capsule 11     ondansetron (ZOFRAN-ODT) 8 MG ODT tab DISSOLVE 1 TABLET ON TOP OF TONGUE EVERY 8 HOURS FOR 2 DAYS AS NEEDED FOR NAUSEA AND VOMITING       prochlorperazine (COMPAZINE) 10 MG tablet Take 10 mg by mouth       senna-docusate (SENOKOT-S/PERICOLACE) 8.6-50 MG tablet Take 1 tablet by mouth PRN       acetone, Urine, test STRP 1 strip by In Vitro route daily (Patient not taking: Reported on 7/23/2021) 50 each 3     benzoyl peroxide 5 % external liquid For folliculitis. Apply once a day for 1-2 minutes on areas of whiteheads or acne then rinse off. (Patient not taking: Reported on 7/23/2021) 140 g 4     calcium acetate (PHOSLO) 667 MG CAPS capsule Take 667 mg by mouth 3 times daily (with meals) (Patient not taking: Reported on 7/23/2021)       Continuous Blood Gluc Sensor (DEXCOM G6 SENSOR) MISC Change every 10 days. (Patient not taking: Reported on 7/23/2021) 3 each 11     Continuous Blood Gluc Transmit (DEXCOM G6 TRANSMITTER) MISC Change every 3 months. (Patient not taking: Reported on 7/23/2021) 1 each 3     hydrocortisone 2.5 % ointment Apply if you have itch rash on the face, genitals, armpit. (Patient not taking: Reported on 7/23/2021) 30 g 1     Insulin Disposable Pump (OMNIPOD DASH 5 PACK PODS) MISC 1 each every 48 hours (Patient not taking: Reported on 7/23/2021) 5 each 11     lanthanum (FOSRENOL) 750 MG chewable tablet Take 2 tablets (1,500 mg) by mouth 5 times daily With meals and snacks. (Patient not taking: Reported on 7/23/2021) 900 tablet 3     metoclopramide (REGLAN) 5 MG tablet Take 1 tablet (5 mg) by mouth 4 times daily (before meals and nightly) (Patient not taking: Reported on 8/13/2021) 120 tablet 11     triamcinolone (KENALOG) 0.1 % external ointment Apply twice a day to areas of itchy rash. Do not apply to the face, groin,armpits. (Patient not taking: Reported on 7/23/2021) 30 g 3       ROS:  Review Of Systems  Skin: negative  Eyes: negative  Ears/Nose/Throat:  negative  Respiratory: No shortness of breath, dyspnea on exertion, cough, or hemoptysis  Cardiovascular: negative  Gastrointestinal: negative  Genitourinary: negative  Musculoskeletal: negative  Neurologic: negative  Psychiatric: negative  Hematologic/Lymphatic/Immunologic: negative  Endocrine: negative      EXAM:  BP (!) 157/80 (BP Location: Right arm, Patient Position: Chair, Cuff Size: Adult Regular)   Pulse 74   Wt 83.8 kg (184 lb 12.8 oz)   SpO2 100%   BMI 27.89 kg/m    Home weight:  General: alert, articulate, and in no acute distress.  HEENT: normocephalic, atraumatic, anicteric sclera, EOMI, mucosa moist, no cyanosis.   Neck: neck supple.  No adenopathy, masses, or carotid bruits.  JVP elevated 11-12  Heart: regular rhythm, normal S1/S2, no murmur, gallop, rub.  Precordium quiet with normal PMI.     Lungs: clear, no rales, ronchi, or wheezing.  No accessory muscle use, respirations unlabored.   Abdomen: soft, non-tender, bowel sounds present, no hepatomegaly  Extremities: no pitting edema.   No cyanosis.     Neurological: alert and oriented x 3.  normal speech and affect, no gross motor deficits  Skin:  No ecchymoses, rashes, or clubbing.    Labs:  CBC RESULTS:  Lab Results   Component Value Date    WBC 6.7 03/03/2021    RBC 3.47 (L) 03/03/2021    HGB 11.1 (L) 03/03/2021    HCT 34.3 (L) 03/03/2021    MCV 99 03/03/2021    MCH 32.0 03/03/2021    MCHC 32.4 03/03/2021    RDW 15.1 (H) 03/03/2021     03/03/2021       CMP RESULTS:  Lab Results   Component Value Date     07/23/2021     (L) 03/19/2021    POTASSIUM 5.3 07/23/2021    POTASSIUM 4.1 03/19/2021    CHLORIDE 97 07/23/2021    CHLORIDE 90 (L) 03/19/2021    CO2 31 07/23/2021    CO2 32 03/19/2021    ANIONGAP 6 07/23/2021    ANIONGAP 8 03/19/2021     (H) 07/23/2021     (H) 03/19/2021    BUN 33 (H) 07/23/2021    BUN 38 (H) 03/19/2021    CR 6.31 (H) 07/23/2021    CR 6.59 (H) 03/19/2021    GFRESTIMATED 10 (L) 07/23/2021     GFRESTIMATED 9 (L) 03/19/2021    GFRESTBLACK 11 (L) 03/19/2021    CHARLY 9.0 07/23/2021    CHARLY 9.0 03/19/2021    BILITOTAL 1.4 (H) 03/01/2021    ALBUMIN 3.4 03/01/2021    ALKPHOS 199 (H) 03/01/2021    ALT 19 03/01/2021    AST 15 03/01/2021        INR RESULTS:  Lab Results   Component Value Date    INR 1.24 (H) 03/01/2021       No components found for: CK  Lab Results   Component Value Date    MAG 2.3 12/14/2020     Lab Results   Component Value Date    NTBNP 62,990 (H) 07/23/2021     @BRIEFLABR (dig)@    Most recent echocardiogram:  No results found for this or any previous visit (from the past 8760 hour(s)).      Assessment and Plan:    In summary,Murtaza  is a  45 year old male who is here for a follow up  visit to CORE clinic. He appears hypervolemic today and will have his dialysis run tomorrow as planned.  He is motivated to increase his EF to be eligible for transplant. Patient increased carvedilol to 12.5 mg BID- a week ago . [facility]      1.  Chronic sytolic heart failure secondary to ICM .  Stage C  NYHA Class III  ACEi/ARB: - introduce at upcoming appointments if BP not in suggested range  BB: yes- increased to 12.5 mg BID   Aldosterone antagonist: contraindicated due to renal dysfunction  SCD prophylaxis: decision deferred during medication uptitration  Fluid status: hypervolemic- dialysis patient   Anticoagulation:   Antiplatelet:  ASA dose   Sleep apnea:  NSAID use:  Contraindicated.  Avoid use.  Remote Monitoring:  SGLT-2- not candidate DM Type I.     2.  Other comordbid conditions:      #.  Hypertension.        He says his blood pressures tend to run quite high, which would not be optimal in terms of his existing cardiomyopathy and may explain at least partially why his ejection fraction dropped from October to 12/2020.       if his blood pressures are not in the goal range of 115 mmHg systolic. we will plan on increasing carvedilol and/or adding an afterload  reduction, possibly with ACE inhibitors at that time.     #.  Hyperlipidemia.        His goal LDL is less than 70.  It most recently was 88 with mildly elevated triglycerides.  We will perform further adjustments.     # DM II - 7/23/21 A1C - 6.9.  Insulin dependant - PCP following       3.  Follow-up   -Carvedilol 12.5 mg BID- started last Friday     -RN call in 1 week if doing well- and BP's are not in the range indicated above add Ace inhibitor 10 mg     -Lisinopril.- if added BMP in 1 week - RN call    after 10:30 on Thursday - due to dialysis    -[facility] in one month        Sumit ORTEGA, CNP  [facility]

## 2021-08-13 NOTE — PATIENT INSTRUCTIONS
Take your medicines every day, as directed    Changes made today:  o Carvedilol continue the 12.5mg twice a day   o    Monitor Your Weight and Symptoms    Contact us if you:      Gain 2 pounds in one day or 5 pounds in one week    Feel more short of breath    Notice more leg swelling    Feel lightheadeded   Change your lifestyle    Limit Salt or Sodium:    2000 mg  Limit Fluids:    2000 mL or approximately 64 ounces  Eat a Heart Healthy Diet    Low in saturated fats  Stay Active:    Aim to move at least 150 minutes every  week         To Contact us    During Business Hours:  [phone], option # 1 (University)  Then option # 4 (medical questions)     After hours, weekends or holidays:   [phone], Option #4  Ask to speak to the On-Call Cardiologist. Inform them you are a CORE/heart failure patient at the [facility].     Use [employer] allows you to communicate directly with your heart team through secure messaging.    [email or web address] can be accessed any time on your phone, computer, or tablet.    If you need assistance, we'd be happy to help!         Keep your Heart Appointments:    RN to call you next week check on blood pressures    CORE in one month

## 2021-08-13 NOTE — LETTER
8/13/2021      RE: Murtaza Fitzgerald  [address]  [facility] [address]       Dear Colleague,    Thank you for the opportunity to participate in the care of your patient, Murtaza Fitzgerald, at the [facility] [place] [facility]. Please see a copy of my visit note below.      HPI: Murtaza is a 45 year old White male with a past medical history of :     1.  Type 1 diabetes since age 11.   2.  Hypertension.   3.  Hyperlipidemia, now status post kidney transplant 11/2008, failed.  Back on dialysis 02/2020.   4.  Status post pancreas transplant in 2009, failed 3 years later.  Currently on insulin.   5.  Depression.   6.  Tobacco abuse in the past.   7.  Knee tumor surgery.   8.  Gastroparesis.      His cardiac history is significant for a moderate ischemic cardiomyopathy.  He was asymptomatic, but found to have a positive stress test, which eventually led a 3-vessel coronary bypass surgery in 2015 (LIMA to LAD, vein graft to OM, vein graft to PDA).     Follow up CORE appt-  Referred by Dr. Vogel    No SOB at rest unless he has extra fluid. Dialysis T,Th, Sat. No NUÑEZ even with walking. He has no swelling in the legs, ankles, feet. He can feel distention in the abdomen.  He says his dry weight after dialysis is 82 kg. Some days he needs an extra dialysis run due to weigh gain.  His blood pressures typically run quite high, but he has not been keeping track of them at home. Last A1C 6.9 wants to get on the transplant list.    Denies SOB, NUÑEZ, PND, orthopnea, edema, chest pain, palpitations, lightheadedness, dizziness, near syncopal/syncopal episodes. Murtaza has been following salt and fluid restrictions.      PAST MEDICAL HISTORY:  Past Medical History:   Diagnosis Date     CMV (cytomegalovirus infection) status negative 2011     Coronary artery disease, non-occlusive 2008    angio showed diffuse disease with no lesions      Diabetes mellitus, type 1     Diagnosed at age 11. Takes Insulin      Dialysis patient (H)     prior to kidney transplant     Dyslipidemia      Gastroesophageal reflux disease      History of blood transfusion      Hypertension      Immunosuppressed status (H)      Kidney transplant status, living related donor           Migraines      Mycotic aneurysm of kidney transplant (H) 2008     Noncompliance with treatment     no labs for one year     Pancreas replaced by transplant (H) 2009    rejection treated with thymo     Pancreatic disease     pancreas transplant     Tobacco abuse ongoing       FAMILY HISTORY:  Family History   Problem Relation Age of Onset     Unknown/Adopted Father      Heart Disease Maternal Grandfather 62     Melanoma No family hx of      Skin Cancer No family hx of        SOCIAL HISTORY:  Social History     Tobacco Use     Smoking status: Former Smoker     Packs/day: 1.00     Years: 20.00     Pack years: 20.00     Types: Vaping Device, Cigarettes     Quit date: 2016     Years since quittin.6     Smokeless tobacco: Never Used     Tobacco comment: E- Cig use since    Substance Use Topics     Alcohol use: Not Currently     Alcohol/week: 0.0 standard drinks     Drug use: No           CURRENT MEDICATIONS:  Current Outpatient Medications   Medication Sig Dispense Refill     aspirin (ASA) 81 MG EC tablet Take 1 tablet (81 mg) by mouth daily       atorvastatin (LIPITOR) 20 MG tablet Take 2 tablets (40 mg) by mouth daily 180 tablet 3     blood glucose (NO BRAND SPECIFIED) test strip Use to test blood sugar 5 times daily or as directed. 200 strip 2     calcium carbonate (TUMS) 500 MG chewable tablet Take 1 chew tab by mouth 2 times daily       carvedilol (COREG) 6.25 MG tablet Take 1 tablet (6.25 mg) by mouth 2 times daily (with meals) 60 tablet 3     insulin aspart (NOVOLOG FLEXPEN) 100 UNIT/ML pen Sliding scale = 1 unit for every 20 over blood glucose of 140, average 45 units/day 45  mL 0     insulin glargine (LANTUS SOLOSTAR) 100 UNIT/ML pen Inject 24 Units Subcutaneous At Bedtime 15 mL 11     insulin pen needle (BD ARPITA U/F) 32G X 4 MM miscellaneous Use 4 daily or as directed. 120 each 0     naproxen sodium 220 MG capsule Take 220 mg by mouth as needed        omeprazole (PRILOSEC) 40 MG DR capsule Take 2 capsules (80 mg) by mouth every evening 180 capsule 11     ondansetron (ZOFRAN-ODT) 8 MG ODT tab DISSOLVE 1 TABLET ON TOP OF TONGUE EVERY 8 HOURS FOR 2 DAYS AS NEEDED FOR NAUSEA AND VOMITING       prochlorperazine (COMPAZINE) 10 MG tablet Take 10 mg by mouth       senna-docusate (SENOKOT-S/PERICOLACE) 8.6-50 MG tablet Take 1 tablet by mouth PRN       acetone, Urine, test STRP 1 strip by In Vitro route daily (Patient not taking: Reported on 7/23/2021) 50 each 3     benzoyl peroxide 5 % external liquid For folliculitis. Apply once a day for 1-2 minutes on areas of whiteheads or acne then rinse off. (Patient not taking: Reported on 7/23/2021) 140 g 4     calcium acetate (PHOSLO) 667 MG CAPS capsule Take 667 mg by mouth 3 times daily (with meals) (Patient not taking: Reported on 7/23/2021)       Continuous Blood Gluc Sensor (DEXCOM G6 SENSOR) MISC Change every 10 days. (Patient not taking: Reported on 7/23/2021) 3 each 11     Continuous Blood Gluc Transmit (DEXCOM G6 TRANSMITTER) MISC Change every 3 months. (Patient not taking: Reported on 7/23/2021) 1 each 3     hydrocortisone 2.5 % ointment Apply if you have itch rash on the face, genitals, armpit. (Patient not taking: Reported on 7/23/2021) 30 g 1     Insulin Disposable Pump (OMNIPOD DASH 5 PACK PODS) MISC 1 each every 48 hours (Patient not taking: Reported on 7/23/2021) 5 each 11     lanthanum (FOSRENOL) 750 MG chewable tablet Take 2 tablets (1,500 mg) by mouth 5 times daily With meals and snacks. (Patient not taking: Reported on 7/23/2021) 900 tablet 3     metoclopramide (REGLAN) 5 MG tablet Take 1 tablet (5 mg) by mouth 4 times daily (before  meals and nightly) (Patient not taking: Reported on 8/13/2021) 120 tablet 11     triamcinolone (KENALOG) 0.1 % external ointment Apply twice a day to areas of itchy rash. Do not apply to the face, groin,armpits. (Patient not taking: Reported on 7/23/2021) 30 g 3       ROS:  Review Of Systems  Skin: negative  Eyes: negative  Ears/Nose/Throat: negative  Respiratory: No shortness of breath, dyspnea on exertion, cough, or hemoptysis  Cardiovascular: negative  Gastrointestinal: negative  Genitourinary: negative  Musculoskeletal: negative  Neurologic: negative  Psychiatric: negative  Hematologic/Lymphatic/Immunologic: negative  Endocrine: negative      EXAM:  BP (!) 157/80 (BP Location: Right arm, Patient Position: Chair, Cuff Size: Adult Regular)   Pulse 74   Wt 83.8 kg (184 lb 12.8 oz)   SpO2 100%   BMI 27.89 kg/m    Home weight:  General: alert, articulate, and in no acute distress.  HEENT: normocephalic, atraumatic, anicteric sclera, EOMI, mucosa moist, no cyanosis.   Neck: neck supple.  No adenopathy, masses, or carotid bruits.  JVP elevated 11-12  Heart: regular rhythm, normal S1/S2, no murmur, gallop, rub.  Precordium quiet with normal PMI.     Lungs: clear, no rales, ronchi, or wheezing.  No accessory muscle use, respirations unlabored.   Abdomen: soft, non-tender, bowel sounds present, no hepatomegaly  Extremities: no pitting edema.   No cyanosis.     Neurological: alert and oriented x 3.  normal speech and affect, no gross motor deficits  Skin:  No ecchymoses, rashes, or clubbing.    Labs:  CBC RESULTS:  Lab Results   Component Value Date    WBC 6.7 03/03/2021    RBC 3.47 (L) 03/03/2021    HGB 11.1 (L) 03/03/2021    HCT 34.3 (L) 03/03/2021    MCV 99 03/03/2021    MCH 32.0 03/03/2021    MCHC 32.4 03/03/2021    RDW 15.1 (H) 03/03/2021     03/03/2021       CMP RESULTS:  Lab Results   Component Value Date     07/23/2021     (L) 03/19/2021    POTASSIUM 5.3 07/23/2021    POTASSIUM 4.1 03/19/2021     CHLORIDE 97 07/23/2021    CHLORIDE 90 (L) 03/19/2021    CO2 31 07/23/2021    CO2 32 03/19/2021    ANIONGAP 6 07/23/2021    ANIONGAP 8 03/19/2021     (H) 07/23/2021     (H) 03/19/2021    BUN 33 (H) 07/23/2021    BUN 38 (H) 03/19/2021    CR 6.31 (H) 07/23/2021    CR 6.59 (H) 03/19/2021    GFRESTIMATED 10 (L) 07/23/2021    GFRESTIMATED 9 (L) 03/19/2021    GFRESTBLACK 11 (L) 03/19/2021    CHARLY 9.0 07/23/2021    CHARLY 9.0 03/19/2021    BILITOTAL 1.4 (H) 03/01/2021    ALBUMIN 3.4 03/01/2021    ALKPHOS 199 (H) 03/01/2021    ALT 19 03/01/2021    AST 15 03/01/2021        INR RESULTS:  Lab Results   Component Value Date    INR 1.24 (H) 03/01/2021       No components found for: CK  Lab Results   Component Value Date    MAG 2.3 12/14/2020     Lab Results   Component Value Date    NTBNP 62,990 (H) 07/23/2021     @BRIEFLABR (dig)@    Most recent echocardiogram:  No results found for this or any previous visit (from the past 8760 hour(s)).      Assessment and Plan:    In summary,Murtaza  is a  45 year old male who is here for a follow up  visit to [facility] clinic. He appears hypervolemic today and will have his dialysis run tomorrow as planned.  He is motivated to increase his EF to be eligible for transplant. Patient increased carvedilol to 12.5 mg BID- a week ago . We will continue to advance him to GDMT.      1.  Chronic sytolic heart failure secondary to ICM .  Stage C  NYHA Class III  ACEi/ARB: - introduce at upcoming appointments if BP not in suggested range  BB: yes- increased to 12.5 mg BID   Aldosterone antagonist: contraindicated due to renal dysfunction  SCD prophylaxis: decision deferred during medication uptitration  Fluid status: hypervolemic- dialysis patient   Anticoagulation:   Antiplatelet:  ASA dose   Sleep apnea:  NSAID use:  Contraindicated.  Avoid use.  Remote Monitoring:  SGLT-2- not candidate DM Type I.     2.  Other comordbid conditions:      #.  Hypertension.        He says his blood  pressures tend to run quite high, which would not be optimal in terms of his existing cardiomyopathy and may explain at least partially why his ejection fraction dropped from October to 12/2020.       if his blood pressures are not in the goal range of 115 mmHg systolic. we will plan on increasing carvedilol and/or adding an afterload reduction, possibly with ACE inhibitors at that time.     #.  Hyperlipidemia.        His goal LDL is less than 70.  It most recently was 88 with mildly elevated triglycerides.  We will perform further adjustments.     # DM II - 7/23/21 A1C - 6.9.  Insulin dependant - PCP following       3.  Follow-up   -Carvedilol 12.5 mg BID- started last Friday     -RN call in 1 week if doing well- and BP's are not in the range indicated above add Ace inhibitor 10 mg     -Lisinopril.- if added BMP in 1 week - RN call    after 10:30 on Thursday - due to dialysis    -[facility] in one month        Sumit ORTEGA, CNP  [facility]

## 2021-08-18 ENCOUNTER — TELEPHONE (OUTPATIENT)
Dept: INTERVENTIONAL RADIOLOGY/VASCULAR | Facility: CLINIC | Age: 46
End: 2021-08-18

## 2021-08-18 ENCOUNTER — TRANSFERRED RECORDS (OUTPATIENT)
Dept: HEALTH INFORMATION MANAGEMENT | Facility: CLINIC | Age: 46
End: 2021-08-18

## 2021-08-18 LAB
CREATININE (EXTERNAL): 6.06 MG/DL (ref 0.76–1.27)
GFR ESTIMATED (EXTERNAL): 10 ML/MIN/1.73
GFR ESTIMATED (IF AFRICAN AMERICAN) (EXTERNAL): 12 ML/MIN/1.73
GLUCOSE (EXTERNAL): 89 MG/DL (ref 65–99)
HBA1C MFR BLD: 6.6 % (ref 4.8–5.6)
POTASSIUM (EXTERNAL): 5.3 MMOL/L (ref 3.5–5.2)

## 2021-08-19 ENCOUNTER — PATIENT OUTREACH (OUTPATIENT)
Dept: CARDIOLOGY | Facility: CLINIC | Age: 46
End: 2021-08-19

## 2021-08-19 NOTE — TELEPHONE ENCOUNTER
Called patient to check on his blood pressures over the last week. He said he missed a few days but gave me some readings. He said overall he is feelings well and denied any shortness of breath.    8/14: 146/79  8/18: 162/93  8/19: 136/71    Taylor Perez RN

## 2021-08-21 ENCOUNTER — LAB (OUTPATIENT)
Dept: URGENT CARE | Facility: URGENT CARE | Age: 46
End: 2021-08-21
Attending: NURSE PRACTITIONER
Payer: COMMERCIAL

## 2021-08-21 DIAGNOSIS — Z11.59 ENCOUNTER FOR SCREENING FOR OTHER VIRAL DISEASES: ICD-10-CM

## 2021-08-21 PROCEDURE — U0003 INFECTIOUS AGENT DETECTION BY NUCLEIC ACID (DNA OR RNA); SEVERE ACUTE RESPIRATORY SYNDROME CORONAVIRUS 2 (SARS-COV-2) (CORONAVIRUS DISEASE [COVID-19]), AMPLIFIED PROBE TECHNIQUE, MAKING USE OF HIGH THROUGHPUT TECHNOLOGIES AS DESCRIBED BY CMS-2020-01-R: HCPCS

## 2021-08-21 PROCEDURE — U0005 INFEC AGEN DETEC AMPLI PROBE: HCPCS

## 2021-08-22 LAB — SARS-COV-2 RNA RESP QL NAA+PROBE: NEGATIVE

## 2021-08-22 NOTE — OP NOTE
Transplant Surgery  Operative Note    Date:  10/7/2020  Preop Dx:  ESRD  Postop Dx: ESRD  Procedure: Creation of Left Arteriovenous brachial artery to axillary vein bovine graft  Surgeon: Melissa Guzman M.D.  ASSISTANT:  ZELDA Lopez MD fellow. FARRAH Cancino resident.    Anesthesia: General with block  EBL: 10 ml  Drains: no drain  Specimen: none.  Complications: None  Findings:  Creation of brachial artery to axillary vein bovine graft with flows of 504cc/min upon completion. brachio basilic AV fistula was not performed as the brachial artery and basilic vein were  at the AC fossa by approximately 6 cm.  Indication: The patient has end stage renal disease and is in need of permanent hemodialysis access.  After discussing the risks and benefits of surgery and potential complications, the patient provided informed consent.     DETAILS OF PROCEDURE:  The patient was brought to the operating room, placed in a supine position.  Perioperative prophylactic IV antibiotics were given.  Anesthesia was adminisitered. The left arm was prepped and draped in the usual sterile fashion.  Time out was performed.. An incision was made and extended through the subcutaneous tissues above the antecubital fossa with a second just below the hairline in the axilla. The brachial artery and axillary vein were circumferentially dissected. and a Marge-Weck  was used to place an AVG in the subcutaneous space of the upper arm. The graft was distended with heparinzed saline and seen to have good lay.     The patient was heparinized with 1000 units heparin. Proximal and distal control of the  axillary vein was obtained first. A generous venotomy was made and the graft was tailored to have an appropriate size and good lay. The anastomosis was completed using running 7-0 prolene and distended with heparinized saline prior to tying. Hemostasis was obained and we turned attention to the artery. Hemostasis in the antecubital  incision  was obtained, an arteriotomy was made. The arterial end of the graft was trimmed and narrowed to 4mm diameter and anastomosed end to side with the brachial artery using 7-0 Prolene.  The clamps were removed in sequence. Initial flow was excellent at 504cc/min. The distal radial artery pulse was excellent- unchanged from prior and capillary refill good. Hemostasis was achieved. The wound was closed in layers with absorbable suture and dressed with Dermabond.     All needle, sponge, and instrument counts were accurate.  The patient tolerated the procedure well without apparent complications and was trasfered to the PACU in good condition.  Faculty was present for critical portions of the procedure.      I was present during the key portions of the procedure, and I was immediately available for the entire procedure.             normal LV function

## 2021-08-25 ENCOUNTER — HOSPITAL ENCOUNTER (OUTPATIENT)
Facility: CLINIC | Age: 46
Discharge: HOME | End: 2021-08-25
Attending: RADIOLOGY | Admitting: RADIOLOGY
Payer: COMMERCIAL

## 2021-08-25 ENCOUNTER — APPOINTMENT (OUTPATIENT)
Dept: MEDSURG UNIT | Facility: CLINIC | Age: 46
End: 2021-08-25
Attending: RADIOLOGY
Payer: COMMERCIAL

## 2021-08-25 ENCOUNTER — APPOINTMENT (OUTPATIENT)
Dept: INTERVENTIONAL RADIOLOGY/VASCULAR | Facility: CLINIC | Age: 46
End: 2021-08-25
Attending: NURSE PRACTITIONER
Payer: COMMERCIAL

## 2021-08-25 VITALS
HEART RATE: 78 BPM | DIASTOLIC BLOOD PRESSURE: 71 MMHG | RESPIRATION RATE: 16 BRPM | SYSTOLIC BLOOD PRESSURE: 136 MMHG | OXYGEN SATURATION: 97 %

## 2021-08-25 DIAGNOSIS — T82.898A PROBLEM WITH DIALYSIS ACCESS, INITIAL ENCOUNTER (H): ICD-10-CM

## 2021-08-25 LAB
ANION GAP SERPL CALCULATED.3IONS-SCNC: 7 MMOL/L (ref 3–14)
BUN SERPL-MCNC: 29 MG/DL (ref 7–30)
CALCIUM SERPL-MCNC: 9.5 MG/DL (ref 8.5–10.1)
CHLORIDE BLD-SCNC: 88 MMOL/L (ref 94–109)
CO2 SERPL-SCNC: 34 MMOL/L (ref 20–32)
CREAT SERPL-MCNC: 7.94 MG/DL (ref 0.66–1.25)
ERYTHROCYTE [DISTWIDTH] IN BLOOD BY AUTOMATED COUNT: 15.2 % (ref 10–15)
GFR SERPL CREATININE-BSD FRML MDRD: 7 ML/MIN/1.73M2
GLUCOSE BLD-MCNC: 287 MG/DL (ref 70–99)
HCT VFR BLD AUTO: 30.8 % (ref 40–53)
HGB BLD-MCNC: 10.2 G/DL (ref 13.3–17.7)
INR PPP: 1.18 (ref 0.85–1.15)
MCH RBC QN AUTO: 32.2 PG (ref 26.5–33)
MCHC RBC AUTO-ENTMCNC: 33.1 G/DL (ref 31.5–36.5)
MCV RBC AUTO: 97 FL (ref 78–100)
PLATELET # BLD AUTO: 203 10E3/UL (ref 150–450)
POTASSIUM BLD-SCNC: 4.1 MMOL/L (ref 3.4–5.3)
RBC # BLD AUTO: 3.17 10E6/UL (ref 4.4–5.9)
SODIUM SERPL-SCNC: 129 MMOL/L (ref 133–144)
WBC # BLD AUTO: 5.1 10E3/UL (ref 4–11)

## 2021-08-25 PROCEDURE — 255N000002 HC RX 255 OP 636: Performed by: RADIOLOGY

## 2021-08-25 PROCEDURE — C1725 CATH, TRANSLUMIN NON-LASER: HCPCS

## 2021-08-25 PROCEDURE — 85027 COMPLETE CBC AUTOMATED: CPT | Performed by: NURSE PRACTITIONER

## 2021-08-25 PROCEDURE — 258N000003 HC RX IP 258 OP 636: Performed by: NURSE PRACTITIONER

## 2021-08-25 PROCEDURE — 250N000009 HC RX 250: Performed by: STUDENT IN AN ORGANIZED HEALTH CARE EDUCATION/TRAINING PROGRAM

## 2021-08-25 PROCEDURE — 36907 BALO ANGIOP CTR DIALYSIS SEG: CPT

## 2021-08-25 PROCEDURE — 36902 INTRO CATH DIALYSIS CIRCUIT: CPT

## 2021-08-25 PROCEDURE — C1887 CATHETER, GUIDING: HCPCS

## 2021-08-25 PROCEDURE — 80048 BASIC METABOLIC PNL TOTAL CA: CPT | Performed by: NURSE PRACTITIONER

## 2021-08-25 PROCEDURE — 99153 MOD SED SAME PHYS/QHP EA: CPT

## 2021-08-25 PROCEDURE — C1769 GUIDE WIRE: HCPCS

## 2021-08-25 PROCEDURE — 250N000011 HC RX IP 250 OP 636: Performed by: NURSE PRACTITIONER

## 2021-08-25 PROCEDURE — 85610 PROTHROMBIN TIME: CPT | Performed by: NURSE PRACTITIONER

## 2021-08-25 PROCEDURE — 250N000011 HC RX IP 250 OP 636: Performed by: STUDENT IN AN ORGANIZED HEALTH CARE EDUCATION/TRAINING PROGRAM

## 2021-08-25 PROCEDURE — 272N000564 HC SHEATH CR2

## 2021-08-25 PROCEDURE — 36902 INTRO CATH DIALYSIS CIRCUIT: CPT | Mod: GC | Performed by: RADIOLOGY

## 2021-08-25 PROCEDURE — 272N000195 HC ACCESSORY CR4

## 2021-08-25 PROCEDURE — 36415 COLL VENOUS BLD VENIPUNCTURE: CPT | Performed by: NURSE PRACTITIONER

## 2021-08-25 PROCEDURE — 999N000132 HC STATISTIC PP CARE STAGE 1

## 2021-08-25 PROCEDURE — 76937 US GUIDE VASCULAR ACCESS: CPT | Mod: 26 | Performed by: RADIOLOGY

## 2021-08-25 PROCEDURE — 99152 MOD SED SAME PHYS/QHP 5/>YRS: CPT | Mod: GC | Performed by: RADIOLOGY

## 2021-08-25 PROCEDURE — 99152 MOD SED SAME PHYS/QHP 5/>YRS: CPT

## 2021-08-25 PROCEDURE — 36907 BALO ANGIOP CTR DIALYSIS SEG: CPT | Mod: GC | Performed by: RADIOLOGY

## 2021-08-25 RX ORDER — NALOXONE HYDROCHLORIDE 0.4 MG/ML
0.4 INJECTION, SOLUTION INTRAMUSCULAR; INTRAVENOUS; SUBCUTANEOUS
Status: DISCONTINUED | OUTPATIENT
Start: 2021-08-25 | End: 2021-08-25 | Stop reason: HOSPADM

## 2021-08-25 RX ORDER — NALOXONE HYDROCHLORIDE 0.4 MG/ML
0.2 INJECTION, SOLUTION INTRAMUSCULAR; INTRAVENOUS; SUBCUTANEOUS
Status: DISCONTINUED | OUTPATIENT
Start: 2021-08-25 | End: 2021-08-25 | Stop reason: HOSPADM

## 2021-08-25 RX ORDER — FLUMAZENIL 0.1 MG/ML
0.2 INJECTION, SOLUTION INTRAVENOUS
Status: DISCONTINUED | OUTPATIENT
Start: 2021-08-25 | End: 2021-08-25 | Stop reason: HOSPADM

## 2021-08-25 RX ORDER — FENTANYL CITRATE 50 UG/ML
25-50 INJECTION, SOLUTION INTRAMUSCULAR; INTRAVENOUS EVERY 5 MIN PRN
Status: DISCONTINUED | OUTPATIENT
Start: 2021-08-25 | End: 2021-08-25 | Stop reason: HOSPADM

## 2021-08-25 RX ORDER — NICOTINE POLACRILEX 4 MG
15-30 LOZENGE BUCCAL
Status: DISCONTINUED | OUTPATIENT
Start: 2021-08-25 | End: 2021-08-25 | Stop reason: HOSPADM

## 2021-08-25 RX ORDER — DEXTROSE MONOHYDRATE 25 G/50ML
25-50 INJECTION, SOLUTION INTRAVENOUS
Status: DISCONTINUED | OUTPATIENT
Start: 2021-08-25 | End: 2021-08-25 | Stop reason: HOSPADM

## 2021-08-25 RX ORDER — LIDOCAINE 40 MG/G
CREAM TOPICAL
Status: DISCONTINUED | OUTPATIENT
Start: 2021-08-25 | End: 2021-08-25 | Stop reason: HOSPADM

## 2021-08-25 RX ORDER — IODIXANOL 320 MG/ML
100 INJECTION, SOLUTION INTRAVASCULAR ONCE
Status: COMPLETED | OUTPATIENT
Start: 2021-08-25 | End: 2021-08-25

## 2021-08-25 RX ORDER — SODIUM CHLORIDE 9 MG/ML
INJECTION, SOLUTION INTRAVENOUS CONTINUOUS
Status: DISCONTINUED | OUTPATIENT
Start: 2021-08-25 | End: 2021-08-25 | Stop reason: HOSPADM

## 2021-08-25 RX ORDER — HEPARIN SODIUM 200 [USP'U]/100ML
1 INJECTION, SOLUTION INTRAVENOUS CONTINUOUS PRN
Status: DISCONTINUED | OUTPATIENT
Start: 2021-08-25 | End: 2021-08-25 | Stop reason: HOSPADM

## 2021-08-25 RX ADMIN — FENTANYL CITRATE 25 MCG: 50 INJECTION, SOLUTION INTRAMUSCULAR; INTRAVENOUS at 09:09

## 2021-08-25 RX ADMIN — FENTANYL CITRATE 25 MCG: 50 INJECTION, SOLUTION INTRAMUSCULAR; INTRAVENOUS at 09:20

## 2021-08-25 RX ADMIN — MIDAZOLAM 0.5 MG: 1 INJECTION INTRAMUSCULAR; INTRAVENOUS at 09:20

## 2021-08-25 RX ADMIN — MIDAZOLAM 0.5 MG: 1 INJECTION INTRAMUSCULAR; INTRAVENOUS at 09:09

## 2021-08-25 RX ADMIN — LIDOCAINE HYDROCHLORIDE 3 ML: 10 INJECTION, SOLUTION EPIDURAL; INFILTRATION; INTRACAUDAL; PERINEURAL at 09:10

## 2021-08-25 RX ADMIN — FENTANYL CITRATE 50 MCG: 50 INJECTION, SOLUTION INTRAMUSCULAR; INTRAVENOUS at 08:53

## 2021-08-25 RX ADMIN — MIDAZOLAM 1 MG: 1 INJECTION INTRAMUSCULAR; INTRAVENOUS at 08:52

## 2021-08-25 RX ADMIN — SODIUM CHLORIDE: 9 INJECTION, SOLUTION INTRAVENOUS at 07:42

## 2021-08-25 RX ADMIN — HEPARIN SODIUM 1 BAG: 200 INJECTION, SOLUTION INTRAVENOUS at 08:54

## 2021-08-25 RX ADMIN — FENTANYL CITRATE 25 MCG: 50 INJECTION, SOLUTION INTRAMUSCULAR; INTRAVENOUS at 09:43

## 2021-08-25 RX ADMIN — IODIXANOL 50 ML: 320 INJECTION, SOLUTION INTRAVASCULAR at 10:06

## 2021-08-25 RX ADMIN — MIDAZOLAM 0.5 MG: 1 INJECTION INTRAMUSCULAR; INTRAVENOUS at 09:44

## 2021-08-25 NOTE — DISCHARGE INSTRUCTIONS
[facility]      Interventional Radiology  Discharge Instructions Following Fistulogram      ? You may resume normal activities as tolerated, but avoid any strenuous activity or heavy lifting (>10 lbs.) involving your access arm.    ? Elevate and rest your arm as much as possible for the first 24 hours after the procedure.  This will promote healing and reduce swelling.     ? If bleeding or oozing should occur, apply fingertip pressure to puncture site to control bleeding, but avoid excessive pressure as this may clot off the fistula.  Hold light pressure for 10 minutes, or until bleeding/oozing is controlled.  If excessive bleeding is noted or if you are having difficulty controlling the bleeding with direct pressure, call 911.     ? If you develop fever, chills, excessive pain/tenderness or drainage at the puncture site, or have questions call your Doctor or Dialysis Center.     ? If you received sedation for your procedure: An adult should stay with you for 24 hours, Do Not drive a motor vehicle, operate machinery, or drink alcoholic beverages for 24 hours.     ? You may resume your normal medications immediately    ? If you notice sudden loss of pulse or thrill (buzzing feeling) in your fistula, contact your Doctor or Dialysis unit immediately.     Additional Instructions ***      [facility] INTERVENTIONAL RADIOLOGY DEPARTMENT  Procedure Physician: ***                                                          Date of procedure: August 25, 2021  Telephone Numbers: [phone] Monday-Friday 8:00 am to 4:30 pm  [phone] After 4:30 pm Monday-Friday, Weekends & Holidays.   Ask for the Interventional Radiologist on call.  Someone is on call 24 hrs/day  [facility] toll free number: [phone] Monday-Friday 8:00 am to 4:30 pm  [facility] Emergency Dept: [phone]

## 2021-08-25 NOTE — PROGRESS NOTES
discharge criteria met. Left arm site intact. Ambulated and ate. Reviewed discharge instructions with pt. Pt verbalized understanding and signed paperwork. PIV removed.

## 2021-08-25 NOTE — PROGRESS NOTES
Patient returned to 2A s/p fistulogram. Patient A&O, VSS. Left upper arm site intact, fistula thrill present. Patient tolerating PO food and fluids.

## 2021-08-25 NOTE — PRE-PROCEDURE
GENERAL PRE-PROCEDURE:   Procedure:  Left fistulogram with possible intervention  Date/Time:  8/25/2021 8:29 AM    Verbal consent obtained?: Yes    Written consent obtained?: Yes    Risks and benefits: Risks, benefits and alternatives were discussed    Consent given by:  Patient  Patient states understanding of procedure being performed: Yes    Patient's understanding of procedure matches consent: Yes    Procedure consent matches procedure scheduled: Yes    Expected level of sedation:  Moderate  Appropriately NPO:  Yes  ASA Class:  2  Mallampati  :  Grade 2- soft palate, base of uvula, tonsillar pillars, and portion of posterior pharyngeal wall visible  Lungs:  Lungs clear with good breath sounds bilaterally  Heart:  Normal heart sounds and rate  History & Physical reviewed:  History and physical reviewed and no updates needed  Statement of review:  I have reviewed the lab findings, diagnostic data, medications, and the plan for sedation

## 2021-08-25 NOTE — IR NOTE
Patient Name: Murtaza Fitzgerald  Medical Record Number: [id number]  Today's Date: 8/25/2021    Procedure: Left dialysis fistualagram with plasty  Proceduralist: Dr. Felisa Nolen    Procedure Start: 0901  Procedure end: 0955  Sedation medications administered: 125 mcg fentanyl and 2.5 mg versed (sedation time 60 minutes)    Report given to: Edilia GARCIA   : NA    Other Notes: Pt arrived to IR room 4 from . Consent reviewed. Pt denies any questions or concerns regarding procedure. Pt positioned supine and monitored per protocol. Pt tolerated procedure without any noted complications. Pt transferred back to .

## 2021-08-25 NOTE — PROGRESS NOTES
2A prep for Left arm fistulagram. VSS Pt alert, oriented and aware of planned procedure. Right PIV placed, at TKO. Labs pending. Appropriately NPO. Contrast consent signed.

## 2021-09-01 ENCOUNTER — APPOINTMENT (OUTPATIENT)
Dept: GENERAL RADIOLOGY | Facility: CLINIC | Age: 46
DRG: 073 | End: 2021-09-01
Attending: EMERGENCY MEDICINE
Payer: COMMERCIAL

## 2021-09-01 ENCOUNTER — HOSPITAL ENCOUNTER (INPATIENT)
Facility: CLINIC | Age: 46
LOS: 2 days | Discharge: HOME | DRG: 073 | End: 2021-09-03
Attending: EMERGENCY MEDICINE | Admitting: INTERNAL MEDICINE
Payer: COMMERCIAL

## 2021-09-01 DIAGNOSIS — Z99.2 ESRD (END STAGE RENAL DISEASE) ON DIALYSIS (H): ICD-10-CM

## 2021-09-01 DIAGNOSIS — R11.2 INTRACTABLE VOMITING WITH NAUSEA, UNSPECIFIED VOMITING TYPE: ICD-10-CM

## 2021-09-01 DIAGNOSIS — Z99.2 DEPENDENCE ON RENAL DIALYSIS (H): ICD-10-CM

## 2021-09-01 DIAGNOSIS — N18.6 ESRD (END STAGE RENAL DISEASE) ON DIALYSIS (H): ICD-10-CM

## 2021-09-01 DIAGNOSIS — K59.00 CONSTIPATION, UNSPECIFIED CONSTIPATION TYPE: ICD-10-CM

## 2021-09-01 DIAGNOSIS — N18.6 ESRD (END STAGE RENAL DISEASE) (H): ICD-10-CM

## 2021-09-01 DIAGNOSIS — K31.84 GASTROPARESIS: Primary | ICD-10-CM

## 2021-09-01 DIAGNOSIS — Z11.52 ENCOUNTER FOR SCREENING LABORATORY TESTING FOR COVID-19 VIRUS: ICD-10-CM

## 2021-09-01 LAB
ALBUMIN SERPL-MCNC: 3.8 G/DL (ref 3.4–5)
ALP SERPL-CCNC: 152 U/L (ref 40–150)
ALT SERPL W P-5'-P-CCNC: 18 U/L (ref 0–70)
ANION GAP SERPL CALCULATED.3IONS-SCNC: 10 MMOL/L (ref 3–14)
AST SERPL W P-5'-P-CCNC: 8 U/L (ref 0–45)
BASOPHILS # BLD AUTO: 0.1 10E3/UL (ref 0–0.2)
BASOPHILS NFR BLD AUTO: 2 %
BILIRUB SERPL-MCNC: 0.9 MG/DL (ref 0.2–1.3)
BUN SERPL-MCNC: 37 MG/DL (ref 7–30)
CALCIUM SERPL-MCNC: 10.4 MG/DL (ref 8.5–10.1)
CHLORIDE BLD-SCNC: 92 MMOL/L (ref 94–109)
CO2 SERPL-SCNC: 30 MMOL/L (ref 20–32)
CREAT SERPL-MCNC: 9.43 MG/DL (ref 0.66–1.25)
EOSINOPHIL # BLD AUTO: 0.5 10E3/UL (ref 0–0.7)
EOSINOPHIL NFR BLD AUTO: 8 %
ERYTHROCYTE [DISTWIDTH] IN BLOOD BY AUTOMATED COUNT: 14.6 % (ref 10–15)
GFR SERPL CREATININE-BSD FRML MDRD: 6 ML/MIN/1.73M2
GLUCOSE BLD-MCNC: 245 MG/DL (ref 70–99)
HCT VFR BLD AUTO: 34.7 % (ref 40–53)
HGB BLD-MCNC: 11.5 G/DL (ref 13.3–17.7)
HOLD SPECIMEN: NORMAL
IMM GRANULOCYTES # BLD: 0 10E3/UL
IMM GRANULOCYTES NFR BLD: 1 %
LIPASE SERPL-CCNC: 46 U/L (ref 73–393)
LYMPHOCYTES # BLD AUTO: 0.8 10E3/UL (ref 0.8–5.3)
LYMPHOCYTES NFR BLD AUTO: 13 %
MCH RBC QN AUTO: 31.1 PG (ref 26.5–33)
MCHC RBC AUTO-ENTMCNC: 33.1 G/DL (ref 31.5–36.5)
MCV RBC AUTO: 94 FL (ref 78–100)
MONOCYTES # BLD AUTO: 0.4 10E3/UL (ref 0–1.3)
MONOCYTES NFR BLD AUTO: 7 %
NEUTROPHILS # BLD AUTO: 4.2 10E3/UL (ref 1.6–8.3)
NEUTROPHILS NFR BLD AUTO: 69 %
NRBC # BLD AUTO: 0 10E3/UL
NRBC BLD AUTO-RTO: 0 /100
PLATELET # BLD AUTO: 213 10E3/UL (ref 150–450)
POTASSIUM BLD-SCNC: 4.6 MMOL/L (ref 3.4–5.3)
PROT SERPL-MCNC: 8.6 G/DL (ref 6.8–8.8)
RBC # BLD AUTO: 3.7 10E6/UL (ref 4.4–5.9)
SODIUM SERPL-SCNC: 132 MMOL/L (ref 133–144)
WBC # BLD AUTO: 6 10E3/UL (ref 4–11)

## 2021-09-01 PROCEDURE — 80053 COMPREHEN METABOLIC PANEL: CPT | Performed by: EMERGENCY MEDICINE

## 2021-09-01 PROCEDURE — 99285 EMERGENCY DEPT VISIT HI MDM: CPT | Performed by: EMERGENCY MEDICINE

## 2021-09-01 PROCEDURE — 96361 HYDRATE IV INFUSION ADD-ON: CPT | Performed by: EMERGENCY MEDICINE

## 2021-09-01 PROCEDURE — 85025 COMPLETE CBC W/AUTO DIFF WBC: CPT | Performed by: EMERGENCY MEDICINE

## 2021-09-01 PROCEDURE — 250N000011 HC RX IP 250 OP 636: Performed by: EMERGENCY MEDICINE

## 2021-09-01 PROCEDURE — 120N000002 HC R&B MED SURG/OB UMMC

## 2021-09-01 PROCEDURE — 36415 COLL VENOUS BLD VENIPUNCTURE: CPT | Performed by: EMERGENCY MEDICINE

## 2021-09-01 PROCEDURE — 84443 ASSAY THYROID STIM HORMONE: CPT

## 2021-09-01 PROCEDURE — 83690 ASSAY OF LIPASE: CPT | Performed by: EMERGENCY MEDICINE

## 2021-09-01 PROCEDURE — 99285 EMERGENCY DEPT VISIT HI MDM: CPT | Mod: 25 | Performed by: EMERGENCY MEDICINE

## 2021-09-01 PROCEDURE — 83735 ASSAY OF MAGNESIUM: CPT

## 2021-09-01 PROCEDURE — 84100 ASSAY OF PHOSPHORUS: CPT

## 2021-09-01 PROCEDURE — 258N000003 HC RX IP 258 OP 636: Performed by: EMERGENCY MEDICINE

## 2021-09-01 PROCEDURE — 74019 RADEX ABDOMEN 2 VIEWS: CPT

## 2021-09-01 PROCEDURE — C9803 HOPD COVID-19 SPEC COLLECT: HCPCS | Performed by: EMERGENCY MEDICINE

## 2021-09-01 PROCEDURE — 83036 HEMOGLOBIN GLYCOSYLATED A1C: CPT

## 2021-09-01 PROCEDURE — 74019 RADEX ABDOMEN 2 VIEWS: CPT | Mod: 26 | Performed by: RADIOLOGY

## 2021-09-01 PROCEDURE — 84439 ASSAY OF FREE THYROXINE: CPT

## 2021-09-01 PROCEDURE — 96374 THER/PROPH/DIAG INJ IV PUSH: CPT | Performed by: EMERGENCY MEDICINE

## 2021-09-01 RX ORDER — ONDANSETRON 2 MG/ML
4 INJECTION INTRAMUSCULAR; INTRAVENOUS EVERY 30 MIN PRN
Status: COMPLETED | OUTPATIENT
Start: 2021-09-01 | End: 2021-09-02

## 2021-09-01 RX ADMIN — SODIUM CHLORIDE 500 ML: 9 INJECTION, SOLUTION INTRAVENOUS at 22:36

## 2021-09-01 RX ADMIN — ONDANSETRON 4 MG: 2 INJECTION INTRAMUSCULAR; INTRAVENOUS at 22:36

## 2021-09-02 LAB
ANION GAP SERPL CALCULATED.3IONS-SCNC: 11 MMOL/L (ref 3–14)
ATRIAL RATE - MUSE: 90 BPM
BUN SERPL-MCNC: 44 MG/DL (ref 7–30)
CALCIUM SERPL-MCNC: 9.4 MG/DL (ref 8.5–10.1)
CHLORIDE BLD-SCNC: 95 MMOL/L (ref 94–109)
CO2 SERPL-SCNC: 27 MMOL/L (ref 20–32)
CREAT SERPL-MCNC: 10 MG/DL (ref 0.66–1.25)
DIASTOLIC BLOOD PRESSURE - MUSE: NORMAL MMHG
GFR SERPL CREATININE-BSD FRML MDRD: 6 ML/MIN/1.73M2
GLUCOSE BLD-MCNC: 224 MG/DL (ref 70–99)
GLUCOSE BLDC GLUCOMTR-MCNC: 200 MG/DL (ref 70–99)
GLUCOSE BLDC GLUCOMTR-MCNC: 203 MG/DL (ref 70–99)
GLUCOSE BLDC GLUCOMTR-MCNC: 204 MG/DL (ref 70–99)
GLUCOSE BLDC GLUCOMTR-MCNC: 228 MG/DL (ref 70–99)
GLUCOSE BLDC GLUCOMTR-MCNC: 248 MG/DL (ref 70–99)
HBA1C MFR BLD: 6.9 % (ref 0–5.6)
INTERPRETATION ECG - MUSE: NORMAL
MAGNESIUM SERPL-MCNC: 2.2 MG/DL (ref 1.6–2.3)
P AXIS - MUSE: 29 DEGREES
PHOSPHATE SERPL-MCNC: 4.9 MG/DL (ref 2.5–4.5)
POTASSIUM BLD-SCNC: 4.5 MMOL/L (ref 3.4–5.3)
PR INTERVAL - MUSE: 144 MS
QRS DURATION - MUSE: 100 MS
QT - MUSE: 400 MS
QTC - MUSE: 489 MS
R AXIS - MUSE: -9 DEGREES
SARS-COV-2 RNA RESP QL NAA+PROBE: NEGATIVE
SODIUM SERPL-SCNC: 133 MMOL/L (ref 133–144)
SYSTOLIC BLOOD PRESSURE - MUSE: NORMAL MMHG
T AXIS - MUSE: 75 DEGREES
T4 FREE SERPL-MCNC: 1.35 NG/DL (ref 0.76–1.46)
TSH SERPL DL<=0.005 MIU/L-ACNC: 6.28 MU/L (ref 0.4–4)
VENTRICULAR RATE- MUSE: 90 BPM

## 2021-09-02 PROCEDURE — 258N000003 HC RX IP 258 OP 636: Performed by: STUDENT IN AN ORGANIZED HEALTH CARE EDUCATION/TRAINING PROGRAM

## 2021-09-02 PROCEDURE — 250N000011 HC RX IP 250 OP 636: Performed by: EMERGENCY MEDICINE

## 2021-09-02 PROCEDURE — 90937 HEMODIALYSIS REPEATED EVAL: CPT

## 2021-09-02 PROCEDURE — 93010 ELECTROCARDIOGRAM REPORT: CPT | Performed by: INTERNAL MEDICINE

## 2021-09-02 PROCEDURE — 93005 ELECTROCARDIOGRAM TRACING: CPT

## 2021-09-02 PROCEDURE — 99223 1ST HOSP IP/OBS HIGH 75: CPT | Mod: AI | Performed by: INTERNAL MEDICINE

## 2021-09-02 PROCEDURE — 96375 TX/PRO/DX INJ NEW DRUG ADDON: CPT | Performed by: EMERGENCY MEDICINE

## 2021-09-02 PROCEDURE — 258N000003 HC RX IP 258 OP 636: Performed by: INTERNAL MEDICINE

## 2021-09-02 PROCEDURE — U0003 INFECTIOUS AGENT DETECTION BY NUCLEIC ACID (DNA OR RNA); SEVERE ACUTE RESPIRATORY SYNDROME CORONAVIRUS 2 (SARS-COV-2) (CORONAVIRUS DISEASE [COVID-19]), AMPLIFIED PROBE TECHNIQUE, MAKING USE OF HIGH THROUGHPUT TECHNOLOGIES AS DESCRIBED BY CMS-2020-01-R: HCPCS | Performed by: EMERGENCY MEDICINE

## 2021-09-02 PROCEDURE — 250N000012 HC RX MED GY IP 250 OP 636 PS 637

## 2021-09-02 PROCEDURE — 99222 1ST HOSP IP/OBS MODERATE 55: CPT | Performed by: NURSE PRACTITIONER

## 2021-09-02 PROCEDURE — 250N000013 HC RX MED GY IP 250 OP 250 PS 637: Performed by: STUDENT IN AN ORGANIZED HEALTH CARE EDUCATION/TRAINING PROGRAM

## 2021-09-02 PROCEDURE — 250N000011 HC RX IP 250 OP 636: Performed by: STUDENT IN AN ORGANIZED HEALTH CARE EDUCATION/TRAINING PROGRAM

## 2021-09-02 PROCEDURE — 36415 COLL VENOUS BLD VENIPUNCTURE: CPT

## 2021-09-02 PROCEDURE — 80048 BASIC METABOLIC PNL TOTAL CA: CPT

## 2021-09-02 PROCEDURE — 250N000011 HC RX IP 250 OP 636: Performed by: INTERNAL MEDICINE

## 2021-09-02 PROCEDURE — 250N000009 HC RX 250: Performed by: INTERNAL MEDICINE

## 2021-09-02 PROCEDURE — 120N000002 HC R&B MED SURG/OB UMMC

## 2021-09-02 PROCEDURE — 96376 TX/PRO/DX INJ SAME DRUG ADON: CPT | Performed by: EMERGENCY MEDICINE

## 2021-09-02 PROCEDURE — 250N000013 HC RX MED GY IP 250 OP 250 PS 637

## 2021-09-02 PROCEDURE — 99223 1ST HOSP IP/OBS HIGH 75: CPT | Performed by: PHYSICIAN ASSISTANT

## 2021-09-02 PROCEDURE — 5A1D70Z PERFORMANCE OF URINARY FILTRATION, INTERMITTENT, LESS THAN 6 HOURS PER DAY: ICD-10-PCS | Performed by: PHYSICIAN ASSISTANT

## 2021-09-02 RX ORDER — ASPIRIN 81 MG/1
81 TABLET ORAL DAILY
Status: DISCONTINUED | OUTPATIENT
Start: 2021-09-02 | End: 2021-09-03 | Stop reason: HOSPADM

## 2021-09-02 RX ORDER — DEXTROSE MONOHYDRATE 25 G/50ML
25-50 INJECTION, SOLUTION INTRAVENOUS
Status: DISCONTINUED | OUTPATIENT
Start: 2021-09-02 | End: 2021-09-03 | Stop reason: HOSPADM

## 2021-09-02 RX ORDER — CALCIUM CARBONATE 500 MG/1
500 TABLET, CHEWABLE ORAL 2 TIMES DAILY
Status: DISCONTINUED | OUTPATIENT
Start: 2021-09-02 | End: 2021-09-03 | Stop reason: HOSPADM

## 2021-09-02 RX ORDER — CALCIUM ACETATE 667 MG/1
667 CAPSULE ORAL
Status: DISCONTINUED | OUTPATIENT
Start: 2021-09-02 | End: 2021-09-03 | Stop reason: HOSPADM

## 2021-09-02 RX ORDER — PANTOPRAZOLE SODIUM 40 MG/1
80 TABLET, DELAYED RELEASE ORAL EVERY EVENING
Status: DISCONTINUED | OUTPATIENT
Start: 2021-09-02 | End: 2021-09-02

## 2021-09-02 RX ORDER — NICOTINE POLACRILEX 4 MG
15-30 LOZENGE BUCCAL
Status: DISCONTINUED | OUTPATIENT
Start: 2021-09-02 | End: 2021-09-03 | Stop reason: HOSPADM

## 2021-09-02 RX ORDER — AMOXICILLIN 250 MG
2 CAPSULE ORAL 2 TIMES DAILY
Status: DISCONTINUED | OUTPATIENT
Start: 2021-09-02 | End: 2021-09-03 | Stop reason: HOSPADM

## 2021-09-02 RX ORDER — OLANZAPINE 10 MG/2ML
5 INJECTION, POWDER, FOR SOLUTION INTRAMUSCULAR
Status: DISCONTINUED | OUTPATIENT
Start: 2021-09-02 | End: 2021-09-02

## 2021-09-02 RX ORDER — LIDOCAINE 40 MG/G
CREAM TOPICAL
Status: DISCONTINUED | OUTPATIENT
Start: 2021-09-02 | End: 2021-09-03 | Stop reason: HOSPADM

## 2021-09-02 RX ORDER — HEPARIN SODIUM 5000 [USP'U]/.5ML
5000 INJECTION, SOLUTION INTRAVENOUS; SUBCUTANEOUS EVERY 12 HOURS
Status: DISCONTINUED | OUTPATIENT
Start: 2021-09-02 | End: 2021-09-03 | Stop reason: HOSPADM

## 2021-09-02 RX ORDER — CARVEDILOL 12.5 MG/1
12.5 TABLET ORAL 2 TIMES DAILY WITH MEALS
Status: DISCONTINUED | OUTPATIENT
Start: 2021-09-02 | End: 2021-09-03 | Stop reason: HOSPADM

## 2021-09-02 RX ORDER — DOXERCALCIFEROL 4 UG/2ML
4 INJECTION INTRAVENOUS
Status: COMPLETED | OUTPATIENT
Start: 2021-09-02 | End: 2021-09-02

## 2021-09-02 RX ORDER — AMOXICILLIN 250 MG
2 CAPSULE ORAL 2 TIMES DAILY PRN
Status: DISCONTINUED | OUTPATIENT
Start: 2021-09-02 | End: 2021-09-02

## 2021-09-02 RX ORDER — ACETAMINOPHEN 325 MG/1
650 TABLET ORAL EVERY 4 HOURS PRN
Status: DISCONTINUED | OUTPATIENT
Start: 2021-09-02 | End: 2021-09-03 | Stop reason: HOSPADM

## 2021-09-02 RX ORDER — ONDANSETRON 2 MG/ML
4 INJECTION INTRAMUSCULAR; INTRAVENOUS EVERY 6 HOURS
Status: DISCONTINUED | OUTPATIENT
Start: 2021-09-02 | End: 2021-09-03 | Stop reason: HOSPADM

## 2021-09-02 RX ORDER — AMOXICILLIN 250 MG
1 CAPSULE ORAL 2 TIMES DAILY PRN
Status: DISCONTINUED | OUTPATIENT
Start: 2021-09-02 | End: 2021-09-02

## 2021-09-02 RX ORDER — PROCHLORPERAZINE MALEATE 5 MG
5 TABLET ORAL EVERY 6 HOURS PRN
Status: DISCONTINUED | OUTPATIENT
Start: 2021-09-02 | End: 2021-09-03 | Stop reason: HOSPADM

## 2021-09-02 RX ORDER — ATORVASTATIN CALCIUM 40 MG/1
40 TABLET, FILM COATED ORAL DAILY
Status: DISCONTINUED | OUTPATIENT
Start: 2021-09-02 | End: 2021-09-03 | Stop reason: HOSPADM

## 2021-09-02 RX ORDER — PANTOPRAZOLE SODIUM 40 MG/1
40 TABLET, DELAYED RELEASE ORAL
Status: DISCONTINUED | OUTPATIENT
Start: 2021-09-02 | End: 2021-09-03 | Stop reason: HOSPADM

## 2021-09-02 RX ORDER — ONDANSETRON 2 MG/ML
4 INJECTION INTRAMUSCULAR; INTRAVENOUS EVERY 6 HOURS PRN
Status: DISCONTINUED | OUTPATIENT
Start: 2021-09-02 | End: 2021-09-02

## 2021-09-02 RX ORDER — LANTHANUM CARBONATE 750 MG/1
1500 TABLET, CHEWABLE ORAL
Status: DISCONTINUED | OUTPATIENT
Start: 2021-09-02 | End: 2021-09-02

## 2021-09-02 RX ADMIN — CALCIUM ACETATE 667 MG: 667 CAPSULE ORAL at 20:54

## 2021-09-02 RX ADMIN — SODIUM CHLORIDE 250 ML: 9 INJECTION, SOLUTION INTRAVENOUS at 14:50

## 2021-09-02 RX ADMIN — ACETAMINOPHEN 650 MG: 325 TABLET, FILM COATED ORAL at 12:46

## 2021-09-02 RX ADMIN — PROCHLORPERAZINE EDISYLATE 5 MG: 5 INJECTION INTRAMUSCULAR; INTRAVENOUS at 00:04

## 2021-09-02 RX ADMIN — INSULIN GLARGINE 12 UNITS: 100 INJECTION, SOLUTION SUBCUTANEOUS at 22:41

## 2021-09-02 RX ADMIN — CARVEDILOL 12.5 MG: 12.5 TABLET, FILM COATED ORAL at 10:20

## 2021-09-02 RX ADMIN — DOCUSATE SODIUM 50 MG AND SENNOSIDES 8.6 MG 2 TABLET: 8.6; 5 TABLET, FILM COATED ORAL at 20:54

## 2021-09-02 RX ADMIN — INSULIN ASPART 1 UNITS: 100 INJECTION, SOLUTION INTRAVENOUS; SUBCUTANEOUS at 22:45

## 2021-09-02 RX ADMIN — CARVEDILOL 12.5 MG: 12.5 TABLET, FILM COATED ORAL at 20:55

## 2021-09-02 RX ADMIN — LIDOCAINE HYDROCHLORIDE 0.5 ML: 10 INJECTION, SOLUTION EPIDURAL; INFILTRATION; INTRACAUDAL; PERINEURAL at 14:47

## 2021-09-02 RX ADMIN — PROCHLORPERAZINE EDISYLATE 5 MG: 5 INJECTION INTRAMUSCULAR; INTRAVENOUS at 12:38

## 2021-09-02 RX ADMIN — Medication: at 14:50

## 2021-09-02 RX ADMIN — SODIUM CHLORIDE 300 ML: 9 INJECTION, SOLUTION INTRAVENOUS at 14:50

## 2021-09-02 RX ADMIN — CALCIUM CARBONATE (ANTACID) CHEW TAB 500 MG 500 MG: 500 CHEW TAB at 20:55

## 2021-09-02 RX ADMIN — PROCHLORPERAZINE EDISYLATE 5 MG: 5 INJECTION INTRAMUSCULAR; INTRAVENOUS at 02:16

## 2021-09-02 RX ADMIN — ATORVASTATIN CALCIUM 40 MG: 40 TABLET, FILM COATED ORAL at 20:55

## 2021-09-02 RX ADMIN — ONDANSETRON 4 MG: 2 INJECTION INTRAMUSCULAR; INTRAVENOUS at 09:18

## 2021-09-02 RX ADMIN — DOXERCALCIFEROL 4 MCG: 2 INJECTION, SOLUTION INTRAVENOUS at 14:58

## 2021-09-02 RX ADMIN — Medication 81 MG: at 10:20

## 2021-09-02 RX ADMIN — PANTOPRAZOLE SODIUM 40 MG: 40 TABLET, DELAYED RELEASE ORAL at 21:01

## 2021-09-02 RX ADMIN — AZITHROMYCIN MONOHYDRATE 500 MG: 500 INJECTION, POWDER, LYOPHILIZED, FOR SOLUTION INTRAVENOUS at 20:56

## 2021-09-02 RX ADMIN — DOCUSATE SODIUM 50 MG AND SENNOSIDES 8.6 MG 2 TABLET: 8.6; 5 TABLET, FILM COATED ORAL at 12:38

## 2021-09-02 RX ADMIN — INSULIN GLARGINE 12 UNITS: 100 INJECTION, SOLUTION SUBCUTANEOUS at 04:35

## 2021-09-02 RX ADMIN — ONDANSETRON 4 MG: 2 INJECTION INTRAMUSCULAR; INTRAVENOUS at 04:35

## 2021-09-02 ASSESSMENT — ACTIVITIES OF DAILY LIVING (ADL)
DRESSING/BATHING_DIFFICULTY: NO
ADLS_ACUITY_SCORE: 12
WALKING_OR_CLIMBING_STAIRS_DIFFICULTY: YES
TOILETING_ISSUES: YES
ADLS_ACUITY_SCORE: 12
DIFFICULTY_EATING/SWALLOWING: NO
DIFFICULTY_COMMUNICATING: NO
ADLS_ACUITY_SCORE: 12
WEAR_GLASSES_OR_BLIND: NO
FALL_HISTORY_WITHIN_LAST_SIX_MONTHS: NO
CONCENTRATING,_REMEMBERING_OR_MAKING_DECISIONS_DIFFICULTY: NO
WALKING_OR_CLIMBING_STAIRS: OTHER (SEE COMMENTS)
DOING_ERRANDS_INDEPENDENTLY_DIFFICULTY: NO
ADLS_ACUITY_SCORE: 12

## 2021-09-02 ASSESSMENT — ENCOUNTER SYMPTOMS
APPETITE CHANGE: 1
CHILLS: 0
FEVER: 0
FATIGUE: 1
CONSTIPATION: 1
CHEST TIGHTNESS: 0
ABDOMINAL PAIN: 1
SORE THROAT: 0
PALPITATIONS: 0
SHORTNESS OF BREATH: 0
COUGH: 0
ABDOMINAL DISTENTION: 0
ARTHRALGIAS: 0
VOMITING: 1
HEADACHES: 0
WEAKNESS: 1
RHINORRHEA: 0
NAUSEA: 1
MYALGIAS: 0
LIGHT-HEADEDNESS: 0

## 2021-09-02 NOTE — CONSULTS
Nephrology Initial Consult  September 2, 2021      Murtaza Fitzgerald MRN:[id number] YOB: 1975  Date of Admission:9/1/2021  Primary care provider: Kt Ríos  Requesting physician: Rodney Hawkins, *    ASSESSMENT AND RECOMMENDATIONS:   Murtaza Fitzgerald is a 45 year old male with PMH of gastroparesis, ESRD s/p failed kidney tx, ischemic cardiomyopathy s/p CABG (2015), HFrEF (35-40%), T1DM s/p failed pancreatic tx, admitted for intractable nausea with emesis.     ESKD: s/p failed tx, dialyzes TTS at [facility] with Dr. Schneider. Run time: 3.5 hrs. EDW: 81.5 kg. Access: LUE AVG  - Dialysis per TTS schedule  - Consent for dialysis obtained 9/2/2021    Volume: EDW 81.5 kg, usual UF 3-4 kg  - UF goal 2 kg today given elevated pressures    BP: elevated; PTA meds coreg 6.25 mg bid  - Will gently challenge EDW given elevated pressures  - Would also increase Coreg to 12.5 mg bid    Anemia: PTA on epogen 2400 units per HD and venofer 50 mg qTues  - Hold WILL for hgb > 10    BMD: on hectorol 3.5 mcg per HD, tums 2 tabs tid WM  - Continue hectorol via HD  - Continue tums WM    Nausea/vomiting: hx of gastroparesis  - Improving with anti-emetics    Recommendations were communicated to primary team via this note       Maddy Vernon, PA -C  [phone]      REASON FOR CONSULT: ESKD/dialysis    HISTORY OF PRESENT ILLNESS:  Murtaza Fitzgerald is a 45 year old male with PMH of gastroparesis, ESRD s/p failed kidney tx, ischemic cardiomyopathy s/p CABG (2015), HFrEF, T1DM s/p failed pancreatic tx, admitted for intractable nausea with emesis. Patient is seen bedside before dialysis, doing better now with anti-emetics. He is agreeable to UF 2 kg given elevated pressures. He denies current CP, SOB, chills.    PAST MEDICAL HISTORY:  Reviewed with patient on 09/02/2021     Past Medical History:   Diagnosis Date     CMV (cytomegalovirus infection) status negative 2011     Coronary  artery disease, non-occlusive 2008    angio showed diffuse disease with no lesions     Diabetes mellitus, type 1     Diagnosed at age 11. 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     Noncompliance with treatment     no labs for one year     Pancreas replaced by transplant (H) Feb 2009    rejection treated with thymo     Pancreatic disease     pancreas transplant     Tobacco abuse ongoing       Past Surgical History:   Procedure Laterality Date     ABDOMEN SURGERY       ARTHROSCOPY KNEE WITH LATERAL MENISCECTOMY Left 7/10/2019    Procedure: Left knee arthroscopic partial lateral meniscectomy;  Surgeon: Alex Candelaria MD;  Location: [facility] OR     BIOPSY      [facility] 2009     BYPASS GRAFT ARTERY CORONARY N/A 8/5/2015    Procedure: BYPASS GRAFT ARTERY CORONARY;  Surgeon: Katy Neville MD;  Location: [facility] OR     CORONARY ANGIOGRAPHY ADULT ORDER      2015     CREATE FISTULA ARTERIOVENOUS UPPER EXTREMITY Left 10/7/2020    Procedure: CREATION, ARTERIOVENOUS GRAFT placement LEFT UPPER EXTREMITY   with intraoperative ultrasound;  Surgeon: Melissa Guzman MD;  Location: [facility] OR     CV CORONARY ANGIOGRAM N/A 3/3/2021    Procedure: CV CORONARY ANGIOGRAM;  Surgeon: Everette Dang MD;  Location: [facility] HEART CARDIAC CATH LAB     ESOPHAGOSCOPY, GASTROSCOPY, DUODENOSCOPY (EGD), COMBINED N/A 1/28/2020    Procedure: ESOPHAGOGASTRODUODENOSCOPY (EGD) biopsies w/forceps;  Surgeon: Emre Gomes MD;  Location:  GI     EYE SURGERY      vitrectomy both eyes      IR CVC TUNNEL PLACEMENT > 5 YRS OF AGE  2/13/2020     IR CVC TUNNEL REMOVAL RIGHT  12/7/2020     IR DIALYSIS FISTULOGRAM LEFT  3/19/2021     IR DIALYSIS FISTULOGRAM LEFT   8/25/2021     IR DIALYSIS PTA  3/19/2021     IR DIALYSIS PTA  8/25/2021     IR DIALYSIS PTA CENTRAL SEG  8/25/2021     ORTHOPEDIC SURGERY  1993    tumor removed from left knee     TRANSPLANT  2009.2008    pancrease and kidney transplant        MEDICATIONS:  PTA Meds  Prior to Admission medications    Medication Sig Last Dose Taking? Auth Provider   acetone, Urine, test STRP 1 strip by In Vitro route daily  Patient not taking: Reported on 7/23/2021   Marcie Lozano APRN CNP   aspirin (ASA) 81 MG EC tablet Take 1 tablet (81 mg) by mouth daily   Kt Ríos MD   atorvastatin (LIPITOR) 20 MG tablet Take 2 tablets (40 mg) by mouth daily   Yovany Vogel MD   benzoyl peroxide 5 % external liquid For folliculitis. Apply once a day for 1-2 minutes on areas of whiteheads or acne then rinse off.   Renetta Rosales MD   blood glucose (NO BRAND SPECIFIED) test strip Use to test blood sugar 5 times daily or as directed.   Kt Ríos MD   calcium acetate (PHOSLO) 667 MG CAPS capsule Take 667 mg by mouth 3 times daily (with meals)    Reported, Patient   calcium carbonate (TUMS) 500 MG chewable tablet Take 1 chew tab by mouth 2 times daily   Reported, Patient   carvedilol (COREG) 12.5 MG tablet Take 1 tablet (12.5 mg) by mouth 2 times daily (with meals)   Sumit Griffin APRN CNP   Continuous Blood Gluc Sensor (DEXCOM G6 SENSOR) MISC Change every 10 days.   Kt Ríos MD   Continuous Blood Gluc Transmit (DEXCOM G6 TRANSMITTER) MISC Change every 3 months.   Kt Ríos MD   hydrocortisone 2.5 % ointment Apply if you have itch rash on the face, genitals, armpit.   Renetta Rosales MD   insulin aspart (NOVOLOG FLEXPEN) 100 UNIT/ML pen Sliding scale = 1 unit for every 20 over blood glucose of 140, average 45 units/day   Kt Ríos MD   Insulin Disposable Pump (OMNIPOD DASH 5 PACK PODS) MISC 1 each every 48 hours  Patient not taking: Reported on 7/23/2021   Kt Ríos MD   insulin  glargine (LANTUS SOLOSTAR) 100 UNIT/ML pen Inject 24 Units Subcutaneous At Bedtime   Kt Ríos MD   insulin pen needle (BD ARPITA U/F) 32G X 4 MM miscellaneous Use 4 daily or as directed.   Patricia Costa MD   lanthanum (FOSRENOL) 750 MG chewable tablet Take 2 tablets (1,500 mg) by mouth 5 times daily With meals and snacks.   Katelyn Lopez NP   metoclopramide (REGLAN) 5 MG tablet Take 1 tablet (5 mg) by mouth 4 times daily (before meals and nightly)   Kt Ríos MD   naproxen sodium 220 MG capsule Take 220 mg by mouth as needed    Reported, Patient   omeprazole (PRILOSEC) 40 MG DR capsule Take 2 capsules (80 mg) by mouth every evening   Kt Ríos MD   ondansetron (ZOFRAN-ODT) 8 MG ODT tab DISSOLVE 1 TABLET ON TOP OF TONGUE EVERY 8 HOURS FOR 2 DAYS AS NEEDED FOR NAUSEA AND VOMITING   Reported, Patient   prochlorperazine (COMPAZINE) 10 MG tablet Take 10 mg by mouth   Reported, Patient   senna-docusate (SENOKOT-S/PERICOLACE) 8.6-50 MG tablet Take 1 tablet by mouth PRN   Reported, Patient   triamcinolone (KENALOG) 0.1 % external ointment Apply twice a day to areas of itchy rash. Do not apply to the face, groin,armpits.   Renetta Rosales MD      Current Meds    aspirin  81 mg Oral Daily     atorvastatin  40 mg Oral Daily     calcium acetate  667 mg Oral TID w/meals     calcium carbonate  500 mg Oral BID     carvedilol  12.5 mg Oral BID w/meals     insulin aspart  1-7 Units Subcutaneous TID AC     insulin aspart  1-5 Units Subcutaneous At Bedtime     insulin glargine  12 Units Subcutaneous At Bedtime     pantoprazole  80 mg Oral QPM     senna-docusate  2 tablet Oral BID     sodium chloride (PF)  3 mL Intracatheter Q8H     Infusion Meds      ALLERGIES:    Allergies   Allergen Reactions     Diphenhydramine Other (See Comments)     Restless legs     Metoclopramide      Other reaction(s): Confusion, Unknown     Reglan Other (See Comments)     Reaction only to IV formulation -->delsuions        REVIEW OF SYSTEMS:  A comprehensive of systems was negative except as noted above.    SOCIAL HISTORY:   Social History     Socioeconomic History     Marital status: Single     Spouse name: Not on file     Number of children: 1     Years of education: Not on file     Highest education level: Associate degree: academic program   Occupational History     Occupation:    Tobacco Use     Smoking status: Former Smoker     Packs/day: 1.00     Years: 20.00     Pack years: 20.00     Types: Vaping Device, Cigarettes     Quit date: 2016     Years since quittin.6     Smokeless tobacco: Never Used     Tobacco comment: E- Cig use since    Substance and Sexual Activity     Alcohol use: Not Currently     Alcohol/week: 0.0 standard drinks     Drug use: No     Sexual activity: Never     Partners: Female   Other Topics Concern     Parent/sibling w/ CABG, MI or angioplasty before 65F 55M? No      Service Not Asked     Blood Transfusions No     Comment: possibly during surgery, otherwise none.     Caffeine Concern Not Asked     Occupational Exposure Not Asked     Hobby Hazards Not Asked     Sleep Concern Not Asked     Stress Concern Not Asked     Weight Concern Not Asked     Special Diet Not Asked     Back Care Not Asked     Exercise Not Asked     Bike Helmet Not Asked     Seat Belt Yes     Self-Exams Not Asked   Social History Narrative     Not on file     Social Determinants of Health     Financial Resource Strain:      Difficulty of Paying Living Expenses:    Food Insecurity:      Worried About Running Out of Food in the Last Year:      Ran Out of Food in the Last Year:    Transportation Needs:      Lack of Transportation (Medical):      Lack of Transportation (Non-Medical):    Physical Activity:      Days of Exercise per Week:      Minutes of Exercise per Session:    Stress:      Feeling of Stress :    Social Connections:      Frequency of Communication with Friends and Family:       Frequency of Social Gatherings with Friends and Family:      Attends Jew Services:      Active Member of Clubs or Organizations:      Attends Club or Organization Meetings:      Marital Status:    Intimate Partner Violence:      Fear of Current or Ex-Partner:      Emotionally Abused:      Physically Abused:      Sexually Abused:      Reviewed with patient      FAMILY MEDICAL HISTORY:   Family History   Problem Relation Age of Onset     Unknown/Adopted Father      Heart Disease Maternal Grandfather 62     Melanoma No family hx of      Skin Cancer No family hx of      Reviewed with patient     PHYSICAL EXAM:   Temp  Av.7  F (36.5  C)  Min: 96.6  F (35.9  C)  Max: 98.8  F (37.1  C)      Pulse  Av  Min: 70  Max: 110 Resp  Av.3  Min: 9  Max: 18  SpO2  Av %  Min: 93 %  Max: 100 %       BP (!) 167/95 (BP Location: Right arm)   Pulse 86   Temp (!) 96.6  F (35.9  C) (Oral)   Resp 16   Wt 81.5 kg (179 lb 11.2 oz)   SpO2 98%   BMI 27.12 kg/m        Admit Weight: 81.5 kg (179 lb 11.2 oz)     GENERAL APPEARANCE: alert, NAD  EYES: no scleral icterus, pupils equal  Pulmonary: lungs clear to auscultation with equal breath sounds bilaterally   CV: regular rhythm, normal rate, no rub   - Edema no peripheral, mild facial  GI: soft, nontender, normal bowel sounds  MS: no evidence of inflammation in joints, no muscle tenderness  : no schultz  SKIN: no rash, warm, dry, no cyanosis  NEURO: face symmetric, a/o3  Access: PIO VALLEJO    LABS:   CMP  Recent Labs   Lab 21  1135 21  0818 21  0721 21  0352 21  2151   NA  --   --  133  --  132*   POTASSIUM  --   --  4.5  --  4.6   CHLORIDE  --   --  95  --  92*   CO2  --   --  27  --  30   ANIONGAP  --   --  11  --  10   * 200* 224* 248* 245*   BUN  --   --  44*  --  37*   CR  --   --  10.00*  --  9.43*   GFRESTIMATED  --   --  6*  --  6*   CHARLY  --   --  9.4  --  10.4*   MAG  --   --   --   --  2.2   PHOS  --   --   --   --  4.9*    PROTTOTAL  --   --   --   --  8.6   ALBUMIN  --   --   --   --  3.8   BILITOTAL  --   --   --   --  0.9   ALKPHOS  --   --   --   --  152*   AST  --   --   --   --  8   ALT  --   --   --   --  18     CBC  Recent Labs   Lab 09/01/21  2151   HGB 11.5*   WBC 6.0   RBC 3.70*   HCT 34.7*   MCV 94   MCH 31.1   MCHC 33.1   RDW 14.6        INRNo lab results found in last 7 days.  ABGNo lab results found in last 7 days.   URINE STUDIES  Recent Labs   Lab Test 02/11/20  0850 01/27/20  0941 01/03/20  1406 08/28/19  0905   COLOR Yellow Straw Light Yellow Yellow   APPEARANCE Clear Clear Clear Clear   URINEGLC 100* >1000* 30* Negative   URINEBILI Negative Negative Negative Negative   URINEKETONE Negative Trace* 10* Negative   SG 1.020 1.011 1.017 1.015   UBLD Trace* Trace* Trace* Small*   URINEPH 7.0 6.5 6.5 7.0   PROTEIN >=300* 300* 300* 100*   UROBILINOGEN 0.2  --   --  0.2   NITRITE Negative Negative Negative Negative   LEUKEST Negative Negative Negative Negative   RBCU 2-5* 2 3* 5-10*   WBCU 0 - 5 2 1 5-10*     Recent Labs   Lab Test 08/22/19  0857 01/11/19  0741 07/08/15  0747 01/28/15  0749 12/27/13  0736 11/11/13  1436   UTPG 2.20* 0.87* 0.28* 0.43* 0.31* 0.30*     PTH  No lab results found.  IRON STUDIES  Recent Labs   Lab Test 02/13/20  0720   AUBRIE 106       IMAGING:  Reviewed    Maddy Vernon PA-C

## 2021-09-02 NOTE — H&P
[facility]    History and Physical - Medicine On license of [facility] Service        Date of Admission:  9/1/2021    Assessment & Plan      Murtaza Fitzgerald is a 45 year old male admitted on 9/1/2021. 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     #Intractable Nausea with Vomiting   #H/O Gastroparesis   Tyrell reports intractable nausea with vomiting that has been ongoing for two months. He has a history of gastroparesis. His symptoms acutely worsened 2 weeks ago, reported decreased PO intake and no longer receiving relief from his home antiemetics. He complains of belching as well. He denies any fevers/chills or diarrhea.   - Ondansetron PRN   - Olanzapine One Time PRN   - GI Consulted. Appreciate recommendations  - EKG with QTc 489ms   - Daily EKGs     Chronic Problems   #ESRD on HD   - Tues, Thurs, Sat HD schedule   - Continue PTA Renal Vitamins   - Consulted Nephrology.     #HTN   - Continue PTA Carvedilol   -     #T1DM   - Medium Dose sliding scale insulin   - 12U insulin glargine at bedtime (1/2 of normal dose due to poor PO intake)   - Hgb A1c - 6.9    #HFrEF   #Ischemic Cardiomyopathy  - Last Echo (12/6/20) showed EF of 37%, diffuse hypokinesis, and grade III diastolic dysfunction   - Cardiac Cath (pre-transplant eval) - showed severe multivessel native coronary artery disease        Diet: Consistent Carb 60 grams CHO per Meal DietCarb Consistent   DVT Prophylaxis: Ambulate every shift  Mueller Catheter: Not present  Fluids: S/P 500cc Bolus   Central Lines: PRESENT       Code Status: Full Code    Clinically Significant Risk Factors Present on Admission         # Hyponatremia: Na = 132 mmol/L (Ref range: 133 - 144 mmol/L) on admission, will monitor as  appropriate      # Platelet Defect: home medication list includes an antiplatelet medication      Disposition Plan   Expected discharge:  1-2 days recommended to prior living arrangement once nausea controlled and tolerating PO.     The patient's care was discussed with the Attending Physician, Dr. Arnold, Patient and Primary team.    Magdalena Chavez MD  [facility]  Securely message with the Vocera Web Console (learn more here)  Text page via [employer] Paging/Directory  Please see sign in/sign out for up to date coverage information  ______________________________________________________________________    Chief Complaint   Nausea and Vomiting     History is obtained from the patient and electronic health record    History of Present Illness   Murtaza Fitzgerald is a 45 year old male who presents with nausea and vomiting. He reports a history of chronic nausea and vomiting secondary to gastroparesis. He reports that his symptoms have worsened over the past two weeks. He has had inability to tolerate PO intake. He reports decreased relief from his PO antiemetics. He also endorses belching.  He denies any fevers/chills, diarrhea, headache, chest pain, SOB. He reports that he is on HD and received his dialysis on Tuesday. 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 He is seeing GI for his gastroparesis and reports that he is supposed to receive an EGD at some time. He has multiple injuries/scabs on his UE and LE. He reports his LE scabs are secondary to running into furniture and his neuropathy.     Review of Systems    Review of Systems   Constitutional: Positive for appetite change and fatigue. Negative for chills and fever.   HENT: Negative for mouth sores, rhinorrhea and sore throat.    Eyes: Negative for visual disturbance.   Respiratory:  Negative for cough, chest tightness and shortness of breath.    Cardiovascular: Negative for chest pain, palpitations and leg swelling.   Gastrointestinal: Positive for abdominal pain, constipation, nausea and vomiting. Negative for abdominal distention.   Genitourinary:        Tues, Thurs, Sat Dialysis, Makes very little urine     Musculoskeletal: Negative for arthralgias and myalgias.   Skin: Positive for rash.   Neurological: Positive for weakness. Negative for light-headedness and headaches.         Past Medical History    I have reviewed this patient's medical history and updated it with pertinent information if needed.   Past Medical History:   Diagnosis Date     CMV (cytomegalovirus infection) status negative 2011     Coronary artery disease, non-occlusive 2008    angio showed diffuse disease with no lesions     Diabetes mellitus, type 1     Diagnosed at age 11. 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     Noncompliance with treatment     no labs for one year     Pancreas replaced by transplant (H) Feb 2009    rejection treated with thymo     Pancreatic disease     pancreas transplant     Tobacco abuse ongoing        Past Surgical History   I have reviewed this patient's surgical history and updated it with pertinent information if needed.  Past Surgical History:   Procedure Laterality Date     ABDOMEN SURGERY       ARTHROSCOPY KNEE WITH LATERAL MENISCECTOMY Left 7/10/2019    Procedure: Left knee arthroscopic partial lateral meniscectomy;  Surgeon: Alex Candelaria MD;  Location: [facility] OR     BIOPSY      [facility] 2009     BYPASS GRAFT ARTERY CORONARY N/A 8/5/2015    Procedure: BYPASS GRAFT ARTERY CORONARY;  Surgeon:  Katy Neville MD;  Location: [facility] OR     CORONARY ANGIOGRAPHY ADULT ORDER      2015     CREATE FISTULA ARTERIOVENOUS UPPER EXTREMITY Left 10/7/2020    Procedure: CREATION, ARTERIOVENOUS GRAFT placement LEFT UPPER EXTREMITY   with intraoperative ultrasound;  Surgeon: Melissa Guzman MD;  Location: [facility] OR     CV CORONARY ANGIOGRAM N/A 3/3/2021    Procedure: CV CORONARY ANGIOGRAM;  Surgeon: Everette Dang MD;  [facility]     ESOPHAGOSCOPY, GASTROSCOPY, DUODENOSCOPY (EGD), COMBINED N/A 1/28/2020    Procedure: ESOPHAGOGASTRODUODENOSCOPY (EGD) biopsies w/forceps;  Surgeon: Emre Gomes MD;  Location:  GI     EYE SURGERY      vitrectomy both eyes      IR CVC TUNNEL PLACEMENT > 5 YRS OF AGE  2/13/2020     IR CVC TUNNEL REMOVAL RIGHT  12/7/2020     IR DIALYSIS FISTULOGRAM LEFT  3/19/2021     IR DIALYSIS FISTULOGRAM LEFT  8/25/2021     IR DIALYSIS PTA  3/19/2021     IR DIALYSIS PTA  8/25/2021     IR DIALYSIS PTA CENTRAL SEG  8/25/2021     ORTHOPEDIC SURGERY  1993    tumor removed from left knee     TRANSPLANT  2009.2008    pancrease and kidney transplant        Social History   I have reviewed this patient's social history and updated it with pertinent information if needed. Murtaza Fitzgerald  reports that he quit smoking about 5 years ago. His smoking use included vaping device and cigarettes. He has a 20.00 pack-year smoking history. He has never used smokeless tobacco. He reports previous alcohol use. He reports that he does not use drugs. He endorses vaping - nicotine pen.     Family History   I have reviewed this patient's family history and updated it with pertinent information if needed.  Family History   Problem Relation Age of Onset     Unknown/Adopted Father      Heart Disease Maternal Grandfather 62     Melanoma No family hx of      Skin Cancer No family hx of        Prior to Admission Medications   Prior to Admission Medications   Prescriptions Last Dose  Informant Patient Reported? Taking?   Continuous Blood Gluc Sensor (DEXCOM G6 SENSOR) MISC   No No   Sig: Change every 10 days.   Continuous Blood Gluc Transmit (DEXCOM G6 TRANSMITTER) MISC   No No   Sig: Change every 3 months.   Insulin Disposable Pump (OMNIPOD DASH 5 PACK PODS) MISC   No No   Si each every 48 hours   Patient not taking: Reported on 2021   acetone, Urine, test STRP  Self No No   Si strip by In Vitro route daily   Patient not taking: Reported on 2021   aspirin (ASA) 81 MG EC tablet   No No   Sig: Take 1 tablet (81 mg) by mouth daily   atorvastatin (LIPITOR) 20 MG tablet   No No   Sig: Take 2 tablets (40 mg) by mouth daily   benzoyl peroxide 5 % external liquid   No No   Sig: For folliculitis. Apply once a day for 1-2 minutes on areas of whiteheads or acne then rinse off.   blood glucose (NO BRAND SPECIFIED) test strip   No No   Sig: Use to test blood sugar 5 times daily or as directed.   calcium acetate (PHOSLO) 667 MG CAPS capsule   Yes No   Sig: Take 667 mg by mouth 3 times daily (with meals)    calcium carbonate (TUMS) 500 MG chewable tablet   Yes No   Sig: Take 1 chew tab by mouth 2 times daily   carvedilol (COREG) 12.5 MG tablet   No No   Sig: Take 1 tablet (12.5 mg) by mouth 2 times daily (with meals)   hydrocortisone 2.5 % ointment   No No   Sig: Apply if you have itch rash on the face, genitals, armpit.   insulin aspart (NOVOLOG FLEXPEN) 100 UNIT/ML pen   No No   Sig: Sliding scale = 1 unit for every 20 over blood glucose of 140, average 45 units/day   insulin glargine (LANTUS SOLOSTAR) 100 UNIT/ML pen  Self No No   Sig: Inject 24 Units Subcutaneous At Bedtime   insulin pen needle (BD ARPITA U/F) 32G X 4 MM miscellaneous  Self No No   Sig: Use 4 daily or as directed.   lanthanum (FOSRENOL) 750 MG chewable tablet   No No   Sig: Take 2 tablets (1,500 mg) by mouth 5 times daily With meals and snacks.   metoclopramide (REGLAN) 5 MG tablet   No No   Sig: Take 1 tablet (5 mg) by  mouth 4 times daily (before meals and nightly)   naproxen sodium 220 MG capsule  Self Yes No   Sig: Take 220 mg by mouth as needed    omeprazole (PRILOSEC) 40 MG DR capsule   No No   Sig: Take 2 capsules (80 mg) by mouth every evening   ondansetron (ZOFRAN-ODT) 8 MG ODT tab   Yes No   Sig: DISSOLVE 1 TABLET ON TOP OF TONGUE EVERY 8 HOURS FOR 2 DAYS AS NEEDED FOR NAUSEA AND VOMITING   prochlorperazine (COMPAZINE) 10 MG tablet   Yes No   Sig: Take 10 mg by mouth   senna-docusate (SENOKOT-S/PERICOLACE) 8.6-50 MG tablet   Yes No   Sig: Take 1 tablet by mouth PRN   triamcinolone (KENALOG) 0.1 % external ointment   No No   Sig: Apply twice a day to areas of itchy rash. Do not apply to the face, groin,armpits.      Facility-Administered Medications: None     Allergies   Allergies   Allergen Reactions     Diphenhydramine Other (See Comments)     Restless legs     Metoclopramide      Other reaction(s): Confusion, Unknown     Reglan Other (See Comments)     Reaction only to IV formulation -->delsuions       Physical Exam   Vital Signs: Temp: 97.2  F (36.2  C) Temp src: Oral BP: (!) 170/85 Pulse: 90   Resp: 18 SpO2: 94 % O2 Device: None (Room air)    Weight: 0 lbs 0 oz    Physical Exam  Vitals and nursing note reviewed.   Constitutional:       General: He is not in acute distress.     Appearance: He is ill-appearing. He is not toxic-appearing.   HENT:      Mouth/Throat:      Mouth: Mucous membranes are moist.      Pharynx: Oropharynx is clear.   Eyes:      Extraocular Movements: Extraocular movements intact.      Conjunctiva/sclera: Conjunctivae normal.      Pupils: Pupils are equal, round, and reactive to light.   Cardiovascular:      Rate and Rhythm: Normal rate and regular rhythm.      Pulses: Normal pulses.      Heart sounds: Normal heart sounds.   Pulmonary:      Effort: No tachypnea or respiratory distress.      Breath sounds: Decreased air movement present.   Abdominal:      General: Bowel sounds are normal.       Palpations: Abdomen is soft.      Tenderness: There is no abdominal tenderness. There is no guarding.   Musculoskeletal:      Right lower leg: No edema.      Left lower leg: No edema.   Lymphadenopathy:      Cervical: No cervical adenopathy.   Skin:     General: Skin is warm and dry.      Findings: Rash present.      Comments: Multiple scars/scabs    Neurological:      General: No focal deficit present.      Mental Status: He is alert.         Data   Data reviewed today: I reviewed all medications, new labs and imaging results over the last 24 hours. I personally reviewed the EKG tracing showing sinus rhythm with QTc 489ms  and the abdominal x-ray image(s) showing no free air or pneumomediastinum .    Recent Labs   Lab 09/02/21  0352 09/01/21  2151   WBC  --  6.0   HGB  --  11.5*   MCV  --  94   PLT  --  213   NA  --  132*   POTASSIUM  --  4.6   CHLORIDE  --  92*   CO2  --  30   BUN  --  37*   CR  --  9.43*   ANIONGAP  --  10   CHARLY  --  10.4*   * 245*   ALBUMIN  --  3.8   PROTTOTAL  --  8.6   BILITOTAL  --  0.9   ALKPHOS  --  152*   ALT  --  18   AST  --  8   LIPASE  --  46*     Recent Results (from the past 24 hour(s))   Abdomen XR, 2 vw, flat and upright    Narrative    EXAM: XR ABDOMEN 2VIEWS  LOCATION: [facility]  DATE/TIME: 9/1/2021 10:25 PM    INDICATION: Vomiting.  COMPARISON: None.      Impression    IMPRESSION: Postsurgical changes in the lower abdomen and pelvis. No bowel distention. Constipation. Sternotomy with mediastinal clips.

## 2021-09-02 NOTE — CONSULTS
GASTROENTEROLOGY CONSULTATION      Date of Admission:  9/1/2021          ASSESSMENT AND RECOMMENDATIONS:   Assessment:  Murtaza Fitzgerald is a 45 year old male admitted on 9/1/2021. 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    Nausea and vomiting  Gastroparesis 2/2 DM1  Chronic slow transit constipation  Uremia, on HD   Nausea and vomiting with severe intolerance to PO likely multifactorial: uremia, polypharmacy, relative immobility and exacerbation of gastroparesis/constpation. He has a significant stool burden on axr. Acutely, he needs a strong bowel cleanse, motility regimen, anti-emetics and to scale back his diet as needed to facilitate PO. Long-term, a more robust bowel regimen (ideally with gastroparesis effect, such as motegirty, are need) is needed long term. He is set up for GI follow up with egd and repeat gastric emptying study with [facility] for further management.      Recommendations  -DIET: Gastroparesis diet regularly (dietician cannot review if needed). During exacerbation, reasonable to adhere to full liquid diet  -BOWEL REGIMEN:    -Continue Senna, would also consider adding Miralax 1-2x daily pending tolerance of PO   -Clean out over the next 2-3 days (BID enema, glycerin supp in between daily as he cannot tolerate colon prep at this time).    -Following clean out, reasonable to trial motegrity (Amitizia or Linzess ok as well, whatever his insurance will approve)  -MOTILITY: Azithromycin 500 mg IV daily x 3 days, then 14 days 500 mg PO daily. Encourage QID ambulation  -NAUSEA: schedule Zofran and PRN consider hydralazine/ativan/compazine/diphenhydramine (if he can tolerate)  -Continue BID PPI  -No indication for inpatient EGD at this time    GI will  continue to follow    Thank you for involving us in this patient's care. Please do not hesitate to contact the GI service with any questions or concerns.     Pt care plan discussed with Dr. Mayorga, GI staff physician.    JORDAN Lantigua CNP  Text page  -------------------------------------------------------------------------------------------------------------------          Chief Complaint:   We were asked by Dr Hawkins of medicine to evaluate this patient with nausea, vomiting and gastroparesis     History is obtained from the patient and the medical record.          History of Present Illness:   Murtaza Fitzgerald is a 45 year old male admitted on 9/1/2021. 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    Two weeks of acute on chronic nausea and vomiting with most any po intake (solids, liquids and medications). Also with associated belching, regurgitation. Denies hematemesis and abdominal pain. Typically has bm daily (soft, formed) but had not had bm in 5 days prior to passing a small formed stool this am. Takes daily Senna. For nausea/vomiting zofran works well (sublingual), but he cannot take his compazine or reglan very well now as he is vomiting them up. No regular opioid usage.    Established care with [facility] a few weeks ago. No medication changes. Planning on repeat egd (tried but failed first attempt due to inability to check potassium via piv) and gastric emptying study. Last egd 1/2020 with gastric erythema otherwise wnl.            Past Medical History:   Reviewed and edited as appropriate  Past Medical History:   Diagnosis Date     CMV (cytomegalovirus infection) status negative 2011     Coronary artery disease, non-occlusive 2008    angio showed diffuse  disease with no lesions     Diabetes mellitus, type 1     Diagnosed at age 11. 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     Noncompliance with treatment     no labs for one year     Pancreas replaced by transplant (H) Feb 2009    rejection treated with thymo     Pancreatic disease     pancreas transplant     Tobacco abuse ongoing            Past Surgical History:   Reviewed and edited as appropriate   Past Surgical History:   Procedure Laterality Date     ABDOMEN SURGERY       ARTHROSCOPY KNEE WITH LATERAL MENISCECTOMY Left 7/10/2019    Procedure: Left knee arthroscopic partial lateral meniscectomy;  Surgeon: Alex Candelaria MD;  Location: [facility] OR     BIOPSY      [facility] 2009     BYPASS GRAFT ARTERY CORONARY N/A 8/5/2015    Procedure: BYPASS GRAFT ARTERY CORONARY;  Surgeon: Katy Neville MD;  Location: [facility] OR     CORONARY ANGIOGRAPHY ADULT ORDER      2015     CREATE FISTULA ARTERIOVENOUS UPPER EXTREMITY Left 10/7/2020    Procedure: CREATION, ARTERIOVENOUS GRAFT placement LEFT UPPER EXTREMITY   with intraoperative ultrasound;  Surgeon: Melissa Guzman MD;  Location: [facility] OR     CV CORONARY ANGIOGRAM N/A 3/3/2021    Procedure: CV CORONARY ANGIOGRAM;  Surgeon: Everette Dang MD;  Location: [facility]     ESOPHAGOSCOPY, GASTROSCOPY, DUODENOSCOPY (EGD), COMBINED N/A 1/28/2020    Procedure: ESOPHAGOGASTRODUODENOSCOPY (EGD) biopsies w/forceps;  Surgeon: Emre Gomes MD;  Location:  GI     EYE SURGERY      vitrectomy both eyes      IR CVC TUNNEL PLACEMENT > 5 YRS OF AGE  2/13/2020     IR CVC TUNNEL REMOVAL RIGHT  12/7/2020     IR DIALYSIS FISTULOGRAM LEFT  3/19/2021     IR DIALYSIS FISTULOGRAM LEFT   2021     IR DIALYSIS PTA  3/19/2021     IR DIALYSIS PTA  2021     IR DIALYSIS PTA CENTRAL SEG  2021     ORTHOPEDIC SURGERY  1993    tumor removed from left knee     TRANSPLANT  .    pancrease and kidney transplant            Previous Endoscopy:   No results found. However, due to the size of the patient record, not all encounters were searched. Please check Results Review for a complete set of results.         Social History:   Reviewed and edited as appropriate  Social History     Socioeconomic History     Marital status: Single     Spouse name: Not on file     Number of children: 1     Years of education: Not on file     Highest education level: Associate degree: academic program   Occupational History     Occupation:    Tobacco Use     Smoking status: Former Smoker     Packs/day: 1.00     Years: 20.00     Pack years: 20.00     Types: Vaping Device, Cigarettes     Quit date: 2016     Years since quittin.6     Smokeless tobacco: Never Used     Tobacco comment: E- Cig use since    Substance and Sexual Activity     Alcohol use: Not Currently     Alcohol/week: 0.0 standard drinks     Drug use: No     Sexual activity: Never     Partners: Female   Other Topics Concern     Parent/sibling w/ CABG, MI or angioplasty before 65F 55M? No      Service Not Asked     Blood Transfusions No     Comment: possibly during surgery, otherwise none.     Caffeine Concern Not Asked     Occupational Exposure Not Asked     Hobby Hazards Not Asked     Sleep Concern Not Asked     Stress Concern Not Asked     Weight Concern Not Asked     Special Diet Not Asked     Back Care Not Asked     Exercise Not Asked     Bike Helmet Not Asked     Seat Belt Yes     Self-Exams Not Asked   Social History Narrative     Not on file     Social Determinants of Health     Financial Resource Strain:      Difficulty of Paying Living Expenses:    Food Insecurity:      Worried About Running Out of Food in  the Last Year:      Ran Out of Food in the Last Year:    Transportation Needs:      Lack of Transportation (Medical):      Lack of Transportation (Non-Medical):    Physical Activity:      Days of Exercise per Week:      Minutes of Exercise per Session:    Stress:      Feeling of Stress :    Social Connections:      Frequency of Communication with Friends and Family:      Frequency of Social Gatherings with Friends and Family:      Attends [employer] Services:      Active Member of Clubs or Organizations:      Attends Club or Organization Meetings:      Marital Status:    Intimate Partner Violence:      Fear of Current or Ex-Partner:      Emotionally Abused:      Physically Abused:      Sexually Abused:             Family History:   Reviewed and edited as appropriate  Family History   Problem Relation Age of Onset     Unknown/Adopted Father      Heart Disease Maternal Grandfather 62     Melanoma No family hx of      Skin Cancer No family hx of             Allergies:   Reviewed and edited as appropriate     Allergies   Allergen Reactions     Diphenhydramine Other (See Comments)     Restless legs     Metoclopramide      Other reaction(s): Confusion, Unknown     Reglan Other (See Comments)     Reaction only to IV formulation -->delsuions            Medications:     Current Facility-Administered Medications   Medication     0.9% sodium chloride BOLUS     0.9% sodium chloride BOLUS     0.9% sodium chloride BOLUS     acetaminophen (TYLENOL) tablet 650 mg     aspirin EC tablet 81 mg     atorvastatin (LIPITOR) tablet 40 mg     azithromycin (ZITHROMAX) 500 mg in sodium chloride 0.9 % 250 mL intermittent infusion     calcium acetate (PHOSLO) capsule 667 mg     calcium carbonate (TUMS) chewable tablet 500 mg     carvedilol (COREG) tablet 12.5 mg     glucose gel 15-30 g    Or     dextrose 50 % injection 25-50 mL    Or     glucagon injection 1 mg     doxercalciferol (HECTOROL) injection 4 mcg     glycerin (ADULT) Suppository 1  suppository     heparin ANTICOAGULANT injection 5,000 Units     insulin aspart (NovoLOG) injection (RAPID ACTING)     insulin aspart (NovoLOG) injection (RAPID ACTING)     insulin glargine (LANTUS PEN) injection 12 Units     lidocaine (LMX4) cream     lidocaine 1 % 0.1-1 mL     lidocaine 1 % 0.5 mL     lidocaine 1 % 0.5 mL     melatonin tablet 1 mg     No heparin via hemodialysis machine     ondansetron (ZOFRAN) injection 4 mg     pantoprazole (PROTONIX) EC tablet 80 mg     prochlorperazine (COMPAZINE) injection 5 mg    Or     prochlorperazine (COMPAZINE) tablet 5 mg     senna-docusate (SENOKOT-S/PERICOLACE) 8.6-50 MG per tablet 2 tablet     sodium chloride (PF) 0.9% PF flush 3 mL     sodium chloride (PF) 0.9% PF flush 3 mL     sodium phosphate (FLEET ENEMA) 1 enema     Stop Heparin 60 minutes before end of treatment             Review of Systems:     A complete review of systems was performed and is negative except as noted in the HPI           Physical Exam:   BP (!) 167/95 (BP Location: Right arm)   Pulse 86   Temp (!) 96.6  F (35.9  C) (Oral)   Resp 16   Wt 81.5 kg (179 lb 11.2 oz)   SpO2 98%   BMI 27.12 kg/m    Wt:   Wt Readings from Last 2 Encounters:   09/02/21 81.5 kg (179 lb 11.2 oz)   08/13/21 83.8 kg (184 lb 12.8 oz)      Constitutional: cooperative, pleasant, not dyspneic/diaphoretic, no acute distress  Eyes: Sclera anicteric/injected  Ears/nose/mouth/throat: Normal oropharynx without ulcers or exudate, mucus membranes moist, hearing intact  Neck: supple without obvious mass  CV: No edema  Respiratory: Unlabored breathing  Lymph: No submandibular, supraclavicular or inguinal lymphadenopathy  Abd: Nondistended, +bs, no hepatosplenomegaly, umbilical hernia, nontender, no peritoneal signs  Skin: warm, perfused, no jaundice  Neuro: AAO x 3  Psych: Normal affect  MSK: No gross deformities         Data:   Labs and imaging below were independently reviewed and interpreted    BMP  Recent Labs   Lab  21  1135 21  0818 21  0721 21  0352 21   NA  --   --  133  --  132*   POTASSIUM  --   --  4.5  --  4.6   CHLORIDE  --   --  95  --  92*   CHARLY  --   --  9.4  --  10.4*   CO2  --   --  27  --  30   BUN  --   --  44*  --  37*   CR  --   --  10.00*  --  9.43*   * 200* 224* 248* 245*     CBC  Recent Labs   Lab 21   WBC 6.0   RBC 3.70*   HGB 11.5*   HCT 34.7*   MCV 94   MCH 31.1   MCHC 33.1   RDW 14.6        INRNo lab results found in last 7 days.  LFTs  Recent Labs   Lab 21   ALKPHOS 152*   AST 8   ALT 18   BILITOTAL 0.9   PROTTOTAL 8.6   ALBUMIN 3.8      PANC  Recent Labs   Lab 21   LIPASE 46*       Imagin2021 axr    INDICATION: Vomiting.  COMPARISON: None.                                                                      IMPRESSION: Postsurgical changes in the lower abdomen and pelvis. No bowel distention. Constipation. Sternotomy with mediastinal clips.

## 2021-09-02 NOTE — PROGRESS NOTES
Admission/Transfer from: [facility] ED  2 RN skin assessment completed. Yes  Significant findings include:   - 3 hernias surrounding umbilicus  - Scar near umbilicus roughly, pt states from kidney and pancreas operations  - Scar from CABG down sternum  - Diffuse rash to back  - Scattered scabs to bilateral lower extremities. Pt states from bumping himself during the night.     WOC Nurse Consult Ordered? No

## 2021-09-02 NOTE — UTILIZATION REVIEW
[facility] Utilization Review  Admission Status; Secondary Review Determination     Admission Date: 9/1/2021  9:38 PM      Under the authority of the Utilization Management Committee, the utilization review process indicated a secondary review on the above patient.  The review outcome is based on review of the medical records, discussions with staff, and applying clinical experience noted on the date of the review.        (X)      Inpatient Status Appropriate - This patient's medical care is consistent with medical management for inpatient care and reasonable inpatient medical practice.          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  Patient does have history of gastroparesis, started on Zofran as needed, olanzapine.  Needs daily EKGs with antiemetics.  Patient switched to full liquid diet, started on a Zithromax for promotility with aggressive bowel regimen, with twice daily Fleet enemas and glycerin suppositories.  Plan for trial of Amitiza or Linzess once bowels cleaned out.  Complex patient needs bowel cleanout with aggressive enemas, suppositories, as he cannot tolerate colon prep, ongoing full liquid diet, with antiemetic protocol, no plans for EGD currently, then needs trial of motility agents, anticipate more than 2 midnight hospital stay, recommend continue inpatient status      The severity of illness, intensity of service provided, expected LOS and risk for adverse outcome make the care complex, high risk and appropriate for hospital admission.The patient requires hospital based medical care which is anticipated to require a stay of 2 or more midnights; according to CMS guidelines  the patient should be admitted as inpatient        The information on this document is developed by the utilization review team in order for the business office to ensure compliance.  This only denotes the appropriateness of proper admission status and does not reflect the quality of care rendered.         The definitions of Inpatient Status and Observation Status used in making the determination above are those provided in the CMS Coverage Manual, Chapter 1 and Chapter 6, section 70.4.      Sincerely,       Leonardo Moreira MD  Physician Advisor  Utilization Review-[place]    Phone: [phone]

## 2021-09-02 NOTE — ED PROVIDER NOTES
ED Provider Note  [facility]      History     Chief Complaint   Patient presents with     Vomiting     The history is provided by the patient.     Murtaza Fitzgerald is 45-year-old male renal dialysis patient who is dialyzes Tuesday Thursday Saturday who last dialyzed yesterday and presents to the ER today for persistent vomiting.  Patient states he has a problem with his gastroparesis and for the last 2 weeks he has been unable to keep much food or fluid down.  The patient denies any abdominal pain denies any melena or bright blood per rectum denies any diarrhea and denies any fevers. The patient states he produces little if any urine anymore and presents here to the ER for evaluation.    This part of the medical record was transcribed by Yuliya Hazel, Medical Scribe, from a dictation done by Weston Chaudhary MD.       Past Medical History  Past Medical History:   Diagnosis Date     CMV (cytomegalovirus infection) status negative 2011     Coronary artery disease, non-occlusive 2008    angio showed diffuse disease with no lesions     Diabetes mellitus, type 1     Diagnosed at age 11. 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     Noncompliance with treatment     no labs for one year     Pancreas replaced by transplant (H) Feb 2009    rejection treated with thymo     Pancreatic disease     pancreas transplant     Tobacco abuse ongoing     Past Surgical History:   Procedure Laterality Date     ABDOMEN SURGERY       ARTHROSCOPY KNEE WITH LATERAL MENISCECTOMY Left 7/10/2019    Procedure: Left knee arthroscopic partial lateral meniscectomy;  Surgeon: Alex Candelaria MD;  Location:  OR      BIOPSY      [facility] 2009     BYPASS GRAFT ARTERY CORONARY N/A 8/5/2015    Procedure: BYPASS GRAFT ARTERY CORONARY;  Surgeon: Katy Neville MD;  Location: [facility] OR     CORONARY ANGIOGRAPHY ADULT ORDER      2015     CREATE FISTULA ARTERIOVENOUS UPPER EXTREMITY Left 10/7/2020    Procedure: CREATION, ARTERIOVENOUS GRAFT placement LEFT UPPER EXTREMITY   with intraoperative ultrasound;  Surgeon: Melissa Guzman MD;  Location: [facility] OR     CV CORONARY ANGIOGRAM N/A 3/3/2021    Procedure: CV CORONARY ANGIOGRAM;  Surgeon: Everette Dang MD;  Location:  HEART CARDIAC CATH LAB     ESOPHAGOSCOPY, GASTROSCOPY, DUODENOSCOPY (EGD), COMBINED N/A 1/28/2020    Procedure: ESOPHAGOGASTRODUODENOSCOPY (EGD) biopsies w/forceps;  Surgeon: Emre Gomes MD;  Location:  GI     EYE SURGERY      vitrectomy both eyes      IR CVC TUNNEL PLACEMENT > 5 YRS OF AGE  2/13/2020     IR CVC TUNNEL REMOVAL RIGHT  12/7/2020     IR DIALYSIS FISTULOGRAM LEFT  3/19/2021     IR DIALYSIS FISTULOGRAM LEFT  8/25/2021     IR DIALYSIS PTA  3/19/2021     IR DIALYSIS PTA  8/25/2021     IR DIALYSIS PTA CENTRAL SEG  8/25/2021     ORTHOPEDIC SURGERY  1993    tumor removed from left knee     TRANSPLANT  2009.2008    pancrease and kidney transplant     acetone, Urine, test STRP  aspirin (ASA) 81 MG EC tablet  atorvastatin (LIPITOR) 20 MG tablet  benzoyl peroxide 5 % external liquid  blood glucose (NO BRAND SPECIFIED) test strip  calcium acetate (PHOSLO) 667 MG CAPS capsule  calcium carbonate (TUMS) 500 MG chewable tablet  carvedilol (COREG) 12.5 MG tablet  Continuous Blood Gluc Sensor (DEXCOM G6 SENSOR) MISC  Continuous Blood Gluc Transmit (DEXCOM G6 TRANSMITTER) MISC  hydrocortisone 2.5 % ointment  insulin aspart (NOVOLOG FLEXPEN) 100 UNIT/ML pen  Insulin Disposable Pump (OMNIPOD DASH 5 PACK PODS) MISC  insulin glargine (LANTUS SOLOSTAR) 100 UNIT/ML pen  insulin pen needle (BD ARPITA U/F) 32G X 4 MM miscellaneous  lanthanum (FOSRENOL) 750 MG chewable  tablet  metoclopramide (REGLAN) 5 MG tablet  naproxen sodium 220 MG capsule  omeprazole (PRILOSEC) 40 MG DR capsule  ondansetron (ZOFRAN-ODT) 8 MG ODT tab  prochlorperazine (COMPAZINE) 10 MG tablet  senna-docusate (SENOKOT-S/PERICOLACE) 8.6-50 MG tablet  triamcinolone (KENALOG) 0.1 % external ointment      Allergies   Allergen Reactions     Diphenhydramine Other (See Comments)     Restless legs     Metoclopramide      Other reaction(s): Confusion, Unknown     Reglan Other (See Comments)     Reaction only to IV formulation -->delsuions     Family History  Family History   Problem Relation Age of Onset     Unknown/Adopted Father      Heart Disease Maternal Grandfather 62     Melanoma No family hx of      Skin Cancer No family hx of      Social History   Social History     Tobacco Use     Smoking status: Former Smoker     Packs/day: 1.00     Years: 20.00     Pack years: 20.00     Types: Vaping Device, Cigarettes     Quit date: 2016     Years since quittin.6     Smokeless tobacco: Never Used     Tobacco comment: E- Cig use since    Substance Use Topics     Alcohol use: Not Currently     Alcohol/week: 0.0 standard drinks     Drug use: No      Past medical history, past surgical history, medications, allergies, family history, and social history were reviewed with the patient. No additional pertinent items.       Review of Systems  A complete review of systems was performed with pertinent positives and negatives noted in the HPI, and all other systems negative.    Physical Exam   BP: 133/82  Pulse: 110  Temp: 98.8  F (37.1  C)  Resp: 16  SpO2: 99 %  Physical Exam  Vitals and nursing note reviewed.   Constitutional:       General: He is not in acute distress.     Appearance: He is ill-appearing. He is not diaphoretic.   HENT:      Head: Atraumatic.   Eyes:      Extraocular Movements: Extraocular movements intact.      Pupils: Pupils are equal, round, and reactive to light.   Pulmonary:      Breath sounds:  Normal breath sounds.   Abdominal:      Palpations: Abdomen is soft.      Tenderness: There is no abdominal tenderness.   Musculoskeletal:         General: No deformity.      Cervical back: Neck supple.   Neurological:      General: No focal deficit present.      Mental Status: He is alert and oriented to person, place, and time.   Psychiatric:         Mood and Affect: Mood normal.         ED Course      Procedures         Medications   ondansetron (ZOFRAN) injection 4 mg (4 mg Intravenous Given 9/1/21 2236)   prochlorperazine (COMPAZINE) injection 5 mg (has no administration in time range)   0.9% sodium chloride BOLUS (500 mLs Intravenous Started 9/1/21 2236)       Results for orders placed or performed during the hospital encounter of 09/01/21   Abdomen XR, 2 vw, flat and upright     Status: None    Narrative    EXAM: XR ABDOMEN 2VIEWS  LOCATION: [facility]  DATE/TIME: 9/1/2021 10:25 PM    INDICATION: Vomiting.  COMPARISON: None.      Impression    IMPRESSION: Postsurgical changes in the lower abdomen and pelvis. No bowel distention. Constipation. Sternotomy with mediastinal clips.    CBC with platelets differential     Status: Abnormal    Narrative    The following orders were created for panel order CBC with platelets differential.  Procedure                               Abnormality         Status                     ---------                               -----------         ------                     CBC with platelets and d...[[id number]]  Abnormal            Final result                 Please view results for these tests on the individual orders.   Comprehensive metabolic panel     Status: Abnormal   Result Value Ref Range    Sodium 132 (L) 133 - 144 mmol/L    Potassium 4.6 3.4 - 5.3 mmol/L    Chloride 92 (L) 94 - 109 mmol/L    Carbon Dioxide (CO2) 30 20 - 32 mmol/L    Anion Gap 10 3 - 14 mmol/L    Urea Nitrogen 37 (H) 7 - 30 mg/dL    Creatinine 9.43 (H) 0.66 -  1.25 mg/dL    Calcium 10.4 (H) 8.5 - 10.1 mg/dL    Glucose 245 (H) 70 - 99 mg/dL    Alkaline Phosphatase 152 (H) 40 - 150 U/L    AST 8 0 - 45 U/L    ALT 18 0 - 70 U/L    Protein Total 8.6 6.8 - 8.8 g/dL    Albumin 3.8 3.4 - 5.0 g/dL    Bilirubin Total 0.9 0.2 - 1.3 mg/dL    GFR Estimate 6 (L) >60 mL/min/1.73m2   CBC with platelets and differential     Status: Abnormal   Result Value Ref Range    WBC Count 6.0 4.0 - 11.0 10e3/uL    RBC Count 3.70 (L) 4.40 - 5.90 10e6/uL    Hemoglobin 11.5 (L) 13.3 - 17.7 g/dL    Hematocrit 34.7 (L) 40.0 - 53.0 %    MCV 94 78 - 100 fL    MCH 31.1 26.5 - 33.0 pg    MCHC 33.1 31.5 - 36.5 g/dL    RDW 14.6 10.0 - 15.0 %    Platelet Count 213 150 - 450 10e3/uL    % Neutrophils 69 %    % Lymphocytes 13 %    % Monocytes 7 %    % Eosinophils 8 %    % Basophils 2 %    % Immature Granulocytes 1 %    NRBCs per 100 WBC 0 <1 /100    Absolute Neutrophils 4.2 1.6 - 8.3 10e3/uL    Absolute Lymphocytes 0.8 0.8 - 5.3 10e3/uL    Absolute Monocytes 0.4 0.0 - 1.3 10e3/uL    Absolute Eosinophils 0.5 0.0 - 0.7 10e3/uL    Absolute Basophils 0.1 0.0 - 0.2 10e3/uL    Absolute Immature Granulocytes 0.0 <=0.0 10e3/uL    Absolute NRBCs 0.0 10e3/uL   Extra Blue Top Tube     Status: None   Result Value Ref Range    Hold Specimen JIC    Extra Red Top Tube     Status: None   Result Value Ref Range    Hold Specimen JIC    Extra Green Top (Lithium Heparin) Tube     Status: None   Result Value Ref Range    Hold Specimen JIC    Extra Purple Top Tube     Status: None   Result Value Ref Range    Hold Specimen JIC    Extra Green Top (Lithium Heparin) ON ICE     Status: None   Result Value Ref Range    Hold Specimen JIC    Lipase     Status: Abnormal   Result Value Ref Range    Lipase 46 (L) 73 - 393 U/L   [place] Draw     Status: None    Narrative    The following orders were created for panel order [place] Draw.  Procedure                               Abnormality         Status                     ---------                                -----------         ------                     Extra Blue Top Tube[[id number]]                              Final result               Extra Red Top Tube[[id number]]                               Final result               Extra Green Top (Lithium...[[id number]]                      Final result               Extra Purple Top Tube[[id number]]                            Final result               Extra Green Top (Lithium...[[id number]]                      Final result                 Please view results for these tests on the individual orders.          Assessments & Plan (with Medical Decision Making)     I have reviewed the nursing notes.    Patient only feels slightly better here in the ER and states he cannot go home because he cannot hold anything down.  Patient will be admitted to the medicine service with plan for GI consultation.    I have reviewed the findings, diagnosis, and plan with the patient.        Final diagnoses:   Intractable vomiting with nausea, unspecified vomiting type   ESRD (end stage renal disease) on dialysis (H) - [id number][id number]       Weston Chaudhary Md  [facility] EMERGENCY DEPARTMENT  9/1/2021     Weston Chaudhary MD  09/01/21 [id number]

## 2021-09-02 NOTE — PROGRESS NOTES
[facility]    Progress Note - Juan 4 Service        Date of Admission:  9/1/2021    Assessment & Plan             Murtaza Fitzgerald is a 45 year old male admitted on 9/1/2021. 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     #Intractable Nausea with Vomiting   #Exacerbation of Gastroparesis   Presents with intractable nausea with vomiting that has been ongoing for two months. He has a history of gastroparesis. His symptoms acutely worsened 2 weeks ago, reported decreased PO intake and no longer receiving relief from his home antiemetics. He complains of belching as well. He denies any fevers/chills or diarrhea. Consistent with exacerbation of gastroparesis.  - GI consulted  - Will reduce patient's diet to FLD  - Aggressive bowel regimen with BID fleet enemas as well as qnoon glycerin suppositories, continue senna-doc   - Scheduled zofran with PRN compazine   - Azithromycin 500 mg daily x 3 days for promotility     Chronic Problems   #ESRD on HD   - Tues, Thurs, Sat HD schedule   - Continue PTA Renal Vitamins   - Consulted Nephrology.      #HTN   - Continue PTA Carvedilol      #T1DM   - Medium Dose sliding scale insulin   - 12U insulin glargine at bedtime (1/2 of normal dose due to poor PO intake)   - Hgb A1c - 6.9     #HFrEF   #Ischemic Cardiomyopathy  - Last Echo (12/6/20) showed EF of 37%, diffuse hypokinesis, and grade III diastolic dysfunction   - Cardiac Cath (pre-transplant eval) - showed severe multivessel native coronary artery disease      Diet: Full Liquid Diet    DVT Prophylaxis: Heparin SQ  Mueller Catheter: Not present  Fluids: NO  Central Lines: PRESENT     Code Status: Full Code      Disposition Plan   Expected discharge: 09/04/2021    recommended to prior living arrangement once adequate pain management/ tolerating PO medications.     The patient's care was discussed with the Attending Physician, Dr. Hawkins.    Joey Bryant MD  [facility] 4 Service  [facility]  Securely message with the Vocera Web Console (learn more here)  Text page via [facility] Paging/Directory  Please see sign in/sign out for up to date coverage information    Clinically Significant Risk Factors Present on Admission              # Platelet Defect: home medication list includes an antiplatelet medication      ______________________________________________________________________    Interval History   Please see H and P from same day. Continues to report nausea at this time as well as constipation.    4 point ROS otherwise negative    Data reviewed today: I reviewed all medications, new labs and imaging results over the last 24 hours.     Physical Exam   Vital Signs: Temp: (!) 96.6  F (35.9  C) Temp src: Oral BP: (!) 167/95 Pulse: 86   Resp: 16 SpO2: 98 % O2 Device: None (Room air)    Weight: 179 lbs 11.2 oz  General Appearance: NT, NAD  Respiratory: CTAB   Cardiovascular: RRR, no m/r/g  GI: Post surgical changes, NBS, S, NND, NTTP  Skin: No visualized lesions       Data   Recent Labs   Lab 09/02/21  1135 09/02/21  0818 09/02/21  0721 09/01/21  2151   WBC  --   --   --  6.0   HGB  --   --   --  11.5*   MCV  --   --   --  94   PLT  --   --   --  213   NA  --   --  133 132*   POTASSIUM  --   --  4.5 4.6   CHLORIDE  --   --  95 92*   CO2  --   --  27 30   BUN  --   --  44* 37*   CR  --   --  10.00* 9.43*   ANIONGAP  --   --  11 10   CHARLY  --   --  9.4 10.4*   * 200* 224* 245*   ALBUMIN  --   --   --  3.8   PROTTOTAL  --   --   --  8.6   BILITOTAL  --   --   --  0.9   ALKPHOS  --   --   --  152*   ALT  --   --   --  18   AST  --   --   --  8   LIPASE  --   --   --  46*     Recent Results (from the past 24 hour(s))   Abdomen XR, 2 vw,  flat and upright    Narrative    EXAM: XR ABDOMEN 2VIEWS  LOCATION: [facility]  DATE/TIME: 9/1/2021 10:25 PM    INDICATION: Vomiting.  COMPARISON: None.      Impression    IMPRESSION: Postsurgical changes in the lower abdomen and pelvis. No bowel distention. Constipation. Sternotomy with mediastinal clips.      Medications     - MEDICATION INSTRUCTIONS -         sodium chloride 0.9%  250 mL Intravenous Once in dialysis/CRRT     sodium chloride 0.9%  300 mL Hemodialysis Machine Once     aspirin  81 mg Oral Daily     atorvastatin  40 mg Oral Daily     azithromycin  500 mg Intravenous Q24H     calcium acetate  667 mg Oral TID w/meals     calcium carbonate  500 mg Oral BID     carvedilol  12.5 mg Oral BID w/meals     doxercalciferol  4 mcg Intravenous Once in dialysis/CRRT     glycerin  1 suppository Rectal Daily     heparin ANTICOAGULANT  5,000 Units Subcutaneous Q12H     insulin aspart  1-7 Units Subcutaneous TID AC     insulin aspart  1-5 Units Subcutaneous At Bedtime     insulin glargine  12 Units Subcutaneous At Bedtime     - MEDICATION INSTRUCTIONS -   Does not apply Once     ondansetron  4 mg Intravenous Q6H     pantoprazole  80 mg Oral QPM     senna-docusate  2 tablet Oral BID     sodium chloride (PF)  3 mL Intracatheter Q8H     sodium phosphate  1 enema Rectal BID

## 2021-09-02 NOTE — PHARMACY-ADMISSION MEDICATION HISTORY
Admission Medication History Completed by Pharmacy    See [facility] Admission Navigator for allergy information, preferred outpatient pharmacy, prior to admission medications and immunization status.     Medication History Sources:     Patient    SureScripts    Changes made to PTA medication list (reason):    Added: None    Deleted: None    Changed:   o Insulin glargine (lantus) from 24 Units at bedtime --> 22 units at bedtime now  o Metoclopramide 5 mg tablet from 1 tab 4 times daily --> 2 tablets 4 times daily now    Additional Information:    Patient reports last taking his home meds over a week ago    Patient reports not currently taking the following (not deleted due to refills)  o Benzoyl peroxide 5% topical  o Calcium acetate 667 mg caps  o Hydrocortisone 2.5% ointment  o Lanthanum 750 mg tablet  o Triamcinolone 0.1% ointment      Prior to Admission medications    Medication Sig Last Dose Taking? Auth Provider   aspirin (ASA) 81 MG EC tablet Take 1 tablet (81 mg) by mouth daily Past Month at Unknown time Yes Kt Ríos MD   atorvastatin (LIPITOR) 20 MG tablet Take 2 tablets (40 mg) by mouth daily Past Month at Unknown time Yes Yovany Vogel MD   calcium carbonate (TUMS) 500 MG chewable tablet Take 1 chew tab by mouth 2 times daily Past Month at Unknown time Yes Reported, Patient   carvedilol (COREG) 12.5 MG tablet Take 1 tablet (12.5 mg) by mouth 2 times daily (with meals) Past Month at Unknown time Yes Sumit Griffin APRN CNP   insulin aspart (NOVOLOG FLEXPEN) 100 UNIT/ML pen Sliding scale = 1 unit for every 20 over blood glucose of 140, average 45 units/day Past Week at Unknown time Yes tK Ríos MD   insulin glargine (LANTUS SOLOSTAR) 100 UNIT/ML pen Inject 24 Units Subcutaneous At Bedtime  Patient taking differently: Inject 22 Units Subcutaneous At Bedtime  Past Week at Unknown time Yes Kt Ríos MD   metoclopramide (REGLAN) 5 MG tablet Take 1 tablet (5 mg) by mouth 4 times daily  (before meals and nightly)  Patient taking differently: Take 10 mg by mouth 4 times daily (before meals and nightly)  Past Month at Unknown time Yes Kt Ríos MD   naproxen sodium 220 MG capsule Take 220 mg by mouth as needed  Past Month at Unknown time Yes Reported, Patient   omeprazole (PRILOSEC) 40 MG DR capsule Take 2 capsules (80 mg) by mouth every evening Past Month at Unknown time Yes Kt Ríos MD   ondansetron (ZOFRAN-ODT) 8 MG ODT tab DISSOLVE 1 TABLET ON TOP OF TONGUE EVERY 8 HOURS FOR 2 DAYS AS NEEDED FOR NAUSEA AND VOMITING Past Week at Unknown time Yes Reported, Patient   prochlorperazine (COMPAZINE) 10 MG tablet Take 10 mg by mouth Past Week at Unknown time Yes Reported, Patient   senna-docusate (SENOKOT-S/PERICOLACE) 8.6-50 MG tablet Take 1 tablet by mouth PRN Past Month at Unknown time Yes Reported, Patient   benzoyl peroxide 5 % external liquid For folliculitis. Apply once a day for 1-2 minutes on areas of whiteheads or acne then rinse off.  No Renetta Rosales MD   blood glucose (NO BRAND SPECIFIED) test strip Use to test blood sugar 5 times daily or as directed.  No Kt Ríos MD   calcium acetate (PHOSLO) 667 MG CAPS capsule Take 667 mg by mouth 3 times daily (with meals)   No Reported, Patient   Continuous Blood Gluc Sensor (DEXCOM G6 SENSOR) MISC Change every 10 days.  No Kt Ríos MD   Continuous Blood Gluc Transmit (DEXCOM G6 TRANSMITTER) MISC Change every 3 months.  No Kt Ríos MD   hydrocortisone 2.5 % ointment Apply if you have itch rash on the face, genitals, armpit.  No Renetta Rosales MD   Insulin Disposable Pump (OMNIPOD DASH 5 PACK PODS) MISC 1 each every 48 hours  Patient not taking: Reported on 7/23/2021  No Kt Ríos MD   lanthanum (FOSRENOL) 750 MG chewable tablet Take 2 tablets (1,500 mg) by mouth 5 times daily With meals and snacks.  No Katelyn Lopez NP   triamcinolone (KENALOG) 0.1 % external ointment Apply twice a day to areas  of itchy rash. Do not apply to the face, groin,armpits.  No Renetta Rosales MD       Date completed: 09/02/21    Medication history completed by: SHELLEY GARCIA

## 2021-09-02 NOTE — PROGRESS NOTES
HEMODIALYSIS TREATMENT NOTE    Date: 9/2/2021  Time: 4:26 PM    Data:  Pre Wt: 81.5 kg (179 lb 10.8 oz)   Desired Wt: 79.5 kg   Post Wt: 79.5 kg (175 lb 4.3 oz)  Weight change: 2 kg  Ultrafiltration - Post Run Net Total Removed (mL): 2000 mL  Vascular Access Status: Graft  patent  Dialyzer Rinse: Clear  Total Blood Volume Processed: 70.11 L   Total Dialysis (Treatment) Time: 3.5   Dialysate Bath: K 2, Ca 2.5  Heparin: None    Lab:   No    Interventions:  Pt had Lt AVG prepped with lido inj, after which sites were cannulated with 15g needle X 2 @ . Subsequently, BFR dropped from 400 to 350 d/t AP alarm from pt movement in bed. 2L of fluid was pulled per order.  BG @ 1715 was 228, and insulin coverage administered per sliding scale. Pt requested carb coverage; writer reported this to Rome GARCIA, and promised to follow up. Ending BP was 141/76. Pt rinsed back post tx, needles were pulled, new dressings applied, and sites were held for about 10mins to help achieve hemostasis. Hand off report given to JOSE Peter.    Assessment:  -Calm & Cooperative  -A little hypertensive but asymptomatic  -A & O X 4     Plan:    Per renal

## 2021-09-02 NOTE — PLAN OF CARE
"Time 6708-4223     Reason for admission: ESRD (end stage renal disease) on dialysis (H) [N18.6, Z99.2]  Intractable vomiting with nausea, unspecified vomiting type [R11.2]     Vitals: BP (!) 158/77   Pulse 90   Temp 98  F (36.7  C) (Oral)   Resp 18   SpO2 98%    On admission BP: 187/91  Neuro: A&OX4. Peripheral neuropathy baseline to lower extremities.    Cardiac: Hypertension. On tele. Tele showing NSR  Respiratory: WDL  - Provided reinforcement education on pulmonary toilet measures, patient verbalized understanding.   Activity: Independent, up ad mikal  Pain: Denies. Pt offered heat application, cold application, and other nonpharmacologic pain management measures.   Diet/GI/: Orders Placed This Encounter      Consistent Carb 60 grams CHO per Meal Diet   Pt states LBM \"Monday or Sunday.\"   Pt states he produces very little urine, if any. On hemodialysis Tues, Th, Sat  Blood sugar checks: AC/HS  Lines: R forearm PIV. Line c/d/i, patent. saline locked. L arm fistula, limb alert added   Skin/Wounds: Diffuse rash to back and upper shoulders/arms. Scabs to bilateral legs. Scars to sternum and umbilical area from previous surgeries. 4 eyes complete.         This shift:   -  12 units (1/2 of normal dose given)  - Fairly continuous nausea. States that activity and noise makes nausea worse. Given all prns for nausea with some relief. Also offered aromatherapy. Pt verbalized understanding of nausea management  - 12 lead EKG completed, NSR   - add lab replacement  - See electrolytes, minor abnormalities  - Review flowsheets for more information    Plan: Continue to monitor and follow POC. Will be seen by GI and nephrology in AM. Likely dialyze 9/2.     BG:   Glucose Values Bedside Glucose (mg/dl )  GLUCOSE   Latest Ref Rng & Units - 70 - 99 mg/dL   9/1/2021 -- 245(H)   8/25/2021 -- 287(H)   8/13/2021 -- 216(H)   Some recent data might be hidden      Labs/Lactic:  Hemoglobin (g/dL)   Date Value   09/01/2021 11.5 " (L)     Creatinine (mg/dL)   Date Value   09/01/2021 9.43 (H)     Platelet Count   Date Value   09/01/2021 213 10e3/uL     Hematocrit (%)   Date Value   09/01/2021 34.7 (L)     WBC (10e9/L)   Date Value   03/03/2021 6.7     WBC Count (10e3/uL)   Date Value   09/01/2021 6.0     Potassium (mmol/L)   Date Value   09/01/2021 4.6   03/19/2021 4.1     Sodium (mmol/L)   Date Value   09/01/2021 132 (L)     Magnesium (mg/dL)   Date Value   09/01/2021 2.2     Phosphorus (mg/dL)   Date Value   09/01/2021 4.9 (H)     Calcium (mg/dL)   Date Value   09/01/2021 10.4 (H)

## 2021-09-03 VITALS
BODY MASS INDEX: 26.85 KG/M2 | OXYGEN SATURATION: 98 % | RESPIRATION RATE: 18 BRPM | HEART RATE: 71 BPM | DIASTOLIC BLOOD PRESSURE: 69 MMHG | WEIGHT: 177.9 LBS | TEMPERATURE: 96.6 F | SYSTOLIC BLOOD PRESSURE: 135 MMHG

## 2021-09-03 LAB
ANION GAP SERPL CALCULATED.3IONS-SCNC: 9 MMOL/L (ref 3–14)
ATRIAL RATE - MUSE: 73 BPM
BUN SERPL-MCNC: 19 MG/DL (ref 7–30)
CALCIUM SERPL-MCNC: 9.6 MG/DL (ref 8.5–10.1)
CHLORIDE BLD-SCNC: 95 MMOL/L (ref 94–109)
CO2 SERPL-SCNC: 26 MMOL/L (ref 20–32)
CREAT SERPL-MCNC: 6.57 MG/DL (ref 0.66–1.25)
DIASTOLIC BLOOD PRESSURE - MUSE: NORMAL MMHG
ERYTHROCYTE [DISTWIDTH] IN BLOOD BY AUTOMATED COUNT: 14.5 % (ref 10–15)
GFR SERPL CREATININE-BSD FRML MDRD: 9 ML/MIN/1.73M2
GLUCOSE BLD-MCNC: 106 MG/DL (ref 70–99)
GLUCOSE BLDC GLUCOMTR-MCNC: 144 MG/DL (ref 70–99)
GLUCOSE BLDC GLUCOMTR-MCNC: 207 MG/DL (ref 70–99)
GLUCOSE BLDC GLUCOMTR-MCNC: 344 MG/DL (ref 70–99)
HCT VFR BLD AUTO: 32.1 % (ref 40–53)
HGB BLD-MCNC: 11 G/DL (ref 13.3–17.7)
INTERPRETATION ECG - MUSE: NORMAL
MCH RBC QN AUTO: 32.2 PG (ref 26.5–33)
MCHC RBC AUTO-ENTMCNC: 34.3 G/DL (ref 31.5–36.5)
MCV RBC AUTO: 94 FL (ref 78–100)
P AXIS - MUSE: 33 DEGREES
PLATELET # BLD AUTO: 183 10E3/UL (ref 150–450)
POTASSIUM BLD-SCNC: 3.6 MMOL/L (ref 3.4–5.3)
PR INTERVAL - MUSE: 148 MS
QRS DURATION - MUSE: 100 MS
QT - MUSE: 416 MS
QTC - MUSE: 458 MS
R AXIS - MUSE: -4 DEGREES
RBC # BLD AUTO: 3.42 10E6/UL (ref 4.4–5.9)
SODIUM SERPL-SCNC: 130 MMOL/L (ref 133–144)
SYSTOLIC BLOOD PRESSURE - MUSE: NORMAL MMHG
T AXIS - MUSE: 82 DEGREES
VENTRICULAR RATE- MUSE: 73 BPM
WBC # BLD AUTO: 4.9 10E3/UL (ref 4–11)

## 2021-09-03 PROCEDURE — 99239 HOSP IP/OBS DSCHRG MGMT >30: CPT | Mod: GC | Performed by: INTERNAL MEDICINE

## 2021-09-03 PROCEDURE — 93005 ELECTROCARDIOGRAM TRACING: CPT

## 2021-09-03 PROCEDURE — 82374 ASSAY BLOOD CARBON DIOXIDE: CPT | Performed by: STUDENT IN AN ORGANIZED HEALTH CARE EDUCATION/TRAINING PROGRAM

## 2021-09-03 PROCEDURE — 93010 ELECTROCARDIOGRAM REPORT: CPT | Performed by: INTERNAL MEDICINE

## 2021-09-03 PROCEDURE — 85027 COMPLETE CBC AUTOMATED: CPT | Performed by: STUDENT IN AN ORGANIZED HEALTH CARE EDUCATION/TRAINING PROGRAM

## 2021-09-03 PROCEDURE — 250N000013 HC RX MED GY IP 250 OP 250 PS 637: Performed by: STUDENT IN AN ORGANIZED HEALTH CARE EDUCATION/TRAINING PROGRAM

## 2021-09-03 PROCEDURE — 250N000011 HC RX IP 250 OP 636: Performed by: STUDENT IN AN ORGANIZED HEALTH CARE EDUCATION/TRAINING PROGRAM

## 2021-09-03 PROCEDURE — 250N000013 HC RX MED GY IP 250 OP 250 PS 637

## 2021-09-03 PROCEDURE — 99233 SBSQ HOSP IP/OBS HIGH 50: CPT | Performed by: INTERNAL MEDICINE

## 2021-09-03 PROCEDURE — 36415 COLL VENOUS BLD VENIPUNCTURE: CPT | Performed by: STUDENT IN AN ORGANIZED HEALTH CARE EDUCATION/TRAINING PROGRAM

## 2021-09-03 RX ORDER — AZITHROMYCIN 500 MG/1
500 TABLET, FILM COATED ORAL DAILY
Qty: 14 TABLET | Refills: 0 | Status: SHIPPED | OUTPATIENT
Start: 2021-09-03 | End: 2021-09-17

## 2021-09-03 RX ORDER — AMOXICILLIN 250 MG
2 CAPSULE ORAL 2 TIMES DAILY
Qty: 84 TABLET | Refills: 0 | Status: SHIPPED | OUTPATIENT
Start: 2021-09-03 | End: 2021-09-24

## 2021-09-03 RX ORDER — POLYETHYLENE GLYCOL 3350 17 G/17G
17 POWDER, FOR SOLUTION ORAL DAILY PRN
Qty: 850 G | Refills: 0 | Status: SHIPPED | OUTPATIENT
Start: 2021-09-03

## 2021-09-03 RX ORDER — DOXERCALCIFEROL 4 UG/2ML
4 INJECTION INTRAVENOUS
Status: CANCELLED | OUTPATIENT
Start: 2021-09-04

## 2021-09-03 RX ADMIN — Medication 81 MG: at 08:44

## 2021-09-03 RX ADMIN — CALCIUM ACETATE 667 MG: 667 CAPSULE ORAL at 08:44

## 2021-09-03 RX ADMIN — PANTOPRAZOLE SODIUM 40 MG: 40 TABLET, DELAYED RELEASE ORAL at 16:16

## 2021-09-03 RX ADMIN — CALCIUM ACETATE 667 MG: 667 CAPSULE ORAL at 14:06

## 2021-09-03 RX ADMIN — ACETAMINOPHEN 650 MG: 325 TABLET, FILM COATED ORAL at 05:11

## 2021-09-03 RX ADMIN — PANTOPRAZOLE SODIUM 40 MG: 40 TABLET, DELAYED RELEASE ORAL at 08:44

## 2021-09-03 RX ADMIN — CALCIUM CARBONATE (ANTACID) CHEW TAB 500 MG 500 MG: 500 CHEW TAB at 08:44

## 2021-09-03 RX ADMIN — CARVEDILOL 12.5 MG: 12.5 TABLET, FILM COATED ORAL at 08:44

## 2021-09-03 RX ADMIN — ONDANSETRON 4 MG: 2 INJECTION INTRAMUSCULAR; INTRAVENOUS at 06:54

## 2021-09-03 RX ADMIN — DOCUSATE SODIUM 50 MG AND SENNOSIDES 8.6 MG 2 TABLET: 8.6; 5 TABLET, FILM COATED ORAL at 08:44

## 2021-09-03 ASSESSMENT — ACTIVITIES OF DAILY LIVING (ADL)
ADLS_ACUITY_SCORE: 12

## 2021-09-03 NOTE — PLAN OF CARE
Time: 0700- discharge at 1645    Reason for admission: Intractable n/v  Vitals: VSS on RA  Activity: Independent  Pain: Denies  Neuro: A&Ox4   Cardiac: WDL. Tele NSR.  Respiratory: WDL  GI/: No BM this shift, multiple BMs overnight  Diet: Regular, tolerating well, denies nausea  Lines:  R PIV removed  Skin/Wounds: Generalized bruising/scabbing     Changes/plan: Diet advanced from full liquid to regular, tolerating. Denies nausea this shift. sliding scale insulin given, BGs high as patient normally has additional carb coverage at home. PIV removed, belongings in room returned to pt, AVS reviewed. Medications picked up at discharge pharmacy. Discharged home at 1645.

## 2021-09-03 NOTE — DISCHARGE SUMMARY
[facility]  Discharge Summary - Medicine & Pediatrics       Date of Admission:  9/1/2021  Date of Discharge:  9/3/2021  Discharging Provider: Dr. Hawkins  Discharge Service: Juan Mark    Discharge Diagnoses   #Intractable Nausea with Vomiting   #Exacerbation of Gastroparesis   #Severe Malnutrition in the context of acute illness  #ESRD on HD   #HTN   #T1DM   #HFrEF   #Ischemic Cardiomyopathy    Follow-ups Needed After Discharge   Follow-up Appointments     Follow Up and recommended labs and tests      Follow up with [facility] in the next 2-4 weeks             Unresulted Labs Ordered in the Past 30 Days of this Admission     No orders found from 8/2/2021 to 9/2/2021.      These results will be followed up by n/a    Discharge Disposition   Discharged to home  Condition at discharge: Stable    Hospital Course   Murtaza Fitzgerald was admitted on 9/1/2021 for intractable nausea.  The following problems were addressed during his hospitalization:    #Intractable Nausea with Vomiting   #Exacerbation of Gastroparesis   #Severe Malnutrition in the context of acute illness  Presents with intractable nausea with vomiting that has been ongoing for two months. He has a history of gastroparesis. His symptoms acutely worsened 2 weeks ago, reported decreased PO intake and no longer receiving relief from his home antiemetics. He complains of belching as well. He denies any fevers/chills or diarrhea. Consistent with exacerbation of gastroparesis. GI consulted during admission. Improved with aggressive bowel regimen (enemas, suppositories, and senna) as well as azithromycin for promotion of motility.   - Continue gastroparesis diet  - Continue senna-doc 2 tabs BID, with PRN miralax for 1 day without a BM. If > 1 day, recommend OTC enema  - PO Azithromycin 500 mg daily for promotion of motility, total course of 16 days  - Patient to follow-up with [facility] in 2-4 weeks     Chronic  Problems   #ESRD on HD   - Continue regular HD schedule   - Continue PTA Renal Vitamins      #HTN   - Continue PTA Carvedilol      #T1DM   - Continue PTA insulin   - Hgb A1c - 6.9     #HFrEF   #Ischemic Cardiomyopathy  - Last Echo (12/6/20) showed EF of 37%, diffuse hypokinesis, and grade III diastolic dysfunction   - Continue PTA ASA, statin, coreg    Consultations This Hospital Stay   NEPHROLOGY ESRD ADULT IP CONSULT  GI LUMINAL ADULT IP CONSULT  NUTRITION SERVICES ADULT IP CONSULT    Code Status   Full Code       The patient was discussed with MD Aleida Torres[address] [address]  Phone: [phone]  ______________________________________________________________________    Physical Exam   Vital Signs: Temp: (!) 96.6  F (35.9  C) Temp src: Oral BP: 135/69 Pulse: 71   Resp: 18 SpO2: 98 % O2 Device: None (Room air)    Weight: 177 lbs 14.4 oz  General Appearance:  NT, NAD  Respiratory: CTAB        Cardiovascular: RRR, no m/r/g  GI: Post surgical changes, NBS, S, NND, NTTP  Skin: No visualized lesions      Primary Care Physician   Kt Ríos    Discharge Orders      Reason for your hospital stay    You came in for nausea due to an exacerbation of your gastroparesis. This improved with an aggressive bowel regimen as well as a pro-motility agent.     Activity    Your activity upon discharge: activity as tolerated     Follow Up and recommended labs and tests    Follow up with [facility] in the next 2-4 weeks     Discharge Instructions    Continue senna-docusate tabs as ordered. Take miralax if no stools for 1 day. Purchase and take an over the counter enema if no bowel movements for 2-3 days.     Diet    Follow this diet upon discharge: Gastroparesis diet       Significant Results and Procedures   Results for orders placed or performed during the hospital encounter of 09/01/21   Abdomen XR, 2 vw, flat and upright    Narrative    EXAM: XR  ABDOMEN 2VIEWS  LOCATION: [facility]  DATE/TIME: 9/1/2021 10:25 PM    INDICATION: Vomiting.  COMPARISON: None.      Impression    IMPRESSION: Postsurgical changes in the lower abdomen and pelvis. No bowel distention. Constipation. Sternotomy with mediastinal clips.      *Note: Due to a large number of results and/or encounters for the requested time period, some results have not been displayed. A complete set of results can be found in Results Review.       Discharge Medications   Current Discharge Medication List      START taking these medications    Details   azithromycin (ZITHROMAX) 500 MG tablet Take 1 tablet (500 mg) by mouth daily for 14 days  Qty: 14 tablet, Refills: 0    Associated Diagnoses: Gastroparesis      polyethylene glycol (MIRALAX) 17 GM/Dose powder Take 17 g by mouth daily as needed for constipation  Qty: 850 g, Refills: 0    Associated Diagnoses: Constipation, unspecified constipation type         CONTINUE these medications which have CHANGED    Details   senna-docusate (SENOKOT-S/PERICOLACE) 8.6-50 MG tablet Take 2 tablets by mouth 2 times daily for 21 days  Qty: 84 tablet, Refills: 0    Associated Diagnoses: Constipation, unspecified constipation type         CONTINUE these medications which have NOT CHANGED    Details   aspirin (ASA) 81 MG EC tablet Take 1 tablet (81 mg) by mouth daily  Qty:      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)      atorvastatin (LIPITOR) 20 MG tablet Take 2 tablets (40 mg) by mouth daily  Qty: 180 tablet, Refills: 3    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)      calcium carbonate (TUMS) 500 MG chewable tablet Take 1 chew tab by mouth 2 times daily      carvedilol (COREG) 12.5 MG tablet Take 1 tablet (12.5 mg) by mouth 2 times daily (with meals)  Qty: 60 tablet, Refills: 1    Associated Diagnoses: Benign essential hypertension      insulin  aspart (NOVOLOG FLEXPEN) 100 UNIT/ML pen Sliding scale = 1 unit for every 20 over blood glucose of 140, average 45 units/day  Qty: 45 mL, Refills: 0    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)      insulin glargine (LANTUS SOLOSTAR) 100 UNIT/ML pen Inject 24 Units Subcutaneous At Bedtime  Qty: 15 mL, Refills: 11    Comments: If Lantus is not covered by insurance, may substitute Basaglar at same dose and frequency.    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)      metoclopramide (REGLAN) 5 MG tablet Take 1 tablet (5 mg) by mouth 4 times daily (before meals and nightly)  Qty: 120 tablet, Refills: 11    Associated Diagnoses: Gastroparesis      naproxen sodium 220 MG capsule Take 220 mg by mouth as needed       omeprazole (PRILOSEC) 40 MG DR capsule Take 2 capsules (80 mg) by mouth every evening  Qty: 180 capsule, Refills: 11    Associated Diagnoses: Gastroesophageal reflux disease with esophagitis without hemorrhage      ondansetron (ZOFRAN-ODT) 8 MG ODT tab DISSOLVE 1 TABLET ON TOP OF TONGUE EVERY 8 HOURS FOR 2 DAYS AS NEEDED FOR NAUSEA AND VOMITING      prochlorperazine (COMPAZINE) 10 MG tablet Take 10 mg by mouth      acetone, Urine, test STRP 1 strip by In Vitro route daily  Qty: 50 each, Refills: 3    Comments: Urine ketone stix  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  Qty: 200 strip, Refills: 2    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)      calcium acetate (PHOSLO) 667 MG CAPS capsule Take 667 mg by mouth 3 times daily (with meals)       Continuous Blood Gluc Sensor (DEXCOM G6 SENSOR) MISC Change every 10 days.  Qty: 3 each, Refills: 11    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)      Continuous Blood Gluc Transmit  (DEXCOM G6 TRANSMITTER) MISC Change every 3 months.  Qty: 1 each, Refills: 3    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)      Insulin Disposable Pump (OMNIPOD DASH 5 PACK PODS) MISC 1 each every 48 hours  Qty: 5 each, Refills: 11    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)      insulin pen needle (BD ARPITA U/F) 32G X 4 MM miscellaneous Use 4 daily or as directed.  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)         STOP taking these medications       benzoyl peroxide 5 % external liquid Comments:   Reason for Stopping:         hydrocortisone 2.5 % ointment Comments:   Reason for Stopping:         lanthanum (FOSRENOL) 750 MG chewable tablet Comments:   Reason for Stopping:         triamcinolone (KENALOG) 0.1 % external ointment Comments:   Reason for Stopping:             Allergies   Allergies   Allergen Reactions     Diphenhydramine Other (See Comments)     Restless legs     Metoclopramide      Other reaction(s): Confusion, Unknown     Reglan Other (See Comments)     Reaction only to IV formulation -->delsuions

## 2021-09-03 NOTE — PLAN OF CARE
Time: 1097-6206    Reason for admission: End stage renal disease, intractable vomiting w/ nausea  Vitals: VSS on RA  Activity:  Independent.  Pain: Reports pain in lower back, prn tylenol given.  Neuro:  AOx4  Cardiac:   HTN, on tele monitoring  Respiratory: Stable on RA  GI/:  Oliguric on HD, last BM morning of 9/2.  Diet:  Full liquid  Lines:  R PIV SL. Thrill and bruit noted on fistula on L arm.  Skin/Wounds: Intact.    Labs/imaging: CRT 6.7     New changes this shift: Nausea controlled with zophran    Plan:  Follow PoC

## 2021-09-03 NOTE — PROGRESS NOTES
[facility]    Progress Note - Juan 4 Service        Date of Admission:  9/1/2021    Assessment & Plan             Murtaza Fitzgerald is a 45 year old male admitted on 9/1/2021. 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     #Intractable Nausea with Vomiting   #Exacerbation of Gastroparesis   Presents with intractable nausea with vomiting that has been ongoing for two months. He has a history of gastroparesis. His symptoms acutely worsened 2 weeks ago, reported decreased PO intake and no longer receiving relief from his home antiemetics. He complains of belching as well. He denies any fevers/chills or diarrhea. Consistent with exacerbation of gastroparesis.  - GI consulted, appreciate recs  - Will advance patient's diet to gastroparesis diet  - Aggressive bowel regimen with BID fleet enemas as well as qnoon glycerin suppositories, continue senna-doc   - Scheduled zofran with PRN compazine   - Azithromycin 500 mg daily x 3 days for promotion of motility, day 2     Chronic Problems   #ESRD on HD   - Tues, Thurs, Sat HD schedule   - Continue PTA Renal Vitamins   - Consulted Nephrology.      #HTN   - Continue PTA Carvedilol      #T1DM   - Medium Dose sliding scale insulin   - Increase to 16U insulin glargine at bedtime (PTA dose 24)   - Hgb A1c - 6.9     #HFrEF   #Ischemic Cardiomyopathy  - Last Echo (12/6/20) showed EF of 37%, diffuse hypokinesis, and grade III diastolic dysfunction   - Cardiac Cath (pre-transplant eval) - showed severe multivessel native coronary artery disease      Diet: Combination Diet    DVT Prophylaxis: Heparin SQ  Mueller Catheter: Not present  Fluids: NO  Central Lines: PRESENT       Code Status: Full Code      Disposition Plan    Expected discharge: 09/04/2021   recommended to prior living arrangement once adequate pain management/ tolerating PO medications.     The patient's care was discussed with the Attending Physician, Dr. Hawkins.    Joey Bryant MD  [place] [id number] Service  [facility]  Securely message with the Vocera Web Console (learn more here)  Text page via [facility] Paging/Directory  Please see sign in/sign out for up to date coverage information    Clinically Significant Risk Factors Present on Admission                ______________________________________________________________________    Interval History   NAEO. Patient does report some small bowel movements yesterday. He does report continued nausea which is somewhat improved.     4 point ROS otherwise negative    Data reviewed today: I reviewed all medications, new labs and imaging results over the last 24 hours.     Physical Exam   Vital Signs: Temp: 97.9  F (36.6  C) Temp src: Oral BP: (!) 155/80 Pulse: 70   Resp: 16 SpO2: 99 % O2 Device: None (Room air)    Weight: 177 lbs 14.4 oz  General Appearance: NT, NAD  Respiratory: CTAB   Cardiovascular: RRR, no m/r/g  GI: Post surgical changes, NBS, S, NND, NTTP  Skin: No visualized lesions       Data   Recent Labs   Lab 09/03/21  0723 09/03/21  0200 09/02/21  2202 09/02/21  0721 09/01/21  2151   WBC 4.9  --   --   --  6.0   HGB 11.0*  --   --   --  11.5*   MCV 94  --   --   --  94     --   --   --  213   *  --   --  133 132*   POTASSIUM 3.6  --   --  4.5 4.6   CHLORIDE 95  --   --  95 92*   CO2 26  --   --  27 30   BUN 19  --   --  44* 37*   CR 6.57*  --   --  10.00* 9.43*   ANIONGAP 9  --   --  11 10   CHARLY 9.6  --   --  9.4 10.4*   * 144* 203* 224* 245*   ALBUMIN  --   --   --   --  3.8   PROTTOTAL  --   --   --   --  8.6   BILITOTAL  --   --   --   --  0.9   ALKPHOS  --   --   --   --  152*   ALT  --   --   --   --  18   AST  --   --   --   --  8   LIPASE  --   --    --   --  46*     No results found for this or any previous visit (from the past 24 hour(s)).  Medications       aspirin  81 mg Oral Daily     atorvastatin  40 mg Oral Daily     azithromycin  500 mg Intravenous Q24H     calcium acetate  667 mg Oral TID w/meals     calcium carbonate  500 mg Oral BID     carvedilol  12.5 mg Oral BID w/meals     glycerin  1 suppository Rectal Daily     heparin ANTICOAGULANT  5,000 Units Subcutaneous Q12H     insulin aspart  1-7 Units Subcutaneous TID AC     insulin aspart  1-5 Units Subcutaneous At Bedtime     insulin glargine  16 Units Subcutaneous At Bedtime     ondansetron  4 mg Intravenous Q6H     pantoprazole  40 mg Oral BID AC     senna-docusate  2 tablet Oral BID     sodium chloride (PF)  3 mL Intracatheter Q8H     sodium phosphate  1 enema Rectal BID

## 2021-09-03 NOTE — PLAN OF CARE
Time 9548-2303     Reason for admission:   ESRD (end stage renal disease) on dialysis (H) [N18.6, Z99.2]  Intractable vomiting with nausea, unspecified vomiting type [R11.2]     Vitals: BP (!) 147/80   Pulse 75   Temp (!) 96.1  F (35.6  C) (Oral)   Resp 18   Wt 79.5 kg (175 lb 4.3 oz)   SpO2 100%   BMI 26.45 kg/m       Activity: Independent   Pain: reports stiff low back pain, PRN tylenol given   Neuro: A&Ox4   Cardiac: HTN   Respiratory: WDL on RA   GI/: Oliguric on HD, reports last BM small in AM   Diet: Full liquid   Lines: R PIV SL      This shift: Nausea controlled with PRN zofran and compazine, dialysis ran     Plan:     Continue to monitor and follow POC

## 2021-09-03 NOTE — PROGRESS NOTES
"CLINICAL NUTRITION SERVICES - ASSESSMENT NOTE     Nutrition Prescription    RECOMMENDATIONS FOR MDs/PROVIDERS TO ORDER:  Could change diet order to combination Low Fat + Low Fiber (gastroparesis diet recommendations) OR keep regular diet and have pt self-select appropriate foods    Malnutrition Status:    Severe malnutrition in the context of acute illness    Recommendations already ordered by Registered Dietitian (RD):  None at this time     Future/Additional Recommendations:  1. See interventions section below for diet education provided to patient today  2. If ongoing poor PO intakes, offer supplements, scheduled snacks, and/or consider nutrition support if indicated     REASON FOR ASSESSMENT  Murtaza Fitzgerald is a/an 45 year old male assessed by the dietitian for Provider order - \"Patient needs to follow a gastroparesis diet, no ordering menu available, given regular diet for now\"    NUTRITION HISTORY  Patient familiar with dialysis and diabetes diets and has had education; pt says most of the gastroparesis diet ed he has is from Internet searches.      Per chart, pt admit with intractable nausea and emesis x 2 weeks and poor PO intake tolerance; PMH includes gastroparesis, ESRD on HD, ischemic cardiomyopathy s/p CABG (2015), HFrEF, T1DM, and failed pancreas transplant    CURRENT NUTRITION ORDERS  Diet: Regular  Intake/Tolerance: just advanced to regular diet late this morning, was on full liquid diet yesterday    LABS  Labs reviewed  - Na+ 130 (L)  - Cr 6.57 (H), GFR 9 (L)  - (12/16/20): pancreatic fecal elastase 344, WNL    MEDICATIONS  Medications reviewed  - Phoslo TID with meals  - Tums  - Medium sliding scale insulin TID before meals + at bedtime  - Lantus    ANTHROPOMETRICS  Height: 173.4 cm (5' 8.25\")  Most Recent Weight: 80.7 kg (177 lb 14.4 oz)    IBW: 70 kg   BMI: Overweight BMI 25-29.9  Weight History: 14 lb wt loss the past 1-2 months (7.5% wt loss)  Wt Readings from Last 10 Encounters: "   09/03/21 80.7 kg (177 lb 14.4 oz)   09/02/21 79.5 kg (175 lb 4.3 oz)   08/13/21 83.8 kg (184 lb 12.8 oz)   07/23/21 85.7 kg (189 lb)   03/19/21 86.2 kg (190 lb)   03/03/21 81.6 kg (180 lb)   03/01/21 82.1 kg (180 lb 14.4 oz)   12/17/20 81.6 kg (180 lb)   12/07/20 82.2 kg (181 lb 3.2 oz)   11/09/20 86.4 kg (190 lb 8 oz)   10/23/20 88.4 kg (194 lb 14.4 oz)      Dosing Weight: 81 kg (actual)    ASSESSED NUTRITION NEEDS  Estimated Energy Needs: 8450-0896 kcals/day (25 - 30 kcals/kg)  Justification: Maintenance  Estimated Protein Needs:  grams protein/day (1.2 - 1.8 grams of pro/kg)  Justification: Dialysis  Estimated Fluid Needs: (1 mL/kcal)   Justification: Maintenance, or other per provider pending fluid status    PHYSICAL FINDINGS  See malnutrition section below.    MALNUTRITION  % Intake: </= 50% for >/= 5 days (severe)  % Weight Loss: > 5% in 1 month (severe)  Subcutaneous Fat Loss: None observed  Muscle Loss: None observed  Fluid Accumulation/Edema: None noted per chart review  Malnutrition Diagnosis: Severe malnutrition in the context of acute illness    NUTRITION DIAGNOSIS  Inadequate oral intake related to nausea/vomiting 2/2 gastroparesis as evidenced by poor PO intakes x 2 weeks 2/2 nausea/vomiting     INTERVENTIONS  Implementation  Nutrition Education: Provided education on gastroparesis diet (low fat, low fiber; small frequent meals), provided handout Gastroparesis Nutrition Therapy     Goals  Patient to consume % of nutritionally adequate meal trays TID, or the equivalent with supplements/snacks.     Monitoring/Evaluation  Progress toward goals will be monitored and evaluated per protocol.     Eleni Munoz, RD, LD  Weekday Pager: [phone]  Weekday Units covered: 7C (all beds) and 5A (beds 5201 through 5211-2)  Weekend/Holiday RD Pager: [phone]

## 2021-09-03 NOTE — PROGRESS NOTES
Care Management Discharge Note    Discharge Date: 09/03/2021       Discharge Disposition:  Home     Discharge Services:  None    Discharge DME:  none    Discharge Transportation:  Patient arrange    Education Provided on the Discharge Plan:  Yes  Persons Notified of Discharge Plans: Yes  Patient/Family in Agreement with the Plan:  Yes    Handoff Referral Completed: Yes    Additional Information:  Patient discharged home.     Laura Clemente, RN    Laura Clemente RN, BSN  5B RN Care Coordinator  [phone] phone  [phone] pager    Weekend or Holiday Care Coordinator [phone] pager  Job Code [phone]

## 2021-09-04 NOTE — PROGRESS NOTES
Gastroenterology Inpatient Sign Off Note    Nausea and vomiting  Gastroparesis 2/2 DM1  Chronic slow transit constipation  Uremia, on HD  45 year old male admitted on 9/1/2021. 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 No longer has nausea or vomiting today. Patient reports has regular 1-2 time form-loose stool a day. Issue with constipation 3 years ago. Is taking senna regularly once a day at home.   Exam today with soft abdomen and normal bowel sound.  The nausea and vomiting this episode is most likely driven by constipation with underlying gastroparesis.     Recommendation  - regular bowel regimen: continue senna once a day, miralax prn if no bowel movement in 3 days and prn enema if no bowel movement for 3 days as well.  - azithromycin 500 mg daily for another 16 days (converted from IV 3 days and po of 14 days) for gastroparesis exacerbation  - avoid opioid (he is not on opioid)    Inpatient GI consults service will sign off. No further recommendations at this time. If primary team has addition questions, please page consult fellow listed in [place].    Current GI Consult Staff: Dr. Cottrell.    Follow up recommendations:   Patient wants to follow-up with [facility] with hope for gastric electrical stimulation (already has a follow-up)    Rashida Gutierrez MD  Gastroenterology Fellow  [id number]  See [facility] for GI on-call information

## 2021-09-07 ENCOUNTER — PATIENT OUTREACH (OUTPATIENT)
Dept: CARE COORDINATION | Facility: CLINIC | Age: 46
End: 2021-09-07

## 2021-09-07 NOTE — LETTER
[facility] CARE COORDINATION  [facility]  September 8, 2021    Murtaza Payton Amilcar  [address]  [place] [address]      Dear Murtaza,    I am a clinic community health worker who works with Kt Ríos MD at the [facility]. I have been trying to reach you recently to introduce Clinic Care Coordination and to see if there was anything I could assist you with.  Below is a description of clinic care coordination and how I can further assist you.      The clinic care coordination team is made up of a registered nurse,  and community health worker who understand the health care system. The goal of clinic care coordination is to help you manage your health and improve access to the health care system in the most efficient manner. The team can assist you in meeting your health care goals by providing education, coordinating services, strengthening the communication among your providers and supporting you with any resource needs.    Please feel free to contact me at [phone] with any questions or concerns. We are focused on providing you with the highest-quality healthcare experience possible and that all starts with you.     Sincerely,     Nimisha Jenkins, NGUYEN, B.S. [facility]  Clinic Care Coordination  [facility]:  [place] [facility] [place]  [phone]  [email or web address]

## 2021-09-07 NOTE — PROGRESS NOTES
Clinic Care Coordination Contact  UT/Voicemail  Left Voicemail     Clinical Data: Care Coordinator Outreach  Outreach attempted x 1.  Left message on patient's voicemail with call back information and requested return call.    Chart Review: Referral from IP handoff. In ED to hospital on 9/1-9/3 for ESRD, nausea, and vomiting. Discharged to home, f/u with [facility] in the next 2-4 weeks.   Followed by cardiology CC.    Plan: Care Coordinator will try to reach patient again in 1-2 business days.    Nimisha Jenkins, NGUYEN, B.S.   Clinic Care Coordination  [facility]:  [place], [facility] [place]  [phone]  [email or web address]

## 2021-09-08 NOTE — PROGRESS NOTES
Clinic Care Coordination Contact  UTC/Voicemail  Left Voicemail     Clinical Data: Care Coordinator Outreach  Outreach attempted x 2.  Left message on patient's voicemail with call back information and requested return call.    Chart Review: Referral from IP handoff. In ED to hospital on 9/1-9/3 for ESRD, nausea, and vomiting. Discharged to home, f/u with [facility] in the next 2-4 weeks.   Followed by cardiology CC.    Plan: Care Coordinator will send care coordination introduction letter with care coordinator contact information and explanation of care coordination services via [employer]. Care Coordinator will do no further outreaches at this time.    NGUYEN Brandt, B.S. [facility] Care Coordination  [facility]:  [place], [place] and [place]  [phone]  [email or web address]

## 2021-09-09 DIAGNOSIS — I50.22 CHRONIC SYSTOLIC HEART FAILURE (H): Primary | ICD-10-CM

## 2021-09-15 NOTE — TELEPHONE ENCOUNTER
Call placed to pt. GI referral and EGD/gastric emptying study orders in place.   To follow-up with Tyrell next week about setting up these appts.      Yes

## 2021-09-17 ENCOUNTER — OFFICE VISIT (OUTPATIENT)
Dept: CARDIOLOGY | Facility: CLINIC | Age: 46
End: 2021-09-17
Attending: NURSE PRACTITIONER
Payer: COMMERCIAL

## 2021-09-17 ENCOUNTER — LAB (OUTPATIENT)
Dept: LAB | Facility: CLINIC | Age: 46
End: 2021-09-17
Attending: NURSE PRACTITIONER
Payer: COMMERCIAL

## 2021-09-17 VITALS
HEART RATE: 81 BPM | HEIGHT: 69 IN | BODY MASS INDEX: 27.99 KG/M2 | WEIGHT: 189 LBS | DIASTOLIC BLOOD PRESSURE: 91 MMHG | SYSTOLIC BLOOD PRESSURE: 161 MMHG | OXYGEN SATURATION: 99 %

## 2021-09-17 DIAGNOSIS — I50.22 CHRONIC SYSTOLIC HEART FAILURE (H): ICD-10-CM

## 2021-09-17 DIAGNOSIS — I50.22 CHRONIC SYSTOLIC HEART FAILURE (H): Primary | ICD-10-CM

## 2021-09-17 DIAGNOSIS — E78.5 DYSLIPIDEMIA: ICD-10-CM

## 2021-09-17 DIAGNOSIS — K31.84 GASTROPARESIS: ICD-10-CM

## 2021-09-17 DIAGNOSIS — Z76.82 AWAITING ORGAN TRANSPLANT: ICD-10-CM

## 2021-09-17 DIAGNOSIS — I25.5 ISCHEMIC CARDIOMYOPATHY: ICD-10-CM

## 2021-09-17 DIAGNOSIS — I43 TYPE 1 DIABETES MELLITUS WITH DIABETIC CARDIOMYOPATHY (H): ICD-10-CM

## 2021-09-17 DIAGNOSIS — I10 BENIGN ESSENTIAL HYPERTENSION: ICD-10-CM

## 2021-09-17 DIAGNOSIS — E10.59 TYPE 1 DIABETES MELLITUS WITH DIABETIC CARDIOMYOPATHY (H): ICD-10-CM

## 2021-09-17 DIAGNOSIS — N18.6 ESRD (END STAGE RENAL DISEASE) (H): ICD-10-CM

## 2021-09-17 LAB
ANION GAP SERPL CALCULATED.3IONS-SCNC: 8 MMOL/L (ref 3–14)
BUN SERPL-MCNC: 30 MG/DL (ref 7–30)
CALCIUM SERPL-MCNC: 9.3 MG/DL (ref 8.5–10.1)
CHLORIDE BLD-SCNC: 92 MMOL/L (ref 94–109)
CO2 SERPL-SCNC: 32 MMOL/L (ref 20–32)
CREAT SERPL-MCNC: 6.63 MG/DL (ref 0.66–1.25)
GFR SERPL CREATININE-BSD FRML MDRD: 9 ML/MIN/1.73M2
GLUCOSE BLD-MCNC: 308 MG/DL (ref 70–99)
POTASSIUM BLD-SCNC: 5.4 MMOL/L (ref 3.4–5.3)
SODIUM SERPL-SCNC: 132 MMOL/L (ref 133–144)

## 2021-09-17 PROCEDURE — 36415 COLL VENOUS BLD VENIPUNCTURE: CPT | Performed by: PATHOLOGY

## 2021-09-17 PROCEDURE — 80048 BASIC METABOLIC PNL TOTAL CA: CPT | Performed by: PATHOLOGY

## 2021-09-17 PROCEDURE — 99213 OFFICE O/P EST LOW 20 MIN: CPT | Performed by: NURSE PRACTITIONER

## 2021-09-17 PROCEDURE — G0463 HOSPITAL OUTPT CLINIC VISIT: HCPCS

## 2021-09-17 RX ORDER — LISINOPRIL 10 MG/1
10 TABLET ORAL DAILY
Qty: 30 TABLET | Refills: 1 | Status: SHIPPED | OUTPATIENT
Start: 2021-09-17 | End: 2021-10-20

## 2021-09-17 ASSESSMENT — PAIN SCALES - GENERAL: PAINLEVEL: NO PAIN (0)

## 2021-09-17 ASSESSMENT — MIFFLIN-ST. JEOR: SCORE: 1729.74

## 2021-09-17 NOTE — LETTER
9/17/2021      RE: Murtaza Fitzgerald  [address]  [facility] [address]       Dear Colleague,    Thank you for the opportunity to participate in the care of your patient, Murtaza Fitzgerald, at the [facility] [place] [facility]. Please see a copy of my visit note below.      HPI: Murtaza is a 45 year old White male with a past medical history of :     1.  Type 1 diabetes since age 11.   2.  Hypertension.   3.  Hyperlipidemia, now status post kidney transplant 11/2008, failed.  Back on dialysis 02/2020.   4.  Status post pancreas transplant in 2009, failed 3 years later.  Currently on insulin.   5.  Depression.   6.  Tobacco abuse in the past.   7.  Knee tumor surgery.   8.  Gastroparesis.      His cardiac history is significant for a moderate ischemic cardiomyopathy.  He was asymptomatic, but found to have a positive stress test, which eventually led a 3-vessel coronary bypass surgery in 2015 (LIMA to LAD, vein graft to OM, vein graft to PDA).     Follow up CORE appt-  Referred by Dr. Vogel    Recent hospitalization 9/1-9/3- nausea and vomiting with history of gastroparesis.     No SOB at rest unless he has extra fluid. Dialysis T,Th, Sat. No NUÑEZ even with walking longer distances. He has no swelling in the legs, ankles, feet. He can feel distention in the abdomen.  He says his dry weight after dialysis is 82 kg. Some days he needs an extra dialysis run due to weight gain.  His blood pressures typically run quite high, but he has not been keeping track of them at home. Last A1C 6.9 wants to get on the transplant list. He was stressed out today as he found out his insurance will not cover the [facility].     Denies  NUÑEZ, PND, orthopnea, edema, chest pain, palpitations, lightheadedness, dizziness, near syncopal/syncopal episodes. Murtaza has been following salt and fluid restrictions.      PAST MEDICAL HISTORY:  Past Medical  History:   Diagnosis Date     CMV (cytomegalovirus infection) status negative      Coronary artery disease, non-occlusive     angio showed diffuse disease with no lesions     Diabetes mellitus, type 1     Diagnosed at age 11. Takes Insulin      Dialysis patient (H)     prior to kidney transplant     Dyslipidemia      Gastroesophageal reflux disease      History of blood transfusion      Hypertension      Immunosuppressed status (H)      Kidney transplant status, living related donor           Migraines      Mycotic aneurysm of kidney transplant (H) 2008     Noncompliance with treatment     no labs for one year     Pancreas replaced by transplant (H) 2009    rejection treated with thymo     Pancreatic disease     pancreas transplant     Tobacco abuse ongoing       FAMILY HISTORY:  Family History   Problem Relation Age of Onset     Unknown/Adopted Father      Heart Disease Maternal Grandfather 62     Melanoma No family hx of      Skin Cancer No family hx of        SOCIAL HISTORY:  Social History     Tobacco Use     Smoking status: Former Smoker     Packs/day: 1.00     Years: 20.00     Pack years: 20.00     Types: Vaping Device, Cigarettes     Quit date: 2016     Years since quittin.7     Smokeless tobacco: Never Used     Tobacco comment: E- Cig use since    Substance Use Topics     Alcohol use: Not Currently     Alcohol/week: 0.0 standard drinks     Drug use: No           CURRENT MEDICATIONS:  Current Outpatient Medications   Medication Sig Dispense Refill     acetone, Urine, test STRP 1 strip by In Vitro route daily (Patient not taking: Reported on 2021) 50 each 3     aspirin (ASA) 81 MG EC tablet Take 1 tablet (81 mg) by mouth daily       atorvastatin (LIPITOR) 20 MG tablet Take 2 tablets (40 mg) by mouth daily 180 tablet 3     azithromycin (ZITHROMAX) 500 MG tablet Take 1 tablet (500 mg) by mouth daily for 14 days 14 tablet 0     blood glucose (NO BRAND SPECIFIED) test strip Use  to test blood sugar 5 times daily or as directed. 200 strip 2     calcium acetate (PHOSLO) 667 MG CAPS capsule Take 667 mg by mouth 3 times daily (with meals)        calcium carbonate (TUMS) 500 MG chewable tablet Take 1 chew tab by mouth 2 times daily       carvedilol (COREG) 12.5 MG tablet Take 1 tablet (12.5 mg) by mouth 2 times daily (with meals) 60 tablet 1     Continuous Blood Gluc Sensor (DEXCOM G6 SENSOR) MISC Change every 10 days. 3 each 11     Continuous Blood Gluc Transmit (DEXCOM G6 TRANSMITTER) MISC Change every 3 months. 1 each 3     insulin aspart (NOVOLOG FLEXPEN) 100 UNIT/ML pen Sliding scale = 1 unit for every 20 over blood glucose of 140, average 45 units/day 45 mL 0     Insulin Disposable Pump (OMNIPOD DASH 5 PACK PODS) MISC 1 each every 48 hours (Patient not taking: Reported on 7/23/2021) 5 each 11     insulin glargine (LANTUS SOLOSTAR) 100 UNIT/ML pen Inject 24 Units Subcutaneous At Bedtime (Patient taking differently: Inject 22 Units Subcutaneous At Bedtime ) 15 mL 11     insulin pen needle (BD ARPITA U/F) 32G X 4 MM miscellaneous Use 4 daily or as directed. 120 each 0     metoclopramide (REGLAN) 5 MG tablet Take 1 tablet (5 mg) by mouth 4 times daily (before meals and nightly) (Patient taking differently: Take 10 mg by mouth 4 times daily (before meals and nightly) ) 120 tablet 11     naproxen sodium 220 MG capsule Take 220 mg by mouth as needed        omeprazole (PRILOSEC) 40 MG DR capsule Take 2 capsules (80 mg) by mouth every evening 180 capsule 11     ondansetron (ZOFRAN-ODT) 8 MG ODT tab DISSOLVE 1 TABLET ON TOP OF TONGUE EVERY 8 HOURS FOR 2 DAYS AS NEEDED FOR NAUSEA AND VOMITING       polyethylene glycol (MIRALAX) 17 GM/Dose powder Take 17 g by mouth daily as needed for constipation 850 g 0     prochlorperazine (COMPAZINE) 10 MG tablet Take 10 mg by mouth       senna-docusate (SENOKOT-S/PERICOLACE) 8.6-50 MG tablet Take 2 tablets by mouth 2 times daily for 21 days 84 tablet 0        ROS:  Review Of Systems  Skin: negative  Eyes: negative  Ears/Nose/Throat: negative  Respiratory: No shortness of breath, dyspnea on exertion, cough, or hemoptysis  Cardiovascular: negative  Gastrointestinal: negative  Genitourinary: negative  Musculoskeletal: negative  Neurologic: negative  Psychiatric: negative  Hematologic/Lymphatic/Immunologic: negative  Endocrine: negative      EXAM:  There were no vitals taken for this visit.  Home weight:  General: alert, articulate, and in no acute distress.  HEENT: normocephalic, atraumatic, anicteric sclera, EOMI, mucosa moist, no cyanosis.   Neck: neck supple.  No adenopathy, masses, or carotid bruits.  JVP elevated 11-12  Heart: regular rhythm, normal S1/S2, no murmur, gallop, rub.  Precordium quiet with normal PMI.     Lungs: clear, no rales, ronchi, or wheezing.  No accessory muscle use, respirations unlabored.   Abdomen: soft, non-tender, bowel sounds present, no hepatomegaly  Extremities: no pitting edema.   No cyanosis.     Neurological: alert and oriented x 3.  normal speech and affect, no gross motor deficits  Skin:  No ecchymoses, rashes, or clubbing.    Labs:  CBC RESULTS:  Lab Results   Component Value Date    WBC 4.9 09/03/2021    WBC 6.7 03/03/2021    RBC 3.42 (L) 09/03/2021    RBC 3.47 (L) 03/03/2021    HGB 11.0 (L) 09/03/2021    HGB 11.1 (L) 03/03/2021    HCT 32.1 (L) 09/03/2021    HCT 34.3 (L) 03/03/2021    MCV 94 09/03/2021    MCV 99 03/03/2021    MCH 32.2 09/03/2021    MCH 32.0 03/03/2021    MCHC 34.3 09/03/2021    MCHC 32.4 03/03/2021    RDW 14.5 09/03/2021    RDW 15.1 (H) 03/03/2021     09/03/2021     03/03/2021       CMP RESULTS:  Lab Results   Component Value Date     (L) 09/03/2021     (L) 03/19/2021    POTASSIUM 3.6 09/03/2021    POTASSIUM 4.1 03/19/2021    CHLORIDE 95 09/03/2021    CHLORIDE 90 (L) 03/19/2021    CO2 26 09/03/2021    CO2 32 03/19/2021    ANIONGAP 9 09/03/2021    ANIONGAP 8 03/19/2021     (H)  09/03/2021     (H) 09/03/2021     (H) 03/19/2021    BUN 19 09/03/2021    BUN 38 (H) 03/19/2021    CR 6.57 (H) 09/03/2021    CR 6.59 (H) 03/19/2021    GFRESTIMATED 9 (L) 09/03/2021    GFRESTIMATED 9 (L) 03/19/2021    GFRESTBLACK 11 (L) 03/19/2021    CHARLY 9.6 09/03/2021    CHARLY 9.0 03/19/2021    BILITOTAL 0.9 09/01/2021    BILITOTAL 1.4 (H) 03/01/2021    ALBUMIN 3.8 09/01/2021    ALBUMIN 3.4 03/01/2021    ALKPHOS 152 (H) 09/01/2021    ALKPHOS 199 (H) 03/01/2021    ALT 18 09/01/2021    ALT 19 03/01/2021    AST 8 09/01/2021    AST 15 03/01/2021        INR RESULTS:  Lab Results   Component Value Date    INR 1.18 (H) 08/25/2021    INR 1.24 (H) 03/01/2021       No components found for: CK  Lab Results   Component Value Date    MAG 2.2 09/01/2021    MAG 2.3 12/14/2020     Lab Results   Component Value Date    NTBNP 62,990 (H) 07/23/2021     @BRIEFLABR (dig)@    Most recent echocardiogram:  No results found for this or any previous visit (from the past 8760 hour(s)).      Assessment and Plan:    In summary,Murtaza  is a  46 year old male who is here for a follow up  visit to CORE clinic. He appears hypervolemic today and will have his dialysis run tomorrow as planned.  He is motivated to increase his EF to be eligible for transplant. Patient increased carvedilol to 12.5 mg BID- a week ago . We will continue to advance him to GDMT.      1.  Chronic sytolic heart failure secondary to ICM .  Stage C  NYHA Class III  ACEi/ARB: - Lisinopril start 10 mg   BB: yes- increased to 12.5 mg BID   Aldosterone antagonist: contraindicated due to renal dysfunction  SCD prophylaxis: decision deferred during medication uptitration  Fluid status: hypervolemic- dialysis patient   Anticoagulation:   Antiplatelet:  ASA dose   Sleep apnea:  NSAID use:  Contraindicated.  Avoid use.  Remote Monitoring:  SGLT-2- not candidate DM Type I.     2.  Other comordbid conditions:      #.  Hypertension.        He says his blood pressures tend  to run quite high, which would not be optimal in terms of his existing cardiomyopathy and may explain at least partially why his ejection fraction dropped from October to 12/2020.       if his blood pressures are not in the goal range of 115 mmHg systolic. we will plan on increasing carvedilol and/or adding an afterload reduction, possibly with ACE inhibitors at that time.     #.  Hyperlipidemia.        His goal LDL is less than 70.  It most recently was 88 with mildly elevated triglycerides.  We will perform further adjustments.     # DM II - 7/23/21 A1C - 6.9.  Insulin dependant - PCP following       3.  Follow-up   Labs pending today    Lisinopril 10 mg- daily - new start    Carvedilol 12.5 mg BID- continue    RN to call in 2 weeks if patient tolerating meds well will increase the Carvedilol to 25 mg BID     Dr. Vogel - follow up in one month     [facility] in 2 months        Sumit ORTEGA, CNP  [facility]

## 2021-09-17 NOTE — NURSING NOTE
Chief Complaint   Patient presents with     Follow Up     Core Follow UP      Vitals were taken and medications were reconciled.     Ally CHRISTENSEN  8:02 AM

## 2021-09-17 NOTE — NURSING NOTE
Labs: Patient was given results of the laboratory testing obtained today. Patient was instructed to return for the next laboratory testing in 2 weeks . Patient demonstrated understanding of this information and agreed to call with further questions or concerns.   New Medication: Patient was educated regarding newly prescribed medication, including discussion of  the indication, administration, side effects, and when to report to MD or RN. Patient demonstrated understanding of this information and agreed to call with further questions or concerns.  Return Appointment: Patient given instructions regarding scheduling next clinic visit. Patient demonstrated understanding of this information and agreed to call with further questions or concerns.  Patient stated he understood all health information given and agreed to call with further questions or concerns.    Taylor Perez, RN

## 2021-09-17 NOTE — PATIENT INSTRUCTIONS
Take your medicines every day, as directed    Changes made today:  o Start taking lisinopril 10 mg once daily  o Please have labs drawn in 2 weeks  o RN will call you in 2 weeks to see how you are feeling and if we can increase carvedilol   Monitor Your Weight and Symptoms    Contact us if you:      Gain 2 pounds in one day or 5 pounds in one week    Feel more short of breath    Notice more leg swelling    Feel lightheadeded   Change your lifestyle    Limit Salt or Sodium:    2000 mg  Limit Fluids:    2000 mL or approximately 64 ounces  Eat a Heart Healthy Diet    Low in saturated fats  Stay Active:    Aim to move at least 150 minutes every  week         To Contact us    During Business Hours:  [phone], option # 1 (University)  Then option # 4 (medical questions)     After hours, weekends or holidays:   [phone], Option #4  Ask to speak to the On-Call Cardiologist. Inform them you are a CORE/heart failure patient at the [facility].     Use [employer] allows you to communicate directly with your heart team through secure messaging.    [employer] can be accessed any time on your phone, computer, or tablet.    If you need assistance, we'd be happy to help!         Keep your Heart Appointments:    Make appointment with Dr. Vogel in 1 month.    Follow up with [facility] in 2 months with labs prior.

## 2021-09-17 NOTE — PROGRESS NOTES
HPI: Murtaza is a 45 year old White male with a past medical history of :     1.  Type 1 diabetes since age 11.   2.  Hypertension.   3.  Hyperlipidemia, now status post kidney transplant 11/2008, failed.  Back on dialysis 02/2020.   4.  Status post pancreas transplant in 2009, failed 3 years later.  Currently on insulin.   5.  Depression.   6.  Tobacco abuse in the past.   7.  Knee tumor surgery.   8.  Gastroparesis.      His cardiac history is significant for a moderate ischemic cardiomyopathy.  He was asymptomatic, but found to have a positive stress test, which eventually led a 3-vessel coronary bypass surgery in 2015 (LIMA to LAD, vein graft to OM, vein graft to PDA).     Follow up [facility] appt-  Referred by Dr. Vogel    Recent hospitalization 9/1-9/3- nausea and vomiting with history of gastroparesis.     No SOB at rest unless he has extra fluid. Dialysis T,Th, Sat. No NUÑEZ even with walking longer distances. He has no swelling in the legs, ankles, feet. He can feel distention in the abdomen.  He says his dry weight after dialysis is 82 kg. Some days he needs an extra dialysis run due to weight gain.  His blood pressures typically run quite high, but he has not been keeping track of them at home. Last A1C 6.9 wants to get on the transplant list. He was stressed out today as he found out his insurance will not cover the [facility].     Denies  NUÑEZ, PND, orthopnea, edema, chest pain, palpitations, lightheadedness, dizziness, near syncopal/syncopal episodes. Murtaza has been following salt and fluid restrictions.      PAST MEDICAL HISTORY:  Past Medical History:   Diagnosis Date     CMV (cytomegalovirus infection) status negative 2011     Coronary artery disease, non-occlusive 2008    angio showed diffuse disease with no lesions     Diabetes mellitus, type 1     Diagnosed at age 11. Takes Insulin      Dialysis patient (H)     prior to kidney transplant     Dyslipidemia      Gastroesophageal reflux disease       History of blood transfusion      Hypertension      Immunosuppressed status (H)      Kidney transplant status, living related donor           Migraines      Mycotic aneurysm of kidney transplant (H) 2008     Noncompliance with treatment     no labs for one year     Pancreas replaced by transplant (H) 2009    rejection treated with thymo     Pancreatic disease     pancreas transplant     Tobacco abuse ongoing       FAMILY HISTORY:  Family History   Problem Relation Age of Onset     Unknown/Adopted Father      Heart Disease Maternal Grandfather 62     Melanoma No family hx of      Skin Cancer No family hx of        SOCIAL HISTORY:  Social History     Tobacco Use     Smoking status: Former Smoker     Packs/day: 1.00     Years: 20.00     Pack years: 20.00     Types: Vaping Device, Cigarettes     Quit date: 2016     Years since quittin.7     Smokeless tobacco: Never Used     Tobacco comment: E- Cig use since    Substance Use Topics     Alcohol use: Not Currently     Alcohol/week: 0.0 standard drinks     Drug use: No           CURRENT MEDICATIONS:  Current Outpatient Medications   Medication Sig Dispense Refill     acetone, Urine, test STRP 1 strip by In Vitro route daily (Patient not taking: Reported on 2021) 50 each 3     aspirin (ASA) 81 MG EC tablet Take 1 tablet (81 mg) by mouth daily       atorvastatin (LIPITOR) 20 MG tablet Take 2 tablets (40 mg) by mouth daily 180 tablet 3     azithromycin (ZITHROMAX) 500 MG tablet Take 1 tablet (500 mg) by mouth daily for 14 days 14 tablet 0     blood glucose (NO BRAND SPECIFIED) test strip Use to test blood sugar 5 times daily or as directed. 200 strip 2     calcium acetate (PHOSLO) 667 MG CAPS capsule Take 667 mg by mouth 3 times daily (with meals)        calcium carbonate (TUMS) 500 MG chewable tablet Take 1 chew tab by mouth 2 times daily       carvedilol (COREG) 12.5 MG tablet Take 1 tablet (12.5 mg) by mouth 2 times daily (with meals) 60 tablet  1     Continuous Blood Gluc Sensor (DEXCOM G6 SENSOR) MISC Change every 10 days. 3 each 11     Continuous Blood Gluc Transmit (DEXCOM G6 TRANSMITTER) MISC Change every 3 months. 1 each 3     insulin aspart (NOVOLOG FLEXPEN) 100 UNIT/ML pen Sliding scale = 1 unit for every 20 over blood glucose of 140, average 45 units/day 45 mL 0     Insulin Disposable Pump (OMNIPOD DASH 5 PACK PODS) MISC 1 each every 48 hours (Patient not taking: Reported on 7/23/2021) 5 each 11     insulin glargine (LANTUS SOLOSTAR) 100 UNIT/ML pen Inject 24 Units Subcutaneous At Bedtime (Patient taking differently: Inject 22 Units Subcutaneous At Bedtime ) 15 mL 11     insulin pen needle (BD ARPITA U/F) 32G X 4 MM miscellaneous Use 4 daily or as directed. 120 each 0     metoclopramide (REGLAN) 5 MG tablet Take 1 tablet (5 mg) by mouth 4 times daily (before meals and nightly) (Patient taking differently: Take 10 mg by mouth 4 times daily (before meals and nightly) ) 120 tablet 11     naproxen sodium 220 MG capsule Take 220 mg by mouth as needed        omeprazole (PRILOSEC) 40 MG DR capsule Take 2 capsules (80 mg) by mouth every evening 180 capsule 11     ondansetron (ZOFRAN-ODT) 8 MG ODT tab DISSOLVE 1 TABLET ON TOP OF TONGUE EVERY 8 HOURS FOR 2 DAYS AS NEEDED FOR NAUSEA AND VOMITING       polyethylene glycol (MIRALAX) 17 GM/Dose powder Take 17 g by mouth daily as needed for constipation 850 g 0     prochlorperazine (COMPAZINE) 10 MG tablet Take 10 mg by mouth       senna-docusate (SENOKOT-S/PERICOLACE) 8.6-50 MG tablet Take 2 tablets by mouth 2 times daily for 21 days 84 tablet 0       ROS:  Review Of Systems  Skin: negative  Eyes: negative  Ears/Nose/Throat: negative  Respiratory: No shortness of breath, dyspnea on exertion, cough, or hemoptysis  Cardiovascular: negative  Gastrointestinal: negative  Genitourinary: negative  Musculoskeletal: negative  Neurologic: negative  Psychiatric: negative  Hematologic/Lymphatic/Immunologic:  negative  Endocrine: negative      EXAM:  There were no vitals taken for this visit.  Home weight:  General: alert, articulate, and in no acute distress.  HEENT: normocephalic, atraumatic, anicteric sclera, EOMI, mucosa moist, no cyanosis.   Neck: neck supple.  No adenopathy, masses, or carotid bruits.  JVP elevated 11-12  Heart: regular rhythm, normal S1/S2, no murmur, gallop, rub.  Precordium quiet with normal PMI.     Lungs: clear, no rales, ronchi, or wheezing.  No accessory muscle use, respirations unlabored.   Abdomen: soft, non-tender, bowel sounds present, no hepatomegaly  Extremities: no pitting edema.   No cyanosis.     Neurological: alert and oriented x 3.  normal speech and affect, no gross motor deficits  Skin:  No ecchymoses, rashes, or clubbing.    Labs:  CBC RESULTS:  Lab Results   Component Value Date    WBC 4.9 09/03/2021    WBC 6.7 03/03/2021    RBC 3.42 (L) 09/03/2021    RBC 3.47 (L) 03/03/2021    HGB 11.0 (L) 09/03/2021    HGB 11.1 (L) 03/03/2021    HCT 32.1 (L) 09/03/2021    HCT 34.3 (L) 03/03/2021    MCV 94 09/03/2021    MCV 99 03/03/2021    MCH 32.2 09/03/2021    MCH 32.0 03/03/2021    MCHC 34.3 09/03/2021    MCHC 32.4 03/03/2021    RDW 14.5 09/03/2021    RDW 15.1 (H) 03/03/2021     09/03/2021     03/03/2021       CMP RESULTS:  Lab Results   Component Value Date     (L) 09/03/2021     (L) 03/19/2021    POTASSIUM 3.6 09/03/2021    POTASSIUM 4.1 03/19/2021    CHLORIDE 95 09/03/2021    CHLORIDE 90 (L) 03/19/2021    CO2 26 09/03/2021    CO2 32 03/19/2021    ANIONGAP 9 09/03/2021    ANIONGAP 8 03/19/2021     (H) 09/03/2021     (H) 09/03/2021     (H) 03/19/2021    BUN 19 09/03/2021    BUN 38 (H) 03/19/2021    CR 6.57 (H) 09/03/2021    CR 6.59 (H) 03/19/2021    GFRESTIMATED 9 (L) 09/03/2021    GFRESTIMATED 9 (L) 03/19/2021    GFRESTBLACK 11 (L) 03/19/2021    CHARLY 9.6 09/03/2021    CHARLY 9.0 03/19/2021    BILITOTAL 0.9 09/01/2021    BILITOTAL 1.4 (H)  03/01/2021    ALBUMIN 3.8 09/01/2021    ALBUMIN 3.4 03/01/2021    ALKPHOS 152 (H) 09/01/2021    ALKPHOS 199 (H) 03/01/2021    ALT 18 09/01/2021    ALT 19 03/01/2021    AST 8 09/01/2021    AST 15 03/01/2021        INR RESULTS:  Lab Results   Component Value Date    INR 1.18 (H) 08/25/2021    INR 1.24 (H) 03/01/2021       No components found for: CK  Lab Results   Component Value Date    MAG 2.2 09/01/2021    MAG 2.3 12/14/2020     Lab Results   Component Value Date    NTBNP 62,990 (H) 07/23/2021     @BRIEFLABR (dig)@    Most recent echocardiogram:  No results found for this or any previous visit (from the past 8760 hour(s)).      Assessment and Plan:    In summary,Murtaza  is a  46 year old male who is here for a follow up  visit to [facility]. He appears hypervolemic today and will have his dialysis run tomorrow as planned.  He is motivated to increase his EF to be eligible for transplant. Patient increased carvedilol to 12.5 mg BID- a week ago . [facility]      1.  Chronic sytolic heart failure secondary to ICM .  Stage C  NYHA Class III  ACEi/ARB: - Lisinopril start 10 mg   BB: yes- increased to 12.5 mg BID   Aldosterone antagonist: contraindicated due to renal dysfunction  SCD prophylaxis: decision deferred during medication uptitration  Fluid status: hypervolemic- dialysis patient   Anticoagulation:   Antiplatelet:  ASA dose   Sleep apnea:  NSAID use:  Contraindicated.  Avoid use.  Remote Monitoring:  SGLT-2- not candidate DM Type I.     2.  Other comordbid conditions:      #.  Hypertension.        He says his blood pressures tend to run quite high, which would not be optimal in terms of his existing cardiomyopathy and may explain at least partially why his ejection fraction dropped from October to 12/2020.       if his blood pressures are not in the goal range of 115 mmHg systolic. we will plan on increasing carvedilol and/or adding an afterload reduction, possibly with ACE  inhibitors at that time.     #.  Hyperlipidemia.        His goal LDL is less than 70.  It most recently was 88 with mildly elevated triglycerides.  We will perform further adjustments.     # DM II - 7/23/21 A1C - 6.9.  Insulin dependant - PCP following       3.  Follow-up   Labs pending today    Lisinopril 10 mg- daily - new start    Carvedilol 12.5 mg BID- continue    RN to call in 2 weeks if patient tolerating meds well will increase the Carvedilol to 25 mg BID     Dr. Vogel - follow up in one month     [facility] in 2 months        Sumit ORTEGA, CNP  [facility]

## 2021-09-25 ENCOUNTER — HEALTH MAINTENANCE LETTER (OUTPATIENT)
Age: 46
End: 2021-09-25

## 2021-10-04 ENCOUNTER — TELEPHONE (OUTPATIENT)
Dept: CARDIOLOGY | Facility: CLINIC | Age: 46
End: 2021-10-04

## 2021-10-04 NOTE — TELEPHONE ENCOUNTER
Called Tyrell to see how he is feeling after starting lisinopril. He is taking 10 mg daily, starting 9/17. Left voicemail for call back.    Taylor Perez RN

## 2021-10-20 ENCOUNTER — VIRTUAL VISIT (OUTPATIENT)
Dept: CARDIOLOGY | Facility: CLINIC | Age: 46
End: 2021-10-20
Attending: INTERNAL MEDICINE
Payer: COMMERCIAL

## 2021-10-20 DIAGNOSIS — I10 HYPERTENSION, UNSPECIFIED TYPE: ICD-10-CM

## 2021-10-20 DIAGNOSIS — I50.22 CHRONIC SYSTOLIC HEART FAILURE (H): ICD-10-CM

## 2021-10-20 DIAGNOSIS — E78.5 DYSLIPIDEMIA: Primary | ICD-10-CM

## 2021-10-20 PROCEDURE — 99214 OFFICE O/P EST MOD 30 MIN: CPT | Mod: 95 | Performed by: INTERNAL MEDICINE

## 2021-10-20 RX ORDER — LISINOPRIL 20 MG/1
TABLET ORAL
Qty: 180 TABLET | Refills: 3 | Status: SHIPPED | OUTPATIENT
Start: 2021-10-20

## 2021-10-20 NOTE — PROGRESS NOTES
"Murtaza Fitzgerald is a 46 year old who is being evaluated via a billable telephone visit.      What phone number would you like to be contacted at? [phone]  How would you like to obtain your AVS? Tavareshart    Vitals - Patient Reported  Pain Score: No Pain (0) (No SOB)    Vitals were taken and medications reconciled.    Cosmo Andujar, EMT  7:35 AM      The patient has been notified of following:     \"This phone visit will be conducted via a call between you and your physician/provider. We have found that certain health care needs can be provided without the need for an in-person physical exam.  This service lets us provide the care you need with a phone conversation.  If a prescription is necessary we can send it directly to your pharmacy.  If lab work is needed we can place an order for that and you can then stop by our lab to have the test done at a later time.    Phone visits are billed at different rates depending on your insurance coverage.  Please reach out to your insurance provider with any questions.    If during the course of the call the physician/provider feels a phone visit is not appropriate, you will not be charged for this service.\"    Patient has given verbal consent for phone visit? Yes    How would you like to obtain your AVS? Josefinat    Duration of call:16min    Total visit time (including visit time, charting, chart review, coordination of care, etc.=38min    See dictation #[id number]    CSN#:[id number]        "

## 2021-10-20 NOTE — PATIENT INSTRUCTIONS
1.  Increase Lisinopril to 20 mg on dialysis days, and up to 40 mg on non-dialysis days    2.  Keep appt with Cardiology Nurse Practitioner, Sumit Griffin in [facility] as scheduled, lab appointment prior, please fast for 12 hours prior to lab appointment

## 2021-10-20 NOTE — LETTER
10/20/2021      RE: Murtaza Fitzgerald  [address]  [place] [address]       Dear Colleague,    Thank you for the opportunity to participate in the care of your patient, Murtaza Fitzgerald, at the [facility] [place] [facility]. Please see a copy of my visit note below.    Service Date: 10/20/2021    Kt Ríos MD  [facility]  [address] [place]  [address]    RE:  Murtaza Fitzgerald  MRN:  [id number]    1975    Dear Dr. Ríos:    It was a pleasure participating in the care of your patient, . Murtaza Fitzgerald.  As you know, he is a 46-year-old gentleman who I spoke to over the phone today regarding coronary artery disease, hypertension, cardiomyopathy, hyperlipidemia and preoperative evaluation prior to a second kidney and pancreas transplant.    PAST MEDICAL HISTORY:      1.  Type 1 diabetes since age 11.  2.  Hypertension.  3.  Hyperlipidemia.  4.  Status post kidney transplant 2008, failed.  Back on dialysis since 2020.  5.  Status post pancreas transplant , failed 3 years later.  Currently on insulin.  6.  Depression.  7.  Tobacco abuse in the past.  8.  Knee tumor surgery.  9.  Gastroparesis.  10.  Neuropathy.    The patient's cardiac history is significant for known coronary disease and decreased LV systolic function.  He had a positive stress test that led to 3-vessel coronary bypass surgery in  (LIMA to LAD, vein graft to OM, vein graft to PDA).     He had another positive stress test and eventually had his last coronary angiogram 2021 that revealed the following:      Left main 45% lesion.  LAD chronically occluded proximally.  Circumflex 80% proximal lesion.    RCA .  LIMA to LAD patent.  SVG to OM3 patent.  SVG to right PDA chronically occluded.    Medical therapy was recommended.    Echocardiogram 10/2020 revealed an ejection  fraction in the 50%-55% range.  However, repeat echo 12/06/2020 revealed a decrease in LV systolic function to 37% range.    He has been seeing Sumit in [facility] to optimize guideline-directed medical therapy due to his drop in LV systolic function, presumably secondary to hypertension.  She started lisinopril on 09/17/2021.  He presents today for continuing care.    Since his last visit to the [facility], he says his blood pressures may have dropped slightly but not much.  He says that his blood pressures after dialysis run in the 140s, and typically on nondialysis days, they run in the 160s.    From a functional standpoint, he does not exercise and is unable to walk for very far due to neuropathic pain and is limited to about 2 blocks of walking.  He denies new chest pain, exertional dyspnea, PND, orthopnea, edema, palpitations, syncope or near syncope.    He says his dry weight runs in the 82 kg range and goes up to about 85 on nondialysis days.  He says that if his weight goes up to 88-89 kg that is when he will get short of breath.    CURRENT MEDICATIONS:  He is taking aspirin 81 mg a day, Lipitor 40 mg a day, carvedilol 12.5 twice daily, insulin, lisinopril 10 mg a day.    PHYSICAL EXAMINATION:      VITAL SIGNS:  Blood pressures are running anywhere from 140s to 160s depending on dialysis or not.  PSYCHIATRIC:  He is alert and oriented x3.    GENERAL:  His decision-making capabilities are intact.  RESPIRATORY:  He is breathing comfortably without gross cough.    The remainder of the comprehensive physical exam was deferred secondary to the COVID-19 pandemic and secondary to telephone visit restrictions.    LABORATORY:  Labs 02/11/2020, LDL was 88 and 09/17/2021, potassium 5.4, GFR 9, hemoglobin 11.    Echocardiogram 12/06/2020 reveals an ejection fraction of 35%-40%.  No significant valvular pathology; however, EF decreased since 10/02/2020.      IMPRESSION:      Tyrell is a 46-year-old  gentleman whose past medical history is significant for prior kidney transplant in 2008 but subsequently failed, currently on dialysis, who also had a pancreas transplant in 2009 that subsequently failed, currently on insulin.  He has several active cardiac issues:    1.  Moderate cardiomyopathy, likely hypertensive related.    His ejection fraction dropped from the 50%-55% range to 35%-40% range from 10/2020 through 12/2020.  This is likely related to his uncontrolled hypertension and we are attempting to optimize blood pressure control.    2.  Coronary artery disease.    The patient had 3-vessel coronary artery bypass surgery performed 2015 (LIMA to LAD, vein graft to OM, vein graft to PDA).  Coronary angiogram 03/03/2021 revealed that his LIMA to LAD is patent, SVG to OM3 is patent; however SVG to right PDA is chronically occluded and his native right coronary artery is also chronically occluded.  Medical therapy was recommended.    We also suspect that his chronically occluded right coronary territory along with uncontrolled hypertension may be contributing to his decreased ejection fraction and optimizing medical therapy for this would certainly be helpful.    3.  Hypertension.    Blood pressures are running high on dialysis days.  Post dialysis it is running in the 140s.  Nondialysis days it is running in the 160s-170s. Clearly further improved control would be warranted.     4.  Hyperlipidemia.  Goal LDL less than 70.  Checking a fasting lipid profile would certainly be warranted.      PLAN:      1.  Increase lisinopril from 10 mg a day to 20 mg on dialysis days and to 40 mg on nondialysis days.  Goal systolic blood pressures in the 115 mmHg range without symptoms.    Our long-term plan would be to optimize guideline-directed medical therapy for his heart failure regimen and blood pressure control and repeat an echo 3 months after optimizing his medical regimen in order to check for improvement in overall LV  systolic function to hopefully reinstate him as a potential transplant candidate.    He already has a followup with Sumit in the [facility] in 2-3 weeks and he will continue to pursue this rapid up titration with her until he can have his overall ejection fraction recheck by echo after optimization.    2.  He should also have a fasting lipid profile checked and his Lipitor up titrated if needed as well.    Once again, it was a pleasure participating in the care of your patient, . Murtaza Fitzgerald.  Please feel free to contact me at any time if you have any questions regarding his care in the future.      Sincerely,            Yovany Vogel MD        D: 10/20/2021   T: 10/20/2021   MT: maico    Name:     MURTAZA PAIZ  MRN:      [id number]        Account:      [id number]   :      1975           Service Date: 10/20/2021       Document: [id number]

## 2021-10-20 NOTE — PROGRESS NOTES
Service Date: 10/20/2021    Kt Ríos MD  [facility]  [address] [place]  [address]    RE:  Murtaza Fitzgerald  MRN:  [id number]    1975    Dear Dr. Ríos:    It was a pleasure participating in the care of your patient, . Murtaza Fitzgerald.  As you know, he is a 46-year-old gentleman who I spoke to over the phone today regarding coronary artery disease, hypertension, cardiomyopathy, hyperlipidemia and preoperative evaluation prior to a second kidney and pancreas transplant.    PAST MEDICAL HISTORY:      1.  Type 1 diabetes since age 11.  2.  Hypertension.  3.  Hyperlipidemia.  4.  Status post kidney transplant 2008, failed.  Back on dialysis since 2020.  5.  Status post pancreas transplant , failed 3 years later.  Currently on insulin.  6.  Depression.  7.  Tobacco abuse in the past.  8.  Knee tumor surgery.  9.  Gastroparesis.  10.  Neuropathy.    The patient's cardiac history is significant for known coronary disease and decreased LV systolic function.  He had a positive stress test that led to 3-vessel coronary bypass surgery in  (LIMA to LAD, vein graft to OM, vein graft to PDA).     He had another positive stress test and eventually had his last coronary angiogram 2021 that revealed the following:      Left main 45% lesion.  LAD chronically occluded proximally.  Circumflex 80% proximal lesion.    RCA .  LIMA to LAD patent.  SVG to OM3 patent.  SVG to right PDA chronically occluded.    Medical therapy was recommended.    Echocardiogram 10/2020 revealed an ejection fraction in the 50%-55% range.  However, repeat echo 2020 revealed a decrease in LV systolic function to 37% range.    He has been seeing [facility] to optimize guideline-directed medical therapy due to his drop in LV systolic function, presumably secondary to hypertension.  She started lisinopril on 2021.  He presents today for continuing  care.    Since his last visit to [facility], he says his blood pressures may have dropped slightly but not much.  He says that his blood pressures after dialysis run in the 140s, and typically on nondialysis days, they run in the 160s.    From a functional standpoint, he does not exercise and is unable to walk for very far due to neuropathic pain and is limited to about 2 blocks of walking.  He denies new chest pain, exertional dyspnea, PND, orthopnea, edema, palpitations, syncope or near syncope.    He says his dry weight runs in the 82 kg range and goes up to about 85 on nondialysis days.  He says that if his weight goes up to 88-89 kg that is when he will get short of breath.    CURRENT MEDICATIONS:  He is taking aspirin 81 mg a day, Lipitor 40 mg a day, carvedilol 12.5 twice daily, insulin, lisinopril 10 mg a day.    PHYSICAL EXAMINATION:      VITAL SIGNS:  Blood pressures are running anywhere from 140s to 160s depending on dialysis or not.  PSYCHIATRIC:  He is alert and oriented x3.    GENERAL:  His decision-making capabilities are intact.  RESPIRATORY:  He is breathing comfortably without gross cough.    The remainder of the comprehensive physical exam was deferred secondary to the COVID-19 pandemic and secondary to telephone visit restrictions.    LABORATORY:  Labs 02/11/2020, LDL was 88 and 09/17/2021, potassium 5.4, GFR 9, hemoglobin 11.    Echocardiogram 12/06/2020 reveals an ejection fraction of 35%-40%.  No significant valvular pathology; however, EF decreased since 10/02/2020.      IMPRESSION:      Tyrell is a 46-year-old gentleman whose past medical history is significant for prior kidney transplant in 2008 but subsequently failed, currently on dialysis, who also had a pancreas transplant in 2009 that subsequently failed, currently on insulin.  He has several active cardiac issues:    1.  Moderate cardiomyopathy, likely hypertensive related.    His ejection fraction dropped from the 50%-55%  range to 35%-40% range from 10/2020 through 12/2020.  This is likely related to his uncontrolled hypertension and we are attempting to optimize blood pressure control.    2.  Coronary artery disease.    The patient had 3-vessel coronary artery bypass surgery performed 2015 (LIMA to LAD, vein graft to OM, vein graft to PDA).  Coronary angiogram 03/03/2021 revealed that his LIMA to LAD is patent, SVG to OM3 is patent; however SVG to right PDA is chronically occluded and his native right coronary artery is also chronically occluded.  Medical therapy was recommended.    We also suspect that his chronically occluded right coronary territory along with uncontrolled hypertension may be contributing to his decreased ejection fraction and optimizing medical therapy for this would certainly be helpful.    3.  Hypertension.    Blood pressures are running high on dialysis days.  Post dialysis it is running in the 140s.  Nondialysis days it is running in the 160s-170s. Clearly further improved control would be warranted.     4.  Hyperlipidemia.  Goal LDL less than 70.  Checking a fasting lipid profile would certainly be warranted.      PLAN:      1.  Increase lisinopril from 10 mg a day to 20 mg on dialysis days and to 40 mg on nondialysis days.  Goal systolic blood pressures in the 115 mmHg range without symptoms.    Our long-term plan would be to optimize guideline-directed medical therapy for his heart failure regimen and blood pressure control and repeat an echo 3 months after optimizing his medical regimen in order to check for improvement in overall LV systolic function to hopefully reinstate him as a potential transplant candidate.    He already has a followup with [facility] in the [facility] in 2-3 weeks and he will continue to pursue this rapid up titration with her until he can have his overall ejection fraction recheck by echo after optimization.    2.  He should also have a fasting lipid profile checked and his  Lipitor up titrated if needed as well.    Once again, it was a pleasure participating in the care of your patient, Mr. Doug Fitzgerald.  Please feel free to contact me at any time if you have any questions regarding his care in the future.      Sincerely,            Yovany Vogel MD        D: 10/20/2021   T: 10/20/2021   MT: maico    Name:     DOUG PAIZ  MRN:      [id number]        Account:      [id number]   :      1975           Service Date: 10/20/2021       Document: [id number]

## 2021-10-21 ENCOUNTER — DOCUMENTATION ONLY (OUTPATIENT)
Dept: TRANSPLANT | Facility: CLINIC | Age: 46
End: 2021-10-21

## 2021-10-21 DIAGNOSIS — Z76.82 AWAITING ORGAN TRANSPLANT: Primary | ICD-10-CM

## 2021-10-28 DIAGNOSIS — I10 BENIGN ESSENTIAL HYPERTENSION: ICD-10-CM

## 2021-10-28 RX ORDER — CARVEDILOL 12.5 MG/1
12.5 TABLET ORAL 2 TIMES DAILY WITH MEALS
Qty: 60 TABLET | Refills: 0 | Status: SHIPPED | OUTPATIENT
Start: 2021-10-28 | End: 2021-11-12

## 2021-10-28 NOTE — TELEPHONE ENCOUNTER
carvedilol (COREG) 12.5 MG tablet  Last Written Prescription Date:  8/13/2021  Last Fill Quantity: 60,   # refills: 1  Last Office Visit :  10/20/2021  Future Office visit:   11/12/2021  30 days sent to pharm to cover Pt until office visit in November.       Henrietta Chance RN  Central Triage Red Flags/Med Refills

## 2021-11-02 ENCOUNTER — TRANSFERRED RECORDS (OUTPATIENT)
Dept: HEALTH INFORMATION MANAGEMENT | Facility: CLINIC | Age: 46
End: 2021-11-02
Payer: COMMERCIAL

## 2021-11-02 DIAGNOSIS — I50.22 CHRONIC SYSTOLIC HEART FAILURE (H): Primary | ICD-10-CM

## 2021-11-02 LAB
ALT SERPL-CCNC: 11 LU/L (ref 0–44)
AST SERPL-CCNC: 13 LU/L (ref 0–40)

## 2021-11-12 ENCOUNTER — LAB (OUTPATIENT)
Dept: LAB | Facility: CLINIC | Age: 46
End: 2021-11-12
Attending: NURSE PRACTITIONER
Payer: COMMERCIAL

## 2021-11-12 ENCOUNTER — OFFICE VISIT (OUTPATIENT)
Dept: CARDIOLOGY | Facility: CLINIC | Age: 46
End: 2021-11-12
Attending: NURSE PRACTITIONER
Payer: COMMERCIAL

## 2021-11-12 VITALS
WEIGHT: 189.2 LBS | HEART RATE: 80 BPM | SYSTOLIC BLOOD PRESSURE: 165 MMHG | DIASTOLIC BLOOD PRESSURE: 85 MMHG | BODY MASS INDEX: 28.67 KG/M2 | HEIGHT: 68 IN | OXYGEN SATURATION: 98 %

## 2021-11-12 DIAGNOSIS — I43 TYPE 1 DIABETES MELLITUS WITH DIABETIC CARDIOMYOPATHY (H): ICD-10-CM

## 2021-11-12 DIAGNOSIS — K31.84 GASTROPARESIS: ICD-10-CM

## 2021-11-12 DIAGNOSIS — I10 BENIGN ESSENTIAL HYPERTENSION: ICD-10-CM

## 2021-11-12 DIAGNOSIS — I25.5 ISCHEMIC CARDIOMYOPATHY: ICD-10-CM

## 2021-11-12 DIAGNOSIS — I50.22 CHRONIC SYSTOLIC HEART FAILURE (H): ICD-10-CM

## 2021-11-12 DIAGNOSIS — N18.6 ESRD (END STAGE RENAL DISEASE) (H): ICD-10-CM

## 2021-11-12 DIAGNOSIS — E10.59 TYPE 1 DIABETES MELLITUS WITH DIABETIC CARDIOMYOPATHY (H): ICD-10-CM

## 2021-11-12 DIAGNOSIS — E78.5 DYSLIPIDEMIA: ICD-10-CM

## 2021-11-12 DIAGNOSIS — I50.22 CHRONIC SYSTOLIC HEART FAILURE (H): Primary | ICD-10-CM

## 2021-11-12 DIAGNOSIS — Z76.82 AWAITING ORGAN TRANSPLANT: ICD-10-CM

## 2021-11-12 LAB
ANION GAP SERPL CALCULATED.3IONS-SCNC: 1 MMOL/L (ref 3–14)
BUN SERPL-MCNC: 33 MG/DL (ref 7–30)
CALCIUM SERPL-MCNC: 9.9 MG/DL (ref 8.5–10.1)
CHLORIDE BLD-SCNC: 94 MMOL/L (ref 94–109)
CHOLEST SERPL-MCNC: 126 MG/DL
CO2 SERPL-SCNC: 34 MMOL/L (ref 20–32)
CREAT SERPL-MCNC: 6.35 MG/DL (ref 0.66–1.25)
FASTING STATUS PATIENT QL REPORTED: YES
GFR SERPL CREATININE-BSD FRML MDRD: 10 ML/MIN/1.73M2
GLUCOSE BLD-MCNC: 301 MG/DL (ref 70–99)
HDLC SERPL-MCNC: 63 MG/DL
LDLC SERPL CALC-MCNC: 53 MG/DL
NONHDLC SERPL-MCNC: 63 MG/DL
POTASSIUM BLD-SCNC: 5.5 MMOL/L (ref 3.4–5.3)
SODIUM SERPL-SCNC: 129 MMOL/L (ref 133–144)
TRIGL SERPL-MCNC: 52 MG/DL

## 2021-11-12 PROCEDURE — 86832 HLA CLASS I HIGH DEFIN QUAL: CPT | Performed by: SURGERY

## 2021-11-12 PROCEDURE — 99214 OFFICE O/P EST MOD 30 MIN: CPT | Performed by: NURSE PRACTITIONER

## 2021-11-12 PROCEDURE — 86833 HLA CLASS II HIGH DEFIN QUAL: CPT | Performed by: SURGERY

## 2021-11-12 PROCEDURE — 80061 LIPID PANEL: CPT | Performed by: PATHOLOGY

## 2021-11-12 PROCEDURE — 80048 BASIC METABOLIC PNL TOTAL CA: CPT | Performed by: PATHOLOGY

## 2021-11-12 PROCEDURE — 36415 COLL VENOUS BLD VENIPUNCTURE: CPT | Performed by: PATHOLOGY

## 2021-11-12 PROCEDURE — G0463 HOSPITAL OUTPT CLINIC VISIT: HCPCS

## 2021-11-12 RX ORDER — CARVEDILOL 12.5 MG/1
25 TABLET ORAL 2 TIMES DAILY WITH MEALS
Qty: 60 TABLET | Refills: 0 | Status: SHIPPED | OUTPATIENT
Start: 2021-11-12 | End: 2021-11-12

## 2021-11-12 RX ORDER — CARVEDILOL 25 MG/1
25 TABLET ORAL 2 TIMES DAILY WITH MEALS
Qty: 60 TABLET | Refills: 11 | Status: SHIPPED | OUTPATIENT
Start: 2021-11-12 | End: 2022-01-18

## 2021-11-12 ASSESSMENT — PAIN SCALES - GENERAL: PAINLEVEL: NO PAIN (0)

## 2021-11-12 ASSESSMENT — MIFFLIN-ST. JEOR: SCORE: 1722.71

## 2021-11-12 NOTE — PATIENT INSTRUCTIONS
Take your medicines every day, as directed    Changes made today:  o Increase coreg to 25 mg twice daily.       Monitor Your Weight and Symptoms    Contact us if you:      Gain 2 pounds in one day or 5 pounds in one week    Feel more short of breath    Notice more leg swelling    Feel lightheadeded   Change your lifestyle    Limit Salt or Sodium:    2000 mg  Limit Fluids:    2000 mL or approximately 64 ounces  Eat a Heart Healthy Diet    Low in saturated fats  Stay Active:    Aim to move at least 150 minutes every  week         To Contact us    During Business Hours:  [phone], option # 1 (University)  Then option # 4 (medical questions)     After hours, weekends or holidays:   [phone], Option #4  Ask to speak to the On-Call Cardiologist. Inform them you are a CORE/heart failure patient at the [facility].     Use [employer] allows you to communicate directly with your heart team through secure messaging.    [employer] can be accessed any time on your phone, computer, or tablet.    If you need assistance, we'd be happy to help!         Keep your Heart Appointments:    Follow up with [facility] in 2 months.

## 2021-11-12 NOTE — NURSING NOTE
Chief Complaint   Patient presents with     Follow Up     2 month follow up      Vitals were taken and medications were reconciled.   Moncho Adam, EMT  7:50 AM

## 2021-11-12 NOTE — NURSING NOTE
Return Appointment: Patient given instructions regarding scheduling next clinic visit. Patient demonstrated understanding of this information and agreed to call with further questions or concerns. Follow up with [facility] in 2 months.    Medication Change: Patient was educated regarding prescribed medication change, including discussion of the indication, administration, side effects, and when to report to MD or RN. Patient demonstrated understanding of this information and agreed to call with further questions or concerns. Increase carvedilol to 25 mg BID.    Patient stated he understood all health information given and agreed to call with further questions or concerns.    Taylor Perez, RN

## 2021-11-12 NOTE — LETTER
11/12/2021      RE: Murtaza Fitzgerald  [address]  [facility] [address]       Dear Colleague,    Thank you for the opportunity to participate in the care of your patient, Murtaza Fitzgerald, at the [facility] [place] [facility]. Please see a copy of my visit note below.      HPI: Murtaza is a 46 year old White male with a past medical history of :     1.  Type 1 diabetes since age 11.   2.  Hypertension.   3.  Hyperlipidemia, now status post kidney transplant 11/2008, failed.  Back on dialysis 02/2020.   4.  Status post pancreas transplant in 2009, failed 3 years later.  Currently on insulin.   5.  Depression.   6.  Tobacco abuse in the past.   7.  Knee tumor surgery.   8.  Gastroparesis.      His cardiac history is significant for a moderate ischemic cardiomyopathy.  He was asymptomatic, but found to have a positive stress test, which eventually led a 3-vessel coronary bypass surgery in 2015 (LIMA to LAD, vein graft to OM, vein graft to PDA).     Follow up CORE appt-  Referred by Dr. Vogel    Recent hospitalization 9/1-9/3- nausea and vomiting with history of gastroparesis.     No SOB at rest unless he has extra fluid. Dialysis T,Th, Sat. No NUÑEZ even with walking longer distances. He has no swelling in the legs, ankles, feet. He can feel distention in the abdomen. BP's have been 120's-160's systolic.  Standing he did note a 102/57 yesterday. They have ordered a pacemaker for his stomach which he will get at some point after the visit with the surgeon.  He says his dry weight after dialysis is 82 kg.  Last A1C 6.9 wants to get on the transplant list.     Denies  NUÑEZ, PND, orthopnea, edema, chest pain, palpitations, lightheadedness, dizziness, near syncopal/syncopal episodes. Murtaza has been following salt and fluid restrictions.      PAST MEDICAL HISTORY:  Past Medical History:   Diagnosis Date     CMV (cytomegalovirus  infection) status negative      Coronary artery disease, non-occlusive     angio showed diffuse disease with no lesions     Diabetes mellitus, type 1     Diagnosed at age 11. Takes Insulin      Dialysis patient (H)     prior to kidney transplant     Dyslipidemia      Gastroesophageal reflux disease      History of blood transfusion      Hypertension      Immunosuppressed status (H)      Kidney transplant status, living related donor           Migraines      Mycotic aneurysm of kidney transplant (H) 2008     Noncompliance with treatment     no labs for one year     Pancreas replaced by transplant (H) 2009    rejection treated with thymo     Pancreatic disease     pancreas transplant     Tobacco abuse ongoing       FAMILY HISTORY:  Family History   Problem Relation Age of Onset     Unknown/Adopted Father      Heart Disease Maternal Grandfather 62     Melanoma No family hx of      Skin Cancer No family hx of        SOCIAL HISTORY:  Social History     Tobacco Use     Smoking status: Former Smoker     Packs/day: 1.00     Years: 20.00     Pack years: 20.00     Types: Vaping Device, Cigarettes     Quit date: 2016     Years since quittin.8     Smokeless tobacco: Never Used     Tobacco comment: E- Cig use since    Substance Use Topics     Alcohol use: Not Currently     Alcohol/week: 0.0 standard drinks     Drug use: No           CURRENT MEDICATIONS:  Current Outpatient Medications   Medication Sig Dispense Refill     aspirin (ASA) 81 MG EC tablet Take 1 tablet (81 mg) by mouth daily       atorvastatin (LIPITOR) 20 MG tablet Take 2 tablets (40 mg) by mouth daily 180 tablet 3     blood glucose (NO BRAND SPECIFIED) test strip Use to test blood sugar 5 times daily or as directed. 200 strip 2     calcium carbonate (TUMS) 500 MG chewable tablet Take 1 chew tab by mouth 2 times daily       carvedilol (COREG) 12.5 MG tablet Take 1 tablet (12.5 mg) by mouth 2 times daily (with meals) 60 tablet 0      insulin aspart (NOVOLOG FLEXPEN) 100 UNIT/ML pen USE PER SLIDING SCALE 1 UNIT FOR EVERY 20 OVER BLOOD GLUCOSE ; AVERAGE 45 UNITS PER DAY. Needs appointment with Dr. Ríos before next refill. 45 mL 0     insulin pen needle (BD ARPITA U/F) 32G X 4 MM miscellaneous Use 4 daily or as directed. 120 each 0     lisinopril (ZESTRIL) 20 MG tablet Take one tablet (20mg) on dialysis days (Tues, Thursday and Saturday), and take two tablets (40mg) on non-dialysis days (Mondays, Wednesdays, Fridays and Sundays) 180 tablet 3     naproxen sodium 220 MG capsule Take 220 mg by mouth as needed        omeprazole (PRILOSEC) 40 MG DR capsule Take 2 capsules (80 mg) by mouth every evening 180 capsule 11     ondansetron (ZOFRAN-ODT) 8 MG ODT tab DISSOLVE 1 TABLET ON TOP OF TONGUE EVERY 8 HOURS FOR 2 DAYS AS NEEDED FOR NAUSEA AND VOMITING       polyethylene glycol (MIRALAX) 17 GM/Dose powder Take 17 g by mouth daily as needed for constipation 850 g 0     prochlorperazine (COMPAZINE) 10 MG tablet Take 10 mg by mouth       acetone, Urine, test STRP 1 strip by In Vitro route daily (Patient not taking: Reported on 7/23/2021) 50 each 3     calcium acetate (PHOSLO) 667 MG CAPS capsule Take 667 mg by mouth 3 times daily (with meals)  (Patient not taking: Reported on 9/17/2021)       Continuous Blood Gluc Sensor (DEXCOM G6 SENSOR) MISC Change every 10 days. (Patient not taking: Reported on 9/17/2021) 3 each 11     Continuous Blood Gluc Transmit (DEXCOM G6 TRANSMITTER) MISC Change every 3 months. (Patient not taking: Reported on 9/17/2021) 1 each 3     Insulin Disposable Pump (OMNIPOD DASH 5 PACK PODS) MISC 1 each every 48 hours (Patient not taking: Reported on 7/23/2021) 5 each 11     insulin glargine (LANTUS SOLOSTAR) 100 UNIT/ML pen Inject 24 Units Subcutaneous At Bedtime (Patient taking differently: Inject 22 Units Subcutaneous At Bedtime ) 15 mL 11     metoclopramide (REGLAN) 5 MG tablet Take 1 tablet (5 mg) by mouth 4 times daily (before  "meals and nightly) (Patient taking differently: Take 10 mg by mouth 4 times daily (before meals and nightly) ) 120 tablet 11       ROS:  Review Of Systems  Skin: negative  Eyes: negative  Ears/Nose/Throat: negative  Respiratory: No shortness of breath, dyspnea on exertion, cough, or hemoptysis  Cardiovascular: negative  Gastrointestinal: negative  Genitourinary: negative  Musculoskeletal: negative  Neurologic: negative  Psychiatric: negative  Hematologic/Lymphatic/Immunologic: negative  Endocrine: negative      EXAM:  BP (!) 165/85 (BP Location: Right arm, Patient Position: Chair, Cuff Size: Adult Regular)   Pulse 80   Ht 1.727 m (5' 8\")   Wt 85.8 kg (189 lb 3.2 oz)   SpO2 98%   BMI 28.77 kg/m    Home weight:  General: alert, articulate, and in no acute distress.  HEENT: normocephalic, atraumatic, anicteric sclera, EOMI, mucosa moist, no cyanosis.   Neck: neck supple.  No adenopathy, masses, or carotid bruits.  JVP elevated 11-12  Heart: regular rhythm, normal S1/S2, no murmur, gallop, rub.  Precordium quiet with normal PMI.     Lungs: clear, no rales, ronchi, or wheezing.  No accessory muscle use, respirations unlabored.   Abdomen: soft, non-tender, bowel sounds present, no hepatomegaly  Extremities: no pitting edema.   No cyanosis.     Neurological: alert and oriented x 3.  normal speech and affect, no gross motor deficits  Skin:  No ecchymoses, rashes, or clubbing.    Labs:  CBC RESULTS:  Lab Results   Component Value Date    WBC 4.9 09/03/2021    WBC 6.7 03/03/2021    RBC 3.42 (L) 09/03/2021    RBC 3.47 (L) 03/03/2021    HGB 11.0 (L) 09/03/2021    HGB 11.1 (L) 03/03/2021    HCT 32.1 (L) 09/03/2021    HCT 34.3 (L) 03/03/2021    MCV 94 09/03/2021    MCV 99 03/03/2021    MCH 32.2 09/03/2021    MCH 32.0 03/03/2021    MCHC 34.3 09/03/2021    MCHC 32.4 03/03/2021    RDW 14.5 09/03/2021    RDW 15.1 (H) 03/03/2021     09/03/2021     03/03/2021       CMP RESULTS:  Lab Results   Component Value Date    "  (L) 11/12/2021     (L) 03/19/2021    POTASSIUM 5.5 (H) 11/12/2021    POTASSIUM 4.1 03/19/2021    CHLORIDE 94 11/12/2021    CHLORIDE 90 (L) 03/19/2021    CO2 34 (H) 11/12/2021    CO2 32 03/19/2021    ANIONGAP 1 (L) 11/12/2021    ANIONGAP 8 03/19/2021     (H) 11/12/2021     (H) 03/19/2021    BUN 33 (H) 11/12/2021    BUN 38 (H) 03/19/2021    CR 6.35 (H) 11/12/2021    CR 6.59 (H) 03/19/2021    GFRESTIMATED 10 (L) 11/12/2021    GFRESTIMATED 9 (L) 03/19/2021    GFRESTBLACK 11 (L) 03/19/2021    CHARLY 9.9 11/12/2021    CHARLY 9.0 03/19/2021    BILITOTAL 0.9 09/01/2021    BILITOTAL 1.4 (H) 03/01/2021    ALBUMIN 3.8 09/01/2021    ALBUMIN 3.4 03/01/2021    ALKPHOS 152 (H) 09/01/2021    ALKPHOS 199 (H) 03/01/2021    ALT 18 09/01/2021    ALT 19 03/01/2021    AST 8 09/01/2021    AST 15 03/01/2021        INR RESULTS:  Lab Results   Component Value Date    INR 1.18 (H) 08/25/2021    INR 1.24 (H) 03/01/2021       No components found for: CK  Lab Results   Component Value Date    MAG 2.2 09/01/2021    MAG 2.3 12/14/2020     Lab Results   Component Value Date    NTBNP 62,990 (H) 07/23/2021     @BRIEFLABR (dig)@    Most recent echocardiogram:  No results found for this or any previous visit (from the past 8760 hour(s)).      Assessment and Plan:    In summary,Murtaza  is a  46 year old male who is here for a follow up  visit to [facility] clinic. He appears hypervolemic today and will have his dialysis run tomorrow as planned.  He is motivated to increase his EF to be eligible for transplant. Patient will increase carvedilol to 25 mg BID. [facility]      1.  Chronic sytolic heart failure secondary to ICM .  Stage C  NYHA Class III  ACEi/ARB: - Lisinopril 20 mg on Dialysis days and 40 mg on non-dialysis days  BB: yes- increased to 25 mg BID   Aldosterone antagonist: contraindicated due to renal dysfunction  SCD prophylaxis: decision deferred during medication  uptitration  Fluid status: hypervolemic- dialysis patient   Anticoagulation:   Antiplatelet:  ASA dose   Sleep apnea:  NSAID use:  Contraindicated.  Avoid use.  Remote Monitoring:  SGLT-2- not candidate DM Type I.     2.  Other comordbid conditions:      #.  Hypertension.        He says his blood pressures tend to run quite high, which would not be optimal in terms of his existing cardiomyopathy and may explain at least partially why his ejection fraction dropped from October to 12/2020.       if his blood pressures are not in the goal range of 115 mmHg systolic. We will plan on increasing carvedilol today.  He is at max dose Lisinopril on non-dialysis days and tolerating 20 mg on dialysis days. He did have a low BP standing yesterday and was asymptomatic.     #.  Hyperlipidemia.        His goal LDL is less than 70.  It most recently was 88 with mildly elevated triglycerides.  We will perform further adjustments.     # DM II - 7/23/21 A1C - 6.9.  Insulin dependant - PCP following       3.  Follow-up   Increase the Carvedilol to 25 mg BID  [facility] in one month   Dr. Vogel in January           Sumit ORTEGA, CNP  [facility]

## 2021-11-12 NOTE — PROGRESS NOTES
HPI: Murtaza is a 46 year old White male with a past medical history of :     1.  Type 1 diabetes since age 11.   2.  Hypertension.   3.  Hyperlipidemia, now status post kidney transplant 11/2008, failed.  Back on dialysis 02/2020.   4.  Status post pancreas transplant in 2009, failed 3 years later.  Currently on insulin.   5.  Depression.   6.  Tobacco abuse in the past.   7.  Knee tumor surgery.   8.  Gastroparesis.      His cardiac history is significant for a moderate ischemic cardiomyopathy.  He was asymptomatic, but found to have a positive stress test, which eventually led a 3-vessel coronary bypass surgery in 2015 (LIMA to LAD, vein graft to OM, vein graft to PDA).     Follow up CORE appt-  Referred by Dr. Vogel    Recent hospitalization 9/1-9/3- nausea and vomiting with history of gastroparesis.     No SOB at rest unless he has extra fluid. Dialysis T,Th, Sat. No NUÑEZ even with walking longer distances. He has no swelling in the legs, ankles, feet. He can feel distention in the abdomen. BP's have been 120's-160's systolic.  Standing he did note a 102/57 yesterday. They have ordered a pacemaker for his stomach which he will get at some point after the visit with the surgeon.  He says his dry weight after dialysis is 82 kg.  Last A1C 6.9 wants to get on the transplant list.     Denies  NUÑEZ, PND, orthopnea, edema, chest pain, palpitations, lightheadedness, dizziness, near syncopal/syncopal episodes. Murtaza has been following salt and fluid restrictions.      PAST MEDICAL HISTORY:  Past Medical History:   Diagnosis Date     CMV (cytomegalovirus infection) status negative 2011     Coronary artery disease, non-occlusive 2008    angio showed diffuse disease with no lesions     Diabetes mellitus, type 1     Diagnosed at age 11. Takes Insulin      Dialysis patient (H)     prior to kidney transplant     Dyslipidemia      Gastroesophageal reflux disease      History of blood transfusion      Hypertension       Immunosuppressed status (H)      Kidney transplant status, living related donor           Migraines      Mycotic aneurysm of kidney transplant (H) 2008     Noncompliance with treatment     no labs for one year     Pancreas replaced by transplant (H) 2009    rejection treated with thymo     Pancreatic disease     pancreas transplant     Tobacco abuse ongoing       FAMILY HISTORY:  Family History   Problem Relation Age of Onset     Unknown/Adopted Father      Heart Disease Maternal Grandfather 62     Melanoma No family hx of      Skin Cancer No family hx of        SOCIAL HISTORY:  Social History     Tobacco Use     Smoking status: Former Smoker     Packs/day: 1.00     Years: 20.00     Pack years: 20.00     Types: Vaping Device, Cigarettes     Quit date: 2016     Years since quittin.8     Smokeless tobacco: Never Used     Tobacco comment: E- Cig use since    Substance Use Topics     Alcohol use: Not Currently     Alcohol/week: 0.0 standard drinks     Drug use: No           CURRENT MEDICATIONS:  Current Outpatient Medications   Medication Sig Dispense Refill     aspirin (ASA) 81 MG EC tablet Take 1 tablet (81 mg) by mouth daily       atorvastatin (LIPITOR) 20 MG tablet Take 2 tablets (40 mg) by mouth daily 180 tablet 3     blood glucose (NO BRAND SPECIFIED) test strip Use to test blood sugar 5 times daily or as directed. 200 strip 2     calcium carbonate (TUMS) 500 MG chewable tablet Take 1 chew tab by mouth 2 times daily       carvedilol (COREG) 12.5 MG tablet Take 1 tablet (12.5 mg) by mouth 2 times daily (with meals) 60 tablet 0     insulin aspart (NOVOLOG FLEXPEN) 100 UNIT/ML pen USE PER SLIDING SCALE 1 UNIT FOR EVERY 20 OVER BLOOD GLUCOSE ; AVERAGE 45 UNITS PER DAY. Needs appointment with Dr. Ríos before next refill. 45 mL 0     insulin pen needle (BD ARPITA U/F) 32G X 4 MM miscellaneous Use 4 daily or as directed. 120 each 0     lisinopril (ZESTRIL) 20 MG tablet Take one tablet (20mg)  on dialysis days (Tues, Thursday and Saturday), and take two tablets (40mg) on non-dialysis days (Mondays, Wednesdays, Fridays and Sundays) 180 tablet 3     naproxen sodium 220 MG capsule Take 220 mg by mouth as needed        omeprazole (PRILOSEC) 40 MG DR capsule Take 2 capsules (80 mg) by mouth every evening 180 capsule 11     ondansetron (ZOFRAN-ODT) 8 MG ODT tab DISSOLVE 1 TABLET ON TOP OF TONGUE EVERY 8 HOURS FOR 2 DAYS AS NEEDED FOR NAUSEA AND VOMITING       polyethylene glycol (MIRALAX) 17 GM/Dose powder Take 17 g by mouth daily as needed for constipation 850 g 0     prochlorperazine (COMPAZINE) 10 MG tablet Take 10 mg by mouth       acetone, Urine, test STRP 1 strip by In Vitro route daily (Patient not taking: Reported on 7/23/2021) 50 each 3     calcium acetate (PHOSLO) 667 MG CAPS capsule Take 667 mg by mouth 3 times daily (with meals)  (Patient not taking: Reported on 9/17/2021)       Continuous Blood Gluc Sensor (DEXCOM G6 SENSOR) MISC Change every 10 days. (Patient not taking: Reported on 9/17/2021) 3 each 11     Continuous Blood Gluc Transmit (DEXCOM G6 TRANSMITTER) MISC Change every 3 months. (Patient not taking: Reported on 9/17/2021) 1 each 3     Insulin Disposable Pump (OMNIPOD DASH 5 PACK PODS) MISC 1 each every 48 hours (Patient not taking: Reported on 7/23/2021) 5 each 11     insulin glargine (LANTUS SOLOSTAR) 100 UNIT/ML pen Inject 24 Units Subcutaneous At Bedtime (Patient taking differently: Inject 22 Units Subcutaneous At Bedtime ) 15 mL 11     metoclopramide (REGLAN) 5 MG tablet Take 1 tablet (5 mg) by mouth 4 times daily (before meals and nightly) (Patient taking differently: Take 10 mg by mouth 4 times daily (before meals and nightly) ) 120 tablet 11       ROS:  Review Of Systems  Skin: negative  Eyes: negative  Ears/Nose/Throat: negative  Respiratory: No shortness of breath, dyspnea on exertion, cough, or hemoptysis  Cardiovascular: negative  Gastrointestinal: negative  Genitourinary:  "negative  Musculoskeletal: negative  Neurologic: negative  Psychiatric: negative  Hematologic/Lymphatic/Immunologic: negative  Endocrine: negative      EXAM:  BP (!) 165/85 (BP Location: Right arm, Patient Position: Chair, Cuff Size: Adult Regular)   Pulse 80   Ht 1.727 m (5' 8\")   Wt 85.8 kg (189 lb 3.2 oz)   SpO2 98%   BMI 28.77 kg/m    Home weight:  General: alert, articulate, and in no acute distress.  HEENT: normocephalic, atraumatic, anicteric sclera, EOMI, mucosa moist, no cyanosis.   Neck: neck supple.  No adenopathy, masses, or carotid bruits.  JVP elevated 11-12  Heart: regular rhythm, normal S1/S2, no murmur, gallop, rub.  Precordium quiet with normal PMI.     Lungs: clear, no rales, ronchi, or wheezing.  No accessory muscle use, respirations unlabored.   Abdomen: soft, non-tender, bowel sounds present, no hepatomegaly  Extremities: no pitting edema.   No cyanosis.     Neurological: alert and oriented x 3.  normal speech and affect, no gross motor deficits  Skin:  No ecchymoses, rashes, or clubbing.    Labs:  CBC RESULTS:  Lab Results   Component Value Date    WBC 4.9 09/03/2021    WBC 6.7 03/03/2021    RBC 3.42 (L) 09/03/2021    RBC 3.47 (L) 03/03/2021    HGB 11.0 (L) 09/03/2021    HGB 11.1 (L) 03/03/2021    HCT 32.1 (L) 09/03/2021    HCT 34.3 (L) 03/03/2021    MCV 94 09/03/2021    MCV 99 03/03/2021    MCH 32.2 09/03/2021    MCH 32.0 03/03/2021    MCHC 34.3 09/03/2021    MCHC 32.4 03/03/2021    RDW 14.5 09/03/2021    RDW 15.1 (H) 03/03/2021     09/03/2021     03/03/2021       CMP RESULTS:  Lab Results   Component Value Date     (L) 11/12/2021     (L) 03/19/2021    POTASSIUM 5.5 (H) 11/12/2021    POTASSIUM 4.1 03/19/2021    CHLORIDE 94 11/12/2021    CHLORIDE 90 (L) 03/19/2021    CO2 34 (H) 11/12/2021    CO2 32 03/19/2021    ANIONGAP 1 (L) 11/12/2021    ANIONGAP 8 03/19/2021     (H) 11/12/2021     (H) 03/19/2021    BUN 33 (H) 11/12/2021    BUN 38 (H) 03/19/2021 "    CR 6.35 (H) 11/12/2021    CR 6.59 (H) 03/19/2021    GFRESTIMATED 10 (L) 11/12/2021    GFRESTIMATED 9 (L) 03/19/2021    GFRESTBLACK 11 (L) 03/19/2021    CHARLY 9.9 11/12/2021    CHARLY 9.0 03/19/2021    BILITOTAL 0.9 09/01/2021    BILITOTAL 1.4 (H) 03/01/2021    ALBUMIN 3.8 09/01/2021    ALBUMIN 3.4 03/01/2021    ALKPHOS 152 (H) 09/01/2021    ALKPHOS 199 (H) 03/01/2021    ALT 18 09/01/2021    ALT 19 03/01/2021    AST 8 09/01/2021    AST 15 03/01/2021        INR RESULTS:  Lab Results   Component Value Date    INR 1.18 (H) 08/25/2021    INR 1.24 (H) 03/01/2021       No components found for: CK  Lab Results   Component Value Date    MAG 2.2 09/01/2021    MAG 2.3 12/14/2020     Lab Results   Component Value Date    NTBNP 62,990 (H) 07/23/2021     @BRIEFLABR (dig)@    Most recent echocardiogram:  No results found for this or any previous visit (from the past 8760 hour(s)).      Assessment and Plan:    In summary,Murtaza  is a  46 year old male who is here for a follow up  visit to [facility] clinic. He appears hypervolemic today and will have his dialysis run tomorrow as planned.  He is motivated to increase his EF to be eligible for transplant. Patient will increase carvedilol to 25 mg BID. We will continue to advance him to GDMT as tolerated.      1.  Chronic sytolic heart failure secondary to ICM .  Stage C  NYHA Class III  ACEi/ARB: - Lisinopril 20 mg on Dialysis days and 40 mg on non-dialysis days  BB: yes- increased to 25 mg BID   Aldosterone antagonist: contraindicated due to renal dysfunction  SCD prophylaxis: decision deferred during medication uptitration  Fluid status: hypervolemic- dialysis patient   Anticoagulation:   Antiplatelet:  ASA dose   Sleep apnea:  NSAID use:  Contraindicated.  Avoid use.  Remote Monitoring:  SGLT-2- not candidate DM Type I.     2.  Other comordbid conditions:      #.  Hypertension.        He says his blood pressures tend to run quite high, which would not be optimal in terms of his  existing cardiomyopathy and may explain at least partially why his ejection fraction dropped from October to 12/2020.       if his blood pressures are not in the goal range of 115 mmHg systolic. We will plan on increasing carvedilol today.  He is at max dose Lisinopril on non-dialysis days and tolerating 20 mg on dialysis days. He did have a low BP standing yesterday and was asymptomatic.     #.  Hyperlipidemia.        His goal LDL is less than 70.  It most recently was 88 with mildly elevated triglycerides.  We will perform further adjustments.     # DM II - 7/23/21 A1C - 6.9.  Insulin dependant - PCP following       3.  Follow-up   Increase the Carvedilol to 25 mg BID  CORE in one month   Dr. Vogel in January           Sumit ORTEGA, CNP  [facility]

## 2021-11-15 ENCOUNTER — TELEPHONE (OUTPATIENT)
Dept: TRANSPLANT | Facility: CLINIC | Age: 46
End: 2021-11-15
Payer: COMMERCIAL

## 2021-11-15 NOTE — TELEPHONE ENCOUNTER
Pt returned my call, he will be having the consult for the gastric pacemaker on 1/4/21, we will touch base following that appt. He had no other updates for me.

## 2021-11-15 NOTE — TELEPHONE ENCOUNTER
Left VM for pt- wanted to touch base on eval status- we will want him to continue to work w/ cardiology to increase his EF (next appt in January).  Last note from cards mentioned gastric pacemaker, wanted to know more about that procedure.

## 2021-11-16 LAB
PROTOCOL CUTOFF: NORMAL
SA 1 CELL: NORMAL
SA 1 TEST METHOD: NORMAL
SA 2 CELL: NORMAL
SA 2 TEST METHOD: NORMAL
SA1 HI RISK ABY: NORMAL
SA1 MOD RISK ABY: NORMAL
SA2 HI RISK ABY: NORMAL
SA2 MOD RISK ABY: NORMAL
UNACCEPTABLE ANTIGENS: NORMAL
UNOS CPRA: 98
ZZZSA 1  COMMENTS: NORMAL
ZZZSA 2 COMMENTS: NORMAL

## 2021-12-03 DIAGNOSIS — I43 TYPE 1 DIABETES MELLITUS WITH DIABETIC CARDIOMYOPATHY (H): ICD-10-CM

## 2021-12-03 DIAGNOSIS — E10.59 TYPE 1 DIABETES MELLITUS WITH DIABETIC CARDIOMYOPATHY (H): ICD-10-CM

## 2021-12-03 RX ORDER — INSULIN ASPART 100 [IU]/ML
INJECTION, SOLUTION INTRAVENOUS; SUBCUTANEOUS
Qty: 45 ML | Refills: 0 | Status: SHIPPED | OUTPATIENT
Start: 2021-12-03 | End: 2022-01-01

## 2021-12-03 NOTE — TELEPHONE ENCOUNTER
Routing refill request to provider for review/approval because:  Lazara given x1 and patient did not follow up, please advise  Patient needs to be seen because:  Failing visit    Bhavya Betts RN

## 2022-01-01 ENCOUNTER — TELEPHONE (OUTPATIENT)
Dept: TRANSPLANT | Facility: CLINIC | Age: 47
End: 2022-01-01

## 2022-01-01 ENCOUNTER — COMMITTEE REVIEW (OUTPATIENT)
Dept: TRANSPLANT | Facility: CLINIC | Age: 47
End: 2022-01-01

## 2022-01-01 ENCOUNTER — TELEPHONE (OUTPATIENT)
Dept: DERMATOLOGY | Facility: CLINIC | Age: 47
End: 2022-01-01

## 2022-01-01 ENCOUNTER — LAB (OUTPATIENT)
Dept: LAB | Facility: CLINIC | Age: 47
End: 2022-01-01

## 2022-01-01 ENCOUNTER — TELEPHONE (OUTPATIENT)
Dept: PEDIATRICS | Facility: CLINIC | Age: 47
End: 2022-01-01
Payer: COMMERCIAL

## 2022-01-01 ENCOUNTER — TELEPHONE (OUTPATIENT)
Dept: INTERVENTIONAL RADIOLOGY/VASCULAR | Facility: CLINIC | Age: 47
End: 2022-01-01
Payer: COMMERCIAL

## 2022-01-01 ENCOUNTER — TRANSFERRED RECORDS (OUTPATIENT)
Dept: HEALTH INFORMATION MANAGEMENT | Facility: CLINIC | Age: 47
End: 2022-01-01

## 2022-01-01 ENCOUNTER — PATIENT OUTREACH (OUTPATIENT)
Dept: NEPHROLOGY | Facility: CLINIC | Age: 47
End: 2022-01-01

## 2022-01-01 ENCOUNTER — HEALTH MAINTENANCE LETTER (OUTPATIENT)
Age: 47
End: 2022-01-01

## 2022-01-01 ENCOUNTER — TELEPHONE (OUTPATIENT)
Dept: TRANSPLANT | Facility: CLINIC | Age: 47
End: 2022-01-01
Payer: COMMERCIAL

## 2022-01-01 ENCOUNTER — NURSE TRIAGE (OUTPATIENT)
Dept: PEDIATRICS | Facility: CLINIC | Age: 47
End: 2022-01-01
Payer: COMMERCIAL

## 2022-01-01 ENCOUNTER — TELEPHONE (OUTPATIENT)
Dept: WOUND CARE | Facility: CLINIC | Age: 47
End: 2022-01-01

## 2022-01-01 ENCOUNTER — TRANSFERRED RECORDS (OUTPATIENT)
Dept: HEALTH INFORMATION MANAGEMENT | Facility: CLINIC | Age: 47
End: 2022-01-01
Payer: COMMERCIAL

## 2022-01-01 ENCOUNTER — TELEPHONE (OUTPATIENT)
Dept: CARDIOLOGY | Facility: CLINIC | Age: 47
End: 2022-01-01
Payer: COMMERCIAL

## 2022-01-01 ENCOUNTER — VIRTUAL VISIT (OUTPATIENT)
Dept: CARDIOLOGY | Facility: CLINIC | Age: 47
End: 2022-01-01
Attending: NURSE PRACTITIONER
Payer: COMMERCIAL

## 2022-01-01 ENCOUNTER — MYC MEDICAL ADVICE (OUTPATIENT)
Dept: PEDIATRICS | Facility: CLINIC | Age: 47
End: 2022-01-01
Payer: COMMERCIAL

## 2022-01-01 ENCOUNTER — OFFICE VISIT (OUTPATIENT)
Dept: DERMATOLOGY | Facility: CLINIC | Age: 47
End: 2022-01-01
Attending: PHYSICIAN ASSISTANT
Payer: COMMERCIAL

## 2022-01-01 ENCOUNTER — MYC MEDICAL ADVICE (OUTPATIENT)
Dept: CARDIOLOGY | Facility: CLINIC | Age: 47
End: 2022-01-01
Payer: COMMERCIAL

## 2022-01-01 ENCOUNTER — OFFICE VISIT (OUTPATIENT)
Dept: TRANSPLANT | Facility: CLINIC | Age: 47
End: 2022-01-01
Attending: TRANSPLANT SURGERY
Payer: COMMERCIAL

## 2022-01-01 ENCOUNTER — DOCUMENTATION ONLY (OUTPATIENT)
Dept: TRANSPLANT | Facility: CLINIC | Age: 47
End: 2022-01-01

## 2022-01-01 ENCOUNTER — HOSPITAL ENCOUNTER (OUTPATIENT)
Facility: CLINIC | Age: 47
End: 2022-01-01
Admitting: RADIOLOGY
Payer: COMMERCIAL

## 2022-01-01 ENCOUNTER — ANCILLARY PROCEDURE (OUTPATIENT)
Dept: CARDIOLOGY | Facility: CLINIC | Age: 47
End: 2022-01-01
Attending: INTERNAL MEDICINE
Payer: COMMERCIAL

## 2022-01-01 ENCOUNTER — MYC MEDICAL ADVICE (OUTPATIENT)
Dept: INTERVENTIONAL RADIOLOGY/VASCULAR | Facility: CLINIC | Age: 47
End: 2022-01-01

## 2022-01-01 ENCOUNTER — CARE COORDINATION (OUTPATIENT)
Dept: NEPHROLOGY | Facility: CLINIC | Age: 47
End: 2022-01-01

## 2022-01-01 ENCOUNTER — TELEPHONE (OUTPATIENT)
Dept: PEDIATRICS | Facility: CLINIC | Age: 47
End: 2022-01-01

## 2022-01-01 ENCOUNTER — VIRTUAL VISIT (OUTPATIENT)
Dept: CARDIOLOGY | Facility: CLINIC | Age: 47
End: 2022-01-01
Attending: PHYSICIAN ASSISTANT
Payer: COMMERCIAL

## 2022-01-01 ENCOUNTER — OFFICE VISIT (OUTPATIENT)
Dept: NEPHROLOGY | Facility: CLINIC | Age: 47
End: 2022-01-01
Attending: TRANSPLANT SURGERY
Payer: COMMERCIAL

## 2022-01-01 ENCOUNTER — PATIENT OUTREACH (OUTPATIENT)
Dept: NEPHROLOGY | Facility: CLINIC | Age: 47
End: 2022-01-01
Payer: COMMERCIAL

## 2022-01-01 ENCOUNTER — LAB (OUTPATIENT)
Dept: LAB | Facility: CLINIC | Age: 47
End: 2022-01-01
Payer: COMMERCIAL

## 2022-01-01 ENCOUNTER — MEDICAL CORRESPONDENCE (OUTPATIENT)
Dept: HEALTH INFORMATION MANAGEMENT | Facility: CLINIC | Age: 47
End: 2022-01-01

## 2022-01-01 VITALS
BODY MASS INDEX: 28.93 KG/M2 | HEIGHT: 68 IN | WEIGHT: 190.9 LBS | HEART RATE: 69 BPM | SYSTOLIC BLOOD PRESSURE: 121 MMHG | OXYGEN SATURATION: 96 % | DIASTOLIC BLOOD PRESSURE: 67 MMHG

## 2022-01-01 VITALS
BODY MASS INDEX: 29.24 KG/M2 | SYSTOLIC BLOOD PRESSURE: 128 MMHG | WEIGHT: 192.3 LBS | HEART RATE: 74 BPM | DIASTOLIC BLOOD PRESSURE: 66 MMHG | OXYGEN SATURATION: 97 %

## 2022-01-01 VITALS
HEIGHT: 68 IN | DIASTOLIC BLOOD PRESSURE: 67 MMHG | SYSTOLIC BLOOD PRESSURE: 121 MMHG | BODY MASS INDEX: 28.79 KG/M2 | WEIGHT: 190 LBS | OXYGEN SATURATION: 96 % | HEART RATE: 69 BPM

## 2022-01-01 DIAGNOSIS — Z76.82 ORGAN TRANSPLANT CANDIDATE: ICD-10-CM

## 2022-01-01 DIAGNOSIS — I10 BENIGN ESSENTIAL HYPERTENSION: ICD-10-CM

## 2022-01-01 DIAGNOSIS — D22.9 MULTIPLE BENIGN NEVI: ICD-10-CM

## 2022-01-01 DIAGNOSIS — Z01.818 ENCOUNTER FOR PRE-TRANSPLANT EVALUATION FOR KIDNEY AND PANCREAS TRANSPLANT: ICD-10-CM

## 2022-01-01 DIAGNOSIS — I25.10 CARDIOVASCULAR DISEASE: ICD-10-CM

## 2022-01-01 DIAGNOSIS — Z76.82 AWAITING ORGAN TRANSPLANT: Primary | ICD-10-CM

## 2022-01-01 DIAGNOSIS — N18.6 END STAGE RENAL DISEASE (H): ICD-10-CM

## 2022-01-01 DIAGNOSIS — E10.59 TYPE 1 DIABETES MELLITUS WITH DIABETIC CARDIOMYOPATHY (H): ICD-10-CM

## 2022-01-01 DIAGNOSIS — L82.1 SEBORRHEIC KERATOSIS: ICD-10-CM

## 2022-01-01 DIAGNOSIS — I10 ESSENTIAL HYPERTENSION: ICD-10-CM

## 2022-01-01 DIAGNOSIS — L29.89 UREMIC PRURITUS: Primary | ICD-10-CM

## 2022-01-01 DIAGNOSIS — I50.22 CHRONIC SYSTOLIC HEART FAILURE (H): ICD-10-CM

## 2022-01-01 DIAGNOSIS — I43 TYPE 1 DIABETES MELLITUS WITH DIABETIC CARDIOMYOPATHY (H): ICD-10-CM

## 2022-01-01 DIAGNOSIS — E10.69 TYPE 1 DIABETES MELLITUS WITH OTHER SPECIFIED COMPLICATION (H): ICD-10-CM

## 2022-01-01 DIAGNOSIS — I25.10 CORONARY ARTERY DISEASE INVOLVING NATIVE CORONARY ARTERY OF NATIVE HEART WITHOUT ANGINA PECTORIS: ICD-10-CM

## 2022-01-01 DIAGNOSIS — K31.84 GASTROPARESIS: ICD-10-CM

## 2022-01-01 DIAGNOSIS — Z76.82 AWAITING ORGAN TRANSPLANT: ICD-10-CM

## 2022-01-01 DIAGNOSIS — T14.8XXA OPEN WOUND: Primary | ICD-10-CM

## 2022-01-01 DIAGNOSIS — I50.22 CHRONIC SYSTOLIC HEART FAILURE (H): Primary | ICD-10-CM

## 2022-01-01 DIAGNOSIS — N18.6 TYPE 1 DIABETES MELLITUS WITH CHRONIC KIDNEY DISEASE ON CHRONIC DIALYSIS (H): ICD-10-CM

## 2022-01-01 DIAGNOSIS — L81.4 SOLAR LENTIGO: ICD-10-CM

## 2022-01-01 DIAGNOSIS — N18.6 ESRD (END STAGE RENAL DISEASE) (H): ICD-10-CM

## 2022-01-01 DIAGNOSIS — E10.9 TYPE 1 DIABETES MELLITUS (H): ICD-10-CM

## 2022-01-01 DIAGNOSIS — L98.8 ACQUIRED PERFORATING DERMATOSIS: ICD-10-CM

## 2022-01-01 DIAGNOSIS — L28.1 PRURIGO NODULARIS: Primary | ICD-10-CM

## 2022-01-01 DIAGNOSIS — L30.9 DERMATITIS: ICD-10-CM

## 2022-01-01 DIAGNOSIS — Z99.2 TYPE 1 DIABETES MELLITUS WITH CHRONIC KIDNEY DISEASE ON CHRONIC DIALYSIS (H): ICD-10-CM

## 2022-01-01 DIAGNOSIS — I25.5 ISCHEMIC CARDIOMYOPATHY: ICD-10-CM

## 2022-01-01 DIAGNOSIS — E78.5 HYPERLIPIDEMIA: ICD-10-CM

## 2022-01-01 DIAGNOSIS — L03.114 CELLULITIS OF LEFT UPPER EXTREMITY: Primary | ICD-10-CM

## 2022-01-01 DIAGNOSIS — E10.22 TYPE 1 DIABETES MELLITUS WITH CHRONIC KIDNEY DISEASE ON CHRONIC DIALYSIS (H): ICD-10-CM

## 2022-01-01 DIAGNOSIS — D18.01 CHERRY ANGIOMA: ICD-10-CM

## 2022-01-01 DIAGNOSIS — L97.909: Primary | ICD-10-CM

## 2022-01-01 DIAGNOSIS — T82.898A PROBLEM WITH DIALYSIS ACCESS, INITIAL ENCOUNTER (H): Primary | ICD-10-CM

## 2022-01-01 LAB
ALT SERPL-CCNC: 11 U/L (ref 10–49)
ALT SERPL-CCNC: 15 U/L (ref 10–49)
ANION GAP SERPL CALCULATED.3IONS-SCNC: 13 MMOL/L (ref 3–14)
AST SERPL-CCNC: 16 U/L (ref 0–33)
AST SERPL-CCNC: 16 U/L (ref 0–33)
BUN SERPL-MCNC: 39 MG/DL (ref 7–30)
CALCIUM SERPL-MCNC: 8.9 MG/DL (ref 8.5–10.1)
CHLORIDE BLD-SCNC: 93 MMOL/L (ref 94–109)
CO2 SERPL-SCNC: 30 MMOL/L (ref 20–32)
CREAT SERPL-MCNC: 6.75 MG/DL (ref 0.66–1.25)
CREATININE (EXTERNAL): 10.07 MG/DL (ref 0.7–1.3)
CREATININE (EXTERNAL): 8.66 MG/DL (ref 0.7–1.3)
GFR SERPL CREATININE-BSD FRML MDRD: 10 ML/MIN/1.73M2
GLUCOSE BLD-MCNC: 231 MG/DL (ref 70–99)
HBA1C MFR BLD: 6.6 % (ref 0–5.6)
HEP C HIM: NORMAL
LVEF ECHO: NORMAL
POTASSIUM (EXTERNAL): 5 MEQ/L (ref 3.5–5.5)
POTASSIUM (EXTERNAL): 5.6 MEQ/L (ref 3.5–5.5)
POTASSIUM (EXTERNAL): 5.7 MEQ/L (ref 3.5–5.5)
POTASSIUM (EXTERNAL): 5.8 MEQ/L (ref 3.5–5.5)
POTASSIUM (EXTERNAL): 6.4 MEQ/L (ref 3.5–5.5)
POTASSIUM (EXTERNAL): 6.8 MEQ/L (ref 3.5–5.5)
POTASSIUM BLD-SCNC: 4.8 MMOL/L (ref 3.4–5.3)
PROTOCOL CUTOFF: NORMAL
PROTOCOL CUTOFF: NORMAL
RETINOPATHY: POSITIVE
SA 1 CELL: NORMAL
SA 1 CELL: NORMAL
SA 1 TEST METHOD: NORMAL
SA 1 TEST METHOD: NORMAL
SA 2 CELL: NORMAL
SA 2 CELL: NORMAL
SA 2 TEST METHOD: NORMAL
SA 2 TEST METHOD: NORMAL
SA1 HI RISK ABY: NORMAL
SA1 HI RISK ABY: NORMAL
SA1 MOD RISK ABY: NORMAL
SA1 MOD RISK ABY: NORMAL
SA2 HI RISK ABY: NORMAL
SA2 HI RISK ABY: NORMAL
SA2 MOD RISK ABY: NORMAL
SA2 MOD RISK ABY: NORMAL
SODIUM SERPL-SCNC: 136 MMOL/L (ref 133–144)
UNACCEPTABLE ANTIGENS: NORMAL
UNACCEPTABLE ANTIGENS: NORMAL
UNOS CPRA: 98
UNOS CPRA: 98
ZZZSA 1  COMMENTS: NORMAL
ZZZSA 1  COMMENTS: NORMAL
ZZZSA 2 COMMENTS: NORMAL
ZZZSA 2 COMMENTS: NORMAL

## 2022-01-01 PROCEDURE — G0463 HOSPITAL OUTPT CLINIC VISIT: HCPCS

## 2022-01-01 PROCEDURE — 93306 TTE W/DOPPLER COMPLETE: CPT | Performed by: STUDENT IN AN ORGANIZED HEALTH CARE EDUCATION/TRAINING PROGRAM

## 2022-01-01 PROCEDURE — 86833 HLA CLASS II HIGH DEFIN QUAL: CPT | Performed by: PHYSICIAN ASSISTANT

## 2022-01-01 PROCEDURE — 99215 OFFICE O/P EST HI 40 MIN: CPT | Performed by: PHYSICIAN ASSISTANT

## 2022-01-01 PROCEDURE — 36415 COLL VENOUS BLD VENIPUNCTURE: CPT | Performed by: PATHOLOGY

## 2022-01-01 PROCEDURE — 80048 BASIC METABOLIC PNL TOTAL CA: CPT | Performed by: PATHOLOGY

## 2022-01-01 PROCEDURE — 99214 OFFICE O/P EST MOD 30 MIN: CPT | Mod: 95 | Performed by: NURSE PRACTITIONER

## 2022-01-01 PROCEDURE — 99205 OFFICE O/P NEW HI 60 MIN: CPT | Performed by: TRANSPLANT SURGERY

## 2022-01-01 PROCEDURE — 36415 COLL VENOUS BLD VENIPUNCTURE: CPT

## 2022-01-01 PROCEDURE — 99417 PROLNG OP E/M EACH 15 MIN: CPT | Performed by: PHYSICIAN ASSISTANT

## 2022-01-01 PROCEDURE — 99213 OFFICE O/P EST LOW 20 MIN: CPT | Performed by: NURSE PRACTITIONER

## 2022-01-01 PROCEDURE — 99214 OFFICE O/P EST MOD 30 MIN: CPT | Performed by: DERMATOLOGY

## 2022-01-01 PROCEDURE — 99441 PR PHYSICIAN TELEPHONE EVALUATION 5-10 MIN: CPT | Mod: 95 | Performed by: INTERNAL MEDICINE

## 2022-01-01 PROCEDURE — 86832 HLA CLASS I HIGH DEFIN QUAL: CPT | Performed by: PHYSICIAN ASSISTANT

## 2022-01-01 PROCEDURE — 86833 HLA CLASS II HIGH DEFIN QUAL: CPT

## 2022-01-01 PROCEDURE — 86832 HLA CLASS I HIGH DEFIN QUAL: CPT

## 2022-01-01 RX ORDER — HEPARIN SODIUM 200 [USP'U]/100ML
1 INJECTION, SOLUTION INTRAVENOUS CONTINUOUS PRN
Status: CANCELLED | OUTPATIENT
Start: 2022-01-01

## 2022-01-01 RX ORDER — GABAPENTIN 100 MG/1
CAPSULE ORAL
Qty: 12 CAPSULE | Refills: 5 | Status: SHIPPED | OUTPATIENT
Start: 2022-01-01

## 2022-01-01 RX ORDER — CARVEDILOL 25 MG/1
50 TABLET ORAL 2 TIMES DAILY WITH MEALS
Qty: 360 TABLET | Refills: 3 | Status: SHIPPED | OUTPATIENT
Start: 2022-01-01

## 2022-01-01 RX ORDER — TRIAMCINOLONE ACETONIDE 1 MG/G
CREAM TOPICAL 2 TIMES DAILY
Qty: 453.6 G | Refills: 3 | Status: SHIPPED | OUTPATIENT
Start: 2022-01-01

## 2022-01-01 RX ORDER — CARVEDILOL 12.5 MG/1
TABLET ORAL
OUTPATIENT
Start: 2022-01-01

## 2022-01-01 RX ORDER — INSULIN GLARGINE 100 [IU]/ML
22 INJECTION, SOLUTION SUBCUTANEOUS AT BEDTIME
Qty: 9 ML | Refills: 5 | Status: SHIPPED | OUTPATIENT
Start: 2022-01-01

## 2022-01-01 RX ORDER — FENTANYL CITRATE 50 UG/ML
25-50 INJECTION, SOLUTION INTRAMUSCULAR; INTRAVENOUS EVERY 5 MIN PRN
Status: CANCELLED | OUTPATIENT
Start: 2022-01-01

## 2022-01-01 RX ORDER — NALOXONE HYDROCHLORIDE 0.4 MG/ML
0.4 INJECTION, SOLUTION INTRAMUSCULAR; INTRAVENOUS; SUBCUTANEOUS
Status: CANCELLED | OUTPATIENT
Start: 2022-01-01

## 2022-01-01 RX ORDER — CARVEDILOL 12.5 MG/1
TABLET ORAL
COMMUNITY
Start: 2022-02-11

## 2022-01-01 RX ORDER — INSULIN GLARGINE 100 [IU]/ML
INJECTION, SOLUTION SUBCUTANEOUS
Qty: 9 ML | Refills: 5 | OUTPATIENT
Start: 2022-01-01

## 2022-01-01 RX ORDER — INSULIN ASPART 100 [IU]/ML
INJECTION, SOLUTION INTRAVENOUS; SUBCUTANEOUS
Qty: 45 ML | Refills: 0 | Status: SHIPPED | OUTPATIENT
Start: 2022-01-01 | End: 2022-01-01

## 2022-01-01 RX ORDER — NALOXONE HYDROCHLORIDE 0.4 MG/ML
0.2 INJECTION, SOLUTION INTRAMUSCULAR; INTRAVENOUS; SUBCUTANEOUS
Status: CANCELLED | OUTPATIENT
Start: 2022-01-01

## 2022-01-01 RX ORDER — CARVEDILOL 12.5 MG/1
TABLET ORAL
Status: CANCELLED | OUTPATIENT
Start: 2022-01-01

## 2022-01-01 RX ORDER — FLUMAZENIL 0.1 MG/ML
0.2 INJECTION, SOLUTION INTRAVENOUS
Status: CANCELLED | OUTPATIENT
Start: 2022-01-01

## 2022-01-01 RX ORDER — CEPHALEXIN 500 MG/1
500 CAPSULE ORAL DAILY
Qty: 7 CAPSULE | Refills: 0 | Status: SHIPPED | OUTPATIENT
Start: 2022-01-01

## 2022-01-01 ASSESSMENT — PAIN SCALES - GENERAL
PAINLEVEL: SEVERE PAIN (6)
PAINLEVEL: MODERATE PAIN (4)
PAINLEVEL: NO PAIN (0)

## 2022-01-07 DIAGNOSIS — I50.22 CHRONIC SYSTOLIC HEART FAILURE (H): Primary | ICD-10-CM

## 2022-01-14 ENCOUNTER — OFFICE VISIT (OUTPATIENT)
Dept: CARDIOLOGY | Facility: CLINIC | Age: 47
End: 2022-01-14
Attending: NURSE PRACTITIONER
Payer: COMMERCIAL

## 2022-01-14 ENCOUNTER — LAB (OUTPATIENT)
Dept: LAB | Facility: CLINIC | Age: 47
End: 2022-01-14
Attending: NURSE PRACTITIONER
Payer: COMMERCIAL

## 2022-01-14 VITALS
HEIGHT: 69 IN | HEART RATE: 68 BPM | WEIGHT: 195.9 LBS | BODY MASS INDEX: 29.02 KG/M2 | SYSTOLIC BLOOD PRESSURE: 152 MMHG | OXYGEN SATURATION: 98 % | DIASTOLIC BLOOD PRESSURE: 84 MMHG

## 2022-01-14 DIAGNOSIS — I25.5 ISCHEMIC CARDIOMYOPATHY: ICD-10-CM

## 2022-01-14 DIAGNOSIS — I10 BENIGN ESSENTIAL HYPERTENSION: ICD-10-CM

## 2022-01-14 DIAGNOSIS — K31.84 GASTROPARESIS: ICD-10-CM

## 2022-01-14 DIAGNOSIS — I43 TYPE 1 DIABETES MELLITUS WITH DIABETIC CARDIOMYOPATHY (H): ICD-10-CM

## 2022-01-14 DIAGNOSIS — I50.22 CHRONIC SYSTOLIC HEART FAILURE (H): ICD-10-CM

## 2022-01-14 DIAGNOSIS — E10.59 TYPE 1 DIABETES MELLITUS WITH DIABETIC CARDIOMYOPATHY (H): ICD-10-CM

## 2022-01-14 DIAGNOSIS — I25.10 CORONARY ARTERY DISEASE INVOLVING NATIVE CORONARY ARTERY OF NATIVE HEART WITHOUT ANGINA PECTORIS: ICD-10-CM

## 2022-01-14 DIAGNOSIS — N18.6 ESRD (END STAGE RENAL DISEASE) (H): ICD-10-CM

## 2022-01-14 DIAGNOSIS — I50.22 CHRONIC SYSTOLIC HEART FAILURE (H): Primary | ICD-10-CM

## 2022-01-14 LAB
ANION GAP SERPL CALCULATED.3IONS-SCNC: 9 MMOL/L (ref 3–14)
BUN SERPL-MCNC: 36 MG/DL (ref 7–30)
CALCIUM SERPL-MCNC: 9.6 MG/DL (ref 8.5–10.1)
CHLORIDE BLD-SCNC: 95 MMOL/L (ref 94–109)
CO2 SERPL-SCNC: 32 MMOL/L (ref 20–32)
CREAT SERPL-MCNC: 7.32 MG/DL (ref 0.66–1.25)
GFR SERPL CREATININE-BSD FRML MDRD: 9 ML/MIN/1.73M2
GLUCOSE BLD-MCNC: 136 MG/DL (ref 70–99)
POTASSIUM BLD-SCNC: 4.8 MMOL/L (ref 3.4–5.3)
SODIUM SERPL-SCNC: 136 MMOL/L (ref 133–144)

## 2022-01-14 PROCEDURE — G0463 HOSPITAL OUTPT CLINIC VISIT: HCPCS

## 2022-01-14 PROCEDURE — 36415 COLL VENOUS BLD VENIPUNCTURE: CPT | Performed by: PATHOLOGY

## 2022-01-14 PROCEDURE — 99214 OFFICE O/P EST MOD 30 MIN: CPT | Performed by: NURSE PRACTITIONER

## 2022-01-14 PROCEDURE — 80048 BASIC METABOLIC PNL TOTAL CA: CPT | Performed by: PATHOLOGY

## 2022-01-14 ASSESSMENT — MIFFLIN-ST. JEOR: SCORE: 1762.36

## 2022-01-14 ASSESSMENT — PAIN SCALES - GENERAL: PAINLEVEL: NO PAIN (0)

## 2022-01-14 NOTE — NURSING NOTE
Chief Complaint   Patient presents with     Follow Up     2 month f/u w/labs prior   Vitals were taken and medications reconciled.    Judson Vyas, EMT  7:55 AM

## 2022-01-14 NOTE — PATIENT INSTRUCTIONS
Take your medicines every day, as directed    Changes made today:  o No medication changes.   Monitor Your Weight and Symptoms    Contact us if you:      Gain 2 pounds in one day or 5 pounds in one week    Feel more short of breath    Notice more leg swelling    Feel lightheadeded   Change your lifestyle    Limit Salt or Sodium:    2000 mg  Limit Fluids:    2000 mL or approximately 64 ounces  Eat a Heart Healthy Diet    Low in saturated fats  Stay Active:    Aim to move at least 150 minutes every  week         To Contact us    During Business Hours:  [phone], option # 1 (University)  Then option # 4 (medical questions)     After hours, weekends or holidays:   [phone], Option #4  Ask to speak to the On-Call Cardiologist. Inform them you are a CORE/heart failure patient at the [facility].     Use [employer] allows you to communicate directly with your heart team through secure messaging.    [employer] can be accessed any time on your phone, computer, or tablet.    If you need assistance, we'd be happy to help!         Keep your Heart Appointments:    Dr. Vogel next available    [facility] in March with labs prior.

## 2022-01-14 NOTE — PROGRESS NOTES
HPI: Murtaza is a 46 year old White male with a past medical history of :     1.  Type 1 diabetes since age 11.   2.  Hypertension.   3.  Hyperlipidemia, now status post kidney transplant 11/2008, failed.  Back on dialysis 02/2020.   4.  Status post pancreas transplant in 2009, failed 3 years later.  Currently on insulin.   5.  Depression.   6.  Tobacco abuse in the past.   7.  Knee tumor surgery.   8.  Gastroparesis.      His cardiac history is significant for a moderate ischemic cardiomyopathy.  He was asymptomatic, but found to have a positive stress test, which eventually led a 3-vessel coronary bypass surgery in 2015 (LIMA to LAD, vein graft to OM, vein graft to PDA).     Follow up CORE appt-  Referred by Dr. Vogel    Recent hospitalization 9/1-9/3- nausea and vomiting with history of gastroparesis.     No SOB at rest unless he has extra fluid. Dialysis T,Th, Sat. No NUÑEZ even with walking longer distances. He has no swelling in the legs, ankles, feet. He can feel distention in the abdomen. BP's have been 113-140's systolic.  They have ordered a pacemaker for his stomach which he will get at some point after the visit with the surgeon.  He says his dry weight after dialysis is 83 kg.  Last A1C 6.9 wants to get on the transplant list.     Denies  NUÑEZ, PND, orthopnea, edema, chest pain, palpitations, lightheadedness, dizziness, near syncopal/syncopal episodes. Murtaza has been following salt and fluid restrictions.      PAST MEDICAL HISTORY:  Past Medical History:   Diagnosis Date     CMV (cytomegalovirus infection) status negative 2011     Coronary artery disease, non-occlusive 2008    angio showed diffuse disease with no lesions     Diabetes mellitus, type 1     Diagnosed at age 11. Takes Insulin      Dialysis patient (H)     prior to kidney transplant     Dyslipidemia      Gastroesophageal reflux disease      History of blood transfusion      Hypertension      Immunosuppressed status (H)      Kidney  transplant status, living related donor           Migraines      Mycotic aneurysm of kidney transplant (H) 2008     Noncompliance with treatment     no labs for one year     Pancreas replaced by transplant (H) 2009    rejection treated with thymo     Pancreatic disease     pancreas transplant     Tobacco abuse ongoing       FAMILY HISTORY:  Family History   Problem Relation Age of Onset     Unknown/Adopted Father      Heart Disease Maternal Grandfather 62     Melanoma No family hx of      Skin Cancer No family hx of        SOCIAL HISTORY:  Social History     Tobacco Use     Smoking status: Former Smoker     Packs/day: 1.00     Years: 20.00     Pack years: 20.00     Types: Vaping Device, Cigarettes     Quit date: 2016     Years since quittin.0     Smokeless tobacco: Never Used     Tobacco comment: E- Cig use since    Substance Use Topics     Alcohol use: Not Currently     Alcohol/week: 0.0 standard drinks     Drug use: No           CURRENT MEDICATIONS:  Current Outpatient Medications   Medication Sig Dispense Refill     aspirin (ASA) 81 MG EC tablet Take 1 tablet (81 mg) by mouth daily       atorvastatin (LIPITOR) 20 MG tablet Take 2 tablets (40 mg) by mouth daily 180 tablet 3     blood glucose (NO BRAND SPECIFIED) test strip Use to test blood sugar 5 times daily or as directed. 200 strip 2     calcium carbonate (TUMS) 500 MG chewable tablet Take 1 chew tab by mouth 2 times daily       carvedilol (COREG) 25 MG tablet Take 1 tablet (25 mg) by mouth 2 times daily (with meals) 60 tablet 11     Continuous Blood Gluc Sensor (DEXCOM G6 SENSOR) MISC Change every 10 days. 3 each 11     Continuous Blood Gluc Transmit (DEXCOM G6 TRANSMITTER) MISC Change every 3 months. 1 each 3     Insulin Aspart FlexPen 100 UNIT/ML SOPN USE 1 UNIT FOR EVERY 20 OVER BLOOD GLUCOSE  PER SLIDING SCALE AS DIRECTED. AVERAGE USE: 45UNITS DAILY 45 mL 0     insulin glargine (LANTUS SOLOSTAR) 100 UNIT/ML pen Inject 22  "Units Subcutaneous At Bedtime 9 mL 5     insulin pen needle (BD ARPITA U/F) 32G X 4 MM miscellaneous Use 4 daily or as directed. 120 each 0     lisinopril (ZESTRIL) 20 MG tablet Take one tablet (20mg) on dialysis days (Tues, Thursday and Saturday), and take two tablets (40mg) on non-dialysis days (Mondays, Wednesdays, Fridays and Sundays) 180 tablet 3     naproxen sodium 220 MG capsule Take 220 mg by mouth as needed        omeprazole (PRILOSEC) 40 MG DR capsule Take 2 capsules (80 mg) by mouth every evening 180 capsule 11     ondansetron (ZOFRAN-ODT) 8 MG ODT tab DISSOLVE 1 TABLET ON TOP OF TONGUE EVERY 8 HOURS FOR 2 DAYS AS NEEDED FOR NAUSEA AND VOMITING       polyethylene glycol (MIRALAX) 17 GM/Dose powder Take 17 g by mouth daily as needed for constipation 850 g 0     prochlorperazine (COMPAZINE) 10 MG tablet Take 10 mg by mouth       acetone, Urine, test STRP 1 strip by In Vitro route daily (Patient not taking: Reported on 7/23/2021) 50 each 3     calcium acetate (PHOSLO) 667 MG CAPS capsule Take 667 mg by mouth 3 times daily (with meals)  (Patient not taking: Reported on 9/17/2021)       Insulin Disposable Pump (OMNIPOD DASH 5 PACK PODS) MISC 1 each every 48 hours (Patient not taking: Reported on 7/23/2021) 5 each 11     metoclopramide (REGLAN) 5 MG tablet Take 1 tablet (5 mg) by mouth 4 times daily (before meals and nightly) (Patient not taking: Reported on 1/14/2022) 120 tablet 11       ROS:  Review Of Systems  Skin: negative  Eyes: negative  Ears/Nose/Throat: negative  Respiratory: No shortness of breath, dyspnea on exertion, cough, or hemoptysis  Cardiovascular: negative  Gastrointestinal: negative  Genitourinary: negative  Musculoskeletal: negative  Neurologic: negative  Psychiatric: negative  Hematologic/Lymphatic/Immunologic: negative  Endocrine: negative      EXAM:  BP (!) 152/84 (BP Location: Right arm, Patient Position: Sitting, Cuff Size: Adult Regular)   Pulse 68   Ht 1.742 m (5' 8.58\")   Wt 88.9 " kg (195 lb 14.4 oz)   SpO2 98%   BMI 29.28 kg/m    Home weight:  General: alert, articulate, and in no acute distress.  HEENT: normocephalic, atraumatic, anicteric sclera, EOMI, mucosa moist, no cyanosis.   Neck: neck supple.  No adenopathy, masses, or carotid bruits.  JVP elevated 10  Heart: regular rhythm, normal S1/S2, no murmur, gallop, rub.  Precordium quiet with normal PMI.     Lungs: clear, no rales, ronchi, or wheezing.  No accessory muscle use, respirations unlabored.   Abdomen: soft, non-tender, bowel sounds present, no hepatomegaly  Extremities: no pitting edema.   No cyanosis.     Neurological: alert and oriented x 3.  normal speech and affect, no gross motor deficits  Skin:  No ecchymoses, rashes, or clubbing.    Labs:  CBC RESULTS:  Lab Results   Component Value Date    WBC 4.9 09/03/2021    WBC 6.7 03/03/2021    RBC 3.42 (L) 09/03/2021    RBC 3.47 (L) 03/03/2021    HGB 11.0 (L) 09/03/2021    HGB 11.1 (L) 03/03/2021    HCT 32.1 (L) 09/03/2021    HCT 34.3 (L) 03/03/2021    MCV 94 09/03/2021    MCV 99 03/03/2021    MCH 32.2 09/03/2021    MCH 32.0 03/03/2021    MCHC 34.3 09/03/2021    MCHC 32.4 03/03/2021    RDW 14.5 09/03/2021    RDW 15.1 (H) 03/03/2021     09/03/2021     03/03/2021       CMP RESULTS:  Lab Results   Component Value Date     01/14/2022     (L) 03/19/2021    POTASSIUM 4.8 01/14/2022    POTASSIUM 4.1 03/19/2021    CHLORIDE 95 01/14/2022    CHLORIDE 90 (L) 03/19/2021    CO2 32 01/14/2022    CO2 32 03/19/2021    ANIONGAP 9 01/14/2022    ANIONGAP 8 03/19/2021     (H) 01/14/2022     (H) 03/19/2021    BUN 36 (H) 01/14/2022    BUN 38 (H) 03/19/2021    CR 7.32 (H) 01/14/2022    CR 6.59 (H) 03/19/2021    GFRESTIMATED 9 (L) 01/14/2022    GFRESTIMATED 9 (L) 03/19/2021    GFRESTBLACK 11 (L) 03/19/2021    CHARLY 9.6 01/14/2022    CHARLY 9.0 03/19/2021    BILITOTAL 0.9 09/01/2021    BILITOTAL 1.4 (H) 03/01/2021    ALBUMIN 3.8 09/01/2021    ALBUMIN 3.4 03/01/2021     ALKPHOS 152 (H) 09/01/2021    ALKPHOS 199 (H) 03/01/2021    ALT 18 09/01/2021    ALT 19 03/01/2021    AST 8 09/01/2021    AST 15 03/01/2021        INR RESULTS:  Lab Results   Component Value Date    INR 1.18 (H) 08/25/2021    INR 1.24 (H) 03/01/2021       No components found for: CK  Lab Results   Component Value Date    MAG 2.2 09/01/2021    MAG 2.3 12/14/2020     Lab Results   Component Value Date    NTBNP 62,990 (H) 07/23/2021     @BRIEFLABR (dig)@    Most recent echocardiogram:  No results found for this or any previous visit (from the past 8760 hour(s)).      Assessment and Plan:    In summary,Murtaza  is a  46 year old male who is here for a follow up  visit to [facility] clinic. He appears hypervolemic today and will have his dialysis run tomorrow as planned.  He is motivated to increase his EF to be eligible for transplant.  Patient has been seen GI specialist outside of [place] who has suggested a gastric neurostimulator.  The patient states that the surgeon would like to get an opinion from cardiology regarding the surgery.  An appointment will be made for the next available with Dr. Vogel.  Currently the patient's blood pressures are ranging from 113-140's systolic for both dialysis and nondialysis days.  The patient's blood pressure used to range anywhere from 170s to 140s systolic prior.    1.  Chronic sytolic heart failure secondary to ICM .  Stage C  NYHA Class III  ACEi/ARB: - Lisinopril 20 mg on Dialysis days and 40 mg on non-dialysis days  BB: yes- Coreg 25 mg BID   Aldosterone antagonist: contraindicated due to renal dysfunction  SCD prophylaxis: decision deferred during medication uptitration  Fluid status: hypervolemic- dialysis patient   Anticoagulation:   Antiplatelet:  ASA dose   Sleep apnea:  NSAID use:  Contraindicated.  Avoid use.  Remote Monitoring:  SGLT-2- not candidate DM Type I.     2.  Other comordbid conditions:      #.  Hypertension.        He says his blood pressures tend to run  quite high, which would not be optimal in terms of his existing cardiomyopathy and may explain at least partially why his ejection fraction dropped from October to 12/2020.       if his blood pressures are not in the goal range of 115 mmHg systolic. We will plan on increasing carvedilol today.  He is at max dose Lisinopril on non-dialysis days and tolerating 20 mg on dialysis days. He did have a low BP standing yesterday and was asymptomatic.     #.  Hyperlipidemia.        His goal LDL is less than 70.  It most recently was 88 with mildly elevated triglycerides.  We will perform further adjustments.     # DM I - 7/23/21 A1C - 6.9.  Insulin dependant - PCP following       3.  Follow-up   Med changes to be determined after discussion with Dr. Vogel  [facility] in March  Dr. Vogel in February           Sumit ORTEGA, CNP  [facility]

## 2022-01-14 NOTE — LETTER
1/14/2022      RE: Murtaza Fitzgerald  [address]  [facility] [address]       Dear Colleague,    Thank you for the opportunity to participate in the care of your patient, Murtaza Fitzgerald, at the [facility] [place] [facility]. Please see a copy of my visit note below.      HPI: Murtaza is a 46 year old White male with a past medical history of :     1.  Type 1 diabetes since age 11.   2.  Hypertension.   3.  Hyperlipidemia, now status post kidney transplant 11/2008, failed.  Back on dialysis 02/2020.   4.  Status post pancreas transplant in 2009, failed 3 years later.  Currently on insulin.   5.  Depression.   6.  Tobacco abuse in the past.   7.  Knee tumor surgery.   8.  Gastroparesis.      His cardiac history is significant for a moderate ischemic cardiomyopathy.  He was asymptomatic, but found to have a positive stress test, which eventually led a 3-vessel coronary bypass surgery in 2015 (LIMA to LAD, vein graft to OM, vein graft to PDA).     Follow up [facility] appt-  Referred by Dr. Vogel    Recent hospitalization 9/1-9/3- nausea and vomiting with history of gastroparesis.     No SOB at rest unless he has extra fluid. Dialysis T,Th, Sat. No NUÑEZ even with walking longer distances. He has no swelling in the legs, ankles, feet. He can feel distention in the abdomen. BP's have been 113-140's systolic.  They have ordered a pacemaker for his stomach which he will get at some point after the visit with the surgeon.  He says his dry weight after dialysis is 83 kg.  Last A1C 6.9 wants to get on the transplant list.     Denies  NUÑEZ, PND, orthopnea, edema, chest pain, palpitations, lightheadedness, dizziness, near syncopal/syncopal episodes. Murtaza has been following salt and fluid restrictions.      PAST MEDICAL HISTORY:  Past Medical History:   Diagnosis Date     CMV (cytomegalovirus infection) status negative 2011     Coronary  artery disease, non-occlusive     angio showed diffuse disease with no lesions     Diabetes mellitus, type 1     Diagnosed at age 11. Takes Insulin      Dialysis patient (H)     prior to kidney transplant     Dyslipidemia      Gastroesophageal reflux disease      History of blood transfusion      Hypertension      Immunosuppressed status (H)      Kidney transplant status, living related donor           Migraines      Mycotic aneurysm of kidney transplant (H) 2008     Noncompliance with treatment     no labs for one year     Pancreas replaced by transplant (H) 2009    rejection treated with thymo     Pancreatic disease     pancreas transplant     Tobacco abuse ongoing       FAMILY HISTORY:  Family History   Problem Relation Age of Onset     Unknown/Adopted Father      Heart Disease Maternal Grandfather 62     Melanoma No family hx of      Skin Cancer No family hx of        SOCIAL HISTORY:  Social History     Tobacco Use     Smoking status: Former Smoker     Packs/day: 1.00     Years: 20.00     Pack years: 20.00     Types: Vaping Device, Cigarettes     Quit date: 2016     Years since quittin.0     Smokeless tobacco: Never Used     Tobacco comment: E- Cig use since    Substance Use Topics     Alcohol use: Not Currently     Alcohol/week: 0.0 standard drinks     Drug use: No           CURRENT MEDICATIONS:  Current Outpatient Medications   Medication Sig Dispense Refill     aspirin (ASA) 81 MG EC tablet Take 1 tablet (81 mg) by mouth daily       atorvastatin (LIPITOR) 20 MG tablet Take 2 tablets (40 mg) by mouth daily 180 tablet 3     blood glucose (NO BRAND SPECIFIED) test strip Use to test blood sugar 5 times daily or as directed. 200 strip 2     calcium carbonate (TUMS) 500 MG chewable tablet Take 1 chew tab by mouth 2 times daily       carvedilol (COREG) 25 MG tablet Take 1 tablet (25 mg) by mouth 2 times daily (with meals) 60 tablet 11     Continuous Blood Gluc Sensor (DEXCOM G6 SENSOR)  MISC Change every 10 days. 3 each 11     Continuous Blood Gluc Transmit (DEXCOM G6 TRANSMITTER) MISC Change every 3 months. 1 each 3     Insulin Aspart FlexPen 100 UNIT/ML SOPN USE 1 UNIT FOR EVERY 20 OVER BLOOD GLUCOSE  PER SLIDING SCALE AS DIRECTED. AVERAGE USE: 45UNITS DAILY 45 mL 0     insulin glargine (LANTUS SOLOSTAR) 100 UNIT/ML pen Inject 22 Units Subcutaneous At Bedtime 9 mL 5     insulin pen needle (BD ARPITA U/F) 32G X 4 MM miscellaneous Use 4 daily or as directed. 120 each 0     lisinopril (ZESTRIL) 20 MG tablet Take one tablet (20mg) on dialysis days (Tues, Thursday and Saturday), and take two tablets (40mg) on non-dialysis days (Mondays, Wednesdays, Fridays and Sundays) 180 tablet 3     naproxen sodium 220 MG capsule Take 220 mg by mouth as needed        omeprazole (PRILOSEC) 40 MG DR capsule Take 2 capsules (80 mg) by mouth every evening 180 capsule 11     ondansetron (ZOFRAN-ODT) 8 MG ODT tab DISSOLVE 1 TABLET ON TOP OF TONGUE EVERY 8 HOURS FOR 2 DAYS AS NEEDED FOR NAUSEA AND VOMITING       polyethylene glycol (MIRALAX) 17 GM/Dose powder Take 17 g by mouth daily as needed for constipation 850 g 0     prochlorperazine (COMPAZINE) 10 MG tablet Take 10 mg by mouth       acetone, Urine, test STRP 1 strip by In Vitro route daily (Patient not taking: Reported on 7/23/2021) 50 each 3     calcium acetate (PHOSLO) 667 MG CAPS capsule Take 667 mg by mouth 3 times daily (with meals)  (Patient not taking: Reported on 9/17/2021)       Insulin Disposable Pump (OMNIPOD DASH 5 PACK PODS) MISC 1 each every 48 hours (Patient not taking: Reported on 7/23/2021) 5 each 11     metoclopramide (REGLAN) 5 MG tablet Take 1 tablet (5 mg) by mouth 4 times daily (before meals and nightly) (Patient not taking: Reported on 1/14/2022) 120 tablet 11       ROS:  Review Of Systems  Skin: negative  Eyes: negative  Ears/Nose/Throat: negative  Respiratory: No shortness of breath, dyspnea on exertion, cough, or  "hemoptysis  Cardiovascular: negative  Gastrointestinal: negative  Genitourinary: negative  Musculoskeletal: negative  Neurologic: negative  Psychiatric: negative  Hematologic/Lymphatic/Immunologic: negative  Endocrine: negative      EXAM:  BP (!) 152/84 (BP Location: Right arm, Patient Position: Sitting, Cuff Size: Adult Regular)   Pulse 68   Ht 1.742 m (5' 8.58\")   Wt 88.9 kg (195 lb 14.4 oz)   SpO2 98%   BMI 29.28 kg/m    Home weight:  General: alert, articulate, and in no acute distress.  HEENT: normocephalic, atraumatic, anicteric sclera, EOMI, mucosa moist, no cyanosis.   Neck: neck supple.  No adenopathy, masses, or carotid bruits.  JVP elevated 10  Heart: regular rhythm, normal S1/S2, no murmur, gallop, rub.  Precordium quiet with normal PMI.     Lungs: clear, no rales, ronchi, or wheezing.  No accessory muscle use, respirations unlabored.   Abdomen: soft, non-tender, bowel sounds present, no hepatomegaly  Extremities: no pitting edema.   No cyanosis.     Neurological: alert and oriented x 3.  normal speech and affect, no gross motor deficits  Skin:  No ecchymoses, rashes, or clubbing.    Labs:  CBC RESULTS:  Lab Results   Component Value Date    WBC 4.9 09/03/2021    WBC 6.7 03/03/2021    RBC 3.42 (L) 09/03/2021    RBC 3.47 (L) 03/03/2021    HGB 11.0 (L) 09/03/2021    HGB 11.1 (L) 03/03/2021    HCT 32.1 (L) 09/03/2021    HCT 34.3 (L) 03/03/2021    MCV 94 09/03/2021    MCV 99 03/03/2021    MCH 32.2 09/03/2021    MCH 32.0 03/03/2021    MCHC 34.3 09/03/2021    MCHC 32.4 03/03/2021    RDW 14.5 09/03/2021    RDW 15.1 (H) 03/03/2021     09/03/2021     03/03/2021       CMP RESULTS:  Lab Results   Component Value Date     01/14/2022     (L) 03/19/2021    POTASSIUM 4.8 01/14/2022    POTASSIUM 4.1 03/19/2021    CHLORIDE 95 01/14/2022    CHLORIDE 90 (L) 03/19/2021    CO2 32 01/14/2022    CO2 32 03/19/2021    ANIONGAP 9 01/14/2022    ANIONGAP 8 03/19/2021     (H) 01/14/2022    GLC " 272 (H) 03/19/2021    BUN 36 (H) 01/14/2022    BUN 38 (H) 03/19/2021    CR 7.32 (H) 01/14/2022    CR 6.59 (H) 03/19/2021    GFRESTIMATED 9 (L) 01/14/2022    GFRESTIMATED 9 (L) 03/19/2021    GFRESTBLACK 11 (L) 03/19/2021    CHARLY 9.6 01/14/2022    CHARLY 9.0 03/19/2021    BILITOTAL 0.9 09/01/2021    BILITOTAL 1.4 (H) 03/01/2021    ALBUMIN 3.8 09/01/2021    ALBUMIN 3.4 03/01/2021    ALKPHOS 152 (H) 09/01/2021    ALKPHOS 199 (H) 03/01/2021    ALT 18 09/01/2021    ALT 19 03/01/2021    AST 8 09/01/2021    AST 15 03/01/2021        INR RESULTS:  Lab Results   Component Value Date    INR 1.18 (H) 08/25/2021    INR 1.24 (H) 03/01/2021       No components found for: CK  Lab Results   Component Value Date    MAG 2.2 09/01/2021    MAG 2.3 12/14/2020     Lab Results   Component Value Date    NTBNP 62,990 (H) 07/23/2021     @BRIEFLABR (dig)@    Most recent echocardiogram:  No results found for this or any previous visit (from the past 8760 hour(s)).      Assessment and Plan:    In summary,Murtaza  is a  46 year old male who is here for a follow up  visit to [facility] clinic. He appears hypervolemic today and will have his dialysis run tomorrow as planned.  He is motivated to increase his EF to be eligible for transplant.  Patient has been seen GI specialist outside of [place] who has suggested a gastric neurostimulator.  The patient states that the surgeon would like to get an opinion from cardiology regarding the surgery.  An appointment will be made for the next available with Dr. Vogel.  Currently the patient's blood pressures are ranging from 140's-113's systolic for both dialysis and nondialysis days.  The patient's blood pressure used to range anywhere from 170s to 140s systolic prior.    1.  Chronic sytolic heart failure secondary to ICM .  Stage C  NYHA Class III  ACEi/ARB: - Lisinopril 20 mg on Dialysis days and 40 mg on non-dialysis days  BB: yes- Coreg 25 mg BID   Aldosterone antagonist: contraindicated due to renal  dysfunction  SCD prophylaxis: decision deferred during medication uptitration  Fluid status: hypervolemic- dialysis patient   Anticoagulation:   Antiplatelet:  ASA dose   Sleep apnea:  NSAID use:  Contraindicated.  Avoid use.  Remote Monitoring:  SGLT-2- not candidate DM Type I.     2.  Other comordbid conditions:      #.  Hypertension.        He says his blood pressures tend to run quite high, which would not be optimal in terms of his existing cardiomyopathy and may explain at least partially why his ejection fraction dropped from October to 12/2020.       if his blood pressures are not in the goal range of 115 mmHg systolic. We will plan on increasing carvedilol today.  He is at max dose Lisinopril on non-dialysis days and tolerating 20 mg on dialysis days. He did have a low BP standing yesterday and was asymptomatic.     #.  Hyperlipidemia.        His goal LDL is less than 70.  It most recently was 88 with mildly elevated triglycerides.  We will perform further adjustments.     # DM I - 7/23/21 A1C - 6.9.  Insulin dependant - PCP following       3.  Follow-up   Med changes to be determined after discussion with Dr. Vogel  [facility] in March  Dr. Vogel in February           Sumit ORTEGA, CNP  [facility]

## 2022-01-14 NOTE — NURSING NOTE
Return Appointment: Patient given instructions regarding scheduling next clinic visit. Patient demonstrated understanding of this information and agreed to call with further questions or concerns. [facility] in March. Please see Dr. Vogel next available.    Patient stated he understood all health information given and agreed to call with further questions or concerns.    Taylor Perez RN

## 2022-01-15 ENCOUNTER — HEALTH MAINTENANCE LETTER (OUTPATIENT)
Age: 47
End: 2022-01-15

## 2022-01-18 ENCOUNTER — VIRTUAL VISIT (OUTPATIENT)
Dept: CARDIOLOGY | Facility: CLINIC | Age: 47
End: 2022-01-18
Payer: COMMERCIAL

## 2022-01-18 DIAGNOSIS — I10 BENIGN ESSENTIAL HYPERTENSION: ICD-10-CM

## 2022-01-18 DIAGNOSIS — I50.22 CHRONIC SYSTOLIC HEART FAILURE (H): ICD-10-CM

## 2022-01-18 PROCEDURE — 99215 OFFICE O/P EST HI 40 MIN: CPT | Mod: 95 | Performed by: INTERNAL MEDICINE

## 2022-01-18 RX ORDER — CARVEDILOL 25 MG/1
37.5 TABLET ORAL 2 TIMES DAILY WITH MEALS
Qty: 360 TABLET | Refills: 3 | Status: SHIPPED | OUTPATIENT
Start: 2022-01-18 | End: 2022-01-01

## 2022-01-18 NOTE — PROGRESS NOTES
"Murtaza Fitzgerald is a 46 year old who is being evaluated via a billable video visit.      How would you like to obtain your AVS? MyChart  If the video visit is dropped, the invitation should be resent by: Text to cell phone: [phone]  Will anyone else be joining your video visit? No  {If patient encounters technical issues they should call [phone]    Sriram Rabago, [facility]  Clinic Support  [facility]    [phone]    The patient has been notified of following:     \"This video visit will be conducted via a call between you and your physician/provider. We have found that certain health care needs can be provided without the need for an in-person physical exam.  This service lets us provide the care you need with a video conversation.  If a prescription is necessary we can send it directly to your pharmacy.  If lab work is needed we can place an order for that and you can then stop by our lab to have the test done at a later time.    Video visits are billed at different rates depending on your insurance coverage.  Please reach out to your insurance provider with any questions.    If during the course of the call the physician/provider feels a video visit is not appropriate, you will not be charged for this service.\"    Patient has given verbal consent for video visit? Yes    How would you like to obtain your AVS? Mail    The patient has been notified of following:     \"This phone visit will be conducted via a call between you and your physician/provider. We have found that certain health care needs can be provided without the need for an in-person physical exam.  This service lets us provide the care you need with a phone conversation.  If a prescription is necessary we can send it directly to your pharmacy.  If lab work is needed we can place an order for that and you can then stop by our lab to have the test done at a later time.    Phone visits are billed at different rates " "depending on your insurance coverage.  Please reach out to your insurance provider with any questions.    If during the course of the call the physician/provider feels a phone visit is not appropriate, you will not be charged for this service.\"    Patient has given verbal consent for phone visit? Yes    How would you like to obtain your AVS? MyChart    Duration of call:19min    Total visit time (including visit time, charting, chart review, coordination of care, etc.=45min    See dictation #[id number]    CSN#:[id number]    "

## 2022-01-18 NOTE — PATIENT INSTRUCTIONS
1.  Echo within next few weeks    2.  Patient to increase carvedilol to 37.5mg po BID on non-dialysis days    3.  F/u TBD pending echo results

## 2022-01-18 NOTE — PROGRESS NOTES
Visit Date: 2022    D: 2022   T: 2022   MT: [facility]    Name:     DOUG PAIZ  MRN:      [id number]        Account:    [id number]   :      1975           Visit Date: 2022     Document: [id number]      Dear Dr. Ríos:     It was a pleasure participating in the care of your patient, Mr. Doug Fitzgerald.  As you know, he is a 46-year-old gentleman who I spoke to over the phone today regarding hypertensive cardiomyopathy, coronary artery disease, hypertension and hyperlipidemia as well as in preoperative evaluation prior to gastric stimulator device and prior to a second kidney and pancreas transplant.    PAST MEDICAL HISTORY:      1.  Type 1 diabetes since age 11.  2.  Hypertension.  3.  Hyperlipidemia.  4.  Status post kidney transplant 2008, failed on dialysis since 2020.  5.  Status post pancreas transplant , failed 3 years later, currently on insulin.  6.  Depression.  7.  Tobacco abuse in the past.  8.  Knee tumor surgery.  9.  Gastroparesis.  10.  Neuropathy.    The patient's cardiac history is significant for known coronary disease and decreased LV systolic function.  He had a positive stress test that led to 3-vessel coronary bypass surgery in  (LIMA to LAD, vein graft to OM, vein graft to PDA).    He had another positive stress test recently and eventually had a coronary angiogram 2021, that revealed the following:     Left main 45% lesion.  LAD chronically occluded proximally.  Circumflex 80% proximal lesion.  RCA .    LIMA to LAD patent.  SVG to OM3 patent.  SVG to right PDA chronically occluded.    Medical therapy was recommended.    Echocardiogram 10/2020, had revealed an ejection fraction in the 50-55% range.  However, repeat echo in 2020 revealed a decrease in LV systolic function to a 37% range.    We were optimizing blood pressure medicine and heart failure regimen therapy.  He is currently on lisinopril 20 on dialysis  days and 40 on nondialysis days with carvedilol 25 twice daily.  He says his blood pressures on dialysis days run in the 117 mmHg range while on nondialysis days, they run in the 140 mmHg range.    He does not exercise on a regular basis.  He usually only walks around his home, but denies any chest pain or shortness of breath or any change in his exertional capacity over the past 6 months. He denies other PND, orthopnea, edema, palpitations, syncope or near syncope.    MEDICATIONS:     1.  Aspirin 81 mg a day.  2.  Lipitor 40 mg a day.  3.  Carvedilol 25 mg twice daily.  4.  Insulin.  5.  Lisinopril 20 on dialysis days, 40 on nondialysis days.    PHYSICAL EXAMINATION:      VITAL SIGNS:  His blood pressures on dialysis days at 117 systolic.  Nondialysis days 140s.  GENERAL:  His decision-making capabilities are intact.  PSYCHIATRIC:  He is alert and oriented x3.  RESPIRATORY:  He is breathing comfortably without gross cough.    The remainder of the comprehensive physical exam was deferred secondary to the COVID-19 pandemic and secondary to telephone visit restrictions.    LABORATORY:  11/12/2021:  LDL 53.  On 01/14/2022 potassium 4.8, GFR 9.    Echocardiogram 12/06/2020, reveals an ejection fraction of 37%.  No significant valvular pathology identified.  EF decreased since 10/02/2020.      IMPRESSION:      Tyrell is a 46-year-old gentleman whose past medical history is significant for a kidney transplant in 2008, but subsequently failed currently on dialysis, who also had a pancreas transplant in 2009, that subsequently failed, currently on insulin, who has several active issues:    1.  Moderate cardiomyopathy, likely at least partially hypertensive related.    Ejection fraction dropped from the 50-55% range to the 37% range from October through 12/2020.  Possibly related to uncontrolled hypertension and  we are continuing to attempt to optimize blood pressure control.    2.  Coronary artery disease.    The patient  "had 3-vessel coronary bypass surgery performed in 2015 (LIMA to LAD, vein graft to OM, vein graft to PDA).  A coronary angiogram on 03/03/2021 revealed that his LIMA to LAD is patent.  SVG to OM3 patent.  However, the SVG to the right PDA is chronically occluded and native right coronary artery is also chronically occluded.  Medical therapy was recommended by the interventional team.      He is currently asymptomatic at a low level of exertion.  No change in his overall exertional capacity since his angiogram  in March.  He appears stable from this standpoint at the present time.    3.  Hypertension.    Blood pressures are elevated in the 140s on nondialysis days, but are well controlled on dialysis days at 117.  Will attempt to perform nonstandard adjustments to hopefully improve control.    4.  Hyperlipidemia.  Goal LDL less than 70 achieved.  Continue to monitor.      PLAN:       1.  Increase carvedilol on nondialysis days to 37.5 twice daily.  Goal systolic blood pressures in the 115 mmHg range without symptoms.    2.  Repeat echo to ensure no significant structural changes and also to check current status of overall LV systolic function.    I anticipate that if there is no significant change or in fact if the ejection fraction has improved, then he would be approved for his gastric stimulator procedure at increased, but acceptable perioperative risk for events.      Note that when he did have his coronary angiogram on 03/03/2021, \"from the interventional standpoint the disease appeared stable in the LIMA to LAD and SVG to OM appear patent.  While the SVG to right PDA is down, there is collateral filling from the left\"      The interventional team, specifically commented that no findings on that day's angiogram warranted percutaneous intervention prior to renal transplantation\" and this would also apply to his gastric stimulator procedure as well.    Further updates to follow pending echo results.      Once " "again, it was a pleasure participating in the care of your patient, Mr. Doug Fitzgerald.  Please feel free to contact me anytime if any questions regarding his care in the future.          Yovany Vogel MD    Addendum 22:    Echo reveals EF 50-55%, no gross valvular pathology,  EF improved from 35-40% range in 20    Impression:    HFimpEF (35 improved to 50-55%)    Plan:    1.  Patient is approved for his procedure at increased but acceptable perioperative risk for event    Note that when he did have his coronary angiogram on 2021, \"from the interventional standpoint the disease appeared stable in the LIMA to LAD and SVG to OM appear patent.  While the SVG to right PDA is down, there is collateral filling from the left\"      The interventional team, specifically commented that no findings on that day's angiogram warranted percutaneous intervention prior to renal transplantation\" and this would also apply to his gastric stimulator procedure as well.        D: 2022   T: 2022   MT: [facility]    Name:     DOUG PAIZ  MRN:      [id number]        Account:    [id number]   :      1975           Visit Date: 2022     Document: [id number]    "

## 2022-01-20 ENCOUNTER — TELEPHONE (OUTPATIENT)
Dept: NEPHROLOGY | Facility: CLINIC | Age: 47
End: 2022-01-20
Payer: COMMERCIAL

## 2022-01-20 DIAGNOSIS — T82.898A PROBLEM WITH DIALYSIS ACCESS, INITIAL ENCOUNTER (H): Primary | ICD-10-CM

## 2022-01-20 NOTE — TELEPHONE ENCOUNTER
----- Message from Michelle Schneider DO sent at 1/20/2022  7:48 AM CST -----  Please get patient scheduled for fistulogram - elevated DVPs, left arm swelling - also had last fistulogram in late August 2021 and at that time it was recommended to get a fistulogram 3 months later so overdue.     Thank you,  Michelle Schneider,

## 2022-01-20 NOTE — TELEPHONE ENCOUNTER
Per Dr. Schneider, put in order for fistulogram of LUE AVG created on 10/7/2020 by Dr. Guzman.  Last fistulogram on 8/25/21 and was recommended to repeat in 3 months.  Pt experiencing increasing DVP's and left arm swelling.    Sent request to schedule within 1 week on non-dialysis day to IR and scheduling pool.    BETH LEWIS RN on 1/20/2022 at 2:46 PM  Dialysis Access Care Coordinator  Phone: [phone]

## 2022-01-21 NOTE — PROGRESS NOTES
Outpatient IR Referral    Patient is a 47 y/o male with a PMH of tobacco use, gastroparesis, HTN, DM I, HFrEF, ischemic cardiomyopathy, CAD s/p CABG 2015ESRD on HD, hx of renal transplant 2008, mycotic aneurysm of renal transplant, failed pancreas transplant 2009. Patient has a LUE brachiocephalic AV bovine graft created 10/7/2020.  Last fistulogram 8/25/21 due to post dialysis bleeding. IR has been asked for a fistulogram due to prolonged bleeding, poor runs,  increased DVPs, arm swelling.      8/25/21 Dialysis PTA  Impression:   1. Patent left upper extremity brachiocephalic. Areas of stenosis at  the venous outflow dilated to 8 mm with no significant stenosis  remaining.  2. Areas of stenosis in the central veins dilated to 12 mm. Subsequent  improved flow and decrease narrowing.     Plan:   1. Patient to return to interventional radiology for maintenance  fistulogram in 3 months, or sooner if needed.    Procedure order placed.    Primary team Dr. Schneider made aware of IR recommendations via epic messaging.     JORDAN Barrios CNP  Interventional Radiology   IR on-call pager: [phone]

## 2022-01-24 DIAGNOSIS — Z11.59 ENCOUNTER FOR SCREENING FOR OTHER VIRAL DISEASES: Primary | ICD-10-CM

## 2022-01-24 RX ORDER — HEPARIN SODIUM 200 [USP'U]/100ML
1 INJECTION, SOLUTION INTRAVENOUS CONTINUOUS PRN
Status: CANCELLED | OUTPATIENT
Start: 2022-01-24

## 2022-01-25 ENCOUNTER — TELEPHONE (OUTPATIENT)
Dept: INTERVENTIONAL RADIOLOGY/VASCULAR | Facility: CLINIC | Age: 47
End: 2022-01-25
Payer: COMMERCIAL

## 2022-01-28 ENCOUNTER — LAB (OUTPATIENT)
Dept: LAB | Facility: CLINIC | Age: 47
End: 2022-01-28
Payer: COMMERCIAL

## 2022-01-28 DIAGNOSIS — Z11.59 ENCOUNTER FOR SCREENING FOR OTHER VIRAL DISEASES: ICD-10-CM

## 2022-01-28 PROCEDURE — U0005 INFEC AGEN DETEC AMPLI PROBE: HCPCS

## 2022-01-28 PROCEDURE — U0003 INFECTIOUS AGENT DETECTION BY NUCLEIC ACID (DNA OR RNA); SEVERE ACUTE RESPIRATORY SYNDROME CORONAVIRUS 2 (SARS-COV-2) (CORONAVIRUS DISEASE [COVID-19]), AMPLIFIED PROBE TECHNIQUE, MAKING USE OF HIGH THROUGHPUT TECHNOLOGIES AS DESCRIBED BY CMS-2020-01-R: HCPCS

## 2022-01-29 LAB — SARS-COV-2 RNA RESP QL NAA+PROBE: NEGATIVE

## 2022-01-31 ENCOUNTER — HOSPITAL ENCOUNTER (OUTPATIENT)
Facility: CLINIC | Age: 47
Discharge: HOME | End: 2022-01-31
Attending: INTERNAL MEDICINE | Admitting: RADIOLOGY
Payer: COMMERCIAL

## 2022-01-31 ENCOUNTER — APPOINTMENT (OUTPATIENT)
Dept: MEDSURG UNIT | Facility: CLINIC | Age: 47
End: 2022-01-31
Attending: INTERNAL MEDICINE
Payer: COMMERCIAL

## 2022-01-31 ENCOUNTER — APPOINTMENT (OUTPATIENT)
Dept: INTERVENTIONAL RADIOLOGY/VASCULAR | Facility: CLINIC | Age: 47
End: 2022-01-31
Attending: INTERNAL MEDICINE
Payer: COMMERCIAL

## 2022-01-31 VITALS
HEIGHT: 69 IN | DIASTOLIC BLOOD PRESSURE: 88 MMHG | TEMPERATURE: 97.9 F | HEART RATE: 70 BPM | BODY MASS INDEX: 27.76 KG/M2 | RESPIRATION RATE: 16 BRPM | WEIGHT: 187.39 LBS | OXYGEN SATURATION: 98 % | SYSTOLIC BLOOD PRESSURE: 178 MMHG

## 2022-01-31 DIAGNOSIS — T82.898A PROBLEM WITH DIALYSIS ACCESS, INITIAL ENCOUNTER (H): ICD-10-CM

## 2022-01-31 LAB
ANION GAP SERPL CALCULATED.3IONS-SCNC: 8 MMOL/L (ref 3–14)
APTT PPP: 35 SECONDS (ref 22–38)
BUN SERPL-MCNC: 51 MG/DL (ref 7–30)
CALCIUM SERPL-MCNC: 9.5 MG/DL (ref 8.5–10.1)
CHLORIDE BLD-SCNC: 94 MMOL/L (ref 94–109)
CO2 SERPL-SCNC: 29 MMOL/L (ref 20–32)
CREAT SERPL-MCNC: 8.73 MG/DL (ref 0.66–1.25)
ERYTHROCYTE [DISTWIDTH] IN BLOOD BY AUTOMATED COUNT: 15.3 % (ref 10–15)
GFR SERPL CREATININE-BSD FRML MDRD: 7 ML/MIN/1.73M2
GLUCOSE BLD-MCNC: 244 MG/DL (ref 70–99)
HCT VFR BLD AUTO: 29 % (ref 40–53)
HGB BLD-MCNC: 9.4 G/DL (ref 13.3–17.7)
INR PPP: 1.26 (ref 0.85–1.15)
MCH RBC QN AUTO: 32.9 PG (ref 26.5–33)
MCHC RBC AUTO-ENTMCNC: 32.4 G/DL (ref 31.5–36.5)
MCV RBC AUTO: 101 FL (ref 78–100)
PLATELET # BLD AUTO: 145 10E3/UL (ref 150–450)
POTASSIUM BLD-SCNC: 5.7 MMOL/L (ref 3.4–5.3)
RBC # BLD AUTO: 2.86 10E6/UL (ref 4.4–5.9)
SODIUM SERPL-SCNC: 131 MMOL/L (ref 133–144)
WBC # BLD AUTO: 5.9 10E3/UL (ref 4–11)

## 2022-01-31 PROCEDURE — C1887 CATHETER, GUIDING: HCPCS

## 2022-01-31 PROCEDURE — C1725 CATH, TRANSLUMIN NON-LASER: HCPCS

## 2022-01-31 PROCEDURE — C2623 CATH, TRANSLUMIN, DRUG-COAT: HCPCS

## 2022-01-31 PROCEDURE — 85610 PROTHROMBIN TIME: CPT | Performed by: NURSE PRACTITIONER

## 2022-01-31 PROCEDURE — 250N000011 HC RX IP 250 OP 636: Performed by: STUDENT IN AN ORGANIZED HEALTH CARE EDUCATION/TRAINING PROGRAM

## 2022-01-31 PROCEDURE — 85730 THROMBOPLASTIN TIME PARTIAL: CPT | Performed by: NURSE PRACTITIONER

## 2022-01-31 PROCEDURE — 272N000302 HC DEVICE INFLATION CR5

## 2022-01-31 PROCEDURE — 76937 US GUIDE VASCULAR ACCESS: CPT | Mod: 26 | Performed by: RADIOLOGY

## 2022-01-31 PROCEDURE — C1769 GUIDE WIRE: HCPCS

## 2022-01-31 PROCEDURE — 80048 BASIC METABOLIC PNL TOTAL CA: CPT | Performed by: NURSE PRACTITIONER

## 2022-01-31 PROCEDURE — 99152 MOD SED SAME PHYS/QHP 5/>YRS: CPT | Mod: GC | Performed by: RADIOLOGY

## 2022-01-31 PROCEDURE — 36902 INTRO CATH DIALYSIS CIRCUIT: CPT

## 2022-01-31 PROCEDURE — 85027 COMPLETE CBC AUTOMATED: CPT | Performed by: NURSE PRACTITIONER

## 2022-01-31 PROCEDURE — 36415 COLL VENOUS BLD VENIPUNCTURE: CPT | Performed by: NURSE PRACTITIONER

## 2022-01-31 PROCEDURE — 36902 INTRO CATH DIALYSIS CIRCUIT: CPT | Mod: GC | Performed by: RADIOLOGY

## 2022-01-31 PROCEDURE — 255N000002 HC RX 255 OP 636: Performed by: RADIOLOGY

## 2022-01-31 PROCEDURE — 272N000504 HC NEEDLE CR4

## 2022-01-31 PROCEDURE — 258N000003 HC RX IP 258 OP 636: Performed by: NURSE PRACTITIONER

## 2022-01-31 PROCEDURE — 272N000564 HC SHEATH CR2

## 2022-01-31 PROCEDURE — 36907 BALO ANGIOP CTR DIALYSIS SEG: CPT | Mod: GC | Performed by: RADIOLOGY

## 2022-01-31 PROCEDURE — 99207 PR SATISFY VISIT NUMBER: CPT | Performed by: RADIOLOGY

## 2022-01-31 PROCEDURE — 999N000142 HC STATISTIC PROCEDURE PREP ONLY

## 2022-01-31 PROCEDURE — 99153 MOD SED SAME PHYS/QHP EA: CPT

## 2022-01-31 PROCEDURE — 36907 BALO ANGIOP CTR DIALYSIS SEG: CPT

## 2022-01-31 PROCEDURE — 999N000134 HC STATISTIC PP CARE STAGE 3

## 2022-01-31 PROCEDURE — 250N000011 HC RX IP 250 OP 636: Performed by: NURSE PRACTITIONER

## 2022-01-31 PROCEDURE — 250N000009 HC RX 250: Performed by: STUDENT IN AN ORGANIZED HEALTH CARE EDUCATION/TRAINING PROGRAM

## 2022-01-31 RX ORDER — NALOXONE HYDROCHLORIDE 0.4 MG/ML
0.2 INJECTION, SOLUTION INTRAMUSCULAR; INTRAVENOUS; SUBCUTANEOUS
Status: DISCONTINUED | OUTPATIENT
Start: 2022-01-31 | End: 2022-01-31 | Stop reason: HOSPADM

## 2022-01-31 RX ORDER — LIDOCAINE 40 MG/G
CREAM TOPICAL
Status: DISCONTINUED | OUTPATIENT
Start: 2022-01-31 | End: 2022-01-31 | Stop reason: HOSPADM

## 2022-01-31 RX ORDER — SODIUM CHLORIDE 9 MG/ML
INJECTION, SOLUTION INTRAVENOUS CONTINUOUS
Status: DISCONTINUED | OUTPATIENT
Start: 2022-01-31 | End: 2022-01-31 | Stop reason: HOSPADM

## 2022-01-31 RX ORDER — FENTANYL CITRATE 50 UG/ML
25-50 INJECTION, SOLUTION INTRAMUSCULAR; INTRAVENOUS EVERY 5 MIN PRN
Status: DISCONTINUED | OUTPATIENT
Start: 2022-01-31 | End: 2022-01-31 | Stop reason: HOSPADM

## 2022-01-31 RX ORDER — NALOXONE HYDROCHLORIDE 0.4 MG/ML
0.4 INJECTION, SOLUTION INTRAMUSCULAR; INTRAVENOUS; SUBCUTANEOUS
Status: DISCONTINUED | OUTPATIENT
Start: 2022-01-31 | End: 2022-01-31 | Stop reason: HOSPADM

## 2022-01-31 RX ORDER — IODIXANOL 320 MG/ML
100 INJECTION, SOLUTION INTRAVASCULAR ONCE
Status: COMPLETED | OUTPATIENT
Start: 2022-01-31 | End: 2022-01-31

## 2022-01-31 RX ORDER — DEXTROSE MONOHYDRATE 25 G/50ML
25-50 INJECTION, SOLUTION INTRAVENOUS
Status: DISCONTINUED | OUTPATIENT
Start: 2022-01-31 | End: 2022-01-31 | Stop reason: HOSPADM

## 2022-01-31 RX ORDER — NICOTINE POLACRILEX 4 MG
15-30 LOZENGE BUCCAL
Status: DISCONTINUED | OUTPATIENT
Start: 2022-01-31 | End: 2022-01-31 | Stop reason: HOSPADM

## 2022-01-31 RX ORDER — ONDANSETRON 2 MG/ML
8 INJECTION INTRAMUSCULAR; INTRAVENOUS ONCE
Status: COMPLETED | OUTPATIENT
Start: 2022-01-31 | End: 2022-01-31

## 2022-01-31 RX ORDER — FLUMAZENIL 0.1 MG/ML
0.2 INJECTION, SOLUTION INTRAVENOUS
Status: DISCONTINUED | OUTPATIENT
Start: 2022-01-31 | End: 2022-01-31 | Stop reason: HOSPADM

## 2022-01-31 RX ADMIN — FENTANYL CITRATE 50 MCG: 50 INJECTION, SOLUTION INTRAMUSCULAR; INTRAVENOUS at 12:16

## 2022-01-31 RX ADMIN — ONDANSETRON 8 MG: 2 INJECTION INTRAMUSCULAR; INTRAVENOUS at 11:38

## 2022-01-31 RX ADMIN — MIDAZOLAM 0.5 MG: 1 INJECTION INTRAMUSCULAR; INTRAVENOUS at 12:54

## 2022-01-31 RX ADMIN — FENTANYL CITRATE 25 MCG: 50 INJECTION, SOLUTION INTRAMUSCULAR; INTRAVENOUS at 12:25

## 2022-01-31 RX ADMIN — SODIUM CHLORIDE: 9 INJECTION, SOLUTION INTRAVENOUS at 10:16

## 2022-01-31 RX ADMIN — FENTANYL CITRATE 25 MCG: 50 INJECTION, SOLUTION INTRAMUSCULAR; INTRAVENOUS at 12:53

## 2022-01-31 RX ADMIN — MIDAZOLAM 1 MG: 1 INJECTION INTRAMUSCULAR; INTRAVENOUS at 12:18

## 2022-01-31 RX ADMIN — LIDOCAINE HYDROCHLORIDE 2 ML: 10 INJECTION, SOLUTION EPIDURAL; INFILTRATION; INTRACAUDAL; PERINEURAL at 12:29

## 2022-01-31 RX ADMIN — IODIXANOL 50 ML: 320 INJECTION, SOLUTION INTRAVASCULAR at 13:28

## 2022-01-31 RX ADMIN — MIDAZOLAM 0.5 MG: 1 INJECTION INTRAMUSCULAR; INTRAVENOUS at 12:31

## 2022-01-31 ASSESSMENT — MIFFLIN-ST. JEOR: SCORE: 1723.76

## 2022-01-31 NOTE — PROGRESS NOTES
Woggle stitched removed earlier by Dr. Vance & tip dressing applied. Scant bleeding developed after 15 minutes. Light pressure applied, area recleaned, & new tip dressing applied. Dr. Vance notified. Dr. Mccormack arrived to assess pt. Another 30 minutes of monitoring. Pt stable.

## 2022-01-31 NOTE — PROGRESS NOTES
Patient Name: Murtaza Fitzgerald  Medical Record Number: [id number]  Today's Date: 1/31/2022    Procedure:   Left upper extremity fistulogram with plasty  Proceduralist:   MD Mendoza., MD Rajiv  Pathology present: n/a    Procedure Start: 1212  Procedure end: 1303  Sedation medications administered: 100 mcg fentanyl and 2 mg versed    Report given to: Tanesha GARCIA   : n/a    Other Notes: Pt arrived to IR room 4 from . Consent reviewed. Pt denies any questions or concerns regarding procedure. Pt positioned supine and monitored per protocol. Pt tolerated procedure without any noted complications. Pt transferred back to .

## 2022-01-31 NOTE — DISCHARGE INSTRUCTIONS
[facility]      Interventional Radiology  Discharge Instructions Following Fistulogram      ? You may resume normal activities as tolerated, but avoid any strenuous activity or heavy lifting (>10 lbs.) involving your access arm.    ? Elevate and rest your arm as much as possible for the first 24 hours after the procedure.  This will promote healing and reduce swelling.     ? If bleeding or oozing should occur, apply fingertip pressure to puncture site to control bleeding, but avoid excessive pressure as this may clot off the fistula.  Hold light pressure for 10 minutes, or until bleeding/oozing is controlled.  If excessive bleeding is noted or if you are having difficulty controlling the bleeding with direct pressure, call 911.     ? If you develop fever, chills, excessive pain/tenderness or drainage at the puncture site, or have questions call your Doctor or Dialysis Center.     ? If you received sedation for your procedure: An adult should stay with you for 24 hours, Do Not drive a motor vehicle, operate machinery, or drink alcoholic beverages for 24 hours.     ? You may resume your normal medications immediately    ? If you notice sudden loss of pulse or thrill (buzzing feeling) in your fistula, contact your Doctor or Dialysis unit immediately.       [facility] INTERVENTIONAL RADIOLOGY DEPARTMENT  Procedure Physician: Dr. Vance                                                        Date of procedure: January 31, 2022    Telephone Numbers: [phone] Monday-Friday 8:00 am to 4:30 pm  [phone] After 4:30 pm Monday-Friday, Weekends & Holidays.   Ask for the Interventional Radiologist on call.  Someone is on call 24 hrs/day  [facility] toll free number: [phone] Monday-Friday 8:00 am to 4:30 pm  [facility] Emergency Dept: [phone]

## 2022-01-31 NOTE — PROGRESS NOTES
Scant bleeding, again, at LUE procedural site. Dr. Vance notified. Light pressure applied, area recleaned, new tip dressing reapplied. Thrill present & palpable. Dr Donaldson arrived to assess pt. Instructed to extend recovery time until 1630 to monitor for any additional bleeding or other complications. Pt declined, expressing want to go home. Dr. Vance notified & arrived to explain risks of leaving against medical advice. Pt verbalized understanding. Pt left unit in WC with this RN & his sister to go to [facility] services to discharge home. Pt stable.

## 2022-01-31 NOTE — PROGRESS NOTES
Returned from IR s/p left arm fistulagram.  VSS.  Denies pain.  Left arm site with woggle in place; strong pulse at fistula.  Left arm swollen and tight.  Sister at bedside.  Resting in bed.

## 2022-01-31 NOTE — PROGRESS NOTES
"Arrived to Unit [facility] for fistulagram procedure in IR. AFVSS. States \"5/10, constant, aching\" pain at LUE, \"it's from the swelling over the past few weeks.\" Pt states last HD run on Saturday was successfully completed without any noted complications. Labs drawn & processing. Pt's sister at bedside. Pt being consented. Stable.   "

## 2022-01-31 NOTE — PROCEDURES
[facility]    Procedure: IR Procedure Note    Date/Time: 1/31/2022 1:06 PM  Performed by: Emanuel Vance DO  Authorized by: Emanuel Vance DO   IR Fellow Physician: Rajiv      UNIVERSAL PROTOCOL   Site Marked: NA  Prior Images Obtained and Reviewed:  Yes  Required items: Required blood products, implants, devices and special equipment available    Patient identity confirmed:  Verbally with patient, arm band, provided demographic data and hospital-assigned identification number  Patient was reevaluated immediately before administering moderate or deep sedation or anesthesia  Confirmation Checklist:  Patient's identity using two indicators, relevant allergies, procedure was appropriate and matched the consent or emergent situation and correct equipment/implants were available  Time out: Immediately prior to the procedure a time out was called    Universal Protocol: the Joint Commission Universal Protocol was followed    Preparation: Patient was prepped and draped in usual sterile fashion       ANESTHESIA    Anesthesia: Local infiltration  Local Anesthetic:  Lidocaine 1% without epinephrine      SEDATION  Patient Sedated: Yes    Sedation Type:  Moderate (conscious) sedation  Sedation:  Midazolam and fentanyl  Vital signs: Vital signs monitored during sedation    See dictated procedure note for full details.  Findings: Multifocal moderate stenosis in the thoracic outlet and in the venous outflow in the left upper extremity.  Uncomplicated central and peripheral venoplasty.    Specimens: none    Complications: None    Condition: Stable    Plan: Bedrest for 1 hour.  Woggle can be removed after 1 hour bedrest.  Fistula is ready for immediate use.      PROCEDURE    Patient Tolerance:  Patient tolerated the procedure well with no immediate complications  Length of time physician/provider present for 1:1 monitoring during sedation: 60

## 2022-01-31 NOTE — PRE-PROCEDURE
GENERAL PRE-PROCEDURE:   Procedure:  Left upper extremity fistulogram with possible intervention  Date/Time:  1/31/2022 10:17 AM    Verbal consent obtained?: Yes    Written consent obtained?: Yes    Risks and benefits: Risks, benefits and alternatives were discussed    Consent given by:  Patient  Patient states understanding of procedure being performed: Yes    Patient's understanding of procedure matches consent: Yes    Procedure consent matches procedure scheduled: Yes    Expected level of sedation:  Moderate  Appropriately NPO:  Yes  ASA Class:  2  Mallampati  :  Grade 2- soft palate, base of uvula, tonsillar pillars, and portion of posterior pharyngeal wall visible  Lungs:  Lungs clear with good breath sounds bilaterally  Heart:  Normal heart sounds and rate  History & Physical reviewed:  History and physical reviewed and no updates needed  Statement of review:  I have reviewed the lab findings, diagnostic data, medications, and the plan for sedation

## 2022-02-16 NOTE — PROGRESS NOTES
No new care gaps identified.  Powered by [facility] by [employer]. Reference number: [id number].   2/16/2022 8:55:15 AM CST   [facility]    Hospitalist Progress Note  Name: Murtaza Fitzgerald    MRN: [id number]  Provider:  Diaz Minaya DO, MPH  Date of Service: 01/28/2020    Summary of Stay: Murtaza Fitzgerald is a 44 year old male with a PMH significant for DM I, CKD s/p kidney transplant 11/2008, pancreatic transplant 2/2009, CAD s/p bypass graft, HTN,  GERD, HLP  who presents with intractable nausea and vomiting with evidence of DKA on admission lab work.      #Ketosis (suspect starvation) with hyperglycemia  #Intractable nausea and vomiting secondary to suspected gastroparesis  #DM1  Patient presents with intractable nausea and vomiting for 24 hours. Has had poor oral intake with minimal insulin use since symptoms started.  Initial glucose of  367 and only improved to 342 after two litres of fluid. Serum ketones positive at 1.7, anion gap normal, venous gas with normal pH but slightly low bicarb at 18 and initial lactic acid elevated at 2.7 but improved to 1.7 after two litres. Started on insulin drip in ED.   -IMC due to insulin drip  -Continue Insulin drip  -Lactated ringers at 100 ml/hr  -Restart Tresiba tonight as normally takes and discontinue insulin drip at that time  -GI consulted  -EGD today  -Continue anti-emetics and scheduled Reglan  -Empiric IV PPI BID     #Acute on chronic kidney disease stage V  Hx of kidney transplant in 11/2008. On chart review note steady decline in kidney function since June 2019 and is back on the transplant list. Creatinine recently has been in the 4 range with current creatine of 5.51 on admit, suspect related to dehydration and poor oral intake.   -Hydrate with LR  -Nephrology consult  -Continue Tacrolimus (goal 4-6) and Mycophenolic acid (goal 1-3.5)  -Missed transplant appt on day of admit, will need to reschedule     #Type 1 diabetes hx of pancreatic transplant 2/2009  Has insulin pump at home but do due to insurance coverage has not been using. Instead using  Tresiba and Novolog Most recent A1c was 6.7 in November.  -Hold home insulin for now     #HTN  Recently stopped Amlodipine due to low blood pressure and pressures elevated here 218/117.  -Restart Amlodipine 5 mg  -Hydralazine PRN     #Anemia of chronic disease  Hemoglobin is 13.1 on admit but I suspect hemoconcentration with dehydration and did trend down but now stable.  -Recheck in AM     #CAD  Hx of severe 3 vessel diseae with CABG in 10/15. Last dobutamine stress test in 7/2019 was normal.     DVT Prophylaxis: Pneumatic Compression Devices  Code Status: Full Code  Diet: NPO per Anesthesia Guidelines for Procedure/Surgery Except for: Meds    Mueller Catheter: not present  Disposition: Expected discharge in 2-3 days to home. Goals prior to discharge include GI work up and BG control.   Incidental Findings: None  Family updated today: No.     Interval History   Assumed care from previous hospitalist. The history was fully reviewed.  The patient reports doing well. No chest pain or shortness of breath. Still nausea but no vomiting, diarrhea, constipation. No fevers. No other specific complaints identified.     -Data reviewed today: I personally reviewed all new labs and imaging results over the last 24 hours.     Physical Exam   Temp: 98.2  F (36.8  C) Temp src: Oral BP: (!) 157/85   Heart Rate: 97 Resp: 18 SpO2: 96 % O2 Device: None (Room air)    Vitals:    01/27/20 0637   Weight: 90.7 kg (200 lb)     Vital Signs with Ranges  Temp:  [98.1  F (36.7  C)-99.1  F (37.3  C)] 98.2  F (36.8  C)  Heart Rate:  [] 97  Resp:  [11-20] 18  BP: (122-198)/() 157/85  SpO2:  [91 %-99 %] 96 %  I/O last 3 completed shifts:  In: 300 [P.O.:300]  Out: 850 [Emesis/NG output:850]    GENERAL: No apparent distress. Awake, alert, and fully oriented.  HEENT: Normocephalic, atraumatic. Extraocular movements intact.  CARDIOVASCULAR: Regular rate and rhythm without murmurs or rubs. No S3.  PULMONARY: Clear  bilaterally.  GASTROINTESTINAL: Soft, non-tender, non-distended. Bowel sounds normoactive.   EXTREMITIES: No cyanosis or clubbing. No edema.  NEUROLOGICAL: CN 2-12 grossly intact, no focal neurological deficits.  DERMATOLOGICAL: No rash, ulcer, bruising, nor jaundice.     Medications     dextrose       insulin (regular) 1 Units/hr (01/28/20 1026)     lactated ringers 75 mL/hr at 01/28/20 0158     - MEDICATION INSTRUCTIONS -       - MEDICATION INSTRUCTIONS -         amLODIPine  5 mg Oral Daily     atorvastatin  40 mg Oral Daily     insulin degludec  24 Units Subcutaneous At Bedtime     metoclopramide  5 mg Oral 4x Daily AC & HS     mycophenolic acid  360 mg Oral BID     pantoprazole (PROTONIX) IV  40 mg Intravenous BID     sodium chloride (PF)  3 mL Intracatheter Q8H     sodium chloride (PF)  3 mL Intracatheter Q8H     tacrolimus  2 mg Oral BID     Data     Laboratory:  Recent Labs   Lab 01/28/20  0629 01/28/20  0018 01/27/20  1744 01/27/20  0641   WBC 11.5*  --   --  10.3   HGB 10.3* 10.3* 11.1* 13.1*   HCT 32.3*  --   --  40.2   MCV 90  --   --  88     --   --  322     Recent Labs   Lab 01/28/20  0629 01/27/20  0642    137   POTASSIUM 4.1 4.1   CHLORIDE 109 107   CO2 22 19*   ANIONGAP 8 11   * 367*   BUN 50* 50*   CR 5.56* 5.51*   GFRESTIMATED 12* 12*   GFRESTBLACK 13* 14*   CHARLY 8.7 9.3     Recent Labs   Lab 01/28/20  1024 01/28/20  0927 01/28/20  0823 01/28/20  0732 01/28/20  0639 01/28/20  0629  01/27/20  0642   GLC  --   --   --   --   --  157*  --  367*   * 155* 156* 146* 148*  --    < >  --     < > = values in this interval not displayed.     No results for input(s): CULT in the last 168 hours.    Imaging:  No results found for this or any previous visit (from the past 24 hour(s)).      Diaz Minaya DO MPH  [facility] Hospitalist  [address] [place] [place] [address]  Pager: [phone]  01/28/2020

## 2022-02-17 PROBLEM — E11.10 DKA (DIABETIC KETOACIDOSIS) (H): Status: RESOLVED | Noted: 2020-05-13 | Resolved: 2020-05-15

## 2022-02-21 DIAGNOSIS — I50.22 CHRONIC SYSTOLIC HEART FAILURE (H): Primary | ICD-10-CM

## 2022-03-09 NOTE — TELEPHONE ENCOUNTER
Looks like that would be 0.25 Moderna 5 months after the third dose, so in June 27th 2022.    Waiting for patient to call back regarding the sleep study as well.  Sharon Zepeda RN  Message handled by Nurse Triage.

## 2022-03-09 NOTE — TELEPHONE ENCOUNTER
"1. COVID Vaccine: **off the subject, but noticed that patient had 3 and not 4 Covid vaccines.  Since he is a transplant patient, he should have 3 primary vaccines and 1 booster.  ( 0, 28 days after the first shot, 28 days after the second shot, 5 months after the last one)    https://www.cdc.gov/vaccines/covid-19/downloads/covid19-vaccine-quick-reference-guide-2pages.pdf    Unable to tell the dose he got per the vaccine report in his chart, but per [facility] he got:    COVID-19  02/02/2021  1 of 2  Spikevax-MOD 100mcg  Full    No    COVID-19  03/16/2021  2 of 2  Spikevax-MOD 100mcg  Full    No    COVID-19  01/27/2022  3 of 2  Spikevax-MOD 50mcg  Full    No      What is recorded is \"FULL\" doses for each, but the dose for the third shot was half: Should have gotten 100mcg each time, not 50mcg ( as it should have not been booster yet)  Given at [facility] dialysis center.    Checked with Karen, clinic [facility] pharmacist and the information above is correct-the third dose should have been 100mcg and not 50mcg.  He should be getting a booster now at least 5 months after the last one of his primary series-if this was a full shot which this was not.  Not sure what to do and what dose he should still receive.  Routing to PCP to direct.**    2.  Sleep study: I called and LM for patient to call back to discuss.  See below.    Sharon Zepeda RN  Message handled by Nurse Triage.                    "

## 2022-03-09 NOTE — TELEPHONE ENCOUNTER
Pt called and LVM on PAL4 direct line. He is looking for a referral for sleep study. It looks like he is due for a physical as well. Routing to PAL3.    Jessica Mckay, EMT at 8:38 AM on March 9, 2022  [facility] Health Guide  [phone]

## 2022-03-15 NOTE — TELEPHONE ENCOUNTER
Medication is being filled for 1 time refill only due to:  Patient needs labs A1c. Patient needs to be seen because due for DM follow up.  Routing to MA/TC pool. The Pt is due for a visit with PCP. Please call and help them schedule.    RN will issue a 90 day supply.       Walter MANCERA RN

## 2022-03-25 NOTE — LETTER
3/25/2022      RE: Murtaza Fitzgerald  [address]  [facility] [address]       Dear Colleague,    Thank you for the opportunity to participate in the care of your patient, Murtaza Fitzgerald, at the [facility] [place] [facility]. Please see a copy of my visit note below.    Tyrell is a 46 year old who is being evaluated via a billable telephone visit.      What phone number would you like to be contacted at? [phone]  How would you like to obtain your AVS? [employer]  Phone call duration: 25 minutes    Isha Lozano, Visit Facilitator/MA.        HPI: Murtaza is a 46 year old White male with a past medical history of :     1.  Type 1 diabetes since age 11.   2.  Hypertension.   3.  Hyperlipidemia, now status post kidney transplant 11/2008, failed.  Back on dialysis 02/2020.   4.  Status post pancreas transplant in 2009, failed 3 years later.  Currently on insulin.   5.  Depression.   6.  Tobacco abuse in the past.   7.  Knee tumor surgery.   8.  Gastroparesis.      His cardiac history is significant for a moderate ischemic cardiomyopathy.  He was asymptomatic, but found to have a positive stress test, which eventually led a 3-vessel coronary bypass surgery in 2015 (LIMA to LAD, vein graft to OM, vein graft to PDA).     Follow up CORE appt-  Referred by Dr. Vogel    Recent hospitalization [facility]  3/22-3/24 DKA type I, COVID 19    No SOB at rest unless he has extra fluid. Dialysis M,W,F now . He doesn't check BP's at home. He says the BP's on dialysis days once he has reached his goal weight has shown that the systolic is less than 115. No NUÑEZ even with walking longer distances. He has no swelling in the legs, ankles, feet. He can feel distention in the abdomen..  They have ordered a pacemaker for his stomach which he will get at some point after the visit with the surgeon.  He says his dry weight after dialysis is 84.5  kg.  Last A1C 6.9 in on the transplant list.     Denies  NUÑEZ, PND, orthopnea, edema, chest pain, palpitations, lightheadedness, dizziness, near syncopal/syncopal episodes. Murtaza has been following salt and fluid restrictions.      PAST MEDICAL HISTORY:  Past Medical History:   Diagnosis Date     CMV (cytomegalovirus infection) status negative      Coronary artery disease, non-occlusive     angio showed diffuse disease with no lesions     Diabetes mellitus, type 1     Diagnosed at age 11. Takes Insulin      Dialysis patient (H)     prior to kidney transplant     Dyslipidemia      Gastroesophageal reflux disease      History of blood transfusion      Hypertension      Immunosuppressed status (H)      Kidney transplant status, living related donor           Migraines      Mycotic aneurysm of kidney transplant (H) 2008     Noncompliance with treatment     no labs for one year     Pancreas replaced by transplant (H) 2009    rejection treated with thymo     Pancreatic disease     pancreas transplant     Tobacco abuse ongoing       FAMILY HISTORY:  Family History   Problem Relation Age of Onset     Unknown/Adopted Father      Heart Disease Maternal Grandfather 62     Melanoma No family hx of      Skin Cancer No family hx of        SOCIAL HISTORY:  Social History     Tobacco Use     Smoking status: Former Smoker     Packs/day: 1.00     Years: 20.00     Pack years: 20.00     Types: Vaping Device, Cigarettes     Quit date: 2016     Years since quittin.2     Smokeless tobacco: Never Used     Tobacco comment: E- Cig use since 2016   Substance Use Topics     Alcohol use: Not Currently     Alcohol/week: 0.0 standard drinks     Drug use: No           CURRENT MEDICATIONS:  Current Outpatient Medications   Medication Sig Dispense Refill     aspirin (ASA) 81 MG EC tablet Take 1 tablet (81 mg) by mouth daily       atorvastatin (LIPITOR) 20 MG tablet Take 2 tablets (40 mg) by mouth daily 180 tablet 3      blood glucose (NO BRAND SPECIFIED) test strip Use to test blood sugar 5 times daily or as directed. 200 strip 2     calcium carbonate (TUMS) 500 MG chewable tablet Take 1 chew tab by mouth 2 times daily       carvedilol (COREG) 25 MG tablet Take 1.5 tablets (37.5 mg) by mouth 2 times daily (with meals) 360 tablet 3     insulin glargine (LANTUS SOLOSTAR) 100 UNIT/ML pen Inject 22 Units Subcutaneous At Bedtime 9 mL 5     insulin pen needle (BD ARPITA U/F) 32G X 4 MM miscellaneous Use 4 daily or as directed. 120 each 0     lisinopril (ZESTRIL) 20 MG tablet Take one tablet (20mg) on dialysis days (Tues, Thursday and Saturday), and take two tablets (40mg) on non-dialysis days (Mondays, Wednesdays, Fridays and Sundays) 180 tablet 3     naproxen sodium 220 MG capsule Take 220 mg by mouth as needed        NOVOLOG FLEXPEN 100 UNIT/ML soln USE 1 UNIT FOR EVERY 20 OVER BLOOD GLUCOSE  PER SLIDING SCALE AS DIRECTED. AVERAGE USE OF 45 UNITS DAILY 45 mL 0     omeprazole (PRILOSEC) 40 MG DR capsule TAKE 2 CAPSULES(80 MG) BY MOUTH EVERY EVENING 180 capsule 0     ondansetron (ZOFRAN-ODT) 8 MG ODT tab DISSOLVE 1 TABLET ON TOP OF TONGUE EVERY 8 HOURS FOR 2 DAYS AS NEEDED FOR NAUSEA AND VOMITING       polyethylene glycol (MIRALAX) 17 GM/Dose powder Take 17 g by mouth daily as needed for constipation 850 g 0     prochlorperazine (COMPAZINE) 10 MG tablet Take 10 mg by mouth       acetone, Urine, test STRP 1 strip by In Vitro route daily (Patient not taking: Reported on 3/25/2022) 50 each 3     calcium acetate (PHOSLO) 667 MG CAPS capsule Take 667 mg by mouth 3 times daily (with meals)  (Patient not taking: Reported on 3/25/2022)       Continuous Blood Gluc Sensor (DEXCOM G6 SENSOR) MISC Change every 10 days. (Patient not taking: Reported on 3/25/2022) 3 each 11     Continuous Blood Gluc Transmit (DEXCOM G6 TRANSMITTER) MISC Change every 3 months. (Patient not taking: Reported on 3/25/2022) 1 each 3     Insulin Disposable Pump  (OMNIPOD DASH 5 PACK PODS) MISC 1 each every 48 hours (Patient not taking: Reported on 3/25/2022) 5 each 11     metoclopramide (REGLAN) 5 MG tablet Take 1 tablet (5 mg) by mouth 4 times daily (before meals and nightly) (Patient not taking: Reported on 3/25/2022) 120 tablet 11       ROS:  Review Of Systems  Skin: negative  Eyes: negative  Ears/Nose/Throat: negative  Respiratory: No shortness of breath, dyspnea on exertion, cough, or hemoptysis  Cardiovascular: negative  Gastrointestinal: negative  Genitourinary: negative  Musculoskeletal: negative  Neurologic: negative  Psychiatric: negative  Hematologic/Lymphatic/Immunologic: negative  Endocrine: negative    EXAM:   Constitutional -  no acute distress   Eyes - no redness or discharge, nonicteric sclera  Respiratory - nonlabored breathing, able to speak in full sentences without difficulty  CV: no visible edema of visualized upper extremities.  Neurological - alert and oriented, speech fluent/appropriate  PSYCH: cooperative, affect appropriate  DERM: no rashes on visualized face/neck/upper extremities     The rest of a comprehensive physical examination is deferred due to PHE (public health emergency) video visit restrictions  Labs:  CBC RESULTS:  Lab Results   Component Value Date    WBC 5.9 01/31/2022    WBC 6.7 03/03/2021    RBC 2.86 (L) 01/31/2022    RBC 3.47 (L) 03/03/2021    HGB 9.4 (L) 01/31/2022    HGB 11.1 (L) 03/03/2021    HCT 29.0 (L) 01/31/2022    HCT 34.3 (L) 03/03/2021     (H) 01/31/2022    MCV 99 03/03/2021    MCH 32.9 01/31/2022    MCH 32.0 03/03/2021    MCHC 32.4 01/31/2022    MCHC 32.4 03/03/2021    RDW 15.3 (H) 01/31/2022    RDW 15.1 (H) 03/03/2021     (L) 01/31/2022     03/03/2021       CMP RESULTS:  Lab Results   Component Value Date     (L) 01/31/2022     (L) 03/19/2021    POTASSIUM 5.7 (H) 01/31/2022    POTASSIUM 4.1 03/19/2021    CHLORIDE 94 01/31/2022    CHLORIDE 90 (L) 03/19/2021    CO2 29 01/31/2022    CO2  32 03/19/2021    ANIONGAP 8 01/31/2022    ANIONGAP 8 03/19/2021     (H) 01/31/2022     (H) 03/19/2021    BUN 51 (H) 01/31/2022    BUN 38 (H) 03/19/2021    CR 8.73 (H) 01/31/2022    CR 6.59 (H) 03/19/2021    GFRESTIMATED 7 (L) 01/31/2022    GFRESTIMATED 9 (L) 03/19/2021    GFRESTBLACK 11 (L) 03/19/2021    CHARLY 9.5 01/31/2022    CHARLY 9.0 03/19/2021    BILITOTAL 0.9 09/01/2021    BILITOTAL 1.4 (H) 03/01/2021    ALBUMIN 3.8 09/01/2021    ALBUMIN 3.4 03/01/2021    ALKPHOS 152 (H) 09/01/2021    ALKPHOS 199 (H) 03/01/2021    ALT 18 09/01/2021    ALT 19 03/01/2021    AST 8 09/01/2021    AST 15 03/01/2021        INR RESULTS:  Lab Results   Component Value Date    INR 1.26 (H) 01/31/2022    INR 1.24 (H) 03/01/2021       No components found for: CK  Lab Results   Component Value Date    MAG 2.2 09/01/2021    MAG 2.3 12/14/2020     Lab Results   Component Value Date    NTBNP 62,990 (H) 07/23/2021     @BRIEFLABR (dig)@    Most recent echocardiogram:  No results found for this or any previous visit (from the past 8760 hour(s)).      Assessment and Plan:    In summary,Murtaza  is a  46 year old male who is here for a follow up  visit to [facility] clinic. Patient has been seen GI specialist outside of [place] who has suggested a gastric neurostimulator. I have asked that he check his blood pressures at home so we can tell if the medication dosing is helping. He notes a drop in his BP in dialysis after reaching his dry weight to 104 systolic, which he says is low for him.  He has an echo coming up soon.     1.  Chronic sytolic heart failure secondary to ICM .  Stage C  NYHA Class III  ACEi/ARB: - Lisinopril 20 mg on Dialysis days and 40 mg on non-dialysis days  BB: yes- Coreg 25 mg BID - 37.5 mg BID on non-dialysis days   Aldosterone antagonist: contraindicated due to renal dysfunction  SCD prophylaxis: decision deferred during medication uptitration  Fluid status: hypervolemic- dialysis patient   Anticoagulation:    Antiplatelet:  ASA dose   Sleep apnea:  NSAID use:  Contraindicated.  Avoid use.  Remote Monitoring:  SGLT-2- not candidate DM Type I.     2.  Other comordbid conditions:      #.  Hypertension.        He says his blood pressures tend to run quite high, which would not be optimal in terms of his existing cardiomyopathy and may explain at least partially why his ejection fraction dropped from October to 12/2020.       He has been tolerating the carvedilol increase. He should check his BP's home to help determine if we need to increase his carvedilol on non-dialysis days.  He is at max dose Lisinopril on non-dialysis days and tolerating 20 mg on dialysis days.      #.  Hyperlipidemia.        His goal LDL is less than 70.  It most recently was 88 with mildly elevated triglycerides.  We will perform further adjustments.     # DM I - 7/23/21 A1C - 6.9.  Insulin dependant - PCP following       3.  Follow-up   [facility] in 3 months           Sumit ORTEGA CNP  [facility]

## 2022-03-25 NOTE — NURSING NOTE
Chief Complaint   Patient presents with     Follow Up     3 month f/u, no updates per pt. Reviewed medicaitons with pt, no changes.      Isha Lozano, Visit Facilitator/MA.

## 2022-03-25 NOTE — NURSING NOTE
Return Appointment: Patient given instructions regarding scheduling next clinic visit. Patient demonstrated understanding of this information and agreed to call with further questions or concerns. CORE in 3 months.    Patient stated he understood all health information given and agreed to call with further questions or concerns.    Taylor Perez RN

## 2022-03-25 NOTE — PATIENT INSTRUCTIONS
Take your medicines every day, as directed    Changes made today:  o No medication changes   Monitor Your Weight and Symptoms    Contact us if you:      Gain 2 pounds in one day or 5 pounds in one week    Feel more short of breath    Notice more leg swelling    Feel lightheadeded   Change your lifestyle    Limit Salt or Sodium:    2000 mg  Limit Fluids:    2000 mL or approximately 64 ounces  Eat a Heart Healthy Diet    Low in saturated fats  Stay Active:    Aim to move at least 150 minutes every  week         To Contact us    During Business Hours:  [phone], option # 1      After hours, weekends or holidays:   [phone], Option #4  Ask to speak to the On-Call Cardiologist. Inform them you are a CORE/heart failure patient at the [facility].     Use [employer] allows you to communicate directly with your heart team through secure messaging.    [employer] can be accessed any time on your phone, computer, or tablet.    If you need assistance, we'd be happy to help!         Keep your Heart Appointments:    Follow up with [facility] Clinic in 3 months.

## 2022-03-25 NOTE — PROGRESS NOTES
Tyrell is a 46 year old who is being evaluated via a billable telephone visit.      What phone number would you like to be contacted at? [phone]  How would you like to obtain your AVS? [employer]  Phone call duration: 25 minutes    Isha Lozano Visit Facilitator/MA.

## 2022-03-31 NOTE — TELEPHONE ENCOUNTER
Called Tyrell to let him know we would like him to make some medication changes. He expressed understanding. Refill sent to his pharmacy.    Date: 3/31/2022    Time of Call: 10:02 AM     Diagnosis:  Heart failure     [ TORB ] Ordering provider: Sumit Griffin NP  Order: Increase coreg to 50 mg BID on non-dialysis days.     Order received by: Taylor Perez RN

## 2022-04-06 NOTE — TELEPHONE ENCOUNTER
Routing refill request to provider for review/approval because:  See pt Mychart message, we don't take his insurance, please confirm f/up plan  Becky Farley RN, BSN  [facility]

## 2022-04-12 NOTE — TELEPHONE ENCOUNTER
Pt returned my call, he was cleared by Dr. Vogel to move ahead with the gastric pacer procedure and will be working w/ [facility] to arrange.  We discussed that following his GI procedure we will set him up to see the transplant team.  He will call me once he has his pacer scheduled. He had no further questions.

## 2022-04-13 NOTE — TELEPHONE ENCOUNTER
----- Message from Michelle Schneider, DO sent at 4/13/2022  8:24 AM CDT -----  Hi Dr. Ríos,    I see Tyrell on dialysis and he mentioned today that he is having difficulty with getting back to an appt with you because he is told his insurance no longer covers your clinic. Our  at the dialysis unit is working to help him with this to assure he is established with you or another primary care. My concern is that he is going to run out of insulin before he can be established. Are you able to cover him for his insulin until he can be established?     Michelle Schneider, DO  Nephrology

## 2022-05-05 NOTE — TELEPHONE ENCOUNTER
Pt called to let me know he has had the gastric pacemaker placed and is interested in completing return visits for his pre-K/P evaluation.  Will place orders for SW, nephrology, and surgery.  Explained that once he has those visits we will formulate a plan for any remaining items to be completed.  He had no further questions and was in good agreement w/ the plan.

## 2022-05-12 NOTE — TELEPHONE ENCOUNTER
carvedilol (COREG) 12.5 MG tablet  Last Written Prescription Date:  ?  Last Fill Quantity: ?,   # refills: ?  Last Office Visit :  3/25/2022  Future Office visit:   6/10/2022    Routing refill request to provider for review/approval because:  Medication is reported/historical      Henrietta Chance RN  Central Triage Red Flags/Med Refills

## 2022-05-23 NOTE — LETTER
5/23/2022      RE: Murtaza Fitzgerald   [address]  [place] [address]       TRANSPLANT NEPHROLOGY WAITLIST VISIT    Assessment and Plan:  # Kidney/Pancreas Transplant Wait List Evaluation: Patient is a good candidate overall.     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 He is ABO O and cPRA 98%.    # Type 1 Diabetes: s/p ANNMARIE in February 2009, since failed (2/2 rejection in 2012) and on the pancreas wait list for several years (inactive per patient choice). Currently using lantus 24 units and novolog 40-45 units daily. He may benefit from a second pancreas transplant.    # Cardiac Risk: currently [facility] given reduced EF of 35-40% in December 2020, since improved to 50-55% as of April 2022. He is s/p 3 vessel CABG 2015 and repeat LHC March 2021 showing:       Prox LAD to Mid LAD lesion is 100% stenosed.     1.  Severe, multivessel native coronary artery disease  2.   of RCA with significant left to right collateral filling  3.  SVG to rPDA appears to be closed  4.  Patent SVG-OM  5.  Patent LIMA-LAD  6.  From interventional standpoint, disease appears stable and LIMA-LAD and SVG-OM appear patent.  While the SVG-rPDA is down there is collateral filling from the left.  No findings on today's angiogram to warrant percutaneous intervention prior to renal transplantation.      # Gastroparesis: now s/p gastric pacemaker in April 2022 with improving symptoms.     # Leg Sores: appear to be more so excoriations, although some are bleeding, and patient reports this is from chronic pruritis. Should get HD notes/labs to see if this is phosphorus related etc. May need derm. Would be best if these were healed prior to transplant.    # Health Maintenance: dental should be updated if not done in the last 6-12 months.     Discussed the risks and benefits of a transplant, including  the risk of surgery and immunosuppression medications.    KDPI: We discussed approximate remaining wait time and how that is influenced by issues such as blood type and sensitization (PRA) and access to a living donor. I contrasted potential waiting time for living vs  donor kidneys from  normal (0-85%) or higher (%) kidney donor profile index (KDPI) donors and their associated outcomes. I would not recommend Mr. Tata Fitzgerald to consider kidneys from high KDPI donors. The reason for this decision is best summarized as: multi-organ transplant candidate.    Patient presently appears to be enough of an acceptable kidney transplant recipient candidate to have any potential kidney donors start the evaluation process.  Patient s overall re-evaluation may require further discussion in the Transplant Program s multidisciplinary selection committee for a final recommendation on the patient s suitability for transplant.     Reason for Visit:  Murtaza Fitzgerald is a 46 year old male with ESKD from diabetes mellitus type 1, who presents for kidney/pancreas transplant wait list evaluation.     Date of Initial Transplant Evaluation: May 2020        Current Transplant Phase: Evaluation: Active  Official [facility] Listing Date:   Blood Type: O        cPRA: 98       Date of cPRA: 2021  Transplant Coordinator: [facility] Office phone number [phone]    History of Present Illness:   Mr. Tata Fitzgerald is a 46-year-old male with history of ESKD from type 1 diabetes s/p LDKT 2008 followed by ANNMARIE 2009, both since failed (pancreas 2/2 rejection, kidney 2/2 repetitive SWATHI in setting of gastroparesis (no rejection on 2013 biopsy)), on dialysis since 2020, HTN, cardiomyopathy, CAD s/p 3 vessel CABG, gastroparesis, DKA, former tobacco use, and orthostatic hypotension (not recently an issue for past few years, per patient report). He has been listed on the  pancreas list for a few years, but has been inactive per his choice as he would prefer to undergo SPK. He was initially evaluated in May 2020 for SPK, but had significant issues with gastroparesis requiring multiple ER visits and a few admissions. He now presents s/p gastric pacemaker placed a few weeks ago, and already notes an improvement in symptoms.            Interim Events:        - Gastric pacemaker placed April 2022. Nausea has decreased, still has some dry heaves, symptoms now last 1/2-1 day, previously lasted 4-5 days. No recent admits for gastroparesis.         - DKA March 2022          - Developed cardiomyopathy in December 2020 with EF 35-40%. March 2021 [facility]       Prox LAD to Mid LAD lesion is 100% stenosed.     1.  Severe, multivessel native coronary artery disease  2.   of RCA with significant left to right collateral filling  3.  SVG to rPDA appears to be closed  4.  Patent SVG-OM  5.  Patent LIMA-LAD  6.  From interventional standpoint, disease appears stable and LIMA-LAD and SVG-OM appear patent.  While the SVG-rPDA is down there is collateral filling from the left.  No findings on today's angiogram to warrant percutaneous intervention prior to renal transplantation.      Has worked on improving BP control in the last year. EF now 50% as of April 2022. [facility].         Kidney Disease: HD is going OK. He is anuric. Off all immunosuppression.       Primary Nephrologist: Dr. Schneider         Diabetes: novolog 45-50 units, lantus 20 units at night       Diabetic Control: Controlled (HbA1c <7%)      Last HbA1c: 6.9       Hypoglycemic Unawareness: Yes; 30-40 overnight, although has improved with recently decreasing lantus dose       New Issues: No         Cardiac/Vascular Disease History:       Known CAD: Yes       Known PAD/Claudication Symptoms: No       Known Heart Failure: Yes        Arrhythmia:  No       Pulmonary Hypertension: No        Valvular Disease: No       Other: None          Functional Capacity/Frailty:        He's not currently exercising due to peripheral neuropathy pain and balance issues. He can walk ~1-2 blocks. No chest pain or SOB.    # COVID Vaccination Up To Date: Yes    Other Pertinent Transplant Surgical Issues:  Recent Blood Transfusion: No  Previous Abdominal Transplant: Yes  Bladder Dysfunction: anuric  Chronic/Recurrent Infections: No  Chronic Anticoagulation: No  Jehovah s Witness: No       Active Problem List:   Patient Active Problem List   Diagnosis     Mycotic aneurysm of kidney transplant (H)     Kidney replaced by transplant     Coronary artery disease, non-occlusive     CMV (cytomegalovirus infection) status negative     Hypertension goal BP (blood pressure) < 140/90     Hyperlipidemia with target LDL less than 100     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)     Adhesive capsulitis of right shoulder     ESRD (end stage renal disease) on dialysis (H)     ESRD (end stage renal disease) (H)     Encounter regarding vascular access for dialysis for ESRD (H)     NUÑEZ (dyspnea on exertion)     Abnormal cardiovascular stress test     Abnormal echocardiogram     Gastroparesis     S/P CABG x 3     Coronary artery disease involving native coronary artery of native heart without angina pectoris     Nausea & vomiting     Status post coronary angiogram     Chronic kidney disease (CKD)     Diabetic neuropathy (H)     Headache     Immunocompromised (H)     Kidney disorder     Migraine     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)       Personal History:  Non-smoker     Allergies:  Allergies   Allergen Reactions     Diphenhydramine Other (See Comments)     Restless legs     Metoclopramide      Other reaction(s): Confusion, Unknown     Reglan Other (See Comments)     Reaction only to IV formulation -->delsuions        Medications:  Current Outpatient Medications   Medication Sig     aspirin (ASA) 81 MG EC tablet Take 1 tablet (81 mg) by mouth  daily     atorvastatin (LIPITOR) 20 MG tablet Take 2 tablets (40 mg) by mouth daily     blood glucose (NO BRAND SPECIFIED) test strip Use to test blood sugar 5 times daily or as directed.     calcium acetate (PHOSLO) 667 MG CAPS capsule Take 667 mg by mouth 3 times daily (with meals)     calcium carbonate (TUMS) 500 MG chewable tablet Take 1 chew tab by mouth 2 times daily     carvedilol (COREG) 12.5 MG tablet      carvedilol (COREG) 25 MG tablet Take 2 tablets (50 mg) by mouth 2 times daily (with meals)     insulin glargine (LANTUS SOLOSTAR) 100 UNIT/ML pen Inject 22 Units Subcutaneous At Bedtime     insulin pen needle (BD ARPITA U/F) 32G X 4 MM miscellaneous Use 4 daily or as directed.     lisinopril (ZESTRIL) 20 MG tablet Take one tablet (20mg) on dialysis days (Tues, Thursday and Saturday), and take two tablets (40mg) on non-dialysis days (Mondays, Wednesdays, Fridays and Sundays)     naproxen sodium 220 MG capsule Take 220 mg by mouth as needed      NOVOLOG FLEXPEN 100 UNIT/ML soln USE 1 UNIT FOR EVERY 20 OVER BLOOD GLUCOSE  PER SLIDING SCALE AS DIRECTED. AVERAGE USE OF 45 UNITS DAILY     omeprazole (PRILOSEC) 40 MG DR capsule TAKE 2 CAPSULES(80 MG) BY MOUTH EVERY EVENING     ondansetron (ZOFRAN-ODT) 8 MG ODT tab DISSOLVE 1 TABLET ON TOP OF TONGUE EVERY 8 HOURS FOR 2 DAYS AS NEEDED FOR NAUSEA AND VOMITING     polyethylene glycol (MIRALAX) 17 GM/Dose powder Take 17 g by mouth daily as needed for constipation     prochlorperazine (COMPAZINE) 10 MG tablet Take 10 mg by mouth     acetone, Urine, test STRP 1 strip by In Vitro route daily (Patient not taking: No sig reported)     Continuous Blood Gluc Sensor (DEXCOM G6 SENSOR) MISC Change every 10 days. (Patient not taking: Reported on 5/23/2022)     Continuous Blood Gluc Transmit (DEXCOM G6 TRANSMITTER) MISC Change every 3 months. (Patient not taking: No sig reported)     Insulin Disposable Pump (OMNIPOD DASH 5 PACK PODS) MISC 1 each every 48 hours (Patient not  "taking: No sig reported)     metoclopramide (REGLAN) 5 MG tablet Take 1 tablet (5 mg) by mouth 4 times daily (before meals and nightly) (Patient not taking: No sig reported)     No current facility-administered medications for this visit.        Vitals:  /67   Pulse 69   Ht 1.727 m (5' 8\")   Wt 86.6 kg (190 lb 14.4 oz)   SpO2 96%   BMI 29.03 kg/m       Exam:  GENERAL APPEARANCE: alert and no distress  HENT: mouth without ulcers or lesions  LYMPHATICS: no cervical or supraclavicular nodes  RESP: lungs clear to auscultation - no rales, rhonchi or wheezes  CV: regular rhythm, normal rate, no rub, no murmur  EDEMA: no LE edema bilaterally  ABDOMEN: soft, nondistended, nontender, bowel sounds normal  MS: extremities normal - no gross deformities noted, no evidence of inflammation in joints, no muscle tenderness  SKIN: several excoriations noted to bilateral lower extremities          Yasemin Moser PA-C    "

## 2022-05-23 NOTE — LETTER
5/23/2022         RE: Murtaza Fitzgerald   [address]  [facility] [address]        Dear Colleague,    Thank you for referring your patient, Murtaza Fitzgerald, to the [facility]. Please see a copy of my visit note below.    Transplant Surgery Consult Note    Medical record number: [id number]  YOB: 1975,   Consult requested for evaluation of kidney and pancreas transplant candidacy.    Assessment and Recommendations: Mr. Tata Fitzgerald is a good candidate for transplantation and has a good understanding of the risks and benefits of this approach to management of renal failure and diabetes. The following issues should be addressed prior to transplant:     Mr. Tata Fitzgerald has End stage renal failure due to diabetes mellitus type 1 whose condition is not expected to resolve, is expected to progress, and is expected to continue to develop related comorbid conditions.  Cardiology consult for cardiac risk stratification to be ordered: Yes.  He was cleared for gastric pacemaker, but will need to assess risk for simultaneous kidney and pancreas transplant..  CT abdomen and pelvis without contrast to be ordered for assessment of vascular targets: No.  Previous abd CT reviewed.   Appears to have space for organs on both sides.  May need to remove left kidney transplant.  Transplant listing labs ordered to include HLA, ABOx2, Cr, etc.  Dietician consult ordered: Yes  Social work consult ordered: Yes  Imaging reports reviewed:  CT abd  Radiology images reviewed:CT abd  Recipient suitable to move forward with work up of living donors:  Yes  Hernia:  Discussed repair options with patient.  Would likely do some sort of repair at the time of transplant.  It could be a temporary repair (primary closure) with possibility of recurrence, or a biologic or absorbable mesh.  Discussed risks of recurrence and infection.    The majority of our visit was spent in counselling,  discussing the medical and surgical risks of living or  donor kidney and pancreas transplantation, either in a simultaneous or sequential fashion. I contrasted approximate wait time for SPK vs living vs  donor kidneys from normal (0-85%) or higher (%) kidney donor profile index (KDPI) donors and their associated outcomes. I would not recommend this individual to consider kidneys from high KDPI donors. The reason for this decision is best summarized as: multi-organ transplant candidate.  Access to transplant will be impacted by living donor availability and overall candidacy for SPK, as well as the influence of blood type and degree of sensitization. We discussed advantages of preemptive transplant as well as living donor kidney transplant, and graft and patient survival outcomes associated with these options. Potential surgical complications of kidney and pancreas transplantation include bleeding, clotting, infection, wound complications, anastomotic failure and other issues such as cardiac complications, pneumonia, deep venous thrombosis, pulmonary embolism, post transplant diabetes and death. We discussed the need for protocol biopsy of the kidney and the possible need for a ureteral stent (and subsequent removal). We discussed benefits and risks associated with different approaches to exocrine drainage of pancreatic secretions. We also discussed differences in the average length of stay, recovery process, and posttransplant lab and monitoring protocol. We discussed the risk of graft rejection and recurrent diabetic nephropathy in the setting of poor glycemic control. I emphasized the need for strict immunosuppression adherence and the potential for complications of immunosuppression such as skin cancer or lymphoma, as well as a very low but not zero risk of donor-derived disease transmission risks (infection, cancer). Mr. Tata Fitzgerald asked good questions and the patient's candidacy will be  reviewed at our Multidisciplinary Selection Committee. Thank you for the opportunity to participate in Mr. Tata Fitzgerald's care.    Total time: 60 minutes      Jacinto Urban MD, PhD  Associate Professor of Surgery      ---------------------------------------------------------------------------------------------------    HPI: Mr. Tata Fitzgerald has End stage renal failure due to failing prior transplant.  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  Both failed.  On dialysis and on insulin.  Gastric pacemaker.        Complications of diabetes include:    Retinopathy:  Yes   Neuropathy: Yes   Gastroparesis:  Yes     The patient is on dialysis.    Has potential kidney donors:  No.  Interested in participation in paired exchange if a donor is willing: Doesn't know     The patient has the following pertinent history:       No    Yes  Dialysis:    []      [x] via:       Blood Transfusion                  []      []  Number of units:   Most recently:  Pregnancy:    [x]      [] Number:       Previous Transplant:  []      [x] Details:    Cancer    [x]      [] Comment:   Kidney stones   []      [] Comment:      Recurrent infections  [x]      []  Type:                  Bladder dysfunction  [x]      [] Cause:    Claudication   [x]      [] Distance:    Previous Amputation  [x]      [] Cause:     Chronic anticoagulation  [x]      [] Indication:  Baptism  []      []     CT abd:   1. No acute intra-abdominal pathology or evidence for small bowel  obstruction  2. Small bowel containing umbilical hernia with out evidence for  obstruction. Adjacent fat-containing ventral hernia.  3. Small hiatal hernia.  4. Stable postsurgical changes of renal and pancreas transplants.       Past Medical History:   Diagnosis Date     CMV (cytomegalovirus infection) status negative 2011     Coronary artery disease, non-occlusive 2008    angio showed diffuse disease with no lesions     Diabetes mellitus, type 1     Diagnosed at age 11.  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     Noncompliance with treatment     no labs for one year     Pancreas replaced by transplant (H) Feb 2009    rejection treated with thymo     Pancreatic disease     pancreas transplant     Tobacco abuse ongoing     Past Surgical History:   Procedure Laterality Date     ABDOMEN SURGERY       ARTHROSCOPY KNEE WITH LATERAL MENISCECTOMY Left 7/10/2019    Procedure: Left knee arthroscopic partial lateral meniscectomy;  Surgeon: Alex Candelaria MD;  Location: [facility] OR     BIOPSY      [facility] 2009     BYPASS GRAFT ARTERY CORONARY N/A 8/5/2015    Procedure: BYPASS GRAFT ARTERY CORONARY;  Surgeon: Katy Neville MD;  Location: [facility] OR     CORONARY ANGIOGRAPHY ADULT ORDER      2015     CREATE FISTULA ARTERIOVENOUS UPPER EXTREMITY Left 10/7/2020    Procedure: CREATION, ARTERIOVENOUS GRAFT placement LEFT UPPER EXTREMITY   with intraoperative ultrasound;  Surgeon: Melissa Guzman MD;  Location: [facility] OR     CV CORONARY ANGIOGRAM N/A 3/3/2021    Procedure: CV CORONARY ANGIOGRAM;  Surgeon: Everette Dang MD;  Location:  HEART CARDIAC CATH LAB     ESOPHAGOSCOPY, GASTROSCOPY, DUODENOSCOPY (EGD), COMBINED N/A 1/28/2020    Procedure: ESOPHAGOGASTRODUODENOSCOPY (EGD) biopsies w/forceps;  Surgeon: Emre Gomes MD;  Location:  GI     EYE SURGERY      vitrectomy both eyes      IR CVC TUNNEL PLACEMENT > 5 YRS OF AGE  2/13/2020     IR CVC TUNNEL REMOVAL RIGHT  12/7/2020     IR DIALYSIS FISTULOGRAM LEFT  3/19/2021     IR DIALYSIS FISTULOGRAM LEFT  8/25/2021     IR DIALYSIS FISTULOGRAM LEFT  1/31/2022     IR DIALYSIS PTA  3/19/2021     IR DIALYSIS PTA  8/25/2021     IR DIALYSIS PTA  1/31/2022      IR DIALYSIS PTA CENTRAL SEG  2021     IR DIALYSIS PTA CENTRAL SEG  2022     ORTHOPEDIC SURGERY  1993    tumor removed from left knee     TRANSPLANT  .    pancrease and kidney transplant     Family History   Problem Relation Age of Onset     Unknown/Adopted Father      Heart Disease Maternal Grandfather 62     Melanoma No family hx of      Skin Cancer No family hx of      Social History     Socioeconomic History     Marital status: Single     Spouse name: Not on file     Number of children: 1     Years of education: Not on file     Highest education level: Associate degree: academic program   Occupational History     Occupation:    Tobacco Use     Smoking status: Former Smoker     Packs/day: 1.00     Years: 20.00     Pack years: 20.00     Types: Vaping Device, Cigarettes     Quit date: 2016     Years since quittin.3     Smokeless tobacco: Never Used     Tobacco comment: E- Cig use since    Substance and Sexual Activity     Alcohol use: Not Currently     Alcohol/week: 0.0 standard drinks     Drug use: No     Sexual activity: Never     Partners: Female   Other Topics Concern     Parent/sibling w/ CABG, MI or angioplasty before 65F 55M? No      Service Not Asked     Blood Transfusions No     Comment: possibly during surgery, otherwise none.     Caffeine Concern Not Asked     Occupational Exposure Not Asked     Hobby Hazards Not Asked     Sleep Concern Not Asked     Stress Concern Not Asked     Weight Concern Not Asked     Special Diet Not Asked     Back Care Not Asked     Exercise Not Asked     Bike Helmet Not Asked     Seat Belt Yes     Self-Exams Not Asked   Social History Narrative     Not on file     Social Determinants of Health     Financial Resource Strain: Not on file   Food Insecurity: Not on file   Transportation Needs: Not on file   Physical Activity: Not on file   Stress: Not on file   Social Connections: Not on file   Intimate Partner Violence: Not  on file   Housing Stability: Not on file       ROS:   CONSTITUTIONAL:  No fevers or chills  EYES: negative for icterus  ENT:  negative for hearing loss, tinnitus and sore throat  RESPIRATORY:  negative for cough, sputum, dyspnea  CARDIOVASCULAR:  negative for chest pain    GASTROINTESTINAL:  negative for nausea, vomiting, diarrhea or constipation  GENITOURINARY:  negative for incontinence, dysuria, bladder emptying problems  HEME:  No easy bruising  INTEGUMENT:  negative for rash and pruritus  NEURO:  Negative for headache, seizure disorder    Allergies:   Allergies   Allergen Reactions     Diphenhydramine Other (See Comments)     Restless legs     Metoclopramide      Other reaction(s): Confusion, Unknown     Reglan Other (See Comments)     Reaction only to IV formulation -->delsuions       Medications:  Prescription Medications as of 5/23/2022       Rx Number Disp Refills Start End Last Dispensed Date Next Fill Date Owning Pharmacy    aspirin (ASA) 81 MG EC tablet     1/22/2021    [facility] [address] [place], [place] [id number] [facility] [address]    Sig: Take 1 tablet (81 mg) by mouth daily    Class: OTC    Route: Oral    atorvastatin (LIPITOR) 20 MG tablet  180 tablet 3 4/7/2021    [facility] [address] [place], [place] [id number] [facility] [address]    Sig: Take 2 tablets (40 mg) by mouth daily    Class: E-Prescribe    Route: Oral    blood glucose (NO BRAND SPECIFIED) test strip  200 strip 2 7/1/2021    [facility] [address] [place] [address] [address] [facility] [address]    Sig: Use to test blood sugar 5 times daily or as directed.    Class: E-Prescribe    calcium acetate (PHOSLO) 667 MG CAPS capsule            Sig: Take 667 mg by mouth 3 times daily (with meals)    Class: Historical    Route: Oral    calcium carbonate (TUMS) 500 MG chewable tablet        [facility] [address] [place], [place] [id number] [facility] [address]     Sig: Take 1 chew tab by mouth 2 times daily    Class: Historical    Route: Oral    carvedilol (COREG) 12.5 MG tablet    2/11/2022    [facility] [address] [place] [address] [address] [facility] [address]    Class: Historical    carvedilol (COREG) 25 MG tablet  360 tablet 3 3/31/2022    [facility] [address] [place] [address] [address] [facility] [address]    Sig: Take 2 tablets (50 mg) by mouth 2 times daily (with meals)    Class: E-Prescribe    Route: Oral    insulin glargine (LANTUS SOLOSTAR) 100 UNIT/ML pen  9 mL 5 4/6/2022    [facility] [address] [place] [address] [address] [facility] [address]    Sig: Inject 22 Units Subcutaneous At Bedtime    Class: E-Prescribe    Notes to Pharmacy: If Lantus is not covered by insurance, may substitute Basaglar or Semglee or other insulin glargine product per insurance preference at same dose and frequency.      Route: Subcutaneous    insulin pen needle (BD APRITA U/F) 32G X 4 MM miscellaneous  120 each 0 6/17/2020    [facility] [address]    Sig: Use 4 daily or as directed.    Class: E-Prescribe    lisinopril (ZESTRIL) 20 MG tablet  180 tablet 3 10/20/2021    [id number] [place] [address] [address] AT [facility] [address]    Sig: Take one tablet (20mg) on dialysis days (Tues, Thursday and Saturday), and take two tablets (40mg) on non-dialysis days (Mondays, Wednesdays, Fridays and Sundays)    Class: E-Prescribe    naproxen sodium 220 MG capsule            Sig: Take 220 mg by mouth as needed     Class: Historical    Route: Oral    NOVOLOG FLEXPEN 100 UNIT/ML soln  45 mL 0 3/15/2022    [id number] [address] [facility] [address]    Sig: USE 1 UNIT FOR EVERY 20 OVER BLOOD GLUCOSE  PER SLIDING SCALE AS DIRECTED. AVERAGE USE OF 45 UNITS DAILY    Class: E-Prescribe    omeprazole (PRILOSEC) 40 MG DR  capsule  180 capsule 0 3/2/2022    [id number] [address] [facility] [address]    Sig: TAKE 2 CAPSULES(80 MG) BY MOUTH EVERY EVENING    Class: E-Prescribe    ondansetron (ZOFRAN-ODT) 8 MG ODT tab    2021    [id number] [address] [facility] [address]    Sig: DISSOLVE 1 TABLET ON TOP OF TONGUE EVERY 8 HOURS FOR 2 DAYS AS NEEDED FOR NAUSEA AND VOMITING    Class: Historical    polyethylene glycol (MIRALAX) 17 GM/Dose powder  850 g 0 9/3/2021    [facility] [place], [place] - [address] [place] [address]    Sig: Take 17 g by mouth daily as needed for constipation    Class: E-Prescribe    Route: Oral    Renewals     Renewal requests to authorizing provider (Joey Bryant MD) <b>prohibited</b>          prochlorperazine (COMPAZINE) 10 MG tablet    2020    [id number] [place] [address] [address] [facility] [address]    Sig: Take 10 mg by mouth    Class: Historical    Route: Oral    acetone, Urine, test STRP  50 each 3 8/10/2018    [facility] [address]    Si strip by In Vitro route daily    Class: E-Prescribe    Notes to Pharmacy: Urine ketone stix    Route: In Vitro    Continuous Blood Gluc Sensor (DEXCOM G6 SENSOR) MISC  3 each 11 2021    [id number] [place] [address] [address] [facility] [address]    Sig: Change every 10 days.    Class: E-Prescribe    Continuous Blood Gluc Transmit (DEXCOM G6 TRANSMITTER) MISC  1 each 3 2021    [facility] [id number] [place] [address] [address] [facility] [address]    Sig: Change every 3 months.    Class: E-Prescribe    Insulin Disposable Pump (OMNIPOD DASH 5 PACK PODS) MISC  5 each 11 2021    [id number] [address]    Si each every 48 hours    Class:  E-Prescribe    Route: Does not apply    metoclopramide (REGLAN) 5 MG tablet  120 tablet 11 1/22/2021    [facility] #[id number] [facility] [address] [facility]  [address]    Sig: Take 1 tablet (5 mg) by mouth 4 times daily (before meals and nightly)    Class: E-Prescribe    Route: Oral          Exam:   Pulse:  [69] 69  BP: (121)/(67) 121/67  SpO2:  [96 %] 96 %  Appearance: in no apparent distress.   Skin: normal  Head and Neck: Normal, no rashes or jaundice  Respiratory: easy respirations, no audible wheezing.  Cardiovascular: RRR  Abdomen: rounded, No distention.  Large midline hernia.  Soft and reducible   Extremeties: femoral 2+/2+, Neuro: grossly normal.      Diagnostics:   No results found for this or any previous visit (from the past 672 hour(s)).  [facility]   Date Value Ref Range Status   10/02/2020 98  Final       Again, thank you for allowing me to participate in the care of your patient.        Sincerely,        Jacinto Urban MD

## 2022-05-23 NOTE — PROGRESS NOTES
Transplant Surgery Consult Note    Medical record number: [id number]  YOB: 1975,   Consult requested for evaluation of kidney and pancreas transplant candidacy.    Assessment and Recommendations: Mr. Tata Fitzgerald is a good candidate for transplantation and has a good understanding of the risks and benefits of this approach to management of renal failure and diabetes. The following issues should be addressed prior to transplant:     Mr. Tata Fitzgerald has End stage renal failure due to diabetes mellitus type 1 whose condition is not expected to resolve, is expected to progress, and is expected to continue to develop related comorbid conditions.  Cardiology consult for cardiac risk stratification to be ordered: Yes.  He was cleared for gastric pacemaker, but will need to assess risk for simultaneous kidney and pancreas transplant..  CT abdomen and pelvis without contrast to be ordered for assessment of vascular targets: No.  Previous abd CT reviewed.   Appears to have space for organs on both sides.  May need to remove left kidney transplant.  Transplant listing labs ordered to include HLA, ABOx2, Cr, etc.  Dietician consult ordered: Yes  Social work consult ordered: Yes  Imaging reports reviewed:  CT abd  Radiology images reviewed:CT abd  Recipient suitable to move forward with work up of living donors:  Yes  Hernia:  Discussed repair options with patient.  Would likely do some sort of repair at the time of transplant.  It could be a temporary repair (primary closure) with possibility of recurrence, or a biologic or absorbable mesh.  Discussed risks of recurrence and infection.    The majority of our visit was spent in counselling, discussing the medical and surgical risks of living or  donor kidney and pancreas transplantation, either in a simultaneous or sequential fashion. I contrasted approximate wait time for SPK vs living vs  donor kidneys from normal (0-85%) or higher (%)  kidney donor profile index (KDPI) donors and their associated outcomes. I would not recommend this individual to consider kidneys from high KDPI donors. The reason for this decision is best summarized as: multi-organ transplant candidate.  Access to transplant will be impacted by living donor availability and overall candidacy for SPK, as well as the influence of blood type and degree of sensitization. We discussed advantages of preemptive transplant as well as living donor kidney transplant, and graft and patient survival outcomes associated with these options. Potential surgical complications of kidney and pancreas transplantation include bleeding, clotting, infection, wound complications, anastomotic failure and other issues such as cardiac complications, pneumonia, deep venous thrombosis, pulmonary embolism, post transplant diabetes and death. We discussed the need for protocol biopsy of the kidney and the possible need for a ureteral stent (and subsequent removal). We discussed benefits and risks associated with different approaches to exocrine drainage of pancreatic secretions. We also discussed differences in the average length of stay, recovery process, and posttransplant lab and monitoring protocol. We discussed the risk of graft rejection and recurrent diabetic nephropathy in the setting of poor glycemic control. I emphasized the need for strict immunosuppression adherence and the potential for complications of immunosuppression such as skin cancer or lymphoma, as well as a very low but not zero risk of donor-derived disease transmission risks (infection, cancer). Mr. Tata Fitzgerald asked good questions and the patient's candidacy will be reviewed at our Multidisciplinary Selection Committee. Thank you for the opportunity to participate in Mr. Tata Fitzgerald's care.    Total time: 60 minutes      Jacinto Urban MD, PhD   of  Surgery      ---------------------------------------------------------------------------------------------------    HPI: Mr. Tata Fitzgerald has End stage renal failure due to failing prior transplant.  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  Both failed.  On dialysis and on insulin.  Gastric pacemaker.        Complications of diabetes include:    Retinopathy:  Yes   Neuropathy: Yes   Gastroparesis:  Yes     The patient is on dialysis.    Has potential kidney donors:  No.  Interested in participation in paired exchange if a donor is willing: Doesn't know     The patient has the following pertinent history:       No    Yes  Dialysis:    []      [x] via:       Blood Transfusion                  []      []  Number of units:   Most recently:  Pregnancy:    [x]      [] Number:       Previous Transplant:  []      [x] Details:    Cancer    [x]      [] Comment:   Kidney stones   []      [] Comment:      Recurrent infections  [x]      []  Type:                  Bladder dysfunction  [x]      [] Cause:    Claudication   [x]      [] Distance:    Previous Amputation  [x]      [] Cause:     Chronic anticoagulation  [x]      [] Indication:  Yazidi  []      []     CT abd:   1. No acute intra-abdominal pathology or evidence for small bowel  obstruction  2. Small bowel containing umbilical hernia with out evidence for  obstruction. Adjacent fat-containing ventral hernia.  3. Small hiatal hernia.  4. Stable postsurgical changes of renal and pancreas transplants.       Past Medical History:   Diagnosis Date     CMV (cytomegalovirus infection) status negative 2011     Coronary artery disease, non-occlusive 2008    angio showed diffuse disease with no lesions     Diabetes mellitus, type 1     Diagnosed at age 11. 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     Noncompliance with treatment     no labs for one year     Pancreas replaced by transplant (H) Feb 2009    rejection treated with thymo     Pancreatic disease     pancreas transplant     Tobacco abuse ongoing     Past Surgical History:   Procedure Laterality Date     ABDOMEN SURGERY       ARTHROSCOPY KNEE WITH LATERAL MENISCECTOMY Left 7/10/2019    Procedure: Left knee arthroscopic partial lateral meniscectomy;  Surgeon: Alex Candelaria MD;  Location: [facility] OR     BIOPSY      [facility] 2009     BYPASS GRAFT ARTERY CORONARY N/A 8/5/2015    Procedure: BYPASS GRAFT ARTERY CORONARY;  Surgeon: Katy Neville MD;  Location: [facility]     CORONARY ANGIOGRAPHY ADULT ORDER      2015     CREATE FISTULA ARTERIOVENOUS UPPER EXTREMITY Left 10/7/2020    Procedure: CREATION, ARTERIOVENOUS GRAFT placement LEFT UPPER EXTREMITY   with intraoperative ultrasound;  Surgeon: Melissa Guzman MD;  Location: [facility] OR     CV CORONARY ANGIOGRAM N/A 3/3/2021    Procedure: CV CORONARY ANGIOGRAM;  Surgeon: Everette Dang MD;  [facility]     ESOPHAGOSCOPY, GASTROSCOPY, DUODENOSCOPY (EGD), COMBINED N/A 1/28/2020    Procedure: ESOPHAGOGASTRODUODENOSCOPY (EGD) biopsies w/forceps;  Surgeon: Emre Gomes MD;  Location:  GI     EYE SURGERY      vitrectomy both eyes      IR CVC TUNNEL PLACEMENT > 5 YRS OF AGE  2/13/2020     IR CVC TUNNEL REMOVAL RIGHT  12/7/2020     IR DIALYSIS FISTULOGRAM LEFT  3/19/2021     IR DIALYSIS FISTULOGRAM LEFT  8/25/2021     IR DIALYSIS FISTULOGRAM LEFT  1/31/2022     IR DIALYSIS PTA  3/19/2021     IR DIALYSIS PTA  8/25/2021     IR DIALYSIS PTA  1/31/2022     IR DIALYSIS PTA CENTRAL SEG  8/25/2021     IR DIALYSIS PTA CENTRAL SEG  1/31/2022     ORTHOPEDIC SURGERY  1993    tumor removed from left knee     TRANSPLANT  2009.2008    pancrease and kidney transplant      Family History   Problem Relation Age of Onset     Unknown/Adopted Father      Heart Disease Maternal Grandfather 62     Melanoma No family hx of      Skin Cancer No family hx of      Social History     Socioeconomic History     Marital status: Single     Spouse name: Not on file     Number of children: 1     Years of education: Not on file     Highest education level: Associate degree: academic program   Occupational History     Occupation:    Tobacco Use     Smoking status: Former Smoker     Packs/day: 1.00     Years: 20.00     Pack years: 20.00     Types: Vaping Device, Cigarettes     Quit date: 2016     Years since quittin.3     Smokeless tobacco: Never Used     Tobacco comment: E- Cig use since    Substance and Sexual Activity     Alcohol use: Not Currently     Alcohol/week: 0.0 standard drinks     Drug use: No     Sexual activity: Never     Partners: Female   Other Topics Concern     Parent/sibling w/ CABG, MI or angioplasty before 65F 55M? No      Service Not Asked     Blood Transfusions No     Comment: possibly during surgery, otherwise none.     Caffeine Concern Not Asked     Occupational Exposure Not Asked     Hobby Hazards Not Asked     Sleep Concern Not Asked     Stress Concern Not Asked     Weight Concern Not Asked     Special Diet Not Asked     Back Care Not Asked     Exercise Not Asked     Bike Helmet Not Asked     Seat Belt Yes     Self-Exams Not Asked   Social History Narrative     Not on file     Social Determinants of Health     Financial Resource Strain: Not on file   Food Insecurity: Not on file   Transportation Needs: Not on file   Physical Activity: Not on file   Stress: Not on file   Social Connections: Not on file   Intimate Partner Violence: Not on file   Housing Stability: Not on file       ROS:   CONSTITUTIONAL:  No fevers or chills  EYES: negative for icterus  ENT:  negative for hearing loss, tinnitus and sore throat  RESPIRATORY:  negative for  cough, sputum, dyspnea  CARDIOVASCULAR:  negative for chest pain    GASTROINTESTINAL:  negative for nausea, vomiting, diarrhea or constipation  GENITOURINARY:  negative for incontinence, dysuria, bladder emptying problems  HEME:  No easy bruising  INTEGUMENT:  negative for rash and pruritus  NEURO:  Negative for headache, seizure disorder    Allergies:   Allergies   Allergen Reactions     Diphenhydramine Other (See Comments)     Restless legs     Metoclopramide      Other reaction(s): Confusion, Unknown     Reglan Other (See Comments)     Reaction only to IV formulation -->delsuions       Medications:  Prescription Medications as of 5/23/2022       Rx Number Disp Refills Start End Last Dispensed Date Next Fill Date Owning Pharmacy    aspirin (ASA) 81 MG EC tablet     [facility]/22/2021    [facility] [place] [address] [address] [facility] [address]    Sig: Take 1 tablet (81 mg) by mouth daily    Class: OTC    Route: Oral    atorvastatin (LIPITOR) 20 MG tablet  180 tablet 3 4/7/2021    [facility] [place] [address] [address] AT [facility] [address]    Sig: Take 2 tablets (40 mg) by mouth daily    Class: E-Prescribe    Route: Oral    blood glucose (NO BRAND SPECIFIED) test strip  200 strip 2 7/1/2021    [facility] [address] [place] [address] [address] [facility] [address]    Sig: Use to test blood sugar 5 times daily or as directed.    Class: E-Prescribe    calcium acetate (PHOSLO) 667 MG CAPS capsule            Sig: Take 667 mg by mouth 3 times daily (with meals)    Class: Historical    Route: Oral    calcium carbonate (TUMS) 500 MG chewable tablet        [facility] [place] [address] [address] [facility] [address]    Sig: Take 1 chew tab by mouth 2 times daily    Class: Historical    Route: Oral    carvedilol (COREG) 12.5 MG tablet    2/11/2022    [facility] [place], [place] [id number] [address] AT  [facility] [address]    Class: Historical    carvedilol (COREG) 25 MG tablet  360 tablet 3 3/31/2022    [id number] [address] AT [facility] [address]    Sig: Take 2 tablets (50 mg) by mouth 2 times daily (with meals)    Class: E-Prescribe    Route: Oral    insulin glargine (LANTUS SOLOSTAR) 100 UNIT/ML pen  9 mL 5 4/6/2022    [facility] STORE #[id number] [place] [address] [address] AT [facility] [address]    Sig: Inject 22 Units Subcutaneous At Bedtime    Class: E-Prescribe    Notes to Pharmacy: If Lantus is not covered by insurance, may substitute Basaglar or Semglee or other insulin glargine product per insurance preference at same dose and frequency.      Route: Subcutaneous    insulin pen needle (BD ARPITA U/F) 32G X 4 MM miscellaneous  120 each 0 6/17/2020    [facility] [address]    Sig: Use 4 daily or as directed.    Class: E-Prescribe    lisinopril (ZESTRIL) 20 MG tablet  180 tablet 3 10/20/2021    [id number] [place] [address] [address] AT [facility] [address]    Sig: Take one tablet (20mg) on dialysis days (Tues, Thursday and Saturday), and take two tablets (40mg) on non-dialysis days (Mondays, Wednesdays, Fridays and Sundays)    Class: E-Prescribe    naproxen sodium 220 MG capsule            Sig: Take 220 mg by mouth as needed     Class: Historical    Route: Oral    NOVOLOG FLEXPEN 100 UNIT/ML soln  45 mL 0 3/15/2022    [id number] [address] AT [facility] [address]    Sig: USE 1 UNIT FOR EVERY 20 OVER BLOOD GLUCOSE  PER SLIDING SCALE AS DIRECTED. AVERAGE USE OF 45 UNITS DAILY    Class: E-Prescribe    omeprazole (PRILOSEC) 40 MG DR capsule  180 capsule 0 3/2/2022    [facility] #[id number] [facility] [address]    Sig: TAKE 2 CAPSULES(80 MG) BY MOUTH EVERY EVENING    Class: E-Prescribe     ondansetron (ZOFRAN-ODT) 8 MG ODT tab    2021    [id number] [address] [facility] [address]    Sig: DISSOLVE 1 TABLET ON TOP OF TONGUE EVERY 8 HOURS FOR 2 DAYS AS NEEDED FOR NAUSEA AND VOMITING    Class: Historical    polyethylene glycol (MIRALAX) 17 GM/Dose powder  850 g 0 9/3/2021    [facility] [place], [place] - [address] [facility]    Sig: Take 17 g by mouth daily as needed for constipation    Class: E-Prescribe    Route: Oral    Renewals     Renewal requests to authorizing provider (Joey Bryant MD) <b>prohibited</b>          prochlorperazine (COMPAZINE) 10 MG tablet    2020    [id number] [place] [address] [address] [facility] [address]    Sig: Take 10 mg by mouth    Class: Historical    Route: Oral    acetone, Urine, test STRP  50 each 3 8/10/2018    [facility] [address]    Si strip by In Vitro route daily    Class: E-Prescribe    Notes to Pharmacy: Urine ketone stix    Route: In Vitro    Continuous Blood Gluc Sensor (DEXCOM G6 SENSOR) MISC  3 each 11 2021    [facility] [place] [address] [address] [facility] [address]    Sig: Change every 10 days.    Class: E-Prescribe    Continuous Blood Gluc Transmit (DEXCOM G6 TRANSMITTER) MISC  1 each 3 2021    [facility] [place] [address] [address] [facility] [address]    Sig: Change every 3 months.    Class: E-Prescribe    Insulin Disposable Pump (OMNIPOD DASH 5 PACK PODS) MISC  5 each 11 2021    [facility] [address] [place] [address] [address] [facility] [address]    Si each every 48 hours    Class: E-Prescribe    Route: Does not apply    metoclopramide (REGLAN) 5 MG tablet  120 tablet 11 2021    [id number] [place] [id number] AT [facility]  [address]    Sig: Take 1  tablet (5 mg) by mouth 4 times daily (before meals and nightly)    Class: E-Prescribe    Route: Oral          Exam:   Pulse:  [69] 69  BP: (121)/(67) 121/67  SpO2:  [96 %] 96 %  Appearance: in no apparent distress.   Skin: normal  Head and Neck: Normal, no rashes or jaundice  Respiratory: easy respirations, no audible wheezing.  Cardiovascular: RRR  Abdomen: rounded, No distention.  Large midline hernia.  Soft and reducible   Extremeties: femoral 2+/2+, Neuro: grossly normal.      Diagnostics:   No results found for this or any previous visit (from the past 672 hour(s)).  [facility] cPRA   Date Value Ref Range Status   10/02/2020 98  Final

## 2022-05-23 NOTE — NURSING NOTE
"Chief Complaint   Patient presents with     RECHECK     Wait list     Blood pressure 121/67, pulse 69, height 1.727 m (5' 8\"), weight 86.6 kg (190 lb 14.4 oz), SpO2 96 %.    Joan Hill MA  "

## 2022-05-23 NOTE — PROGRESS NOTES
TRANSPLANT NEPHROLOGY WAITLIST VISIT    Assessment and Plan:  # Kidney/Pancreas Transplant Wait List Evaluation: Patient is a good candidate overall.     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 He is ABO O and cPRA 98%.    # Type 1 Diabetes: s/p ANNMARIE in February 2009, since failed (2/2 rejection in 2012) and on the pancreas wait list for several years (inactive per patient choice). Currently using lantus 24 units and novolog 40-45 units daily. He may benefit from a second pancreas transplant.    # Cardiac Risk: currently [facility] given reduced EF of 35-40% in December 2020, since improved to 50-55% as of April 2022. He is s/p 3 vessel CABG 2015 and repeat LHC March 2021 showing:       Prox LAD to Mid LAD lesion is 100% stenosed.     1.  Severe, multivessel native coronary artery disease  2.   of RCA with significant left to right collateral filling  3.  SVG to rPDA appears to be closed  4.  Patent SVG-OM  5.  Patent LIMA-LAD  6.  From interventional standpoint, disease appears stable and LIMA-LAD and SVG-OM appear patent.  While the SVG-rPDA is down there is collateral filling from the left.  No findings on today's angiogram to warrant percutaneous intervention prior to renal transplantation.      # Gastroparesis: now s/p gastric pacemaker in April 2022 with improving symptoms.     # Leg Sores: appear to be more so excoriations vs ulcers or open wounds, although some are bleeding, and patient reports this is from chronic pruritis. Should get HD notes/labs to see if this is phosphorus related etc. May need derm. Would be best if these were healed prior to transplant.    # Health Maintenance: dental should be updated if not done in the last 6-12 months.     Discussed the risks and benefits of a transplant, including the risk of surgery and immunosuppression medications.    KDPI: We  discussed approximate remaining wait time and how that is influenced by issues such as blood type and sensitization (PRA) and access to a living donor. I contrasted potential waiting time for living vs  donor kidneys from  normal (0-85%) or higher (%) kidney donor profile index (KDPI) donors and their associated outcomes. I would not recommend Mr. Tata Fitzgerald to consider kidneys from high KDPI donors. The reason for this decision is best summarized as: multi-organ transplant candidate.    Patient presently appears to be enough of an acceptable kidney transplant recipient candidate to have any potential kidney donors start the evaluation process.  Patient s overall re-evaluation may require further discussion in the Transplant Program s multidisciplinary selection committee for a final recommendation on the patient s suitability for transplant.     Reason for Visit:  Murtaza Fitzgerald is a 46 year old male with ESKD from diabetes mellitus type 1, who presents for kidney/pancreas transplant wait list evaluation.     Date of Initial Transplant Evaluation: May 2020        Current Transplant Phase: Evaluation: Active  Official UNOS Listing Date:   Blood Type: O        cPRA: 98       Date of cPRA: 2021  Transplant Coordinator: [facility] Office phone number [phone]    History of Present Illness:   Mr. Tata Fitzgerald is a 46-year-old male with history of ESKD from type 1 diabetes s/p LDKT 2008 followed by ANNMARIE 2009, both since failed (pancreas 2/2 rejection, kidney 2/2 repetitive SWATHI in setting of gastroparesis (no rejection on 2013 biopsy)), on dialysis since 2020, HTN, cardiomyopathy, CAD s/p 3 vessel CABG, gastroparesis, DKA, former tobacco use, and orthostatic hypotension (not recently an issue for past few years, per patient report). He has been listed on the pancreas list for a few years, but has been inactive per his  choice as he would prefer to undergo SPK. He was initially evaluated in May 2020 for SPK, but had significant issues with gastroparesis requiring multiple ER visits and a few admissions. He now presents s/p gastric pacemaker placed a few weeks ago, and already notes an improvement in symptoms.            Interim Events:        - Gastric pacemaker placed April 2022. Nausea has decreased, still has some dry heaves, symptoms now last 1/2-1 day, previously lasted 4-5 days. No recent admits for gastroparesis.         - DKA March 2022          - Developed cardiomyopathy in December 2020 with EF 35-40%. March 2021 C       Prox LAD to Mid LAD lesion is 100% stenosed.     1.  Severe, multivessel native coronary artery disease  2.   of RCA with significant left to right collateral filling  3.  SVG to rPDA appears to be closed  4.  Patent SVG-OM  5.  Patent LIMA-LAD  6.  From interventional standpoint, disease appears stable and LIMA-LAD and SVG-OM appear patent.  While the SVG-rPDA is down there is collateral filling from the left.  No findings on today's angiogram to warrant percutaneous intervention prior to renal transplantation.      Has worked on improving BP control in the last year. EF now 50% as of April 2022. [facility].         Kidney Disease: HD is going OK. He is anuric. Off all immunosuppression.       Primary Nephrologist: Dr. Schneider         Diabetes: novolog 45-50 units, lantus 20 units at night       Diabetic Control: Controlled (HbA1c <7%)      Last HbA1c: 6.9       Hypoglycemic Unawareness: Yes; 30-40 overnight, although has improved with recently decreasing lantus dose       New Issues: No         Cardiac/Vascular Disease History:       Known CAD: Yes       Known PAD/Claudication Symptoms: No       Known Heart Failure: Yes        Arrhythmia:  No       Pulmonary Hypertension: No        Valvular Disease: No       Other: None         Functional Capacity/Frailty:        He's not currently  exercising due to peripheral neuropathy pain and balance issues. He can walk ~1-2 blocks. No chest pain or SOB.    # COVID Vaccination Up To Date: Yes    Other Pertinent Transplant Surgical Issues:  Recent Blood Transfusion: No  Previous Abdominal Transplant: Yes  Bladder Dysfunction: anuric  Chronic/Recurrent Infections: No  Chronic Anticoagulation: No  Jehovah s Witness: No       Active Problem List:   Patient Active Problem List   Diagnosis     Mycotic aneurysm of kidney transplant (H)     Kidney replaced by transplant     Coronary artery disease, non-occlusive     CMV (cytomegalovirus infection) status negative     Hypertension goal BP (blood pressure) < 140/90     Hyperlipidemia with target LDL less than 100     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)     Adhesive capsulitis of right shoulder     ESRD (end stage renal disease) on dialysis (H)     ESRD (end stage renal disease) (H)     Encounter regarding vascular access for dialysis for ESRD (H)     NUÑEZ (dyspnea on exertion)     Abnormal cardiovascular stress test     Abnormal echocardiogram     Gastroparesis     S/P CABG x 3     Coronary artery disease involving native coronary artery of native heart without angina pectoris     Nausea & vomiting     Status post coronary angiogram     Chronic kidney disease (CKD)     Diabetic neuropathy (H)     Headache     Immunocompromised (H)     Kidney disorder     Migraine     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)       Personal History:  Non-smoker     Allergies:  Allergies   Allergen Reactions     Diphenhydramine Other (See Comments)     Restless legs     Metoclopramide      Other reaction(s): Confusion, Unknown     Reglan Other (See Comments)     Reaction only to IV formulation -->delsuions        Medications:  Current Outpatient Medications   Medication Sig     aspirin (ASA) 81 MG EC tablet Take 1 tablet (81 mg) by mouth daily     atorvastatin (LIPITOR) 20 MG tablet Take 2  tablets (40 mg) by mouth daily     blood glucose (NO BRAND SPECIFIED) test strip Use to test blood sugar 5 times daily or as directed.     calcium acetate (PHOSLO) 667 MG CAPS capsule Take 667 mg by mouth 3 times daily (with meals)     calcium carbonate (TUMS) 500 MG chewable tablet Take 1 chew tab by mouth 2 times daily     carvedilol (COREG) 12.5 MG tablet      carvedilol (COREG) 25 MG tablet Take 2 tablets (50 mg) by mouth 2 times daily (with meals)     insulin glargine (LANTUS SOLOSTAR) 100 UNIT/ML pen Inject 22 Units Subcutaneous At Bedtime     insulin pen needle (BD ARPITA U/F) 32G X 4 MM miscellaneous Use 4 daily or as directed.     lisinopril (ZESTRIL) 20 MG tablet Take one tablet (20mg) on dialysis days (Tues, Thursday and Saturday), and take two tablets (40mg) on non-dialysis days (Mondays, Wednesdays, Fridays and Sundays)     naproxen sodium 220 MG capsule Take 220 mg by mouth as needed      NOVOLOG FLEXPEN 100 UNIT/ML soln USE 1 UNIT FOR EVERY 20 OVER BLOOD GLUCOSE  PER SLIDING SCALE AS DIRECTED. AVERAGE USE OF 45 UNITS DAILY     omeprazole (PRILOSEC) 40 MG DR capsule TAKE 2 CAPSULES(80 MG) BY MOUTH EVERY EVENING     ondansetron (ZOFRAN-ODT) 8 MG ODT tab DISSOLVE 1 TABLET ON TOP OF TONGUE EVERY 8 HOURS FOR 2 DAYS AS NEEDED FOR NAUSEA AND VOMITING     polyethylene glycol (MIRALAX) 17 GM/Dose powder Take 17 g by mouth daily as needed for constipation     prochlorperazine (COMPAZINE) 10 MG tablet Take 10 mg by mouth     acetone, Urine, test STRP 1 strip by In Vitro route daily (Patient not taking: No sig reported)     Continuous Blood Gluc Sensor (DEXCOM G6 SENSOR) MISC Change every 10 days. (Patient not taking: Reported on 5/23/2022)     Continuous Blood Gluc Transmit (DEXCOM G6 TRANSMITTER) MISC Change every 3 months. (Patient not taking: No sig reported)     Insulin Disposable Pump (OMNIPOD DASH 5 PACK PODS) MISC 1 each every 48 hours (Patient not taking: No sig reported)     metoclopramide (REGLAN)  "5 MG tablet Take 1 tablet (5 mg) by mouth 4 times daily (before meals and nightly) (Patient not taking: No sig reported)     No current facility-administered medications for this visit.        Vitals:  /67   Pulse 69   Ht 1.727 m (5' 8\")   Wt 86.6 kg (190 lb 14.4 oz)   SpO2 96%   BMI 29.03 kg/m       Exam:  GENERAL APPEARANCE: alert and no distress  HENT: mouth without ulcers or lesions  LYMPHATICS: no cervical or supraclavicular nodes  RESP: lungs clear to auscultation - no rales, rhonchi or wheezes  CV: regular rhythm, normal rate, no rub, no murmur  EDEMA: no LE edema bilaterally  ABDOMEN: soft, nondistended, nontender, bowel sounds normal  MS: extremities normal - no gross deformities noted, no evidence of inflammation in joints, no muscle tenderness  SKIN: several excoriations noted to bilateral lower extremities    Review of prior external note(s) from - [facility] information from [facility]  reviewed  I spent a total of 161 minutes on the day of the visit.   Time spent doing chart review, history and exam, documentation and further activities per the note          "

## 2022-05-23 NOTE — Clinical Note
2022       RE: Murtaza Fitzgerald   [address]  [facility] [address]     Dear Colleague,    Thank you for referring your patient, Murtaza Fitzgerald, to the [facility] [place] at [facility]. Please see a copy of my visit note below.    TRANSPLANT NEPHROLOGY WAITLIST VISIT    Assessment and Plan:  # Kidney/Pancreas Transplant Wait List Evaluation: Patient is a {desc.:682152} candidate overall. Patient should {Wait List Status:859729} on the wait list.    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 He currently remains on full dose myfortic and tacrolimus***. He may benefit from a second kidney transplant.     # Type 1 Diabetes: s/p ANNMARIE in 2009, since failed (2/2 rejection around ) and on the pancreas wait list for several years (inactive per patient choice). Currently using lantus 24 units and novolog 40-45 units daily. He may benefit from a second pancreas transplant.    # Cardiac Risk: ***s/p 3 vessel CABG  ***    # Gastroparesis: now s/p gastric pacemaker ***    # ***: ***    # Health Maintenance: {[facility] [place] MAINT:616555619}     Discussed the risks and benefits of a transplant, including the risk of surgery and immunosuppression medications.    KDPI: We discussed approximate remaining wait time and how that is influenced by issues such as blood type and sensitization (PRA) and access to a living donor. I contrasted potential waiting time for living vs  donor kidneys from  normal (0-85%) or higher (%) kidney donor profile index (KDPI) donors and their associated outcomes. I {Would:928419} recommend Mr. Tata Fitzgerald to consider kidneys from high KDPI donors. The reason for this decision is best summarized as: {KDPI REASON:504326865}.    {FV RENAL TX [facility] KIDNEY CANDIDATE WAITLIST (Optional):12014329}  Patient s overall  re-evaluation may require further discussion in the Transplant Program s multidisciplinary selection committee for a final recommendation on the patient s suitability for transplant.     Reason for Visit:  Murtaza Fitzgerald is a 46 year old male with {[facility] TRANSPLANT ESKD/CKD/DM:281236858}, who presents for {[facility] Living donor organ:795506} transplant wait list evaluation.     Date of Initial Transplant Evaluation:  ***        Current Transplant Phase: Evaluation: Active  Official UNOS Listing Date:   Blood Type: ***        cPRA: ***       Date of cPRA: ***  Transplant Coordinator: Jewell [facility] Trans[facility] Office phone number [phone]     Previous Medical Issues:  {FV RENAL [place] OPTIONAL COMMENT (Optional):474986}     History of Present Illness:   ***           Interim Events: {FV Renal Tx None vs Blank w/ Indent:991578}          Kidney Disease: ***       Kidney Disease Dx: {FV Renal Tx Renal failure Cause CAPITALIZED:922369}        On Dialysis: {[facility] YES NO NEPH:943893374}       Primary Nephrologist: ***  {Add Diabetes (Optional - WILL DELETE ITSELF IF LEFT):378947}       Cardiac/Vascular Disease History:       Known CAD: {FV Renal Tx Known CAD w/ Asessments:906697}       Known PAD/Claudication Symptoms: {YES WITH WILD CARD/NO:98580082}       Known Heart Failure: ischemic cardiomyopathy ***,        Arrhythmia:  {YES WITH WILD CARD/NO:95095554}       Pulmonary Hypertension: {YES WITH WILD CARD/NO:72646349}        Valvular Disease: {YES WITH WILD CARD/NO:86473839}       Other: {Other Risk Factors:175929}       New Cardiac/Vascular Events: {YES WITH WILD CARD/NO:36051597}         Functional Capacity/Frailty:        ***    # COVID Vaccination Up To Date: { :624286}         {FV RENAL [place] OPTIONAL COMMENT (Optional):536852}    Other Pertinent Transplant Surgical Issues:  Recent Blood Transfusion: {YES WITH WILD CARD/NO:22297635}  Previous Abdominal Transplant: {YES WITH WILD  CARD/NO:11504697}  Bladder Dysfunction: {YES WITH WILD CARD/NO:48031035}  Chronic/Recurrent Infections: {YES WITH WILD CARD/NO:31352832}  Chronic Anticoagulation: {YES WITH WILD CARD/NO:78785178}  Lauren watkins Witness: {YES WITH WILD CARD/NO:60246516}       Active Problem List:   Patient Active Problem List   Diagnosis     Mycotic aneurysm of kidney transplant (H)     Kidney replaced by transplant     Coronary artery disease, non-occlusive     CMV (cytomegalovirus infection) status negative     Hypertension goal BP (blood pressure) < 140/90     Hyperlipidemia with target LDL less than 100     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)     Adhesive capsulitis of right shoulder     ESRD (end stage renal disease) on dialysis (H)     ESRD (end stage renal disease) (H)     Encounter regarding vascular access for dialysis for ESRD (H)     NUÑEZ (dyspnea on exertion)     Abnormal cardiovascular stress test     Abnormal echocardiogram     Gastroparesis     S/P CABG x 3     Coronary artery disease involving native coronary artery of native heart without angina pectoris     Nausea & vomiting     Status post coronary angiogram     Chronic kidney disease (CKD)     Diabetic neuropathy (H)     Headache     Immunocompromised (H)     Kidney disorder     Migraine     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)       Personal History:  ***     Allergies:  Allergies   Allergen Reactions     Diphenhydramine Other (See Comments)     Restless legs     Metoclopramide      Other reaction(s): Confusion, Unknown     Reglan Other (See Comments)     Reaction only to IV formulation -->delsuions        Medications:  Current Outpatient Medications   Medication Sig     aspirin (ASA) 81 MG EC tablet Take 1 tablet (81 mg) by mouth daily     atorvastatin (LIPITOR) 20 MG tablet Take 2 tablets (40 mg) by mouth daily     blood glucose (NO BRAND SPECIFIED) test strip Use to test blood sugar 5 times daily or as directed.      calcium acetate (PHOSLO) 667 MG CAPS capsule Take 667 mg by mouth 3 times daily (with meals)     calcium carbonate (TUMS) 500 MG chewable tablet Take 1 chew tab by mouth 2 times daily     carvedilol (COREG) 12.5 MG tablet      carvedilol (COREG) 25 MG tablet Take 2 tablets (50 mg) by mouth 2 times daily (with meals)     insulin glargine (LANTUS SOLOSTAR) 100 UNIT/ML pen Inject 22 Units Subcutaneous At Bedtime     insulin pen needle (BD ARPITA U/F) 32G X 4 MM miscellaneous Use 4 daily or as directed.     lisinopril (ZESTRIL) 20 MG tablet Take one tablet (20mg) on dialysis days (Tues, Thursday and Saturday), and take two tablets (40mg) on non-dialysis days (Mondays, Wednesdays, Fridays and Sundays)     naproxen sodium 220 MG capsule Take 220 mg by mouth as needed      NOVOLOG FLEXPEN 100 UNIT/ML soln USE 1 UNIT FOR EVERY 20 OVER BLOOD GLUCOSE  PER SLIDING SCALE AS DIRECTED. AVERAGE USE OF 45 UNITS DAILY     omeprazole (PRILOSEC) 40 MG DR capsule TAKE 2 CAPSULES(80 MG) BY MOUTH EVERY EVENING     ondansetron (ZOFRAN-ODT) 8 MG ODT tab DISSOLVE 1 TABLET ON TOP OF TONGUE EVERY 8 HOURS FOR 2 DAYS AS NEEDED FOR NAUSEA AND VOMITING     polyethylene glycol (MIRALAX) 17 GM/Dose powder Take 17 g by mouth daily as needed for constipation     prochlorperazine (COMPAZINE) 10 MG tablet Take 10 mg by mouth     acetone, Urine, test STRP 1 strip by In Vitro route daily (Patient not taking: No sig reported)     Continuous Blood Gluc Sensor (DEXCOM G6 SENSOR) MISC Change every 10 days. (Patient not taking: Reported on 5/23/2022)     Continuous Blood Gluc Transmit (DEXCOM G6 TRANSMITTER) MISC Change every 3 months. (Patient not taking: No sig reported)     Insulin Disposable Pump (OMNIPOD DASH 5 PACK PODS) MISC 1 each every 48 hours (Patient not taking: No sig reported)     metoclopramide (REGLAN) 5 MG tablet Take 1 tablet (5 mg) by mouth 4 times daily (before meals and nightly) (Patient not taking: No sig reported)     No current  "facility-administered medications for this visit.        Vitals:  /67   Pulse 69   Ht 1.727 m (5' 8\")   Wt 86.6 kg (190 lb 14.4 oz)   SpO2 96%   BMI 29.03 kg/m       Exam:  {EXAM FAST TX:[id number]::\"GENERAL APPEARANCE: alert and no distress\",\"HENT: mouth without ulcers or lesions\",\"LYMPHATICS: no cervical or supraclavicular nodes\",\"RESP: lungs clear to auscultation - no rales, rhonchi or wheezes\",\"CV: regular rhythm, normal rate, no rub, no murmur\",\"EDEMA: no LE edema bilaterally\",\"ABDOMEN: soft, nondistended, nontender, bowel sounds normal\",\"MS: extremities normal - no gross deformities noted, no evidence of inflammation in joints, no muscle tenderness\",\"SKIN: no rash\"}          Again, thank you for allowing me to participate in the care of your patient.      Sincerely,    Yasemin Moser PA-C    "

## 2022-05-23 NOTE — PROGRESS NOTES
Patient Name: Murtaza Fitzgerald  : 1975  Age: 46 year old  MRN: [id number]  Date of Social Work Evaluation: 2020    Patient on Kidney/Pancreas transplant wait list inactive due to medical reasons.  Writer met with Tyrell and his mother Marcie today to update psychosocial assessment.      Presenting Information   Living Situation: in [place], [place] with his mother and sister  If not local, plans for short term stay:  N/A  Previous Functional Status: Independent but relies on his mother or daughter with transportation needs.  Cultural/Language/Spiritual Considerations: None indicated at this time.    Support System  Primary Support Person Mother  Other support:  Family  Plan for support in immediate post-transplant period: Mother    Health Care Directive  Decision Maker: Self  Alternate Decision Maker: Mother and daughter Olesya  Health Care Directive: Copy in Chart    Mental Health/Coping:   History of Mental Health: No known history  History of Chemical Health: Tyrell continues to vape nicotine on and off.  Current status: Appropriate  Coping: Tyrell indicated when under stress he will talk to friends or play video games.  Services Needed/Recommended: None indicated at this time.    Financial   Income: SSDI  Impact of transplant on income: Discussed Tyrell may no longer be eligible for disability one year post successful kidney/pancreas transplant unless disability is based more on diabetic complications instead of ESRD.  Insurance and medication coverage: Medicare [employer] Advantage Plan.  Tyrell is aware of his $5900 OFP max.  He also indicated he pays his own premiums.  Financial concerns: None indicated at this time.  Resources needed: None indicated at this time.    Assessment and recommendations and plan:  Tyerll continues to be on dialysis and to be an appropriate transplant candidate.  Reviewed transplant education (Medicare, rehabilitation, donor issues, community/financial resources, and  psych/family adjustment) as well as psychosocial risks of transplant. Provided patient with a copy of post-transplant informational sheet that includes information on potential costs of medications, Medicare ESRD, post-transplant lodging, etc. Patient seemed to process information well. Appeared well informed, motivated, and able to follow post transplant requirements. Behavior was appropriate during interview. Has adequate income and insurance coverage. Adequate social support. No major contraindications noted for transplant. At this time, patient appears to understand the risks and benefits of transplant.

## 2022-05-27 NOTE — TELEPHONE ENCOUNTER
carvedilol (COREG) 12.5 MG tablet      Last Written Prescription Date:  2/11/22  Last Fill Quantity: n/a,   # refills: n/a  Last Office Visit : 3/25/22  Future Office visit:  6/10/22    Routing refill request to provider for review/approval because:  Patient reported dose of 12.5 mg

## 2022-05-31 NOTE — TELEPHONE ENCOUNTER
Spoke to patient, says it's not needed and refill must be on autofill.  Requested he remove autofill from this RX if possible.

## 2022-06-08 NOTE — PROGRESS NOTES
Received message from nephrologist requesting fistulogram of LUE AVG.    Pt dialyzes at [facility], TTS with Dr. Schneider and Katelyn Lopez NP.    Pt has LUE AVG created on 10/7/2020 by Dr. Guzman.    Received: Today  Michelle Schneider, Beth Barron, JOSE; Katelyn Lopez NP [facility] - Chris needs fistulogram in coming weeks - high DVPS, prolonged bleeding. Thanks, Michelle     Order placed and request sent to IR and scheduling.    BETH LEWIS, RN on 6/8/2022 at 2:47 PM  Dialysis Access Care Coordinator  Phone: [phone]  P_Dialysis_Access_Nurse

## 2022-06-08 NOTE — PROGRESS NOTES
Outpatient IR Fistulogram Referral    Patient is a 47 y/o male with a PMH of tobacco use, gastroparesis, HTN, DM I, HFrEF, ischemic cardiomyopathy, CAD s/p CABG 2015ESRD on HD, hx of renal transplant 2008, mycotic aneurysm of renal transplant, failed pancreas transplant 2009. Patient has a LUE brachiocephalic AV bovine graft created 10/7/2020.  Last fistulogram 1/31/22. IR has been asked for a fistulogram due to prolonged bleeding, high pressures at dialysis.     IR recommends updated US prior to fistulogram.     1/31/22 Fistulogram:                                                                  IMPRESSION:  Patent left upper extremity brachial artery to axillary vein bovine  graft with recurrent venous outflow and thoracic outlet stenoses.  These were plastied to 8 mm and 12 mm, respectively with conventional  and drug coated balloons.      PLAN:  Bedrest for one hour. Woggle can be removed after 1 hour.  Fistula is ready for immediate use.    Type of dialysis Access Left Upper Arm Fistula    If Fistula AV fistula  Creation of Left Arteriovenous brachial artery to axillary vein bovine graft    Date created-10/7/2020     Where and by whom-[facility] Dr. Guzman    Date of last US-5/24/21    Problem with dialysis- prolonged bleeding and high pressures at dialysis.     Procedure order placed.    Primary team Dr. Schneider, Katelyn Lopez NP, Jerica Martinez RN made aware of IR recommendations via epic messaging.     JORDAN Barrios CNP  Interventional Radiology   IR on-call pager: [phone]

## 2022-06-09 NOTE — TELEPHONE ENCOUNTER
Left pt a VM explaining next steps for evaluation - he will need an updated cardiac risk assessment w/ Dr. Vogel, he has an appt 6/10/22 w/ Sumit Griffin NP that can be postponed if he would like to wait until he see Dr. Vgoel.  Order placed for consult w/ Dr. Vogel.  Pt will also need to address leg sores - messaged Dr. Smith and her team will address w/ him.  Provided direct line for follow up.

## 2022-06-09 NOTE — TELEPHONE ENCOUNTER
General  Route to LPN    Reason for call: Tyrell was returning missed call. Advised caller left , patient still wanted to speak to coordinator    Call back needed? Yes  Return Call Needed  Same as documented in contacts section

## 2022-06-10 NOTE — PATIENT INSTRUCTIONS
Take your medicines every day, as directed    Changes made today:  No changes     Monitor Your Weight and Symptoms    Contact us if you:    Gain 2 pounds in one day or 5 pounds in one week  Feel more short of breath  Notice more leg swelling  Feel lightheadeded   Change your lifestyle    Limit Salt or Sodium:  2000 mg  Limit Fluids:  2000 mL or approximately 64 ounces  Eat a Heart Healthy Diet  Low in saturated fats  Stay Active:  Aim to move at least 150 minutes every  week         To Contact us    During Business Hours:  [phone], option # 1      After hours, weekends or holidays:   [phone], Option #4  Ask to speak to the On-Call Cardiologist. Inform them you are a CORE/heart failure patient at the [facility].     Use [employer] allows you to communicate directly with your heart team through secure messaging.  Health eVillages can be accessed any time on your phone, computer, or tablet.  If you need assistance, we'd be happy to help!         Keep your Heart Appointments:    CORE in 4 months

## 2022-06-10 NOTE — TELEPHONE ENCOUNTER
Pt called for pre-procedure/covid. Pt will get covid test. Pt had all information given to him and will call back with any questions.

## 2022-06-10 NOTE — NURSING NOTE
Return Appointment: Patient given instructions regarding scheduling next clinic visit. Patient demonstrated understanding of this information and agreed to call with further questions or concerns. CORE in 4 months.    Patient stated he understood all health information given and agreed to call with further questions or concerns.    Taylor Perez RN

## 2022-06-10 NOTE — Clinical Note
6/10/2022      RE: Murtaza Fitzgerald   [address]  [facility] [address]       Dear Colleague,    Thank you for the opportunity to participate in the care of your patient, Murtaza Fitzgerald, at the [facility] [place] [facility]. Please see a copy of my visit note below.      HPI: Murtaza is a 46 year old White male with a past medical history of :     1.  Type 1 diabetes since age 11.   2.  Hypertension.   3.  Hyperlipidemia, now status post kidney transplant 11/2008, failed.  Back on dialysis 02/2020.   4.  Status post pancreas transplant in 2009, failed 3 years later.  Currently on insulin.   5.  Depression.   6.  Tobacco abuse in the past.   7.  Knee tumor surgery.   8.  Gastroparesis.      His cardiac history is significant for a moderate ischemic cardiomyopathy.  He was asymptomatic, but found to have a positive stress test, which eventually led a 3-vessel coronary bypass surgery in 2015 (LIMA to LAD, vein graft to OM, vein graft to PDA).     Follow up CORE appt-  Referred by Dr. Vogel    No SOB at rest unless he has extra fluid. Dialysis M,W,F now . -130 systolic non-dialysis .  No NUÑEZ even with walking longer distances. He has no swelling in the legs, ankles, feet. He can feel distention in the abdomen.    He says his dry weight after dialysis is 84.5 kg.  Last A1C 6.9 in on the transplant list.     Denies  NUÑEZ, PND, orthopnea, edema, chest pain, palpitations, lightheadedness, dizziness, near syncopal/syncopal episodes. Murtaza has been following salt and fluid restrictions.      PAST MEDICAL HISTORY:  Past Medical History:   Diagnosis Date     CMV (cytomegalovirus infection) status negative 2011     Coronary artery disease, non-occlusive 2008    angio showed diffuse disease with no lesions     Diabetes mellitus, type 1     Diagnosed at age 11. Takes Insulin      Dialysis patient (H)     prior to kidney  transplant     Dyslipidemia      Gastroesophageal reflux disease      History of blood transfusion      Hypertension      Immunosuppressed status (H)      Kidney transplant status, living related donor           Migraines      Mycotic aneurysm of kidney transplant (H) 2008     Noncompliance with treatment     no labs for one year     Pancreas replaced by transplant (H) 2009    rejection treated with thymo     Pancreatic disease     pancreas transplant     Tobacco abuse ongoing       FAMILY HISTORY:  Family History   Problem Relation Age of Onset     Unknown/Adopted Father      Heart Disease Maternal Grandfather 62     Melanoma No family hx of      Skin Cancer No family hx of        SOCIAL HISTORY:  Social History     Tobacco Use     Smoking status: Former Smoker     Packs/day: 1.00     Years: 20.00     Pack years: 20.00     Types: Vaping Device, Cigarettes     Quit date: 2016     Years since quittin.4     Smokeless tobacco: Never Used     Tobacco comment: E- Cig use since    Substance Use Topics     Alcohol use: Not Currently     Alcohol/week: 0.0 standard drinks     Drug use: No           CURRENT MEDICATIONS:  Current Outpatient Medications   Medication Sig Dispense Refill     acetone, Urine, test STRP 1 strip by In Vitro route daily (Patient not taking: No sig reported) 50 each 3     aspirin (ASA) 81 MG EC tablet Take 1 tablet (81 mg) by mouth daily       atorvastatin (LIPITOR) 20 MG tablet Take 2 tablets (40 mg) by mouth daily 180 tablet 3     blood glucose (NO BRAND SPECIFIED) test strip Use to test blood sugar 5 times daily or as directed. 200 strip 2     calcium acetate (PHOSLO) 667 MG CAPS capsule Take 667 mg by mouth 3 times daily (with meals)       calcium carbonate (TUMS) 500 MG chewable tablet Take 1 chew tab by mouth 2 times daily       carvedilol (COREG) 12.5 MG tablet        carvedilol (COREG) 25 MG tablet Take 2 tablets (50 mg) by mouth 2 times daily (with meals) 360 tablet 3      Continuous Blood Gluc Sensor (DEXCOM G6 SENSOR) MISC Change every 10 days. (Patient not taking: Reported on 5/23/2022) 3 each 11     Continuous Blood Gluc Transmit (DEXCOM G6 TRANSMITTER) MISC Change every 3 months. (Patient not taking: No sig reported) 1 each 3     Insulin Disposable Pump (OMNIPOD DASH 5 PACK PODS) MISC 1 each every 48 hours (Patient not taking: No sig reported) 5 each 11     insulin glargine (LANTUS SOLOSTAR) 100 UNIT/ML pen Inject 22 Units Subcutaneous At Bedtime 9 mL 5     insulin pen needle (BD ARPITA U/F) 32G X 4 MM miscellaneous Use 4 daily or as directed. 120 each 0     lisinopril (ZESTRIL) 20 MG tablet Take one tablet (20mg) on dialysis days (Tues, Thursday and Saturday), and take two tablets (40mg) on non-dialysis days (Mondays, Wednesdays, Fridays and Sundays) 180 tablet 3     metoclopramide (REGLAN) 5 MG tablet Take 1 tablet (5 mg) by mouth 4 times daily (before meals and nightly) (Patient not taking: No sig reported) 120 tablet 11     naproxen sodium 220 MG capsule Take 220 mg by mouth as needed        NOVOLOG FLEXPEN 100 UNIT/ML soln USE 1 UNIT FOR EVERY 20 OVER BLOOD GLUCOSE  PER SLIDING SCALE AS DIRECTED. AVERAGE USE OF 45 UNITS DAILY 45 mL 0     omeprazole (PRILOSEC) 40 MG DR capsule TAKE 2 CAPSULES(80 MG) BY MOUTH EVERY EVENING 180 capsule 3     ondansetron (ZOFRAN-ODT) 8 MG ODT tab DISSOLVE 1 TABLET ON TOP OF TONGUE EVERY 8 HOURS FOR 2 DAYS AS NEEDED FOR NAUSEA AND VOMITING       polyethylene glycol (MIRALAX) 17 GM/Dose powder Take 17 g by mouth daily as needed for constipation 850 g 0     prochlorperazine (COMPAZINE) 10 MG tablet Take 10 mg by mouth         ROS:  Review Of Systems  Skin: negative  Eyes: negative  Ears/Nose/Throat: negative  Respiratory: No shortness of breath, dyspnea on exertion, cough, or hemoptysis  Cardiovascular: negative  Gastrointestinal: negative  Genitourinary: negative  Musculoskeletal: negative  Neurologic: negative  Psychiatric:  negative  Hematologic/Lymphatic/Immunologic: negative  Endocrine: negative    EXAM:     General: alert, articulate, and in no acute distress.  HEENT: normocephalic, atraumatic, anicteric sclera, EOMI, mucosa moist, no cyanosis.   Neck: neck supple.  No adenopathy, masses, or carotid bruits.  JVP elevated 10  Heart: regular rhythm, normal S1/S2, no murmur, gallop, rub.  Precordium quiet with normal PMI.     Lungs: clear, no rales, ronchi, or wheezing.  No accessory muscle use, respirations unlabored.   Abdomen: soft, non-tender, bowel sounds present, no hepatomegaly  Extremities: no pitting edema.   No cyanosis.     Neurological: alert and oriented x 3.  normal speech and affect, no gross motor deficits  Skin:  No ecchymoses, rashes, or clubbing.     Labs:  CBC RESULTS:  Lab Results   Component Value Date    WBC 5.9 01/31/2022    WBC 6.7 03/03/2021    RBC 2.86 (L) 01/31/2022    RBC 3.47 (L) 03/03/2021    HGB 9.4 (L) 01/31/2022    HGB 11.1 (L) 03/03/2021    HCT 29.0 (L) 01/31/2022    HCT 34.3 (L) 03/03/2021     (H) 01/31/2022    MCV 99 03/03/2021    MCH 32.9 01/31/2022    MCH 32.0 03/03/2021    MCHC 32.4 01/31/2022    MCHC 32.4 03/03/2021    RDW 15.3 (H) 01/31/2022    RDW 15.1 (H) 03/03/2021     (L) 01/31/2022     03/03/2021       CMP RESULTS:  Lab Results   Component Value Date     (L) 01/31/2022     (L) 03/19/2021    POTASSIUM 5.7 (H) 01/31/2022    POTASSIUM 4.1 03/19/2021    CHLORIDE 94 01/31/2022    CHLORIDE 90 (L) 03/19/2021    CO2 29 01/31/2022    CO2 32 03/19/2021    ANIONGAP 8 01/31/2022    ANIONGAP 8 03/19/2021     (H) 01/31/2022     (H) 03/19/2021    BUN 51 (H) 01/31/2022    BUN 38 (H) 03/19/2021    CR 8.73 (H) 01/31/2022    CR 6.59 (H) 03/19/2021    GFRESTIMATED 7 (L) 01/31/2022    GFRESTIMATED 9 (L) 03/19/2021    GFRESTBLACK 11 (L) 03/19/2021    CHARLY 9.5 01/31/2022    CHARLY 9.0 03/19/2021    BILITOTAL 0.9 09/01/2021    BILITOTAL 1.4 (H) 03/01/2021    ALBUMIN 3.8  09/01/2021    ALBUMIN 3.4 03/01/2021    ALKPHOS 152 (H) 09/01/2021    ALKPHOS 199 (H) 03/01/2021    ALT 18 09/01/2021    ALT 19 03/01/2021    AST 8 09/01/2021    AST 15 03/01/2021        INR RESULTS:  Lab Results   Component Value Date    INR 1.26 (H) 01/31/2022    INR 1.24 (H) 03/01/2021       No components found for: CK  Lab Results   Component Value Date    MAG 2.2 09/01/2021    MAG 2.3 12/14/2020     Lab Results   Component Value Date    NTBNP 62,990 (H) 07/23/2021     @BRIEFLABR (dig)@    Most recent echocardiogram:  No results found for this or any previous visit (from the past 8760 hour(s)).      Assessment and Plan:    In summary,Murtaza  is a  46 year old male who is here for a follow up  visit to CORE clinic. I have asked that he check his blood pressures at home so we can tell if the medication dosing is helping. He notes a drop in his BP in dialysis after reaching his dry weight to 113 systolic, which he says is low for him.  Most recent Echo 50-55 %     1.  Chronic sytolic heart failure secondary to ICM .  Stage C  NYHA Class III  ACEi/ARB: - Lisinopril 20 mg on Dialysis days and 40 mg on non-dialysis days  BB: yes- Coreg 25 mg BID - 37.5 mg BID on non-dialysis days   Aldosterone antagonist: contraindicated due to renal dysfunction  SCD prophylaxis: decision deferred during medication uptitration  Fluid status: hypervolemic- dialysis patient   Anticoagulation:   Antiplatelet:  ASA dose   Sleep apnea:  NSAID use:  Contraindicated.  Avoid use.  Remote Monitoring:  SGLT-2- not candidate DM Type I.     2.  Other comordbid conditions:      #.  Hypertension.        He says his blood pressures tend to run quite high, which would not be optimal in terms of his existing cardiomyopathy and may explain at least partially why his ejection fraction dropped from October to 12/2020.       He has been tolerating the carvedilol increase. He should check his BP's home to help determine if we need to increase his  carvedilol on non-dialysis days.  He is at max dose Lisinopril on non-dialysis days and tolerating 20 mg on dialysis days.      #.  Hyperlipidemia.        His goal LDL is less than 70.  It most recently was 88 with mildly elevated triglycerides.  We will perform further adjustments.     # DM I - 7/23/21 A1C - 6.9.  Insulin dependant - PCP following       3.  Follow-up   CORE in 4 months       Sumit ORTEGA, CNP  [facility]             Please do not hesitate to contact me if you have any questions/concerns.     Sincerely,     JORDAN Carolina CNP

## 2022-06-10 NOTE — LETTER
6/10/2022       RE: Murtaza Fitzgerald   [address]  [facility] [address]     Dear Colleague,    Thank you for referring your patient, Murtaza Fitzgerald, to the [facility] at [facility]. Please see a copy of my visit note below.      HPI: Murtaza is a 46 year old White male with a past medical history of :     1.  Type 1 diabetes since age 11.   2.  Hypertension.   3.  Hyperlipidemia, now status post kidney transplant 11/2008, failed.  Back on dialysis 02/2020.   4.  Status post pancreas transplant in 2009, failed 3 years later.  Currently on insulin.   5.  Depression.   6.  Tobacco abuse in the past.   7.  Knee tumor surgery.   8.  Gastroparesis.      His cardiac history is significant for a moderate ischemic cardiomyopathy.  He was asymptomatic, but found to have a positive stress test, which eventually led a 3-vessel coronary bypass surgery in 2015 (LIMA to LAD, vein graft to OM, vein graft to PDA).     Follow up CORE appt-  Referred by Dr. Vogel    No SOB at rest unless he has extra fluid. Dialysis M,W,F now . -130 systolic non-dialysis .  No NUÑEZ even with walking longer distances. He has no swelling in the legs, ankles, feet. He can feel distention in the abdomen.    He says his dry weight after dialysis is 84.5 kg.  Last A1C 6.9 in on the transplant list.     Denies  NUÑEZ, PND, orthopnea, edema, chest pain, palpitations, lightheadedness, dizziness, near syncopal/syncopal episodes. Murtaza has been following salt and fluid restrictions.      PAST MEDICAL HISTORY:  Past Medical History:   Diagnosis Date     CMV (cytomegalovirus infection) status negative 2011     Coronary artery disease, non-occlusive 2008    angio showed diffuse disease with no lesions     Diabetes mellitus, type 1     Diagnosed at age 11. Takes Insulin      Dialysis patient (H)     prior to kidney transplant     Dyslipidemia       Gastroesophageal reflux disease      History of blood transfusion      Hypertension      Immunosuppressed status (H)      Kidney transplant status, living related donor           Migraines      Mycotic aneurysm of kidney transplant (H) 2008     Noncompliance with treatment     no labs for one year     Pancreas replaced by transplant (H) 2009    rejection treated with thymo     Pancreatic disease     pancreas transplant     Tobacco abuse ongoing       FAMILY HISTORY:  Family History   Problem Relation Age of Onset     Unknown/Adopted Father      Heart Disease Maternal Grandfather 62     Melanoma No family hx of      Skin Cancer No family hx of        SOCIAL HISTORY:  Social History     Tobacco Use     Smoking status: Former Smoker     Packs/day: 1.00     Years: 20.00     Pack years: 20.00     Types: Vaping Device, Cigarettes     Quit date: 2016     Years since quittin.4     Smokeless tobacco: Never Used     Tobacco comment: E- Cig use since    Substance Use Topics     Alcohol use: Not Currently     Alcohol/week: 0.0 standard drinks     Drug use: No           CURRENT MEDICATIONS:  Current Outpatient Medications   Medication Sig Dispense Refill     acetone, Urine, test STRP 1 strip by In Vitro route daily (Patient not taking: No sig reported) 50 each 3     aspirin (ASA) 81 MG EC tablet Take 1 tablet (81 mg) by mouth daily       atorvastatin (LIPITOR) 20 MG tablet Take 2 tablets (40 mg) by mouth daily 180 tablet 3     blood glucose (NO BRAND SPECIFIED) test strip Use to test blood sugar 5 times daily or as directed. 200 strip 2     calcium acetate (PHOSLO) 667 MG CAPS capsule Take 667 mg by mouth 3 times daily (with meals)       calcium carbonate (TUMS) 500 MG chewable tablet Take 1 chew tab by mouth 2 times daily       carvedilol (COREG) 12.5 MG tablet        carvedilol (COREG) 25 MG tablet Take 2 tablets (50 mg) by mouth 2 times daily (with meals) 360 tablet 3     Continuous Blood Gluc Sensor  (DEXCOM G6 SENSOR) MISC Change every 10 days. (Patient not taking: Reported on 5/23/2022) 3 each 11     Continuous Blood Gluc Transmit (DEXCOM G6 TRANSMITTER) MISC Change every 3 months. (Patient not taking: No sig reported) 1 each 3     Insulin Disposable Pump (OMNIPOD DASH 5 PACK PODS) MISC 1 each every 48 hours (Patient not taking: No sig reported) 5 each 11     insulin glargine (LANTUS SOLOSTAR) 100 UNIT/ML pen Inject 22 Units Subcutaneous At Bedtime 9 mL 5     insulin pen needle (BD ARPITA U/F) 32G X 4 MM miscellaneous Use 4 daily or as directed. 120 each 0     lisinopril (ZESTRIL) 20 MG tablet Take one tablet (20mg) on dialysis days (Tues, Thursday and Saturday), and take two tablets (40mg) on non-dialysis days (Mondays, Wednesdays, Fridays and Sundays) 180 tablet 3     metoclopramide (REGLAN) 5 MG tablet Take 1 tablet (5 mg) by mouth 4 times daily (before meals and nightly) (Patient not taking: No sig reported) 120 tablet 11     naproxen sodium 220 MG capsule Take 220 mg by mouth as needed        NOVOLOG FLEXPEN 100 UNIT/ML soln USE 1 UNIT FOR EVERY 20 OVER BLOOD GLUCOSE  PER SLIDING SCALE AS DIRECTED. AVERAGE USE OF 45 UNITS DAILY 45 mL 0     omeprazole (PRILOSEC) 40 MG DR capsule TAKE 2 CAPSULES(80 MG) BY MOUTH EVERY EVENING 180 capsule 3     ondansetron (ZOFRAN-ODT) 8 MG ODT tab DISSOLVE 1 TABLET ON TOP OF TONGUE EVERY 8 HOURS FOR 2 DAYS AS NEEDED FOR NAUSEA AND VOMITING       polyethylene glycol (MIRALAX) 17 GM/Dose powder Take 17 g by mouth daily as needed for constipation 850 g 0     prochlorperazine (COMPAZINE) 10 MG tablet Take 10 mg by mouth         ROS:  Review Of Systems  Skin: negative  Eyes: negative  Ears/Nose/Throat: negative  Respiratory: No shortness of breath, dyspnea on exertion, cough, or hemoptysis  Cardiovascular: negative  Gastrointestinal: negative  Genitourinary: negative  Musculoskeletal: negative  Neurologic: negative  Psychiatric: negative  Hematologic/Lymphatic/Immunologic:  negative  Endocrine: negative    EXAM:     General: alert, articulate, and in no acute distress.  HEENT: normocephalic, atraumatic, anicteric sclera, EOMI, mucosa moist, no cyanosis.   Neck: neck supple.  No adenopathy, masses, or carotid bruits.  JVP elevated 10  Heart: regular rhythm, normal S1/S2, no murmur, gallop, rub.  Precordium quiet with normal PMI.     Lungs: clear, no rales, ronchi, or wheezing.  No accessory muscle use, respirations unlabored.   Abdomen: soft, non-tender, bowel sounds present, no hepatomegaly  Extremities: no pitting edema.   No cyanosis.     Neurological: alert and oriented x 3.  normal speech and affect, no gross motor deficits  Skin:  No ecchymoses, rashes, or clubbing.     Labs:  CBC RESULTS:  Lab Results   Component Value Date    WBC 5.9 01/31/2022    WBC 6.7 03/03/2021    RBC 2.86 (L) 01/31/2022    RBC 3.47 (L) 03/03/2021    HGB 9.4 (L) 01/31/2022    HGB 11.1 (L) 03/03/2021    HCT 29.0 (L) 01/31/2022    HCT 34.3 (L) 03/03/2021     (H) 01/31/2022    MCV 99 03/03/2021    MCH 32.9 01/31/2022    MCH 32.0 03/03/2021    MCHC 32.4 01/31/2022    MCHC 32.4 03/03/2021    RDW 15.3 (H) 01/31/2022    RDW 15.1 (H) 03/03/2021     (L) 01/31/2022     03/03/2021       CMP RESULTS:  Lab Results   Component Value Date     (L) 01/31/2022     (L) 03/19/2021    POTASSIUM 5.7 (H) 01/31/2022    POTASSIUM 4.1 03/19/2021    CHLORIDE 94 01/31/2022    CHLORIDE 90 (L) 03/19/2021    CO2 29 01/31/2022    CO2 32 03/19/2021    ANIONGAP 8 01/31/2022    ANIONGAP 8 03/19/2021     (H) 01/31/2022     (H) 03/19/2021    BUN 51 (H) 01/31/2022    BUN 38 (H) 03/19/2021    CR 8.73 (H) 01/31/2022    CR 6.59 (H) 03/19/2021    GFRESTIMATED 7 (L) 01/31/2022    GFRESTIMATED 9 (L) 03/19/2021    GFRESTBLACK 11 (L) 03/19/2021    CHARLY 9.5 01/31/2022    CHARLY 9.0 03/19/2021    BILITOTAL 0.9 09/01/2021    BILITOTAL 1.4 (H) 03/01/2021    ALBUMIN 3.8 09/01/2021    ALBUMIN 3.4 03/01/2021     ALKPHOS 152 (H) 09/01/2021    ALKPHOS 199 (H) 03/01/2021    ALT 18 09/01/2021    ALT 19 03/01/2021    AST 8 09/01/2021    AST 15 03/01/2021        INR RESULTS:  Lab Results   Component Value Date    INR 1.26 (H) 01/31/2022    INR 1.24 (H) 03/01/2021       No components found for: CK  Lab Results   Component Value Date    MAG 2.2 09/01/2021    MAG 2.3 12/14/2020     Lab Results   Component Value Date    NTBNP 62,990 (H) 07/23/2021     @BRIEFLABR (dig)@    Most recent echocardiogram:  No results found for this or any previous visit (from the past 8760 hour(s)).      Assessment and Plan:    In summary,Murtaza  is a  46 year old male who is here for a follow up  visit to [facility] clinic. I have asked that he check his blood pressures at home so we can tell if the medication dosing is helping. He notes a drop in his BP in dialysis after reaching his dry weight to 113 systolic, which he says is low for him.  Most recent Echo 50-55 %     1.  Chronic sytolic heart failure secondary to ICM .  Stage C  NYHA Class III  ACEi/ARB: - Lisinopril 20 mg on Dialysis days and 40 mg on non-dialysis days  BB: yes- Coreg 25 mg BID - 37.5 mg BID on non-dialysis days   Aldosterone antagonist: contraindicated due to renal dysfunction  SCD prophylaxis: decision deferred during medication uptitration  Fluid status: hypervolemic- dialysis patient   Anticoagulation:   Antiplatelet:  ASA dose   Sleep apnea:  NSAID use:  Contraindicated.  Avoid use.  Remote Monitoring:  SGLT-2- not candidate DM Type I.     2.  Other comordbid conditions:      #.  Hypertension.        He says his blood pressures tend to run quite high, which would not be optimal in terms of his existing cardiomyopathy and may explain at least partially why his ejection fraction dropped from October to 12/2020.       He has been tolerating the carvedilol increase. He should check his BP's home to help determine if we need to increase his carvedilol on non-dialysis days.  He is at  max dose Lisinopril on non-dialysis days and tolerating 20 mg on dialysis days.      #.  Hyperlipidemia.        His goal LDL is less than 70.  It most recently was 88 with mildly elevated triglycerides.  We will perform further adjustments.     # DM I - 7/23/21 A1C - 6.9.  Insulin dependant - PCP following       3.  Follow-up   [facility] in 4 months       Sumit ORTEGA, CNP  [facility]

## 2022-06-10 NOTE — PROGRESS NOTES
HPI: Murtaza is a 46 year old White male with a past medical history of :     1.  Type 1 diabetes since age 11.   2.  Hypertension.   3.  Hyperlipidemia, now status post kidney transplant 11/2008, failed.  Back on dialysis 02/2020.   4.  Status post pancreas transplant in 2009, failed 3 years later.  Currently on insulin.   5.  Depression.   6.  Tobacco abuse in the past.   7.  Knee tumor surgery.   8.  Gastroparesis.      His cardiac history is significant for a moderate ischemic cardiomyopathy.  He was asymptomatic, but found to have a positive stress test, which eventually led a 3-vessel coronary bypass surgery in 2015 (LIMA to LAD, vein graft to OM, vein graft to PDA).     Follow up CORE appt-  Referred by Dr. Vogel    No SOB at rest unless he has extra fluid. Dialysis M,W,F now . -130 systolic non-dialysis .  No NUÑEZ even with walking longer distances. He has no swelling in the legs, ankles, feet. He can feel distention in the abdomen.    He says his dry weight after dialysis is 84.5 kg.  Last A1C 6.9 in on the transplant list.     Denies  NUÑEZ, PND, orthopnea, edema, chest pain, palpitations, lightheadedness, dizziness, near syncopal/syncopal episodes. Murtaza has been following salt and fluid restrictions.      PAST MEDICAL HISTORY:  Past Medical History:   Diagnosis Date     CMV (cytomegalovirus infection) status negative 2011     Coronary artery disease, non-occlusive 2008    angio showed diffuse disease with no lesions     Diabetes mellitus, type 1     Diagnosed at age 11. 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     Noncompliance with treatment     no labs for one year     Pancreas replaced by  transplant (H) 2009    rejection treated with thymo     Pancreatic disease     pancreas transplant     Tobacco abuse ongoing       FAMILY HISTORY:  Family History   Problem Relation Age of Onset     Unknown/Adopted Father      Heart Disease Maternal Grandfather 62     Melanoma No family hx of      Skin Cancer No family hx of        SOCIAL HISTORY:  Social History     Tobacco Use     Smoking status: Former Smoker     Packs/day: 1.00     Years: 20.00     Pack years: 20.00     Types: Vaping Device, Cigarettes     Quit date: 2016     Years since quittin.4     Smokeless tobacco: Never Used     Tobacco comment: E- Cig use since    Substance Use Topics     Alcohol use: Not Currently     Alcohol/week: 0.0 standard drinks     Drug use: No           CURRENT MEDICATIONS:  Current Outpatient Medications   Medication Sig Dispense Refill     acetone, Urine, test STRP 1 strip by In Vitro route daily (Patient not taking: No sig reported) 50 each 3     aspirin (ASA) 81 MG EC tablet Take 1 tablet (81 mg) by mouth daily       atorvastatin (LIPITOR) 20 MG tablet Take 2 tablets (40 mg) by mouth daily 180 tablet 3     blood glucose (NO BRAND SPECIFIED) test strip Use to test blood sugar 5 times daily or as directed. 200 strip 2     calcium acetate (PHOSLO) 667 MG CAPS capsule Take 667 mg by mouth 3 times daily (with meals)       calcium carbonate (TUMS) 500 MG chewable tablet Take 1 chew tab by mouth 2 times daily       carvedilol (COREG) 12.5 MG tablet        carvedilol (COREG) 25 MG tablet Take 2 tablets (50 mg) by mouth 2 times daily (with meals) 360 tablet 3     Continuous Blood Gluc Sensor (DEXCOM G6 SENSOR) MISC Change every 10 days. (Patient not taking: Reported on 2022) 3 each 11     Continuous Blood Gluc Transmit (DEXCOM G6 TRANSMITTER) MISC Change every 3 months. (Patient not taking: No sig reported) 1 each 3     Insulin Disposable Pump (OMNIPOD DASH 5 PACK PODS) MISC 1 each every 48 hours (Patient not  taking: No sig reported) 5 each 11     insulin glargine (LANTUS SOLOSTAR) 100 UNIT/ML pen Inject 22 Units Subcutaneous At Bedtime 9 mL 5     insulin pen needle (BD ARPITA U/F) 32G X 4 MM miscellaneous Use 4 daily or as directed. 120 each 0     lisinopril (ZESTRIL) 20 MG tablet Take one tablet (20mg) on dialysis days (Tues, Thursday and Saturday), and take two tablets (40mg) on non-dialysis days (Mondays, Wednesdays, Fridays and Sundays) 180 tablet 3     metoclopramide (REGLAN) 5 MG tablet Take 1 tablet (5 mg) by mouth 4 times daily (before meals and nightly) (Patient not taking: No sig reported) 120 tablet 11     naproxen sodium 220 MG capsule Take 220 mg by mouth as needed        NOVOLOG FLEXPEN 100 UNIT/ML soln USE 1 UNIT FOR EVERY 20 OVER BLOOD GLUCOSE  PER SLIDING SCALE AS DIRECTED. AVERAGE USE OF 45 UNITS DAILY 45 mL 0     omeprazole (PRILOSEC) 40 MG DR capsule TAKE 2 CAPSULES(80 MG) BY MOUTH EVERY EVENING 180 capsule 3     ondansetron (ZOFRAN-ODT) 8 MG ODT tab DISSOLVE 1 TABLET ON TOP OF TONGUE EVERY 8 HOURS FOR 2 DAYS AS NEEDED FOR NAUSEA AND VOMITING       polyethylene glycol (MIRALAX) 17 GM/Dose powder Take 17 g by mouth daily as needed for constipation 850 g 0     prochlorperazine (COMPAZINE) 10 MG tablet Take 10 mg by mouth         ROS:  Review Of Systems  Skin: negative  Eyes: negative  Ears/Nose/Throat: negative  Respiratory: No shortness of breath, dyspnea on exertion, cough, or hemoptysis  Cardiovascular: negative  Gastrointestinal: negative  Genitourinary: negative  Musculoskeletal: negative  Neurologic: negative  Psychiatric: negative  Hematologic/Lymphatic/Immunologic: negative  Endocrine: negative    EXAM:     General: alert, articulate, and in no acute distress.  HEENT: normocephalic, atraumatic, anicteric sclera, EOMI, mucosa moist, no cyanosis.   Neck: neck supple.  No adenopathy, masses, or carotid bruits.  JVP elevated 10  Heart: regular rhythm, normal S1/S2, no murmur, gallop, rub.   Precordium quiet with normal PMI.     Lungs: clear, no rales, ronchi, or wheezing.  No accessory muscle use, respirations unlabored.   Abdomen: soft, non-tender, bowel sounds present, no hepatomegaly  Extremities: no pitting edema.   No cyanosis.     Neurological: alert and oriented x 3.  normal speech and affect, no gross motor deficits  Skin:  No ecchymoses, rashes, or clubbing.     Labs:  CBC RESULTS:  Lab Results   Component Value Date    WBC 5.9 01/31/2022    WBC 6.7 03/03/2021    RBC 2.86 (L) 01/31/2022    RBC 3.47 (L) 03/03/2021    HGB 9.4 (L) 01/31/2022    HGB 11.1 (L) 03/03/2021    HCT 29.0 (L) 01/31/2022    HCT 34.3 (L) 03/03/2021     (H) 01/31/2022    MCV 99 03/03/2021    MCH 32.9 01/31/2022    MCH 32.0 03/03/2021    MCHC 32.4 01/31/2022    MCHC 32.4 03/03/2021    RDW 15.3 (H) 01/31/2022    RDW 15.1 (H) 03/03/2021     (L) 01/31/2022     03/03/2021       CMP RESULTS:  Lab Results   Component Value Date     (L) 01/31/2022     (L) 03/19/2021    POTASSIUM 5.7 (H) 01/31/2022    POTASSIUM 4.1 03/19/2021    CHLORIDE 94 01/31/2022    CHLORIDE 90 (L) 03/19/2021    CO2 29 01/31/2022    CO2 32 03/19/2021    ANIONGAP 8 01/31/2022    ANIONGAP 8 03/19/2021     (H) 01/31/2022     (H) 03/19/2021    BUN 51 (H) 01/31/2022    BUN 38 (H) 03/19/2021    CR 8.73 (H) 01/31/2022    CR 6.59 (H) 03/19/2021    GFRESTIMATED 7 (L) 01/31/2022    GFRESTIMATED 9 (L) 03/19/2021    GFRESTBLACK 11 (L) 03/19/2021    CHARLY 9.5 01/31/2022    CHARLY 9.0 03/19/2021    BILITOTAL 0.9 09/01/2021    BILITOTAL 1.4 (H) 03/01/2021    ALBUMIN 3.8 09/01/2021    ALBUMIN 3.4 03/01/2021    ALKPHOS 152 (H) 09/01/2021    ALKPHOS 199 (H) 03/01/2021    ALT 18 09/01/2021    ALT 19 03/01/2021    AST 8 09/01/2021    AST 15 03/01/2021        INR RESULTS:  Lab Results   Component Value Date    INR 1.26 (H) 01/31/2022    INR 1.24 (H) 03/01/2021       No components found for: CK  Lab Results   Component Value Date    MAG 2.2  09/01/2021    MAG 2.3 12/14/2020     Lab Results   Component Value Date    NTBNP 62,990 (H) 07/23/2021     @BRIEFLABR (dig)@    Most recent echocardiogram:  No results found for this or any previous visit (from the past 8760 hour(s)).      Assessment and Plan:    In summary,Murtaza  is a  46 year old male who is here for a follow up  visit to [facility] clinic. I have asked that he check his blood pressures at home so we can tell if the medication dosing is helping. He notes a drop in his BP in dialysis after reaching his dry weight to 113 systolic, which he says is low for him.  Most recent Echo 50-55 %     1.  Chronic sytolic heart failure secondary to ICM .  Stage C  NYHA Class III  ACEi/ARB: - Lisinopril 20 mg on Dialysis days and 40 mg on non-dialysis days  BB: yes- Coreg 25 mg BID - 37.5 mg BID on non-dialysis days   Aldosterone antagonist: contraindicated due to renal dysfunction  SCD prophylaxis: decision deferred during medication uptitration  Fluid status: hypervolemic- dialysis patient   Anticoagulation:   Antiplatelet:  ASA dose   Sleep apnea:  NSAID use:  Contraindicated.  Avoid use.  Remote Monitoring:  SGLT-2- not candidate DM Type I.     2.  Other comordbid conditions:      #.  Hypertension.        He says his blood pressures tend to run quite high, which would not be optimal in terms of his existing cardiomyopathy and may explain at least partially why his ejection fraction dropped from October to 12/2020.       He has been tolerating the carvedilol increase. He should check his BP's home to help determine if we need to increase his carvedilol on non-dialysis days.  He is at max dose Lisinopril on non-dialysis days and tolerating 20 mg on dialysis days.      #.  Hyperlipidemia.        His goal LDL is less than 70.  It most recently was 88 with mildly elevated triglycerides.  We will perform further adjustments.     # DM I - 7/23/21 A1C - 6.9.  Insulin dependant - PCP following       3.  Follow-up    [facility] in 4 months       Sumit ORTEGA, CNP  [facility]

## 2022-06-10 NOTE — PROGRESS NOTES
Per IR, US requested to be scheduled at [facility] at 0800 on 6/17/22, prior to fistulogram scheduled at [facility] on 6/17/22 with arrival time of 0930.    Sent request to central imaging scheduling pool with request to schedule and communicate appointment info with pt.    BETH LEWIS, RN on 6/10/2022 at 4:08 PM  Dialysis Access Care Coordinator  Phone: [phone]  P_Dialysis_Access_Nurse

## 2022-06-10 NOTE — NURSING NOTE
Chief Complaint   Patient presents with     Follow Up     Return CORE, 47 yo male with moderate ischemic cardiomyopathy presents for follow up with labs prior.    Vitals were taken and medications reconciled.    Judson Vyas, EMT  3:27 PM

## 2022-06-20 NOTE — TELEPHONE ENCOUNTER
"Pt reports he has had diarrhea for 2 weeks, multiple stools a day  Describes them as watery  Has been taking imodium with some relief  Pt is on dialysis- no urine out out  BP has been a little high at dialysis    Virtual Appt made for 6/21/22  Pt verbalized understanding and agrees to the plan    Thank you  Francisco Nelson RN on 6/20/2022 at 10:16 AM       Reason for Disposition    SEVERE diarrhea (e.g., 7 or more times / day more than normal) and present > 24 hours (1 day)    Answer Assessment - Initial Assessment Questions  1. DIARRHEA SEVERITY: \"How bad is the diarrhea?\" \"How many extra stools have you had in the past 24 hours than normal?\"     - NO DIARRHEA (SCALE 0)    - MILD (SCALE 1-3): Few loose or mushy BMs; increase of 1-3 stools over normal daily number of stools; mild increase in ostomy output.    -  MODERATE (SCALE 4-7): Increase of 4-6 stools daily over normal; moderate increase in ostomy output.  * SEVERE (SCALE 8-10; OR 'WORST POSSIBLE'): Increase of 7 or more stools daily over normal; moderate increase in ostomy output; incontinence.      4-5 a day. Worse in the am.  2. ONSET: \"When did the diarrhea begin?\"       2 weeks ago  3. BM CONSISTENCY: \"How loose or watery is the diarrhea?\"       watery  4. VOMITING: \"Are you also vomiting?\" If so, ask: \"How many times in the past 24 hours?\"       no  5. ABDOMINAL PAIN: \"Are you having any abdominal pain?\" If yes: \"What does it feel like?\" (e.g., crampy, dull, intermittent, constant)       Gassy pain  6. ABDOMINAL PAIN SEVERITY: If present, ask: \"How bad is the pain?\"  (e.g., Scale 1-10; mild, moderate, or severe)    - MILD (1-3): doesn't interfere with normal activities, abdomen soft and not tender to touch     - MODERATE (4-7): interferes with normal activities or awakens from sleep, tender to touch     - SEVERE (8-10): excruciating pain, doubled over, unable to do any normal activities        mild  7. ORAL INTAKE: If vomiting, \"Have you been able " "to drink liquids?\" \"How much fluids have you had in the past 24 hours?\"      drinking liquids okay  8. HYDRATION: \"Any signs of dehydration?\" (e.g., dry mouth [not just dry lips], too weak to stand, dizziness, new weight loss) \"When did you last urinate?\"      dialysis  9. EXPOSURE: \"Have you traveled to a foreign country recently?\" \"Have you been exposed to anyone with diarrhea?\" \"Could you have eaten any food that was spoiled?\"      no  10. ANTIBIOTIC USE: \"Are you taking antibiotics now or have you taken antibiotics in the past 2 months?\"        no  11. OTHER SYMPTOMS: \"Do you have any other symptoms?\" (e.g., fever, blood in stool)        no  12. PREGNANCY: \"Is there any chance you are pregnant?\" \"When was your last menstrual period?\"        NA    Protocols used: DIARRHEA-A-OH      "

## 2022-06-21 NOTE — LETTER
Murtaza Anderson   [address]  [facility] [address]                June 21, 2022    Dear Dental Provider,     You may or may not know that the above patient in your care is in evaluation for an organ transplant. In order to be a candidate for transplant, our center requests that the patient provide a letter of clearance from their dentist stating that they are free from disease or infections. At your earliest convenience, please fax this information to [phone]. Your help is greatly appreciated.    Sincerely,   Jewell Ceron RN, [place], Transplant Coordinator  Solid Organ Transplant [address]  [address] [place] [address]  Phone [phone] Fax [phone]  [email or web address]

## 2022-06-21 NOTE — TELEPHONE ENCOUNTER
Pt called with questions about his cardiac risk assessment- I explained that per the team it needs to be updated and per the cardiology NP that recently saw him, he will need to be seen by Dr. Vogel to complete (it is ordered, not yet scheduled).  He also needs to follow up with regarding the sores/wounds noted on his legs at his [facility] w/ nephrology.  He would like to see dermatology at [facility] but has been told they do not accept his insurance ([employer]) - I have reached out to the PFR to get more information on this and will follow up w/ the pt.  He is still working with his dentist- [facility] in [place].  I will fax a dental clearance letter to them- [phone]    Will follow up with the pt once I hear more on the insurance issue.  He had no further questions and was in good agreement w/ the plan.

## 2022-06-22 NOTE — PROGRESS NOTES
On chart review, patient no-showed for his ultrasound and fistulogram scheduled for 6/17/22.    Called Dasha Vergara to notify HD nurse of patient's missed appointment. HD nurse noted that patient dialyzes on Mondays, Wednesdays, and Fridays. Updated chart. HD nurse will contact us if patient is needing access intervention.    Sania Morelos RN  Dialysis Access Care Coordinator  Phone: [phone]  P_Dialysis_Access_Nurse

## 2022-06-22 NOTE — TELEPHONE ENCOUNTER
Received the following message from PFR regarding [employer]:   So with [employer] and [employer] Medicare Advantage plans our system does not have a medical contract with them but we DO have a transplant contract.      Per Daisha ([employer] Transplant CM), If the services are being ordered by the transplant team to determine transplant candidacy and they are done at our facility ([facility] or [address] [place] [address]) then the appts would be covered under the transplant eval auth. If the tests are being ordered for eval or treatment of an underlying condition that isn't being used to determine transplant candidacy then a new auth will be needed under medical. I assume that would then be better if patient did at an in-network facility.      Called pt to let him know that I have placed an order for derm and that it should be covered under his transplant evaluation.  He expressed good understanding.  I have asked scheduling to follow up with him to get derm and cardiac risk assessment arranged.  He will follow up w/ his dentist to ensure all work is completed and that they fax a letter of clearance back to us.  He had no further questions at this time.

## 2022-06-29 NOTE — PROGRESS NOTES
Service Date: 2022    Kt Ríos MD  [id number] [place], [address]    D: 2022   T: 2022   MT: kailash    Name:     DOUG PAIZ  MRN:      [id number]        Account:      [id number]   :      1975           Service Date: 2022       Dear Dr. Ríos:    t was a pleasure participating in the care of your patient, Mr. Doug Fitzgerald.      As you know, he is a 46-year-old gentleman who I spoke to over the phone today regarding heart failure with improved ejection fraction, coronary artery disease, hypertension, hyperlipidemia and for preoperative evaluation prior to a second kidney and pancreas transplant.    PAST MEDICAL HISTORY:      1.  Type 1 diabetes since age 11.  2.  Hypertension.  3.  Hyperlipidemia.  4.  Status post kidney transplant 2008, subsequently failed, currently on dialysis since .  5.  Status post pancreas transplant , failed in , currently on insulin.  6.  Depression.  7.  Tobacco abuse in the past.  8.  Knee tumor surgery.  9.  Gastroparesis.  10.  Neuropathy.    The patient's cardiac history is significant for known coronary disease and decreased LV systolic function.  He had a positive stress test that led to 3-vessel coronary bypass surgery in  (LIMA to LAD, vein graft to OM, vein graft to PDA).    His last coronary angiogram 2021 revealed the following:    - Left main 45% lesion.  - LAD chronically occluded proximally.  - Circumflex 80% proximal lesion.  - RCA .    - LIMA to LAD patent.  - SVG to OM3 patent.  - SVG to right PDA chronically occluded.    - Medical therapy was recommended.    Echocardiogram back in 2020 revealed decreased LV systolic function from the 50%-55% range in 10/2020 to 37% in 2020.  After optimizing blood pressure control and optimizing his medical therapy, his echo 2022 revealed improvement in ejection fraction to the 50%-55% range once  again.    From a functional standpoint, the patient does not exercise on a regular basis.  He really only performs ADLs at home, walking around the house for 5-10 minutes without chest pain or shortness of breath.  He has had no new symptoms for the past year or year and a half, and denies other PND, orthopnea, edema, palpitations, syncope or near syncope.    CURRENT MEDICATIONS:  He is taking aspirin 81 mg a day, Lipitor 40 mg a day, carvedilol 50 mg twice daily, insulin, lisinopril 20 on dialysis days, 40 on nondialysis days.  Actually for his carvedilol, I he takes an increased dose on nondialysis days compared to dialysis days.    PHYSICAL EXAMINATION:    VITAL SIGNS:  On 06/10/2022, his blood pressure was 128/66 with a pulse of 74.  His weight was 190 pounds.  GENERAL:  His decision-making capabilities are intact.  PSYCHIATRIC:  He is alert and oriented x3.  RESPIRATORY:  He is breathing comfortably without gross cough.  The remainder of the comprehensive physical exam was deferred secondary to the COVID-19 pandemic and secondary to telephone visit restrictions.    LABORATORY:  11/12/2021:  LDL 53.  06/10/2022:  Potassium 4.8, GFR 10.  01/31/2022:  Hemoglobin 9.4.    Echocardiogram on 04/07/2022 revealed an ejection fraction of 50%-55% without significant valvular pathology.  EF increased since 12/06/2020.      IMPRESSION:      Tyrell is a 46-year-old gentleman status post kidney transplant in 2008, subsequently failed, currently on dialysis and also status post pancreas transplant in 2009, subsequently failed, currently on insulin, who has several active cardiac issues:    1.  Heart failure with improved ejection fraction.      Ejection fraction dropped from 50%-55% to 37% from 10/2020 through 12/2020.  This is possibly related to uncontrolled hypertension.      With improved blood pressure control and medical therapy, his ejection fraction improved to the 50%-55% range again by echo 04/07/2022.      He appears  to be doing well from this standpoint.  We will continue clinical monitoring.    2.  Coronary artery disease.      The patient had 3-vessel coronary bypass surgery performed 2015 (LIMA to LAD, vein graft to OM, vein graft to PDA).  Coronary angiogram 03/03/2021 revealed that the LIMA to LAD is patent.  SVG to OM3 is patent; however, the SVG to the right PDA is chronically occluded and the native right coronary artery is also chronically occluded.      Medical therapy was recommended at that time.      He has had no change in exertional capacity since angiogram 03/2021.  His ejection fraction has actually improved by echo 04/07/2022.  He appears stable from a coronary artery disease standpoint at present.    3.  Hypertension.      Blood pressures are currently running in the 115 to high 120s range on both dialysis and nondialysis days.  We will continue to follow.    4.  Hyperlipidemia.      LDL was 53 on 11/12/2021.  Continue to follow.    5.  Preoperative evaluation prior to kidney transplant.      He has had no new symptoms since his last angiogram in 03/2021.  His ejection fraction has in fact improved and was documented to be in the 50% -55% range as of echo 04/07/2022.      His LIMA to LAD and vein graft to OM were patent on last angiogram 03/2021.  However, the right coronary territory is collaterally dependent as the SVG to right PDA and native right coronary arteries are both chronically occluded.      Per the interventional team from prior angiogram, no percutaneous intervention prior to renal transplantation was required and medical therapy was recommended.      Considering the lack of new symptoms and improvement in ejection fraction, no additional testing or treatment would be currently indicated in order to further decrease perioperative mortality.      PLAN:       The patient is approved for his procedure at somewhat increased, but acceptable perioperative risk for event.    Note that the patient's  right coronary territory has collaterally dependent circulation and a small bump in troponin in the perioperative period would not be unexpected.  However, cardiac consultation is always available if needed as an inpatient if other cardiac issues should arise.    Once again, it was a pleasure participating in the care of your patient, . Murtaza Fitzgerald.  Please feel free to contact me at any time if there are any questions regarding his care in the future.            Yovany Vogel MD    cc:  Kt Ríos MD  [address], [address]    Yovany Vogel MD        D: 2022   T: 2022   MT: kailash    Name:     MURTAZA PAIZ  MRN:      [id number]        Account:      [id number]   :      1975           Service Date: 2022       Document: [id number]

## 2022-06-29 NOTE — NURSING NOTE
Chief Complaint   Patient presents with     Follow Up     Pre kidney clearance, no questions while rooming, pt states vitals were taken at dialysis this morning, no changes to medications per pt     Patient denies any changes regarding medication and allergies and states all information remains accurate since last reviewed.    Oc Coffey, CHEN/CMA

## 2022-06-29 NOTE — LETTER
2022      RE: Murtaza Fitzgerald  [address]  [facility] [address]       Dear Colleague,    Thank you for the opportunity to participate in the care of your patient, Murtaza Fitzgerald, at the [facility]. Please see a copy of my visit note below.      Service Date: 2022    HISTORY OF PRESENT ILLNESS:  It was a pleasure participating in the care of your patient, . Murtaza Fitzgerald.  As you know, he is a 46-year-old gentleman who I spoke to over the phone today regarding heart failure with improved ejection fraction, coronary artery disease, hypertension, hyperlipidemia and for preoperative evaluation prior to a second kidney and pancreas transplant.    PAST MEDICAL HISTORY:  Significant for the followin.  Type 1 diabetes since age 11.  2.  Hypertension.  3.  Hyperlipidemia.  4.  Status post kidney transplant 2008, subsequently failed, currently on dialysis since .  5.  Status post pancreas transplant , failed in , currently on insulin.  6.  Depression.  7.  Tobacco abuse in the past.  8.  Knee tumor surgery.  9.  Gastroparesis.  10.  Neuropathy.    The patient's cardiac history is significant for known coronary disease and decreased LV systolic function.  He had a positive stress test that led to 3-vessel coronary bypass surgery in  (LIMA to LAD, vein graft to OM, vein graft to PDA).    His last coronary angiogram 2021 revealed the following:  - Left main 45% lesion.  - LAD chronically occluded proximally.  - Circumflex 80% proximal lesion.  - RCA .  - LIMA to LAD patent.  - SVG to OM3 patent.  - SVG to right PDA chronically occluded.  - Medical therapy was recommended.    Echocardiogram back in 2020 revealed decreased LV systolic function from the 50%-55% range in 10/2020 to 37% in 2020.  After optimizing blood pressure control and optimizing his  medical therapy, his echo 04/07/2022 revealed improvement in ejection fraction to the 50%-55% range once again.    From a functional standpoint, the patient does not exercise on a regular basis.  He really only performs ADLs at home, walking around the house for 5-10 minutes without chest pain or shortness of breath.  He has had no new symptoms for the past year or year and a half, and denies other PND, orthopnea, edema, palpitations, syncope or near syncope.    CURRENT MEDICATIONS:  He is taking aspirin 81 mg a day, Lipitor 40 mg a day, carvedilol 50 mg twice daily, insulin, lisinopril 20 on dialysis days, 40 on nondialysis days.  Actually for his carvedilol, I he takes an increased dose on nondialysis days compared to dialysis days.    PHYSICAL EXAMINATION:  VITAL SIGNS:  On 06/10/2022, his blood pressure was 128/66 with a pulse of 74.  His weight was 190 pounds.  GENERAL:  His decision-making capabilities are intact.  PSYCHIATRIC:  He is alert and oriented x3.  RESPIRATORY:  He is breathing comfortably without gross cough.  The remainder of the comprehensive physical exam was deferred secondary to the COVID-19 pandemic and secondary to telephone visit restrictions.    LABORATORY:  11/12/2021:  LDL 53.  06/10/2022:  Potassium 4.8, GFR 10.  01/31/2022:  Hemoglobin 9.4.    Echocardiogram on 04/07/2022 revealed an ejection fraction of 50%-55% without significant valvular pathology.  EF increased since 12/06/2020.    IMPRESSION:  Tyrell is a 46-year-old gentleman status post kidney transplant in 2008, subsequently failed, currently on dialysis and also status post pancreas transplant in 2009, subsequently failed, currently on insulin, who has several active cardiac issues:  1.  Heart failure with improved ejection fraction.  This is likely hypertensive related.  Ejection fraction dropped from 50%-55% to 37% from 10/2020 through 12/2020.  This is possibly related to uncontrolled hypertension.  With improved blood  pressure control and medical therapy, his ejection fraction improved to the 50%-55% range again by echo 04/07/2022.  He appears to be doing well from this standpoint.  We will continue clinical monitoring.  2.  Coronary artery disease.  The patient had 3-vessel coronary bypass surgery performed 2015 (LIMA to LAD, vein graft to OM, vein graft to PDA).  Coronary angiogram 03/03/2021 revealed that the LIMA to LAD is patent.  SVG to OM3 is patent; however, the SVG to the right PDA is chronically occluded and the native right coronary artery is also chronically occluded.  Medical therapy was recommended at that time.  He has had no change in exertional capacity since angiogram 03/2021.  His ejection fraction has actually improved by echo 04/07/2022.  He appears stable from a coronary artery disease standpoint at present.  3.  Hypertension.  Blood pressures are currently running in the 115 to high 120s range on both dialysis and nondialysis days.  We will continue to follow.  4.  Hyperlipidemia.  LDL was 53 on 11/12/2021.  Continue to follow.  5.  Preoperative evaluation prior to kidney transplant.  He has had no new symptoms since his last angiogram in 03/2021.  His ejection fraction has in fact improved and was documented to be in the 50% -55% range as of echo 04/07/2022.  His LIMA to LAD and vein graft to OM were patent on last angiogram 03/2021.  However, the right coronary territory is collaterally dependent as the SVG to right PDA and native right coronary arteries are both chronically occluded.  Per the interventional team from prior angiogram, no percutaneous intervention prior to renal transplantation was required and medical therapy was recommended.  Considering the lack of new symptoms and improvement in ejection fraction, no additional testing or treatment would be currently indicated in order to further decrease perioperative mortality.    PLAN:  The patient is approved for his procedure at somewhat  increased, but acceptable perioperative risk for event.    Note that the patient's right coronary territory has collaterally dependent circulation and a small bump in troponin in the perioperative period would not be unexpected.  However, cardiac consultation is always available if needed as an inpatient if other cardiac issues should arise.    Once again, it was a pleasure participating in the care of your patient, Mr. Murtaza Fitzgerald.  Please feel free to contact me at any time if there are any questions regarding his care in the future.    Yovany Vogel MD    cc:  Kt Ríos MD  [address] [address]      D: 2022   T: 2022   MT: kailash    Name:     MURTAZA PAIZ  MRN:      [id number]        Account:      [id number]   :      1975           Service Date: 2022       Document: [id number]

## 2022-06-29 NOTE — PROGRESS NOTES
"Tyrell is a 46 year old who is being evaluated via a billable telephone visit.      What phone number would you like to be contacted at? [phone]   How would you like to obtain your AVS? CHEN Sepulveda/SKYE    The patient has been notified of following:     \"This phone visit will be conducted via a call between you and your physician/provider. We have found that certain health care needs can be provided without the need for an in-person physical exam.  This service lets us provide the care you need with a phone conversation.  If a prescription is necessary we can send it directly to your pharmacy.  If lab work is needed we can place an order for that and you can then stop by our lab to have the test done at a later time.    Phone visits are billed at different rates depending on your insurance coverage.  Please reach out to your insurance provider with any questions.    If during the course of the call the physician/provider feels a phone visit is not appropriate, you will not be charged for this service.\"    Patient has given verbal consent for phone visit? Yes    How would you like to obtain your AVS? Danie    Duration of call:9min    Total visit time (including visit time, charting, chart review, coordination of care, etc.=31min    See dictation #[id number]    CSN#:[id number]        "

## 2022-07-25 NOTE — TELEPHONE ENCOUNTER
Rj GARCIA from [facility] left a voicemail on PAL direct line.   Callback [phone].    Requesting verbal orders for Wound Care.       Called Rj GARCIA back:    Rj is requesting verbal orders for the following recommendations:    Wounds of the legs, feet, and left arm.     - Remove dressing, clean with normal saline and replace new dressing.   vashe soak 20 min.  Xeroform to open wounds.   Cover with roll of guaze and tape.     -Refer to wound care clinic.     Routing to Dr. Ríos:  -ok for the above verbal orders?  Wound Care referral pended.      Jun Dalal RN on 7/25/2022 at 5:11 PM

## 2022-07-26 NOTE — PLAN OF CARE
A&O. VSS. Denies  pain.  Nausea  improving. Compazine given x1. . LR @ 75 ml/hr. Tele SR.  Full liquid diet. Up SBA/independent.  Cherie Harrell RN on 1/29/2020 at 4:34 AM    Patient GCS 15, stable, no signs of acute distress, A+O x4, resps unlabored, airway intact, and in good condition upon exiting with  escort.      Gerri Frankel, RN  07/25/22 [id number]

## 2022-07-26 NOTE — TELEPHONE ENCOUNTER
Called Rj GARCIA from [facility] [phone].    Left a voicemail with instruction to return call.     When Rj GARCIA calls back:  -Accept the verbal orders as requested.     -Instruct Rj GARCIA to give the patient the phone number for the [facility] [place] Phone: [phone].    Jun Dalal RN on 7/26/2022 at 9:21 AM

## 2022-07-26 NOTE — TELEPHONE ENCOUNTER
Rj GARCIA from [facility] left a voicemail requesting a call back.   Rj GARCIA stated that he has a confidential voicemail.    Called Rj GARCIA [phone].     Verbal orders accepted.   Rj GARCIA will contact the patient and pass along the scheduling number for the [facility] [place] [phone].     Jun Dalal RN on 7/26/2022 at 5:09 PM

## 2022-07-29 PROBLEM — E10.10 DIABETIC KETOACIDOSIS WITHOUT COMA ASSOCIATED WITH TYPE 1 DIABETES MELLITUS (H): Status: ACTIVE | Noted: 2020-05-13

## 2022-07-29 PROBLEM — I25.5 ISCHEMIC CARDIOMYOPATHY: Status: ACTIVE | Noted: 2022-01-01

## 2022-07-29 PROBLEM — Z95.1 PRESENCE OF AORTOCORONARY BYPASS GRAFT: Status: ACTIVE | Noted: 2021-06-08

## 2022-07-29 PROBLEM — E10.9 DIABETES MELLITUS TYPE 1 (H): Status: ACTIVE | Noted: 2021-03-04

## 2022-07-29 PROBLEM — N18.6 END-STAGE RENAL DISEASE (H): Status: ACTIVE | Noted: 2020-09-25

## 2022-07-29 PROBLEM — T86.12 KIDNEY TRANSPLANT FAILURE: Status: ACTIVE | Noted: 2021-06-08

## 2022-07-29 PROBLEM — K31.84 GASTROPARESIS: Status: ACTIVE | Noted: 2020-12-15

## 2022-07-29 NOTE — PROGRESS NOTES
Received call from dialysis RN, wondering if there is still an order for a fistulogram in pt chart.  Pt had one scheduled previously and was unable to make the appointment- so they are wondering if he can reschedule or if another order needs to be placed.    It appears pt still has order for fistulogram active.  Will relay info to dialysis unit.    BETH LEWIS RN on 7/29/2022 at 3:28 PM  Dialysis Access Care Coordinator  Phone: [phone] / [phone]  P_Dialysis_Access_Nurse

## 2022-07-29 NOTE — TELEPHONE ENCOUNTER
Consult received via work queue from Kt Ríos MD in [facility] IM/PEDS for wound of the legs, feet, and left arm.     Patient has history of DM 1, CAD, ESKD. Per Standing Order patient qualifies for KATIA to be scheduled prior to assessment with providers Florencio Olivia or Ashlyn at [facility] [place] [facility] for next available appointment.    Is patient a MISBAH lift?  Scheduling to inquire    Routing to [facility] Appointment Scheduling Pool.  Orders pending.

## 2022-07-29 NOTE — PROGRESS NOTES
Called dialysis clinic to communicate pt still has active fistulogram order.  They stated pt had called this morning and reported he was too dizzy to get out of bed.  They were instructed to call 911 and bring pt to the hospital.  Pt refused and wanted an extra HD tx today.  Pt was scheduled for 3rd shift at [facility].  Pt mother recently called the dialysis unit stating she could not get pt out of bed.  [facility] RN instructed to pt mother to call 911 and get pt to the hospital.    Will continue to follow as needed.    BETH LEWIS RN on 7/29/2022 at 3:33 PM  Dialysis Access Care Coordinator  Phone: [phone] / [phone]  P_Dialysis_Access_Nurse

## 2022-08-01 NOTE — TELEPHONE ENCOUNTER
Spoke with patient and he stated he is already receiving  in home wound care and declined to make appointment .

## 2022-08-05 NOTE — PROGRESS NOTES
Jai called stating pt has yet to reschedule fistulogram.  Sent message to scheduling team requesting to reschedule.    BETH LEWIS RN on 8/5/2022 at 2:48 PM  Dialysis Access Care Coordinator  Phone: [phone] / [phone]  P_Dialysis_Access_Nurse

## 2022-08-16 NOTE — PROGRESS NOTES
[facility] Dermatology Note  Encounter Date: Aug 17, 2022  Office Visit     Dermatology Problem List:  # FBSE for Renal Transplant   Kidney transplant in 2008, which failed and patient is now on hemodialysis. Previously on tacrolimus; prior mycophenolic acid. Patient is undergoing renal and pancreatic transplant eval.    - being worked up for second kidney transplant and pancreatic transplant.  2. 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  - current tx: gabapentin, triamcinolone 0.1% cream, pending nbUVB  - prior tx: hydrocortisone ointment, triamcinolone 0/1% ointment     3. Folliculitis  Involving the trunk. Discussed potential side effects of BPO including irritation and dryness.  - BPO wash every day.  ____________________________________________    Assessment & Plan:    # Excoriated papules on trunk and extremities. 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 Chronic, flare.   Due to chronic renal disease, dialysis, hyperphosphatemia. Worsening. Bilateral upper extremities, lower extremities, abdomen, buttock, and back, active. Patient is receiving wound care 2x/week and dislikes all previous topical treatments prescribed. Discussed treatment options, including nbUVB, creams, and gabapentin.   - Start triamcinolone 0.1% cream BID PRN  - Start nbUVB treatment 3x/week, pending insurance approval. Counseled on side effects/risks, including sun burns and minimal increased risk of skin cancer.  - Continue gabapentin 100 mg after dialysis. Patient notes that he currently takes this nightly. Will follow up with primary care physician about adjusting the dosage.   - Follow-up in 3 months.     # Benign lesions: Multiple benign nevi, solar lentigos, seborrheic keratoses, cherry angiomas. Explained to patient benign nature of lesion. No treatment is necessary at this time unless the lesion changes or becomes symptomatic.   - ABCDs of  melanoma were discussed and self skin checks were advised.  - Sun precaution was advised including the use of sun screens of SPF 30 or higher, sun protective clothing, and avoidance of tanning beds.     # Hx of kidney transplant in 2008, failed  - We discussed the role of the immune system in the surveillance of skin for precancers and early lesions.  - Sun precaution was advised including the use of sun screens of SPF 30 or higher, sun protective clothing, and avoidance of tanning beds.    Procedures Performed:   None.    Follow-up: 3 month(s) in-person, earlier for PDL treatment    Staff and Resident and Scribe:     Howard Gaines MD  Medicine/Dermatology PGY-2  *[phone]    Scribe Disclosure:  I, BERNARD GUERRERO, am serving as a scribe to document services personally performed by Raman Arnold MD based on data collection and the provider's statements to me.     Provider Disclosure:   The documentation recorded by the scribe accurately reflects the services I personally performed and the decisions made by me.    Staff Physician Comments:   I saw and evaluated the patient with the resident and I agree with the assessment and plan.  I was present for the examination.    Raman Arnold MD  Pronouns: he/him/his    Department of Dermatology  [facility]: Phone: [phone], Fax:[phone]  [facility]: Phone: [phone] Fax: [phone]  ____________________________________________    CC: Skin Check (Tyrell is here for a skin check and has no spots of concern.)    HPI:  Mr. Murtaza Fitzgerald is a(n) 46 year old male who presents today as a return patient for FBSE. Last seen by Dr. Rosales on 4/12/21, at which time patient was started on hydrocortisone and triamcinolone 0.1% ointment for treatment of prurigo nodules.    Today, patient reports that he has wounds all over his body  from scratching/picking or bumping into things. They often start as small itchy bumps. He is seeing wound care twice/week for bandage replacement. He is not using any topical ointments on those areas because he did not find them helpful and found them to be annoyingly messy.    The patient otherwise denies any new or concerning lesions. No bleeding, painful, pruritic, or changing lesions. They report no personal history of skin cancer. There is no family history of skin cancer. History of immunosuppression but not on any immunosuppression medications currently. They do not use sunscreen and protective clothing when outdoors for sun protection. No occupational exposure to ultraviolet light or other forms of radiation. Health otherwise stable. No other skin concerns.     Labs Reviewed:  N/A    Physical Exam:  Vitals: There were no vitals taken for this visit.  SKIN: Full skin, which includes the head/face, both arms, chest, back, abdomen,both legs, genitalia and/or groin buttocks, digits and/or nails, was examined.  - Numerous flesh-toned to erythematous pink papules, nodules, and angulated plaques, excoriated, with overlying ulceration and serosanguinous to hemorraghic crust, spread across bilateral upper extremities, lower extremities, abdomen, buttock, and back with butterfly sparing.   - 5 mm exophytic flesh-toned papule on left cheek with overlying ulceration, consistent on dermoscopy with excoriated nevus.   - There are dome shaped bright red papules on the trunk and extremities.  - Multiple regular brown pigmented macules and papules are identified on the trunk and extremities.   - Scattered brown macules on sun exposed areas.  - There are waxy stuck on tan to brown papules on the trunk and extremities.   - No other lesions of concern on areas examined.     Medications:  Current Outpatient Medications   Medication     aspirin (ASA) 81 MG EC tablet     atorvastatin (LIPITOR) 20 MG tablet     blood glucose (NO  BRAND SPECIFIED) test strip     carvedilol (COREG) 12.5 MG tablet     carvedilol (COREG) 25 MG tablet     insulin glargine (LANTUS SOLOSTAR) 100 UNIT/ML pen     insulin pen needle (BD ARPITA U/F) 32G X 4 MM miscellaneous     lisinopril (ZESTRIL) 20 MG tablet     naproxen sodium 220 MG capsule     NOVOLOG FLEXPEN 100 UNIT/ML soln     omeprazole (PRILOSEC) 40 MG DR capsule     ondansetron (ZOFRAN-ODT) 8 MG ODT tab     prochlorperazine (COMPAZINE) 10 MG tablet     acetone, Urine, test STRP     calcium acetate (PHOSLO) 667 MG CAPS capsule     calcium carbonate (TUMS) 500 MG chewable tablet     Continuous Blood Gluc Sensor (DEXCOM G6 SENSOR) MISC     Continuous Blood Gluc Transmit (DEXCOM G6 TRANSMITTER) MISC     Insulin Disposable Pump (OMNIPOD DASH 5 PACK PODS) MISC     metoclopramide (REGLAN) 5 MG tablet     polyethylene glycol (MIRALAX) 17 GM/Dose powder     No current facility-administered medications for this visit.      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)     Adhesive capsulitis of right shoulder     Diabetic ketoacidosis without coma associated with type 1 diabetes mellitus (H)     ESRD (end stage renal disease) on dialysis (H)     End-stage renal disease (H)     Encounter regarding vascular access for dialysis for ESRD (H)     NUÑEZ (dyspnea on exertion)     Abnormal cardiovascular stress test     Abnormal echocardiogram     Gastroparesis     S/P CABG x 3     Atherosclerosis of coronary artery     Nausea & vomiting     Status post coronary  angiogram     Hypertensive chronic kidney disease     Diabetic neuropathy (H)     Headache     Immunocompromised (H)     Kidney disorder     Migraine     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

## 2022-08-17 NOTE — LETTER
8/17/2022       RE: Murtaza Fitzgerald  [address]  [facility] [address]     Dear Colleague,    Thank you for referring your patient, Murtaza Fitzgerald, to the [facility] [place] at [facility]. Please see a copy of my visit note below.    [facility] Dermatology Note  Encounter Date: Aug 17, 2022  Office Visit     Dermatology Problem List:  # FBSE for Renal Transplant   Kidney transplant in 2008, which failed and patient is now on hemodialysis. Previously on tacrolimus; prior mycophenolic acid. Patient is undergoing renal and pancreatic transplant eval.    - being worked up for second kidney transplant and pancreatic transplant.  2. 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  - current tx: gabapentin, triamcinolone 0.1% cream, pending nbUVB  - prior tx: hydrocortisone ointment, triamcinolone 0/1% ointment     3. Folliculitis  Involving the trunk. Discussed potential side effects of BPO including irritation and dryness.  - BPO wash every day.  ____________________________________________    Assessment & Plan:    # Excoriated papules on trunk and extremities. 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 Chronic, flare.   Due to chronic renal disease, dialysis, hyperphosphatemia. Worsening. Bilateral upper extremities, lower extremities, abdomen, buttock, and back, active. Patient is receiving wound care 2x/week and dislikes all previous topical treatments prescribed. Discussed treatment options, including nbUVB, creams, and gabapentin.   - Start triamcinolone 0.1% cream BID PRN  - Start nbUVB treatment 3x/week, pending insurance approval. Counseled on side effects/risks, including sun burns and minimal increased risk of skin cancer.  - Continue gabapentin 100 mg after dialysis. Patient notes that he currently takes this nightly. Will follow  up with primary care physician about adjusting the dosage.   - Follow-up in 3 months.     # Benign lesions: Multiple benign nevi, solar lentigos, seborrheic keratoses, cherry angiomas. Explained to patient benign nature of lesion. No treatment is necessary at this time unless the lesion changes or becomes symptomatic.   - ABCDs of melanoma were discussed and self skin checks were advised.  - Sun precaution was advised including the use of sun screens of SPF 30 or higher, sun protective clothing, and avoidance of tanning beds.     # Hx of kidney transplant in 2008, failed  - We discussed the role of the immune system in the surveillance of skin for precancers and early lesions.  - Sun precaution was advised including the use of sun screens of SPF 30 or higher, sun protective clothing, and avoidance of tanning beds.    Procedures Performed:   None.    Follow-up: 3 month(s) in-person, earlier for PDL treatment    Staff and Resident and Scribe:     Howard Gaines MD  Medicine/Dermatology PGY-2  *[phone]    Scribe Disclosure:  I, BERNARD GUERRERO, am serving as a scribe to document services personally performed by Raman Arnold MD based on data collection and the provider's statements to me.     Provider Disclosure:   The documentation recorded by the scribe accurately reflects the services I personally performed and the decisions made by me.    Staff Physician Comments:   I saw and evaluated the patient with the resident and I agree with the assessment and plan.  I was present for the examination.    Raman Arnold MD  Pronouns: he/him/his    Department of Dermatology  [facility]: Phone: [phone], Fax:[phone]  [facility]: Phone: [phone] Fax: [phone]  ____________________________________________    CC: Skin Check (Tyrell is here for a skin check and has no spots of  concern.)    HPI:  Mr. Murtaza Fitzgerald is a(n) 46 year old male who presents today as a return patient for FBSE. Last seen by Dr. Rosales on 4/12/21, at which time patient was started on hydrocortisone and triamcinolone 0.1% ointment for treatment of prurigo nodules.    Today, patient reports that he has wounds all over his body from scratching/picking or bumping into things. They often start as small itchy bumps. He is seeing wound care twice/week for bandage replacement. He is not using any topical ointments on those areas because he did not find them helpful and found them to be annoyingly messy.    The patient otherwise denies any new or concerning lesions. No bleeding, painful, pruritic, or changing lesions. They report no personal history of skin cancer. There is no family history of skin cancer. History of immunosuppression but not on any immunosuppression medications currently. They do not use sunscreen and protective clothing when outdoors for sun protection. No occupational exposure to ultraviolet light or other forms of radiation. Health otherwise stable. No other skin concerns.     Labs Reviewed:  N/A    Physical Exam:  Vitals: There were no vitals taken for this visit.  SKIN: Full skin, which includes the head/face, both arms, chest, back, abdomen,both legs, genitalia and/or groin buttocks, digits and/or nails, was examined.  - Numerous flesh-toned to erythematous pink papules, nodules, and angulated plaques, excoriated, with overlying ulceration and serosanguinous to hemorraghic crust, spread across bilateral upper extremities, lower extremities, abdomen, buttock, and back with butterfly sparing.   - 5 mm exophytic flesh-toned papule on left cheek with overlying ulceration, consistent on dermoscopy with excoriated nevus.   - There are dome shaped bright red papules on the trunk and extremities.  - Multiple regular brown pigmented macules and papules are identified on the trunk and  extremities.   - Scattered brown macules on sun exposed areas.  - There are waxy stuck on tan to brown papules on the trunk and extremities.   - No other lesions of concern on areas examined.     Medications:  Current Outpatient Medications   Medication     aspirin (ASA) 81 MG EC tablet     atorvastatin (LIPITOR) 20 MG tablet     blood glucose (NO BRAND SPECIFIED) test strip     carvedilol (COREG) 12.5 MG tablet     carvedilol (COREG) 25 MG tablet     insulin glargine (LANTUS SOLOSTAR) 100 UNIT/ML pen     insulin pen needle (BD ARPITA U/F) 32G X 4 MM miscellaneous     lisinopril (ZESTRIL) 20 MG tablet     naproxen sodium 220 MG capsule     NOVOLOG FLEXPEN 100 UNIT/ML soln     omeprazole (PRILOSEC) 40 MG DR capsule     ondansetron (ZOFRAN-ODT) 8 MG ODT tab     prochlorperazine (COMPAZINE) 10 MG tablet     acetone, Urine, test STRP     calcium acetate (PHOSLO) 667 MG CAPS capsule     calcium carbonate (TUMS) 500 MG chewable tablet     Continuous Blood Gluc Sensor (DEXCOM G6 SENSOR) MISC     Continuous Blood Gluc Transmit (DEXCOM G6 TRANSMITTER) MISC     Insulin Disposable Pump (OMNIPOD DASH 5 PACK PODS) MISC     metoclopramide (REGLAN) 5 MG tablet     polyethylene glycol (MIRALAX) 17 GM/Dose powder     No current facility-administered medications for this visit.      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)     Adhesive capsulitis of right shoulder     Diabetic ketoacidosis without coma associated with type 1 diabetes mellitus (H)     ESRD (end stage  renal disease) on dialysis (H)     End-stage renal disease (H)     Encounter regarding vascular access for dialysis for ESRD (H)     NUÑEZ (dyspnea on exertion)     Abnormal cardiovascular stress test     Abnormal echocardiogram     Gastroparesis     S/P CABG x 3     Atherosclerosis of coronary artery     Nausea & vomiting     Status post coronary angiogram     Hypertensive chronic kidney disease     Diabetic neuropathy (H)     Headache     Immunocompromised (H)     Kidney disorder     Migraine     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

## 2022-08-17 NOTE — NURSING NOTE
Dermatology Rooming Note    Murtaza Fitzgerald's goals for this visit include:   Chief Complaint   Patient presents with     Skin Check     Tyrell is here for a skin check and has no spots of concern.     Orion Sarabia, EMT

## 2022-08-17 NOTE — TELEPHONE ENCOUNTER
Please check coverage for NBUVB. Patient would like this done at [facility].     Consent Obtained in clinic and sent to Medical Records to scan into patient's chart.

## 2022-08-17 NOTE — TELEPHONE ENCOUNTER
Financial Counselor Review:    Procedure to be performed (include CPT code):  Phototherapy narrow-band UVB - CPT 43701   Diagnosis and ICD10:    Prurigo nodularis [L28.1]  - Primary       Dermatitis [L30.9]       Acquired perforating dermatosis         # of treatments requested:  156  Is this a cosmetic procedure/medication: No  Include coverage and patient financial responsibility information:  Yes  Does patient need to be contacted by Financial Counselor:  No    Route response back to the dermatology patient care [place] - [phone]    Sharon Weeks RN

## 2022-08-17 NOTE — PATIENT INSTRUCTIONS
- We will put in orders for you to start light therapy at our site in [place] on Monday, Wednesday, and Friday. These will be short visits with nursing only and should help with your itchy spots.  - We prescribed a cream (triamcinolone) for your itchy spots that you can use up to twice daily.        Patient Education     Checking for Skin Cancer  You can find cancer early by checking your skin each month. There are 3 kinds of skin cancer. They are melanoma, basal cell carcinoma, and squamous cell carcinoma. Doing monthly skin checks is the best way to find new marks or skin changes. Follow the instructions below for checking your skin.   The ABCDEs of checking moles for melanoma   Check your moles or growths for signs of melanoma using ABCDE:   Asymmetry: the sides of the mole or growth don t match  Border: the edges are ragged, notched, or blurred  Color: the color within the mole or growth varies  Diameter: the mole or growth is larger than 6 mm (size of a pencil eraser)  Evolving: the size, shape, or color of the mole or growth is changing (evolving is not shown in the images below)    Checking for other types of skin cancer  Basal cell carcinoma or squamous cell carcinoma have symptoms such as:     A spot or mole that looks different from all other marks on your skin  Changes in how an area feels, such as itching, tenderness, or pain  Changes in the skin's surface, such as oozing, bleeding, or scaliness  A sore that does not heal  New swelling or redness beyond the border of a mole    Who s at risk?  Anyone can get skin cancer. But you are at greater risk if you have:   Fair skin, light-colored hair, or light-colored eyes  Many moles or abnormal moles on your skin  A history of sunburns from sunlight or tanning beds  A family history of skin cancer  A history of exposure to radiation or chemicals  A weakened immune system  If you have had skin cancer in the past, you are at risk for recurring skin cancer.    How to check your skin  Do your monthly skin checkups in front of a full-length mirror. Check all parts of your body, including your:   Head (ears, face, neck, and scalp)  Torso (front, back, and sides)  Arms (tops, undersides, upper, and lower armpits)  Hands (palms, backs, and fingers, including under the nails)  Buttocks and genitals  Legs (front, back, and sides)  Feet (tops, soles, toes, including under the nails, and between toes)  If you have a lot of moles, take digital photos of them each month. Make sure to take photos both up close and from a distance. These can help you see if any moles change over time.   Most skin changes are not cancer. But if you see any changes in your skin, call your doctor right away. Only he or she can diagnose a problem. If you have skin cancer, seeing your doctor can be the first step toward getting the treatment that could save your life.   [employer] last reviewed this educational content on 4/1/2019 2000-2020 [employer]. [address] [place] [address]. All rights reserved. This information is not intended as a substitute for professional medical care. Always follow your healthcare professional's instructions.       When should I call my doctor?  If you are worsening or not improving, please, contact us or seek urgent care as noted below.     Who should I call with questions (adults)?  [facility] (adult and pediatric): [phone]  [facility] (adult): [phone]  For urgent needs outside of business hours call the [facility] at [phone] and ask for the dermatology resident on call to be paged  If this is a medical emergency and you are unable to reach an ER, Call [phone]    Who should I call with questions (pediatric)?  [facility]- Pediatric Dermatology  Dr. Kelly Bautista, Dr. Oskar Main, Dr. Zainab Addison, ZACK Burr, Dr. Green  Wan, Dr. Sandie Ragland & Dr. Kt Best  Non-urgent nurse triage line; [phone]- Laurie and Bri GARCIA Care Coordinators   Isamar (/Complex ) [phone]    If you need a prescription refill, please contact your pharmacy. Refills are approved or denied by our Physicians during normal business hours, Monday through Fridays  Per office policy, refills will not be granted if you have not been seen within the past year (or sooner depending on your child's condition)    Scheduling Information:  Pediatric Appointment Scheduling and Call Center [phone]  Radiology Scheduling- [phone]  Sedation Unit Scheduling- [phone]  [facility] Scheduling- [facility] [phone]; Pediatric Dermatology [phone]  Main  Services: [phone]  Czech: [phone]  Stateless: [phone]  Hmong/Nauruan/Andre: [phone]  Preadmission Nursing Department Fax Number: [phone] (Fax all pre-operative paperwork to this number)    For urgent matters arising during evenings, weekends, or holidays that cannot wait for normal business hours please call [phone] and ask for the dermatology resident on call to be paged.     Patient Instructions for Narrow Band Ultraviolet B (NBUVB) Therapy    I will have pinkness or redness and my skin will be tan. I may have persistent redness or itching. Risks of the procedure include burn, pain, scar, skin discoloration, itching, eye damage, worsening of skin condition, skin aging and skin cancer. Multiple treatments may be required.     If your skin lesions are very scaly, you will be asked to apply mineral oil to the involved skin before treatment. This increases the penetration of the light.    Before your light treatment you will apply sunscreen to your lips to protect them from unnecessary ultraviolet light exposure.    You may also be asked to apply sunscreen to your face prior to light therapy if it is not affected by the skin  "condition. This is to protect your face from unnecessary ultraviolet light exposure.    All patients will wear ultraviolet light-blocking eye protection in the arellano. Close your eyes behind the goggles.     Female patients will apply sunscreen to the nipples and surrounding area. Male patients will cover the genitals with an opaque sock or supporter.     If you have orders to cover additional parts of your body during the treatment, by using an undergarment, shirt, etc., always use this same item every time to prevent burning.      You should not sunbathe or go to tanning booths during the time you are receiving light therapy. When you are outdoors you should apply a sunscreen to all exposed areas. If you get sunburned you will not be able to receive light treatments.    Light therapy has a drying effect on the skin, so it is important to apply a moisturizing cream or ointment frequently.    You may want to try and schedule a light appointment on the same day as your follow-up clinic visits.    Report any redness or burning that lasts longer than 24 hours, any increase in itching, or changes in skin condition. You should feel like your skin has a \"sun kissed\" feeling.    Inform your phototherapy nurse or your Dermatology doctor, before having any more light treatments, if (a) you start taking any new medication or (b) you develop any new health problems.     Please do not bring children to your phototherapy appointments. If children must come with you, please bring a responsible adult caregiver to supervise them.    13. IF YOU ARE HAVING TREATMENT AT THE [facility], you must check with your insurance company to make sure phototherapy is covered before you can start treatment.    Procedure codes to tell your insurance company are:   UVB- 48725 if you are using petroleum or sunscreen during the treatment  [id number] if you are not going petroleum or sunscreen.   [id number]       Who should I call with " questions?  [facility]: [phone]   [facility]: [phone]  For urgent needs outside of business hours call the [facility] at [phone] and ask for the dermatology resident on call

## 2022-08-18 NOTE — TELEPHONE ENCOUNTER
Called Tyrell and explained that Dermatology feels it appropriate move forward with listing.  He still needs dental clearance and has an appointment next week.  I explained that I will review his case w/ the team next week at Selection Committee and will follow up with him after the meeting.  He had no further questions and was in good agreement w/ the plan.

## 2022-08-18 NOTE — TELEPHONE ENCOUNTER
My chart message sent to patient with codes per his request to contact his insurance for coverage information. Patient will contact clinic via my chart when he finds out if he would like to proceed with scheduling.     Betty Torres LPN

## 2022-08-18 NOTE — LETTER
PHYSICIAN ORDER   ALA/PRA BLOOD    DATE & TIME ISSUED: 2022 9:54 AM  PATIENT NAME: Murtaza Fitzgerald   : 1975     [facility] MR# [if applicable]: [id number]     DIAGNOSIS/ICD-10 CODE: Awaiting Organ transplant [Z76.82}  EXPIRES: (1 YEAR AFTER DATE ISSUED)  Every 3 months: Please draw immediately upon receipt of  kit    1. Please draw 20ml of blood in red top (plain) tube for Antileukocyte Antibody (ALA or PRA).  2. Label tubes with the patient s name, complete lab slip.    3. Mailers, lab slips with instructions are sent to patient separately.  4. Call the Outreach Lab at [phone] to reorder mailers.  5. Mail blood to (this address is also on the mailers):    IMMUNOLOGY LABORATORY   [facility]    [address] [address]      .

## 2022-08-18 NOTE — TELEPHONE ENCOUNTER
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 # [id number] Auth #   Valid Dates:     Prior authorization(PA) or \"no PA required\" is not a guarantee for coverage.  Please advise the patient to contact insurance for their specific plan benefits.     Thank you,    CANDY Ayers  Financial Counselor  [facility]  [address] [place] [address]  [phone]  "

## 2022-08-23 NOTE — TELEPHONE ENCOUNTER
Spoke with JOSE Dela Cruz and relayed the message below. She will reach out to Dr. Merlos and get back to us.

## 2022-08-23 NOTE — TELEPHONE ENCOUNTER
[facility] Call Center    Phone Message    May a detailed message be left on voicemail: no     Reason for Call: Other: New dosage of Gabapentin is ok'd by Dr. Merlos.      Action Taken: Message routed to:  [facility] DERM    Travel Screening: Not Applicable

## 2022-08-23 NOTE — TELEPHONE ENCOUNTER
----- Message from Raman Arnold MD sent at 8/17/2022  3:27 PM CDT -----  Could we call patient's PCP (Dr. Be Merlos) to clarify dosing of gabapentin.     Patient seen by dermatology today - recently started on gabapentin 100 mg PO qdaily per PCP. Worried about dosing as should be given only after dialysis on dialysis day (three times weekly) and typically would start at 100 mg three times weekly and increase slowly to maximum of 300 mg three times weekly. Just want to make sure he is OK if we reduce dosing to gabapentin 100 mg three times weekly after dialysis as this is safer dosing in a patient in renal failure. Thanks!    Raman Arnold MD  Pronouns: he/him/his    Department of Dermatology  [facility]: Phone: [phone], Fax:[phone]  [facility]: Phone: [phone] Fax: [phone]

## 2022-08-25 NOTE — COMMITTEE REVIEW
Abdominal Committee Review Note     Evaluation Date: 5/27/2020  Committee Review Date: 8/24/2022    Organ being evaluated for: Kidney/Pancreas    Transplant Phase: Evaluation  Transplant Status: Active    Transplant Coordinator: Jewell Villela  Transplant Surgeon:       Referring Physician: Melissa Rojas    Primary Diagnosis: Diabetes Mellitus - Type I  Secondary Diagnosis:     Committee Review Members:  Dietitian, Registered Roxy Brunson, RD   Nephrology Jatin Rowe MD, Mikhail Rachel, APRN CNP, Yane Rodriguez MD   Pharmacist Swapna Bernal, [facility]    - Clinical Farzana Deanna Costalund, [facility]   Transplant My Bansal, RN, Jewell Ceron, RN, Rebecca Rocha, RN, Lisa Bean, RN, Amalia Rose, JOSE, Melissa Guzman MD, Drea Ortega, RN, Jazmin Ramirez, RN   Transplant Surgery Melissa Guzman MD       Transplant Eligibility: Insulin-dependent diabetes mellitus, Irreversible chronic kidney disease treated w/dialysis or expected need for dialysis    Committee Review Decision: Approved    Relative Contraindications: None    Absolute Contraindications: None    Committee Chair Melissa Guzman MD verbally attested to the committee's decision.    Committee Discussion Details: Reviewed pt's medical status and evaluation results to date.  Reviewed pt's cardiac status: pt follows w/ [facility] -has a hx of CAD, He is s/p 3 vessel CABG 2015, then had a reduced EF of 35-40% in December 2020, since improved to 50-55% as of April 2022 and repeat [facility] March 2021.  Pt was cleared to proceed w/ SPK transplant by Dr. Vogel.  Pt's Gastroparesis is significantly improved following gastric pacemaker placed April 2022. Pt was noted to have leg sores during visit w/ Transplant Nephrology [facility] on 5/23 - was cleared by Dr. Arnold in dermatology, seen on 8/17/22. Pt's abdominal imaging 2/11/2020 Ab/Pelv CT was reviewed and approved by Dr. Colby. The Committee discussed  that the pt will be at a higher risk cardiac-wise, and wanted to get further information regarding the pt's diabetes status before clearing him for listing for SPK vs. K alone.  RNCC will reach out to pt and relay information back to transplant provider.  Pt will need insurance PA and dental clearance piror to listing.  Pt will be called and summary letter will be sent.

## 2022-08-30 NOTE — TELEPHONE ENCOUNTER
Pt called inquiring if I have heard back from Dr. Guzman regarding his candidacy for K vs. SPK, I told him that I have reached out but have not heard back and will let him know as soon as I do.  I asked the pt if he has completed his dental work- he states that he has some cavities but he does not think his dentist understands the criteria for transplant- I will reach out to his dental office. Will follow up w/ pt once both issues are resolved.

## 2022-09-01 NOTE — TELEPHONE ENCOUNTER
Pt's dentist called and stated that she believes the best route of action would likely be dentures.  She noted that his oral health significantly declined in a year or a half.  He has significant decay on almost every tooth.  She stated that about 2 years ago his mouth was healthy enough to clear him, he now has about 11 teeth that she is concerned that even with intubation they may be problematic. His insurance will approve 2 fillings/year.  She states that he frequently cancels appointments and it may be due to anxiety, but he also does not appear to be brushing his teeth.      Called pt's dialysis unit and left  requesting return call from  regarding getting help w/ dental concerns and to see if he may need have mental health issues that are contributing to his poor dentition.

## 2022-09-01 NOTE — COMMITTEE REVIEW
Abdominal Patient Discussion Note Transplant Coordinator: Jewell Villela  Transplant Surgeon:       Referring Physician: Dr. Sung Jorgensen, Dr. Melissa Guzman    Committee Review Members:  Darwin, Jerilyn Brunson, HERBIE   Nephrology Jatin Rowe MD, Mikhail Rachel, APRN [facility]   Pharmacy Refugio Nguyen, [facility]    - Clinical Farzana Deanna Carlos, [facility]   Transplant Yasemin Moser PA-C, My Bansal, RN, Jewell Ceron, RN, Rebecca Rocha, RN, Lisa Bean, RN, Amalia Rose, RN, Melissa Guzman MD, Drea Ortega, RN, Jazmin Ramirez RN   Transplant Surgery Melissa Guzman MD       Additional Discussion Notes and Findings: Pt was re-discussed today following last review by Committee on 8/24/22 in which the Committee did not determine if the pt would be listed for SPK and K or just K due to his complex cardiac history, vascular status, and diabetes status.  The Committee reviewed the pt's report of his diabetic status - he reports his blood sugars tend to drop to the 40s about once a week during the middle of the night.  He states he is still waking up to the symptoms, but is working with his insurance to get a CGM that will alarm him, should he not continue to wake up when this happens.  His biggest compliacation w/ his diabetes has been gastroparesis- he is now s/p gastric pace-maker April 2022 and reports symptoms are much improved since then.  Due to his higher risk cardiac status, surgical hx, and vascular status, the pt is approved for listing on the K alone wait list once he has dental clearance.  The pt will be removed from Pancreas wait list as he is no longer considered a candidate. Pt will be called and summary letter will be sent.

## 2022-09-01 NOTE — LETTER
09/01/22        Murtaza Fitzgerald  [address]  [place] [address]        Dear Murtaza,    It is a pleasure to work with you toward the goal of kidney transplantation. Your pre-transplant evaluation results were reviewed at our Multidisciplinary Selection Committee on 8/31/22. The committee has approved you as a candidate for kidney transplant and is requesting the following items are completed before you will be eligible for active listing status:    1. Dental clearance - please let me know which dental clinic you work with so that I can obtain a letter of clearance from your dentist    For any questions, please contact the Transplant Office at [phone] or you may reach me directly at [phone].      Sincerely,  Jewell Ceron RN, Pre-Kidney/Pancreas Transplant Coordinator     Solid Organ Transplant  [facility], [facility]    CC: Care Team

## 2022-09-01 NOTE — TELEPHONE ENCOUNTER
"Called [facility] and spoke w/ Diaz ([phone]).  Diaz stated that the pt was last seen on 8/24/22, his dentist has not cleared him - he has an extensive care plan and \"decay on almost every tooth\" that will need to be treated before his dentist will clear him for transplant.  I will relay this information back to the pt.   "

## 2022-09-01 NOTE — TELEPHONE ENCOUNTER
Called pt to discuss outcome of Selection Committee, we reviewed that he is approved to move forward with K alone, but will not be considered for SPK and will be removed from the pancreas wait list as he is considered too high risk from both a cardiac and vascular perspective.  The pt had no further questions regarding this decision.  We then reviewed that he will need dental clearance before he can be listed for kidney transplant.  I spoke w/ his dental office -[facility] and they have a treatment plan for him, however he is concerned with the cost.  I have asked that he let me know which clinic he works with so that we can obtain a letter of clearance from whomever work with him.  He had no further questions and was in good agreement w/ the plan.

## 2022-09-07 NOTE — COMMITTEE REVIEW
Abdominal Patient Discussion Note Transplant Coordinator: Jewell Ceron  Transplant Surgeon:       Referring Physician: Melissa Guzman    Committee Review Members:  Darwin, Jerilyn Brunson, HERBIE   Nephrology Mikhail Rachel, APRN CNP, Sung Jorgensen MD   Pharmacy Refugio Nguyen, [facility], Micaela Parker, [facility]    - Clinical [facility] Deanna Gonzalez, [facility]   Transplant Yasemin Moser PA-C, My Bansal, JOSE, Jewell Ceron, RN, Rebecca Rocha, RN, Sania Ventura, ILIANA, Lisa Bean, JOSE, Amalia Rose, RN, Drea Ortega, RN, Jazmin Ramirez, RN   Transplant Surgery Isamar Colby MD, MD       Additional Discussion Notes and Findings:   Per 08/31/2022 Committee meeting, patient's name removed from the pancreas transplant list on 09/01/2022.

## 2022-09-13 NOTE — TELEPHONE ENCOUNTER
Pt called to let me know he was recently hospitalized with a BG of 24.  He will be following up with his endocrinologist and is not yet using a CGM but will hopefully be soon.  He is seeing his dentist again on 9/28/22 and will let me know once he has clearance. He had no further questions.

## 2022-09-28 NOTE — LETTER
7/23/2021      RE: Murtaza Fitzgerald  [address]  [facility] [address]         HPI: Murtaza is a 45 year old White male with a past medical history of :     1.  Type 1 diabetes since age 11.   2.  Hypertension.   3.  Hyperlipidemia, now status post kidney transplant 11/2008, failed.  Back on dialysis 02/2020.   4.  Status post pancreas transplant in 2009, failed 3 years later.  Currently on insulin.   5.  Depression.   6.  Tobacco abuse in the past.   7.  Knee tumor surgery.   8.  Gastroparesis.      His cardiac history is significant for a moderate ischemic cardiomyopathy.  He was asymptomatic, but found to have a positive stress test, which eventually led a 3-vessel coronary bypass surgery in 2015 (LIMA to LAD, vein graft to OM, vein graft to PDA).     Initial [facility] appt-  Referred by Dr. Vogel     No SOB at rest unless he has extra fluid. Dialysis T,Th, Sat. No NUÑEZ even with walking. He has no swelling in the legs, ankles, feet. He can feel distention in the abdomen.  He says his dry weight after dialysis is 82 kg. Some days he needs an extra dialysis run due to weigh gain.  His blood pressures typically run quite high, but he has not been keeping track of them at home. Last A1C 7.2 ( not today). wants to get on the transplant list.    Denies SOB, NUÑEZ, PND, orthopnea, edema, weight gain, chest pain, palpitations, lightheadedness, dizziness, near syncopal/syncopal episodes. Murtaza has been following salt and fluid restrictions.      PAST MEDICAL HISTORY:  Past Medical History:   Diagnosis Date     CMV (cytomegalovirus infection) status negative 2011     Coronary artery disease, non-occlusive 2008    angio showed diffuse disease with no lesions     Diabetes mellitus, type 1     Diagnosed at age 11. Takes Insulin      Dialysis patient (H)     prior to kidney transplant     Dyslipidemia      Gastroesophageal reflux disease      History of blood transfusion      Hypertension      Immunosuppressed  Called, spoke to pt. Two pt identifiers confirmed. Patient informed per.   See message below. Refer to [facility] location  # [phone]  For repeat pap. Patient verbalized understanding of information discussed with no further questions at this time.      Ania Medellin LPN status (H)      Kidney transplant status, living related donor           Migraines      Mycotic aneurysm of kidney transplant (H) 2008     Noncompliance with treatment     no labs for one year     Pancreas replaced by transplant (H) 2009    rejection treated with thymo     Pancreatic disease     pancreas transplant     Tobacco abuse ongoing       FAMILY HISTORY:  Family History   Problem Relation Age of Onset     Unknown/Adopted Father      Heart Disease Maternal Grandfather 62     Melanoma No family hx of      Skin Cancer No family hx of        SOCIAL HISTORY:  Social History     Tobacco Use     Smoking status: Former Smoker     Packs/day: 1.00     Years: 20.00     Pack years: 20.00     Types: Vaping Device, Cigarettes     Quit date: 2016     Years since quittin.5     Smokeless tobacco: Never Used     Tobacco comment: E- Cig use since    Substance Use Topics     Alcohol use: Not Currently     Alcohol/week: 0.0 standard drinks     Drug use: No           CURRENT MEDICATIONS:  Current Outpatient Medications   Medication Sig Dispense Refill     aspirin (ASA) 81 MG EC tablet Take 1 tablet (81 mg) by mouth daily       atorvastatin (LIPITOR) 20 MG tablet Take 2 tablets (40 mg) by mouth daily 180 tablet 3     blood glucose (NO BRAND SPECIFIED) test strip Use to test blood sugar 5 times daily or as directed. 200 strip 2     calcium carbonate (TUMS) 500 MG chewable tablet Take 1 chew tab by mouth 2 times daily       carvedilol (COREG) 6.25 MG tablet Take 6.25 mg by mouth       insulin aspart (NOVOLOG FLEXPEN) 100 UNIT/ML pen Sliding scale = 1 unit for every 20 over blood glucose of 140, average 45 units/day 45 mL 0     insulin glargine (LANTUS SOLOSTAR) 100 UNIT/ML pen Inject 24 Units Subcutaneous At Bedtime 15 mL 11     insulin pen needle (BD ARPITA U/F) 32G X 4 MM miscellaneous Use 4 daily or as directed. 120 each 0     metoclopramide (REGLAN) 5 MG tablet Take 1 tablet (5 mg) by mouth 4 times daily  (before meals and nightly) 120 tablet 11     naproxen sodium 220 MG capsule Take 220 mg by mouth as needed        omeprazole (PRILOSEC) 40 MG DR capsule Take 2 capsules (80 mg) by mouth every evening 180 capsule 11     ondansetron (ZOFRAN-ODT) 8 MG ODT tab DISSOLVE 1 TABLET ON TOP OF TONGUE EVERY 8 HOURS FOR 2 DAYS AS NEEDED FOR NAUSEA AND VOMITING       prochlorperazine (COMPAZINE) 10 MG tablet Take 10 mg by mouth       senna-docusate (SENOKOT-S/PERICOLACE) 8.6-50 MG tablet Take 1 tablet by mouth PRN       acetone, Urine, test STRP 1 strip by In Vitro route daily (Patient not taking: Reported on 7/23/2021) 50 each 3     benzoyl peroxide 5 % external liquid For folliculitis. Apply once a day for 1-2 minutes on areas of whiteheads or acne then rinse off. (Patient not taking: Reported on 7/23/2021) 140 g 4     calcium acetate (PHOSLO) 667 MG CAPS capsule Take 667 mg by mouth 3 times daily (with meals) (Patient not taking: Reported on 7/23/2021)       carvedilol (COREG) 12.5 MG tablet Take 1 tablet (12.5 mg) by mouth 2 times daily (with meals) (Patient not taking: Reported on 7/23/2021) 180 tablet 1     Continuous Blood Gluc Sensor (DEXCOM G6 SENSOR) MISC Change every 10 days. (Patient not taking: Reported on 7/23/2021) 3 each 11     Continuous Blood Gluc Transmit (DEXCOM G6 TRANSMITTER) MISC Change every 3 months. (Patient not taking: Reported on 7/23/2021) 1 each 3     hydrocortisone 2.5 % ointment Apply if you have itch rash on the face, genitals, armpit. (Patient not taking: Reported on 7/23/2021) 30 g 1     Insulin Disposable Pump (OMNIPOD DASH 5 PACK PODS) MISC 1 each every 48 hours (Patient not taking: Reported on 7/23/2021) 5 each 11     lanthanum (FOSRENOL) 750 MG chewable tablet Take 2 tablets (1,500 mg) by mouth 5 times daily With meals and snacks. (Patient not taking: Reported on 7/23/2021) 900 tablet 3     triamcinolone (KENALOG) 0.1 % external ointment Apply twice a day to areas of itchy rash. Do not  "apply to the face, groin,armpits. (Patient not taking: Reported on 7/23/2021) 30 g 3       ROS:  Review Of Systems  Skin: negative  Eyes: negative  Ears/Nose/Throat: negative  Respiratory: No shortness of breath, dyspnea on exertion, cough, or hemoptysis  Cardiovascular: negative  Gastrointestinal: negative  Genitourinary: negative  Musculoskeletal: negative  Neurologic: negative  Psychiatric: negative  Hematologic/Lymphatic/Immunologic: negative  Endocrine: negative      EXAM:  BP (!) 162/90 (BP Location: Right arm, Patient Position: Chair, Cuff Size: Adult Regular)   Pulse 88   Ht 1.734 m (5' 8.25\")   Wt 85.7 kg (189 lb)   SpO2 99%   BMI 28.53 kg/m    Home weight:  General: alert, articulate, and in no acute distress.  HEENT: normocephalic, atraumatic, anicteric sclera, EOMI, mucosa moist, no cyanosis.   Neck: neck supple.  No adenopathy, masses, or carotid bruits.  JVP elevated 11-12  Heart: regular rhythm, normal S1/S2, no murmur, gallop, rub.  Precordium quiet with normal PMI.     Lungs: clear, no rales, ronchi, or wheezing.  No accessory muscle use, respirations unlabored.   Abdomen: soft, non-tender, bowel sounds present, no hepatomegaly  Extremities: no pitting edema.   No cyanosis.     Neurological: alert and oriented x 3.  normal speech and affect, no gross motor deficits  Skin:  No ecchymoses, rashes, or clubbing.    Labs:  CBC RESULTS:  Lab Results   Component Value Date    WBC 6.7 03/03/2021    RBC 3.47 (L) 03/03/2021    HGB 11.1 (L) 03/03/2021    HCT 34.3 (L) 03/03/2021    MCV 99 03/03/2021    MCH 32.0 03/03/2021    MCHC 32.4 03/03/2021    RDW 15.1 (H) 03/03/2021     03/03/2021       CMP RESULTS:  Lab Results   Component Value Date     (L) 03/19/2021    POTASSIUM 4.1 03/19/2021    CHLORIDE 90 (L) 03/19/2021    CO2 32 03/19/2021    ANIONGAP 8 03/19/2021     (H) 03/19/2021    BUN 38 (H) 03/19/2021    CR 6.59 (H) 03/19/2021    GFRESTIMATED 9 (L) 03/19/2021    GFRESTBLACK 11 (L) " 2021    CHARLY 9.0 2021    BILITOTAL 1.4 (H) 2021    ALBUMIN 3.4 2021    ALKPHOS 199 (H) 2021    ALT 19 2021    AST 15 2021        INR RESULTS:  Lab Results   Component Value Date    INR 1.24 (H) 2021       No components found for: CK  Lab Results   Component Value Date    MAG 2.3 2020     No results found for: NTBNP  @BRIEFLABR (dig)@    Most recent echocardiogram:  No results found for this or any previous visit (from the past 8760 hour(s)).      Assessment and Plan:    In summary,Murtaza  is a  45 year old male who is here for an initial visit to [facility]. He appears hypervolemic today and will have his dialysis run tomorrow as planned.  He wants to increase his EF to be eligible for transplant. He was only taking 6.25 mg Carvedilol daily and not on the 12.5 mg BID as Dr. Vogel had recommended for him. [facility]      1.  Chronic sytolic heart failure .  Stage C  NYHA Class III  ACEi/ARB: - introduce at upcoming appointments  BB: yes- increase to 6.25 mg BID   Aldosterone antagonist: contraindicated due to renal dysfunction  SCD prophylaxis: decision deferred during medication uptitration  Fluid status: hypervolemic  Anticoagulation:   Antiplatelet:  ASA dose   Sleep apnea:  NSAID use:  Contraindicated.  Avoid use.  Remote Monitorin.  Other comordbid conditions:      #.  Hypertension.        He says his blood pressures tend to run quite high, which would not be optimal in terms of his existing cardiomyopathy and may explain at least partially why his ejection fraction dropped from October to 2020.       Achieving more optimal blood pressure with a goal of approximately 115 mmHg systolic without symptoms would be more ideal.  We will gather further information and make adjustments accordingly.      #.  Hyperlipidemia.        His goal LDL is less than 70.  It most recently was 88 with mildly elevated triglycerides.  We  will perform further adjustments.     # DM II - 7/23/21 A1C - 6.9.  Insulin dependant - PCP following       3.  Follow-up   Carvedilol 6.25 mg BID  CORE 2 weeks       Sumit ORTEGA, CNP  [facility]

## 2023-01-01 ENCOUNTER — TRANSFERRED RECORDS (OUTPATIENT)
Dept: HEALTH INFORMATION MANAGEMENT | Facility: CLINIC | Age: 48
End: 2023-01-01

## 2023-01-01 ENCOUNTER — TELEPHONE (OUTPATIENT)
Dept: TRANSPLANT | Facility: CLINIC | Age: 48
End: 2023-01-01
Payer: COMMERCIAL

## 2023-01-01 ENCOUNTER — VIRTUAL VISIT (OUTPATIENT)
Dept: NEPHROLOGY | Facility: CLINIC | Age: 48
End: 2023-01-01
Attending: NURSE PRACTITIONER
Payer: COMMERCIAL

## 2023-01-01 ENCOUNTER — LAB (OUTPATIENT)
Dept: LAB | Facility: CLINIC | Age: 48
End: 2023-01-01

## 2023-01-01 DIAGNOSIS — N18.6 END STAGE RENAL DISEASE (H): ICD-10-CM

## 2023-01-01 DIAGNOSIS — I25.10 CARDIOVASCULAR DISEASE: ICD-10-CM

## 2023-01-01 DIAGNOSIS — E10.21 TYPE 1 DIABETES MELLITUS WITH NEPHROPATHY (H): ICD-10-CM

## 2023-01-01 DIAGNOSIS — Z76.82 ORGAN TRANSPLANT CANDIDATE: ICD-10-CM

## 2023-01-01 DIAGNOSIS — I10 ESSENTIAL HYPERTENSION: ICD-10-CM

## 2023-01-01 DIAGNOSIS — E10.9 TYPE 1 DIABETES MELLITUS (H): ICD-10-CM

## 2023-01-01 DIAGNOSIS — T86.12 KIDNEY TRANSPLANT FAILURE: ICD-10-CM

## 2023-01-01 DIAGNOSIS — Z01.818 ENCOUNTER FOR PRE-TRANSPLANT EVALUATION FOR KIDNEY AND PANCREAS TRANSPLANT: ICD-10-CM

## 2023-01-01 DIAGNOSIS — Z76.82 AWAITING ORGAN TRANSPLANT: ICD-10-CM

## 2023-01-01 PROCEDURE — 86833 HLA CLASS II HIGH DEFIN QUAL: CPT

## 2023-01-01 PROCEDURE — 99214 OFFICE O/P EST MOD 30 MIN: CPT | Mod: VID | Performed by: NURSE PRACTITIONER

## 2023-01-01 PROCEDURE — 86832 HLA CLASS I HIGH DEFIN QUAL: CPT

## 2023-01-01 ASSESSMENT — PAIN SCALES - GENERAL: PAINLEVEL: NO PAIN (0)

## 2023-02-08 NOTE — TELEPHONE ENCOUNTER
Pt called to let me know he is working on his dental clearance, he has an upcoming visit in March and will have one more treatment after that which is not yet scheduled.  He wanted to know if there was anything else to be done w/ his evaluation.  The Committee last reviewed him in September 2022 and approved him to move ahead w/ active listing on the kidney alone list pending dental clearance.  Since then, he has had several admissions and was last seen by transplant nephrology in 2020.  I explained we will have him follow up with transplant nephrology to determine if any further testing is needed. Orders placed.

## 2023-02-23 NOTE — PROGRESS NOTES
Video-Visit Details    Type of service:  Video Visit    Video Start Time (time video started): 3:30 pm     Video End Time (time video stopped): 3:40 pm     Originating Location (pt. Location): Home        Distant Location (provider location):  Off-site    Mode of Communication:  Video Conference via [employer]      TRANSPLANT NEPHROLOGY WAITLIST VISIT    Assessment and Plan:  # Kidney/Pancreas Transplant Wait List Evaluation: Patient is a fair candidate overall.     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 He is ABO O and cPRA 98%.    # Type 1 Diabetes: s/p ANNMARIE in February 2009, since failed (2/2 rejection in 2012). Currently using novolog 10-12 units per meal and lantus 14 units at night. A1c 6.9%.      # Cardiac Risk: currently following with [facility] given reduced EF of 35-40% in December 2020, since improved to 50-55% as of April 2022. He is s/p 3 vessel CABG 2015 and repeat LHC March 2021 showing:Prox LAD to Mid LAD lesion is 100% stenosed.     1.  Severe, multivessel native coronary artery disease  2.   of RCA with significant left to right collateral filling  3.  SVG to rPDA appears to be closed  4.  Patent SVG-OM  5.  Patent LIMA-LAD  6.  From interventional standpoint, disease appears stable and LIMA-LAD and SVG-OM appear patent.  While the SVG-rPDA is down there is collateral filling from the left.  No findings on today's angiogram to warrant percutaneous intervention prior to renal transplantation.    No additional cardiac testing recommended by Dr. Vogel at last risk assessment in 6/2022.     # Gastroparesis: now s/p gastric pacemaker in April 2022 with stable symptoms.      # Open leg wound: plantar right foot (2.5 x 3 cm), home wound nurse following with dressing changes. Recommend wound be healed prior to active listing.      # Health Maintenance: dental work still pending.        Discussed the risks and benefits of a transplant, including the risk of surgery and immunosuppression medications.    KDPI: We discussed approximate remaining wait time and how that is influenced by issues such as blood type and sensitization (PRA) and access to a living donor. I contrasted potential waiting time for living vs  donor kidneys from  normal (0-85%) or higher (%) kidney donor profile index (KDPI) donors and their associated outcomes. I would not recommend Mr. Tata Fitzgerald to consider kidneys from high KDPI donors. The reason for this decision is best summarized as: multi-organ transplant candidate.    Patient presently appears to be enough of an acceptable kidney transplant recipient candidate to have any potential kidney donors start the evaluation process.  Patient s overall re-evaluation may require further discussion in the Transplant Program s multidisciplinary selection committee for a final recommendation on the patient s suitability for transplant.     Reason for Visit:  Murtaza Fitzgerald is a 46 year old male with ESKD from diabetes mellitus type 1, who presents for kidney/pancreas transplant wait list evaluation.     Date of Initial Transplant Evaluation: May 2020        Current Transplant Phase: Evaluation: Active  Official UNOS Listing Date:   Blood Type: O        cPRA: 98       Date of cPRA: 2021  Transplant Coordinator: Jewell Villela Trans[facility] Office phone number [phone]    History of Present Illness:   Mr. Tata Fitzgerald is a 47-year-old male who presents for wait list evaluation with history of ESKD from type 1 diabetes s/p LDKT 2008 followed by ANNMARIE 2009, both since failed (pancreas 2/2 rejection, kidney 2/2 repetitive SWATHI in setting of gastroparesis (no rejection on 2013 biopsy)), on dialysis since 2020, HTN, cardiomyopathy, CAD s/p 3 vessel CABG, gastroparesis, DKA, former tobacco use, and orthostatic  hypotension (not recently an issue for past few years, per patient report).         Interim Events:   Multiple admissions since last wait list visit detailed below:     3/2022: DKA likely triggered by gastroparesis.  Imaging negative for any acute findings.    4/2022: Short stay in the setting of hyperkalemia.    8/2022: Hypoglycemia for which he required 2 bags of D10 and several ampules of D50.  Concerns expressed about incorrect dosing insulin incorrectly.    10/2022: Again for hypoglycemia with in route blood sugar of 44 and confusion.  He completed stroke work-up which included a CT showed indeterminate focus of infarction in the right cerebellar hemisphere with otherwise chronic ischemic changes. Has since decreased insulin use with endocrine team and BG typically ranging 70s-140s.            Kidney Disease: HD is going OK. He is anuric. Off all immunosuppression.       Primary Nephrologist: Dr. Schneider         Diabetes: using novolog 10-12 units per meal and lantus 14 units at night       Diabetic Control: Controlled (HbA1c <7%)      Last HbA1c: 6.9       Hypoglycemic Unawareness: Yes; 30-40 a few times weekly, working with endocrine to get a CGM. Otherwise, mostly when he checks BG ~ 70s-120s, but does not always check daily.        New Issues: No         Cardiac/Vascular Disease History:       Known CAD: Yes       Known PAD/Claudication Symptoms: No       Known Heart Failure: Yes        Arrhythmia:  No       Pulmonary Hypertension: No        Valvular Disease: No       Other: None         Functional Capacity/Frailty:        Lives with mother and sister. Independent with ADLs. He's not currently exercising due to peripheral neuropathy pain and balance issues. He can walk 1 block, but unable to do stars now d/t neuropathy. Denies chest pains or SOB with walking.       # COVID Vaccination Up To Date: Yes    Other Pertinent Transplant Surgical Issues:  Recent Blood Transfusion: No  Previous Abdominal Transplant:  Yes  Bladder Dysfunction: anuric  Chronic/Recurrent Infections: No  Chronic Anticoagulation: No  Jehovah s Witness: No       Active Problem List:   Patient Active Problem List   Diagnosis     History of kidney transplant     Diabetes mellitus type 1 (H)     Acquired aneurysm of renal artery (H)     Kidney replaced by transplant     Coronary artery disease, non-occlusive     CMV (cytomegalovirus infection) status negative     Hypertension goal BP (blood pressure) < 140/90     Hyperlipidemia     Arteriosclerosis of coronary artery     History of coronary artery bypass surgery     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)     Adhesive capsulitis of right shoulder     Diabetic ketoacidosis without coma associated with type 1 diabetes mellitus (H)     ESRD (end stage renal disease) on dialysis (H)     End-stage renal disease (H)     Encounter regarding vascular access for dialysis for ESRD (H)     NUÑEZ (dyspnea on exertion)     Abnormal cardiovascular stress test     Abnormal echocardiogram     Gastroparesis     S/P CABG x 3     Atherosclerosis of coronary artery     Nausea & vomiting     Status post coronary angiogram     Hypertensive chronic kidney disease     Diabetic neuropathy (H)     Headache     Immunocompromised (H)     Kidney disorder     Migraine     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)     Ischemic cardiomyopathy     Kidney transplant failure     Presence of aortocoronary bypass graft       Personal History:  Non-smoker     Allergies:  Allergies   Allergen Reactions     Diphenhydramine Other (See Comments)     Restless legs     Metoclopramide      Other reaction(s): Confusion, Unknown     Reglan Other (See Comments)     Reaction only to IV formulation -->delsuions        Medications:  Current Outpatient Medications   Medication Sig     acetone, Urine, test STRP 1 strip by In Vitro route daily (Patient not taking: No sig reported)     aspirin (ASA) 81 MG EC tablet Take  1 tablet (81 mg) by mouth daily     atorvastatin (LIPITOR) 20 MG tablet Take 2 tablets (40 mg) by mouth daily     blood glucose (NO BRAND SPECIFIED) test strip Use to test blood sugar 5 times daily or as directed.     calcium acetate (PHOSLO) 667 MG CAPS capsule Take 667 mg by mouth 3 times daily (with meals) (Patient not taking: No sig reported)     calcium carbonate (TUMS) 500 MG chewable tablet Take 1 chew tab by mouth 2 times daily (Patient not taking: Reported on 8/17/2022)     carvedilol (COREG) 12.5 MG tablet      carvedilol (COREG) 25 MG tablet Take 2 tablets (50 mg) by mouth 2 times daily (with meals)     cephALEXin (KEFLEX) 500 MG capsule Take 1 capsule (500 mg) by mouth daily     Continuous Blood Gluc Sensor (DEXCOM G6 SENSOR) MISC Change every 10 days. (Patient not taking: No sig reported)     Continuous Blood Gluc Transmit (DEXCOM G6 TRANSMITTER) MISC Change every 3 months. (Patient not taking: No sig reported)     gabapentin (NEURONTIN) 100 MG capsule Take 100 mg (1 tablet) three times weekly after dialysis     Insulin Disposable Pump (OMNIPOD DASH 5 PACK PODS) MISC 1 each every 48 hours (Patient not taking: No sig reported)     insulin glargine (LANTUS SOLOSTAR) 100 UNIT/ML pen Inject 22 Units Subcutaneous At Bedtime     insulin pen needle (BD ARPITA U/F) 32G X 4 MM miscellaneous Use 4 daily or as directed.     lisinopril (ZESTRIL) 20 MG tablet Take one tablet (20mg) on dialysis days (Tues, Thursday and Saturday), and take two tablets (40mg) on non-dialysis days (Mondays, Wednesdays, Fridays and Sundays)     metoclopramide (REGLAN) 5 MG tablet Take 1 tablet (5 mg) by mouth 4 times daily (before meals and nightly) (Patient not taking: No sig reported)     naproxen sodium 220 MG capsule Take 220 mg by mouth as needed      NOVOLOG FLEXPEN 100 UNIT/ML soln INJECT 1 UNIT PER SLIDING SCALE AS DIRECTED, USE NO MORE THEN 45 UNITS DAILY     omeprazole (PRILOSEC) 40 MG DR capsule TAKE 2 CAPSULES(80 MG) BY MOUTH  EVERY EVENING     ondansetron (ZOFRAN-ODT) 8 MG ODT tab DISSOLVE 1 TABLET ON TOP OF TONGUE EVERY 8 HOURS FOR 2 DAYS AS NEEDED FOR NAUSEA AND VOMITING     polyethylene glycol (MIRALAX) 17 GM/Dose powder Take 17 g by mouth daily as needed for constipation (Patient not taking: Reported on 8/17/2022)     prochlorperazine (COMPAZINE) 10 MG tablet Take 10 mg by mouth     triamcinolone (KENALOG) 0.1 % external cream Apply topically 2 times daily     No current facility-administered medications for this visit.        Vitals:  There were no vitals taken for this visit.     Exam:  GENERAL APPEARANCE: alert and no distress  HENT: no obvious abnormalities on appearance  RESP: breathing appears unremarkable with normal rate, no audible wheezing or cough and no apparent shortness of breath with conversation  MS: extremities normal - no gross deformities noted  SKIN: no apparent rash and normal skin tone  NEURO: speech is clear with no obvious neurological deficits  PSYCH: mentation appears normal and affect normal

## 2023-02-23 NOTE — NURSING NOTE
Is the patient currently in the state of [place]? YES    Visit mode:VIDEO    If the visit is dropped, the patient can be reconnected by: VIDEO VISIT: Text to cell phone: [phone]    Will anyone else be joining the visit? NO      How would you like to obtain your AVS? MyChart    Are changes needed to the allergy or medication list? NO    Reason for visit: follow up transplant

## 2023-02-23 NOTE — LETTER
2/23/2023       RE: Murtaza Fitzgerald  [address]  [facility] [address]     Dear Colleague,    Thank you for referring your patient, Murtaza Fitzgerald, to the [facility] [place] [facility]. Please see a copy of my visit note below.        TRANSPLANT NEPHROLOGY WAITLIST VISIT    Assessment and Plan:  # Kidney/Pancreas Transplant Wait List Evaluation: Patient is a fair candidate overall.     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 He is ABO O and cPRA 98%.    # Type 1 Diabetes: s/p ANNMARIE in February 2009, since failed (2/2 rejection in 2012). Currently using novolog 10-12 units per meal and lantus 14 units at night. A1c 6.9%.      # Cardiac Risk: currently following with [facility] given reduced EF of 35-40% in December 2020, since improved to 50-55% as of April 2022. He is s/p 3 vessel CABG 2015 and repeat LHC March 2021 showing:Prox LAD to Mid LAD lesion is 100% stenosed.     1.  Severe, multivessel native coronary artery disease  2.   of RCA with significant left to right collateral filling  3.  SVG to rPDA appears to be closed  4.  Patent SVG-OM  5.  Patent LIMA-LAD  6.  From interventional standpoint, disease appears stable and LIMA-LAD and SVG-OM appear patent.  While the SVG-rPDA is down there is collateral filling from the left.  No findings on today's angiogram to warrant percutaneous intervention prior to renal transplantation.    No additional cardiac testing recommended by Dr. Vogel at last risk assessment in 6/2022.     # Gastroparesis: now s/p gastric pacemaker in April 2022 with stable symptoms.      # Open leg wound: plantar right foot (2.5 x 3 cm), home wound nurse following with dressing changes. Recommend wound be healed prior to active listing.      # Health Maintenance: dental work  still pending.       Discussed the risks and benefits of a transplant, including the risk of surgery and immunosuppression medications.    KDPI: We discussed approximate remaining wait time and how that is influenced by issues such as blood type and sensitization (PRA) and access to a living donor. I contrasted potential waiting time for living vs  donor kidneys from  normal (0-85%) or higher (%) kidney donor profile index (KDPI) donors and their associated outcomes. I would not recommend Mr. Tata Fitzgerald to consider kidneys from high KDPI donors. The reason for this decision is best summarized as: multi-organ transplant candidate.    Patient presently appears to be enough of an acceptable kidney transplant recipient candidate to have any potential kidney donors start the evaluation process.  Patient s overall re-evaluation may require further discussion in the Transplant Program s multidisciplinary selection committee for a final recommendation on the patient s suitability for transplant.     Reason for Visit:  Murtaza Fitzgerald is a 46 year old male with ESKD from diabetes mellitus type 1, who presents for kidney/pancreas transplant wait list evaluation.     Date of Initial Transplant Evaluation: May 2020        Current Transplant Phase: Evaluation: Active  Official UNOS Listing Date:   Blood Type: O        cPRA: 98       Date of cPRA: 2021  Transplant Coordinator: Jewell Villela Transplant Office phone number [phone]    History of Present Illness:   Mr. Tata Fitzgerald is a 47-year-old male who presents for wait list evaluation with history of ESKD from type 1 diabetes s/p LDKT 2008 followed by ANNMARIE 2009, both since failed (pancreas 2/2 rejection, kidney 2/2 repetitive SWATHI in setting of gastroparesis (no rejection on 2013 biopsy)), on dialysis since 2020, HTN, cardiomyopathy, CAD s/p 3 vessel CABG, gastroparesis, DKA, former tobacco  use, and orthostatic hypotension (not recently an issue for past few years, per patient report).         Interim Events:   Multiple admissions since last wait list visit detailed below:     3/2022: DKA likely triggered by gastroparesis.  Imaging negative for any acute findings.    4/2022: Short stay in the setting of hyperkalemia.    8/2022: Hypoglycemia for which he required 2 bags of D10 and several ampules of D50.  Concerns expressed about incorrect dosing insulin incorrectly.    10/2022: Again for hypoglycemia with in route blood sugar of 44 and confusion.  He completed stroke work-up which included a CT showed indeterminate focus of infarction in the right cerebellar hemisphere with otherwise chronic ischemic changes. Has since decreased insulin use with endocrine team and BG typically ranging 70s-140s.            Kidney Disease: HD is going OK. He is anuric. Off all immunosuppression.       Primary Nephrologist: Dr. Schneider         Diabetes: using novolog 10-12 units per meal and lantus 14 units at night       Diabetic Control: Controlled (HbA1c <7%)      Last HbA1c: 6.9       Hypoglycemic Unawareness: Yes; 30-40 a few times weekly, working with endocrine to get a CGM. Otherwise, mostly when he checks BG ~ 70s-120s, but does not always check daily.        New Issues: No         Cardiac/Vascular Disease History:       Known CAD: Yes       Known PAD/Claudication Symptoms: No       Known Heart Failure: Yes        Arrhythmia:  No       Pulmonary Hypertension: No        Valvular Disease: No       Other: None         Functional Capacity/Frailty:        Lives with mother and sister. Independent with ADLs. He's not currently exercising due to peripheral neuropathy pain and balance issues. He can walk 1 block, but unable to do stars now d/t neuropathy. Denies chest pains or SOB with walking.       # COVID Vaccination Up To Date: Yes    Other Pertinent Transplant Surgical Issues:  Recent Blood Transfusion: No  Previous  Abdominal Transplant: Yes  Bladder Dysfunction: anuric  Chronic/Recurrent Infections: No  Chronic Anticoagulation: No  Jehovah s Witness: No       Active Problem List:   Patient Active Problem List   Diagnosis     History of kidney transplant     Diabetes mellitus type 1 (H)     Acquired aneurysm of renal artery (H)     Kidney replaced by transplant     Coronary artery disease, non-occlusive     CMV (cytomegalovirus infection) status negative     Hypertension goal BP (blood pressure) < 140/90     Hyperlipidemia     Arteriosclerosis of coronary artery     History of coronary artery bypass surgery     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)     Adhesive capsulitis of right shoulder     Diabetic ketoacidosis without coma associated with type 1 diabetes mellitus (H)     ESRD (end stage renal disease) on dialysis (H)     End-stage renal disease (H)     Encounter regarding vascular access for dialysis for ESRD (H)     NUÑEZ (dyspnea on exertion)     Abnormal cardiovascular stress test     Abnormal echocardiogram     Gastroparesis     S/P CABG x 3     Atherosclerosis of coronary artery     Nausea & vomiting     Status post coronary angiogram     Hypertensive chronic kidney disease     Diabetic neuropathy (H)     Headache     Immunocompromised (H)     Kidney disorder     Migraine     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)     Ischemic cardiomyopathy     Kidney transplant failure     Presence of aortocoronary bypass graft       Personal History:  Non-smoker     Allergies:  Allergies   Allergen Reactions     Diphenhydramine Other (See Comments)     Restless legs     Metoclopramide      Other reaction(s): Confusion, Unknown     Reglan Other (See Comments)     Reaction only to IV formulation -->delsuions        Medications:  Current Outpatient Medications   Medication Sig     acetone, Urine, test STRP 1 strip by In Vitro route daily (Patient not taking: No sig reported)     aspirin  (ASA) 81 MG EC tablet Take 1 tablet (81 mg) by mouth daily     atorvastatin (LIPITOR) 20 MG tablet Take 2 tablets (40 mg) by mouth daily     blood glucose (NO BRAND SPECIFIED) test strip Use to test blood sugar 5 times daily or as directed.     calcium acetate (PHOSLO) 667 MG CAPS capsule Take 667 mg by mouth 3 times daily (with meals) (Patient not taking: No sig reported)     calcium carbonate (TUMS) 500 MG chewable tablet Take 1 chew tab by mouth 2 times daily (Patient not taking: Reported on 8/17/2022)     carvedilol (COREG) 12.5 MG tablet      carvedilol (COREG) 25 MG tablet Take 2 tablets (50 mg) by mouth 2 times daily (with meals)     cephALEXin (KEFLEX) 500 MG capsule Take 1 capsule (500 mg) by mouth daily     Continuous Blood Gluc Sensor (DEXCOM G6 SENSOR) MISC Change every 10 days. (Patient not taking: No sig reported)     Continuous Blood Gluc Transmit (DEXCOM G6 TRANSMITTER) MISC Change every 3 months. (Patient not taking: No sig reported)     gabapentin (NEURONTIN) 100 MG capsule Take 100 mg (1 tablet) three times weekly after dialysis     Insulin Disposable Pump (OMNIPOD DASH 5 PACK PODS) MISC 1 each every 48 hours (Patient not taking: No sig reported)     insulin glargine (LANTUS SOLOSTAR) 100 UNIT/ML pen Inject 22 Units Subcutaneous At Bedtime     insulin pen needle (BD ARPITA U/F) 32G X 4 MM miscellaneous Use 4 daily or as directed.     lisinopril (ZESTRIL) 20 MG tablet Take one tablet (20mg) on dialysis days (Tues, Thursday and Saturday), and take two tablets (40mg) on non-dialysis days (Mondays, Wednesdays, Fridays and Sundays)     metoclopramide (REGLAN) 5 MG tablet Take 1 tablet (5 mg) by mouth 4 times daily (before meals and nightly) (Patient not taking: No sig reported)     naproxen sodium 220 MG capsule Take 220 mg by mouth as needed      NOVOLOG FLEXPEN 100 UNIT/ML soln INJECT 1 UNIT PER SLIDING SCALE AS DIRECTED, USE NO MORE THEN 45 UNITS DAILY     omeprazole (PRILOSEC) 40 MG DR capsule TAKE 2  CAPSULES(80 MG) BY MOUTH EVERY EVENING     ondansetron (ZOFRAN-ODT) 8 MG ODT tab DISSOLVE 1 TABLET ON TOP OF TONGUE EVERY 8 HOURS FOR 2 DAYS AS NEEDED FOR NAUSEA AND VOMITING     polyethylene glycol (MIRALAX) 17 GM/Dose powder Take 17 g by mouth daily as needed for constipation (Patient not taking: Reported on 8/17/2022)     prochlorperazine (COMPAZINE) 10 MG tablet Take 10 mg by mouth     triamcinolone (KENALOG) 0.1 % external cream Apply topically 2 times daily     No current facility-administered medications for this visit.        Vitals:  There were no vitals taken for this visit.     Exam:  GENERAL APPEARANCE: alert and no distress  HENT: no obvious abnormalities on appearance  RESP: breathing appears unremarkable with normal rate, no audible wheezing or cough and no apparent shortness of breath with conversation  MS: extremities normal - no gross deformities noted  SKIN: no apparent rash and normal skin tone  NEURO: speech is clear with no obvious neurological deficits  PSYCH: mentation appears normal and affect katlin    Again, thank you for allowing me to participate in the care of your patient.      Sincerely,    JORDAN Tran CNP

## 2023-04-06 ENCOUNTER — DOCUMENTATION ONLY (OUTPATIENT)
Dept: TRANSPLANT | Facility: CLINIC | Age: 48
End: 2023-04-06

## 2024-08-08 NOTE — PATIENT INSTRUCTIONS
[facility] Dermatology Visit    Thank you for allowing us to participate in your care. Your findings, instructions and follow-up plan are as follows:    Sunscreen: spf30+ with zinc oxide or titanium dioxide on the ingredient list   - reapply every 2 hours   - wear sun protective clothing    When should I call my doctor?    If you are worsening or not improving, please, contact us or seek urgent care as noted below.     Who should I call with questions (adults)?    [facility] (adult and pediatric): [phone]     [facility] (adult): [phone]    For urgent needs outside of business hours call the [facility] at [phone] and ask for the dermatology resident on call    If this is a medical emergency and you are unable to reach an ER, Call 911      Who should I call with questions (pediatric)?  [facility]- Pediatric Dermatology  Dr. Kelly Bautista, Dr. Oskar Main, Dr. Zainab Addison, Brooke Lawrence, PA  Dr. Norma Blas, Dr. Sandie Ragland & Dr. Kt Best  Non Urgent  Nurse Triage Line; [phone]- Laurie and Bri GARCIA Care Coordinators   [place] (/Complex ) [phone]    If you need a prescription refill, please contact your pharmacy. Refills are approved or denied by our Physicians during normal business hours, Monday through Fridays  Per office policy, refills will not be granted if you have not been seen within the past year (or sooner depending on your child's condition)    Scheduling Information:  Pediatric Appointment Scheduling and Call Center [phone]  Radiology Scheduling- [phone]  Sedation Unit Scheduling- [phone]  [place] Scheduling- General [phone]; Pediatric Dermatology [phone]  Main  Services: [phone]  Swedish: [phone]  Cambodian: [phone]  Hmong/Vincentian/Kyrgyz: [phone]  Preadmission Nursing  Department Fax Number: [phone] (Fax all pre-operative paperwork to this number)    For urgent matters arising during evenings, weekends, or holidays that cannot wait for normal business hours please call [phone] and ask for the Dermatology Resident On-Call to be paged.         Detail Level: Zone Include Location In Plan?: No Include Location In Plan?: Yes

## 2024-10-28 NOTE — PATIENT INSTRUCTIONS
It was a pleasure taking care of you today.  I've included a brief summary of our discussion and care plan from today's visit below.  Please review this information with your primary care provider.  ______________________________________________________________________    My recommendations are summarized as follows:    Regulating bowel movements:  -- Recommend a trial of Miralax.  This is not a stimulant laxative and is safe to take on a daily basis.  Try 1 cap(s) full every day.  You can titrate this dose (increase or decrease) based on stool frequency and consistency.    Regarding gastroparesis:  -- Small frequent meals meals (6-7), half of which should be liquid in consistency  -- Stick to low fat and low fiber diet  -- Avoid eating late at night before bed (wait 3 hours after eating before laying down)  -- Start food and symptom journal   -- Dietitian referral    Regarding Reflux:  -- Continue omeprazole 40 mg, should be taken on an empty stomach (I recommend switching this to the morning)       Return to GI Clinic in 3 months to review your progress.    ______________________________________________________________________    Who do I call with any questions after my visit?  Please be in touch if there are any further questions that arise following today's visit.  There are multiple ways to contact your gastroenterology care team.        During business hours, you may reach a Gastroenterology nurse at [phone], option 3.       To schedule or reschedule an appointment, please call [phone].       You can always send a secure message through [employer].  Travelogy messages are answered by your nurse or doctor typically within 24 hours.  Please allow extra time on weekends and holidays.        For urgent/emergent questions after business hours, you may reach the on-call GI Fellow by contacting the [facility] at [phone].      In order for your refill to be processed in a timely fashion,  it is your responsibility to ensure you follow the recommendations from your provider regarding your laboratory studies and follow up appointments.       How will I get the results of any tests ordered?    You will receive all of your results.  If you have signed up for [employer], any tests ordered at your visit will be available to you after your physician reviews them.  Typically this takes 1-2 weeks.  If there are urgent results that require a change in your care plan, your physician or nurse will call you to discuss the next steps.      What is [employer] (formerly [employer])?  [employer]iser (formerly [employer]) is a secure way for you to access all of your healthcare records from the [facility].  It is a web based computer program, so you can sign on to it from any location.  It also allows you to send secure messages to your care team.  I recommend signing up for Wiser (formerly [employer]) access if you have not already done so and are comfortable with using a computer.      How to I schedule a follow-up visit?  If you did not schedule a follow-up visit today, please call [phone] to schedule a follow-up office visit.        Sincerely,    Jas Baxter PA-C  [facility]  Division of Gastroenterology         82

## (undated) DEVICE — PACK ARTHROSCOPY KNEE

## (undated) DEVICE — LINEN HALF SHEET 5512

## (undated) DEVICE — CATH ANGIO INFINITI JR4 4FRX100CM 538421

## (undated) DEVICE — CATH ANGIO INFINITI IM 4FRX100CM 538460

## (undated) DEVICE — SU ETHILON 3-0 PS-2 18" 1669H

## (undated) DEVICE — ESU GROUND PAD ADULT W/CORD E7507

## (undated) DEVICE — PREP CHLORAPREP 26ML TINTED ORANGE  260815

## (undated) DEVICE — SU PROLENE 4-0 RB-1DA 36" 8557H

## (undated) DEVICE — SOL NACL 0.9% IRRIG 3000ML BAG 2B7477

## (undated) DEVICE — ENDO BITE BLOCK ADULT OLYMPUS LATEX FREE MAJ-1632

## (undated) DEVICE — MANIFOLD KIT ANGIO AUTOMATED 014613

## (undated) DEVICE — INTRO SHEATH AVANTI 4FRX23CM 504604T

## (undated) DEVICE — DRAPE SLEEVE 599

## (undated) DEVICE — DRAPE STOCKINETTE 4" 8544

## (undated) DEVICE — COVER ULTRASOUND PROBE W/GEL FLEXI-FEEL 6"X58" LF  25-FF658

## (undated) DEVICE — SU SILK 5-0 TIE 12X30" A302H

## (undated) DEVICE — GLOVE PROTEXIS MICRO 7.5  2D73PM75

## (undated) DEVICE — SU PROLENE 7-0 BV-1DA 4X24" M8702

## (undated) DEVICE — CATH ANGIO SUPERTORQUE MPA1 4FRX100CM 532430

## (undated) DEVICE — GLOVE PROTEXIS POWDER FREE 7.5 ORTHOPEDIC 2D73ET75

## (undated) DEVICE — Device

## (undated) DEVICE — VESSEL LOOPS BLUE SUPERMAXI 011022PBX

## (undated) DEVICE — SU VICRYL 3-0 SH 27" UND J416H

## (undated) DEVICE — SU MONOCRYL 4-0 PS-2 18" UND Y496G

## (undated) DEVICE — LINEN FULL SHEET 5511

## (undated) DEVICE — GLOVE PROTEXIS BLUE W/NEU-THERA 8.0  2D73EB80

## (undated) DEVICE — SUTURE BOOTS 051003PBX

## (undated) DEVICE — COVER FOOTSWITCH W/CINCH 20X24" 923267

## (undated) DEVICE — GEL ULTRASOUND AQUASONIC 20GM 01-01

## (undated) DEVICE — PACK AV FISTULA

## (undated) DEVICE — SYR 50ML LL W/O NDL 309653

## (undated) DEVICE — BLADE SHAVER ARTHRO 4.2MM CUDA CVD C9256

## (undated) DEVICE — BLADE CLIPPER SGL USE 9680

## (undated) DEVICE — LINEN TOWEL PACK X6 WHITE 5487

## (undated) DEVICE — TOURNIQUET CUFF 30" REPRO BLUE 60-7070-105

## (undated) DEVICE — GLOVE PROTEXIS BLUE W/NEU-THERA 7.5  2D73EB75

## (undated) DEVICE — SU SILK 4-0 TIE 12X30" A303H

## (undated) DEVICE — SYR 30ML LL W/O NDL 302832

## (undated) DEVICE — SOL WATER IRRIG 1000ML BOTTLE 2F7114

## (undated) DEVICE — CATH ANGIO INFINITI JL4 4FRX100CM 538420

## (undated) DEVICE — SUCTION MANIFOLD NEPTUNE 2 SYS 4 PORT 0702-020-000

## (undated) DEVICE — ENDO FORCEP ENDOJAW BIOPSY 2.8MMX160CM FB-220K

## (undated) DEVICE — BAG CLEAR TRASH 1.3M 39X33" P4040C

## (undated) DEVICE — BASIN SET SINGLE STERILE 13752-624

## (undated) DEVICE — SU SILK 3-0 TIE 12X30" A304H

## (undated) DEVICE — SU VICRYL 3-0 SH 27" J316H

## (undated) DEVICE — KIT ENDO TURNOVER/PROCEDURE W/CLEAN A SCOPE LINERS 103888

## (undated) DEVICE — SU PROLENE 5-0 RB-2DA 18" 8713H

## (undated) DEVICE — PACK HEART LEFT CUSTOM

## (undated) DEVICE — EYE SPONGE SPEAR WECK CEL 0008685

## (undated) DEVICE — CATH ANGIO INFINITI MPA2 4FRX100CM 2 SH 538442

## (undated) DEVICE — GLOVE PROTEXIS POWDER FREE 8.0 ORTHOPEDIC 2D73ET80

## (undated) DEVICE — SURGICEL ABSORBABLE HEMOSTAT SNOW 4"X4" 2083

## (undated) DEVICE — LINEN DRAPE 54X72" 5467

## (undated) DEVICE — SU PROLENE 6-0 RB-2DA 18" 8714H

## (undated) DEVICE — CAST PADDING 6" STERILE 9046S

## (undated) DEVICE — 0.035IN X 260CM, EMERALD DIAGNOSTIC GUIDEWIRE, FIXED-CORE PTFE COATED, STANDARD, 3MM EXCHANGE J-TIP (EA/1)

## (undated) DEVICE — SU SILK 0 TIE 6X30" A306H

## (undated) DEVICE — PAD CHUX UNDERPAD 23X24" 7136

## (undated) DEVICE — LINEN TOWEL PACK X30 5481

## (undated) DEVICE — KIT HAND CONTROL ACIST 014644 AR-P54

## (undated) DEVICE — SU PROLENE 7-0 BV-1DA 24" 8702H

## (undated) DEVICE — VESSEL LOOPS DEV-O-LOOP WHITE MINI 31145728

## (undated) DEVICE — SU SILK 2-0 TIE 12X30" A305H

## (undated) DEVICE — SOL NACL 0.9% IRRIG 1000ML BOTTLE 2F7124

## (undated) DEVICE — BNDG ELASTIC 4"X5YDS STERILE 6611-4S

## (undated) DEVICE — VESSEL LOOPS YELLOW MAXI 31145694

## (undated) DEVICE — TUBING PRESSURE 30"

## (undated) DEVICE — TUBING ARTHRO CONMED/LINVATEC PUMP BLUE INFLOW 10K100

## (undated) DEVICE — LINEN GOWN XLG 5407

## (undated) DEVICE — DRSG KERLIX 4 1/2"X4YDS ROLL 6715

## (undated) RX ORDER — ONDANSETRON 2 MG/ML
INJECTION INTRAMUSCULAR; INTRAVENOUS
Status: DISPENSED
Start: 2022-01-31

## (undated) RX ORDER — NEOSTIGMINE METHYLSULFATE 1 MG/ML
VIAL (ML) INJECTION
Status: DISPENSED
Start: 2019-07-10

## (undated) RX ORDER — PROPOFOL 10 MG/ML
INJECTION, EMULSION INTRAVENOUS
Status: DISPENSED
Start: 2020-10-07

## (undated) RX ORDER — SODIUM CHLORIDE 9 MG/ML
INJECTION, SOLUTION INTRAVENOUS
Status: DISPENSED
Start: 2021-03-19

## (undated) RX ORDER — FENTANYL CITRATE 50 UG/ML
INJECTION, SOLUTION INTRAMUSCULAR; INTRAVENOUS
Status: DISPENSED
Start: 2022-01-31

## (undated) RX ORDER — PAPAVERINE HYDROCHLORIDE 30 MG/ML
INJECTION INTRAMUSCULAR; INTRAVENOUS
Status: DISPENSED
Start: 2020-10-07

## (undated) RX ORDER — ACETAMINOPHEN 325 MG/1
TABLET ORAL
Status: DISPENSED
Start: 2020-10-07

## (undated) RX ORDER — FENTANYL CITRATE 50 UG/ML
INJECTION, SOLUTION INTRAMUSCULAR; INTRAVENOUS
Status: DISPENSED
Start: 2021-03-19

## (undated) RX ORDER — ONDANSETRON 2 MG/ML
INJECTION INTRAMUSCULAR; INTRAVENOUS
Status: DISPENSED
Start: 2019-07-10

## (undated) RX ORDER — SCOLOPAMINE TRANSDERMAL SYSTEM 1 MG/1
PATCH, EXTENDED RELEASE TRANSDERMAL
Status: DISPENSED
Start: 2019-07-10

## (undated) RX ORDER — SODIUM CHLORIDE 9 MG/ML
INJECTION, SOLUTION INTRAVENOUS
Status: DISPENSED
Start: 2022-01-31

## (undated) RX ORDER — CEFAZOLIN SODIUM 2 G/100ML
INJECTION, SOLUTION INTRAVENOUS
Status: DISPENSED
Start: 2020-10-07

## (undated) RX ORDER — HEPARIN SODIUM 1000 [USP'U]/ML
INJECTION, SOLUTION INTRAVENOUS; SUBCUTANEOUS
Status: DISPENSED
Start: 2020-10-07

## (undated) RX ORDER — FENTANYL CITRATE 50 UG/ML
INJECTION, SOLUTION INTRAMUSCULAR; INTRAVENOUS
Status: DISPENSED
Start: 2021-03-03

## (undated) RX ORDER — HEPARIN SODIUM 1000 [USP'U]/ML
INJECTION, SOLUTION INTRAVENOUS; SUBCUTANEOUS
Status: DISPENSED
Start: 2020-02-13

## (undated) RX ORDER — ASPIRIN 325 MG
TABLET ORAL
Status: DISPENSED
Start: 2021-03-03

## (undated) RX ORDER — HEPARIN SODIUM 1000 [USP'U]/ML
INJECTION, SOLUTION INTRAVENOUS; SUBCUTANEOUS
Status: DISPENSED
Start: 2021-03-19

## (undated) RX ORDER — ROPIVACAINE HYDROCHLORIDE 7.5 MG/ML
INJECTION, SOLUTION EPIDURAL; PERINEURAL
Status: DISPENSED
Start: 2019-07-10

## (undated) RX ORDER — SODIUM CHLORIDE 9 MG/ML
INJECTION, SOLUTION INTRAVENOUS
Status: DISPENSED
Start: 2021-03-03

## (undated) RX ORDER — LIDOCAINE HYDROCHLORIDE 10 MG/ML
INJECTION, SOLUTION EPIDURAL; INFILTRATION; INTRACAUDAL; PERINEURAL
Status: DISPENSED
Start: 2021-08-25

## (undated) RX ORDER — HEPARIN SODIUM 1000 [USP'U]/ML
INJECTION, SOLUTION INTRAVENOUS; SUBCUTANEOUS
Status: DISPENSED
Start: 2022-01-31

## (undated) RX ORDER — FENTANYL CITRATE 50 UG/ML
INJECTION, SOLUTION INTRAMUSCULAR; INTRAVENOUS
Status: DISPENSED
Start: 2020-10-07

## (undated) RX ORDER — PHENYLEPHRINE HCL IN 0.9% NACL 1 MG/10 ML
SYRINGE (ML) INTRAVENOUS
Status: DISPENSED
Start: 2019-07-10

## (undated) RX ORDER — CEFAZOLIN SODIUM 1 G/50ML
SOLUTION INTRAVENOUS
Status: DISPENSED
Start: 2020-02-13

## (undated) RX ORDER — ONDANSETRON 2 MG/ML
INJECTION INTRAMUSCULAR; INTRAVENOUS
Status: DISPENSED
Start: 2020-10-07

## (undated) RX ORDER — GLYCOPYRROLATE 0.2 MG/ML
INJECTION INTRAMUSCULAR; INTRAVENOUS
Status: DISPENSED
Start: 2019-07-10

## (undated) RX ORDER — BUPIVACAINE HYDROCHLORIDE 2.5 MG/ML
INJECTION, SOLUTION EPIDURAL; INFILTRATION; INTRACAUDAL
Status: DISPENSED
Start: 2020-10-07

## (undated) RX ORDER — LIDOCAINE HYDROCHLORIDE 10 MG/ML
INJECTION, SOLUTION EPIDURAL; INFILTRATION; INTRACAUDAL; PERINEURAL
Status: DISPENSED
Start: 2022-01-31

## (undated) RX ORDER — LIDOCAINE HYDROCHLORIDE 10 MG/ML
INJECTION, SOLUTION EPIDURAL; INFILTRATION; INTRACAUDAL; PERINEURAL
Status: DISPENSED
Start: 2020-02-13

## (undated) RX ORDER — FENTANYL CITRATE 50 UG/ML
INJECTION, SOLUTION INTRAMUSCULAR; INTRAVENOUS
Status: DISPENSED
Start: 2020-01-28

## (undated) RX ORDER — FENTANYL CITRATE 50 UG/ML
INJECTION, SOLUTION INTRAMUSCULAR; INTRAVENOUS
Status: DISPENSED
Start: 2021-08-25

## (undated) RX ORDER — LIDOCAINE HYDROCHLORIDE 10 MG/ML
INJECTION, SOLUTION EPIDURAL; INFILTRATION; INTRACAUDAL; PERINEURAL
Status: DISPENSED
Start: 2021-03-19

## (undated) RX ORDER — SODIUM CHLORIDE 9 MG/ML
INJECTION, SOLUTION INTRAVENOUS
Status: DISPENSED
Start: 2021-08-25

## (undated) RX ORDER — CEFAZOLIN SODIUM 2 G/100ML
INJECTION, SOLUTION INTRAVENOUS
Status: DISPENSED
Start: 2019-07-10

## (undated) RX ORDER — LIDOCAINE HYDROCHLORIDE 20 MG/ML
INJECTION, SOLUTION EPIDURAL; INFILTRATION; INTRACAUDAL; PERINEURAL
Status: DISPENSED
Start: 2020-10-07

## (undated) RX ORDER — FENTANYL CITRATE 50 UG/ML
INJECTION, SOLUTION INTRAMUSCULAR; INTRAVENOUS
Status: DISPENSED
Start: 2019-07-10

## (undated) RX ORDER — REGADENOSON 0.08 MG/ML
INJECTION, SOLUTION INTRAVENOUS
Status: DISPENSED
Start: 2020-10-02

## (undated) RX ORDER — LIDOCAINE HYDROCHLORIDE 10 MG/ML
INJECTION, SOLUTION EPIDURAL; INFILTRATION; INTRACAUDAL; PERINEURAL
Status: DISPENSED
Start: 2021-03-03

## (undated) RX ORDER — EPHEDRINE SULFATE 50 MG/ML
INJECTION, SOLUTION INTRAMUSCULAR; INTRAVENOUS; SUBCUTANEOUS
Status: DISPENSED
Start: 2020-10-07

## (undated) RX ORDER — FENTANYL CITRATE 50 UG/ML
INJECTION, SOLUTION INTRAMUSCULAR; INTRAVENOUS
Status: DISPENSED
Start: 2020-02-13